# Patient Record
Sex: MALE | Race: WHITE | NOT HISPANIC OR LATINO | Employment: OTHER | URBAN - METROPOLITAN AREA
[De-identification: names, ages, dates, MRNs, and addresses within clinical notes are randomized per-mention and may not be internally consistent; named-entity substitution may affect disease eponyms.]

---

## 2017-06-17 ENCOUNTER — APPOINTMENT (OUTPATIENT)
Dept: RADIOLOGY | Facility: MEDICAL CENTER | Age: 37
DRG: 004 | End: 2017-06-17
Attending: EMERGENCY MEDICINE
Payer: COMMERCIAL

## 2017-06-17 ENCOUNTER — RESOLUTE PROFESSIONAL BILLING HOSPITAL PROF FEE (OUTPATIENT)
Dept: HOSPITALIST | Facility: MEDICAL CENTER | Age: 37
End: 2017-06-17
Payer: COMMERCIAL

## 2017-06-17 ENCOUNTER — HOSPITAL ENCOUNTER (INPATIENT)
Facility: MEDICAL CENTER | Age: 37
LOS: 24 days | DRG: 004 | End: 2017-07-11
Attending: EMERGENCY MEDICINE | Admitting: INTERNAL MEDICINE
Payer: COMMERCIAL

## 2017-06-17 DIAGNOSIS — J96.01 ACUTE RESPIRATORY FAILURE WITH HYPOXIA (HCC): ICD-10-CM

## 2017-06-17 PROBLEM — J69.0 ASPIRATION PNEUMONIA (HCC): Status: ACTIVE | Noted: 2017-06-17

## 2017-06-17 PROBLEM — R25.1 OCCASIONAL TREMORS: Status: ACTIVE | Noted: 2017-06-17

## 2017-06-17 PROBLEM — E87.1 HYPONATREMIA: Status: ACTIVE | Noted: 2017-06-17

## 2017-06-17 PROBLEM — R41.82 ALTERED MENTAL STATUS: Status: ACTIVE | Noted: 2017-06-17

## 2017-06-17 PROBLEM — E16.2 HYPOGLYCEMIA: Status: ACTIVE | Noted: 2017-06-17

## 2017-06-17 PROBLEM — A41.9 SEPSIS, UNSPECIFIED: Status: ACTIVE | Noted: 2017-06-17

## 2017-06-17 LAB
ALBUMIN SERPL BCP-MCNC: 3.9 G/DL (ref 3.2–4.9)
ALBUMIN/GLOB SERPL: 1.1 G/DL
ALP SERPL-CCNC: 118 U/L (ref 30–99)
ALT SERPL-CCNC: 37 U/L (ref 2–50)
ANION GAP SERPL CALC-SCNC: 11 MMOL/L (ref 0–11.9)
APPEARANCE UR: CLEAR
APTT PPP: 33.6 SEC (ref 24.7–36)
AST SERPL-CCNC: 34 U/L (ref 12–45)
BASOPHILS # BLD AUTO: 0 % (ref 0–1.8)
BASOPHILS # BLD: 0 K/UL (ref 0–0.12)
BILIRUB SERPL-MCNC: 0.3 MG/DL (ref 0.1–1.5)
BILIRUB UR QL STRIP.AUTO: NEGATIVE
BUN SERPL-MCNC: 23 MG/DL (ref 8–22)
CALCIUM SERPL-MCNC: 8.6 MG/DL (ref 8.5–10.5)
CHLORIDE SERPL-SCNC: 93 MMOL/L (ref 96–112)
CO2 SERPL-SCNC: 23 MMOL/L (ref 20–33)
COLOR UR: ABNORMAL
CREAT SERPL-MCNC: 0.35 MG/DL (ref 0.5–1.4)
EKG IMPRESSION: NORMAL
EOSINOPHIL # BLD AUTO: 0 K/UL (ref 0–0.51)
EOSINOPHIL NFR BLD: 0 % (ref 0–6.9)
ERYTHROCYTE [DISTWIDTH] IN BLOOD BY AUTOMATED COUNT: 49.8 FL (ref 35.9–50)
GFR SERPL CREATININE-BSD FRML MDRD: >60 ML/MIN/1.73 M 2
GLOBULIN SER CALC-MCNC: 3.6 G/DL (ref 1.9–3.5)
GLUCOSE BLD-MCNC: 167 MG/DL (ref 65–99)
GLUCOSE BLD-MCNC: 204 MG/DL (ref 65–99)
GLUCOSE SERPL-MCNC: 39 MG/DL (ref 65–99)
GLUCOSE UR STRIP.AUTO-MCNC: 500 MG/DL
HCT VFR BLD AUTO: 42 % (ref 42–52)
HGB BLD-MCNC: 14.1 G/DL (ref 14–18)
INR PPP: 1.08 (ref 0.87–1.13)
KETONES UR STRIP.AUTO-MCNC: 10 MG/DL
LACTATE BLD-SCNC: 1 MMOL/L (ref 0.5–2)
LACTATE BLD-SCNC: 1.4 MMOL/L (ref 0.5–2)
LEUKOCYTE ESTERASE UR QL STRIP.AUTO: NEGATIVE
LYMPHOCYTES # BLD AUTO: 1.4 K/UL (ref 1–4.8)
LYMPHOCYTES NFR BLD: 5 % (ref 22–41)
MANUAL DIFF BLD: NORMAL
MCH RBC QN AUTO: 28.8 PG (ref 27–33)
MCHC RBC AUTO-ENTMCNC: 33.6 G/DL (ref 33.7–35.3)
MCV RBC AUTO: 85.7 FL (ref 81.4–97.8)
MICRO URNS: ABNORMAL
MONOCYTES # BLD AUTO: 1.12 K/UL (ref 0–0.85)
MONOCYTES NFR BLD AUTO: 4 % (ref 0–13.4)
MORPHOLOGY BLD-IMP: NORMAL
NEUTROPHILS # BLD AUTO: 25.48 K/UL (ref 1.82–7.42)
NEUTROPHILS NFR BLD: 88 % (ref 44–72)
NEUTS BAND NFR BLD MANUAL: 3 % (ref 0–10)
NITRITE UR QL STRIP.AUTO: NEGATIVE
NRBC # BLD AUTO: 0 K/UL
NRBC BLD AUTO-RTO: 0 /100 WBC
PH UR STRIP.AUTO: 5 [PH]
PLATELET # BLD AUTO: 334 K/UL (ref 164–446)
PLATELET BLD QL SMEAR: NORMAL
PMV BLD AUTO: 9.1 FL (ref 9–12.9)
POTASSIUM SERPL-SCNC: 4.2 MMOL/L (ref 3.6–5.5)
PROT SERPL-MCNC: 7.5 G/DL (ref 6–8.2)
PROT UR QL STRIP: NEGATIVE MG/DL
PROTHROMBIN TIME: 14.3 SEC (ref 12–14.6)
RBC # BLD AUTO: 4.9 M/UL (ref 4.7–6.1)
RBC BLD AUTO: NORMAL
RBC UR QL AUTO: NEGATIVE
SODIUM SERPL-SCNC: 127 MMOL/L (ref 135–145)
SP GR UR STRIP.AUTO: 1.01
TOXIC GRANULES BLD QL SMEAR: SLIGHT
WBC # BLD AUTO: 28 K/UL (ref 4.8–10.8)

## 2017-06-17 PROCEDURE — 87086 URINE CULTURE/COLONY COUNT: CPT

## 2017-06-17 PROCEDURE — 302136 NUTRITION PUMP: Performed by: INTERNAL MEDICINE

## 2017-06-17 PROCEDURE — 85007 BL SMEAR W/DIFF WBC COUNT: CPT

## 2017-06-17 PROCEDURE — 36415 COLL VENOUS BLD VENIPUNCTURE: CPT

## 2017-06-17 PROCEDURE — 96361 HYDRATE IV INFUSION ADD-ON: CPT

## 2017-06-17 PROCEDURE — 700105 HCHG RX REV CODE 258: Performed by: EMERGENCY MEDICINE

## 2017-06-17 PROCEDURE — 71010 DX-CHEST-PORTABLE (1 VIEW): CPT

## 2017-06-17 PROCEDURE — 93005 ELECTROCARDIOGRAM TRACING: CPT | Performed by: EMERGENCY MEDICINE

## 2017-06-17 PROCEDURE — 94760 N-INVAS EAR/PLS OXIMETRY 1: CPT

## 2017-06-17 PROCEDURE — 82962 GLUCOSE BLOOD TEST: CPT | Mod: 91

## 2017-06-17 PROCEDURE — 85027 COMPLETE CBC AUTOMATED: CPT

## 2017-06-17 PROCEDURE — 96365 THER/PROPH/DIAG IV INF INIT: CPT

## 2017-06-17 PROCEDURE — 700111 HCHG RX REV CODE 636 W/ 250 OVERRIDE (IP): Performed by: INTERNAL MEDICINE

## 2017-06-17 PROCEDURE — 99223 1ST HOSP IP/OBS HIGH 75: CPT | Performed by: INTERNAL MEDICINE

## 2017-06-17 PROCEDURE — 96375 TX/PRO/DX INJ NEW DRUG ADDON: CPT

## 2017-06-17 PROCEDURE — 80053 COMPREHEN METABOLIC PANEL: CPT

## 2017-06-17 PROCEDURE — 83605 ASSAY OF LACTIC ACID: CPT | Mod: 91

## 2017-06-17 PROCEDURE — 302128 INFUSION PUMP W/POLE: Performed by: INTERNAL MEDICINE

## 2017-06-17 PROCEDURE — A9270 NON-COVERED ITEM OR SERVICE: HCPCS | Performed by: INTERNAL MEDICINE

## 2017-06-17 PROCEDURE — 87040 BLOOD CULTURE FOR BACTERIA: CPT | Mod: 91

## 2017-06-17 PROCEDURE — 700111 HCHG RX REV CODE 636 W/ 250 OVERRIDE (IP): Performed by: EMERGENCY MEDICINE

## 2017-06-17 PROCEDURE — 700111 HCHG RX REV CODE 636 W/ 250 OVERRIDE (IP)

## 2017-06-17 PROCEDURE — 99285 EMERGENCY DEPT VISIT HI MDM: CPT

## 2017-06-17 PROCEDURE — 81003 URINALYSIS AUTO W/O SCOPE: CPT

## 2017-06-17 PROCEDURE — 770020 HCHG ROOM/CARE - TELE (206)

## 2017-06-17 PROCEDURE — 51798 US URINE CAPACITY MEASURE: CPT

## 2017-06-17 PROCEDURE — 85730 THROMBOPLASTIN TIME PARTIAL: CPT

## 2017-06-17 PROCEDURE — 85610 PROTHROMBIN TIME: CPT

## 2017-06-17 PROCEDURE — 70450 CT HEAD/BRAIN W/O DYE: CPT

## 2017-06-17 PROCEDURE — 700102 HCHG RX REV CODE 250 W/ 637 OVERRIDE(OP): Performed by: INTERNAL MEDICINE

## 2017-06-17 PROCEDURE — 96376 TX/PRO/DX INJ SAME DRUG ADON: CPT

## 2017-06-17 PROCEDURE — 700105 HCHG RX REV CODE 258: Performed by: INTERNAL MEDICINE

## 2017-06-17 RX ORDER — ONDANSETRON 4 MG/1
4 TABLET, ORALLY DISINTEGRATING ORAL EVERY 4 HOURS PRN
Status: DISCONTINUED | OUTPATIENT
Start: 2017-06-17 | End: 2017-07-11 | Stop reason: HOSPADM

## 2017-06-17 RX ORDER — BISACODYL 10 MG
10 SUPPOSITORY, RECTAL RECTAL
Status: DISCONTINUED | OUTPATIENT
Start: 2017-06-17 | End: 2017-06-19

## 2017-06-17 RX ORDER — POLYETHYLENE GLYCOL 3350 17 G/17G
1 POWDER, FOR SOLUTION ORAL
Status: DISCONTINUED | OUTPATIENT
Start: 2017-06-17 | End: 2017-06-19

## 2017-06-17 RX ORDER — SODIUM CHLORIDE 9 MG/ML
30 INJECTION, SOLUTION INTRAVENOUS
Status: DISCONTINUED | OUTPATIENT
Start: 2017-06-17 | End: 2017-06-23

## 2017-06-17 RX ORDER — SODIUM CHLORIDE 9 MG/ML
2000 INJECTION, SOLUTION INTRAVENOUS ONCE
Status: DISCONTINUED | OUTPATIENT
Start: 2017-06-17 | End: 2017-06-17

## 2017-06-17 RX ORDER — LORAZEPAM 2 MG/ML
1 INJECTION INTRAMUSCULAR ONCE
Status: COMPLETED | OUTPATIENT
Start: 2017-06-17 | End: 2017-06-17

## 2017-06-17 RX ORDER — LORAZEPAM 2 MG/ML
0.5 INJECTION INTRAMUSCULAR ONCE
Status: COMPLETED | OUTPATIENT
Start: 2017-06-17 | End: 2017-06-17

## 2017-06-17 RX ORDER — SODIUM CHLORIDE 9 MG/ML
INJECTION, SOLUTION INTRAVENOUS CONTINUOUS
Status: DISCONTINUED | OUTPATIENT
Start: 2017-06-17 | End: 2017-06-18

## 2017-06-17 RX ORDER — LORAZEPAM 2 MG/ML
0.5 INJECTION INTRAMUSCULAR EVERY 6 HOURS PRN
Status: DISCONTINUED | OUTPATIENT
Start: 2017-06-17 | End: 2017-07-01

## 2017-06-17 RX ORDER — PROMETHAZINE HYDROCHLORIDE 25 MG/1
12.5-25 SUPPOSITORY RECTAL EVERY 4 HOURS PRN
Status: DISCONTINUED | OUTPATIENT
Start: 2017-06-17 | End: 2017-07-11 | Stop reason: HOSPADM

## 2017-06-17 RX ORDER — ACETAMINOPHEN 325 MG/1
650 TABLET ORAL EVERY 6 HOURS PRN
Status: DISCONTINUED | OUTPATIENT
Start: 2017-06-17 | End: 2017-07-11 | Stop reason: HOSPADM

## 2017-06-17 RX ORDER — LORAZEPAM 2 MG/ML
1 INJECTION INTRAMUSCULAR ONCE
Status: DISCONTINUED | OUTPATIENT
Start: 2017-06-17 | End: 2017-06-17

## 2017-06-17 RX ORDER — SODIUM CHLORIDE 9 MG/ML
1000 INJECTION, SOLUTION INTRAVENOUS
Status: DISCONTINUED | OUTPATIENT
Start: 2017-06-17 | End: 2017-06-23

## 2017-06-17 RX ORDER — ONDANSETRON 2 MG/ML
4 INJECTION INTRAMUSCULAR; INTRAVENOUS EVERY 4 HOURS PRN
Status: DISCONTINUED | OUTPATIENT
Start: 2017-06-17 | End: 2017-07-11 | Stop reason: HOSPADM

## 2017-06-17 RX ORDER — LORAZEPAM 1 MG/1
0.5 TABLET ORAL EVERY 6 HOURS PRN
Status: DISCONTINUED | OUTPATIENT
Start: 2017-06-17 | End: 2017-07-01

## 2017-06-17 RX ORDER — PROMETHAZINE HYDROCHLORIDE 25 MG/1
12.5-25 TABLET ORAL EVERY 4 HOURS PRN
Status: DISCONTINUED | OUTPATIENT
Start: 2017-06-17 | End: 2017-07-11 | Stop reason: HOSPADM

## 2017-06-17 RX ORDER — LORAZEPAM 2 MG/ML
INJECTION INTRAMUSCULAR
Status: COMPLETED
Start: 2017-06-17 | End: 2017-06-17

## 2017-06-17 RX ORDER — ACETAMINOPHEN 325 MG/1
650 TABLET ORAL EVERY 6 HOURS PRN
Status: DISCONTINUED | OUTPATIENT
Start: 2017-06-17 | End: 2017-06-17

## 2017-06-17 RX ORDER — AMPICILLIN AND SULBACTAM 2; 1 G/1; G/1
3 INJECTION, POWDER, FOR SOLUTION INTRAMUSCULAR; INTRAVENOUS ONCE
Status: COMPLETED | OUTPATIENT
Start: 2017-06-17 | End: 2017-06-17

## 2017-06-17 RX ORDER — AMOXICILLIN 250 MG
2 CAPSULE ORAL 2 TIMES DAILY
Status: DISCONTINUED | OUTPATIENT
Start: 2017-06-17 | End: 2017-06-19

## 2017-06-17 RX ADMIN — SODIUM CHLORIDE 2000 ML: 9 INJECTION, SOLUTION INTRAVENOUS at 08:15

## 2017-06-17 RX ADMIN — AMPICILLIN SODIUM AND SULBACTAM SODIUM 3 G: 2; 1 INJECTION, POWDER, FOR SOLUTION INTRAMUSCULAR; INTRAVENOUS at 18:02

## 2017-06-17 RX ADMIN — ENOXAPARIN SODIUM 40 MG: 100 INJECTION SUBCUTANEOUS at 20:52

## 2017-06-17 RX ADMIN — LORAZEPAM 1 MG: 2 INJECTION INTRAMUSCULAR; INTRAVENOUS at 07:21

## 2017-06-17 RX ADMIN — SODIUM CHLORIDE: 9 INJECTION, SOLUTION INTRAVENOUS at 12:33

## 2017-06-17 RX ADMIN — AMPICILLIN SODIUM AND SULBACTAM SODIUM 3 G: 2; 1 INJECTION, POWDER, FOR SOLUTION INTRAMUSCULAR; INTRAVENOUS at 11:53

## 2017-06-17 RX ADMIN — ACETAMINOPHEN 650 MG: 325 TABLET, FILM COATED ORAL at 18:57

## 2017-06-17 RX ADMIN — LORAZEPAM 0.5 MG: 2 INJECTION INTRAMUSCULAR; INTRAVENOUS at 11:40

## 2017-06-17 RX ADMIN — FAMOTIDINE 20 MG: 10 INJECTION, SOLUTION INTRAVENOUS at 18:58

## 2017-06-17 RX ADMIN — LORAZEPAM 1 MG: 2 INJECTION INTRAMUSCULAR at 07:21

## 2017-06-17 RX ADMIN — DEXTROSE MONOHYDRATE 1000 MG: 50 INJECTION, SOLUTION INTRAVENOUS at 12:32

## 2017-06-17 RX ADMIN — DEXTROSE MONOHYDRATE 250 ML: 100 INJECTION, SOLUTION INTRAVENOUS at 10:04

## 2017-06-17 ASSESSMENT — COGNITIVE AND FUNCTIONAL STATUS - GENERAL
STANDING UP FROM CHAIR USING ARMS: TOTAL
TURNING FROM BACK TO SIDE WHILE IN FLAT BAD: UNABLE
SUGGESTED CMS G CODE MODIFIER MOBILITY: CN
MOVING FROM LYING ON BACK TO SITTING ON SIDE OF FLAT BED: UNABLE
PERSONAL GROOMING: TOTAL
DAILY ACTIVITIY SCORE: 6
EATING MEALS: TOTAL
HELP NEEDED FOR BATHING: TOTAL
SUGGESTED CMS G CODE MODIFIER DAILY ACTIVITY: CN
DRESSING REGULAR UPPER BODY CLOTHING: TOTAL
MOBILITY SCORE: 6
WALKING IN HOSPITAL ROOM: TOTAL
MOVING TO AND FROM BED TO CHAIR: UNABLE
DRESSING REGULAR LOWER BODY CLOTHING: TOTAL
CLIMB 3 TO 5 STEPS WITH RAILING: TOTAL
TOILETING: TOTAL

## 2017-06-17 ASSESSMENT — PATIENT HEALTH QUESTIONNAIRE - PHQ9
SUM OF ALL RESPONSES TO PHQ9 QUESTIONS 1 AND 2: 0
2. FEELING DOWN, DEPRESSED, IRRITABLE, OR HOPELESS: NOT AT ALL
1. LITTLE INTEREST OR PLEASURE IN DOING THINGS: NOT AT ALL
SUM OF ALL RESPONSES TO PHQ QUESTIONS 1-9: 0

## 2017-06-17 NOTE — IP AVS SNAPSHOT
[employer] Access Code: [email or web address]  Expires: 8/10/2017 11:25 AM    Your email address is not on file at [facility].  Email Addresses are required for you to sign up for [employer], please contact [phone] to verify your personal information and to provide your email address prior to attempting to register for [employer].    Rub[place] [address]    [employer]  A secure, online tool to manage your health information     [facility]’s [employer]® is a secure, online tool that connects you to your personalized health information from the privacy of your home -- day or night - making it very easy for you to manage your healthcare. Once the activation process is completed, you can even access your medical information using the [employer] karen, which is available for free in the Apple Karen store or Google Play store.     To learn more about [employer], visit [email or web address]    There are two levels of access available (as shown below):   My Chart Features  [facility] Primary Care Doctor [facility]  Specialists [facility]  Urgent  Care Non-[facility] Primary Care Doctor   Email your healthcare team securely and privately 24/7 X X X    Manage appointments: schedule your next appointment; view details of past/upcoming appointments X      Request prescription refills. X      View recent personal medical records, including lab and immunizations X X X X   View health record, including health history, allergies, medications X X X X   Read reports about your outpatient visits, procedures, consult and ER notes X X X X   See your discharge summary, which is a recap of your hospital and/or ER visit that includes your diagnosis, lab results, and care plan X X  X     How to register for [facility]:  Once your e-mail address has been verified, follow the following steps to sign up for [employer].     1. Go to  [email or web address]  2. Click on the Sign Up Now box, which takes you to the New Member Sign Up page. You will need to  provide the following information:  a. Enter your [employer] Access Code exactly as it appears at the top of this page. (You will not need to use this code after you’ve completed the sign-up process. If you do not sign up before the expiration date, you must request a new code.)   b. Enter your date of birth.   c. Enter your home email address.   d. Click Submit, and follow the next screen’s instructions.  3. Create a [employer] ID. This will be your [employer] login ID and cannot be changed, so think of one that is secure and easy to remember.  4. Create a [employer] password. You can change your password at any time.  5. Enter your Password Reset Question and Answer. This can be used at a later time if you forget your password.   6. Enter your e-mail address. This allows you to receive e-mail notifications when new information is available in [employer].  7. Click Sign Up. You can now view your health information.    For assistance activating your [employer] account, call [phone]

## 2017-06-17 NOTE — DIETARY
"Nutrition Support Assessment - Male    Ruben Edwards is a 36 y.o. male with admitting DX of Aspiration pneumonia    Pertinent History: Traumatic brain injury  Allergies:  Sulfa drugs    Height: 172.7 cm (5' 7.99\")  Weight: 57.6 kg (126 lb 15.8 oz)  Ideal Body Weight: 63.05 kg (139 lb) (Pt quadriplegic)  Percent Ideal Body Weight: 91.4  Body mass index is 19.31 kg/(m^2).     Pertinent Labs: Na 127, Glucose 39, BUN 23, Creatinine 0.35, POC glucose 204  Pertinent Medications: unasyn, lovenox, pericolace, pain pump (pt supplied) device  Pertinent Fluids: NS infusion @ 125 ml/hr  Surgery / Procedures: None noted in chart  Skin: No skin breakdown noted in chart  GI: Last BM PTA     Estimated Needs: REE per MSJ x 1.2 = 1778 kcal/day  Total Calories / day: 1700 - 2000  (Calories / k - 35)  Total Grams Protein / day: 69 - 86  (Grams Protein / k.2 - 1.5)  Total Fluids ml / day: 1731.1 ml         Assessment / Evaluation:   Consult received for TF assessment. Per ED MD note - He is quadriplegic with a poor baseline mental status. About a month ago he may have had some aspiration. Since that time he's only been fed the NG tube. However he has periodically been given water.     Plan / Recommendation:   Start Fibersource HN @ 25 ml/hr and advance per protocol to 65 ml/hr (goal rate) to provide 1872 kcal (33 kcal/kg), 84 grams protein (1.5 gm/kg), and 1264 ml free water per day.   Fluids per MD.     RD following.           "

## 2017-06-17 NOTE — ED PROVIDER NOTES
"ED Provider Note    CHIEF COMPLAINT  Chief Complaint   Patient presents with   • Shortness of Breath   • Difficulty Breathing       HPI  Ruben Edwards is a 36 y.o. male who presents to the emergency room with difficulty breathing. Patient has a past medical history of traumatic brain injury. He is quadriplegic with a poor baseline mental status. He requires 100% care by the family. About a month ago he may have had some aspiration. A fever with cough. He was seen by primary provider. He is put on antibiotics. His symptoms improved. Since that time he's only been fed the NG tube. However he has periodically been given water. He started coughing and choking and gagging. He appeared in distress and family called the ambulance. He has had some jerking movement primarily of the left upper extremity. He will tend to have some jerking movement of this extremity when he is in distress, but he has not done anything to this degree. He has not had a fever at home. No vomiting. I am unable to obtain any history from the patient.    REVIEW OF SYSTEMS  As per HPI, otherwise a 10 point review of systems is negative    PAST MEDICAL HISTORY  Traumatic brain injury    SOCIAL HISTORY  Lives with family    SURGICAL HISTORY  No past surgical history on file.    CURRENT MEDICATIONS  Home Medications     Reviewed by Ligia Frazier, Pharmacy Int (Pharmacy Intern) on 06/17/17 at 0945  Med List Status: Partial    Medication Last Dose Status    BACLOFEN IT 6/17/2017 Active                ALLERGIES  Allergies   Allergen Reactions   • Sulfa Drugs Unspecified     Per caretaker       PHYSICAL EXAM  VITAL SIGNS: Pulse 111  Temp(Src) 37.4 °C (99.3 °F)  Resp 12  Ht 1.727 m (5' 8\")  Wt 54.432 kg (120 lb)  BMI 18.25 kg/m2  SpO2 99%   Constitutional: Awake and alert, chronically debilitated male with obvious respiratory distress and rhonchorous breath sounds  HENT:  Atraumatic, Normocephalic.Oropharynx moist mucus membranes, Nose normal " inspection.   Eyes: Normal inspection  Neck: Supple, no JVD. Tracheostomy stoma healed  Cardiovascular: Tachycardic heart rate, Normal rhythm.  Symmetric peripheral pulses.   Thorax & Lungs: Diffuse rhonchorous breath sounds with upper airway mucus sounding noise. Tachypnea.  Abdomen: Bowel sounds normal, soft, non-distended, nontender, no mass. [place] button left upper quadrant  Skin: No pathologic rash. Diaphoresis  Extremities: No asymmetric swelling. Muscle atrophy and spasticity  Neurologic: Diffuse spasticity. There is a rhythmic jerking of the left upper extremity  Psychiatric: Anxious appearing    RADIOLOGY/PROCEDURES  CT-HEAD W/O   Final Result      1.  Limited exam      2.  No acute intracranial findings.      3.  Encephalomalacia in the left basal ganglia and frontal lobe.      4.  Questionable absence of the corpus callosum.      5.  Mild right maxillary sinus disease.      6.  Diffuse atrophy         DX-CHEST-PORTABLE (1 VIEW)   Final Result         No acute cardiac or pulmonary abnormality is identified.         Imaging is interpreted by radiologist    Labs:  Results for orders placed or performed during the hospital encounter of 06/17/17   LACTIC ACID   Result Value Ref Range    Lactic Acid 1.4 0.5 - 2.0 mmol/L   CBC WITH DIFFERENTIAL   Result Value Ref Range    WBC 28.0 (H) 4.8 - 10.8 K/uL    RBC 4.90 4.70 - 6.10 M/uL    Hemoglobin 14.1 14.0 - 18.0 g/dL    Hematocrit 42.0 42.0 - 52.0 %    MCV 85.7 81.4 - 97.8 fL    MCH 28.8 27.0 - 33.0 pg    MCHC 33.6 (L) 33.7 - 35.3 g/dL    RDW 49.8 35.9 - 50.0 fL    Platelet Count 334 164 - 446 K/uL    MPV 9.1 9.0 - 12.9 fL    Nucleated RBC 0.00 /100 WBC    NRBC (Absolute) 0.00 K/uL    Neutrophils-Polys 88.00 (H) 44.00 - 72.00 %    Lymphocytes 5.00 (L) 22.00 - 41.00 %    Monocytes 4.00 0.00 - 13.40 %    Eosinophils 0.00 0.00 - 6.90 %    Basophils 0.00 0.00 - 1.80 %    Neutrophils (Absolute) 25.48 (H) 1.82 - 7.42 K/uL    Lymphs (Absolute) 1.40 1.00 - 4.80 K/uL     Monos (Absolute) 1.12 (H) 0.00 - 0.85 K/uL    Eos (Absolute) 0.00 0.00 - 0.51 K/uL    Baso (Absolute) 0.00 0.00 - 0.12 K/uL   COMP METABOLIC PANEL   Result Value Ref Range    Sodium 127 (L) 135 - 145 mmol/L    Potassium 4.2 3.6 - 5.5 mmol/L    Chloride 93 (L) 96 - 112 mmol/L    Co2 23 20 - 33 mmol/L    Anion Gap 11.0 0.0 - 11.9    Glucose 39 (LL) 65 - 99 mg/dL    Bun 23 (H) 8 - 22 mg/dL    Creatinine 0.35 (L) 0.50 - 1.40 mg/dL    Calcium 8.6 8.5 - 10.5 mg/dL    AST(SGOT) 34 12 - 45 U/L    ALT(SGPT) 37 2 - 50 U/L    Alkaline Phosphatase 118 (H) 30 - 99 U/L    Total Bilirubin 0.3 0.1 - 1.5 mg/dL    Albumin 3.9 3.2 - 4.9 g/dL    Total Protein 7.5 6.0 - 8.2 g/dL    Globulin 3.6 (H) 1.9 - 3.5 g/dL    A-G Ratio 1.1 g/dL   ESTIMATED GFR   Result Value Ref Range    GFR If African American >60 >60 mL/min/1.73 m 2    GFR If Non African American >60 >60 mL/min/1.73 m 2   DIFFERENTIAL MANUAL   Result Value Ref Range    Bands-Stabs 3.00 0.00 - 10.00 %    Manual Diff Status PERFORMED    PERIPHERAL SMEAR REVIEW   Result Value Ref Range    Peripheral Smear Review see below    PLATELET ESTIMATE   Result Value Ref Range    Plt Estimation Normal    MORPHOLOGY   Result Value Ref Range    RBC Morphology Normal     Toxic Gran Slight    ACCU-CHEK GLUCOSE   Result Value Ref Range    Glucose - Accu-Ck 204 (H) 65 - 99 mg/dL   EKG (ER)   Result Value Ref Range    Report       [facility] Emergency Dept.    Test Date:  2017  Pt Name:    JAVED IGLESIAS                  Department: ER  MRN:        [id number]                      Room:       RD 03  Gender:     M                            Technician: [id number]  :        1980                   Requested By:JAQUELIN WILDER  Order #:    [id number]                    Reading MD: JAQUELIN WILDER MD    Measurements  Intervals                                Axis  Rate:       124                          P:          39  ME:         168                          QRS:         50  QRSD:       82                           T:          15  QT:         312  QTc:        448    Interpretive Statements  SINUS TACHYCARDIA  PROBABLE LEFT ATRIAL ABNORMALITY  PROBABLE LEFT VENTRICULAR HYPERTROPHY  BORDERLINE INFERIOR Q WAVES  BASELINE WANDER IN LEAD(S) V1  No previous ECG available for comparison    Electronically Signed On 6- 11:41:53 PDT by JAQUELIN WILDER MD             COURSE & MEDICAL DECISION MAKING  Patient presents to the ER with respiratory distress. His exam is consistent with upper airway obstruction and/or aspiration. He had rhythmic jerking of his left upper extremity. He was hypoxic. He was given Ativan intravenously which quelled this activity. He was suctioned vigorously. His respiratory status dramatically improved. He remained mildly hypoxic. Blood sugar was checked and was low. He was given 250 mg of D 10 intravenously. A chest x-ray does not show infiltrate yet, but I do suspect aspiration. He was treated with Unasyn intravenously. He was tachycardic and given normal saline. He was not hypotensive and does not have an elevated lactic acid. With the possibility of new seizure activity CT scan of the head was obtained with results as noted. Upon returning from CT scan he was noted to have recurrent jerking activity as well as fluttering of his eyes which was a new activity for the patient according to the mother. This stopped after about 20 minutes. Replete blood sugar was acceptable. I ordered IV Keppra. At this point patient will need to be admitted to the hospital. I consulted Dr. Morfin for admission.      FINAL IMPRESSION  1. Hypoxic respiratory failure  2. Suspected aspiration  3. Seizure  4. Hyponatremia  5. Hypoglycemia    CRITICAL CARE TIME 40 minutes  There was a very real possibility of deterioration of the patient's condition.  This patient required the highest level of care.  I provided critical care services which included: review of the medical record,  treatment orders, ordering and reviewing test results, frequent reevaluation of the patient's condition and response to treatment, as well as discussing the case with appropriate personnel and various consultants. The critical care time associated with the care of this patient is exclusive of any procedures or specific interventions.      This dictation was created using voice recognition software. The accuracy of the dictation is limited to the abilities of the software.  The nursing notes were reviewed and certain aspects of this information were incorporated into this note.      Electronically signed by: Evans Espinosa, 6/17/2017 11:38 AM

## 2017-06-17 NOTE — ED NOTES
, while in the room, pt's eye lashes started fluttering and pt was starting to shake.  Per family this has never happened before, pt does not have a hx of seizures. ERP was notified and ordered keppra.

## 2017-06-17 NOTE — IP AVS SNAPSHOT
" <p align=\"LEFT\"><IMG SRC=\"//[email or web address]$/[email or web address]\" alt=\"Image\" WIDTH=\"50%\" HEIGHT=\"200\" BORDER=\"\"></p>                   Name:Ruben Edwards  Medical Record Number:[id number]  CSN: [id number]    YOB: 1980   Age: 37 y.o.  Sex: male  HT:1.727 m (5' 7.99\") WT: 55 kg (121 lb 4.1 oz)          Admit Date: 6/17/2017     Discharge Date:   Today's Date: 7/11/2017  Attending Doctor:  Attila Crowder*                  Allergies:  Sulfa drugs          Follow-up Information     1. Follow up with [facility] ([facility] .    Specialty:  [facility]    Contact information    [address]  [place] [address]  [phone]         Medication List      Take these Medications        Instructions    acetaminophen 325 MG Tabs   Commonly known as:  TYLENOL    Take 2 Tabs by mouth every 6 hours as needed (Mild Pain; (Pain scale 1-3); Temp greater than 100.5 F).   Dose:  650 mg       BACLOFEN IT    by Intrathecal route Continuous. IT pump.       enoxaparin 30 MG/0.3ML Soln inj   Commonly known as:  LOVENOX    Inject 0.3 mL as instructed every day.   Dose:  30 mg       famotidine 20 MG Tabs   Commonly known as:  PEPCID    Take 1 Tab by mouth 2 Times a Day.   Dose:  20 mg       * levalbuterol 1.25 MG/3ML Nebu   Commonly known as:  XOPENEX    3 mL by Nebulization route 4 times a day.   Dose:  1.25 mg       * levalbuterol 1.25 MG/3ML Nebu   Commonly known as:  XOPENEX    3 mL by Nebulization route every four hours as needed for Shortness of Breath.   Dose:  1.25 mg       levetiracetam 100 MG/ML Soln   Commonly known as:  KEPPRA    Take 15 mL by mouth every 12 hours.   Dose:  1500 mg       miconazole 2%-zinc oxide 2 % Crea topical cream    Apply to groin       ondansetron 4 MG Tbdp   Commonly known as:  ZOFRAN ODT    Take 1 Tab by mouth every four hours as needed for Nausea/Vomiting (give PO if no IV route available).   Dose:  4 mg       oxycodone immediate-release 5 MG Tabs   "   Commonly known as:  ROXICODONE    Take 1 Tab by mouth every four hours as needed.   Dose:  5 mg       PHENobarbital 20 MG/5ML Elix    Take 17.5 mL by mouth 2 Times a Day.   Dose:  70 mg       senna-docusate 8.6-50 MG Tabs   Commonly known as:  PERICOLACE or SENOKOT S    Take 2 Tabs by mouth 2 Times a Day.   Dose:  2 Tab       * Notice:  This list has 2 medication(s) that are the same as other medications prescribed for you. Read the directions carefully, and ask your doctor or other care provider to review them with you.

## 2017-06-17 NOTE — PROGRESS NOTES
Pt admitted to this unit this afternoon and appears to be in no sort of resp distress.  Vitals are stable.  Unable to assess level of orientation.  Pt has old TB and unable to assess cognitive status.  Air mattress cover applied to prevent breakdown.  Currently on 3L of O2 with no signs of resp distress.Bladder scan just preformed and found >999 cc of urine.  MD paged and waiting to hear back.  Skin assessed and is clear and intact.  Family oriented to unit and bedroom.  MD contacted about tube feedings as well and family will be preparing his tube feeding in the day time.  Otherwise patient is stable with no signs of distress.  Will continue to monitor for any changes.

## 2017-06-17 NOTE — ED NOTES
Med rec partially complete per patient's family.  Pt has a baclofen pump placed by [facility].  Pt's family to call later today with pump rate.

## 2017-06-17 NOTE — IP AVS SNAPSHOT
7/11/2017    Ruben Edwards  [address]  [facility] [address]    Dear Ruben:    [facility] wants to ensure your discharge home is safe and you or your loved ones have had all of your questions answered regarding your care after you leave the hospital.    Below is a list of resources and contact information should you have any questions regarding your hospital stay, follow-up instructions, or active medical symptoms.    Questions or Concerns Regarding… Contact   Medical Questions Related to Your Discharge  (7 days a week, 8am-5pm) Contact a Nurse Care Coordinator   [phone]   Medical Questions Not Related to Your Discharge  (24 hours a day / 7 days a week)  Contact the Nurse Health Line   [phone]    Medications or Discharge Instructions Refer to your discharge packet   or contact your [facility] Primary Care Provider   [phone]   Follow-up Appointment(s) Schedule your appointment via [employer]   or contact Scheduling [phone]   Billing Review your statement via [employer]  or contact Billing [phone]   Medical Records Review your records via [employer]   or contact Medical Records [phone]     You may receive a telephone call within two days of discharge. This call is to make certain you understand your discharge instructions and have the opportunity to have any questions answered. You can also easily access your medical information, test results and upcoming appointments via the [employer] free online health management tool. You can learn more and sign up at [employer]. For assistance setting up your [employer] account, please call [phone].    Once again, we want to ensure your discharge home is safe and that you have a clear understanding of any next steps in your care. If you have any questions or concerns, please do not hesitate to contact us, we are here for you. Thank you for choosing [facility] for your healthcare needs.    Sincerely,    Your [facility] Healthcare Team

## 2017-06-17 NOTE — ED NOTES
Lab called with critical result of glucose of 39 at 0850. Critical lab result read back to lab.   Dr. Espinosa notified of critical lab result at 0850.  Critical lab result read back by Dr. Espinosa.

## 2017-06-17 NOTE — IP AVS SNAPSHOT
" Home Care Instructions                                                                                                                  Name:Ruben Edwards  Medical Record Number:[id number]  CSN: [id number]    YOB: 1980   Age: 37 y.o.  Sex: male  HT:1.727 m (5' 7.99\") WT: 55 kg (121 lb 4.1 oz)          Admit Date: 6/17/2017     Discharge Date:   Today's Date: 7/11/2017  Attending Doctor:  Attila Crowder*                  Allergies:  Sulfa drugs            Discharge Instructions       Discharge Instructions    Discharged to other by medical transportation with escort. Discharged via ambulance, hospital escort: Yes.  Special equipment needed: Oxygen    Be sure to schedule a follow-up appointment with your primary care doctor or any specialists as instructed.     Discharge Plan:   Diet Plan: Discussed  Activity Level: Discussed  Confirmed Symptoms Management: Discussed  Medication Reconciliation Updated: Yes  Influenza Vaccine Indication: Patient Refuses    I understand that a diet low in cholesterol, fat, and sodium is recommended for good health. Unless I have been given specific instructions below for another diet, I accept this instruction as my diet prescription.   Other diet: tube feeding    Special Instructions: Discharged to [facility]    · Is patient discharged on Warfarin / Coumadin?   No     · Is patient Post Blood Transfusion?  No    Depression / Suicide Risk    As you are discharged from this [facility] facility, it is important to learn how to keep safe from harming yourself.    Recognize the warning signs:  · Abrupt changes in personality, positive or negative- including increase in energy   · Giving away possessions  · Change in eating patterns- significant weight changes-  positive or negative  · Change in sleeping patterns- unable to sleep or sleeping all the time   · Unwillingness or inability to communicate  · Depression  · Unusual sadness, discouragement and " loneliness  · Talk of wanting to die  · Neglect of personal appearance   · Rebelliousness- reckless behavior  · Withdrawal from people/activities they love  · Confusion- inability to concentrate     If you or a loved one observes any of these behaviors or has concerns about self-harm, here's what you can do:  · Talk about it- your feelings and reasons for harming yourself  · Remove any means that you might use to hurt yourself (examples: pills, rope, extension cords, firearm)  · Get professional help from the community (Mental Health, Substance Abuse, psychological counseling)  · Do not be alone:Call your Safe Contact- someone whom you trust who will be there for you.  · Call your local CRISIS HOTLINE 301-5647 or 570-107-0313  · Call your local Children's Mobile Crisis Response Team [place] (321) 164-8536 or www.[employer]  · Call the toll free National Suicide Prevention Hotlines   · National Suicide Prevention Lifeline 121-797-YUEQ (6001)  · [employer] Hope Line Network 800-SUICIDE (088-4269)        Follow-up Information     1. Follow up with [facility] ([facility]) .    Specialty:  [facility]    Contact information    [address]  [facility] [address]  [phone]         Discharge Medication Instructions:    Below are the medications your physician expects you to take upon discharge:    Review all your home medications and newly ordered medications with your doctor and/or pharmacist. Follow medication instructions as directed by your doctor and/or pharmacist.    Please keep your medication list with you and share with your physician.               Medication List      START taking these medications        Instructions    Morning Afternoon Evening Bedtime    acetaminophen 325 MG Tabs   Last time this was given:  650 mg on 7/8/2017  5:50 PM   Commonly known as:  TYLENOL        Take 2 Tabs by mouth every 6 hours as needed (Mild Pain; (Pain scale 1-3); Temp  greater than 100.5 F).   Dose:  650 mg                        enoxaparin 30 MG/0.3ML Soln inj   Last time this was given:  30 mg on 7/10/2017  8:39 PM   Commonly known as:  LOVENOX        Inject 0.3 mL as instructed every day.   Dose:  30 mg                        famotidine 20 MG Tabs   Last time this was given:  20 mg on 7/11/2017  8:09 AM   Commonly known as:  PEPCID        Take 1 Tab by mouth 2 Times a Day.   Dose:  20 mg                        * levalbuterol 1.25 MG/3ML Nebu   Last time this was given:  1.25 mg on 7/11/2017 10:33 AM   Commonly known as:  XOPENEX        3 mL by Nebulization route 4 times a day.   Dose:  1.25 mg                        * levalbuterol 1.25 MG/3ML Nebu   Last time this was given:  1.25 mg on 7/11/2017 10:33 AM   Commonly known as:  XOPENEX        3 mL by Nebulization route every four hours as needed for Shortness of Breath.   Dose:  1.25 mg                        levetiracetam 100 MG/ML Soln   Last time this was given:  1,500 mg on 7/11/2017  8:08 AM   Commonly known as:  KEPPRA        Take 15 mL by mouth every 12 hours.   Dose:  1500 mg                        miconazole 2%-zinc oxide 2 % Crea topical cream   Last time this was given:  7/9/2017  9:26 AM        Apply to groin                        ondansetron 4 MG Tbdp   Commonly known as:  ZOFRAN ODT        Take 1 Tab by mouth every four hours as needed for Nausea/Vomiting (give PO if no IV route available).   Dose:  4 mg                        oxycodone immediate-release 5 MG Tabs   Last time this was given:  5 mg on 7/10/2017 12:40 AM   Commonly known as:  ROXICODONE        Take 1 Tab by mouth every four hours as needed.   Dose:  5 mg                        PHENobarbital 20 MG/5ML Elix   Last time this was given:  70 mg on 7/11/2017  8:08 AM        Take 17.5 mL by mouth 2 Times a Day.   Dose:  70 mg                        senna-docusate 8.6-50 MG Tabs   Last time this was given:  2 Tabs on 7/11/2017  8:08 AM   Commonly known as:   PERICOLACE or SENOKOT S        Take 2 Tabs by mouth 2 Times a Day.   Dose:  2 Tab                        * Notice:  This list has 2 medication(s) that are the same as other medications prescribed for you. Read the directions carefully, and ask your doctor or other care provider to review them with you.      CONTINUE taking these medications        Instructions    Morning Afternoon Evening Bedtime    BACLOFEN IT        by Intrathecal route Continuous. IT pump.                             Where to Get Your Medications      Information about where to get these medications is not yet available     ! Ask your nurse or doctor about these medications    - acetaminophen 325 MG Tabs  - enoxaparin 30 MG/0.3ML Soln inj  - famotidine 20 MG Tabs  - levalbuterol 1.25 MG/3ML Nebu  - levalbuterol 1.25 MG/3ML Nebu  - levetiracetam 100 MG/ML Soln  - miconazole 2%-zinc oxide 2 % Crea topical cream  - ondansetron 4 MG Tbdp  - oxycodone immediate-release 5 MG Tabs  - PHENobarbital 20 MG/5ML Elix  - senna-docusate 8.6-50 MG Tabs            Orders for after discharge     DISCHARGE PATIENT    Complete by:  As directed        REFERRAL TO LONG TERM ACUTE CARE    Complete by:  As directed              Instructions           Diet / Nutrition:    Follow any diet instructions given to you by your doctor or the dietician, including how much salt (sodium) you are allowed each day.    If you are overweight, talk to your doctor about a weight reduction plan.    Activity:    Remain physically active following your doctor's instructions about exercise and activity.    Rest often.     Any time you become even a little tired or short of breath, SIT DOWN and rest.    Worsening Symptoms:    Report any of the following signs and symptoms to the doctor's office immediately:    *Pain of jaw, arm, or neck  *Chest pain not relieved by medication                               *Dizziness or loss of consciousness  *Difficulty breathing even when at rest   *More  tired than usual                                       *Bleeding drainage or swelling of surgical site  *Swelling of feet, ankles, legs or stomach                 *Fever (>100ºF)  *Pink or blood tinged sputum  *Weight gain (3lbs/day or 5lbs /week)           *Shock from internal defibrillator (if applicable)  *Palpitations or irregular heartbeats                *Cool and/or numb extremities    Stroke Awareness    Common Risk Factors for Stroke include:    Age  Atrial Fibrillation  Carotid Artery Stenosis  Diabetes Mellitus  Excessive alcohol consumption  High blood pressure  Overweight   Physical inactivity  Smoking    Warning signs and symptoms of a stroke include:    *Sudden numbness or weakness of the face, arm or leg (especially on one side of the body).  *Sudden confusion, trouble speaking or understanding.  *Sudden trouble seeing in one or both eyes.  *Sudden trouble walking, dizziness, loss of balance or coordination.Sudden severe headache with no known cause.    It is very important to get treatment quickly when a stroke occurs. If you experience any of the above warning signs, call 911 immediately.                   Disclaimer         Quit Smoking / Tobacco Use:    I understand the use of any tobacco products increases my chance of suffering from future heart disease or stroke and could cause other illnesses which may shorten my life. Quitting the use of tobacco products is the single most important thing I can do to improve my health. For further information on smoking / tobacco cessation call a Toll Free Quit Line at 1-535.652.4558 (*National [employer] [place]) or 1-448.211.4406 (American Lung Association) or you can access the web based program at www.lungusa.org.    [place] Tobacco Users Help Line:  (562) 189-6681       Toll Free: 1-426.729.9249  Quit Tobacco Program [facility] ([phone]    Crisis Hotline:    [place] Crisis Hotline:  4-093-UXDXRHP or 1-772.334.4930    [place]  Crisis Hotline:    1-505.765.1401 or 580-497-2956    Discharge Survey:   Thank you for choosing [facility]. We hope we did everything we could to make your hospital stay a pleasant one. You may be receiving a phone survey and we would appreciate your time and participation in answering the questions. Your input is very valuable to us in our efforts to improve our service to our patients and their families.        My signature on this form indicates that:    1. I have reviewed and understand the above information.  2. My questions regarding this information have been answered to my satisfaction.  3. I have formulated a plan with my discharge nurse to obtain my prescribed medications for home.                  Disclaimer         __________________________________                     __________       ________                       Patient Signature                                                 Date                    Time

## 2017-06-17 NOTE — ED NOTES
Pt's family came out of the room to tell this RN that pt was having the same type of movements.  Dr. Morfin gave a verbal to give 0.5mg ativan if this happened again.  Pt medicated per orders.
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
## 2017-06-18 ENCOUNTER — APPOINTMENT (OUTPATIENT)
Dept: RADIOLOGY | Facility: MEDICAL CENTER | Age: 37
DRG: 004 | End: 2017-06-18
Attending: INTERNAL MEDICINE
Payer: COMMERCIAL

## 2017-06-18 PROBLEM — R06.03 ACUTE RESPIRATORY DISTRESS: Status: ACTIVE | Noted: 2017-06-18

## 2017-06-18 LAB
ALBUMIN SERPL BCP-MCNC: 3.6 G/DL (ref 3.2–4.9)
ALBUMIN/GLOB SERPL: 1.1 G/DL
ALP SERPL-CCNC: 102 U/L (ref 30–99)
ALT SERPL-CCNC: 35 U/L (ref 2–50)
ANION GAP SERPL CALC-SCNC: 8 MMOL/L (ref 0–11.9)
ANISOCYTOSIS BLD QL SMEAR: ABNORMAL
AST SERPL-CCNC: 41 U/L (ref 12–45)
BASOPHILS # BLD AUTO: 0 % (ref 0–1.8)
BASOPHILS # BLD: 0 K/UL (ref 0–0.12)
BILIRUB SERPL-MCNC: 0.5 MG/DL (ref 0.1–1.5)
BUN SERPL-MCNC: 7 MG/DL (ref 8–22)
CALCIUM SERPL-MCNC: 8.4 MG/DL (ref 8.5–10.5)
CHLORIDE SERPL-SCNC: 91 MMOL/L (ref 96–112)
CO2 SERPL-SCNC: 25 MMOL/L (ref 20–33)
CREAT SERPL-MCNC: <0.2 MG/DL (ref 0.5–1.4)
EOSINOPHIL # BLD AUTO: 0 K/UL (ref 0–0.51)
EOSINOPHIL NFR BLD: 0 % (ref 0–6.9)
ERYTHROCYTE [DISTWIDTH] IN BLOOD BY AUTOMATED COUNT: 49 FL (ref 35.9–50)
GFR SERPL CREATININE-BSD FRML MDRD: >60 ML/MIN/1.73 M 2
GLOBULIN SER CALC-MCNC: 3.3 G/DL (ref 1.9–3.5)
GLUCOSE BLD-MCNC: 125 MG/DL (ref 65–99)
GLUCOSE SERPL-MCNC: 81 MG/DL (ref 65–99)
HCT VFR BLD AUTO: 40 % (ref 42–52)
HGB BLD-MCNC: 13.5 G/DL (ref 14–18)
LYMPHOCYTES # BLD AUTO: 0.92 K/UL (ref 1–4.8)
LYMPHOCYTES NFR BLD: 5.2 % (ref 22–41)
MACROCYTES BLD QL SMEAR: ABNORMAL
MANUAL DIFF BLD: NORMAL
MCH RBC QN AUTO: 28.7 PG (ref 27–33)
MCHC RBC AUTO-ENTMCNC: 33.8 G/DL (ref 33.7–35.3)
MCV RBC AUTO: 85.1 FL (ref 81.4–97.8)
MONOCYTES # BLD AUTO: 0.92 K/UL (ref 0–0.85)
MONOCYTES NFR BLD AUTO: 5.2 % (ref 0–13.4)
MORPHOLOGY BLD-IMP: NORMAL
NEUTROPHILS # BLD AUTO: 15.86 K/UL (ref 1.82–7.42)
NEUTROPHILS NFR BLD: 87 % (ref 44–72)
NEUTS BAND NFR BLD MANUAL: 2.6 % (ref 0–10)
NRBC # BLD AUTO: 0 K/UL
NRBC BLD AUTO-RTO: 0 /100 WBC
OVALOCYTES BLD QL SMEAR: NORMAL
PLATELET # BLD AUTO: 243 K/UL (ref 164–446)
PLATELET BLD QL SMEAR: NORMAL
PMV BLD AUTO: 8.6 FL (ref 9–12.9)
POIKILOCYTOSIS BLD QL SMEAR: NORMAL
POTASSIUM SERPL-SCNC: 2.8 MMOL/L (ref 3.6–5.5)
PROT SERPL-MCNC: 6.9 G/DL (ref 6–8.2)
RBC # BLD AUTO: 4.7 M/UL (ref 4.7–6.1)
RBC BLD AUTO: PRESENT
SODIUM SERPL-SCNC: 124 MMOL/L (ref 135–145)
WBC # BLD AUTO: 17.7 K/UL (ref 4.8–10.8)

## 2017-06-18 PROCEDURE — 94640 AIRWAY INHALATION TREATMENT: CPT

## 2017-06-18 PROCEDURE — 99233 SBSQ HOSP IP/OBS HIGH 50: CPT | Performed by: INTERNAL MEDICINE

## 2017-06-18 PROCEDURE — 94668 MNPJ CHEST WALL SBSQ: CPT

## 2017-06-18 PROCEDURE — 700101 HCHG RX REV CODE 250: Performed by: INTERNAL MEDICINE

## 2017-06-18 PROCEDURE — 36415 COLL VENOUS BLD VENIPUNCTURE: CPT

## 2017-06-18 PROCEDURE — 85027 COMPLETE CBC AUTOMATED: CPT

## 2017-06-18 PROCEDURE — 85007 BL SMEAR W/DIFF WBC COUNT: CPT

## 2017-06-18 PROCEDURE — 700102 HCHG RX REV CODE 250 W/ 637 OVERRIDE(OP): Performed by: INTERNAL MEDICINE

## 2017-06-18 PROCEDURE — A9270 NON-COVERED ITEM OR SERVICE: HCPCS | Performed by: INTERNAL MEDICINE

## 2017-06-18 PROCEDURE — 82962 GLUCOSE BLOOD TEST: CPT

## 2017-06-18 PROCEDURE — 94667 MNPJ CHEST WALL 1ST: CPT

## 2017-06-18 PROCEDURE — 94669 MECHANICAL CHEST WALL OSCILL: CPT

## 2017-06-18 PROCEDURE — 71010 DX-CHEST-PORTABLE (1 VIEW): CPT

## 2017-06-18 PROCEDURE — 700105 HCHG RX REV CODE 258: Performed by: INTERNAL MEDICINE

## 2017-06-18 PROCEDURE — 700111 HCHG RX REV CODE 636 W/ 250 OVERRIDE (IP): Performed by: INTERNAL MEDICINE

## 2017-06-18 PROCEDURE — 770020 HCHG ROOM/CARE - TELE (206)

## 2017-06-18 PROCEDURE — 80053 COMPREHEN METABOLIC PANEL: CPT

## 2017-06-18 RX ORDER — SODIUM CHLORIDE AND POTASSIUM CHLORIDE 300; 900 MG/100ML; MG/100ML
INJECTION, SOLUTION INTRAVENOUS CONTINUOUS
Status: DISCONTINUED | OUTPATIENT
Start: 2017-06-18 | End: 2017-06-19

## 2017-06-18 RX ORDER — SODIUM CHLORIDE FOR INHALATION 3 %
3 VIAL, NEBULIZER (ML) INHALATION
Status: DISCONTINUED | OUTPATIENT
Start: 2017-06-18 | End: 2017-06-20

## 2017-06-18 RX ORDER — SODIUM CHLORIDE AND POTASSIUM CHLORIDE 300; 900 MG/100ML; MG/100ML
INJECTION, SOLUTION INTRAVENOUS CONTINUOUS
Status: DISCONTINUED | OUTPATIENT
Start: 2017-06-18 | End: 2017-06-18

## 2017-06-18 RX ORDER — MAG HYDROX/ALUMINUM HYD/SIMETH 400-400-40
1 SUSPENSION, ORAL (FINAL DOSE FORM) ORAL ONCE
Status: DISPENSED | OUTPATIENT
Start: 2017-06-18 | End: 2017-06-19

## 2017-06-18 RX ADMIN — FAMOTIDINE 20 MG: 10 INJECTION, SOLUTION INTRAVENOUS at 09:55

## 2017-06-18 RX ADMIN — ENOXAPARIN SODIUM 40 MG: 100 INJECTION SUBCUTANEOUS at 20:52

## 2017-06-18 RX ADMIN — AMPICILLIN SODIUM AND SULBACTAM SODIUM 3 G: 2; 1 INJECTION, POWDER, FOR SOLUTION INTRAMUSCULAR; INTRAVENOUS at 11:39

## 2017-06-18 RX ADMIN — SODIUM CHLORIDE: 9 INJECTION, SOLUTION INTRAVENOUS at 04:30

## 2017-06-18 RX ADMIN — BISACODYL 10 MG: 10 SUPPOSITORY RECTAL at 11:39

## 2017-06-18 RX ADMIN — AMPICILLIN SODIUM AND SULBACTAM SODIUM 3 G: 2; 1 INJECTION, POWDER, FOR SOLUTION INTRAMUSCULAR; INTRAVENOUS at 17:47

## 2017-06-18 RX ADMIN — SODIUM CHLORIDE SOLN NEBU 3% 3 ML: 3 NEBU SOLN at 20:07

## 2017-06-18 RX ADMIN — SODIUM CHLORIDE SOLN NEBU 3% 3 ML: 3 NEBU SOLN at 14:58

## 2017-06-18 RX ADMIN — ALBUTEROL SULFATE 2.5 MG: 2.5 SOLUTION RESPIRATORY (INHALATION) at 20:07

## 2017-06-18 RX ADMIN — AMPICILLIN SODIUM AND SULBACTAM SODIUM 3 G: 2; 1 INJECTION, POWDER, FOR SOLUTION INTRAMUSCULAR; INTRAVENOUS at 00:00

## 2017-06-18 RX ADMIN — AMPICILLIN SODIUM AND SULBACTAM SODIUM 3 G: 2; 1 INJECTION, POWDER, FOR SOLUTION INTRAMUSCULAR; INTRAVENOUS at 05:51

## 2017-06-18 RX ADMIN — FAMOTIDINE 20 MG: 10 INJECTION, SOLUTION INTRAVENOUS at 20:52

## 2017-06-18 RX ADMIN — ALBUTEROL SULFATE 2.5 MG: 2.5 SOLUTION RESPIRATORY (INHALATION) at 14:56

## 2017-06-18 RX ADMIN — POTASSIUM CHLORIDE AND SODIUM CHLORIDE: 900; 300 INJECTION, SOLUTION INTRAVENOUS at 13:49

## 2017-06-18 ASSESSMENT — LIFESTYLE VARIABLES
DO YOU DRINK ALCOHOL: NO
EVER_SMOKED: NEVER

## 2017-06-18 NOTE — PROGRESS NOTES
Inserted rosas catheter @1630.  Initiated Tube feeding @1645 @ 25.  Stopping @ 7 pm per family request and doctors order.  Will continue to monitor for any changes.

## 2017-06-18 NOTE — PROGRESS NOTES
Bedside report received. Patient A&O X Brain injusry, non-responcisve, tele- ST,  RA,  Voids- rosas, Activity- bed rest, pt does not move, contracted, Diet- TF per MOM- MD authorized to give home nutrition. Complains of pain non-verbal, vitals stable. POC discussed with patient and mom, mom in the room.

## 2017-06-18 NOTE — THERAPY
"SPEECH THERAPY NOTE  Hold bedside swallow evaluation this date. Patient had two episodes of \"storming\" with eye/facial twitching and SPO2% dropping to 81% followed by coughing up large amounts of brown/blood-tinged secretions. Immediately following episodes, patient's SPO2% would return to 94%. Patient is currently NPO with PEG tube. He has hx of TBI s/p 18 yr with PEG tube and has been non verbal/non mobile since. Patient's mother, who is primary caregiver, reports patient was given pureed solids and water daily for pleasure feeding, but received primary nutrition from PEG tube feedings. She states 6 months ago, he began having difficulty swallowing, with delayed swallow, occasional coughing, and increased pulmonary congestion. He currently is being treated for aspiration PNA. Due to high risk of aspiration, a diagnostic swallow study such as a FEES or MBS is recommended. Patient's mother believes he can sit still and tolerate FEES procedure. Educated patient and family regarding aspiration risk and FEES procedure.  "

## 2017-06-18 NOTE — ASSESSMENT & PLAN NOTE
- pt unable to clear secretions   -sputum + Klebsiella completed Rocephin  -blood cultures neg  Bronchoscopy 6/25. Again on 6/29  Thoracentesis with 300 ml off on 6/25.  - s/p trach 7/2

## 2017-06-18 NOTE — ASSESSMENT & PLAN NOTE
- worse and reintubated 4 days ago. Suspect mucus plugging as cxr much better after bronc. .   - vent per pulmonary.   - ongoing diuresis .

## 2017-06-18 NOTE — PROGRESS NOTES
[facility] Hospitalist Progress Note    Date of Service: 2017    Chief Complaint  36 y.o. male admitted 2017 with AMS.    Interval Problem Update  Pt has hx of TBI and is nonverbal. Family is able to take cues as to when he is agitated.  Called to bedside this morning d/t respiratory distress.  Discussed plan and pt condition with RN at bedside.  Discussed with family at bedside.    Consultants/Specialty  none    Disposition  Pt requires additional treatment in the hospital        Review of Systems   Unable to perform ROS: acuity of condition      Physical Exam  Laboratory/Imaging   Hemodynamics  Temp (24hrs), Av.1 °C (96.9 °F), Min:36 °C (96.8 °F), Max:36.1 °C (97 °F)   Temperature: 36.1 °C (97 °F)  Pulse  Av.7  Min: 75  Max: 131    Blood Pressure: 128/90 mmHg      Respiratory      Respiration: 16, Pulse Oximetry: 96 %, O2 Daily Delivery Respiratory : OxyMask     Work Of Breathing / Effort: Shallow  RUL Breath Sounds: Diminished, RML Breath Sounds: Coarse Crackles, RLL Breath Sounds: Coarse Crackles, JONATAN Breath Sounds: Diminished;Expiratory Wheezes, LLL Breath Sounds: Coarse Crackles;Expiratory Wheezes    Fluids    Intake/Output Summary (Last 24 hours) at 17 1422  Last data filed at 17 1354   Gross per 24 hour   Intake   2005 ml   Output   2800 ml   Net   -795 ml       Nutrition  Orders Placed This Encounter   Procedures   • Diet NPO     Standing Status: Standing      Number of Occurrences: 1      Standing Expiration Date:      Order Specific Question:  Restrict to:     Answer:  Strict [1]     Physical Exam   Constitutional:  Non-toxic appearance. He appears distressed (respiratory ).   Chronically ill appearing    HENT:   Head: Normocephalic and atraumatic.   Mouth/Throat: Oropharynx is clear and moist. No oropharyngeal exudate.   Eyes: Right eye exhibits no discharge. Left eye exhibits no discharge.   Neck: Neck supple. No tracheal deviation, no edema and no erythema present.   Trach  stoma   Cardiovascular: Normal rate and regular rhythm.  Exam reveals no gallop and no friction rub.    No murmur heard.  Pulmonary/Chest: No accessory muscle usage or stridor. He is in respiratory distress. He has no wheezes. He has rhonchi. He has no rales.   Abdominal: Soft. Bowel sounds are normal. He exhibits no distension.   Musculoskeletal: He exhibits no edema.   Lymphadenopathy:     He has no cervical adenopathy.   Neurological:   Awake, nonverbal    Skin: Skin is warm and dry. No rash noted. He is not diaphoretic. No erythema.   Psychiatric: He is noncommunicative.   Nursing note and vitals reviewed.      Recent Labs      06/17/17   0745  06/18/17   0341   WBC  28.0*  17.7*   RBC  4.90  4.70   HEMOGLOBIN  14.1  13.5*   HEMATOCRIT  42.0  40.0*   MCV  85.7  85.1   MCH  28.8  28.7   MCHC  33.6*  33.8   RDW  49.8  49.0   PLATELETCT  334  243   MPV  9.1  8.6*     Recent Labs      06/17/17   0745  06/18/17   0341   SODIUM  127*  124*   POTASSIUM  4.2  2.8*   CHLORIDE  93*  91*   CO2  23  25   GLUCOSE  39*  81   BUN  23*  7*   CREATININE  0.35*  <0.20*   CALCIUM  8.6  8.4*     Recent Labs      06/17/17   1601   APTT  33.6   INR  1.08                  Assessment/Plan     Sepsis (CMS-HCC)  Assessment & Plan  - d/t aspiration pneumonia  - was improved but now pt more agitated and in distress  - pt needs suctioning and CPT  - continue abx    Aspiration pneumonia (CMS-HCC)  Assessment & Plan  - pt unable to get his secretions out  - needs suctioning and CPT  - RN to call RT  - continue unasyn  - await culture results     Acute respiratory distress (CMS-HCC)  Assessment & Plan  - now worse, needs RT therapy  - high risk for needing intubation if treatments aren't started soon    Altered mental status  Assessment & Plan  - acute on chronic  - now in more distress    Occasional tremors  Assessment & Plan  - unlikely to be seizure but will get EEG    Hyponatremia  Assessment & Plan  - worse, continue ivf  - repeat bmp in  am    Hypoglycemia  Assessment & Plan  - resolved      Labs reviewed, Medications reviewed and Radiology images reviewed        DVT Prophylaxis: Enoxaparin (Lovenox)    Ulcer prophylaxis: Yes  Antibiotics: Treating active infection/contamination beyond 24 hours perioperative coverage        More than 35 minutes was spent in pt exam, interview, and more than 60 % of this in discussion of plan, diagnoses, prognosis and disposition with patient, family, nurse and case management coordinator.
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
## 2017-06-18 NOTE — PROGRESS NOTES
Bedside report performed with Neymar. Pt is a/o, safety check performed, all possessions and call bell within reach. POC and doctors orders addressed as needed.

## 2017-06-19 ENCOUNTER — APPOINTMENT (OUTPATIENT)
Dept: RADIOLOGY | Facility: MEDICAL CENTER | Age: 37
DRG: 004 | End: 2017-06-19
Attending: INTERNAL MEDICINE
Payer: COMMERCIAL

## 2017-06-19 LAB
ALBUMIN SERPL BCP-MCNC: 3.4 G/DL (ref 3.2–4.9)
ALBUMIN/GLOB SERPL: 1 G/DL
ALP SERPL-CCNC: 111 U/L (ref 30–99)
ALT SERPL-CCNC: 52 U/L (ref 2–50)
ANION GAP SERPL CALC-SCNC: 7 MMOL/L (ref 0–11.9)
ANISOCYTOSIS BLD QL SMEAR: ABNORMAL
AST SERPL-CCNC: 49 U/L (ref 12–45)
BACTERIA UR CULT: NORMAL
BASE EXCESS BLDA CALC-SCNC: 4 MMOL/L (ref -4–3)
BASOPHILS # BLD AUTO: 0.9 % (ref 0–1.8)
BASOPHILS # BLD: 0.09 K/UL (ref 0–0.12)
BILIRUB SERPL-MCNC: 0.7 MG/DL (ref 0.1–1.5)
BODY TEMPERATURE: ABNORMAL DEGREES
BUN SERPL-MCNC: 7 MG/DL (ref 8–22)
CALCIUM SERPL-MCNC: 8.6 MG/DL (ref 8.5–10.5)
CHLORIDE SERPL-SCNC: 92 MMOL/L (ref 96–112)
CO2 BLDA-SCNC: 27 MMOL/L (ref 20–33)
CO2 SERPL-SCNC: 26 MMOL/L (ref 20–33)
CREAT SERPL-MCNC: <0.2 MG/DL (ref 0.5–1.4)
CRP SERPL HS-MCNC: 13.38 MG/DL (ref 0–0.75)
EOSINOPHIL # BLD AUTO: 0.09 K/UL (ref 0–0.51)
EOSINOPHIL NFR BLD: 0.9 % (ref 0–6.9)
ERYTHROCYTE [DISTWIDTH] IN BLOOD BY AUTOMATED COUNT: 47.1 FL (ref 35.9–50)
GFR SERPL CREATININE-BSD FRML MDRD: >60 ML/MIN/1.73 M 2
GLOBULIN SER CALC-MCNC: 3.3 G/DL (ref 1.9–3.5)
GLUCOSE BLD-MCNC: 107 MG/DL (ref 65–99)
GLUCOSE SERPL-MCNC: 80 MG/DL (ref 65–99)
HCO3 BLDA-SCNC: 26.3 MMOL/L (ref 17–25)
HCT VFR BLD AUTO: 35.7 % (ref 42–52)
HGB BLD-MCNC: 12.4 G/DL (ref 14–18)
LYMPHOCYTES # BLD AUTO: 0.82 K/UL (ref 1–4.8)
LYMPHOCYTES NFR BLD: 8 % (ref 22–41)
MACROCYTES BLD QL SMEAR: ABNORMAL
MANUAL DIFF BLD: NORMAL
MCH RBC QN AUTO: 29.1 PG (ref 27–33)
MCHC RBC AUTO-ENTMCNC: 34.7 G/DL (ref 33.7–35.3)
MCV RBC AUTO: 83.8 FL (ref 81.4–97.8)
MONOCYTES # BLD AUTO: 0.9 K/UL (ref 0–0.85)
MONOCYTES NFR BLD AUTO: 8.8 % (ref 0–13.4)
MORPHOLOGY BLD-IMP: NORMAL
NEUTROPHILS # BLD AUTO: 8.3 K/UL (ref 1.82–7.42)
NEUTROPHILS NFR BLD: 81.4 % (ref 44–72)
NRBC # BLD AUTO: 0 K/UL
NRBC BLD AUTO-RTO: 0 /100 WBC
O2/TOTAL GAS SETTING VFR VENT: 100 %
PCO2 BLDA: 32.3 MMHG (ref 26–37)
PCO2 TEMP ADJ BLDA: 30.6 MMHG (ref 26–37)
PH BLDA: 7.52 [PH] (ref 7.4–7.5)
PH TEMP ADJ BLDA: 7.54 [PH] (ref 7.4–7.5)
PLATELET # BLD AUTO: 219 K/UL (ref 164–446)
PLATELET BLD QL SMEAR: NORMAL
PMV BLD AUTO: 9 FL (ref 9–12.9)
PO2 BLDA: 384 MMHG (ref 64–87)
PO2 TEMP ADJ BLDA: 377 MMHG (ref 64–87)
POTASSIUM SERPL-SCNC: 3.5 MMOL/L (ref 3.6–5.5)
PREALB SERPL-MCNC: 8 MG/DL (ref 18–38)
PROCALCITONIN SERPL-MCNC: 4.95 NG/ML
PROT SERPL-MCNC: 6.7 G/DL (ref 6–8.2)
RBC # BLD AUTO: 4.26 M/UL (ref 4.7–6.1)
RBC BLD AUTO: PRESENT
SAO2 % BLDA: 100 % (ref 93–99)
SIGNIFICANT IND 70042: NORMAL
SITE SITE: NORMAL
SODIUM SERPL-SCNC: 125 MMOL/L (ref 135–145)
SOURCE SOURCE: NORMAL
SPECIMEN DRAWN FROM PATIENT: ABNORMAL
WBC # BLD AUTO: 10.2 K/UL (ref 4.8–10.8)

## 2017-06-19 PROCEDURE — 700111 HCHG RX REV CODE 636 W/ 250 OVERRIDE (IP): Performed by: INTERNAL MEDICINE

## 2017-06-19 PROCEDURE — 36556 INSERT NON-TUNNEL CV CATH: CPT | Mod: 51 | Performed by: INTERNAL MEDICINE

## 2017-06-19 PROCEDURE — 36600 WITHDRAWAL OF ARTERIAL BLOOD: CPT

## 2017-06-19 PROCEDURE — 94669 MECHANICAL CHEST WALL OSCILL: CPT

## 2017-06-19 PROCEDURE — 700105 HCHG RX REV CODE 258: Performed by: PSYCHIATRY & NEUROLOGY

## 2017-06-19 PROCEDURE — 700101 HCHG RX REV CODE 250: Performed by: INTERNAL MEDICINE

## 2017-06-19 PROCEDURE — 99291 CRITICAL CARE FIRST HOUR: CPT | Mod: 25 | Performed by: INTERNAL MEDICINE

## 2017-06-19 PROCEDURE — 31645 BRNCHSC W/THER ASPIR 1ST: CPT | Mod: AG | Performed by: INTERNAL MEDICINE

## 2017-06-19 PROCEDURE — 700111 HCHG RX REV CODE 636 W/ 250 OVERRIDE (IP): Performed by: PHARMACIST

## 2017-06-19 PROCEDURE — 31720 CLEARANCE OF AIRWAYS: CPT

## 2017-06-19 PROCEDURE — 85027 COMPLETE CBC AUTOMATED: CPT

## 2017-06-19 PROCEDURE — 94002 VENT MGMT INPAT INIT DAY: CPT

## 2017-06-19 PROCEDURE — 94640 AIRWAY INHALATION TREATMENT: CPT

## 2017-06-19 PROCEDURE — G8996 SWALLOW CURRENT STATUS: HCPCS | Mod: CM

## 2017-06-19 PROCEDURE — 5A1955Z RESPIRATORY VENTILATION, GREATER THAN 96 CONSECUTIVE HOURS: ICD-10-PCS | Performed by: INTERNAL MEDICINE

## 2017-06-19 PROCEDURE — 87206 SMEAR FLUORESCENT/ACID STAI: CPT

## 2017-06-19 PROCEDURE — 0BH17EZ INSERTION OF ENDOTRACHEAL AIRWAY INTO TRACHEA, VIA NATURAL OR ARTIFICIAL OPENING: ICD-10-PCS | Performed by: INTERNAL MEDICINE

## 2017-06-19 PROCEDURE — 85007 BL SMEAR W/DIFF WBC COUNT: CPT

## 2017-06-19 PROCEDURE — 36556 INSERT NON-TUNNEL CV CATH: CPT

## 2017-06-19 PROCEDURE — A9270 NON-COVERED ITEM OR SERVICE: HCPCS | Performed by: INTERNAL MEDICINE

## 2017-06-19 PROCEDURE — 87070 CULTURE OTHR SPECIMN AEROBIC: CPT

## 2017-06-19 PROCEDURE — 99233 SBSQ HOSP IP/OBS HIGH 50: CPT | Performed by: INTERNAL MEDICINE

## 2017-06-19 PROCEDURE — 71010 DX-CHEST-PORTABLE (1 VIEW): CPT

## 2017-06-19 PROCEDURE — 87186 SC STD MICRODIL/AGAR DIL: CPT

## 2017-06-19 PROCEDURE — 700102 HCHG RX REV CODE 250 W/ 637 OVERRIDE(OP): Performed by: INTERNAL MEDICINE

## 2017-06-19 PROCEDURE — 31500 INSERT EMERGENCY AIRWAY: CPT | Mod: 51 | Performed by: INTERNAL MEDICINE

## 2017-06-19 PROCEDURE — 02HV33Z INSERTION OF INFUSION DEVICE INTO SUPERIOR VENA CAVA, PERCUTANEOUS APPROACH: ICD-10-PCS | Performed by: INTERNAL MEDICINE

## 2017-06-19 PROCEDURE — 84134 ASSAY OF PREALBUMIN: CPT

## 2017-06-19 PROCEDURE — 87015 SPECIMEN INFECT AGNT CONCNTJ: CPT

## 2017-06-19 PROCEDURE — G8997 SWALLOW GOAL STATUS: HCPCS | Mod: CM

## 2017-06-19 PROCEDURE — 700111 HCHG RX REV CODE 636 W/ 250 OVERRIDE (IP): Performed by: PSYCHIATRY & NEUROLOGY

## 2017-06-19 PROCEDURE — 86140 C-REACTIVE PROTEIN: CPT

## 2017-06-19 PROCEDURE — G8998 SWALLOW D/C STATUS: HCPCS | Mod: CM

## 2017-06-19 PROCEDURE — 87102 FUNGUS ISOLATION CULTURE: CPT

## 2017-06-19 PROCEDURE — 770022 HCHG ROOM/CARE - ICU (200)

## 2017-06-19 PROCEDURE — 94668 MNPJ CHEST WALL SBSQ: CPT

## 2017-06-19 PROCEDURE — 82962 GLUCOSE BLOOD TEST: CPT

## 2017-06-19 PROCEDURE — 302978 HCHG BRONCHOSCOPY-DIAGNOSTIC

## 2017-06-19 PROCEDURE — 95951 EEG: CPT | Mod: 52

## 2017-06-19 PROCEDURE — 0B9F8ZX DRAINAGE OF RIGHT LOWER LUNG LOBE, VIA NATURAL OR ARTIFICIAL OPENING ENDOSCOPIC, DIAGNOSTIC: ICD-10-PCS | Performed by: INTERNAL MEDICINE

## 2017-06-19 PROCEDURE — 82803 BLOOD GASES ANY COMBINATION: CPT

## 2017-06-19 PROCEDURE — 31500 INSERT EMERGENCY AIRWAY: CPT

## 2017-06-19 PROCEDURE — C1751 CATH, INF, PER/CENT/MIDLINE: HCPCS

## 2017-06-19 PROCEDURE — 92612 ENDOSCOPY SWALLOW (FEES) VID: CPT

## 2017-06-19 PROCEDURE — 80053 COMPREHEN METABOLIC PANEL: CPT

## 2017-06-19 PROCEDURE — 76937 US GUIDE VASCULAR ACCESS: CPT

## 2017-06-19 PROCEDURE — 87116 MYCOBACTERIA CULTURE: CPT

## 2017-06-19 PROCEDURE — 700105 HCHG RX REV CODE 258: Performed by: INTERNAL MEDICINE

## 2017-06-19 PROCEDURE — 302214 INTUBATION BOX: Performed by: INTERNAL MEDICINE

## 2017-06-19 PROCEDURE — 87077 CULTURE AEROBIC IDENTIFY: CPT

## 2017-06-19 PROCEDURE — 87205 SMEAR GRAM STAIN: CPT

## 2017-06-19 PROCEDURE — 84145 PROCALCITONIN (PCT): CPT

## 2017-06-19 PROCEDURE — 306396 CUSHION, WAFFLE ADULT 17X17: Performed by: INTERNAL MEDICINE

## 2017-06-19 RX ORDER — ROCURONIUM BROMIDE 10 MG/ML
50 INJECTION, SOLUTION INTRAVENOUS ONCE
Status: COMPLETED | OUTPATIENT
Start: 2017-06-19 | End: 2017-06-19

## 2017-06-19 RX ORDER — IPRATROPIUM BROMIDE AND ALBUTEROL SULFATE 2.5; .5 MG/3ML; MG/3ML
3 SOLUTION RESPIRATORY (INHALATION)
Status: DISCONTINUED | OUTPATIENT
Start: 2017-06-19 | End: 2017-06-23

## 2017-06-19 RX ORDER — FAMOTIDINE 20 MG/1
20 TABLET, FILM COATED ORAL EVERY 12 HOURS
Status: DISCONTINUED | OUTPATIENT
Start: 2017-06-19 | End: 2017-06-28

## 2017-06-19 RX ORDER — SODIUM CHLORIDE AND POTASSIUM CHLORIDE 150; 900 MG/100ML; MG/100ML
INJECTION, SOLUTION INTRAVENOUS CONTINUOUS
Status: DISCONTINUED | OUTPATIENT
Start: 2017-06-19 | End: 2017-06-22

## 2017-06-19 RX ORDER — IPRATROPIUM BROMIDE AND ALBUTEROL SULFATE 2.5; .5 MG/3ML; MG/3ML
3 SOLUTION RESPIRATORY (INHALATION)
Status: DISCONTINUED | OUTPATIENT
Start: 2017-06-19 | End: 2017-06-30

## 2017-06-19 RX ORDER — LIDOCAINE HYDROCHLORIDE 10 MG/ML
1-2 INJECTION, SOLUTION INFILTRATION; PERINEURAL
Status: DISCONTINUED | OUTPATIENT
Start: 2017-06-19 | End: 2017-06-28

## 2017-06-19 RX ORDER — SODIUM CHLORIDE AND POTASSIUM CHLORIDE 300; 900 MG/100ML; MG/100ML
INJECTION, SOLUTION INTRAVENOUS CONTINUOUS
Status: DISCONTINUED | OUTPATIENT
Start: 2017-06-19 | End: 2017-06-19

## 2017-06-19 RX ORDER — POLYETHYLENE GLYCOL 3350 17 G/17G
1 POWDER, FOR SOLUTION ORAL
Status: DISCONTINUED | OUTPATIENT
Start: 2017-06-19 | End: 2017-07-11 | Stop reason: HOSPADM

## 2017-06-19 RX ORDER — DEXTROSE MONOHYDRATE 25 G/50ML
25 INJECTION, SOLUTION INTRAVENOUS
Status: DISCONTINUED | OUTPATIENT
Start: 2017-06-19 | End: 2017-07-11 | Stop reason: HOSPADM

## 2017-06-19 RX ORDER — AMOXICILLIN 250 MG
2 CAPSULE ORAL 2 TIMES DAILY
Status: DISCONTINUED | OUTPATIENT
Start: 2017-06-19 | End: 2017-07-11 | Stop reason: HOSPADM

## 2017-06-19 RX ORDER — PROPOFOL 10 MG/ML
200 INJECTION, EMULSION INTRAVENOUS ONCE
Status: COMPLETED | OUTPATIENT
Start: 2017-06-19 | End: 2017-06-19

## 2017-06-19 RX ORDER — ETOMIDATE 2 MG/ML
20 INJECTION INTRAVENOUS ONCE
Status: COMPLETED | OUTPATIENT
Start: 2017-06-19 | End: 2017-06-19

## 2017-06-19 RX ORDER — IPRATROPIUM BROMIDE AND ALBUTEROL SULFATE 2.5; .5 MG/3ML; MG/3ML
3 SOLUTION RESPIRATORY (INHALATION)
Status: DISCONTINUED | OUTPATIENT
Start: 2017-06-19 | End: 2017-06-19

## 2017-06-19 RX ORDER — LEVETIRACETAM 100 MG/ML
1500 SOLUTION ORAL EVERY 12 HOURS
Status: DISCONTINUED | OUTPATIENT
Start: 2017-06-20 | End: 2017-06-20

## 2017-06-19 RX ORDER — BISACODYL 10 MG
10 SUPPOSITORY, RECTAL RECTAL
Status: DISCONTINUED | OUTPATIENT
Start: 2017-06-19 | End: 2017-07-11 | Stop reason: HOSPADM

## 2017-06-19 RX ORDER — SODIUM CHLORIDE 9 MG/ML
INJECTION, SOLUTION INTRAVENOUS
Status: ACTIVE
Start: 2017-06-19 | End: 2017-06-20

## 2017-06-19 RX ORDER — CHLORHEXIDINE GLUCONATE ORAL RINSE 1.2 MG/ML
15 SOLUTION DENTAL 2 TIMES DAILY
Status: DISCONTINUED | OUTPATIENT
Start: 2017-06-19 | End: 2017-06-28

## 2017-06-19 RX ADMIN — AMPICILLIN SODIUM AND SULBACTAM SODIUM 3 G: 2; 1 INJECTION, POWDER, FOR SOLUTION INTRAMUSCULAR; INTRAVENOUS at 00:07

## 2017-06-19 RX ADMIN — SODIUM CHLORIDE AND POTASSIUM CHLORIDE: 9; 2.98 INJECTION, SOLUTION INTRAVENOUS at 01:28

## 2017-06-19 RX ADMIN — BISACODYL 10 MG: 10 SUPPOSITORY RECTAL at 14:02

## 2017-06-19 RX ADMIN — POTASSIUM CHLORIDE AND SODIUM CHLORIDE: 900; 150 INJECTION, SOLUTION INTRAVENOUS at 13:14

## 2017-06-19 RX ADMIN — DEXTROSE MONOHYDRATE 1000 MG: 50 INJECTION, SOLUTION INTRAVENOUS at 16:49

## 2017-06-19 RX ADMIN — SODIUM CHLORIDE SOLN NEBU 3% 3 ML: 3 NEBU SOLN at 18:44

## 2017-06-19 RX ADMIN — FENTANYL CITRATE 100 MCG: 50 INJECTION, SOLUTION INTRAMUSCULAR; INTRAVENOUS at 19:37

## 2017-06-19 RX ADMIN — FAMOTIDINE 20 MG: 10 INJECTION, SOLUTION INTRAVENOUS at 08:32

## 2017-06-19 RX ADMIN — ALBUTEROL SULFATE 2.5 MG: 2.5 SOLUTION RESPIRATORY (INHALATION) at 06:40

## 2017-06-19 RX ADMIN — PROPOFOL 30 MG: 10 INJECTION, EMULSION INTRAVENOUS at 19:00

## 2017-06-19 RX ADMIN — IPRATROPIUM BROMIDE AND ALBUTEROL SULFATE 3 ML: .5; 3 SOLUTION RESPIRATORY (INHALATION) at 18:45

## 2017-06-19 RX ADMIN — ALBUTEROL SULFATE 2.5 MG: 2.5 SOLUTION RESPIRATORY (INHALATION) at 15:49

## 2017-06-19 RX ADMIN — ENOXAPARIN SODIUM 30 MG: 100 INJECTION SUBCUTANEOUS at 19:57

## 2017-06-19 RX ADMIN — IPRATROPIUM BROMIDE AND ALBUTEROL SULFATE 3 ML: .5; 3 SOLUTION RESPIRATORY (INHALATION) at 22:35

## 2017-06-19 RX ADMIN — POTASSIUM CHLORIDE AND SODIUM CHLORIDE: 900; 150 INJECTION, SOLUTION INTRAVENOUS at 18:27

## 2017-06-19 RX ADMIN — IPRATROPIUM BROMIDE AND ALBUTEROL SULFATE 3 ML: .5; 3 SOLUTION RESPIRATORY (INHALATION) at 03:44

## 2017-06-19 RX ADMIN — PROPOFOL 30 MCG/KG/MIN: 10 INJECTION, EMULSION INTRAVENOUS at 19:05

## 2017-06-19 RX ADMIN — ETOMIDATE 20 MG: 2 INJECTION INTRAVENOUS at 17:57

## 2017-06-19 RX ADMIN — SODIUM CHLORIDE SOLN NEBU 3% 3 ML: 3 NEBU SOLN at 15:50

## 2017-06-19 RX ADMIN — SODIUM CHLORIDE SOLN NEBU 3% 3 ML: 3 NEBU SOLN at 11:48

## 2017-06-19 RX ADMIN — AMPICILLIN SODIUM AND SULBACTAM SODIUM 3 G: 2; 1 INJECTION, POWDER, FOR SOLUTION INTRAMUSCULAR; INTRAVENOUS at 05:27

## 2017-06-19 RX ADMIN — PROPOFOL 30 MG: 10 INJECTION, EMULSION INTRAVENOUS at 18:00

## 2017-06-19 RX ADMIN — AMPICILLIN SODIUM AND SULBACTAM SODIUM 3 G: 2; 1 INJECTION, POWDER, FOR SOLUTION INTRAMUSCULAR; INTRAVENOUS at 18:27

## 2017-06-19 RX ADMIN — ALBUTEROL SULFATE 2.5 MG: 2.5 SOLUTION RESPIRATORY (INHALATION) at 11:48

## 2017-06-19 RX ADMIN — CHLORHEXIDINE GLUCONATE 15 ML: 1.2 RINSE ORAL at 19:57

## 2017-06-19 RX ADMIN — LORAZEPAM 0.5 MG: 2 INJECTION INTRAMUSCULAR; INTRAVENOUS at 02:55

## 2017-06-19 RX ADMIN — FAMOTIDINE 20 MG: 10 INJECTION INTRAVENOUS at 19:57

## 2017-06-19 RX ADMIN — ROCURONIUM BROMIDE 50 MG: 10 INJECTION, SOLUTION INTRAVENOUS at 17:57

## 2017-06-19 RX ADMIN — AMPICILLIN SODIUM AND SULBACTAM SODIUM 3 G: 2; 1 INJECTION, POWDER, FOR SOLUTION INTRAMUSCULAR; INTRAVENOUS at 12:30

## 2017-06-19 RX ADMIN — FENTANYL CITRATE 50 MCG: 50 INJECTION, SOLUTION INTRAMUSCULAR; INTRAVENOUS at 18:04
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
## 2017-06-19 NOTE — THERAPY
"Speech Language Therapy FEES completed.  Functional Status: Non-fxnl swallow with penetration of ntl, overt aspiration of thin liquids and oral holding of puree.  Pt would benefit from npo for all nutrition and thickening of H2O, with suction, during oral care.    Recommendations - Diet: Diet / Liquid Recommendation: NPO.  Reassess as an outpt in +/-6 months if pt presents with improved status, to re-assess status for intake of quality tastes of ntl/puree.                          Strategies: Not approriate at this time.                          Medication Administration: Medication Administration : Via Gastric Tube  Plan of Care: Patient with no further skilled SLP needs in the acute care setting at this time  Post-Acute Therapy: Currently anticipate no further skilled therapy needs once patient is discharged from the inpatient setting.  Repeat study in 6 mos.    See \"Rehab Therapy-Acute\" Patient Summary Report for complete documentation.   "

## 2017-06-19 NOTE — EEG PROGRESS NOTE
VIDEO ELECTROENCEPHALOGRAM REPORT      Referring MD: Dr. Morfin.     DOS:  6/19/2017 (total recording time 34 minutes).     INDICATION:  Ruben Edwards 36 y.o. male presenting with seizures. History of TBI.     CURRENT ANTIEPILEPTIC REGIMEN: None.     TECHNIQUE: 30 channel video electroencephalogram (EEG) was performed in accordance with the international 10-20 system. The study was reviewed in bipolar and referential montages. The recording examined the patient during wakeful and drowsy state(s).     DESCRIPTION OF THE RECORD:  During the wakefulness, the background showed a symmetrical 2-4 Hz delta activity diffusely.  There was no reactivity to eye closure/opening.  There was loss of the normal anterior-posterior gradient.  During drowsiness, slower delta frequencies were seen.      ACTIVATION PROCEDURES:   Not performed.     ICTAL AND/OR INTERICTAL FINDINGS:   Frequent interictal / independent right and left temporal spikes. FOUR seizures captured during the study. All seizures were several seconds long, some involved secondary generalization. Two seizures appear to have a right temporal and two others a left temporal onset. There is no recovery of mentation (unknown baseline) in between events, in that sense, the patient should be considered in status epilepticus.     SEIZURE ONE:   First seizure was ongoing when study started running and was characterized electroencephalographically by rhythmic sharply contoured alpha / theta which appeared generalized and better organized / more prominent on the left frontotemporal region, then rhythmic right temporal sharply countored theta was noted while rhythmic alpha activity continued on the left temporal chain. Review of video EEG during the seizure showed patient staring, oropharyngeal automatisms such as lip smacking and swallowing noted. He was not interactive during or in between seizures. Post ictally, there was diffuse attenuation and slowing of the EEG.                         SEIZURE TWO:   Rhythmic alpha activity on the right temporal spreading to the right hemisphere, then becoming generalized. Eye fluttering, then eye deviation to the right with nystagmus like movements to the right, makes gurgling noises and mild jerk activity involving mostly the left upper extremity and head. Staring, confused.                        SEIZURE THREE:   Staring, blinking / eye flutter, eyes deviated to the left. Did not generalized. Review of the EEG shows spiky alpha activity on the left temporal, spreading to the entire left hemisphere. There is diffuse attenuation and slowing of the EEG in the post ictal state.             SEIZURE FOUR:   Starts on the right temporal with rhythmic alpha activity which spreads to the entire right hemisphere. Eyes deviated and nystagmus to the right. Generalization is noted, makes grunting noises, left arm twitching, head /trunk jerks, eye fluttering. Post ictally he is not interactive, makes breathing noises,  and there is diffuse slowing in the EEG.                     EKG: sampling of the EKG recording demonstrated sinus rhythm with sinus tachycardia.       INTERPRETATION:  This is an abnormal video EEG recording in the awake and drowsy state(s).  Frequent interictal / independent right and left temporal spikes. FOUR seizures captured during the study. All seizures were several seconds long, some involved secondary generalization. Two seizures appear to have a right temporal and two others a left temporal onset. There is no recovery of mentation (unknown baseline) in between events, in that sense, the patient should be considered in status epilepticus. 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 Clinical and radiological correlation is recommended.    Updates provided to the patient's RN, I requested a call back stat from the attending physician.       Harris Bolton MD  Medical Director, Epilepsy and Neurodiagnostics.   Clinical  of Neurology  [facility].   Diplomate in Neurology, Epilepsy, and Electrodiagnostic Medicine.   Office: [phone]  Fax: [phone]

## 2017-06-19 NOTE — CARE PLAN
Problem: Bowel/Gastric:  Goal: Normal bowel function is maintained or improved  Intervention: Educate patient and significant other/support system about diet, fluid intake, medications and activity to promote bowel function  Family educated on proper bowel function, verbalized understanding

## 2017-06-19 NOTE — PROGRESS NOTES
Bedside report received. Patient non-verbal, tele- SR-ST,  RA- RT treatments,  Voids- rosas, Activity- bed rest, Diet- TF- NPO. Complains of pain non-verbal medicated per MAR. POC discussed with patient. Call light and belongings with in reach.  Bed locked and in lowest position, alarm and fall precautions in place.

## 2017-06-19 NOTE — PROCEDURES
VIDEO ELECTROENCEPHALOGRAM REPORT      Referring MD: Dr. Morfin.     DOS:  6/19/2017 (total recording time 34 minutes).     INDICATION:  Ruben Edwards 36 y.o. male presenting with seizures. History of TBI.     CURRENT ANTIEPILEPTIC REGIMEN: None.     TECHNIQUE: 30 channel video electroencephalogram (EEG) was performed in accordance with the international 10-20 system. The study was reviewed in bipolar and referential montages. The recording examined the patient during wakeful and drowsy state(s).     DESCRIPTION OF THE RECORD:  During the wakefulness, the background showed a symmetrical 2-4 Hz delta activity diffusely.  There was no reactivity to eye closure/opening.  There was loss of the normal anterior-posterior gradient.  During drowsiness, slower delta frequencies were seen.      ACTIVATION PROCEDURES:    Not performed.     ICTAL AND/OR INTERICTAL FINDINGS:    Frequent interictal / independent right and left temporal spikes. FOUR seizures captured during the study. All seizures were several seconds long, some involved secondary generalization. Two seizures appear to have a right temporal and two others a left temporal onset. There is no recovery of mentation (unknown baseline) in between events, in that sense, the patient should be considered in status epilepticus.      SEIZURE ONE:   First seizure was ongoing when study started running and was characterized electroencephalographically by rhythmic sharply contoured alpha / theta which appeared generalized and better organized / more prominent on the left frontotemporal region, then rhythmic right temporal sharply countored theta was noted while rhythmic alpha activity continued on the left temporal chain. Review of video EEG during the seizure showed patient staring, oropharyngeal automatisms such as lip smacking and swallowing noted. He was not interactive during or in between seizures. Post ictally, there was diffuse attenuation and slowing of the EEG.                         SEIZURE TWO:    Rhythmic alpha activity on the right temporal spreading to the right hemisphere, then becoming generalized. Eye fluttering, then eye deviation to the right with nystagmus like movements to the right, makes gurgling noises and mild jerk activity involving mostly the left upper extremity and head. Staring, confused.                        SEIZURE THREE:   Staring, blinking / eye flutter, eyes deviated to the left. Did not generalized. Review of the EEG shows spiky alpha activity on the left temporal, spreading to the entire left hemisphere. There is diffuse attenuation and slowing of the EEG in the post ictal state.              SEIZURE FOUR:   Starts on the right temporal with rhythmic alpha activity which spreads to the entire right hemisphere. Eyes deviated and nystagmus to the right. Generalization is noted, makes grunting noises, left arm twitching, head /trunk jerks, eye fluttering. Post ictally he is not interactive, makes breathing noises,  and there is diffuse slowing in the EEG.                     EKG: sampling of the EKG recording demonstrated sinus rhythm with sinus tachycardia.        INTERPRETATION:  This is an abnormal video EEG recording in the awake and drowsy state(s).  Frequent interictal / independent right and left temporal spikes. FOUR seizures captured during the study. All seizures were several seconds long, some involved secondary generalization. Two seizures appear to have a right temporal and two others a left temporal onset. There is no recovery of mentation (unknown baseline) in between events, in that sense, the patient should be considered in status epilepticus. 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 Clinical and radiological correlation is recommended.    Updates provided to the patient's RN, I requested a call back stat from the attending physician.       Harris Bolton MD  Medical Director, Epilepsy and Neurodiagnostics.    Clinical  of  Neurology [facility].    Diplomate in Neurology, Epilepsy, and Electrodiagnostic Medicine.    Office: [phone]  Fax: [phone]        MARLON GOODMAN    DD:  06/19/2017 12:30:36  DT:  06/19/2017 16:27:58    D#:  [id number]  Job#:  [id number]

## 2017-06-19 NOTE — CARE PLAN
Problem: Infection  Goal: Will remain free from infection  Intervention: Assess signs and symptoms of infection  No s/s of infection noted

## 2017-06-19 NOTE — PROGRESS NOTES
[facility] Hospitalist Progress Note    Date of Service: 2017    Chief Complaint  36 y.o. male admitted 2017 with AMS.    Interval Problem Update  Pt has hx of TBI and is nonverbal. Family is able to take cues as to when he is agitated.  Respiratory distress, improved with suctioning and CPT.  Concern for seizure, await EEG.  Discussed plan and pt condition with RN at bedside.  Discussed with family at bedside.    Consultants/Specialty  none    Disposition  Pt requires additional treatment in the hospital        Review of Systems   Unable to perform ROS: acuity of condition      Physical Exam  Laboratory/Imaging   Hemodynamics  Temp (24hrs), Av.3 °C (97.4 °F), Min:35.9 °C (96.7 °F), Max:37.1 °C (98.7 °F)   Temperature: 36.8 °C (98.3 °F)  Pulse  Av.1  Min: 75  Max: 131    Blood Pressure: 139/92 mmHg      Respiratory      Respiration: 20, Pulse Oximetry: 94 %, O2 Daily Delivery Respiratory : OxyMask     Given By:: Mask, PEP/CPT Method: Positive Airway Pressure Device;Percussion/Vibration, Work Of Breathing / Effort: Mild  RUL Breath Sounds: Rhonchi, RML Breath Sounds: Rhonchi, RLL Breath Sounds: Diminished, JONATAN Breath Sounds: Rhonchi, LLL Breath Sounds: Diminished    Fluids    Intake/Output Summary (Last 24 hours) at 17 1024  Last data filed at 17 0500   Gross per 24 hour   Intake   1200 ml   Output   1600 ml   Net   -400 ml       Nutrition  Orders Placed This Encounter   Procedures   • Diet NPO     Standing Status: Standing      Number of Occurrences: 1      Standing Expiration Date:      Order Specific Question:  Restrict to:     Answer:  Strict [1]     Physical Exam   Constitutional:  Non-toxic appearance. No distress.   Chronically ill appearing    HENT:   Head: Normocephalic and atraumatic.   Mouth/Throat: No oropharyngeal exudate.   Eyes: Right eye exhibits no discharge. Left eye exhibits no discharge. No scleral icterus.   Neck: Neck supple. No edema and no erythema present.   Trach  stoma   Cardiovascular: Normal rate and regular rhythm.  Exam reveals no gallop.    No murmur heard.  Pulmonary/Chest: No accessory muscle usage or stridor. He is in respiratory distress. He has no wheezes. He has rhonchi.   Abdominal: Soft. Bowel sounds are normal.   Musculoskeletal: He exhibits no edema.   Lymphadenopathy:     He has no cervical adenopathy.   Neurological:   Awake, nonverbal    Skin: Skin is warm and dry. He is not diaphoretic.   Psychiatric: He is noncommunicative.   Nursing note and vitals reviewed.      Recent Labs      06/17/17   0745  06/18/17   0341  06/19/17   0344   WBC  28.0*  17.7*  10.2   RBC  4.90  4.70  4.26*   HEMOGLOBIN  14.1  13.5*  12.4*   HEMATOCRIT  42.0  40.0*  35.7*   MCV  85.7  85.1  83.8   MCH  28.8  28.7  29.1   MCHC  33.6*  33.8  34.7   RDW  49.8  49.0  47.1   PLATELETCT  334  243  219   MPV  9.1  8.6*  9.0     Recent Labs      06/17/17   0745  06/18/17   0341  06/19/17   0343   SODIUM  127*  124*  125*   POTASSIUM  4.2  2.8*  3.5*   CHLORIDE  93*  91*  92*   CO2  23  25  26   GLUCOSE  39*  81  80   BUN  23*  7*  7*   CREATININE  0.35*  <0.20*  <0.20*   CALCIUM  8.6  8.4*  8.6     Recent Labs      06/17/17   1601   APTT  33.6   INR  1.08                  Assessment/Plan     Sepsis (CMS-HCC)  Assessment & Plan  - d/t aspiration pneumonia  - improved, continue suctioning and CPT  - continue abx    Aspiration pneumonia (CMS-HCC)  Assessment & Plan  - pt unable to get his secretions out  - needs suctioning and CPT  - continue unasyn  - await culture results     Acute respiratory distress (CMS-HCC)  Assessment & Plan  - improved with suctioning and CPT, continue  - good sats on low flow o2    Altered mental status  Assessment & Plan  - acute on chronic  - now in more distress    Occasional tremors  Assessment & Plan  - concern for seizure but pt has similar actions at times per family  - if sz will call neuro    Hyponatremia  Assessment & Plan  - worse, continue ivf  - repeat  bmp in am    Hypoglycemia  Assessment & Plan  - resolved      Labs reviewed, Medications reviewed and Radiology images reviewed        DVT Prophylaxis: Enoxaparin (Lovenox)    Ulcer prophylaxis: Yes  Antibiotics: Treating active infection/contamination beyond 24 hours perioperative coverage        More than 35 minutes was spent in pt exam, interview, and more than 60 % of this in discussion of plan, diagnoses, prognosis and disposition with patient, family, nurse and case management coordinator.     Addendum: discussed with neuro and family and Dr Araiza, will upgrade to ICU for intubation and status tx.

## 2017-06-20 ENCOUNTER — APPOINTMENT (OUTPATIENT)
Dept: RADIOLOGY | Facility: MEDICAL CENTER | Age: 37
DRG: 004 | End: 2017-06-20
Attending: INTERNAL MEDICINE
Payer: COMMERCIAL

## 2017-06-20 PROBLEM — J96.01 ACUTE RESPIRATORY FAILURE WITH HYPOXIA (HCC): Status: ACTIVE | Noted: 2017-06-20

## 2017-06-20 PROBLEM — G40.901 STATUS EPILEPTICUS (HCC): Status: ACTIVE | Noted: 2017-06-20

## 2017-06-20 PROBLEM — R25.1 OCCASIONAL TREMORS: Status: RESOLVED | Noted: 2017-06-17 | Resolved: 2017-06-20

## 2017-06-20 PROBLEM — E87.6 HYPOKALEMIA: Status: ACTIVE | Noted: 2017-06-20

## 2017-06-20 PROBLEM — S06.9XAA TBI (TRAUMATIC BRAIN INJURY) (HCC): Status: ACTIVE | Noted: 2017-06-20

## 2017-06-20 PROBLEM — R41.82 ALTERED MENTAL STATUS: Status: RESOLVED | Noted: 2017-06-17 | Resolved: 2017-06-20

## 2017-06-20 PROBLEM — R06.03 ACUTE RESPIRATORY DISTRESS: Status: RESOLVED | Noted: 2017-06-18 | Resolved: 2017-06-20

## 2017-06-20 PROBLEM — G93.40 ENCEPHALOPATHY ACUTE: Status: ACTIVE | Noted: 2017-06-20

## 2017-06-20 LAB
ANION GAP SERPL CALC-SCNC: 8 MMOL/L (ref 0–11.9)
BASE EXCESS BLDA CALC-SCNC: -2 MMOL/L (ref -4–3)
BASOPHILS # BLD AUTO: 0.4 % (ref 0–1.8)
BASOPHILS # BLD AUTO: 0.6 % (ref 0–1.8)
BASOPHILS # BLD: 0.02 K/UL (ref 0–0.12)
BASOPHILS # BLD: 0.04 K/UL (ref 0–0.12)
BODY TEMPERATURE: ABNORMAL DEGREES
BUN SERPL-MCNC: 7 MG/DL (ref 8–22)
CALCIUM SERPL-MCNC: 7.7 MG/DL (ref 8.5–10.5)
CHLORIDE SERPL-SCNC: 98 MMOL/L (ref 96–112)
CO2 BLDA-SCNC: 22 MMOL/L (ref 20–33)
CO2 SERPL-SCNC: 22 MMOL/L (ref 20–33)
CREAT SERPL-MCNC: <0.2 MG/DL (ref 0.5–1.4)
EOSINOPHIL # BLD AUTO: 0.05 K/UL (ref 0–0.51)
EOSINOPHIL # BLD AUTO: 0.07 K/UL (ref 0–0.51)
EOSINOPHIL NFR BLD: 0.9 % (ref 0–6.9)
EOSINOPHIL NFR BLD: 1 % (ref 0–6.9)
ERYTHROCYTE [DISTWIDTH] IN BLOOD BY AUTOMATED COUNT: 47.8 FL (ref 35.9–50)
ERYTHROCYTE [DISTWIDTH] IN BLOOD BY AUTOMATED COUNT: 48.1 FL (ref 35.9–50)
GFR SERPL CREATININE-BSD FRML MDRD: >60 ML/MIN/1.73 M 2
GLUCOSE BLD-MCNC: 118 MG/DL (ref 65–99)
GLUCOSE BLD-MCNC: 135 MG/DL (ref 65–99)
GLUCOSE BLD-MCNC: 67 MG/DL (ref 65–99)
GLUCOSE BLD-MCNC: 74 MG/DL (ref 65–99)
GLUCOSE BLD-MCNC: 77 MG/DL (ref 65–99)
GLUCOSE BLD-MCNC: 82 MG/DL (ref 65–99)
GLUCOSE SERPL-MCNC: 74 MG/DL (ref 65–99)
GRAM STN SPEC: NORMAL
HCO3 BLDA-SCNC: 21.3 MMOL/L (ref 17–25)
HCT VFR BLD AUTO: 27.8 % (ref 42–52)
HCT VFR BLD AUTO: 28.8 % (ref 42–52)
HGB BLD-MCNC: 10.1 G/DL (ref 14–18)
HGB BLD-MCNC: 9.5 G/DL (ref 14–18)
IMM GRANULOCYTES # BLD AUTO: 0.01 K/UL (ref 0–0.11)
IMM GRANULOCYTES # BLD AUTO: 0.03 K/UL (ref 0–0.11)
IMM GRANULOCYTES NFR BLD AUTO: 0.2 % (ref 0–0.9)
IMM GRANULOCYTES NFR BLD AUTO: 0.4 % (ref 0–0.9)
LYMPHOCYTES # BLD AUTO: 1.25 K/UL (ref 1–4.8)
LYMPHOCYTES # BLD AUTO: 1.39 K/UL (ref 1–4.8)
LYMPHOCYTES NFR BLD: 20 % (ref 22–41)
LYMPHOCYTES NFR BLD: 22.1 % (ref 22–41)
MAGNESIUM SERPL-MCNC: 1.4 MG/DL (ref 1.5–2.5)
MCH RBC QN AUTO: 29 PG (ref 27–33)
MCH RBC QN AUTO: 29.5 PG (ref 27–33)
MCHC RBC AUTO-ENTMCNC: 34.2 G/DL (ref 33.7–35.3)
MCHC RBC AUTO-ENTMCNC: 35.1 G/DL (ref 33.7–35.3)
MCV RBC AUTO: 84.2 FL (ref 81.4–97.8)
MCV RBC AUTO: 84.8 FL (ref 81.4–97.8)
MONOCYTES # BLD AUTO: 0.55 K/UL (ref 0–0.85)
MONOCYTES # BLD AUTO: 0.66 K/UL (ref 0–0.85)
MONOCYTES NFR BLD AUTO: 9.5 % (ref 0–13.4)
MONOCYTES NFR BLD AUTO: 9.7 % (ref 0–13.4)
NEUTROPHILS # BLD AUTO: 3.78 K/UL (ref 1.82–7.42)
NEUTROPHILS # BLD AUTO: 4.76 K/UL (ref 1.82–7.42)
NEUTROPHILS NFR BLD: 66.7 % (ref 44–72)
NEUTROPHILS NFR BLD: 68.5 % (ref 44–72)
NRBC # BLD AUTO: 0 K/UL
NRBC # BLD AUTO: 0 K/UL
NRBC BLD AUTO-RTO: 0 /100 WBC
NRBC BLD AUTO-RTO: 0 /100 WBC
O2/TOTAL GAS SETTING VFR VENT: 40 %
PCO2 BLDA: 29.5 MMHG (ref 26–37)
PCO2 TEMP ADJ BLDA: 29.3 MMHG (ref 26–37)
PH BLDA: 7.47 [PH] (ref 7.4–7.5)
PH TEMP ADJ BLDA: 7.47 [PH] (ref 7.4–7.5)
PLATELET # BLD AUTO: 144 K/UL (ref 164–446)
PLATELET # BLD AUTO: 166 K/UL (ref 164–446)
PMV BLD AUTO: 9 FL (ref 9–12.9)
PMV BLD AUTO: 9 FL (ref 9–12.9)
PO2 BLDA: 137 MMHG (ref 64–87)
PO2 TEMP ADJ BLDA: 136 MMHG (ref 64–87)
POTASSIUM SERPL-SCNC: 2.7 MMOL/L (ref 3.6–5.5)
RBC # BLD AUTO: 3.28 M/UL (ref 4.7–6.1)
RBC # BLD AUTO: 3.42 M/UL (ref 4.7–6.1)
RHODAMINE-AURAMINE STN SPEC: NORMAL
SAO2 % BLDA: 99 % (ref 93–99)
SIGNIFICANT IND 70042: NORMAL
SIGNIFICANT IND 70042: NORMAL
SITE SITE: NORMAL
SITE SITE: NORMAL
SODIUM SERPL-SCNC: 128 MMOL/L (ref 135–145)
SOURCE SOURCE: NORMAL
SOURCE SOURCE: NORMAL
SPECIMEN DRAWN FROM PATIENT: ABNORMAL
WBC # BLD AUTO: 5.7 K/UL (ref 4.8–10.8)
WBC # BLD AUTO: 7 K/UL (ref 4.8–10.8)

## 2017-06-20 PROCEDURE — 80048 BASIC METABOLIC PNL TOTAL CA: CPT

## 2017-06-20 PROCEDURE — 94003 VENT MGMT INPAT SUBQ DAY: CPT

## 2017-06-20 PROCEDURE — 700105 HCHG RX REV CODE 258: Performed by: INTERNAL MEDICINE

## 2017-06-20 PROCEDURE — 770022 HCHG ROOM/CARE - ICU (200)

## 2017-06-20 PROCEDURE — A9270 NON-COVERED ITEM OR SERVICE: HCPCS | Performed by: PSYCHIATRY & NEUROLOGY

## 2017-06-20 PROCEDURE — 700111 HCHG RX REV CODE 636 W/ 250 OVERRIDE (IP): Performed by: INTERNAL MEDICINE

## 2017-06-20 PROCEDURE — 71010 DX-CHEST-PORTABLE (1 VIEW): CPT

## 2017-06-20 PROCEDURE — 85025 COMPLETE CBC W/AUTO DIFF WBC: CPT

## 2017-06-20 PROCEDURE — 99291 CRITICAL CARE FIRST HOUR: CPT | Performed by: INTERNAL MEDICINE

## 2017-06-20 PROCEDURE — 700102 HCHG RX REV CODE 250 W/ 637 OVERRIDE(OP): Performed by: INTERNAL MEDICINE

## 2017-06-20 PROCEDURE — 82962 GLUCOSE BLOOD TEST: CPT

## 2017-06-20 PROCEDURE — 700102 HCHG RX REV CODE 250 W/ 637 OVERRIDE(OP): Performed by: PSYCHIATRY & NEUROLOGY

## 2017-06-20 PROCEDURE — 700101 HCHG RX REV CODE 250: Performed by: INTERNAL MEDICINE

## 2017-06-20 PROCEDURE — 700105 HCHG RX REV CODE 258: Performed by: PSYCHIATRY & NEUROLOGY

## 2017-06-20 PROCEDURE — 99233 SBSQ HOSP IP/OBS HIGH 50: CPT | Performed by: HOSPITALIST

## 2017-06-20 PROCEDURE — 51798 US URINE CAPACITY MEASURE: CPT

## 2017-06-20 PROCEDURE — 94640 AIRWAY INHALATION TREATMENT: CPT

## 2017-06-20 PROCEDURE — 82803 BLOOD GASES ANY COMBINATION: CPT

## 2017-06-20 PROCEDURE — 36600 WITHDRAWAL OF ARTERIAL BLOOD: CPT

## 2017-06-20 PROCEDURE — 95951 EEG-24 HR: CPT

## 2017-06-20 PROCEDURE — 83735 ASSAY OF MAGNESIUM: CPT

## 2017-06-20 PROCEDURE — A9270 NON-COVERED ITEM OR SERVICE: HCPCS | Performed by: INTERNAL MEDICINE

## 2017-06-20 PROCEDURE — 700111 HCHG RX REV CODE 636 W/ 250 OVERRIDE (IP): Performed by: PSYCHIATRY & NEUROLOGY

## 2017-06-20 RX ORDER — POTASSIUM CHLORIDE 29.8 MG/ML
40 INJECTION INTRAVENOUS ONCE
Status: COMPLETED | OUTPATIENT
Start: 2017-06-20 | End: 2017-06-20

## 2017-06-20 RX ORDER — POTASSIUM CHLORIDE 1.5 G/1.58G
40 POWDER, FOR SOLUTION ORAL ONCE
Status: COMPLETED | OUTPATIENT
Start: 2017-06-20 | End: 2017-06-20

## 2017-06-20 RX ORDER — MAGNESIUM SULFATE HEPTAHYDRATE 40 MG/ML
4 INJECTION, SOLUTION INTRAVENOUS ONCE
Status: COMPLETED | OUTPATIENT
Start: 2017-06-20 | End: 2017-06-20

## 2017-06-20 RX ORDER — PHENOBARBITAL 20 MG/5ML
60 ELIXIR ORAL 2 TIMES DAILY
Status: DISCONTINUED | OUTPATIENT
Start: 2017-06-20 | End: 2017-06-23

## 2017-06-20 RX ORDER — SODIUM CHLORIDE 9 MG/ML
1000 INJECTION, SOLUTION INTRAVENOUS ONCE
Status: COMPLETED | OUTPATIENT
Start: 2017-06-20 | End: 2017-06-20

## 2017-06-20 RX ADMIN — IPRATROPIUM BROMIDE AND ALBUTEROL SULFATE 3 ML: .5; 3 SOLUTION RESPIRATORY (INHALATION) at 06:30

## 2017-06-20 RX ADMIN — AMPICILLIN SODIUM AND SULBACTAM SODIUM 3 G: 2; 1 INJECTION, POWDER, FOR SOLUTION INTRAMUSCULAR; INTRAVENOUS at 12:25

## 2017-06-20 RX ADMIN — IPRATROPIUM BROMIDE AND ALBUTEROL SULFATE 3 ML: .5; 3 SOLUTION RESPIRATORY (INHALATION) at 02:21

## 2017-06-20 RX ADMIN — DEXTROSE MONOHYDRATE 1500 MG: 50 INJECTION, SOLUTION INTRAVENOUS at 09:18

## 2017-06-20 RX ADMIN — AMPICILLIN SODIUM AND SULBACTAM SODIUM 3 G: 2; 1 INJECTION, POWDER, FOR SOLUTION INTRAMUSCULAR; INTRAVENOUS at 23:50

## 2017-06-20 RX ADMIN — DEXTROSE MONOHYDRATE 1500 MG: 50 INJECTION, SOLUTION INTRAVENOUS at 20:01

## 2017-06-20 RX ADMIN — CALCIUM GLUCONATE 2 G: 94 INJECTION, SOLUTION INTRAVENOUS at 09:30

## 2017-06-20 RX ADMIN — CHLORHEXIDINE GLUCONATE 15 ML: 1.2 RINSE ORAL at 20:01

## 2017-06-20 RX ADMIN — AMPICILLIN SODIUM AND SULBACTAM SODIUM 3 G: 2; 1 INJECTION, POWDER, FOR SOLUTION INTRAMUSCULAR; INTRAVENOUS at 18:01

## 2017-06-20 RX ADMIN — POTASSIUM CHLORIDE 40 MEQ: 29.8 INJECTION, SOLUTION INTRAVENOUS at 07:28

## 2017-06-20 RX ADMIN — ENOXAPARIN SODIUM 30 MG: 100 INJECTION SUBCUTANEOUS at 20:00

## 2017-06-20 RX ADMIN — FAMOTIDINE 20 MG: 10 INJECTION INTRAVENOUS at 09:18

## 2017-06-20 RX ADMIN — POTASSIUM CHLORIDE AND SODIUM CHLORIDE: 900; 150 INJECTION, SOLUTION INTRAVENOUS at 05:31

## 2017-06-20 RX ADMIN — CHLORHEXIDINE GLUCONATE 15 ML: 1.2 RINSE ORAL at 09:18

## 2017-06-20 RX ADMIN — SODIUM CHLORIDE 1000 ML: 9 INJECTION, SOLUTION INTRAVENOUS at 00:31

## 2017-06-20 RX ADMIN — AMPICILLIN SODIUM AND SULBACTAM SODIUM 3 G: 2; 1 INJECTION, POWDER, FOR SOLUTION INTRAMUSCULAR; INTRAVENOUS at 07:27

## 2017-06-20 RX ADMIN — IPRATROPIUM BROMIDE AND ALBUTEROL SULFATE 3 ML: .5; 3 SOLUTION RESPIRATORY (INHALATION) at 15:14

## 2017-06-20 RX ADMIN — SENNOSIDES AND DOCUSATE SODIUM 2 TABLET: 8.6; 5 TABLET ORAL at 20:01

## 2017-06-20 RX ADMIN — PHENOBARBITAL SODIUM 528 MG: 130 INJECTION INTRAMUSCULAR; INTRAVENOUS at 17:17

## 2017-06-20 RX ADMIN — IPRATROPIUM BROMIDE AND ALBUTEROL SULFATE 3 ML: .5; 3 SOLUTION RESPIRATORY (INHALATION) at 10:54

## 2017-06-20 RX ADMIN — POTASSIUM CHLORIDE AND SODIUM CHLORIDE: 900; 150 INJECTION, SOLUTION INTRAVENOUS at 15:18

## 2017-06-20 RX ADMIN — IPRATROPIUM BROMIDE AND ALBUTEROL SULFATE 3 ML: .5; 3 SOLUTION RESPIRATORY (INHALATION) at 22:56

## 2017-06-20 RX ADMIN — DEXTROSE MONOHYDRATE 25 ML: 25 INJECTION, SOLUTION INTRAVENOUS at 18:40

## 2017-06-20 RX ADMIN — NOREPINEPHRINE BITARTRATE 10 MCG/MIN: 1 INJECTION INTRAVENOUS at 06:19

## 2017-06-20 RX ADMIN — PHENOBARBITAL 60 MG: 20 ELIXIR ORAL at 20:01

## 2017-06-20 RX ADMIN — MAGNESIUM SULFATE IN WATER 4 G: 40 INJECTION, SOLUTION INTRAVENOUS at 08:01

## 2017-06-20 RX ADMIN — AMPICILLIN SODIUM AND SULBACTAM SODIUM 3 G: 2; 1 INJECTION, POWDER, FOR SOLUTION INTRAMUSCULAR; INTRAVENOUS at 00:55

## 2017-06-20 RX ADMIN — IPRATROPIUM BROMIDE AND ALBUTEROL SULFATE 3 ML: .5; 3 SOLUTION RESPIRATORY (INHALATION) at 19:20

## 2017-06-20 RX ADMIN — FAMOTIDINE 20 MG: 20 TABLET, FILM COATED ORAL at 20:01

## 2017-06-20 NOTE — CARE PLAN
Problem: Nutritional:  Goal: Nutrition support tolerated and meeting greater than 85% of estimated needs  Outcome: NOT MET  R/t tube feeding refused per mother in favor of home-made tube feeding

## 2017-06-20 NOTE — DIETARY
"Nutrition Support Assessment - Male    Ruben Edwards is a 36 y.o. male with admitting DX of Aspiration pneumonia (CMS-[facility])    Pertinent History: history of TBI and quadriplegia.   Allergies: Sulfa drugs    Height: 172.7 cm (5' 7.99\")  Weight: 52.8 kg (116 lb 6.5 oz)  Weight to Use in Calculations: 57.6 kg (126 lb 15.8 oz)  Ideal Body Weight: 63.05 kg (139 lb) (Pt quadriplegic)  Percent Ideal Body Weight: 91.4  Body mass index is 17.7 kg/(m^2).     Pertinent Labs: Na 128, K+ 2.7, BUN 7, Cr .20   Last BM: 06/20/17  Pertinent Medications: pepcid, SSI, senokot  Pertinent Fluids: NaCl with 20 KCL @ 100 ml/hr, levophed @ 2 mcg/min,   Surgery / Procedures: na  Skin: pt with possible pressure ulcers to chest, buttock, arm - not seen by wound team yet    Estimated Needs:MSJ x 1.2 based on admit scale weight  Total Calories / day: 1800 - 2000 kcals (31 - 35 kcals/kg)   Total Grams Protein / day: 86 - 115 g (1.5 - 2.0 g/kg)  Total Fluids ml / day: 1731.1 ml         Assessment / Evaluation:    1) pt now on ventilator secondary to aspiration pneumonia, sepsis and respiratory distress  2) mom takes total care of her son at home and makes homemade tube feedings.  Discussed with mom that her son has increased nutritional needs secondary to infection, ventilator and possible pressure ulcers.  Mom is in agreement to use tube feeding at this time.    3) pt to benefit from peptide based, low CHO, tube feeding with fish oil, MCT oil, as well increased amounts of specific vitamins and minerals.     Plan / Recommendation:    1) start and advance TF per protocol  2) Impact 1.5 goal 50 ml/hr will provide 1800 kcals, 113 g protein, 926 ml H20/day  3) consider changing back to moms home TF when pt no longer is critically ill, and infections have resolved.         "

## 2017-06-20 NOTE — CARE PLAN
Problem: Ventilation Defect:  Goal: Ability to achieve and maintain unassisted ventilation or tolerate decreased levels of ventilator support  Intervention: Support and monitor invasive and noninvasive mechanical ventilation  Adult Ventilation Update    Total Vent Days: 2      Patient Lines/Drains/Airways Status    Active Airway      Name: Placement date: Placement time: Site: Days:     Airway Group ET Tube 8.0 06/19/17 1755    less than 1                 Plateau Pressure (Q Shift): 20 (06/20/17 1056)  Static Compliance (ml / cm H2O): 22 (06/20/17 1519)    Patient failed trials because of Barriers to Wean: Other (No order) (06/20/17 1056)  Cough: Productive (06/20/17 1519)  Sputum Amount: Small (06/20/17 1519)  Sputum Color: Tan (06/20/17 1519)  Sputum Consistency: Thick (06/20/17 1519)    Mobility Group  Activity Performed: Unable to mobilize (06/20/17 1056)  Assistance / Tolerance: Assistance of Two or More;General Weakness (06/19/17 1549)  Pt Calls for Assistance: No (06/20/17 0800)  Staff Present for Mobilization: RN;CNA (06/19/17 1549)  Assistive Devices: Hand held assist;Rails (06/19/17 1549)  Reason Not Mobilized: Bed rest (06/20/17 1056)  Mobilization Comments: 24 hour EEG (06/20/17 1056)    Events/Summary/Plan: vent check (06/20/17 1519)

## 2017-06-20 NOTE — PROGRESS NOTES
Jeana from Lab called with critical result of K+ 2.7 at 0640. Critical lab result read back to Jeana.   Dr. Araiza notified of critical lab result at 0644.  Critical lab result read back by Dr. Araiza.

## 2017-06-20 NOTE — PROGRESS NOTES
No changes in pt status, non-verbal, Quad,  2-3 Mask- mom places mask when needed, tele/HR - SR-ST, voids- [place], tolerates Mom's TF diet.  Non-verbal s/s of pain. Reviewed plan of care, continue to monitor vitals and manage pain.
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
## 2017-06-20 NOTE — PROGRESS NOTES
NEUROLOGY PROGRESS NOTE      Background:  36 y.o. male was admitted on 6/17/2017  7:07 AM for Aspiration pneumonia (CMS-[facility])  Aspiration pneumonia (CMS-HCC).  He is being followed by Neurology for status epilepticus.    SUBJECTIVE: Intubated, sedated. No clinical Sz activity. Became severely hypotensive and propofol was held.   Reported by Dr. Bolton to have 2 episode of Sz on EEG monitoring    NEUROLOGICAL EXAM:   NEUROLOGIC:  Limited as the patient is not interactive.  Apparently at    baseline, he just track with his eyes.  He is nonverbal, with a spastic    quadriparesis.    OBJECTIVE:     EEG:   (6/19)  This is an abnormal video EEG recording in the awake and drowsy state(s).  Frequent interictal / independent right and left temporal spikes. FOUR seizures captured during the study. All seizures were several seconds long, some involved secondary generalization. Two seizures appear to have a right temporal and two others a left temporal onset. There is no recovery of mentation (unknown baseline) in between events, in that sense, the patient should be considered in status epilepticus. 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 Clinical and radiological correlation is recommended.    MEDICATIONS:  Current Facility-Administered Medications   Medication Dose   • norepinephrine (LEVOPHED) 8 mg in  mL Infusion  0.5-30 mcg/min   • levetiracetam (KEPPRA) 1,500 mg in D5W 100 mL IVPB  1,500 mg   • enoxaparin (LOVENOX) inj 30 mg  30 mg   • 0.9 % NaCl with KCl 20 mEq infusion     • Respiratory Care per Protocol     • senna-docusate (PERICOLACE or SENOKOT S) 8.6-50 MG per tablet 2 Tab  2 Tab    And   • polyethylene glycol/lytes (MIRALAX) PACKET 1 Packet  1 Packet    And   • magnesium hydroxide (MILK OF MAGNESIA) suspension 30 mL  30 mL    And   • bisacodyl (DULCOLAX) suppository 10 mg  10 mg   • chlorhexidine (PERIDEX) 0.12 % solution 15 mL  15 mL   • lidocaine (XYLOCAINE) 1%  injection  1-2 mL   • MD ALERT...Adult ICU Electrolyte  "Replacement per Pharmacy Protocol     • fentaNYL (SUBLIMAZE) injection 25 mcg  25 mcg    Or   • fentaNYL (SUBLIMAZE) injection 50 mcg  50 mcg    Or   • fentaNYL (SUBLIMAZE) injection 100 mcg  100 mcg   • famotidine (PEPCID) tablet 20 mg  20 mg    Or   • famotidine (PEPCID) injection 20 mg  20 mg   • ipratropium-albuterol (DUONEB) nebulizer solution 3 mL  3 mL   • ipratropium-albuterol (DUONEB) nebulizer solution 3 mL  3 mL   • insulin lispro (HUMALOG) injection 1-6 Units  1-6 Units   • glucose 4 g chewable tablet 16 g  16 g    And   • dextrose 50% (D50W) injection 25 mL  25 mL   • propofol (DIPRIVAN) injection  5-80 mcg/kg/min   • sodium chloride 3% nebulizer solution 3 mL  3 mL   • NS infusion 1,632 mL  30 mL/kg   • NS (BOLUS) infusion 1,000 mL  1,000 mL   • ampicillin/sulbactam (UNASYN) 3 g in  mL IVPB  3 g   • ondansetron (ZOFRAN) syringe/vial injection 4 mg  4 mg   • ondansetron (ZOFRAN ODT) dispertab 4 mg  4 mg   • promethazine (PHENERGAN) tablet 12.5-25 mg  12.5-25 mg   • promethazine (PHENERGAN) suppository 12.5-25 mg  12.5-25 mg   • prochlorperazine (COMPAZINE) injection 5-10 mg  5-10 mg   • lorazepam (ATIVAN) tablet 0.5 mg  0.5 mg    Or   • lorazepam (ATIVAN) injection 0.5 mg  0.5 mg   • acetaminophen (TYLENOL) tablet 650 mg  650 mg   • Pain Pump (patient supplied) Device         Blood pressure 128/98, pulse 72, temperature 36.9 °C (98.5 °F), resp. rate 14, height 1.727 m (5' 7.99\"), weight 52.8 kg (116 lb 6.5 oz), SpO2 99 %.        Recent Labs      06/19/17   0344  06/20/17   0330  06/20/17   0530   WBC  10.2  7.0  5.7   RBC  4.26*  3.42*  3.28*   HEMOGLOBIN  12.4*  10.1*  9.5*   HEMATOCRIT  35.7*  28.8*  27.8*   MCV  83.8  84.2  84.8   MCH  29.1  29.5  29.0   MCHC  34.7  35.1  34.2   RDW  47.1  48.1  47.8   PLATELETCT  219  166  144*   MPV  9.0  9.0  9.0     Recent Labs      06/18/17   0341  06/19/17   0343  06/20/17   0530   SODIUM  124*  125*  128*   POTASSIUM  2.8*  3.5*  2.7*   CHLORIDE  91*  " 92*  98   CO2  25  26  22   GLUCOSE  81  80  74   BUN  7*  7*  7*       No results found for this or any previous visit.     ASSESSMENT AND PLAN:   A 36-year-old male with status epilepticus. Currently intubated.  Reported by Dr. Bolton to have 2 episode of Sz on EEG monitoring. Will con't  With prolonged EEG monitoring Keppra increased to 1500mg BID. Will load with   Phenobarb 10mg/kg and con't with 60md BID.

## 2017-06-20 NOTE — CONSULTS
DATE OF SERVICE:  06/19/2017    PULMONARY CRITICAL CARE CONSULTATION    REQUESTING PHYSICIAN:  Clarence Morfin DO    HISTORY OF PRESENT ILLNESS:  All information taken from the patient's family   at bedside as well as physician sign out as the patient is noncommunicative.    This is a 36-year-old gentleman with history of traumatic brain injury   approximately 18 years ago after [place].  He   had been in a nearly persistent vegetative state since, has not been verbal,   does not mobilize, previously was trached for approximately 1 year, but has   been trached free for approximately 18 years.  He continues to have a deep   stoma; however, it has apparently closed off.  He has a PEG tube for feeding;   however, he used to also take miniscule amounts of food orally as well up   until most recently.  Patient was admitted on 06/17/2017 with the concern of   aspiration pneumonia as well as increased tremor type activities with upper   extremity involvement.  The patient was initially placed on telemetry floor,   treated for aspiration pneumonitis with antibiotic therapy as well as   supportive therapy with IV fluids and tube feeds.  Over the past 24 hours or   so, there had been noted ongoing jerking type motions of the upper extremities   and therefore, patient had EEG.  The EEG did show several periods of seizures   lasting several seconds.  Based on this, neurology recommended that the   patient be transferred to intensive care unit for concern of underlying status   epilepticus, intubated and placed on propofol therapy, while antiepileptic   medications are being titrated and adjusted.  Patient has no history of known   seizure activity.  His only medication is a baclofen pump for a significant   spasticity and this is apparently managed by [facility].  At the present time,   no further history is currently available or able to be obtained.    ALLERGIES:  SULFA DRUGS.    OUTPATIENT  MEDICATIONS:  Include baclofen pump.    PAST MEDICAL HISTORY:  Only significant for previous traumatic brain injury   when he was 17 years old.    PAST SURGICAL HISTORY:  1.  Includes tracheostomy approximately 18 years ago removed after   approximately 1 year, still has a deep stump that apparently has closed off.  2.  PEG tube.    SOCIAL HISTORY:  Lives with family, is completely dependent for all ADLs.  No   tobacco or alcohol use.    FAMILY HISTORY:  Noncontributory.    REVIEW OF SYSTEMS:  Unable to obtain given the patient's clinical state.    PHYSICAL EXAMINATION:  VITAL SIGNS:  Temperature 37.2, heart rate 109, respiratory rate 20, blood   pressure within normal limits, satting 96% on 2 liters.  GENERAL:  The patient is noncommunicative, does not appear to be in acute   distress at this present time, and is quite contracted in all extremities.  HEENT:  Normocephalic, anicteric, moist mucous membranes.  NECK:  With noted stoma.  CARDIAC:  Tachy rate, regular rhythm.  RESPIRATORY:  Diffuse rhonchi.  ABDOMEN:  Soft, nondistended.  Positive bowel sounds.  PEG tube in place   without significant erythema or drainage.  EXTREMITIES:  All very contracted and in flexion.  NEUROLOGIC:  The patient is awake, does not follow commands, all extremities   are flexed and contracted.    LABORATORY DATA:  White count 10.2, H and H of 12 and 35, platelets 219.  No   current bands.  Chemistry with a sodium of 125, potassium 3.5, chloride 92,   bicarbonate 26, gap of 7, glucose 80, BUN 7, creatinine less than 0.2, calcium   8.6, AST 49, ALT 52, alkaline phosphatase 111, total bilirubin 0.7, INR 1.08,   PTT of 33.6.  Urinalysis:  500 glucose, 10 ketones, otherwise unremarkable.    Procalcitonin was 4.95.  Blood culture on 06/17/2017, no growth to date.    Urine culture on 06/17/2017 is negative.    IMAGING:  CT head on 06/17/2017 shows no acute intracranial findings.    Encephalomalacia in the left basal ganglia and frontal lobe.   Questionable   absence of the corpus callosum.  Diffuse atrophy.    Chest x-ray with diffuse bibasilar opacification, left greater than right.    EEG on 06/19/2017 showed 4 seizures captured during the study, all were   several seconds long, involving secondary generalization.  Seizures appeared   to have right temporal and 2 others left temporal onset per report and should   be considered in status epilepticus.    ASSESSMENT:  1.  New onset seizure activity with concern of status epilepticus.  2.  Aspiration pneumonia.  3.  Acute respiratory insufficiency secondary to above.  4.  Hyponatremia.  5.  Severe chronic underlying debility secondary to profound traumatic brain   injury 18 years ago.    PLAN:  1.  Acute hypoxic respiratory failure secondary to aspiration pneumonitis.    Patient will be intubated, put on full mechanical ventilation.  We will have   RT and O2 protocols, will be on empiric antibiotics.  We will also perform   bronchoscopy and BAL.  2.  Concern of underlying status epilepticus.  The patient had numerous   recurrent seizures on most recent EEG.  Under recommendation of neurology, the   patient will be intubated, put on propofol therapy as well as initiated on   antiepileptics per their choosing.  The patient will be under seizure   precautions, will remain fully sedate and on full mechanical ventilatory   support, utilizing propofol as sedation of choice, further EEGs per neurology.    Total critical care time not including billable procedures 45 minutes.       ____________________________________     MD OMAR Gomes / MAXINE    DD:  06/19/2017 17:42:30  DT:  06/19/2017 21:15:09    D#:  [id number]  Job#:  [id number]

## 2017-06-20 NOTE — CARE PLAN
Problem: Communication  Goal: The ability to communicate needs accurately and effectively will improve  Outcome: PROGRESSING AS EXPECTED  Discussed Plan of Care, daily medications with patient, reviewed diet, and activity.  Reviewed with mom, pt non-verbal

## 2017-06-20 NOTE — CARE PLAN
Problem: Skin Integrity  Goal: Risk for impaired skin integrity will decrease  Outcome: PROGRESSING AS EXPECTED  Pt on waffle overlay mattress. Mepilex on coccyx and to upper back. Pt turned q2h. Full skin assessment performed every shift and throughout shift as needed.    Problem: Respiratory:  Goal: Respiratory status will improve  Outcome: PROGRESSING AS EXPECTED  VAP bundle in place. Q2h suctioning and oral care provided. Continuous pulse ox monitoring in place. Collaborating with MD and RT for vent management.

## 2017-06-20 NOTE — PROCEDURES
Date: 6/19/2017      Procedure:  Emergent orotracheal intubation    Indication: Status Epilepticus    Physician:  Kev Araiza M.D.    Consent:  Emergent     Procedure:  This patient has developed increasing respiratory failure in status epilepticus and requires emergent intubation.  RSI given with etomidate and rocuronium.  Using a #3 mac, a 8.0 ETT was placed on the first attempt w/o complication.  Tube placement confirmed by direct visualization of placement, end-tidal CO2 monitor color change and equal breath sounds.  No immediate complications.  Vitals remained stable throughout the procedure.  A post-procedure CXR will be reviewed.

## 2017-06-20 NOTE — ASSESSMENT & PLAN NOTE
- had continuous seizure activity for approximately 48 hours suspected from pneumonia.   - neurology following  - on phenobarbital and keppra  -no further events

## 2017-06-20 NOTE — PROGRESS NOTES
[facility] Hospitalist Progress Note    Date of Service: 2017    Chief Complaint  36 y.o. male admitted 2017 with altered mental status.    Interval Problem Update  Mr. Edwards has a hx of TBI at age 17. He was admitted with aspiration pneumonitis. EEG revealed status epilepticus thus he was intubated on  placed on IV keppra and propofol.   Due to low BP last night, propofol was held and levophed started with one liter NS bolus given  CVP 8-12  Urine output 400 ml over night.  I met with Ruben's mother at bedside and we discussed his condition at length.  Consultants/Specialty  Neurology  Pulmonology  I discussed with Dr. Araiza on [facility]    Disposition  ICU        Review of Systems   Unable to perform ROS: intubated      Physical Exam  Laboratory/Imaging   Hemodynamics  Temp (24hrs), Av.4 °C (97.6 °F), Min:35.8 °C (96.4 °F), Max:37.2 °C (98.9 °F)   Temperature: 36.6 °C (97.9 °F)  Pulse  Av.8  Min: 71  Max: 131 Heart Rate (Monitored): 75  Blood Pressure: 128/98 mmHg, NIBP: 105/62 mmHg CVP (mm Hg): (!) 12 MM HG    Respiratory  Liriano Vent Mode: APVCMV, Rate (breaths/min): 14, PEEP/CPAP: 8, PEEP/CPAP: 8, FiO2: 30, P Peak (PIP): 24, P MEAN: 11   Respiration: 14, Pulse Oximetry: 99 %, O2 Daily Delivery Respiratory : OxyMask     Given By:: Mask, PEP/CPT Method: Positive Airway Pressure Device;Percussion/Vibration, Work Of Breathing / Effort: Vented  RUL Breath Sounds: Clear, RML Breath Sounds: Clear, RLL Breath Sounds: Diminished, JONATAN Breath Sounds: Clear, LLL Breath Sounds: Diminished    Fluids    Intake/Output Summary (Last 24 hours) at 17 0824  Last data filed at 17 0600   Gross per 24 hour   Intake 3764.91 ml   Output   1296 ml   Net 2468.91 ml       Nutrition  Orders Placed This Encounter   Procedures   • Diet NPO     Standing Status: Standing      Number of Occurrences: 1      Standing Expiration Date:      Order Specific Question:  Type:     Answer:  Now [1]     Order Specific  Question:  Restrict to:     Answer:  Strict [1]     Physical Exam   Constitutional: No distress.   HENT:   EEG monitor on.   Endotracheal tube.    Neck:   Right IJ central line without bleeding.   Well-healed tracheostomy site.    Cardiovascular: Regular rhythm.    No murmur heard.  Pulmonary/Chest:   Vent, good air movement   Abdominal: He exhibits no distension.   PEG   Genitourinary:   Waldrop cath   Musculoskeletal: He exhibits no edema or tenderness.   Poor muscle tone   Neurological:   Contractures of the arms and legs. Sedated. He does not follow.    Skin: Skin is warm. No rash noted. There is pallor.       Recent Labs      06/19/17   0344  06/20/17   0330  06/20/17   0530   WBC  10.2  7.0  5.7   RBC  4.26*  3.42*  3.28*   HEMOGLOBIN  12.4*  10.1*  9.5*   HEMATOCRIT  35.7*  28.8*  27.8*   MCV  83.8  84.2  84.8   MCH  29.1  29.5  29.0   MCHC  34.7  35.1  34.2   RDW  47.1  48.1  47.8   PLATELETCT  219  166  144*   MPV  9.0  9.0  9.0     Recent Labs      06/18/17   0341  06/19/17   0343  06/20/17   0530   SODIUM  124*  125*  128*   POTASSIUM  2.8*  3.5*  2.7*   CHLORIDE  91*  92*  98   CO2  25  26  22   GLUCOSE  81  80  74   BUN  7*  7*  7*   CREATININE  <0.20*  <0.20*  <0.20*   CALCIUM  8.4*  8.6  7.7*     Recent Labs      06/17/17   1601   APTT  33.6   INR  1.08                  Assessment/Plan     Status epilepticus (CMS-HCC) (present on admission)  Assessment & Plan  As evidenced on EEG. Continuous EEG on  Neurology consultation.  IV keppra loaded.     Aspiration pneumonia (CMS-[facility]) (present on admission)  Assessment & Plan  - pt unable to get his secretions out  - continue IV unasyn  - await culture results     Sepsis (CMS-[facility])  Assessment & Plan  - secondary to aspiration pneumonia  - continue IV abx    Acute respiratory failure with hypoxia (CMS-[facility]) (present on admission)  Assessment & Plan  Intubated on 6/19.  Pulmonary consult.    Hyponatremia (present on admission)  Assessment & Plan  - persistent  despite IV fluids  - repeat bmp in am    Hypoglycemia (present on admission)  Assessment & Plan  - resolved    Encephalopathy acute (present on admission)  Assessment & Plan  Secondary to status epilepticus.    TBI (traumatic brain injury) (CMS-HCC) (present on admission)  Assessment & Plan  Since age 17 years.  With quadriplegia   [facility]aclofen pump followed at [facility].    Hypokalemia (present on admission)  Assessment & Plan  IV replacement.      Labs reviewed and Medications reviewed  Waldrop catheter: Unconscious / Sedated Patient on a Ventilator      DVT Prophylaxis: Enoxaparin (Lovenox)

## 2017-06-20 NOTE — PROGRESS NOTES
Pulmonary Critical Care Progress Note      Date of service: 6/20/2017     Chief Complaint: status epilepticus     History of Present Illness: All information taken from the patient's family    at bedside as well as physician sign out as the patient is noncommunicative.     This is a 36-year-old gentleman with history of traumatic brain injury    approximately 18 years ago after a [place].  He    had been in a nearly persistent vegetative state since, has not been verbal,    does not mobilize, previously was trached for approximately 1 year, but has    been trached free for approximately 18 years.  He continues to have a deep    stoma; however, it has apparently closed off.  He has a PEG tube for feeding;    however, he used to also take miniscule amounts of food orally as well up    until most recently.  Patient was admitted on 06/17/2017 with the concern of    aspiration pneumonia as well as increased tremor type activities with upper    extremity involvement.  The patient was initially placed on telemetry floor,    treated for aspiration pneumonitis with antibiotic therapy as well as    supportive therapy with IV fluids and tube feeds.  Over the past 24 hours or    so, there had been noted ongoing jerking type motions of the upper extremities   and therefore, patient had EEG.  The EEG did show several periods of seizures   lasting several seconds.  Based on this, neurology recommended that the    patient be transferred to intensive care unit for concern of underlying status   epilepticus, intubated and placed on propofol therapy, while antiepileptic    medications are being titrated and adjusted.  Patient has no history of known    seizure activity.  His only medication is a baclofen pump for a significant    spasticity and this is apparently managed by [facility].  At the present time,    no further history is currently available or able to be obtained.    ROS:  Respiratory: unable to  perform due to the patient's inability to effectively communicate, Cardiac: unable to perform due to the patient's inability to effectively communicate, GI: unable to perform due to the patient's inability to effectively communicate.  All other systems negative.    Interval Events:  24 hour interval history reviewed     Tmax 98.9 °F   +2.5 liters over the last 24 hours, +1.7 liters since admit   CXR shows: Slightly improved left lower lobe consolidation and infiltrate.    Does not follow.   Opens eyes, pupils are questionably reactive. Withdraws all extremities but is very contracted.  SR, 70's-80s  Hypotensive overnight   Started on Levo, Levo at 3  Fluid bolus administered last night   NPO, PEG tube in place  Bedrest orders  Vent day 2   FiO2 40% PEEP 8    PFSH:  No change.    Physical Exam  General: nad, spastic quadriparesis   Neuro/Psych: does not follow, sedate  HEENT: nc, at, deep stoma to ant neck  CVS: rrr  Respiratory: scattered rhonchi  Abdomen/: s, nt, peg in place  Extremities: contracted, no edema    Respiratory:  Liriano Vent Mode: APVCMV, Rate (breaths/min): 14, Vt Target (mL): 400, PEEP/CPAP: 8, FiO2: 30, Static Compliance (ml / cm H2O): 23, Control VTE (exp VT): 435  Pulse Oximetry: 100 %  Chest Tube Drains:            ImagingAvailable data reviewed   Recent Labs      06/19/17 2015 06/20/17   0431   ISTATAPH  7.520*  7.466   ISTATAPCO2  32.3  29.5   ISTATAPO2  384*  137*   ISTATATCO2  27  22   PSLTAHL5HTH  100*  99   ISTATARTHCO3  26.3*  21.3   ISTATARTBE  4*  -2   ISTATTEMP  96.4 F  36.8 C   ISTATFIO2  100  40   ISTATSPEC  Arterial  Arterial   ISTATAPHTC  7.539*  7.469   EPHVLAIP9ZZ  377*  136*       HemoDynamics:  Pulse: 71, Heart Rate (Monitored): 63  Blood Pressure: 128/98 mmHg, NIBP: (!) 82/47 mmHg  CVP (mm Hg): (!) 8 MM HG      Imaging: Available data reviewed        Neuro:  GCS Total [place] Coma Score: 10       Imaging: Available data reviewed    Fluids:  Intake/Output       06/18/17  0700 - 06/19/17 0659 06/19/17 0700 - 06/20/17 0659 06/20/17 0700 - 06/21/17 0659      0415-3262 1443-5442 Total 0700-1859 1900-0659 Total 0700-1859 1900-0659 Total       Intake    I.V.  600  -- 600  1200  2384.9 3584.9  --  -- --    Propofol Volume -- -- -- -- 84.9 84.9 -- -- --    IV Volume (< Enter Name Here >) 600 -- 600 1000 1000 2000 -- -- --    IV Volume (NS 20K+) -- -- -- -- 1200 1200 -- -- --    IV Piggyback Volume (IV Piggyback) -- -- -- 200 100 300 -- -- --    Enteral  645  -- 645  180  -- 180  --  -- --    Enteral Volume 645 -- 645 180 -- 180 -- -- --    Total Intake 1245 -- 1245 1380 2384.9 3764.9 -- -- --       Output    Urine  750  850 1600  900  396 1296  --  -- --    Indwelling Cathether   -- -- --    Total Output   -- -- --       Net I/O     495 -850 -.9 2468.9 -- -- --        Weight: 52.8 kg (116 lb 6.5 oz)  Recent Labs      06/17/17   0745  06/18/17 0341 06/19/17   0343  06/20/17   0330   SODIUM  127*  124*  125*   --    POTASSIUM  4.2  2.8*  3.5*   --    CHLORIDE  93*  91*  92*   --    CO2  23  25  26   --    BUN  23*  7*  7*   --    CREATININE  0.35*  <0.20*  <0.20*   --    MAGNESIUM   --    --    --   1.4*   CALCIUM  8.6  8.4*  8.6   --        GI/Nutrition:    Imaging: Available data reviewed  NPO  Liver Function  Recent Labs      06/17/17   0745  06/18/17 0341 06/19/17   0343   ALTSGPT  37  35  52*   ASTSGOT  34  41  49*   ALKPHOSPHAT  118*  102*  111*   TBILIRUBIN  0.3  0.5  0.7   PREALBUMIN   --    --   8.0*   GLUCOSE  39*  81  80       Heme:  Recent Labs      06/17/17   1601   06/19/17   0344  06/20/17   0330  06/20/17   0530   RBC   --    < >  4.26*  3.42*  3.28*   HEMOGLOBIN   --    < >  12.4*  10.1*  9.5*   HEMATOCRIT   --    < >  35.7*  28.8*  27.8*   PLATELETCT   --    < >  219  166  144*   PROTHROMBTM  14.3   --    --    --    --    APTT  33.6   --    --    --    --    INR  1.08   --    --    --    --     < > = values  in this interval not displayed.       Infectious Disease:  Temp  Av.4 °C (97.6 °F)  Min: 35.8 °C (96.4 °F)  Max: 37.2 °C (98.9 °F)  Micro: cultures reviewed  Recent Labs      17   0745  17   0341  17   0343  17   0344  17   0330  17   0530   WBC  28.0*  17.7*   --   10.2  7.0  5.7   NEUTSPOLYS  88.00*  87.00*   --   81.40*  68.50  66.70   LYMPHOCYTES  5.00*  5.20*   --   8.00*  20.00*  22.10   MONOCYTES  4.00  5.20   --   8.80  9.50  9.70   EOSINOPHILS  0.00  0.00   --   0.90  1.00  0.90   BASOPHILS  0.00  0.00   --   0.90  0.60  0.40   ASTSGOT  34  41  49*   --    --    --    ALTSGPT  37  35  52*   --    --    --    ALKPHOSPHAT  118*  102*  111*   --    --    --    TBILIRUBIN  0.3  0.5  0.7   --    --    --      Current Facility-Administered Medications   Medication Dose Frequency Provider Last Rate Last Dose   • norepinephrine (LEVOPHED) 8 mg in  mL Infusion  0.5-30 mcg/min Continuous Rivas Dee M.D. 7.5 mL/hr at 1731 4 mcg/min at 1731   • levetiracetam (KEPPRA) 1,500 mg in D5W 100 mL IVPB  1,500 mg Q12HRS Martell Hunter M.D.       • enoxaparin (LOVENOX) inj 30 mg  30 mg DAILY FARHAT MedinaO.   30 mg at 17   • 0.9 % NaCl with KCl 20 mEq infusion   Continuous Clarence Morfin D.O. 100 mL/hr at 1731     • Respiratory Care per Protocol   Continuous RT Kev Araiza M.D.       • senna-docusate (PERICOLACE or SENOKOT S) 8.6-50 MG per tablet 2 Tab  2 Tab BID Kev Araiza M.D.   Stopped at 17 2100    And   • polyethylene glycol/lytes (MIRALAX) PACKET 1 Packet  1 Packet QDAY PRBRIDGETTE Araiza M.D.        And   • magnesium hydroxide (MILK OF MAGNESIA) suspension 30 mL  30 mL QDAY SUSAN Araiza M.D.        And   • bisacodyl (DULCOLAX) suppository 10 mg  10 mg QDAY PRBRIDGETTE Araiza M.D.       • chlorhexidine (PERIDEX) 0.12 % solution 15 mL  15 mL BID Kev Araiza M.D.   15 mL at 17    • lidocaine (XYLOCAINE) 1%  injection  1-2 mL Q30 MIN PRN Kev Araiza M.D.       • MD ALERT...Adult ICU Electrolyte Replacement per Pharmacy Protocol   pharmacy to dose Kev Araiza M.D.       • MD ALERT...Pharmacy to implement PROPOFOL CRITICAL CARE PROTOCOL 1 Each  1 Each PRN Kev Araiza M.D.       • fentaNYL (SUBLIMAZE) injection 25 mcg  25 mcg Q HOUR PRN Kev Arazia M.D.        Or   • fentaNYL (SUBLIMAZE) injection 50 mcg  50 mcg Q HOUR PRN Kev Araiza M.D.        Or   • fentaNYL (SUBLIMAZE) injection 100 mcg  100 mcg Q HOUR PRN Kev Araiza M.D.   100 mcg at 06/19/17 1937   • famotidine (PEPCID) tablet 20 mg  20 mg Q12HRS Kev Araiza M.D.        Or   • famotidine (PEPCID) injection 20 mg  20 mg Q12HRS Kev Araiza M.D.   20 mg at 06/19/17 1957   • ipratropium-albuterol (DUONEB) nebulizer solution 3 mL  3 mL Q2HRS PRN (RT) Kev Araiza M.D.       • ipratropium-albuterol (DUONEB) nebulizer solution 3 mL  3 mL Q4HRS (RT) Kev Araiza M.D.   3 mL at 06/20/17 0630   • insulin lispro (HUMALOG) injection 1-6 Units  1-6 Units Q6HRS Kev Araiza M.D.   Stopped at 06/19/17 1845   • glucose 4 g chewable tablet 16 g  16 g Q15 MIN PRN Kev Araiza M.D.        And   • dextrose 50% (D50W) injection 25 mL  25 mL Q15 MIN PRN Kev Araiza M.D.       • propofol (DIPRIVAN) injection  5-80 mcg/kg/min Continuous Wenceslao Partida, PHARMD   Stopped at 06/20/17 0440   • sodium chloride 3% nebulizer solution 3 mL  3 mL 4X/DAY (RT) Clarence Morfin D.O.   3 mL at 06/19/17 1844   • NS infusion 1,632 mL  30 mL/kg Once PRN Clarence Morfin D.O.       • NS (BOLUS) infusion 1,000 mL  1,000 mL Once PRN FARHAT MedinaO.   Stopped at 06/17/17 1836   • ampicillin/sulbactam (UNASYN) 3 g in  mL IVPB  3 g Q6HRS FARHAT MedinaO.   Stopped at 06/20/17 0125   • ondansetron (ZOFRAN) syringe/vial injection 4 mg  4 mg Q4HRS PRN FARHAT MedinaO.       • ondansetron (ZOFRAN  ODT) dispertab 4 mg  4 mg Q4HRS PRN JASPAL Medina.O.       • promethazine (PHENERGAN) tablet 12.5-25 mg  12.5-25 mg Q4HRS PRN JASPAL Medina.O.       • promethazine (PHENERGAN) suppository 12.5-25 mg  12.5-25 mg Q4HRS PRN JASPAL Medina.O.       • prochlorperazine (COMPAZINE) injection 5-10 mg  5-10 mg Q4HRS PRN JASPAL Medina.O.       • lorazepam (ATIVAN) tablet 0.5 mg  0.5 mg Q6HRS PRN JASPAL Medina.O.   Stopped at 06/19/17 0248    Or   • lorazepam (ATIVAN) injection 0.5 mg  0.5 mg Q6HRS PRN JASPAL Medina.O.   0.5 mg at 06/19/17 0255   • acetaminophen (TYLENOL) tablet 650 mg  650 mg Q6HRS PRN JASPAL Medina.O.   650 mg at 06/17/17 1857   • Pain Pump (patient supplied) Device   Continuous JASPAL Medina.O.   Stopped at 06/17/17 1230     Last reviewed on 6/17/2017  9:45 AM by Ligia Frazier, Pharmacy Int    Quality  Measures:  Radiology images reviewed, Medications reviewed and Labs reviewed                    Gtts:     Levo 3  Propofol off    Abx:   Unasyn     Cultures:   Urine 6/17 negative  Blood 6/17 negative  BAL 6/19 NLF GNRs    Assessment/Plan  -  New onset seizure activity with concern of status epilepticus.   - keppra   - propofol   - continuous eeg   - neuro following  -  Aspiration pneumonia.   - intubated 6/19 (mainly for sz control)   - bal 6/19 NLFGNRs, diffuse thick secretions noted   - on empiric abx  -  Acute respiratory insufficiency secondary to above.   - rt/02 protcol   - full vent support   - sat/sbt once sz activity suppressed  -  Hyponatremia.  - HypoK and Hyop Mag - repace  -  Severe chronic underlying debility secondary to profound traumatic brain    injury 18 years ago.    Discussed patient condition and risk of morbidity and/or mortality with RN, RT, Therapies and Pharmacy.    The patient remains critically ill.  Critical care time = 35 minutes in directly providing and coordinating critical care and extensive data review.  No time overlap and  excludes procedures.     IKathleen (Scribe), am scribing for, and in the presence of, Kev Araiza M.D.    Electronically signed by: Kathleen Wills ([place]), 6/20/2017    IKev M.D. personally performed the services described in this documentation, as scribed by Kathleen Wills in my presence, and it is both accurate and complete.

## 2017-06-20 NOTE — WOUND TEAM
"[facility]  Inpatient Services  Initial Wound & Skin Care Evaluation    Admission Date:  6/17/2017   HPI, PMH, SH: Reviewed  Unit where seen by Wound Team: S115/[id number]    WOUND CONSULT RELATED TO: multiple wounds    SUBJECTIVE:   Intubated, non-verbal     Self Report / Pain Level: no s/s of distress      OBJECTIVE: on BEATRICE bed with waffle overlay, heel float boots  WOUND TYPE, LOCATION, CHARACTERISTICS (Pressure ulcers: location, stage, POA or date identified)  Pressure Ulcer  Deep Tissue Injury POA Chest Right Medial  Periwound Skin: Intact  Drainage : None  Tissue Type and %:    Red/purplish 100%  Wound Edges:    attached    Odor:     None   Exposed structure(s):   No   Signs and Symptoms of Infection: none    Pressure Ulcer  Stage 3 POA Ischium Left  Periwound Skin: Intact  Drainage : None  Tissue Type and %:    Red/pink 100%  Wound Edges:    Attached, scarred    Odor:     None   Exposed structure(s):   No   Signs and Symptoms of Infection: none    Pressure Ulcer  Stage 2 POA Arm Right Middle  Periwound Skin: Intact  Drainage : None  Tissue Type and %:    Red/pink 100% \"T\" shaped  Wound Edges:    attached    Odor:     None   Exposed structure(s):   No   Signs and Symptoms of Infection: none    Wound Rash Elbow Right Anterior  Periwound Skin: Intact  Drainage : Scant, Serosanguinous     Tissue Type and %:    Red, [id number]%  Wound Edges:    attached    Odor:     None   Exposed structure(s):   No   Signs and Symptoms of Infection: none    Measurements: taken 6/20/17    Chest Right  L ischium R arm  R elbow  Length (cm):  2  1  1.5  2  Width (cm):  1  0.5  1.5  1  Depth (cm):  (nm)  (nm)  0.1  <0.1     Tracts/undermining:  All None     INTERVENTIONS BY WOUND TEAM: assessed pt's skin and bony prominences, removed foam drsg from R elbow, applied barrier paste. Cleaned L ischium with NS, dried. Applied adhesive foam. Cleaned of BM, repositioned, Waffle folded to float heels, waffle cushion is present under " head and between knees.   Chest Right Medial; Arm Right;  Elbow Right-Dressing Options: Open to Air  Ischium Left-Dressing Options: Foam    Interdisciplinary consultation:   With Nursing;With Patient / Family    EVALUATION: pt has POA DTI to R chest, resolving St 3 to L ischium, St 2 to R arm/wrist. There is discolored IAD to skin in gluteal cleft 12 x 8 cm. Scar tissue present to coccyx. Barrier paste to skin of elbow and buttocks to protect from moisture.     Factors affecting wound healing: contractures, quadriplegia, aspiration pneumonia   Goals: decreased wound size 1% each week      NURSING PLAN OF CARE ORDERS (X):    Dressing changes: See Dressing Maintenance orders:   Skin care: See Skin Care orders: x  Rectal tube care: See Rectal Tube Care orders:   Other orders:    RSKIN: CURRENT (X) ORDERED (O)  Q shift Neo:  X  Q shift pressure point assessments:  X  Pressure redistribution mattress        BEATRICE  X with waffle overlay    Bariatric BEATRICE      Bariatric foam        Heel float boots    x   Heels floated on pillows      Barrier wipes    x  Barrier Cream  x    Barrier paste      Sacral silicone dressing      Padded O2 tubing      Anchorfast   x   Trach with Optifoam split foam       Waffle cushion      Rectal tube or BMS      Antifungal tx    Turn q 2 hours x  Up to chair  Ambulate   PT/OT     Dietician    x  PO     TF   TPN     PVN    NPO 1  # days   Other       WOUND TEAM PLAN OF CARE (X):   NPWT change 3 x week:        Dressing changes by wound team:       Follow up as needed:  x     Other (explain):    Anticipated discharge plans (X):  SNF:           Home Care:           Outpatient Wound Center:            Self Care:            Other:  tbd

## 2017-06-20 NOTE — PROCEDURES
"Date: 6/19/2017    Procedure:  Central Venous Catheter Insertion    Indication: access    Physician:  Kev Araiza M.D.    Consent:  Obtained from NOK and included in the medical record; time out performed    Procedure:  The patient was placed in the [place] position.  Appropriate \"time out\" was performed.  The right neck was prepped and draped in the usual sterile fashion.  Under full body sterile barrier precautions, a triple lumen central venous catheter was placed without difficulty in the right IJ position.  US was used for localization and placement.  All lumens were flushed with sterile saline and sterile caps were applied.  The line was sutured in place and a sterile dressing was applied.  There were no immediate complications.  A post-procedure CXR will be reviewed.  Estimated blood loss < 5cc.      "

## 2017-06-20 NOTE — PROGRESS NOTES
Dr. Benedict updated on pt's low u/o and low BP. Also notified MD that pt is more obtunded. Order for levophed for MAP >65 received. Will pass on updates for dayshift to address in rounds.

## 2017-06-20 NOTE — PROCEDURES
Date: 6/19/2017    Procedure:  Emergent bronchoscopy    Indication: Respiratory failure, ? pneumonia    Physician:  Kev Araiza M.D.    Consent:  Emergent     Procedure:  This patient has developed increasing respiratory failure and required emergent intubation.  Bronchoscopy was indicated for airway evaluation and BAL to evaluate for pneumonia.  A flexible FOB was inserted through the ETT without difficulty.  All airways were evaluated to the sub-segmental level.  The airway mucosa was diffusely inflamed with dark, thin, secretions.  No endobronchial lesions were seen.  The bronchoscope was then wedged in a segment of the right lower lobe.  20cc of saline was instilled with moderate return of dark, thin, homogenous BAL fluid.  The BAL specimen will be sent for appropriate culture.  No immediate complications.  EBL = 0.

## 2017-06-20 NOTE — ASSESSMENT & PLAN NOTE
Since age 17 years.  With quadriplegia   Baclofen pump followed at [facility]--his mother states it does not need refilled for a few months.

## 2017-06-21 ENCOUNTER — APPOINTMENT (OUTPATIENT)
Dept: RADIOLOGY | Facility: MEDICAL CENTER | Age: 37
DRG: 004 | End: 2017-06-21
Attending: INTERNAL MEDICINE
Payer: COMMERCIAL

## 2017-06-21 LAB
ANION GAP SERPL CALC-SCNC: 6 MMOL/L (ref 0–11.9)
BACTERIA SPEC RESP CULT: ABNORMAL
BACTERIA SPEC RESP CULT: ABNORMAL
BASE EXCESS BLDA CALC-SCNC: 1 MMOL/L (ref -4–3)
BASOPHILS # BLD AUTO: 0.5 % (ref 0–1.8)
BASOPHILS # BLD: 0.03 K/UL (ref 0–0.12)
BODY TEMPERATURE: ABNORMAL DEGREES
BUN SERPL-MCNC: 4 MG/DL (ref 8–22)
CALCIUM SERPL-MCNC: 7.7 MG/DL (ref 8.5–10.5)
CHLORIDE SERPL-SCNC: 104 MMOL/L (ref 96–112)
CO2 BLDA-SCNC: 25 MMOL/L (ref 20–33)
CO2 SERPL-SCNC: 24 MMOL/L (ref 20–33)
CREAT SERPL-MCNC: <0.2 MG/DL (ref 0.5–1.4)
EOSINOPHIL # BLD AUTO: 0.19 K/UL (ref 0–0.51)
EOSINOPHIL NFR BLD: 3.2 % (ref 0–6.9)
ERYTHROCYTE [DISTWIDTH] IN BLOOD BY AUTOMATED COUNT: 49.2 FL (ref 35.9–50)
GFR SERPL CREATININE-BSD FRML MDRD: >60 ML/MIN/1.73 M 2
GLUCOSE BLD-MCNC: 111 MG/DL (ref 65–99)
GLUCOSE BLD-MCNC: 112 MG/DL (ref 65–99)
GLUCOSE BLD-MCNC: 115 MG/DL (ref 65–99)
GLUCOSE BLD-MCNC: 120 MG/DL (ref 65–99)
GLUCOSE SERPL-MCNC: 122 MG/DL (ref 65–99)
GRAM STN SPEC: ABNORMAL
HCO3 BLDA-SCNC: 23.7 MMOL/L (ref 17–25)
HCT VFR BLD AUTO: 29.1 % (ref 42–52)
HGB BLD-MCNC: 10.1 G/DL (ref 14–18)
IMM GRANULOCYTES # BLD AUTO: 0.02 K/UL (ref 0–0.11)
IMM GRANULOCYTES NFR BLD AUTO: 0.3 % (ref 0–0.9)
LYMPHOCYTES # BLD AUTO: 1.37 K/UL (ref 1–4.8)
LYMPHOCYTES NFR BLD: 22.9 % (ref 22–41)
MAGNESIUM SERPL-MCNC: 1.6 MG/DL (ref 1.5–2.5)
MCH RBC QN AUTO: 29.1 PG (ref 27–33)
MCHC RBC AUTO-ENTMCNC: 34.7 G/DL (ref 33.7–35.3)
MCV RBC AUTO: 83.9 FL (ref 81.4–97.8)
MONOCYTES # BLD AUTO: 0.57 K/UL (ref 0–0.85)
MONOCYTES NFR BLD AUTO: 9.5 % (ref 0–13.4)
NEUTROPHILS # BLD AUTO: 3.8 K/UL (ref 1.82–7.42)
NEUTROPHILS NFR BLD: 63.6 % (ref 44–72)
NRBC # BLD AUTO: 0 K/UL
NRBC BLD AUTO-RTO: 0 /100 WBC
O2/TOTAL GAS SETTING VFR VENT: 30 %
PCO2 BLDA: 31.3 MMHG (ref 26–37)
PCO2 TEMP ADJ BLDA: 30.7 MMHG (ref 26–37)
PH BLDA: 7.49 [PH] (ref 7.4–7.5)
PH TEMP ADJ BLDA: 7.5 [PH] (ref 7.4–7.5)
PHENOBARB SERPL-MCNC: 14.3 UG/ML (ref 15–40)
PHOSPHATE SERPL-MCNC: <1 MG/DL (ref 2.5–4.5)
PLATELET # BLD AUTO: 170 K/UL (ref 164–446)
PMV BLD AUTO: 9.1 FL (ref 9–12.9)
PO2 BLDA: 89 MMHG (ref 64–87)
PO2 TEMP ADJ BLDA: 86 MMHG (ref 64–87)
POTASSIUM SERPL-SCNC: 2.9 MMOL/L (ref 3.6–5.5)
PROCALCITONIN SERPL-MCNC: 1.09 NG/ML
RBC # BLD AUTO: 3.47 M/UL (ref 4.7–6.1)
SAO2 % BLDA: 98 % (ref 93–99)
SIGNIFICANT IND 70042: ABNORMAL
SITE SITE: ABNORMAL
SODIUM SERPL-SCNC: 134 MMOL/L (ref 135–145)
SOURCE SOURCE: ABNORMAL
SPECIMEN DRAWN FROM PATIENT: ABNORMAL
TRIGL SERPL-MCNC: 59 MG/DL (ref 0–149)
WBC # BLD AUTO: 6 K/UL (ref 4.8–10.8)

## 2017-06-21 PROCEDURE — 94640 AIRWAY INHALATION TREATMENT: CPT

## 2017-06-21 PROCEDURE — 71010 DX-CHEST-PORTABLE (1 VIEW): CPT

## 2017-06-21 PROCEDURE — 770022 HCHG ROOM/CARE - ICU (200)

## 2017-06-21 PROCEDURE — 700111 HCHG RX REV CODE 636 W/ 250 OVERRIDE (IP): Performed by: INTERNAL MEDICINE

## 2017-06-21 PROCEDURE — 700101 HCHG RX REV CODE 250: Performed by: INTERNAL MEDICINE

## 2017-06-21 PROCEDURE — A9270 NON-COVERED ITEM OR SERVICE: HCPCS | Performed by: PSYCHIATRY & NEUROLOGY

## 2017-06-21 PROCEDURE — 83735 ASSAY OF MAGNESIUM: CPT

## 2017-06-21 PROCEDURE — 80184 ASSAY OF PHENOBARBITAL: CPT

## 2017-06-21 PROCEDURE — 95951 HCHG EEG-VIDEO-24HR: CPT

## 2017-06-21 PROCEDURE — 94003 VENT MGMT INPAT SUBQ DAY: CPT

## 2017-06-21 PROCEDURE — 80048 BASIC METABOLIC PNL TOTAL CA: CPT

## 2017-06-21 PROCEDURE — 82803 BLOOD GASES ANY COMBINATION: CPT

## 2017-06-21 PROCEDURE — 700102 HCHG RX REV CODE 250 W/ 637 OVERRIDE(OP): Performed by: HOSPITALIST

## 2017-06-21 PROCEDURE — 700111 HCHG RX REV CODE 636 W/ 250 OVERRIDE (IP): Performed by: HOSPITALIST

## 2017-06-21 PROCEDURE — 84478 ASSAY OF TRIGLYCERIDES: CPT

## 2017-06-21 PROCEDURE — 700102 HCHG RX REV CODE 250 W/ 637 OVERRIDE(OP): Performed by: PSYCHIATRY & NEUROLOGY

## 2017-06-21 PROCEDURE — 700111 HCHG RX REV CODE 636 W/ 250 OVERRIDE (IP): Performed by: PSYCHIATRY & NEUROLOGY

## 2017-06-21 PROCEDURE — 700105 HCHG RX REV CODE 258: Performed by: INTERNAL MEDICINE

## 2017-06-21 PROCEDURE — 85025 COMPLETE CBC W/AUTO DIFF WBC: CPT

## 2017-06-21 PROCEDURE — 99233 SBSQ HOSP IP/OBS HIGH 50: CPT | Performed by: HOSPITALIST

## 2017-06-21 PROCEDURE — 84145 PROCALCITONIN (PCT): CPT

## 2017-06-21 PROCEDURE — 700102 HCHG RX REV CODE 250 W/ 637 OVERRIDE(OP): Performed by: INTERNAL MEDICINE

## 2017-06-21 PROCEDURE — 84100 ASSAY OF PHOSPHORUS: CPT

## 2017-06-21 PROCEDURE — 700105 HCHG RX REV CODE 258: Performed by: PSYCHIATRY & NEUROLOGY

## 2017-06-21 PROCEDURE — 700105 HCHG RX REV CODE 258: Performed by: HOSPITALIST

## 2017-06-21 PROCEDURE — 99291 CRITICAL CARE FIRST HOUR: CPT | Performed by: INTERNAL MEDICINE

## 2017-06-21 PROCEDURE — 82962 GLUCOSE BLOOD TEST: CPT | Mod: 91

## 2017-06-21 PROCEDURE — A9270 NON-COVERED ITEM OR SERVICE: HCPCS | Performed by: INTERNAL MEDICINE

## 2017-06-21 PROCEDURE — A9270 NON-COVERED ITEM OR SERVICE: HCPCS | Performed by: HOSPITALIST

## 2017-06-21 RX ORDER — POTASSIUM CHLORIDE 29.8 MG/ML
40 INJECTION INTRAVENOUS ONCE
Status: COMPLETED | OUTPATIENT
Start: 2017-06-21 | End: 2017-06-21

## 2017-06-21 RX ORDER — MAGNESIUM SULFATE HEPTAHYDRATE 40 MG/ML
4 INJECTION, SOLUTION INTRAVENOUS ONCE
Status: COMPLETED | OUTPATIENT
Start: 2017-06-21 | End: 2017-06-21

## 2017-06-21 RX ORDER — POTASSIUM CHLORIDE 1.5 G/1.58G
40 POWDER, FOR SOLUTION ORAL 2 TIMES DAILY
Status: DISCONTINUED | OUTPATIENT
Start: 2017-06-21 | End: 2017-06-25

## 2017-06-21 RX ORDER — METRONIDAZOLE 500 MG/1
500 TABLET ORAL EVERY 8 HOURS
Status: COMPLETED | OUTPATIENT
Start: 2017-06-21 | End: 2017-06-23

## 2017-06-21 RX ADMIN — POTASSIUM CHLORIDE 40 MEQ: 1.5 POWDER, FOR SOLUTION ORAL at 08:30

## 2017-06-21 RX ADMIN — POTASSIUM CHLORIDE 40 MEQ: 29.8 INJECTION, SOLUTION INTRAVENOUS at 06:23

## 2017-06-21 RX ADMIN — IPRATROPIUM BROMIDE AND ALBUTEROL SULFATE 3 ML: .5; 3 SOLUTION RESPIRATORY (INHALATION) at 14:43

## 2017-06-21 RX ADMIN — CEFTRIAXONE SODIUM 2 G: 2 INJECTION, POWDER, FOR SOLUTION INTRAMUSCULAR; INTRAVENOUS at 16:19

## 2017-06-21 RX ADMIN — IPRATROPIUM BROMIDE AND ALBUTEROL SULFATE 3 ML: .5; 3 SOLUTION RESPIRATORY (INHALATION) at 03:38

## 2017-06-21 RX ADMIN — METRONIDAZOLE 500 MG: 500 TABLET ORAL at 16:20

## 2017-06-21 RX ADMIN — POTASSIUM CHLORIDE 40 MEQ: 1.5 POWDER, FOR SOLUTION ORAL at 21:29

## 2017-06-21 RX ADMIN — SODIUM PHOSPHATE, MONOBASIC, MONOHYDRATE AND SODIUM PHOSPHATE, DIBASIC, ANHYDROUS 30 MMOL: 276; 142 INJECTION, SOLUTION INTRAVENOUS at 06:41

## 2017-06-21 RX ADMIN — POTASSIUM CHLORIDE AND SODIUM CHLORIDE: 900; 150 INJECTION, SOLUTION INTRAVENOUS at 21:19

## 2017-06-21 RX ADMIN — IPRATROPIUM BROMIDE AND ALBUTEROL SULFATE 3 ML: .5; 3 SOLUTION RESPIRATORY (INHALATION) at 18:56

## 2017-06-21 RX ADMIN — PHENOBARBITAL 60 MG: 20 ELIXIR ORAL at 21:17

## 2017-06-21 RX ADMIN — SENNOSIDES AND DOCUSATE SODIUM 2 TABLET: 8.6; 5 TABLET ORAL at 21:29

## 2017-06-21 RX ADMIN — IPRATROPIUM BROMIDE AND ALBUTEROL SULFATE 3 ML: .5; 3 SOLUTION RESPIRATORY (INHALATION) at 10:31

## 2017-06-21 RX ADMIN — LORAZEPAM 0.5 MG: 2 INJECTION INTRAMUSCULAR; INTRAVENOUS at 01:32

## 2017-06-21 RX ADMIN — SENNOSIDES AND DOCUSATE SODIUM 2 TABLET: 8.6; 5 TABLET ORAL at 08:30

## 2017-06-21 RX ADMIN — PHENOBARBITAL 60 MG: 20 ELIXIR ORAL at 08:30

## 2017-06-21 RX ADMIN — POTASSIUM CHLORIDE AND SODIUM CHLORIDE: 900; 150 INJECTION, SOLUTION INTRAVENOUS at 11:28

## 2017-06-21 RX ADMIN — POTASSIUM CHLORIDE AND SODIUM CHLORIDE: 900; 150 INJECTION, SOLUTION INTRAVENOUS at 01:22

## 2017-06-21 RX ADMIN — IPRATROPIUM BROMIDE AND ALBUTEROL SULFATE 3 ML: .5; 3 SOLUTION RESPIRATORY (INHALATION) at 22:28

## 2017-06-21 RX ADMIN — DEXTROSE MONOHYDRATE 1500 MG: 50 INJECTION, SOLUTION INTRAVENOUS at 08:30

## 2017-06-21 RX ADMIN — DEXTROSE MONOHYDRATE 1500 MG: 50 INJECTION, SOLUTION INTRAVENOUS at 21:17

## 2017-06-21 RX ADMIN — FAMOTIDINE 20 MG: 20 TABLET, FILM COATED ORAL at 08:30

## 2017-06-21 RX ADMIN — AMPICILLIN SODIUM AND SULBACTAM SODIUM 3 G: 2; 1 INJECTION, POWDER, FOR SOLUTION INTRAMUSCULAR; INTRAVENOUS at 05:44

## 2017-06-21 RX ADMIN — AMPICILLIN SODIUM AND SULBACTAM SODIUM 3 G: 2; 1 INJECTION, POWDER, FOR SOLUTION INTRAMUSCULAR; INTRAVENOUS at 11:30

## 2017-06-21 RX ADMIN — CHLORHEXIDINE GLUCONATE 15 ML: 1.2 RINSE ORAL at 08:30

## 2017-06-21 RX ADMIN — IPRATROPIUM BROMIDE AND ALBUTEROL SULFATE 3 ML: .5; 3 SOLUTION RESPIRATORY (INHALATION) at 07:05

## 2017-06-21 RX ADMIN — METRONIDAZOLE 500 MG: 500 TABLET ORAL at 21:17

## 2017-06-21 RX ADMIN — MAGNESIUM SULFATE IN WATER 4 G: 40 INJECTION, SOLUTION INTRAVENOUS at 06:38

## 2017-06-21 RX ADMIN — LORAZEPAM 0.5 MG: 2 INJECTION INTRAMUSCULAR; INTRAVENOUS at 15:49

## 2017-06-21 RX ADMIN — CHLORHEXIDINE GLUCONATE 15 ML: 1.2 RINSE ORAL at 21:29

## 2017-06-21 RX ADMIN — ENOXAPARIN SODIUM 30 MG: 100 INJECTION SUBCUTANEOUS at 21:29

## 2017-06-21 RX ADMIN — FAMOTIDINE 20 MG: 20 TABLET, FILM COATED ORAL at 21:29

## 2017-06-21 NOTE — PROCEDURES
VIDEO ELECTROENCEPHALOGRAM REPORT      Referring MD: Dr. Hunter.      DOS:  6/20/2017 - 6/21/2017 (total recording time 21 hours and 48 minutes).     INDICATION:  Ruben Edwards 36 y.o. male presenting with frequent seizures. History of TBI.     CURRENT ANTIEPILEPTIC REGIMEN: Levetiracetam 1500 mg q 12 hrs, phenobarbital 60 mg bid, and propofol continuous infusion.     TECHNIQUE: 30 channel video electroencephalogram (EEG) was performed in accordance with the international 10-20 system. The study was reviewed in bipolar and referential montages. The recording examined a sedated patient with brief intermittent arousals and elements of slow wave sleep.     DESCRIPTION OF THE RECORD:  The patients has been sedated since the last study. There is waxing and waning of the cerebral electrical activity, mostly in keeping with slow diffuse delta activity and elements of slow wave wave sleep including vertex sharp waves.     Brief intermittent arousals without background improvement but noted increased muscle artifact.      ACTIVATION PROCEDURES:    Not performed.     ICTAL AND/OR INTERICTAL FINDINGS:    Frequent interictal / independent right and left temporal spikes. TEN seizures captured during the study. All seizures were several seconds long, a few involved secondary generalization. All seizures had a left temporal onset. There is no evidence to suggest status epilepticus during the study.      Most seizures were focal on the left temporal and/or left hemispheric without clear clinical changes (patient is now sedated and intubated), however a few seizures exhibit secondary generalization (including one marked for review by the patient's RN) and clinically demonstrated eye fluttering. Review of the EEG during seizures shows spiky alpha activity on the left temporal, which may remain localized in the left temporal or left hemisphere but a few times spreading to both hemispheres with higher amplitude spikes / sharps which are  rhythmic for several seconds long. There is diffuse attenuation and slowing of the EEG in the post ictal state.        EKG: sampling of the EKG recording demonstrated sinus rhythm with sinus tachycardia.        INTERPRETATION:  This is an abnormal extended video EEG recording in the awake and drowsy state(s).  Frequent interictal / independent right and left temporal spikes. TEN (10) focal seizures captured during the study. All seizures were several seconds long, a few involved secondary generalization. All seizures had a left temporal onset. There has been a some improvement when compared to the prior EEG. 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 There is no evidence to suggest status epilepticus during the study. Clinical and radiological correlation is recommended.    Updates provided to Dr. Hunter.       Harris Bolton MD  Medical Director, Epilepsy and Neurodiagnostics.    Clinical  of Neurology [facility].    Diplomate in Neurology, Epilepsy, and Electrodiagnostic Medicine.    Office: [phone]  Fax: [phone]    MARLON GOODMAN    DD:  06/21/2017 07:35:11  DT:  06/21/2017 07:44:07    D#:  [id number]  Job#:  [id number]

## 2017-06-21 NOTE — CARE PLAN
Problem: Skin Integrity  Goal: Risk for impaired skin integrity will decrease  Outcome: PROGRESSING AS EXPECTED  Q2h turns in place. Wound care per WOCN order. Full skin check performed every shift and pressure points assessed throughout shift.    Problem: Respiratory:  Goal: Respiratory status will improve  Intervention: Collaborate with respiratory therapist and Interdisciplinary Team on treatment measures to improve respiratory function  VAP bundle in place. Q2h suctioning and oral care provided. Continuous pulse ox monitoring in place. Collaborating with MD and RT for vent management.

## 2017-06-21 NOTE — CARE PLAN
Problem: Skin Integrity  Goal: Risk for impaired skin integrity will decrease  Patient has full bed waffle cushion in place, turned every two hours, heels floated, wound care following, barrier cream and mepilex dressing used.  Nutrition consult, patient now receiving enteral feeding at goal rate.     Problem: Mobility  Goal: Risk for activity intolerance will decrease  Discussed with LOPEZ MEDEIROS to begin mobilizing patient once continuous EEG is completed.

## 2017-06-21 NOTE — RESPIRATORY CARE
COPD EDUCATION by COPD CLINICAL EDUCATOR  6/21/2017 at 5:32 AM by Berkley Kang     Patient reviewed by COPD education team. Patient does not qualify for COPD program.

## 2017-06-21 NOTE — PROGRESS NOTES
Pulmonary Critical Care Progress Note      Date of service: 6/21/2017    Chief Complaint: status epilepticus     History of Present Illness: All information taken from the patient's family at bedside as well as physician sign out as the patient is noncommunicative.  This is a 36-year-old gentleman with history of traumatic brain injury approximately 18 years ago after a [place].  He had been in a nearly persistent vegetative state since, has not been verbal, does not mobilize, previously was trached for approximately 1 year, but has been trached free for approximately 18 years.  He continues to have a deep stoma; however, it has apparently closed off.  He has a PEG tube for feeding; however, he used to also take miniscule amounts of food orally as well up until most recently.  Patient was admitted on 06/17/2017 with the concern of aspiration pneumonia as well as increased tremor type activities with upper extremity involvement.  The patient was initially placed on telemetry floor, treated for aspiration pneumonitis with antibiotic therapy as well as supportive therapy with IV fluids and tube feeds.  Over the past 24 hours or so, there had been noted ongoing jerking type motions of the upper extremities and therefore, patient had EEG.  The EEG did show several periods of seizures lasting several seconds.  Based on this, neurology recommended that the patient be transferred to intensive care unit for concern of underlying status epilepticus, intubated and placed on propofol therapy, while antiepileptic medications are being titrated and adjusted.  Patient has no history of known seizure activity.  His only medication is a baclofen pump for a significant spasticity and this is apparently managed by [facility].  At the present time, no further history is currently available or able to be obtained.    ROS:  Respiratory: unable to perform due to the patient's inability to effectively  communicate, Cardiac: unable to perform due to the patient's inability to effectively communicate, GI: unable to perform due to the patient's inability to effectively communicate.  All other systems negative.    Interval Events:  24 hour interval history reviewed   Tmax 99.6 °F   +2.8 L over the last 24 hours, +4.5 since admit   CXR shows: increase left lower lobe infiltrate and consolidation.    Rapid eye fluttering noted, possible seizure, continuous EEG resume.  Neurologically unchanged, does not follow commands, withdrawals all extremities.   Phenobarb started yesterday per Dr. Hunter (Neurology)  SR, BP  systolic.   TF started yesterday per dietary.   ET tube was placed to 24 from 20.  Electrolytes replaced per protocol.     PFSH:  No change.    Physical Exam  General: nad, spastic quadriparesis   Neuro/Psych: does not follow, sedate  HEENT: nc, at, deep stoma to ant neck  CVS: rrr  Respiratory: scattered rhonchi  Abdomen/: s, nt, peg in place  Extremities: contracted, no edema    Physical Exam:  General:  NAD, getting continuous EEG  HEENT:  Normocephalic, atraumatic. Pupils equal.  CVS:  RRR.   Respiratory:  Clear to auscultation.  Abdomen:  Soft, NT.  Extremities:  Contracted, no edema.   Neuro/Psych:  Unchanged, withdrawals to sternal rub.      Respiratory:  Liriano Vent Mode: APVCMV, Rate (breaths/min): 14, Vt Target (mL): 400, PEEP/CPAP: 8, FiO2: 30, Static Compliance (ml / cm H2O): 54, Control VTE (exp VT): 412  Pulse Oximetry: 99 %  ImagingAvailable data reviewed   Recent Labs      06/19/17 2015 06/20/17   0431  06/21/17   0500   ISTATAPH  7.520*  7.466  7.488   ISTATAPCO2  32.3  29.5  31.3   ISTATAPO2  384*  137*  89*   ISTATATCO2  27  22  25   CHZKXDS2HDV  100*  99  98   ISTATARTHCO3  26.3*  21.3  23.7   ISTATARTBE  4*  -2  1   ISTATTEMP  96.4 F  36.8 C  97.8 F   ISTATFIO2  100  40  30   ISTATSPEC  Arterial  Arterial  Arterial   ISTATAPHTC  7.539*  7.469  7.495   [id number]  377*  136*   86     HemoDynamics:  Pulse: 79, Heart Rate (Monitored): 75  NIBP: 108/68 mmHg  CVP (mm Hg): (!) 14-16 MM HG  Imaging: Available data reviewed        Neuro:  GCS Total Aleida Coma Score: 10     Imaging: Available data reviewed    Fluids:  Intake/Output       06/19/17 0700 - 06/20/17 0659 06/20/17 0700 - 06/21/17 0659 06/21/17 0700 - 06/22/17 0659      0700-1859 1900-0659 Total 0700-1859 1900-0659 Total 0700-1859 1900-0659 Total       Intake    I.V.  1200  2384.9 3584.9  1953.1  1525.2 3478.3  --  -- --    Magnesium Sulfate Volume -- -- -- 99.6 -- 99.6 -- -- --    Propofol Volume -- 84.9 84.9 -- -- -- -- -- --    Norepinephrine Volume -- -- -- 53.5 25.2 78.7 -- -- --    IV Volume (< Enter Name Here >) 1000 1000 2000 -- -- -- -- -- --    IV Volume (NS 20K+) -- 1200 1200 1200 1200 2400 -- -- --    IV Piggyback Volume (IV Piggyback) 200 100 300 600 300 900 -- -- --    Other  --  -- --  --  120 120  --  -- --    Medications (P.O./ Enteral Liquids) -- -- -- -- 120 120 -- -- --    Enteral  180  -- 180  75  660 735  --  -- --    Enteral Volume 180 -- 180 75 450 525 -- -- --    Free Water / Tube Flush -- -- -- -- 210 210 -- -- --    Total Intake 1380 2384.9 3764.9 2028.1 2305.2 4333.3 -- -- --       Output    Urine  900  396 1296  530  925 1455  --  -- --    Indwelling Cathether   -- -- --    Stool/Urine  --  -- --  0  -- 0  --  -- --    Measurable Stool (ml) -- -- -- 0 -- 0 -- -- --    Stool  --  -- --  --  -- --  --  -- --    Number of Times Stooled -- -- -- 1 x -- 1 x -- -- --    Total Output   -- -- --       Net I/O     480 1988.9 2468.9 1498.1 1380.2 2878.3 -- -- --        Weight: 56.9 kg (125 lb 7.1 oz)  Recent Labs      06/19/17   0343  06/20/17   0330  06/20/17   0530  06/21/17   0420   SODIUM  125*   --   128*  134*   POTASSIUM  3.5*   --   2.7*  2.9*   CHLORIDE  92*   --   98  104   CO2  26   --   22  24   BUN  7*   --   7*  4*   CREATININE  <0.20*   --   <0.20*   <0.20*   MAGNESIUM   --   1.4*   --   1.6   PHOSPHORUS   --    --    --   <1.0*   CALCIUM  8.6   --   7.7*  7.7*     GI/Nutrition:  Imaging: Available data reviewed  NPO     Liver Function  Recent Labs      17   0420   ALTSGPT  52*   --    --    ASTSGOT  49*   --    --    ALKPHOSPHAT  111*   --    --    TBILIRUBIN  0.7   --    --    PREALBUMIN  8.0*   --    --    GLUCOSE  80  74  122*     Heme:  Recent Labs      17   0420   RBC  3.42*  3.28*  3.47*   HEMOGLOBIN  10.1*  9.5*  10.1*   HEMATOCRIT  28.8*  27.8*  29.1*   PLATELETCT  166  144*  170     Infectious Disease:  Temp  Av.6 °C (97.9 °F)  Min: 36.2 °C (97.1 °F)  Max: 37.6 °C (99.6 °F)  Micro: cultures reviewed  Recent Labs      17   0420   WBC   --    < >  7.0  5.7  6.0   NEUTSPOLYS   --    < >  68.50  66.70  63.60   LYMPHOCYTES   --    < >  20.00*  22.10  22.90   MONOCYTES   --    < >  9.50  9.70  9.50   EOSINOPHILS   --    < >  1.00  0.90  3.20   BASOPHILS   --    < >  0.60  0.40  0.50   ASTSGOT  49*   --    --    --    --    ALTSGPT  52*   --    --    --    --    ALKPHOSPHAT  111*   --    --    --    --    TBILIRUBIN  0.7   --    --    --    --     < > = values in this interval not displayed.     Current Facility-Administered Medications   Medication Dose Frequency Provider Last Rate Last Dose   • magnesium sulfate IVPB premix 4 g  4 g Once Kev Araiza M.D. 25 mL/hr at 17 0638 4 g at 17   • potassium chloride in water (KCL) ivpb **Administer in ICU only** 40 mEq  40 mEq Once Kev Araiza M.D. 50 mL/hr at 17 0623 40 mEq at 17 0623   • potassium chloride (KLOR-CON) 20 MEQ packet 40 mEq  40 mEq BID Kev Araiza M.D.       • sodium phosphate 30 mmol in 1/2  mL ivpb  30 mmol Once Kev Araiza M.D. 83.3 mL/hr at 17 0641 30 mmol at 17 06   • norepinephrine  (LEVOPHED) 8 mg in  mL Infusion  0.5-30 mcg/min Continuous Rivas Dee M.D. 1.9 mL/hr at 06/21/17 0617 1 mcg/min at 06/21/17 0617   • levetiracetam (KEPPRA) 1,500 mg in D5W 100 mL IVPB  1,500 mg Q12HRS Martell Hunter M.D.   Stopped at 06/20/17 2016   • PHENobarbital solution 60 mg  60 mg BID Martell Hunter M.D.   60 mg at 06/20/17 2001   • enoxaparin (LOVENOX) inj 30 mg  30 mg DAILY FARHAT MedinaOBucky   30 mg at 06/20/17 2000   • 0.9 % NaCl with KCl 20 mEq infusion   Continuous Clarence Morfin D.O. 100 mL/hr at 06/21/17 0122     • Respiratory Care per Protocol   Continuous RT Kev Araiza M.D.       • senna-docusate (PERICOLACE or SENOKOT S) 8.6-50 MG per tablet 2 Tab  2 Tab BID Kev Araiza M.D.   2 Tab at 06/20/17 2001    And   • polyethylene glycol/lytes (MIRALAX) PACKET 1 Packet  1 Packet QDAY PRN Kev Araiza M.D.        And   • magnesium hydroxide (MILK OF MAGNESIA) suspension 30 mL  30 mL QDAY PRN Kev Araiza M.D.        And   • bisacodyl (DULCOLAX) suppository 10 mg  10 mg QDAY PRN Kev Araiza M.D.       • chlorhexidine (PERIDEX) 0.12 % solution 15 mL  15 mL BID Kev Araiza M.D.   15 mL at 06/20/17 2001   • lidocaine (XYLOCAINE) 1%  injection  1-2 mL Q30 MIN PRN Kev Araiza M.D.       • MD ALERT...Adult ICU Electrolyte Replacement per Pharmacy Protocol   pharmacy to dose Kev Araiza M.D.       • fentaNYL (SUBLIMAZE) injection 25 mcg  25 mcg Q HOUR PRN Kev Araiza M.D.        Or   • fentaNYL (SUBLIMAZE) injection 50 mcg  50 mcg Q HOUR PRN Kev Araiza M.D.        Or   • fentaNYL (SUBLIMAZE) injection 100 mcg  100 mcg Q HOUR PRN Kev Araiza M.D.   100 mcg at 06/19/17 1937   • famotidine (PEPCID) tablet 20 mg  20 mg Q12HRS Kev Araiza M.D.   20 mg at 06/20/17 2001    Or   • famotidine (PEPCID) injection 20 mg  20 mg Q12HRS Kev Araiza M.D.   20 mg at 06/20/17 0918   • ipratropium-albuterol (DUONEB) nebulizer solution 3 mL  3 mL  Q2HRS PRN (RT) Kev Araiza M.D.       • ipratropium-albuterol (DUONEB) nebulizer solution 3 mL  3 mL Q4HRS (RT) Kev Araiza M.D.   3 mL at 06/21/17 0705   • insulin lispro (HUMALOG) injection 1-6 Units  1-6 Units Q6HRS Kev Araiza M.D.   Stopped at 06/19/17 1845   • glucose 4 g chewable tablet 16 g  16 g Q15 MIN PRN Kev Araiza M.D.        And   • dextrose 50% (D50W) injection 25 mL  25 mL Q15 MIN PRN Kev Araiza M.D.   25 mL at 06/20/17 1840   • propofol (DIPRIVAN) injection  5-80 mcg/kg/min Continuous Wenceslao Partida, PHARMD   Stopped at 06/20/17 0440   • NS infusion 1,632 mL  30 mL/kg Once PRN FARHAT MedinaO.       • NS (BOLUS) infusion 1,000 mL  1,000 mL Once PRN FARHAT MedinaO.   Stopped at 06/17/17 1836   • ampicillin/sulbactam (UNASYN) 3 g in  mL IVPB  3 g Q6HRS JASPAL Medina.O.   Stopped at 06/21/17 0614   • ondansetron (ZOFRAN) syringe/vial injection 4 mg  4 mg Q4HRS PRN FARHAT MedinaO.       • ondansetron (ZOFRAN ODT) dispertab 4 mg  4 mg Q4HRS PRN JASPAL Medina.O.       • promethazine (PHENERGAN) tablet 12.5-25 mg  12.5-25 mg Q4HRS PRN FARHAT MedinaO.       • promethazine (PHENERGAN) suppository 12.5-25 mg  12.5-25 mg Q4HRS PRN JASPAL Medina.O.       • prochlorperazine (COMPAZINE) injection 5-10 mg  5-10 mg Q4HRS PRN FARHAT MedinaO.       • lorazepam (ATIVAN) tablet 0.5 mg  0.5 mg Q6HRS PRN FARHAT MedinaO.   Stopped at 06/19/17 0248    Or   • lorazepam (ATIVAN) injection 0.5 mg  0.5 mg Q6HRS PRN JASPAL Medina.O.   0.5 mg at 06/21/17 0132   • acetaminophen (TYLENOL) tablet 650 mg  650 mg Q6HRS PRN JASPAL Medina.O.   650 mg at 06/17/17 1857   • Pain Pump (patient supplied) Device   Continuous Clarence Morfin D.O.   Stopped at 06/17/17 1230     Last reviewed on 6/17/2017  9:45 AM by Ligia Frazier, Pharmacy Int    Quality  Measures:  Radiology images reviewed, Medications reviewed and Labs  reviewed                    Gtts:  NS with 20 K 100   Levo 1  Propofol off    Abx:   Unasyn     Cultures:   Urine 6/17 negative  Blood 6/17 negative  BAL 6/19 NLF GNRs    Assessment/Plan  -  New onset seizure activity with concern of status epilepticus.   - keppra   - propofol   - continuous eeg   - neuro following  -  Aspiration pneumonia.   - intubated 6/19 (mainly for sz control)   - bal 6/19 NLFGNRs, diffuse thick secretions noted   - on empiric abx  -  Acute respiratory insufficiency secondary to above.   - rt/02 protcol   - full vent support   - sat/sbt once sz activity suppressed  -  Hyponatremia.  - HypoK and Hyop Mag - replace  -  Severe chronic underlying debility secondary to profound traumatic brain    injury 18 years ago.    Discussed patient condition and risk of morbidity and/or mortality with RN, RT, Therapies, Pharmacy, Dietary, Charge nurse / hot rounds and QA team.    The patient remains critically ill.  Critical care time = 35 minutes in directly providing and coordinating critical care and extensive data review.  No time overlap and excludes procedures.    Kaylene WOODY (Conchita), am scribing for, and in the presence of, Kev Araiza M.D.    Electronically signed by: Kaylene Cote (Conchita), 6/21/2017    Kev WOODY M.D. personally performed the services described in this documentation, as scribed by Kaylene Cote in my presence, and it is both accurate and complete.

## 2017-06-21 NOTE — PROGRESS NOTES
Dr. Hunter at bedside.  Discussed EEG results and patient's plan of care.  Plans to continue continuous EEG for another 24 hours to monitor for continued seizure activity.  Family at bedside, updated by Dr. Hunter on plan of care.

## 2017-06-21 NOTE — PROGRESS NOTES
NEUROLOGY PROGRESS NOTE      Background:  36 y.o. male was admitted on 6/17/2017  7:07 AM for Aspiration pneumonia (CMS-HCC)  Aspiration pneumonia (CMS-HCC).  He is being followed by Neurology for status epilepticus.    SUBJECTIVE: Intubated, sedated. No clinical Sz activity. Became severely hypotensive and propofol was held.   Reported by Dr. Bolton to have 10 episode of Sz on EEG monitoring    NEUROLOGICAL EXAM:   NEUROLOGIC:  Limited as the patient is not interactive.  Apparently at    baseline, he just track with his eyes.  He is nonverbal, with a spastic    quadriparesis.    OBJECTIVE:     EEG:   (6/19)  This is an abnormal video EEG recording in the awake and drowsy state(s).  Frequent interictal / independent right and left temporal spikes. FOUR seizures captured during the study. All seizures were several seconds long, some involved secondary generalization. Two seizures appear to have a right temporal and two others a left temporal onset. There is no recovery of mentation (unknown baseline) in between events, in that sense, the patient should be considered in status epilepticus. 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 Clinical and radiological correlation is recommended.    MEDICATIONS:  Current Facility-Administered Medications   Medication Dose   • potassium chloride (KLOR-CON) 20 MEQ packet 40 mEq  40 mEq   • cefTRIAXone (ROCEPHIN) 2 g in  mL IVPB  2 g   • metronidazole (FLAGYL) tablet 500 mg  500 mg   • norepinephrine (LEVOPHED) 8 mg in  mL Infusion  0.5-30 mcg/min   • levetiracetam (KEPPRA) 1,500 mg in D5W 100 mL IVPB  1,500 mg   • PHENobarbital solution 60 mg  60 mg   • enoxaparin (LOVENOX) inj 30 mg  30 mg   • 0.9 % NaCl with KCl 20 mEq infusion     • Respiratory Care per Protocol     • senna-docusate (PERICOLACE or SENOKOT S) 8.6-50 MG per tablet 2 Tab  2 Tab    And   • polyethylene glycol/lytes (MIRALAX) PACKET 1 Packet  1 Packet    And   • magnesium hydroxide (MILK OF MAGNESIA) suspension 30 mL   "30 mL    And   • bisacodyl (DULCOLAX) suppository 10 mg  10 mg   • chlorhexidine (PERIDEX) 0.12 % solution 15 mL  15 mL   • lidocaine (XYLOCAINE) 1%  injection  1-2 mL   • MD ALERT...Adult ICU Electrolyte Replacement per Pharmacy Protocol     • fentaNYL (SUBLIMAZE) injection 25 mcg  25 mcg    Or   • fentaNYL (SUBLIMAZE) injection 50 mcg  50 mcg    Or   • fentaNYL (SUBLIMAZE) injection 100 mcg  100 mcg   • famotidine (PEPCID) tablet 20 mg  20 mg    Or   • famotidine (PEPCID) injection 20 mg  20 mg   • ipratropium-albuterol (DUONEB) nebulizer solution 3 mL  3 mL   • ipratropium-albuterol (DUONEB) nebulizer solution 3 mL  3 mL   • insulin lispro (HUMALOG) injection 1-6 Units  1-6 Units   • glucose 4 g chewable tablet 16 g  16 g    And   • dextrose 50% (D50W) injection 25 mL  25 mL   • propofol (DIPRIVAN) injection  5-80 mcg/kg/min   • NS infusion 1,632 mL  30 mL/kg   • NS (BOLUS) infusion 1,000 mL  1,000 mL   • ondansetron (ZOFRAN) syringe/vial injection 4 mg  4 mg   • ondansetron (ZOFRAN ODT) dispertab 4 mg  4 mg   • promethazine (PHENERGAN) tablet 12.5-25 mg  12.5-25 mg   • promethazine (PHENERGAN) suppository 12.5-25 mg  12.5-25 mg   • prochlorperazine (COMPAZINE) injection 5-10 mg  5-10 mg   • lorazepam (ATIVAN) tablet 0.5 mg  0.5 mg    Or   • lorazepam (ATIVAN) injection 0.5 mg  0.5 mg   • acetaminophen (TYLENOL) tablet 650 mg  650 mg   • Pain Pump (patient supplied) Device         Blood pressure 128/98, pulse 78, temperature 37.4 °C (99.4 °F), resp. rate 14, height 1.727 m (5' 7.99\"), weight 56.9 kg (125 lb 7.1 oz), SpO2 99 %.        Recent Labs      06/20/17   0330  06/20/17   0530  06/21/17   0420   WBC  7.0  5.7  6.0   RBC  3.42*  3.28*  3.47*   HEMOGLOBIN  10.1*  9.5*  10.1*   HEMATOCRIT  28.8*  27.8*  29.1*   MCV  84.2  84.8  83.9   MCH  29.5  29.0  29.1   MCHC  35.1  34.2  34.7   RDW  48.1  47.8  49.2   PLATELETCT  166  144*  170   MPV  9.0  9.0  9.1     Recent Labs      06/19/17   0343  06/20/17   0530  " 06/21/17   0420   SODIUM  125*  128*  134*   POTASSIUM  3.5*  2.7*  2.9*   CHLORIDE  92*  98  104   CO2  26  22  24   GLUCOSE  80  74  122*   BUN  7*  7*  4*       No results found for this or any previous visit.     ASSESSMENT AND PLAN:   A 36-year-old male with status epilepticus. Currently intubated.  Reported by Dr. Bolton to have 2 episode of Sz on EEG monitoring. Will con't  With prolonged EEG monitoring Keppra increased to 1500mg BID. Will load with    con't with Phenobarb 60md BID.  Had a lenghtly discussion with the parents. They would like us to be aggressive in his treatment.    CCT:35 min

## 2017-06-21 NOTE — CARE PLAN
Problem: Fluid Volume:  Goal: Will maintain balanced intake and output  CVP monitored via central line.  Waldrop catheter in place for strict I & O monitoring.     Problem: Skin Integrity  Goal: Risk for impaired skin integrity will decrease  Wound care RN at bedside, collaborated on best approach for wound care dressings and maintenance.  Patient has full bed waffle cushion in place, turns every two hours, heels floated, barrier paste applied to wounds.

## 2017-06-21 NOTE — PROGRESS NOTES
[facility] Hospitalist Progress Note    Date of Service: 2017    Chief Complaint  36 y.o. male admitted 2017 with altered mental status.    Interval Problem Update  Mr. Edwards has a hx of TBI at age 17. He was admitted with aspiration pneumonitis. EEG revealed status epilepticus thus he was intubated on  placed on IV keppra and propofol.   Due to low BP last night, propofol was held and remains off. Levophed remains at 1. CVP over 10.   He remains on 24 hour EEG    Urine output appropriate over night.  I met with Ruben's mother today and we discussed his condition.   Consultants/Specialty  Neurology  Pulmonology  I discussed with Dr. Araiza on Hot Rounds    Disposition  ICU        Review of Systems   Unable to perform ROS: intubated      Physical Exam  Laboratory/Imaging   Hemodynamics  Temp (24hrs), Av.6 °C (97.9 °F), Min:36.2 °C (97.1 °F), Max:37.6 °C (99.6 °F)   Temperature: 36.4 °C (97.6 °F)  Pulse  Av.8  Min: 60  Max: 131 Heart Rate (Monitored): 79  NIBP: (!) 99/58 mmHg CVP (mm Hg): (!) 14 MM HG    Respiratory  Liriano Vent Mode: APVCMV, Rate (breaths/min): 14, PEEP/CPAP: 8, PEEP/CPAP: 8, FiO2: 30, P Peak (PIP): 20, P MEAN: 11   Respiration: 15, Pulse Oximetry: 99 %     Work Of Breathing / Effort: Vented  RUL Breath Sounds: Clear, RML Breath Sounds: Clear, RLL Breath Sounds: Diminished, JONATAN Breath Sounds: Clear, LLL Breath Sounds: Diminished    Fluids    Intake/Output Summary (Last 24 hours) at 17 0720  Last data filed at 17 0600   Gross per 24 hour   Intake 4333.32 ml   Output   1455 ml   Net 2878.32 ml       Nutrition  Orders Placed This Encounter   Procedures   • Diet NPO     Standing Status: Standing      Number of Occurrences: 1      Standing Expiration Date:      Order Specific Question:  Type:     Answer:  Now [1]     Order Specific Question:  Restrict to:     Answer:  Strict [1]     Physical Exam   Constitutional: No distress.   HENT:   EEG monitor on.   Endotracheal tube.     Eyes: Right eye exhibits no discharge. Left eye exhibits no discharge.   Neck:   Right IJ central line without bleeding.   Well-healed tracheostomy site.    Cardiovascular: Regular rhythm.    No murmur heard.  Pulmonary/Chest:   Vent, good air movement   Abdominal: He exhibits no distension. There is no tenderness.   PEG. Baclofen pump palpable on abdomen.    Genitourinary:   Waldrop cath   Musculoskeletal: He exhibits edema. He exhibits no tenderness.   Poor muscle tone   Neurological:   Contractures of the arms and legs. Sedated. He does not follow.    Skin: Skin is warm. No rash noted. There is pallor.       Recent Labs      06/20/17   0330  06/20/17   0530  06/21/17   0420   WBC  7.0  5.7  6.0   RBC  3.42*  3.28*  3.47*   HEMOGLOBIN  10.1*  9.5*  10.1*   HEMATOCRIT  28.8*  27.8*  29.1*   MCV  84.2  84.8  83.9   MCH  29.5  29.0  29.1   MCHC  35.1  34.2  34.7   RDW  48.1  47.8  49.2   PLATELETCT  166  144*  170   MPV  9.0  9.0  9.1     Recent Labs      06/19/17   0343  06/20/17   0530  06/21/17   0420   SODIUM  125*  128*  134*   POTASSIUM  3.5*  2.7*  2.9*   CHLORIDE  92*  98  104   CO2  26  22  24   GLUCOSE  80  74  122*   BUN  7*  7*  4*   CREATININE  <0.20*  <0.20*  <0.20*   CALCIUM  8.6  7.7*  7.7*             Recent Labs      06/21/17   0420   TRIGLYCERIDE  59          Assessment/Plan     Status epilepticus (CMS-[facility]) (present on admission)  Assessment & Plan  As evidenced on EEG. Continuous EEG will continue another 24 hours.  Neurology consultation.  IV keppra loaded.   Phenobarbital added.    Aspiration pneumonia (CMS-[facility]) (present on admission)  Assessment & Plan  - pt unable to get his secretions out  - s/p IV unasyn  - culture growing Klebsiella resistant to ampicillin thus change to rocephin and add cortrak flagyl to cover aspiration     Sepsis (CMS-[facility])  Assessment & Plan  - secondary to aspiration pneumonia  - continue IV abx    Acute respiratory failure with hypoxia (CMS-[facility]) (present on  admission)  Assessment & Plan  Intubated on 6/19.  Pulmonary consult.    Hyponatremia (present on admission)  Assessment & Plan  - persistent despite IV fluids  - repeat bmp in am    Hypoglycemia (present on admission)  Assessment & Plan  - resolved    Encephalopathy acute (present on admission)  Assessment & Plan  Secondary to status epilepticus.    TBI (traumatic brain injury) (CMS-HCC) (present on admission)  Assessment & Plan  Since age 17 years.  With quadriplegia   Baclofen pump followed at [facility].    Hypokalemia (present on admission)  Assessment & Plan  IV replacement.      Labs reviewed, Medications reviewed and Radiology images reviewed  Waldrop catheter: Unconscious / Sedated Patient on a Ventilator      DVT Prophylaxis: Enoxaparin (Lovenox)

## 2017-06-21 NOTE — PROGRESS NOTES
Jeana from Lab called with critical result of K+ 2.9 & Phosphorus <1 at 0558. Critical lab result read back to Jeana.   Dr. Araiza notified of critical lab result at 0608.  Critical lab result read back by Dr. Araiza. Orders placed by MD.

## 2017-06-21 NOTE — EEG PROGRESS NOTE
VIDEO ELECTROENCEPHALOGRAM REPORT      Referring MD: Dr. Hunter.     DOS:  6/20/2017 - 6/21/2017 (total recording time 21 hours and 48 minutes).     INDICATION:  Ruben Edwards 36 y.o. male presenting with frequent seizures. History of TBI.     CURRENT ANTIEPILEPTIC REGIMEN: Levetiracetam 1500 mg q 12 hrs, phenobarbital 60 mg bid, and propofol continuous infusion.     TECHNIQUE: 30 channel video electroencephalogram (EEG) was performed in accordance with the international 10-20 system. The study was reviewed in bipolar and referential montages. The recording examined a sedated patient with brief intermittent arousals and elements of slow wave sleep.     DESCRIPTION OF THE RECORD:  The patients has been sedated since the last study. There is waxing and waning of the cerebral electrical activity, mostly in keeping with slow diffuse delta activity and elements of slow wave wave sleep including vertex sharp waves.     Brief intermittent arousals without background improvement but noted increased muscle artifact.      ACTIVATION PROCEDURES:    Not performed.     ICTAL AND/OR INTERICTAL FINDINGS:    Frequent interictal / independent right and left temporal spikes. TEN seizures captured during the study. All seizures were several seconds long, a few involved secondary generalization. All seizures had a left temporal onset. There is no evidence to suggest status epilepticus during the study.      Most seizures were focal on the left temporal and/or left hemispheric without clear clinical changes (patient is now sedated and intubated), however a few seizures exhibit secondary generalization (including one marked for review by the patient's RN) and clinically demonstrated eye fluttering. Review of the EEG during seizures shows spiky alpha activity on the left temporal, which may remain localized in the left temporal or left hemisphere but a few times spreading to both hemispheres with higher amplitude spikes / sharps which are  rhythmic for several seconds long. There is diffuse attenuation and slowing of the EEG in the post ictal state.        EKG: sampling of the EKG recording demonstrated sinus rhythm with sinus tachycardia.        INTERPRETATION:  This is an abnormal extended video EEG recording in the awake and drowsy state(s).  Frequent interictal / independent right and left temporal spikes. TEN (10) focal seizures captured during the study. All seizures were several seconds long, a few involved secondary generalization. All seizures had a left temporal onset. There has been a some improvement when compared to the prior EEG. 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 There is no evidence to suggest status epilepticus during the study. Clinical and radiological correlation is recommended.    Updates provided to Dr. Hunter.       Harris Bolton MD  Medical Director, Epilepsy and Neurodiagnostics.    Clinical  of Neurology [facility].    Diplomate in Neurology, Epilepsy, and Electrodiagnostic Medicine.    Office: [phone]  Fax: [phone]

## 2017-06-22 ENCOUNTER — APPOINTMENT (OUTPATIENT)
Dept: RADIOLOGY | Facility: MEDICAL CENTER | Age: 37
DRG: 004 | End: 2017-06-22
Attending: INTERNAL MEDICINE
Payer: COMMERCIAL

## 2017-06-22 LAB
ANION GAP SERPL CALC-SCNC: 5 MMOL/L (ref 0–11.9)
ANISOCYTOSIS BLD QL SMEAR: ABNORMAL
BACTERIA BLD CULT: NORMAL
BASOPHILS # BLD AUTO: 0 % (ref 0–1.8)
BASOPHILS # BLD: 0 K/UL (ref 0–0.12)
BUN SERPL-MCNC: 9 MG/DL (ref 8–22)
CALCIUM SERPL-MCNC: 7.8 MG/DL (ref 8.5–10.5)
CHLORIDE SERPL-SCNC: 109 MMOL/L (ref 96–112)
CO2 SERPL-SCNC: 24 MMOL/L (ref 20–33)
CREAT SERPL-MCNC: <0.2 MG/DL (ref 0.5–1.4)
EOSINOPHIL # BLD AUTO: 0.59 K/UL (ref 0–0.51)
EOSINOPHIL NFR BLD: 6.9 % (ref 0–6.9)
ERYTHROCYTE [DISTWIDTH] IN BLOOD BY AUTOMATED COUNT: 49.9 FL (ref 35.9–50)
GFR SERPL CREATININE-BSD FRML MDRD: >60 ML/MIN/1.73 M 2
GLUCOSE BLD-MCNC: 124 MG/DL (ref 65–99)
GLUCOSE BLD-MCNC: 82 MG/DL (ref 65–99)
GLUCOSE BLD-MCNC: 94 MG/DL (ref 65–99)
GLUCOSE BLD-MCNC: 96 MG/DL (ref 65–99)
GLUCOSE SERPL-MCNC: 111 MG/DL (ref 65–99)
HCT VFR BLD AUTO: 30.3 % (ref 42–52)
HGB BLD-MCNC: 10.4 G/DL (ref 14–18)
LYMPHOCYTES # BLD AUTO: 1.78 K/UL (ref 1–4.8)
LYMPHOCYTES NFR BLD: 20.9 % (ref 22–41)
MAGNESIUM SERPL-MCNC: 2 MG/DL (ref 1.5–2.5)
MANUAL DIFF BLD: NORMAL
MCH RBC QN AUTO: 28.9 PG (ref 27–33)
MCHC RBC AUTO-ENTMCNC: 34.3 G/DL (ref 33.7–35.3)
MCV RBC AUTO: 84.2 FL (ref 81.4–97.8)
MICROCYTES BLD QL SMEAR: ABNORMAL
MONOCYTES # BLD AUTO: 0.14 K/UL (ref 0–0.85)
MONOCYTES NFR BLD AUTO: 1.7 % (ref 0–13.4)
MORPHOLOGY BLD-IMP: NORMAL
NEUTROPHILS # BLD AUTO: 5.99 K/UL (ref 1.82–7.42)
NEUTROPHILS NFR BLD: 69.6 % (ref 44–72)
NEUTS BAND NFR BLD MANUAL: 0.9 % (ref 0–10)
NRBC # BLD AUTO: 0 K/UL
NRBC BLD AUTO-RTO: 0 /100 WBC
PHOSPHATE SERPL-MCNC: 2 MG/DL (ref 2.5–4.5)
PLATELET # BLD AUTO: 203 K/UL (ref 164–446)
PLATELET BLD QL SMEAR: NORMAL
PMV BLD AUTO: 9.6 FL (ref 9–12.9)
POTASSIUM SERPL-SCNC: 4.1 MMOL/L (ref 3.6–5.5)
RBC # BLD AUTO: 3.6 M/UL (ref 4.7–6.1)
RBC BLD AUTO: PRESENT
SIGNIFICANT IND 70042: NORMAL
SITE SITE: NORMAL
SODIUM SERPL-SCNC: 138 MMOL/L (ref 135–145)
SOURCE SOURCE: NORMAL
WBC # BLD AUTO: 8.5 K/UL (ref 4.8–10.8)

## 2017-06-22 PROCEDURE — 700102 HCHG RX REV CODE 250 W/ 637 OVERRIDE(OP): Performed by: HOSPITALIST

## 2017-06-22 PROCEDURE — 700101 HCHG RX REV CODE 250: Performed by: PHARMACIST

## 2017-06-22 PROCEDURE — 700105 HCHG RX REV CODE 258: Performed by: PHARMACIST

## 2017-06-22 PROCEDURE — 700105 HCHG RX REV CODE 258: Performed by: HOSPITALIST

## 2017-06-22 PROCEDURE — 700101 HCHG RX REV CODE 250: Performed by: INTERNAL MEDICINE

## 2017-06-22 PROCEDURE — 99291 CRITICAL CARE FIRST HOUR: CPT | Performed by: INTERNAL MEDICINE

## 2017-06-22 PROCEDURE — 99233 SBSQ HOSP IP/OBS HIGH 50: CPT | Performed by: HOSPITALIST

## 2017-06-22 PROCEDURE — 700111 HCHG RX REV CODE 636 W/ 250 OVERRIDE (IP): Performed by: PSYCHIATRY & NEUROLOGY

## 2017-06-22 PROCEDURE — 82962 GLUCOSE BLOOD TEST: CPT

## 2017-06-22 PROCEDURE — 700102 HCHG RX REV CODE 250 W/ 637 OVERRIDE(OP): Performed by: PSYCHIATRY & NEUROLOGY

## 2017-06-22 PROCEDURE — 700105 HCHG RX REV CODE 258: Performed by: PSYCHIATRY & NEUROLOGY

## 2017-06-22 PROCEDURE — 770022 HCHG ROOM/CARE - ICU (200)

## 2017-06-22 PROCEDURE — 700102 HCHG RX REV CODE 250 W/ 637 OVERRIDE(OP): Performed by: INTERNAL MEDICINE

## 2017-06-22 PROCEDURE — A9270 NON-COVERED ITEM OR SERVICE: HCPCS | Performed by: HOSPITALIST

## 2017-06-22 PROCEDURE — 71010 DX-CHEST-PORTABLE (1 VIEW): CPT

## 2017-06-22 PROCEDURE — 94003 VENT MGMT INPAT SUBQ DAY: CPT

## 2017-06-22 PROCEDURE — A9270 NON-COVERED ITEM OR SERVICE: HCPCS | Performed by: INTERNAL MEDICINE

## 2017-06-22 PROCEDURE — 85007 BL SMEAR W/DIFF WBC COUNT: CPT

## 2017-06-22 PROCEDURE — 700111 HCHG RX REV CODE 636 W/ 250 OVERRIDE (IP): Performed by: INTERNAL MEDICINE

## 2017-06-22 PROCEDURE — 83735 ASSAY OF MAGNESIUM: CPT

## 2017-06-22 PROCEDURE — 84100 ASSAY OF PHOSPHORUS: CPT

## 2017-06-22 PROCEDURE — A9270 NON-COVERED ITEM OR SERVICE: HCPCS | Performed by: PSYCHIATRY & NEUROLOGY

## 2017-06-22 PROCEDURE — 80048 BASIC METABOLIC PNL TOTAL CA: CPT

## 2017-06-22 PROCEDURE — 94640 AIRWAY INHALATION TREATMENT: CPT

## 2017-06-22 PROCEDURE — 85027 COMPLETE CBC AUTOMATED: CPT

## 2017-06-22 PROCEDURE — 700111 HCHG RX REV CODE 636 W/ 250 OVERRIDE (IP): Performed by: HOSPITALIST

## 2017-06-22 RX ADMIN — POLYETHYLENE GLYCOL 3350 1 PACKET: 17 POWDER, FOR SOLUTION ORAL at 20:55

## 2017-06-22 RX ADMIN — MAGNESIUM HYDROXIDE 30 ML: 400 SUSPENSION ORAL at 20:55

## 2017-06-22 RX ADMIN — POTASSIUM CHLORIDE 40 MEQ: 1.5 POWDER, FOR SOLUTION ORAL at 10:24

## 2017-06-22 RX ADMIN — IPRATROPIUM BROMIDE AND ALBUTEROL SULFATE 3 ML: .5; 3 SOLUTION RESPIRATORY (INHALATION) at 22:32

## 2017-06-22 RX ADMIN — IPRATROPIUM BROMIDE AND ALBUTEROL SULFATE 3 ML: .5; 3 SOLUTION RESPIRATORY (INHALATION) at 11:38

## 2017-06-22 RX ADMIN — CEFTRIAXONE SODIUM 2 G: 2 INJECTION, POWDER, FOR SOLUTION INTRAMUSCULAR; INTRAVENOUS at 10:25

## 2017-06-22 RX ADMIN — DEXTROSE MONOHYDRATE 1500 MG: 50 INJECTION, SOLUTION INTRAVENOUS at 20:56

## 2017-06-22 RX ADMIN — METRONIDAZOLE 500 MG: 500 TABLET ORAL at 05:36

## 2017-06-22 RX ADMIN — CHLORHEXIDINE GLUCONATE 15 ML: 1.2 RINSE ORAL at 10:25

## 2017-06-22 RX ADMIN — SODIUM CHLORIDE 15 MMOL: 450 INJECTION, SOLUTION INTRAVENOUS at 10:25

## 2017-06-22 RX ADMIN — SENNOSIDES AND DOCUSATE SODIUM 2 TABLET: 8.6; 5 TABLET ORAL at 10:24

## 2017-06-22 RX ADMIN — FAMOTIDINE 20 MG: 20 TABLET, FILM COATED ORAL at 20:55

## 2017-06-22 RX ADMIN — IPRATROPIUM BROMIDE AND ALBUTEROL SULFATE 3 ML: .5; 3 SOLUTION RESPIRATORY (INHALATION) at 18:32

## 2017-06-22 RX ADMIN — METRONIDAZOLE 500 MG: 500 TABLET ORAL at 15:29

## 2017-06-22 RX ADMIN — PHENOBARBITAL 60 MG: 20 ELIXIR ORAL at 10:25

## 2017-06-22 RX ADMIN — IPRATROPIUM BROMIDE AND ALBUTEROL SULFATE 3 ML: .5; 3 SOLUTION RESPIRATORY (INHALATION) at 15:43

## 2017-06-22 RX ADMIN — SENNOSIDES AND DOCUSATE SODIUM 2 TABLET: 8.6; 5 TABLET ORAL at 20:55

## 2017-06-22 RX ADMIN — CHLORHEXIDINE GLUCONATE 15 ML: 1.2 RINSE ORAL at 22:41

## 2017-06-22 RX ADMIN — POTASSIUM CHLORIDE AND SODIUM CHLORIDE: 900; 150 INJECTION, SOLUTION INTRAVENOUS at 07:21

## 2017-06-22 RX ADMIN — IPRATROPIUM BROMIDE AND ALBUTEROL SULFATE 3 ML: .5; 3 SOLUTION RESPIRATORY (INHALATION) at 02:53

## 2017-06-22 RX ADMIN — LORAZEPAM 0.5 MG: 2 INJECTION INTRAMUSCULAR; INTRAVENOUS at 00:44

## 2017-06-22 RX ADMIN — POTASSIUM CHLORIDE 40 MEQ: 1.5 POWDER, FOR SOLUTION ORAL at 20:55

## 2017-06-22 RX ADMIN — FAMOTIDINE 20 MG: 20 TABLET, FILM COATED ORAL at 10:24

## 2017-06-22 RX ADMIN — DEXTROSE MONOHYDRATE 1500 MG: 50 INJECTION, SOLUTION INTRAVENOUS at 10:25

## 2017-06-22 RX ADMIN — PHENOBARBITAL 60 MG: 20 ELIXIR ORAL at 20:56

## 2017-06-22 RX ADMIN — IPRATROPIUM BROMIDE AND ALBUTEROL SULFATE 3 ML: .5; 3 SOLUTION RESPIRATORY (INHALATION) at 06:23

## 2017-06-22 RX ADMIN — ENOXAPARIN SODIUM 30 MG: 100 INJECTION SUBCUTANEOUS at 20:55

## 2017-06-22 RX ADMIN — METRONIDAZOLE 500 MG: 500 TABLET ORAL at 22:41

## 2017-06-22 NOTE — PROGRESS NOTES
[facility] Hospitalist Progress Note    Date of Service: 2017    Chief Complaint  36 y.o. male admitted 2017 with altered mental status.    Interval Problem Update  Mr. Edwards has a hx of TBI at age 17. He was admitted with aspiration pneumonitis. EEG revealed status epilepticus thus he was intubated on  placed on IV keppra, phenobarbital.   Due to low BP, propofol was held and remains off.   He remains on 48 hour EEG  Tmax 99.6. 900 ml urine over night.  I met with Ruben's mother today and we discussed his condition.   Consultants/Specialty  Neurology  Pulmonology  I discussed with Dr. Araiza on [facility]    Disposition  ICU        Review of Systems   Unable to perform ROS: intubated      Physical Exam  Laboratory/Imaging   Hemodynamics  Temp (24hrs), Av.9 °C (98.4 °F), Min:36.1 °C (97 °F), Max:37.6 °C (99.6 °F)   Temperature: 36.9 °C (98.4 °F)  Pulse  Av.9  Min: 60  Max: 131 Heart Rate (Monitored): 82  NIBP: (!) 95/54 mmHg CVP (mm Hg): (!) 187 MM HG    Respiratory  Liriano Vent Mode: APVCMV, Rate (breaths/min): 14, PEEP/CPAP: 8, PEEP/CPAP: 8, FiO2: 30, P Peak (PIP): 22, P MEAN: 11   Respiration: 14, Pulse Oximetry: 100 %     Work Of Breathing / Effort: Vented  RUL Breath Sounds: Clear, RML Breath Sounds: Clear, RLL Breath Sounds: Diminished, JONATAN Breath Sounds: Clear, LLL Breath Sounds: Diminished    Fluids    Intake/Output Summary (Last 24 hours) at 17 0747  Last data filed at 17 0600   Gross per 24 hour   Intake 5221.31 ml   Output   2600 ml   Net 2621.31 ml       Nutrition  Orders Placed This Encounter   Procedures   • Diet NPO     Standing Status: Standing      Number of Occurrences: 1      Standing Expiration Date:      Order Specific Question:  Type:     Answer:  Now [1]     Order Specific Question:  Restrict to:     Answer:  Strict [1]     Physical Exam   Constitutional: No distress.   Thin and frail-appearing   HENT:   Endotracheal tube.    Eyes: Right eye exhibits no  discharge. Left eye exhibits no discharge.   Neck:   Right IJ central line without bleeding.   Well-healed tracheostomy site.    Cardiovascular: Normal rate and regular rhythm.    No murmur heard.  Pulmonary/Chest:   Vent, good air movement   Abdominal: He exhibits no distension. There is no tenderness.   PEG. Baclofen pump palpable on abdomen.    Genitourinary:   Waldrop cath   Musculoskeletal: He exhibits edema. He exhibits no tenderness.   Poor muscle tone, significant contractures of the arms and legs.    Neurological:    He does not follow and does not withdraw to pain.   Skin: Skin is warm. No rash noted. There is pallor.       Recent Labs      06/20/17   0530  06/21/17   0420  06/22/17   0533   WBC  5.7  6.0  8.5   RBC  3.28*  3.47*  3.60*   HEMOGLOBIN  9.5*  10.1*  10.4*   HEMATOCRIT  27.8*  29.1*  30.3*   MCV  84.8  83.9  84.2   MCH  29.0  29.1  28.9   MCHC  34.2  34.7  34.3   RDW  47.8  49.2  49.9   PLATELETCT  144*  170  203   MPV  9.0  9.1  9.6     Recent Labs      06/20/17   0530  06/21/17   0420  06/22/17   0533   SODIUM  128*  134*  138   POTASSIUM  2.7*  2.9*  4.1   CHLORIDE  98  104  109   CO2  22  24  24   GLUCOSE  74  122*  111*   BUN  7*  4*  9   CREATININE  <0.20*  <0.20*  <0.20*   CALCIUM  7.7*  7.7*  7.8*             Recent Labs      06/21/17   0420   TRIGLYCERIDE  59          Assessment/Plan     Status epilepticus (CMS-[facility]) (present on admission)  Assessment & Plan  As evidenced on EEG. Continuous EEG showed seizure activity over 48 hours.  Neurology consultation.  IV keppra loaded.   Phenobarbital added.    Aspiration pneumonia (CMS-[facility]) (present on admission)  Assessment & Plan  - pt unable to get his secretions out  - s/p IV unasyn  - culture growing Klebsiella resistant to ampicillin thus rocephin and add cortrak flagyl to cover aspiration     Sepsis (CMS-[facility])  Assessment & Plan  - secondary to aspiration pneumonia  - continue IV abx    Acute respiratory failure with hypoxia (CMS-[facility])  (present on admission)  Assessment & Plan  Intubated on 6/19.  Pulmonary consult.  He may require another tracheostomy.    Hyponatremia (present on admission)  Assessment & Plan  - persistent despite IV fluids  - repeat bmp in am    Hypoglycemia (present on admission)  Assessment & Plan  - resolved    Encephalopathy acute (present on admission)  Assessment & Plan  Secondary to status epilepticus.    TBI (traumatic brain injury) (CMS-[facility]) (present on admission)  Assessment & Plan  Since age 17 years.  With quadriplegia   Baclofen pump followed at [facility].    Hypokalemia (present on admission)  Assessment & Plan  IV replacement.      Labs reviewed, Medications reviewed and Radiology images reviewed  Waldrop catheter: Unconscious / Sedated Patient on a Ventilator      DVT Prophylaxis: Enoxaparin (Lovenox)

## 2017-06-22 NOTE — CARE PLAN
Problem: Infection  Goal: Will remain free from infection  Pt will remain free from s/s of further infection.  Intervention: Implement standard precautions and perform hand washing before and after patient contact  Standard precautions in place including adequate hand hygiene.      Problem: Skin Integrity  Goal: Risk for impaired skin integrity will decrease  Neo skin risk assessment completed.   Intervention: Assess risk factors for impaired skin integrity and/or pressure ulcers  Wound care collaboration with [facility]. Pressure ulcers present on admission.  Intervention: Implement precautions to protect skin integrity in collaboration with the interdisciplinary team  Measures in place to protect skin including nutrition per dietician, low air loss mattress, waffle cushion, Q 2 h turns, medical device repositioning, head to toe skin assessment.

## 2017-06-22 NOTE — CARE PLAN
Problem: Ventilation Defect:  Goal: Ability to achieve and maintain unassisted ventilation or tolerate decreased levels of ventilator support  Adult Ventilation Update    Total Vent Days: 3          Patient Lines/Drains/Airways Status    Active Airway      Name: Placement date: Placement time: Site: Days:     Airway Group ET Tube 8.0 06/19/17  1755    1                  Cough: Productive;Strong (06/21/17 1600)  Sputum Amount: Small;Moderate (06/21/17 1600)  Sputum Color: White;Yellow;Bloody (06/21/17 1600)  Sputum Consistency: Thick (06/21/17 1600)    Mobility Group  Activity Performed: Unable to mobilize (06/21/17 0800)  Assistance / Tolerance: Assistance of Two or More;General Weakness (06/19/17 1549)  Pt Calls for Assistance: No (06/21/17 0800)  Staff Present for Mobilization: RN;CNA (06/19/17 1549)  Assistive Devices: Hand held assist;Rails (06/19/17 1549)  Reason Not Mobilized: Bed rest;Unstable condition (06/21/17 0800)  Mobilization Comments: 24 hour continuous EEG (06/21/17 0800)    Events/Summary/Plan: Pt remains on CMV. No changes made at this time.(06/21/17 4099)

## 2017-06-22 NOTE — EEG PROGRESS NOTE
24 HR VIDEO ELECTROENCEPHALOGRAM REPORT      Referring MD: Dr. Hunter.      DOS:  6/21/2017 - 6/22/2017     INDICATION:  Ruben Edwards 36 y.o. male presenting with frequent seizures. History of TBI.     CURRENT ANTIEPILEPTIC REGIMEN: Levetiracetam 1500 mg q 12 hrs, phenobarbital 60 mg bid.    TECHNIQUE: 30 channel 24 hrs video electroencephalogram (EEG) was performed in accordance with the international 10-20 system. The study was reviewed in bipolar and referential montages. The recording examined a mostly sedated patient with intermittent arousals and elements of slow wave sleep.     DESCRIPTION OF THE RECORD:  There is waxing and waning of the cerebral electrical activity, mostly in keeping with slow diffuse delta activity and elements of slow wave wave sleep including vertex sharp waves.     Intermittent arousals without background improvement but noted increased muscle artifact.      ACTIVATION PROCEDURES:    Not performed.     ICTAL AND/OR INTERICTAL FINDINGS:    Frequent interictal / independent right and left temporal spikes. No seizures.       EKG: sampling of the EKG recording demonstrated sinus rhythm with sinus tachycardia.        INTERPRETATION:  This is an abnormal 24 hrs video EEG recording in the awake and drowsy state(s).  Frequent interictal / independent right and left temporal spikes. No seizures. There has been significant improvement when compared to prior studies. Clinical and radiological correlation is recommended.    Updates provided to Dr. Hunter.       Harris Bolton MD  Medical Director, Epilepsy and Neurodiagnostics.    Clinical  of Neurology [facility].    Diplomate in Neurology, Epilepsy, and Electrodiagnostic Medicine.    Office: [phone]  Fax: [phone]

## 2017-06-22 NOTE — DOCUMENTATION QUERY
DOCUMENTATION QUERY    PROVIDERS: Please select “Cosign w/ note”to reply to query.    Dr. Encarnacion,    To better represent the severity of illness of your patient, please review the following information and exercise your independent professional judgment in responding to this query.     Multiple Pressure Ulcers are noted in the Wound Team Note on 6/20/17.       Pressure ulcer, DTI, R medial chest, POA       Pressure ulcer, stage 3, L ischium, POA       Pressure ulcer, stage 2, R middle arm, POA     Based upon the clinical findings, risk factors, and treatment, can a diagnosis be provided to support this finding?    · Agree with wound team findings of:              Pressure ulcer, DTI, R medial chest, POA              Pressure ulcer, stage 3, L ischium, POA              Pressure ulcer, stage 2, R middle arm, POA  • Disagree with Wound team findings  • Other explanation of clinical findings, please explain  • Unable to determine    The medical record reflects the following:   Clinical Findings Wound team notes pressure ulcer, dti, R medial chest, pressure ulcer, stage 3, L ischium, and pressure ulcer, stage 2, R middle arm, all POA.   Treatment Wound team consult, adhesive foam to L ischium, all others open to air   Risk Factors Pt w/hx of TBI, quadriplegic, sepsis, acute respiratory failure now intubated, aspiration pna, encephalopathy, hyponatremia, hypokalemia, & hypomagnesemia.   Location within medical record Wound Team Note     Thank you,   Meg Lion RN  Clinical   Phone # [phone]

## 2017-06-22 NOTE — DOCUMENTATION QUERY
DOCUMENTATION QUERY    PROVIDERS: Please select “Cosign w/ note”to reply to query.    Dr. Encarnacion,    To better represent the severity of illness of your patient, please review the following information and exercise your independent professional judgment in responding to this query.     Sepsis and Acute Respiratory Failure w/hypoxia are documented in the Progress Notes. Based upon the clinical findings, risk factors, and treatment, can the relationship between these two conditions be further specified?    • Acute respiratory failure w/hypoxia is related to sepsis  • Acute respiratory failure w/hypoxia is not related to sepsis  • Other explanation of clinical findings, please explain  • Unable to determine      The medical record reflects the following:   Clinical Findings Documented that pt has sepsis and acute respiratory failure.   Treatment Labs, imaging, cx's, abx, rt protocol, mechanical ventilation.   Risk Factors Sepsis, acute respiratory failure now intubated, aspiration pna   Location within medical record  Progress Notes     Thank you,   Meg Lion RN  Clinical   Phone # [phone]

## 2017-06-22 NOTE — CARE PLAN
Problem: Nutritional:  Goal: Nutrition support tolerated and meeting greater than 85% of estimated needs  Outcome: MET Date Met:  06/22/17  TF at goal of Impact Peptide 1.5 @50ml/hr.

## 2017-06-22 NOTE — PROGRESS NOTES
Pulmonary Critical Care Progress Note      Date of service: 6/22/2017    Chief Complaint: status epilepticus     History of Present Illness: All information taken from the patient's family at bedside as well as physician sign out as the patient is noncommunicative.  This is a 36-year-old gentleman with history of traumatic brain injury approximately 18 years ago after [place].  He had been in a nearly persistent vegetative state since, has not been verbal, does not mobilize, previously was trached for approximately 1 year, but has been trached free for approximately 18 years.  He continues to have a deep stoma; however, it has apparently closed off.  He has a PEG tube for feeding; however, he used to also take miniscule amounts of food orally as well up until most recently.  Patient was admitted on 06/17/2017 with the concern of aspiration pneumonia as well as increased tremor type activities with upper extremity involvement.  The patient was initially placed on telemetry floor, treated for aspiration pneumonitis with antibiotic therapy as well as supportive therapy with IV fluids and tube feeds.  Over the past 24 hours or so, there had been noted ongoing jerking type motions of the upper extremities and therefore, patient had EEG.  The EEG did show several periods of seizures lasting several seconds.  Based on this, neurology recommended that the patient be transferred to intensive care unit for concern of underlying status epilepticus, intubated and placed on propofol therapy, while antiepileptic medications are being titrated and adjusted.  Patient has no history of known seizure activity.  His only medication is a baclofen pump for a significant spasticity and this is apparently managed by [facility].  At the present time, no further history is currently available or able to be obtained.    ROS:  Respiratory: unable to perform due to the patient's inability to effectively  communicate, Cardiac: unable to perform due to the patient's inability to effectively communicate, GI: unable to perform due to the patient's inability to effectively communicate.  All other systems negative.    Interval Events:  24 hour interval history reviewed   Tmax 99.6 °F   +2.6L over the last 24 hours, +7L since admit   CXR shows: ongoing left lower lobe infiltrate and consolidation with bilateral interstitial edema.    EEG completed 48hr, off continuous EEG.   Not on sedation. Opens eyes to voice. Extremities cont contracted.  SR, BP  systolic.   BM overnight.   Bedrest orders.     PFSH:  No change.    Physical Exam  General: nad, spastic quadriparesis   Neuro/Psych: does not follow, sedate  HEENT: nc, at, deep stoma to ant neck  CVS: rrr  Respiratory: Mild rhonchi on left, otherwise clear.   Abdomen/: s, nt, peg in place  Extremities: contracted, 2+ edema.    Respiratory:  Liriano Vent Mode: APVCMV, Rate (breaths/min): 14, Vt Target (mL): 400, PEEP/CPAP: 8, FiO2: 30, Static Compliance (ml / cm H2O): 42, Control VTE (exp VT): 397  Pulse Oximetry: 100 %  ImagingAvailable data reviewed   Recent Labs      06/19/17 2015 06/20/17   0431  06/21/17   0500   ISTATAPH  7.520*  7.466  7.488   ISTATAPCO2  32.3  29.5  31.3   ISTATAPO2  384*  137*  89*   ISTATATCO2  27  22  25   LEABUYP6OXC  100*  99  98   ISTATARTHCO3  26.3*  21.3  23.7   ISTATARTBE  4*  -2  1   ISTATTEMP  96.4 F  36.8 C  97.8 F   ISTATFIO2  100  40  30   ISTATSPEC  Arterial  Arterial  Arterial   ISTATAPHTC  7.539*  7.469  7.495   YEBZNYBN5ZY  377*  136*  86     HemoDynamics:  Pulse: 84, Heart Rate (Monitored): 82  NIBP: (!) 95/54 mmHg  CVP (mm Hg): (!) 14-16 MM HG  Imaging: Available data reviewed        Neuro:  GCS Total Spur Coma Score: 10     Imaging: Available data reviewed    Fluids:  Intake/Output       06/20/17 0700 - 06/21/17 0659 06/21/17 0700 - 06/22/17 0659 06/22/17 0700 - 06/23/17 0659      9805-0038 2818-2009 Total 7050-2363  8002-2200 Total 5302-4126 9788-3503 Total       Intake    I.V.  1953.1  1525.2 3478.3  2171.3  1300 3471.3  --  -- --    Magnesium Sulfate Volume 99.6 -- 99.6 50 -- 50 -- -- --    Norepinephrine Volume 53.5 25.2 78.7 21.3 0 21.3 -- -- --    IV Volume (NS 20K+) 1200 1200 2400 1200 1200 2400 -- -- --    IV Piggyback Volume (IV Piggyback) 600 300 900  -- -- --    Other  --  120 120  120  190 310  --  -- --    Medications (P.O./ Enteral Liquids) -- 120 120 120 190 310 -- -- --    Enteral  75  660 735  720  720 1440  --  -- --    Enteral Volume 75 450 525  -- -- --    Free Water / Tube Flush -- 210 210 120 120 240 -- -- --    Total Intake 2028.1 2305.2 4333.3 3011.3 2210 5221.3 -- -- --       Output    Urine  530  925 1455  1700  900 2600  --  -- --    Indwelling Cathether  1571 410 0977 -- -- --    Stool/Urine  0  -- 0  0  -- 0  --  -- --    Measurable Stool (ml) 0 -- 0 0 -- 0 -- -- --    Stool  --  -- --  --  -- --  --  -- --    Number of Times Stooled 1 x -- 1 x 0 x -- 0 x -- -- --    Total Output  3091 945 4796 -- -- --       Net I/O     1498.1 1380.2 2878.3 1311.3 1310 2621.3 -- -- --        Weight: 60.4 kg (133 lb 2.5 oz)  Recent Labs      06/20/17   0330  06/20/17   0530  06/21/17   0420  06/22/17   0533   SODIUM   --   128*  134*  138   POTASSIUM   --   2.7*  2.9*  4.1   CHLORIDE   --   98  104  109   CO2   --   22  24  24   BUN   --   7*  4*  9   CREATININE   --   <0.20*  <0.20*  <0.20*   MAGNESIUM  1.4*   --   1.6  2.0   PHOSPHORUS   --    --   <1.0*  2.0*   CALCIUM   --   7.7*  7.7*  7.8*     GI/Nutrition:  Imaging: Available data reviewed  NPO     Liver Function  Recent Labs      06/20/17   0530  06/21/17 0420  06/22/17   0533   GLUCOSE  74  122*  111*     Heme:  Recent Labs      06/20/17   0530  06/21/17   0420  06/22/17   0533   RBC  3.28*  3.47*  3.60*   HEMOGLOBIN  9.5*  10.1*  10.4*   HEMATOCRIT  27.8*  29.1*  30.3*   PLATELETCT  144*  170  203      Infectious Disease:  Temp  Av.9 °C (98.4 °F)  Min: 36.1 °C (97 °F)  Max: 37.6 °C (99.6 °F)  Micro: cultures reviewed  Recent Labs      17   0330  17   0530  17   0420  17   0533   WBC  7.0  5.7  6.0  8.5   NEUTSPOLYS  68.50  66.70  63.60   --    LYMPHOCYTES  20.00*  22.10  22.90   --    MONOCYTES  9.50  9.70  9.50   --    EOSINOPHILS  1.00  0.90  3.20   --    BASOPHILS  0.60  0.40  0.50   --      Current Facility-Administered Medications   Medication Dose Frequency Provider Last Rate Last Dose   • potassium chloride (KLOR-CON) 20 MEQ packet 40 mEq  40 mEq BID Kev Araiza M.D.   40 mEq at 17   • cefTRIAXone (ROCEPHIN) 2 g in  mL IVPB  2 g Q24HRS Aidan Encarnacion M.D.   Stopped at 17 1649   • metronidazole (FLAGYL) tablet 500 mg  500 mg Q8HRS Aidan Encarnacion M.D.   500 mg at 17 0536   • norepinephrine (LEVOPHED) 8 mg in  mL Infusion  0.5-30 mcg/min Continuous Rivas Dee M.D.   Stopped at 17 1730   • levetiracetam (KEPPRA) 1,500 mg in D5W 100 mL IVPB  1,500 mg Q12HRS Martell Hunter M.D.   Stopped at 17   • PHENobarbital solution 60 mg  60 mg BID Martell Hunter M.D.   60 mg at 17   • enoxaparin (LOVENOX) inj 30 mg  30 mg DAILY FARHAT MedinaO.   30 mg at 17   • 0.9 % NaCl with KCl 20 mEq infusion   Continuous FARHAT MedinaO. 100 mL/hr at 17     • Respiratory Care per Protocol   Continuous RT Kev Araiza M.D.       • senna-docusate (PERICOLACE or SENOKOT S) 8.6-50 MG per tablet 2 Tab  2 Tab BID Kev Araiza M.D.   2 Tab at 17    And   • polyethylene glycol/lytes (MIRALAX) PACKET 1 Packet  1 Packet QDAY PRBRIDGETTE Araiza M.D.        And   • magnesium hydroxide (MILK OF MAGNESIA) suspension 30 mL  30 mL QDAY SUSAN Araiza M.D.        And   • bisacodyl (DULCOLAX) suppository 10 mg  10 mg QDAY SUSAN Araiza M.D.       • chlorhexidine (PERIDEX)  0.12 % solution 15 mL  15 mL BID Kev Araiza M.D.   15 mL at 06/21/17 2129   • lidocaine (XYLOCAINE) 1%  injection  1-2 mL Q30 MIN PRN Kev Araiza M.D.       • MD ALERT...Adult ICU Electrolyte Replacement per Pharmacy Protocol   pharmacy to dose Kev Araiza M.D.       • fentaNYL (SUBLIMAZE) injection 25 mcg  25 mcg Q HOUR PRN Kev Araiza M.D.        Or   • fentaNYL (SUBLIMAZE) injection 50 mcg  50 mcg Q HOUR PRN Kev Araiza M.D.        Or   • fentaNYL (SUBLIMAZE) injection 100 mcg  100 mcg Q HOUR PRN Kev Araiza M.D.   100 mcg at 06/19/17 1937   • famotidine (PEPCID) tablet 20 mg  20 mg Q12HRS Kev Araiza M.D.   20 mg at 06/21/17 2129    Or   • famotidine (PEPCID) injection 20 mg  20 mg Q12HRS Kev Araiza M.D.   20 mg at 06/20/17 0918   • ipratropium-albuterol (DUONEB) nebulizer solution 3 mL  3 mL Q2HRS PRN (RT) Kev Araiza M.D.       • ipratropium-albuterol (DUONEB) nebulizer solution 3 mL  3 mL Q4HRS (RT) Kev Araiza M.D.   3 mL at 06/22/17 0623   • insulin lispro (HUMALOG) injection 1-6 Units  1-6 Units Q6HRS Kev Araiza M.D.   Stopped at 06/19/17 1845   • glucose 4 g chewable tablet 16 g  16 g Q15 MIN PRN Kev Araiza M.D.        And   • dextrose 50% (D50W) injection 25 mL  25 mL Q15 MIN PRN Kev Araiza M.D.   25 mL at 06/20/17 1840   • propofol (DIPRIVAN) injection  5-80 mcg/kg/min Continuous Wenceslao Partida, PHARMD   Stopped at 06/20/17 0440   • NS infusion 1,632 mL  30 mL/kg Once PRN FARHAT MedinaO.       • NS (BOLUS) infusion 1,000 mL  1,000 mL Once PRN FARHAT MedinaO.   Stopped at 06/17/17 1836   • ondansetron (ZOFRAN) syringe/vial injection 4 mg  4 mg Q4HRS PRN FARHAT MedinaO.       • ondansetron (ZOFRAN ODT) dispertab 4 mg  4 mg Q4HRS PRN FARHAT MedinaO.       • promethazine (PHENERGAN) tablet 12.5-25 mg  12.5-25 mg Q4HRS PRN FARHAT MedinaO.       • promethazine (PHENERGAN) suppository 12.5-25 mg  12.5-25  mg Q4HRS PRN JASPAL Medina.O.       • prochlorperazine (COMPAZINE) injection 5-10 mg  5-10 mg Q4HRS PRN Clarence Morfin D.O.       • lorazepam (ATIVAN) tablet 0.5 mg  0.5 mg Q6HRS PRN JASPAL Medina.O.   Stopped at 06/19/17 0248    Or   • lorazepam (ATIVAN) injection 0.5 mg  0.5 mg Q6HRS PRN JASPAL Medina.O.   0.5 mg at 06/22/17 0044   • acetaminophen (TYLENOL) tablet 650 mg  650 mg Q6HRS PRN JASPAL Medina.O.   650 mg at 06/17/17 1857   • Pain Pump (patient supplied) Device   Continuous JASPAL Medina.SAM.   Stopped at 06/17/17 1230     Last reviewed on 6/17/2017  9:45 AM by Ligia Frazier, Pharmacy Int    Quality  Measures:  Radiology images reviewed, Medications reviewed and Labs reviewed  Waldrop catheter: Epidural Catheter / Drain                  Gtts:  NS with 20 K 100   Levo off  Propofol off    Abx:   Ceftriaxone   Flagyl    Cultures:   Urine 6/17 negative  Blood 6/17 negative  BAL 6/19  Klebsiella PNA     Assessment/Plan  -  New onset seizure activity with concern of status epilepticus.   - keppra   - phenobarb   - continuous eeg without sz overnight, stopped a.m. 6/22   - neuro following  -  Aspiration pneumonia.   - intubated 6/19 (mainly for sz control)   - bal 6/19 Kleb pna   - on rocephin   -  Acute respiratory insufficiency secondary to above.   - rt/02 protcol   - full vent support   - sat/sbt now sz activity suppressed   - dc ivf   - will likely need diureses soon   -  Hyponatremia.  - HypoK and Hyop Mag - replace  -  Severe chronic underlying debility secondary to profound traumatic brain    injury 18 years ago.    Discussed patient condition and risk of morbidity and/or mortality with RN, RT, Therapies, Pharmacy, Dietary, Charge nurse / hot rounds and hospitalist.    The patient remains critically ill.  Critical care time = 35 minutes in directly providing and coordinating critical care and extensive data review.  No time overlap and excludes procedures.    Kaylene WOODY  (Scribe), am scribing for, and in the presence of, Kev Araiza M.D.    Electronically signed by: Kaylene Cote (Scribbartolome), 6/22/2017    I, Kev Araiza M.D. personally performed the services described in this documentation, as scribed by Kaylene Cote in my presence, and it is both accurate and complete.

## 2017-06-22 NOTE — CARE PLAN
Problem: Safety  Goal: Will remain free from injury  Outcome: PROGRESSING AS EXPECTED  Seizure precautions in place, bed low and locked, protective padding in place, suction at bed side    Problem: Skin Integrity  Goal: Risk for impaired skin integrity will decrease  Outcome: PROGRESSING AS EXPECTED  Pt turned Q2 hours, bony prominences offloaded with pillows/heel protector boots, barrier wipes in use to prevent skin breakdown, linens kept free of moisture, full waffle cushion in use

## 2017-06-22 NOTE — PROCEDURES
24 HR VIDEO ELECTROENCEPHALOGRAM REPORT      Referring MD: Dr. Hunter.      DOS:  6/21/2017 - 6/22/2017     INDICATION:  Ruben Edwards 36 y.o. male presenting with frequent seizures. History of TBI.     CURRENT ANTIEPILEPTIC REGIMEN: Levetiracetam 1500 mg q 12 hrs, phenobarbital 60 mg bid.    TECHNIQUE: 30 channel 24 hrs video electroencephalogram (EEG) was performed in accordance with the international 10-20 system. The study was reviewed in bipolar and referential montages. The recording examined a mostly sedated patient with intermittent arousals and elements of slow wave sleep.     DESCRIPTION OF THE RECORD:  There is waxing and waning of the cerebral electrical activity, mostly in keeping with slow diffuse delta activity and elements of slow wave wave sleep including vertex sharp waves.     Intermittent arousals without background improvement but noted increased muscle artifact.      ACTIVATION PROCEDURES:    Not performed.     ICTAL AND/OR INTERICTAL FINDINGS:    Frequent interictal / independent right and left temporal spikes. No seizures.       EKG: sampling of the EKG recording demonstrated sinus rhythm with sinus tachycardia.        INTERPRETATION:  This is an abnormal 24 hrs video EEG recording in the awake and drowsy state(s).  Frequent interictal / independent right and left temporal spikes. The findings increase risk for seizures and suggest underlying areas of cortical irritability, however no seizures were captured during this study. There has been significant improvement when compared to prior studies. Clinical and radiological correlation is recommended.    Updates provided to Dr. Hunter.       Harris Bolton MD  Medical Director, Epilepsy and Neurodiagnostics.    Clinical  of Neurology [employer] [facility].    Diplomate in Neurology, Epilepsy, and Electrodiagnostic Medicine.    Office: [phone]  Fax: [phone]    MARLON / MAXINE    DD:  06/22/2017  06:42:57  DT:  06/22/2017 07:43:51    D#:  [id number]  Job#:  [id number]

## 2017-06-23 ENCOUNTER — APPOINTMENT (OUTPATIENT)
Dept: RADIOLOGY | Facility: MEDICAL CENTER | Age: 37
DRG: 004 | End: 2017-06-23
Attending: INTERNAL MEDICINE
Payer: COMMERCIAL

## 2017-06-23 LAB
ANION GAP SERPL CALC-SCNC: 5 MMOL/L (ref 0–11.9)
BASE EXCESS BLDA CALC-SCNC: 2 MMOL/L (ref -4–3)
BASOPHILS # BLD AUTO: 0.3 % (ref 0–1.8)
BASOPHILS # BLD: 0.03 K/UL (ref 0–0.12)
BODY TEMPERATURE: ABNORMAL CENTIGRADE
BUN SERPL-MCNC: 7 MG/DL (ref 8–22)
CALCIUM SERPL-MCNC: 8.1 MG/DL (ref 8.5–10.5)
CHLORIDE SERPL-SCNC: 101 MMOL/L (ref 96–112)
CO2 SERPL-SCNC: 24 MMOL/L (ref 20–33)
CREAT SERPL-MCNC: <0.2 MG/DL (ref 0.5–1.4)
EOSINOPHIL # BLD AUTO: 0.38 K/UL (ref 0–0.51)
EOSINOPHIL NFR BLD: 3.7 % (ref 0–6.9)
ERYTHROCYTE [DISTWIDTH] IN BLOOD BY AUTOMATED COUNT: 49.8 FL (ref 35.9–50)
GFR SERPL CREATININE-BSD FRML MDRD: >60 ML/MIN/1.73 M 2
GLUCOSE BLD-MCNC: 101 MG/DL (ref 65–99)
GLUCOSE BLD-MCNC: 111 MG/DL (ref 65–99)
GLUCOSE BLD-MCNC: 130 MG/DL (ref 65–99)
GLUCOSE BLD-MCNC: 91 MG/DL (ref 65–99)
GLUCOSE BLD-MCNC: 97 MG/DL (ref 65–99)
GLUCOSE SERPL-MCNC: 117 MG/DL (ref 65–99)
HCO3 BLDA-SCNC: 24 MMOL/L (ref 17–25)
HCT VFR BLD AUTO: 31 % (ref 42–52)
HGB BLD-MCNC: 10.6 G/DL (ref 14–18)
IMM GRANULOCYTES # BLD AUTO: 0.06 K/UL (ref 0–0.11)
IMM GRANULOCYTES NFR BLD AUTO: 0.6 % (ref 0–0.9)
LYMPHOCYTES # BLD AUTO: 0.97 K/UL (ref 1–4.8)
LYMPHOCYTES NFR BLD: 9.5 % (ref 22–41)
MCH RBC QN AUTO: 28.8 PG (ref 27–33)
MCHC RBC AUTO-ENTMCNC: 34.2 G/DL (ref 33.7–35.3)
MCV RBC AUTO: 84.2 FL (ref 81.4–97.8)
MONOCYTES # BLD AUTO: 0.86 K/UL (ref 0–0.85)
MONOCYTES NFR BLD AUTO: 8.4 % (ref 0–13.4)
NEUTROPHILS # BLD AUTO: 7.92 K/UL (ref 1.82–7.42)
NEUTROPHILS NFR BLD: 77.5 % (ref 44–72)
NRBC # BLD AUTO: 0 K/UL
NRBC BLD AUTO-RTO: 0 /100 WBC
O2/TOTAL GAS SETTING VFR VENT: 40 % (ref 30–60)
PCO2 BLDA: 30.5 MMHG (ref 26–37)
PH BLDA: 7.51 [PH] (ref 7.4–7.5)
PLATELET # BLD AUTO: 215 K/UL (ref 164–446)
PMV BLD AUTO: 9.3 FL (ref 9–12.9)
PO2 BLDA: 101.1 MMHG (ref 64–87)
POTASSIUM SERPL-SCNC: 3.7 MMOL/L (ref 3.6–5.5)
RBC # BLD AUTO: 3.68 M/UL (ref 4.7–6.1)
SAO2 % BLDA: 97.6 % (ref 93–99)
SODIUM SERPL-SCNC: 130 MMOL/L (ref 135–145)
WBC # BLD AUTO: 10.2 K/UL (ref 4.8–10.8)

## 2017-06-23 PROCEDURE — 700111 HCHG RX REV CODE 636 W/ 250 OVERRIDE (IP): Performed by: INTERNAL MEDICINE

## 2017-06-23 PROCEDURE — A9270 NON-COVERED ITEM OR SERVICE: HCPCS | Performed by: PSYCHIATRY & NEUROLOGY

## 2017-06-23 PROCEDURE — 99233 SBSQ HOSP IP/OBS HIGH 50: CPT | Performed by: HOSPITALIST

## 2017-06-23 PROCEDURE — 700102 HCHG RX REV CODE 250 W/ 637 OVERRIDE(OP): Performed by: PSYCHIATRY & NEUROLOGY

## 2017-06-23 PROCEDURE — 71250 CT THORAX DX C-: CPT

## 2017-06-23 PROCEDURE — 700102 HCHG RX REV CODE 250 W/ 637 OVERRIDE(OP): Performed by: INTERNAL MEDICINE

## 2017-06-23 PROCEDURE — 700101 HCHG RX REV CODE 250: Performed by: INTERNAL MEDICINE

## 2017-06-23 PROCEDURE — A9270 NON-COVERED ITEM OR SERVICE: HCPCS | Performed by: INTERNAL MEDICINE

## 2017-06-23 PROCEDURE — 80048 BASIC METABOLIC PNL TOTAL CA: CPT

## 2017-06-23 PROCEDURE — A9270 NON-COVERED ITEM OR SERVICE: HCPCS | Performed by: HOSPITALIST

## 2017-06-23 PROCEDURE — 82803 BLOOD GASES ANY COMBINATION: CPT

## 2017-06-23 PROCEDURE — 82962 GLUCOSE BLOOD TEST: CPT | Mod: 91

## 2017-06-23 PROCEDURE — 700111 HCHG RX REV CODE 636 W/ 250 OVERRIDE (IP): Performed by: HOSPITALIST

## 2017-06-23 PROCEDURE — 71010 DX-CHEST-PORTABLE (1 VIEW): CPT

## 2017-06-23 PROCEDURE — 700111 HCHG RX REV CODE 636 W/ 250 OVERRIDE (IP): Performed by: PSYCHIATRY & NEUROLOGY

## 2017-06-23 PROCEDURE — 99291 CRITICAL CARE FIRST HOUR: CPT | Performed by: INTERNAL MEDICINE

## 2017-06-23 PROCEDURE — 94003 VENT MGMT INPAT SUBQ DAY: CPT

## 2017-06-23 PROCEDURE — 700105 HCHG RX REV CODE 258: Performed by: HOSPITALIST

## 2017-06-23 PROCEDURE — 94640 AIRWAY INHALATION TREATMENT: CPT

## 2017-06-23 PROCEDURE — 770022 HCHG ROOM/CARE - ICU (200)

## 2017-06-23 PROCEDURE — 700105 HCHG RX REV CODE 258: Performed by: PSYCHIATRY & NEUROLOGY

## 2017-06-23 PROCEDURE — 700102 HCHG RX REV CODE 250 W/ 637 OVERRIDE(OP): Performed by: HOSPITALIST

## 2017-06-23 PROCEDURE — 85025 COMPLETE CBC W/AUTO DIFF WBC: CPT

## 2017-06-23 RX ORDER — FUROSEMIDE 10 MG/ML
20 INJECTION INTRAMUSCULAR; INTRAVENOUS EVERY 8 HOURS
Status: DISCONTINUED | OUTPATIENT
Start: 2017-06-23 | End: 2017-06-23

## 2017-06-23 RX ORDER — LORAZEPAM 2 MG/ML
2 INJECTION INTRAMUSCULAR ONCE
Status: COMPLETED | OUTPATIENT
Start: 2017-06-23 | End: 2017-06-23

## 2017-06-23 RX ORDER — PHENOBARBITAL 20 MG/5ML
70 ELIXIR ORAL 2 TIMES DAILY
Status: DISCONTINUED | OUTPATIENT
Start: 2017-06-23 | End: 2017-07-11 | Stop reason: HOSPADM

## 2017-06-23 RX ADMIN — FENTANYL CITRATE 50 MCG: 50 INJECTION, SOLUTION INTRAMUSCULAR; INTRAVENOUS at 23:12

## 2017-06-23 RX ADMIN — FAMOTIDINE 20 MG: 20 TABLET, FILM COATED ORAL at 08:40

## 2017-06-23 RX ADMIN — PHENOBARBITAL 70 MG: 20 ELIXIR ORAL at 20:40

## 2017-06-23 RX ADMIN — CEFTRIAXONE SODIUM 2 G: 2 INJECTION, POWDER, FOR SOLUTION INTRAMUSCULAR; INTRAVENOUS at 08:39

## 2017-06-23 RX ADMIN — POTASSIUM CHLORIDE 40 MEQ: 1.5 POWDER, FOR SOLUTION ORAL at 08:40

## 2017-06-23 RX ADMIN — SENNOSIDES AND DOCUSATE SODIUM 2 TABLET: 8.6; 5 TABLET ORAL at 08:40

## 2017-06-23 RX ADMIN — FAMOTIDINE 20 MG: 20 TABLET, FILM COATED ORAL at 20:42

## 2017-06-23 RX ADMIN — DEXTROSE MONOHYDRATE 1500 MG: 50 INJECTION, SOLUTION INTRAVENOUS at 08:39

## 2017-06-23 RX ADMIN — LORAZEPAM 2 MG: 2 INJECTION INTRAMUSCULAR; INTRAVENOUS at 14:15

## 2017-06-23 RX ADMIN — CHLORHEXIDINE GLUCONATE 15 ML: 1.2 RINSE ORAL at 08:40

## 2017-06-23 RX ADMIN — ENOXAPARIN SODIUM 30 MG: 100 INJECTION SUBCUTANEOUS at 20:42

## 2017-06-23 RX ADMIN — FUROSEMIDE 20 MG: 10 INJECTION, SOLUTION INTRAMUSCULAR; INTRAVENOUS at 13:10

## 2017-06-23 RX ADMIN — DEXTROSE MONOHYDRATE 1500 MG: 50 INJECTION, SOLUTION INTRAVENOUS at 21:25

## 2017-06-23 RX ADMIN — PHENOBARBITAL 60 MG: 20 ELIXIR ORAL at 08:39

## 2017-06-23 RX ADMIN — METRONIDAZOLE 500 MG: 500 TABLET ORAL at 06:12

## 2017-06-23 RX ADMIN — POTASSIUM CHLORIDE 40 MEQ: 1.5 POWDER, FOR SOLUTION ORAL at 20:42

## 2017-06-23 RX ADMIN — ONDANSETRON 4 MG: 2 INJECTION INTRAMUSCULAR; INTRAVENOUS at 22:38

## 2017-06-23 RX ADMIN — FENTANYL CITRATE 50 MCG: 50 INJECTION, SOLUTION INTRAMUSCULAR; INTRAVENOUS at 20:40

## 2017-06-23 RX ADMIN — IPRATROPIUM BROMIDE AND ALBUTEROL SULFATE 3 ML: .5; 3 SOLUTION RESPIRATORY (INHALATION) at 02:29

## 2017-06-23 RX ADMIN — CHLORHEXIDINE GLUCONATE 15 ML: 1.2 RINSE ORAL at 20:42

## 2017-06-23 NOTE — DIETARY
"Nutrition Services:     Pt's mother requested to talk to RD regarding current TF formula and feedings via PEG. Spoke to pt's mom who states that she has been bolusing her own \"shake/smoothie\" type drinks that she prepares herself at home for the past twenty years. Pt currently on Impact Peptide 1.5 @50ml/hr fed continuously x 24 hours since admit in ICU. Pt previously agreeable to current TF regimen; however, states that she prefers to return back to bolusing her son every hour and a half of her own formula that she makes and will bring from home while he is in the ICU. Unfortunately, there is a risk associated with pt's mom bolusing her own made formula, given that the exact contents of the formula is unknown. Pt states she places \"57 trace minerals, omega 3s, omega 7s, and omega 9s, powdered goat milk, whole milk, greek yogurt, and whey protein.\" She states that she puts other \"stuff\" in there; however, she is unable to recall the \"other food items\" at this time. Also, unknown exactly what \"trace minerals\" she places in her formula, query whether omega 7s and omega 9 supplementation necessary 2' limited evidence on its effectiveness.     Therefore, rec'd pt continue on a continuous feed while inpatient of current TF regimen. Discussed with MD, and agreeable to continuing pt on Impact Peptide 1.5 continuously; however, would like to have a \"meeting\" with pt to meet a compromise.     RD to follow-up and leave recommendations accordingly.   "

## 2017-06-23 NOTE — PROGRESS NOTES
NEUROLOGY PROGRESS NOTE      Background:  36 y.o. male was admitted on 6/17/2017  7:07 AM for Aspiration pneumonia (CMS-[facility])  Aspiration pneumonia (CMS-[facility]).  He is being followed by Neurology for status epilepticus.    SUBJECTIVE: Intubated, sedated. Noted to have short lasting eye twitching by his nurse.    NEUROLOGICAL EXAM:   NEUROLOGIC:  Limited as the patient is not interactive.  Apparently at    baseline, he just track with his eyes.  He is nonverbal, with a spastic    quadriparesis.    OBJECTIVE:     EEG:   (6/19)  This is an abnormal video EEG recording in the awake and drowsy state(s).  Frequent interictal / independent right and left temporal spikes. FOUR seizures captured during the study. All seizures were several seconds long, some involved secondary generalization. Two seizures appear to have a right temporal and two others a left temporal onset. There is no recovery of mentation (unknown baseline) in between events, in that sense, the patient should be considered in status epilepticus. 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 Clinical and radiological correlation is recommended.    EEG(6/22):  This is an abnormal 24 hrs video EEG recording in the awake and drowsy state(s).  Frequent interictal / independent right and left temporal spikes. The findings increase risk for seizures and suggest underlying areas of cortical irritability, however no seizures were captured during this study. There has been significant improvement when compared to prior studies. Clinical and radiological correlation is recommended.      Harris Bolton MD      MEDICATIONS:  Current Facility-Administered Medications   Medication Dose   • furosemide (LASIX) injection 20 mg  20 mg   • potassium chloride (KLOR-CON) 20 MEQ packet 40 mEq  40 mEq   • levetiracetam (KEPPRA) 1,500 mg in D5W 100 mL IVPB  1,500 mg   • PHENobarbital solution 60 mg  60 mg   • enoxaparin (LOVENOX) inj 30 mg  30 mg   • Respiratory Care per Protocol     • senna-docusate  "(PERICOLACE or SENOKOT S) 8.6-50 MG per tablet 2 Tab  2 Tab    And   • polyethylene glycol/lytes (MIRALAX) PACKET 1 Packet  1 Packet    And   • magnesium hydroxide (MILK OF MAGNESIA) suspension 30 mL  30 mL    And   • bisacodyl (DULCOLAX) suppository 10 mg  10 mg   • chlorhexidine (PERIDEX) 0.12 % solution 15 mL  15 mL   • lidocaine (XYLOCAINE) 1%  injection  1-2 mL   • MD ALERT...Adult ICU Electrolyte Replacement per Pharmacy Protocol     • fentaNYL (SUBLIMAZE) injection 25 mcg  25 mcg    Or   • fentaNYL (SUBLIMAZE) injection 50 mcg  50 mcg   • famotidine (PEPCID) tablet 20 mg  20 mg    Or   • famotidine (PEPCID) injection 20 mg  20 mg   • ipratropium-albuterol (DUONEB) nebulizer solution 3 mL  3 mL   • insulin lispro (HUMALOG) injection 1-6 Units  1-6 Units   • glucose 4 g chewable tablet 16 g  16 g    And   • dextrose 50% (D50W) injection 25 mL  25 mL   • ondansetron (ZOFRAN) syringe/vial injection 4 mg  4 mg   • ondansetron (ZOFRAN ODT) dispertab 4 mg  4 mg   • promethazine (PHENERGAN) tablet 12.5-25 mg  12.5-25 mg   • promethazine (PHENERGAN) suppository 12.5-25 mg  12.5-25 mg   • prochlorperazine (COMPAZINE) injection 5-10 mg  5-10 mg   • lorazepam (ATIVAN) tablet 0.5 mg  0.5 mg    Or   • lorazepam (ATIVAN) injection 0.5 mg  0.5 mg   • acetaminophen (TYLENOL) tablet 650 mg  650 mg   • Pain Pump (patient supplied) Device         Blood pressure 128/98, pulse 85, temperature 36.2 °C (97.2 °F), resp. rate 22, height 1.727 m (5' 7.99\"), weight 59.2 kg (130 lb 8.2 oz), SpO2 98 %.        Recent Labs      06/21/17   0420  06/22/17   0533  06/23/17   0525   WBC  6.0  8.5  10.2   RBC  3.47*  3.60*  3.68*   HEMOGLOBIN  10.1*  10.4*  10.6*   HEMATOCRIT  29.1*  30.3*  31.0*   MCV  83.9  84.2  84.2   MCH  29.1  28.9  28.8   MCHC  34.7  34.3  34.2   RDW  49.2  49.9  49.8   PLATELETCT  170  203  215   MPV  9.1  9.6  9.3     Recent Labs      06/21/17   0420  06/22/17   0533  06/23/17   0525   SODIUM  134*  138  130* "   POTASSIUM  2.9*  4.1  3.7   CHLORIDE  104  109  101   CO2  24  24  24   GLUCOSE  122*  111*  117*   BUN  4*  9  7*       No results found for this or any previous visit.     ASSESSMENT AND PLAN:   A 36-year-old male with status epilepticus. Currently intubated.  Con't with Keppra 1500mg BID and  Phenobarb 60md BID.  When possible proceed with SBT and extubation.  I will follow as needed, please call me if any questions.    Addendum:  It was reported that he had some more facial twitching suspected for Sz activity,   I have increased Phenobarb to 70mg BID.

## 2017-06-23 NOTE — PROGRESS NOTES
Pulmonary Critical Care Progress Note      Date of service: 6/23/2017    Chief Complaint: status epilepticus     History of Present Illness: All information taken from the patient's family at bedside as well as physician sign out as the patient is noncommunicative.  This is a 36-year-old gentleman with history of traumatic brain injury approximately 18 years ago after a [place].  He had been in a nearly persistent vegetative state since, has not been verbal, does not mobilize, previously was trached for approximately 1 year, but has been trached free for approximately 18 years.  He continues to have a deep stoma; however, it has apparently closed off.  He has a PEG tube for feeding; however, he used to also take miniscule amounts of food orally as well up until most recently.  Patient was admitted on 06/17/2017 with the concern of aspiration pneumonia as well as increased tremor type activities with upper extremity involvement.  The patient was initially placed on telemetry floor, treated for aspiration pneumonitis with antibiotic therapy as well as supportive therapy with IV fluids and tube feeds.  Over the past 24 hours or so, there had been noted ongoing jerking type motions of the upper extremities and therefore, patient had EEG.  The EEG did show several periods of seizures lasting several seconds.  Based on this, neurology recommended that the patient be transferred to intensive care unit for concern of underlying status epilepticus, intubated and placed on propofol therapy, while antiepileptic medications are being titrated and adjusted.  Patient has no history of known seizure activity.  His only medication is a baclofen pump for a significant spasticity and this is apparently managed by [facility].  At the present time, no further history is currently available or able to be obtained.    ROS:  Respiratory: unable to perform due to the patient's inability to effectively  communicate, Cardiac: unable to perform due to the patient's inability to effectively communicate, GI: unable to perform due to the patient's inability to effectively communicate.  All other systems negative.    Interval Events:  24 hour interval history reviewed   Tmax 99.6 °F   -400cc over the last 24 hours, +6.7L since admit   CXR shows: worsening left lower lobe infiltrate and consolidation with bilateral interstitial edema.    Opening eyes more  No obvious further sz activity    PFSH:  No change.    Physical Exam  General: nad, spastic quadriparesis   Neuro/Psych: does not follow, partial eye opening  HEENT: nc, at, deep stoma to ant neck  CVS: rrr  Respiratory: Mild rhonchi on left, otherwise clear.   Abdomen/: s, nt, peg in place  Extremities: contracted, 2+ edema.    Respiratory:  Liriano Vent Mode: APVCMV, Rate (breaths/min): 14, Vt Target (mL): 400, PEEP/CPAP: 8, FiO2: 30, Static Compliance (ml / cm H2O): 24, Control VTE (exp VT): 590  Pulse Oximetry: 98 %  ImagingAvailable data reviewed   Recent Labs      06/21/17   0500  06/23/17   0559   [id number]   --   7.51*   [id number]   --   30.5   [id number]   --   101.1*   [id number]   --   97.6   ARTHCO3   --   24   FIO2   --   40   ARTBE   --   2   ISTATAPH  7.488   --    ISTATAPCO2  31.3   --    ISTATAPO2  89*   --    ISTATATCO2  25   --    [id number]  98   --    ISTATARTHCO3  23.7   --    ISTATARTBE  1   --    ISTATTEMP  97.8 F   --    ISTATFIO2  30   --    ISTATSPEC  Arterial   --    ISTATAPHTC  7.495   --    TPLRCFRZ0HP  86   --      HemoDynamics:  Pulse: 85, Heart Rate (Monitored): 93  NIBP: 111/71 mmHg  CVP (mm Hg): (!) 14-16 MM HG  Imaging: Available data reviewed        Neuro:  GCS Total Aleida Coma Score: 10     Imaging: Available data reviewed    Fluids:  Intake/Output       06/21/17 0700 - 06/22/17 0659 06/22/17 0700 - 06/23/17 0659 06/23/17 0700 - 06/24/17 0659      0700-1859 1900-0659 Total 0700-1859 1900-0659 Total 0700-1859 1900-0659 Total        Intake    I.V.  2171.3  1300 3471.3  1000  100 1100  --  -- --    Magnesium Sulfate Volume 50 -- 50 -- -- -- -- -- --    Norepinephrine Volume 21.3 0 21.3 -- -- -- -- -- --    IV Volume (TKO) -- -- -- -- 100 100 -- -- --    IV Volume (NS 20K+) 1200 1200 2400 800 -- 800 -- -- --    IV Piggyback Volume (IV Piggyback)  200 -- 200 -- -- --    Other  120  190 310  90  240 330  --  -- --    Medications (P.O./ Enteral Liquids) 120 190 310 90 240 330 -- -- --    Enteral  720  720 1440  780  750 1530  --  -- --    Enteral Volume   -- -- --    Free Water / Tube Flush 120 120 240 180 150 330 -- -- --    Total Intake 3011.3 2210 5221.3 1870 1090 2960 -- -- --       Output    Urine  1700  900 2600  1450  1950 3400  --  -- --    Indwelling Cathether 1193 687 2945 1450 1950 3400 -- -- --    Stool/Urine  0  -- 0  --  -- --  --  -- --    Measurable Stool (ml) 0 -- 0 -- -- -- -- -- --    Stool  --  -- --  --  -- --  --  -- --    Number of Times Stooled 0 x -- 0 x -- -- -- -- -- --    Total Output 8532 399 2427 1450 1950 3400 -- -- --       Net I/O     1311.3 1310 2621.3 420 -860 -440 -- -- --        Weight: 59.2 kg (130 lb 8.2 oz)  Recent Labs      17   04217   0533  17   0525   SODIUM  134*  138  130*   POTASSIUM  2.9*  4.1  3.7   CHLORIDE  104  109  101   CO2  24  24  24   BUN  4*  9  7*   CREATININE  <0.20*  <0.20*  <0.20*   MAGNESIUM  1.6  2.0   --    PHOSPHORUS  <1.0*  2.0*   --    CALCIUM  7.7*  7.8*  8.1*     GI/Nutrition:  Imaging: Available data reviewed  NPO     Liver Function  Recent Labs      17   0533  17   0525   GLUCOSE  122*  111*  117*     Heme:  Recent Labs      17   0533  17   0525   RBC  3.47*  3.60*  3.68*   HEMOGLOBIN  10.1*  10.4*  10.6*   HEMATOCRIT  29.1*  30.3*  31.0*   PLATELETCT  170  203  215     Infectious Disease:  Temp  Av °C (98.6 °F)  Min: 36.2 °C (97.2 °F)  Max: 37.6 °C (99.6  °F)  Micro: cultures reviewed  Recent Labs      06/21/17   0420  06/22/17   0533  06/23/17   0525   WBC  6.0  8.5  10.2   NEUTSPOLYS  63.60  69.60  77.50*   LYMPHOCYTES  22.90  20.90*  9.50*   MONOCYTES  9.50  1.70  8.40   EOSINOPHILS  3.20  6.90  3.70   BASOPHILS  0.50  0.00  0.30     Current Facility-Administered Medications   Medication Dose Frequency Provider Last Rate Last Dose   • potassium chloride (KLOR-CON) 20 MEQ packet 40 mEq  40 mEq BID Kev Araiza M.D.   40 mEq at 06/23/17 0840   • levetiracetam (KEPPRA) 1,500 mg in D5W 100 mL IVPB  1,500 mg Q12HRS Martell Hunter M.D.   Stopped at 06/23/17 0854   • PHENobarbital solution 60 mg  60 mg BID Martell Hunter M.D.   60 mg at 06/23/17 0839   • enoxaparin (LOVENOX) inj 30 mg  30 mg DAILY Clarence Morfin D.O.   30 mg at 06/22/17 2055   • Respiratory Care per Protocol   Continuous RT Kev Araiza M.D.       • senna-docusate (PERICOLACE or SENOKOT S) 8.6-50 MG per tablet 2 Tab  2 Tab BID Kev Araiza M.D.   2 Tab at 06/23/17 0840    And   • polyethylene glycol/lytes (MIRALAX) PACKET 1 Packet  1 Packet QDAY PRN Kev Araiza M.D.   1 Packet at 06/22/17 2055    And   • magnesium hydroxide (MILK OF MAGNESIA) suspension 30 mL  30 mL QDAY PRN Kev Araiza M.D.   30 mL at 06/22/17 2055    And   • bisacodyl (DULCOLAX) suppository 10 mg  10 mg QDAY PRN Kev Araiza M.D.   10 mg at 06/23/17 0337   • chlorhexidine (PERIDEX) 0.12 % solution 15 mL  15 mL BID Kev Araiza M.D.   15 mL at 06/23/17 0840   • lidocaine (XYLOCAINE) 1%  injection  1-2 mL Q30 MIN PRN Kev Araiza M.D.       • MD ALERT...Adult ICU Electrolyte Replacement per Pharmacy Protocol   pharmacy to dose Kev Araiza M.D.       • fentaNYL (SUBLIMAZE) injection 25 mcg  25 mcg Q HOUR PRN Kev Araiza M.D.        Or   • fentaNYL (SUBLIMAZE) injection 50 mcg  50 mcg Q HOUR PRN Kev Araiza M.D.        Or   • fentaNYL (SUBLIMAZE) injection 100 mcg  100 mcg Q HOUR PRN  Kev Araiza M.D.   100 mcg at 06/19/17 1937   • famotidine (PEPCID) tablet 20 mg  20 mg Q12HRS Kev Araiza M.D.   20 mg at 06/23/17 0840    Or   • famotidine (PEPCID) injection 20 mg  20 mg Q12HRS Kev Araiza M.D.   20 mg at 06/20/17 0918   • ipratropium-albuterol (DUONEB) nebulizer solution 3 mL  3 mL Q2HRS PRN (RT) Kev Araiza M.D.       • insulin lispro (HUMALOG) injection 1-6 Units  1-6 Units Q6HRS Kev Araiza M.D.   Stopped at 06/19/17 1845   • glucose 4 g chewable tablet 16 g  16 g Q15 MIN PRN Kev Araiza M.D.        And   • dextrose 50% (D50W) injection 25 mL  25 mL Q15 MIN PRN Kev Araiza M.D.   25 mL at 06/20/17 1840   • NS infusion 1,632 mL  30 mL/kg Once PRN FARHAT MedinaO.       • NS (BOLUS) infusion 1,000 mL  1,000 mL Once PRN FARHAT MedinaO.   Stopped at 06/17/17 1836   • ondansetron (ZOFRAN) syringe/vial injection 4 mg  4 mg Q4HRS PRN FARHAT MedinaO.       • ondansetron (ZOFRAN ODT) dispertab 4 mg  4 mg Q4HRS PRN FARHAT MedinaO.       • promethazine (PHENERGAN) tablet 12.5-25 mg  12.5-25 mg Q4HRS PRN FARHAT MedinaO.       • promethazine (PHENERGAN) suppository 12.5-25 mg  12.5-25 mg Q4HRS PRN FARHAT MedinaO.       • prochlorperazine (COMPAZINE) injection 5-10 mg  5-10 mg Q4HRS PRN FARHAT MedinaO.       • lorazepam (ATIVAN) tablet 0.5 mg  0.5 mg Q6HRS PRN FARHAT MedinaO.   Stopped at 06/19/17 0248    Or   • lorazepam (ATIVAN) injection 0.5 mg  0.5 mg Q6HRS PRN Clarence Morfin D.O.   0.5 mg at 06/22/17 0044   • acetaminophen (TYLENOL) tablet 650 mg  650 mg Q6HRS PRN FARHAT MedinaOBucky   650 mg at 06/17/17 1857   • Pain Pump (patient supplied) Device   Continuous Clarence Morfin D.O.   Stopped at 06/17/17 1230     Last reviewed on 6/17/2017  9:45 AM by Ligia Frazier, Pharmacy Int    Quality  Measures:  Radiology images reviewed, Medications reviewed and Labs reviewed  Waldrop catheter: Epidural Catheter /  Drain                  Gtts:  Levo off  Propofol off    Abx:   Ceftriaxone   Flagyl    Cultures:   Urine 6/17 negative  Blood 6/17 negative  BAL 6/19  Klebsiella PNA     Assessment/Plan  -  New onset seizure activity with concern of status epilepticus.   - keppra   - phenobarb   - neuro following  -  Aspiration pneumonia.   - intubated 6/19    - bal 6/19 Kleb pna   - on rocephin    - procalcitonin improving  -  Acute respiratory insufficiency secondary to above.   - rt/02 protcol   - full vent support   - sat/sbt now sz activity suppressed   - start diureses   -  Hyponatremia.  - HypoK and Hyop Mag - replace  -  Severe chronic underlying debility secondary to profound traumatic brain    injury 18 years ago.    Discussed patient condition and risk of morbidity and/or mortality with RN, RT, Therapies, Pharmacy, Dietary, Charge nurse / hot rounds and hospitalist.    The patient remains critically ill.  Critical care time = 35 minutes in directly providing and coordinating critical care and extensive data review.  No time overlap and excludes procedures.

## 2017-06-23 NOTE — CARE PLAN
Problem: Skin Integrity  Goal: Risk for impaired skin integrity will decrease  Outcome: PROGRESSING AS EXPECTED  Pt turned Q2 hours, bony prominences offloaded with pillows/heel protector boots, barrier wipes in use to prevent skin breakdown, linens kept free of moisture    Problem: Pain Management  Goal: Pain level will decrease to patient’s comfort goal  Outcome: PROGRESSING AS EXPECTED  Pt assessed for pain and medications administered as needed per the MAR.

## 2017-06-23 NOTE — CARE PLAN
Problem: Skin Integrity  Goal: Risk for impaired skin integrity will decrease  Outcome: PROGRESSING AS EXPECTED  Waffle cushion over entire bed. Heels floated. Q2 turns. All devices intact. Pink under marycarmen under ears. Marycarmen repositioned frequently on ears.     Problem: Urinary Elimination:  Goal: Ability to reestablish a normal urinary elimination pattern will improve  Outcome: PROGRESSING AS EXPECTED  Large amount of UOP. Lasix 20 IV provided. I/O monitored q2 hrs.

## 2017-06-23 NOTE — PROGRESS NOTES
[facility] Hospitalist Progress Note    Date of Service: 2017    Chief Complaint  36 y.o. male admitted 2017 with altered mental status.    Interval Problem Update  Mr. Edwards has a hx of TBI at age 17. He was admitted with aspiration pneumonitis. EEG revealed status epilepticus thus he was intubated on  placed on IV keppra, phenobarbital.    RN notes he occasionally tracks with his eyes. CVP 14-18  He remains on 48 hour EEG  Tmax 99.1. 2,000 ml urine over night. He is tolerating G tube feeding.   I met with Ruben's mother today and we discussed his condition including feeding options as she would like to bring in food from home. He had a seizure this afternoon which resolved with 2 mg IV ativan. His BP has been high.  Consultants/Specialty  Neurology  Pulmonology  I discussed with Dr. Araiza on Hot Rounds    Disposition  ICU        Review of Systems   Unable to perform ROS: intubated      Physical Exam  Laboratory/Imaging   Hemodynamics  Temp (24hrs), Av °C (98.6 °F), Min:36.2 °C (97.2 °F), Max:37.6 °C (99.6 °F)   Temperature: 36.2 °C (97.2 °F)  Pulse  Av  Min: 60  Max: 131 Heart Rate (Monitored): 79  NIBP: 111/71 mmHg CVP (mm Hg): (!) 18 MM HG    Respiratory  Liriano Vent Mode: APVCMV, Rate (breaths/min): 14, PEEP/CPAP: 8, PEEP/CPAP: 8, FiO2: 30, P Peak (PIP): 16, P MEAN: 11   Respiration: (!) 22, Pulse Oximetry: 99 %     Work Of Breathing / Effort: Vented  RUL Breath Sounds: Clear, RML Breath Sounds: Clear, RLL Breath Sounds: Diminished, JONATAN Breath Sounds: Clear, LLL Breath Sounds: Diminished    Fluids    Intake/Output Summary (Last 24 hours) at 17 0809  Last data filed at 17 0600   Gross per 24 hour   Intake   2250 ml   Output   3000 ml   Net   -750 ml       Nutrition  Orders Placed This Encounter   Procedures   • Diet NPO     Standing Status: Standing      Number of Occurrences: 1      Standing Expiration Date:      Order Specific Question:  Type:     Answer:  Now [1]     Order  Specific Question:  Restrict to:     Answer:  Strict [1]     Physical Exam   Constitutional: No distress.   Thin and frail-appearing   HENT:   Endotracheal tube.    Eyes: Right eye exhibits no discharge. Left eye exhibits no discharge.   Neck:   Right IJ central line without bleeding.   Well-healed tracheostomy site.    Cardiovascular: Normal rate and regular rhythm.    No murmur heard.  Pulmonary/Chest:   Vent, good air movement   Abdominal: He exhibits no distension. There is no tenderness.   PEG. Baclofen pump palpable on abdomen.    Genitourinary:   Waldrop cath   Musculoskeletal: He exhibits edema. He exhibits no tenderness.   Poor muscle tone, significant contractures of the arms and legs.    Neurological:    He does not follow and does not withdraw to pain. He opens his eyes but but does not track.   Skin: Skin is warm. No rash noted. He is not diaphoretic. There is pallor.       Recent Labs      06/21/17   0420  06/22/17   0533  06/23/17   0525   WBC  6.0  8.5  10.2   RBC  3.47*  3.60*  3.68*   HEMOGLOBIN  10.1*  10.4*  10.6*   HEMATOCRIT  29.1*  30.3*  31.0*   MCV  83.9  84.2  84.2   MCH  29.1  28.9  28.8   MCHC  34.7  34.3  34.2   RDW  49.2  49.9  49.8   PLATELETCT  170  203  215   MPV  9.1  9.6  9.3     Recent Labs      06/21/17   0420  06/22/17   0533  06/23/17   0525   SODIUM  134*  138  130*   POTASSIUM  2.9*  4.1  3.7   CHLORIDE  104  109  101   CO2  24  24  24   GLUCOSE  122*  111*  117*   BUN  4*  9  7*   CREATININE  <0.20*  <0.20*  <0.20*   CALCIUM  7.7*  7.8*  8.1*             Recent Labs      06/21/17   0420   TRIGLYCERIDE  59          Assessment/Plan     Status epilepticus (CMS-HCC) (present on admission)  Assessment & Plan  As evidenced on EEG. Continuous EEG showed seizure activity over 48 hours.  Neurology consultation.  IV keppra loaded.   Phenobarbital added.    Aspiration pneumonia (CMS-HCC) (present on admission)  Assessment & Plan  - pt unable to get his secretions out  - culture growing  Klebsiella resistant to ampicillin thus rocephin and add cortrak flagyl to cover aspiration for 7 days.  -CT chest to eval further.       Sepsis (CMS-HCC)  Assessment & Plan  - secondary to aspiration pneumonia  - s/p IV abx    Acute respiratory failure with hypoxia (CMS-HCC) (present on admission)  Assessment & Plan  Intubated on 6/19.  Pulmonary consult.  He may require another tracheostomy.    Hyponatremia (present on admission)  Assessment & Plan  - persistent despite IV fluids  - repeat bmp in am    Hypoglycemia (present on admission)  Assessment & Plan  - resolved    Encephalopathy acute (present on admission)  Assessment & Plan  Secondary to status epilepticus. His mentation is not improving.    TBI (traumatic brain injury) (CMS-HCC) (present on admission)  Assessment & Plan  Since age 17 years.  With quadriplegia   [facility] pump [facility].    Hypokalemia (present on admission)  Assessment & Plan  IV replacement.      Labs reviewed, Medications reviewed and Radiology images reviewed  Waldrop catheter: Unconscious / Sedated Patient on a Ventilator      DVT Prophylaxis: Enoxaparin (Lovenox)

## 2017-06-24 ENCOUNTER — APPOINTMENT (OUTPATIENT)
Dept: RADIOLOGY | Facility: MEDICAL CENTER | Age: 37
DRG: 004 | End: 2017-06-24
Attending: INTERNAL MEDICINE
Payer: COMMERCIAL

## 2017-06-24 LAB
ANION GAP SERPL CALC-SCNC: 7 MMOL/L (ref 0–11.9)
APTT PPP: 36.7 SEC (ref 24.7–36)
BASE EXCESS BLDA CALC-SCNC: 2 MMOL/L (ref -4–3)
BASOPHILS # BLD AUTO: 0.3 % (ref 0–1.8)
BASOPHILS # BLD: 0.02 K/UL (ref 0–0.12)
BODY TEMPERATURE: ABNORMAL CENTIGRADE
BUN SERPL-MCNC: 11 MG/DL (ref 8–22)
CALCIUM SERPL-MCNC: 8.6 MG/DL (ref 8.5–10.5)
CHLORIDE SERPL-SCNC: 92 MMOL/L (ref 96–112)
CO2 SERPL-SCNC: 22 MMOL/L (ref 20–33)
CREAT SERPL-MCNC: <0.2 MG/DL (ref 0.5–1.4)
EOSINOPHIL # BLD AUTO: 0.4 K/UL (ref 0–0.51)
EOSINOPHIL NFR BLD: 5.4 % (ref 0–6.9)
ERYTHROCYTE [DISTWIDTH] IN BLOOD BY AUTOMATED COUNT: 47.8 FL (ref 35.9–50)
GFR SERPL CREATININE-BSD FRML MDRD: >60 ML/MIN/1.73 M 2
GLUCOSE BLD-MCNC: 116 MG/DL (ref 65–99)
GLUCOSE BLD-MCNC: 99 MG/DL (ref 65–99)
GLUCOSE SERPL-MCNC: 128 MG/DL (ref 65–99)
HCO3 BLDA-SCNC: 24 MMOL/L (ref 17–25)
HCT VFR BLD AUTO: 33 % (ref 42–52)
HGB BLD-MCNC: 11.4 G/DL (ref 14–18)
IMM GRANULOCYTES # BLD AUTO: 0.04 K/UL (ref 0–0.11)
IMM GRANULOCYTES NFR BLD AUTO: 0.5 % (ref 0–0.9)
INR PPP: 1.08 (ref 0.87–1.13)
LYMPHOCYTES # BLD AUTO: 1.4 K/UL (ref 1–4.8)
LYMPHOCYTES NFR BLD: 18.7 % (ref 22–41)
MCH RBC QN AUTO: 28.4 PG (ref 27–33)
MCHC RBC AUTO-ENTMCNC: 34.5 G/DL (ref 33.7–35.3)
MCV RBC AUTO: 82.3 FL (ref 81.4–97.8)
MONOCYTES # BLD AUTO: 0.7 K/UL (ref 0–0.85)
MONOCYTES NFR BLD AUTO: 9.4 % (ref 0–13.4)
NEUTROPHILS # BLD AUTO: 4.91 K/UL (ref 1.82–7.42)
NEUTROPHILS NFR BLD: 65.7 % (ref 44–72)
NRBC # BLD AUTO: 0 K/UL
NRBC BLD AUTO-RTO: 0 /100 WBC
PCO2 BLDA: 29.3 MMHG (ref 26–37)
PH BLDA: 7.52 [PH] (ref 7.4–7.5)
PLATELET # BLD AUTO: 238 K/UL (ref 164–446)
PMV BLD AUTO: 9 FL (ref 9–12.9)
PO2 BLDA: 67.9 MMHG (ref 64–87)
POTASSIUM SERPL-SCNC: 3.9 MMOL/L (ref 3.6–5.5)
PROCALCITONIN SERPL-MCNC: 0.26 NG/ML
PROTHROMBIN TIME: 14.3 SEC (ref 12–14.6)
RBC # BLD AUTO: 4.01 M/UL (ref 4.7–6.1)
SAO2 % BLDA: 95 % (ref 93–99)
SODIUM SERPL-SCNC: 121 MMOL/L (ref 135–145)
WBC # BLD AUTO: 7.5 K/UL (ref 4.8–10.8)

## 2017-06-24 PROCEDURE — 700111 HCHG RX REV CODE 636 W/ 250 OVERRIDE (IP): Performed by: PSYCHIATRY & NEUROLOGY

## 2017-06-24 PROCEDURE — 700102 HCHG RX REV CODE 250 W/ 637 OVERRIDE(OP): Performed by: INTERNAL MEDICINE

## 2017-06-24 PROCEDURE — 85610 PROTHROMBIN TIME: CPT

## 2017-06-24 PROCEDURE — 84145 PROCALCITONIN (PCT): CPT

## 2017-06-24 PROCEDURE — 85025 COMPLETE CBC W/AUTO DIFF WBC: CPT

## 2017-06-24 PROCEDURE — 82962 GLUCOSE BLOOD TEST: CPT | Mod: 91

## 2017-06-24 PROCEDURE — 700105 HCHG RX REV CODE 258: Performed by: INTERNAL MEDICINE

## 2017-06-24 PROCEDURE — 82803 BLOOD GASES ANY COMBINATION: CPT

## 2017-06-24 PROCEDURE — 80048 BASIC METABOLIC PNL TOTAL CA: CPT

## 2017-06-24 PROCEDURE — 700111 HCHG RX REV CODE 636 W/ 250 OVERRIDE (IP): Performed by: INTERNAL MEDICINE

## 2017-06-24 PROCEDURE — 85730 THROMBOPLASTIN TIME PARTIAL: CPT

## 2017-06-24 PROCEDURE — 99233 SBSQ HOSP IP/OBS HIGH 50: CPT | Performed by: HOSPITALIST

## 2017-06-24 PROCEDURE — 71010 DX-CHEST-PORTABLE (1 VIEW): CPT

## 2017-06-24 PROCEDURE — 700105 HCHG RX REV CODE 258: Performed by: PSYCHIATRY & NEUROLOGY

## 2017-06-24 PROCEDURE — 770022 HCHG ROOM/CARE - ICU (200)

## 2017-06-24 PROCEDURE — A9270 NON-COVERED ITEM OR SERVICE: HCPCS | Performed by: INTERNAL MEDICINE

## 2017-06-24 PROCEDURE — A9270 NON-COVERED ITEM OR SERVICE: HCPCS | Performed by: PSYCHIATRY & NEUROLOGY

## 2017-06-24 PROCEDURE — 94003 VENT MGMT INPAT SUBQ DAY: CPT

## 2017-06-24 PROCEDURE — 99291 CRITICAL CARE FIRST HOUR: CPT | Performed by: INTERNAL MEDICINE

## 2017-06-24 PROCEDURE — 700102 HCHG RX REV CODE 250 W/ 637 OVERRIDE(OP): Performed by: PSYCHIATRY & NEUROLOGY

## 2017-06-24 RX ORDER — SODIUM CHLORIDE 1 G/1
2 TABLET ORAL
Status: DISCONTINUED | OUTPATIENT
Start: 2017-06-24 | End: 2017-06-28

## 2017-06-24 RX ORDER — OXYCODONE HYDROCHLORIDE 5 MG/1
5 TABLET ORAL EVERY 4 HOURS PRN
Status: DISCONTINUED | OUTPATIENT
Start: 2017-06-24 | End: 2017-07-11 | Stop reason: HOSPADM

## 2017-06-24 RX ADMIN — OXYCODONE HYDROCHLORIDE 5 MG: 5 TABLET ORAL at 18:25

## 2017-06-24 RX ADMIN — CEFTRIAXONE SODIUM 2 G: 2 INJECTION, POWDER, FOR SOLUTION INTRAMUSCULAR; INTRAVENOUS at 08:32

## 2017-06-24 RX ADMIN — POTASSIUM CHLORIDE 40 MEQ: 1.5 POWDER, FOR SOLUTION ORAL at 20:20

## 2017-06-24 RX ADMIN — SODIUM CHLORIDE TAB 1 GM 2 G: 1 TAB at 08:32

## 2017-06-24 RX ADMIN — SENNOSIDES AND DOCUSATE SODIUM 2 TABLET: 8.6; 5 TABLET ORAL at 08:32

## 2017-06-24 RX ADMIN — FENTANYL CITRATE 50 MCG: 50 INJECTION, SOLUTION INTRAMUSCULAR; INTRAVENOUS at 06:07

## 2017-06-24 RX ADMIN — PHENOBARBITAL 70 MG: 20 ELIXIR ORAL at 08:32

## 2017-06-24 RX ADMIN — CHLORHEXIDINE GLUCONATE 15 ML: 1.2 RINSE ORAL at 08:32

## 2017-06-24 RX ADMIN — OXYCODONE HYDROCHLORIDE 5 MG: 5 TABLET ORAL at 08:32

## 2017-06-24 RX ADMIN — ENOXAPARIN SODIUM 30 MG: 100 INJECTION SUBCUTANEOUS at 20:20

## 2017-06-24 RX ADMIN — CHLORHEXIDINE GLUCONATE 15 ML: 1.2 RINSE ORAL at 20:20

## 2017-06-24 RX ADMIN — DEXTROSE MONOHYDRATE 1500 MG: 50 INJECTION, SOLUTION INTRAVENOUS at 20:28

## 2017-06-24 RX ADMIN — FAMOTIDINE 20 MG: 20 TABLET, FILM COATED ORAL at 08:33

## 2017-06-24 RX ADMIN — FENTANYL CITRATE 25 MCG: 50 INJECTION, SOLUTION INTRAMUSCULAR; INTRAVENOUS at 14:06

## 2017-06-24 RX ADMIN — SODIUM CHLORIDE TAB 1 GM 2 G: 1 TAB at 18:25

## 2017-06-24 RX ADMIN — DEXTROSE MONOHYDRATE 1500 MG: 50 INJECTION, SOLUTION INTRAVENOUS at 08:32

## 2017-06-24 RX ADMIN — POTASSIUM CHLORIDE 40 MEQ: 1.5 POWDER, FOR SOLUTION ORAL at 08:32

## 2017-06-24 RX ADMIN — FENTANYL CITRATE 50 MCG: 50 INJECTION, SOLUTION INTRAMUSCULAR; INTRAVENOUS at 02:30

## 2017-06-24 RX ADMIN — FAMOTIDINE 20 MG: 20 TABLET, FILM COATED ORAL at 20:20

## 2017-06-24 RX ADMIN — PHENOBARBITAL 70 MG: 20 ELIXIR ORAL at 20:28

## 2017-06-24 ASSESSMENT — LIFESTYLE VARIABLES: DO YOU DRINK ALCOHOL: NO

## 2017-06-24 NOTE — PROGRESS NOTES
NEUROLOGY PROGRESS NOTE      Background:  36 y.o. male was admitted on 6/17/2017  7:07 AM for Aspiration pneumonia (CMS-HCC)  Aspiration pneumonia (CMS-HCC).  He is being followed by Neurology for status epilepticus.    SUBJECTIVE: Intubated, sedated. No more Sz reported, appears more alert and intermittently track with his eyes.    NEUROLOGICAL EXAM:   NEUROLOGIC:  Limited as the patient is not interactive.  Apparently at    baseline, he just track with his eyes.  He is nonverbal, with a spastic    quadriparesis.    OBJECTIVE:     EEG:   (6/19)  This is an abnormal video EEG recording in the awake and drowsy state(s).  Frequent interictal / independent right and left temporal spikes. FOUR seizures captured during the study. All seizures were several seconds long, some involved secondary generalization. Two seizures appear to have a right temporal and two others a left temporal onset. There is no recovery of mentation (unknown baseline) in between events, in that sense, the patient should be considered in status epilepticus. 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 Clinical and radiological correlation is recommended.    EEG(6/22):  This is an abnormal 24 hrs video EEG recording in the awake and drowsy state(s).  Frequent interictal / independent right and left temporal spikes. The findings increase risk for seizures and suggest underlying areas of cortical irritability, however no seizures were captured during this study. There has been significant improvement when compared to prior studies. Clinical and radiological correlation is recommended.      Harris Bolton MD      MEDICATIONS:  Current Facility-Administered Medications   Medication Dose   • sodium chloride (SALT) tablet 2 g  2 g   • cefTRIAXone (ROCEPHIN) 2 g in  mL IVPB  2 g   • fentaNYL (SUBLIMAZE) injection 25 mcg  25 mcg   • oxycodone immediate-release (ROXICODONE) tablet 5 mg  5 mg   • PHENobarbital solution 70 mg  70 mg   • potassium chloride (KLOR-CON) 20  "MEQ packet 40 mEq  40 mEq   • levetiracetam (KEPPRA) 1,500 mg in D5W 100 mL IVPB  1,500 mg   • enoxaparin (LOVENOX) inj 30 mg  30 mg   • Respiratory Care per Protocol     • senna-docusate (PERICOLACE or SENOKOT S) 8.6-50 MG per tablet 2 Tab  2 Tab    And   • polyethylene glycol/lytes (MIRALAX) PACKET 1 Packet  1 Packet    And   • magnesium hydroxide (MILK OF MAGNESIA) suspension 30 mL  30 mL    And   • bisacodyl (DULCOLAX) suppository 10 mg  10 mg   • chlorhexidine (PERIDEX) 0.12 % solution 15 mL  15 mL   • lidocaine (XYLOCAINE) 1%  injection  1-2 mL   • MD ALERT...Adult ICU Electrolyte Replacement per Pharmacy Protocol     • famotidine (PEPCID) tablet 20 mg  20 mg    Or   • famotidine (PEPCID) injection 20 mg  20 mg   • ipratropium-albuterol (DUONEB) nebulizer solution 3 mL  3 mL   • insulin lispro (HUMALOG) injection 1-6 Units  1-6 Units   • glucose 4 g chewable tablet 16 g  16 g    And   • dextrose 50% (D50W) injection 25 mL  25 mL   • ondansetron (ZOFRAN) syringe/vial injection 4 mg  4 mg   • ondansetron (ZOFRAN ODT) dispertab 4 mg  4 mg   • promethazine (PHENERGAN) tablet 12.5-25 mg  12.5-25 mg   • promethazine (PHENERGAN) suppository 12.5-25 mg  12.5-25 mg   • prochlorperazine (COMPAZINE) injection 5-10 mg  5-10 mg   • lorazepam (ATIVAN) tablet 0.5 mg  0.5 mg    Or   • lorazepam (ATIVAN) injection 0.5 mg  0.5 mg   • acetaminophen (TYLENOL) tablet 650 mg  650 mg   • Pain Pump (patient supplied) Device         Blood pressure 128/98, pulse 103, temperature 37 °C (98.6 °F), resp. rate 15, height 1.727 m (5' 7.99\"), weight 55.3 kg (121 lb 14.6 oz), SpO2 99 %.        Recent Labs      06/22/17   0533  06/23/17   0525 06/24/17   0524   WBC  8.5  10.2  7.5   RBC  3.60*  3.68*  4.01*   HEMOGLOBIN  10.4*  10.6*  11.4*   HEMATOCRIT  30.3*  31.0*  33.0*   MCV  84.2  84.2  82.3   MCH  28.9  28.8  28.4   MCHC  34.3  34.2  34.5   RDW  49.9  49.8  47.8   PLATELETCT  203  215  238   MPV  9.6  9.3  9.0     Recent Labs      " 06/22/17   0533  06/23/17   0525  06/24/17   0524   SODIUM  138  130*  121*   POTASSIUM  4.1  3.7  3.9   CHLORIDE  109  101  92*   CO2  24 24  22   GLUCOSE  111*  117*  128*   BUN  9  7*  11       No results found for this or any previous visit.     ASSESSMENT AND PLAN:   A 36-year-old male with status epilepticus which has resolved. Currently intubated.  Con't with Keppra 1500mg BID and  Phenobarb 70md BID.  Patient may experience short episodes of Sz intermittently but clearly he is not in status.  When possible proceed with SBT and extubation.  I will follow as needed, please call me if any questions.

## 2017-06-24 NOTE — CARE PLAN
Problem: Fluid Volume:  Goal: Will maintain balanced intake and output  Outcome: PROGRESSING SLOWER THAN EXPECTED  Strict I&O are obtained Q2hr.     Problem: Skin Integrity  Goal: Risk for impaired skin integrity will decrease  Outcome: PROGRESSING SLOWER THAN EXPECTED  Skin assessment completed. Dressings and barrier cream in place. Pt has been seen by Wound care. Waffle cushion on bed. Extra pillows in place.  Q2hr turning to prevent skin breakdown.

## 2017-06-24 NOTE — PROGRESS NOTES
Pulmonary Critical Care Progress Note      Date of service: 6/24/2017    Chief Complaint: status epilepticus     History of Present Illness: All information taken from the patient's family at bedside as well as physician sign out as the patient is noncommunicative.  This is a 36-year-old gentleman with history of traumatic brain injury approximately 18 years ago after [place].  He had been in a nearly persistent vegetative state since, has not been verbal, does not mobilize, previously was trached for approximately 1 year, but has been trached free for approximately 18 years.  He continues to have a deep stoma; however, it has apparently closed off.  He has a PEG tube for feeding; however, he used to also take miniscule amounts of food orally as well up until most recently.  Patient was admitted on 06/17/2017 with the concern of aspiration pneumonia as well as increased tremor type activities with upper extremity involvement.  The patient was initially placed on telemetry floor, treated for aspiration pneumonitis with antibiotic therapy as well as supportive therapy with IV fluids and tube feeds.  Over the past 24 hours or so, there had been noted ongoing jerking type motions of the upper extremities and therefore, patient had EEG.  The EEG did show several periods of seizures lasting several seconds.  Based on this, neurology recommended that the patient be transferred to intensive care unit for concern of underlying status epilepticus, intubated and placed on propofol therapy, while antiepileptic medications are being titrated and adjusted.  Patient has no history of known seizure activity.  His only medication is a baclofen pump for a significant spasticity and this is apparently managed by [facility].  At the present time, no further history is currently available or able to be obtained.    ROS:  Respiratory: unable to perform due to the patient's inability to effectively  communicate, Cardiac: unable to perform due to the patient's inability to effectively communicate, GI: unable to perform due to the patient's inability to effectively communicate.  All other systems negative.      Interval Events:  24 hour interval history reviewed   Tmax 99.1  -4L over the last 24 hours, +2.5L since admit   CXR shows: ongoing left lower lobe infiltrate and effusion   CT chest performed last night.  Remains PERRL and sluggish. Not tracking.   No seizures overnight. (did have yesterday afternoon)  One small episode of emesis last night.   ST, BP hypertensive overnight. BP improved with fentanyl pushes PRN.   Mobile to edge of bed.   No SBTs yet, no abnormal secretions.       PFSH:  No change.      Physical Exam  General: NAD, spastic quadriparesis   Neuro/Psych:  Eyes remain open but not tracking   HEENT: NC, AT, deep stoma to anterior neck  CVS: RRR  Respiratory: Mild rhonchi on left, otherwise clear.   Abdomen/: soft, NT, peg tube in place  Extremities: contracted, trace edema.      Respiratory:  Liriano Vent Mode: APVCMV, Rate (breaths/min): 14, Vt Target (mL): 400, PEEP/CPAP: 8, FiO2: 30, Static Compliance (ml / cm H2O): 157, Control VTE (exp VT): 405  Pulse Oximetry: 97 %  ImagingAvailable data reviewed     Recent Labs      06/23/17   0559  06/24/17   0606   [id number]  7.51*  7.52*   [id number]  30.5  29.3   [id number]  101.1*  67.9   EALB6KMP  97.6  95.0   ARTHCO3  24  24   FIO2  40   --    ARTBE  2  2     HemoDynamics:  Pulse: 100, Heart Rate (Monitored): 100  NIBP: 127/75 mmHg  CVP (mm Hg): (!) 14-16 MM HG  Imaging: Available data reviewed      Neuro:  GCS Total Aleida Coma Score: 10  Imaging: Available data reviewed      Fluids:  Intake/Output       06/22/17 0700 - 06/23/17 0659 06/23/17 0700 - 06/24/17 0659 06/24/17 0700 - 06/25/17 0659      4479-1352 2455-8021 Total 3460-6556 8668-3068 Total 2691-4634 5610-1117 Total       Intake    I.V.  1000  100 1100  220  220 440  --  -- --    IV  Volume (TKO) -- 100 100 120 120 240 -- -- --    IV Volume (NS 20K+) 800 -- 800 -- -- -- -- -- --    IV Piggyback Volume (IV Piggyback) 200 -- 200 100 100 200 -- -- --    Other  90  240 330  90  120 210  --  -- --    Medications (P.O./ Enteral Liquids) 90 240 330 90 120 210 -- -- --    Enteral  780  750 1530  690  750 1440  --  -- --    Enteral Volume   -- -- --    Free Water / Tube Flush 180 150 330 90 150 240 -- -- --    Total Intake 1870 1090 2960 1000 1090 2090 -- -- --       Output    Urine  1450  1950 3400  4500  1750 6250  --  -- --    Indwelling Cathether 1450 1950 3400 4500 1750 6250 -- -- --    Emesis  --  -- --  --  -- --  --  -- --    Emesis - Number of Times -- -- -- -- 1 x 1 x -- -- --    Stool  --  -- --  --  -- --  --  -- --    Number of Times Stooled -- -- -- -- 1 x 1 x -- -- --    Total Output 1450 1950 3400 4500 1750 6250 -- -- --       Net I/O     388 -502 -862 -3771 -364 -2263 -- -- --        Weight: 55.3 kg (121 lb 14.6 oz)  Recent Labs      17   SODIUM  138  130*  121*   POTASSIUM  4.1  3.7  3.9   CHLORIDE  109  101  92*   CO2    22   BUN  9  7*  11   CREATININE  <0.20*  <0.20*  <0.20*   MAGNESIUM  2.0   --    --    PHOSPHORUS  2.0*   --    --    CALCIUM  7.8*  8.1*  8.6     GI/Nutrition:  Imaging: Available data reviewed  NPO     Liver Function  Recent Labs      1725  17   GLUCOSE  111*  117*  128*       Heme:  Recent Labs      17  17   0524   RBC  3.60*  3.68*  4.01*   HEMOGLOBIN  10.4*  10.6*  11.4*   HEMATOCRIT  30.3*  31.0*  33.0*   PLATELETCT  203  215  238     Infectious Disease:  Temp  Av.6 °C (97.9 °F)  Min: 36.2 °C (97.2 °F)  Max: 37.3 °C (99.1 °F)  Micro: cultures reviewed     Recent Labs      17   0533  17   0525  17   0524   WBC  8.5  10.2  7.5   NEUTSPOLYS  69.60  77.50*  65.70   LYMPHOCYTES  20.90*  9.50*   18.70*   MONOCYTES  1.70  8.40  9.40   EOSINOPHILS  6.90  3.70  5.40   BASOPHILS  0.00  0.30  0.30     Current Facility-Administered Medications   Medication Dose Frequency Provider Last Rate Last Dose   • PHENobarbital solution 70 mg  70 mg BID Martell Hunter M.D.   70 mg at 06/23/17 2040   • potassium chloride (KLOR-CON) 20 MEQ packet 40 mEq  40 mEq BID Kev Araiza M.D.   40 mEq at 06/23/17 2042   • levetiracetam (KEPPRA) 1,500 mg in D5W 100 mL IVPB  1,500 mg Q12HRS Martell Hunter M.D.   Stopped at 06/23/17 2140   • enoxaparin (LOVENOX) inj 30 mg  30 mg DAILY Clarence Morfin D.O.   30 mg at 06/23/17 2042   • Respiratory Care per Protocol   Continuous RT Kev Araiza M.D.       • senna-docusate (PERICOLACE or SENOKOT S) 8.6-50 MG per tablet 2 Tab  2 Tab BID Kev Araiza M.D.   Stopped at 06/23/17 2100    And   • polyethylene glycol/lytes (MIRALAX) PACKET 1 Packet  1 Packet QDAY PRN Kev Araiza M.D.   1 Packet at 06/22/17 2055    And   • magnesium hydroxide (MILK OF MAGNESIA) suspension 30 mL  30 mL QDAY PRN Kev Araiza M.D.   30 mL at 06/22/17 2055    And   • bisacodyl (DULCOLAX) suppository 10 mg  10 mg QDAY PRN Kev Araiza M.D.   10 mg at 06/23/17 0337   • chlorhexidine (PERIDEX) 0.12 % solution 15 mL  15 mL BID Kev Araiza M.D.   15 mL at 06/23/17 2042   • lidocaine (XYLOCAINE) 1%  injection  1-2 mL Q30 MIN PRN Kev Araiza M.D.       • MD ALERT...Adult ICU Electrolyte Replacement per Pharmacy Protocol   pharmacy to dose Kev Araiza M.D.       • fentaNYL (SUBLIMAZE) injection 25 mcg  25 mcg Q HOUR PRN Kev Araiza M.D.        Or   • fentaNYL (SUBLIMAZE) injection 50 mcg  50 mcg Q HOUR PRN Kev Araiza M.D.   50 mcg at 06/24/17 0607   • famotidine (PEPCID) tablet 20 mg  20 mg Q12HRS Kev Araiza M.D.   20 mg at 06/23/17 2042    Or   • famotidine (PEPCID) injection 20 mg  20 mg Q12HRS Kev Araiza M.D.   20 mg at 06/20/17 0918   • ipratropium-albuterol  (DUONEB) nebulizer solution 3 mL  3 mL Q2HRS PRN (RT) Kev Araiza M.D.       • insulin lispro (HUMALOG) injection 1-6 Units  1-6 Units Q6HRS Kev Araiza M.D.   Stopped at 06/19/17 1845   • glucose 4 g chewable tablet 16 g  16 g Q15 MIN PRN Kev Araiza M.D.        And   • dextrose 50% (D50W) injection 25 mL  25 mL Q15 MIN PRN Kev Araiza M.D.   25 mL at 06/20/17 1840   • ondansetron (ZOFRAN) syringe/vial injection 4 mg  4 mg Q4HRS PRN Clarence Morfin D.O.   4 mg at 06/23/17 2238   • ondansetron (ZOFRAN ODT) dispertab 4 mg  4 mg Q4HRS PRN Clarence Morfin D.O.       • promethazine (PHENERGAN) tablet 12.5-25 mg  12.5-25 mg Q4HRS PRN Clarence Morfin D.O.       • promethazine (PHENERGAN) suppository 12.5-25 mg  12.5-25 mg Q4HRS PRN JASPAL Medina.O.       • prochlorperazine (COMPAZINE) injection 5-10 mg  5-10 mg Q4HRS PRN Clarence Morfin D.O.       • lorazepam (ATIVAN) tablet 0.5 mg  0.5 mg Q6HRS PRN Clarence Morfin D.O.   Stopped at 06/19/17 0248    Or   • lorazepam (ATIVAN) injection 0.5 mg  0.5 mg Q6HRS PRN Clarence Morfin D.O.   0.5 mg at 06/22/17 0044   • acetaminophen (TYLENOL) tablet 650 mg  650 mg Q6HRS PRN Clarence Morfin D.O.   650 mg at 06/17/17 1857   • Pain Pump (patient supplied) Device   Continuous JASPAL Medina.O.   Stopped at 06/17/17 1230     Last reviewed on 6/17/2017  9:45 AM by Ligia Frazier, Pharmacy Int    Quality  Measures:  Radiology images reviewed, Medications reviewed and Labs reviewed  Waldrop catheter: Epidural Catheter / Drain                  Gtts:  Levo off  Propofol off    Abx:   Ceftriaxone day 4   Unasyn x 4 days    Cultures:   Urine 6/17 negative  Blood 6/17 negative  BAL 6/19  Klebsiella PNA       Assessment/Plan  -  New onset seizure activity with concern of status epilepticus.   - keppra   - phenobarb   - neuro following  -  Aspiration pneumonia. Klebsiella pna   - intubated 6/19    - bal 6/19 Kleb pna   - on rocephin    - plan for 10 days  abx   - procalcitonin improving - repeat today  -  Moderate Left sided Pleural Effusion   - thoracentesis  -  Acute respiratory insufficiency secondary to above.   - rt/02 protcol   - full vent support   - sat/sbt now sz activity suppressed  -  Hyponatremia.   - start salt tabs   - hold further diureses   - repeat bmp this evening  - HypoK and Hyop Mag - replace  -  Severe chronic underlying debility secondary to profound traumatic brain    injury 18 years ago.    Discussed patient condition and risk of morbidity and/or mortality with RN, RT, Therapies, Pharmacy, Dietary, Charge nurse / hot rounds and hospitalist.    The patient remains critically ill.  Critical care time = 32 minutes in directly providing and coordinating critical care and extensive data review.  No time overlap and excludes procedures.    Mica WOODY (Conchita), am scribing for, and in the presence of, Kev Araiza M.D.   Electronically signed by: Mica Quintana (Conchita), 6/24/2017  Kev WOODY M.D. personally performed the services described in this documentation, as scribed by Mica Quintana in my presence, and it is both accurate and complete.

## 2017-06-24 NOTE — CARE PLAN
Problem: Bowel/Gastric:  Goal: Normal bowel function is maintained or improved  Outcome: PROGRESSING AS EXPECTED  Pt having regular bowel movements.     Problem: Mobility  Goal: Risk for activity intolerance will decrease  Outcome: PROGRESSING AS EXPECTED  Pt sits edge of bed with 3 person assist. Tolerates well.

## 2017-06-24 NOTE — PROGRESS NOTES
[facility] Hospitalist Progress Note    Date of Service: 2017    Chief Complaint  36 y.o. male admitted 2017 with altered mental status.    Interval Problem Update  Mr. Edwards has a hx of TBI at age 17. He was admitted with aspiration pneumonitis. EEG revealed status epilepticus thus he was intubated on  placed on IV keppra, phenobarbital.    RN notes he was up to side of bed  S/p 48 hour EEG  Tmax 99.6. 1,750 ml urine over night. He is tolerating G tube feeding.   I met with Ruben's mother today and we discussed his BP and pulse. He had a seizure   which resolved with 2 mg IV ativan. His BP has been high and pulse has gone up. Night RN gave 4 doses of fentanyl last night which dropped his BP and pulse.  Consultants/Specialty  Neurology  Pulmonology  I discussed with Dr. Araiza on Hot Rounds    Disposition  ICU        Review of Systems   Unable to perform ROS: intubated      Physical Exam  Laboratory/Imaging   Hemodynamics  Temp (24hrs), Av.6 °C (97.9 °F), Min:36.2 °C (97.2 °F), Max:37.3 °C (99.1 °F)   Temperature: 37 °C (98.6 °F)  Pulse  Av  Min: 60  Max: 131 Heart Rate (Monitored): 93  NIBP: 159/103 mmHg     Respiratory  Liriano Vent Mode: APVCMV, Rate (breaths/min): 14, PEEP/CPAP: 8, PEEP/CPAP: 8, FiO2: 30, P Peak (PIP): 16, P MEAN: 10   Respiration: 15, Pulse Oximetry: 98 %     Work Of Breathing / Effort: Vented  RUL Breath Sounds: Clear, RML Breath Sounds: Clear, RLL Breath Sounds: Diminished, JONATAN Breath Sounds: Clear, LLL Breath Sounds: Diminished    Fluids    Intake/Output Summary (Last 24 hours) at 17 0745  Last data filed at 17 0600   Gross per 24 hour   Intake   2090 ml   Output   6250 ml   Net  -4160 ml       Nutrition  Orders Placed This Encounter   Procedures   • Diet NPO     Standing Status: Standing      Number of Occurrences: 1      Standing Expiration Date:      Order Specific Question:  Type:     Answer:  Now [1]     Order Specific Question:  Restrict to:      Answer:  With Tube Feed [4]      Comments:  yogurt and milk ok for delivery. used in bolus formula. not for PO intake     Physical Exam   Constitutional: No distress.   Thin and frail-appearing   HENT:   Endotracheal tube.    Eyes: Right eye exhibits no discharge. Left eye exhibits no discharge.   Eyes are open but he does not track   Neck:   Right IJ central line without bleeding.   Well-healed tracheostomy site.    Cardiovascular: Normal rate and regular rhythm.    No murmur heard.  Pulmonary/Chest:   Vent, moderate air movement   Abdominal: Bowel sounds are normal. He exhibits no distension. There is no tenderness.   PEG. Baclofen pump palpable on abdomen.    Genitourinary:   Waldrop cath   Musculoskeletal: He exhibits edema. He exhibits no tenderness.   Poor muscle tone, significant contractures of the arms and legs.    Neurological:   He does not follow and does not withdraw to pain.   Skin: Skin is warm and dry. No rash noted. He is not diaphoretic. There is pallor.       Recent Labs      06/22/17   0533  06/23/17   0525  06/24/17   0524   WBC  8.5  10.2  7.5   RBC  3.60*  3.68*  4.01*   HEMOGLOBIN  10.4*  10.6*  11.4*   HEMATOCRIT  30.3*  31.0*  33.0*   MCV  84.2  84.2  82.3   MCH  28.9  28.8  28.4   MCHC  34.3  34.2  34.5   RDW  49.9  49.8  47.8   PLATELETCT  203  215  238   MPV  9.6  9.3  9.0     Recent Labs      06/22/17   0533  06/23/17   0525  06/24/17   0524   SODIUM  138  130*  121*   POTASSIUM  4.1  3.7  3.9   CHLORIDE  109  101  92*   CO2  24  24  22   GLUCOSE  111*  117*  128*   BUN  9  7*  11   CREATININE  <0.20*  <0.20*  <0.20*   CALCIUM  7.8*  8.1*  8.6                      Assessment/Plan     Status epilepticus (CMS-HCC) (present on admission)  Assessment & Plan  As evidenced on EEG. Continuous EEG showed seizure activity over 48 hours.  Neurology consultation.  IV keppra loaded.   Phenobarbital added.    Aspiration pneumonia (CMS-HCC) (present on admission)  Assessment & Plan  - pt unable to get  his secretions out  - culture growing Klebsiella resistant to ampicillin thus rocephin to cover aspiration for 10 days.  -CT chest to eval further done 6/23  CT:    1.  Bilateral airspace opacities, much more severe on the left, consistent with multifocal pneumonia. Moderate left and trace amounts of right pleural fluid are also present       Sepsis (CMS-HCC)  Assessment & Plan  - secondary to aspiration pneumonia      Acute respiratory failure with hypoxia (CMS-HCC) (present on admission)  Assessment & Plan  Intubated on 6/19.  Pulmonary consult.  He may require another tracheostomy.    Hyponatremia (present on admission)  Assessment & Plan  - persistent despite IV fluids  - repeat bmp in am    Hypoglycemia (present on admission)  Assessment & Plan  - resolved    Encephalopathy acute (present on admission)  Assessment & Plan  Secondary to status epilepticus. His mentation is not improving. Consider post-ictal as well as secondary to meds. Refrain from sedation.    TBI (traumatic brain injury) (CMS-HCC) (present on admission)  Assessment & Plan  Since age 17 years.  With quadriplegia   [facility] pump [facility].    Hypokalemia (present on admission)  Assessment & Plan  IV replacement.      Labs reviewed, Medications reviewed and Radiology images reviewed  Waldrop catheter: Unconscious / Sedated Patient on a Ventilator      DVT Prophylaxis: Enoxaparin (Lovenox)

## 2017-06-24 NOTE — PROGRESS NOTES
MD Araiza Paged about updates    06:32 MD Araiza bedside. Updated about a Na of 121 from 130 the day prior. Orders received and implemented.

## 2017-06-25 ENCOUNTER — APPOINTMENT (OUTPATIENT)
Dept: RADIOLOGY | Facility: MEDICAL CENTER | Age: 37
DRG: 004 | End: 2017-06-25
Attending: INTERNAL MEDICINE
Payer: COMMERCIAL

## 2017-06-25 ENCOUNTER — APPOINTMENT (OUTPATIENT)
Dept: RADIOLOGY | Facility: MEDICAL CENTER | Age: 37
DRG: 004 | End: 2017-06-25
Attending: HOSPITALIST
Payer: COMMERCIAL

## 2017-06-25 LAB
AMYLASE FLD-CCNC: 46 U/L
ANION GAP SERPL CALC-SCNC: 4 MMOL/L (ref 0–11.9)
ANION GAP SERPL CALC-SCNC: 6 MMOL/L (ref 0–11.9)
ANION GAP SERPL CALC-SCNC: 7 MMOL/L (ref 0–11.9)
ANION GAP SERPL CALC-SCNC: 7 MMOL/L (ref 0–11.9)
APPEARANCE FLD: NORMAL
BASE EXCESS BLDA CALC-SCNC: -1 MMOL/L (ref -4–3)
BASE EXCESS BLDA CALC-SCNC: 1 MMOL/L (ref -4–3)
BASOPHILS # BLD AUTO: 0.4 % (ref 0–1.8)
BASOPHILS # BLD: 0.03 K/UL (ref 0–0.12)
BODY FLD TYPE: NORMAL
BODY TEMPERATURE: ABNORMAL CENTIGRADE
BODY TEMPERATURE: ABNORMAL DEGREES
BUN SERPL-MCNC: 13 MG/DL (ref 8–22)
BUN SERPL-MCNC: 14 MG/DL (ref 8–22)
BUN SERPL-MCNC: 14 MG/DL (ref 8–22)
BUN SERPL-MCNC: 17 MG/DL (ref 8–22)
CALCIUM SERPL-MCNC: 8 MG/DL (ref 8.5–10.5)
CALCIUM SERPL-MCNC: 8.1 MG/DL (ref 8.5–10.5)
CALCIUM SERPL-MCNC: 8.2 MG/DL (ref 8.5–10.5)
CALCIUM SERPL-MCNC: 8.3 MG/DL (ref 8.5–10.5)
CHLORIDE SERPL-SCNC: 103 MMOL/L (ref 96–112)
CHLORIDE SERPL-SCNC: 94 MMOL/L (ref 96–112)
CHLORIDE SERPL-SCNC: 95 MMOL/L (ref 96–112)
CHLORIDE SERPL-SCNC: 99 MMOL/L (ref 96–112)
CO2 BLDA-SCNC: 23 MMOL/L (ref 20–33)
CO2 SERPL-SCNC: 21 MMOL/L (ref 20–33)
CO2 SERPL-SCNC: 22 MMOL/L (ref 20–33)
COLOR FLD: YELLOW
CORTIS SERPL-MCNC: 5.7 UG/DL (ref 0–23)
CREAT SERPL-MCNC: <0.2 MG/DL (ref 0.5–1.4)
CSF COMMENTS 1658: NORMAL
EOSINOPHIL # BLD AUTO: 0.32 K/UL (ref 0–0.51)
EOSINOPHIL NFR BLD: 4.1 % (ref 0–6.9)
EOSINOPHIL NFR FLD: 3 %
ERYTHROCYTE [DISTWIDTH] IN BLOOD BY AUTOMATED COUNT: 48.5 FL (ref 35.9–50)
GFR SERPL CREATININE-BSD FRML MDRD: >60 ML/MIN/1.73 M 2
GLUCOSE FLD-MCNC: 116 MG/DL
GLUCOSE SERPL-MCNC: 105 MG/DL (ref 65–99)
GLUCOSE SERPL-MCNC: 111 MG/DL (ref 65–99)
GLUCOSE SERPL-MCNC: 111 MG/DL (ref 65–99)
GLUCOSE SERPL-MCNC: 127 MG/DL (ref 65–99)
GRAM STN SPEC: NORMAL
GRAM STN SPEC: NORMAL
HCO3 BLDA-SCNC: 22 MMOL/L (ref 17–25)
HCO3 BLDA-SCNC: 24 MMOL/L (ref 17–25)
HCT VFR BLD AUTO: 31.8 % (ref 42–52)
HGB BLD-MCNC: 11 G/DL (ref 14–18)
IMM GRANULOCYTES # BLD AUTO: 0.07 K/UL (ref 0–0.11)
IMM GRANULOCYTES NFR BLD AUTO: 0.9 % (ref 0–0.9)
INHALED O2 FLOW RATE: 2 L/MIN (ref 2–10)
LDH FLD-CCNC: 272 U/L
LYMPHOCYTES # BLD AUTO: 1.69 K/UL (ref 1–4.8)
LYMPHOCYTES NFR BLD: 21.9 % (ref 22–41)
LYMPHOCYTES NFR FLD: 61 %
MCH RBC QN AUTO: 28.8 PG (ref 27–33)
MCHC RBC AUTO-ENTMCNC: 34.6 G/DL (ref 33.7–35.3)
MCV RBC AUTO: 83.2 FL (ref 81.4–97.8)
MESOTHL CELL NFR FLD: 1 %
MONOCYTES # BLD AUTO: 0.83 K/UL (ref 0–0.85)
MONOCYTES NFR BLD AUTO: 10.7 % (ref 0–13.4)
MONONUC CELLS NFR FLD: 6 %
NEUTROPHILS # BLD AUTO: 4.79 K/UL (ref 1.82–7.42)
NEUTROPHILS NFR BLD: 62 % (ref 44–72)
NEUTROPHILS NFR FLD: 29 %
NRBC # BLD AUTO: 0 K/UL
NRBC BLD AUTO-RTO: 0 /100 WBC
O2/TOTAL GAS SETTING VFR VENT: 30 %
PCO2 BLDA: 32 MMHG (ref 26–37)
PCO2 BLDA: 36 MMHG (ref 26–37)
PCO2 TEMP ADJ BLDA: 31.7 MMHG (ref 26–37)
PH BLDA: 7.45 [PH] (ref 7.4–7.5)
PH BLDA: 7.45 [PH] (ref 7.4–7.5)
PH FLD: 7.8 [PH]
PH TEMP ADJ BLDA: 7.45 [PH] (ref 7.4–7.5)
PLATELET # BLD AUTO: 279 K/UL (ref 164–446)
PMV BLD AUTO: 9.1 FL (ref 9–12.9)
PO2 BLDA: 111 MMHG (ref 64–87)
PO2 BLDA: 42.1 MMHG (ref 64–87)
PO2 TEMP ADJ BLDA: 109 MMHG (ref 64–87)
POTASSIUM SERPL-SCNC: 3.9 MMOL/L (ref 3.6–5.5)
POTASSIUM SERPL-SCNC: 4.1 MMOL/L (ref 3.6–5.5)
POTASSIUM SERPL-SCNC: 4.1 MMOL/L (ref 3.6–5.5)
POTASSIUM SERPL-SCNC: 4.8 MMOL/L (ref 3.6–5.5)
PROT FLD-MCNC: 3.1 G/DL
RBC # BLD AUTO: 3.82 M/UL (ref 4.7–6.1)
RBC # FLD: <2000 CELLS/UL
SAO2 % BLDA: 78.8 % (ref 93–99)
SAO2 % BLDA: 99 % (ref 93–99)
SIGNIFICANT IND 70042: NORMAL
SIGNIFICANT IND 70042: NORMAL
SITE SITE: NORMAL
SITE SITE: NORMAL
SODIUM SERPL-SCNC: 121 MMOL/L (ref 135–145)
SODIUM SERPL-SCNC: 124 MMOL/L (ref 135–145)
SODIUM SERPL-SCNC: 128 MMOL/L (ref 135–145)
SODIUM SERPL-SCNC: 129 MMOL/L (ref 135–145)
SOURCE SOURCE: NORMAL
SOURCE SOURCE: NORMAL
SPECIMEN DRAWN FROM PATIENT: ABNORMAL
WBC # BLD AUTO: 7.7 K/UL (ref 4.8–10.8)
WBC # FLD: 94 CELLS/UL

## 2017-06-25 PROCEDURE — 87015 SPECIMEN INFECT AGNT CONCNTJ: CPT | Mod: 91

## 2017-06-25 PROCEDURE — 700102 HCHG RX REV CODE 250 W/ 637 OVERRIDE(OP): Performed by: PSYCHIATRY & NEUROLOGY

## 2017-06-25 PROCEDURE — 83615 LACTATE (LD) (LDH) ENZYME: CPT

## 2017-06-25 PROCEDURE — 700102 HCHG RX REV CODE 250 W/ 637 OVERRIDE(OP): Performed by: HOSPITALIST

## 2017-06-25 PROCEDURE — A9270 NON-COVERED ITEM OR SERVICE: HCPCS | Performed by: PSYCHIATRY & NEUROLOGY

## 2017-06-25 PROCEDURE — 94003 VENT MGMT INPAT SUBQ DAY: CPT

## 2017-06-25 PROCEDURE — A9270 NON-COVERED ITEM OR SERVICE: HCPCS | Performed by: INTERNAL MEDICINE

## 2017-06-25 PROCEDURE — 84157 ASSAY OF PROTEIN OTHER: CPT

## 2017-06-25 PROCEDURE — 82150 ASSAY OF AMYLASE: CPT

## 2017-06-25 PROCEDURE — 700111 HCHG RX REV CODE 636 W/ 250 OVERRIDE (IP): Performed by: INTERNAL MEDICINE

## 2017-06-25 PROCEDURE — 85025 COMPLETE CBC W/AUTO DIFF WBC: CPT

## 2017-06-25 PROCEDURE — 89051 BODY FLUID CELL COUNT: CPT

## 2017-06-25 PROCEDURE — 71010 DX-CHEST-PORTABLE (1 VIEW): CPT

## 2017-06-25 PROCEDURE — 82803 BLOOD GASES ANY COMBINATION: CPT | Mod: 91

## 2017-06-25 PROCEDURE — 700111 HCHG RX REV CODE 636 W/ 250 OVERRIDE (IP): Performed by: HOSPITALIST

## 2017-06-25 PROCEDURE — 700105 HCHG RX REV CODE 258: Performed by: PSYCHIATRY & NEUROLOGY

## 2017-06-25 PROCEDURE — 83986 ASSAY PH BODY FLUID NOS: CPT

## 2017-06-25 PROCEDURE — 87205 SMEAR GRAM STAIN: CPT | Mod: 91

## 2017-06-25 PROCEDURE — 80048 BASIC METABOLIC PNL TOTAL CA: CPT

## 2017-06-25 PROCEDURE — 0BJ08ZZ INSPECTION OF TRACHEOBRONCHIAL TREE, VIA NATURAL OR ARTIFICIAL OPENING ENDOSCOPIC: ICD-10-PCS | Performed by: INTERNAL MEDICINE

## 2017-06-25 PROCEDURE — 99291 CRITICAL CARE FIRST HOUR: CPT | Performed by: HOSPITALIST

## 2017-06-25 PROCEDURE — 87206 SMEAR FLUORESCENT/ACID STAI: CPT

## 2017-06-25 PROCEDURE — 82945 GLUCOSE OTHER FLUID: CPT

## 2017-06-25 PROCEDURE — 87116 MYCOBACTERIA CULTURE: CPT

## 2017-06-25 PROCEDURE — 700102 HCHG RX REV CODE 250 W/ 637 OVERRIDE(OP): Performed by: INTERNAL MEDICINE

## 2017-06-25 PROCEDURE — 31645 BRNCHSC W/THER ASPIR 1ST: CPT | Mod: AG | Performed by: INTERNAL MEDICINE

## 2017-06-25 PROCEDURE — 0W9B3ZX DRAINAGE OF LEFT PLEURAL CAVITY, PERCUTANEOUS APPROACH, DIAGNOSTIC: ICD-10-PCS | Performed by: INTERNAL MEDICINE

## 2017-06-25 PROCEDURE — 302978 HCHG BRONCHOSCOPY-DIAGNOSTIC

## 2017-06-25 PROCEDURE — 87102 FUNGUS ISOLATION CULTURE: CPT

## 2017-06-25 PROCEDURE — 700105 HCHG RX REV CODE 258: Performed by: INTERNAL MEDICINE

## 2017-06-25 PROCEDURE — 87070 CULTURE OTHR SPECIMN AEROBIC: CPT | Mod: 91

## 2017-06-25 PROCEDURE — 36600 WITHDRAWAL OF ARTERIAL BLOOD: CPT

## 2017-06-25 PROCEDURE — 32555 ASPIRATE PLEURA W/ IMAGING: CPT | Mod: LT

## 2017-06-25 PROCEDURE — 82533 TOTAL CORTISOL: CPT

## 2017-06-25 PROCEDURE — 770022 HCHG ROOM/CARE - ICU (200)

## 2017-06-25 PROCEDURE — 0B9J8ZX DRAINAGE OF LEFT LOWER LUNG LOBE, VIA NATURAL OR ARTIFICIAL OPENING ENDOSCOPIC, DIAGNOSTIC: ICD-10-PCS | Performed by: INTERNAL MEDICINE

## 2017-06-25 PROCEDURE — 700111 HCHG RX REV CODE 636 W/ 250 OVERRIDE (IP): Performed by: PSYCHIATRY & NEUROLOGY

## 2017-06-25 PROCEDURE — A9270 NON-COVERED ITEM OR SERVICE: HCPCS | Performed by: HOSPITALIST

## 2017-06-25 PROCEDURE — 99291 CRITICAL CARE FIRST HOUR: CPT | Mod: 25 | Performed by: INTERNAL MEDICINE

## 2017-06-25 PROCEDURE — 71010 DX-CHEST-LIMITED (1 VIEW): CPT

## 2017-06-25 RX ORDER — PROPOFOL 10 MG/ML
20 INJECTION, EMULSION INTRAVENOUS ONCE
Status: COMPLETED | OUTPATIENT
Start: 2017-06-25 | End: 2017-06-25

## 2017-06-25 RX ORDER — POTASSIUM CHLORIDE 1.5 G/1.58G
40 POWDER, FOR SOLUTION ORAL 3 TIMES DAILY
Status: DISCONTINUED | OUTPATIENT
Start: 2017-06-25 | End: 2017-07-06

## 2017-06-25 RX ORDER — LEVETIRACETAM 100 MG/ML
1500 SOLUTION ORAL EVERY 12 HOURS
Status: DISCONTINUED | OUTPATIENT
Start: 2017-06-25 | End: 2017-07-02

## 2017-06-25 RX ORDER — MIDODRINE HYDROCHLORIDE 5 MG/1
5 TABLET ORAL
Status: DISCONTINUED | OUTPATIENT
Start: 2017-06-25 | End: 2017-06-26

## 2017-06-25 RX ORDER — 3% SODIUM CHLORIDE 3 G/100ML
INJECTION, SOLUTION INTRAVENOUS CONTINUOUS
Status: DISCONTINUED | OUTPATIENT
Start: 2017-06-25 | End: 2017-06-25

## 2017-06-25 RX ADMIN — POLYETHYLENE GLYCOL 3350 1 PACKET: 17 POWDER, FOR SOLUTION ORAL at 20:28

## 2017-06-25 RX ADMIN — MAGNESIUM HYDROXIDE 30 ML: 400 SUSPENSION ORAL at 15:48

## 2017-06-25 RX ADMIN — SODIUM CHLORIDE TAB 1 GM 2 G: 1 TAB at 11:19

## 2017-06-25 RX ADMIN — PROPOFOL 20 MG: 10 INJECTION, EMULSION INTRAVENOUS at 12:01

## 2017-06-25 RX ADMIN — DEXTROSE MONOHYDRATE 1500 MG: 50 INJECTION, SOLUTION INTRAVENOUS at 08:32

## 2017-06-25 RX ADMIN — FAMOTIDINE 20 MG: 20 TABLET, FILM COATED ORAL at 08:34

## 2017-06-25 RX ADMIN — LEVETIRACETAM 1500 MG: 100 SOLUTION ORAL at 20:27

## 2017-06-25 RX ADMIN — SENNOSIDES AND DOCUSATE SODIUM 2 TABLET: 8.6; 5 TABLET ORAL at 20:28

## 2017-06-25 RX ADMIN — PHENOBARBITAL 70 MG: 20 ELIXIR ORAL at 22:11

## 2017-06-25 RX ADMIN — MIDODRINE HYDROCHLORIDE 5 MG: 5 TABLET ORAL at 18:06

## 2017-06-25 RX ADMIN — SODIUM CHLORIDE: 3 INJECTION, SOLUTION INTRAVENOUS at 02:23

## 2017-06-25 RX ADMIN — PROPOFOL 20 MG: 10 INJECTION, EMULSION INTRAVENOUS at 12:04

## 2017-06-25 RX ADMIN — SODIUM CHLORIDE TAB 1 GM 2 G: 1 TAB at 08:33

## 2017-06-25 RX ADMIN — CHLORHEXIDINE GLUCONATE 15 ML: 1.2 RINSE ORAL at 08:34

## 2017-06-25 RX ADMIN — POTASSIUM CHLORIDE 40 MEQ: 1.5 POWDER, FOR SOLUTION ORAL at 20:28

## 2017-06-25 RX ADMIN — PROPOFOL 20 MG: 10 INJECTION, EMULSION INTRAVENOUS at 11:59

## 2017-06-25 RX ADMIN — SENNOSIDES AND DOCUSATE SODIUM 2 TABLET: 8.6; 5 TABLET ORAL at 08:33

## 2017-06-25 RX ADMIN — FAMOTIDINE 20 MG: 20 TABLET, FILM COATED ORAL at 20:28

## 2017-06-25 RX ADMIN — PHENOBARBITAL 70 MG: 20 ELIXIR ORAL at 08:32

## 2017-06-25 RX ADMIN — POTASSIUM CHLORIDE 40 MEQ: 1.5 POWDER, FOR SOLUTION ORAL at 08:33

## 2017-06-25 RX ADMIN — CEFTRIAXONE SODIUM 2 G: 2 INJECTION, POWDER, FOR SOLUTION INTRAMUSCULAR; INTRAVENOUS at 08:34

## 2017-06-25 RX ADMIN — CHLORHEXIDINE GLUCONATE 15 ML: 1.2 RINSE ORAL at 20:27

## 2017-06-25 RX ADMIN — ENOXAPARIN SODIUM 30 MG: 100 INJECTION SUBCUTANEOUS at 20:27

## 2017-06-25 RX ADMIN — POTASSIUM CHLORIDE 40 MEQ: 1.5 POWDER, FOR SOLUTION ORAL at 15:48

## 2017-06-25 RX ADMIN — SODIUM CHLORIDE TAB 1 GM 2 G: 1 TAB at 18:06

## 2017-06-25 RX ADMIN — HYDROCORTISONE SODIUM SUCCINATE 100 MG: 100 INJECTION, POWDER, FOR SOLUTION INTRAMUSCULAR; INTRAVENOUS at 20:29

## 2017-06-25 ASSESSMENT — LIFESTYLE VARIABLES: DO YOU DRINK ALCOHOL: NO

## 2017-06-25 NOTE — PROGRESS NOTES
Garry from Lab called with critical result of PO2 at 0440. Critical lab result read back to Garry.   RT will redraw this lab as it is suspected to be a venous sample.

## 2017-06-25 NOTE — CARE PLAN
Problem: Communication  Goal: The ability to communicate needs accurately and effectively will improve  Outcome: PROGRESSING AS EXPECTED  Patient non-communicative at baseline.    Problem: Skin Integrity  Goal: Risk for impaired skin integrity will decrease  Outcome: PROGRESSING AS EXPECTED  Full waffle cushion in place. Q 2 hour turns. Pillows for support.
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
## 2017-06-25 NOTE — PROGRESS NOTES
Dr. Araiza at bedside performing bronchoscopy. Signed consents in the chart. Total of 60mg propofol given during procedure. Scanned in MAR. RSI kit returned to charge RN.

## 2017-06-25 NOTE — PROGRESS NOTES
Dr. Pruett and US tech at bedside to perform left thoracentesis. Time out performed. RN at bedside to monitor. 300ml fluid removed.

## 2017-06-25 NOTE — PROCEDURES
Date: 6/25/2017    Procedure:  Bronchoscopy w BAL    Indication: Respiratory failure, ? pneumonia    Physician:  Kev Araiza M.D.    Consent:  Obtained     Procedure:  This patient has developed increasing respiratory failure, on vent.  Bronchoscopy was indicated for airway evaluation and BAL to evaluate for pneumonia.  A flexible FOB was inserted through the ETT without difficulty.  All airways were evaluated to the sub-segmental level.  The airway mucosa was mildly inflamed.  No endobronchial lesions were seen.  The bronchoscope was then wedged in a segment of the Left lower lobe.  20cc of saline was instilled with moderate return of thick, opaque BAL fluid.  The BAL specimen will be sent for appropriate culture.  No immediate complications.  EBL = 0.

## 2017-06-25 NOTE — CARE PLAN
Problem: Mobility  Goal: Risk for activity intolerance will decrease  Outcome: PROGRESSING AS EXPECTED  Pt sits in wheel chair most of day at home. Sitting in cardiac chair since 8am today.     Problem: Urinary Elimination:  Goal: Ability to reestablish a normal urinary elimination pattern will improve  Outcome: PROGRESSING AS EXPECTED  Adequate UOP. Monitor strict I/O.
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
## 2017-06-26 ENCOUNTER — APPOINTMENT (OUTPATIENT)
Dept: RADIOLOGY | Facility: MEDICAL CENTER | Age: 37
DRG: 004 | End: 2017-06-26
Attending: INTERNAL MEDICINE
Payer: COMMERCIAL

## 2017-06-26 LAB
ALBUMIN SERPL BCP-MCNC: 3 G/DL (ref 3.2–4.9)
ALBUMIN/GLOB SERPL: 0.8 G/DL
ALP SERPL-CCNC: 80 U/L (ref 30–99)
ALT SERPL-CCNC: 21 U/L (ref 2–50)
ANION GAP SERPL CALC-SCNC: 2 MMOL/L (ref 0–11.9)
ANION GAP SERPL CALC-SCNC: 2 MMOL/L (ref 0–11.9)
ANION GAP SERPL CALC-SCNC: 5 MMOL/L (ref 0–11.9)
AST SERPL-CCNC: 16 U/L (ref 12–45)
BASE EXCESS BLDA CALC-SCNC: -1 MMOL/L (ref -4–3)
BASOPHILS # BLD AUTO: 0.4 % (ref 0–1.8)
BASOPHILS # BLD: 0.02 K/UL (ref 0–0.12)
BILIRUB SERPL-MCNC: 0.3 MG/DL (ref 0.1–1.5)
BODY TEMPERATURE: ABNORMAL DEGREES
BUN SERPL-MCNC: 18 MG/DL (ref 8–22)
BUN SERPL-MCNC: 20 MG/DL (ref 8–22)
BUN SERPL-MCNC: 21 MG/DL (ref 8–22)
CALCIUM SERPL-MCNC: 8.3 MG/DL (ref 8.5–10.5)
CALCIUM SERPL-MCNC: 8.5 MG/DL (ref 8.5–10.5)
CALCIUM SERPL-MCNC: 8.5 MG/DL (ref 8.5–10.5)
CHLORIDE SERPL-SCNC: 105 MMOL/L (ref 96–112)
CHLORIDE SERPL-SCNC: 106 MMOL/L (ref 96–112)
CHLORIDE SERPL-SCNC: 106 MMOL/L (ref 96–112)
CO2 BLDA-SCNC: 24 MMOL/L (ref 20–33)
CO2 SERPL-SCNC: 22 MMOL/L (ref 20–33)
CO2 SERPL-SCNC: 23 MMOL/L (ref 20–33)
CO2 SERPL-SCNC: 25 MMOL/L (ref 20–33)
CREAT SERPL-MCNC: <0.2 MG/DL (ref 0.5–1.4)
CRP SERPL HS-MCNC: 3.12 MG/DL (ref 0–0.75)
EOSINOPHIL # BLD AUTO: 0.07 K/UL (ref 0–0.51)
EOSINOPHIL NFR BLD: 1.3 % (ref 0–6.9)
ERYTHROCYTE [DISTWIDTH] IN BLOOD BY AUTOMATED COUNT: 51 FL (ref 35.9–50)
GFR SERPL CREATININE-BSD FRML MDRD: >60 ML/MIN/1.73 M 2
GLOBULIN SER CALC-MCNC: 3.6 G/DL (ref 1.9–3.5)
GLUCOSE SERPL-MCNC: 133 MG/DL (ref 65–99)
GLUCOSE SERPL-MCNC: 155 MG/DL (ref 65–99)
GLUCOSE SERPL-MCNC: 172 MG/DL (ref 65–99)
HCO3 BLDA-SCNC: 22.9 MMOL/L (ref 17–25)
HCT VFR BLD AUTO: 30.9 % (ref 42–52)
HGB BLD-MCNC: 10.5 G/DL (ref 14–18)
IMM GRANULOCYTES # BLD AUTO: 0.05 K/UL (ref 0–0.11)
IMM GRANULOCYTES NFR BLD AUTO: 1 % (ref 0–0.9)
LYMPHOCYTES # BLD AUTO: 0.87 K/UL (ref 1–4.8)
LYMPHOCYTES NFR BLD: 16.6 % (ref 22–41)
MCH RBC QN AUTO: 28.8 PG (ref 27–33)
MCHC RBC AUTO-ENTMCNC: 34 G/DL (ref 33.7–35.3)
MCV RBC AUTO: 84.9 FL (ref 81.4–97.8)
MONOCYTES # BLD AUTO: 0.21 K/UL (ref 0–0.85)
MONOCYTES NFR BLD AUTO: 4 % (ref 0–13.4)
NEUTROPHILS # BLD AUTO: 4.03 K/UL (ref 1.82–7.42)
NEUTROPHILS NFR BLD: 76.7 % (ref 44–72)
NRBC # BLD AUTO: 0 K/UL
NRBC BLD AUTO-RTO: 0 /100 WBC
O2/TOTAL GAS SETTING VFR VENT: 30 %
PCO2 BLDA: 34.2 MMHG (ref 26–37)
PCO2 TEMP ADJ BLDA: 35.6 MMHG (ref 26–37)
PH BLDA: 7.43 [PH] (ref 7.4–7.5)
PH TEMP ADJ BLDA: 7.42 [PH] (ref 7.4–7.5)
PLATELET # BLD AUTO: 327 K/UL (ref 164–446)
PMV BLD AUTO: 8.6 FL (ref 9–12.9)
PO2 BLDA: 136 MMHG (ref 64–87)
PO2 TEMP ADJ BLDA: 141 MMHG (ref 64–87)
POTASSIUM SERPL-SCNC: 3.9 MMOL/L (ref 3.6–5.5)
POTASSIUM SERPL-SCNC: 4.1 MMOL/L (ref 3.6–5.5)
POTASSIUM SERPL-SCNC: 4.4 MMOL/L (ref 3.6–5.5)
PREALB SERPL-MCNC: 14 MG/DL (ref 18–38)
PROT SERPL-MCNC: 6.6 G/DL (ref 6–8.2)
RBC # BLD AUTO: 3.64 M/UL (ref 4.7–6.1)
RHODAMINE-AURAMINE STN SPEC: NORMAL
SAO2 % BLDA: 99 % (ref 93–99)
SIGNIFICANT IND 70042: NORMAL
SITE SITE: NORMAL
SODIUM SERPL-SCNC: 130 MMOL/L (ref 135–145)
SODIUM SERPL-SCNC: 132 MMOL/L (ref 135–145)
SODIUM SERPL-SCNC: 134 MMOL/L (ref 135–145)
SOURCE SOURCE: NORMAL
SPECIMEN DRAWN FROM PATIENT: ABNORMAL
WBC # BLD AUTO: 5.3 K/UL (ref 4.8–10.8)

## 2017-06-26 PROCEDURE — A9270 NON-COVERED ITEM OR SERVICE: HCPCS | Performed by: PSYCHIATRY & NEUROLOGY

## 2017-06-26 PROCEDURE — 700105 HCHG RX REV CODE 258: Performed by: HOSPITALIST

## 2017-06-26 PROCEDURE — 94150 VITAL CAPACITY TEST: CPT

## 2017-06-26 PROCEDURE — 85025 COMPLETE CBC W/AUTO DIFF WBC: CPT

## 2017-06-26 PROCEDURE — 700111 HCHG RX REV CODE 636 W/ 250 OVERRIDE (IP): Performed by: HOSPITALIST

## 2017-06-26 PROCEDURE — 80053 COMPREHEN METABOLIC PANEL: CPT

## 2017-06-26 PROCEDURE — A9270 NON-COVERED ITEM OR SERVICE: HCPCS | Performed by: INTERNAL MEDICINE

## 2017-06-26 PROCEDURE — 700111 HCHG RX REV CODE 636 W/ 250 OVERRIDE (IP): Performed by: INTERNAL MEDICINE

## 2017-06-26 PROCEDURE — 94003 VENT MGMT INPAT SUBQ DAY: CPT

## 2017-06-26 PROCEDURE — 770022 HCHG ROOM/CARE - ICU (200)

## 2017-06-26 PROCEDURE — 700102 HCHG RX REV CODE 250 W/ 637 OVERRIDE(OP): Performed by: PSYCHIATRY & NEUROLOGY

## 2017-06-26 PROCEDURE — 99291 CRITICAL CARE FIRST HOUR: CPT | Performed by: INTERNAL MEDICINE

## 2017-06-26 PROCEDURE — 84134 ASSAY OF PREALBUMIN: CPT

## 2017-06-26 PROCEDURE — 700102 HCHG RX REV CODE 250 W/ 637 OVERRIDE(OP): Performed by: INTERNAL MEDICINE

## 2017-06-26 PROCEDURE — 86140 C-REACTIVE PROTEIN: CPT

## 2017-06-26 PROCEDURE — A9270 NON-COVERED ITEM OR SERVICE: HCPCS | Performed by: HOSPITALIST

## 2017-06-26 PROCEDURE — 71010 DX-CHEST-PORTABLE (1 VIEW): CPT

## 2017-06-26 PROCEDURE — 80048 BASIC METABOLIC PNL TOTAL CA: CPT

## 2017-06-26 PROCEDURE — 700102 HCHG RX REV CODE 250 W/ 637 OVERRIDE(OP): Performed by: HOSPITALIST

## 2017-06-26 PROCEDURE — 82803 BLOOD GASES ANY COMBINATION: CPT

## 2017-06-26 PROCEDURE — 36600 WITHDRAWAL OF ARTERIAL BLOOD: CPT

## 2017-06-26 PROCEDURE — 99233 SBSQ HOSP IP/OBS HIGH 50: CPT | Performed by: HOSPITALIST

## 2017-06-26 RX ADMIN — SODIUM CHLORIDE TAB 1 GM 2 G: 1 TAB at 11:54

## 2017-06-26 RX ADMIN — LEVETIRACETAM 1500 MG: 100 SOLUTION ORAL at 20:09

## 2017-06-26 RX ADMIN — LEVETIRACETAM 1500 MG: 100 SOLUTION ORAL at 10:12

## 2017-06-26 RX ADMIN — FAMOTIDINE 20 MG: 20 TABLET, FILM COATED ORAL at 20:09

## 2017-06-26 RX ADMIN — POTASSIUM CHLORIDE 40 MEQ: 1.5 POWDER, FOR SOLUTION ORAL at 08:20

## 2017-06-26 RX ADMIN — POTASSIUM CHLORIDE 40 MEQ: 1.5 POWDER, FOR SOLUTION ORAL at 20:08

## 2017-06-26 RX ADMIN — HYDROCORTISONE SODIUM SUCCINATE 100 MG: 100 INJECTION, POWDER, FOR SOLUTION INTRAMUSCULAR; INTRAVENOUS at 20:08

## 2017-06-26 RX ADMIN — CHLORHEXIDINE GLUCONATE 15 ML: 1.2 RINSE ORAL at 09:00

## 2017-06-26 RX ADMIN — BISACODYL 10 MG: 10 SUPPOSITORY RECTAL at 12:16

## 2017-06-26 RX ADMIN — PHENOBARBITAL 70 MG: 20 ELIXIR ORAL at 08:30

## 2017-06-26 RX ADMIN — SODIUM CHLORIDE TAB 1 GM 2 G: 1 TAB at 08:20

## 2017-06-26 RX ADMIN — HYDROCORTISONE SODIUM SUCCINATE 100 MG: 100 INJECTION, POWDER, FOR SOLUTION INTRAMUSCULAR; INTRAVENOUS at 04:06

## 2017-06-26 RX ADMIN — ENOXAPARIN SODIUM 30 MG: 100 INJECTION SUBCUTANEOUS at 20:09

## 2017-06-26 RX ADMIN — SENNOSIDES AND DOCUSATE SODIUM 2 TABLET: 8.6; 5 TABLET ORAL at 08:20

## 2017-06-26 RX ADMIN — SODIUM CHLORIDE TAB 1 GM 2 G: 1 TAB at 16:52

## 2017-06-26 RX ADMIN — HYDROCORTISONE SODIUM SUCCINATE 100 MG: 100 INJECTION, POWDER, FOR SOLUTION INTRAMUSCULAR; INTRAVENOUS at 10:15

## 2017-06-26 RX ADMIN — FAMOTIDINE 20 MG: 20 TABLET, FILM COATED ORAL at 08:20

## 2017-06-26 RX ADMIN — CHLORHEXIDINE GLUCONATE 15 ML: 1.2 RINSE ORAL at 20:09

## 2017-06-26 RX ADMIN — CEFTRIAXONE SODIUM 2 G: 2 INJECTION, POWDER, FOR SOLUTION INTRAMUSCULAR; INTRAVENOUS at 10:12

## 2017-06-26 RX ADMIN — MIDODRINE HYDROCHLORIDE 5 MG: 5 TABLET ORAL at 11:54

## 2017-06-26 RX ADMIN — MIDODRINE HYDROCHLORIDE 5 MG: 5 TABLET ORAL at 08:21

## 2017-06-26 RX ADMIN — POTASSIUM CHLORIDE 40 MEQ: 1.5 POWDER, FOR SOLUTION ORAL at 15:34

## 2017-06-26 RX ADMIN — PHENOBARBITAL 70 MG: 20 ELIXIR ORAL at 20:09

## 2017-06-26 RX ADMIN — SENNOSIDES AND DOCUSATE SODIUM 2 TABLET: 8.6; 5 TABLET ORAL at 20:09

## 2017-06-26 ASSESSMENT — PULMONARY FUNCTION TESTS
FVC: 398
FVC: 780
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
## 2017-06-26 NOTE — PROGRESS NOTES
Pulmonary Critical Care Progress Note      Date of Service: 6/26/17    Chief Complaint: Shortness of Breath, difficulty breathing.    History of Present Illness: All information taken from the patient's family at bedside as well as physician sign out as the patient is noncommunicative.  This is a 36-year-old gentleman with history of traumatic brain injury approximately 18 years ago after a [place].  He had been in a nearly persistent vegetative state since, has not been verbal, does not mobilize, previously was trached for approximately 1 year, but has been trached free for approximately 18 years.  He continues to have a deep stoma; however, it has apparently closed off.  He has a PEG tube for feeding; however, he used to also take miniscule amounts of food orally as well up until most recently.  Patient was admitted on 06/17/2017 with the concern of aspiration pneumonia as well as increased tremor type activities with upper extremity involvement.  The patient was initially placed on telemetry floor, treated for aspiration pneumonitis with antibiotic therapy as well as supportive therapy with IV fluids and tube feeds.  Over the past 24 hours or so, there had been noted ongoing jerking type motions of the upper extremities and therefore, patient had EEG.  The EEG did show several periods of seizures lasting several seconds.  Based on this, neurology recommended that the patient be transferred to intensive care unit for concern of underlying status epilepticus, intubated and placed on propofol therapy, while antiepileptic medications are being titrated and adjusted.  Patient has no history of known seizure activity.  His only medication is a baclofen pump for a significant spasticity and this is apparently managed by [facility].  At the present time, no further history is currently available or able to be obtained.    ROS:    Respiratory: unable to perform due to the patient's inability to  effectively communicate,   Cardiac: unable to perform due to the patient's inability to effectively communicate,   GI: unable to perform due to the patient's inability to effectively communicate.    All other systems negative.    Interval Events:  24 hour interval history reviewed   Does not follow  No witnessed seizure activity over last couple of days  SR, BP okay but has been fluctuating   Bronch yesterday  1100cc UOP overnight  TKO fluids  Thoracentesis yesterday, 300cc output  Keppra and Phenobarbital     PFSH:  No change.    Respiratory:  Liriano Vent Mode: APVCMV, Rate (breaths/min): 14, Vt Target (mL): 400, PEEP/CPAP: 8, FiO2: 30, Static Compliance (ml / cm H2O): 47, Control VTE (exp VT): 379  Pulse Oximetry: 100 %     Vent day 8  No SBTs secondary to not following          Exam: Few rhonchi diffusely, no wheeze, breathing comfortably on vent, PIP low 20's  ImagingCXR  I have personally reviewed the chest x-ray my impression is  bilateral lower lobe infiltrates with perihilar component. ET tube and RIJ in good position.   Recent Labs      06/24/17   0606  06/25/17   0426  06/25/17   0507  06/26/17   0532   MMSZX17C  7.52*  7.45   --    --    VODQYV971O  29.3  36.0   --    --    WYSPR371C  67.9  42.1*   --    --    BCHU7BEX  95.0  78.8*   --    --    ARTHCO3  24  24   --    --    ARTBE  2  1   --    --    ISTATAPH   --    --   7.446  7.433   ISTATAPCO2   --    --   32.0  34.2   ISTATAPO2   --    --   111*  136*   ISTATATCO2   --    --   23  24   LUIPLOS1OSQ   --    --   99  99   ISTATARTHCO3   --    --   22.0  22.9   ISTATARTBE   --    --   -1  -1   ISTATTEMP   --    --   98.2 F  37.9 C   ISTATFIO2   --    --   30  30   ISTATSPEC   --    --   Arterial  Arterial   ISTATAPHTC   --    --   7.449  7.420   TQOQKSBO8QH   --    --   109*  141*   Blood gas shows: Normal pH with adequate oxygenation.     HemoDynamics:  Pulse: 73, Heart Rate (Monitored): 81  NIBP: 132/71 mmHg  CVP (mm Hg): (!) 9 MM HG    Exam: RRR, no  murmur, improved blood pressure  Imaging: Available data reviewed        Neuro:  GCS Total Aleida Coma Score: 10       Exam: Contractures throughout, opens eyes, intermittently tracking visually but still drowsy per mom from baseline  Imaging: Available data reviewed    Fluids:  Intake/Output       06/24/17 0700 - 06/25/17 0659 06/25/17 0700 - 06/26/17 0659 06/26/17 0700 - 06/27/17 0659      0700-1859 1900-0659 Total 0700-1859 1900-0659 Total 1240-6104 2073-7284 Total       Intake    I.V.  120  220 340  100  120 220  --  -- --    IV Volume (TKO) 120 120 240 100 120 220 -- -- --    IV Piggyback Volume (IV Piggyback) -- 100 100 -- -- -- -- -- --    Other  90  120 210  90  120 210  --  -- --    Medications (P.O./ Enteral Liquids) 90 120 210 90 120 210 -- -- --    Enteral  690  690 1380  690  690 1380  --  -- --    Enteral Volume   -- -- --    Free Water / Tube Flush 90 90 180 90 90 180 -- -- --    Total Intake 900 1030 1930  -- -- --       Output    Urine  1750  1325 3075  1425  1200 2625  --  -- --    Indwelling Cathether 1750 1325 3075 1425 1200 2625 -- -- --    Total Output 1750 1325 3075 1425 1200 2625 -- -- --       Net I/O     -850 -295 -1145 -545 -270 -815 -- -- --        Weight: 55.7 kg (122 lb 12.7 oz)  Recent Labs      06/25/17   1730  06/25/17   2350  06/26/17   0530   SODIUM  129*  130*  134*   POTASSIUM  4.8  4.4  3.9   CHLORIDE  103  106  106   CO2  22  22  23   BUN  17  18  20   CREATININE  <0.20*  <0.20*  <0.20*   CALCIUM  8.2*  8.3*  8.5       GI/Nutrition:  Exam: Soft, NT, normal bowel sounds, G tube noted without signs of infection   Imaging: Available data reviewed  NPO and tube feed Tolerated   Last BM 6/23  Tube feeds at 50  Liver Function  Recent Labs      06/25/17   1730  06/25/17   2350  06/26/17   0530   ALTSGPT   --    --   21   ASTSGOT   --    --   16   ALKPHOSPHAT   --    --   80   TBILIRUBIN   --    --   0.3   PREALBUMIN   --    --   14.0*   GLUCOSE   111*  133*  155*       Heme:  Recent Labs      17   0524  17   1200  17   0005  17   0530   RBC  4.01*   --   3.82*  3.64*   HEMOGLOBIN  11.4*   --   11.0*  10.5*   HEMATOCRIT  33.0*   --   31.8*  30.9*   PLATELETCT  238   --   279  327   PROTHROMBTM   --   14.3   --    --    APTT   --   36.7*   --    --    INR   --   1.08   --    --        Infectious Disease:  Temp  Av.1 °C (98.7 °F)  Min: 36.2 °C (97.1 °F)  Max: 37.7 °C (99.9 °F)   Rocephin for Klebsiella, normal WBC    Micro: reviewed  Recent Labs      17   0524  17   0005  17   1440  17   0530   WBC  7.5  7.7   --   5.3   NEUTSPOLYS  65.70  62.00   --   76.70*   LYMPHOCYTES  18.70*  21.90*   --   16.60*   MONOCYTES  9.40  10.70   --   4.00   EOSINOPHILS  5.40  4.10  3  1.30   BASOPHILS  0.30  0.40   --   0.40   ASTSGOT   --    --    --   16   ALTSGPT   --    --    --   21   ALKPHOSPHAT   --    --    --   80   TBILIRUBIN   --    --    --   0.3     Current Facility-Administered Medications   Medication Dose Frequency Provider Last Rate Last Dose   • potassium chloride (KLOR-CON) 20 MEQ packet 40 mEq  40 mEq TID Kev Araiza M.D.   40 mEq at 17   • levetiracetam (KEPPRA) 100 MG/ML solution 1,500 mg  1,500 mg Q12HRS Martell Hunter M.D.   1,500 mg at 17   • norepinephrine (LEVOPHED) 8 mg in  mL Infusion  0.5-30 mcg/min Continuous Aidan Encarnacion M.D.   Stopped at 17 1715   • midodrine (PROAMATINE) tablet 5 mg  5 mg TID WITH MEALS Aidan Encarnacion M.D.   5 mg at 17   • hydrocortisone sodium succinate PF (SOLU-CORTEF) 100 MG injection 100 mg  100 mg Q8HRS Aidan Encarnacion M.D.   100 mg at 17 0406   • sodium chloride (SALT) tablet 2 g  2 g TID WITH MEALS Kev Araiza M.D.   2 g at 17   • cefTRIAXone (ROCEPHIN) 2 g in  mL IVPB  2 g Q24HRS Kev Araiza M.D.   Stopped at 17 0904   • fentaNYL (SUBLIMAZE) injection 25 mcg  25 mcg Q4HRS PRN  Kev Araiza M.D.   25 mcg at 06/24/17 1406   • oxycodone immediate-release (ROXICODONE) tablet 5 mg  5 mg Q4HRS PRN Kev Araiza M.D.   5 mg at 06/24/17 1825   • PHENobarbital solution 70 mg  70 mg BID Martell Hunter M.D.   70 mg at 06/25/17 2211   • enoxaparin (LOVENOX) inj 30 mg  30 mg DAILY Clarence Morfin D.O.   30 mg at 06/25/17 2027   • Respiratory Care per Protocol   Continuous RT Kev Araiza M.D.       • senna-docusate (PERICOLACE or SENOKOT S) 8.6-50 MG per tablet 2 Tab  2 Tab BID Kev Araiza M.D.   2 Tab at 06/25/17 2028    And   • polyethylene glycol/lytes (MIRALAX) PACKET 1 Packet  1 Packet QDAY PRN Kev Araiza M.D.   1 Packet at 06/25/17 2028    And   • magnesium hydroxide (MILK OF MAGNESIA) suspension 30 mL  30 mL QDAY PRN Kev Araiza M.D.   30 mL at 06/25/17 1548    And   • bisacodyl (DULCOLAX) suppository 10 mg  10 mg QDAY PRN Kev Araiza M.D.   10 mg at 06/23/17 0337   • chlorhexidine (PERIDEX) 0.12 % solution 15 mL  15 mL BID Kev Araiza M.D.   15 mL at 06/25/17 2027   • lidocaine (XYLOCAINE) 1%  injection  1-2 mL Q30 MIN PRN Kev Araiza M.D.       • MD ALERT...Adult ICU Electrolyte Replacement per Pharmacy Protocol   pharmacy to dose Kev Araiza M.D.       • famotidine (PEPCID) tablet 20 mg  20 mg Q12HRS Kev Araiza M.D.   20 mg at 06/25/17 2028   • ipratropium-albuterol (DUONEB) nebulizer solution 3 mL  3 mL Q2HRS PRN (RT) Kev Araiza M.D.       • glucose 4 g chewable tablet 16 g  16 g Q15 MIN PRN Kev Araiza M.D.        And   • dextrose 50% (D50W) injection 25 mL  25 mL Q15 MIN PRN Kev Araiza M.D.   25 mL at 06/20/17 1840   • ondansetron (ZOFRAN) syringe/vial injection 4 mg  4 mg Q4HRS PRN FARHAT MedinaO.   4 mg at 06/23/17 2238   • ondansetron (ZOFRAN ODT) dispertab 4 mg  4 mg Q4HRS PRN FARHAT MedinaO.       • promethazine (PHENERGAN) tablet 12.5-25 mg  12.5-25 mg Q4HRS PRN Clarence Morfin D.O.       •  promethazine (PHENERGAN) suppository 12.5-25 mg  12.5-25 mg Q4HRS PRN FARHAT MedinaO.       • prochlorperazine (COMPAZINE) injection 5-10 mg  5-10 mg Q4HRS PRN JASPAL Medina.O.       • lorazepam (ATIVAN) tablet 0.5 mg  0.5 mg Q6HRS PRN JASPAL Medina.O.   Stopped at 06/19/17 0248    Or   • lorazepam (ATIVAN) injection 0.5 mg  0.5 mg Q6HRS PRN JASPAL Medina.O.   0.5 mg at 06/22/17 0044   • acetaminophen (TYLENOL) tablet 650 mg  650 mg Q6HRS PRN JASPAL Medina.O.   650 mg at 06/17/17 1857   • Pain Pump (patient supplied) Device   Continuous JASPAL Medina.O.   Stopped at 06/17/17 1230     Last reviewed on 6/17/2017  9:45 AM by Ligia Frazier, Pharmacy Int    Quality  Measures:  Labs reviewed, Medications reviewed and Radiology images reviewed  Waldrop catheter: Critically Ill - Requiring Accurate Measurement of Urinary Output and Unconscious / Sedated Patient on a Ventilator  Central line in place: Need for access    DVT Prophylaxis: Enoxaparin (Lovenox)  DVT prophylaxis - mechanical: SCDs  Ulcer prophylaxis: Yes  Antibiotics: Treating active infection/contamination beyond 24 hours perioperative coverage  Assessed for rehab: Patient unable to tolerate rehabilitation therapeutic regimen      Assessment/Plan:  Acute hypoxic respiratory failure - intubated 6/19              - rt/02 protcol              - full vent support with BID SBTs, hopeful extubation in 1-2 days              - limit sedatives   - force diuresis  New onset seizure activity with concern of status epilepticus              - keppra              - phenobarb              - neuro following, ?reduction in dosage in case contributing to drowsiness  Aspiration pneumonia. Klebsiella pna              - completes abx today  Moderate Left sided Pleural Effusion s/p - thoracentesis 6/25 (transudative)   - f/u cultures/cytology  Hyponatremia              - cont salt tabs              - d/c 3%  HypoK and Hyop Mag - replace  Severe  chronic underlying debility secondary to profound traumatic brain injury 18 years ago  Prophylaxis, enteral nutrition via PEG    Discussed patient condition and risk of morbidity and/or mortality with Family, RN, RT, Pharmacy, Charge nurse / hot rounds, Patient and hospitalist.    The patient remains critically ill.  Critical care time = 34 minutes in directly providing and coordinating critical care and extensive data review.  No time overlap and excludes procedures.       Kalani WOODY (Conchita), am scribing for, and in the presence of, Jeremy Gonda, M.D..    Electronically signed by: Kalani Roger (Conchita), 6/26/2017    I, Jeremy Gonda, M.D. personally performed the services described in this documentation, as scribed by Kalani Roger in my presence, and it is both accurate and complete.

## 2017-06-26 NOTE — CARE PLAN
Problem: Infection  Goal: Will remain free from infection  Outcome: PROGRESSING AS EXPECTED  All standard infection prevention in place.    Problem: Respiratory:  Goal: Respiratory status will improve  Outcome: PROGRESSING AS EXPECTED  Will collaborate with RT to coordinate suctioning and pulmonary treatment.

## 2017-06-26 NOTE — CARE PLAN
Problem: Ventilation Defect:  Goal: Ability to achieve and maintain unassisted ventilation or tolerate decreased levels of ventilator support  Intervention: Monitor ventilator weaning response  Adult Ventilation Update    Total Vent Days: 8    APVCMV    14  400  +8  30%        Patient Lines/Drains/Airways Status    Active Airway      Name: Placement date: Placement time: Site: Days:     Airway Group ET Tube 8.0 06/19/17  1755    6                   #FVC / Vital Capacity (liters) : 780 (06/26/17 1630)  NIF (cm H2O) : -6 (unable to follow) (06/26/17 1630)  Rapid Shallow Breathing Index (RR/VT): 15 (06/26/17 1630)  Plateau Pressure (Q Shift): 16 (06/26/17 0649)  Static Compliance (ml / cm H2O): 84.9 (06/26/17 1446)    Barriers to SBT Weaning Trial Stopped due to:: Pt weaned for 1 hour and returned to rest settings per protocol (06/26/17 1630)  Length of Weaning Trial Length of Weaning Trial (Hours): 1.5 (06/26/17 1630)    Cough: Productive (06/26/17 1446)  Sputum Amount: Scant (06/26/17 1600)  Sputum Color: White (06/26/17 1600)  Sputum Consistency: Thin (06/26/17 1600)    Mobility Group  Activity Performed: Back to bed (06/26/17 1200)  Time Activity Tolerated: 300 min (in cardiac chair for 5 hours ) (06/25/17 1300)  Distance Per Occurrence (ft.): 0 feet (06/25/17 1300)  # of Times Distance was Traveled: 1 (06/25/17 1300)  Assistance / Tolerance: Assistance of Two or More;Tolerates Well (06/26/17 1200)  Pt Calls for Assistance: No (06/26/17 1200)  Staff Present for Mobilization: RN (06/26/17 1200)  Assistive Devices: Hand held assist;Rails (06/24/17 1037)  Reason Not Mobilized: Bed rest (06/22/17 0800)  Mobilization Comments: 24 hour continuous EEG (06/21/17 0800)    Events/Summary/Plan: Pt did 2.5 hours SBT on 5/5 settings.

## 2017-06-26 NOTE — DOCUMENTATION QUERY
DOCUMENTATION QUERY    PROVIDERS: Please select “Cosign w/ note”to reply to query.    Dr. Encarnacion,    To better represent the severity of illness of your patient, please review the following information and exercise your independent professional judgment in responding to this query.     Low BP and levophed started are documented in the Progress Notes. Based upon the clinical findings, risk factors, and treatment, can a diagnosis be provided to support this finding?    • Septic shock  • Cardiogenic shock  • Hypovolemic shock  • Other type of shock  • Hypotension without shock  • Unable to determine  • Other explanation of clinical findings, please explain          The medical record reflects the following:   Clinical Findings Documented low BP and levophed started.   Treatment levophed   Risk Factors Sepsis, aspiration pna, acute respiratory failure w/hypoxia, acute encephalopathy, & hx of TBI w/quadriplegia.   Location within medical record Progress Notes     Thank you,   Meg Lion RN  Clinical   Phone # [phone]

## 2017-06-26 NOTE — PROGRESS NOTES
Dr. Encarnacion notified of hypotension. Orders to monitor CVP. Dr. Encarnacion notified of CVP 6. Levophed ordered to keep MAP >55, midodrine 5mg, and cortisol ordered. Levophed not required at this time. Cortisol drawn and sent. Midodrine administered.

## 2017-06-26 NOTE — FLOWSHEET NOTE
06/26/17 0945   Events/Summary/Plan   Events/Summary/Plan sbt end, not following for parameters, returned to vent settings   Weaning Parameters   RR (bpm) 6   #FVC / Vital Capacity (liters)  398  (unable to follow )   NIF (cm H2O)  (unable to follow)   Rapid Shallow Breathing Index (RR/VT) 10   Spontaneous VE 5.8   Spontaneous    Weaning Trial   Weaning Trial Stopped due to: Pt weaned for 1 hour and returned to rest settings per protocol   Length of Weaning Trial (Hours) 1   Vent Weaning Smart Text completed? Yes

## 2017-06-26 NOTE — DOCUMENTATION QUERY
DOCUMENTATION QUERY    PROVIDERS: Please select “Cosign w/ note”to reply to query.    Dr. Encarnacion,    To better represent the severity of illness of your patient, please review the following information and exercise your independent professional judgment in responding to this query.     BMI of 17.7 is noted in the Dietary Note on 6/20/17. Based upon the clinical findings, risk factors, and treatment, can a diagnosis be provided to support this finding?    • Underweight  • Cachexia  • Unable to determine  • Findings of no clinical significance  • Other explanation of clinical findings, please explain        The medical record reflects the following:   Clinical Findings Noted BMI of 17.7 in the Dietary Note on 6/20/17.   Treatment Dietary consult, tube feeds   Risk Factors Pt w/hx of TBI w/quadriplegia & PEG tube, sepsis, aspiration pna, acute respiratory failure w/hypoxia, currently intubated, acute encephalopathy, hyponatremia, hypokalemia & hypomagnesemia   Location within medical record Dietary Note on 6/20/17.     Thank you,   Meg Lion RN  Clinical   Phone # [phone]

## 2017-06-26 NOTE — CARE PLAN
Problem: Ventilation Defect:  Goal: Ability to achieve and maintain unassisted ventilation or tolerate decreased levels of ventilator support  Outcome: PROGRESSING AS EXPECTED  Intervention: Support and monitor invasive and noninvasive mechanical ventilation  Adult Ventilation Update    Total Vent Days: 8      Patient Lines/Drains/Airways Status    Active Airway      Name: Placement date: Placement time: Site: Days:     Airway Group ET Tube 8.0 06/19/17  1755    6                 No SBTs currently    Plateau Pressure (Q Shift): 15 (06/26/17 0156)  Static Compliance (ml / cm H2O): 62 (06/26/17 0156)      Intervention: Perform ventilator associated pneumonia prevention interventions  See VAP Flowsheet

## 2017-06-26 NOTE — CARE PLAN
Problem: Skin Integrity  Goal: Risk for impaired skin integrity will decrease  Turn q 2 hr while in bed, check devices q shift, low air loss mattress, mepilex.     Problem: Mobility  Goal: Risk for activity intolerance will decrease  ROM, up to cardiac chair as tolerated.

## 2017-06-26 NOTE — PROGRESS NOTES
[facility] Hospitalist Progress Note    Date of Service: 2017    Chief Complaint  36 y.o. male admitted 2017 with altered mental status.    Interval Problem Update  Mr. Edwards has a hx of TBI at age 17. He was admitted with aspiration pneumonitis. EEG revealed status epilepticus thus he was intubated on  placed on IV keppra, phenobarbital.    S/p 48 hour EEG  He is afebrile. 1,100 ml urine over night. He is tolerating G tube feeding.   I met with Ruben's parents today and we discussed his condition and consideration of LTACH. No seizures over night per nurse.  Thoracentesis done .  Consultants/Specialty  Neurology  Pulmonology  I discussed with Dr. Gonda on Hot Rounds    Disposition  ICU  LTACH referral placed.       Review of Systems   Unable to perform ROS: intubated      Physical Exam  Laboratory/Imaging   Hemodynamics  Temp (24hrs), Av.1 °C (98.7 °F), Min:36.2 °C (97.1 °F), Max:37.7 °C (99.9 °F)   Temperature: 36.3 °C (97.4 °F)  Pulse  Av.9  Min: 60  Max: 131 Heart Rate (Monitored): 81  NIBP: 132/71 mmHg CVP (mm Hg): (!) 9 MM HG    Respiratory  Liriano Vent Mode: APVCMV, Rate (breaths/min): 14, PEEP/CPAP: 8, PEEP/CPAP: 8, FiO2: 30, P Peak (PIP): 18, P MEAN: 10   Respiration: 19, Pulse Oximetry: 100 %     Work Of Breathing / Effort: Vented  RUL Breath Sounds: Clear, RML Breath Sounds: Diminished, RLL Breath Sounds: Diminished, JONATAN Breath Sounds: Clear, LLL Breath Sounds: Diminished    Fluids    Intake/Output Summary (Last 24 hours) at 17 0749  Last data filed at 17 0600   Gross per 24 hour   Intake   1810 ml   Output   2625 ml   Net   -815 ml       Nutrition  Orders Placed This Encounter   Procedures   • Diet NPO     Standing Status: Standing      Number of Occurrences: 1      Standing Expiration Date:      Order Specific Question:  Type:     Answer:  Now [1]     Order Specific Question:  Restrict to:     Answer:  With Tube Feed [4]      Comments:  yogurt and milk ok for  delivery. used in bolus formula. not for PO intake     Physical Exam   Constitutional: No distress.   Thin    HENT:   Endotracheal tube.    Eyes: Right eye exhibits no discharge. Left eye exhibits no discharge.   Eyes are open but he does not track   Neck:   Right IJ central line without bleeding.   Well-healed tracheostomy site with indentation.    Cardiovascular: Normal rate and regular rhythm.    No murmur heard.  Pulmonary/Chest:   Vent, moderate air movement, few crackles.    Abdominal: Bowel sounds are normal. He exhibits no distension. There is no tenderness.   PEG. Baclofen pump palpable on abdomen.    Genitourinary:   Waldrop cath   Musculoskeletal: He exhibits edema. He exhibits no tenderness.   Poor muscle tone, severe contractures of the arms and legs with significant deformity.    Neurological:   He does not follow and does not withdraw to pain. He opens his eyes spontaneously.   Skin: Skin is warm and dry. No rash noted. He is not diaphoretic. There is pallor.       Recent Labs      06/24/17   0524  06/25/17   0005  06/26/17   0530   WBC  7.5  7.7  5.3   RBC  4.01*  3.82*  3.64*   HEMOGLOBIN  11.4*  11.0*  10.5*   HEMATOCRIT  33.0*  31.8*  30.9*   MCV  82.3  83.2  84.9   MCH  28.4  28.8  28.8   MCHC  34.5  34.6  34.0   RDW  47.8  48.5  51.0*   PLATELETCT  238  279  327   MPV  9.0  9.1  8.6*     Recent Labs      06/25/17   1730  06/25/17   2350  06/26/17   0530   SODIUM  129*  130*  134*   POTASSIUM  4.8  4.4  3.9   CHLORIDE  103  106  106   CO2  22  22  23   GLUCOSE  111*  133*  155*   BUN  17  18  20   CREATININE  <0.20*  <0.20*  <0.20*   CALCIUM  8.2*  8.3*  8.5     Recent Labs      06/24/17   1200   APTT  36.7*   INR  1.08                  Assessment/Plan     Status epilepticus (CMS-HCC) (present on admission)  Assessment & Plan  As evidenced on EEG. Continuous EEG showed seizure activity over 48 hours.  Neurology consulted and following.  IV keppra loaded.   Phenobarbital added.    Aspiration  pneumonia (CMS-HCC) (present on admission)  Assessment & Plan  - pt unable to get his secretions out  - culture growing Klebsiella resistant to ampicillin thus rocephin to cover aspiration for 10 days.  Bronchoscopy 6/25.  Thoracentesis with 300 ml off on 6/25.    -CT chest to eval further done 6/23  CT:    1.  Bilateral airspace opacities, much more severe on the left, consistent with multifocal pneumonia. Moderate left and trace amounts of right pleural fluid are also present       Sepsis (CMS-HCC)  Assessment & Plan  - secondary to aspiration pneumonia  -on 6/25 he became quite hypotensive.  Midodrine was started and will be stopped now that BP has improved. Cortisol level low at 5.7 thus IV hydrocortisone ordered.      Acute respiratory failure with hypoxia (CMS-HCC) (present on admission)  Assessment & Plan  Intubated on 6/19.  Pulmonary consult.  He may require another tracheostomy.  LTACH referral placed.     Hyponatremia (present on admission)  Assessment & Plan  - persistent despite IV fluids  - repeat bmp in am  -on 3% Na drip stopped     Hypoglycemia (present on admission)  Assessment & Plan  - resolved    Encephalopathy acute (present on admission)  Assessment & Plan  Secondary to status epilepticus. His mentation is not improving. Consider post-ictal as well as secondary to meds. Refrain from sedation.    TBI (traumatic brain injury) (CMS-HCC) (present on admission)  Assessment & Plan  Since age 17 years.  With quadriplegia   Baclofen pump followed at [facility]--his mother states it does not need refilled for a few months.    Hypokalemia (present on admission)  Assessment & Plan  IV replacement.      Labs reviewed, Medications reviewed and Radiology images reviewed  Waldrop catheter: Unconscious / Sedated Patient on a Ventilator      DVT Prophylaxis: Enoxaparin (Lovenox)

## 2017-06-27 ENCOUNTER — APPOINTMENT (OUTPATIENT)
Dept: RADIOLOGY | Facility: MEDICAL CENTER | Age: 37
DRG: 004 | End: 2017-06-27
Attending: INTERNAL MEDICINE
Payer: COMMERCIAL

## 2017-06-27 LAB
ANION GAP SERPL CALC-SCNC: 5 MMOL/L (ref 0–11.9)
BACTERIA SPEC RESP CULT: NORMAL
BASE EXCESS BLDA CALC-SCNC: 0 MMOL/L (ref -4–3)
BASOPHILS # BLD AUTO: 0.3 % (ref 0–1.8)
BASOPHILS # BLD: 0.02 K/UL (ref 0–0.12)
BODY TEMPERATURE: ABNORMAL CENTIGRADE
BUN SERPL-MCNC: 22 MG/DL (ref 8–22)
CALCIUM SERPL-MCNC: 8.2 MG/DL (ref 8.5–10.5)
CHLORIDE SERPL-SCNC: 109 MMOL/L (ref 96–112)
CO2 SERPL-SCNC: 24 MMOL/L (ref 20–33)
CREAT SERPL-MCNC: <0.2 MG/DL (ref 0.5–1.4)
EOSINOPHIL # BLD AUTO: 0.02 K/UL (ref 0–0.51)
EOSINOPHIL NFR BLD: 0.3 % (ref 0–6.9)
ERYTHROCYTE [DISTWIDTH] IN BLOOD BY AUTOMATED COUNT: 54.4 FL (ref 35.9–50)
GFR SERPL CREATININE-BSD FRML MDRD: >60 ML/MIN/1.73 M 2
GLUCOSE SERPL-MCNC: 132 MG/DL (ref 65–99)
GRAM STN SPEC: NORMAL
HCO3 BLDA-SCNC: 24 MMOL/L (ref 17–25)
HCT VFR BLD AUTO: 29.4 % (ref 42–52)
HGB BLD-MCNC: 9.8 G/DL (ref 14–18)
IMM GRANULOCYTES # BLD AUTO: 0.05 K/UL (ref 0–0.11)
IMM GRANULOCYTES NFR BLD AUTO: 0.8 % (ref 0–0.9)
LYMPHOCYTES # BLD AUTO: 1.14 K/UL (ref 1–4.8)
LYMPHOCYTES NFR BLD: 18.4 % (ref 22–41)
MCH RBC QN AUTO: 29.3 PG (ref 27–33)
MCHC RBC AUTO-ENTMCNC: 33.3 G/DL (ref 33.7–35.3)
MCV RBC AUTO: 87.8 FL (ref 81.4–97.8)
MONOCYTES # BLD AUTO: 0.39 K/UL (ref 0–0.85)
MONOCYTES NFR BLD AUTO: 6.3 % (ref 0–13.4)
NEUTROPHILS # BLD AUTO: 4.56 K/UL (ref 1.82–7.42)
NEUTROPHILS NFR BLD: 73.9 % (ref 44–72)
NRBC # BLD AUTO: 0 K/UL
NRBC BLD AUTO-RTO: 0 /100 WBC
PCO2 BLDA: 36.4 MMHG (ref 26–37)
PH BLDA: 7.43 [PH] (ref 7.4–7.5)
PLATELET # BLD AUTO: 342 K/UL (ref 164–446)
PMV BLD AUTO: 8.7 FL (ref 9–12.9)
PO2 BLDA: 144.8 MMHG (ref 64–87)
POTASSIUM SERPL-SCNC: 4.1 MMOL/L (ref 3.6–5.5)
RBC # BLD AUTO: 3.35 M/UL (ref 4.7–6.1)
SAO2 % BLDA: 98.2 % (ref 93–99)
SIGNIFICANT IND 70042: NORMAL
SITE SITE: NORMAL
SODIUM SERPL-SCNC: 138 MMOL/L (ref 135–145)
SOURCE SOURCE: NORMAL
WBC # BLD AUTO: 6.2 K/UL (ref 4.8–10.8)

## 2017-06-27 PROCEDURE — 700102 HCHG RX REV CODE 250 W/ 637 OVERRIDE(OP): Performed by: PSYCHIATRY & NEUROLOGY

## 2017-06-27 PROCEDURE — 700102 HCHG RX REV CODE 250 W/ 637 OVERRIDE(OP): Performed by: INTERNAL MEDICINE

## 2017-06-27 PROCEDURE — 71010 DX-CHEST-PORTABLE (1 VIEW): CPT

## 2017-06-27 PROCEDURE — 99233 SBSQ HOSP IP/OBS HIGH 50: CPT | Performed by: HOSPITALIST

## 2017-06-27 PROCEDURE — 51798 US URINE CAPACITY MEASURE: CPT

## 2017-06-27 PROCEDURE — 82803 BLOOD GASES ANY COMBINATION: CPT

## 2017-06-27 PROCEDURE — 94003 VENT MGMT INPAT SUBQ DAY: CPT

## 2017-06-27 PROCEDURE — 94667 MNPJ CHEST WALL 1ST: CPT

## 2017-06-27 PROCEDURE — 94640 AIRWAY INHALATION TREATMENT: CPT

## 2017-06-27 PROCEDURE — A9270 NON-COVERED ITEM OR SERVICE: HCPCS | Performed by: PSYCHIATRY & NEUROLOGY

## 2017-06-27 PROCEDURE — 770022 HCHG ROOM/CARE - ICU (200)

## 2017-06-27 PROCEDURE — A9270 NON-COVERED ITEM OR SERVICE: HCPCS | Performed by: INTERNAL MEDICINE

## 2017-06-27 PROCEDURE — 700111 HCHG RX REV CODE 636 W/ 250 OVERRIDE (IP): Performed by: INTERNAL MEDICINE

## 2017-06-27 PROCEDURE — 99291 CRITICAL CARE FIRST HOUR: CPT | Performed by: INTERNAL MEDICINE

## 2017-06-27 PROCEDURE — 85025 COMPLETE CBC W/AUTO DIFF WBC: CPT

## 2017-06-27 PROCEDURE — 80048 BASIC METABOLIC PNL TOTAL CA: CPT

## 2017-06-27 PROCEDURE — 31720 CLEARANCE OF AIRWAYS: CPT

## 2017-06-27 PROCEDURE — 94150 VITAL CAPACITY TEST: CPT

## 2017-06-27 RX ADMIN — SENNOSIDES AND DOCUSATE SODIUM 2 TABLET: 8.6; 5 TABLET ORAL at 08:24

## 2017-06-27 RX ADMIN — CHLORHEXIDINE GLUCONATE 15 ML: 1.2 RINSE ORAL at 08:24

## 2017-06-27 RX ADMIN — SODIUM CHLORIDE TAB 1 GM 2 G: 1 TAB at 08:24

## 2017-06-27 RX ADMIN — POTASSIUM CHLORIDE 40 MEQ: 1.5 POWDER, FOR SOLUTION ORAL at 20:51

## 2017-06-27 RX ADMIN — HYDROCORTISONE SODIUM SUCCINATE 100 MG: 100 INJECTION, POWDER, FOR SOLUTION INTRAMUSCULAR; INTRAVENOUS at 02:07

## 2017-06-27 RX ADMIN — SENNOSIDES AND DOCUSATE SODIUM 2 TABLET: 8.6; 5 TABLET ORAL at 20:51

## 2017-06-27 RX ADMIN — SODIUM CHLORIDE TAB 1 GM 2 G: 1 TAB at 18:16

## 2017-06-27 RX ADMIN — POTASSIUM CHLORIDE 40 MEQ: 1.5 POWDER, FOR SOLUTION ORAL at 18:16

## 2017-06-27 RX ADMIN — LEVETIRACETAM 1500 MG: 100 SOLUTION ORAL at 08:25

## 2017-06-27 RX ADMIN — PHENOBARBITAL 70 MG: 20 ELIXIR ORAL at 21:26

## 2017-06-27 RX ADMIN — ENOXAPARIN SODIUM 30 MG: 100 INJECTION SUBCUTANEOUS at 20:51

## 2017-06-27 RX ADMIN — SODIUM CHLORIDE TAB 1 GM 2 G: 1 TAB at 11:58

## 2017-06-27 RX ADMIN — POTASSIUM CHLORIDE 40 MEQ: 1.5 POWDER, FOR SOLUTION ORAL at 08:25

## 2017-06-27 RX ADMIN — CHLORHEXIDINE GLUCONATE 15 ML: 1.2 RINSE ORAL at 20:51

## 2017-06-27 RX ADMIN — HYDROCORTISONE SODIUM SUCCINATE 100 MG: 100 INJECTION, POWDER, FOR SOLUTION INTRAMUSCULAR; INTRAVENOUS at 18:16

## 2017-06-27 RX ADMIN — LEVETIRACETAM 1500 MG: 100 SOLUTION ORAL at 20:50

## 2017-06-27 RX ADMIN — HYDROCORTISONE SODIUM SUCCINATE 100 MG: 100 INJECTION, POWDER, FOR SOLUTION INTRAMUSCULAR; INTRAVENOUS at 10:24

## 2017-06-27 RX ADMIN — PHENOBARBITAL 70 MG: 20 ELIXIR ORAL at 08:25

## 2017-06-27 RX ADMIN — FAMOTIDINE 20 MG: 20 TABLET, FILM COATED ORAL at 08:24

## 2017-06-27 RX ADMIN — FAMOTIDINE 20 MG: 20 TABLET, FILM COATED ORAL at 20:51

## 2017-06-27 ASSESSMENT — PULMONARY FUNCTION TESTS: FVC: 1.2

## 2017-06-27 NOTE — PROGRESS NOTES
Pulmonary Critical Care Progress Note      Date of Service: 6/27/17    Chief Complaint: Shortness of Breath, difficulty breathing.    History of Present Illness: All information taken from the patient's family at bedside as well as physician sign out as the patient is noncommunicative.  This is a 36-year-old gentleman with history of traumatic brain injury approximately 18 years ago after a [place].  He had been in a nearly persistent vegetative state since, has not been verbal, does not mobilize, previously was trached for approximately 1 year, but has been trached free for approximately 18 years.  He continues to have a deep stoma; however, it has apparently closed off.  He has a PEG tube for feeding; however, he used to also take miniscule amounts of food orally as well up until most recently.  Patient was admitted on 06/17/2017 with the concern of aspiration pneumonia as well as increased tremor type activities with upper extremity involvement.  The patient was initially placed on telemetry floor, treated for aspiration pneumonitis with antibiotic therapy as well as supportive therapy with IV fluids and tube feeds.  Over the past 24 hours or so, there had been noted ongoing jerking type motions of the upper extremities and therefore, patient had EEG.  The EEG did show several periods of seizures lasting several seconds.  Based on this, neurology recommended that the patient be transferred to intensive care unit for concern of underlying status epilepticus, intubated and placed on propofol therapy, while antiepileptic medications are being titrated and adjusted.  Patient has no history of known seizure activity.  His only medication is a baclofen pump for a significant spasticity and this is apparently managed by [facility].  At the present time, no further history is currently available or able to be obtained.      ROS:    Respiratory: unable to perform due to the patient's inability  to effectively communicate,   Cardiac: unable to perform due to the patient's inability to effectively communicate,   GI: unable to perform due to the patient's inability to effectively communicate.    All other systems negative.    Interval Events:  24 hour interval history reviewed   Mobile to cardiac chair with good toleration.   Good forced vent weaning parameters    PFSH:  No change.      Respiratory:  Liriano Vent Mode: Spont, Rate (breaths/min): 14, Vt Target (mL): 400, PEEP/CPAP: 8, FiO2: 30, P Support: 5, Static Compliance (ml / cm H2O): 38, Control VTE (exp VT): 371  Pulse Oximetry: 100 %   SBT this AM with RSBI 7, VC 1.2 and NiF -34 --> extubate today.   Exam: Clear bilaterally, no wheeze, breathing comfortably on vent, PIP low 20's  ImagingCXR  I have personally reviewed the chest x-ray my impression is  unchanged left lower lobe hazy infiltrate. ET tube and RIJ in good position.       Recent Labs      06/25/17   0426  06/25/17   0507  06/26/17   0532  06/27/17   0512   NUDYF76O  7.45   --    --   7.43   [id number]  36.0   --    --   36.4   [id number]  42.1*   --    --   144.8*   [id number]  78.8*   --    --   98.2   ARTHCO3  24   --    --   24   ARTBE  1   --    --   0   ISTATAPH   --   7.446  7.433   --    ISTATAPCO2   --   32.0  34.2   --    ISTATAPO2   --   111*  136*   --    ISTATATCO2   --   23  24   --    [id number]   --   99  99   --    ISTATARTHCO3   --   22.0  22.9   --    ISTATARTBE   --   -1  -1   --    ISTATTEMP   --   98.2 F  37.9 C   --    ISTATFIO2   --   30  30   --    ISTATSPEC   --   Arterial  Arterial   --    ISTATAPHTC   --   7.449  7.420   --    YURKDXFG6OH   --   109*  141*   --    Blood gas shows: Normal pH with adequate oxygenation.       HemoDynamics:  Pulse: 75, Heart Rate (Monitored): 74  NIBP: 106/53 mmHg  CVP (mm Hg): (!) 10 MM HG  Exam: RRR, no murmur, trace pedal edema, right greater than left.   CVP 7-11   Imaging: Available data reviewed      Neuro:  GCS Total Clarence  Coma Score: 10  Exam: At baseline, opens eyes, awake but not following commands, contractures throughout without muscular atrophy.   Imaging: Available data reviewed      Fluids:  Intake/Output       06/25/17 0700 - 06/26/17 0659 06/26/17 0700 - 06/27/17 0659 06/27/17 0700 - 06/28/17 0659      2190-6204 6239-1469 Total 7246-7052 1439-6163 Total 2164-0425 6300-8255 Total       Intake    I.V.  100  120 220  150  120 270  --  -- --    IV Volume (TKO) 100 120 220 50 120 170 -- -- --    IV Piggyback Volume (IV Piggyback) -- -- -- 100 -- 100 -- -- --    Other  90  120 210  160  120 280  --  -- --    Medications (P.O./ Enteral Liquids) 90 120 210 160 120 280 -- -- --    Enteral  690  690 1380  660  720 1380  --  -- --    Enteral Volume   -- -- --    Free Water / Tube Flush 90 90 180 60 120 180 -- -- --    Total Intake   -- -- --       Output    Urine  1425  1200 2625  420  625 1045  --  -- --    Indwelling Cathether 1425 1200 2625  -- -- --    Total Output 1425 1200 2625  -- -- --       Net I/O     -545 270 -194 694 335 885 -- -- --        Weight: 55.1 kg (121 lb 7.6 oz)  Recent Labs      06/26/17   0530  06/26/17   1205  06/27/17   0400   SODIUM  134*  132*  138   POTASSIUM  3.9  4.1  4.1   CHLORIDE  106  105  109   CO2  23  25  24   BUN  20  21  22   CREATININE  <0.20*  <0.20*  <0.20*   CALCIUM  8.5  8.5  8.2*       GI/Nutrition:  Exam: Soft, NT, tolerating tube feeds, normal bowel sounds, Peg tube in place with minimal erythema at insertion site.   Imaging: Available data reviewed  NPO and tube feed Tolerated       Liver Function  Recent Labs      06/26/17   0530  06/26/17   1205  06/27/17   0400   ALTSGPT  21   --    --    ASTSGOT  16   --    --    ALKPHOSPHAT  80   --    --    TBILIRUBIN  0.3   --    --    PREALBUMIN  14.0*   --    --    GLUCOSE  155*  172*  132*       Heme:  Recent Labs      06/24/17   1200  06/25/17   0005  06/26/17   0530   17   0400   RBC   --   3.82*  3.64*  3.35*   HEMOGLOBIN   --   11.0*  10.5*  9.8*   HEMATOCRIT   --   31.8*  30.9*  29.4*   PLATELETCT   --   279  327  342   PROTHROMBTM  14.3   --    --    --    APTT  36.7*   --    --    --    INR  1.08   --    --    --        Infectious Disease:  Temp  Av.8 °C (98.2 °F)  Min: 35.9 °C (96.6 °F)  Max: 37.2 °C (98.9 °F)   Rocephin for Klebsiella pneumonia, normal WBC    Micro: reviewed  Recent Labs      17   0005  17   1440  17   0530  17   0400   WBC  7.7   --   5.3  6.2   NEUTSPOLYS  62.00   --   76.70*  73.90*   LYMPHOCYTES  21.90*   --   16.60*  18.40*   MONOCYTES  10.70   --   4.00  6.30   EOSINOPHILS  4.10  3  1.30  0.30   BASOPHILS  0.40   --   0.40  0.30   ASTSGOT   --    --   16   --    ALTSGPT   --    --   21   --    ALKPHOSPHAT   --    --   80   --    TBILIRUBIN   --    --   0.3   --      Current Facility-Administered Medications   Medication Dose Frequency Provider Last Rate Last Dose   • potassium chloride (KLOR-CON) 20 MEQ packet 40 mEq  40 mEq TID Kev Araiza M.D.   40 mEq at 17   • levetiracetam (KEPPRA) 100 MG/ML solution 1,500 mg  1,500 mg Q12HRS Martell Hunter M.D.   1,500 mg at 17   • hydrocortisone sodium succinate PF (SOLU-CORTEF) 100 MG injection 100 mg  100 mg Q8HRS Jeremy M Gonda, M.D.   100 mg at 17 0207   • sodium chloride (SALT) tablet 2 g  2 g TID WITH MEALS Kev Araiza M.D.   2 g at 17 1652   • fentaNYL (SUBLIMAZE) injection 25 mcg  25 mcg Q4HRS PRN JAYDE Bahena.D.   25 mcg at 17 1406   • oxycodone immediate-release (ROXICODONE) tablet 5 mg  5 mg Q4HRS PRBRIDGETTE Araiza M.D.   5 mg at 17 1825   • PHENobarbital solution 70 mg  70 mg BID Martell Hunter M.D.   70 mg at 17   • enoxaparin (LOVENOX) inj 30 mg  30 mg DAILY Clarence Morfin D.O.   30 mg at 17   • Respiratory Care per Protocol   Continuous RT Kev Araiza M.D.       •  senna-docusate (PERICOLACE or SENOKOT S) 8.6-50 MG per tablet 2 Tab  2 Tab BID Kev Araiza M.D.   2 Tab at 06/26/17 2009    And   • polyethylene glycol/lytes (MIRALAX) PACKET 1 Packet  1 Packet QDAY PRN Kev Araiza M.D.   1 Packet at 06/25/17 2028    And   • magnesium hydroxide (MILK OF MAGNESIA) suspension 30 mL  30 mL QDAY PRN Kev Araiza M.D.   30 mL at 06/25/17 1548    And   • bisacodyl (DULCOLAX) suppository 10 mg  10 mg QDAY PRN Kev Araiza M.D.   10 mg at 06/26/17 1216   • chlorhexidine (PERIDEX) 0.12 % solution 15 mL  15 mL BID Kev Araiza M.D.   15 mL at 06/26/17 2009   • lidocaine (XYLOCAINE) 1%  injection  1-2 mL Q30 MIN PRN Kev Araiza M.D.       • MD ALERT...Adult ICU Electrolyte Replacement per Pharmacy Protocol   pharmacy to dose Kev Araiza M.D.       • famotidine (PEPCID) tablet 20 mg  20 mg Q12HRS Kev Araiza M.D.   20 mg at 06/26/17 2009   • ipratropium-albuterol (DUONEB) nebulizer solution 3 mL  3 mL Q2HRS PRN (RT) Kev Araiza M.D.       • glucose 4 g chewable tablet 16 g  16 g Q15 MIN PRN Kev Araiza M.D.        And   • dextrose 50% (D50W) injection 25 mL  25 mL Q15 MIN PRN Kev Araiza M.D.   25 mL at 06/20/17 1840   • ondansetron (ZOFRAN) syringe/vial injection 4 mg  4 mg Q4HRS PRN FARHAT MedinaO.   4 mg at 06/23/17 2238   • ondansetron (ZOFRAN ODT) dispertab 4 mg  4 mg Q4HRS PRN Clarence Morfin DBuckyO.       • promethazine (PHENERGAN) tablet 12.5-25 mg  12.5-25 mg Q4HRS PRN FARHAT MedinaO.       • promethazine (PHENERGAN) suppository 12.5-25 mg  12.5-25 mg Q4HRS PRN FARHAT MedinaO.       • prochlorperazine (COMPAZINE) injection 5-10 mg  5-10 mg Q4HRS PRN FARHAT MedinaO.       • lorazepam (ATIVAN) tablet 0.5 mg  0.5 mg Q6HRS PRN JASPAL Medina.O.   Stopped at 06/19/17 0248    Or   • lorazepam (ATIVAN) injection 0.5 mg  0.5 mg Q6HRS PRN JASPAL Medina.O.   0.5 mg at 06/22/17 0044   • acetaminophen (TYLENOL)  tablet 650 mg  650 mg Q6HRS PRN Clarence Morfin D.O.   650 mg at 06/17/17 1857   • Pain Pump (patient supplied) Device   Continuous Clarence Morfin D.O.   Stopped at 06/17/17 1230     Last reviewed on 6/17/2017  9:45 AM by Ligia Frazier, Pharmacy Int      Quality  Measures:  Labs reviewed, Medications reviewed and Radiology images reviewed  Waldrop catheter: Critically Ill - Requiring Accurate Measurement of Urinary Output and Unconscious / Sedated Patient on a Ventilator  Central line in place: Need for access    DVT Prophylaxis: Enoxaparin (Lovenox)  DVT prophylaxis - mechanical: SCDs  Ulcer prophylaxis: Yes  Antibiotics: Treating active infection/contamination beyond 24 hours perioperative coverage  Assessed for rehab: Patient unable to tolerate rehabilitation therapeutic regimen        Assessment/Plan:  Acute hypoxic respiratory failure - intubated 6/19              - rt/02 protcol              - extubation trial today, pulm toilet/aspiration precautions              - limit sedatives   - force diuresis  New onset seizure activity with concern of status epilepticus              - keppra              - phenobarb              - neuro following, ?reduction in dosage in case contributing to drowsiness  Aspiration pneumonia. Klebsiella pna              - completed abx x 7 days  Moderate Left sided Pleural Effusion s/p - thoracentesis 6/25 (transudative)   - f/u cultures/cytology  Hyponatremia              - cont salt tabs  HypoK and Hyop Mag - replace  Severe chronic underlying debility secondary to profound traumatic brain injury 18 years ago  Prophylaxis, enteral nutrition via PEG, therapies    Discussed patient condition and risk of morbidity and/or mortality with RN, RT, Pharmacy, Charge nurse / hot rounds, Patient and hospitalist.    The patient remains critically ill.  Critical care time = 33 minutes in directly providing and coordinating critical care and extensive data review.  No time overlap and  excludes procedures.      Mica WOODY (Conchita), am scribing for, and in the presence of, Jeremy Gonda M.D.   Electronically signed by: Mica Quintana (Conchita), 6/27/2017  I, Jeremy Gonda M.D.  personally performed the services described in this documentation, as scribed by Mica Quintana in my presence, and it is both accurate and complete.

## 2017-06-27 NOTE — RESPIRATORY CARE
Extubation    Cuff leak noted: yes  Stridor present: not at this time      Patient toleration: well    Events/Summary/Plan: pt extubated per Dr. Gonda, cuff leak present, no distress noted, placed on 5L oxymask

## 2017-06-27 NOTE — CARE PLAN
Problem: Mobility  Goal: Risk for activity intolerance will decrease  Intervention: Assess and monitor signs of activity intolerance  Pt sitting up in a cardiac chair and tolerating it well at this point.      Problem: Pain Management  Goal: Pain level will decrease to patient’s comfort goal  Intervention: Follow pain managment plan developed in collaboration with patient and Interdisciplinary Team  CPOT scale used to ensure adequate pain control.

## 2017-06-27 NOTE — DISCHARGE PLANNING
[facility]  [facility] referral sent to [facility]) at 0814.

## 2017-06-27 NOTE — DISCHARGE PLANNING
Spoke to Emily at [facility]. She said they have, on rare occasions, been able to obtain auth for [facility] pts with [facility]. She will work on that.

## 2017-06-27 NOTE — DIETARY
"WEEKLY TF UPDATE - Day 10 of admit  TF via Peg: Impact Peptide 1.5 @ goal rate 50ml/hr providing 1800 kcals, 113 grams protein and 924 mL free water.  TF continues to run at goal at this time and is meeting 100% of patients estimated nutrition needs.  Patient remains intubated this morning.    Per discussion in rounds patients mother continues to bolus some of her homemade \"pureed foods and shakes\" in PEG tube however RN uncertain of quantity that has been provided recently.  Per previous RD documentation it is clear that the exact contents of this home formula are unclear so dietary team remains unable to calculate percentage of estimated needs being provided from home feedings.  Weight is fairly stable with admit weight and I recommend continuing with Impact Peptide @ goal for primary source of nutrition at this time.    Mom not at bedside this morning to clarify what has been provided most recently.  Per active diet order whole milk and vanilla yogurt okay per MD via g-button.  Dr. Rivas Farrell is now on this patients case and this morning I discussed feeding routine with him.  MD is aware that patients mom continues to bolus home feeds intermittently at this time and he is comfortable with her continuing this via g-button.  Nursing communication in place for \"family may bring in home tube feeding formula and administer\".      Pertinent Labs: Glucose: 132, Creatinine: < 0.20, Calcium: 8.2  Pertinent Meds: Pepcid, phenobarbital, potassium chloride, pericolace, sodium chloride, pain pump  Fluids: No IVF noted in chart  GI: Last BM 6/26  WT 6/27: 55.1 kg; admit weight via bed scale 6/17 was 57.6 kg and patients mom has previously stated to RD that weight had been stable at home.   SKIN: Per wound team assessment 6/20: DTI to chest, stage 3 pressure ulcer to ischium, stage 2 pressure ulcer to right arm, rash to right elbow.    TF is currently providing 100% of estimated micronutrient needs for healing including 1200 " mg vitamin C per day and 22 grams of L-Arginine which can aid in wound healing and maintaining skin integrity.     PLAN/RECOMMEND -   1) Continue with TF as ordered for primary source of nutrition: Impact Peptide 1.5 @ 50 ml/hr continuous.   2) If patients mom to continue to bolus home feeds per MD order and nursing communication, please document amount provided in i/o flowsheet to better track quantity given - I discussed this with RN.   3) Monitor amount of home TF being provided and consider transition to home feeding routine when patient no longer critically ill.    RD will continue to monitor.

## 2017-06-27 NOTE — DISCHARGE PLANNING
LTAC in [facility] include Angy Otero, Rowdy Al, [facility], Ruth Dacosta, [facility] [place], Ruth Lockett, Ruth Arciniega, Angy Arciniega.

## 2017-06-27 NOTE — CARE PLAN
Problem: Venous Thromboembolism (VTW)/Deep Vein Thrombosis (DVT) Prevention:  Goal: Patient will participate in Venous Thrombosis (VTE)/Deep Vein Thrombosis (DVT)Prevention Measures  Outcome: PROGRESSING AS EXPECTED  Pt has mechanical and pharmacological prophylaxis in place.     Problem: Skin Integrity  Goal: Risk for impaired skin integrity will decrease  Outcome: PROGRESSING AS EXPECTED  Skin assessment completed. Moisture barrier and mepilex in place. Pillows in use to float heels. Waffle cushion in place. Q2hr turning to prevent skin breakdown.

## 2017-06-27 NOTE — PROGRESS NOTES
[facility]ist Progress Note    Date of Service: 2017    Chief Complaint  36 y.o. male admitted 2017 with altered mental status.  Severe TBI with status and aspiration. He may require a trach if his mentation does not improve.    Interval Problem Update  Mr. Edwards has a hx of TBI at age 17. He was admitted with aspiration pneumonitis. EEG revealed status epilepticus thus he was intubated on  placed on IV keppra, phenobarbital.    S/p 48 hour EEG  He is afebrile. 1,100 ml urine over night. He is tolerating G tube feeding.   I met with Ruben's parents today and we discussed his condition and consideration of LTACH. No seizures over night per nurse.  Thoracentesis done .    No changes neurologiclaly over night  Bp and hr stable.   Cvp 7-11  Tolerating peg TF at goal with last bm on   550 ou tvia rosas  Tolerated up to cardiac chair  Weaning attempted with good parameters    Consultants/Specialty  Neurology  Pulmonology  I discussed with Dr. Gonda on [facility] [place]    Disposition  ICU  LTACH referral placed.   Mother would prefer home. Was feeding him at home.   Possible decrease in sz meds per neuro?- mother concerned about aloc.       Review of Systems   Unable to perform ROS: intubated      Physical Exam  Laboratory/Imaging   Hemodynamics  Temp (24hrs), Av.8 °C (98.2 °F), Min:35.9 °C (96.6 °F), Max:37.2 °C (98.9 °F)   Temperature: 36.9 °C (98.5 °F)  Pulse  Av.1  Min: 60  Max: 131 Heart Rate (Monitored): 68  NIBP: (!) 89/48 mmHg CVP (mm Hg): (!) 10 MM HG    Respiratory  Liriano Vent Mode: Spont, Rate (breaths/min): 14, PEEP/CPAP: 8, PEEP/CPAP: 8, FiO2: 30, P Peak (PIP): 15, P MEAN: 9   Respiration: 14, Pulse Oximetry: 100 %     Work Of Breathing / Effort: Vented  RUL Breath Sounds: Clear, RML Breath Sounds: Diminished, RLL Breath Sounds: Diminished, JONATAN Breath Sounds: Clear, LLL Breath Sounds: Diminished    Fluids    Intake/Output Summary (Last 24 hours) at 17 0755  Last data  filed at 06/27/17 0600   Gross per 24 hour   Intake   1930 ml   Output   1045 ml   Net    885 ml       Nutrition  Orders Placed This Encounter   Procedures   • Diet NPO     Standing Status: Standing      Number of Occurrences: 1      Standing Expiration Date:      Order Specific Question:  Type:     Answer:  Now [1]     Order Specific Question:  Restrict to:     Answer:  With Tube Feed [4]      Comments:  yogurt and milk ok for delivery. used in bolus formula. not for PO intake     Physical Exam   Constitutional: He appears well-developed and well-nourished. No distress.   HENT:   Right Ear: External ear normal.   Left Ear: External ear normal.   Nose: Nose normal.   Eyes: Right eye exhibits no discharge. Left eye exhibits no discharge. No scleral icterus.   Neck: No JVD present. No tracheal deviation present.   Right IJ central line without bleeding.   Well-healed tracheostomy site with indentation.    Cardiovascular: Normal rate, regular rhythm, normal heart sounds and intact distal pulses.    No murmur heard.  Pulmonary/Chest: Effort normal and breath sounds normal. No respiratory distress. He has no rales.   intubated   Abdominal: Soft. Bowel sounds are normal. He exhibits no distension. There is no tenderness.   PEG. Baclofen pump palpable on abdomen.    Genitourinary:   Waldrop    Musculoskeletal: He exhibits edema. He exhibits no tenderness.   Poor muscle tone, severe contractures of the arms and legs with significant deformity.    Neurological: He is alert.   Eyes are open but he does not track   Skin: Skin is warm and dry. No rash noted. He is not diaphoretic. No erythema. There is pallor.   Nursing note and vitals reviewed.      Recent Labs      06/25/17   0005  06/26/17   0530  06/27/17   0400   WBC  7.7  5.3  6.2   RBC  3.82*  3.64*  3.35*   HEMOGLOBIN  11.0*  10.5*  9.8*   HEMATOCRIT  31.8*  30.9*  29.4*   MCV  83.2  84.9  87.8   MCH  28.8  28.8  29.3   MCHC  34.6  34.0  33.3*   RDW  48.5  51.0*  54.4*    PLATELETCT  279  327  342   MPV  9.1  8.6*  8.7*     Recent Labs      06/26/17   0530  06/26/17   1205  06/27/17   0400   SODIUM  134*  132*  138   POTASSIUM  3.9  4.1  4.1   CHLORIDE  106  105  109   CO2  23 25  24   GLUCOSE  155*  172*  132*   BUN  20  21  22   CREATININE  <0.20*  <0.20*  <0.20*   CALCIUM  8.5  8.5  8.2*     Recent Labs      06/24/17   1200   APTT  36.7*   INR  1.08                  Assessment/Plan     Status epilepticus (CMS-HCC) (present on admission)  Assessment & Plan  As evidenced on EEG. Continuous EEG showed seizure activity over 48 hours.  Neurology consulted and following.  IV keppra loaded.   Phenobarbital added.  Mother asking about decrease in meds now for mental status    Aspiration pneumonia (CMS-HCC) (present on admission)  Assessment & Plan  - pt unable to clear secretions   - culture growing Klebsiella resistant to ampicillin thus rocephin to cover aspiration for 10 days.  Bronchoscopy 6/25.  Thoracentesis with 300 ml off on 6/25.    -CT chest to eval further done 6/23  CT:    1.  Bilateral airspace opacities, much more severe on the left, consistent with multifocal pneumonia. Moderate left and trace amounts of right pleural fluid are also present       Sepsis (CMS-[facility])  Assessment & Plan  - secondary to aspiration pneumonia  -on 6/25 he became quite hypotensive.  Midodrine was started and will be stopped now that BP has improved. Cortisol level low at 5.7 thus IV hydrocortisone ordered.   Completed today.       Acute respiratory failure with hypoxia (CMS-HCC) (present on admission)  Assessment & Plan  Intubated on 6/19.  Pulmonary folloiwing.  He may require another tracheostomy.  LTACH referral placed.   Palliative care consulted    Hyponatremia (present on admission)  Assessment & Plan  Resolved.     Hypoglycemia (present on admission)  Assessment & Plan  - resolved    Encephalopathy acute (present on admission)  Assessment & Plan  Secondary to status epilepticus and  pneumonia.  His mentation is unchanged.     TBI (traumatic brain injury) (CMS-HCC) (present on admission)  Assessment & Plan  Since age 17 years.  With quadriplegia   Baclofen pump followed at [facility]--his mother states it does not need refilled for a few months.    Hypokalemia (present on admission)  Assessment & Plan  IV replacement.      Labs reviewed, Medications reviewed, Radiology images reviewed and EKG reviewed  Waldrop catheter: Unconscious / Sedated Patient on a Ventilator      DVT Prophylaxis: Enoxaparin (Lovenox)      Antibiotics: Treating active infection/contamination beyond 24 hours perioperative coverage  Assessed for rehab: Patient was assess for and/or received rehabilitation services during this hospitalization

## 2017-06-28 ENCOUNTER — APPOINTMENT (OUTPATIENT)
Dept: RADIOLOGY | Facility: MEDICAL CENTER | Age: 37
DRG: 004 | End: 2017-06-28
Attending: INTERNAL MEDICINE
Payer: COMMERCIAL

## 2017-06-28 LAB
ALBUMIN SERPL BCP-MCNC: 3.1 G/DL (ref 3.2–4.9)
ALBUMIN/GLOB SERPL: 0.9 G/DL
ALP SERPL-CCNC: 83 U/L (ref 30–99)
ALT SERPL-CCNC: 63 U/L (ref 2–50)
ANION GAP SERPL CALC-SCNC: 5 MMOL/L (ref 0–11.9)
AST SERPL-CCNC: 57 U/L (ref 12–45)
BACTERIA FLD AEROBE CULT: NORMAL
BASOPHILS # BLD AUTO: 0.7 % (ref 0–1.8)
BASOPHILS # BLD: 0.04 K/UL (ref 0–0.12)
BILIRUB SERPL-MCNC: 0.2 MG/DL (ref 0.1–1.5)
BUN SERPL-MCNC: 27 MG/DL (ref 8–22)
CALCIUM SERPL-MCNC: 8.6 MG/DL (ref 8.5–10.5)
CHLORIDE SERPL-SCNC: 113 MMOL/L (ref 96–112)
CO2 SERPL-SCNC: 26 MMOL/L (ref 20–33)
CREAT SERPL-MCNC: <0.2 MG/DL (ref 0.5–1.4)
EOSINOPHIL # BLD AUTO: 0.01 K/UL (ref 0–0.51)
EOSINOPHIL NFR BLD: 0.2 % (ref 0–6.9)
ERYTHROCYTE [DISTWIDTH] IN BLOOD BY AUTOMATED COUNT: 52.9 FL (ref 35.9–50)
GFR SERPL CREATININE-BSD FRML MDRD: >60 ML/MIN/1.73 M 2
GLOBULIN SER CALC-MCNC: 3.4 G/DL (ref 1.9–3.5)
GLUCOSE SERPL-MCNC: 108 MG/DL (ref 65–99)
GRAM STN SPEC: NORMAL
HCT VFR BLD AUTO: 29.9 % (ref 42–52)
HGB BLD-MCNC: 9.9 G/DL (ref 14–18)
IMM GRANULOCYTES # BLD AUTO: 0.02 K/UL (ref 0–0.11)
IMM GRANULOCYTES NFR BLD AUTO: 0.3 % (ref 0–0.9)
LYMPHOCYTES # BLD AUTO: 1.58 K/UL (ref 1–4.8)
LYMPHOCYTES NFR BLD: 27 % (ref 22–41)
MAGNESIUM SERPL-MCNC: 1.9 MG/DL (ref 1.5–2.5)
MCH RBC QN AUTO: 28.9 PG (ref 27–33)
MCHC RBC AUTO-ENTMCNC: 33.1 G/DL (ref 33.7–35.3)
MCV RBC AUTO: 87.4 FL (ref 81.4–97.8)
MONOCYTES # BLD AUTO: 0.72 K/UL (ref 0–0.85)
MONOCYTES NFR BLD AUTO: 12.3 % (ref 0–13.4)
NEUTROPHILS # BLD AUTO: 3.48 K/UL (ref 1.82–7.42)
NEUTROPHILS NFR BLD: 59.5 % (ref 44–72)
NRBC # BLD AUTO: 0 K/UL
NRBC BLD AUTO-RTO: 0 /100 WBC
PHOSPHATE SERPL-MCNC: 2.4 MG/DL (ref 2.5–4.5)
PLATELET # BLD AUTO: 429 K/UL (ref 164–446)
PMV BLD AUTO: 8.8 FL (ref 9–12.9)
POTASSIUM SERPL-SCNC: 3.8 MMOL/L (ref 3.6–5.5)
PROT SERPL-MCNC: 6.5 G/DL (ref 6–8.2)
RBC # BLD AUTO: 3.42 M/UL (ref 4.7–6.1)
SIGNIFICANT IND 70042: NORMAL
SITE SITE: NORMAL
SODIUM SERPL-SCNC: 144 MMOL/L (ref 135–145)
SOURCE SOURCE: NORMAL
WBC # BLD AUTO: 5.9 K/UL (ref 4.8–10.8)

## 2017-06-28 PROCEDURE — 51798 US URINE CAPACITY MEASURE: CPT

## 2017-06-28 PROCEDURE — 80053 COMPREHEN METABOLIC PANEL: CPT

## 2017-06-28 PROCEDURE — 84100 ASSAY OF PHOSPHORUS: CPT

## 2017-06-28 PROCEDURE — 700111 HCHG RX REV CODE 636 W/ 250 OVERRIDE (IP): Performed by: INTERNAL MEDICINE

## 2017-06-28 PROCEDURE — 700105 HCHG RX REV CODE 258: Performed by: INTERNAL MEDICINE

## 2017-06-28 PROCEDURE — 94668 MNPJ CHEST WALL SBSQ: CPT

## 2017-06-28 PROCEDURE — 99233 SBSQ HOSP IP/OBS HIGH 50: CPT | Performed by: INTERNAL MEDICINE

## 2017-06-28 PROCEDURE — 31720 CLEARANCE OF AIRWAYS: CPT

## 2017-06-28 PROCEDURE — 700101 HCHG RX REV CODE 250: Performed by: INTERNAL MEDICINE

## 2017-06-28 PROCEDURE — 700102 HCHG RX REV CODE 250 W/ 637 OVERRIDE(OP): Performed by: HOSPITALIST

## 2017-06-28 PROCEDURE — A9270 NON-COVERED ITEM OR SERVICE: HCPCS | Performed by: INTERNAL MEDICINE

## 2017-06-28 PROCEDURE — 99233 SBSQ HOSP IP/OBS HIGH 50: CPT | Performed by: HOSPITALIST

## 2017-06-28 PROCEDURE — A9270 NON-COVERED ITEM OR SERVICE: HCPCS | Performed by: PSYCHIATRY & NEUROLOGY

## 2017-06-28 PROCEDURE — A9270 NON-COVERED ITEM OR SERVICE: HCPCS | Performed by: HOSPITALIST

## 2017-06-28 PROCEDURE — 770006 HCHG ROOM/CARE - MED/SURG/GYN SEMI*

## 2017-06-28 PROCEDURE — 71010 DX-CHEST-PORTABLE (1 VIEW): CPT

## 2017-06-28 PROCEDURE — 700102 HCHG RX REV CODE 250 W/ 637 OVERRIDE(OP): Performed by: PSYCHIATRY & NEUROLOGY

## 2017-06-28 PROCEDURE — 83735 ASSAY OF MAGNESIUM: CPT

## 2017-06-28 PROCEDURE — 85025 COMPLETE CBC W/AUTO DIFF WBC: CPT

## 2017-06-28 PROCEDURE — 700102 HCHG RX REV CODE 250 W/ 637 OVERRIDE(OP): Performed by: INTERNAL MEDICINE

## 2017-06-28 RX ORDER — SODIUM CHLORIDE 1 G/1
1 TABLET ORAL
Status: DISCONTINUED | OUTPATIENT
Start: 2017-06-28 | End: 2017-07-01

## 2017-06-28 RX ADMIN — POTASSIUM PHOSPHATE, MONOBASIC AND POTASSIUM PHOSPHATE, DIBASIC 15 MMOL: 224; 236 INJECTION, SOLUTION INTRAVENOUS at 08:20

## 2017-06-28 RX ADMIN — SENNOSIDES AND DOCUSATE SODIUM 2 TABLET: 8.6; 5 TABLET ORAL at 20:11

## 2017-06-28 RX ADMIN — SODIUM CHLORIDE TAB 1 GM 1 G: 1 TAB at 17:01

## 2017-06-28 RX ADMIN — POTASSIUM CHLORIDE 40 MEQ: 1.5 POWDER, FOR SOLUTION ORAL at 15:20

## 2017-06-28 RX ADMIN — MAGNESIUM HYDROXIDE 30 ML: 400 SUSPENSION ORAL at 13:05

## 2017-06-28 RX ADMIN — SENNOSIDES AND DOCUSATE SODIUM 2 TABLET: 8.6; 5 TABLET ORAL at 08:19

## 2017-06-28 RX ADMIN — POTASSIUM CHLORIDE 40 MEQ: 1.5 POWDER, FOR SOLUTION ORAL at 08:19

## 2017-06-28 RX ADMIN — HYDROCORTISONE SODIUM SUCCINATE 100 MG: 100 INJECTION, POWDER, FOR SOLUTION INTRAMUSCULAR; INTRAVENOUS at 08:20

## 2017-06-28 RX ADMIN — HYDROCORTISONE SODIUM SUCCINATE 100 MG: 100 INJECTION, POWDER, FOR SOLUTION INTRAMUSCULAR; INTRAVENOUS at 02:13

## 2017-06-28 RX ADMIN — LEVETIRACETAM 1500 MG: 100 SOLUTION ORAL at 08:20

## 2017-06-28 RX ADMIN — PHENOBARBITAL 70 MG: 20 ELIXIR ORAL at 08:19

## 2017-06-28 RX ADMIN — POTASSIUM CHLORIDE 40 MEQ: 1.5 POWDER, FOR SOLUTION ORAL at 20:11

## 2017-06-28 RX ADMIN — SODIUM CHLORIDE TAB 1 GM 1 G: 1 TAB at 10:44

## 2017-06-28 RX ADMIN — BISACODYL 10 MG: 10 SUPPOSITORY RECTAL at 15:20

## 2017-06-28 RX ADMIN — PHENOBARBITAL 70 MG: 20 ELIXIR ORAL at 20:10

## 2017-06-28 RX ADMIN — LEVETIRACETAM 1500 MG: 100 SOLUTION ORAL at 20:11

## 2017-06-28 RX ADMIN — ENOXAPARIN SODIUM 30 MG: 100 INJECTION SUBCUTANEOUS at 20:11

## 2017-06-28 RX ADMIN — FAMOTIDINE 20 MG: 20 TABLET, FILM COATED ORAL at 08:19

## 2017-06-28 NOTE — FLOWSHEET NOTE
06/28/17 0805   Education   Education Yes - Pt. / Family has been Instructed in use of Respiratory Equipment   PEP/CPT Group   PEP/CPT/Airway Clearance Therapy Yes   #PEP/CPT (Manual) Subsequent Subsequent   PEP/CPT Method Positive Airway Pressure Device   Pressure 10   Respiratory WDL   Respiratory (WDL) X   Chest Exam   Work Of Breathing / Effort Mild   Respiration 14   Pulse 76   Heart Rate (Monitored) 78   Breath Sounds   RUL Breath Sounds Diminished   RML Breath Sounds Diminished   RLL Breath Sounds Diminished   JONATAN Breath Sounds Diminished   LLL Breath Sounds Diminished   Secretions   Cough Moist;Productive   How Sputum Obtained Oropharyngeal   Sputum Amount Small   Sputum Color Clear;White   Sputum Consistency Thin   Suction Frequency Suctioned Once or Twice Per Encounter   Oxygen   Pulse Oximetry 96 %   O2 Daily Delivery Respiratory  Room Air with O2 Available

## 2017-06-28 NOTE — PROGRESS NOTES
[facility] Hospitalist Progress Note    Date of Service: 2017    Chief Complaint  36 y.o. male admitted 2017 with altered mental status.  Severe TBI with status and aspiration.     Interval Problem Update  No events  No seizures  occ bigem  On room air  Tf at goal  Bm on   Straight cathing q6  No fevers  Mobilizing to cardiac chair  Small mod secretions      Taper salt tabs    Consultants/Specialty  Neurology  Pulmonology      Disposition  ICU  LTACH referral placed.   Mother would prefer home. Was feeding him at home.   Possible decrease in sz meds per neuro?- mother concerned about aloc. - called and left message with dr degroot today.       Review of Systems   Unable to perform ROS: intubated      Physical Exam  Laboratory/Imaging   Hemodynamics  Temp (24hrs), Av.6 °C (97.9 °F), Min:36.3 °C (97.4 °F), Max:37.1 °C (98.7 °F)   Temperature: 36.7 °C (98 °F)  Pulse  Av.8  Min: 60  Max: 131 Heart Rate (Monitored): 78  NIBP: 109/71 mmHg     Respiratory      Respiration: 14, Pulse Oximetry: 96 %, O2 Daily Delivery Respiratory : Room Air with O2 Available     PEP/CPT Method: Positive Airway Pressure Device, Work Of Breathing / Effort: Mild  RUL Breath Sounds: Diminished, RML Breath Sounds: Diminished, RLL Breath Sounds: Diminished, JONATAN Breath Sounds: Diminished, LLL Breath Sounds: Diminished    Fluids    Intake/Output Summary (Last 24 hours) at 17 0842  Last data filed at 17 0600   Gross per 24 hour   Intake   1885 ml   Output    950 ml   Net    935 ml       Nutrition  Orders Placed This Encounter   Procedures   • Diet NPO     Standing Status: Standing      Number of Occurrences: 1      Standing Expiration Date:      Order Specific Question:  Type:     Answer:  Now [1]     Order Specific Question:  Restrict to:     Answer:  With Tube Feed [4]      Comments:  yogurt and milk ok for delivery. used in bolus formula. not for PO intake     Physical Exam   Constitutional: He appears well-developed  and well-nourished. No distress.   HENT:   Right Ear: External ear normal.   Left Ear: External ear normal.   Nose: Nose normal.   Eyes: Right eye exhibits no discharge. Left eye exhibits no discharge. No scleral icterus.   Neck: No JVD present. No tracheal deviation present.   Right IJ central line without bleeding.   Well-healed tracheostomy site with indentation.    Cardiovascular: Normal rate, regular rhythm, normal heart sounds and intact distal pulses.    No murmur heard.  Pulmonary/Chest: Effort normal and breath sounds normal. No respiratory distress. He has no rales.   intubated   Abdominal: Soft. Bowel sounds are normal. He exhibits no distension. There is no tenderness.   PEG. Baclofen pump palpable on abdomen.    Genitourinary:   Waldrop    Musculoskeletal: He exhibits edema. He exhibits no tenderness.   Poor muscle tone, severe contractures of the arms and legs with significant deformity.    Neurological: He is alert.   Eyes are open but he does not track   Skin: Skin is warm and dry. No rash noted. He is not diaphoretic. No erythema. There is pallor.   Nursing note and vitals reviewed.      Recent Labs      06/26/17   0530  06/27/17   0400  06/28/17   0219   WBC  5.3  6.2  5.9   RBC  3.64*  3.35*  3.42*   HEMOGLOBIN  10.5*  9.8*  9.9*   HEMATOCRIT  30.9*  29.4*  29.9*   MCV  84.9  87.8  87.4   MCH  28.8  29.3  28.9   MCHC  34.0  33.3*  33.1*   RDW  51.0*  54.4*  52.9*   PLATELETCT  327  342  429   MPV  8.6*  8.7*  8.8*     Recent Labs      06/26/17   1205  06/27/17   0400  06/28/17   0219   SODIUM  132*  138  144   POTASSIUM  4.1  4.1  3.8   CHLORIDE  105  109  113*   CO2  25  24  26   GLUCOSE  172*  132*  108*   BUN  21  22  27*   CREATININE  <0.20*  <0.20*  <0.20*   CALCIUM  8.5  8.2*  8.6                      Assessment/Plan     Status epilepticus (CMS-HCC) (present on admission)  Assessment & Plan  As evidenced on EEG. Continuous EEG showed seizure activity over 48 hours.  Neurology consulted and  following.  IV keppra loaded.   Phenobarbital added.  Mother asking about decrease in meds now for mental status    Aspiration pneumonia (CMS-HCC) (present on admission)  Assessment & Plan  - pt unable to clear secretions   - culture growing Klebsiella resistant to ampicillin thus rocephin to cover aspiration for 10 days.  Bronchoscopy 6/25.  Thoracentesis with 300 ml off on 6/25.    -CT chest to eval further done 6/23  CT:    1.  Bilateral airspace opacities, much more severe on the left, consistent with multifocal pneumonia. Moderate left and trace amounts of right pleural fluid are also present       Sepsis (CMS-[facility])  Assessment & Plan  - secondary to aspiration pneumonia  -on 6/25 he became quite hypotensive.  Midodrine was started and will be stopped now that BP has improved. Cortisol level low at 5.7 thus IV hydrocortisone ordered.   Completed today.       Acute respiratory failure with hypoxia (CMS-[facility]) (present on admission)  Assessment & Plan  Intubated on 6/19.  Pulmonary folloiwing.  He may require another tracheostomy.  LTACH referral placed.   Palliative care consulted    Hyponatremia (present on admission)  Assessment & Plan  Resolved.     Hypoglycemia (present on admission)  Assessment & Plan  - resolved    Encephalopathy acute (present on admission)  Assessment & Plan  Secondary to status epilepticus and pneumonia.  His mentation is unchanged.     TBI (traumatic brain injury) (CMS-[facility]) (present on admission)  Assessment & Plan  Since age 17 years.  With quadriplegia   Baclofen pump followed at [facility]--his mother states it does not need refilled for a few months.    Hypokalemia (present on admission)  Assessment & Plan  IV replacement.      Labs reviewed, Medications reviewed, Radiology images reviewed and EKG reviewed  Waldrop catheter: Unconscious / Sedated Patient on a Ventilator      DVT Prophylaxis: Enoxaparin (Lovenox)      Antibiotics: Treating active infection/contamination beyond 24 hours  perioperative coverage  Assessed for rehab: Patient was assess for and/or received rehabilitation services during this hospitalization

## 2017-06-28 NOTE — CARE PLAN
Problem: Bowel/Gastric:  Goal: Normal bowel function is maintained or improved  Outcome: PROGRESSING AS EXPECTED  Providing stool softeners, See MAR. Targeting a bowel movement at least every two days. Providing PRN bowel protocol medications as needed.     Problem: Fluid Volume:  Goal: Will maintain balanced intake and output  Outcome: PROGRESSING AS EXPECTED  Obtaining strict I&O Q2hr.

## 2017-06-28 NOTE — PROGRESS NOTES
Pt transferred to S199 bed 1 with chart, belongings and meds. RN walked with mother, transport and patient to neuro unit. Chart and medications given to receiving unit.

## 2017-06-28 NOTE — PROGRESS NOTES
The pt has not voided in 6 hours after rosas removal.  Bladder scan ordered and traction notified.

## 2017-06-28 NOTE — PROGRESS NOTES
Pt bladder scan showed 600. Dr. Farrell notified. Order for straight cath. During straight cath pt began spontaneously voiding.

## 2017-06-28 NOTE — PROGRESS NOTES
Patient received from ICU. Patient moved to our bed. Seizure precautions in place. Patient positioned so that he is comfortable. Tube feeding set up. Patient's mother stated that she is very worried that he has not had a BM. MOM given per EMAR. Vitals stable. Will monitor closely.

## 2017-06-28 NOTE — CARE PLAN
Problem: Respiratory:  Goal: Respiratory status will improve  Outcome: PROGRESSING AS EXPECTED  Pt on room air. Transmitted upper airway sound. Strong cough. Requiring occasional occasional NT suction.     Problem: Urinary Elimination:  Goal: Ability to reestablish a normal urinary elimination pattern will improve  Outcome: PROGRESSING AS EXPECTED  Pt has not yet voided this AM. Bladder scan ordered. Passed on in report.

## 2017-06-28 NOTE — FLOWSHEET NOTE
06/28/17 1107   Events/Summary/Plan   Events/Summary/Plan Pt. out of room.   Therapy Not Performed   Type of Therapy Not Performed PEP   Reason Therapy Not Performed Off Floor

## 2017-06-28 NOTE — PROGRESS NOTES
Pulmonary Critical Care Progress Note      Date of Service: 6/28/17    Chief Complaint: Shortness of Breath, difficulty breathing.    History of Present Illness: All information taken from the patient's family at bedside as well as physician sign out as the patient is noncommunicative.  This is a 36-year-old gentleman with history of traumatic brain injury approximately 18 years ago after [place].  He had been in a nearly persistent vegetative state since, has not been verbal, does not mobilize, previously was trached for approximately 1 year, but has been trached free for approximately 18 years.  He continues to have a deep stoma; however, it has apparently closed off.  He has a PEG tube for feeding; however, he used to also take miniscule amounts of food orally as well up until most recently.  Patient was admitted on 06/17/2017 with the concern of aspiration pneumonia as well as increased tremor type activities with upper extremity involvement.  The patient was initially placed on telemetry floor, treated for aspiration pneumonitis with antibiotic therapy as well as supportive therapy with IV fluids and tube feeds.  Over the past 24 hours or so, there had been noted ongoing jerking type motions of the upper extremities and therefore, patient had EEG.  The EEG did show several periods of seizures lasting several seconds.  Based on this, neurology recommended that the patient be transferred to intensive care unit for concern of underlying status epilepticus, intubated and placed on propofol therapy, while antiepileptic medications are being titrated and adjusted.  Patient has no history of known seizure activity.  His only medication is a baclofen pump for a significant spasticity and this is apparently managed by [facility].  At the present time, no further history is currently available or able to be obtained.      ROS:    Respiratory: unable to perform due to the patient's inability  to effectively communicate,   Cardiac: unable to perform due to the patient's inability to effectively communicate,   GI: unable to perform due to the patient's inability to effectively communicate.    All other systems negative.    Interval Events:  24 hour interval history reviewed   Extubated yesterday. On room air.   Mobile to cardiac chair with good toleration.   No seizure activity overnight.   No pain medications overnight. No gtts.     PFSH:  No change.      Respiratory:     Pulse Oximetry: 96 % on room air   Minimal secretions.   Exam: Few rhonchi at the bases. Sonorous respirations with sleeping. Breathing comfortably on room air.   ImagingCXR  I have personally reviewed the chest x-ray my impression is  Unchanged perihilar and left lower lobe infiltrate. RIJ in good position.       Recent Labs      06/26/17   0532  06/27/17   0512   KNSOI88V   --   7.43   [id number]   --   36.4   [id number]   --   144.8*   [id number]   --   98.2   ARTHCO3   --   24   ARTBE   --   0   ISTATAPH  7.433   --    ISTATAPCO2  34.2   --    ISTATAPO2  136*   --    ISTATATCO2  24   --    YBWLGEI0QLB  99   --    ISTATARTHCO3  22.9   --    ISTATARTBE  -1   --    ISTATTEMP  37.9 C   --    ISTATFIO2  30   --    ISTATSPEC  Arterial   --    ISTATAPHTC  7.420   --    QYHZVCUO6AX  141*   --    Blood gas shows: None today       HemoDynamics:  Pulse: 79, Heart Rate (Monitored): 79  NIBP: 109/71 mmHg    Exam: RRR, no murmur, trace pedal edema.  Imaging: Available data reviewed      Neuro:  GCS Total Aleida Coma Score: 10  Exam:  At baseline per mother, contractures, not following commands, non verbal.   Imaging: Available data reviewed      Fluids:  Intake/Output       06/26/17 0700 - 06/27/17 0659 06/27/17 0700 - 06/28/17 0659 06/28/17 0700 - 06/29/17 0659      2717-7052 2006-6391 Total 4631-5082 6934-9770 Total 6964-6193 0793-7705 Total       Intake    I.V.  150  120 270  120  150 270  --  -- --    IV Volume (TKO) 50 120 170 120 150 270 -- --  --    IV Piggyback Volume (IV Piggyback) 100 -- 100 -- -- -- -- -- --    Other  160  120 280  145  150 295  --  -- --    Medications (P.O./ Enteral Liquids) 160 120 280 145 150 295 -- -- --    Enteral  660  720 1380  690  780 1470  --  -- --    Enteral Volume   -- -- --    Free Water / Tube Flush 60 120 180 90 180 270 -- -- --    Total Intake  955 1080 2035 -- -- --       Output    Urine  420  625 1045  325  700 1025  --  -- --    Indwelling Cathether  325 -- 325 -- -- --    Straight Catheter -- -- -- -- 700 700 -- -- --    Void (ml) -- -- -- 0 -- 0 -- -- --    Stool  --  -- --  --  -- --  --  -- --    Number of Times Stooled -- -- -- 0 x 0 x 0 x -- -- --    Total Output   -- -- --       Net I/O     550 335 885  -- -- --        Weight: 55.2 kg (121 lb 11.1 oz)  Recent Labs      06/26/17   1205  06/27/17 0400  06/28/17 0219   SODIUM  132*  138  144   POTASSIUM  4.1  4.1  3.8   CHLORIDE  105  109  113*   CO2  25  24  26   BUN  21  22  27*   CREATININE  <0.20*  <0.20*  <0.20*   MAGNESIUM   --    --   1.9   PHOSPHORUS   --    --   2.4*   CALCIUM  8.5  8.2*  8.6       GI/Nutrition:  Exam: Soft, NT, tolerating tube feeds, normal bowel sounds, G tube in place with no erythema noted around site. Pain pump palpable to the right lower quadrant.   Imaging: Available data reviewed  NPO and tube feed Tolerated       Liver Function  Recent Labs      06/26/17   0530  06/26/17   1205  06/27/17   0400  06/28/17 0219   ALTSGPT  21   --    --   63*   ASTSGOT  16   --    --   57*   ALKPHOSPHAT  80   --    --   83   TBILIRUBIN  0.3   --    --   0.2   PREALBUMIN  14.0*   --    --    --    GLUCOSE  155*  172*  132*  108*       Heme:  Recent Labs      06/26/17   0530  06/27/17   0400  06/28/17   0219   RBC  3.64*  3.35*  3.42*   HEMOGLOBIN  10.5*  9.8*  9.9*   HEMATOCRIT  30.9*  29.4*  29.9*   PLATELETCT  327  342  429       Infectious Disease:  Temp   Av.6 °C (97.9 °F)  Min: 36.3 °C (97.4 °F)  Max: 37.1 °C (98.7 °F)   Rocephin for Klebsiella pneumonia, normal WBC    Micro: reviewed  Recent Labs      17   0530  17   0400  179   WBC  5.3  6.2  5.9   NEUTSPOLYS  76.70*  73.90*  59.50   LYMPHOCYTES  16.60*  18.40*  27.00   MONOCYTES  4.00  6.30  12.30   EOSINOPHILS  1.30  0.30  0.20   BASOPHILS  0.40  0.30  0.70   ASTSGOT  16   --   57*   ALTSGPT  21   --   63*   ALKPHOSPHAT  80   --   83   TBILIRUBIN  0.3   --   0.2     Current Facility-Administered Medications   Medication Dose Frequency Provider Last Rate Last Dose   • potassium phosphates 15 mmol in D5W 250 mL ivpb  15 mmol Once Jeremy M Gonda, M.D.       • potassium chloride (KLOR-CON) 20 MEQ packet 40 mEq  40 mEq TID Kev Araiza M.D.   40 mEq at 17   • levetiracetam (KEPPRA) 100 MG/ML solution 1,500 mg  1,500 mg Q12HRS Martell Hunter M.D.   1,500 mg at 17   • hydrocortisone sodium succinate PF (SOLU-CORTEF) 100 MG injection 100 mg  100 mg Q8HRS Jeremy M Gonda, M.D.   100 mg at 17   • sodium chloride (SALT) tablet 2 g  2 g TID WITH MEALS Kev Araiza M.D.   2 g at 17   • fentaNYL (SUBLIMAZE) injection 25 mcg  25 mcg Q4HRS PRN Kev Araiza M.D.   25 mcg at 17 1406   • oxycodone immediate-release (ROXICODONE) tablet 5 mg  5 mg Q4HRS PRN Kev Araiza M.D.   5 mg at 17 182   • PHENobarbital solution 70 mg  70 mg BID Martell Hunter M.D.   70 mg at 17   • enoxaparin (LOVENOX) inj 30 mg  30 mg DAILY Clarence Morfin D.O.   30 mg at 17   • Respiratory Care per Protocol   Continuous RT Kev Araiza M.D.       • senna-docusate (PERICOLACE or SENOKOT S) 8.6-50 MG per tablet 2 Tab  2 Tab BID Kev Araiza M.D.   2 Tab at 17    And   • polyethylene glycol/lytes (MIRALAX) PACKET 1 Packet  1 Packet QDAY PRN Kev Araiza M.D.   1 Packet at 17    And   • magnesium  hydroxide (MILK OF MAGNESIA) suspension 30 mL  30 mL QDAY PRN Kev Araiza M.D.   30 mL at 06/25/17 1548    And   • bisacodyl (DULCOLAX) suppository 10 mg  10 mg QDAY PRN Kev Araiza M.D.   10 mg at 06/26/17 1216   • chlorhexidine (PERIDEX) 0.12 % solution 15 mL  15 mL BID Kev Araiza M.D.   15 mL at 06/27/17 2051   • lidocaine (XYLOCAINE) 1%  injection  1-2 mL Q30 MIN PRN Kev Araiza M.D.       • MD ALERT...Adult ICU Electrolyte Replacement per Pharmacy Protocol   pharmacy to dose Kev Araiza M.D.       • famotidine (PEPCID) tablet 20 mg  20 mg Q12HRS Kev Araiza M.D.   20 mg at 06/27/17 2051   • ipratropium-albuterol (DUONEB) nebulizer solution 3 mL  3 mL Q2HRS PRN (RT) Kev Araiza M.D.       • glucose 4 g chewable tablet 16 g  16 g Q15 MIN PRN Kev Araiza M.D.        And   • dextrose 50% (D50W) injection 25 mL  25 mL Q15 MIN PRN Kev Araiza M.D.   25 mL at 06/20/17 1840   • ondansetron (ZOFRAN) syringe/vial injection 4 mg  4 mg Q4HRS PRN FARHAT MedinaOBucky   4 mg at 06/23/17 2238   • ondansetron (ZOFRAN ODT) dispertab 4 mg  4 mg Q4HRS PRN FARHAT MedinaO.       • promethazine (PHENERGAN) tablet 12.5-25 mg  12.5-25 mg Q4HRS PRN FARHAT MedinaO.       • promethazine (PHENERGAN) suppository 12.5-25 mg  12.5-25 mg Q4HRS PRN FARHAT MedinaO.       • prochlorperazine (COMPAZINE) injection 5-10 mg  5-10 mg Q4HRS PRN FARHAT MedinaO.       • lorazepam (ATIVAN) tablet 0.5 mg  0.5 mg Q6HRS PRN Clarence Morfin D.O.   Stopped at 06/19/17 0248    Or   • lorazepam (ATIVAN) injection 0.5 mg  0.5 mg Q6HRS PRN FARHAT MedinaO.   0.5 mg at 06/22/17 0044   • acetaminophen (TYLENOL) tablet 650 mg  650 mg Q6HRS PRN FARHAT MedinaO.   650 mg at 06/17/17 1857   • Pain Pump (patient supplied) Device   Continuous FARHAT MedinaOBucky   Stopped at 06/17/17 1230     Last reviewed on 6/17/2017  9:45 AM by Ligia Frazier, Pharmacy Int      Quality   Measures:  Labs reviewed, Medications reviewed and Radiology images reviewed        DVT Prophylaxis: Enoxaparin (Lovenox)  DVT prophylaxis - mechanical: SCDs  Ulcer prophylaxis: Yes  Antibiotics: Treating active infection/contamination beyond 24 hours perioperative coverage  Assessed for rehab: Patient was assess for and/or received rehabilitation services during this hospitalization        Assessment/Plan:  Acute hypoxic respiratory failure - intubated 6/19-6/27              - pulm toilet/aspiration precautions              - limit sedatives  New onset seizure activity with concern of status epilepticus              - keppra, phenobarb per neuro              - neuro following, ?reduction in dosage in case contributing to drowsiness  Aspiration pneumonia. Klebsiella pna              - completed abx x 7 days  Moderate Left sided Pleural Effusion s/p - thoracentesis 6/25 (transudative)  Hyponatremia              - decrease salt tabs  HypoK and Hyop Mag, phos - replace  Severe chronic underlying debility secondary to profound traumatic brain injury 18 years ago  Prophylaxis, enteral nutrition via PEG, therapies    Ok to transfer patient out of ICU today. [facility] will sign off at transfer. Please call with questions.    Discussed patient condition and risk of morbidity and/or mortality with RN, RT, Pharmacy, Charge nurse / hot rounds, Patient and hospitalist and neurology.        Mica WOODY (Conchita), am scribing for, and in the presence of, Jeremy Gonda M.D.   Electronically signed by: Mica Quintana (Conchita), 6/28/2017  I, Jeremy Gonda M.D.  personally performed the services described in this documentation, as scribed by iMca Quintana in my presence, and it is both accurate and complete.

## 2017-06-29 ENCOUNTER — APPOINTMENT (OUTPATIENT)
Dept: RADIOLOGY | Facility: MEDICAL CENTER | Age: 37
DRG: 004 | End: 2017-06-29
Attending: INTERNAL MEDICINE
Payer: COMMERCIAL

## 2017-06-29 ENCOUNTER — APPOINTMENT (OUTPATIENT)
Dept: RADIOLOGY | Facility: MEDICAL CENTER | Age: 37
DRG: 004 | End: 2017-06-29
Attending: HOSPITALIST
Payer: COMMERCIAL

## 2017-06-29 LAB
ANION GAP SERPL CALC-SCNC: 5 MMOL/L (ref 0–11.9)
ANION GAP SERPL CALC-SCNC: 6 MMOL/L (ref 0–11.9)
BASOPHILS # BLD AUTO: 0.4 % (ref 0–1.8)
BASOPHILS # BLD AUTO: 0.8 % (ref 0–1.8)
BASOPHILS # BLD: 0.07 K/UL (ref 0–0.12)
BASOPHILS # BLD: 0.07 K/UL (ref 0–0.12)
BUN SERPL-MCNC: 19 MG/DL (ref 8–22)
BUN SERPL-MCNC: 31 MG/DL (ref 8–22)
CALCIUM SERPL-MCNC: 8.1 MG/DL (ref 8.5–10.5)
CALCIUM SERPL-MCNC: 8.6 MG/DL (ref 8.5–10.5)
CHLORIDE SERPL-SCNC: 100 MMOL/L (ref 96–112)
CHLORIDE SERPL-SCNC: 107 MMOL/L (ref 96–112)
CO2 SERPL-SCNC: 27 MMOL/L (ref 20–33)
CO2 SERPL-SCNC: 27 MMOL/L (ref 20–33)
CREAT SERPL-MCNC: <0.2 MG/DL (ref 0.5–1.4)
CREAT SERPL-MCNC: <0.2 MG/DL (ref 0.5–1.4)
EOSINOPHIL # BLD AUTO: 0.17 K/UL (ref 0–0.51)
EOSINOPHIL # BLD AUTO: 0.31 K/UL (ref 0–0.51)
EOSINOPHIL NFR BLD: 1.7 % (ref 0–6.9)
EOSINOPHIL NFR BLD: 1.9 % (ref 0–6.9)
ERYTHROCYTE [DISTWIDTH] IN BLOOD BY AUTOMATED COUNT: 53.9 FL (ref 35.9–50)
ERYTHROCYTE [DISTWIDTH] IN BLOOD BY AUTOMATED COUNT: 54.5 FL (ref 35.9–50)
GFR SERPL CREATININE-BSD FRML MDRD: >60 ML/MIN/1.73 M 2
GFR SERPL CREATININE-BSD FRML MDRD: >60 ML/MIN/1.73 M 2
GLUCOSE SERPL-MCNC: 102 MG/DL (ref 65–99)
GLUCOSE SERPL-MCNC: 89 MG/DL (ref 65–99)
HCT VFR BLD AUTO: 35.1 % (ref 42–52)
HCT VFR BLD AUTO: 35.7 % (ref 42–52)
HGB BLD-MCNC: 11.5 G/DL (ref 14–18)
HGB BLD-MCNC: 11.9 G/DL (ref 14–18)
IMM GRANULOCYTES # BLD AUTO: 0.03 K/UL (ref 0–0.11)
IMM GRANULOCYTES # BLD AUTO: 0.07 K/UL (ref 0–0.11)
IMM GRANULOCYTES NFR BLD AUTO: 0.3 % (ref 0–0.9)
IMM GRANULOCYTES NFR BLD AUTO: 0.4 % (ref 0–0.9)
LACTATE BLD-SCNC: 0.8 MMOL/L (ref 0.5–2)
LYMPHOCYTES # BLD AUTO: 2.27 K/UL (ref 1–4.8)
LYMPHOCYTES # BLD AUTO: 2.28 K/UL (ref 1–4.8)
LYMPHOCYTES NFR BLD: 12.4 % (ref 22–41)
LYMPHOCYTES NFR BLD: 25.6 % (ref 22–41)
MCH RBC QN AUTO: 28.9 PG (ref 27–33)
MCH RBC QN AUTO: 29.3 PG (ref 27–33)
MCHC RBC AUTO-ENTMCNC: 32.8 G/DL (ref 33.7–35.3)
MCHC RBC AUTO-ENTMCNC: 33.3 G/DL (ref 33.7–35.3)
MCV RBC AUTO: 87.9 FL (ref 81.4–97.8)
MCV RBC AUTO: 88.2 FL (ref 81.4–97.8)
MONOCYTES # BLD AUTO: 0.85 K/UL (ref 0–0.85)
MONOCYTES # BLD AUTO: 1.39 K/UL (ref 0–0.85)
MONOCYTES NFR BLD AUTO: 7.6 % (ref 0–13.4)
MONOCYTES NFR BLD AUTO: 9.6 % (ref 0–13.4)
NEUTROPHILS # BLD AUTO: 14.28 K/UL (ref 1.82–7.42)
NEUTROPHILS # BLD AUTO: 5.47 K/UL (ref 1.82–7.42)
NEUTROPHILS NFR BLD: 61.8 % (ref 44–72)
NEUTROPHILS NFR BLD: 77.5 % (ref 44–72)
NRBC # BLD AUTO: 0 K/UL
NRBC # BLD AUTO: 0 K/UL
NRBC BLD AUTO-RTO: 0 /100 WBC
NRBC BLD AUTO-RTO: 0 /100 WBC
PLATELET # BLD AUTO: 559 K/UL (ref 164–446)
PLATELET # BLD AUTO: 617 K/UL (ref 164–446)
PMV BLD AUTO: 7.9 FL (ref 9–12.9)
PMV BLD AUTO: 8.4 FL (ref 9–12.9)
POTASSIUM SERPL-SCNC: 4 MMOL/L (ref 3.6–5.5)
POTASSIUM SERPL-SCNC: 4.1 MMOL/L (ref 3.6–5.5)
RBC # BLD AUTO: 3.98 M/UL (ref 4.7–6.1)
RBC # BLD AUTO: 4.06 M/UL (ref 4.7–6.1)
SODIUM SERPL-SCNC: 133 MMOL/L (ref 135–145)
SODIUM SERPL-SCNC: 139 MMOL/L (ref 135–145)
WBC # BLD AUTO: 18.4 K/UL (ref 4.8–10.8)
WBC # BLD AUTO: 8.9 K/UL (ref 4.8–10.8)

## 2017-06-29 PROCEDURE — 700102 HCHG RX REV CODE 250 W/ 637 OVERRIDE(OP): Performed by: PSYCHIATRY & NEUROLOGY

## 2017-06-29 PROCEDURE — A9270 NON-COVERED ITEM OR SERVICE: HCPCS | Performed by: INTERNAL MEDICINE

## 2017-06-29 PROCEDURE — 71010 DX-CHEST-LIMITED (1 VIEW): CPT

## 2017-06-29 PROCEDURE — 51798 US URINE CAPACITY MEASURE: CPT

## 2017-06-29 PROCEDURE — 87205 SMEAR GRAM STAIN: CPT

## 2017-06-29 PROCEDURE — 99291 CRITICAL CARE FIRST HOUR: CPT | Performed by: INTERNAL MEDICINE

## 2017-06-29 PROCEDURE — 80048 BASIC METABOLIC PNL TOTAL CA: CPT

## 2017-06-29 PROCEDURE — 94668 MNPJ CHEST WALL SBSQ: CPT

## 2017-06-29 PROCEDURE — 700102 HCHG RX REV CODE 250 W/ 637 OVERRIDE(OP)

## 2017-06-29 PROCEDURE — 31720 CLEARANCE OF AIRWAYS: CPT

## 2017-06-29 PROCEDURE — A9270 NON-COVERED ITEM OR SERVICE: HCPCS

## 2017-06-29 PROCEDURE — 700111 HCHG RX REV CODE 636 W/ 250 OVERRIDE (IP): Performed by: INTERNAL MEDICINE

## 2017-06-29 PROCEDURE — 83605 ASSAY OF LACTIC ACID: CPT

## 2017-06-29 PROCEDURE — A9270 NON-COVERED ITEM OR SERVICE: HCPCS | Performed by: PSYCHIATRY & NEUROLOGY

## 2017-06-29 PROCEDURE — 770022 HCHG ROOM/CARE - ICU (200)

## 2017-06-29 PROCEDURE — 71010 DX-CHEST-PORTABLE (1 VIEW): CPT

## 2017-06-29 PROCEDURE — 700102 HCHG RX REV CODE 250 W/ 637 OVERRIDE(OP): Performed by: INTERNAL MEDICINE

## 2017-06-29 PROCEDURE — 87186 SC STD MICRODIL/AGAR DIL: CPT

## 2017-06-29 PROCEDURE — 87070 CULTURE OTHR SPECIMN AEROBIC: CPT

## 2017-06-29 PROCEDURE — 99292 CRITICAL CARE ADDL 30 MIN: CPT | Performed by: INTERNAL MEDICINE

## 2017-06-29 PROCEDURE — 85025 COMPLETE CBC W/AUTO DIFF WBC: CPT | Mod: 91

## 2017-06-29 PROCEDURE — 87077 CULTURE AEROBIC IDENTIFY: CPT | Mod: 91

## 2017-06-29 PROCEDURE — 700102 HCHG RX REV CODE 250 W/ 637 OVERRIDE(OP): Performed by: HOSPITALIST

## 2017-06-29 PROCEDURE — 700105 HCHG RX REV CODE 258: Performed by: HOSPITALIST

## 2017-06-29 PROCEDURE — A9270 NON-COVERED ITEM OR SERVICE: HCPCS | Performed by: HOSPITALIST

## 2017-06-29 PROCEDURE — 99233 SBSQ HOSP IP/OBS HIGH 50: CPT | Performed by: INTERNAL MEDICINE

## 2017-06-29 RX ORDER — SODIUM CHLORIDE 9 MG/ML
INJECTION, SOLUTION INTRAVENOUS CONTINUOUS
Status: ACTIVE | OUTPATIENT
Start: 2017-06-29 | End: 2017-06-30

## 2017-06-29 RX ORDER — FAMOTIDINE 20 MG/1
20 TABLET, FILM COATED ORAL 2 TIMES DAILY
Status: DISCONTINUED | OUTPATIENT
Start: 2017-06-29 | End: 2017-06-29

## 2017-06-29 RX ORDER — MIDAZOLAM HYDROCHLORIDE 1 MG/ML
INJECTION INTRAMUSCULAR; INTRAVENOUS
Status: COMPLETED
Start: 2017-06-29 | End: 2017-06-30

## 2017-06-29 RX ORDER — FAMOTIDINE 20 MG/1
20 TABLET, FILM COATED ORAL 2 TIMES DAILY
Status: DISCONTINUED | OUTPATIENT
Start: 2017-06-29 | End: 2017-07-11 | Stop reason: HOSPADM

## 2017-06-29 RX ADMIN — FAMOTIDINE 20 MG: 20 TABLET, FILM COATED ORAL at 17:45

## 2017-06-29 RX ADMIN — SODIUM CHLORIDE: 9 INJECTION, SOLUTION INTRAVENOUS at 17:47

## 2017-06-29 RX ADMIN — SODIUM CHLORIDE TAB 1 GM 1 G: 1 TAB at 13:12

## 2017-06-29 RX ADMIN — POTASSIUM CHLORIDE 40 MEQ: 1.5 POWDER, FOR SOLUTION ORAL at 20:30

## 2017-06-29 RX ADMIN — SENNOSIDES AND DOCUSATE SODIUM 2 TABLET: 8.6; 5 TABLET ORAL at 07:37

## 2017-06-29 RX ADMIN — LEVETIRACETAM 1500 MG: 100 SOLUTION ORAL at 20:14

## 2017-06-29 RX ADMIN — POTASSIUM CHLORIDE 40 MEQ: 1.5 POWDER, FOR SOLUTION ORAL at 17:45

## 2017-06-29 RX ADMIN — LEVETIRACETAM 1500 MG: 100 SOLUTION ORAL at 07:37

## 2017-06-29 RX ADMIN — ENOXAPARIN SODIUM 30 MG: 100 INJECTION SUBCUTANEOUS at 20:14

## 2017-06-29 RX ADMIN — SENNOSIDES AND DOCUSATE SODIUM 2 TABLET: 8.6; 5 TABLET ORAL at 20:15

## 2017-06-29 RX ADMIN — PHENOBARBITAL 70 MG: 20 ELIXIR ORAL at 20:15

## 2017-06-29 RX ADMIN — PHENOBARBITAL 70 MG: 20 ELIXIR ORAL at 07:38

## 2017-06-29 RX ADMIN — SODIUM CHLORIDE TAB 1 GM 1 G: 1 TAB at 07:37

## 2017-06-29 RX ADMIN — POTASSIUM CHLORIDE 40 MEQ: 1.5 POWDER, FOR SOLUTION ORAL at 07:37

## 2017-06-29 RX ADMIN — SODIUM CHLORIDE TAB 1 GM 1 G: 1 TAB at 17:48

## 2017-06-29 NOTE — PROGRESS NOTES
TRAVIS A+O status, pt is nonverbal. Nonverbal cues used to assess pain, pt is sleeping calmly. Q2h turning in place to maintain integrity of pt's skin. Waffle cushion is in use, heals floated, mepliex used prophylacticly. Skin wound noted on left ischium, sacrum is blanching. Central line removal dressing is CDI. Contractures noted Q4 extremities. Pt has been having loose stools, incontinent of urine, scant irene blood assessed at tip of penis, has since stopped.oral care performed as necessary, suctioned PRN. Found to be tachy at times. Pt repositioned and oxygen applied at 1 LPM. Peg tube assessed, bereket skin has erythema. TF running at 50, which is goal, HOB elevated at all times.

## 2017-06-29 NOTE — CARE PLAN
Problem: Safety  Goal: Will remain free from falls  Intervention: Implement fall precautions  Bed alarm, non skid socks, mobility sign, in place.      Problem: Mobility  Goal: Risk for activity intolerance will decrease  Intervention: Assess and monitor signs of activity intolerance  q2 turrn.

## 2017-06-29 NOTE — PROGRESS NOTES
0544: MD paged for bladder scan results.     0546: one time order to straight cath now     0610: pt straight cathed for 700ml

## 2017-06-29 NOTE — PROGRESS NOTES
[facility] Hospitalist Progress Note    Date of Service: 2017    Chief Complaint  36 y.o. male admitted 2017 with altered mental status.  Severe TBI with status and aspiration.     Interval Problem Update  Febrile overnight  Now requiring 2 liters supplemental oxygen  Tachycardic   Mother requesting for o2 to be removed and to do tube feeds on her own    Taper salt tabs    Consultants/Specialty  Neurology  Pulmonology    Disposition  LTACH referral placed.   Mother would prefer home. Was feeding him at home.       Review of Systems   Unable to perform ROS: intubated      Physical Exam  Laboratory/Imaging   Hemodynamics  Temp (24hrs), Av.5 °C (99.5 °F), Min:36.7 °C (98 °F), Max:38.1 °C (100.5 °F)   Temperature: 37.8 °C (100.1 °F)  Pulse  Av.1  Min: 60  Max: 131    Blood Pressure: 135/84 mmHg     Respiratory      Respiration: 18, Pulse Oximetry: 94 %, O2 Daily Delivery Respiratory : Nasal Cannula     PEP/CPT Method: Positive Airway Pressure Device, Work Of Breathing / Effort: Mild  RUL Breath Sounds: Fine Crackles, RML Breath Sounds: Diminished, RLL Breath Sounds: Diminished, JONATAN Breath Sounds: Fine Crackles, LLL Breath Sounds: Diminished    Fluids    Intake/Output Summary (Last 24 hours) at 17 1418  Last data filed at 17 1336   Gross per 24 hour   Intake    600 ml   Output   1700 ml   Net  -1100 ml       Nutrition  Orders Placed This Encounter   Procedures   • Diet NPO     Standing Status: Standing      Number of Occurrences: 1      Standing Expiration Date:      Order Specific Question:  Type:     Answer:  Now [1]     Order Specific Question:  Restrict to:     Answer:  With Tube Feed [4]      Comments:  yogurt and milk ok for delivery. used in bolus formula. not for PO intake     Physical Exam   Constitutional: He appears ill. No distress.   HENT:   Right Ear: External ear normal.   Left Ear: External ear normal.   Nose: Nose normal.   Eyes: Pupils are equal, round, and reactive to light.  No scleral icterus.   Neck: No JVD present. No tracheal deviation present.   Right IJ central line without bleeding.   Well-healed tracheostomy site with indentation.    Cardiovascular: Regular rhythm.  Tachycardia present.    No murmur heard.  Pulmonary/Chest: Effort normal and breath sounds normal. No respiratory distress.   Abdominal: Soft. Bowel sounds are normal. He exhibits no distension.   PEG. Baclofen pump palpable on abdomen.   PEG tube site erythematous and with purulent drainage   Genitourinary:   [place]rop    Musculoskeletal: He exhibits edema. He exhibits no tenderness.   Poor muscle tone, severe contractures of the arms and legs with significant deformity.    Neurological: He is alert.   Eyes are open but he does not track   Skin: Skin is warm and dry. He is not diaphoretic. There is pallor.   Nursing note and vitals reviewed.      Recent Labs      06/27/17   0400  06/28/17 0219 06/29/17   0513   WBC  6.2  5.9  8.9   RBC  3.35*  3.42*  3.98*   HEMOGLOBIN  9.8*  9.9*  11.5*   HEMATOCRIT  29.4*  29.9*  35.1*   MCV  87.8  87.4  88.2   MCH  29.3  28.9  28.9   MCHC  33.3*  33.1*  32.8*   RDW  54.4*  52.9*  54.5*   PLATELETCT  342  429  617*   MPV  8.7*  8.8*  8.4*     Recent Labs      06/27/17   0400  06/28/17 0219 06/29/17   0513   SODIUM  138  144  139   POTASSIUM  4.1  3.8  4.0   CHLORIDE  109  113*  107   CO2  24  26  27   GLUCOSE  132*  108*  102*   BUN  22  27*  31*   CREATININE  <0.20*  <0.20*  <0.20*   CALCIUM  8.2*  8.6  8.6                      Assessment/Plan     Status epilepticus (CMS-[facility]) (present on admission)  Assessment & Plan  - had continuous seizure activity for approximately 48 hours  - neurology following  - on phenobarbital and keppra  - mother wants patient to be taken off soon  - seizure precautions    Aspiration pneumonia (CMS-HCC) (present on admission)  Assessment & Plan  - pt unable to clear secretions   - culture growing Klebsiella resistant to ampicillin thus rocephin to  cover aspiration for 10 days.  Bronchoscopy 6/25.  Thoracentesis with 300 ml off on 6/25.    - now requiring supplemental oxygen  - currently off of abx  - will repeat cxr        Sepsis (CMS-HCC)  Assessment & Plan  - secondary to aspiration pneumonia  -on 6/25 he became quite hypotensive.  Midodrine was started and will be stopped now that BP has improved. Cortisol level low at 5.7 thus IV hydrocortisone ordered.   Completed today.     - febrile and tachycardic overnight; o2 sats declining therefore placed on supplemental oxygen  - will check cxr  - may need to restart abx if he worsens any further    Acute respiratory failure with hypoxia (CMS-HCC) (present on admission)  Assessment & Plan  - liberated off vent a few days  - now requiring 2 liter o2    Hyponatremia (present on admission)  Assessment & Plan  - resolved  - still on salt tabs     Hypoglycemia (present on admission)  Assessment & Plan  - resolved    Encephalopathy acute (present on admission)  Assessment & Plan  Secondary to status epilepticus and pneumonia.  His mentation is unchanged although mother feels he is not blinking as much     TBI (traumatic brain injury) (CMS-HCC) (present on admission)  Assessment & Plan  Since age 17 years.  With quadriplegia   Baclofen pump followed at [facility]--his mother states it does not need refilled for a few months.    Hypokalemia (present on admission)  Assessment & Plan  - replenished      Labs reviewed, Medications reviewed and Radiology images reviewed        DVT Prophylaxis: Enoxaparin (Lovenox)      Antibiotics: Treating active infection/contamination beyond 24 hours perioperative coverage  Assessed for rehab: Patient was assess for and/or received rehabilitation services during this hospitalization

## 2017-06-29 NOTE — PROGRESS NOTES
Paged dr fontenot concerning xray results. Per Dr Fontenot, pulmonary will be seeing pt today or tomorrow.   Mother of pt stated intent to free water flush 180 ml every 1.5  Hours.   Mother of pt also wants the following noted:  Pt is to have sheet covering lower legs ONLY.   No blankets at all  She would like temp taken every hour, minimum

## 2017-06-29 NOTE — CARE PLAN
Problem: Venous Thromboembolism (VTW)/Deep Vein Thrombosis (DVT) Prevention:  Goal: Patient will participate in Venous Thrombosis (VTE)/Deep Vein Thrombosis (DVT)Prevention Measures  SCDs in place     Problem: Skin Integrity  Goal: Risk for impaired skin integrity will decrease  Q2h turning in place to maintain integrity of pt's skin.      Problem: Respiratory:  Goal: Respiratory status will improve  Oral care performed PRN     Problem: Urinary Elimination:  Goal: Ability to reestablish a normal urinary elimination pattern will improve  Pt is incontinent or urine

## 2017-06-29 NOTE — CARE PLAN
Problem: Skin Integrity  Goal: Risk for impaired skin integrity will decrease  Intervention: Assess and monitor skin integrity, appearance and/or temperature  Q2 turns. Mepilex.       Problem: Pain Management  Goal: Pain level will decrease to patient’s comfort goal  Intervention: Follow pain managment plan developed in collaboration with patient and Interdisciplinary Team  No s/sx of pain at this time.

## 2017-06-30 ENCOUNTER — APPOINTMENT (OUTPATIENT)
Dept: RADIOLOGY | Facility: MEDICAL CENTER | Age: 37
DRG: 004 | End: 2017-06-30
Attending: INTERNAL MEDICINE
Payer: COMMERCIAL

## 2017-06-30 LAB
ANION GAP SERPL CALC-SCNC: 6 MMOL/L (ref 0–11.9)
APPEARANCE UR: CLEAR
APTT PPP: 28.7 SEC (ref 24.7–36)
BACTERIA #/AREA URNS HPF: NEGATIVE /HPF
BASE EXCESS BLDA CALC-SCNC: 0 MMOL/L (ref -4–3)
BASE EXCESS BLDA CALC-SCNC: 0 MMOL/L (ref -4–3)
BASOPHILS # BLD AUTO: 0.3 % (ref 0–1.8)
BASOPHILS # BLD: 0.05 K/UL (ref 0–0.12)
BILIRUB UR QL STRIP.AUTO: NEGATIVE
BODY TEMPERATURE: ABNORMAL DEGREES
BODY TEMPERATURE: ABNORMAL DEGREES
BUN SERPL-MCNC: 17 MG/DL (ref 8–22)
CALCIUM SERPL-MCNC: 7.3 MG/DL (ref 8.5–10.5)
CHLORIDE SERPL-SCNC: 104 MMOL/L (ref 96–112)
CO2 BLDA-SCNC: 24 MMOL/L (ref 20–33)
CO2 BLDA-SCNC: 26 MMOL/L (ref 20–33)
CO2 SERPL-SCNC: 26 MMOL/L (ref 20–33)
COLOR UR: YELLOW
CREAT SERPL-MCNC: <0.2 MG/DL (ref 0.5–1.4)
EOSINOPHIL # BLD AUTO: 0.26 K/UL (ref 0–0.51)
EOSINOPHIL NFR BLD: 1.5 % (ref 0–6.9)
EPI CELLS #/AREA URNS HPF: NEGATIVE /HPF
ERYTHROCYTE [DISTWIDTH] IN BLOOD BY AUTOMATED COUNT: 52.8 FL (ref 35.9–50)
GFR SERPL CREATININE-BSD FRML MDRD: >60 ML/MIN/1.73 M 2
GLUCOSE SERPL-MCNC: 87 MG/DL (ref 65–99)
GLUCOSE UR STRIP.AUTO-MCNC: NEGATIVE MG/DL
GRAM STN SPEC: NORMAL
HCO3 BLDA-SCNC: 22.8 MMOL/L (ref 17–25)
HCO3 BLDA-SCNC: 24.7 MMOL/L (ref 17–25)
HCT VFR BLD AUTO: 31.3 % (ref 42–52)
HGB BLD-MCNC: 10.3 G/DL (ref 14–18)
HYALINE CASTS #/AREA URNS LPF: ABNORMAL /LPF
IMM GRANULOCYTES # BLD AUTO: 0.11 K/UL (ref 0–0.11)
IMM GRANULOCYTES NFR BLD AUTO: 0.6 % (ref 0–0.9)
INR PPP: 1.01 (ref 0.87–1.13)
KETONES UR STRIP.AUTO-MCNC: NEGATIVE MG/DL
LACTATE BLD-SCNC: 0.9 MMOL/L (ref 0.5–2)
LACTATE BLD-SCNC: 0.9 MMOL/L (ref 0.5–2)
LDH SERPL-CCNC: 141 U/L (ref 107–266)
LEUKOCYTE ESTERASE UR QL STRIP.AUTO: NEGATIVE
LYMPHOCYTES # BLD AUTO: 2.09 K/UL (ref 1–4.8)
LYMPHOCYTES NFR BLD: 11.8 % (ref 22–41)
MCH RBC QN AUTO: 29 PG (ref 27–33)
MCHC RBC AUTO-ENTMCNC: 32.9 G/DL (ref 33.7–35.3)
MCV RBC AUTO: 88.2 FL (ref 81.4–97.8)
MICRO URNS: ABNORMAL
MONOCYTES # BLD AUTO: 1.11 K/UL (ref 0–0.85)
MONOCYTES NFR BLD AUTO: 6.3 % (ref 0–13.4)
NEUTROPHILS # BLD AUTO: 14.12 K/UL (ref 1.82–7.42)
NEUTROPHILS NFR BLD: 79.5 % (ref 44–72)
NITRITE UR QL STRIP.AUTO: NEGATIVE
NRBC # BLD AUTO: 0 K/UL
NRBC BLD AUTO-RTO: 0 /100 WBC
O2/TOTAL GAS SETTING VFR VENT: 100 %
O2/TOTAL GAS SETTING VFR VENT: 60 %
PCO2 BLDA: 31.5 MMHG (ref 26–37)
PCO2 BLDA: 40.2 MMHG (ref 26–37)
PCO2 TEMP ADJ BLDA: 31.3 MMHG (ref 26–37)
PCO2 TEMP ADJ BLDA: 40.7 MMHG (ref 26–37)
PH BLDA: 7.4 [PH] (ref 7.4–7.5)
PH BLDA: 7.47 [PH] (ref 7.4–7.5)
PH TEMP ADJ BLDA: 7.39 [PH] (ref 7.4–7.5)
PH TEMP ADJ BLDA: 7.47 [PH] (ref 7.4–7.5)
PH UR STRIP.AUTO: 8.5 [PH]
PLATELET # BLD AUTO: 512 K/UL (ref 164–446)
PMV BLD AUTO: 8 FL (ref 9–12.9)
PO2 BLDA: 105 MMHG (ref 64–87)
PO2 BLDA: 136 MMHG (ref 64–87)
PO2 TEMP ADJ BLDA: 107 MMHG (ref 64–87)
PO2 TEMP ADJ BLDA: 135 MMHG (ref 64–87)
POTASSIUM SERPL-SCNC: 3.4 MMOL/L (ref 3.6–5.5)
PROCALCITONIN SERPL-MCNC: 0.05 NG/ML
PROT SERPL-MCNC: 5.7 G/DL (ref 6–8.2)
PROT UR QL STRIP: NEGATIVE MG/DL
PROTHROMBIN TIME: 13.6 SEC (ref 12–14.6)
RBC # BLD AUTO: 3.55 M/UL (ref 4.7–6.1)
RBC # URNS HPF: ABNORMAL /HPF
RBC UR QL AUTO: ABNORMAL
RHODAMINE-AURAMINE STN SPEC: NORMAL
RHODAMINE-AURAMINE STN SPEC: NORMAL
SAO2 % BLDA: 98 % (ref 93–99)
SAO2 % BLDA: 99 % (ref 93–99)
SIGNIFICANT IND 70042: NORMAL
SITE SITE: NORMAL
SODIUM SERPL-SCNC: 136 MMOL/L (ref 135–145)
SOURCE SOURCE: NORMAL
SP GR UR STRIP.AUTO: 1.02
SPECIMEN DRAWN FROM PATIENT: ABNORMAL
SPECIMEN DRAWN FROM PATIENT: ABNORMAL
TRIGL SERPL-MCNC: 71 MG/DL (ref 0–149)
VANCOMYCIN SERPL-MCNC: 10.9 UG/ML
WBC # BLD AUTO: 17.7 K/UL (ref 4.8–10.8)
WBC #/AREA URNS HPF: ABNORMAL /HPF

## 2017-06-30 PROCEDURE — 700111 HCHG RX REV CODE 636 W/ 250 OVERRIDE (IP)

## 2017-06-30 PROCEDURE — 87015 SPECIMEN INFECT AGNT CONCNTJ: CPT | Mod: 91

## 2017-06-30 PROCEDURE — 87205 SMEAR GRAM STAIN: CPT

## 2017-06-30 PROCEDURE — 0B9J8ZX DRAINAGE OF LEFT LOWER LUNG LOBE, VIA NATURAL OR ARTIFICIAL OPENING ENDOSCOPIC, DIAGNOSTIC: ICD-10-PCS | Performed by: INTERNAL MEDICINE

## 2017-06-30 PROCEDURE — 99233 SBSQ HOSP IP/OBS HIGH 50: CPT | Performed by: HOSPITALIST

## 2017-06-30 PROCEDURE — 700105 HCHG RX REV CODE 258: Performed by: INTERNAL MEDICINE

## 2017-06-30 PROCEDURE — A9270 NON-COVERED ITEM OR SERVICE: HCPCS

## 2017-06-30 PROCEDURE — 302977 HCHG BRONCHOSCOPY PROC-THERAPEUTIC

## 2017-06-30 PROCEDURE — 81001 URINALYSIS AUTO W/SCOPE: CPT

## 2017-06-30 PROCEDURE — 87116 MYCOBACTERIA CULTURE: CPT

## 2017-06-30 PROCEDURE — 302214 INTUBATION BOX: Performed by: INTERNAL MEDICINE

## 2017-06-30 PROCEDURE — 87077 CULTURE AEROBIC IDENTIFY: CPT

## 2017-06-30 PROCEDURE — A9270 NON-COVERED ITEM OR SERVICE: HCPCS | Performed by: INTERNAL MEDICINE

## 2017-06-30 PROCEDURE — 700111 HCHG RX REV CODE 636 W/ 250 OVERRIDE (IP): Performed by: INTERNAL MEDICINE

## 2017-06-30 PROCEDURE — 88305 TISSUE EXAM BY PATHOLOGIST: CPT

## 2017-06-30 PROCEDURE — 3E1F88Z IRRIGATION OF RESPIRATORY TRACT USING IRRIGATING SUBSTANCE, VIA NATURAL OR ARTIFICIAL OPENING ENDOSCOPIC: ICD-10-PCS | Performed by: INTERNAL MEDICINE

## 2017-06-30 PROCEDURE — 92950 HEART/LUNG RESUSCITATION CPR: CPT

## 2017-06-30 PROCEDURE — 0BH18EZ INSERTION OF ENDOTRACHEAL AIRWAY INTO TRACHEA, VIA NATURAL OR ARTIFICIAL OPENING ENDOSCOPIC: ICD-10-PCS | Performed by: INTERNAL MEDICINE

## 2017-06-30 PROCEDURE — 94003 VENT MGMT INPAT SUBQ DAY: CPT

## 2017-06-30 PROCEDURE — 700101 HCHG RX REV CODE 250

## 2017-06-30 PROCEDURE — 85730 THROMBOPLASTIN TIME PARTIAL: CPT

## 2017-06-30 PROCEDURE — 80202 ASSAY OF VANCOMYCIN: CPT

## 2017-06-30 PROCEDURE — 700102 HCHG RX REV CODE 250 W/ 637 OVERRIDE(OP): Performed by: HOSPITALIST

## 2017-06-30 PROCEDURE — 700101 HCHG RX REV CODE 250: Performed by: INTERNAL MEDICINE

## 2017-06-30 PROCEDURE — 700102 HCHG RX REV CODE 250 W/ 637 OVERRIDE(OP)

## 2017-06-30 PROCEDURE — 87186 SC STD MICRODIL/AGAR DIL: CPT

## 2017-06-30 PROCEDURE — 87070 CULTURE OTHR SPECIMN AEROBIC: CPT | Mod: 91

## 2017-06-30 PROCEDURE — 700102 HCHG RX REV CODE 250 W/ 637 OVERRIDE(OP): Performed by: INTERNAL MEDICINE

## 2017-06-30 PROCEDURE — 31645 BRNCHSC W/THER ASPIR 1ST: CPT | Mod: AG | Performed by: INTERNAL MEDICINE

## 2017-06-30 PROCEDURE — 82803 BLOOD GASES ANY COMBINATION: CPT

## 2017-06-30 PROCEDURE — 84155 ASSAY OF PROTEIN SERUM: CPT

## 2017-06-30 PROCEDURE — 84145 PROCALCITONIN (PCT): CPT

## 2017-06-30 PROCEDURE — 700105 HCHG RX REV CODE 258

## 2017-06-30 PROCEDURE — 94669 MECHANICAL CHEST WALL OSCILL: CPT

## 2017-06-30 PROCEDURE — 99291 CRITICAL CARE FIRST HOUR: CPT | Mod: 25 | Performed by: INTERNAL MEDICINE

## 2017-06-30 PROCEDURE — C1751 CATH, INF, PER/CENT/MIDLINE: HCPCS | Performed by: INTERNAL MEDICINE

## 2017-06-30 PROCEDURE — 71010 DX-CHEST-PORTABLE (1 VIEW): CPT

## 2017-06-30 PROCEDURE — 84478 ASSAY OF TRIGLYCERIDES: CPT

## 2017-06-30 PROCEDURE — 5A1955Z RESPIRATORY VENTILATION, GREATER THAN 96 CONSECUTIVE HOURS: ICD-10-PCS | Performed by: INTERNAL MEDICINE

## 2017-06-30 PROCEDURE — 83605 ASSAY OF LACTIC ACID: CPT | Mod: 91

## 2017-06-30 PROCEDURE — 36600 WITHDRAWAL OF ARTERIAL BLOOD: CPT

## 2017-06-30 PROCEDURE — 71010 DX-CHEST-LIMITED (1 VIEW): CPT

## 2017-06-30 PROCEDURE — 88112 CYTOPATH CELL ENHANCE TECH: CPT

## 2017-06-30 PROCEDURE — 99152 MOD SED SAME PHYS/QHP 5/>YRS: CPT

## 2017-06-30 PROCEDURE — 31500 INSERT EMERGENCY AIRWAY: CPT

## 2017-06-30 PROCEDURE — 770022 HCHG ROOM/CARE - ICU (200)

## 2017-06-30 PROCEDURE — 87106 FUNGI IDENTIFICATION YEAST: CPT | Mod: 91

## 2017-06-30 PROCEDURE — 94668 MNPJ CHEST WALL SBSQ: CPT

## 2017-06-30 PROCEDURE — 31500 INSERT EMERGENCY AIRWAY: CPT | Mod: 51 | Performed by: INTERNAL MEDICINE

## 2017-06-30 PROCEDURE — 94640 AIRWAY INHALATION TREATMENT: CPT

## 2017-06-30 PROCEDURE — 36556 INSERT NON-TUNNEL CV CATH: CPT | Mod: 51 | Performed by: INTERNAL MEDICINE

## 2017-06-30 PROCEDURE — 700102 HCHG RX REV CODE 250 W/ 637 OVERRIDE(OP): Performed by: PSYCHIATRY & NEUROLOGY

## 2017-06-30 PROCEDURE — 85025 COMPLETE CBC W/AUTO DIFF WBC: CPT

## 2017-06-30 PROCEDURE — 87102 FUNGUS ISOLATION CULTURE: CPT | Mod: 91

## 2017-06-30 PROCEDURE — 87206 SMEAR FLUORESCENT/ACID STAI: CPT | Mod: 91

## 2017-06-30 PROCEDURE — 76937 US GUIDE VASCULAR ACCESS: CPT

## 2017-06-30 PROCEDURE — A9270 NON-COVERED ITEM OR SERVICE: HCPCS | Performed by: PSYCHIATRY & NEUROLOGY

## 2017-06-30 PROCEDURE — 87040 BLOOD CULTURE FOR BACTERIA: CPT | Mod: 91

## 2017-06-30 PROCEDURE — 80048 BASIC METABOLIC PNL TOTAL CA: CPT

## 2017-06-30 PROCEDURE — 31624 DX BRONCHOSCOPE/LAVAGE: CPT | Mod: 51 | Performed by: INTERNAL MEDICINE

## 2017-06-30 PROCEDURE — 85610 PROTHROMBIN TIME: CPT

## 2017-06-30 PROCEDURE — A9270 NON-COVERED ITEM OR SERVICE: HCPCS | Performed by: HOSPITALIST

## 2017-06-30 PROCEDURE — 36556 INSERT NON-TUNNEL CV CATH: CPT

## 2017-06-30 PROCEDURE — C1751 CATH, INF, PER/CENT/MIDLINE: HCPCS

## 2017-06-30 PROCEDURE — 02HV33Z INSERTION OF INFUSION DEVICE INTO SUPERIOR VENA CAVA, PERCUTANEOUS APPROACH: ICD-10-PCS | Performed by: INTERNAL MEDICINE

## 2017-06-30 PROCEDURE — 83615 LACTATE (LD) (LDH) ENZYME: CPT

## 2017-06-30 RX ORDER — ASCORBIC ACID 500 MG/ML
1500 INJECTION, SOLUTION INTRAMUSCULAR; INTRAVENOUS; SUBCUTANEOUS EVERY 6 HOURS
Status: DISCONTINUED | OUTPATIENT
Start: 2017-06-30 | End: 2017-06-30

## 2017-06-30 RX ORDER — AMOXICILLIN 250 MG
2 CAPSULE ORAL 2 TIMES DAILY
Status: DISCONTINUED | OUTPATIENT
Start: 2017-06-30 | End: 2017-06-30

## 2017-06-30 RX ORDER — ROCURONIUM BROMIDE 10 MG/ML
50 INJECTION, SOLUTION INTRAVENOUS ONCE
Status: COMPLETED | OUTPATIENT
Start: 2017-06-30 | End: 2017-06-30

## 2017-06-30 RX ORDER — SODIUM CHLORIDE 9 MG/ML
INJECTION, SOLUTION INTRAVENOUS CONTINUOUS
Status: DISCONTINUED | OUTPATIENT
Start: 2017-06-30 | End: 2017-07-06

## 2017-06-30 RX ORDER — SODIUM CHLORIDE 9 MG/ML
1000 INJECTION, SOLUTION INTRAVENOUS
Status: COMPLETED | OUTPATIENT
Start: 2017-06-30 | End: 2017-07-01

## 2017-06-30 RX ORDER — MIDAZOLAM HYDROCHLORIDE 1 MG/ML
5 INJECTION INTRAMUSCULAR; INTRAVENOUS
Status: DISCONTINUED | OUTPATIENT
Start: 2017-06-30 | End: 2017-07-01

## 2017-06-30 RX ORDER — MIDAZOLAM HYDROCHLORIDE 1 MG/ML
1 INJECTION INTRAMUSCULAR; INTRAVENOUS
Status: DISCONTINUED | OUTPATIENT
Start: 2017-06-30 | End: 2017-07-01

## 2017-06-30 RX ORDER — SODIUM CHLORIDE 9 MG/ML
30 INJECTION, SOLUTION INTRAVENOUS
Status: COMPLETED | OUTPATIENT
Start: 2017-06-30 | End: 2017-06-30

## 2017-06-30 RX ORDER — MIDAZOLAM HYDROCHLORIDE 1 MG/ML
2 INJECTION INTRAMUSCULAR; INTRAVENOUS
Status: DISCONTINUED | OUTPATIENT
Start: 2017-06-30 | End: 2017-07-01

## 2017-06-30 RX ORDER — NOREPINEPHRINE BITARTRATE 1 MG/ML
INJECTION, SOLUTION INTRAVENOUS
Status: COMPLETED
Start: 2017-06-30 | End: 2017-06-30

## 2017-06-30 RX ORDER — IPRATROPIUM BROMIDE AND ALBUTEROL SULFATE 2.5; .5 MG/3ML; MG/3ML
3 SOLUTION RESPIRATORY (INHALATION)
Status: DISCONTINUED | OUTPATIENT
Start: 2017-06-30 | End: 2017-07-08 | Stop reason: SINTOL

## 2017-06-30 RX ORDER — BISACODYL 10 MG
10 SUPPOSITORY, RECTAL RECTAL
Status: DISCONTINUED | OUTPATIENT
Start: 2017-06-30 | End: 2017-06-30

## 2017-06-30 RX ORDER — POLYETHYLENE GLYCOL 3350 17 G/17G
1 POWDER, FOR SOLUTION ORAL
Status: DISCONTINUED | OUTPATIENT
Start: 2017-06-30 | End: 2017-06-30

## 2017-06-30 RX ORDER — LIDOCAINE HYDROCHLORIDE 10 MG/ML
1-2 INJECTION, SOLUTION INFILTRATION; PERINEURAL
Status: DISCONTINUED | OUTPATIENT
Start: 2017-06-30 | End: 2017-07-11 | Stop reason: HOSPADM

## 2017-06-30 RX ORDER — CHLORHEXIDINE GLUCONATE ORAL RINSE 1.2 MG/ML
15 SOLUTION DENTAL 2 TIMES DAILY
Status: DISCONTINUED | OUTPATIENT
Start: 2017-06-30 | End: 2017-07-11 | Stop reason: HOSPADM

## 2017-06-30 RX ADMIN — CHLORHEXIDINE GLUCONATE 15 ML: 1.2 RINSE ORAL at 02:28

## 2017-06-30 RX ADMIN — HYDROCORTISONE SODIUM SUCCINATE 50 MG: 100 INJECTION, POWDER, FOR SOLUTION INTRAMUSCULAR; INTRAVENOUS at 17:20

## 2017-06-30 RX ADMIN — CHLORHEXIDINE GLUCONATE 15 ML: 1.2 RINSE ORAL at 08:46

## 2017-06-30 RX ADMIN — LEVETIRACETAM 1500 MG: 100 SOLUTION ORAL at 21:18

## 2017-06-30 RX ADMIN — SENNOSIDES AND DOCUSATE SODIUM 2 TABLET: 8.6; 5 TABLET ORAL at 08:46

## 2017-06-30 RX ADMIN — HYDROCORTISONE SODIUM SUCCINATE 50 MG: 100 INJECTION, POWDER, FOR SOLUTION INTRAMUSCULAR; INTRAVENOUS at 11:55

## 2017-06-30 RX ADMIN — ROCURONIUM BROMIDE 50 MG: 10 INJECTION, SOLUTION INTRAVENOUS at 00:49

## 2017-06-30 RX ADMIN — PHENOBARBITAL 70 MG: 20 ELIXIR ORAL at 21:15

## 2017-06-30 RX ADMIN — FENTANYL CITRATE 50 MCG: 50 INJECTION, SOLUTION INTRAMUSCULAR; INTRAVENOUS at 00:00

## 2017-06-30 RX ADMIN — VANCOMYCIN HYDROCHLORIDE 800 MG: 100 INJECTION, POWDER, LYOPHILIZED, FOR SOLUTION INTRAVENOUS at 15:10

## 2017-06-30 RX ADMIN — IPRATROPIUM BROMIDE AND ALBUTEROL SULFATE 3 ML: .5; 3 SOLUTION RESPIRATORY (INHALATION) at 00:38

## 2017-06-30 RX ADMIN — PROPOFOL 30 MG: 10 INJECTION, EMULSION INTRAVENOUS at 00:19

## 2017-06-30 RX ADMIN — POTASSIUM CHLORIDE 40 MEQ: 1.5 POWDER, FOR SOLUTION ORAL at 21:17

## 2017-06-30 RX ADMIN — NOREPINEPHRINE BITARTRATE 5 MCG/MIN: 1 INJECTION INTRAVENOUS at 03:15

## 2017-06-30 RX ADMIN — SODIUM CHLORIDE TAB 1 GM 1 G: 1 TAB at 11:55

## 2017-06-30 RX ADMIN — LEVETIRACETAM 1500 MG: 100 SOLUTION ORAL at 09:39

## 2017-06-30 RX ADMIN — VANCOMYCIN HYDROCHLORIDE 1400 MG: 100 INJECTION, POWDER, LYOPHILIZED, FOR SOLUTION INTRAVENOUS at 02:17

## 2017-06-30 RX ADMIN — NOREPINEPHRINE BITARTRATE 2 MCG/MIN: 1 INJECTION INTRAVENOUS at 18:32

## 2017-06-30 RX ADMIN — ASCORBIC ACID: 500 INJECTION, SOLUTION INTRAMUSCULAR; INTRAVENOUS; SUBCUTANEOUS at 03:16

## 2017-06-30 RX ADMIN — THIAMINE HYDROCHLORIDE 200 MG: 100 INJECTION, SOLUTION INTRAMUSCULAR; INTRAVENOUS at 02:39

## 2017-06-30 RX ADMIN — PIPERACILLIN SODIUM AND TAZOBACTAM SODIUM 4.5 G: 4; .5 INJECTION, POWDER, FOR SOLUTION INTRAVENOUS at 01:40

## 2017-06-30 RX ADMIN — SODIUM CHLORIDE TAB 1 GM 1 G: 1 TAB at 17:20

## 2017-06-30 RX ADMIN — POTASSIUM CHLORIDE 40 MEQ: 1.5 POWDER, FOR SOLUTION ORAL at 08:46

## 2017-06-30 RX ADMIN — HYDROCORTISONE SODIUM SUCCINATE 50 MG: 100 INJECTION, POWDER, FOR SOLUTION INTRAMUSCULAR; INTRAVENOUS at 02:28

## 2017-06-30 RX ADMIN — PIPERACILLIN SODIUM AND TAZOBACTAM SODIUM 4.5 G: 4; .5 INJECTION, POWDER, FOR SOLUTION INTRAVENOUS at 05:41

## 2017-06-30 RX ADMIN — PROPOFOL 10 MCG/KG/MIN: 10 INJECTION, EMULSION INTRAVENOUS at 02:45

## 2017-06-30 RX ADMIN — IPRATROPIUM BROMIDE AND ALBUTEROL SULFATE 3 ML: .5; 3 SOLUTION RESPIRATORY (INHALATION) at 02:27

## 2017-06-30 RX ADMIN — FAMOTIDINE 20 MG: 20 TABLET, FILM COATED ORAL at 08:46

## 2017-06-30 RX ADMIN — HYDROCORTISONE SODIUM SUCCINATE 50 MG: 100 INJECTION, POWDER, FOR SOLUTION INTRAMUSCULAR; INTRAVENOUS at 05:40

## 2017-06-30 RX ADMIN — IPRATROPIUM BROMIDE AND ALBUTEROL SULFATE 3 ML: .5; 3 SOLUTION RESPIRATORY (INHALATION) at 14:29

## 2017-06-30 RX ADMIN — SODIUM CHLORIDE TAB 1 GM 1 G: 1 TAB at 08:46

## 2017-06-30 RX ADMIN — ENOXAPARIN SODIUM 30 MG: 100 INJECTION SUBCUTANEOUS at 21:17

## 2017-06-30 RX ADMIN — VANCOMYCIN HYDROCHLORIDE 800 MG: 100 INJECTION, POWDER, LYOPHILIZED, FOR SOLUTION INTRAVENOUS at 23:13

## 2017-06-30 RX ADMIN — MIDAZOLAM HYDROCHLORIDE 1 MG: 1 INJECTION INTRAMUSCULAR; INTRAVENOUS at 00:00

## 2017-06-30 RX ADMIN — CHLORHEXIDINE GLUCONATE 15 ML: 1.2 RINSE ORAL at 21:17

## 2017-06-30 RX ADMIN — NOREPINEPHRINE BITARTRATE 4 MG: 1 INJECTION INTRAVENOUS at 02:48

## 2017-06-30 RX ADMIN — SENNOSIDES AND DOCUSATE SODIUM 2 TABLET: 8.6; 5 TABLET ORAL at 21:17

## 2017-06-30 RX ADMIN — IPRATROPIUM BROMIDE AND ALBUTEROL SULFATE 3 ML: .5; 3 SOLUTION RESPIRATORY (INHALATION) at 07:19

## 2017-06-30 RX ADMIN — PIPERACILLIN SODIUM AND TAZOBACTAM SODIUM 4.5 G: 4; .5 INJECTION, POWDER, FOR SOLUTION INTRAVENOUS at 11:56

## 2017-06-30 RX ADMIN — SODIUM CHLORIDE 1656 ML: 9 INJECTION, SOLUTION INTRAVENOUS at 02:21

## 2017-06-30 RX ADMIN — FAMOTIDINE 20 MG: 20 TABLET, FILM COATED ORAL at 21:18

## 2017-06-30 RX ADMIN — THIAMINE HYDROCHLORIDE 200 MG: 100 INJECTION, SOLUTION INTRAMUSCULAR; INTRAVENOUS at 15:06

## 2017-06-30 RX ADMIN — POTASSIUM CHLORIDE 40 MEQ: 1.5 POWDER, FOR SOLUTION ORAL at 15:05

## 2017-06-30 RX ADMIN — IPRATROPIUM BROMIDE AND ALBUTEROL SULFATE 3 ML: .5; 3 SOLUTION RESPIRATORY (INHALATION) at 10:28

## 2017-06-30 RX ADMIN — IPRATROPIUM BROMIDE AND ALBUTEROL SULFATE 3 ML: .5; 3 SOLUTION RESPIRATORY (INHALATION) at 22:42

## 2017-06-30 RX ADMIN — ASCORBIC ACID: 500 INJECTION, SOLUTION INTRAMUSCULAR; INTRAVENOUS; SUBCUTANEOUS at 08:46

## 2017-06-30 RX ADMIN — IPRATROPIUM BROMIDE AND ALBUTEROL SULFATE 3 ML: .5; 3 SOLUTION RESPIRATORY (INHALATION) at 18:41

## 2017-06-30 RX ADMIN — ASCORBIC ACID: 500 INJECTION, SOLUTION INTRAMUSCULAR; INTRAVENOUS; SUBCUTANEOUS at 17:19

## 2017-06-30 RX ADMIN — PHENOBARBITAL 70 MG: 20 ELIXIR ORAL at 09:22

## 2017-06-30 RX ADMIN — MIDAZOLAM HYDROCHLORIDE 1 MG: 1 INJECTION, SOLUTION INTRAMUSCULAR; INTRAVENOUS at 00:00

## 2017-06-30 RX ADMIN — ASCORBIC ACID: 500 INJECTION, SOLUTION INTRAMUSCULAR; INTRAVENOUS; SUBCUTANEOUS at 12:07

## 2017-06-30 RX ADMIN — PIPERACILLIN SODIUM AND TAZOBACTAM SODIUM 4.5 G: 4; .5 INJECTION, POWDER, FOR SOLUTION INTRAVENOUS at 17:19

## 2017-06-30 NOTE — RESPIRATORY CARE
Endotracheal Intubation Procedure Note    Indication for endotracheal intubation: Impending respiratory failure  Sedation: Yes  Paralytic: No  Lidocaine: Yes  Atropine: No  Equipment: Bronchoscope   Cricoid Pressure: No  Number of attempts: 1  ETT location confirmed by: BBS, negative gastric sounds, positive color change via EZcap, Bronchoscope intubation.

## 2017-06-30 NOTE — PROGRESS NOTES
Dr. Gonda at the bedside to speak with the pts mother about a tracheostomy. All questions answered.

## 2017-06-30 NOTE — CARE PLAN
Problem: Communication  Goal: The ability to communicate needs accurately and effectively will improve  Intervention: Educate patient and significant other/support system about the plan of care, procedures, treatments, medications and allow for questions  RN to discuss the plan of care with the treatment team and the pts family. Rn will continue to update the family throughout the day.       Problem: Venous Thromboembolism (VTW)/Deep Vein Thrombosis (DVT) Prevention:  Goal: Patient will participate in Venous Thrombosis (VTE)/Deep Vein Thrombosis (DVT)Prevention Measures  Intervention: Ensure patient wears graduated elastic stockings (NATI hose) and/or SCDs, if ordered, when in bed or chair (Remove at least once per shift for skin check)  SCDs are in place, SCD machine is on, SCDs are inflating.

## 2017-06-30 NOTE — DISCHARGE PLANNING
Informed by [facility] liaison, Leidy that the pt's insurance was going to sign a RADHA for LTACH supports prior to the pt improving. After the pt improved he was readmitted to the ICU. Pt's need continue to be that of an LTACH. Leidy states she will attempt to reestablish contact with the insurance provider on Monday 7/3/2017 and see if they are willing to agree to another RADHA. Updated family and Charge RN

## 2017-06-30 NOTE — PROGRESS NOTES
Pulmonary Critical Care Progress Note      Date of Service: 6/29/17    Chief Complaint: Shortness of Breath, difficulty breathing.    History of Present Illness: All information taken from the patient's family at bedside as well as physician sign out as the patient is noncommunicative.  This is a 36-year-old gentleman with history of traumatic brain injury approximately 18 years ago after a [place].  He had been in a nearly persistent vegetative state since, has not been verbal, does not mobilize, previously was trached for approximately 1 year, but has been trached free for approximately 18 years.  He continues to have a deep stoma; however, it has apparently closed off.  He has a PEG tube for feeding; however, he used to also take miniscule amounts of food orally as well up until most recently.  Patient was admitted on 06/17/2017 with the concern of aspiration pneumonia as well as increased tremor type activities with upper extremity involvement.  The patient was initially placed on telemetry floor, treated for aspiration pneumonitis with antibiotic therapy as well as supportive therapy with IV fluids and tube feeds.  Over the past 24 hours or so, there had been noted ongoing jerking type motions of the upper extremities and therefore, patient had EEG.  The EEG did show several periods of seizures lasting several seconds.  Based on this, neurology recommended that the patient be transferred to intensive care unit for concern of underlying status epilepticus, intubated and placed on propofol therapy, while antiepileptic medications are being titrated and adjusted.  Patient has no history of known seizure activity.  His only medication is a baclofen pump for a significant spasticity and this is apparently managed by [facility].  At the present time, no further history is currently available or able to be obtained.    ROS:    Respiratory: unable to perform due to the patient's inability to  effectively communicate,   Cardiac: unable to perform due to the patient's inability to effectively communicate,   GI: unable to perform due to the patient's inability to effectively communicate.        Interval Events:  24 hour interval history reviewed     Extubated 6/27.   Transferred to Neuro floor yesterday 6/28  Called tonight by Dr Zaragoza (Hospitalist) and RNs for worsening hypoxemia.  Patient moved back to ICU 6/29 and aggressive suctioning by RT helped.  No seizure activity reported.  No clear aspiration event reported.  However O2 requirements increased from RA to 100% NRBM on arrival to ICU  WOB breathing mild to moderately increased - ok cough - no gag - not protecting airway  WBC jumped 8.9 -> to 18.4, T 100.5 earlier today  CXR reveals total whiteout L chest - worse vs earlier today - L main cut off sign - probable plugs and L effusion  Bp so far is acceptable  Intubated with FOB just after midnight  TONS plugs LMB  BAL LLL sent  Dropped Bp with vent/sedation/sepsis?  Line placed - low CVP 1!!!  Sepsis order set entered    Respiratory:     Pulse Oximetry: 94 % on 100% NRBM  Lots secretions obtained by NT suctioning by RT    Exam:  Labored resps, few rhonchi diffusely on R and JONATAN, absent BS much of L chest   Old trach stoma noted - from initial trauma 17 years ago per mom   Severe retrognathia and poor dentition  ImagingAvailable data reviewed  CT chest 6/23:  1.  Bilateral airspace opacities, much more severe on the left, consistent with multifocal pneumonia. Moderate left and trace amounts of right pleural fluid are also present.    Recent Labs      06/27/17   0512   UPGCV20S  7.43   [id number]  36.4   [id number]  144.8*   KOUN5DYE  98.2   ARTHCO3  24   ARTBE  0       HemoDynamics:  Pulse: (!) 101, Heart Rate (Monitored): 100  Blood Pressure: 151/92 mmHg       Exam: TR/RR, no murmur, no pedal edema, strong pulses, good cap refill  Imaging: Available data reviewed      Neuro:  GCS Total Aleida Coma  Score: 6  Exam:  At baseline per mother, contractures, not following commands, non verbal, opens eyes and not tracking   Imaging: Available data reviewed     Last EEG 6/22:  The findings increase risk for seizures and suggest underlying areas of cortical irritability, however no seizures were captured during this study. There has been significant improvement when compared to prior studies.    Fluids:  Intake/Output       06/27/17 0700 - 06/28/17 0659 06/28/17 0700 - 06/29/17 0659 06/29/17 0700 - 06/30/17 0659      0700-1859 5060-2516 Total 0700-1859 1900-0659 Total 0700-1859 1900-0659 Total       Intake    P.O.  --  -- --  --  0 0  --  -- --    P.O. -- -- -- -- 0 0 -- -- --    I.V.  120  150 270  30  -- 30  --  -- --    IV Volume (TKO) 120 150 270 30 -- 30 -- -- --    Other  145  150 295  30  -- 30  --  -- --    Medications (P.O./ Enteral Liquids) 145 150 295 30 -- 30 -- -- --    Enteral  690  780 1470  130  600 730  --  -- --    Enteral Volume  100 600 700 -- -- --    Free Water / Tube Flush 90 180 270 30 -- 30 -- -- --    Total Intake 955 1080 2035 190 600 790 -- -- --       Output    Urine  325  700 1025  600  700 1300  1600  -- 1600    Number of Times Voided -- -- -- -- 1 x 1 x 1 x -- 1 x    Indwelling Cathether 325 -- 325 -- -- -- -- -- --    Straight Catheter -- 700 700  1600 -- 1600    Void (ml) 0 -- 0 0 -- 0 -- -- --    Stool  --  -- --  --  -- --  --  -- --    Number of Times Stooled 0 x 0 x 0 x -- 2 x 2 x 1 x -- 1 x    Total Output   1600 -- 1600       Net I/O      -410 -100 -510 -1600 -- -1600           Recent Labs      06/27/17   0400  06/28/17   0219  06/29/17   0513   SODIUM  138  144  139   POTASSIUM  4.1  3.8  4.0   CHLORIDE  109  113*  107   CO2  24  26  27   BUN  22  27*  31*   CREATININE  <0.20*  <0.20*  <0.20*   MAGNESIUM   --   1.9   --    PHOSPHORUS   --   2.4*   --    CALCIUM  8.2*  8.6  8.6       GI/Nutrition:  Exam: Soft, NT, TF  off, normal bowel sounds, PEG tube in place with min erythema noted around site.   Pain pump palpable to the right lower quadrant.     Imaging: Available data reviewed  NPO - PEG-tube with TF on hold now      Liver Function  Recent Labs      17   ALTSGPT   --   63*   --    ASTSGOT   --   57*   --    ALKPHOSPHAT   --   83   --    TBILIRUBIN   --   0.2   --    GLUCOSE  132*  108*  102*       Heme:  Recent Labs      17   RBC  3.35*  3.42*  3.98*   HEMOGLOBIN  9.8*  9.9*  11.5*   HEMATOCRIT  29.4*  29.9*  35.1*   PLATELETCT  342  429  617*       Infectious Disease:  Temp  Av.6 °C (99.6 °F)  Min: 36.8 °C (98.3 °F)  Max: 38.1 °C (100.5 °F)   S/p Rx Klebsiella pneumonia, Flagyl complete  and Ceftriaxone   WBC up to 18.4 tonight - normal this AM    Micro: reviewed  Recent Labs      17   WBC  6.2  5.9  8.9   NEUTSPOLYS  73.90*  59.50  61.80   LYMPHOCYTES  18.40*  27.00  25.60   MONOCYTES  6.30  12.30  9.60   EOSINOPHILS  0.30  0.20  1.90   BASOPHILS  0.30  0.70  0.80   ASTSGOT   --   57*   --    ALTSGPT   --   63*   --    ALKPHOSPHAT   --   83   --    TBILIRUBIN   --   0.2   --      Current Facility-Administered Medications   Medication Dose Frequency Provider Last Rate Last Dose   • famotidine (PEPCID) tablet 20 mg  20 mg BID Gabrielle Vargas, PHARMD   20 mg at 17   • NS infusion   Continuous Gila Fontenot M.D. 100 mL/hr at 17     • sodium chloride (SALT) tablet 1 g  1 g TID WITH MEALS Rivas Farrell M.D.   1 g at 17   • potassium chloride (KLOR-CON) 20 MEQ packet 40 mEq  40 mEq TID Kev Araiza M.D.   40 mEq at 17   • levetiracetam (KEPPRA) 100 MG/ML solution 1,500 mg  1,500 mg Q12HRS Martell Hunter M.D.   1,500 mg at 17   • oxycodone immediate-release (ROXICODONE) tablet 5 mg  5 mg Q4HRS PRN Kev VIVAS  SHOSHANA Araiza   5 mg at 06/24/17 1825   • PHENobarbital solution 70 mg  70 mg BID Martell Hunter M.D.   70 mg at 06/29/17 2015   • enoxaparin (LOVENOX) inj 30 mg  30 mg DAILY FARHAT MedinaOBucky   30 mg at 06/29/17 2014   • Respiratory Care per Protocol   Continuous RT Kev Araiza M.D.       • senna-docusate (PERICOLACE or SENOKOT S) 8.6-50 MG per tablet 2 Tab  2 Tab BID Kev Araiza M.D.   2 Tab at 06/29/17 2015    And   • polyethylene glycol/lytes (MIRALAX) PACKET 1 Packet  1 Packet QDAY PRN Kev Araiza M.D.   1 Packet at 06/25/17 2028    And   • magnesium hydroxide (MILK OF MAGNESIA) suspension 30 mL  30 mL QDAY PRN Kev Araiza M.D.   30 mL at 06/28/17 1305    And   • bisacodyl (DULCOLAX) suppository 10 mg  10 mg QDAY PRN Kev Araiza M.D.   10 mg at 06/28/17 1520   • ipratropium-albuterol (DUONEB) nebulizer solution 3 mL  3 mL Q2HRS PRN (RT) Kev Araiza M.D.       • glucose 4 g chewable tablet 16 g  16 g Q15 MIN PRN Kev Araiza M.D.        And   • dextrose 50% (D50W) injection 25 mL  25 mL Q15 MIN PRN Kev Araiza M.D.   25 mL at 06/20/17 1840   • ondansetron (ZOFRAN) syringe/vial injection 4 mg  4 mg Q4HRS PRN Clarence Morfin D.O.   4 mg at 06/23/17 2238   • ondansetron (ZOFRAN ODT) dispertab 4 mg  4 mg Q4HRS PRN Clarence Morfin D.O.       • promethazine (PHENERGAN) tablet 12.5-25 mg  12.5-25 mg Q4HRS PRN Clarence Morfin D.O.       • promethazine (PHENERGAN) suppository 12.5-25 mg  12.5-25 mg Q4HRS PRN FARHAT MedinaO.       • lorazepam (ATIVAN) tablet 0.5 mg  0.5 mg Q6HRS PRN FARHAT MedinaO.   Stopped at 06/19/17 0248    Or   • lorazepam (ATIVAN) injection 0.5 mg  0.5 mg Q6HRS PRN JASPAL Medina.O.   0.5 mg at 06/22/17 0044   • acetaminophen (TYLENOL) tablet 650 mg  650 mg Q6HRS PRN JASPAL Medina.O.   650 mg at 06/17/17 1857   • Pain Pump (patient supplied) Device   Continuous FARHAT MedinaO.   Stopped at 06/17/17 1230     Last reviewed on  6/17/2017  9:45 AM by Ligia Frazier, Pharmacy Int      Quality  Measures:  Labs reviewed, Medications reviewed and Radiology images reviewed        DVT Prophylaxis: Enoxaparin (Lovenox)  DVT prophylaxis - mechanical: SCDs  Ulcer prophylaxis: Yes  Antibiotics: Treating active infection/contamination beyond 24 hours perioperative coverage  Assessed for rehab: Patient was assess for and/or received rehabilitation services during this hospitalization        Assessment/Plan:  Acute hypoxic respiratory failure - intubated 6/19-6/27   - Acute ICU transfer for hypoxemia  6/29   - Re-intubated 6/30 - difficult airway              - pulm toilet/aspiration precautions              - limit sedatives - use Propofol - change to Precedex when ready for SBTs   - not ready for SBT   - sedation vacations/mobilize when clinically appropriate  Aspiration/HCAP pneumonia   - emergent Tx FOB 6/30 early AM - COPIOUS PLUGS - L main bronchus down 100%  obstructed - very inflamed airways - BAL - LLL sent after Tx FOB   - Vanco/Zosyn - de-escalate when appropriate   - aggressive pulmonary toilet  Sepsis syndrome - pulmonary source   - Sepsis protocols - LA level pending   - central line - may need pressors   - Marik protocols  Moderate Left sided Pleural Effusion s/p - thoracentesis 6/25 (transudative - 300cc)   - may have recurred - see pleural effusion order set - tap again   - monitor for need for CT chest to eval for empyema  H/o trach with trauma 17 years ago - prob proximal airway stricture at trach site seen at FOB   - May need ENT eval and F/u FOB with extubation? - CT neck?  Aspiration pneumonia. Klebsiella pneumonia - pansensitive              - completed abx x 7 days 6/26 - C3/Flagyl  Recent  seizure activity with concern of status epilepticus   - last EEG 6/22 noted - less irritability              - Keppra, Phenobarb per neuro              - neuro following, ?reduction in dosage in case contributing to drowsiness per neuro   -  check levels if LOC an issue - request neuro F/u?  Hyponatremia              - salt tabs PRN - serial Na  Anemia with thrombocytosis with clinical bleeding - serial CBC  HypoK and Hyop Mag, phos - ERP  Severe chronic underlying debility secondary to profound traumatic brain injury 18 years ago  Enteral nutrition via PEG - on hold for now,  Therapies - on hold for now  Prophylaxis     D/w Mom, brother and aunt re status at length at bedside - all agree to re-intubate and treat HCAP/L lung ATX/sepsis    Discussed patient condition and risk of morbidity and/or mortality with 3x Family, RN, RT, hospitalist and Charge RN/RTs.      Critically ill [id number], 80 minutes, no overlap with procedures

## 2017-06-30 NOTE — PROGRESS NOTES
RCC - procedure note    DOS:  6/30/2017    R IJ central line    Emergently performed, hypotension, HCAP, sepsis, acute hypoxemic RF,  poor peripheral access, needs multiple IV meds/drips    Time out observed    R neck prep/drape usual fashion - chlorhexidine/sterile fields   glove/gown/cap/mask  U/s guidance - excellent views  Vein cannulated 1st attempt - wire passed easily  7.0 F triple lumen place with Seldinger technique  Good venous return all ports  Central line sutured in place 3.0 silk x 2  Site re-prep with chlorhexidine/Biopatch/sterile dressing  CVP appeared very low at placement - fluid bolus ongoing    pCXR pending

## 2017-06-30 NOTE — PROGRESS NOTES
Pulmonary Critical Care Progress Note        Date of service: 6/30/2017    Chief Complaint: Seizures    History of Present Illness: All information taken from the patient's family at bedside as well as physician sign out as the patient is noncommunicative.  This is a 36-year-old gentleman with history of traumatic brain injury approximately 18 years ago after [place].  He had been in a nearly persistent vegetative state since, has not been verbal, does not mobilize, previously was trached for approximately 1 year, but has been trached free for approximately 18 years.  He continues to have a deep stoma; however, it has apparently closed off.  He has a PEG tube for feeding; however, he used to also take miniscule amounts of food orally as well up until most recently.  Patient was admitted on 06/17/2017 with the concern of aspiration pneumonia as well as increased tremor type activities with upper extremity involvement.  The patient was initially placed on telemetry floor, treated for aspiration pneumonitis with antibiotic therapy as well as supportive therapy with IV fluids and tube feeds.  Over the past 24 hours or so, there had been noted ongoing jerking type motions of the upper extremities and therefore, patient had EEG.  The EEG did show several periods of seizures lasting several seconds.  Based on this, neurology recommended that the patient be transferred to intensive care unit for concern of underlying status epilepticus, intubated and placed on propofol therapy, while antiepileptic medications are being titrated and adjusted.  Patient has no history of known seizure activity.  His only medication is a baclofen pump for a significant spasticity and this is apparently managed by [facility].  At the present time, no further history is currently available or able to be obtained.    ROS:    Respiratory: unable to perform due to the patient's inability to effectively communicate    Cardiac: unable to perform due to the patient's inability to effectively communicate   GI: unable to perform due to the patient's inability to effectively communicate.    All other systems negative.    Interval Events:  24 hour interval history reviewed   Intubated for worsening respiratory failure overnight, bronch performed  1600 L NS administered overnight  Increase in WBC, temp, requiring vasopressor support    PFSH:  No change.    Respiratory:  Liriano Vent Mode: APVCMV, Rate (breaths/min): 15, Vt Target (mL): 410, PEEP/CPAP: 8, FiO2: 30, Static Compliance (ml / cm H2O): 55, Control VTE (exp VT): 413  Pulse Oximetry: 100 %    Vent day 1.  Clear pink secretions noted.  No SBT performed.    Exam:  Slightly diminished at left base with few rhonchi, otherwise breathing comfortably and clear on right.  PIP in mid teens.    ImagingAvailable data reviewed  CXR shows improved L lung white-out after bronch. Unchanged left lower lobe consolidation.  Left sided effusion.  ETT and FT in good position.    Recent Labs      06/30/17   0122  06/30/17   0442   ISTATAPH  7.397*  7.467   ISTATAPCO2  40.2*  31.5   ISTATAPO2  105*  136*   ISTATATCO2  26  24   [id number]  98  99   ISTATARTHCO3  24.7  22.8   ISTATARTBE  0  0   ISTATTEMP  99.2 F  36.8 C   ISTATFIO2  100  60   ISTATSPEC  Arterial  Arterial   ISTATAPHTC  7.392*  7.470   [id number]  107*  135*   Blood Gas:  Combined respiratory alkalosis with metabolic acidosis.  Adequate oxygenation.    HemoDynamics:  Pulse: 60, Heart Rate (Monitored): 61  Blood Pressure: 151/92 mmHg, NIBP: (!) 85/45 mmHg  CVP (mm Hg): (!) 9 MM HG    Sinus rhythm in the 60-90s with frequent PACs.  On Levo 2 mcg.    Exam:  RRR, no murmurs, no edema.    Imaging: Available data reviewed        Neuro:  GCS Total [place] Coma Score: 6    At baseline, nonverbal.  On prop 15.    Exam:  Eyes open, looks around room, unchanged contractures throughout.  Does not follow commands at baseline.    Imaging:  Available data reviewed    Fluids:  Intake/Output       06/28/17 0700 - 06/29/17 0659 06/29/17 0700 - 06/30/17 0659 06/30/17 0700 - 07/01/17 0659      0700-1859 1900-0659 Total 0700-1859 1900-0659 Total 0700-1859 1900-0659 Total       Intake    P.O.  --  0 0  --  -- --  --  -- --    P.O. -- 0 0 -- -- -- -- -- --    I.V.  30  -- 30  --  2486.4 2486.4  544.4  -- 544.4    Magnesium Sulfate Volume -- -- -- -- -- -- 15.8 -- 15.8    Propofol Volume -- -- -- -- 14 14 13.4 -- 13.4    Norepinephrine Volume -- -- -- -- 16.5 16.5 15.2 -- 15.2    IV Volume (TKO) 30 -- 30 -- -- -- -- -- --    Normal Saline -- -- -- -- 2456 2456 400 -- 400    IV Piggyback Volume (IV Piggyback) -- -- -- -- -- -- 100 -- 100    Other  30  -- 30  --  -- --  120  -- 120    Medications (P.O./ Enteral Liquids) 30 -- 30 -- -- -- 120 -- 120    Enteral  130  600 730  --  -- --  30  -- 30    Enteral Volume 100 600 700 -- -- -- 0 -- 0    Free Water / Tube Flush 30 -- 30 -- -- -- 30 -- 30    Total Intake 190 600 790 -- 2486.4 2486.4 694.4 -- 694.4       Output    Urine  600  700 1300  1600  1400 3000  275  -- 275    Number of Times Voided -- 1 x 1 x 1 x -- 1 x -- -- --    Indwelling Cathether -- -- -- -- -- -- 275 -- 275    Straight Catheter  1600 1400 3000 -- -- --    Void (ml) 0 -- 0 -- -- -- -- -- --    Stool/Urine  --  -- --  --  -- --  0  -- 0    Measurable Stool (ml) -- -- -- -- -- -- 0 -- 0    Stool  --  -- --  --  -- --  --  -- --    Number of Times Stooled -- 2 x 2 x 1 x -- 1 x 0 x -- 0 x    Total Output  1600 1400 3000 275 -- 275       Net I/O     -410 -100 -510 -1600 1086.4 -513.6 419.4 -- 419.4        Weight: 55.2 kg (121 lb 11.1 oz)  Recent Labs      06/28/17   0219  06/29/17   0513  06/29/17   2225  06/30/17   0155   SODIUM  144  139  133*  136   POTASSIUM  3.8  4.0  4.1  3.4*   CHLORIDE  113*  107  100  104   CO2  26  27  27  26   BUN  27*  31*  19  17   CREATININE  <0.20*  <0.20*  <0.20*  <0.20*   MAGNESIUM  1.9   --     --    --    PHOSPHORUS  2.4*   --    --    --    CALCIUM  8.6  8.6  8.1*  7.3*       GI/Nutrition:    Last bowel movement .    Exam:  Soft, G tube in place, surrounding erythema with mild purulent drainage. Pain pump placed in the right lower quadrant. Normal Bowel Sounds.    Imaging: Available data reviewed     tube feed Tolerated  Liver Function  Recent Labs      17   ALTSGPT  63*   --    --    --    ASTSGOT  57*   --    --    --    ALKPHOSPHAT  83   --    --    --    TBILIRUBIN  0.2   --    --    --    GLUCOSE  108*  102*  89  87       Heme:  Recent Labs      17   RBC  3.98*  4.06*  3.55*   HEMOGLOBIN  11.5*  11.9*  10.3*   HEMATOCRIT  35.1*  35.7*  31.3*   PLATELETCT  617*  559*  512*   PROTHROMBTM   --    --   13.6   APTT   --    --   28.7   INR   --    --   1.01       Infectious Disease:  Temp  Av.3 °C (99.2 °F)  Min: 36.8 °C (98.3 °F)  Max: 37.9 °C (100.2 °F)  Monitored Temp  Av.1 °C (98.8 °F)  Min: 36.8 °C (98.2 °F)  Max: 37.3 °C (99.1 °F)     Tmax overnight 99.2 °F.  On vanco, zosyn.    Micro: reviewed  Recent Labs      17   WBC  5.9  8.9  18.4*  17.7*   NEUTSPOLYS  59.50  61.80  77.50*  79.50*   LYMPHOCYTES  27.00  25.60  12.40*  11.80*   MONOCYTES  12.30  9.60  7.60  6.30   EOSINOPHILS  0.20  1.90  1.70  1.50   BASOPHILS  0.70  0.80  0.40  0.30   ASTSGOT  57*   --    --    --    ALTSGPT  63*   --    --    --    ALKPHOSPHAT  83   --    --    --    TBILIRUBIN  0.2   --    --    --      Current Facility-Administered Medications   Medication Dose Frequency Provider Last Rate Last Dose   • propofol (DIPRIVAN) injection  5-80 mcg/kg/min Continuous Clarence Pro M.D. 5 mL/hr at 17 0800 15 mcg/kg/min at 17 0800   • Respiratory Care per Protocol   Continuous RT Clarence Pro M.D.       • chlorhexidine  (PERIDEX) 0.12 % solution 15 mL  15 mL BID Clarence Pro M.D.   15 mL at 06/30/17 0846   • lidocaine (XYLOCAINE) 1%  injection  1-2 mL Q30 MIN PRN Clarence Pro M.D.       • MD ALERT...Adult ICU Electrolyte Replacement per Pharmacy Protocol   pharmacy to dose Clarence Pro M.D.       • midazolam (VERSED) 2 MG/2ML injection 1 mg  1 mg Q HOUR PRN Clarence Pro M.D.   1 mg at 06/30/17 0000    Or   • midazolam (VERSED) 2 MG/2ML injection 2 mg  2 mg Q HOUR PRN Clarence Pro M.D.        Or   • midazolam (VERSED) injection 5 mg  5 mg Q HOUR PRN Clarence Pro M.D.       • fentaNYL (SUBLIMAZE) injection 25 mcg  25 mcg Q HOUR PRN Clarence Pro M.D.        Or   • fentaNYL (SUBLIMAZE) injection 50 mcg  50 mcg Q HOUR PRN Clarence Pro M.D.   50 mcg at 06/30/17 0000    Or   • fentaNYL (SUBLIMAZE) injection 100 mcg  100 mcg Q HOUR PRN Clarence Pro M.D.       • ipratropium-albuterol (DUONEB) nebulizer solution 3 mL  3 mL Q2HRS PRN (RT) Clarence Pro M.D.       • ipratropium-albuterol (DUONEB) nebulizer solution 3 mL  3 mL Q4HRS (RT) Clarence Pro M.D.   3 mL at 06/30/17 1028   • piperacillin-tazobactam (ZOSYN) 4.5 g in  mL IVPB  4.5 g Q6HRS Clarence Pro M.D. 100 mL/hr at 06/30/17 1156 4.5 g at 06/30/17 1156   • MD ALERT... vancomycin per pharmacy protocol   pharmacy to dose Clarence Pro M.D.       • NS (BOLUS) infusion 1,000 mL  1,000 mL Once PRN Clarence Pro M.D.       • thiamine (B-1) 200 mg in D5W 100 mL IVPB  200 mg Q12HR Clarence Pro M.D. 200 mL/hr at 06/30/17 0239 200 mg at 06/30/17 0239   • hydrocortisone sodium succinate PF (SOLU-CORTEF) 100 MG injection 50 mg  50 mg Q6HRS Clarence Pro M.D.   50 mg at 06/30/17 1155   • ascorbic acid (VITAMIN C) 1,500 mg in  mL   Q6HRS Clarence Pro M.D. 100 mL/hr at 06/30/17 1207     • norepinephrine (LEVOPHED) 8 mg in  mL Infusion  0.5-30 mcg/min Continuous Clarence Pro M.D.  7.5 mL/hr at 06/30/17 1145 4 mcg/min at 06/30/17 1145   • NS infusion   Continuous Jeremy M Gonda, M.D. 100 mL/hr at 06/30/17 0852     • famotidine (PEPCID) tablet 20 mg  20 mg BID Gabrielle Vargas PHARMD   20 mg at 06/30/17 0846   • sodium chloride (SALT) tablet 1 g  1 g TID WITH MEALS Rivas Farrell M.D.   1 g at 06/30/17 1155   • potassium chloride (KLOR-CON) 20 MEQ packet 40 mEq  40 mEq TID Kev Araiza M.D.   40 mEq at 06/30/17 0846   • levetiracetam (KEPPRA) 100 MG/ML solution 1,500 mg  1,500 mg Q12HRS Martell Hunter M.D.   1,500 mg at 06/30/17 0939   • oxycodone immediate-release (ROXICODONE) tablet 5 mg  5 mg Q4HRS PRN Kev Araiza M.D.   5 mg at 06/24/17 1825   • PHENobarbital solution 70 mg  70 mg BID Martell Hunter M.D.   70 mg at 06/30/17 0922   • enoxaparin (LOVENOX) inj 30 mg  30 mg DAILY Clarence Morfin D.O.   30 mg at 06/29/17 2014   • senna-docusate (PERICOLACE or SENOKOT S) 8.6-50 MG per tablet 2 Tab  2 Tab BID Kev Araiza M.D.   2 Tab at 06/30/17 0846    And   • polyethylene glycol/lytes (MIRALAX) PACKET 1 Packet  1 Packet QDAY SUSAN Araiza M.D.   1 Packet at 06/25/17 2028    And   • magnesium hydroxide (MILK OF MAGNESIA) suspension 30 mL  30 mL QDAY SUSAN Araiza M.D.   30 mL at 06/28/17 1305    And   • bisacodyl (DULCOLAX) suppository 10 mg  10 mg QDAY PRBRIDGETTE Araiza M.D.   10 mg at 06/28/17 1520   • ipratropium-albuterol (DUONEB) nebulizer solution 3 mL  3 mL Q2HRS PRN (RT) Kev Araiza M.D.       • glucose 4 g chewable tablet 16 g  16 g Q15 MIN PRN Kev Araiza M.D.        And   • dextrose 50% (D50W) injection 25 mL  25 mL Q15 MIN PRN Kev Araiza M.D.   25 mL at 06/20/17 1840   • ondansetron (ZOFRAN) syringe/vial injection 4 mg  4 mg Q4HRS PRN FARHAT MedinaO.   4 mg at 06/23/17 2238   • ondansetron (ZOFRAN ODT) dispertab 4 mg  4 mg Q4HRS PRN Clarence Morfin D.O.       • promethazine (PHENERGAN) tablet 12.5-25 mg  12.5-25 mg Q4HRS PRN  Clarence Morfin D.O.       • promethazine (PHENERGAN) suppository 12.5-25 mg  12.5-25 mg Q4HRS PRN Clarence Morfin D.O.       • lorazepam (ATIVAN) tablet 0.5 mg  0.5 mg Q6HRS PRN JASPAL Medina.O.   Stopped at 06/19/17 0248    Or   • lorazepam (ATIVAN) injection 0.5 mg  0.5 mg Q6HRS PRN JASPAL Medina.O.   0.5 mg at 06/22/17 0044   • acetaminophen (TYLENOL) tablet 650 mg  650 mg Q6HRS PRN JASPAL Medina.O.   650 mg at 06/17/17 1857   • Pain Pump (patient supplied) Device   Continuous Clarence Morfin D.O.   Stopped at 06/17/17 1230     Last reviewed on 6/17/2017  9:45 AM by Ligia Frazier, Pharmacy Int    Quality  Measures:  Medications reviewed, Labs reviewed and Radiology images reviewed  Waldrop catheter: Critically Ill - Requiring Accurate Measurement of Urinary Output and Unconscious / Sedated Patient on a Ventilator  Central line in place: Need for access, Shock and Vasopressors    DVT Prophylaxis: Enoxaparin (Lovenox)  DVT prophylaxis - mechanical: SCDs  Ulcer prophylaxis: Yes  Antibiotics: Treating active infection/contamination beyond 24 hours perioperative coverage  Assessed for rehab: Patient unable to tolerate rehabilitation therapeutic regimen      Assessment and Plan:  Acute hypoxic respiratory failure - intubated 6/19-6/27, re-intubated 6/30              - cont full vent support, RR 15, start SBTs later today              - IPV, mucolytics              - limit sedatives  Aspiration/HCAP pneumonia with L lung atelectasis - s/p FOB 6/30 early AM - COPIOUS PLUGS              - cont Vanco/Zosyn - de-escalate when appropriate, f/u cultures, PCT              - aggressive pulmonary toilet  Sepsis syndrome with shock - pulmonary source              - Sepsis protocols, cont levophed to goal MAP>65              - Marik protocols  Moderate Left sided Pleural Effusion s/p - thoracentesis 6/25 (transudative - 300cc)              - monitor for need for CT chest to eval for empyema  H/o trach with  trauma 17 years ago - prob proximal airway stricture at trach site seen at FOB              - May need ENT eval and F/u FOB with extubation? - CT neck?  Aspiration pneumonia. Klebsiella pneumonia - pansensitive              - completed abx x 7 days 6/26 - C3/Flagyl  Recent first time seizure likely secondary to hyponatremia              - Keppra, Phenobarb per neuro              - neuro following  Hyponatremia              - salt tabs PRN - serial Na  Anemia with thrombocytosis with clinical bleeding - serial CBC  HypoK and Hyop Mag, phos - ERP  Severe chronic underlying debility secondary to profound traumatic brain injury 18 years ago  Enteral nutrition via PEG - resume  Therapies   Prophylaxis     Discussed with mother eventual extubation attempt again vs trach to allow for ongoing pulm toiletry. She will likely choose trach but would like to think about it today. Her goal is to get him home again to continue his care and trach may assist with this. Also discussed TPH per her suggestion as another viable option once trach placed (she has already been in discussion with them). She does not however want him to live in a chronic vent-dependent condition and would consider comfort care should he worsen or show signs of becoming ventilator dependent.    Discussed patient condition and risk of morbidity and/or mortality with Family, RN, RT, Pharmacy, Charge nurse / hot rounds and hospitalist.    The patient remains critically ill.  Critical care time = 34 minutes in directly providing and coordinating critical care and extensive data review.  No time overlap and excludes procedures.       Elmo WOODY (Renzoibe), am scribing for, and in the presence of, Jeremy Gonda M.D..  Electronically signed by: Elmo Flores (Conchita), 6/30/2017  I, Jeremy Gonda M.D. personally performed the services described in this documentation, as scribed by Elmo Flores in my presence, and it is both accurate and complete.

## 2017-06-30 NOTE — PROGRESS NOTES
"Pharmacy Kinetics 37 y.o. male on vancomycin day # 1   2017    Currently on Vancomycin 1400 mg iv once    Indication for Treatment: Pneumonia    Pertinent history per medical record: Admitted on 2017 for aspiration pneumonia.  Patient was transferred to ICU today from Neuro after having a seizure, confirmed by EEG.  He was then intubated and placed on propofol.  Empiric antibiotics started for HCAP/aspiration pneumonia.      Other antibiotics: Zosyn 4.5 g IV q6h    Allergies: Sulfa drugs     List concerns for renal function: low dose pressors, bed bound    Pertinent cultures to date:   17: Abdominal wound, ? PEG tube site = NGTD  17:  Blood, peripheral x2 = in process  17:  Respiratory, BAL = in process    Recent Labs      17   0219  17   0513  17   2225  17   0155   WBC  5.9  8.9  18.4*  17.7*   NEUTSPOLYS  59.50  61.80  77.50*  79.50*     Recent Labs      17   0219  17   0513  17   2225  17   0155   BUN  27*  31*  19  17   CREATININE  <0.20*  <0.20*  <0.20*  <0.20*   ALBUMIN  3.1*   --    --    --      Recent Labs      17   1200   VANCORANDOM  10.9     Intake/Output Summary (Last 24 hours) at 17 1429  Last data filed at 17 1400   Gross per 24 hour   Intake 4014.37 ml   Output   2450 ml   Net 1564.37 ml      Blood pressure 151/92, pulse 58, temperature 36.9 °C (98.4 °F), resp. rate 15, height 1.727 m (5' 7.99\"), weight 55.2 kg (121 lb 11.1 oz), SpO2 100 %. Temp (24hrs), Av.3 °C (99.1 °F), Min:36.8 °C (98.3 °F), Max:37.9 °C (100.2 °F)    A/P   1. Vancomycin dose change: start 800 mg IV q8h  2. Next vancomycin level: 1-2 days (not ordered)  3. Goal trough: 16-20 mcg/mL  4. Comments: Trough back a little higher than expected, will start a lower scheduled dose. PCT 0.05, MD would like to continue broad spectrum abx at this time. Remain son low dose NE and Do protocol. CXR much improved after therapeutic bronch.      Terry " Leroy, PharmD, BCPS

## 2017-06-30 NOTE — PROGRESS NOTES
Patient seen awake however, unable to assess orientation d/t patient being nonverbal.  Labored breathing noted upon assessment. Patient mouth breathing heavily.  Patient on 2L via Oxy mask. Oxygen sats 87-89%. RN provided oral suctioning. Oxygen sats did not improve.   Oxygen sats began to drop and sustain at 85%. RN increased Oxygen to 4L. Sats continued to remain at 87-89%  RN called respiratory to provide NT suctioning.   Patient's oxygen demands began to increase. Patient eventually placed on 15L via non-rebreather mask. Oxygen sats sustained in low 90's.  On call hospitalist (Dr. Zaragoza)  Paged regarding transfer to higher level of care.   Rapid response then called.   Orders received for transfer to [facility]. Family notified.

## 2017-06-30 NOTE — PROCEDURES
RCC    DOS:  6/30/2017    Intubation - altered LOC, not protecting airway, acute hypoxemic and hypercarbic respiratory failure, L lung whiteout - suspect LMB obstruction with plugs    Timeout observed    Sedation:  Versed 2 mg, Fentanyl 50 mcg, Topical Huricaine spray    Severe retrognathia and poor dentition  Old trach stoma noted anterior neck - well healed  FOB employed for procedure - 8.0 ET loaded onto FOB  Oral bite block in place  Cords well seen with FOB after suctioning airway with [place] and FOB  FOB advanced to main kiersten in process noted possible proximal airway stricture at level of old tracheostomy  ET advanced into position with some resistance  Tons of thick tan mucous in airway - % obstructed - Tx FOB follows - TF aspiration?  Good ET tube fogging, good color change with etCO2 monitor and good bilateral BS/chest wall movement and tracheal rings clearly seen with FOB    CXR pending

## 2017-06-30 NOTE — CARE PLAN
Problem: Ventilation Defect:  Goal: Ability to achieve and maintain unassisted ventilation or tolerate decreased levels of ventilator support  Intervention: Support and monitor invasive and noninvasive mechanical ventilation  Adult Ventilation Update    Total Vent Days: 1      Patient Lines/Drains/Airways Status    Active Airway      Name: Placement date: Placement time: Site: Days:     Airway Group ET Tube Oral 8.0 06/30/17  0008  Oral  less than 1                 In the last 24 hours, the patient tolerated SBT for 1 hour on settings of 5/8.    Rapid Shallow Breathing Index (RR/VT): 29 (06/30/17 1158)  Plateau Pressure (Q Shift): 15 (06/30/17 1046)  Static Compliance (ml / cm H2O): 46 (06/30/17 1440)    Barriers to SBT Weaning Trial Stopped due to:: Pt weaned for 1 hour and returned to rest settings per protocol (06/30/17 1158)  Length of Weaning Trial Length of Weaning Trial (Hours): 1 (06/30/17 1158)    Sputum/Suction   Cough: Moist;Productive (06/30/17 1440)  Sputum Amount: Small (06/30/17 1440)  Sputum Color: Clear;Pink Tinged (06/30/17 1440)  Sputum Consistency: Thin (06/30/17 1440)    Events/Summary/Plan: SBT ended. PT placed back on full support at previous settings. (06/30/17 1158) SBT x1 today. FiO2 decreased to 30 % today. No other changes made to vent settings today.

## 2017-06-30 NOTE — PROCEDURES
RCC    Date of service:  6/30/2017    PROCEDURE:  Diagnostic and therapeutic bronchoscopy with bronchoalveolar lavage/bronchail wash.     INDICATION:  Acute hypoxemic respiratory failure, HCAP/atlectasis - r/o plugs & aspiration.    Timeout observed.     DESCRIPTION OF PROCEDURE:  The patient is intubated on full ventilator  support.  He was sedated with Versed/Propofol for the procedure.  Hemodynamics were acceptable throughout but Bp dropped after completion of procedure.  The flexible fiberoptic bronchoscope was inserted through the adaptor inline with the endotracheal tube without difficulty and the ET tube was noted to be 1 cm above the main kiersten and was repositioned.  All airways were examined at the subsegmental level.  Airway mucosa was severely inflamed on the L>>R.  There was a large copious amount of thick purulent tan yellow secretions, which were therapeutically lavaged from the L main bronchus down to both JONATAN and LLL equentially with small aliquots of saline and collected in a Lukens Trap - 100% full.  L main bronchus was 100% obstructed.  Bronchoscope was then wedged the LLL and 30 mL x 2 BAL was obtained, yielding very purulent thick yellow secretions, which will be sent for appropriate cultures.  There is no immediate complication.    pCXR post FOB and central line is still pending

## 2017-06-30 NOTE — PROGRESS NOTES
[facility] Hospitalist Progress Note    Date of Service: 2017    Chief Complaint  36 y.o. male admitted 2017 with altered mental status seizures and aspiration. H/o   Severe TBI. Initially improved then had repeat event with respiratory failure and left lung white out s/p improvement with bronc.     Interval Problem Update  Transferred back to icu with aspiration event and Intubated over night  No fevers  On vent day 1              Consultants/Specialty  Neurology  Pulmonology    Disposition  LTACH referral placed.   Mother would prefer home. Was feeding him at home.       Review of Systems   Unable to perform ROS: intubated      Physical Exam  Laboratory/Imaging   Hemodynamics  Temp (24hrs), Av.4 °C (99.3 °F), Min:36.8 °C (98.3 °F), Max:37.9 °C (100.2 °F)   Temperature: 37.3 °C (99.1 °F), Monitored Temp: 37.3 °C (99.1 °F)  Pulse  Av.1  Min: 52  Max: 131 Heart Rate (Monitored): 73  Blood Pressure: 151/92 mmHg, NIBP: 100/64 mmHg CVP (mm Hg): 3 MM HG    Respiratory  Liriano Vent Mode: APVCMV, Rate (breaths/min): 20, PEEP/CPAP: 8, PEEP/CPAP: 8, FiO2: 40, P Peak (PIP): 21, P MEAN: 12   Respiration: 19, Pulse Oximetry: 100 %, O2 Daily Delivery Respiratory : OxyMask     PEP/CPT Method: Percussion/Vibration, Work Of Breathing / Effort: Vented  RUL Breath Sounds: Diminished, RML Breath Sounds: Diminished, RLL Breath Sounds: Diminished, JONATAN Breath Sounds: Diminished, LLL Breath Sounds: Diminished    Fluids    Intake/Output Summary (Last 24 hours) at 17 0837  Last data filed at 17 0600   Gross per 24 hour   Intake 2486.41 ml   Output   3000 ml   Net -513.59 ml       Nutrition  Orders Placed This Encounter   Procedures   • Diet NPO     Standing Status: Standing      Number of Occurrences: 1      Standing Expiration Date:      Order Specific Question:  Type:     Answer:  Now [1]     Order Specific Question:  Restrict to:     Answer:  Strict [1]     Physical Exam   Constitutional: He appears  well-developed and well-nourished. He appears ill. No distress.   HENT:   Right Ear: External ear normal.   Left Ear: External ear normal.   Nose: Nose normal.   Eyes: Pupils are equal, round, and reactive to light. Right eye exhibits no discharge. Left eye exhibits no discharge. No scleral icterus.   Neck: No JVD present. No tracheal deviation present.   Right IJ central line without bleeding.   Well-healed tracheostomy site with indentation.    Cardiovascular: Regular rhythm, normal heart sounds and intact distal pulses.  Tachycardia present.    No murmur heard.  Pulmonary/Chest: Effort normal and breath sounds normal. No respiratory distress. He has no wheezes. He has no rales.   Abdominal: Soft. Bowel sounds are normal. He exhibits no distension. There is no tenderness. There is no guarding.   PEG. Baclofen pump palpable on abdomen.   PEG tube site erythematous and with purulent drainage   Genitourinary:   Waldrop    Musculoskeletal: He exhibits edema. He exhibits no tenderness.   Poor muscle tone, severe contractures of the arms and legs with significant deformity.    Neurological:   Eyes are open but he does not track   Skin: Skin is warm and dry. He is not diaphoretic. No erythema. There is pallor.   Nursing note and vitals reviewed.      Recent Labs      06/29/17   0513 06/29/17 2225 06/30/17   0155   WBC  8.9  18.4*  17.7*   RBC  3.98*  4.06*  3.55*   HEMOGLOBIN  11.5*  11.9*  10.3*   HEMATOCRIT  35.1*  35.7*  31.3*   MCV  88.2  87.9  88.2   MCH  28.9  29.3  29.0   MCHC  32.8*  33.3*  32.9*   RDW  54.5*  53.9*  52.8*   PLATELETCT  617*  559*  512*   MPV  8.4*  7.9*  8.0*     Recent Labs      06/29/17   0513  06/29/17 2225 06/30/17   0155   SODIUM  139  133*  136   POTASSIUM  4.0  4.1  3.4*   CHLORIDE  107  100  104   CO2  27  27  26   GLUCOSE  102*  89  87   BUN  31*  19  17   CREATININE  <0.20*  <0.20*  <0.20*   CALCIUM  8.6  8.1*  7.3*     Recent Labs      06/30/17   0155   APTT  28.7   INR  1.01          Recent Labs      06/30/17   0155   TRIGLYCERIDE  71          Assessment/Plan     Status epilepticus (CMS-HCC) (present on admission)  Assessment & Plan  - had continuous seizure activity for approximately 48 hours  - neurology following  - on phenobarbital and keppra  - mother wants patient to be taken off soon  - seizure precautions    Aspiration pneumonia (CMS-HCC) (present on admission)  Assessment & Plan  - pt unable to clear secretions   - culture growing Klebsiella resistant to ampicillin thus rocephin to cover aspiration   Bronchoscopy 6/25. Again on 6/29  Thoracentesis with 300 ml off on 6/25.  - trach likely needed.        Sepsis (CMS-HCC)  Assessment & Plan  - secondary to aspiration pneumonia  -on 6/25 he became quite hypotensive.  Midodrine was started and will be stopped now that BP has improved. Cortisol level low at 5.7 thus IV hydrocortisone ordered.   Completed today.     - worse over night again but likely SIRS    Acute respiratory failure with hypoxia (CMS-HCC) (present on admission)  Assessment & Plan  - recurred over night. Mucus plugging likely.   - Trach replacement discussed with mother.   - family considering  - now requiring 2 liter o2    Hyponatremia (present on admission)  Assessment & Plan  - resolved  - still on salt tabs     Hypoglycemia (present on admission)  Assessment & Plan  - resolved    Encephalopathy acute (present on admission)  Assessment & Plan  Secondary to status epilepticus and pneumonia.      TBI (traumatic brain injury) (CMS-HCC) (present on admission)  Assessment & Plan  Since age 17 years.  With quadriplegia   [facility]acl[facility]en pump [facility]--his mother states it does not need refilled for a few months.    Hypokalemia (present on admission)  Assessment & Plan  - replenished      Labs reviewed, Medications reviewed, Radiology images reviewed and EKG reviewed  Waldrop catheter: Critically Ill - Requiring Accurate Measurement of Urinary Output      DVT  Prophylaxis: Enoxaparin (Lovenox)      Antibiotics: Treating active infection/contamination beyond 24 hours perioperative coverage  Assessed for rehab: Patient was assess for and/or received rehabilitation services during this hospitalization

## 2017-06-30 NOTE — RESPIRATORY CARE
Pt arrived to S134 for higher level of care. Pt suctioned orally with Yankauer, moderate amount of white/clear/tan sputum achieved. Nasopharyngeal 7.5 placed in right nares, NT suctioned with the same result as above. Placed pt on 8L oxymask SpO2 94%.

## 2017-06-30 NOTE — CARE PLAN
Problem: Skin Integrity  Goal: Risk for impaired skin integrity will decrease  Outcome: PROGRESSING AS EXPECTED    Problem: Urinary Elimination:  Goal: Ability to reestablish a normal urinary elimination pattern will improve  Outcome: PROGRESSING SLOWER THAN EXPECTED

## 2017-06-30 NOTE — CARE PLAN
Problem: Ventilation Defect:  Goal: Ability to achieve and maintain unassisted ventilation or tolerate decreased levels of ventilator support  Intervention: Support and monitor invasive and noninvasive mechanical ventilation  Adult Ventilation Update    Total Vent Days: 1  CMV: 20/410/+8/40%      Patient Lines/Drains/Airways Status    Active Airway      Name: Placement date:           Airway Group ET Tube Oral 8.0 06/30/17                  Plateau Pressure (Q Shift): 15   Static Compliance (ml / cm H2O): 37     Sputum/Suction   Cough: Moist, congested   Sputum Amount: Moderate  Sputum Color: Tan, pink tinged, white/clear  Sputum Consistency: Thick    Mobility Group  Activity Performed: Unable to mobilize   Reason Not Mobilized: Unstable condition   Mobilization Comments: Unstable     Events/Summary/Plan: Pt intubated/bronched overnight for impending respiratory failure after being brought to the ICU for a Rapid response.

## 2017-06-30 NOTE — FLOWSHEET NOTE
06/30/17 1158   Events/Summary/Plan   Events/Summary/Plan SBT ended. PT placed back on full support at previous settings.   Weaning Parameters   RR (bpm) 16   Rapid Shallow Breathing Index (RR/VT) 29   Spontaneous VE 6   Spontaneous    Weaning Trial   Weaning Trial Stopped due to: Pt weaned for 1 hour and returned to rest settings per protocol   Length of Weaning Trial (Hours) 1   Vent Weaning Smart Text completed? Yes

## 2017-06-30 NOTE — PROGRESS NOTES
Dr. Gonda to notify RN that per the pts mother, his BP is most accurate on the Right Lower leg. RN to change the BP cuff to the right lower leg. RN will continue to monitor.

## 2017-06-30 NOTE — PROGRESS NOTES
"Pharmacy Kinetics 37 y.o. male on vancomycin day # 1 2017    Currently on Vancomycin 1400 mg iv once    Indication for Treatment: Pneumonia    Pertinent history per medical record: Admitted on 2017 for aspiration pneumonia.  Patient was transferred to ICU today from Neuro after having a seizure, confirmed by EEG.  He was then intubated and placed on propofol.  Empiric antibiotics started for HCAP/aspiration pneumonia.      Other antibiotics: Zosyn 4.5 g q6 hours    Allergies: Sulfa drugs     List concerns for renal function: Pressors    Pertinent cultures to date:     Blood culture x2 -- in process        Recent Labs      17   0219  17   0513  17   2225  17   0155   WBC  5.9  8.9  18.4*  17.7*   NEUTSPOLYS  59.50  61.80  77.50*  79.50*     Recent Labs      17   0219  17   0513  17   2225  17   0155   BUN  27*  31*  19  17   CREATININE  <0.20*  <0.20*  <0.20*  <0.20*   ALBUMIN  3.1*   --    --    --      No results for input(s): VANCOTROUGH, VANCOPEAK, VANCORANDOM in the last 72 hours.  Intake/Output Summary (Last 24 hours) at 17 0449  Last data filed at 17 1800   Gross per 24 hour   Intake      0 ml   Output   2300 ml   Net  -2300 ml      Blood pressure 151/92, pulse 67, temperature 37.3 °C (99.1 °F), resp. rate 19, height 1.727 m (5' 7.99\"), weight 55.2 kg (121 lb 11.1 oz), SpO2 100 %. Temp (24hrs), Av.5 °C (99.5 °F), Min:36.8 °C (98.3 °F), Max:38.1 °C (100.5 °F)      A/P   1. Vancomycin dose change: New start  2. Next vancomycin level: Today @ 1200  3. Goal trough: 16-20 mcg/mL  4. Comments: Because it is unclear how the patient will clear vancomycin, a random 10 hour level is ordered for today.  Clinical pharmacist to follow.      Nicola Beck, PHARMD        "

## 2017-07-01 ENCOUNTER — APPOINTMENT (OUTPATIENT)
Dept: RADIOLOGY | Facility: MEDICAL CENTER | Age: 37
DRG: 004 | End: 2017-07-01
Attending: INTERNAL MEDICINE
Payer: COMMERCIAL

## 2017-07-01 PROBLEM — R41.0 ACUTE DELIRIUM: Status: RESOLVED | Noted: 2017-06-17 | Resolved: 2017-06-20

## 2017-07-01 LAB
ANION GAP SERPL CALC-SCNC: 6 MMOL/L (ref 0–11.9)
BASE EXCESS BLDA CALC-SCNC: -1 MMOL/L (ref -4–3)
BASOPHILS # BLD AUTO: 0.2 % (ref 0–1.8)
BASOPHILS # BLD: 0.02 K/UL (ref 0–0.12)
BODY TEMPERATURE: ABNORMAL DEGREES
BUN SERPL-MCNC: 9 MG/DL (ref 8–22)
CALCIUM SERPL-MCNC: 7.5 MG/DL (ref 8.5–10.5)
CHLORIDE SERPL-SCNC: 113 MMOL/L (ref 96–112)
CO2 BLDA-SCNC: 24 MMOL/L (ref 20–33)
CO2 SERPL-SCNC: 23 MMOL/L (ref 20–33)
CREAT SERPL-MCNC: <0.2 MG/DL (ref 0.5–1.4)
EOSINOPHIL # BLD AUTO: 0.21 K/UL (ref 0–0.51)
EOSINOPHIL NFR BLD: 2.1 % (ref 0–6.9)
ERYTHROCYTE [DISTWIDTH] IN BLOOD BY AUTOMATED COUNT: 55.4 FL (ref 35.9–50)
GFR SERPL CREATININE-BSD FRML MDRD: >60 ML/MIN/1.73 M 2
GLUCOSE SERPL-MCNC: 160 MG/DL (ref 65–99)
HCO3 BLDA-SCNC: 23 MMOL/L (ref 17–25)
HCT VFR BLD AUTO: 29.1 % (ref 42–52)
HGB BLD-MCNC: 9.5 G/DL (ref 14–18)
IMM GRANULOCYTES # BLD AUTO: 0.06 K/UL (ref 0–0.11)
IMM GRANULOCYTES NFR BLD AUTO: 0.6 % (ref 0–0.9)
INR PPP: 1.07 (ref 0.87–1.13)
LYMPHOCYTES # BLD AUTO: 1.16 K/UL (ref 1–4.8)
LYMPHOCYTES NFR BLD: 11.6 % (ref 22–41)
MAGNESIUM SERPL-MCNC: 1.8 MG/DL (ref 1.5–2.5)
MCH RBC QN AUTO: 29.2 PG (ref 27–33)
MCHC RBC AUTO-ENTMCNC: 32.6 G/DL (ref 33.7–35.3)
MCV RBC AUTO: 89.5 FL (ref 81.4–97.8)
MONOCYTES # BLD AUTO: 0.63 K/UL (ref 0–0.85)
MONOCYTES NFR BLD AUTO: 6.3 % (ref 0–13.4)
NEUTROPHILS # BLD AUTO: 7.94 K/UL (ref 1.82–7.42)
NEUTROPHILS NFR BLD: 79.2 % (ref 44–72)
NRBC # BLD AUTO: 0 K/UL
NRBC BLD AUTO-RTO: 0 /100 WBC
O2/TOTAL GAS SETTING VFR VENT: 30 %
PCO2 BLDA: 32.5 MMHG (ref 26–37)
PCO2 TEMP ADJ BLDA: 30.7 MMHG (ref 26–37)
PH BLDA: 7.46 [PH] (ref 7.4–7.5)
PH TEMP ADJ BLDA: 7.48 [PH] (ref 7.4–7.5)
PHOSPHATE SERPL-MCNC: 1.8 MG/DL (ref 2.5–4.5)
PLATELET # BLD AUTO: 493 K/UL (ref 164–446)
PMV BLD AUTO: 8.2 FL (ref 9–12.9)
PO2 BLDA: 59 MMHG (ref 64–87)
PO2 TEMP ADJ BLDA: 54 MMHG (ref 64–87)
POTASSIUM SERPL-SCNC: 3.1 MMOL/L (ref 3.6–5.5)
PROTHROMBIN TIME: 14.2 SEC (ref 12–14.6)
RBC # BLD AUTO: 3.25 M/UL (ref 4.7–6.1)
SAO2 % BLDA: 92 % (ref 93–99)
SODIUM SERPL-SCNC: 142 MMOL/L (ref 135–145)
SPECIMEN DRAWN FROM PATIENT: ABNORMAL
WBC # BLD AUTO: 10 K/UL (ref 4.8–10.8)

## 2017-07-01 PROCEDURE — A9270 NON-COVERED ITEM OR SERVICE: HCPCS | Performed by: HOSPITALIST

## 2017-07-01 PROCEDURE — 700111 HCHG RX REV CODE 636 W/ 250 OVERRIDE (IP): Performed by: INTERNAL MEDICINE

## 2017-07-01 PROCEDURE — 85025 COMPLETE CBC W/AUTO DIFF WBC: CPT

## 2017-07-01 PROCEDURE — 83735 ASSAY OF MAGNESIUM: CPT

## 2017-07-01 PROCEDURE — 82803 BLOOD GASES ANY COMBINATION: CPT

## 2017-07-01 PROCEDURE — 80048 BASIC METABOLIC PNL TOTAL CA: CPT

## 2017-07-01 PROCEDURE — 700111 HCHG RX REV CODE 636 W/ 250 OVERRIDE (IP)

## 2017-07-01 PROCEDURE — 700102 HCHG RX REV CODE 250 W/ 637 OVERRIDE(OP): Performed by: INTERNAL MEDICINE

## 2017-07-01 PROCEDURE — 700102 HCHG RX REV CODE 250 W/ 637 OVERRIDE(OP): Performed by: HOSPITALIST

## 2017-07-01 PROCEDURE — A9270 NON-COVERED ITEM OR SERVICE: HCPCS | Performed by: INTERNAL MEDICINE

## 2017-07-01 PROCEDURE — 700105 HCHG RX REV CODE 258: Performed by: INTERNAL MEDICINE

## 2017-07-01 PROCEDURE — A9270 NON-COVERED ITEM OR SERVICE: HCPCS

## 2017-07-01 PROCEDURE — 770022 HCHG ROOM/CARE - ICU (200)

## 2017-07-01 PROCEDURE — 84100 ASSAY OF PHOSPHORUS: CPT

## 2017-07-01 PROCEDURE — 85610 PROTHROMBIN TIME: CPT

## 2017-07-01 PROCEDURE — 99291 CRITICAL CARE FIRST HOUR: CPT | Performed by: INTERNAL MEDICINE

## 2017-07-01 PROCEDURE — 700101 HCHG RX REV CODE 250: Performed by: INTERNAL MEDICINE

## 2017-07-01 PROCEDURE — A9270 NON-COVERED ITEM OR SERVICE: HCPCS | Performed by: PSYCHIATRY & NEUROLOGY

## 2017-07-01 PROCEDURE — 36600 WITHDRAWAL OF ARTERIAL BLOOD: CPT

## 2017-07-01 PROCEDURE — 700105 HCHG RX REV CODE 258

## 2017-07-01 PROCEDURE — 700101 HCHG RX REV CODE 250

## 2017-07-01 PROCEDURE — 94668 MNPJ CHEST WALL SBSQ: CPT

## 2017-07-01 PROCEDURE — 71010 DX-CHEST-PORTABLE (1 VIEW): CPT

## 2017-07-01 PROCEDURE — 99233 SBSQ HOSP IP/OBS HIGH 50: CPT | Performed by: HOSPITALIST

## 2017-07-01 PROCEDURE — 94669 MECHANICAL CHEST WALL OSCILL: CPT

## 2017-07-01 PROCEDURE — 700102 HCHG RX REV CODE 250 W/ 637 OVERRIDE(OP)

## 2017-07-01 PROCEDURE — 94640 AIRWAY INHALATION TREATMENT: CPT

## 2017-07-01 PROCEDURE — 700102 HCHG RX REV CODE 250 W/ 637 OVERRIDE(OP): Performed by: PSYCHIATRY & NEUROLOGY

## 2017-07-01 PROCEDURE — 94003 VENT MGMT INPAT SUBQ DAY: CPT

## 2017-07-01 RX ORDER — SODIUM CHLORIDE 9 MG/ML
1000 INJECTION, SOLUTION INTRAVENOUS ONCE
Status: DISCONTINUED | OUTPATIENT
Start: 2017-07-01 | End: 2017-07-01

## 2017-07-01 RX ORDER — MAGNESIUM SULFATE HEPTAHYDRATE 40 MG/ML
2 INJECTION, SOLUTION INTRAVENOUS ONCE
Status: COMPLETED | OUTPATIENT
Start: 2017-07-01 | End: 2017-07-01

## 2017-07-01 RX ORDER — POTASSIUM CHLORIDE 1.5 G/1.58G
40 POWDER, FOR SOLUTION ORAL ONCE
Status: DISCONTINUED | OUTPATIENT
Start: 2017-07-01 | End: 2017-07-01

## 2017-07-01 RX ADMIN — POTASSIUM PHOSPHATE, MONOBASIC AND POTASSIUM PHOSPHATE, DIBASIC 30 MMOL: 224; 236 INJECTION, SOLUTION INTRAVENOUS at 08:14

## 2017-07-01 RX ADMIN — MAGNESIUM SULFATE IN WATER 2 G: 40 INJECTION, SOLUTION INTRAVENOUS at 08:13

## 2017-07-01 RX ADMIN — VANCOMYCIN HYDROCHLORIDE 800 MG: 100 INJECTION, POWDER, LYOPHILIZED, FOR SOLUTION INTRAVENOUS at 06:51

## 2017-07-01 RX ADMIN — LEVETIRACETAM 1500 MG: 100 SOLUTION ORAL at 08:14

## 2017-07-01 RX ADMIN — PIPERACILLIN SODIUM AND TAZOBACTAM SODIUM 4.5 G: 4; .5 INJECTION, POWDER, FOR SOLUTION INTRAVENOUS at 05:49

## 2017-07-01 RX ADMIN — ASCORBIC ACID: 500 INJECTION, SOLUTION INTRAMUSCULAR; INTRAVENOUS; SUBCUTANEOUS at 12:39

## 2017-07-01 RX ADMIN — SODIUM CHLORIDE TAB 1 GM 1 G: 1 TAB at 12:27

## 2017-07-01 RX ADMIN — IPRATROPIUM BROMIDE AND ALBUTEROL SULFATE 3 ML: .5; 3 SOLUTION RESPIRATORY (INHALATION) at 22:08

## 2017-07-01 RX ADMIN — HYDROCORTISONE SODIUM SUCCINATE 50 MG: 100 INJECTION, POWDER, FOR SOLUTION INTRAMUSCULAR; INTRAVENOUS at 17:39

## 2017-07-01 RX ADMIN — POTASSIUM CHLORIDE 40 MEQ: 1.5 POWDER, FOR SOLUTION ORAL at 14:06

## 2017-07-01 RX ADMIN — ASCORBIC ACID: 500 INJECTION, SOLUTION INTRAMUSCULAR; INTRAVENOUS; SUBCUTANEOUS at 23:41

## 2017-07-01 RX ADMIN — SODIUM CHLORIDE TAB 1 GM 1 G: 1 TAB at 06:52

## 2017-07-01 RX ADMIN — PIPERACILLIN SODIUM AND TAZOBACTAM SODIUM 4.5 G: 4; .5 INJECTION, POWDER, FOR SOLUTION INTRAVENOUS at 12:26

## 2017-07-01 RX ADMIN — BISACODYL 10 MG: 10 SUPPOSITORY RECTAL at 10:47

## 2017-07-01 RX ADMIN — IPRATROPIUM BROMIDE AND ALBUTEROL SULFATE 3 ML: .5; 3 SOLUTION RESPIRATORY (INHALATION) at 02:28

## 2017-07-01 RX ADMIN — HYDROCORTISONE SODIUM SUCCINATE 50 MG: 100 INJECTION, POWDER, FOR SOLUTION INTRAMUSCULAR; INTRAVENOUS at 01:37

## 2017-07-01 RX ADMIN — PHENOBARBITAL 70 MG: 20 ELIXIR ORAL at 20:39

## 2017-07-01 RX ADMIN — PIPERACILLIN SODIUM AND TAZOBACTAM SODIUM 4.5 G: 4; .5 INJECTION, POWDER, FOR SOLUTION INTRAVENOUS at 01:37

## 2017-07-01 RX ADMIN — THIAMINE HYDROCHLORIDE 200 MG: 100 INJECTION, SOLUTION INTRAMUSCULAR; INTRAVENOUS at 13:49

## 2017-07-01 RX ADMIN — LEVETIRACETAM 1500 MG: 100 SOLUTION ORAL at 20:39

## 2017-07-01 RX ADMIN — HYDROCORTISONE SODIUM SUCCINATE 50 MG: 100 INJECTION, POWDER, FOR SOLUTION INTRAMUSCULAR; INTRAVENOUS at 12:27

## 2017-07-01 RX ADMIN — SODIUM CHLORIDE: 9 INJECTION, SOLUTION INTRAVENOUS at 16:35

## 2017-07-01 RX ADMIN — POTASSIUM CHLORIDE 40 MEQ: 1.5 POWDER, FOR SOLUTION ORAL at 20:39

## 2017-07-01 RX ADMIN — FAMOTIDINE 20 MG: 20 TABLET, FILM COATED ORAL at 20:38

## 2017-07-01 RX ADMIN — SENNOSIDES AND DOCUSATE SODIUM 2 TABLET: 8.6; 5 TABLET ORAL at 08:13

## 2017-07-01 RX ADMIN — NOREPINEPHRINE BITARTRATE 1 MCG/MIN: 1 INJECTION INTRAVENOUS at 18:13

## 2017-07-01 RX ADMIN — IPRATROPIUM BROMIDE AND ALBUTEROL SULFATE 3 ML: .5; 3 SOLUTION RESPIRATORY (INHALATION) at 18:43

## 2017-07-01 RX ADMIN — SODIUM CHLORIDE 1000 ML: 9 INJECTION, SOLUTION INTRAVENOUS at 02:00

## 2017-07-01 RX ADMIN — VANCOMYCIN HYDROCHLORIDE 800 MG: 100 INJECTION, POWDER, LYOPHILIZED, FOR SOLUTION INTRAVENOUS at 14:07

## 2017-07-01 RX ADMIN — PHENOBARBITAL 70 MG: 20 ELIXIR ORAL at 08:13

## 2017-07-01 RX ADMIN — THIAMINE HYDROCHLORIDE 200 MG: 100 INJECTION, SOLUTION INTRAMUSCULAR; INTRAVENOUS at 03:50

## 2017-07-01 RX ADMIN — PROPOFOL 5 MCG/KG/MIN: 10 INJECTION, EMULSION INTRAVENOUS at 22:29

## 2017-07-01 RX ADMIN — SENNOSIDES AND DOCUSATE SODIUM 2 TABLET: 8.6; 5 TABLET ORAL at 20:39

## 2017-07-01 RX ADMIN — ASCORBIC ACID: 500 INJECTION, SOLUTION INTRAMUSCULAR; INTRAVENOUS; SUBCUTANEOUS at 17:40

## 2017-07-01 RX ADMIN — ASCORBIC ACID: 500 INJECTION, SOLUTION INTRAMUSCULAR; INTRAVENOUS; SUBCUTANEOUS at 01:37

## 2017-07-01 RX ADMIN — PIPERACILLIN SODIUM AND TAZOBACTAM SODIUM 4.5 G: 4; .5 INJECTION, POWDER, FOR SOLUTION INTRAVENOUS at 17:40

## 2017-07-01 RX ADMIN — IPRATROPIUM BROMIDE AND ALBUTEROL SULFATE 3 ML: .5; 3 SOLUTION RESPIRATORY (INHALATION) at 10:46

## 2017-07-01 RX ADMIN — IPRATROPIUM BROMIDE AND ALBUTEROL SULFATE 3 ML: .5; 3 SOLUTION RESPIRATORY (INHALATION) at 07:19

## 2017-07-01 RX ADMIN — CHLORHEXIDINE GLUCONATE 15 ML: 1.2 RINSE ORAL at 08:13

## 2017-07-01 RX ADMIN — PROPOFOL 10 MCG/KG/MIN: 10 INJECTION, EMULSION INTRAVENOUS at 01:40

## 2017-07-01 RX ADMIN — VANCOMYCIN HYDROCHLORIDE 800 MG: 100 INJECTION, POWDER, LYOPHILIZED, FOR SOLUTION INTRAVENOUS at 22:06

## 2017-07-01 RX ADMIN — FAMOTIDINE 20 MG: 20 TABLET, FILM COATED ORAL at 08:13

## 2017-07-01 RX ADMIN — CHLORHEXIDINE GLUCONATE 15 ML: 1.2 RINSE ORAL at 20:38

## 2017-07-01 RX ADMIN — ASCORBIC ACID: 500 INJECTION, SOLUTION INTRAMUSCULAR; INTRAVENOUS; SUBCUTANEOUS at 05:38

## 2017-07-01 RX ADMIN — HYDROCORTISONE SODIUM SUCCINATE 50 MG: 100 INJECTION, POWDER, FOR SOLUTION INTRAMUSCULAR; INTRAVENOUS at 23:42

## 2017-07-01 RX ADMIN — HYDROCORTISONE SODIUM SUCCINATE 50 MG: 100 INJECTION, POWDER, FOR SOLUTION INTRAMUSCULAR; INTRAVENOUS at 05:39

## 2017-07-01 RX ADMIN — POTASSIUM CHLORIDE 40 MEQ: 1.5 POWDER, FOR SOLUTION ORAL at 08:13

## 2017-07-01 RX ADMIN — IPRATROPIUM BROMIDE AND ALBUTEROL SULFATE 3 ML: .5; 3 SOLUTION RESPIRATORY (INHALATION) at 15:28

## 2017-07-01 NOTE — CARE PLAN
Problem: Communication  Goal: The ability to communicate needs accurately and effectively will improve  Intervention: Educate patient and significant other/support system about the plan of care, procedures, treatments, medications and allow for questions  RN to discuss the plan of care for the trach this morning with pts mother Susana. Susana will be notified of a time frame once RN is made aware.       Problem: Skin Integrity  Goal: Risk for impaired skin integrity will decrease  Intervention: Assess risk factors for impaired skin integrity and/or pressure ulcers  RN to turn pt q 2 hours, RN to utilize pillows for support and repositioning. Body waffle cushion in place.

## 2017-07-01 NOTE — CARE PLAN
Problem: Ventilation Defect:  Goal: Ability to achieve and maintain unassisted ventilation or tolerate decreased levels of ventilator support  Intervention: Support and monitor invasive and noninvasive mechanical ventilation  Adult Ventilation Update    Total Vent Days: 2      Patient Lines/Drains/Airways Status    Active Airway      Name: Placement date: Placement time: Site: Days:     Airway Group ET Tube Oral 8.0 06/30/17  0008  Oral  1               Plateau Pressure (Q Shift): 19 (07/01/17 1537)  Static Compliance (ml / cm H2O): 39 (07/01/17 1537)    Sputum/Suction   Cough: Moist;Productive (07/01/17 1200)  Sputum Amount: Scant (07/01/17 1537)  Sputum Color: Unable to Evaluate (07/01/17 1537)  Sputum Consistency: Unable to Evaluate (07/01/17 1537)    Events/Summary/Plan: Report received, orders reviewed. (07/01/17 0733) FiO2 increased to 40% this AM. No other changes made to vent settings today. No SBTs today.

## 2017-07-01 NOTE — PROGRESS NOTES
[facility] Hospitalist Progress Note    Date of Service: 2017    Chief Complaint  36 y.o. male admitted 2017 with altered mental status seizures and aspiration. H/o   Severe TBI. Intubated Initially improved then had repeat event with respiratory failure and left lung white, reintubation out s/p improvement with bronc.     Interval Problem Update  Hypotensive with low uop over night  Bradycardic  Trace WD to pain  Cvp 10  Peg intact with a little redness unchanged  abd distended wiht BM on  and BS  On pressors for hypotension  Vent day          K  Mag  Phos        Consultants/Specialty  Neurology  Pulmonology    Disposition  LTACH referral placed.   Mother would prefer home. Was feeding him at home.       Review of Systems   Unable to perform ROS: intubated      Physical Exam  Laboratory/Imaging   Hemodynamics  Temp (24hrs), Av.1 °C (98.8 °F), Min:36.9 °C (98.4 °F), Max:37.3 °C (99.1 °F)   Temperature: 36.9 °C (98.4 °F), Monitored Temp: 36.4 °C (97.5 °F)  Pulse  Av.7  Min: 50  Max: 131 Heart Rate (Monitored): (!) 55  NIBP: 112/53 mmHg CVP (mm Hg): (!) 11 MM HG    Respiratory  Liriano Vent Mode: APVCMV, Rate (breaths/min): 15, PEEP/CPAP: 8, PEEP/CPAP: 8, FiO2: 40, P Peak (PIP): 23, P MEAN: 12   Respiration: 15, Pulse Oximetry: 97 %     PEP/CPT Method: Percussion/Vibration, Work Of Breathing / Effort: Vented  RUL Breath Sounds: Clear, RML Breath Sounds: Clear, RLL Breath Sounds: Diminished, JONATAN Breath Sounds: Clear, LLL Breath Sounds: Diminished    Fluids    Intake/Output Summary (Last 24 hours) at 17 0740  Last data filed at 17 0600   Gross per 24 hour   Intake 4390.75 ml   Output   2415 ml   Net 1975.75 ml       Nutrition  Orders Placed This Encounter   Procedures   • Diet NPO     Standing Status: Standing      Number of Occurrences: 1      Standing Expiration Date:      Order Specific Question:  Type:     Answer:  Now [1]     Order Specific Question:  Restrict to:     Answer:  Strict  [1]     Physical Exam   Constitutional: He appears well-developed and well-nourished. He appears ill. No distress.   HENT:   Right Ear: External ear normal.   Left Ear: External ear normal.   Nose: Nose normal.   Eyes: Pupils are equal, round, and reactive to light. Right eye exhibits no discharge. Left eye exhibits no discharge. No scleral icterus.   Neck: No JVD present. No tracheal deviation present.   Right IJ central line without bleeding.   Well-healed tracheostomy site with indentation.    Cardiovascular: Regular rhythm, normal heart sounds and intact distal pulses.  Tachycardia present.    No murmur heard.  Pulmonary/Chest: Effort normal and breath sounds normal. No respiratory distress. He has no rales.   Abdominal: Soft. Bowel sounds are normal. He exhibits no distension. There is no tenderness.   PEG. Baclofen pump palpable on abdomen.   PEG tube site erythematous and with purulent drainage   Genitourinary:   Waldrop    Musculoskeletal: He exhibits edema. He exhibits no tenderness.   Poor muscle tone, severe contractures of the arms and legs with significant deformity.    Neurological:   Unresponsive today.    Skin: Skin is warm and dry. He is not diaphoretic. No erythema. There is pallor.   Nursing note and vitals reviewed.      Recent Labs      06/29/17 2225 06/30/17 0155 07/01/17   0350   WBC  18.4*  17.7*  10.0   RBC  4.06*  3.55*  3.25*   HEMOGLOBIN  11.9*  10.3*  9.5*   HEMATOCRIT  35.7*  31.3*  29.1*   MCV  87.9  88.2  89.5   MCH  29.3  29.0  29.2   MCHC  33.3*  32.9*  32.6*   RDW  53.9*  52.8*  55.4*   PLATELETCT  559*  512*  493*   MPV  7.9*  8.0*  8.2*     Recent Labs      06/29/17 2225 06/30/17 0155  07/01/17   0350   SODIUM  133*  136  142   POTASSIUM  4.1  3.4*  3.1*   CHLORIDE  100  104  113*   CO2  27  26  23   GLUCOSE  89  87  160*   BUN  19  17  9   CREATININE  <0.20*  <0.20*  <0.20*   CALCIUM  8.1*  7.3*  7.5*     Recent Labs      06/30/17 0155  07/01/17   0350   APTT  28.7    --    INR  1.01  1.07         Recent Labs      06/30/17   0155   TRIGLYCERIDE  71          Assessment/Plan     Status epilepticus (CMS-HCC) (present on admission)  Assessment & Plan  - had continuous seizure activity for approximately 48 hours suspected from pneumonia.   - neurology following  - on phenobarbital and keppra  - seizure precautions    Aspiration pneumonia (CMS-HCC) (present on admission)  Assessment & Plan  - pt unable to clear secretions   - culture growing Klebsiella resistant to ampicillin thus rocephin to cover aspiration   Bronchoscopy 6/25. Again on 6/29  Thoracentesis with 300 ml off on 6/25.  - to trach today.        Sepsis (CMS-HCC)  Assessment & Plan  - likely resolved.   Sirs from aspiration now most appropriate dx.       Acute respiratory failure with hypoxia (CMS-HCC) (present on admission)  Assessment & Plan  - recurred. Mucus plugging likely.   - Trach replacement likely today.       Hyponatremia (present on admission)  Assessment & Plan  - resolved  - stop salt tabs     Hypoglycemia (present on admission)  Assessment & Plan  - resolved    TBI (traumatic brain injury) (CMS-HCC) (present on admission)  Assessment & Plan  Since age 17 years.  With quadriplegia   [facility]aclofen pump [facility] at [facility]--his mother states it does not need refilled for a few months.    Hypokalemia (present on admission)  Assessment & Plan  - replenished      Labs reviewed, Medications reviewed, Radiology images reviewed and EKG reviewed  Waldrop catheter: Critically Ill - Requiring Accurate Measurement of Urinary Output      DVT Prophylaxis: Enoxaparin (Lovenox)      Antibiotics: Treating active infection/contamination beyond 24 hours perioperative coverage  Assessed for rehab: Patient was assess for and/or received rehabilitation services during this hospitalization

## 2017-07-01 NOTE — PROGRESS NOTES
Pulmonary Critical Care Progress Note        Date of service: 7/1/2017    Interval Events:  24 hour interval history reviewed  Reason for visit:  Respiratory failure, pneumonia, atelectasis  Unable to provide CC or ROS due to chronic encephalopathy      Patient became bradycardic last night with a low of 38.  1 L NS administered.  At baseline neurologically.  Sinus bradycardic overnight 38-60s with frequent PACs.  Systolic BPs 70s-110.  CVP 10  TFs at goal.  Last bowel movement 6/29/17  On Levo 2 mcg, Prop 10,   CXR shows improvement in the left lower lobe  Vancomycin and Zosyn      PFSH:  No change.    Respiratory:  Liriano Vent Mode: APVCMV, Rate (breaths/min): 15, Vt Target (mL): 410, PEEP/CPAP: 8, FiO2: 40, Static Compliance (ml / cm H2O): 32, Control VTE (exp VT): 406  Pulse Oximetry: 99 %  CXR with improved LLL opacification  Few coarse crackles bilaterally    Recent Labs      06/30/17   0122  06/30/17   0442  07/01/17   0454   ISTATAPH  7.397*  7.467  7.457   ISTATAPCO2  40.2*  31.5  32.5   ISTATAPO2  105*  136*  59*   ISTATATCO2  26  24  24   [id number]  98  99  92*   ISTATARTHCO3  24.7  22.8  23.0   ISTATARTBE  0  0  -1   ISTATTEMP  99.2 F  36.8 C  96.3 F   ISTATFIO2  100  60  30   ISTATSPEC  Arterial  Arterial  Arterial   ISTATAPHTC  7.392*  7.470  7.477   [id number]  107*  135*  54*       HemoDynamics:  Pulse: 62, Heart Rate (Monitored): (!) 58  NIBP: 115/52 mmHg  CVP (mm Hg): (!) 10 MM HG  SR/SB  NE 2    Neuro:  Sedated  Propofol 10    Fluids:  Intake/Output       06/29/17 0700 - 06/30/17 0659 06/30/17 0700 - 07/01/17 0659 07/01/17 0700 - 07/02/17 0659      6129-7149 1818-7403 Total 0466-9501 2269-9887 Total 5060-2945 5592-0991 Total       Intake    I.V.  --  2486.4 2486.4  1828.8  1397 3225.8  979.7  -- 979.7    Magnesium Sulfate Volume -- -- -- 15.8 -- 15.8 44.6 -- 44.6    Propofol Volume -- 14 14 53.4 49.3 102.7 14.9 -- 14.9    Norepinephrine Volume -- 16.5 16.5 59.5 47.7 107.2 20.2 -- 20.2     Normal Saline -- 2456 2456 1200 1000 2200 400 -- 400    IV Piggyback Volume (IV Piggyback) -- -- -- 500 300 800 500 -- 500    Other  --  -- --  180  75 255  120  -- 120    Medications (P.O./ Enteral Liquids) -- -- -- 180 75 255 120 -- 120    Enteral  --  -- --  290  620 910  230  -- 230    Enteral Volume -- -- -- 200 300 500 200 -- 200    Free Water / Tube Flush -- -- -- 90 320 410 30 -- 30    Total Intake -- 2486.4 2486.4 2298.8 2092 4390.8 1329.7 -- 1329.7       Output    Urine  1600  1400 3000  1400  1015 2415  195  -- 195    Number of Times Voided 1 x -- 1 x -- -- -- -- -- --    Indwelling Cathether -- -- -- 1400 1015 2415 195 -- 195    Straight Catheter 1600 1400 3000 -- -- -- -- -- --    Stool/Urine  --  -- --  0  0 0  0  -- 0    Measurable Stool (ml) -- -- -- 0 0 0 0 -- 0    Stool  --  -- --  --  -- --  --  -- --    Number of Times Stooled 1 x -- 1 x 0 x 0 x 0 x 0 x -- 0 x    Total Output 1600 1400 3000 1400 1015 2415 195 -- 195       Net I/O     -1600 1086.4 -513.6 898.8 1077 1975.8 1134.7 -- 1134.7        Weight: 64 kg (141 lb 1.5 oz)  Recent Labs      175  17   0350   SODIUM  133*  136  142   POTASSIUM  4.1  3.4*  3.1*   CHLORIDE  100  104  113*   CO2  27  26  23   BUN  19  17  9   CREATININE  <0.20*  <0.20*  <0.20*   MAGNESIUM   --    --   1.8   PHOSPHORUS   --    --   1.8*   CALCIUM  8.1*  7.3*  7.5*       GI/Nutrition:  Abd soft ND/NT  Tolerating enteral TF    Liver Function  Recent Labs      17   0350   GLUCOSE  89  87  160*       Heme:  Recent Labs      17   2225  17   0155  17   0350   RBC  4.06*  3.55*  3.25*   HEMOGLOBIN  11.9*  10.3*  9.5*   HEMATOCRIT  35.7*  31.3*  29.1*   PLATELETCT  559*  512*  493*   PROTHROMBTM   --   13.6  14.2   APTT   --   28.7   --    INR   --   1.01  1.07       Infectious Disease:  Monitored Temp  Av.7 °C (98.1 °F)  Min: 35.6 °C (96.1 °F)  Max: 37.3 °C (99.1 °F)      Recent Labs      06/29/17   2225  06/30/17   0155  07/01/17   0350   WBC  18.4*  17.7*  10.0   NEUTSPOLYS  77.50*  79.50*  79.20*   LYMPHOCYTES  12.40*  11.80*  11.60*   MONOCYTES  7.60  6.30  6.30   EOSINOPHILS  1.70  1.50  2.10   BASOPHILS  0.40  0.30  0.20     Current Facility-Administered Medications   Medication Dose Frequency Provider Last Rate Last Dose   • NS (BOLUS) infusion 1,000 mL  1,000 mL Once Rivas Farrell M.D.   Stopped at 07/01/17 0215   • potassium phosphates 30 mmol in D5W 500 mL ivpb  30 mmol Once Terry Harper PHARMD 83.3 mL/hr at 07/01/17 0814 30 mmol at 07/01/17 0814   • propofol (DIPRIVAN) injection  5-80 mcg/kg/min Continuous Clarence Pro M.D. 1.7 mL/hr at 07/01/17 0900 5 mcg/kg/min at 07/01/17 0900   • Respiratory Care per Protocol   Continuous RT Clarence Pro M.D.       • chlorhexidine (PERIDEX) 0.12 % solution 15 mL  15 mL BID Clarence Pro M.D.   15 mL at 07/01/17 0813   • lidocaine (XYLOCAINE) 1%  injection  1-2 mL Q30 MIN PRN Clarence Pro M.D.       • MD ALERT...Adult ICU Electrolyte Replacement per Pharmacy Protocol   pharmacy to dose Clarence Pro M.D.       • fentaNYL (SUBLIMAZE) injection 25 mcg  25 mcg Q HOUR PRN Clarence Pro M.D.        Or   • fentaNYL (SUBLIMAZE) injection 50 mcg  50 mcg Q HOUR PRN Clarence Pro M.D.   50 mcg at 06/30/17 0000    Or   • fentaNYL (SUBLIMAZE) injection 100 mcg  100 mcg Q HOUR PRN Clarence Pro M.D.       • ipratropium-albuterol (DUONEB) nebulizer solution 3 mL  3 mL Q2HRS PRN (RT) Clarence Pro M.D.       • ipratropium-albuterol (DUONEB) nebulizer solution 3 mL  3 mL Q4HRS (RT) Clarence Pro M.D.   3 mL at 07/01/17 1046   • piperacillin-tazobactam (ZOSYN) 4.5 g in  mL IVPB  4.5 g Q6HRS Clarence Pro M.D.   Stopped at 07/01/17 0649   • MD ALERT... vancomycin per pharmacy protocol   pharmacy to dose Clarence Pro M.D.       • thiamine (B-1) 200 mg in D5W 100 mL IVPB  200 mg  Q12HR Clarence Pro M.D. 200 mL/hr at 07/01/17 0350 200 mg at 07/01/17 0350   • hydrocortisone sodium succinate PF (SOLU-CORTEF) 100 MG injection 50 mg  50 mg Q6HRS Jeremy M Gonda, M.D.   50 mg at 07/01/17 0539   • ascorbic acid (VITAMIN C) 1,500 mg in  mL   Q6HRS Clarence Pro M.D.   Stopped at 07/01/17 0638   • norepinephrine (LEVOPHED) 8 mg in  mL Infusion  0.5-30 mcg/min Continuous Clarence Pro M.D. 3.8 mL/hr at 07/01/17 0718 2 mcg/min at 07/01/17 0718   • NS infusion   Continuous Jeremy M Gonda, M.D. 100 mL/hr at 07/01/17 0718     • vancomycin 800 mg in  mL IVPB  14 mg/kg Q8HR Jeremy M Gonda, M.D.   Stopped at 07/01/17 0821   • famotidine (PEPCID) tablet 20 mg  20 mg BID Gabrielle Vargas, PHARMD   20 mg at 07/01/17 0813   • sodium chloride (SALT) tablet 1 g  1 g TID WITH MEALS Rivas Farrell M.D.   1 g at 07/01/17 0652   • potassium chloride (KLOR-CON) 20 MEQ packet 40 mEq  40 mEq TID Kev Araiza M.D.   40 mEq at 07/01/17 0813   • levetiracetam (KEPPRA) 100 MG/ML solution 1,500 mg  1,500 mg Q12HRS Martell Hunter M.D.   1,500 mg at 07/01/17 0814   • oxycodone immediate-release (ROXICODONE) tablet 5 mg  5 mg Q4HRS PRN Kev Araiza M.D.   5 mg at 06/24/17 1825   • PHENobarbital solution 70 mg  70 mg BID Martell Hunter M.D.   70 mg at 07/01/17 0813   • enoxaparin (LOVENOX) inj 30 mg  30 mg DAILY Clarence Morfin D.O.   30 mg at 06/30/17 2117   • senna-docusate (PERICOLACE or SENOKOT S) 8.6-50 MG per tablet 2 Tab  2 Tab BID Kev Araiza M.D.   2 Tab at 07/01/17 0813    And   • polyethylene glycol/lytes (MIRALAX) PACKET 1 Packet  1 Packet QDAY SUSAN Araiza M.D.   1 Packet at 06/25/17 2028    And   • magnesium hydroxide (MILK OF MAGNESIA) suspension 30 mL  30 mL QDAY SUSAN Araiza M.D.   30 mL at 06/28/17 1305    And   • bisacodyl (DULCOLAX) suppository 10 mg  10 mg QDAY SUSAN Araiza M.D.   10 mg at 07/01/17 1047   • glucose 4 g chewable tablet  16 g  16 g Q15 MIN PRN Kev Araiza M.D.        And   • dextrose 50% (D50W) injection 25 mL  25 mL Q15 MIN PRN Kve Araiza M.D.   25 mL at 06/20/17 1840   • ondansetron (ZOFRAN) syringe/vial injection 4 mg  4 mg Q4HRS PRN Clarence Morfin D.O.   4 mg at 06/23/17 2238   • ondansetron (ZOFRAN ODT) dispertab 4 mg  4 mg Q4HRS PRN Clarence Morfin D.O.       • promethazine (PHENERGAN) tablet 12.5-25 mg  12.5-25 mg Q4HRS PRN Clarence Morfin D.O.       • promethazine (PHENERGAN) suppository 12.5-25 mg  12.5-25 mg Q4HRS PRN Clarence Morfin D.O.       • lorazepam (ATIVAN) tablet 0.5 mg  0.5 mg Q6HRS PRN Clarence Morfin D.O.   Stopped at 06/19/17 0248    Or   • lorazepam (ATIVAN) injection 0.5 mg  0.5 mg Q6HRS PRN Clarence Morfin D.O.   0.5 mg at 06/22/17 0044   • acetaminophen (TYLENOL) tablet 650 mg  650 mg Q6HRS PRN Clarence Morfin D.O.   650 mg at 06/17/17 1857   • Pain Pump (patient supplied) Device   Continuous Clarence Morfin D.O.   Stopped at 06/17/17 1230     Last reviewed on 6/17/2017  9:45 AM by Ligia Frazier, Pharmacy Int    Quality  Measures:  Medications reviewed, Labs reviewed and Radiology images reviewed                        Assessment and Plan:    Acute hypoxemic respiratory failure  VDRF - intubated 6/19-6/27, re-intubated 6/30   - cont vent support   - needs trach - discussed with mother - she consents  Aspiration/HCAP pneumonia with L lung atelectasis   - S/P FOB 6/30 - atelectasis improved              - cont Vanco/Zosyn  Sepsis syndrome with shock - pulmonary source              - vasopressor support as necessary              - Marik protocol  Parapneumonic left pleural effusion   - S/P thoracentesis 6/25   - follow  Prior trach with trauma years ago   - prob proximal airway stricture at trach site seen at FOB   - query candidacy for perc trach - discussed with Dr. Calabrese who will evaluate  Aspiration pneumonia   - Klebsiella pneumonia - pansensitive              - completed abx x 7  days 6/26 - C3/Flagyl  New onset seizure likely secondary to hyponatremia              - Keppra, Phenobarb per neuro  Hyponatremia - improved              - cont salt tabs  Severe chronic underlying debility secondary to profound traumatic brain injury 18 years ago  Dysphagia - cont enteral G-tube feedings    Critical Care Time:  35 minutes  [id number]  No time overlap  Time excludes procedures  Discussed with RN, RT, Team, mother       Elmo WOODY (Scribe), am scribing for, and in the presence of, Rivas Brooks M.D..  Electronically signed by: Elmo Flores (Scribe), 7/1/2017  Rivas WOODY M.D. personally performed the services described in this documentation, as scribed by Elmo Flores in my presence, and it is both accurate and complete.

## 2017-07-01 NOTE — PROGRESS NOTES
"Pharmacy Kinetics 37 y.o. male on vancomycin day # 2   7/1/2017    Currently on Vancomycin 800 mg IV q8h    Indication for Treatment: Pneumonia    Pertinent history per medical record: Admitted on 6/17/2017 for aspiration pneumonia.  Patient was transferred to ICU today from Neuro after having a seizure, confirmed by EEG.  He was then intubated and placed on propofol.  Empiric antibiotics started for HCAP/aspiration pneumonia.      Other antibiotics: Zosyn 4.5 g IV q6h    Allergies: Sulfa drugs     List concerns for renal function: low dose pressors, bed bound    Pertinent cultures to date:   6/29/17: Abdominal wound, ? PEG tube site = mixed enteric leon  6/30/17:  Blood, peripheral x2 = NGTD  6/30/17:  Respiratory, BAL = 23k cfu/mL staph aureus    Recent Labs      06/29/17   0513  06/29/17   2225  06/30/17   0155  07/01/17   0350   WBC  8.9  18.4*  17.7*  10.0   NEUTSPOLYS  61.80  77.50*  79.50*  79.20*     Recent Labs      06/29/17   0513  06/29/17   2225  06/30/17   0155  07/01/17   0350   BUN  31*  19  17  9   CREATININE  <0.20*  <0.20*  <0.20*  <0.20*     Recent Labs      06/30/17   1200   VANCORANDOM  10.9     Intake/Output Summary (Last 24 hours) at 07/01/17 1508  Last data filed at 07/01/17 1400   Gross per 24 hour   Intake 4809.89 ml   Output   2330 ml   Net 2479.89 ml      Blood pressure 151/92, pulse 48, temperature 36.9 °C (98.4 °F), resp. rate 14, height 1.727 m (5' 7.99\"), weight 64 kg (141 lb 1.5 oz), SpO2 100 %. No data recorded.    A/P   1. Vancomycin dose change: start 800 mg IV q8h  2. Next vancomycin level: 1-2 days (not ordered)  3. Goal trough: 16-20 mcg/mL  4. Comments: BAL back with staph aureus, should finalize tomorrow. Good UOP. Continue same. Trough in a couple of days if MRSA.      Terry Harper, PharmD, BCPS        "

## 2017-07-01 NOTE — CARE PLAN
Problem: Fluid Volume:  Goal: Will maintain balanced intake and output  Outcome: PROGRESSING SLOWER THAN EXPECTED

## 2017-07-01 NOTE — CARE PLAN
Problem: Urinary Elimination:  Goal: Ability to reestablish a normal urinary elimination pattern will improve  Outcome: PROGRESSING SLOWER THAN EXPECTED

## 2017-07-01 NOTE — PROGRESS NOTES
"Ruben Edwards is a 37 y.o. male patient.  1. Acute respiratory failure with hypoxia (CMS-HCC)    36 y.o. male admitted 6/17/2017 with altered mental status seizures and aspiration. H/o   Severe TBI 18 years ago, had trach at that time. Intubated Initially improved then had repeat event with respiratory failure and left lung white, reintubation out s/p improvement with bronc.       Mom states that he was eating before and has just had problems the last 6 months.  This is the second episode of aspiration    Allergies   Allergen Reactions   • Sulfa Drugs Unspecified     Per caretaker     Active Problems:    Status epilepticus (CMS-[facility])    Aspiration pneumonia (CMS-[facility])    Sepsis (CMS-[facility])    Acute respiratory failure with hypoxia (CMS-[facility])    Hyponatremia    Hypoglycemia    TBI (traumatic brain injury) (CMS-[facility])    Hypokalemia    Blood pressure 151/92, pulse 48, temperature 36.9 °C (98.4 °F), resp. rate 14, height 1.727 m (5' 7.99\"), weight 64 kg (141 lb 1.5 oz), SpO2 100 %.    Subjective Intubated with 8.0 ETT   Objective neck with previous trach scar that with palpation the trachea can be felt at it's base  Assessment & Plan  38 yo male history of TBI 18 years about, with severe CP  Recent aspiration pneumonia and dysphagia  Will take him to the OR tomorrow at 1pm for trach, direct laryngoscopy, bronchoscopy, esophagoscopy discussed with mother    Catherine Osorio MD  7/1/2017    "

## 2017-07-01 NOTE — PROGRESS NOTES
Dr. Calabrese to speak with pts mother, MD Dr. Calabrese and Dr. Gordon to consult ENT in regards to pts percutaneous tracheostomy.

## 2017-07-01 NOTE — PROGRESS NOTES
RN to call Dr. Farrell in regards to pts bradycardia, Heart rate 47-53. RN awaiting return phone call.

## 2017-07-01 NOTE — PROGRESS NOTES
Dr. Brooks at the bedside to speak with the pts mother Susana. MD orders to cancel the thoracentesis, due to chest x ray.

## 2017-07-01 NOTE — CARE PLAN
Problem: Ventilation Defect:  Goal: Ability to achieve and maintain unassisted ventilation or tolerate decreased levels of ventilator support  Intervention: Support and monitor invasive and noninvasive mechanical ventilation  Adult Ventilation Update    Total Vent Days: 2  CMV: 15/410/+8/30%      Patient Lines/Drains/Airways Status    Active Airway      Name: Placement date:           Airway Group ET Tube Oral 8.0 06/30/17                  In the last 24 hours, the patient tolerated SBT for 2 hours on settings of 5/8    Rapid Shallow Breathing Index (RR/VT): 132   Plateau Pressure (Q Shift): 14   Static Compliance (ml / cm H2O): 35     Sputum/Suction   Cough: Weak   Sputum Amount: Small   Sputum Color: White;Clear   Sputum Consistency: Thin;Thick     Mobility Group  Activity Performed: Unable to mobilize     Events/Summary/Plan: No ventilatory changes made overnight. CPT Q4, BBS clear/diminished, FiO2 titrated per ABG and SpO2.

## 2017-07-01 NOTE — PROGRESS NOTES
Dr. Farrell returned page and I updated him on pt HR dropping slowly into high 40s and mid 50s. Updated him on current medication list and agreed to work on weaning off Levophed. Notify him if HR drops and sustains <40

## 2017-07-02 ENCOUNTER — APPOINTMENT (OUTPATIENT)
Dept: RADIOLOGY | Facility: MEDICAL CENTER | Age: 37
DRG: 004 | End: 2017-07-02
Attending: INTERNAL MEDICINE
Payer: COMMERCIAL

## 2017-07-02 LAB
ANION GAP SERPL CALC-SCNC: 3 MMOL/L (ref 0–11.9)
BACTERIA BRONCH AEROBE CULT: ABNORMAL
BACTERIA BRONCH AEROBE CULT: ABNORMAL
BACTERIA SPEC RESP CULT: ABNORMAL
BACTERIA SPEC RESP CULT: ABNORMAL
BASOPHILS # BLD AUTO: 0.4 % (ref 0–1.8)
BASOPHILS # BLD: 0.04 K/UL (ref 0–0.12)
BUN SERPL-MCNC: 9 MG/DL (ref 8–22)
CALCIUM SERPL-MCNC: 7.5 MG/DL (ref 8.5–10.5)
CHLORIDE SERPL-SCNC: 115 MMOL/L (ref 96–112)
CO2 SERPL-SCNC: 25 MMOL/L (ref 20–33)
CREAT SERPL-MCNC: <0.2 MG/DL (ref 0.5–1.4)
EOSINOPHIL # BLD AUTO: 0.18 K/UL (ref 0–0.51)
EOSINOPHIL NFR BLD: 1.8 % (ref 0–6.9)
ERYTHROCYTE [DISTWIDTH] IN BLOOD BY AUTOMATED COUNT: 56.7 FL (ref 35.9–50)
GFR SERPL CREATININE-BSD FRML MDRD: >60 ML/MIN/1.73 M 2
GLUCOSE SERPL-MCNC: 145 MG/DL (ref 65–99)
GRAM STN SPEC: ABNORMAL
GRAM STN SPEC: ABNORMAL
HCT VFR BLD AUTO: 29.3 % (ref 42–52)
HGB BLD-MCNC: 9.6 G/DL (ref 14–18)
IMM GRANULOCYTES # BLD AUTO: 0.06 K/UL (ref 0–0.11)
IMM GRANULOCYTES NFR BLD AUTO: 0.6 % (ref 0–0.9)
LYMPHOCYTES # BLD AUTO: 1.36 K/UL (ref 1–4.8)
LYMPHOCYTES NFR BLD: 13.9 % (ref 22–41)
MAGNESIUM SERPL-MCNC: 2.1 MG/DL (ref 1.5–2.5)
MCH RBC QN AUTO: 29.5 PG (ref 27–33)
MCHC RBC AUTO-ENTMCNC: 32.8 G/DL (ref 33.7–35.3)
MCV RBC AUTO: 90.2 FL (ref 81.4–97.8)
MONOCYTES # BLD AUTO: 0.85 K/UL (ref 0–0.85)
MONOCYTES NFR BLD AUTO: 8.7 % (ref 0–13.4)
NEUTROPHILS # BLD AUTO: 7.26 K/UL (ref 1.82–7.42)
NEUTROPHILS NFR BLD: 74.6 % (ref 44–72)
NRBC # BLD AUTO: 0 K/UL
NRBC BLD AUTO-RTO: 0 /100 WBC
PHOSPHATE SERPL-MCNC: 1.6 MG/DL (ref 2.5–4.5)
PLATELET # BLD AUTO: 506 K/UL (ref 164–446)
PMV BLD AUTO: 8.3 FL (ref 9–12.9)
POTASSIUM SERPL-SCNC: 3.4 MMOL/L (ref 3.6–5.5)
PROCALCITONIN SERPL-MCNC: 0.06 NG/ML
RBC # BLD AUTO: 3.25 M/UL (ref 4.7–6.1)
SIGNIFICANT IND 70042: ABNORMAL
SIGNIFICANT IND 70042: ABNORMAL
SITE SITE: ABNORMAL
SITE SITE: ABNORMAL
SODIUM SERPL-SCNC: 143 MMOL/L (ref 135–145)
SOURCE SOURCE: ABNORMAL
SOURCE SOURCE: ABNORMAL
TRIGL SERPL-MCNC: 54 MG/DL (ref 0–149)
WBC # BLD AUTO: 9.8 K/UL (ref 4.8–10.8)

## 2017-07-02 PROCEDURE — 700111 HCHG RX REV CODE 636 W/ 250 OVERRIDE (IP): Performed by: INTERNAL MEDICINE

## 2017-07-02 PROCEDURE — 500126 HCHG BOVIE, NEEDLE TIP: Performed by: OTOLARYNGOLOGY

## 2017-07-02 PROCEDURE — 700111 HCHG RX REV CODE 636 W/ 250 OVERRIDE (IP): Performed by: PSYCHIATRY & NEUROLOGY

## 2017-07-02 PROCEDURE — A9270 NON-COVERED ITEM OR SERVICE: HCPCS | Performed by: INTERNAL MEDICINE

## 2017-07-02 PROCEDURE — 160048 HCHG OR STATISTICAL LEVEL 1-5: Performed by: OTOLARYNGOLOGY

## 2017-07-02 PROCEDURE — 700111 HCHG RX REV CODE 636 W/ 250 OVERRIDE (IP)

## 2017-07-02 PROCEDURE — 700102 HCHG RX REV CODE 250 W/ 637 OVERRIDE(OP): Performed by: INTERNAL MEDICINE

## 2017-07-02 PROCEDURE — 0DJ08ZZ INSPECTION OF UPPER INTESTINAL TRACT, VIA NATURAL OR ARTIFICIAL OPENING ENDOSCOPIC: ICD-10-PCS | Performed by: OTOLARYNGOLOGY

## 2017-07-02 PROCEDURE — 94003 VENT MGMT INPAT SUBQ DAY: CPT

## 2017-07-02 PROCEDURE — 700105 HCHG RX REV CODE 258: Performed by: PSYCHIATRY & NEUROLOGY

## 2017-07-02 PROCEDURE — 700105 HCHG RX REV CODE 258: Performed by: PHARMACIST

## 2017-07-02 PROCEDURE — 700101 HCHG RX REV CODE 250: Performed by: INTERNAL MEDICINE

## 2017-07-02 PROCEDURE — 83735 ASSAY OF MAGNESIUM: CPT

## 2017-07-02 PROCEDURE — 71010 DX-CHEST-PORTABLE (1 VIEW): CPT

## 2017-07-02 PROCEDURE — 700102 HCHG RX REV CODE 250 W/ 637 OVERRIDE(OP)

## 2017-07-02 PROCEDURE — 700105 HCHG RX REV CODE 258: Performed by: INTERNAL MEDICINE

## 2017-07-02 PROCEDURE — 85025 COMPLETE CBC W/AUTO DIFF WBC: CPT

## 2017-07-02 PROCEDURE — 84100 ASSAY OF PHOSPHORUS: CPT

## 2017-07-02 PROCEDURE — 0CJS8ZZ INSPECTION OF LARYNX, VIA NATURAL OR ARTIFICIAL OPENING ENDOSCOPIC: ICD-10-PCS | Performed by: OTOLARYNGOLOGY

## 2017-07-02 PROCEDURE — 84145 PROCALCITONIN (PCT): CPT

## 2017-07-02 PROCEDURE — 99291 CRITICAL CARE FIRST HOUR: CPT | Performed by: INTERNAL MEDICINE

## 2017-07-02 PROCEDURE — 94669 MECHANICAL CHEST WALL OSCILL: CPT

## 2017-07-02 PROCEDURE — A9270 NON-COVERED ITEM OR SERVICE: HCPCS

## 2017-07-02 PROCEDURE — A9270 NON-COVERED ITEM OR SERVICE: HCPCS | Performed by: PSYCHIATRY & NEUROLOGY

## 2017-07-02 PROCEDURE — 160009 HCHG ANES TIME/MIN: Performed by: OTOLARYNGOLOGY

## 2017-07-02 PROCEDURE — 700101 HCHG RX REV CODE 250

## 2017-07-02 PROCEDURE — 160041 HCHG SURGERY MINUTES - EA ADDL 1 MIN LEVEL 4: Performed by: OTOLARYNGOLOGY

## 2017-07-02 PROCEDURE — 99291 CRITICAL CARE FIRST HOUR: CPT | Performed by: HOSPITALIST

## 2017-07-02 PROCEDURE — 700102 HCHG RX REV CODE 250 W/ 637 OVERRIDE(OP): Performed by: PSYCHIATRY & NEUROLOGY

## 2017-07-02 PROCEDURE — 501838 HCHG SUTURE GENERAL: Performed by: OTOLARYNGOLOGY

## 2017-07-02 PROCEDURE — 160029 HCHG SURGERY MINUTES - 1ST 30 MINS LEVEL 4: Performed by: OTOLARYNGOLOGY

## 2017-07-02 PROCEDURE — 94640 AIRWAY INHALATION TREATMENT: CPT

## 2017-07-02 PROCEDURE — 700101 HCHG RX REV CODE 250: Performed by: PHARMACIST

## 2017-07-02 PROCEDURE — 94668 MNPJ CHEST WALL SBSQ: CPT

## 2017-07-02 PROCEDURE — 84478 ASSAY OF TRIGLYCERIDES: CPT

## 2017-07-02 PROCEDURE — 0B113F4 BYPASS TRACHEA TO CUTANEOUS WITH TRACHEOSTOMY DEVICE, PERCUTANEOUS APPROACH: ICD-10-PCS | Performed by: OTOLARYNGOLOGY

## 2017-07-02 PROCEDURE — 80048 BASIC METABOLIC PNL TOTAL CA: CPT

## 2017-07-02 PROCEDURE — 770022 HCHG ROOM/CARE - ICU (200)

## 2017-07-02 PROCEDURE — 502240 HCHG MISC OR SUPPLY RC 0272: Performed by: OTOLARYNGOLOGY

## 2017-07-02 RX ORDER — LIDOCAINE HYDROCHLORIDE AND EPINEPHRINE 10; 10 MG/ML; UG/ML
INJECTION, SOLUTION INFILTRATION; PERINEURAL
Status: DISCONTINUED | OUTPATIENT
Start: 2017-07-02 | End: 2017-07-02 | Stop reason: HOSPADM

## 2017-07-02 RX ORDER — FUROSEMIDE 10 MG/ML
40 INJECTION INTRAMUSCULAR; INTRAVENOUS ONCE
Status: COMPLETED | OUTPATIENT
Start: 2017-07-02 | End: 2017-07-02

## 2017-07-02 RX ORDER — POTASSIUM CHLORIDE 29.8 MG/ML
40 INJECTION INTRAVENOUS ONCE
Status: DISCONTINUED | OUTPATIENT
Start: 2017-07-02 | End: 2017-07-02

## 2017-07-02 RX ORDER — CEFAZOLIN SODIUM 2 G/100ML
2 INJECTION, SOLUTION INTRAVENOUS EVERY 8 HOURS
Status: COMPLETED | OUTPATIENT
Start: 2017-07-02 | End: 2017-07-06

## 2017-07-02 RX ORDER — PHENOBARBITAL SODIUM 130 MG/ML
70 INJECTION, SOLUTION INTRAMUSCULAR; INTRAVENOUS ONCE
Status: COMPLETED | OUTPATIENT
Start: 2017-07-02 | End: 2017-07-02

## 2017-07-02 RX ORDER — LEVETIRACETAM 100 MG/ML
1500 SOLUTION ORAL EVERY 12 HOURS
Status: DISCONTINUED | OUTPATIENT
Start: 2017-07-02 | End: 2017-07-11 | Stop reason: HOSPADM

## 2017-07-02 RX ADMIN — THIAMINE HYDROCHLORIDE 200 MG: 100 INJECTION, SOLUTION INTRAMUSCULAR; INTRAVENOUS at 14:59

## 2017-07-02 RX ADMIN — POTASSIUM CHLORIDE 40 MEQ: 1.5 POWDER, FOR SOLUTION ORAL at 21:14

## 2017-07-02 RX ADMIN — ASCORBIC ACID: 500 INJECTION, SOLUTION INTRAMUSCULAR; INTRAVENOUS; SUBCUTANEOUS at 06:08

## 2017-07-02 RX ADMIN — ENOXAPARIN SODIUM 30 MG: 100 INJECTION SUBCUTANEOUS at 21:15

## 2017-07-02 RX ADMIN — IPRATROPIUM BROMIDE AND ALBUTEROL SULFATE 3 ML: .5; 3 SOLUTION RESPIRATORY (INHALATION) at 06:51

## 2017-07-02 RX ADMIN — CHLORHEXIDINE GLUCONATE 15 ML: 1.2 RINSE ORAL at 09:50

## 2017-07-02 RX ADMIN — DEXTROSE MONOHYDRATE 1500 MG: 50 INJECTION, SOLUTION INTRAVENOUS at 09:51

## 2017-07-02 RX ADMIN — FAMOTIDINE 20 MG: 20 TABLET, FILM COATED ORAL at 21:14

## 2017-07-02 RX ADMIN — VANCOMYCIN HYDROCHLORIDE 800 MG: 100 INJECTION, POWDER, LYOPHILIZED, FOR SOLUTION INTRAVENOUS at 06:08

## 2017-07-02 RX ADMIN — ASCORBIC ACID: 500 INJECTION, SOLUTION INTRAMUSCULAR; INTRAVENOUS; SUBCUTANEOUS at 12:18

## 2017-07-02 RX ADMIN — ASCORBIC ACID: 500 INJECTION, SOLUTION INTRAMUSCULAR; INTRAVENOUS; SUBCUTANEOUS at 23:53

## 2017-07-02 RX ADMIN — FUROSEMIDE 40 MG: 10 INJECTION, SOLUTION INTRAMUSCULAR; INTRAVENOUS at 08:26

## 2017-07-02 RX ADMIN — CEFAZOLIN SODIUM 2 G: 2 INJECTION, SOLUTION INTRAVENOUS at 21:16

## 2017-07-02 RX ADMIN — PIPERACILLIN SODIUM AND TAZOBACTAM SODIUM 4.5 G: 4; .5 INJECTION, POWDER, FOR SOLUTION INTRAVENOUS at 00:34

## 2017-07-02 RX ADMIN — CEFAZOLIN SODIUM 2 G: 2 INJECTION, SOLUTION INTRAVENOUS at 14:29

## 2017-07-02 RX ADMIN — LEVETIRACETAM 1500 MG: 100 SOLUTION ORAL at 21:15

## 2017-07-02 RX ADMIN — POTASSIUM PHOSPHATE, MONOBASIC AND POTASSIUM PHOSPHATE, DIBASIC 30 MMOL: 224; 236 INJECTION, SOLUTION INTRAVENOUS at 08:26

## 2017-07-02 RX ADMIN — HYDROCORTISONE SODIUM SUCCINATE 50 MG: 100 INJECTION, POWDER, FOR SOLUTION INTRAMUSCULAR; INTRAVENOUS at 05:06

## 2017-07-02 RX ADMIN — IPRATROPIUM BROMIDE AND ALBUTEROL SULFATE 3 ML: .5; 3 SOLUTION RESPIRATORY (INHALATION) at 22:06

## 2017-07-02 RX ADMIN — THIAMINE HYDROCHLORIDE 200 MG: 100 INJECTION, SOLUTION INTRAMUSCULAR; INTRAVENOUS at 02:05

## 2017-07-02 RX ADMIN — HYDROCORTISONE SODIUM SUCCINATE 50 MG: 100 INJECTION, POWDER, FOR SOLUTION INTRAMUSCULAR; INTRAVENOUS at 23:54

## 2017-07-02 RX ADMIN — IPRATROPIUM BROMIDE AND ALBUTEROL SULFATE 3 ML: .5; 3 SOLUTION RESPIRATORY (INHALATION) at 02:38

## 2017-07-02 RX ADMIN — IPRATROPIUM BROMIDE AND ALBUTEROL SULFATE 3 ML: .5; 3 SOLUTION RESPIRATORY (INHALATION) at 10:52

## 2017-07-02 RX ADMIN — IPRATROPIUM BROMIDE AND ALBUTEROL SULFATE 3 ML: .5; 3 SOLUTION RESPIRATORY (INHALATION) at 14:52

## 2017-07-02 RX ADMIN — SENNOSIDES AND DOCUSATE SODIUM 2 TABLET: 8.6; 5 TABLET ORAL at 21:14

## 2017-07-02 RX ADMIN — IPRATROPIUM BROMIDE AND ALBUTEROL SULFATE 3 ML: .5; 3 SOLUTION RESPIRATORY (INHALATION) at 18:41

## 2017-07-02 RX ADMIN — ASCORBIC ACID: 500 INJECTION, SOLUTION INTRAMUSCULAR; INTRAVENOUS; SUBCUTANEOUS at 17:58

## 2017-07-02 RX ADMIN — HYDROCORTISONE SODIUM SUCCINATE 50 MG: 100 INJECTION, POWDER, FOR SOLUTION INTRAMUSCULAR; INTRAVENOUS at 17:59

## 2017-07-02 RX ADMIN — PHENOBARBITAL SODIUM 70 MG: 130 INJECTION INTRAMUSCULAR; INTRAVENOUS at 09:52

## 2017-07-02 RX ADMIN — HYDROCORTISONE SODIUM SUCCINATE 50 MG: 100 INJECTION, POWDER, FOR SOLUTION INTRAMUSCULAR; INTRAVENOUS at 12:18

## 2017-07-02 RX ADMIN — CHLORHEXIDINE GLUCONATE 15 ML: 1.2 RINSE ORAL at 21:14

## 2017-07-02 RX ADMIN — POTASSIUM CHLORIDE 40 MEQ: 1.5 POWDER, FOR SOLUTION ORAL at 16:30

## 2017-07-02 RX ADMIN — PHENOBARBITAL 70 MG: 20 ELIXIR ORAL at 22:25

## 2017-07-02 RX ADMIN — PIPERACILLIN SODIUM AND TAZOBACTAM SODIUM 4.5 G: 4; .5 INJECTION, POWDER, FOR SOLUTION INTRAVENOUS at 05:06

## 2017-07-02 NOTE — PROGRESS NOTES
Pulmonary Critical Care Progress Note        Date of service:  7/2/2017    Interval Events:  24 hour interval history reviewed  Reason for visit:  Respiratory failure, pneumonia, atelectasis  Unable to provide CC or ROS due to chronic encephalopathy      Scheduled to OR at 1PM today for trach.   TF off since 4AM.  CVP, blood pressure, and urine output all increased when Levo increased.  No secretions suctioned.   On Levo 2mcg, off Propofol and NS.       PFSH:  No change.    Respiratory:  Liriano Vent Mode: APVCMV, Rate (breaths/min): 15, Vt Target (mL): 410, PEEP/CPAP: 8, FiO2: 40, Static Compliance (ml / cm H2O): 37, Control VTE (exp VT): 405  Pulse Oximetry: 100 %  CXR with increased bibasilar opacities and edema.  Left > right opacification  Few coarse crackles bilaterally - no wheezing    Recent Labs      06/30/17   0122  06/30/17   0442  07/01/17   0454   ISTATAPH  7.397*  7.467  7.457   ISTATAPCO2  40.2*  31.5  32.5   ISTATAPO2  105*  136*  59*   ISTATATCO2  26  24  24   [id number]  98  99  92*   ISTATARTHCO3  24.7  22.8  23.0   ISTATARTBE  0  0  -1   ISTATTEMP  99.2 F  36.8 C  96.3 F   ISTATFIO2  100  60  30   ISTATSPEC  Arterial  Arterial  Arterial   ISTATAPHTC  7.392*  7.470  7.477   [id number]  107*  135*  54*       HemoDynamics:  Pulse: 68, Heart Rate (Monitored): (!) 54  NIBP: 111/49 mmHg  CVP (mm Hg): (!) 17 MM HG  SR/SB  NE 2    Neuro:  Sedated  Propofol off    Fluids:  Intake/Output       06/30/17 0700 - 07/01/17 0659 07/01/17 0700 - 07/02/17 0659 07/02/17 0700 - 07/03/17 0659      4769-6523 6193-4702 Total 2593-8635 4175-5223 Total 9093-2890 6571-2496 Total       Intake    I.V.  1828.8  1397 3225.8  2129.1  1455 3584.1  --  -- --    Magnesium Sulfate Volume 15.8 -- 15.8 50 -- 50 -- -- --    Propofol Volume 53.4 49.3 102.7 28.5 17 45.5 -- -- --    Norepinephrine Volume 59.5 47.7 107.2 50.6 38 88.6 -- -- --    Normal Saline 1200 1000 2200 1200 1000 2200 -- -- --    IV Piggyback Volume (IV Piggyback)  500 300 800  -- -- --    Other  180  75 255  180  120 300  --  -- --    Medications (P.O./ Enteral Liquids) 180 75 255 180 120 300 -- -- --    Enteral  290  620 910  490  800 1290  --  -- --    Enteral Volume 200 300 500 400 500 900 -- -- --    Free Water / Tube Flush 90 320 410 90 300 390 -- -- --    Total Intake 2298.8 2092 4390.8 2799.1 2375 5174.1 -- -- --       Output    Urine  1400  1015 2415  600  400 1000  --  -- --    Indwelling Cathether 1400 1015 2415  -- -- --    Stool/Urine  0  0 0  0  0 0  --  -- --    Measurable Stool (ml) 0 0 0 0 0 0 -- -- --    Stool  --  -- --  --  -- --  --  -- --    Number of Times Stooled 0 x 0 x 0 x 1 x 0 x 1 x -- -- --    Total Output 1400 1015 2415  -- -- --       Net I/O     898.8 1077 1975.8 2199.1 1975 4174.1 -- -- --        Weight: 64.9 kg (143 lb 1.3 oz)  Recent Labs      17   0500   SODIUM  136  142  143   POTASSIUM  3.4*  3.1*  3.4*   CHLORIDE  104  113*  115*   CO2  26  23  25   BUN  17  9  9   CREATININE  <0.20*  <0.20*  <0.20*   MAGNESIUM   --   1.8  2.1   PHOSPHORUS   --   1.8*  1.6*   CALCIUM  7.3*  7.5*  7.5*       GI/Nutrition:  Abd soft ND/NT  TF on hold for surgery    Liver Function  Recent Labs      17   0500   GLUCOSE  87  160*  145*       Heme:  Recent Labs      17   0500   RBC  3.55*  3.25*  3.25*   HEMOGLOBIN  10.3*  9.5*  9.6*   HEMATOCRIT  31.3*  29.1*  29.3*   PLATELETCT  512*  493*  506*   PROTHROMBTM  13.6  14.2   --    APTT  28.7   --    --    INR  1.01  1.07   --        Infectious Disease:  Monitored Temp  Av.7 °C (98 °F)  Min: 36.1 °C (97 °F)  Max: 37.3 °C (99.1 °F)     Recent Labs      17   0155  17   0350  17   0500   WBC  17.7*  10.0  9.8   NEUTSPOLYS  79.50*  79.20*  74.60*   LYMPHOCYTES  11.80*  11.60*  13.90*   MONOCYTES  6.30  6.30  8.70   EOSINOPHILS  1.50  2.10   1.80   BASOPHILS  0.30  0.20  0.40     Current Facility-Administered Medications   Medication Dose Frequency Provider Last Rate Last Dose   • propofol (DIPRIVAN) injection  5-80 mcg/kg/min Continuous Clarence Pro M.D. 1.7 mL/hr at 07/01/17 2229 5 mcg/kg/min at 07/01/17 2229   • Respiratory Care per Protocol   Continuous RT Clarence Pro M.D.       • chlorhexidine (PERIDEX) 0.12 % solution 15 mL  15 mL BID Clarence Pro M.D.   15 mL at 07/01/17 2038   • lidocaine (XYLOCAINE) 1%  injection  1-2 mL Q30 MIN PRN Clarence Pro M.D.       • MD ALERT...Adult ICU Electrolyte Replacement per Pharmacy Protocol   pharmacy to dose Clarence Pro M.D.       • fentaNYL (SUBLIMAZE) injection 25 mcg  25 mcg Q HOUR PRN Clarence Pro M.D.        Or   • fentaNYL (SUBLIMAZE) injection 50 mcg  50 mcg Q HOUR PRN Clarence rPo M.D.   50 mcg at 06/30/17 0000    Or   • fentaNYL (SUBLIMAZE) injection 100 mcg  100 mcg Q HOUR PRN Clarence Pro M.D.       • ipratropium-albuterol (DUONEB) nebulizer solution 3 mL  3 mL Q2HRS PRN (RT) Clarence Pro M.D.       • ipratropium-albuterol (DUONEB) nebulizer solution 3 mL  3 mL Q4HRS (RT) Clarence Pro M.D.   3 mL at 07/02/17 0238   • piperacillin-tazobactam (ZOSYN) 4.5 g in  mL IVPB  4.5 g Q6HRS Clarence Pro M.D. 100 mL/hr at 07/02/17 0506 4.5 g at 07/02/17 0506   • MD ALERT... vancomycin per pharmacy protocol   pharmacy to dose Clarence Pro M.D.       • thiamine (B-1) 200 mg in D5W 100 mL IVPB  200 mg Q12HR Clarence Pro M.D. 200 mL/hr at 07/02/17 0205 200 mg at 07/02/17 0205   • hydrocortisone sodium succinate PF (SOLU-CORTEF) 100 MG injection 50 mg  50 mg Q6HRS Jeremy M Gonda, M.D.   50 mg at 07/02/17 0506   • ascorbic acid (VITAMIN C) 1,500 mg in  mL   Q6HRS Clarence Pro M.D.   Stopped at 07/02/17 0041   • norepinephrine (LEVOPHED) 8 mg in  mL Infusion  0.5-30 mcg/min Continuous Rivas Farrell M.D. 3.8 mL/hr  at 07/02/17 0207 2 mcg/min at 07/02/17 0207   • NS infusion   Continuous Jeremy M Gonda, M.D. 100 mL/hr at 07/01/17 1635     • vancomycin 800 mg in  mL IVPB  14 mg/kg Q8HR Jeremy M Gonda, M.D.   Stopped at 07/01/17 2336   • famotidine (PEPCID) tablet 20 mg  20 mg BID Gabrielle Vargas PHARMD   20 mg at 07/01/17 2038   • potassium chloride (KLOR-CON) 20 MEQ packet 40 mEq  40 mEq TID Kev Araiza M.D.   40 mEq at 07/01/17 2039   • levetiracetam (KEPPRA) 100 MG/ML solution 1,500 mg  1,500 mg Q12HRS Martell Hunter M.D.   1,500 mg at 07/01/17 2039   • oxycodone immediate-release (ROXICODONE) tablet 5 mg  5 mg Q4HRS PRN Kev Araiza M.D.   5 mg at 06/24/17 1825   • PHENobarbital solution 70 mg  70 mg BID Martell Hunter M.D.   70 mg at 07/01/17 2039   • enoxaparin (LOVENOX) inj 30 mg  30 mg DAILY Clarence Morfin D.O.   Stopped at 07/01/17 2100   • senna-docusate (PERICOLACE or SENOKOT S) 8.6-50 MG per tablet 2 Tab  2 Tab BID Kev Araiza M.D.   2 Tab at 07/01/17 2039    And   • polyethylene glycol/lytes (MIRALAX) PACKET 1 Packet  1 Packet QDAY SUSAN Araiza M.D.   1 Packet at 06/25/17 2028    And   • magnesium hydroxide (MILK OF MAGNESIA) suspension 30 mL  30 mL QDAY SUSAN Araiza M.D.   30 mL at 06/28/17 1305    And   • bisacodyl (DULCOLAX) suppository 10 mg  10 mg QDAY PRN Kev Araiza M.D.   10 mg at 07/01/17 1047   • glucose 4 g chewable tablet 16 g  16 g Q15 MIN PRN Kev Araiza M.D.        And   • dextrose 50% (D50W) injection 25 mL  25 mL Q15 MIN PRN Kev Araiza M.D.   25 mL at 06/20/17 1840   • ondansetron (ZOFRAN) syringe/vial injection 4 mg  4 mg Q4HRS PRN FARHAT MedinaO.   4 mg at 06/23/17 2238   • ondansetron (ZOFRAN ODT) dispertab 4 mg  4 mg Q4HRS PRN Clarence Morfin D.O.       • promethazine (PHENERGAN) tablet 12.5-25 mg  12.5-25 mg Q4HRS PRN Clarence Morfin D.O.       • promethazine (PHENERGAN) suppository 12.5-25 mg  12.5-25 mg Q4HRS PRN Clarence SORENSEN  ZAHRA Morfin       • acetaminophen (TYLENOL) tablet 650 mg  650 mg Q6HRS PRN Clarence Morfin D.O.   650 mg at 06/17/17 1857   • Pain Pump (patient supplied) Device   Continuous Clarence Morfin D.O.   Stopped at 06/17/17 1230     Last reviewed on 6/17/2017  9:45 AM by Ligia Frazier, Pharmacy Int    Quality  Measures:  Medications reviewed, Labs reviewed and Radiology images reviewed                        Assessment and Plan:    Acute hypoxemic respiratory failure  VDRF - intubated 6/19-6/27, re-intubated 6/30   - cont vent support   - trach today with Dr. Osorio - suspect tracheal scar tissue from prior trach  Aspiration/HCAP pneumonia with left lung atelectasis   - S/P FOB 6/30 - atelectasis improved   - Staph aureus in sputum - sensitivities pending              - cont Vanco/Zosyn  Sepsis syndrome with shock - pulmonary source              - vasopressor support as necessary              - Marik protocol  PERALTA - cont hydrocortisone  Acute pulmonary edema   - decrease IV fluids and force diuresis  Parapneumonic left pleural effusion   - S/P thoracentesis 6/25   - follow  Prior trach with trauma years ago   - prob proximal airway stricture at trach site seen at FOB   - to OR with ENT today  S/P aspiration pneumonia   - Klebsiella pneumonia - pansensitive              - completed abx x 7 days 6/26 - C3/Flagyl  New onset seizure likely secondary to hyponatremia              - Keppra, Phenobarb per neuro  Hyponatremia - improved              - cont salt tabs  Severe chronic underlying debility secondary to profound traumatic brain injury 20 years ago  Dysphagia - cont enteral G-tube feedings    Critical Care Time:  32 minutes  [id number]  No time overlap  Time excludes procedures  Discussed with RN, RT, Team, mother    Kaylene WOODY ([place]), am scribing for, and in the presence of, Rivas Brooks M.D..    Electronically signed by: Kaylene Cote ([place]), 7/2/2017    Rivas WOODY M.D. personally  performed the services described in this documentation, as scribed by Kaylene Cote in my presence, and it is both accurate and complete.

## 2017-07-02 NOTE — PROGRESS NOTES
Called pharmacist Sabrina to get second opinion on whether to administer Lovenox tonight prior to OR for trach tomorrow @ 1PM. Discussed that patient doesn't appear to be high risk for clot formation, therefore she said to hold Lovenox tonight.

## 2017-07-02 NOTE — OP REPORT
DATE OF OPERATION: 7/2/2017     PREOPERATIVE DIAGNOSIS: Respiratory failure, aspiration pneumonia    POSTOPERATIVE DIAGNOSIS: Same    PROCEDURE: Tracheostomy, direct laryngoscopy, esophagoscopy.     ATTENDING: Catherine Osorio MD     ANESTHESIA:  Anesthesiologist: Te Vieyra Jr., M.D.     COMPLICATIONS: None.     SPECIMENS: None     PROCEDURE IN DETAIL: The patient was properly identified and taken to the   operating room where he was lying in supine position. General anesthesia was   induced and ventilation was maintained through previously place 8.0 ETT..  The patient was positioned with his head extended.  He was noted to have a previous scar with a deep well from previous tracheostomy where the trachea could be felt at its base.  1% lidocaine with epinephrine was injected into the area, a total of 4 cc was used after which the patient was prep and draped in a sterile fashion.  A 15 blade was used to incise the skin in a vertical fashion after which a needle point cautery at 15 cain was used to cut through the previous scar tissue.  A Weitlaner self retaining retractor was placed for improved visualization.  Any bleeding seen was stopped using the monopolar cautery.  The trachea was cleaned off and entered using a vertical incision through the tracheal rings.  Immediately the ETT could be visualized.  A cricoid hook was placed in the trachea superiorly and the ETT was pulled by the anesthiologist to just above the stoma.  A trachea dilator was used to widen the opening and a 6.0 cuffed shiley trach tube was placed in the stoma without difficulty.  This was secured in position using 3 3-0 silk suture and an adult trach tie.  The drape was removed from the head and a tooth guard was place on the teeth.  A modified anterior commissure scope was used to inspect the oral cavity and larynx.  He was noted to have a high arched palate and copious secretions.  The larynx showed some furrows posterior where the  previous ETT was place.  Subglottic was patent.  The scope was removed and an esophagoscope was brought in.  The upper cervical esophagus was scoped showing no stenosis and normal mucosa.  This scope was removed.  The patient was unprepped and draped and returned to the ICU in stable condition    Catherine Osorio MD

## 2017-07-02 NOTE — CARE PLAN
Problem: Psychosocial Needs:  Goal: Level of anxiety will decrease  Outcome: PROGRESSING SLOWER THAN EXPECTED

## 2017-07-02 NOTE — CARE PLAN
Problem: Ventilation Defect:  Goal: Ability to achieve and maintain unassisted ventilation or tolerate decreased levels of ventilator support  Intervention: Manage ventilation therapy by monitoring diagnostic test results  Adult Ventilation Update    Total Vent Days: 3      Patient Lines/Drains/Airways Status    Active Airway      Name: Placement date: Placement time: Site: Days:     Airway Group ET Tube Oral 8.0 06/30/17  0008  Oral  2               #FVC / Vital Capacity (liters) : 1.2 (pt not following) (06/27/17 0827)  NIF (cm H2O) : -34 (pt not following, manually) (06/27/17 0827)  Rapid Shallow Breathing Index (RR/VT): 132 (06/30/17 1948)  Plateau Pressure (Q Shift): 20 (07/01/17 1843)  Static Compliance (ml / cm H2O): 30 (07/02/17 0238)    Patient failed trials because of Barriers to Wean: Other (Comments) (06/24/17 0642)  Barriers to SBT Weaning Trial Stopped due to:: Pt weaned for 1 hour and returned to rest settings per protocol (06/30/17 1158)  Length of Weaning Trial Length of Weaning Trial (Hours): 1 (06/30/17 1158)  Sputum/Suction   Cough: Moist;Productive (07/02/17 0000)  Sputum Amount: Small (07/02/17 0000)  Sputum Color: Clear (07/02/17 0000)  Sputum Consistency: Thin (07/02/17 0000)    Mobility Group  Activity Performed: Unable to mobilize (07/01/17 0800)  Time Activity Tolerated:  (Up to cardiac chair) (06/28/17 0600)  Distance Per Occurrence (ft.): 0 feet (06/28/17 1800)  # of Times Distance was Traveled: 0 (06/28/17 1800)  Assistance / Tolerance: Assistance of Two or More (06/28/17 2000)  Pt Calls for Assistance: No (07/01/17 2000)  Staff Present for Mobilization: RN (06/28/17 2000)  Gait: Unable to Ambulate (06/28/17 2000)  Assistive Devices:  (full assist) (06/27/17 1000)  Reason Not Mobilized: Unstable condition (07/01/17 0800)  Mobilization Comments: Unstable (06/30/17 0000)

## 2017-07-02 NOTE — PROGRESS NOTES
[facility] Hospitalist Progress Note    Date of Service: 2017    Chief Complaint  36 y.o. male admitted 2017 with altered mental status seizures and aspiration. H/o   Severe TBI. Intubated Initially improved then had repeat event with respiratory failure and left lung white, reintubation out s/p improvement with bronc.     Interval Problem Update  Planned trach today  No events over night  No neuro changes  Ignacio over night to 39  On pressors for hypotension  Good uop  notmobilizing  Vent day3  Small tan secretions  day3 vanc/zosyn with bal staph aureus          Diuresis  K  Mag  Phos        Consultants/Specialty  Neurology  Pulmonology    Disposition  LTACH referral placed.   Mother would prefer home. Was feeding him at home.   Critical care time 35min wiht active management of hypotension and shock.       Review of Systems   Unable to perform ROS: intubated      Physical Exam  Laboratory/Imaging   Hemodynamics  No data recorded.   Monitored Temp: 36.6 °C (97.9 °F)  Pulse  Av.4  Min: 42  Max: 131 Heart Rate (Monitored): (!) 53  NIBP: 127/62 mmHg CVP (mm Hg): (!) 13 MM HG    Respiratory  Liriano Vent Mode: APVCMV, Rate (breaths/min): 15, PEEP/CPAP: 8, PEEP/CPAP: 8, FiO2: 40, P Peak (PIP): 20, P MEAN: 11   Respiration: 15, Pulse Oximetry: 100 %     PEP/CPT Method: Percussion/Vibration, Work Of Breathing / Effort: Vented  RUL Breath Sounds: Clear, RML Breath Sounds: Clear, RLL Breath Sounds: Diminished, JONATAN Breath Sounds: Clear, LLL Breath Sounds: Diminished    Fluids    Intake/Output Summary (Last 24 hours) at 17 0755  Last data filed at 17 0600   Gross per 24 hour   Intake 5278.33 ml   Output   1135 ml   Net 4143.33 ml       Nutrition  Orders Placed This Encounter   Procedures   • DIET NPO     Standing Status: Standing      Number of Occurrences: 1      Standing Expiration Date:      Order Specific Question:  Restrict to:     Answer:  Strict [1]      Comments:  Stop tube feeds     Physical  Exam   Constitutional: He appears well-developed and well-nourished. He appears ill. No distress.   HENT:   Right Ear: External ear normal.   Left Ear: External ear normal.   Nose: Nose normal.   Eyes: Pupils are equal, round, and reactive to light. Right eye exhibits no discharge. Left eye exhibits no discharge. No scleral icterus.   Neck: No JVD present. No tracheal deviation present.   Right IJ central line without bleeding.   Well-healed tracheostomy site with indentation.    Cardiovascular: Regular rhythm, normal heart sounds and intact distal pulses.  Tachycardia present.    No murmur heard.  Pulmonary/Chest: Effort normal and breath sounds normal. No respiratory distress. He has no rales.   Abdominal: Soft. Bowel sounds are normal. He exhibits no distension. There is no tenderness.   PEG. Baclofen pump palpable on abdomen.   PEG tube site erythematous and with purulent drainage   Genitourinary:   Waldrop    Musculoskeletal: He exhibits edema. He exhibits no tenderness.   Poor muscle tone, severe contractures of the arms and legs with significant deformity.    Neurological:   Unresponsive today.    Skin: Skin is warm and dry. He is not diaphoretic. No erythema. There is pallor.   Nursing note and vitals reviewed.      Recent Labs      06/30/17 0155 07/01/17   0350  07/02/17   0500   WBC  17.7*  10.0  9.8   RBC  3.55*  3.25*  3.25*   HEMOGLOBIN  10.3*  9.5*  9.6*   HEMATOCRIT  31.3*  29.1*  29.3*   MCV  88.2  89.5  90.2   MCH  29.0  29.2  29.5   MCHC  32.9*  32.6*  32.8*   RDW  52.8*  55.4*  56.7*   PLATELETCT  512*  493*  506*   MPV  8.0*  8.2*  8.3*     Recent Labs      06/30/17 0155 07/01/17   0350  07/02/17   0500   SODIUM  136  142  143   POTASSIUM  3.4*  3.1*  3.4*   CHLORIDE  104  113*  115*   CO2  26  23  25   GLUCOSE  87  160*  145*   BUN  17  9  9   CREATININE  <0.20*  <0.20*  <0.20*   CALCIUM  7.3*  7.5*  7.5*     Recent Labs      06/30/17 0155 07/01/17   0350   APTT  28.7   --    INR  1.01   1.07         Recent Labs      06/30/17   0155  07/02/17   0500   TRIGLYCERIDE  71  54          Assessment/Plan     Status epilepticus (CMS-HCC) (present on admission)  Assessment & Plan  - had continuous seizure activity for approximately 48 hours suspected from pneumonia.   - neurology following  - on phenobarbital and keppra  - seizure precautions    Aspiration pneumonia (CMS-HCC) (present on admission)  Assessment & Plan  - pt unable to clear secretions   - culture growing Klebsiella resistant to ampicillin thus rocephin to cover aspiration   Bronchoscopy 6/25. Again on 6/29  Thoracentesis with 300 ml off on 6/25.  - to trach today.        Sepsis (CMS-HCC)  Assessment & Plan  - likely resolved.   Sirs from aspiration now most appropriate dx.       Acute respiratory failure with hypoxia (CMS-HCC) (present on admission)  Assessment & Plan  - recurred. Mucus plugging likely.   - Trach replacement likely today.       Hyponatremia (present on admission)  Assessment & Plan  - resolved  - stop salt tabs     Hypoglycemia (present on admission)  Assessment & Plan  - resolved    TBI (traumatic brain injury) (CMS-HCC) (present on admission)  Assessment & Plan  Since age 17 years.  With quadriplegia   Baclofen pump followed at [facility]--his mother states it does not need refilled for a few months.    Hypokalemia (present on admission)  Assessment & Plan  - replenished      Labs reviewed, Medications reviewed, Radiology images reviewed and EKG reviewed  Waldrop catheter: Critically Ill - Requiring Accurate Measurement of Urinary Output      DVT Prophylaxis: Enoxaparin (Lovenox)      Antibiotics: Treating active infection/contamination beyond 24 hours perioperative coverage  Assessed for rehab: Patient was assess for and/or received rehabilitation services during this hospitalization

## 2017-07-03 ENCOUNTER — APPOINTMENT (OUTPATIENT)
Dept: RADIOLOGY | Facility: MEDICAL CENTER | Age: 37
DRG: 004 | End: 2017-07-03
Attending: INTERNAL MEDICINE
Payer: COMMERCIAL

## 2017-07-03 LAB
ALBUMIN SERPL BCP-MCNC: 2.9 G/DL (ref 3.2–4.9)
ALBUMIN/GLOB SERPL: 1 G/DL
ALP SERPL-CCNC: 82 U/L (ref 30–99)
ALT SERPL-CCNC: 48 U/L (ref 2–50)
ANION GAP SERPL CALC-SCNC: 5 MMOL/L (ref 0–11.9)
AST SERPL-CCNC: 24 U/L (ref 12–45)
BASOPHILS # BLD AUTO: 0.4 % (ref 0–1.8)
BASOPHILS # BLD: 0.03 K/UL (ref 0–0.12)
BILIRUB SERPL-MCNC: 0.2 MG/DL (ref 0.1–1.5)
BUN SERPL-MCNC: 12 MG/DL (ref 8–22)
CALCIUM SERPL-MCNC: 7.9 MG/DL (ref 8.5–10.5)
CHLORIDE SERPL-SCNC: 109 MMOL/L (ref 96–112)
CO2 SERPL-SCNC: 26 MMOL/L (ref 20–33)
CREAT SERPL-MCNC: <0.2 MG/DL (ref 0.5–1.4)
CRP SERPL HS-MCNC: 0.65 MG/DL (ref 0–0.75)
EOSINOPHIL # BLD AUTO: 0.11 K/UL (ref 0–0.51)
EOSINOPHIL NFR BLD: 1.4 % (ref 0–6.9)
ERYTHROCYTE [DISTWIDTH] IN BLOOD BY AUTOMATED COUNT: 57.4 FL (ref 35.9–50)
GFR SERPL CREATININE-BSD FRML MDRD: >60 ML/MIN/1.73 M 2
GLOBULIN SER CALC-MCNC: 3 G/DL (ref 1.9–3.5)
GLUCOSE SERPL-MCNC: 166 MG/DL (ref 65–99)
HCT VFR BLD AUTO: 30.8 % (ref 42–52)
HGB BLD-MCNC: 9.9 G/DL (ref 14–18)
IMM GRANULOCYTES # BLD AUTO: 0.04 K/UL (ref 0–0.11)
IMM GRANULOCYTES NFR BLD AUTO: 0.5 % (ref 0–0.9)
LYMPHOCYTES # BLD AUTO: 1.25 K/UL (ref 1–4.8)
LYMPHOCYTES NFR BLD: 15.6 % (ref 22–41)
MAGNESIUM SERPL-MCNC: 1.8 MG/DL (ref 1.5–2.5)
MCH RBC QN AUTO: 29.1 PG (ref 27–33)
MCHC RBC AUTO-ENTMCNC: 32.1 G/DL (ref 33.7–35.3)
MCV RBC AUTO: 90.6 FL (ref 81.4–97.8)
MONOCYTES # BLD AUTO: 0.67 K/UL (ref 0–0.85)
MONOCYTES NFR BLD AUTO: 8.3 % (ref 0–13.4)
NEUTROPHILS # BLD AUTO: 5.93 K/UL (ref 1.82–7.42)
NEUTROPHILS NFR BLD: 73.8 % (ref 44–72)
NRBC # BLD AUTO: 0 K/UL
NRBC BLD AUTO-RTO: 0 /100 WBC
PHOSPHATE SERPL-MCNC: 2.2 MG/DL (ref 2.5–4.5)
PLATELET # BLD AUTO: 639 K/UL (ref 164–446)
PMV BLD AUTO: 8.4 FL (ref 9–12.9)
POTASSIUM SERPL-SCNC: 3.9 MMOL/L (ref 3.6–5.5)
PREALB SERPL-MCNC: 29 MG/DL (ref 18–38)
PROT SERPL-MCNC: 5.9 G/DL (ref 6–8.2)
RBC # BLD AUTO: 3.4 M/UL (ref 4.7–6.1)
SODIUM SERPL-SCNC: 140 MMOL/L (ref 135–145)
WBC # BLD AUTO: 8 K/UL (ref 4.8–10.8)

## 2017-07-03 PROCEDURE — A9270 NON-COVERED ITEM OR SERVICE: HCPCS

## 2017-07-03 PROCEDURE — 700105 HCHG RX REV CODE 258: Performed by: PHARMACIST

## 2017-07-03 PROCEDURE — 99291 CRITICAL CARE FIRST HOUR: CPT | Performed by: INTERNAL MEDICINE

## 2017-07-03 PROCEDURE — 700101 HCHG RX REV CODE 250: Performed by: PHARMACIST

## 2017-07-03 PROCEDURE — 700111 HCHG RX REV CODE 636 W/ 250 OVERRIDE (IP): Performed by: INTERNAL MEDICINE

## 2017-07-03 PROCEDURE — 94668 MNPJ CHEST WALL SBSQ: CPT

## 2017-07-03 PROCEDURE — 700111 HCHG RX REV CODE 636 W/ 250 OVERRIDE (IP): Performed by: HOSPITALIST

## 2017-07-03 PROCEDURE — 700102 HCHG RX REV CODE 250 W/ 637 OVERRIDE(OP): Performed by: INTERNAL MEDICINE

## 2017-07-03 PROCEDURE — A9270 NON-COVERED ITEM OR SERVICE: HCPCS | Performed by: INTERNAL MEDICINE

## 2017-07-03 PROCEDURE — 86140 C-REACTIVE PROTEIN: CPT

## 2017-07-03 PROCEDURE — 83735 ASSAY OF MAGNESIUM: CPT

## 2017-07-03 PROCEDURE — 94669 MECHANICAL CHEST WALL OSCILL: CPT

## 2017-07-03 PROCEDURE — 71010 DX-CHEST-PORTABLE (1 VIEW): CPT

## 2017-07-03 PROCEDURE — 80053 COMPREHEN METABOLIC PANEL: CPT

## 2017-07-03 PROCEDURE — 700111 HCHG RX REV CODE 636 W/ 250 OVERRIDE (IP): Performed by: PHARMACIST

## 2017-07-03 PROCEDURE — 99291 CRITICAL CARE FIRST HOUR: CPT | Performed by: HOSPITALIST

## 2017-07-03 PROCEDURE — A9270 NON-COVERED ITEM OR SERVICE: HCPCS | Performed by: PSYCHIATRY & NEUROLOGY

## 2017-07-03 PROCEDURE — 700101 HCHG RX REV CODE 250: Performed by: INTERNAL MEDICINE

## 2017-07-03 PROCEDURE — 94640 AIRWAY INHALATION TREATMENT: CPT

## 2017-07-03 PROCEDURE — 85025 COMPLETE CBC W/AUTO DIFF WBC: CPT

## 2017-07-03 PROCEDURE — 700102 HCHG RX REV CODE 250 W/ 637 OVERRIDE(OP)

## 2017-07-03 PROCEDURE — 94003 VENT MGMT INPAT SUBQ DAY: CPT

## 2017-07-03 PROCEDURE — A9270 NON-COVERED ITEM OR SERVICE: HCPCS | Performed by: PHARMACIST

## 2017-07-03 PROCEDURE — 770022 HCHG ROOM/CARE - ICU (200)

## 2017-07-03 PROCEDURE — 84134 ASSAY OF PREALBUMIN: CPT

## 2017-07-03 PROCEDURE — 700102 HCHG RX REV CODE 250 W/ 637 OVERRIDE(OP): Performed by: PHARMACIST

## 2017-07-03 PROCEDURE — 700102 HCHG RX REV CODE 250 W/ 637 OVERRIDE(OP): Performed by: PSYCHIATRY & NEUROLOGY

## 2017-07-03 PROCEDURE — 84100 ASSAY OF PHOSPHORUS: CPT

## 2017-07-03 PROCEDURE — 700105 HCHG RX REV CODE 258: Performed by: INTERNAL MEDICINE

## 2017-07-03 RX ORDER — MAGNESIUM SULFATE HEPTAHYDRATE 40 MG/ML
2 INJECTION, SOLUTION INTRAVENOUS ONCE
Status: DISCONTINUED | OUTPATIENT
Start: 2017-07-03 | End: 2017-07-03

## 2017-07-03 RX ORDER — MAGNESIUM SULFATE HEPTAHYDRATE 40 MG/ML
2 INJECTION, SOLUTION INTRAVENOUS ONCE
Status: COMPLETED | OUTPATIENT
Start: 2017-07-03 | End: 2017-07-03

## 2017-07-03 RX ORDER — POTASSIUM CHLORIDE 1.5 G/1.58G
20 POWDER, FOR SOLUTION ORAL ONCE
Status: COMPLETED | OUTPATIENT
Start: 2017-07-03 | End: 2017-07-03

## 2017-07-03 RX ORDER — FUROSEMIDE 10 MG/ML
20 INJECTION INTRAMUSCULAR; INTRAVENOUS EVERY 12 HOURS
Status: DISCONTINUED | OUTPATIENT
Start: 2017-07-03 | End: 2017-07-04

## 2017-07-03 RX ADMIN — SENNOSIDES AND DOCUSATE SODIUM 2 TABLET: 8.6; 5 TABLET ORAL at 09:00

## 2017-07-03 RX ADMIN — PHENOBARBITAL 70 MG: 20 ELIXIR ORAL at 21:47

## 2017-07-03 RX ADMIN — CEFAZOLIN SODIUM 2 G: 2 INJECTION, SOLUTION INTRAVENOUS at 13:59

## 2017-07-03 RX ADMIN — ASCORBIC ACID: 500 INJECTION, SOLUTION INTRAMUSCULAR; INTRAVENOUS; SUBCUTANEOUS at 06:00

## 2017-07-03 RX ADMIN — ASCORBIC ACID: 500 INJECTION, SOLUTION INTRAMUSCULAR; INTRAVENOUS; SUBCUTANEOUS at 18:06

## 2017-07-03 RX ADMIN — IPRATROPIUM BROMIDE AND ALBUTEROL SULFATE 3 ML: .5; 3 SOLUTION RESPIRATORY (INHALATION) at 14:54

## 2017-07-03 RX ADMIN — THIAMINE HYDROCHLORIDE 200 MG: 100 INJECTION, SOLUTION INTRAMUSCULAR; INTRAVENOUS at 03:27

## 2017-07-03 RX ADMIN — ENOXAPARIN SODIUM 30 MG: 100 INJECTION SUBCUTANEOUS at 21:47

## 2017-07-03 RX ADMIN — LEVETIRACETAM 1500 MG: 100 SOLUTION ORAL at 09:04

## 2017-07-03 RX ADMIN — HYDROCORTISONE SODIUM SUCCINATE 50 MG: 100 INJECTION, POWDER, FOR SOLUTION INTRAMUSCULAR; INTRAVENOUS at 12:21

## 2017-07-03 RX ADMIN — ACETAMINOPHEN 650 MG: 325 TABLET, FILM COATED ORAL at 10:35

## 2017-07-03 RX ADMIN — IPRATROPIUM BROMIDE AND ALBUTEROL SULFATE 3 ML: .5; 3 SOLUTION RESPIRATORY (INHALATION) at 10:57

## 2017-07-03 RX ADMIN — FUROSEMIDE 20 MG: 10 INJECTION, SOLUTION INTRAMUSCULAR; INTRAVENOUS at 09:02

## 2017-07-03 RX ADMIN — IPRATROPIUM BROMIDE AND ALBUTEROL SULFATE 3 ML: .5; 3 SOLUTION RESPIRATORY (INHALATION) at 02:22

## 2017-07-03 RX ADMIN — LEVETIRACETAM 1500 MG: 100 SOLUTION ORAL at 21:47

## 2017-07-03 RX ADMIN — THIAMINE HYDROCHLORIDE 200 MG: 100 INJECTION, SOLUTION INTRAMUSCULAR; INTRAVENOUS at 13:59

## 2017-07-03 RX ADMIN — IPRATROPIUM BROMIDE AND ALBUTEROL SULFATE 3 ML: .5; 3 SOLUTION RESPIRATORY (INHALATION) at 19:16

## 2017-07-03 RX ADMIN — BISACODYL 10 MG: 10 SUPPOSITORY RECTAL at 10:35

## 2017-07-03 RX ADMIN — PHENOBARBITAL 70 MG: 20 ELIXIR ORAL at 09:01

## 2017-07-03 RX ADMIN — MAGNESIUM SULFATE IN WATER 2 G: 40 INJECTION, SOLUTION INTRAVENOUS at 09:02

## 2017-07-03 RX ADMIN — POTASSIUM CHLORIDE 40 MEQ: 1.5 POWDER, FOR SOLUTION ORAL at 21:47

## 2017-07-03 RX ADMIN — CEFAZOLIN SODIUM 2 G: 2 INJECTION, SOLUTION INTRAVENOUS at 06:00

## 2017-07-03 RX ADMIN — POTASSIUM CHLORIDE 40 MEQ: 1.5 POWDER, FOR SOLUTION ORAL at 16:36

## 2017-07-03 RX ADMIN — CHLORHEXIDINE GLUCONATE 15 ML: 1.2 RINSE ORAL at 09:00

## 2017-07-03 RX ADMIN — HYDROCORTISONE SODIUM SUCCINATE 50 MG: 100 INJECTION, POWDER, FOR SOLUTION INTRAMUSCULAR; INTRAVENOUS at 18:06

## 2017-07-03 RX ADMIN — CEFAZOLIN SODIUM 2 G: 2 INJECTION, SOLUTION INTRAVENOUS at 21:48

## 2017-07-03 RX ADMIN — IPRATROPIUM BROMIDE AND ALBUTEROL SULFATE 3 ML: .5; 3 SOLUTION RESPIRATORY (INHALATION) at 22:37

## 2017-07-03 RX ADMIN — HYDROCORTISONE SODIUM SUCCINATE 50 MG: 100 INJECTION, POWDER, FOR SOLUTION INTRAMUSCULAR; INTRAVENOUS at 06:00

## 2017-07-03 RX ADMIN — MAGNESIUM SULFATE IN WATER 2 G: 40 INJECTION, SOLUTION INTRAVENOUS at 11:15

## 2017-07-03 RX ADMIN — CHLORHEXIDINE GLUCONATE 15 ML: 1.2 RINSE ORAL at 21:47

## 2017-07-03 RX ADMIN — FUROSEMIDE 20 MG: 10 INJECTION, SOLUTION INTRAMUSCULAR; INTRAVENOUS at 21:46

## 2017-07-03 RX ADMIN — SENNOSIDES AND DOCUSATE SODIUM 2 TABLET: 8.6; 5 TABLET ORAL at 21:47

## 2017-07-03 RX ADMIN — ASCORBIC ACID: 500 INJECTION, SOLUTION INTRAMUSCULAR; INTRAVENOUS; SUBCUTANEOUS at 16:26

## 2017-07-03 RX ADMIN — POTASSIUM CHLORIDE 40 MEQ: 1.5 POWDER, FOR SOLUTION ORAL at 09:03

## 2017-07-03 RX ADMIN — SODIUM PHOSPHATE, MONOBASIC, MONOHYDRATE AND SODIUM PHOSPHATE, DIBASIC, ANHYDROUS 15 MMOL: 276; 142 INJECTION, SOLUTION INTRAVENOUS at 09:01

## 2017-07-03 RX ADMIN — FAMOTIDINE 20 MG: 20 TABLET, FILM COATED ORAL at 09:00

## 2017-07-03 RX ADMIN — POTASSIUM CHLORIDE 20 MEQ: 1.5 POWDER, FOR SOLUTION ORAL at 09:01

## 2017-07-03 RX ADMIN — IPRATROPIUM BROMIDE AND ALBUTEROL SULFATE 3 ML: .5; 3 SOLUTION RESPIRATORY (INHALATION) at 06:36

## 2017-07-03 RX ADMIN — FAMOTIDINE 20 MG: 20 TABLET, FILM COATED ORAL at 21:47

## 2017-07-03 NOTE — CARE PLAN
Problem: Respiratory:  Goal: Respiratory status will improve  Intervention: Collaborate with respiratory therapist and Interdisciplinary Team on treatment measures to improve respiratory function  SBT x 1.5 hrs      Problem: Mobility  Goal: Risk for activity intolerance will decrease  Intervention: Encourage patient to increase activity level in collaboration with Interdisciplinary Team  Edge of bed x 10 min w 3 person assist

## 2017-07-03 NOTE — PROGRESS NOTES
12 hr chart check    Day shift summary:    Trach today in OR. No SBTs.    Lasix and IVF TKO today, 3.5L UOP.    Levo off this AM, but restarted at low dose after OR this afternoon.

## 2017-07-03 NOTE — CARE PLAN
Problem: Fluid Volume:  Goal: Will maintain balanced intake and output  Intervention: Monitor, educate, and encourage compliance with therapeutic intake of liquids  Lasix this AM      Problem: Respiratory:  Goal: Respiratory status will improve  Intervention: Collaborate with respiratory therapist and Interdisciplinary Team on treatment measures to improve respiratory function  Trach today

## 2017-07-03 NOTE — CARE PLAN
Problem: Ventilation Defect:  Goal: Ability to achieve and maintain unassisted ventilation or tolerate decreased levels of ventilator support  Adult Ventilation Update    Total Vent Days: 3      Patient Lines/Drains/Airways Status    Active Airway      Name: Placement date: Placement time: Site: Days:     Airway Group Standard Cuffed Trach Tracheostomy 6.0 07/02/17  1403  Tracheostomy  less than 1                 Patient failed trials because of Barriers to Wean: Other (Comments) (06/24/17 0642)  Barriers to SBT Weaning Trial Stopped due to:: Pt weaned for 1 hour and returned to rest settings per protocol (06/30/17 1158)  Length of Weaning Trial Length of Weaning Trial (Hours): 1 (06/30/17 1158)    Cough: Moist;Productive (07/02/17 1459)  Sputum Amount: Small (07/02/17 1600)  Sputum Color: Bloody;Clear (07/02/17 1600)  Sputum Consistency: Thin (07/02/17 1600)    Mobility Group  Activity Performed: Unable to mobilize (07/02/17 1430)  Time Activity Tolerated:  (Up to cardiac chair) (06/28/17 0600)  Distance Per Occurrence (ft.): 0 feet (06/28/17 1800)  # of Times Distance was Traveled: 0 (06/28/17 1800)  Assistance / Tolerance: Assistance of Two or More (06/28/17 2000)  Pt Calls for Assistance: No (07/02/17 0800)  Staff Present for Mobilization: RN (06/28/17 2000)  Gait: Unable to Ambulate (06/28/17 2000)  Assistive Devices:  (full assist) (06/27/17 1000)  Reason Not Mobilized: Bed rest (07/02/17 1430)  Mobilization Comments: Unstable (06/30/17 0000)    Events/Summary/Plan: ^ shiley placed today in OR. Pt remains on APV-CMV, no further changes at this time. CPT performed, DUO given. (07/02/17 1459)

## 2017-07-03 NOTE — PROGRESS NOTES
Pulmonary Critical Care Progress Note        Date of service:  7/3/2017    Interval Events:  24 hour interval history reviewed  Reason for visit:  Respiratory failure, pneumonia, atelectasis  Unable to provide CC or ROS due to chronic encephalopathy      SR  NE 1  CVP 9-15  Tolerating TF      PFSH:  No change.    Respiratory:  Liriaon Vent Mode: APVCMV, Rate (breaths/min): 15, Vt Target (mL): 410, PEEP/CPAP: 8, FiO2: 40, Static Compliance (ml / cm H2O): 45, Control VTE (exp VT): 402  Pulse Oximetry: 100 %  CXR with unchanged left > right lung opacities  Coarse crackles bilaterally    Recent Labs      07/01/17   0454   ISTATAPH  7.457   ISTATAPCO2  32.5   ISTATAPO2  59*   ISTATATCO2  24   [id number]  92*   ISTATARTHCO3  23.0   ISTATARTBE  -1   ISTATTEMP  96.3 F   ISTATFIO2  30   ISTATSPEC  Arterial   ISTATAPHTC  7.477   [id number]  54*       HemoDynamics:  Pulse: 62, Heart Rate (Monitored): 61  NIBP: 106/59 mmHg  CVP (mm Hg): (!) 15 MM HG  SR/SB  NE 1    Neuro:  Sedated  Propofol off    Fluids:  Intake/Output       07/01/17 0700 - 07/02/17 0659 07/02/17 0700 - 07/03/17 0659 07/03/17 0700 - 07/04/17 0659      2317-5386 7655-2874 Total 6832-7005 1686-3455 Total 5624-6594 0612-7925 Total       Intake    I.V.  2129.1  1659.2 3788.3  1051.6  428.3 1479.9  --  -- --    Magnesium Sulfate Volume 50 -- 50 -- -- -- -- -- --    Propofol Volume 28.5 13.6 42.1 -- -- -- -- -- --    Norepinephrine Volume 50.6 45.6 96.2 10.6 28.3 38.9 -- -- --    Normal Saline 1200 1200 2400 140 100 240 -- -- --    IV Piggyback Volume (IV Piggyback)   -- -- --    Other  180  120 300  30  120 150  --  -- --    Medications (P.O./ Enteral Liquids) 180 120 300 30 120 150 -- -- --    Enteral  490  700 1190  260  710 970  --  -- --    Enteral Volume 400 400 800 200 500 700 -- -- --    Free Water / Tube Flush 90 300 390 60 210 270 -- -- --    Total Intake 2799.1 2479.2 5278.3 1341.6 1258.3 2599.9 -- -- --       Output    Urine   600  535 1135  3450  235 3685  --  -- --    Indwelling Cathether  3450 235 3685 -- -- --    Stool/Urine  0  0 0  --  -- --  --  -- --    Measurable Stool (ml) 0 0 0 -- -- -- -- -- --    Stool  --  -- --  --  -- --  --  -- --    Number of Times Stooled 1 x 0 x 1 x 1 x 0 x 1 x -- -- --    Total Output  3450 235 3685 -- -- --       Net I/O     2199.1 1944.2 4143.3 -2108.4 1023.3 -1085.1 -- -- --        Weight: 62.1 kg (136 lb 14.5 oz)  Recent Labs      17   0425   SODIUM  142  143  140   POTASSIUM  3.1*  3.4*  3.9   CHLORIDE  113*  115*  109   CO2  23  25  26   BUN  9  9  12   CREATININE  <0.20*  <0.20*  <0.20*   MAGNESIUM  1.8  2.1  1.8   PHOSPHORUS  1.8*  1.6*  2.2*   CALCIUM  7.5*  7.5*  7.9*       GI/Nutrition:  Abd soft ND/NT  Tolerating enteral TF    Liver Function  Recent Labs      17   0425   ALTSGPT   --    --   48   ASTSGOT   --    --   24   ALKPHOSPHAT   --    --   82   TBILIRUBIN   --    --   0.2   PREALBUMIN   --    --   29.0   GLUCOSE  160*  145*  166*       Heme:  Recent Labs      17   0425   RBC  3.25*  3.25*  3.40*   HEMOGLOBIN  9.5*  9.6*  9.9*   HEMATOCRIT  29.1*  29.3*  30.8*   PLATELETCT  493*  506*  639*   PROTHROMBTM  14.2   --    --    INR  1.07   --    --        Infectious Disease:  Monitored Temp  Av.7 °C (98 °F)  Min: 34.8 °C (94.6 °F)  Max: 37.4 °C (99.3 °F)     Recent Labs      17   0350  17   0500  17   0425   WBC  10.0  9.8  8.0   NEUTSPOLYS  79.20*  74.60*  73.80*   LYMPHOCYTES  11.60*  13.90*  15.60*   MONOCYTES  6.30  8.70  8.30   EOSINOPHILS  2.10  1.80  1.40   BASOPHILS  0.20  0.40  0.40   ASTSGOT   --    --   24   ALTSGPT   --    --   48   ALKPHOSPHAT   --    --   82   TBILIRUBIN   --    --   0.2     Current Facility-Administered Medications   Medication Dose Frequency Provider Last Rate Last Dose   • levetiracetam  (KEPPRA) 100 MG/ML solution 1,500 mg  1,500 mg Q12HRS Martell Hunter M.D.   1,500 mg at 07/02/17 2115   • ceFAZolin (ANCEF) IVPB 2 g  2 g Q8HRS Rivas Dee M.D.   2 g at 07/02/17 2116   • propofol (DIPRIVAN) injection  5-80 mcg/kg/min Continuous Clarence Pro M.D.   Stopped at 07/02/17 0200   • Respiratory Care per Protocol   Continuous RT Clarence Pro M.D.       • chlorhexidine (PERIDEX) 0.12 % solution 15 mL  15 mL BID Clarence Pro M.D.   15 mL at 07/02/17 2114   • lidocaine (XYLOCAINE) 1%  injection  1-2 mL Q30 MIN PRN Clarence Pro M.D.       • MD ALERT...Adult ICU Electrolyte Replacement per Pharmacy Protocol   pharmacy to dose Clarence Pro M.D.       • fentaNYL (SUBLIMAZE) injection 25 mcg  25 mcg Q HOUR PRN Clarence Pro M.D.        Or   • fentaNYL (SUBLIMAZE) injection 50 mcg  50 mcg Q HOUR PRN Clarence Pro M.D.   50 mcg at 06/30/17 0000    Or   • fentaNYL (SUBLIMAZE) injection 100 mcg  100 mcg Q HOUR PRN Clarence Pro M.D.       • ipratropium-albuterol (DUONEB) nebulizer solution 3 mL  3 mL Q2HRS PRN (RT) Clarence Pro M.D.       • ipratropium-albuterol (DUONEB) nebulizer solution 3 mL  3 mL Q4HRS (RT) Clarence Pro M.D.   3 mL at 07/03/17 0222   • thiamine (B-1) 200 mg in D5W 100 mL IVPB  200 mg Q12HR Clarence Pro M.D. 200 mL/hr at 07/03/17 0327 200 mg at 07/03/17 0327   • hydrocortisone sodium succinate PF (SOLU-CORTEF) 100 MG injection 50 mg  50 mg Q6HRS Jeremy M Gonda, M.D.   50 mg at 07/02/17 2354   • ascorbic acid (VITAMIN C) 1,500 mg in  mL   Q6HRS Clarence Pro M.D.   Stopped at 07/03/17 0053   • norepinephrine (LEVOPHED) 8 mg in  mL Infusion  0.5-30 mcg/min Continuous Rivas Farrell M.D. 9.4 mL/hr at 07/03/17 0405 5 mcg/min at 07/03/17 0405   • NS infusion   Continuous Rivas Dee M.D. 10 mL/hr at 07/02/17 0729     • famotidine (PEPCID) tablet 20 mg  20 mg BID Gabrielle Vargas, PHARMD   20 mg at  07/02/17 2114   • potassium chloride (KLOR-CON) 20 MEQ packet 40 mEq  40 mEq TID Kev Araiza M.D.   40 mEq at 07/02/17 2114   • oxycodone immediate-release (ROXICODONE) tablet 5 mg  5 mg Q4HRS PRN Kev Araiza M.D.   5 mg at 06/24/17 1825   • PHENobarbital solution 70 mg  70 mg BID Martell Hunter M.D.   70 mg at 07/02/17 2225   • enoxaparin (LOVENOX) inj 30 mg  30 mg DAILY Clarence Morfin D.O.   30 mg at 07/02/17 2115   • senna-docusate (PERICOLACE or SENOKOT S) 8.6-50 MG per tablet 2 Tab  2 Tab BID Kev Araiza M.D.   2 Tab at 07/02/17 2114    And   • polyethylene glycol/lytes (MIRALAX) PACKET 1 Packet  1 Packet QDAY PRBRIDGETTE Araiza M.D.   1 Packet at 06/25/17 2028    And   • magnesium hydroxide (MILK OF MAGNESIA) suspension 30 mL  30 mL QDAY SUSAN Araiza M.D.   30 mL at 06/28/17 1305    And   • bisacodyl (DULCOLAX) suppository 10 mg  10 mg QDAY PRBRIDGETTE Araiza M.D.   10 mg at 07/01/17 1047   • glucose 4 g chewable tablet 16 g  16 g Q15 MIN PRBRIDGETTE Araiza M.D.        And   • dextrose 50% (D50W) injection 25 mL  25 mL Q15 MIN PRBRIDGETTE Araiza M.D.   25 mL at 06/20/17 1840   • ondansetron (ZOFRAN) syringe/vial injection 4 mg  4 mg Q4HRS PRN Clarence Morfin D.O.   4 mg at 06/23/17 2238   • ondansetron (ZOFRAN ODT) dispertab 4 mg  4 mg Q4HRS PRN Clarence Morfin D.O.       • promethazine (PHENERGAN) tablet 12.5-25 mg  12.5-25 mg Q4HRS PRN Clarence Morfin D.O.       • promethazine (PHENERGAN) suppository 12.5-25 mg  12.5-25 mg Q4HRS PRN Clarence Morfin D.O.       • acetaminophen (TYLENOL) tablet 650 mg  650 mg Q6HRS PRN Clarence Morfin D.O.   650 mg at 06/17/17 1857   • Pain Pump (patient supplied) Device   Continuous Clarence Morfin D.O.   Stopped at 06/17/17 1230     Last reviewed on 6/17/2017  9:45 AM by Ligia Frazier, Pharmacy Int    Quality  Measures:  Medications reviewed, Labs reviewed and Radiology images reviewed                        Assessment and  Plan:    Acute hypoxemic respiratory failure  VDRF - intubated 6/19-6/27, re-intubated 6/30   - cont vent support   - SBT as tolerated  Trach 7/2  Aspiration/HCAP pneumonia with left lung atelectasis   - S/P FOB 6/30 - atelectasis improved   - MSSA - antibiotics changed to Ancef  Sepsis syndrome with shock - pulmonary source              - vasopressor support as necessary              - Marik protocol  PERALTA - cont hydrocortisone  Acute pulmonary edema   - force diuresis as tolerated  Parapneumonic left pleural effusion   - S/P thoracentesis 6/25   - follow  Prior trach with trauma years ago  S/P aspiration pneumonia   - Klebsiella pneumonia - pansensitive              - completed abx x 7 days 6/26 - C3/Flagyl  New onset seizure likely secondary to hyponatremia              - Keppra, Phenobarb per neuro  Severe chronic underlying debility secondary to profound traumatic brain injury 20 years ago  Dysphagia - cont enteral G-tube feedings    Critical Care Time:  33 minutes  16986  No time overlap  Time excludes procedures  Discussed with RN, RT, Team, mother

## 2017-07-03 NOTE — CARE PLAN
Problem: Safety  Goal: Will remain free from injury  Safety and fall precautions in place, bed in lowest position. Bed alarm on.     Problem: Skin Integrity  Goal: Risk for impaired skin integrity will decrease  Skin assessment completed. Moisture barrier in place. Pillows in use to float heels. Q2hr turning to prevent skin breakdown. Full body waffle cushion in place. Mepilex on sacrum and left ischium.

## 2017-07-03 NOTE — PROGRESS NOTES
12 hour chart check completed      Night Shift Summary:    - Low urine output throughout night, started to increase around 0500, MD aware    - Levo off from 2045 until 2355, now running at 1mcg/min

## 2017-07-03 NOTE — PROGRESS NOTES
[facility] Hospitalist Progress Note    Date of Service: 7/3/2017    Chief Complaint  36 y.o. male admitted 2017 with altered mental status seizures and aspiration. H/o   Severe TBI. Intubated Initially improved then had repeat event with respiratory failure and left lung white, reintubation out s/p improvement with bronc.     Interval Problem Update  S/p trach  Still on pressors for shock  No fevers  TF are at goal.  Good uop   Central line in place  Vent day4        Diuresis  K  Mag  Phos        Consultants/Specialty  Neurology  Pulmonology    Disposition  LTACH referral placed.   Mother would prefer home.   Critical care time 31min with active management of pressors for shock.         Review of Systems   Unable to perform ROS: intubated      Physical Exam  Laboratory/Imaging   Hemodynamics  No data recorded.   Monitored Temp: 36.8 °C (98.2 °F)  Pulse  Av.3  Min: 42  Max: 131 Heart Rate (Monitored): 73  NIBP: 100/55 mmHg CVP (mm Hg): (!) 9 MM HG    Respiratory  Liriano Vent Mode: APVCMV, Rate (breaths/min): 15, PEEP/CPAP: 8, PEEP/CPAP: 8, FiO2: 40, P Peak (PIP): 20, P MEAN: 11   Respiration: (!) 9, Pulse Oximetry: 99 %     PEP/CPT Method: Percussion/Vibration, Work Of Breathing / Effort: Vented  RUL Breath Sounds: Diminished, RML Breath Sounds: Diminished, RLL Breath Sounds: Diminished, JONATAN Breath Sounds: Diminished, LLL Breath Sounds: Diminished    Fluids    Intake/Output Summary (Last 24 hours) at 17 0758  Last data filed at 17 0600   Gross per 24 hour   Intake 2935.85 ml   Output   3950 ml   Net -1014.15 ml       Nutrition  Orders Placed This Encounter   Procedures   • DIET NPO     Standing Status: Standing      Number of Occurrences: 1      Standing Expiration Date:      Order Specific Question:  Restrict to:     Answer:  With Tube Feed [4]     Physical Exam   Constitutional: He appears well-developed and well-nourished. He appears ill. No distress.   HENT:   Right Ear: External ear normal.    Left Ear: External ear normal.   Nose: Nose normal.   Eyes: Pupils are equal, round, and reactive to light. Right eye exhibits no discharge. Left eye exhibits no discharge. No scleral icterus.   Neck: No JVD present. No tracheal deviation present.   Right IJ central line without bleeding.   Well-healed tracheostomy site with indentation.    Cardiovascular: Regular rhythm, normal heart sounds and intact distal pulses.  Tachycardia present.    No murmur heard.  Pulmonary/Chest: Effort normal and breath sounds normal. No respiratory distress. He has no rales.   Abdominal: Soft. Bowel sounds are normal. He exhibits no distension. There is no tenderness.   PEG. Baclofen pump palpable on abdomen.   PEG tube site erythematous and with purulent drainage   Genitourinary:   Waldrop    Musculoskeletal: He exhibits edema. He exhibits no tenderness.   Poor muscle tone, severe contractures of the arms and legs with significant deformity.    Neurological:   Unresponsive today.    Skin: Skin is warm and dry. He is not diaphoretic. No erythema. There is pallor.   Nursing note and vitals reviewed.      Recent Labs      07/01/17   0350  07/02/17   0500  07/03/17   0425   WBC  10.0  9.8  8.0   RBC  3.25*  3.25*  3.40*   HEMOGLOBIN  9.5*  9.6*  9.9*   HEMATOCRIT  29.1*  29.3*  30.8*   MCV  89.5  90.2  90.6   MCH  29.2  29.5  29.1   MCHC  32.6*  32.8*  32.1*   RDW  55.4*  56.7*  57.4*   PLATELETCT  493*  506*  639*   MPV  8.2*  8.3*  8.4*     Recent Labs      07/01/17   0350  07/02/17   0500  07/03/17   0425   SODIUM  142  143  140   POTASSIUM  3.1*  3.4*  3.9   CHLORIDE  113*  115*  109   CO2  23  25  26   GLUCOSE  160*  145*  166*   BUN  9  9  12   CREATININE  <0.20*  <0.20*  <0.20*   CALCIUM  7.5*  7.5*  7.9*     Recent Labs      07/01/17   0350   INR  1.07         Recent Labs      07/02/17   0500   TRIGLYCERIDE  54          Assessment/Plan     Status epilepticus (CMS-HCC) (present on admission)  Assessment & Plan  - had continuous  seizure activity for approximately 48 hours suspected from pneumonia.   - neurology following  - on phenobarbital and keppra  - seizure precautions    Aspiration pneumonia (CMS-HCC) (present on admission)  Assessment & Plan  - pt unable to clear secretions   - culture growing Klebsiella resistant to ampicillin thus rocephin to cover aspiration   Bronchoscopy 6/25. Again on 6/29  Thoracentesis with 300 ml off on 6/25.  - to trach today.        Sepsis (CMS-HCC)  Assessment & Plan  - likely resolved.   Sirs from aspiration now most appropriate dx.       Acute respiratory failure with hypoxia (CMS-HCC) (present on admission)  Assessment & Plan  - recurred. Mucus plugging likely.   - Trach replacement likely today.       Hyponatremia (present on admission)  Assessment & Plan  - resolved  - stop salt tabs     Hypoglycemia (present on admission)  Assessment & Plan  - resolved    TBI (traumatic brain injury) (CMS-HCC) (present on admission)  Assessment & Plan  Since age 17 years.  With quadriplegia   [facility]aclofen pump [facility] at [facility]--his mother states it does not need refilled for a few months.    Hypokalemia (present on admission)  Assessment & Plan  - replenished      Labs reviewed, Medications reviewed, Radiology images reviewed and EKG reviewed  Waldrop catheter: Critically Ill - Requiring Accurate Measurement of Urinary Output      DVT Prophylaxis: Enoxaparin (Lovenox)      Antibiotics: Treating active infection/contamination beyond 24 hours perioperative coverage  Assessed for rehab: Patient was assess for and/or received rehabilitation services during this hospitalization

## 2017-07-03 NOTE — PROGRESS NOTES
2250: Paged hospitalist, Dr. Kenney, for updates on low urine output.     2255: Updates given to Dr. Kenney. Orders received to check albumin level with AM labs. No other orders received at this time.

## 2017-07-03 NOTE — PROGRESS NOTES
"Ruben Edwards is a 37 y.o. male patient.  1. Acute respiratory failure with hypoxia (CMS-[facility])      No past medical history on file.        Allergies   Allergen Reactions   • Sulfa Drugs Unspecified     Per caretaker     Active Problems:    Status epilepticus (CMS-[facility])    Aspiration pneumonia (CMS-HCC)    Sepsis (CMS-HCC)    Acute respiratory failure with hypoxia (CMS-HCC)    Hyponatremia    Hypoglycemia    TBI (traumatic brain injury) (CMS-HCC)    Hypokalemia    Blood pressure 151/92, pulse 83, temperature 36.9 °C (98.4 °F), resp. rate 17, height 1.727 m (5' 7.99\"), weight 62.1 kg (136 lb 14.5 oz), SpO2 99 %.    Subjective stable  Objective on vent trach   Assessment & Plan   Respiratory failure, aspiration s/p trach  Will remove ties later this week    Catherine Osorio MD  7/3/2017    "

## 2017-07-04 ENCOUNTER — APPOINTMENT (OUTPATIENT)
Dept: RADIOLOGY | Facility: MEDICAL CENTER | Age: 37
DRG: 004 | End: 2017-07-04
Attending: INTERNAL MEDICINE
Payer: COMMERCIAL

## 2017-07-04 LAB
ALBUMIN SERPL BCP-MCNC: 2.6 G/DL (ref 3.2–4.9)
ALBUMIN/GLOB SERPL: 1 G/DL
ALP SERPL-CCNC: 75 U/L (ref 30–99)
ALT SERPL-CCNC: 37 U/L (ref 2–50)
ANION GAP SERPL CALC-SCNC: 4 MMOL/L (ref 0–11.9)
AST SERPL-CCNC: 18 U/L (ref 12–45)
BACTERIA WND AEROBE CULT: ABNORMAL
BASOPHILS # BLD AUTO: 0.4 % (ref 0–1.8)
BASOPHILS # BLD: 0.03 K/UL (ref 0–0.12)
BILIRUB SERPL-MCNC: 0.2 MG/DL (ref 0.1–1.5)
BUN SERPL-MCNC: 18 MG/DL (ref 8–22)
CALCIUM SERPL-MCNC: 7.6 MG/DL (ref 8.5–10.5)
CHLORIDE SERPL-SCNC: 108 MMOL/L (ref 96–112)
CO2 SERPL-SCNC: 29 MMOL/L (ref 20–33)
CREAT SERPL-MCNC: <0.2 MG/DL (ref 0.5–1.4)
EOSINOPHIL # BLD AUTO: 0.3 K/UL (ref 0–0.51)
EOSINOPHIL NFR BLD: 3.8 % (ref 0–6.9)
ERYTHROCYTE [DISTWIDTH] IN BLOOD BY AUTOMATED COUNT: 58.5 FL (ref 35.9–50)
GFR SERPL CREATININE-BSD FRML MDRD: >60 ML/MIN/1.73 M 2
GLOBULIN SER CALC-MCNC: 2.7 G/DL (ref 1.9–3.5)
GLUCOSE SERPL-MCNC: 101 MG/DL (ref 65–99)
GRAM STN SPEC: ABNORMAL
HCT VFR BLD AUTO: 27.6 % (ref 42–52)
HGB BLD-MCNC: 9 G/DL (ref 14–18)
IMM GRANULOCYTES # BLD AUTO: 0.02 K/UL (ref 0–0.11)
IMM GRANULOCYTES NFR BLD AUTO: 0.3 % (ref 0–0.9)
LYMPHOCYTES # BLD AUTO: 2.46 K/UL (ref 1–4.8)
LYMPHOCYTES NFR BLD: 30.8 % (ref 22–41)
MAGNESIUM SERPL-MCNC: 2.1 MG/DL (ref 1.5–2.5)
MCH RBC QN AUTO: 29.4 PG (ref 27–33)
MCHC RBC AUTO-ENTMCNC: 32.6 G/DL (ref 33.7–35.3)
MCV RBC AUTO: 90.2 FL (ref 81.4–97.8)
MONOCYTES # BLD AUTO: 0.9 K/UL (ref 0–0.85)
MONOCYTES NFR BLD AUTO: 11.3 % (ref 0–13.4)
NEUTROPHILS # BLD AUTO: 4.27 K/UL (ref 1.82–7.42)
NEUTROPHILS NFR BLD: 53.4 % (ref 44–72)
NRBC # BLD AUTO: 0 K/UL
NRBC BLD AUTO-RTO: 0 /100 WBC
PHOSPHATE SERPL-MCNC: 2.1 MG/DL (ref 2.5–4.5)
PLATELET # BLD AUTO: 479 K/UL (ref 164–446)
PMV BLD AUTO: 8.3 FL (ref 9–12.9)
POTASSIUM SERPL-SCNC: 3.8 MMOL/L (ref 3.6–5.5)
PROT SERPL-MCNC: 5.3 G/DL (ref 6–8.2)
RBC # BLD AUTO: 3.06 M/UL (ref 4.7–6.1)
SIGNIFICANT IND 70042: ABNORMAL
SITE SITE: ABNORMAL
SODIUM SERPL-SCNC: 141 MMOL/L (ref 135–145)
SOURCE SOURCE: ABNORMAL
WBC # BLD AUTO: 8 K/UL (ref 4.8–10.8)

## 2017-07-04 PROCEDURE — 99291 CRITICAL CARE FIRST HOUR: CPT | Performed by: INTERNAL MEDICINE

## 2017-07-04 PROCEDURE — 700102 HCHG RX REV CODE 250 W/ 637 OVERRIDE(OP): Performed by: INTERNAL MEDICINE

## 2017-07-04 PROCEDURE — 700101 HCHG RX REV CODE 250: Performed by: PHARMACIST

## 2017-07-04 PROCEDURE — 83735 ASSAY OF MAGNESIUM: CPT

## 2017-07-04 PROCEDURE — A9270 NON-COVERED ITEM OR SERVICE: HCPCS

## 2017-07-04 PROCEDURE — 94640 AIRWAY INHALATION TREATMENT: CPT

## 2017-07-04 PROCEDURE — 80053 COMPREHEN METABOLIC PANEL: CPT

## 2017-07-04 PROCEDURE — 700105 HCHG RX REV CODE 258: Performed by: PHARMACIST

## 2017-07-04 PROCEDURE — 94150 VITAL CAPACITY TEST: CPT

## 2017-07-04 PROCEDURE — 71010 DX-CHEST-PORTABLE (1 VIEW): CPT

## 2017-07-04 PROCEDURE — 94669 MECHANICAL CHEST WALL OSCILL: CPT

## 2017-07-04 PROCEDURE — 700102 HCHG RX REV CODE 250 W/ 637 OVERRIDE(OP)

## 2017-07-04 PROCEDURE — 99233 SBSQ HOSP IP/OBS HIGH 50: CPT | Performed by: HOSPITALIST

## 2017-07-04 PROCEDURE — A9270 NON-COVERED ITEM OR SERVICE: HCPCS | Performed by: INTERNAL MEDICINE

## 2017-07-04 PROCEDURE — 84100 ASSAY OF PHOSPHORUS: CPT

## 2017-07-04 PROCEDURE — 700101 HCHG RX REV CODE 250: Performed by: INTERNAL MEDICINE

## 2017-07-04 PROCEDURE — 770022 HCHG ROOM/CARE - ICU (200)

## 2017-07-04 PROCEDURE — 85025 COMPLETE CBC W/AUTO DIFF WBC: CPT

## 2017-07-04 PROCEDURE — 700102 HCHG RX REV CODE 250 W/ 637 OVERRIDE(OP): Performed by: PSYCHIATRY & NEUROLOGY

## 2017-07-04 PROCEDURE — 94003 VENT MGMT INPAT SUBQ DAY: CPT

## 2017-07-04 PROCEDURE — A9270 NON-COVERED ITEM OR SERVICE: HCPCS | Performed by: PSYCHIATRY & NEUROLOGY

## 2017-07-04 PROCEDURE — 700111 HCHG RX REV CODE 636 W/ 250 OVERRIDE (IP): Performed by: INTERNAL MEDICINE

## 2017-07-04 PROCEDURE — 94668 MNPJ CHEST WALL SBSQ: CPT

## 2017-07-04 RX ORDER — FUROSEMIDE 10 MG/ML
20 INJECTION INTRAMUSCULAR; INTRAVENOUS
Status: DISCONTINUED | OUTPATIENT
Start: 2017-07-05 | End: 2017-07-06

## 2017-07-04 RX ORDER — POTASSIUM CHLORIDE 1.5 G/1.58G
20 POWDER, FOR SOLUTION ORAL ONCE
Status: DISCONTINUED | OUTPATIENT
Start: 2017-07-04 | End: 2017-07-04

## 2017-07-04 RX ADMIN — IPRATROPIUM BROMIDE AND ALBUTEROL SULFATE 3 ML: .5; 3 SOLUTION RESPIRATORY (INHALATION) at 02:09

## 2017-07-04 RX ADMIN — PHENOBARBITAL 70 MG: 20 ELIXIR ORAL at 11:25

## 2017-07-04 RX ADMIN — CEFAZOLIN SODIUM 2 G: 2 INJECTION, SOLUTION INTRAVENOUS at 21:38

## 2017-07-04 RX ADMIN — CEFAZOLIN SODIUM 2 G: 2 INJECTION, SOLUTION INTRAVENOUS at 14:21

## 2017-07-04 RX ADMIN — SENNOSIDES AND DOCUSATE SODIUM 2 TABLET: 8.6; 5 TABLET ORAL at 21:37

## 2017-07-04 RX ADMIN — CHLORHEXIDINE GLUCONATE 15 ML: 1.2 RINSE ORAL at 09:16

## 2017-07-04 RX ADMIN — FAMOTIDINE 20 MG: 20 TABLET, FILM COATED ORAL at 09:15

## 2017-07-04 RX ADMIN — IPRATROPIUM BROMIDE AND ALBUTEROL SULFATE 3 ML: .5; 3 SOLUTION RESPIRATORY (INHALATION) at 14:34

## 2017-07-04 RX ADMIN — IPRATROPIUM BROMIDE AND ALBUTEROL SULFATE 3 ML: .5; 3 SOLUTION RESPIRATORY (INHALATION) at 19:23

## 2017-07-04 RX ADMIN — POTASSIUM CHLORIDE 40 MEQ: 1.5 POWDER, FOR SOLUTION ORAL at 09:15

## 2017-07-04 RX ADMIN — ENOXAPARIN SODIUM 30 MG: 100 INJECTION SUBCUTANEOUS at 21:37

## 2017-07-04 RX ADMIN — LEVETIRACETAM 1500 MG: 100 SOLUTION ORAL at 21:38

## 2017-07-04 RX ADMIN — IPRATROPIUM BROMIDE AND ALBUTEROL SULFATE 3 ML: .5; 3 SOLUTION RESPIRATORY (INHALATION) at 10:46

## 2017-07-04 RX ADMIN — FAMOTIDINE 20 MG: 20 TABLET, FILM COATED ORAL at 21:37

## 2017-07-04 RX ADMIN — IPRATROPIUM BROMIDE AND ALBUTEROL SULFATE 3 ML: .5; 3 SOLUTION RESPIRATORY (INHALATION) at 06:48

## 2017-07-04 RX ADMIN — CHLORHEXIDINE GLUCONATE 15 ML: 1.2 RINSE ORAL at 21:37

## 2017-07-04 RX ADMIN — POTASSIUM PHOSPHATE, MONOBASIC AND POTASSIUM PHOSPHATE, DIBASIC 15 MMOL: 224; 236 INJECTION, SOLUTION INTRAVENOUS at 09:15

## 2017-07-04 RX ADMIN — SENNOSIDES AND DOCUSATE SODIUM 2 TABLET: 8.6; 5 TABLET ORAL at 09:16

## 2017-07-04 RX ADMIN — IPRATROPIUM BROMIDE AND ALBUTEROL SULFATE 3 ML: .5; 3 SOLUTION RESPIRATORY (INHALATION) at 22:39

## 2017-07-04 RX ADMIN — CEFAZOLIN SODIUM 2 G: 2 INJECTION, SOLUTION INTRAVENOUS at 05:54

## 2017-07-04 RX ADMIN — POTASSIUM CHLORIDE 40 MEQ: 1.5 POWDER, FOR SOLUTION ORAL at 14:21

## 2017-07-04 RX ADMIN — LEVETIRACETAM 1500 MG: 100 SOLUTION ORAL at 09:16

## 2017-07-04 RX ADMIN — PHENOBARBITAL 70 MG: 20 ELIXIR ORAL at 21:37

## 2017-07-04 RX ADMIN — POTASSIUM CHLORIDE 40 MEQ: 1.5 POWDER, FOR SOLUTION ORAL at 21:37

## 2017-07-04 ASSESSMENT — PULMONARY FUNCTION TESTS: FVC: 555

## 2017-07-04 NOTE — CARE PLAN
Problem: Ventilation Defect:  Goal: Ability to achieve and maintain unassisted ventilation or tolerate decreased levels of ventilator support  Outcome: PROGRESSING SLOWER THAN EXPECTED  Adult Ventilation Update    Total Vent Days: 4  Trache day 2      Patient Lines/Drains/Airways Status    Active Airway      Name: Placement date: Placement time: Site: Days:     Airway Group Standard Cuffed Trach Tracheostomy 6.0 07/02/17  1403  Tracheostomy  1                 In the last 24 hours, the patient tolerated SBT for 2 hours and 30 minutes on settings of 10/8.    #FVC / Vital Capacity (liters) :  (unable to follow) (07/03/17 0758)  NIF (cm H2O) :  (unable to follow) (07/03/17 0758)  Rapid Shallow Breathing Index (RR/VT): 19 (07/03/17 1617)  Plateau Pressure (Q Shift): 17 (07/03/17 0636)  Static Compliance (ml / cm H2O): 33 (07/03/17 1617)     Cough: Productive (07/03/17 1454)  Sputum Amount: Moderate (07/03/17 1600)  Sputum Color: Tan (07/03/17 1600)  Sputum Consistency: Thick;Thin (07/03/17 1600)    Mobility Group  Activity Performed: Edge of bed (07/03/17 1600)  Time Activity Tolerated: 10 min (07/03/17 1600)  Distance Per Occurrence (ft.): 0 feet (06/28/17 1800)  # of Times Distance was Traveled: 0 (06/28/17 1800)  Assistance / Tolerance: Assistance of Two or More (07/03/17 1600)  Pt Calls for Assistance: No (07/03/17 1600)  Staff Present for Mobilization: RT;RN;Other (comment) (07/03/17 1600)  Gait: Unable to Ambulate (07/03/17 1600)  Assistive Devices:  (full assist) (06/27/17 1000)  Reason Not Mobilized: Bed rest (07/02/17 2000)  Mobilization Comments: Unstable (06/30/17 0000)    Events/Summary/Plan: No vent changes at this time.  Patient is resting on current setting.  Will continue to monitor patient.

## 2017-07-04 NOTE — DIETARY
WEEKLY TF UPDATE - TF via PEG: Impact Peptide 1.5 @ 50 ml/hr providing 1800 kcals, 113 grams protein and 924 mL free water.  TF continues to run at goal at this time and is meeting 100% of patients estimated nutrition needs.  RN reports pt is tolerating TF. Pt is trach and on the vent. Pt's mother was providing home TF boluses. MD d/c order on 7/1    Pertinent Labs: Glucose 101, Creatinine < 0.20, Phosphorus  Pertinent Meds: ancef, peridex, lovenox, pepcid, lasix, keppra, electrolyte replacement, phenobarbital, klor-con, potassium phosphates, pericolace, levophed (stopped), propofol (paused)  Fluids: NS infusion @ 10 ml/hr. 924 ml free water from TF.  GI: Last BM 7/4  Wt 7/4: 61.4 kg via bed scale, wt fluctuation noted. Suspect related to fluids, per rounds RN reported LE edema. Pt is receiving lasix. Per I/Os pt is +6 L  SKIN: Per wound team note on 6/20 - pressure ulcer deep tissue injury POA chest right medial, pressure ulcer stage 3 POA ischium left, pressure ulcer stage 2 POA arm right middle, and wound rash elbow right anterior. TF meetings RDI for vitamins/minerals to aide in wound healing.     PLAN/RECOMMEND - Continue current TF regimen with no changes. Fluids per MD. Weekly weights to monitor fluid and nutrition status.     RD following.

## 2017-07-04 NOTE — PROGRESS NOTES
Pulmonary Critical Care Progress Note        Date of service:  7/4/2017    Interval Events:  24 hour interval history reviewed  Reason for visit:  Respiratory failure, pneumonia, atelectasis  Unable to provide CC or ROS due to chronic encephalopathy      SR  Decrease Lasix   TF at 50 tolerated  CXR with less edema      PFSH:  No change.    Respiratory:  Liriano Vent Mode: APVCMV, Rate (breaths/min): 15, Vt Target (mL): 410, PEEP/CPAP: 8, FiO2: 40, Static Compliance (ml / cm H2O): 31, Control VTE (exp VT): 322  Pulse Oximetry: 99 %  CXR with improved edema.  Persistent left lung opacities  Coarse crackles bilaterally    HemoDynamics:  Pulse: 81, Heart Rate (Monitored): 80  NIBP: 126/63 mmHg  CVP (mm Hg): (!) 10 MM HG  SR  NE off    Neuro:  More alert  Propofol off    Fluids:  Intake/Output       07/02/17 0700 - 07/03/17 0659 07/03/17 0700 - 07/04/17 0659 07/04/17 0700 - 07/05/17 0659      7312-9963 4178-7020 Total 7082-8038 8729-9965 Total 7223-8380 4601-2123 Total       Intake    I.V.  1051.6  664.3 1715.9  886.4  200 1086.4  --  -- --    Norepinephrine Volume 10.6 44.3 54.9 36.4 -- 36.4 -- -- --    Normal Saline 140 120 260 100 100 200 -- -- --    IV Piggyback Volume (IV Piggyback)  750 100 850 -- -- --    Other  30  120 150  120  120 240  --  -- --    Medications (P.O./ Enteral Liquids) 30 120 150 120 120 240 -- -- --    Enteral  260  810 1070  720  710 1430  --  -- --    Enteral Volume 200 600 800  -- -- --    Free Water / Tube Flush 60 210 270 120 210 330 -- -- --    Total Intake 1341.6 1594.3 2935.9 1726.4 1030 2756.4 -- -- --       Output    Urine  3450  500 3950  1975  1450 3425  --  -- --    Indwelling Cathether 3450 500 3950 1975 1450 3425 -- -- --    Stool  --  -- --  --  -- --  --  -- --    Number of Times Stooled 1 x 0 x 1 x 1 x 1 x 2 x -- -- --    Total Output 9211 500 7490 1975 1450 3425 -- -- --       Net I/O     -2108.4 1094.3 -1014.2 -248.7 -420 -668.7 -- -- --         Weight: 61.4 kg (135 lb 5.8 oz)  Recent Labs      17   0500  17   0500   SODIUM  143  140  141   POTASSIUM  3.4*  3.9  3.8   CHLORIDE  115*  109  108   CO2  25  26  29   BUN  9  12  18   CREATININE  <0.20*  <0.20*  <0.20*   MAGNESIUM  2.1  1.8  2.1   PHOSPHORUS  1.6*  2.2*  2.1*   CALCIUM  7.5*  7.9*  7.6*       GI/Nutrition:  Abd soft ND/NT  Tolerating enteral TF    Liver Function  Recent Labs      17   0500  17   0500   ALTSGPT   --   48  37   ASTSGOT   --   24  18   ALKPHOSPHAT   --   82  75   TBILIRUBIN   --   0.2  0.2   PREALBUMIN   --   29.0   --    GLUCOSE  145*  166*  101*       Heme:  Recent Labs      17   0500  17   0500   RBC  3.25*  3.40*  3.06*   HEMOGLOBIN  9.6*  9.9*  9.0*   HEMATOCRIT  29.3*  30.8*  27.6*   PLATELETCT  506*  639*  479*       Infectious Disease:  Monitored Temp  Av.9 °C (98.5 °F)  Min: 36 °C (96.8 °F)  Max: 37.5 °C (99.5 °F)     Recent Labs      17   0500  17   0500   WBC  9.8  8.0  8.0   NEUTSPOLYS  74.60*  73.80*  53.40   LYMPHOCYTES  13.90*  15.60*  30.80   MONOCYTES  8.70  8.30  11.30   EOSINOPHILS  1.80  1.40  3.80   BASOPHILS  0.40  0.40  0.40   ASTSGOT   --   24  18   ALTSGPT   --   48  37   ALKPHOSPHAT   --   82  75   TBILIRUBIN   --   0.2  0.2     Current Facility-Administered Medications   Medication Dose Frequency Provider Last Rate Last Dose   • furosemide (LASIX) injection 20 mg  20 mg Q12HRS Rivas Dee M.D.   20 mg at 17 5448   • levetiracetam (KEPPRA) 100 MG/ML solution 1,500 mg  1,500 mg Q12HRS Martell Hunter M.D.   1,500 mg at 17 2147   • ceFAZolin (ANCEF) IVPB 2 g  2 g Q8HRS Rivas Dee M.D.   2 g at 17 0554   • propofol (DIPRIVAN) injection  5-80 mcg/kg/min Continuous Clarence Pro M.D.   Stopped at 17 0200   • Respiratory Care per Protocol   Continuous RT Clarence Pro M.D.       •  chlorhexidine (PERIDEX) 0.12 % solution 15 mL  15 mL BID Clarence Pro M.D.   15 mL at 07/03/17 2147   • lidocaine (XYLOCAINE) 1%  injection  1-2 mL Q30 MIN PRN Clarence Pro M.D.       • MD ALERT...Adult ICU Electrolyte Replacement per Pharmacy Protocol   pharmacy to dose Clarence Pro M.D.       • fentaNYL (SUBLIMAZE) injection 25 mcg  25 mcg Q HOUR PRN Clarence Pro M.D.        Or   • fentaNYL (SUBLIMAZE) injection 50 mcg  50 mcg Q HOUR PRN Clarence Pro M.D.   50 mcg at 06/30/17 0000    Or   • fentaNYL (SUBLIMAZE) injection 100 mcg  100 mcg Q HOUR PRN Clarence Pro M.D.       • ipratropium-albuterol (DUONEB) nebulizer solution 3 mL  3 mL Q2HRS PRN (RT) Clarence Pro M.D.       • ipratropium-albuterol (DUONEB) nebulizer solution 3 mL  3 mL Q4HRS (RT) Clarence Pro M.D.   3 mL at 07/04/17 0209   • norepinephrine (LEVOPHED) 8 mg in  mL Infusion  0.5-30 mcg/min Continuous Rivas Farrell M.D.   Stopped at 07/03/17 1413   • NS infusion   Continuous Rivas Dee M.D. 10 mL/hr at 07/02/17 0729     • famotidine (PEPCID) tablet 20 mg  20 mg BID Gabrielle Vargas, PHARMD   20 mg at 07/03/17 2147   • potassium chloride (KLOR-CON) 20 MEQ packet 40 mEq  40 mEq TID Kev Araiza M.D.   40 mEq at 07/03/17 2147   • oxycodone immediate-release (ROXICODONE) tablet 5 mg  5 mg Q4HRS PRN Kev Araiza M.D.   5 mg at 06/24/17 1825   • PHENobarbital solution 70 mg  70 mg BID Martell Hunter M.D.   70 mg at 07/03/17 2147   • enoxaparin (LOVENOX) inj 30 mg  30 mg DAILY Clarence Morfin D.O.   30 mg at 07/03/17 2147   • senna-docusate (PERICOLACE or SENOKOT S) 8.6-50 MG per tablet 2 Tab  2 Tab BID Kev Araiza M.D.   2 Tab at 07/03/17 2147    And   • polyethylene glycol/lytes (MIRALAX) PACKET 1 Packet  1 Packet QDAY SUSAN Araiza M.D.   1 Packet at 06/25/17 2028    And   • magnesium hydroxide (MILK OF MAGNESIA) suspension 30 mL  30 mL QDAY SUSAN Araiza M.D.    30 mL at 06/28/17 1305    And   • bisacodyl (DULCOLAX) suppository 10 mg  10 mg QDAY PRN Kev Araiza M.D.   10 mg at 07/03/17 1035   • glucose 4 g chewable tablet 16 g  16 g Q15 MIN PRN Kev Araiza M.D.        And   • dextrose 50% (D50W) injection 25 mL  25 mL Q15 MIN PRN Kev Araiza M.D.   25 mL at 06/20/17 1840   • ondansetron (ZOFRAN) syringe/vial injection 4 mg  4 mg Q4HRS PRN Clarence Morfin D.O.   4 mg at 06/23/17 2238   • ondansetron (ZOFRAN ODT) dispertab 4 mg  4 mg Q4HRS PRN Clarence Morfin D.O.       • promethazine (PHENERGAN) tablet 12.5-25 mg  12.5-25 mg Q4HRS PRN Clarence Morfin D.O.       • promethazine (PHENERGAN) suppository 12.5-25 mg  12.5-25 mg Q4HRS PRN Clarence Morfin D.O.       • acetaminophen (TYLENOL) tablet 650 mg  650 mg Q6HRS PRN Clarence Morfin D.O.   650 mg at 07/03/17 1035   • Pain Pump (patient supplied) Device   Continuous JASPAL Medina.O.   Stopped at 06/17/17 1230     Last reviewed on 6/17/2017  9:45 AM by Ligia Frazier, Pharmacy Int    Quality  Measures:  Medications reviewed, Labs reviewed and Radiology images reviewed                        Assessment and Plan:    Acute hypoxemic respiratory failure  VDRF - intubated 6/19-6/27, re-intubated 6/30   - cont vent support   - SBT as tolerated  Trach 7/2  Aspiration/HCAP pneumonia with left lung atelectasis   - S/P FOB 6/30 - atelectasis improved   - MSSA - Ancef  Sepsis syndrome with shock - pulmonary source              - vasopressor support as necessary              - Do protocol completed  PERALTA - observe off hydrocortisone  Acute pulmonary edema   - force diuresis as tolerated  Parapneumonic left pleural effusion   - S/P thoracentesis 6/25   - follow  Prior trach with trauma years ago  S/P aspiration pneumonia   - Klebsiella pneumonia - pansensitive              - completed abx x 7 days 6/26 - C3/Flagyl  New onset seizure likely secondary to hyponatremia              - Keppra, Phenobarb per  neuro  Severe chronic underlying debility secondary to profound traumatic brain injury 20 years ago  Dysphagia - cont enteral G-tube feedings    Critical Care Time:  32 minutes  28662  No time overlap  Time excludes procedures  Discussed with RN, RT, Team       IKaylene (Scribe), am scribing for, and in the presence of, Rivas Dee M.D.    Electronically signed by: Kaylene Cote (Renzoibe), 7/4/2017    IRivas M.D. personally performed the services described in this documentation, as scribed by Kaylene Cote in my presence, and it is both accurate and complete.

## 2017-07-04 NOTE — PROGRESS NOTES
12 hr chart check    Day shift summary:    Neuro exam unchanged.    Levo off since 1415.    SBT x 1.5 hrs.    Large BM after suppository.    Edge of bed x 10 min w 3 person assist.

## 2017-07-04 NOTE — CARE PLAN
Problem: Skin Integrity  Goal: Risk for impaired skin integrity will decrease  Intervention: Assess risk factors for impaired skin integrity and/or pressure ulcers  Pt on a waffle mattress and pillows used to float pressure points.      Problem: Mobility  Goal: Risk for activity intolerance will decrease  Intervention: Assess and monitor signs of activity intolerance  Pt mobilized each shift with 2-3 people assist.

## 2017-07-04 NOTE — CARE PLAN
Problem: Mobility  Goal: Risk for activity intolerance will decrease  Edge of bed for 10 min. Full assist x2. Tolerated well.     Problem: Urinary Elimination:  Goal: Ability to reestablish a normal urinary elimination pattern will improve  Pt receiving lasix BID. Urine output improving and adequate.

## 2017-07-04 NOTE — PROGRESS NOTES
[facility] Hospitalist Progress Note    Date of Service: 2017    Chief Complaint  36 y.o. male admitted 2017 with altered mental status seizures and aspiration. H/o   Severe TBI. Intubated Initially improved then had repeat event with respiratory failure and left lung white, reintubation out s/p improvement with bronc.      Interval Problem Update    ROS limited by mental status  Per Mom at bedside he seems much better to her today  Tolerating SBT's  Having copious secretions  Tolerating lasix    Consultants/Specialty  Pulmonology    Disposition  Cont in ICU for pulmonary toilett        ROS   Physical Exam  Laboratory/Imaging   Hemodynamics  No data recorded.   Monitored Temp: 36.7 °C (98.1 °F)  Pulse  Av.3  Min: 42  Max: 131 Heart Rate (Monitored): 76  NIBP: 134/57 mmHg CVP (mm Hg): (!) 14 MM HG    Respiratory  Liriano Vent Mode: Spont, Rate (breaths/min): 15, PEEP/CPAP: 8, PEEP/CPAP: 8, FiO2: 40, P Peak (PIP): 25, P MEAN: 13   Respiration: 16, Pulse Oximetry: 100 %     PEP/CPT Method: Percussion/Vibration, Work Of Breathing / Effort: Vented  RUL Breath Sounds: Clear, RML Breath Sounds: Diminished, RLL Breath Sounds: Diminished, JONATAN Breath Sounds: Clear, LLL Breath Sounds: Diminished    Fluids    Intake/Output Summary (Last 24 hours) at 17 0837  Last data filed at 17 0800   Gross per 24 hour   Intake 2757.49 ml   Output   3680 ml   Net -922.51 ml       Nutrition  Orders Placed This Encounter   Procedures   • DIET NPO     Standing Status: Standing      Number of Occurrences: 1      Standing Expiration Date:      Order Specific Question:  Restrict to:     Answer:  With Tube Feed [4]     Physical Exam    Recent Labs      17   0500  17   0425  17   0500   WBC  9.8  8.0  8.0   RBC  3.25*  3.40*  3.06*   HEMOGLOBIN  9.6*  9.9*  9.0*   HEMATOCRIT  29.3*  30.8*  27.6*   MCV  90.2  90.6  90.2   MCH  29.5  29.1  29.4   MCHC  32.8*  32.1*  32.6*   RDW  56.7*  57.4*  58.5*   PLATELETCT   506*  639*  479*   MPV  8.3*  8.4*  8.3*     Recent Labs      07/02/17   0500  07/03/17   0425  07/04/17   0500   SODIUM  143  140  141   POTASSIUM  3.4*  3.9  3.8   CHLORIDE  115*  109  108   CO2  25  26  29   GLUCOSE  145*  166*  101*   BUN  9  12  18   CREATININE  <0.20*  <0.20*  <0.20*   CALCIUM  7.5*  7.9*  7.6*             Recent Labs      07/02/17   0500   TRIGLYCERIDE  54          Assessment/Plan     Status epilepticus (CMS-[facility]) (present on admission)  Assessment & Plan  - had continuous seizure activity for approximately 48 hours suspected from pneumonia.   - neurology following  - on phenobarbital and keppra  - seizure precautions  -Neurology consulted    Aspiration pneumonia (CMS-HCC) (present on admission)  Assessment & Plan  - pt unable to clear secretions   - culture growing Klebsiella resistant to ampicillin thus rocephin to cover aspiration   -blood cultures neg  Bronchoscopy 6/25. Again on 6/29  Thoracentesis with 300 ml off on 6/25.  - s/p trach 7/2       Sepsis (CMS-HCC)  Assessment & Plan  Off pressors  Continue iv abx  Trend temps  pulm toilet.         Acute respiratory failure with hypoxia (CMS-HCC) (present on admission)  Assessment & Plan  - recurred. Mucus plugging likely.   - Trach replacement done yesterday.       Hyponatremia (present on admission)  Assessment & Plan  - resolved  - stop salt tabs     Hypoglycemia (present on admission)  Assessment & Plan  - resolved    TBI (traumatic brain injury) (CMS-HCC) (present on admission)  Assessment & Plan  Since age 17 years.  With quadriplegia   Baclofen pump followed at [facility]--his mother states it does not need refilled for a few months.    Hypokalemia (present on admission)  Assessment & Plan  - replace and replace mag      Core Measures

## 2017-07-04 NOTE — CARE PLAN
Problem: Ventilation Defect:  Goal: Ability to achieve and maintain unassisted ventilation or tolerate decreased levels of ventilator support  Intervention: Manage ventilation therapy by monitoring diagnostic test results  Adult Ventilation Update    Total Vent Days: 5  Trach Days: 3      Patient Lines/Drains/Airways Status    Active Airway      Name: Placement date: Placement time: Site: Days:     Airway Group Standard Cuffed Trach Tracheostomy 6.0 07/02/17  1403  Tracheostomy  1                 In the last 24 hours, the patient tolerated SBT for 2 hours and 30 minutes on settings of 10/5 40%.    #FVC / Vital Capacity (liters) :  (unable to follow) (07/03/17 0758)  NIF (cm H2O) :  (unable to follow) (07/03/17 0758)  Rapid Shallow Breathing Index (RR/VT): 19 (07/03/17 1617)  Plateau Pressure (Q Shift): 21 (07/03/17 1916)  Static Compliance (ml / cm H2O): 31 (07/04/17 0209)    Patient failed trials because of Barriers to Wean: Other (Comments) (06/24/17 0642)  Barriers to SBT Weaning Trial Stopped due to:: Pt weaned for 1 hour and returned to rest settings per protocol (07/03/17 1617)  Length of Weaning Trial Length of Weaning Trial (Hours): 1.19 (07/03/17 1617)    3 day post trache.  The patient is currently in slow wean according to protocol.     Sputum/Suction   Cough: Productive (07/03/17 1454)  Sputum Amount: Small (07/04/17 0000)  Sputum Color: Bloody;Clear (07/04/17 0000)  Sputum Consistency: Thin (07/04/17 0000)    Mobility Group  Activity Performed: Edge of bed (07/04/17 0000)  Time Activity Tolerated: 10 min (07/04/17 0000)  Distance Per Occurrence (ft.): 0 feet (07/04/17 0000)  # of Times Distance was Traveled: 0 (07/04/17 0000)  Assistance / Tolerance: Assistance of Two or More (07/04/17 0000)  Pt Calls for Assistance: No (07/04/17 0000)  Staff Present for Mobilization: RN;CNA (07/04/17 0000)  Gait: Unable to Ambulate (07/04/17 0000)  Assistive Devices: Other (Comments) (Full assist) (07/04/17  0000)  Reason Not Mobilized: Bed rest (07/02/17 2000)  Mobilization Comments: Unstable (06/30/17 0000)    Events/Summary/Plan: Patient placed on CMV to rest for the night (07/03/17 9457)

## 2017-07-04 NOTE — PROGRESS NOTES
"Ruben Edwards is a 37 y.o. male patient.  1. Acute respiratory failure with hypoxia (CMS-[facility])      History of TBI    Allergies   Allergen Reactions   • Sulfa Drugs Unspecified     Per caretaker     Active Problems:    Status epilepticus (CMS-[facility])    Aspiration pneumonia (CMS-[facility])    Sepsis (CMS-[facility])    Acute respiratory failure with hypoxia (CMS-HCC)    Hyponatremia    Hypoglycemia    TBI (traumatic brain injury) (CMS-HCC)    Hypokalemia    Blood pressure 151/92, pulse 78, temperature 36.9 °C (98.4 °F), resp. rate 11, height 1.727 m (5' 7.99\"), weight 61.4 kg (135 lb 5.8 oz), SpO2 100 %.    Subjective Stable overnight  Objective trach looks good, sutures removed  Assessment & Plan   Continue current care and weaning    Catherine Osorio MD  7/4/2017    "

## 2017-07-05 ENCOUNTER — APPOINTMENT (OUTPATIENT)
Dept: RADIOLOGY | Facility: MEDICAL CENTER | Age: 37
DRG: 004 | End: 2017-07-05
Attending: INTERNAL MEDICINE
Payer: COMMERCIAL

## 2017-07-05 LAB
ANION GAP SERPL CALC-SCNC: 4 MMOL/L (ref 0–11.9)
BACTERIA BLD CULT: NORMAL
BACTERIA BLD CULT: NORMAL
BASOPHILS # BLD AUTO: 0.4 % (ref 0–1.8)
BASOPHILS # BLD: 0.04 K/UL (ref 0–0.12)
BUN SERPL-MCNC: 15 MG/DL (ref 8–22)
CALCIUM SERPL-MCNC: 7.6 MG/DL (ref 8.5–10.5)
CHLORIDE SERPL-SCNC: 100 MMOL/L (ref 96–112)
CO2 SERPL-SCNC: 27 MMOL/L (ref 20–33)
CREAT SERPL-MCNC: <0.2 MG/DL (ref 0.5–1.4)
EOSINOPHIL # BLD AUTO: 0.34 K/UL (ref 0–0.51)
EOSINOPHIL NFR BLD: 3.6 % (ref 0–6.9)
ERYTHROCYTE [DISTWIDTH] IN BLOOD BY AUTOMATED COUNT: 54.9 FL (ref 35.9–50)
GFR SERPL CREATININE-BSD FRML MDRD: >60 ML/MIN/1.73 M 2
GLUCOSE SERPL-MCNC: 92 MG/DL (ref 65–99)
HCT VFR BLD AUTO: 29 % (ref 42–52)
HGB BLD-MCNC: 9.4 G/DL (ref 14–18)
IMM GRANULOCYTES # BLD AUTO: 0.04 K/UL (ref 0–0.11)
IMM GRANULOCYTES NFR BLD AUTO: 0.4 % (ref 0–0.9)
LYMPHOCYTES # BLD AUTO: 1.75 K/UL (ref 1–4.8)
LYMPHOCYTES NFR BLD: 18.3 % (ref 22–41)
MAGNESIUM SERPL-MCNC: 1.5 MG/DL (ref 1.5–2.5)
MCH RBC QN AUTO: 29.3 PG (ref 27–33)
MCHC RBC AUTO-ENTMCNC: 32.4 G/DL (ref 33.7–35.3)
MCV RBC AUTO: 90.3 FL (ref 81.4–97.8)
MONOCYTES # BLD AUTO: 0.9 K/UL (ref 0–0.85)
MONOCYTES NFR BLD AUTO: 9.4 % (ref 0–13.4)
NEUTROPHILS # BLD AUTO: 6.48 K/UL (ref 1.82–7.42)
NEUTROPHILS NFR BLD: 67.9 % (ref 44–72)
NRBC # BLD AUTO: 0 K/UL
NRBC BLD AUTO-RTO: 0 /100 WBC
PHOSPHATE SERPL-MCNC: 3.1 MG/DL (ref 2.5–4.5)
PLATELET # BLD AUTO: 469 K/UL (ref 164–446)
PMV BLD AUTO: 8.6 FL (ref 9–12.9)
POTASSIUM SERPL-SCNC: 3.9 MMOL/L (ref 3.6–5.5)
RBC # BLD AUTO: 3.21 M/UL (ref 4.7–6.1)
SIGNIFICANT IND 70042: NORMAL
SIGNIFICANT IND 70042: NORMAL
SITE SITE: NORMAL
SITE SITE: NORMAL
SODIUM SERPL-SCNC: 131 MMOL/L (ref 135–145)
SOURCE SOURCE: NORMAL
SOURCE SOURCE: NORMAL
TRIGL SERPL-MCNC: 41 MG/DL (ref 0–149)
WBC # BLD AUTO: 9.6 K/UL (ref 4.8–10.8)

## 2017-07-05 PROCEDURE — 94003 VENT MGMT INPAT SUBQ DAY: CPT

## 2017-07-05 PROCEDURE — 700102 HCHG RX REV CODE 250 W/ 637 OVERRIDE(OP): Performed by: INTERNAL MEDICINE

## 2017-07-05 PROCEDURE — 700102 HCHG RX REV CODE 250 W/ 637 OVERRIDE(OP): Performed by: PSYCHIATRY & NEUROLOGY

## 2017-07-05 PROCEDURE — 700111 HCHG RX REV CODE 636 W/ 250 OVERRIDE (IP): Performed by: INTERNAL MEDICINE

## 2017-07-05 PROCEDURE — A9270 NON-COVERED ITEM OR SERVICE: HCPCS

## 2017-07-05 PROCEDURE — 94150 VITAL CAPACITY TEST: CPT

## 2017-07-05 PROCEDURE — A9270 NON-COVERED ITEM OR SERVICE: HCPCS | Performed by: INTERNAL MEDICINE

## 2017-07-05 PROCEDURE — 83735 ASSAY OF MAGNESIUM: CPT

## 2017-07-05 PROCEDURE — 94669 MECHANICAL CHEST WALL OSCILL: CPT

## 2017-07-05 PROCEDURE — 84478 ASSAY OF TRIGLYCERIDES: CPT

## 2017-07-05 PROCEDURE — A9270 NON-COVERED ITEM OR SERVICE: HCPCS | Performed by: PHARMACIST

## 2017-07-05 PROCEDURE — 700111 HCHG RX REV CODE 636 W/ 250 OVERRIDE (IP): Performed by: PHARMACIST

## 2017-07-05 PROCEDURE — 302101 FENESTRATED FOAM

## 2017-07-05 PROCEDURE — 71010 DX-CHEST-PORTABLE (1 VIEW): CPT

## 2017-07-05 PROCEDURE — 85025 COMPLETE CBC W/AUTO DIFF WBC: CPT

## 2017-07-05 PROCEDURE — 700102 HCHG RX REV CODE 250 W/ 637 OVERRIDE(OP)

## 2017-07-05 PROCEDURE — A9270 NON-COVERED ITEM OR SERVICE: HCPCS | Performed by: PSYCHIATRY & NEUROLOGY

## 2017-07-05 PROCEDURE — 700105 HCHG RX REV CODE 258: Performed by: HOSPITALIST

## 2017-07-05 PROCEDURE — 80048 BASIC METABOLIC PNL TOTAL CA: CPT

## 2017-07-05 PROCEDURE — 770022 HCHG ROOM/CARE - ICU (200)

## 2017-07-05 PROCEDURE — 94640 AIRWAY INHALATION TREATMENT: CPT

## 2017-07-05 PROCEDURE — 99233 SBSQ HOSP IP/OBS HIGH 50: CPT | Performed by: HOSPITALIST

## 2017-07-05 PROCEDURE — 84100 ASSAY OF PHOSPHORUS: CPT

## 2017-07-05 PROCEDURE — 700101 HCHG RX REV CODE 250: Performed by: INTERNAL MEDICINE

## 2017-07-05 PROCEDURE — 99291 CRITICAL CARE FIRST HOUR: CPT | Performed by: INTERNAL MEDICINE

## 2017-07-05 PROCEDURE — 94668 MNPJ CHEST WALL SBSQ: CPT

## 2017-07-05 PROCEDURE — 700102 HCHG RX REV CODE 250 W/ 637 OVERRIDE(OP): Performed by: PHARMACIST

## 2017-07-05 RX ORDER — POTASSIUM CHLORIDE 1.5 G/1.58G
20 POWDER, FOR SOLUTION ORAL ONCE
Status: COMPLETED | OUTPATIENT
Start: 2017-07-05 | End: 2017-07-05

## 2017-07-05 RX ORDER — MAGNESIUM SULFATE HEPTAHYDRATE 40 MG/ML
4 INJECTION, SOLUTION INTRAVENOUS ONCE
Status: COMPLETED | OUTPATIENT
Start: 2017-07-05 | End: 2017-07-05

## 2017-07-05 RX ORDER — SODIUM CHLORIDE 1 G/1
1 TABLET ORAL
Status: DISCONTINUED | OUTPATIENT
Start: 2017-07-05 | End: 2017-07-09 | Stop reason: ALTCHOICE

## 2017-07-05 RX ORDER — SODIUM CHLORIDE 9 MG/ML
500 INJECTION, SOLUTION INTRAVENOUS ONCE
Status: COMPLETED | OUTPATIENT
Start: 2017-07-05 | End: 2017-07-05

## 2017-07-05 RX ADMIN — SENNOSIDES AND DOCUSATE SODIUM 2 TABLET: 8.6; 5 TABLET ORAL at 08:50

## 2017-07-05 RX ADMIN — ACETAMINOPHEN 650 MG: 325 TABLET, FILM COATED ORAL at 20:29

## 2017-07-05 RX ADMIN — IPRATROPIUM BROMIDE AND ALBUTEROL SULFATE 3 ML: .5; 3 SOLUTION RESPIRATORY (INHALATION) at 02:05

## 2017-07-05 RX ADMIN — IPRATROPIUM BROMIDE AND ALBUTEROL SULFATE 3 ML: .5; 3 SOLUTION RESPIRATORY (INHALATION) at 22:29

## 2017-07-05 RX ADMIN — ENOXAPARIN SODIUM 30 MG: 100 INJECTION SUBCUTANEOUS at 20:29

## 2017-07-05 RX ADMIN — IPRATROPIUM BROMIDE AND ALBUTEROL SULFATE 3 ML: .5; 3 SOLUTION RESPIRATORY (INHALATION) at 18:15

## 2017-07-05 RX ADMIN — ACETAMINOPHEN 650 MG: 325 TABLET, FILM COATED ORAL at 02:12

## 2017-07-05 RX ADMIN — LEVETIRACETAM 1500 MG: 100 SOLUTION ORAL at 08:51

## 2017-07-05 RX ADMIN — SODIUM CHLORIDE TAB 1 GM 1 G: 1 TAB at 11:54

## 2017-07-05 RX ADMIN — PHENOBARBITAL 70 MG: 20 ELIXIR ORAL at 20:29

## 2017-07-05 RX ADMIN — ONDANSETRON 4 MG: 2 INJECTION INTRAMUSCULAR; INTRAVENOUS at 14:29

## 2017-07-05 RX ADMIN — IPRATROPIUM BROMIDE AND ALBUTEROL SULFATE 3 ML: .5; 3 SOLUTION RESPIRATORY (INHALATION) at 14:57

## 2017-07-05 RX ADMIN — PHENOBARBITAL 70 MG: 20 ELIXIR ORAL at 08:50

## 2017-07-05 RX ADMIN — IPRATROPIUM BROMIDE AND ALBUTEROL SULFATE 3 ML: .5; 3 SOLUTION RESPIRATORY (INHALATION) at 10:26

## 2017-07-05 RX ADMIN — SODIUM CHLORIDE TAB 1 GM 1 G: 1 TAB at 18:20

## 2017-07-05 RX ADMIN — MAGNESIUM SULFATE IN WATER 4 G: 40 INJECTION, SOLUTION INTRAVENOUS at 08:49

## 2017-07-05 RX ADMIN — CHLORHEXIDINE GLUCONATE 15 ML: 1.2 RINSE ORAL at 08:50

## 2017-07-05 RX ADMIN — CEFAZOLIN SODIUM 2 G: 2 INJECTION, SOLUTION INTRAVENOUS at 05:30

## 2017-07-05 RX ADMIN — CEFAZOLIN SODIUM 2 G: 2 INJECTION, SOLUTION INTRAVENOUS at 13:55

## 2017-07-05 RX ADMIN — IPRATROPIUM BROMIDE AND ALBUTEROL SULFATE 3 ML: .5; 3 SOLUTION RESPIRATORY (INHALATION) at 07:11

## 2017-07-05 RX ADMIN — POTASSIUM CHLORIDE 40 MEQ: 1.5 POWDER, FOR SOLUTION ORAL at 20:29

## 2017-07-05 RX ADMIN — FUROSEMIDE 20 MG: 10 INJECTION, SOLUTION INTRAVENOUS at 08:50

## 2017-07-05 RX ADMIN — POTASSIUM CHLORIDE 20 MEQ: 1.5 POWDER, FOR SOLUTION ORAL at 08:49

## 2017-07-05 RX ADMIN — SODIUM CHLORIDE 500 ML: 9 INJECTION, SOLUTION INTRAVENOUS at 13:54

## 2017-07-05 RX ADMIN — CHLORHEXIDINE GLUCONATE 15 ML: 1.2 RINSE ORAL at 20:29

## 2017-07-05 RX ADMIN — POTASSIUM CHLORIDE 40 MEQ: 1.5 POWDER, FOR SOLUTION ORAL at 11:54

## 2017-07-05 RX ADMIN — CEFAZOLIN SODIUM 2 G: 2 INJECTION, SOLUTION INTRAVENOUS at 23:04

## 2017-07-05 RX ADMIN — LEVETIRACETAM 1500 MG: 100 SOLUTION ORAL at 20:29

## 2017-07-05 RX ADMIN — FAMOTIDINE 20 MG: 20 TABLET, FILM COATED ORAL at 08:50

## 2017-07-05 RX ADMIN — FAMOTIDINE 20 MG: 20 TABLET, FILM COATED ORAL at 20:29

## 2017-07-05 RX ADMIN — POTASSIUM CHLORIDE 40 MEQ: 1.5 POWDER, FOR SOLUTION ORAL at 14:41

## 2017-07-05 ASSESSMENT — PULMONARY FUNCTION TESTS: FVC: 560

## 2017-07-05 NOTE — CARE PLAN
Problem: Safety  Goal: Will remain free from injury  Safety and fall precautions in place, bed in lowest position, bed alarm on.      Problem: Pain Management  Goal: Pain level will decrease to patient’s comfort goal  Assessed patient's pain via CPOT pain scale. Pt looks comfortable and pain free.

## 2017-07-05 NOTE — PROGRESS NOTES
[facility] Hospitalist Progress Note    Date of Service: 2017    Chief Complaint  36 y.o. male admitted 2017 with altered mental status seizures and aspiration. H/o   Severe TBI. Intubated Initially improved then had repeat event with respiratory failure and left lung white, reintubation out s/p improvement with bronc.      Interval Problem Update    ROS limited by mental status  Per Mom at bedside he seems much better to her today  Daily BM's  Up x 4 hrs in WC yesterday  SBT x 2 hrs x 2 yesterday    Consultants/Specialty  Pulmonology    Disposition  Cont in ICU for pulmonary toilett        ROS     Physical Exam  Laboratory/Imaging   Hemodynamics  No data recorded.   Monitored Temp: 36.6 °C (97.9 °F)  Pulse  Av.5  Min: 42  Max: 131 Heart Rate (Monitored): 80  NIBP: 104/59 mmHg CVP (mm Hg): (!) 7 MM HG    Respiratory  Liriano Vent Mode: APVCMV, Rate (breaths/min): 15, PEEP/CPAP: 8, PEEP/CPAP: 8, FiO2: 30, P Peak (PIP): 18, P MEAN: 12   Respiration: 15, Pulse Oximetry: 98 %     PEP/CPT Method: Percussion/Vibration, Work Of Breathing / Effort: Vented  RUL Breath Sounds: Clear, RML Breath Sounds: Diminished, RLL Breath Sounds: Diminished, JONATAN Breath Sounds: Clear, LLL Breath Sounds: Diminished    Fluids    Intake/Output Summary (Last 24 hours) at 17 0837  Last data filed at 17 0600   Gross per 24 hour   Intake   2320 ml   Output   2710 ml   Net   -390 ml       Nutrition  Orders Placed This Encounter   Procedures   • DIET NPO     Standing Status: Standing      Number of Occurrences: 1      Standing Expiration Date:      Order Specific Question:  Restrict to:     Answer:  With Tube Feed [4]     Physical Exam      Recent Labs      17   0425  17   0500  17   0515   WBC  8.0  8.0  9.6   RBC  3.40*  3.06*  3.21*   HEMOGLOBIN  9.9*  9.0*  9.4*   HEMATOCRIT  30.8*  27.6*  29.0*   MCV  90.6  90.2  90.3   MCH  29.1  29.4  29.3   MCHC  32.1*  32.6*  32.4*   RDW  57.4*  58.5*  54.9*    PLATELETCT  639*  479*  469*   MPV  8.4*  8.3*  8.6*     Recent Labs      07/03/17   0425  07/04/17   0500  07/05/17   0515   SODIUM  140  141  131*   POTASSIUM  3.9  3.8  3.9   CHLORIDE  109  108  100   CO2  26  29  27   GLUCOSE  166*  101*  92   BUN  12  18  15   CREATININE  <0.20*  <0.20*  <0.20*   CALCIUM  7.9*  7.6*  7.6*             Recent Labs      07/05/17   0515   TRIGLYCERIDE  41          Assessment/Plan     Status epilepticus (CMS-[facility]) (present on admission)  Assessment & Plan  - had continuous seizure activity for approximately 48 hours suspected from pneumonia.   - neurology following  - on phenobarbital and keppra  - seizure precautions  -Neurology consulted    Aspiration pneumonia (CMS-HCC) (present on admission)  Assessment & Plan  - pt unable to clear secretions   - culture growing Klebsiella resistant to ampicillin thus rocephin to cover aspiration   -blood cultures neg  Bronchoscopy 6/25. Again on 6/29  Thoracentesis with 300 ml off on 6/25.  - s/p trach 7/2       Sepsis (CMS-HCC)  Assessment & Plan  Off pressors  Continue iv abx  Trend temps  pulm toilet.         Acute respiratory failure with hypoxia (CMS-HCC) (present on admission)  Assessment & Plan  - recurred. Mucus plugging likely.   - Trach replacement done yesterday.       Hyponatremia (present on admission)  Assessment & Plan  - resolved  - stop salt tabs     Hypoglycemia (present on admission)  Assessment & Plan  - resolved    TBI (traumatic brain injury) (CMS-HCC) (present on admission)  Assessment & Plan  Since age 17 years.  With quadriplegia   Baclofen pump followed at [facility]--his mother states it does not need refilled for a few months.    Hypokalemia (present on admission)  Assessment & Plan  - replace and replace mag      Core Measures

## 2017-07-05 NOTE — CARE PLAN
Problem: Skin Integrity  Goal: Risk for impaired skin integrity will decrease  Intervention: Assess risk factors for impaired skin integrity and/or pressure ulcers  Head to toe skin assessment done for the shift and appropriate dressing in place.      Problem: Mobility  Goal: Risk for activity intolerance will decrease  Intervention: Assess and monitor signs of activity intolerance  Pt getting up to a wheelchair during the day and tolerates well.

## 2017-07-05 NOTE — PROGRESS NOTES
Pulmonary Critical Care Progress Note        Date of service:  7/5/2017    Interval Events:  24 hour interval history reviewed  Reason for visit:  Respiratory failure, pneumonia, atelectasis  Unable to provide CC or ROS due to chronic encephalopathy      SR  CXR about the same  Resume salt tabs  CVP 10-15      PFSH:  No change.    Respiratory:  Rate (breaths/min): 15, Vt Target (mL): 410, PEEP/CPAP: 8, FiO2: 30  Pulse Oximetry: 98 %  CXR with unchanged left lung opacities  Coarse crackles bilaterally  No wheezing  Lots of secretions    HemoDynamics:  Pulse: 87, Heart Rate (Monitored): 86  NIBP: 140/73 mmHg  CVP (mm Hg): 5 MM HG  SR  NE off    Neuro:  More alert    Fluids:  Intake/Output       07/03/17 0700 - 07/04/17 0659 07/04/17 0700 - 07/05/17 0659 07/05/17 0700 - 07/06/17 0659      3015-1021 1657-8712 Total 2676-3238 3701-3593 Total 1345-0270 6586-4269 Total       Intake    I.V.  886.4  320 1206.4  370  80 450  --  -- --    Norepinephrine Volume 36.4 -- 36.4 -- -- -- -- -- --    Normal Saline 100 120 220 120 80 200 -- -- --    IV Piggyback Volume (IV Piggyback) 750 200 950 250 -- 250 -- -- --    Other  120  120 240  115  120 235  --  -- --    Medications (P.O./ Enteral Liquids) 120 120 240 115 120 235 -- -- --    Enteral  720  810 1530  735  580 1315  --  -- --    Enteral Volume   -- -- --    Free Water / Tube Flush 120 210 330 135 180 315 -- -- --    Total Intake 1726.4 1250 2976.4 0767 008 7272 -- -- --       Output    Urine  1975 1700 3675  970  875 1845  --  -- --    Indwelling Cathether 1975 1700 3675  -- -- --    Stool  --  -- --  --  -- --  --  -- --    Number of Times Stooled 1 x 1 x 2 x 2 x 0 x 2 x -- -- --    Total Output 1975 1700 7538  -- -- --       Net I/O     -248.7 -450 -698.7 250 -95 155 -- -- --           Recent Labs      07/03/17   0425  07/04/17   0500   SODIUM  140  141   POTASSIUM  3.9  3.8   CHLORIDE  109  108   CO2  26  29   BUN  12  18    CREATININE  <0.20*  <0.20*   MAGNESIUM  1.8  2.1   PHOSPHORUS  2.2*  2.1*   CALCIUM  7.9*  7.6*       GI/Nutrition:  Abd soft ND/NT  Tolerating enteral TF    Liver Function  Recent Labs      17   0500   ALTSGPT  48  37   ASTSGOT  24  18   ALKPHOSPHAT  82  75   TBILIRUBIN  0.2  0.2   PREALBUMIN  29.0   --    GLUCOSE  166*  101*       Heme:  Recent Labs      17   0500  17   0515   RBC  3.40*  3.06*  3.21*   HEMOGLOBIN  9.9*  9.0*  9.4*   HEMATOCRIT  30.8*  27.6*  29.0*   PLATELETCT  639*  479*  469*       Infectious Disease:  Monitored Temp  Av.6 °C (97.9 °F)  Min: 35.4 °C (95.7 °F)  Max: 37 °C (98.6 °F)     Recent Labs      17   0500  17   0515   WBC  8.0  8.0  9.6   NEUTSPOLYS  73.80*  53.40  67.90   LYMPHOCYTES  15.60*  30.80  18.30*   MONOCYTES  8.30  11.30  9.40   EOSINOPHILS  1.40  3.80  3.60   BASOPHILS  0.40  0.40  0.40   ASTSGOT  24  18   --    ALTSGPT  48  37   --    ALKPHOSPHAT  82  75   --    TBILIRUBIN  0.2  0.2   --      Current Facility-Administered Medications   Medication Dose Frequency Provider Last Rate Last Dose   • furosemide (LASIX) injection 20 mg  20 mg Q DAY Rivas Dee M.D.       • levetiracetam (KEPPRA) 100 MG/ML solution 1,500 mg  1,500 mg Q12HRS Martell Hunter M.D.   1,500 mg at 17   • ceFAZolin (ANCEF) IVPB 2 g  2 g Q8HRS Rivas Dee M.D.   2 g at 17 0530   • propofol (DIPRIVAN) injection  5-80 mcg/kg/min Continuous Clarence Pro M.D.   Stopped at 17 0200   • Respiratory Care per Protocol   Continuous RT Clarence Pro M.D.       • chlorhexidine (PERIDEX) 0.12 % solution 15 mL  15 mL BID Clarence rPo M.D.   15 mL at 17 2137   • lidocaine (XYLOCAINE) 1%  injection  1-2 mL Q30 MIN PRN Clarence Pro M.D.       • MD ALERT...Adult ICU Electrolyte Replacement per Pharmacy Protocol   pharmacy to dose Clarence Pro M.D.       •  fentaNYL (SUBLIMAZE) injection 25 mcg  25 mcg Q HOUR PRN Clarence Pro M.D.        Or   • fentaNYL (SUBLIMAZE) injection 50 mcg  50 mcg Q HOUR PRN Clarence Pro M.D.   50 mcg at 06/30/17 0000    Or   • fentaNYL (SUBLIMAZE) injection 100 mcg  100 mcg Q HOUR PRN Clarence Pro M.D.       • ipratropium-albuterol (DUONEB) nebulizer solution 3 mL  3 mL Q2HRS PRN (RT) Clarence Pro M.D.       • ipratropium-albuterol (DUONEB) nebulizer solution 3 mL  3 mL Q4HRS (RT) Clarence Pro M.D.   3 mL at 07/05/17 0205   • norepinephrine (LEVOPHED) 8 mg in  mL Infusion  0.5-30 mcg/min Continuous Rivas Farrell M.D.   Stopped at 07/03/17 1413   • NS infusion   Continuous Rivsa Dee M.D. 10 mL/hr at 07/02/17 0729     • famotidine (PEPCID) tablet 20 mg  20 mg BID Gabrielle Vargas, PHARMJASPAL   20 mg at 07/04/17 2137   • potassium chloride (KLOR-CON) 20 MEQ packet 40 mEq  40 mEq TID Kev Araiza M.D.   40 mEq at 07/04/17 2137   • oxycodone immediate-release (ROXICODONE) tablet 5 mg  5 mg Q4HRS PRN Kev Araiza M.D.   5 mg at 06/24/17 1825   • PHENobarbital solution 70 mg  70 mg BID Martell Hunter M.D.   70 mg at 07/04/17 2137   • enoxaparin (LOVENOX) inj 30 mg  30 mg DAILY Clarence Morfin D.O.   30 mg at 07/04/17 2137   • senna-docusate (PERICOLACE or SENOKOT S) 8.6-50 MG per tablet 2 Tab  2 Tab BID Kev Araiza M.D.   2 Tab at 07/04/17 2137    And   • polyethylene glycol/lytes (MIRALAX) PACKET 1 Packet  1 Packet QDAY PRN Kev Araiza M.D.   1 Packet at 06/25/17 2028    And   • magnesium hydroxide (MILK OF MAGNESIA) suspension 30 mL  30 mL QDAY PRBRIDGETTE Araiza M.D.   30 mL at 06/28/17 1305    And   • bisacodyl (DULCOLAX) suppository 10 mg  10 mg QDAY PRBRIDGETTE Araiza M.D.   10 mg at 07/03/17 1035   • glucose 4 g chewable tablet 16 g  16 g Q15 MIN SUSAN Araiza M.D.        And   • dextrose 50% (D50W) injection 25 mL  25 mL Q15 MIN SUSAN Araiza M.D.   25 mL  at 06/20/17 1840   • ondansetron (ZOFRAN) syringe/vial injection 4 mg  4 mg Q4HRS PRN JASPAL Medina.O.   4 mg at 06/23/17 2238   • ondansetron (ZOFRAN ODT) dispertab 4 mg  4 mg Q4HRS PRN JASPAL Medina.O.       • promethazine (PHENERGAN) tablet 12.5-25 mg  12.5-25 mg Q4HRS PRN JASPAL Medina.O.       • promethazine (PHENERGAN) suppository 12.5-25 mg  12.5-25 mg Q4HRS PRN JASPAL Medina.O.       • acetaminophen (TYLENOL) tablet 650 mg  650 mg Q6HRS PRN JASPAL Medina.O.   650 mg at 07/05/17 0212   • Pain Pump (patient supplied) Device   Continuous JASPAL Medina.SAM.   Stopped at 06/17/17 1230     Last reviewed on 6/17/2017  9:45 AM by Ligia Frazier, Pharmacy Int    Quality  Measures:  Medications reviewed, Labs reviewed and Radiology images reviewed                        Assessment and Plan:    Acute hypoxemic respiratory failure  VDRF - intubated 6/19-6/27, re-intubated 6/30   - cont vent support   - SBT as tolerated  Trach 7/2  Aspiration/HCAP pneumonia with left lung atelectasis   - S/P FOB 6/30 - atelectasis improved   - MSSA - Ancef  Sepsis syndrome with shock - pulmonary source              - vasopressor support as necessary              - [place] protocol completed  PERALTA - observe off hydrocortisone  Acute pulmonary edema   - force diuresis as tolerated  Parapneumonic left pleural effusion   - S/P thoracentesis 6/25   - follow  Prior trach with trauma years ago  S/P aspiration pneumonia   - Klebsiella pneumonia - pansensitive              - completed abx x 7 days 6/26 - C3/Flagyl  New onset seizure likely secondary to hyponatremia              - Keppra, Phenobarb per neuro  Severe chronic underlying debility secondary to profound traumatic brain injury 20 years ago  Dysphagia - cont enteral G-tube feedings  Hyponatremia - resume salt tabs   - follow closely    Critical Care Time:  35 minutes  69113  No time overlap  Time excludes procedures  Discussed with RN, RT, Team

## 2017-07-05 NOTE — CARE PLAN
Problem: Ventilation Defect:  Goal: Ability to achieve and maintain unassisted ventilation or tolerate decreased levels of ventilator support  Outcome: PROGRESSING SLOWER THAN EXPECTED  Adult Ventilation Update    Total Vent Days: 5  Trache:2      Patient Lines/Drains/Airways Status    Active Airway      Name: Placement date: Placement time: Site: Days:     Airway Group Standard Cuffed Trach Tracheostomy 6.0 07/02/17  1403  Tracheostomy  2                 In the last 24 hours, the patient tolerated SBT for 4 hours on settings of 5/8.    #FVC / Vital Capacity (liters) : 555 (07/04/17 0812)  NIF (cm H2O) :  (unable to follow) (07/03/17 0758)  Rapid Shallow Breathing Index (RR/VT): 26 (07/04/17 0812)  Plateau Pressure (Q Shift): 20 (07/04/17 0648)  Static Compliance (ml / cm H2O): 53 (07/04/17 1636)    Patient failed trials because of Barriers to Wean: Other (Comments) (06/24/17 0642)  Barriers to SBT Weaning Trial Stopped due to:: Pt weaned for 1 hour and returned to rest settings per protocol (07/04/17 0812)  Length of Weaning Trial Length of Weaning Trial (Hours): 1 (07/04/17 0812)      Cough: Productive (07/04/17 1454)  Sputum Amount: Moderate (07/04/17 1454)  Sputum Color: White;Yellow (07/04/17 1454)  Sputum Consistency: Thick (07/04/17 1454)    Mobility Group  Activity Performed: Back to bed (07/04/17 1600)  Time Activity Tolerated: 180 min (07/04/17 1600)  Distance Per Occurrence (ft.): 0 feet (07/04/17 0000)  # of Times Distance was Traveled: 0 (07/04/17 0000)  Assistance / Tolerance: Assistance of Two or More (07/04/17 1600)  Pt Calls for Assistance: No (07/04/17 1600)  Staff Present for Mobilization: RN;RT (07/04/17 1600)  Gait: Unable to Ambulate (07/04/17 1600)  Assistive Devices:  (full assist) (07/04/17 1600)  Reason Not Mobilized: Bed rest (07/02/17 2000)  Mobilization Comments: Unstable (06/30/17 0000)    Events/Summary/Plan: Patient placed back on CMV to rest for the night.  No vent changes at this  time. Will continue to monitor patient. (07/04/17 2356)

## 2017-07-05 NOTE — PROGRESS NOTES
Pt voided 3.2L since morning lasix and now SBP in 80s.  Dr. Sinclair updated and order received    -500L NS bolus   -May repeat another 500L NS bolus if not effective after first bolus.

## 2017-07-05 NOTE — DIETARY
Nutrition Services: Brief Update  Per discussion in rounds TF of Impact Peptide 1.5 continues at goal and patients family has not been administering home TF.  MD and RN are aware of this and the order for home feeds previously removed.     Continue with Impact @ goal of 50 ml/hr.

## 2017-07-05 NOTE — PROGRESS NOTES
[facility] Hospitalist Progress Note    Date of Service: 2017    Chief Complaint  36 y.o. male admitted 2017 with altered mental status seizures and aspiration. H/o   Severe TBI. Intubated Initially improved then had repeat event with respiratory failure and left lung white, reintubation out s/p improvement with bronc.      Interval Problem Update    ROS limited by mental status  Per Mom at bedside he seems much better to her today  Daily BM's  Up x 4 hrs in WC yesterday  SBT x 2 hrs x 2 yesterday    Consultants/Specialty  Pulmonology    Disposition  Cont in ICU for pulmonary toilet  Appropriate for dc to [facility] when bed available        Review of Systems   Unable to perform ROS: mental acuity      Physical Exam  Laboratory/Imaging   Hemodynamics  No data recorded.   Monitored Temp: 36.5 °C (97.7 °F)  Pulse  Av.6  Min: 42  Max: 131 Heart Rate (Monitored): 94  NIBP: 106/52 mmHg CVP (mm Hg): (!) 15 MM HG    Respiratory  Liriano Vent Mode: APVCMV, Rate (breaths/min): 15, PEEP/CPAP: 8, PEEP/CPAP: 8, FiO2: 30, P Peak (PIP): 17, P MEAN: 11   Respiration: 15, Pulse Oximetry: 99 %     PEP/CPT Method: Percussion/Vibration, Work Of Breathing / Effort: Vented  RUL Breath Sounds: Clear, RML Breath Sounds: Diminished, RLL Breath Sounds: Diminished, JONATAN Breath Sounds: Clear, LLL Breath Sounds: Diminished    Fluids    Intake/Output Summary (Last 24 hours) at 17 1239  Last data filed at 17 1200   Gross per 24 hour   Intake   2300 ml   Output   6225 ml   Net  -3925 ml       Nutrition  Orders Placed This Encounter   Procedures   • DIET NPO     Standing Status: Standing      Number of Occurrences: 1      Standing Expiration Date:      Order Specific Question:  Restrict to:     Answer:  With Tube Feed [4]     Physical Exam   Constitutional: He appears well-developed and well-nourished. No distress.   HENT:   Head: Normocephalic and atraumatic.   Eyes: Conjunctivae are normal.   Neck: No JVD present.   Trach    Cardiovascular: Normal rate.  Exam reveals no gallop.    No murmur heard.  Pulmonary/Chest: Effort normal. No stridor. No respiratory distress. He has no wheezes. He has rales.   Abdominal: Soft. There is no tenderness. There is no rebound and no guarding.   Musculoskeletal: He exhibits no edema.   Neurological: He is alert.   Alert, attends but does not follow.  Multiple long standing contractures   Skin: Skin is warm and dry. No rash noted. He is not diaphoretic.   Nursing note and vitals reviewed.      Recent Labs      07/03/17 0425 07/04/17   0500  07/05/17   0515   WBC  8.0  8.0  9.6   RBC  3.40*  3.06*  3.21*   HEMOGLOBIN  9.9*  9.0*  9.4*   HEMATOCRIT  30.8*  27.6*  29.0*   MCV  90.6  90.2  90.3   MCH  29.1  29.4  29.3   MCHC  32.1*  32.6*  32.4*   RDW  57.4*  58.5*  54.9*   PLATELETCT  639*  479*  469*   MPV  8.4*  8.3*  8.6*     Recent Labs      07/03/17 0425 07/04/17   0500  07/05/17   0515   SODIUM  140  141  131*   POTASSIUM  3.9  3.8  3.9   CHLORIDE  109  108  100   CO2  26  29  27   GLUCOSE  166*  101*  92   BUN  12  18  15   CREATININE  <0.20*  <0.20*  <0.20*   CALCIUM  7.9*  7.6*  7.6*             Recent Labs      07/05/17   0515   TRIGLYCERIDE  41          Assessment/Plan     Status epilepticus (CMS-HCC) (present on admission)  Assessment & Plan  - had continuous seizure activity for approximately 48 hours suspected from pneumonia.   - neurology following  - on phenobarbital and keppra  - seizure precautions  -Neurology consulted    Aspiration pneumonia (CMS-HCC) (present on admission)  Assessment & Plan  - pt unable to clear secretions   - culture growing Klebsiella resistant to ampicillin thus rocephin to cover aspiration   -blood cultures neg  Bronchoscopy 6/25. Again on 6/29  Thoracentesis with 300 ml off on 6/25.  - s/p trach 7/2       Sepsis (CMS-HCC)  Assessment & Plan  Off pressors  Continue iv abx  Trend temps  pulm toilet.         Acute respiratory failure with hypoxia (CMS-HCC)  (present on admission)  Assessment & Plan  - recurred. Mucus plugging likely.   - Trach replacement done yesterday.       Hyponatremia (present on admission)  Assessment & Plan  - resolved  - stop salt tabs     Hypoglycemia (present on admission)  Assessment & Plan  - resolved    TBI (traumatic brain injury) (CMS-HCC) (present on admission)  Assessment & Plan  Since age 17 years.  With quadriplegia   Baclofen pump followed at [facility]--his mother states it does not need refilled for a few months.    Hypokalemia (present on admission)  Assessment & Plan  - replace and replace mag      Radiology images reviewed, Labs reviewed, Medications reviewed and EKG reviewed  Waldrop catheter: No Waldrop      DVT Prophylaxis: Enoxaparin (Lovenox)  DVT prophylaxis - mechanical: SCDs  Ulcer prophylaxis: Not indicated  Antibiotics: Treating active infection/contamination beyond 24 hours perioperative coverage

## 2017-07-06 ENCOUNTER — APPOINTMENT (OUTPATIENT)
Dept: RADIOLOGY | Facility: MEDICAL CENTER | Age: 37
DRG: 004 | End: 2017-07-06
Attending: INTERNAL MEDICINE
Payer: COMMERCIAL

## 2017-07-06 PROBLEM — E87.6 HYPOKALEMIA: Status: RESOLVED | Noted: 2017-06-20 | Resolved: 2017-07-06

## 2017-07-06 LAB
ANION GAP SERPL CALC-SCNC: 5 MMOL/L (ref 0–11.9)
BASOPHILS # BLD AUTO: 0.4 % (ref 0–1.8)
BASOPHILS # BLD: 0.03 K/UL (ref 0–0.12)
BUN SERPL-MCNC: 15 MG/DL (ref 8–22)
CALCIUM SERPL-MCNC: 8 MG/DL (ref 8.5–10.5)
CHLORIDE SERPL-SCNC: 106 MMOL/L (ref 96–112)
CO2 SERPL-SCNC: 25 MMOL/L (ref 20–33)
CREAT SERPL-MCNC: <0.2 MG/DL (ref 0.5–1.4)
EOSINOPHIL # BLD AUTO: 0.26 K/UL (ref 0–0.51)
EOSINOPHIL NFR BLD: 3.3 % (ref 0–6.9)
ERYTHROCYTE [DISTWIDTH] IN BLOOD BY AUTOMATED COUNT: 55.5 FL (ref 35.9–50)
GFR SERPL CREATININE-BSD FRML MDRD: >60 ML/MIN/1.73 M 2
GLUCOSE SERPL-MCNC: 106 MG/DL (ref 65–99)
HCT VFR BLD AUTO: 29.8 % (ref 42–52)
HGB BLD-MCNC: 9.7 G/DL (ref 14–18)
IMM GRANULOCYTES # BLD AUTO: 0.03 K/UL (ref 0–0.11)
IMM GRANULOCYTES NFR BLD AUTO: 0.4 % (ref 0–0.9)
LYMPHOCYTES # BLD AUTO: 1.78 K/UL (ref 1–4.8)
LYMPHOCYTES NFR BLD: 22.6 % (ref 22–41)
MAGNESIUM SERPL-MCNC: 2 MG/DL (ref 1.5–2.5)
MCH RBC QN AUTO: 29.3 PG (ref 27–33)
MCHC RBC AUTO-ENTMCNC: 32.6 G/DL (ref 33.7–35.3)
MCV RBC AUTO: 90 FL (ref 81.4–97.8)
MONOCYTES # BLD AUTO: 0.89 K/UL (ref 0–0.85)
MONOCYTES NFR BLD AUTO: 11.3 % (ref 0–13.4)
NEUTROPHILS # BLD AUTO: 4.9 K/UL (ref 1.82–7.42)
NEUTROPHILS NFR BLD: 62 % (ref 44–72)
NRBC # BLD AUTO: 0 K/UL
NRBC BLD AUTO-RTO: 0 /100 WBC
PHOSPHATE SERPL-MCNC: 3.4 MG/DL (ref 2.5–4.5)
PLATELET # BLD AUTO: 480 K/UL (ref 164–446)
PMV BLD AUTO: 8.5 FL (ref 9–12.9)
POTASSIUM SERPL-SCNC: 4.3 MMOL/L (ref 3.6–5.5)
RBC # BLD AUTO: 3.31 M/UL (ref 4.7–6.1)
SODIUM SERPL-SCNC: 136 MMOL/L (ref 135–145)
WBC # BLD AUTO: 7.9 K/UL (ref 4.8–10.8)

## 2017-07-06 PROCEDURE — 80048 BASIC METABOLIC PNL TOTAL CA: CPT

## 2017-07-06 PROCEDURE — A9270 NON-COVERED ITEM OR SERVICE: HCPCS | Performed by: PSYCHIATRY & NEUROLOGY

## 2017-07-06 PROCEDURE — 94668 MNPJ CHEST WALL SBSQ: CPT

## 2017-07-06 PROCEDURE — A9270 NON-COVERED ITEM OR SERVICE: HCPCS | Performed by: INTERNAL MEDICINE

## 2017-07-06 PROCEDURE — 302101 FENESTRATED FOAM: Performed by: HOSPITALIST

## 2017-07-06 PROCEDURE — 84100 ASSAY OF PHOSPHORUS: CPT

## 2017-07-06 PROCEDURE — 700111 HCHG RX REV CODE 636 W/ 250 OVERRIDE (IP): Performed by: INTERNAL MEDICINE

## 2017-07-06 PROCEDURE — 94640 AIRWAY INHALATION TREATMENT: CPT

## 2017-07-06 PROCEDURE — 99291 CRITICAL CARE FIRST HOUR: CPT | Performed by: INTERNAL MEDICINE

## 2017-07-06 PROCEDURE — 71010 DX-CHEST-PORTABLE (1 VIEW): CPT

## 2017-07-06 PROCEDURE — 700102 HCHG RX REV CODE 250 W/ 637 OVERRIDE(OP): Performed by: PSYCHIATRY & NEUROLOGY

## 2017-07-06 PROCEDURE — 700101 HCHG RX REV CODE 250: Performed by: INTERNAL MEDICINE

## 2017-07-06 PROCEDURE — 83735 ASSAY OF MAGNESIUM: CPT

## 2017-07-06 PROCEDURE — 99233 SBSQ HOSP IP/OBS HIGH 50: CPT | Performed by: HOSPITALIST

## 2017-07-06 PROCEDURE — 94669 MECHANICAL CHEST WALL OSCILL: CPT

## 2017-07-06 PROCEDURE — 770022 HCHG ROOM/CARE - ICU (200)

## 2017-07-06 PROCEDURE — 700102 HCHG RX REV CODE 250 W/ 637 OVERRIDE(OP): Performed by: INTERNAL MEDICINE

## 2017-07-06 PROCEDURE — 94003 VENT MGMT INPAT SUBQ DAY: CPT

## 2017-07-06 PROCEDURE — 700102 HCHG RX REV CODE 250 W/ 637 OVERRIDE(OP)

## 2017-07-06 PROCEDURE — 85025 COMPLETE CBC W/AUTO DIFF WBC: CPT

## 2017-07-06 PROCEDURE — A9270 NON-COVERED ITEM OR SERVICE: HCPCS

## 2017-07-06 RX ADMIN — IPRATROPIUM BROMIDE AND ALBUTEROL SULFATE 3 ML: .5; 3 SOLUTION RESPIRATORY (INHALATION) at 18:36

## 2017-07-06 RX ADMIN — IPRATROPIUM BROMIDE AND ALBUTEROL SULFATE 3 ML: .5; 3 SOLUTION RESPIRATORY (INHALATION) at 09:41

## 2017-07-06 RX ADMIN — LEVETIRACETAM 1500 MG: 100 SOLUTION ORAL at 21:02

## 2017-07-06 RX ADMIN — PHENOBARBITAL 70 MG: 20 ELIXIR ORAL at 21:01

## 2017-07-06 RX ADMIN — PHENOBARBITAL 70 MG: 20 ELIXIR ORAL at 08:38

## 2017-07-06 RX ADMIN — FAMOTIDINE 20 MG: 20 TABLET, FILM COATED ORAL at 08:38

## 2017-07-06 RX ADMIN — CHLORHEXIDINE GLUCONATE 15 ML: 1.2 RINSE ORAL at 21:02

## 2017-07-06 RX ADMIN — SODIUM CHLORIDE TAB 1 GM 1 G: 1 TAB at 08:38

## 2017-07-06 RX ADMIN — SODIUM CHLORIDE TAB 1 GM 1 G: 1 TAB at 18:15

## 2017-07-06 RX ADMIN — LEVETIRACETAM 1500 MG: 100 SOLUTION ORAL at 08:38

## 2017-07-06 RX ADMIN — FAMOTIDINE 20 MG: 20 TABLET, FILM COATED ORAL at 21:02

## 2017-07-06 RX ADMIN — IPRATROPIUM BROMIDE AND ALBUTEROL SULFATE 3 ML: .5; 3 SOLUTION RESPIRATORY (INHALATION) at 02:31

## 2017-07-06 RX ADMIN — IPRATROPIUM BROMIDE AND ALBUTEROL SULFATE 3 ML: .5; 3 SOLUTION RESPIRATORY (INHALATION) at 22:11

## 2017-07-06 RX ADMIN — SENNOSIDES AND DOCUSATE SODIUM 2 TABLET: 8.6; 5 TABLET ORAL at 21:02

## 2017-07-06 RX ADMIN — SODIUM CHLORIDE TAB 1 GM 1 G: 1 TAB at 11:46

## 2017-07-06 RX ADMIN — CEFAZOLIN SODIUM 2 G: 2 INJECTION, SOLUTION INTRAVENOUS at 14:04

## 2017-07-06 RX ADMIN — CEFAZOLIN SODIUM 2 G: 2 INJECTION, SOLUTION INTRAVENOUS at 21:49

## 2017-07-06 RX ADMIN — CEFAZOLIN SODIUM 2 G: 2 INJECTION, SOLUTION INTRAVENOUS at 05:33

## 2017-07-06 RX ADMIN — IPRATROPIUM BROMIDE AND ALBUTEROL SULFATE 3 ML: .5; 3 SOLUTION RESPIRATORY (INHALATION) at 06:46

## 2017-07-06 RX ADMIN — CHLORHEXIDINE GLUCONATE 15 ML: 1.2 RINSE ORAL at 08:38

## 2017-07-06 RX ADMIN — IPRATROPIUM BROMIDE AND ALBUTEROL SULFATE 3 ML: .5; 3 SOLUTION RESPIRATORY (INHALATION) at 14:11

## 2017-07-06 RX ADMIN — ENOXAPARIN SODIUM 30 MG: 100 INJECTION SUBCUTANEOUS at 21:01

## 2017-07-06 NOTE — PROGRESS NOTES
[facility] Hospitalist Progress Note    Date of Service: 2017    Chief Complaint  36 y.o. male admitted 2017 with altered mental status seizures and aspiration. H/o   Severe TBI. Intubated Initially improved then had repeat event with respiratory failure and left lung white, reintubation out s/p improvement with bronc.      Interval Problem Update    ROS limited by mental status  3 litres out after lasix yesterday, some hypotension so was bolused 500ml back  More alert and interactive per RN  Mom also feels pt is improved today    Consultants/Specialty  Pulmonology    Disposition  Cont in ICU for pulmonary toilet  TPH has declined  Cont to wean from vent in house.    Hopefully dc directly home        Review of Systems   Unable to perform ROS: mental acuity      Physical Exam  Laboratory/Imaging   Hemodynamics  No data recorded.   Monitored Temp: 35.9 °C (96.6 °F)  Pulse  Av.7  Min: 42  Max: 131 Heart Rate (Monitored): 81  NIBP: 114/53 mmHg CVP (mm Hg): (!) 10 MM HG    Respiratory  Liriano Vent Mode: Spont, Rate (breaths/min): 15, PEEP/CPAP: 8, PEEP/CPAP: 8, FiO2: 30, P Peak (PIP): 15, P MEAN: 10   Respiration: 14, Pulse Oximetry: 100 %     PEP/CPT Method: Percussion/Vibration, Work Of Breathing / Effort: Vented;Mild  RUL Breath Sounds: Clear, RML Breath Sounds: Diminished, RLL Breath Sounds: Diminished, JONATAN Breath Sounds: Clear, LLL Breath Sounds: Diminished    Fluids    Intake/Output Summary (Last 24 hours) at 17 0831  Last data filed at 17 0600   Gross per 24 hour   Intake   2530 ml   Output   5650 ml   Net  -3120 ml       Nutrition  Orders Placed This Encounter   Procedures   • DIET NPO     Standing Status: Standing      Number of Occurrences: 1      Standing Expiration Date:      Order Specific Question:  Restrict to:     Answer:  With Tube Feed [4]     Physical Exam   Constitutional: He appears well-developed and well-nourished. No distress.   HENT:   Head: Normocephalic and atraumatic.    Eyes: Conjunctivae are normal.   Neck: No JVD present.   Trach   Cardiovascular: Normal rate.  Exam reveals no gallop.    No murmur heard.  Pulmonary/Chest: Effort normal. No stridor. No respiratory distress. He has no wheezes. He has rales.   Abdominal: Soft. There is no tenderness. There is no rebound and no guarding.   Musculoskeletal: He exhibits no edema.   Neurological: He is alert.   Alert, attends but does not follow.  Multiple long standing contractures   Skin: Skin is warm and dry. No rash noted. He is not diaphoretic.   Nursing note and vitals reviewed.      Recent Labs      07/04/17   0500  07/05/17   0515  07/06/17   0330   WBC  8.0  9.6  7.9   RBC  3.06*  3.21*  3.31*   HEMOGLOBIN  9.0*  9.4*  9.7*   HEMATOCRIT  27.6*  29.0*  29.8*   MCV  90.2  90.3  90.0   MCH  29.4  29.3  29.3   MCHC  32.6*  32.4*  32.6*   RDW  58.5*  54.9*  55.5*   PLATELETCT  479*  469*  480*   MPV  8.3*  8.6*  8.5*     Recent Labs      07/04/17   0500  07/05/17   0515  07/06/17   0330   SODIUM  141  131*  136   POTASSIUM  3.8  3.9  4.3   CHLORIDE  108  100  106   CO2  29  27  25   GLUCOSE  101*  92  106*   BUN  18  15  15   CREATININE  <0.20*  <0.20*  <0.20*   CALCIUM  7.6*  7.6*  8.0*             Recent Labs      07/05/17   0515   TRIGLYCERIDE  41          Assessment/Plan     Status epilepticus (CMS-HCC) (present on admission)  Assessment & Plan  - had continuous seizure activity for approximately 48 hours suspected from pneumonia.   - neurology following  - on phenobarbital and keppra  -no further events    Aspiration pneumonia (CMS-HCC) (present on admission)  Assessment & Plan  - pt unable to clear secretions   - culture growing Klebsiella resistant to ampicillin thus rocephin to cover aspiration   -blood cultures neg  Bronchoscopy 6/25. Again on 6/29  Thoracentesis with 300 ml off on 6/25.  - s/p trach 7/2       Sepsis (CMS-[facility])  Assessment & Plan  Off pressors  Ancef for MSSA  Trend temps  pulm toilet.         Acute  respiratory failure with hypoxia (CMS-HCC) (present on admission)  Assessment & Plan  Cont to wean from vent  Trach in place and tolerating well      Hyponatremia (present on admission)  Assessment & Plan  Chronic  Stable on po NaCl  Cont to follow    Hypoglycemia (present on admission)  Assessment & Plan  - resolved    TBI (traumatic brain injury) (CMS-[facility]) (present on admission)  Assessment & Plan  Since age 17 years.  With quadriplegia   Baclofen pump followed at [facility]--his mother states it does not need refilled for a few months.      Radiology images reviewed, Labs reviewed, Medications reviewed and EKG reviewed  Waldrop catheter: No Waldrop      DVT Prophylaxis: Enoxaparin (Lovenox)  DVT prophylaxis - mechanical: SCDs  Ulcer prophylaxis: Not indicated  Antibiotics: Treating active infection/contamination beyond 24 hours perioperative coverage

## 2017-07-06 NOTE — CARE PLAN
Problem: Skin Integrity  Goal: Risk for impaired skin integrity will decrease  Intervention: Assess risk factors for impaired skin integrity and/or pressure ulcers  +mepilex on pressure points, pillows and waffle mattress used to prevent pressure ulcers.      Problem: Pain Management  Goal: Pain level will decrease to patient’s comfort goal  Intervention: Follow pain managment plan developed in collaboration with patient and Interdisciplinary Team  No signs of apparent distress/pain at this point.

## 2017-07-06 NOTE — CARE PLAN
Problem: Pain Management  Goal: Pain level will decrease to patient’s comfort goal  Assessed patient's pain via CPOT pain scale. Pt also blinked heavily when asked about pain. Medicated per MAR.    Problem: Urinary Elimination:  Goal: Ability to reestablish a normal urinary elimination pattern will improve  Adequate urine output. Clear, yellow through indwelling catheter.

## 2017-07-06 NOTE — PROGRESS NOTES
Pulmonary Critical Care Progress Note        Date of service:  7/6/2017    Interval Events:  24 hour interval history reviewed  Reason for visit:  Respiratory failure, pneumonia, atelectasis  Unable to provide CC or ROS due to chronic encephalopathy      On CPAP 4hr yesterday.  Failed SBT on wheelchair.   SR.  CVP 11.  Minimal TF leaking from peg tube.   Received Lasix yesterday, 3L urine output and hypotensive.  CXR with much improved edema.      PFSH:  No change.    Respiratory:  Liriano Vent Mode: APVCMV, Rate (breaths/min): 15, Vt Target (mL): 410, PEEP/CPAP: 8, FiO2: 30, Static Compliance (ml / cm H2O): 39, Control VTE (exp VT): 451  Pulse Oximetry: 99 %  CXR with improved edema, persistent LLL opacification - improved  Coarse crackles bilaterally  Lots of secretions    HemoDynamics:  Pulse: 79, Heart Rate (Monitored): 78  NIBP: (!) 82/37 mmHg  CVP (mm Hg): (!) 8 MM HG  SR  NE off    Neuro:  More alert    Fluids:  Intake/Output       07/04/17 0700 - 07/05/17 0659 07/05/17 0700 - 07/06/17 0659 07/06/17 0700 - 07/07/17 0659      8927-7459 6311-6764 Total 2884-0022 0219-6276 Total 7950-5146 5998-6604 Total       Intake    I.V.  370  320 690  780  320 1100  --  -- --    Normal Saline 120 120 240 580 120 700 -- -- --    IV Piggyback Volume (IV Piggyback) 250 200 450 200 200 400 -- -- --    Other  115  120 235  145  120 265  --  -- --    Medications (P.O./ Enteral Liquids) 115 120 235 145 120 265 -- -- --    Enteral  735  810 1545  705  810 1515  --  -- --    Enteral Volume   -- -- --    Free Water / Tube Flush 135 210 345 105 210 315 -- -- --    Total Intake 1220 1250 2470 1630 1250 2880 -- -- --       Output    Urine  970  1800 2770  4650  1500 6150  --  -- --    Indwelling Cathether 970 1800 2770 4650 1500 6150 -- -- --    Stool  --  -- --  --  -- --  --  -- --    Number of Times Stooled 2 x 1 x 3 x 2 x 0 x 2 x -- -- --    Total Output 970 0205 9698 3255 6484 4053 -- -- --       Net I/O      250 -449 -300 -3020 -250 -7540 -- -- --        Weight: 56.6 kg (124 lb 12.5 oz)  Recent Labs      17   0500  17   0330   SODIUM  141  131*  136   POTASSIUM  3.8  3.9  4.3   CHLORIDE  108  100  106   CO2  29  27  25   BUN  18  15  15   CREATININE  <0.20*  <0.20*  <0.20*   MAGNESIUM  2.1  1.5  2.0   PHOSPHORUS  2.1*  3.1  3.4   CALCIUM  7.6*  7.6*  8.0*       GI/Nutrition:  Abd soft ND/NT  Tolerating enteral TF    Liver Function  Recent Labs      17   0500  17   033   ALTSGPT  37   --    --    ASTSGOT  18   --    --    ALKPHOSPHAT  75   --    --    TBILIRUBIN  0.2   --    --    GLUCOSE  101*  92  106*       Heme:  Recent Labs      17   05017   0330   RBC  3.06*  3.21*  3.31*   HEMOGLOBIN  9.0*  9.4*  9.7*   HEMATOCRIT  27.6*  29.0*  29.8*   PLATELETCT  479*  469*  480*       Infectious Disease:  Monitored Temp  Av.7 °C (98 °F)  Min: 36.1 °C (97 °F)  Max: 37.2 °C (99 °F)     Recent Labs      17   0500  17   0330   WBC  8.0  9.6  7.9   NEUTSPOLYS  53.40  67.90  62.00   LYMPHOCYTES  30.80  18.30*  22.60   MONOCYTES  11.30  9.40  11.30   EOSINOPHILS  3.80  3.60  3.30   BASOPHILS  0.40  0.40  0.40   ASTSGOT  18   --    --    ALTSGPT  37   --    --    ALKPHOSPHAT  75   --    --    TBILIRUBIN  0.2   --    --      Current Facility-Administered Medications   Medication Dose Frequency Provider Last Rate Last Dose   • sodium chloride (SALT) tablet 1 g  1 g TID WITH MEALS Rivas Dee M.D.   1 g at 17 1820   • furosemide (LASIX) injection 20 mg  20 mg Q DAY Rivas Dee M.D.   20 mg at 17 0850   • levetiracetam (KEPPRA) 100 MG/ML solution 1,500 mg  1,500 mg Q12HRS Martell Hunter M.D.   1,500 mg at 17   • ceFAZolin (ANCEF) IVPB 2 g  2 g Q8HRS Rivas Dee M.D.   2 g at 17 0533   • propofol (DIPRIVAN) injection  5-80 mcg/kg/min Continuous Clarence TRACY  SHOSHANA Pro   Stopped at 07/02/17 0200   • Respiratory Care per Protocol   Continuous RT Clarence Pro M.D.       • chlorhexidine (PERIDEX) 0.12 % solution 15 mL  15 mL BID Clarence Pro M.D.   15 mL at 07/05/17 2029   • lidocaine (XYLOCAINE) 1%  injection  1-2 mL Q30 MIN PRN Clarence Pro M.D.       • MD ALERT...Adult ICU Electrolyte Replacement per Pharmacy Protocol   pharmacy to dose Clarence Pro M.D.       • fentaNYL (SUBLIMAZE) injection 25 mcg  25 mcg Q HOUR PRN Clarence Pro M.D.        Or   • fentaNYL (SUBLIMAZE) injection 50 mcg  50 mcg Q HOUR PRN Clarence Pro M.D.   50 mcg at 06/30/17 0000    Or   • fentaNYL (SUBLIMAZE) injection 100 mcg  100 mcg Q HOUR PRN Clarence Pro M.D.       • ipratropium-albuterol (DUONEB) nebulizer solution 3 mL  3 mL Q2HRS PRN (RT) Clarence Pro M.D.       • ipratropium-albuterol (DUONEB) nebulizer solution 3 mL  3 mL Q4HRS (RT) Clarence Pro M.D.   3 mL at 07/06/17 0231   • norepinephrine (LEVOPHED) 8 mg in  mL Infusion  0.5-30 mcg/min Continuous Rivas Farrell M.D.   Stopped at 07/03/17 1413   • NS infusion   Continuous Rivas Dee M.D. 10 mL/hr at 07/02/17 0729     • famotidine (PEPCID) tablet 20 mg  20 mg BID Gabrielle Vargas PHARMD   20 mg at 07/05/17 2029   • potassium chloride (KLOR-CON) 20 MEQ packet 40 mEq  40 mEq TID Kev Araiza M.D.   40 mEq at 07/05/17 2029   • oxycodone immediate-release (ROXICODONE) tablet 5 mg  5 mg Q4HRS PRN Kev Araiza M.D.   5 mg at 06/24/17 1825   • PHENobarbital solution 70 mg  70 mg BID Martell Hunter M.D.   70 mg at 07/05/17 2029   • enoxaparin (LOVENOX) inj 30 mg  30 mg DAILY Clarence Morfin D.O.   30 mg at 07/05/17 2029   • senna-docusate (PERICOLACE or SENOKOT S) 8.6-50 MG per tablet 2 Tab  2 Tab BID Kev Araiza M.D.   Stopped at 07/05/17 2100    And   • polyethylene glycol/lytes (MIRALAX) PACKET 1 Packet  1 Packet QDAY PRN Kev Araiza M.D.   1  Packet at 06/25/17 2028    And   • magnesium hydroxide (MILK OF MAGNESIA) suspension 30 mL  30 mL QDAY PRN Kev Araiza M.D.   30 mL at 06/28/17 1305    And   • bisacodyl (DULCOLAX) suppository 10 mg  10 mg QDAY PRN Kev Araiza M.D.   10 mg at 07/03/17 1035   • glucose 4 g chewable tablet 16 g  16 g Q15 MIN PRN Kev Araiza M.D.        And   • dextrose 50% (D50W) injection 25 mL  25 mL Q15 MIN PRN Kev Araiza M.D.   25 mL at 06/20/17 1840   • ondansetron (ZOFRAN) syringe/vial injection 4 mg  4 mg Q4HRS PRN JASPAL Medina.O.   4 mg at 07/05/17 1429   • ondansetron (ZOFRAN ODT) dispertab 4 mg  4 mg Q4HRS PRN JASAPL Medina.O.       • promethazine (PHENERGAN) tablet 12.5-25 mg  12.5-25 mg Q4HRS PRN JASPAL Medina.O.       • promethazine (PHENERGAN) suppository 12.5-25 mg  12.5-25 mg Q4HRS PRN JASPAL Medina.O.       • acetaminophen (TYLENOL) tablet 650 mg  650 mg Q6HRS PRN JASPAL Medina.O.   650 mg at 07/05/17 2029   • Pain Pump (patient supplied) Device   Continuous JASPAL Medina.O.   Stopped at 06/17/17 1230     Last reviewed on 6/17/2017  9:45 AM by Ligia Frazier, Pharmacy Int    Quality  Measures:  Medications reviewed, Labs reviewed and Radiology images reviewed                        Assessment and Plan:    Acute hypoxemic respiratory failure  VDRF - intubated 6/19-6/27, re-intubated 6/30   - cont vent support   - SBT as tolerated  Trach 7/2  Aspiration/HCAP pneumonia with left lung atelectasis   - S/P FOB 6/30 - atelectasis improved   - MSSA - Ancef  Sepsis syndrome with shock - pulmonary source              - vasopressor support as necessary              - Marik protocol completed  PERALTA - observe off hydrocortisone  Acute pulmonary edema   - force diuresis as tolerated  Parapneumonic left pleural effusion   - S/P thoracentesis 6/25   - follow  Prior trach with trauma years ago  S/P aspiration pneumonia   - Klebsiella pneumonia - pansensitive              -  completed abx x 7 days 6/26 - C3/Flagyl  New onset seizure likely secondary to hyponatremia              - Keppra, Phenobarb per neuro  Severe chronic underlying debility secondary to profound traumatic brain injury 20 years ago  Dysphagia - cont enteral G-tube feedings  Hyponatremia - improved with salt tabs   - follow closely    Critical Care Time:  33 minutes  16355  No time overlap  Time excludes procedures  Discussed with RN, RT, Team      I, Kaylene Cote (Scribe), am scribing for, and in the presence of, Rivas Brooks M.D.    Electronically signed by: Kaylene Cote (Renzoibe), 7/6/2017    IRivas M.D. personally performed the services described in this documentation, as scribed by Kaylene Cote in my presence, and it is both accurate and complete.

## 2017-07-06 NOTE — CARE PLAN
Problem: Ventilation Defect:  Goal: Ability to achieve and maintain unassisted ventilation or tolerate decreased levels of ventilator support  Outcome: PROGRESSING AS EXPECTED  Adult Ventilation Update    Total Vent Days: 6  Trache 3      Patient Lines/Drains/Airways Status    Active Airway      Name: Placement date: Placement time: Site: Days:     Airway Group Standard Cuffed Trach Tracheostomy 6.0 07/02/17  1403  Tracheostomy  3                 In the last 24 hours, the patient tolerated SBT for 4 hours on settings of 5/8.    #FVC / Vital Capacity (liters) : 555 (07/04/17 0812)  NIF (cm H2O) :  (unable to follow) (07/03/17 0758)  Rapid Shallow Breathing Index (RR/VT): 26 (07/04/17 0812)  Plateau Pressure (Q Shift): 15 (07/04/17 1925)  Static Compliance (ml / cm H2O): 127 (07/05/17 1621)      Cough: Productive (07/05/17 1457)  Sputum Amount: Moderate (07/05/17 1600)  Sputum Color: Yellow (07/05/17 1600)  Sputum Consistency: Thick (07/05/17 1600)    Mobility Group  Activity Performed: Back to bed (07/05/17 1600)  Time Activity Tolerated: 10 min (07/05/17 0200)  Distance Per Occurrence (ft.): 0 feet (07/05/17 0200)  # of Times Distance was Traveled: 0 (07/05/17 0200)  Assistance / Tolerance: Assistance of Two or More (07/05/17 1500)  Pt Calls for Assistance: No (07/05/17 1500)  Staff Present for Mobilization: RN;RT (and the pt's mom) (07/05/17 1500)  Gait: Unable to Ambulate (07/05/17 1500)  Assistive Devices: Other (Comments) (Full assist) (07/05/17 0200)  Reason Not Mobilized: Bed rest (07/02/17 2000)  Mobilization Comments: Unstable (06/30/17 0000)    Events/Summary/Plan: No vent changes at his time.  Will continue to monitor patient. (07/05/17 1621)

## 2017-07-06 NOTE — CARE PLAN
Problem: Ventilation Defect:  Goal: Ability to achieve and maintain unassisted ventilation or tolerate decreased levels of ventilator support  Outcome: PROGRESSING SLOWER THAN EXPECTED  Adult Ventilation Update    Total Vent Days: 7      Patient Lines/Drains/Airways Status    Active Airway      Name: Placement date: Placement time: Site: Days:     Airway Group Standard Cuffed Trach Tracheostomy 6.0 07/02/17  1403  Tracheostomy  4                 In the last 24 hours, the patient tolerated SBT for 1 hrs  on settings of 5/8.    #FVC / Vital Capacity (liters) : 560 (07/05/17 1028)  NIF (cm H2O) : -12 (07/05/17 1028)  Rapid Shallow Breathing Index (RR/VT): 29 (07/05/17 1028)  Plateau Pressure (Q Shift): 13 (07/05/17 1829)  Static Compliance (ml / cm H2O): 39 (07/06/17 0234)    Barriers to SBT Weaning Trial Stopped due to:: Pt weaned for 1 hour and returned to rest settings per protocol (07/04/17 0812)  Length of Weaning Trial Length of Weaning Trial (Hours): 1    Sputum/Suction   Cough: Productive (07/06/17 0234)  Sputum Amount: Moderate (07/06/17 0234)  Sputum Color: White;Yellow (07/06/17 0234)  Sputum Consistency: Thick (07/06/17 0234)    Assistive Devices: Other (Comments) (Full assist) (07/05/17 2200)  Reason Not Mobilized: Bed rest (07/02/17 2000)  Mobilization Comments: Unstable (06/30/17 0000)    Events/Summary/Plan: Patient back in bed  from his chair.  Patient resting on CMV. Settings unchanged.

## 2017-07-07 ENCOUNTER — APPOINTMENT (OUTPATIENT)
Dept: RADIOLOGY | Facility: MEDICAL CENTER | Age: 37
DRG: 004 | End: 2017-07-07
Attending: INTERNAL MEDICINE
Payer: COMMERCIAL

## 2017-07-07 LAB
ANION GAP SERPL CALC-SCNC: 5 MMOL/L (ref 0–11.9)
BASOPHILS # BLD AUTO: 0.4 % (ref 0–1.8)
BASOPHILS # BLD: 0.03 K/UL (ref 0–0.12)
BUN SERPL-MCNC: 14 MG/DL (ref 8–22)
CALCIUM SERPL-MCNC: 8.1 MG/DL (ref 8.5–10.5)
CHLORIDE SERPL-SCNC: 104 MMOL/L (ref 96–112)
CO2 SERPL-SCNC: 27 MMOL/L (ref 20–33)
CREAT SERPL-MCNC: <0.2 MG/DL (ref 0.5–1.4)
EOSINOPHIL # BLD AUTO: 0.29 K/UL (ref 0–0.51)
EOSINOPHIL NFR BLD: 3.9 % (ref 0–6.9)
ERYTHROCYTE [DISTWIDTH] IN BLOOD BY AUTOMATED COUNT: 54.2 FL (ref 35.9–50)
GFR SERPL CREATININE-BSD FRML MDRD: >60 ML/MIN/1.73 M 2
GLUCOSE SERPL-MCNC: 107 MG/DL (ref 65–99)
HCT VFR BLD AUTO: 28.6 % (ref 42–52)
HGB BLD-MCNC: 9.1 G/DL (ref 14–18)
IMM GRANULOCYTES # BLD AUTO: 0.04 K/UL (ref 0–0.11)
IMM GRANULOCYTES NFR BLD AUTO: 0.5 % (ref 0–0.9)
LYMPHOCYTES # BLD AUTO: 1.32 K/UL (ref 1–4.8)
LYMPHOCYTES NFR BLD: 17.6 % (ref 22–41)
MAGNESIUM SERPL-MCNC: 1.6 MG/DL (ref 1.5–2.5)
MCH RBC QN AUTO: 28.5 PG (ref 27–33)
MCHC RBC AUTO-ENTMCNC: 31.8 G/DL (ref 33.7–35.3)
MCV RBC AUTO: 89.7 FL (ref 81.4–97.8)
MONOCYTES # BLD AUTO: 0.8 K/UL (ref 0–0.85)
MONOCYTES NFR BLD AUTO: 10.6 % (ref 0–13.4)
NEUTROPHILS # BLD AUTO: 5.04 K/UL (ref 1.82–7.42)
NEUTROPHILS NFR BLD: 67 % (ref 44–72)
NRBC # BLD AUTO: 0 K/UL
NRBC BLD AUTO-RTO: 0 /100 WBC
PHOSPHATE SERPL-MCNC: 3.1 MG/DL (ref 2.5–4.5)
PLATELET # BLD AUTO: 480 K/UL (ref 164–446)
PMV BLD AUTO: 8.5 FL (ref 9–12.9)
POTASSIUM SERPL-SCNC: 3.4 MMOL/L (ref 3.6–5.5)
RBC # BLD AUTO: 3.19 M/UL (ref 4.7–6.1)
SODIUM SERPL-SCNC: 136 MMOL/L (ref 135–145)
WBC # BLD AUTO: 7.5 K/UL (ref 4.8–10.8)

## 2017-07-07 PROCEDURE — A9270 NON-COVERED ITEM OR SERVICE: HCPCS | Performed by: INTERNAL MEDICINE

## 2017-07-07 PROCEDURE — 770022 HCHG ROOM/CARE - ICU (200)

## 2017-07-07 PROCEDURE — 71010 DX-CHEST-PORTABLE (1 VIEW): CPT

## 2017-07-07 PROCEDURE — A9270 NON-COVERED ITEM OR SERVICE: HCPCS | Performed by: PSYCHIATRY & NEUROLOGY

## 2017-07-07 PROCEDURE — 99233 SBSQ HOSP IP/OBS HIGH 50: CPT | Performed by: HOSPITALIST

## 2017-07-07 PROCEDURE — A9270 NON-COVERED ITEM OR SERVICE: HCPCS

## 2017-07-07 PROCEDURE — 700102 HCHG RX REV CODE 250 W/ 637 OVERRIDE(OP)

## 2017-07-07 PROCEDURE — 94669 MECHANICAL CHEST WALL OSCILL: CPT

## 2017-07-07 PROCEDURE — 700111 HCHG RX REV CODE 636 W/ 250 OVERRIDE (IP)

## 2017-07-07 PROCEDURE — 94640 AIRWAY INHALATION TREATMENT: CPT

## 2017-07-07 PROCEDURE — 700102 HCHG RX REV CODE 250 W/ 637 OVERRIDE(OP): Performed by: PSYCHIATRY & NEUROLOGY

## 2017-07-07 PROCEDURE — 700102 HCHG RX REV CODE 250 W/ 637 OVERRIDE(OP): Performed by: INTERNAL MEDICINE

## 2017-07-07 PROCEDURE — 85025 COMPLETE CBC W/AUTO DIFF WBC: CPT

## 2017-07-07 PROCEDURE — 84100 ASSAY OF PHOSPHORUS: CPT

## 2017-07-07 PROCEDURE — 80048 BASIC METABOLIC PNL TOTAL CA: CPT

## 2017-07-07 PROCEDURE — 700102 HCHG RX REV CODE 250 W/ 637 OVERRIDE(OP): Performed by: HOSPITALIST

## 2017-07-07 PROCEDURE — 94003 VENT MGMT INPAT SUBQ DAY: CPT

## 2017-07-07 PROCEDURE — 700111 HCHG RX REV CODE 636 W/ 250 OVERRIDE (IP): Performed by: INTERNAL MEDICINE

## 2017-07-07 PROCEDURE — 700101 HCHG RX REV CODE 250: Performed by: INTERNAL MEDICINE

## 2017-07-07 PROCEDURE — 83735 ASSAY OF MAGNESIUM: CPT

## 2017-07-07 RX ORDER — MAGNESIUM SULFATE HEPTAHYDRATE 40 MG/ML
2 INJECTION, SOLUTION INTRAVENOUS ONCE
Status: COMPLETED | OUTPATIENT
Start: 2017-07-07 | End: 2017-07-07

## 2017-07-07 RX ORDER — MICONAZOLE NITRATE 20 MG/G
CREAM TOPICAL EVERY 6 HOURS PRN
Status: DISCONTINUED | OUTPATIENT
Start: 2017-07-07 | End: 2017-07-11 | Stop reason: HOSPADM

## 2017-07-07 RX ORDER — POTASSIUM CHLORIDE 1.5 G/1.58G
40 POWDER, FOR SOLUTION ORAL ONCE
Status: COMPLETED | OUTPATIENT
Start: 2017-07-07 | End: 2017-07-07

## 2017-07-07 RX ADMIN — BISACODYL 10 MG: 10 SUPPOSITORY RECTAL at 12:06

## 2017-07-07 RX ADMIN — SENNOSIDES AND DOCUSATE SODIUM 2 TABLET: 8.6; 5 TABLET ORAL at 20:43

## 2017-07-07 RX ADMIN — IPRATROPIUM BROMIDE AND ALBUTEROL SULFATE 3 ML: .5; 3 SOLUTION RESPIRATORY (INHALATION) at 02:17

## 2017-07-07 RX ADMIN — MAGNESIUM SULFATE IN WATER 2 G: 40 INJECTION, SOLUTION INTRAVENOUS at 08:53

## 2017-07-07 RX ADMIN — LEVETIRACETAM 1500 MG: 100 SOLUTION ORAL at 20:43

## 2017-07-07 RX ADMIN — IPRATROPIUM BROMIDE AND ALBUTEROL SULFATE 3 ML: .5; 3 SOLUTION RESPIRATORY (INHALATION) at 06:02

## 2017-07-07 RX ADMIN — IPRATROPIUM BROMIDE AND ALBUTEROL SULFATE 3 ML: .5; 3 SOLUTION RESPIRATORY (INHALATION) at 18:44

## 2017-07-07 RX ADMIN — FAMOTIDINE 20 MG: 20 TABLET, FILM COATED ORAL at 20:43

## 2017-07-07 RX ADMIN — SODIUM CHLORIDE TAB 1 GM 1 G: 1 TAB at 12:06

## 2017-07-07 RX ADMIN — ENOXAPARIN SODIUM 30 MG: 100 INJECTION SUBCUTANEOUS at 20:42

## 2017-07-07 RX ADMIN — IPRATROPIUM BROMIDE AND ALBUTEROL SULFATE 3 ML: .5; 3 SOLUTION RESPIRATORY (INHALATION) at 11:00

## 2017-07-07 RX ADMIN — IPRATROPIUM BROMIDE AND ALBUTEROL SULFATE 3 ML: .5; 3 SOLUTION RESPIRATORY (INHALATION) at 17:37

## 2017-07-07 RX ADMIN — SENNOSIDES AND DOCUSATE SODIUM 2 TABLET: 8.6; 5 TABLET ORAL at 08:54

## 2017-07-07 RX ADMIN — LEVETIRACETAM 1500 MG: 100 SOLUTION ORAL at 08:54

## 2017-07-07 RX ADMIN — SODIUM CHLORIDE TAB 1 GM 1 G: 1 TAB at 17:18

## 2017-07-07 RX ADMIN — CHLORHEXIDINE GLUCONATE 15 ML: 1.2 RINSE ORAL at 08:54

## 2017-07-07 RX ADMIN — PHENOBARBITAL 70 MG: 20 ELIXIR ORAL at 08:56

## 2017-07-07 RX ADMIN — MICONAZOLE NITRATE: 20 CREAM TOPICAL at 16:00

## 2017-07-07 RX ADMIN — FAMOTIDINE 20 MG: 20 TABLET, FILM COATED ORAL at 08:53

## 2017-07-07 RX ADMIN — IPRATROPIUM BROMIDE AND ALBUTEROL SULFATE 3 ML: .5; 3 SOLUTION RESPIRATORY (INHALATION) at 21:56

## 2017-07-07 RX ADMIN — POTASSIUM CHLORIDE 40 MEQ: 1.5 POWDER, FOR SOLUTION ORAL at 08:53

## 2017-07-07 RX ADMIN — PHENOBARBITAL 70 MG: 20 ELIXIR ORAL at 20:43

## 2017-07-07 RX ADMIN — SODIUM CHLORIDE TAB 1 GM 1 G: 1 TAB at 08:53

## 2017-07-07 RX ADMIN — CHLORHEXIDINE GLUCONATE 15 ML: 1.2 RINSE ORAL at 20:43

## 2017-07-07 NOTE — CARE PLAN
Problem: Skin Integrity  Goal: Risk for impaired skin integrity will decrease  Outcome: PROGRESSING AS EXPECTED  Pt has severe contractures in all extremities, at baseline. Techniques and collaboration with pt's mother (primary caregiver) used to help maintain pt's skin integrity.    Problem: Respiratory:  Goal: Respiratory status will improve  Collaboration with RT for pt vent settings and readiness to ween, in place. Pt response to spontaneous breathing done.

## 2017-07-07 NOTE — CARE PLAN
Problem: Ventilation Defect:  Goal: Ability to achieve and maintain unassisted ventilation or tolerate decreased levels of ventilator support  Intervention: Monitor ventilator weaning response  Pts goal is 12 hours, he is about 11 hours in with no sign of resp distress.  Intervention: Manage ventilation therapy by monitoring diagnostic test results  Adult Ventilation Update    Total Vent Days: 7      Patient Lines/Drains/Airways Status    Active Airway      Name: Placement date: Placement time: Site: Days:     Airway Group Standard Cuffed Trach Tracheostomy 6.0 07/02/17  1403  Tracheostomy  4

## 2017-07-07 NOTE — DISCHARGE PLANNING
Spoke with [facility] liaison, Leidy. She informs this SW that there is some concern that in the event the pt still needs a vent or has a trache upon dc, [facility] will only send the pt home via ambulance transport. Leidy states there is some concern that the pt's insurance, [employer] may not pay for the transport. Mary is waiting for approval from her supervisors regarding acceptance.

## 2017-07-07 NOTE — PROGRESS NOTES
[facility] Hospitalist Progress Note    Date of Service: 2017    Chief Complaint  36 y.o. male admitted 2017 with altered mental status seizures and aspiration. H/o   Severe TBI. Intubated Initially improved then had repeat event with respiratory failure and left lung white, reintubation out s/p improvement with bronc.      Interval Problem Update    ROS limited by mental status  Per Mom doing much better, back to his normal  SBT x 12 hrs, rested overnight    Consultants/Specialty  Pulmonology    Disposition  Cont in ICU for pulmonary toilet  TPH has declined  Cont to wean from vent in house.    Hopefully dc directly home        Review of Systems   Unable to perform ROS: mental acuity      Physical Exam  Laboratory/Imaging   Hemodynamics  No data recorded.   Monitored Temp: 36.6 °C (97.9 °F)  Pulse  Av.9  Min: 42  Max: 131 Heart Rate (Monitored): 94  NIBP: 117/59 mmHg CVP (mm Hg): (!) 287 MM HG    Respiratory  Liriano Vent Mode: Spont, Rate (breaths/min): 15, PEEP/CPAP: 8, PEEP/CPAP: 8, FiO2: 30, P Peak (PIP): 15, P MEAN: 10   Respiration: (!) 10, Pulse Oximetry: 100 %     PEP/CPT Method: Percussion/Vibration, Work Of Breathing / Effort: Mild;Vented  RUL Breath Sounds: Clear After Suction, RML Breath Sounds: Clear After Suction, RLL Breath Sounds: Diminished, JONATAN Breath Sounds: Clear After Suction, LLL Breath Sounds: Diminished    Fluids    Intake/Output Summary (Last 24 hours) at 17 0815  Last data filed at 17 0600   Gross per 24 hour   Intake   1730 ml   Output   2525 ml   Net   -795 ml       Nutrition  Orders Placed This Encounter   Procedures   • DIET NPO     Standing Status: Standing      Number of Occurrences: 1      Standing Expiration Date:      Order Specific Question:  Restrict to:     Answer:  With Tube Feed [4]     Physical Exam   Constitutional: He appears well-developed and well-nourished. No distress.   HENT:   Head: Normocephalic and atraumatic.   Eyes: Conjunctivae are normal.    Neck: No JVD present.   Trach   Cardiovascular: Normal rate.  Exam reveals no gallop.    No murmur heard.  Pulmonary/Chest: Effort normal. No stridor. No respiratory distress. He has no wheezes. He has rales.   Abdominal: Soft. There is no tenderness. There is no rebound and no guarding.   Musculoskeletal: He exhibits no edema.   Neurological: He is alert.   Alert, attends but does not follow.  Multiple long standing contractures   Skin: Skin is warm and dry. No rash noted. He is not diaphoretic.   Nursing note and vitals reviewed.      Recent Labs      07/05/17 0515 07/06/17   0330  07/07/17   0440   WBC  9.6  7.9  7.5   RBC  3.21*  3.31*  3.19*   HEMOGLOBIN  9.4*  9.7*  9.1*   HEMATOCRIT  29.0*  29.8*  28.6*   MCV  90.3  90.0  89.7   MCH  29.3  29.3  28.5   MCHC  32.4*  32.6*  31.8*   RDW  54.9*  55.5*  54.2*   PLATELETCT  469*  480*  480*   MPV  8.6*  8.5*  8.5*     Recent Labs      07/05/17 0515 07/06/17   0330  07/07/17   0440   SODIUM  131*  136  136   POTASSIUM  3.9  4.3  3.4*   CHLORIDE  100  106  104   CO2  27  25  27   GLUCOSE  92  106*  107*   BUN  15  15  14   CREATININE  <0.20*  <0.20*  <0.20*   CALCIUM  7.6*  8.0*  8.1*             Recent Labs      07/05/17   0515   TRIGLYCERIDE  41          Assessment/Plan     Status epilepticus (CMS-HCC) (present on admission)  Assessment & Plan  - had continuous seizure activity for approximately 48 hours suspected from pneumonia.   - neurology following  - on phenobarbital and keppra  -no further events    Aspiration pneumonia (CMS-HCC) (present on admission)  Assessment & Plan  - pt unable to clear secretions   - culture growing Klebsiella resistant to ampicillin thus rocephin to cover aspiration   -blood cultures neg  Bronchoscopy 6/25. Again on 6/29  Thoracentesis with 300 ml off on 6/25.  - s/p trach 7/2       Sepsis (CMS-HCC)  Assessment & Plan  Off pressors  Ancef for MSSA  Trend temps  pulm toilet.         Acute respiratory failure with hypoxia  (CMS-HCC) (present on admission)  Assessment & Plan  Cont to wean from vent  Trach in place and tolerating well      Hyponatremia (present on admission)  Assessment & Plan  Chronic  Stable on po NaCl  Cont to follow    Hypoglycemia (present on admission)  Assessment & Plan  - resolved    TBI (traumatic brain injury) (CMS-HCC) (present on admission)  Assessment & Plan  Since age 17 years.  With quadriplegia   [facility]aclofen pump [facility] at [facility]--his mother states it does not need refilled for a few months.      Radiology images reviewed, Labs reviewed, Medications reviewed and EKG reviewed  Waldrop catheter: No Waldrop      DVT Prophylaxis: Enoxaparin (Lovenox)  DVT prophylaxis - mechanical: SCDs  Ulcer prophylaxis: Not indicated  Antibiotics: Treating active infection/contamination beyond 24 hours perioperative coverage

## 2017-07-07 NOTE — DISCHARGE SUMMARY
CHIEF COMPLAINT ON ADMISSION  Chief Complaint   Patient presents with   • Shortness of Breath   • Difficulty Breathing       CODE STATUS  Full Code    HPI & HOSPITAL COURSE  This is a 37 y.o. With a PMHx of TBI admitted 6/17 with altered mental status, sepsis, aspiration pneumonia, hyponatremia and acute hypoxic respiratory failure. His hyponatremia was thought to be due to dehydration however he is maintained at present on po NaClHe was also having some twitching movement which lead to an EEG which demonstrated status epilepticus.   He required intubation on 6/19.  He was subsequently extubated and then re-intubated on 6/30.  On 7/2 dr Osorio performed a Trach.    As regards the pt's seziures he has been stable now for some time on Keppra and Phenobarb.  No structural surprises were found on imaging.  As regards his pneumonia, cultures grew out Klebsiell for he has completed his antibiotic course (rocephin/flagyl).  He subsequently grew out MSSA from sputum BAL on 6/30 for which he is currently on Ancef.  We would recommend completing a 10 day course of this as long as he continues to improve.    He underwent needle drainage of a parapneumonic effusion on 6/25.  As regards sepsis he was treated with the Marik protocol as well as standard interventions.  He was given Hydrocortisone as part of that protocol as well as due to relative addrenal insuficiency.    Pt has also had chronic hyponatremia for which he was maintained on NaCl tabs 1g po TID.  His Mom would like to make sure he still needs them so they have been DC'd on 7/8.  We would recomend following daily Na to determine how he's doing in this regard    Therefore, he is discharged in fair and stable condition with close outpatient follow-up.    PMHX  1-TBI  2-PEG dependent for nutrition    Medications  As outpt pt on baclofen pump    Allergies  Sulfa    Social Hx:  Pt is cared for by his Mom and has been since his injury.  He does not smoke or  drink    Family Hx  Non contributory    ROS:  Unobtainable    SPECIFIC OUTPATIENT FOLLOW-UP      DISCHARGE PROBLEM LIST  Active Problems:    Status epilepticus (CMS-[facility]) POA: Yes    Aspiration pneumonia (CMS-[facility]) POA: Yes    Sepsis (CMS-[facility]) POA: Unknown    Acute respiratory failure with hypoxia (CMS-[facility]) POA: Yes    Hyponatremia POA: Yes    Hypoglycemia POA: Yes    TBI (traumatic brain injury) (CMS-HCC) POA: Yes  Resolved Problems:    Acute respiratory distress (CMS-[facility]) POA: Unknown    Acute delirium    Occasional tremors POA: Unknown    Hypokalemia POA: Yes      FOLLOW UP  No future appointments.  No follow-up provider specified.    MEDICATIONS ON DISCHARGE   Jerry, Ruben   Home Medication Instructions YARITZA:[id number]    Printed on:07/07/17 1305   Medication Information                      BACLOFEN IT  by Intrathecal route Continuous. IT pump.                 DIET  Orders Placed This Encounter   Procedures   • DIET NPO     Standing Status: Standing      Number of Occurrences: 1      Standing Expiration Date:      Order Specific Question:  Restrict to:     Answer:  With Tube Feed [4]       ACTIVITY  As tolerated.  Weight bearing as tolerated      CONSULTATIONS  Neurology  Pulmonology    PROCEDURES  Thoracentesis      LABORATORY  Lab Results   Component Value Date/Time    SODIUM 136 07/07/2017 04:40 AM    POTASSIUM 3.4* 07/07/2017 04:40 AM    CHLORIDE 104 07/07/2017 04:40 AM    CO2 27 07/07/2017 04:40 AM    GLUCOSE 107* 07/07/2017 04:40 AM    BUN 14 07/07/2017 04:40 AM    CREATININE <0.20* 07/07/2017 04:40 AM        Lab Results   Component Value Date/Time    WBC 7.5 07/07/2017 04:40 AM    HEMOGLOBIN 9.1* 07/07/2017 04:40 AM    HEMATOCRIT 28.6* 07/07/2017 04:40 AM    PLATELET COUNT 480* 07/07/2017 04:40 AM        Total time of the discharge process exceeds 40 minutes

## 2017-07-07 NOTE — CARE PLAN
Problem: Ventilation Defect:  Goal: Ability to achieve and maintain unassisted ventilation or tolerate decreased levels of ventilator support  Intervention: Support and monitor invasive and noninvasive mechanical ventilation  Adult Ventilation Update    Total Vent Days: 8  Trache Day 5      Patient Lines/Drains/Airways Status    Active Airway      Name: Placement date: Placement time: Site: Days:     Airway Group Standard Cuffed Trach Tracheostomy 6.0 07/02/17  1403  Tracheostomy  4                 In the last 24 hours, the patient tolerated SBT for 12 hours per trache weaning protocol on settings of 5/8.    #FVC / Vital Capacity (liters) : 560 (07/05/17 1028)  NIF (cm H2O) : -12 (07/05/17 1028)  Rapid Shallow Breathing Index (RR/VT): 29 (07/05/17 1028)  Plateau Pressure (Q Shift): 16 (07/07/17 0219)  Static Compliance (ml / cm H2O): 56 (07/07/17 0219)    Patient failed trials because of Barriers to Wean: Other (Comments) (06/24/17 0642)  Barriers to SBT Weaning Trial Stopped due to:: Pt weaned for 1 hour and returned to rest settings per protocol (Pt weaned for 12 hours per protocol) (07/06/17 1854)  Length of Weaning Trial Length of Weaning Trial (Hours): 12 (07/06/17 1854)      Sputum/Suction   Cough: Productive (07/06/17 2212)  Sputum Amount: Scant (07/07/17 0000)  Sputum Color: White;Clear;Tan (07/07/17 0000)  Sputum Consistency: Thick;Thin (07/07/17 0000)    Mobility Group  Activity Performed: Edge of bed (07/07/17 0000)  Time Activity Tolerated: 5 min (07/07/17 0000)  Distance Per Occurrence (ft.): 0 feet (07/07/17 0000)  # of Times Distance was Traveled: 0 (07/07/17 0000)  Assistance / Tolerance: Assistance of Two or More;Tolerates Well (07/07/17 0000)  Pt Calls for Assistance: No (07/07/17 0000)  Staff Present for Mobilization: RN;CNA (07/07/17 0000)  Gait: Unable to Ambulate (07/07/17 0000)  Assistive Devices: Other (Comments) (full assist with waffle and sheets) (07/07/17 0000)  Reason Not Mobilized: Bed  rest (07/02/17 2000)  Mobilization Comments: Unstable (06/30/17 0000)    Events/Summary/Plan: Placed back on rest settings for the night. per protocol (07/06/17 5050)

## 2017-07-07 NOTE — CARE PLAN
Problem: Fluid Volume:  Goal: Will maintain balanced intake and output  Outcome: PROGRESSING AS EXPECTED  Strict I/O in place.    Problem: Respiratory:  Goal: Respiratory status will improve  Outcome: PROGRESSING AS EXPECTED  Collaboration with pulmonologist and RT for treatments and breathing trials.    Problem: Mobility  Goal: Risk for activity intolerance will decrease  Outcome: PROGRESSING AS EXPECTED  Progressive mobility in place.

## 2017-07-07 NOTE — PROGRESS NOTES
Pulmonary Critical Care Progress Note        Date of service:  7/7/2017    Interval Events:  24 hour interval history reviewed  Reason for visit:  Respiratory failure, pneumonia, atelectasis  Unable to provide CC or ROS due to chronic encephalopathy      SR  Adequate urine output via rosas  CXR with LLL atelectasis - improved      PFSH:  No change.    Respiratory:  Liriano Vent Mode: Spont, Rate (breaths/min): 15, Vt Target (mL): 410, PEEP/CPAP: 8, FiO2: 30, P Support: 5, Static Compliance (ml / cm H2O): 129, Control VTE (exp VT): 465  Pulse Oximetry: 100 %  CXR with improved LLL atelectasis and opacification  Coarse crackles bilaterally  Increased secretions    HemoDynamics:  Pulse: 80, Heart Rate (Monitored): 94  NIBP: (!) 95/43 mmHg  CVP (mm Hg): (!) 287 MM HG  SR  NE off    Neuro:  More alert    Fluids:  Intake/Output       07/05/17 0700 - 07/06/17 0659 07/06/17 0700 - 07/07/17 0659 07/07/17 0700 - 07/08/17 0659      2920-4140 0977-1815 Total 9576-0488 4588-6428 Total 2946-5525 8504-1394 Total       Intake    I.V.  780  320 1100  220  240 460  --  -- --    Normal Saline 580 120 700 120 140 260 -- -- --    IV Piggyback Volume (IV Piggyback) 200 200 400 100 100 200 -- -- --    Other  145  120 265  120  30 150  --  -- --    Medications (P.O./ Enteral Liquids) 145 120 265 120 30 150 -- -- --    Enteral  705  810 1515  690  530 1220  --  -- --    Enteral Volume   -- -- --    Free Water / Tube Flush 105 210 315 90 30 120 -- -- --    Total Intake 1630 1250 2880 9512 737 3714 -- -- --       Output    Urine  4650  1500 6150  1275  1300 2575  --  -- --    Indwelling Cathether 4650 1500 6150 1275 1300 2575 -- -- --    Stool  --  -- --  --  -- --  --  -- --    Number of Times Stooled 2 x 0 x 2 x 0 x 0 x 0 x -- -- --    Total Output 5068 3096 4677 4789 8748 7282 -- -- --       Net I/O     -3020 -250 -3270 -245 -500 -745 -- -- --        Weight: 56.3 kg (124 lb 1.9 oz)  Recent Labs      07/05/17   0515   17   03317   0440   SODIUM  131*  136  136   POTASSIUM  3.9  4.3  3.4*   CHLORIDE  100  106  104   CO2  27  25  27   BUN  15  15  14   CREATININE  <0.20*  <0.20*  <0.20*   MAGNESIUM  1.5  2.0  1.6   PHOSPHORUS  3.1  3.4  3.1   CALCIUM  7.6*  8.0*  8.1*       GI/Nutrition:  Abd soft ND/NT  Tolerating enteral TF    Liver Function  Recent Labs      17   0440   GLUCOSE  92  106*  107*       Heme:  Recent Labs      17   0440   RBC  3.21*  3.31*  3.19*   HEMOGLOBIN  9.4*  9.7*  9.1*   HEMATOCRIT  29.0*  29.8*  28.6*   PLATELETCT  469*  480*  480*       Infectious Disease:  Monitored Temp  Av.6 °C (97.9 °F)  Min: 35.9 °C (96.6 °F)  Max: 37 °C (98.6 °F)     Recent Labs      17   0440   WBC  9.6  7.9  7.5   NEUTSPOLYS  67.90  62.00  67.00   LYMPHOCYTES  18.30*  22.60  17.60*   MONOCYTES  9.40  11.30  10.60   EOSINOPHILS  3.60  3.30  3.90   BASOPHILS  0.40  0.40  0.40     Current Facility-Administered Medications   Medication Dose Frequency Provider Last Rate Last Dose   • sodium chloride (SALT) tablet 1 g  1 g TID WITH MEALS Rivas Dee M.D.   1 g at 17   • levetiracetam (KEPPRA) 100 MG/ML solution 1,500 mg  1,500 mg Q12HRS Martell Hunter M.D.   1,500 mg at 17   • Respiratory Care per Protocol   Continuous RT Clarence Pro M.D.       • chlorhexidine (PERIDEX) 0.12 % solution 15 mL  15 mL BID Clarence Pro M.D.   15 mL at 17   • lidocaine (XYLOCAINE) 1%  injection  1-2 mL Q30 MIN PRN Clarence Pro M.D.       • MD ALERT...Adult ICU Electrolyte Replacement per Pharmacy Protocol   pharmacy to dose Clarence Pro M.D.       • fentaNYL (SUBLIMAZE) injection 25 mcg  25 mcg Q HOUR PRN Clarence Pro M.D.        Or   • fentaNYL (SUBLIMAZE) injection 50 mcg  50 mcg Q HOUR PRN Clarence Pro M.D.   50 mcg at 17 0000     Or   • fentaNYL (SUBLIMAZE) injection 100 mcg  100 mcg Q HOUR PRN Clarence Pro M.D.       • ipratropium-albuterol (DUONEB) nebulizer solution 3 mL  3 mL Q2HRS PRN (RT) Clarence Pro M.D.       • ipratropium-albuterol (DUONEB) nebulizer solution 3 mL  3 mL Q4HRS (RT) Clarence Pro M.D.   3 mL at 07/07/17 0602   • famotidine (PEPCID) tablet 20 mg  20 mg BID Gabrielle Vargas PHARMD   20 mg at 07/06/17 2102   • oxycodone immediate-release (ROXICODONE) tablet 5 mg  5 mg Q4HRS PRN Kve Araiza M.D.   5 mg at 06/24/17 1825   • PHENobarbital solution 70 mg  70 mg BID Martell Hunter M.D.   70 mg at 07/06/17 2101   • enoxaparin (LOVENOX) inj 30 mg  30 mg DAILY Clarence Morfin D.O.   30 mg at 07/06/17 2101   • senna-docusate (PERICOLACE or SENOKOT S) 8.6-50 MG per tablet 2 Tab  2 Tab BID Kev Araiza M.D.   2 Tab at 07/06/17 2102    And   • polyethylene glycol/lytes (MIRALAX) PACKET 1 Packet  1 Packet QDAY SUSAN Araiza M.D.   1 Packet at 06/25/17 2028    And   • magnesium hydroxide (MILK OF MAGNESIA) suspension 30 mL  30 mL QDAY PRBRIDGETTE Araiza M.D.   30 mL at 06/28/17 1305    And   • bisacodyl (DULCOLAX) suppository 10 mg  10 mg QDAY PRBRIDGETTE Araiza M.D.   10 mg at 07/03/17 1035   • glucose 4 g chewable tablet 16 g  16 g Q15 MIN PRN Kev Araiza M.D.        And   • dextrose 50% (D50W) injection 25 mL  25 mL Q15 MIN PRBRIDGETTE Araiza M.D.   25 mL at 06/20/17 1840   • ondansetron (ZOFRAN) syringe/vial injection 4 mg  4 mg Q4HRS PRN FARHAT MedinaO.   4 mg at 07/05/17 1429   • ondansetron (ZOFRAN ODT) dispertab 4 mg  4 mg Q4HRS PRN JASPAL Medina.O.       • promethazine (PHENERGAN) tablet 12.5-25 mg  12.5-25 mg Q4HRS PRN JASPAL Medina.O.       • promethazine (PHENERGAN) suppository 12.5-25 mg  12.5-25 mg Q4HRS PRN JASPAL Medina.O.       • acetaminophen (TYLENOL) tablet 650 mg  650 mg Q6HRS PRN JASPAL Medina.O.   650 mg at 07/05/17 2029   • Pain Pump  (patient supplied) Device   Continuous Clarence Morfin D.O.   Stopped at 06/17/17 1230     Last reviewed on 6/17/2017  9:45 AM by Ligia Frazier, Pharmacy Int    Quality  Measures:  Medications reviewed, Labs reviewed and Radiology images reviewed                        Assessment and Plan:    Acute hypoxemic respiratory failure  VDRF - intubated 6/19-6/27, re-intubated 6/30   - cont vent support   - SBT as tolerated   - T-piece trials as tolerated  Trach 7/2  Aspiration/HCAP pneumonia with left lung atelectasis   - S/P FOB 6/30 - atelectasis improved   - MSSA - completed Ancef - observe off antibiotics  Sepsis syndrome with shock - pulmonary source              - vasopressor support as necessary              - Marik protocol completed  PERALTA - observe off hydrocortisone  Acute pulmonary edema   - improved  Parapneumonic left pleural effusion   - S/P thoracentesis 6/25   - follow  Prior trach with trauma years ago  S/P aspiration pneumonia   - Klebsiella pneumonia - pansensitive              - completed abx x 7 days 6/26 - C3/Flagyl  New onset seizure likely secondary to hyponatremia              - Keppra, Phenobarb per neuro  Severe chronic underlying debility secondary to profound traumatic brain injury 20 years ago  Dysphagia - cont enteral G-tube feedings  Hyponatremia - improved with salt tabs   - follow closely    Critical Care Time:  35 minutes  09365  No time overlap  Time excludes procedures  Discussed with RN, RT, Team      IKaylene (Scribe), am scribing for, and in the presence of, Rivas Brooks M.D.    Electronically signed by: Kaylene Cote (Scribe), 7/7/2017    IRivas M.D. personally performed the services described in this documentation, as scribed by Kaylene Cote in my presence, and it is both accurate and complete.

## 2017-07-08 ENCOUNTER — APPOINTMENT (OUTPATIENT)
Dept: RADIOLOGY | Facility: MEDICAL CENTER | Age: 37
DRG: 004 | End: 2017-07-08
Attending: INTERNAL MEDICINE
Payer: COMMERCIAL

## 2017-07-08 LAB
ANION GAP SERPL CALC-SCNC: 7 MMOL/L (ref 0–11.9)
BASOPHILS # BLD AUTO: 0.7 % (ref 0–1.8)
BASOPHILS # BLD: 0.05 K/UL (ref 0–0.12)
BUN SERPL-MCNC: 18 MG/DL (ref 8–22)
CALCIUM SERPL-MCNC: 8.2 MG/DL (ref 8.5–10.5)
CHLORIDE SERPL-SCNC: 103 MMOL/L (ref 96–112)
CO2 SERPL-SCNC: 26 MMOL/L (ref 20–33)
COMMENT 1642: NORMAL
CREAT SERPL-MCNC: <0.2 MG/DL (ref 0.5–1.4)
EOSINOPHIL # BLD AUTO: 0.31 K/UL (ref 0–0.51)
EOSINOPHIL NFR BLD: 4.1 % (ref 0–6.9)
ERYTHROCYTE [DISTWIDTH] IN BLOOD BY AUTOMATED COUNT: 54.7 FL (ref 35.9–50)
GFR SERPL CREATININE-BSD FRML MDRD: >60 ML/MIN/1.73 M 2
GLUCOSE SERPL-MCNC: 98 MG/DL (ref 65–99)
HCT VFR BLD AUTO: 31.6 % (ref 42–52)
HGB BLD-MCNC: 10.2 G/DL (ref 14–18)
IMM GRANULOCYTES # BLD AUTO: 0.03 K/UL (ref 0–0.11)
IMM GRANULOCYTES NFR BLD AUTO: 0.4 % (ref 0–0.9)
LYMPHOCYTES # BLD AUTO: 1.54 K/UL (ref 1–4.8)
LYMPHOCYTES NFR BLD: 20.5 % (ref 22–41)
MAGNESIUM SERPL-MCNC: 1.8 MG/DL (ref 1.5–2.5)
MCH RBC QN AUTO: 28.8 PG (ref 27–33)
MCHC RBC AUTO-ENTMCNC: 32.3 G/DL (ref 33.7–35.3)
MCV RBC AUTO: 89.3 FL (ref 81.4–97.8)
MONOCYTES # BLD AUTO: 0.84 K/UL (ref 0–0.85)
MONOCYTES NFR BLD AUTO: 11.2 % (ref 0–13.4)
MORPHOLOGY BLD-IMP: NORMAL
NEUTROPHILS # BLD AUTO: 4.74 K/UL (ref 1.82–7.42)
NEUTROPHILS NFR BLD: 63.1 % (ref 44–72)
NRBC # BLD AUTO: 0 K/UL
NRBC BLD AUTO-RTO: 0 /100 WBC
PHOSPHATE SERPL-MCNC: 3 MG/DL (ref 2.5–4.5)
PLATELET # BLD AUTO: 499 K/UL (ref 164–446)
PMV BLD AUTO: ABNORMAL FL (ref 9–12.9)
POTASSIUM SERPL-SCNC: 3.9 MMOL/L (ref 3.6–5.5)
RBC # BLD AUTO: 3.54 M/UL (ref 4.7–6.1)
SODIUM SERPL-SCNC: 136 MMOL/L (ref 135–145)
WBC # BLD AUTO: 7.5 K/UL (ref 4.8–10.8)

## 2017-07-08 PROCEDURE — 700111 HCHG RX REV CODE 636 W/ 250 OVERRIDE (IP): Performed by: INTERNAL MEDICINE

## 2017-07-08 PROCEDURE — A9270 NON-COVERED ITEM OR SERVICE: HCPCS | Performed by: INTERNAL MEDICINE

## 2017-07-08 PROCEDURE — 71010 DX-CHEST-PORTABLE (1 VIEW): CPT

## 2017-07-08 PROCEDURE — A9270 NON-COVERED ITEM OR SERVICE: HCPCS

## 2017-07-08 PROCEDURE — 700102 HCHG RX REV CODE 250 W/ 637 OVERRIDE(OP): Performed by: INTERNAL MEDICINE

## 2017-07-08 PROCEDURE — 80048 BASIC METABOLIC PNL TOTAL CA: CPT

## 2017-07-08 PROCEDURE — 94668 MNPJ CHEST WALL SBSQ: CPT

## 2017-07-08 PROCEDURE — 94640 AIRWAY INHALATION TREATMENT: CPT

## 2017-07-08 PROCEDURE — 700102 HCHG RX REV CODE 250 W/ 637 OVERRIDE(OP)

## 2017-07-08 PROCEDURE — 770022 HCHG ROOM/CARE - ICU (200)

## 2017-07-08 PROCEDURE — 99233 SBSQ HOSP IP/OBS HIGH 50: CPT | Performed by: HOSPITALIST

## 2017-07-08 PROCEDURE — 700101 HCHG RX REV CODE 250: Performed by: INTERNAL MEDICINE

## 2017-07-08 PROCEDURE — 94669 MECHANICAL CHEST WALL OSCILL: CPT

## 2017-07-08 PROCEDURE — 700102 HCHG RX REV CODE 250 W/ 637 OVERRIDE(OP): Performed by: HOSPITALIST

## 2017-07-08 PROCEDURE — 83735 ASSAY OF MAGNESIUM: CPT

## 2017-07-08 PROCEDURE — 700102 HCHG RX REV CODE 250 W/ 637 OVERRIDE(OP): Performed by: PSYCHIATRY & NEUROLOGY

## 2017-07-08 PROCEDURE — 84100 ASSAY OF PHOSPHORUS: CPT

## 2017-07-08 PROCEDURE — 85025 COMPLETE CBC W/AUTO DIFF WBC: CPT

## 2017-07-08 PROCEDURE — 700111 HCHG RX REV CODE 636 W/ 250 OVERRIDE (IP)

## 2017-07-08 PROCEDURE — A9270 NON-COVERED ITEM OR SERVICE: HCPCS | Performed by: PSYCHIATRY & NEUROLOGY

## 2017-07-08 RX ORDER — POTASSIUM CHLORIDE 1.5 G/1.58G
20 POWDER, FOR SOLUTION ORAL ONCE
Status: COMPLETED | OUTPATIENT
Start: 2017-07-08 | End: 2017-07-08

## 2017-07-08 RX ORDER — MAGNESIUM SULFATE HEPTAHYDRATE 40 MG/ML
2 INJECTION, SOLUTION INTRAVENOUS ONCE
Status: COMPLETED | OUTPATIENT
Start: 2017-07-08 | End: 2017-07-08

## 2017-07-08 RX ORDER — LEVALBUTEROL INHALATION SOLUTION 1.25 MG/3ML
1.25 SOLUTION RESPIRATORY (INHALATION)
Status: DISCONTINUED | OUTPATIENT
Start: 2017-07-08 | End: 2017-07-11 | Stop reason: HOSPADM

## 2017-07-08 RX ADMIN — MICONAZOLE NITRATE: 20 CREAM TOPICAL at 16:00

## 2017-07-08 RX ADMIN — FAMOTIDINE 20 MG: 20 TABLET, FILM COATED ORAL at 20:55

## 2017-07-08 RX ADMIN — MAGNESIUM SULFATE IN WATER 2 G: 40 INJECTION, SOLUTION INTRAVENOUS at 08:52

## 2017-07-08 RX ADMIN — SODIUM CHLORIDE TAB 1 GM 1 G: 1 TAB at 08:52

## 2017-07-08 RX ADMIN — LEVETIRACETAM 1500 MG: 100 SOLUTION ORAL at 08:51

## 2017-07-08 RX ADMIN — POTASSIUM CHLORIDE 20 MEQ: 1.5 POWDER, FOR SOLUTION ORAL at 08:52

## 2017-07-08 RX ADMIN — ENOXAPARIN SODIUM 30 MG: 100 INJECTION SUBCUTANEOUS at 20:55

## 2017-07-08 RX ADMIN — IPRATROPIUM BROMIDE AND ALBUTEROL SULFATE 3 ML: .5; 3 SOLUTION RESPIRATORY (INHALATION) at 09:37

## 2017-07-08 RX ADMIN — SODIUM CHLORIDE TAB 1 GM 1 G: 1 TAB at 17:32

## 2017-07-08 RX ADMIN — SENNOSIDES AND DOCUSATE SODIUM 2 TABLET: 8.6; 5 TABLET ORAL at 08:51

## 2017-07-08 RX ADMIN — IPRATROPIUM BROMIDE AND ALBUTEROL SULFATE 3 ML: .5; 3 SOLUTION RESPIRATORY (INHALATION) at 15:49

## 2017-07-08 RX ADMIN — SODIUM CHLORIDE TAB 1 GM 1 G: 1 TAB at 12:07

## 2017-07-08 RX ADMIN — MICONAZOLE NITRATE: 20 CREAM TOPICAL at 10:00

## 2017-07-08 RX ADMIN — PHENOBARBITAL 70 MG: 20 ELIXIR ORAL at 08:53

## 2017-07-08 RX ADMIN — CHLORHEXIDINE GLUCONATE 15 ML: 1.2 RINSE ORAL at 08:57

## 2017-07-08 RX ADMIN — SENNOSIDES AND DOCUSATE SODIUM 2 TABLET: 8.6; 5 TABLET ORAL at 20:55

## 2017-07-08 RX ADMIN — CHLORHEXIDINE GLUCONATE 15 ML: 1.2 RINSE ORAL at 20:55

## 2017-07-08 RX ADMIN — FAMOTIDINE 20 MG: 20 TABLET, FILM COATED ORAL at 08:51

## 2017-07-08 RX ADMIN — IPRATROPIUM BROMIDE AND ALBUTEROL SULFATE 3 ML: .5; 3 SOLUTION RESPIRATORY (INHALATION) at 06:41

## 2017-07-08 RX ADMIN — ACETAMINOPHEN 650 MG: 325 TABLET, FILM COATED ORAL at 17:50

## 2017-07-08 RX ADMIN — LEVETIRACETAM 1500 MG: 100 SOLUTION ORAL at 20:52

## 2017-07-08 RX ADMIN — IPRATROPIUM BROMIDE AND ALBUTEROL SULFATE 3 ML: .5; 3 SOLUTION RESPIRATORY (INHALATION) at 03:16

## 2017-07-08 RX ADMIN — PHENOBARBITAL 70 MG: 20 ELIXIR ORAL at 20:52

## 2017-07-08 NOTE — FLOWSHEET NOTE
17 2154   Ventilator Identifier   Ventilator Number 21   General Vent Information   Ventilator Red Plug Ventilator Plugged into Red Electrical Outlet   Vent Temp (Celsius) 37   Pulse Oximetry 98 %   Heart Rate (Monitored) 90   Resuscitation Bag Resuscitation Bag and Mask at Bedside and Checked   Vent Alarms   Ventilation Alarms Reviewed / Activated Yes   Upper Pressure Limit (HI PIP) Alarm 40   Low VE (LPM) Alarm 3   High Respiratory Rate Alarm 40   Low Respiratory Rate Alarm 6   Low VT Alarm 0.11   Apnea Parameters Checked / Activated Yes   Liriano Vent   Liriano Vent Yes   Liriano Vent Mode PSMIV-APV   [place]ryden Conventional Settings   Rate (breaths/min) 15   Vt Target (mL) 410   TI (Seconds) 1   PEEP/CPAP 8   P Support 5   FiO2 30   Sensitivity Setting Flow Trigger   Other Settings 5   Dryden Measured Parameters   P Peak (PIP) 17    Minute Volume 6.4   Control VTE (exp VT) 422   f Total (Breaths/Min) 15   P MEAN 10   Trache Weaning   Placed on T-Piece/Collar and Stopped Vent Clock Yes  (stopped)   Length of T-Piece Trial (Hours) 13 hours   Vent Inline Medication   #(Single Treatment) Vent Inline Medication Yes   Secretions   How Sputum Obtained Tracheal   Sputum Amount Small   Sputum Color White   Sputum Consistency Thin   Suction Frequency Suctioned Once or Twice Per Encounter   Airway Group Standard Cuffed Trach Tracheostomy 6.0   Placement Date/Time: 17 1403   Airway Placement: Central  Airway Type: Standard Cuffed Trach  Airway Location: Tracheostomy  Airway Size: 6.0  Inserted In: OR  Inserted by: MD  Procedure Intervention: Under Anesthesia   Airway Tube Secured Ties;Velcro Attachment   Cuff Pressure cmH2O (R.C.) 28   T-piece trial concluded for rest, no distress noted. Trial to resume in the early A.M.

## 2017-07-08 NOTE — CARE PLAN
Problem: Ventilation Defect:  Goal: Ability to achieve and maintain unassisted ventilation or tolerate decreased levels of ventilator support  Outcome: PROGRESSING AS EXPECTED  Pt has been on Aerosol pole for 12 hours at this time   Intervention: Monitor ventilator weaning response  12 hour T Piece at this time, goal is as tolerated then DC vent if pt makes 24 hours   Intervention: Manage ventilation therapy by monitoring diagnostic test results  Adult Ventilation Update    Total Vent Days: 9      Patient Lines/Drains/Airways Status    Active Airway      Name: Placement date: Placement time: Site: Days:     Airway Group Standard Cuffed Trach Tracheostomy 6.0 07/02/17  1403  Tracheostomy  7                 Pt has been on T Piece for 12 hours, today (pt did 16 hours yesterday).  The goal is 24 hours(as pt tolerates) then DC vent.  Secretions were minimal today.  Mostly clear/white, thick/thin.  One moderate amount of yellow tinged thick secretions this shift.  CPT was done throughout shift and pt is on 5L/30%

## 2017-07-08 NOTE — CARE PLAN
Problem: Skin Integrity  Goal: Risk for impaired skin integrity will decrease  Outcome: PROGRESSING AS EXPECTED  Skin inspected and assessed during assessments. q2hr turns in place and appropriate skin/wound care protocols in place.    Problem: Respiratory:  Goal: Respiratory status will improve  Outcome: PROGRESSING AS EXPECTED  Pt doing very well on t-piece during the day. Collaboration with RT to help optimize pt's respiratory status and oxygenation

## 2017-07-08 NOTE — PROGRESS NOTES
[facility] Hospitalist Progress Note    Date of Service: 2017    Chief Complaint  36 y.o. male admitted 2017 with altered mental status seizures and aspiration. H/o   Severe TBI. Intubated Initially improved then had repeat event with respiratory failure and left lung white, reintubation out s/p improvement with bronc.      Interval Problem Update    ROS limited by mental status  Per Mom back to his normal  SBT x 16 hrs, rested overnight    Consultants/Specialty  Pulmonology    Disposition  Cont in ICU for pulmonary toilet  TPH planned for Tue  Cont to wean from vent in house.            Review of Systems   Unable to perform ROS: mental acuity      Physical Exam  Laboratory/Imaging   Hemodynamics  No data recorded.   Monitored Temp: 36.7 °C (98.1 °F)  Pulse  Av.4  Min: 42  Max: 131 Heart Rate (Monitored): 89  NIBP: 124/81 mmHg     Respiratory  Liriano Vent Mode: PSMIV-APV, Rate (breaths/min): 15, PEEP/CPAP: 8, PEEP/CPAP: 8, FiO2: 30, P Peak (PIP): 22, P MEAN: 11 #Aerosol Therapy / Airway Management: T-Piece, Aerosol Humidity Temp (celsius): 34.7 Respiration: 15, Pulse Oximetry: 97 %, O2 Daily Delivery Respiratory : T-Piece     Given By:: Trache, PEP/CPT Method: Percussion/Vibration, Work Of Breathing / Effort: Mild  RUL Breath Sounds: Clear, RML Breath Sounds: Clear After Suction;Diminished, RLL Breath Sounds: Diminished, JONATAN Breath Sounds: Clear, LLL Breath Sounds: Diminished    Fluids    Intake/Output Summary (Last 24 hours) at 17 1452  Last data filed at 17 1400   Gross per 24 hour   Intake   1770 ml   Output   2440 ml   Net   -670 ml       Nutrition  Orders Placed This Encounter   Procedures   • DIET NPO     Standing Status: Standing      Number of Occurrences: 1      Standing Expiration Date:      Order Specific Question:  Restrict to:     Answer:  With Tube Feed [4]     Physical Exam   Constitutional: He appears well-developed and well-nourished. No distress.   HENT:   Head:  Normocephalic and atraumatic.   Eyes: Conjunctivae are normal.   Neck: No JVD present.   Trach   Cardiovascular: Normal rate.  Exam reveals no gallop.    No murmur heard.  Pulmonary/Chest: Effort normal. No stridor. No respiratory distress. He has no wheezes. He has rales.   Abdominal: Soft. There is no tenderness. There is no rebound and no guarding.   Musculoskeletal: He exhibits no edema.   Neurological: He is alert.   Alert, attends but does not follow.  Multiple long standing contractures   Skin: Skin is warm and dry. No rash noted. He is not diaphoretic.   Nursing note and vitals reviewed.      Recent Labs      07/06/17 0330 07/07/17 0440 07/08/17 0415   WBC  7.9  7.5  7.5   RBC  3.31*  3.19*  3.54*   HEMOGLOBIN  9.7*  9.1*  10.2*   HEMATOCRIT  29.8*  28.6*  31.6*   MCV  90.0  89.7  89.3   MCH  29.3  28.5  28.8   MCHC  32.6*  31.8*  32.3*   RDW  55.5*  54.2*  54.7*   PLATELETCT  480*  480*  499*   MPV  8.5*  8.5*  SEE COMMENT     Recent Labs      07/06/17 0330 07/07/17 0440 07/08/17 0415   SODIUM  136  136  136   POTASSIUM  4.3  3.4*  3.9   CHLORIDE  106  104  103   CO2  25  27  26   GLUCOSE  106*  107*  98   BUN  15  14  18   CREATININE  <0.20*  <0.20*  <0.20*   CALCIUM  8.0*  8.1*  8.2*                      Assessment/Plan     Status epilepticus (CMS-[facility]) (present on admission)  Assessment & Plan  - had continuous seizure activity for approximately 48 hours suspected from pneumonia.   - neurology following  - on phenobarbital and keppra  -no further events    Aspiration pneumonia (CMS-[facility]CC) (present on admission)  Assessment & Plan  - pt unable to clear secretions   -sputum + Klebsiella completed Rocephin  -blood cultures neg  Bronchoscopy 6/25. Again on 6/29  Thoracentesis with 300 ml off on 6/25.  - s/p trach 7/2       Sepsis (CMS-[facility])  Assessment & Plan  Off pressors  Completed Ancef for MSSA        Acute respiratory failure with hypoxia (CMS-HCC) (present on admission)  Assessment &  Plan  Cont to wean from vent  Trach in place and tolerating well      Hyponatremia (present on admission)  Assessment & Plan  Chronic  Stable on po NaCl  Cont to follow    Hypoglycemia (present on admission)  Assessment & Plan  - resolved    TBI (traumatic brain injury) (CMS-HCC) (present on admission)  Assessment & Plan  Since age 17 years.  With quadriplegia   Baclofen pump followed at [facility]--his mother states it does not need refilled for a few months.      Radiology images reviewed, Labs reviewed, Medications reviewed and EKG reviewed  Waldrop catheter: No Waldrop      DVT Prophylaxis: Enoxaparin (Lovenox)  DVT prophylaxis - mechanical: SCDs  Ulcer prophylaxis: Not indicated  Antibiotics: Treating active infection/contamination beyond 24 hours perioperative coverage     35min's with Mom reviewing current issues, dc planning etc.  All questions answered

## 2017-07-08 NOTE — DIETARY
Nutrition support note:  Received call from RN that MD has given pt's mom the ok to provide homemade shakes via PEG (like pt gets at home).  Spoke with RN and mom.  Each shake (he gets three per day at home) provides ~800 kcals and ~30 gm pro.  Current TF is with Impact 1.5 at 50 mL/hr which provides 1800 kcals and 113 gm pro and 924 mL free water per day.  Mom is providing healthful ingredients in the shakes; RD does not have any concerns with mom giving these if ok with MD.  RN will just have to help mom manage fluids. Will change TF to 12 hours at noc only to provide ~50% of needs until shake tolerance is established.    Recommendation/plan: mom will begin giving pt homemade shake during the day.  Mom will give one shake today (it takes her 4 hours to get it in).  Hold TF. Change TF to night only, Impact 1.5 at 50 mL/hr from 1800 - 0600).  If pt tolerates shake, mom can give two shakes tomorrow (with same noc TF).  When pt tolerating all three shakes per day, can d/c Impact TF.  Plan reviewed with RN and mom. Brought mom some greek yogurt to use for her shakes.  RD following.

## 2017-07-08 NOTE — PROGRESS NOTES
Pulmonary Critical Care Progress Note        Date of service:  7/8/2017    Interval Events:  24 hour interval history reviewed  Reason for visit:  Respiratory failure, pneumonia, atelectasis  Unable to provide CC or ROS due to chronic encephalopathy        SR  CXR with slightly improved LLL      PFSH:  No change.    Respiratory:  Liriano Vent Mode: PSMIV-APV, Rate (breaths/min): 15, Vt Target (mL): 410, PEEP/CPAP: 8, FiO2: 30, P Support: 5, Control VTE (exp VT): 288  Pulse Oximetry: 98 %  CXR with slightly improved LLL opacification  Coarse crackles bilaterally    HemoDynamics:  Pulse: 87, Heart Rate (Monitored): 89  NIBP: 133/78 mmHg    SR    Neuro:  Eyes open, does not follow for me    Fluids:  Intake/Output       07/06/17 0700 - 07/07/17 0659 07/07/17 0700 - 07/08/17 0659 07/08/17 0700 - 07/09/17 0659      0700-1859 4698-2615 Total 0700-1859 1900-0659 Total 9425-0715 0144-7413 Total       Intake    I.V.  220  280 500  390  40 430  --  -- --    Magnesium Sulfate Volume -- -- -- 50 -- 50 -- -- --    Normal Saline 120 180 300 240 40 280 -- -- --    IV Piggyback Volume (IV Piggyback) 100 100 200 100 -- 100 -- -- --    Other  120  30 150  120  -- 120  --  -- --    Medications (P.O./ Enteral Liquids) 120 30 150 120 -- 120 -- -- --    Enteral  690  630 1320  750  500 1250  --  -- --    Enteral Volume   -- -- --    Free Water / Tube Flush 90 30 120 150 -- 150 -- -- --    Total Intake 1147 734 5977 4925 886 1490 -- -- --       Output    Urine  1275  1600 2875  1815  500 2315  --  -- --    Indwelling Cathether 1275 1600 2875 2812 596 5025 -- -- --    Stool  --  -- --  --  -- --  --  -- --    Number of Times Stooled 0 x 0 x 0 x 1 x -- 1 x -- -- --    Total Output 1275 1600 2875 0027 610 5755 -- -- --       Net I/O     -245 -660 -905 -555 40 -069 -- -- --        Weight: 54.8 kg (120 lb 13 oz)  Recent Labs      07/06/17   0330  07/07/17   0440   SODIUM  136  136   POTASSIUM  4.3  3.4*   CHLORIDE  106   104   CO2  25  27   BUN  15  14   CREATININE  <0.20*  <0.20*   MAGNESIUM  2.0  1.6   PHOSPHORUS  3.4  3.1   CALCIUM  8.0*  8.1*       GI/Nutrition:  Abd soft ND/NT  Tolerating enteral TF    Liver Function  Recent Labs      17   0330  17   044   GLUCOSE  106*  107*       Heme:  Recent Labs      17   0330  17   0415   RBC  3.31*  3.19*  3.54*   HEMOGLOBIN  9.7*  9.1*  10.2*   HEMATOCRIT  29.8*  28.6*  31.6*   PLATELETCT  480*  480*  499*       Infectious Disease:  Monitored Temp  Av.5 °C (97.7 °F)  Min: 35.7 °C (96.3 °F)  Max: 37.2 °C (99 °F)     Recent Labs      17   03317   0415   WBC  7.9  7.5  7.5   NEUTSPOLYS  62.00  67.00  63.10   LYMPHOCYTES  22.60  17.60*  20.50*   MONOCYTES  11.30  10.60  11.20   EOSINOPHILS  3.30  3.90  4.10   BASOPHILS  0.40  0.40  0.70     Current Facility-Administered Medications   Medication Dose Frequency Provider Last Rate Last Dose   • miconazole 2%-zinc oxide (RADHA) topical cream   Q6HRS PRN Attila Crowder D.O.       • sodium chloride (SALT) tablet 1 g  1 g TID WITH MEALS Rivas Dee M.D.   1 g at 17   • levetiracetam (KEPPRA) 100 MG/ML solution 1,500 mg  1,500 mg Q12HRS Martell Hunter M.D.   1,500 mg at 17   • Respiratory Care per Protocol   Continuous RT Clarence Pro M.D.       • chlorhexidine (PERIDEX) 0.12 % solution 15 mL  15 mL BID Clarence Pro M.D.   15 mL at 17   • lidocaine (XYLOCAINE) 1%  injection  1-2 mL Q30 MIN PRN Clarence Pro M.D.       • MD ALERT...Adult ICU Electrolyte Replacement per Pharmacy Protocol   pharmacy to dose Clarence Pro M.D.       • fentaNYL (SUBLIMAZE) injection 25 mcg  25 mcg Q HOUR PRN Clarence Pro M.D.        Or   • fentaNYL (SUBLIMAZE) injection 50 mcg  50 mcg Q HOUR PRN Clarence Pro M.D.   50 mcg at 17 0000    Or   • fentaNYL (SUBLIMAZE) injection 100 mcg  100 mcg Q HOUR  PRN Clarence Pro M.D.       • ipratropium-albuterol (DUONEB) nebulizer solution 3 mL  3 mL Q2HRS PRN (RT) Clarence Pro M.D.       • ipratropium-albuterol (DUONEB) nebulizer solution 3 mL  3 mL Q4HRS (RT) Clarence Pro M.D.   3 mL at 07/08/17 0316   • famotidine (PEPCID) tablet 20 mg  20 mg BID Gabrielle Vargas PHARMD   20 mg at 07/07/17 2043   • oxycodone immediate-release (ROXICODONE) tablet 5 mg  5 mg Q4HRS PRN Kev Araiza M.D.   5 mg at 06/24/17 1825   • PHENobarbital solution 70 mg  70 mg BID Martell Hunter M.D.   70 mg at 07/07/17 2043   • enoxaparin (LOVENOX) inj 30 mg  30 mg DAILY Clarence Morfin D.O.   30 mg at 07/07/17 2042   • senna-docusate (PERICOLACE or SENOKOT S) 8.6-50 MG per tablet 2 Tab  2 Tab BID Kev Araiza M.D.   2 Tab at 07/07/17 2043    And   • polyethylene glycol/lytes (MIRALAX) PACKET 1 Packet  1 Packet QDAY PRBRIDGETTE Araiza M.D.   1 Packet at 06/25/17 2028    And   • magnesium hydroxide (MILK OF MAGNESIA) suspension 30 mL  30 mL QDAY PRBRIDGETTE Araiza M.D.   30 mL at 06/28/17 1305    And   • bisacodyl (DULCOLAX) suppository 10 mg  10 mg QDAY PRBRIDGETTE Araiza M.D.   10 mg at 07/07/17 1206   • glucose 4 g chewable tablet 16 g  16 g Q15 MIN PRBRIDGETTE Araiza M.D.        And   • dextrose 50% (D50W) injection 25 mL  25 mL Q15 MIN SUSAN Araiza M.D.   25 mL at 06/20/17 1840   • ondansetron (ZOFRAN) syringe/vial injection 4 mg  4 mg Q4HRS PRN FARHAT MedinaO.   4 mg at 07/05/17 1429   • ondansetron (ZOFRAN ODT) dispertab 4 mg  4 mg Q4HRS PRN FARHAT MedinaO.       • promethazine (PHENERGAN) tablet 12.5-25 mg  12.5-25 mg Q4HRS PRN FARHAT MedinaO.       • promethazine (PHENERGAN) suppository 12.5-25 mg  12.5-25 mg Q4HRS PRN FARHAT MedinaO.       • acetaminophen (TYLENOL) tablet 650 mg  650 mg Q6HRS PRN FARHAT MedinaO.   650 mg at 07/05/17 2029   • Pain Pump (patient supplied) Device   Continuous Clarence Morfin D.O.    Stopped at 06/17/17 1230     Last reviewed on 6/17/2017  9:45 AM by Ligia Frazier, Pharmacy Int    Quality  Measures:  Medications reviewed, Labs reviewed and Radiology images reviewed                        Assessment and Plan:    Acute hypoxemic respiratory failure  VDRF - intubated 6/19-6/27, re-intubated 6/30   - cont vent support   - SBT as tolerated   - T-piece trials as tolerated  Trach 7/2  Aspiration/HCAP pneumonia with left lung atelectasis   - S/P FOB 6/30 - atelectasis improved   - MSSA - completed Ancef - observe off antibiotics  Sepsis syndrome with shock - pulmonary source   - improved              - Marik protocol completed  PERALTA - observe off hydrocortisone  Acute pulmonary edema   - improved  Parapneumonic left pleural effusion   - S/P thoracentesis 6/25   - follow  Prior trach with trauma years ago  S/P aspiration pneumonia   - Klebsiella pneumonia - pansensitive              - completed abx x 7 days 6/26 - C3/Flagyl  New onset seizure likely secondary to hyponatremia              - Keppra, Phenobarb per neuro  Severe chronic underlying debility secondary to profound traumatic brain injury 20 years ago  Dysphagia - cont enteral G-tube feedings  Hyponatremia - improved with salt tabs    Critical Care Time:  32 minutes  [id number]  No time overlap  Time excludes procedures  Discussed with RN, RT, Team      I, Kaylene Cote (Scribe), am scribing for, and in the presence of, Rivas Brooks M.D.    Electronically signed by: Kaylene Cote (Scribe), 7/8/2017    IRivas M.D. personally performed the services described in this documentation, as scribed by Kaylene Cote in my presence, and it is both accurate and complete.

## 2017-07-08 NOTE — CARE PLAN
Problem: Ventilation Defect:  Goal: Ability to achieve and maintain unassisted ventilation or tolerate decreased levels of ventilator support  Outcome: PROGRESSING AS EXPECTED  Pt doing well off vent   Intervention: Monitor ventilator weaning response  Pt has been on the T Piece for 8 hours at this time with no signs of resp distress.    Intervention: Manage ventilation therapy by monitoring diagnostic test results  Adult Ventilation Update    Total Vent Days: 8      Patient Lines/Drains/Airways Status    Active Airway      Name: Placement date: Placement time: Site: Days:     Airway Group Standard Cuffed Trach Tracheostomy 6.0 07/02/17  1403  Tracheostomy  5                 Rest settings are    CMV  15  410  8  30%

## 2017-07-09 ENCOUNTER — APPOINTMENT (OUTPATIENT)
Dept: RADIOLOGY | Facility: MEDICAL CENTER | Age: 37
DRG: 004 | End: 2017-07-09
Attending: INTERNAL MEDICINE
Payer: COMMERCIAL

## 2017-07-09 LAB
ANION GAP SERPL CALC-SCNC: 8 MMOL/L (ref 0–11.9)
BASOPHILS # BLD AUTO: 0.6 % (ref 0–1.8)
BASOPHILS # BLD: 0.04 K/UL (ref 0–0.12)
BUN SERPL-MCNC: 14 MG/DL (ref 8–22)
CALCIUM SERPL-MCNC: 8 MG/DL (ref 8.5–10.5)
CHLORIDE SERPL-SCNC: 103 MMOL/L (ref 96–112)
CO2 SERPL-SCNC: 24 MMOL/L (ref 20–33)
CREAT SERPL-MCNC: <0.2 MG/DL (ref 0.5–1.4)
EOSINOPHIL # BLD AUTO: 0.28 K/UL (ref 0–0.51)
EOSINOPHIL NFR BLD: 4.5 % (ref 0–6.9)
ERYTHROCYTE [DISTWIDTH] IN BLOOD BY AUTOMATED COUNT: 55.2 FL (ref 35.9–50)
GFR SERPL CREATININE-BSD FRML MDRD: >60 ML/MIN/1.73 M 2
GLUCOSE SERPL-MCNC: 100 MG/DL (ref 65–99)
HCT VFR BLD AUTO: 31.3 % (ref 42–52)
HGB BLD-MCNC: 10.3 G/DL (ref 14–18)
IMM GRANULOCYTES # BLD AUTO: 0.03 K/UL (ref 0–0.11)
IMM GRANULOCYTES NFR BLD AUTO: 0.5 % (ref 0–0.9)
LYMPHOCYTES # BLD AUTO: 1.25 K/UL (ref 1–4.8)
LYMPHOCYTES NFR BLD: 20.3 % (ref 22–41)
MAGNESIUM SERPL-MCNC: 1.8 MG/DL (ref 1.5–2.5)
MCH RBC QN AUTO: 29.4 PG (ref 27–33)
MCHC RBC AUTO-ENTMCNC: 32.9 G/DL (ref 33.7–35.3)
MCV RBC AUTO: 89.4 FL (ref 81.4–97.8)
MONOCYTES # BLD AUTO: 0.7 K/UL (ref 0–0.85)
MONOCYTES NFR BLD AUTO: 11.3 % (ref 0–13.4)
NEUTROPHILS # BLD AUTO: 3.87 K/UL (ref 1.82–7.42)
NEUTROPHILS NFR BLD: 62.8 % (ref 44–72)
NRBC # BLD AUTO: 0 K/UL
NRBC BLD AUTO-RTO: 0 /100 WBC
PHOSPHATE SERPL-MCNC: 3.1 MG/DL (ref 2.5–4.5)
PLATELET # BLD AUTO: 473 K/UL (ref 164–446)
PMV BLD AUTO: 8.1 FL (ref 9–12.9)
POTASSIUM SERPL-SCNC: 3.6 MMOL/L (ref 3.6–5.5)
RBC # BLD AUTO: 3.5 M/UL (ref 4.7–6.1)
SODIUM SERPL-SCNC: 135 MMOL/L (ref 135–145)
WBC # BLD AUTO: 6.2 K/UL (ref 4.8–10.8)

## 2017-07-09 PROCEDURE — 700102 HCHG RX REV CODE 250 W/ 637 OVERRIDE(OP): Performed by: INTERNAL MEDICINE

## 2017-07-09 PROCEDURE — 94669 MECHANICAL CHEST WALL OSCILL: CPT

## 2017-07-09 PROCEDURE — 700102 HCHG RX REV CODE 250 W/ 637 OVERRIDE(OP): Performed by: PSYCHIATRY & NEUROLOGY

## 2017-07-09 PROCEDURE — 700101 HCHG RX REV CODE 250: Performed by: INTERNAL MEDICINE

## 2017-07-09 PROCEDURE — 94640 AIRWAY INHALATION TREATMENT: CPT

## 2017-07-09 PROCEDURE — A9270 NON-COVERED ITEM OR SERVICE: HCPCS | Performed by: INTERNAL MEDICINE

## 2017-07-09 PROCEDURE — 700102 HCHG RX REV CODE 250 W/ 637 OVERRIDE(OP)

## 2017-07-09 PROCEDURE — 99232 SBSQ HOSP IP/OBS MODERATE 35: CPT | Performed by: HOSPITALIST

## 2017-07-09 PROCEDURE — 80048 BASIC METABOLIC PNL TOTAL CA: CPT

## 2017-07-09 PROCEDURE — 302101 FENESTRATED FOAM: Performed by: HOSPITALIST

## 2017-07-09 PROCEDURE — A9270 NON-COVERED ITEM OR SERVICE: HCPCS

## 2017-07-09 PROCEDURE — 94668 MNPJ CHEST WALL SBSQ: CPT

## 2017-07-09 PROCEDURE — 700111 HCHG RX REV CODE 636 W/ 250 OVERRIDE (IP): Performed by: INTERNAL MEDICINE

## 2017-07-09 PROCEDURE — 71010 DX-CHEST-PORTABLE (1 VIEW): CPT

## 2017-07-09 PROCEDURE — 85025 COMPLETE CBC W/AUTO DIFF WBC: CPT

## 2017-07-09 PROCEDURE — 84100 ASSAY OF PHOSPHORUS: CPT

## 2017-07-09 PROCEDURE — 770022 HCHG ROOM/CARE - ICU (200)

## 2017-07-09 PROCEDURE — 700111 HCHG RX REV CODE 636 W/ 250 OVERRIDE (IP)

## 2017-07-09 PROCEDURE — A9270 NON-COVERED ITEM OR SERVICE: HCPCS | Performed by: PSYCHIATRY & NEUROLOGY

## 2017-07-09 PROCEDURE — 83735 ASSAY OF MAGNESIUM: CPT

## 2017-07-09 RX ORDER — MAGNESIUM SULFATE HEPTAHYDRATE 40 MG/ML
2 INJECTION, SOLUTION INTRAVENOUS ONCE
Status: COMPLETED | OUTPATIENT
Start: 2017-07-09 | End: 2017-07-09

## 2017-07-09 RX ORDER — POTASSIUM CHLORIDE 1.5 G/1.58G
40 POWDER, FOR SOLUTION ORAL ONCE
Status: COMPLETED | OUTPATIENT
Start: 2017-07-09 | End: 2017-07-09

## 2017-07-09 RX ADMIN — FAMOTIDINE 20 MG: 20 TABLET, FILM COATED ORAL at 21:08

## 2017-07-09 RX ADMIN — POTASSIUM CHLORIDE 40 MEQ: 1.5 POWDER, FOR SOLUTION ORAL at 12:05

## 2017-07-09 RX ADMIN — LEVETIRACETAM 1500 MG: 100 SOLUTION ORAL at 09:20

## 2017-07-09 RX ADMIN — LEVALBUTEROL 1.25 MG: 1.25 SOLUTION RESPIRATORY (INHALATION) at 09:44

## 2017-07-09 RX ADMIN — SENNOSIDES AND DOCUSATE SODIUM 2 TABLET: 8.6; 5 TABLET ORAL at 21:08

## 2017-07-09 RX ADMIN — CHLORHEXIDINE GLUCONATE 15 ML: 1.2 RINSE ORAL at 21:08

## 2017-07-09 RX ADMIN — PHENOBARBITAL 70 MG: 20 ELIXIR ORAL at 09:19

## 2017-07-09 RX ADMIN — PHENOBARBITAL 70 MG: 20 ELIXIR ORAL at 21:08

## 2017-07-09 RX ADMIN — LEVALBUTEROL 1.25 MG: 1.25 SOLUTION RESPIRATORY (INHALATION) at 06:17

## 2017-07-09 RX ADMIN — MICONAZOLE NITRATE: 20 CREAM TOPICAL at 09:26

## 2017-07-09 RX ADMIN — SENNOSIDES AND DOCUSATE SODIUM 2 TABLET: 8.6; 5 TABLET ORAL at 09:19

## 2017-07-09 RX ADMIN — ENOXAPARIN SODIUM 30 MG: 100 INJECTION SUBCUTANEOUS at 21:08

## 2017-07-09 RX ADMIN — LEVALBUTEROL 1.25 MG: 1.25 SOLUTION RESPIRATORY (INHALATION) at 18:52

## 2017-07-09 RX ADMIN — FAMOTIDINE 20 MG: 20 TABLET, FILM COATED ORAL at 09:20

## 2017-07-09 RX ADMIN — CHLORHEXIDINE GLUCONATE 15 ML: 1.2 RINSE ORAL at 09:20

## 2017-07-09 RX ADMIN — LEVETIRACETAM 1500 MG: 100 SOLUTION ORAL at 21:09

## 2017-07-09 RX ADMIN — MAGNESIUM SULFATE IN WATER 2 G: 40 INJECTION, SOLUTION INTRAVENOUS at 09:21

## 2017-07-09 RX ADMIN — SODIUM CHLORIDE TAB 1 GM 1 G: 1 TAB at 09:20

## 2017-07-09 NOTE — CARE PLAN
Problem: Bronchoconstriction:  Goal: Improve in air movement and diminished wheezing  Intervention: Implement inhaled treatments  Respiratory Therapy Update     Trache day 8  Pt has been on t-piece trail since 0400 7/8/17. Tolerating it well    Interdisciplinary Plan of Care-Goals (Indications)  Obstructive Ventilatory Defect or Pulmonary Disease without Obvious Obstruction: History / Diagnosis (07/08/17 1604)  Bronchopulmonary Hygiene Indications: Difficulty with Secretion Clearance (07/09/17 0232)  Hyperinflation Protocol Indications: Physical Exam (06/29/17 0746)  Interdisciplinary Plan of Care-Outcomes   Bronchodilator Outcome: Patient at Stable Baseline (07/08/17 1604)  Bronchopulmonary Hygiene Outcome: Optimal Hydration with Moderate or Less Sputum Production (07/09/17 0232)  Hyperinflation Protocol Goals/Outcome: Stable Vital Capacity x24 hrs and Patient Understands / uses I.S. (06/29/17 0746)    #PEP/CPT Manual Initial: Initial (06/27/17 1513)     #SVN Performed: Held during my shift. Pt's mother was concerned about his HR and wanted to give him a break from the bronchodilator treatments.    Cough: Moist;Productive;Strong (07/09/17 0400)  Sputum Amount: Moderate (07/09/17 0400)  Sputum Color: White;Clear;Yellow (07/09/17 0400)  Sputum Consistency: Thick;Thin (07/09/17 0400)    O2 (FiO2): 30 (07/09/17 0232)  O2 (LPM): 5 (07/09/17 0400)  O2 Daily Delivery Respiratory : T-Piece (07/09/17 0232)    Breath Sounds  Pre/Post Intervention: Post Intervention Assessment (07/09/17 0232)  RUL Breath Sounds: Clear (07/09/17 0400)  RML Breath Sounds: Clear After Suction;Diminished (07/09/17 0400)  RLL Breath Sounds: Diminished (07/09/17 0400)  JONATAN Breath Sounds: Clear (07/09/17 0400)  LLL Breath Sounds: Diminished (07/09/17 0400)

## 2017-07-09 NOTE — PROGRESS NOTES
[facility] Hospitalist Progress Note    Date of Service: 2017    Chief Complaint  36 y.o. male admitted 2017 with altered mental status seizures and aspiration. H/o   Severe TBI. Intubated Initially improved then had repeat event with respiratory failure and left lung white, reintubation out s/p improvement with bronc.      Interval Problem Update    ROS limited by mental status  Per Mom back to his normal  Has been off vent x 30hrs now    Consultants/Specialty  Pulmonology    Disposition  Cont in ICU for pulmonary toilet  TPH vs homeplanned for Tue  Cont to wean from vent in house.            Review of Systems   Unable to perform ROS: mental acuity      Physical Exam  Laboratory/Imaging   Hemodynamics  No data recorded.   Monitored Temp: 36.9 °C (98.4 °F)  Pulse  Av.7  Min: 42  Max: 131 Heart Rate (Monitored): 82  NIBP: 144/72 mmHg     Respiratory    #Aerosol Therapy / Airway Management: T-Piece, Aerosol Humidity Temp (celsius): 35 Respiration: 18, Pulse Oximetry: 99 %, O2 Daily Delivery Respiratory : T-Piece     Given By:: Trache, PEP/CPT Method: Percussion/Vibration, Work Of Breathing / Effort: Mild  RUL Breath Sounds: Clear;Diminished, RML Breath Sounds: Clear;Diminished, RLL Breath Sounds: Diminished, JONATAN Breath Sounds: Clear After Suction, LLL Breath Sounds: Diminished    Fluids    Intake/Output Summary (Last 24 hours) at 17 1422  Last data filed at 17 1200   Gross per 24 hour   Intake   1490 ml   Output   2650 ml   Net  -1160 ml       Nutrition  Orders Placed This Encounter   Procedures   • DIET NPO     Standing Status: Standing      Number of Occurrences: 1      Standing Expiration Date:      Order Specific Question:  Restrict to:     Answer:  With Tube Feed [4]     Physical Exam   Constitutional: He appears well-developed and well-nourished. No distress.   HENT:   Head: Normocephalic and atraumatic.   Eyes: Conjunctivae are normal.   Neck: No JVD present.   Trach   Cardiovascular:  Normal rate.  Exam reveals no gallop.    No murmur heard.  Pulmonary/Chest: Effort normal. No stridor. No respiratory distress. He has no wheezes. He has rales.   Abdominal: Soft. There is no tenderness. There is no rebound and no guarding.   Musculoskeletal: He exhibits no edema.   Neurological: He is alert.   Alert, attends but does not follow.  Multiple long standing contractures   Skin: Skin is warm and dry. No rash noted. He is not diaphoretic.   Nursing note and vitals reviewed.      Recent Labs      07/07/17 0440 07/08/17 0415 07/09/17   0521   WBC  7.5  7.5  6.2   RBC  3.19*  3.54*  3.50*   HEMOGLOBIN  9.1*  10.2*  10.3*   HEMATOCRIT  28.6*  31.6*  31.3*   MCV  89.7  89.3  89.4   MCH  28.5  28.8  29.4   MCHC  31.8*  32.3*  32.9*   RDW  54.2*  54.7*  55.2*   PLATELETCT  480*  499*  473*   MPV  8.5*  SEE COMMENT  8.1*     Recent Labs      07/07/17 0440 07/08/17 0415 07/09/17   0521   SODIUM  136  136  135   POTASSIUM  3.4*  3.9  3.6   CHLORIDE  104  103  103   CO2  27  26  24   GLUCOSE  107*  98  100*   BUN  14  18  14   CREATININE  <0.20*  <0.20*  <0.20*   CALCIUM  8.1*  8.2*  8.0*                      Assessment/Plan     Status epilepticus (CMS-HCC) (present on admission)  Assessment & Plan  - had continuous seizure activity for approximately 48 hours suspected from pneumonia.   - neurology following  - on phenobarbital and keppra  -no further events    Aspiration pneumonia (CMS-HCC) (present on admission)  Assessment & Plan  - pt unable to clear secretions   -sputum + Klebsiella completed Rocephin  -blood cultures neg  Bronchoscopy 6/25. Again on 6/29  Thoracentesis with 300 ml off on 6/25.  - s/p trach 7/2       Sepsis (CMS-HCC)  Assessment & Plan  Off pressors  Completed Ancef for MSSA        Acute respiratory failure with hypoxia (CMS-HCC) (present on admission)  Assessment & Plan  Cont to wean from vent  Trach in place and tolerating well      Hyponatremia (present on admission)  Assessment  & Plan  Chronic  Stable on po NaCl  Cont to follow    Hypoglycemia (present on admission)  Assessment & Plan  - resolved    TBI (traumatic brain injury) (CMS-HCC) (present on admission)  Assessment & Plan  Since age 17 years.  With quadriplegia   [facility]ofen pump [facility]--his mother states it does not need refilled for a few months.      Radiology images reviewed, Labs reviewed, Medications reviewed and EKG reviewed  Waldrop catheter: No Waldrop      DVT Prophylaxis: Enoxaparin (Lovenox)  DVT prophylaxis - mechanical: SCDs  Ulcer prophylaxis: Not indicated  Antibiotics: Treating active infection/contamination beyond 24 hours perioperative coverage     35min's with Mom reviewing current issues, dc planning etc.  All questions answered

## 2017-07-09 NOTE — PROGRESS NOTES
Pulmonary Critical Care Progress Note        Date of service:  7/9/2017    Interval Events:  24 hour interval history reviewed  Reason for visit:  Respiratory failure, pneumonia, atelectasis  Unable to provide CC or ROS due to chronic encephalopathy      SR  D/C salt tabs  T-piece 5L, 30%  CXR with no change from prior      PFSH:  No change.    Respiratory:     Pulse Oximetry: 99 %  CXR with unchanged LLL opacification  Coarse crackles bilaterally  No wheezing  T-piece  5 L 30%    HemoDynamics:  Pulse: 82, Heart Rate (Monitored): 82  NIBP: 132/70 mmHg    SR    Neuro:  Eyes open, does not follow for me    Fluids:  Intake/Output       07/07/17 0700 - 07/08/17 0659 07/08/17 0700 - 07/09/17 0659 07/09/17 0700 - 07/10/17 0659      6597-2036 2401-1629 Total 0700-1859 8254-8603 Total 9575-2493 2913-1268 Total       Intake    I.V.  390  40 430  --  -- --  --  -- --    Magnesium Sulfate Volume 50 -- 50 -- -- -- -- -- --    Normal Saline 240 40 280 -- -- -- -- -- --    IV Piggyback Volume (IV Piggyback) 100 -- 100 -- -- -- -- -- --    Other  120  -- 120  210  30 240  --  -- --    Medications (P.O./ Enteral Liquids) 120 -- 120 210 30 240 -- -- --    Enteral  750  600 1350  960  560 1520  --  -- --    Enteral Volume   -- -- --    Free Water / Tube Flush 150 -- 150 460 60 520 -- -- --    Total Intake 6911 695 2189 9048 624 8484 -- -- --       Output    Urine  1815 700 2515 2025 900 2925  --  -- --    Indwelling Cathether 7887 459 36485 700 2515 2025 900 2925 -- -- --    Stool  --  -- --  --  -- --  --  -- --    Number of Times Stooled 1 x -- 1 x 1 x -- 1 x -- -- --    Total Output 1815 700 2515 2025 900 2925 -- -- --       Net I/O     -555 -60 -615 -855 -310 -1165 -- -- --           Recent Labs      07/07/17   0440  07/08/17   0415  07/09/17   0521   SODIUM  136  136  135   POTASSIUM  3.4*  3.9  3.6   CHLORIDE  104  103  103   CO2  27  26  24   BUN  14  18  14   CREATININE  <0.20*  <0.20*  <0.20*   MAGNESIUM   1.6  1.8  1.8   PHOSPHORUS  3.1  3.0  3.1   CALCIUM  8.1*  8.2*  8.0*       GI/Nutrition:  Abd soft ND/NT  Tolerating enteral TF    Liver Function  Recent Labs      17   0440  17   0521   GLUCOSE  107*  98  100*       Heme:  Recent Labs      17   04117   0521   RBC  3.19*  3.54*  3.50*   HEMOGLOBIN  9.1*  10.2*  10.3*   HEMATOCRIT  28.6*  31.6*  31.3*   PLATELETCT  480*  499*  473*       Infectious Disease:  Monitored Temp  Av.7 °C (98.1 °F)  Min: 35.8 °C (96.4 °F)  Max: 37.4 °C (99.3 °F)     Recent Labs      17   0521   WBC  7.5  7.5  6.2   NEUTSPOLYS  67.00  63.10  62.80   LYMPHOCYTES  17.60*  20.50*  20.30*   MONOCYTES  10.60  11.20  11.30   EOSINOPHILS  3.90  4.10  4.50   BASOPHILS  0.40  0.70  0.60     Current Facility-Administered Medications   Medication Dose Frequency Provider Last Rate Last Dose   • levalbuterol (XOPENEX) 1.25 MG/3ML nebulizer solution 1.25 mg  1.25 mg Q4H PRN (RT) Rivas Dee M.D.       • levalbuterol (XOPENEX) 1.25 MG/3ML nebulizer solution 1.25 mg  1.25 mg 4X/DAY (RT) Rivas Dee M.D.   1.25 mg at 17 1730   • miconazole 2%-zinc oxide (RADHA) topical cream   Q6HRS PRN Attila Crowder D.O.       • sodium chloride (SALT) tablet 1 g  1 g TID WITH MEALS Rivas Dee M.D.   1 g at 17 1732   • levetiracetam (KEPPRA) 100 MG/ML solution 1,500 mg  1,500 mg Q12HRS Martell Hunter M.D.   1,500 mg at 17   • Respiratory Care per Protocol   Continuous RT Clarence Pro M.D.       • chlorhexidine (PERIDEX) 0.12 % solution 15 mL  15 mL BID Clarence Pro M.D.   15 mL at 17   • lidocaine (XYLOCAINE) 1%  injection  1-2 mL Q30 MIN PRN Clarence Pro M.D.       • MD ALERT...Adult ICU Electrolyte Replacement per Pharmacy Protocol   pharmacy to dose Clarence Pro M.D.       • fentaNYL (SUBLIMAZE) injection 25 mcg   25 mcg Q HOUR PRN Clarence Pro M.D.        Or   • fentaNYL (SUBLIMAZE) injection 50 mcg  50 mcg Q HOUR PRN Clarence Pro M.D.   50 mcg at 06/30/17 0000    Or   • fentaNYL (SUBLIMAZE) injection 100 mcg  100 mcg Q HOUR PRN Clarence Pro M.D.       • famotidine (PEPCID) tablet 20 mg  20 mg BID Gabrielle Vargas PHARMD   20 mg at 07/08/17 2055   • oxycodone immediate-release (ROXICODONE) tablet 5 mg  5 mg Q4HRS PRN Kev Araiza M.D.   5 mg at 06/24/17 1825   • PHENobarbital solution 70 mg  70 mg BID Martell Hunter M.D.   70 mg at 07/08/17 2052   • enoxaparin (LOVENOX) inj 30 mg  30 mg DAILY FARHAT MedinaOBucky   30 mg at 07/08/17 2055   • senna-docusate (PERICOLACE or SENOKOT S) 8.6-50 MG per tablet 2 Tab  2 Tab BID Kev Araiza M.D.   2 Tab at 07/08/17 2055    And   • polyethylene glycol/lytes (MIRALAX) PACKET 1 Packet  1 Packet QDAY PRBRIDGETTE Araiza M.D.   1 Packet at 06/25/17 2028    And   • magnesium hydroxide (MILK OF MAGNESIA) suspension 30 mL  30 mL QDAY SUSAN Araiza M.D.   30 mL at 06/28/17 1305    And   • bisacodyl (DULCOLAX) suppository 10 mg  10 mg QDAY PRBRIDGETTE Araiza M.D.   10 mg at 07/07/17 1206   • glucose 4 g chewable tablet 16 g  16 g Q15 MIN PRBRIDGETTE Araiza M.D.        And   • dextrose 50% (D50W) injection 25 mL  25 mL Q15 MIN PRBRIDGETTE Araiza M.D.   25 mL at 06/20/17 1840   • ondansetron (ZOFRAN) syringe/vial injection 4 mg  4 mg Q4HRS PRN FARHAT MedinaOBucky   4 mg at 07/05/17 1429   • ondansetron (ZOFRAN ODT) dispertab 4 mg  4 mg Q4HRS PRN FARHAT MedinaO.       • promethazine (PHENERGAN) tablet 12.5-25 mg  12.5-25 mg Q4HRS PRN FARHAT MedinaO.       • promethazine (PHENERGAN) suppository 12.5-25 mg  12.5-25 mg Q4HRS PRN FARHAT MedinaO.       • acetaminophen (TYLENOL) tablet 650 mg  650 mg Q6HRS PRN JASPAL Medina.O.   650 mg at 07/08/17 1750   • Pain Pump (patient supplied) Device   Continuous Clarence Morfin D.O.    Stopped at 06/17/17 1230     Last reviewed on 6/17/2017  9:45 AM by Ligia Frazier, Pharmacy Int    Quality  Measures:  Medications reviewed, Labs reviewed and Radiology images reviewed                        Assessment and Plan:    Acute hypoxemic respiratory failure  VDRF - intubated 6/19-6/27, re-intubated 6/30   - T-piece trials as tolerated  Trach 7/2  Aspiration/HCAP pneumonia with left lung atelectasis   - S/P FOB 6/30 - atelectasis improved   - MSSA - completed Ancef - observe off antibiotics  Sepsis syndrome with shock - pulmonary source   - improved              - Marik protocol completed  PERALTA - observe off hydrocortisone  Acute pulmonary edema   - improved  Parapneumonic left pleural effusion   - S/P thoracentesis 6/25   - follow  Prior trach with trauma years ago  S/P aspiration pneumonia   - Klebsiella pneumonia - pansensitive              - completed abx x 7 days 6/26 - C3/Flagyl  New onset seizure likely secondary to hyponatremia              - Keppra, Phenobarb per neuro  Severe chronic underlying debility secondary to profound traumatic brain injury 20 years ago  Dysphagia - cont enteral G-tube feedings  Hyponatremia - improved with salt tabs   - stop salt tabs and observe    Keep in ICU.  Unstable pulmonary status.  Requires frequent suctioning for secretions.    Critical Care Time:  34 minutes  [id number]  No time overlap  Time excludes procedures  Discussed with RN, RT, Team      IKaylene (Scribe), am scribing for, and in the presence of, Rivas Brooks M.D.    Electronically signed by: Kaylene Cote (Scribe), 7/9/2017    IRivas M.D. personally performed the services described in this documentation, as scribed by Kaylene Cote in my presence, and it is both accurate and complete.

## 2017-07-09 NOTE — CARE PLAN
Problem: Skin Integrity  Goal: Risk for impaired skin integrity will decrease  Outcome: PROGRESSING AS EXPECTED  Collaboration with pt's mother (primary caregiver) on what pt's normal routine for skin care is, as well as with interdisciplinary team. Cream applied to areas of concern. q2hr turns in place        Problem: Respiratory:  Goal: Respiratory status will improve  Outcome: PROGRESSING AS EXPECTED  Pt on t-piece and doing very well. Collaboration with RT in place and RN/RT in agreement

## 2017-07-10 LAB
ALBUMIN SERPL BCP-MCNC: 3.5 G/DL (ref 3.2–4.9)
ALBUMIN/GLOB SERPL: 1 G/DL
ALP SERPL-CCNC: 173 U/L (ref 30–99)
ALT SERPL-CCNC: 25 U/L (ref 2–50)
ANION GAP SERPL CALC-SCNC: 9 MMOL/L (ref 0–11.9)
AST SERPL-CCNC: 21 U/L (ref 12–45)
BASOPHILS # BLD AUTO: 0.7 % (ref 0–1.8)
BASOPHILS # BLD: 0.05 K/UL (ref 0–0.12)
BILIRUB SERPL-MCNC: 0.3 MG/DL (ref 0.1–1.5)
BUN SERPL-MCNC: 13 MG/DL (ref 8–22)
CALCIUM SERPL-MCNC: 8.4 MG/DL (ref 8.5–10.5)
CHLORIDE SERPL-SCNC: 98 MMOL/L (ref 96–112)
CO2 SERPL-SCNC: 25 MMOL/L (ref 20–33)
CREAT SERPL-MCNC: <0.2 MG/DL (ref 0.5–1.4)
CRP SERPL HS-MCNC: 3.31 MG/DL (ref 0–0.75)
EOSINOPHIL # BLD AUTO: 0.32 K/UL (ref 0–0.51)
EOSINOPHIL NFR BLD: 4.4 % (ref 0–6.9)
ERYTHROCYTE [DISTWIDTH] IN BLOOD BY AUTOMATED COUNT: 52.6 FL (ref 35.9–50)
GFR SERPL CREATININE-BSD FRML MDRD: >60 ML/MIN/1.73 M 2
GLOBULIN SER CALC-MCNC: 3.6 G/DL (ref 1.9–3.5)
GLUCOSE SERPL-MCNC: 99 MG/DL (ref 65–99)
HCT VFR BLD AUTO: 35 % (ref 42–52)
HGB BLD-MCNC: 11.5 G/DL (ref 14–18)
IMM GRANULOCYTES # BLD AUTO: 0.02 K/UL (ref 0–0.11)
IMM GRANULOCYTES NFR BLD AUTO: 0.3 % (ref 0–0.9)
LYMPHOCYTES # BLD AUTO: 1.27 K/UL (ref 1–4.8)
LYMPHOCYTES NFR BLD: 17.4 % (ref 22–41)
MAGNESIUM SERPL-MCNC: 2.6 MG/DL (ref 1.5–2.5)
MCH RBC QN AUTO: 29 PG (ref 27–33)
MCHC RBC AUTO-ENTMCNC: 32.9 G/DL (ref 33.7–35.3)
MCV RBC AUTO: 88.4 FL (ref 81.4–97.8)
MONOCYTES # BLD AUTO: 0.85 K/UL (ref 0–0.85)
MONOCYTES NFR BLD AUTO: 11.6 % (ref 0–13.4)
NEUTROPHILS # BLD AUTO: 4.79 K/UL (ref 1.82–7.42)
NEUTROPHILS NFR BLD: 65.6 % (ref 44–72)
NRBC # BLD AUTO: 0 K/UL
NRBC BLD AUTO-RTO: 0 /100 WBC
PHOSPHATE SERPL-MCNC: 3.1 MG/DL (ref 2.5–4.5)
PLATELET # BLD AUTO: 478 K/UL (ref 164–446)
PMV BLD AUTO: 8.1 FL (ref 9–12.9)
POTASSIUM SERPL-SCNC: 3.9 MMOL/L (ref 3.6–5.5)
PREALB SERPL-MCNC: 23 MG/DL (ref 18–38)
PROT SERPL-MCNC: 7.1 G/DL (ref 6–8.2)
RBC # BLD AUTO: 3.96 M/UL (ref 4.7–6.1)
SODIUM SERPL-SCNC: 132 MMOL/L (ref 135–145)
WBC # BLD AUTO: 7.3 K/UL (ref 4.8–10.8)

## 2017-07-10 PROCEDURE — A9270 NON-COVERED ITEM OR SERVICE: HCPCS | Performed by: PSYCHIATRY & NEUROLOGY

## 2017-07-10 PROCEDURE — A9270 NON-COVERED ITEM OR SERVICE: HCPCS

## 2017-07-10 PROCEDURE — 94640 AIRWAY INHALATION TREATMENT: CPT

## 2017-07-10 PROCEDURE — 770022 HCHG ROOM/CARE - ICU (200)

## 2017-07-10 PROCEDURE — 84134 ASSAY OF PREALBUMIN: CPT

## 2017-07-10 PROCEDURE — 99233 SBSQ HOSP IP/OBS HIGH 50: CPT | Performed by: HOSPITALIST

## 2017-07-10 PROCEDURE — 700102 HCHG RX REV CODE 250 W/ 637 OVERRIDE(OP)

## 2017-07-10 PROCEDURE — 85025 COMPLETE CBC W/AUTO DIFF WBC: CPT

## 2017-07-10 PROCEDURE — 94668 MNPJ CHEST WALL SBSQ: CPT

## 2017-07-10 PROCEDURE — 700102 HCHG RX REV CODE 250 W/ 637 OVERRIDE(OP): Performed by: PSYCHIATRY & NEUROLOGY

## 2017-07-10 PROCEDURE — 80053 COMPREHEN METABOLIC PANEL: CPT

## 2017-07-10 PROCEDURE — 700101 HCHG RX REV CODE 250: Performed by: INTERNAL MEDICINE

## 2017-07-10 PROCEDURE — 700102 HCHG RX REV CODE 250 W/ 637 OVERRIDE(OP): Performed by: INTERNAL MEDICINE

## 2017-07-10 PROCEDURE — 84100 ASSAY OF PHOSPHORUS: CPT

## 2017-07-10 PROCEDURE — 700111 HCHG RX REV CODE 636 W/ 250 OVERRIDE (IP): Performed by: INTERNAL MEDICINE

## 2017-07-10 PROCEDURE — A9270 NON-COVERED ITEM OR SERVICE: HCPCS | Performed by: INTERNAL MEDICINE

## 2017-07-10 PROCEDURE — 86140 C-REACTIVE PROTEIN: CPT

## 2017-07-10 PROCEDURE — 83735 ASSAY OF MAGNESIUM: CPT

## 2017-07-10 RX ORDER — POTASSIUM CHLORIDE 1.5 G/1.58G
20 POWDER, FOR SOLUTION ORAL ONCE
Status: COMPLETED | OUTPATIENT
Start: 2017-07-10 | End: 2017-07-10

## 2017-07-10 RX ADMIN — OXYCODONE HYDROCHLORIDE 5 MG: 5 TABLET ORAL at 00:40

## 2017-07-10 RX ADMIN — FAMOTIDINE 20 MG: 20 TABLET, FILM COATED ORAL at 20:38

## 2017-07-10 RX ADMIN — SENNOSIDES AND DOCUSATE SODIUM 2 TABLET: 8.6; 5 TABLET ORAL at 20:38

## 2017-07-10 RX ADMIN — FAMOTIDINE 20 MG: 20 TABLET, FILM COATED ORAL at 08:01

## 2017-07-10 RX ADMIN — BISACODYL 10 MG: 10 SUPPOSITORY RECTAL at 13:03

## 2017-07-10 RX ADMIN — LEVETIRACETAM 1500 MG: 100 SOLUTION ORAL at 08:01

## 2017-07-10 RX ADMIN — LEVALBUTEROL 1.25 MG: 1.25 SOLUTION RESPIRATORY (INHALATION) at 18:35

## 2017-07-10 RX ADMIN — PHENOBARBITAL 70 MG: 20 ELIXIR ORAL at 20:38

## 2017-07-10 RX ADMIN — ENOXAPARIN SODIUM 30 MG: 100 INJECTION SUBCUTANEOUS at 20:39

## 2017-07-10 RX ADMIN — LEVETIRACETAM 1500 MG: 100 SOLUTION ORAL at 20:39

## 2017-07-10 RX ADMIN — CHLORHEXIDINE GLUCONATE 15 ML: 1.2 RINSE ORAL at 20:38

## 2017-07-10 RX ADMIN — LEVALBUTEROL 1.25 MG: 1.25 SOLUTION RESPIRATORY (INHALATION) at 06:37

## 2017-07-10 RX ADMIN — LEVALBUTEROL 1.25 MG: 1.25 SOLUTION RESPIRATORY (INHALATION) at 10:28

## 2017-07-10 RX ADMIN — POTASSIUM CHLORIDE 20 MEQ: 1.5 POWDER, FOR SOLUTION ORAL at 08:01

## 2017-07-10 RX ADMIN — SENNOSIDES AND DOCUSATE SODIUM 2 TABLET: 8.6; 5 TABLET ORAL at 08:01

## 2017-07-10 RX ADMIN — PHENOBARBITAL 70 MG: 20 ELIXIR ORAL at 08:00

## 2017-07-10 NOTE — DISCHARGE PLANNING
Arranged patient's transport to [facility] via [facility] at 1100 on 07/11.  Susu) notified.

## 2017-07-10 NOTE — DISCHARGE PLANNING
Informed by bedside RN that the pt's mother and POA has decided to have the pt go to [facility]. Completed PCS for requesting transport for tomorrow at 1400. Faxed and notified Celeste WRIGHT. Had pt's mother sign COBRA.

## 2017-07-10 NOTE — PROGRESS NOTES
[facility] Hospitalist Progress Note    Date of Service: 7/10/2017    Chief Complaint  36 y.o. male admitted 2017 with altered mental status seizures and aspiration. H/o   Severe TBI. Intubated Initially improved then had repeat event with respiratory failure and left lung white, reintubation out s/p improvement with bronc.      Interval Problem Update    ROS limited by mental status  Per Mom back to his normal  For >2 days    Consultants/Specialty  Pulmonology    Disposition  Cont in ICU for pulmonary toilet  TPH vs home planned for Tue  Follow Na as now off po supplementation          Review of Systems   Unable to perform ROS: mental acuity      Physical Exam  Laboratory/Imaging   Hemodynamics  No data recorded.   Monitored Temp: 36.8 °C (98.2 °F)  Pulse  Av.9  Min: 42  Max: 131 Heart Rate (Monitored): 88  NIBP: 111/60 mmHg     Respiratory    #Aerosol Therapy / Airway Management: T-Piece, Aerosol Humidity Temp (celsius): 34 Respiration: 17, Pulse Oximetry: 95 %, O2 Daily Delivery Respiratory : T-Piece     Given By:: Trache, PEP/CPT Method: Percussion/Vibration, Work Of Breathing / Effort: Mild  RUL Breath Sounds: Coarse Crackles, RML Breath Sounds: Coarse Crackles, RLL Breath Sounds: Coarse Crackles, JONATAN Breath Sounds: Coarse Crackles, LLL Breath Sounds: Coarse Crackles    Fluids    Intake/Output Summary (Last 24 hours) at 07/10/17 1430  Last data filed at 07/10/17 1200   Gross per 24 hour   Intake 1078.75 ml   Output   2280 ml   Net -1201.25 ml       Nutrition  Orders Placed This Encounter   Procedures   • DIET NPO     Standing Status: Standing      Number of Occurrences: 1      Standing Expiration Date:      Order Specific Question:  Restrict to:     Answer:  With Tube Feed [4]     Physical Exam   Constitutional: He appears well-developed and well-nourished. No distress.   HENT:   Head: Normocephalic and atraumatic.   Eyes: Conjunctivae are normal.   Neck: No JVD present.   Trach   Cardiovascular: Normal  rate.  Exam reveals no gallop.    No murmur heard.  Pulmonary/Chest: Effort normal. No stridor. No respiratory distress. He has no wheezes. He has rales.   Abdominal: Soft. There is no tenderness. There is no rebound and no guarding.   Musculoskeletal: He exhibits no edema.   Neurological: He is alert.   Alert, attends but does not follow.  Multiple long standing contractures   Skin: Skin is warm and dry. No rash noted. He is not diaphoretic.   Nursing note and vitals reviewed.      Recent Labs      07/08/17   0415  07/09/17   0521  07/10/17   0450   WBC  7.5  6.2  7.3   RBC  3.54*  3.50*  3.96*   HEMOGLOBIN  10.2*  10.3*  11.5*   HEMATOCRIT  31.6*  31.3*  35.0*   MCV  89.3  89.4  88.4   MCH  28.8  29.4  29.0   MCHC  32.3*  32.9*  32.9*   RDW  54.7*  55.2*  52.6*   PLATELETCT  499*  473*  478*   MPV  SEE COMMENT  8.1*  8.1*     Recent Labs      07/08/17   0415  07/09/17   0521  07/10/17   0450   SODIUM  136  135  132*   POTASSIUM  3.9  3.6  3.9   CHLORIDE  103  103  98   CO2  26  24  25   GLUCOSE  98  100*  99   BUN  18  14  13   CREATININE  <0.20*  <0.20*  <0.20*   CALCIUM  8.2*  8.0*  8.4*                      Assessment/Plan     Status epilepticus (CMS-HCC) (present on admission)  Assessment & Plan  - had continuous seizure activity for approximately 48 hours suspected from pneumonia.   - neurology following  - on phenobarbital and keppra  -no further events    Aspiration pneumonia (CMS-HCC) (present on admission)  Assessment & Plan  - pt unable to clear secretions   -sputum + Klebsiella completed Rocephin  -blood cultures neg  Bronchoscopy 6/25. Again on 6/29  Thoracentesis with 300 ml off on 6/25.  - s/p trach 7/2       Sepsis (CMS-HCC)  Assessment & Plan  Off pressors  Completed Ancef for MSSA        Acute respiratory failure with hypoxia (CMS-HCC) (present on admission)  Assessment & Plan  Cont to wean from vent  Trach in place and tolerating well      Hyponatremia (present on admission)  Assessment &  Plan  Chronic  Trial off po NaCl per Mom's wishes  Na down to 132.    Cont to follow; may yet need to resume NaCl    Hypoglycemia (present on admission)  Assessment & Plan  - resolved    TBI (traumatic brain injury) (CMS-HCC) (present on admission)  Assessment & Plan  Since age 17 years.  With quadriplegia   Baclofen pump followed at [facility]--his mother states it does not need refilled for a few months.      Radiology images reviewed, Labs reviewed, Medications reviewed and EKG reviewed  Waldrop catheter: No Waldrop      DVT Prophylaxis: Enoxaparin (Lovenox)  DVT prophylaxis - mechanical: SCDs  Ulcer prophylaxis: Not indicated  Antibiotics: Treating active infection/contamination beyond 24 hours perioperative coverage     35min's with Mom reviewing current issues, dc planning etc.  All questions answered

## 2017-07-10 NOTE — CARE PLAN
Problem: Oxygenation:  Goal: Maintain adequate oxygenation dependent on patient condition  Intervention: Manage oxygen therapy by monitoring pulse oximetry and/or ABG values  Respiratory Therapy Update    Interdisciplinary Plan of Care-Goals (Indications)  Obstructive Ventilatory Defect or Pulmonary Disease without Obvious Obstruction: History / Diagnosis (07/09/17 1000)  Bronchopulmonary Hygiene Indications: Difficulty with Secretion Clearance (07/09/17 1623)  Hyperinflation Protocol Indications: Physical Exam (06/29/17 0746)  Interdisciplinary Plan of Care-Outcomes   Bronchodilator Outcome: Patient at Stable Baseline (07/09/17 1000)  Bronchopulmonary Hygiene Outcome: Optimal Hydration with Moderate or Less Sputum Production (07/09/17 1623)  Hyperinflation Protocol Goals/Outcome: Stable Vital Capacity x24 hrs and Patient Understands / uses I.S. (06/29/17 0746)    Cough: Productive;Strong (07/10/17 0000)  Sputum Amount: Small (07/10/17 0000)  Sputum Color: White;Clear (07/10/17 0000)  Sputum Consistency: Thin;Thick (07/10/17 0000)    O2 (FiO2): 30 (07/10/17 0244)  O2 (LPM): 5 (07/10/17 0244)  O2 Daily Delivery Respiratory : T-Piece (07/10/17 0244)    Breath Sounds  Pre/Post Intervention: Pre Intervention Assessment (07/10/17 0244)  RUL Breath Sounds: Clear;Diminished (07/10/17 0244)  RML Breath Sounds: Clear;Diminished (07/10/17 0244)  RLL Breath Sounds: Diminished (07/10/17 0244)  JONATAN Breath Sounds: Clear;Diminished (07/10/17 0244)  LLL Breath Sounds: Diminished (07/10/17 0244)      Events/Summary/Plan: Mother refusing breathing tx for Ruben (07/09/17 1623)

## 2017-07-10 NOTE — PROGRESS NOTES
Pulmonary Critical Care Progress Note        Date of service:  7/10/2017    Interval Events:  24 hour interval history reviewed  Reason for visit:  Respiratory failure, pneumonia, atelectasis  Unable to provide CC or ROS due to chronic encephalopathy    SR, 80s-90s  On T piece 5 liters at 30% O2  Has J-tube in place.   Waldrop with 1200 output overnight   No drips running     CXR:  No change compared to chest x-ray from yesterday.     YESTERDAY'S IE  SR  D/C salt tabs  T-piece 5L, 30%  CXR with no change from prior      PFSH:  No change.    Physical Exam  General: Awake but not interactive. Not following commands.   Neuro/Psych: Awake.   HEENT: Pupils are equal.   CVS: Regular rate and rhythm.   Respiratory: Regular rate and rhythm.   Abdomen/: Soft. Non tender.   Extremities: Retracted in all four extremities.  2+ DPP. No pedal edema.   Skin: Warm. Dry.     Respiratory:     Pulse Oximetry: 97 %    HemoDynamics:  Pulse: 87, Heart Rate (Monitored): 86  NIBP: 127/66 mmHg      Neuro:      Fluids:  Intake/Output       07/08/17 0700 - 07/09/17 0659 07/09/17 0700 - 07/10/17 0659 07/10/17 0700 - 07/11/17 0659      6585-4922 8937-2922 Total 7385-3232 4560-1970 Total 9253-6103 8324-3173 Total       Intake    I.V.  --  -- --  150  -- 150  --  -- --    Magnesium Sulfate Volume -- -- -- 150 -- 150 -- -- --    Other  210  30 240  180  180 360  --  -- --    Medications (P.O./ Enteral Liquids) 210 30 240 180 180 360 -- -- --    Enteral  960  660 1620  430  570 1000  --  -- --    Enteral Volume  250 500 750 -- -- --    Free Water / Tube Flush 460 60 520 180 70 250 -- -- --    Total Intake 0506 654 6718  -- -- --       Output    Urine  2025 1125 3150  1215  1175 2390  --  -- --    Indwelling Cathether 2025 1125 3150 1215 1175 2390 -- -- --    Stool  --  -- --  --  -- --  --  -- --    Number of Times Stooled 1 x -- 1 x 1 x -- 1 x -- -- --    Total Output 2025 8490 0982 9168 0790 4528 -- -- --       Net I/O      -855 -435 -1290 -455 -425 -880 -- -- --        Weight: 54.7 kg (120 lb 9.5 oz)  Recent Labs      07/08/17   0415  07/09/17   0521  07/10/17   0450   SODIUM  136  135  132*   POTASSIUM  3.9  3.6  3.9   CHLORIDE  103  103  98   CO2  26  24  25   BUN  18  14  13   CREATININE  <0.20*  <0.20*  <0.20*   MAGNESIUM  1.8  1.8  2.6*   PHOSPHORUS  3.0  3.1  3.1   CALCIUM  8.2*  8.0*  8.4*       GI/Nutrition:  Tolerating enteral TF    Liver Function  Recent Labs      07/08/17   0415  07/09/17   0521  07/10/17   0450   ALTSGPT   --    --   25   ASTSGOT   --    --   21   ALKPHOSPHAT   --    --   173*   TBILIRUBIN   --    --   0.3   PREALBUMIN   --    --   23.0   GLUCOSE  98  100*  99       Heme:  Recent Labs      07/08/17   0415  07/09/17   0521  07/10/17   0450   RBC  3.54*  3.50*  3.96*   HEMOGLOBIN  10.2*  10.3*  11.5*   HEMATOCRIT  31.6*  31.3*  35.0*   PLATELETCT  499*  473*  478*       Infectious Disease:  Monitored Temp  Av.8 °C (98.2 °F)  Min: 36.3 °C (97.3 °F)  Max: 37.2 °C (99 °F)     Recent Labs      07/08/17   0415  07/09/17   0521  07/10/17   0450   WBC  7.5  6.2  7.3   NEUTSPOLYS  63.10  62.80  65.60   LYMPHOCYTES  20.50*  20.30*  17.40*   MONOCYTES  11.20  11.30  11.60   EOSINOPHILS  4.10  4.50  4.40   BASOPHILS  0.70  0.60  0.70   ASTSGOT   --    --   21   ALTSGPT   --    --   25   ALKPHOSPHAT   --    --   173*   TBILIRUBIN   --    --   0.3     Current Facility-Administered Medications   Medication Dose Frequency Provider Last Rate Last Dose   • levalbuterol (XOPENEX) 1.25 MG/3ML nebulizer solution 1.25 mg  1.25 mg Q4H PRN (RT) Rivas Dee M.D.       • levalbuterol (XOPENEX) 1.25 MG/3ML nebulizer solution 1.25 mg  1.25 mg 4X/DAY (RT) Rivas Dee M.D.   1.25 mg at 17 1852   • miconazole 2%-zinc oxide (RADHA) topical cream   Q6HRS PRN Attila Crowder D.O.       • levetiracetam (KEPPRA) 100 MG/ML solution 1,500 mg  1,500 mg Q12HRS Martell Hunter M.D.   1,500 mg at 17  2109   • Respiratory Care per Protocol   Continuous RT Clarence Pro M.D.       • chlorhexidine (PERIDEX) 0.12 % solution 15 mL  15 mL BID Clarence Pro M.D.   15 mL at 07/09/17 2108   • lidocaine (XYLOCAINE) 1%  injection  1-2 mL Q30 MIN PRN Clarence Pro M.D.       • MD ALERT...Adult ICU Electrolyte Replacement per Pharmacy Protocol   pharmacy to dose Clarence Pro M.D.       • famotidine (PEPCID) tablet 20 mg  20 mg BID Gabrielle Vargas, FUENTES   20 mg at 07/09/17 2108   • oxycodone immediate-release (ROXICODONE) tablet 5 mg  5 mg Q4HRS PRN Kev Araiza M.D.   5 mg at 07/10/17 0040   • PHENobarbital solution 70 mg  70 mg BID Martell Hunter M.D.   70 mg at 07/09/17 2108   • enoxaparin (LOVENOX) inj 30 mg  30 mg DAILY Clarence Morfin D.O.   30 mg at 07/09/17 2108   • senna-docusate (PERICOLACE or SENOKOT S) 8.6-50 MG per tablet 2 Tab  2 Tab BID Kev Araiza M.D.   2 Tab at 07/09/17 2108    And   • polyethylene glycol/lytes (MIRALAX) PACKET 1 Packet  1 Packet QDAY PRBRIDGETTE Araiza M.D.   1 Packet at 06/25/17 2028    And   • magnesium hydroxide (MILK OF MAGNESIA) suspension 30 mL  30 mL QDAY PRBRIDGETTE Araiza M.D.   30 mL at 06/28/17 1305    And   • bisacodyl (DULCOLAX) suppository 10 mg  10 mg QDAY PRBRIDGETTE Araiza M.D.   10 mg at 07/07/17 1206   • glucose 4 g chewable tablet 16 g  16 g Q15 MIN PRN Kev Araiza M.D.        And   • dextrose 50% (D50W) injection 25 mL  25 mL Q15 MIN PRN Kev Araiza M.D.   25 mL at 06/20/17 1840   • ondansetron (ZOFRAN) syringe/vial injection 4 mg  4 mg Q4HRS PRN FARHAT MedinaO.   4 mg at 07/05/17 1429   • ondansetron (ZOFRAN ODT) dispertab 4 mg  4 mg Q4HRS PRN JASPAL Medina.O.       • promethazine (PHENERGAN) tablet 12.5-25 mg  12.5-25 mg Q4HRS PRN JASPAL Medina.O.       • promethazine (PHENERGAN) suppository 12.5-25 mg  12.5-25 mg Q4HRS PRN JASPAL Medina.O.       • acetaminophen (TYLENOL) tablet 650 mg  650 mg  Q6HRS PRN Clarence Morfin D.O.   650 mg at 07/08/17 1750   • Pain Pump (patient supplied) Device   Continuous Clarence Morfin D.O.   Stopped at 06/17/17 1230     Last reviewed on 6/17/2017  9:45 AM by Ligia Frazier, Pharmacy Int    Quality  Measures:  Medications reviewed, Labs reviewed and Radiology images reviewed  Waldrop catheter: Critically Ill - Requiring Accurate Measurement of Urinary Output      DVT Prophylaxis: Enoxaparin (Lovenox)  DVT prophylaxis - mechanical: SCDs  Ulcer prophylaxis: Yes        Assessment and Plan:    Acute hypoxemic respiratory failure   - VDRF;  intubated 6/19-6/27, re-intubated 6/30   - T-piece trials as tolerated   - Trach 7/2   - PEP/CPT every 12 hours  Aspiration/HCAP pneumonia with left lung atelectasis   - S/P FOB 6/30 - atelectasis improved   - MSSA - completed Ancef - observe off antibiotics  Sepsis syndrome with shock - pulmonary source   - improved              - Marik protocol completed  PERALTA    - observe off hydrocortisone  Acute pulmonary edema   - improved  Parapneumonic left pleural effusion   - S/P thoracentesis 6/25   - follow  Prior trach with trauma years ago  S/P aspiration pneumonia   - Klebsiella pneumonia - pansensitive              - completed abx x 7 days 6/26 - C3/Flagyl  New onset seizure likely secondary to hyponatremia              - Keppra, Phenobarb per neuro  Severe chronic underlying debility secondary to profound traumatic brain injury 20 years ago  Dysphagia - cont enteral G-tube feedings  Hyponatremia - improved with salt tabs   - stop salt tabs and observe    Keep in ICU.  Unstable pulmonary status.  Requires frequent suctioning for secretions.    Critical Care Time:  36 minutes  [id number]  No time overlap  Time excludes procedures  Discussed with RN, RT, Team     IKathleen (Conchita), am scribing for, and in the presence of, Darleen Blackburn M.D.   Electronically signed by: Kathleen Wills (Conchita), 7/10/2017    Darleen WOODY M.D.   personally performed the services described in this documentation, as scribed by Kathleen Wills in my presence, and it is both accurate and complete.

## 2017-07-10 NOTE — CARE PLAN
Problem: Infection  Goal: Will remain free from infection  Outcome: PROGRESSING AS EXPECTED  Pt showing no signs of new infection, all lines able to be removed have been discontinued. Will eval rosas as his incontinence dermatitis improves.     Problem: Urinary Elimination:  Goal: Ability to reestablish a normal urinary elimination pattern will improve  Outcome: PROGRESSING AS EXPECTED  Pt urinating adequate amounts via rosas catheter

## 2017-07-10 NOTE — PROGRESS NOTES
Spoke with patient's mother who states she would like patient to d/c to [place] and not home

## 2017-07-10 NOTE — DISCHARGE PLANNING
Discussed pt during AM Rounds. Pt is ready to either be dc'ed home or to an LTACH. After discussing the matter with the doctor as well as the [facility] liaison. It was discovered that the to pt's mother stated that she would like the pt dc'ed home in the event that the pt's insurance pays for an ambulance transfer or to [facility] in the event the insurance does not  the transport charge home. Informed by RN and [facility] Liaison that the pt's mother is indisposed today and would not be able to respond until tomorrow.

## 2017-07-11 ENCOUNTER — HOSPITAL ENCOUNTER (OUTPATIENT)
Facility: MEDICAL CENTER | Age: 37
End: 2017-07-27
Attending: HOSPITALIST | Admitting: HOSPITALIST
Payer: COMMERCIAL

## 2017-07-11 ENCOUNTER — RESOLUTE PROFESSIONAL BILLING HOSPITAL PROF FEE (OUTPATIENT)
Dept: HOSPITALIST | Facility: MEDICAL CENTER | Age: 37
End: 2017-07-11
Payer: COMMERCIAL

## 2017-07-11 ENCOUNTER — APPOINTMENT (OUTPATIENT)
Dept: RADIOLOGY | Facility: MEDICAL CENTER | Age: 37
DRG: 004 | End: 2017-07-11
Attending: INTERNAL MEDICINE
Payer: COMMERCIAL

## 2017-07-11 VITALS
OXYGEN SATURATION: 95 % | HEART RATE: 82 BPM | TEMPERATURE: 98.4 F | WEIGHT: 121.25 LBS | HEIGHT: 68 IN | SYSTOLIC BLOOD PRESSURE: 151 MMHG | RESPIRATION RATE: 17 BRPM | BODY MASS INDEX: 18.38 KG/M2 | DIASTOLIC BLOOD PRESSURE: 92 MMHG

## 2017-07-11 PROBLEM — G40.901 STATUS EPILEPTICUS (HCC): Status: RESOLVED | Noted: 2017-06-20 | Resolved: 2017-07-11

## 2017-07-11 PROBLEM — A41.9 SEPSIS, UNSPECIFIED: Status: RESOLVED | Noted: 2017-06-17 | Resolved: 2017-07-11

## 2017-07-11 PROBLEM — J69.0 ASPIRATION PNEUMONIA (HCC): Status: RESOLVED | Noted: 2017-06-17 | Resolved: 2017-07-11

## 2017-07-11 LAB
ANION GAP SERPL CALC-SCNC: 9 MMOL/L (ref 0–11.9)
BASOPHILS # BLD AUTO: 0.9 % (ref 0–1.8)
BASOPHILS # BLD: 0.06 K/UL (ref 0–0.12)
BUN SERPL-MCNC: 14 MG/DL (ref 8–22)
CALCIUM SERPL-MCNC: 8.4 MG/DL (ref 8.5–10.5)
CHLORIDE SERPL-SCNC: 97 MMOL/L (ref 96–112)
CO2 SERPL-SCNC: 23 MMOL/L (ref 20–33)
CREAT SERPL-MCNC: <0.2 MG/DL (ref 0.5–1.4)
EOSINOPHIL # BLD AUTO: 0.33 K/UL (ref 0–0.51)
EOSINOPHIL NFR BLD: 4.7 % (ref 0–6.9)
ERYTHROCYTE [DISTWIDTH] IN BLOOD BY AUTOMATED COUNT: 52.8 FL (ref 35.9–50)
GFR SERPL CREATININE-BSD FRML MDRD: >60 ML/MIN/1.73 M 2
GLUCOSE SERPL-MCNC: 102 MG/DL (ref 65–99)
HCT VFR BLD AUTO: 34.7 % (ref 42–52)
HGB BLD-MCNC: 11.3 G/DL (ref 14–18)
IMM GRANULOCYTES # BLD AUTO: 0.03 K/UL (ref 0–0.11)
IMM GRANULOCYTES NFR BLD AUTO: 0.4 % (ref 0–0.9)
LYMPHOCYTES # BLD AUTO: 1.32 K/UL (ref 1–4.8)
LYMPHOCYTES NFR BLD: 18.8 % (ref 22–41)
MAGNESIUM SERPL-MCNC: 1.7 MG/DL (ref 1.5–2.5)
MCH RBC QN AUTO: 28.8 PG (ref 27–33)
MCHC RBC AUTO-ENTMCNC: 32.6 G/DL (ref 33.7–35.3)
MCV RBC AUTO: 88.3 FL (ref 81.4–97.8)
MONOCYTES # BLD AUTO: 0.85 K/UL (ref 0–0.85)
MONOCYTES NFR BLD AUTO: 12.1 % (ref 0–13.4)
NEUTROPHILS # BLD AUTO: 4.44 K/UL (ref 1.82–7.42)
NEUTROPHILS NFR BLD: 63.1 % (ref 44–72)
NRBC # BLD AUTO: 0 K/UL
NRBC BLD AUTO-RTO: 0 /100 WBC
PHOSPHATE SERPL-MCNC: 3.3 MG/DL (ref 2.5–4.5)
PLATELET # BLD AUTO: 435 K/UL (ref 164–446)
PMV BLD AUTO: 8 FL (ref 9–12.9)
POTASSIUM SERPL-SCNC: 3.9 MMOL/L (ref 3.6–5.5)
RBC # BLD AUTO: 3.93 M/UL (ref 4.7–6.1)
SODIUM SERPL-SCNC: 129 MMOL/L (ref 135–145)
WBC # BLD AUTO: 7 K/UL (ref 4.8–10.8)

## 2017-07-11 PROCEDURE — A9270 NON-COVERED ITEM OR SERVICE: HCPCS

## 2017-07-11 PROCEDURE — A9270 NON-COVERED ITEM OR SERVICE: HCPCS | Performed by: INTERNAL MEDICINE

## 2017-07-11 PROCEDURE — 71010 DX-CHEST-PORTABLE (1 VIEW): CPT

## 2017-07-11 PROCEDURE — 700102 HCHG RX REV CODE 250 W/ 637 OVERRIDE(OP)

## 2017-07-11 PROCEDURE — 700101 HCHG RX REV CODE 250: Performed by: INTERNAL MEDICINE

## 2017-07-11 PROCEDURE — 87186 SC STD MICRODIL/AGAR DIL: CPT | Mod: 91

## 2017-07-11 PROCEDURE — 94640 AIRWAY INHALATION TREATMENT: CPT

## 2017-07-11 PROCEDURE — 94668 MNPJ CHEST WALL SBSQ: CPT

## 2017-07-11 PROCEDURE — 87205 SMEAR GRAM STAIN: CPT

## 2017-07-11 PROCEDURE — 99239 HOSP IP/OBS DSCHRG MGMT >30: CPT | Performed by: HOSPITALIST

## 2017-07-11 PROCEDURE — 700111 HCHG RX REV CODE 636 W/ 250 OVERRIDE (IP)

## 2017-07-11 PROCEDURE — 84100 ASSAY OF PHOSPHORUS: CPT

## 2017-07-11 PROCEDURE — 700102 HCHG RX REV CODE 250 W/ 637 OVERRIDE(OP): Performed by: INTERNAL MEDICINE

## 2017-07-11 PROCEDURE — 87070 CULTURE OTHR SPECIMN AEROBIC: CPT

## 2017-07-11 PROCEDURE — 700102 HCHG RX REV CODE 250 W/ 637 OVERRIDE(OP): Performed by: PSYCHIATRY & NEUROLOGY

## 2017-07-11 PROCEDURE — A9270 NON-COVERED ITEM OR SERVICE: HCPCS | Performed by: PSYCHIATRY & NEUROLOGY

## 2017-07-11 PROCEDURE — 87077 CULTURE AEROBIC IDENTIFY: CPT

## 2017-07-11 PROCEDURE — 85025 COMPLETE CBC W/AUTO DIFF WBC: CPT

## 2017-07-11 PROCEDURE — 83735 ASSAY OF MAGNESIUM: CPT

## 2017-07-11 PROCEDURE — 87641 MR-STAPH DNA AMP PROBE: CPT

## 2017-07-11 PROCEDURE — 80048 BASIC METABOLIC PNL TOTAL CA: CPT

## 2017-07-11 RX ORDER — LEVETIRACETAM 100 MG/ML
1500 SOLUTION ORAL EVERY 12 HOURS
Qty: 240 ML | Status: ON HOLD
Start: 2017-07-11 | End: 2018-03-15

## 2017-07-11 RX ORDER — MAGNESIUM SULFATE HEPTAHYDRATE 40 MG/ML
2 INJECTION, SOLUTION INTRAVENOUS ONCE
Status: COMPLETED | OUTPATIENT
Start: 2017-07-11 | End: 2017-07-11

## 2017-07-11 RX ORDER — MICONAZOLE NITRATE 20 MG/G
CREAM TOPICAL
Status: ON HOLD
Start: 2017-07-11 | End: 2018-02-18

## 2017-07-11 RX ORDER — AMOXICILLIN 250 MG
2 CAPSULE ORAL 2 TIMES DAILY
Qty: 30 TAB | Refills: 0 | Status: ON HOLD
Start: 2017-07-11 | End: 2018-02-18

## 2017-07-11 RX ORDER — LEVALBUTEROL INHALATION SOLUTION 1.25 MG/3ML
1.25 SOLUTION RESPIRATORY (INHALATION) EVERY 4 HOURS PRN
Qty: 24 ML | Status: ON HOLD
Start: 2017-07-11 | End: 2018-02-18

## 2017-07-11 RX ORDER — FAMOTIDINE 20 MG/1
20 TABLET, FILM COATED ORAL 2 TIMES DAILY
Qty: 60 TAB | Status: ON HOLD
Start: 2017-07-11 | End: 2018-02-18

## 2017-07-11 RX ORDER — PHENOBARBITAL 20 MG/5ML
70 ELIXIR ORAL 2 TIMES DAILY
Qty: 240 ML | Refills: 3 | Status: ON HOLD
Start: 2017-07-11 | End: 2018-02-18

## 2017-07-11 RX ORDER — ACETAMINOPHEN 325 MG/1
650 TABLET ORAL EVERY 6 HOURS PRN
Qty: 30 TAB | Refills: 0 | Status: ON HOLD
Start: 2017-07-11 | End: 2018-02-18

## 2017-07-11 RX ORDER — POTASSIUM CHLORIDE 1.5 G/1.58G
20 POWDER, FOR SOLUTION ORAL ONCE
Status: COMPLETED | OUTPATIENT
Start: 2017-07-11 | End: 2017-07-11

## 2017-07-11 RX ORDER — OXYCODONE HYDROCHLORIDE 5 MG/1
5 TABLET ORAL EVERY 4 HOURS PRN
Qty: 30 TAB | Refills: 0 | Status: ON HOLD
Start: 2017-07-11 | End: 2018-02-18

## 2017-07-11 RX ORDER — ONDANSETRON 4 MG/1
4 TABLET, ORALLY DISINTEGRATING ORAL EVERY 4 HOURS PRN
Qty: 10 TAB | Refills: 0 | Status: ON HOLD
Start: 2017-07-11 | End: 2018-02-18

## 2017-07-11 RX ORDER — LEVALBUTEROL INHALATION SOLUTION 1.25 MG/3ML
1.25 SOLUTION RESPIRATORY (INHALATION) 4 TIMES DAILY
Qty: 24 ML | Status: ON HOLD
Start: 2017-07-11 | End: 2018-02-18

## 2017-07-11 RX ORDER — MAGNESIUM SULFATE HEPTAHYDRATE 40 MG/ML
2 INJECTION, SOLUTION INTRAVENOUS ONCE
Status: DISCONTINUED | OUTPATIENT
Start: 2017-07-11 | End: 2017-07-11 | Stop reason: HOSPADM

## 2017-07-11 RX ADMIN — SENNOSIDES AND DOCUSATE SODIUM 2 TABLET: 8.6; 5 TABLET ORAL at 08:08

## 2017-07-11 RX ADMIN — MAGNESIUM SULFATE IN WATER 2 G: 40 INJECTION, SOLUTION INTRAVENOUS at 08:09

## 2017-07-11 RX ADMIN — FAMOTIDINE 20 MG: 20 TABLET, FILM COATED ORAL at 08:09

## 2017-07-11 RX ADMIN — LEVETIRACETAM 1500 MG: 100 SOLUTION ORAL at 08:08

## 2017-07-11 RX ADMIN — LEVALBUTEROL 1.25 MG: 1.25 SOLUTION RESPIRATORY (INHALATION) at 10:33

## 2017-07-11 RX ADMIN — PHENOBARBITAL 70 MG: 20 ELIXIR ORAL at 08:08

## 2017-07-11 RX ADMIN — POTASSIUM CHLORIDE 20 MEQ: 1.5 POWDER, FOR SOLUTION ORAL at 08:08

## 2017-07-11 RX ADMIN — LEVALBUTEROL 1.25 MG: 1.25 SOLUTION RESPIRATORY (INHALATION) at 06:53

## 2017-07-11 ASSESSMENT — LIFESTYLE VARIABLES: EVER_SMOKED: NEVER

## 2017-07-11 NOTE — DISCHARGE INSTRUCTIONS
Discharge Instructions    Discharged to other by medical transportation with escort. Discharged via ambulance, hospital escort: Yes.  Special equipment needed: Oxygen    Be sure to schedule a follow-up appointment with your primary care doctor or any specialists as instructed.     Discharge Plan:   Diet Plan: Discussed  Activity Level: Discussed  Confirmed Symptoms Management: Discussed  Medication Reconciliation Updated: Yes  Influenza Vaccine Indication: Patient Refuses    I understand that a diet low in cholesterol, fat, and sodium is recommended for good health. Unless I have been given specific instructions below for another diet, I accept this instruction as my diet prescription.   Other diet: tube feeding    Special Instructions: Discharged to [facility]    · Is patient discharged on Warfarin / Coumadin?   No     · Is patient Post Blood Transfusion?  No    Depression / Suicide Risk    As you are discharged from this [facility] facility, it is important to learn how to keep safe from harming yourself.    Recognize the warning signs:  · Abrupt changes in personality, positive or negative- including increase in energy   · Giving away possessions  · Change in eating patterns- significant weight changes-  positive or negative  · Change in sleeping patterns- unable to sleep or sleeping all the time   · Unwillingness or inability to communicate  · Depression  · Unusual sadness, discouragement and loneliness  · Talk of wanting to die  · Neglect of personal appearance   · Rebelliousness- reckless behavior  · Withdrawal from people/activities they love  · Confusion- inability to concentrate     If you or a loved one observes any of these behaviors or has concerns about self-harm, here's what you can do:  · Talk about it- your feelings and reasons for harming yourself  · Remove any means that you might use to hurt yourself (examples: pills, rope, extension cords, firearm)  · Get professional help from the  community (Mental Health, Substance Abuse, psychological counseling)  · Do not be alone:Call your Safe Contact- someone whom you trust who will be there for you.  · Call your local CRISIS HOTLINE 662-0131 or 432-840-3906  · Call your local Children's Mobile Crisis Response Team [place] (366) 206-1375 or www.Pythian  · Call the toll free National Suicide Prevention Hotlines   · National Suicide Prevention Lifeline 017-178-VRUJ (8505)  · National Hope Line Network 800-SUICIDE (148-6917)

## 2017-07-11 NOTE — PROGRESS NOTES
Pt is to d/c to [facility] LTACH per [facility] at 1400. Mother has been informed and understands all D/C instructions. Report called to [facility]

## 2017-07-11 NOTE — DISCHARGE SUMMARY
CHIEF COMPLAINT ON ADMISSION  Chief Complaint   Patient presents with   • Shortness of Breath   • Difficulty Breathing       CODE STATUS  Full Code    HPI & HOSPITAL COURSE  This is a 37 y.o. male here with aspiration PNA seizures and respiratory failure.  Pt Seen and examined in the ICU and chart reviewed and discussed with nursing staff and Dr Blackburn, no overnight events clinically stable for transfer to [facility], discharge meds reviewed and signed admit orders to [facility].    Please refer to d/c summary by Dr Sinclair for further details        DISCHARGE PROBLEM LIST  Active Problems:    Acute respiratory failure with hypoxia (CMS-HCC) POA: Yes    Hyponatremia POA: Yes    Hypoglycemia POA: Yes    TBI (traumatic brain injury) (CMS-HCC) POA: Yes  Resolved Problems:    Status epilepticus (CMS-HCC) POA: Yes    Aspiration pneumonia (CMS-HCC) POA: Yes    Sepsis (CMS-HCC) POA: Unknown    Acute respiratory distress (CMS-HCC) POA: Unknown    Acute delirium    Occasional tremors POA: Unknown    Hypokalemia POA: Yes      FOLLOW UP  No future appointments.  [facility] ([facility])  [address]  [facility] [address]  [phone]          MEDICATIONS ON DISCHARGE   Jerry, Ruben   Home Medication Instructions YARITZA:[id number]    Printed on:07/11/17 1052   Medication Information                      acetaminophen (TYLENOL) 325 MG Tab  Take 2 Tabs by mouth every 6 hours as needed (Mild Pain; (Pain scale 1-3); Temp greater than 100.5 F).             BACLOFEN IT  by Intrathecal route Continuous. IT pump.             enoxaparin (LOVENOX) 30 MG/0.3ML Solution inj  Inject 0.3 mL as instructed every day.             famotidine (PEPCID) 20 MG Tab  Take 1 Tab by mouth 2 Times a Day.             levalbuterol (XOPENEX) 1.25 MG/3ML Nebu Soln  3 mL by Nebulization route 4 times a day.             levalbuterol (XOPENEX) 1.25 MG/3ML Nebu Soln  3 mL by Nebulization route every four hours as needed for Shortness of Breath.              levetiracetam (KEPPRA) 100 MG/ML Solution  Take 15 mL by mouth every 12 hours.             miconazole 2%-zinc oxide (RADHA) 2 % Cream topical cream  Apply to groin             ondansetron (ZOFRAN ODT) 4 MG TABLET DISPERSIBLE  Take 1 Tab by mouth every four hours as needed for Nausea/Vomiting (give PO if no IV route available).             oxycodone immediate-release (ROXICODONE) 5 MG Tab  Take 1 Tab by mouth every four hours as needed.             PHENobarbital 20 MG/5ML Elixir  Take 17.5 mL by mouth 2 Times a Day.             senna-docusate (PERICOLACE OR SENOKOT S) 8.6-50 MG Tab  Take 2 Tabs by mouth 2 Times a Day.                      Total time of the discharge process exceeds 32 minutes

## 2017-07-11 NOTE — CARE PLAN
Problem: Infection  Goal: Will remain free from infection  Outcome: PROGRESSING AS EXPECTED    Problem: Respiratory:  Goal: Respiratory status will improve  Outcome: PROGRESSING AS EXPECTED

## 2017-07-11 NOTE — PROGRESS NOTES
Pulmonary Critical Care Progress Note        Date of service:  7/11/2017    Reason for visit:  Respiratory failure, pneumonia, atelectasis  Unable to provide CC or ROS due to chronic encephalopathy    Interval Events:  24 hour interval history reviewed  [facility]     YESTERDAY'S IE  SR, 80s-90s  On T piece 5 liters at 30% O2  Has J-tube in place.   Waldrop with 1200 output overnight   No drips running   CXR:  No change compared to chest x-ray from yesterday.       PFSH:  No change.    Physical Exam  General: Awake but not interactive. Not following commands.   Neuro/Psych: Awake.   HEENT: Pupils are equal.   CVS: Regular rate and rhythm.   Respiratory: Regular rate and rhythm.   Abdomen/: Soft. Non tender.   Extremities: Retracted in all four extremities.  2+ DPP. No pedal edema.   Skin: Warm. Dry.     Respiratory:     Pulse Oximetry: 99 %    HemoDynamics:  Pulse: 86, Heart Rate (Monitored): 86  NIBP: 107/70 mmHg      Neuro:      Fluids:  Intake/Output       07/09/17 0700 - 07/10/17 0659 07/10/17 0700 - 07/11/17 0659 07/11/17 0700 - 07/12/17 0659      2813-8922 8364-4585 Total 6751-1428 9891-6685 Total 0143-9187 8207-3591 Total       Intake    I.V.  150  -- 150  --  -- --  --  -- --    Magnesium Sulfate Volume 150 -- 150 -- -- -- -- -- --    Other  180  180 360  735  60 795  --  -- --    Medications (P.O./ Enteral Liquids) 180 180 360 735 60 795 -- -- --    Enteral  430  570 1000  160  660 820  --  -- --    Enteral Volume 250 500 750 100 500 600 -- -- --    Free Water / Tube Flush 180 70 250 60 160 220 -- -- --    Total Intake   -- -- --       Output    Urine  1215  1175 2390  1005  700 1705  --  -- --    Indwelling Cathether 1215 1175 2390 7387 325 5224 -- -- --    Stool  --  -- --  --  -- --  --  -- --    Number of Times Stooled 1 x -- 1 x -- -- -- -- -- --    Total Output 3487 9285 2173 3433 977 1176 -- -- --       Net I/O     -455 -425 -880 -110 20 -90 -- -- --         Weight: 55 kg (121 lb 4.1 oz)  Recent Labs      07/09/17   0521  07/10/17   0450  07/11/17   0405   SODIUM  135  132*  129*   POTASSIUM  3.6  3.9  3.9   CHLORIDE  103  98  97   CO2  24  25  23   BUN  14  13  14   CREATININE  <0.20*  <0.20*  <0.20*   MAGNESIUM  1.8  2.6*  1.7   PHOSPHORUS  3.1  3.1  3.3   CALCIUM  8.0*  8.4*  8.4*       GI/Nutrition:  Tolerating enteral TF    Liver Function  Recent Labs      07/09/17   0521  07/10/17   0450  07/11/17   0405   ALTSGPT   --   25   --    ASTSGOT   --   21   --    ALKPHOSPHAT   --   173*   --    TBILIRUBIN   --   0.3   --    PREALBUMIN   --   23.0   --    GLUCOSE  100*  99  102*       Heme:  Recent Labs      07/09/17   0521  07/10/17   0450  07/11/17   0405   RBC  3.50*  3.96*  3.93*   HEMOGLOBIN  10.3*  11.5*  11.3*   HEMATOCRIT  31.3*  35.0*  34.7*   PLATELETCT  473*  478*  435       Infectious Disease:  Monitored Temp  Av.2 °C (98.9 °F)  Min: 36.8 °C (98.2 °F)  Max: 37.7 °C (99.9 °F)     Recent Labs      07/09/17   0521  07/10/17   0450  07/11/17   0405   WBC  6.2  7.3  7.0   NEUTSPOLYS  62.80  65.60  63.10   LYMPHOCYTES  20.30*  17.40*  18.80*   MONOCYTES  11.30  11.60  12.10   EOSINOPHILS  4.50  4.40  4.70   BASOPHILS  0.60  0.70  0.90   ASTSGOT   --   21   --    ALTSGPT   --   25   --    ALKPHOSPHAT   --   173*   --    TBILIRUBIN   --   0.3   --      Current Facility-Administered Medications   Medication Dose Frequency Provider Last Rate Last Dose   • levalbuterol (XOPENEX) 1.25 MG/3ML nebulizer solution 1.25 mg  1.25 mg Q4H PRN (RT) Rivas Dee M.D.       • levalbuterol (XOPENEX) 1.25 MG/3ML nebulizer solution 1.25 mg  1.25 mg 4X/DAY (RT) Rivas Dee M.D.   1.25 mg at 07/10/17 1835   • miconazole 2%-zinc oxide (RADHA) topical cream   Q6HRS PRN FARHAT KlineOBucky       • levetiracetam (KEPPRA) 100 MG/ML solution 1,500 mg  1,500 mg Q12HRS Martell Hunter M.D.   1,500 mg at 07/10/17 2039   • Respiratory Care per Protocol    Continuous RT Clarence Pro M.D.       • chlorhexidine (PERIDEX) 0.12 % solution 15 mL  15 mL BID Clarence Pro M.D.   15 mL at 07/10/17 2038   • lidocaine (XYLOCAINE) 1%  injection  1-2 mL Q30 MIN PRN Clarence Pro M.D.       • MD ALERT...Adult ICU Electrolyte Replacement per Pharmacy Protocol   pharmacy to dose Clarence Pro M.D.       • famotidine (PEPCID) tablet 20 mg  20 mg BID Gabrielle Vargas PHARMD   20 mg at 07/10/17 2038   • oxycodone immediate-release (ROXICODONE) tablet 5 mg  5 mg Q4HRS PRN Kev Araiza M.D.   5 mg at 07/10/17 0040   • PHENobarbital solution 70 mg  70 mg BID Martell Hunter M.D.   70 mg at 07/10/17 2038   • enoxaparin (LOVENOX) inj 30 mg  30 mg DAILY Clarence Morfin D.O.   30 mg at 07/10/17 2039   • senna-docusate (PERICOLACE or SENOKOT S) 8.6-50 MG per tablet 2 Tab  2 Tab BID Kev Araiza M.D.   2 Tab at 07/10/17 2038    And   • polyethylene glycol/lytes (MIRALAX) PACKET 1 Packet  1 Packet QDAY PRBRIDGETTE Araiza M.D.   1 Packet at 06/25/17 2028    And   • magnesium hydroxide (MILK OF MAGNESIA) suspension 30 mL  30 mL QDAY PRBRIDGETTE Araiza M.D.   30 mL at 06/28/17 1305    And   • bisacodyl (DULCOLAX) suppository 10 mg  10 mg QDAY PRBRIDGETTE Araiza M.D.   10 mg at 07/10/17 1303   • glucose 4 g chewable tablet 16 g  16 g Q15 MIN PRBRIDGETTE Araiza M.D.        And   • dextrose 50% (D50W) injection 25 mL  25 mL Q15 MIN PRBRIDGETTE Araiza M.D.   25 mL at 06/20/17 1840   • ondansetron (ZOFRAN) syringe/vial injection 4 mg  4 mg Q4HRS PRN JASPAL Medina.O.   4 mg at 07/05/17 1429   • ondansetron (ZOFRAN ODT) dispertab 4 mg  4 mg Q4HRS PRN JASPAL Medina.O.       • promethazine (PHENERGAN) tablet 12.5-25 mg  12.5-25 mg Q4HRS PRN JASPAL Medina.O.       • promethazine (PHENERGAN) suppository 12.5-25 mg  12.5-25 mg Q4HRS PRN JASPAL Medina.O.       • acetaminophen (TYLENOL) tablet 650 mg  650 mg Q6HRS PRN JASPAL Medina.O.   650 mg at  07/08/17 1750   • Pain Pump (patient supplied) Device   Continuous Clarence Morfin D.O.   Stopped at 06/17/17 1230     Last reviewed on 6/17/2017  9:45 AM by Ligia Frazier, Pharmacy Int    Quality  Measures:  Medications reviewed, Labs reviewed and Radiology images reviewed  Waldrop catheter: Critically Ill - Requiring Accurate Measurement of Urinary Output      DVT Prophylaxis: Enoxaparin (Lovenox)  DVT prophylaxis - mechanical: SCDs  Ulcer prophylaxis: Yes        Assessment and Plan:    Acute hypoxemic respiratory failure   - VDRF;  intubated 6/19-6/27, re-intubated 6/30   - Trach 7/2   - cont T-piece trials and O2 weaning   - PEP/CPT every 12 hours  Aspiration/HCAP pneumonia with left lung atelectasis   - S/P FOB 6/30 - atelectasis improved   - MSSA - completed Ancef - observe off antibiotics  Sepsis syndrome with shock - pulmonary source   - improved              - Marik protocol completed  PERALTA    - off hydrocortisone  Acute pulmonary edema   - improved  Parapneumonic left pleural effusion   - S/P thoracentesis 6/25   - follow  Prior trach with trauma years ago  S/P aspiration pneumonia   - Klebsiella pneumonia - pansensitive              - completed abx x 7 days 6/26 - C3/Flagyl  New onset seizure likely secondary to hyponatremia              - Keppra, Phenobarb per neuro  Severe chronic underlying debility secondary to profound traumatic brain injury 20 years ago  Dysphagia - cont enteral G-tube feedings  Hyponatremia - improved with salt tabs   - stop salt tabs and observe  Transfer to [facility] today      Critical Care Time:  32 minutes  [id number]  No time overlap  Time excludes procedures  Discussed with RN, RT, Team     I, Kathleen Wills (oCnchita), am scribing for, and in the presence of, Darleen Blackburn M.D.   Electronically signed by: Kathleen Wills (Conchita), 7/11/2017    IDarleen M.D.  personally performed the services described in this documentation, as scribed by Kathleen Wills in my presence, and it  is both accurate and complete.

## 2017-07-11 NOTE — CARE PLAN
Problem: Skin Integrity  Goal: Risk for impaired skin integrity will decrease  Outcome: PROGRESSING AS EXPECTED  Assessed skin at beginning of shift. Repositioned pt Q2H and as needed, provided bath and linen change. No changes in skin integrity.    Problem: Mobility  Goal: Risk for activity intolerance will decrease  Outcome: PROGRESSING AS EXPECTED  Repositioned pt Q2H and as needed. Performed ROM. Ambulated as tolerated. Provided rest periods.

## 2017-07-12 ENCOUNTER — APPOINTMENT (OUTPATIENT)
Dept: RADIOLOGY | Facility: MEDICAL CENTER | Age: 37
End: 2017-07-12
Attending: HOSPITALIST
Payer: COMMERCIAL

## 2017-07-12 LAB
ANION GAP SERPL CALC-SCNC: 9 MMOL/L (ref 0–11.9)
APPEARANCE UR: CLEAR
BASOPHILS # BLD AUTO: 0.7 % (ref 0–1.8)
BASOPHILS # BLD: 0.06 K/UL (ref 0–0.12)
BILIRUB UR QL STRIP.AUTO: NEGATIVE
BUN SERPL-MCNC: 14 MG/DL (ref 8–22)
CALCIUM SERPL-MCNC: 8.5 MG/DL (ref 8.4–10.2)
CHLORIDE SERPL-SCNC: 96 MMOL/L (ref 96–112)
CO2 SERPL-SCNC: 23 MMOL/L (ref 20–33)
COLOR UR: YELLOW
CREAT SERPL-MCNC: <0.3 MG/DL (ref 0.5–1.4)
EOSINOPHIL # BLD AUTO: 0.47 K/UL (ref 0–0.51)
EOSINOPHIL NFR BLD: 5.2 % (ref 0–6.9)
ERYTHROCYTE [DISTWIDTH] IN BLOOD BY AUTOMATED COUNT: 50.4 FL (ref 35.9–50)
GFR SERPL CREATININE-BSD FRML MDRD: >60 ML/MIN/1.73 M 2
GLUCOSE SERPL-MCNC: 100 MG/DL (ref 65–99)
GLUCOSE UR STRIP.AUTO-MCNC: NEGATIVE MG/DL
GRAM STN SPEC: NORMAL
HCT VFR BLD AUTO: 34.7 % (ref 42–52)
HGB BLD-MCNC: 11.7 G/DL (ref 14–18)
IMM GRANULOCYTES # BLD AUTO: 0.05 K/UL (ref 0–0.11)
IMM GRANULOCYTES NFR BLD AUTO: 0.6 % (ref 0–0.9)
KETONES UR STRIP.AUTO-MCNC: NEGATIVE MG/DL
LEUKOCYTE ESTERASE UR QL STRIP.AUTO: NEGATIVE
LYMPHOCYTES # BLD AUTO: 1.3 K/UL (ref 1–4.8)
LYMPHOCYTES NFR BLD: 14.4 % (ref 22–41)
MAGNESIUM SERPL-MCNC: 1.8 MG/DL (ref 1.5–2.5)
MCH RBC QN AUTO: 29.4 PG (ref 27–33)
MCHC RBC AUTO-ENTMCNC: 33.7 G/DL (ref 33.7–35.3)
MCV RBC AUTO: 87.2 FL (ref 81.4–97.8)
MICRO URNS: NORMAL
MONOCYTES # BLD AUTO: 0.95 K/UL (ref 0–0.85)
MONOCYTES NFR BLD AUTO: 10.5 % (ref 0–13.4)
MRSA DNA SPEC QL NAA+PROBE: NORMAL
NEUTROPHILS # BLD AUTO: 6.2 K/UL (ref 1.82–7.42)
NEUTROPHILS NFR BLD: 68.6 % (ref 44–72)
NITRITE UR QL STRIP.AUTO: NEGATIVE
NRBC # BLD AUTO: 0 K/UL
NRBC BLD AUTO-RTO: 0 /100 WBC
PH UR STRIP.AUTO: 7 [PH]
PLATELET # BLD AUTO: 405 K/UL (ref 164–446)
PMV BLD AUTO: 8.1 FL (ref 9–12.9)
POTASSIUM SERPL-SCNC: 4.3 MMOL/L (ref 3.6–5.5)
PROT UR QL STRIP: NEGATIVE MG/DL
RBC # BLD AUTO: 3.98 M/UL (ref 4.7–6.1)
RBC UR QL AUTO: NEGATIVE
SIGNIFICANT IND 70042: NORMAL
SIGNIFICANT IND 70042: NORMAL
SITE SITE: NORMAL
SITE SITE: NORMAL
SODIUM SERPL-SCNC: 128 MMOL/L (ref 135–145)
SOURCE SOURCE: NORMAL
SOURCE SOURCE: NORMAL
SP GR UR STRIP.AUTO: <=1.005
WBC # BLD AUTO: 9 K/UL (ref 4.8–10.8)

## 2017-07-12 PROCEDURE — 99356 PR PROLONGED SVC I/P OR OBS SETTING 1ST HOUR: CPT | Performed by: HOSPITALIST

## 2017-07-12 PROCEDURE — 80048 BASIC METABOLIC PNL TOTAL CA: CPT

## 2017-07-12 PROCEDURE — 71010 DX-CHEST-PORTABLE (1 VIEW): CPT

## 2017-07-12 PROCEDURE — 83735 ASSAY OF MAGNESIUM: CPT

## 2017-07-12 PROCEDURE — 85025 COMPLETE CBC W/AUTO DIFF WBC: CPT

## 2017-07-12 PROCEDURE — 81003 URINALYSIS AUTO W/O SCOPE: CPT

## 2017-07-12 PROCEDURE — 87040 BLOOD CULTURE FOR BACTERIA: CPT

## 2017-07-12 PROCEDURE — 99233 SBSQ HOSP IP/OBS HIGH 50: CPT | Performed by: HOSPITALIST

## 2017-07-12 NOTE — [facility]
Hospital Medicine Progress Note, Adult, Complex               Author: Evans Kaykay Date & Time created: 7/12/2017  11:00 AM     Interval History:  CC - RF  Transferred from [facility] yesterday for RF.    Review of Systems:  Review of Systems   Unable to perform ROS: medical condition       Physical Exam:  Physical Exam   Constitutional: No distress.   HENT:   Right Ear: External ear normal.   Left Ear: External ear normal.   Nose: Nose normal.   Eyes: Right eye exhibits no discharge. Left eye exhibits no discharge.   Neck:   Trach collar   Cardiovascular: Normal rate and regular rhythm.    SR   Pulmonary/Chest: No respiratory distress. He has no wheezes.   Decreased BS   Abdominal: Soft. Bowel sounds are normal. He exhibits distension. There is no rebound and no guarding.   Musculoskeletal:   Severely contracted limbs   Neurological:   Awake but not interactive   Skin: Skin is warm and dry. He is not diaphoretic.   Vitals reviewed.      Labs:        Invalid input(s): [id number]      Recent Labs      07/10/17   0450  07/11/17   0405  07/12/17   0330   SODIUM  132*  129*  128*   POTASSIUM  3.9  3.9  4.3   CHLORIDE  98  97  96   CO2  25  23  23   BUN  13  14  14   CREATININE  <0.20*  <0.20*  <0.30*   MAGNESIUM  2.6*  1.7  1.8   PHOSPHORUS  3.1  3.3   --    CALCIUM  8.4*  8.4*  8.5     Recent Labs      07/10/17   0450  07/11/17   0405  07/12/17   0330   ALTSGPT  25   --    --    ASTSGOT  21   --    --    ALKPHOSPHAT  173*   --    --    TBILIRUBIN  0.3   --    --    PREALBUMIN  23.0   --    --    GLUCOSE  99  102*  100*     Recent Labs      07/10/17   0450  07/11/17   0405  07/12/17   0330   RBC  3.96*  3.93*  3.98*   HEMOGLOBIN  11.5*  11.3*  11.7*   HEMATOCRIT  35.0*  34.7*  34.7*   PLATELETCT  478*  435  405     Recent Labs      07/10/17   0450  07/11/17   0405  07/12/17   0330   WBC  7.3  7.0  9.0   NEUTSPOLYS  65.60  63.10  68.60   LYMPHOCYTES  17.40*  18.80*  14.40*   MONOCYTES  11.60  12.10  10.50   EOSINOPHILS  4.40   4.70  5.20   BASOPHILS  0.70  0.90  0.70   ASTSGOT  21   --    --    ALTSGPT  25   --    --    ALKPHOSPHAT  173*   --    --    TBILIRUBIN  0.3   --    --              Medical Decision Making, by Problem:  S/P MSSA/KLEBSIELLA/ASPIRATION PNA W/ SEPSIS - completed abx prior to admission  PARAPNEUMONIC EFFUSION S/P THORACENTESIS  STATUS EPILEPTICUS - check Keppra and Phenobarbital levels  S/P VDRF - HTC 30% FiO2  S/P PERALTA - completed Hydrocortisone prior to admission  ANEMIA - follow H/H  HYPONATREMIA - follow Na+ levels, limit free water  REMOTE H/O TBI - mother is caretaker  CHRONIC PAIN - Baclofen pump as outpt  CHRONIC CONSTIPATION - mother requests daily suppository  SULFA ALLERGY    Reviewed w/ RN and CM    ADDENDUM  Met w/ pt's mother.  Discussed pt's clinical course at [facility], current management plans, and disposition goals.  Reviewed her concerns about care dietary, nursing, and respiratory care at [facility] w/ [place] (Director of Nursing) in attendance.  All questions answered.  Also D/W staff, >70 min.          Medications reviewed and Labs reviewed  Waldrop catheter: Strict Intake and Output During Sepisis or Shock      DVT Prophylaxis: Enoxaparin (Lovenox)    Ulcer prophylaxis: Yes

## 2017-07-13 LAB
ALBUMIN SERPL BCP-MCNC: 3 G/DL (ref 3.2–4.9)
ALBUMIN/GLOB SERPL: 0.7 G/DL
ALP SERPL-CCNC: 198 U/L (ref 30–99)
ALT SERPL-CCNC: 34 U/L (ref 2–50)
ANION GAP SERPL CALC-SCNC: 8 MMOL/L (ref 0–11.9)
APTT PPP: 28.1 SEC (ref 24.7–36)
AST SERPL-CCNC: 25 U/L (ref 12–45)
BASOPHILS # BLD AUTO: 0.5 % (ref 0–1.8)
BASOPHILS # BLD: 0.04 K/UL (ref 0–0.12)
BILIRUB SERPL-MCNC: 0.5 MG/DL (ref 0.1–1.5)
BUN SERPL-MCNC: 12 MG/DL (ref 8–22)
CALCIUM SERPL-MCNC: 8.3 MG/DL (ref 8.4–10.2)
CHLORIDE SERPL-SCNC: 94 MMOL/L (ref 96–112)
CO2 SERPL-SCNC: 24 MMOL/L (ref 20–33)
CREAT SERPL-MCNC: <0.3 MG/DL (ref 0.5–1.4)
EOSINOPHIL # BLD AUTO: 0.45 K/UL (ref 0–0.51)
EOSINOPHIL NFR BLD: 6 % (ref 0–6.9)
ERYTHROCYTE [DISTWIDTH] IN BLOOD BY AUTOMATED COUNT: 50.4 FL (ref 35.9–50)
GFR SERPL CREATININE-BSD FRML MDRD: >60 ML/MIN/1.73 M 2
GLOBULIN SER CALC-MCNC: 4.2 G/DL (ref 1.9–3.5)
GLUCOSE SERPL-MCNC: 99 MG/DL (ref 65–99)
HCT VFR BLD AUTO: 34.1 % (ref 42–52)
HGB BLD-MCNC: 11.5 G/DL (ref 14–18)
IMM GRANULOCYTES # BLD AUTO: 0.05 K/UL (ref 0–0.11)
IMM GRANULOCYTES NFR BLD AUTO: 0.7 % (ref 0–0.9)
INR PPP: 1.03 (ref 0.87–1.13)
LEVETIRACETAM SERPL-MCNC: 26 UG/ML (ref 12–46)
LYMPHOCYTES # BLD AUTO: 1.21 K/UL (ref 1–4.8)
LYMPHOCYTES NFR BLD: 16.2 % (ref 22–41)
MCH RBC QN AUTO: 29.3 PG (ref 27–33)
MCHC RBC AUTO-ENTMCNC: 33.7 G/DL (ref 33.7–35.3)
MCV RBC AUTO: 87 FL (ref 81.4–97.8)
MONOCYTES # BLD AUTO: 0.8 K/UL (ref 0–0.85)
MONOCYTES NFR BLD AUTO: 10.7 % (ref 0–13.4)
NEUTROPHILS # BLD AUTO: 4.9 K/UL (ref 1.82–7.42)
NEUTROPHILS NFR BLD: 65.9 % (ref 44–72)
NRBC # BLD AUTO: 0 K/UL
NRBC BLD AUTO-RTO: 0 /100 WBC
PHENOBARB SERPL-MCNC: 14 UG/ML (ref 15–40)
PHOSPHATE SERPL-MCNC: 3.3 MG/DL (ref 2.5–4.5)
PLATELET # BLD AUTO: 344 K/UL (ref 164–446)
PMV BLD AUTO: 8 FL (ref 9–12.9)
POTASSIUM SERPL-SCNC: 4.2 MMOL/L (ref 3.6–5.5)
PROT SERPL-MCNC: 7.2 G/DL (ref 6–8.2)
PROTHROMBIN TIME: 13.3 SEC (ref 12–14.6)
RBC # BLD AUTO: 3.92 M/UL (ref 4.7–6.1)
SODIUM SERPL-SCNC: 126 MMOL/L (ref 135–145)
WBC # BLD AUTO: 7.5 K/UL (ref 4.8–10.8)

## 2017-07-13 PROCEDURE — 84100 ASSAY OF PHOSPHORUS: CPT

## 2017-07-13 PROCEDURE — 80177 DRUG SCRN QUAN LEVETIRACETAM: CPT

## 2017-07-13 PROCEDURE — 85610 PROTHROMBIN TIME: CPT

## 2017-07-13 PROCEDURE — 85025 COMPLETE CBC W/AUTO DIFF WBC: CPT

## 2017-07-13 PROCEDURE — 99233 SBSQ HOSP IP/OBS HIGH 50: CPT | Performed by: HOSPITALIST

## 2017-07-13 PROCEDURE — 80053 COMPREHEN METABOLIC PANEL: CPT

## 2017-07-13 PROCEDURE — 85730 THROMBOPLASTIN TIME PARTIAL: CPT

## 2017-07-13 PROCEDURE — 80184 ASSAY OF PHENOBARBITAL: CPT

## 2017-07-13 NOTE — [facility]
Hospital Medicine Progress Note, Adult, Complex               Author: Cristina Kaykay Date & Time created: 7/13/2017  12:01 PM     Interval History:  CC - RF  Sputum +Proteus.  G-tube site cultures from [facility] noted.  Mother at bedside.    Review of Systems:  Review of Systems   Unable to perform ROS: medical condition       Physical Exam:  Physical Exam   Constitutional: No distress.   HENT:   Right Ear: External ear normal.   Left Ear: External ear normal.   Nose: Nose normal.   Eyes: Right eye exhibits no discharge. Left eye exhibits no discharge. No scleral icterus.   Neck:   Trach collar   Cardiovascular: Normal rate and regular rhythm.    SR   Pulmonary/Chest: No respiratory distress. He has no wheezes.   Decreased BS   Abdominal: Soft. Bowel sounds are normal. There is no rebound and no guarding.   protuberant   Musculoskeletal:   Severely contracted limbs   Neurological:   Awake but not interactive   Skin: Skin is warm and dry. He is not diaphoretic.   Vitals reviewed.      Labs:        Invalid input(s): [id number]      Recent Labs      07/11/17 0405 07/12/17 0330 07/13/17 0444   SODIUM  129*  128*  126*   POTASSIUM  3.9  4.3  4.2   CHLORIDE  97  96  94*   CO2  23  23  24   BUN  14  14  12   CREATININE  <0.20*  <0.30*  <0.30*   MAGNESIUM  1.7  1.8   --    PHOSPHORUS  3.3   --   3.3   CALCIUM  8.4*  8.5  8.3*     Recent Labs      07/11/17 0405 07/12/17 0330 07/13/17 0444   ALTSGPT   --    --   34   ASTSGOT   --    --   25   ALKPHOSPHAT   --    --   198*   TBILIRUBIN   --    --   0.5   GLUCOSE  102*  100*  99     Recent Labs      07/11/17 0405 07/12/17 0330 07/13/17 0444   RBC  3.93*  3.98*  3.92*   HEMOGLOBIN  11.3*  11.7*  11.5*   HEMATOCRIT  34.7*  34.7*  34.1*   PLATELETCT  435  405  344   PROTHROMBTM   --    --   13.3   APTT   --    --   28.1   INR   --    --   1.03     Recent Labs      07/11/17 0405 07/12/17 0330 07/13/17 0444   WBC  7.0  9.0  7.5   NEUTSPOLYS  63.10  68.60   65.90   LYMPHOCYTES  18.80*  14.40*  16.20*   MONOCYTES  12.10  10.50  10.70   EOSINOPHILS  4.70  5.20  6.00   BASOPHILS  0.90  0.70  0.50   ASTSGOT   --    --   25   ALTSGPT   --    --   34   ALKPHOSPHAT   --    --   198*   TBILIRUBIN   --    --   0.5             Medical Decision Making, by Problem:  PROTEUS TBI - start Unasyn  CUTANEOUS YEAST - start Diflucan  G TUBE CELLULITIS - antimicrobials as above  S/P MSSA/KLEBSIELLA/ASPIRATION PNA W/ SEPSIS  PARAPNEUMONIC EFFUSION S/P THORACENTESIS  STATUS EPILEPTICUS - Keppra and Phenobarbital levels pending  S/P VDRF - HTC 30% FiO2  S/P PERALTA - completed Hydrocortisone prior to admission  ANEMIA - follow H/H  HYPONATREMIA - follow Na+ levels, limit free water  REMOTE H/O TBI - mother is caretaker  CHRONIC PAIN - Baclofen pump as outpt  CHRONIC CONSTIPATION - mother requests daily suppository  SULFA ALLERGY  PROPH - add Culturelle    Reviewed w/ pt's mother at length regarding above plans.  Also D/W RN/RT, >40 min.          Medications reviewed and Labs reviewed  Waldrop catheter: Strict Intake and Output During Sepisis or Shock      DVT Prophylaxis: Enoxaparin (Lovenox)    Ulcer prophylaxis: Yes

## 2017-07-13 NOTE — PROGRESS NOTES
Pulmonary Critical Care Progress Note        Chief Complaint: respiratory failure, tracheostomy.    History of Present Illness: 37 y.o. male with a history of traumatic brain injury and chronic encephalopathy as well as a seizure disorder. He was transferred here for further treatment of respiratory failure with a tracheostomy tube in place.    ROS:   unable to perform due to the patient's inability to effectively communicate.    Interval Events:  24 hour interval history reviewed    PFSH:  No change.    Respiratory:  Exam: symmetrical breath sounds, few rales, no wheeze or egophony.  ImagingAvailable data reviewed. Chest radiogram 7/12/17 with patchy bibasilar densities.    HemoDynamics:  Exam: regular rhythm (Sinus)  Imaging: None - Reviewed    Neuro:  Exam: no response to question or command.  Imaging: Available data reviewed.    Fluids:  Intake/Output     None           Recent Labs      07/10/17   0450  07/11/17   0405  07/12/17   0330   SODIUM  132*  129*  128*   POTASSIUM  3.9  3.9  4.3   CHLORIDE  98  97  96   CO2  25  23  23   BUN  13  14  14   CREATININE  <0.20*  <0.20*  <0.30*   MAGNESIUM  2.6*  1.7  1.8   PHOSPHORUS  3.1  3.3   --    CALCIUM  8.4*  8.4*  8.5       GI/Nutrition:  Exam: abdomen is soft and non-tender  Imaging: Available data reviewed  tolerating enteral tube feeding.  Liver Function  Recent Labs      07/10/17   0450  07/11/17   0405  07/12/17   0330   ALTSGPT  25   --    --    ASTSGOT  21   --    --    ALKPHOSPHAT  173*   --    --    TBILIRUBIN  0.3   --    --    PREALBUMIN  23.0   --    --    GLUCOSE  99  102*  100*       Heme:  Recent Labs      07/10/17   0450  07/11/17   0405  07/12/17   0330   RBC  3.96*  3.93*  3.98*   HEMOGLOBIN  11.5*  11.3*  11.7*   HEMATOCRIT  35.0*  34.7*  34.7*   PLATELETCT  478*  435  405       Infectious Disease:  No Data Recorded  Micro: cultures reviewed. Sputum 7/11/17 with normal leon, Proteus and another gram-negative mateo.  Recent Labs      07/10/17    0450  07/11/17   0405  07/12/17   0330   WBC  7.3  7.0  9.0   NEUTSPOLYS  65.60  63.10  68.60   LYMPHOCYTES  17.40*  18.80*  14.40*   MONOCYTES  11.60  12.10  10.50   EOSINOPHILS  4.40  4.70  5.20   BASOPHILS  0.70  0.90  0.70   ASTSGOT  21   --    --    ALTSGPT  25   --    --    ALKPHOSPHAT  173*   --    --    TBILIRUBIN  0.3   --    --      No current facility-administered medications for this encounter.       Last reviewed on 6/17/2017  9:45 AM by Ligia Frazier, Pharmacy Int    Quality  Measures:  Core Measures & Quality Metrics    Problems:  Respiratory failure - tracheostomy, tolerating collar.  Recent pneumonia - completed antibiotics prior to admission  Recent parapneumonic effusion, recent thoracentesis  Seizure disorder  Anemia  Hyponatremia  Relative adrenal insufficiency, recent hydrocortisone taper.  Encephalopathy - chronic, history of traumatic brain injury.    Recommend:  Continue titrated oxygen via the tracheostomy collar and expiratory protocols.  Continue nutritional support, anticonvulsants and prophylaxis.  The patient's mother who is his caregiver will be trained in the maintenance of the tracheostomy tube at home.  Discussed with respiratory therapy and with Dr. Mccracken.         98.4

## 2017-07-14 LAB
ALBUMIN SERPL BCP-MCNC: 2.8 G/DL (ref 3.2–4.9)
ALBUMIN/GLOB SERPL: 0.7 G/DL
ALP SERPL-CCNC: 184 U/L (ref 30–99)
ALT SERPL-CCNC: 31 U/L (ref 2–50)
ANION GAP SERPL CALC-SCNC: 8 MMOL/L (ref 0–11.9)
AST SERPL-CCNC: 22 U/L (ref 12–45)
BACTERIA SPEC RESP CULT: ABNORMAL
BILIRUB SERPL-MCNC: 0.4 MG/DL (ref 0.1–1.5)
BUN SERPL-MCNC: 10 MG/DL (ref 8–22)
CALCIUM SERPL-MCNC: 8 MG/DL (ref 8.4–10.2)
CHLORIDE SERPL-SCNC: 95 MMOL/L (ref 96–112)
CO2 SERPL-SCNC: 23 MMOL/L (ref 20–33)
CREAT SERPL-MCNC: <0.3 MG/DL (ref 0.5–1.4)
GFR SERPL CREATININE-BSD FRML MDRD: >60 ML/MIN/1.73 M 2
GLOBULIN SER CALC-MCNC: 4 G/DL (ref 1.9–3.5)
GLUCOSE SERPL-MCNC: 107 MG/DL (ref 65–99)
GRAM STN SPEC: ABNORMAL
POTASSIUM SERPL-SCNC: 3.8 MMOL/L (ref 3.6–5.5)
PROT SERPL-MCNC: 6.8 G/DL (ref 6–8.2)
SIGNIFICANT IND 70042: ABNORMAL
SITE SITE: ABNORMAL
SODIUM SERPL-SCNC: 126 MMOL/L (ref 135–145)
SOURCE SOURCE: ABNORMAL

## 2017-07-14 PROCEDURE — 99233 SBSQ HOSP IP/OBS HIGH 50: CPT | Performed by: HOSPITALIST

## 2017-07-14 PROCEDURE — 80053 COMPREHEN METABOLIC PANEL: CPT

## 2017-07-14 NOTE — [facility]
Hospital Medicine Progress Note, Adult, Complex               Author: Cristina Mccracken Date & Time created: 7/14/2017  12:30 PM     Interval History:  CC - RF  No 24 hr events.  Final sputum cx +Kleb/MSSA/Proteus.    Review of Systems:  Review of Systems   Unable to perform ROS: medical condition       Physical Exam:  Physical Exam   Constitutional: No distress.   HENT:   Right Ear: External ear normal.   Left Ear: External ear normal.   Nose: Nose normal.   Eyes: Right eye exhibits no discharge. Left eye exhibits no discharge. No scleral icterus.   Neck:   Trach collar   Cardiovascular: Normal rate and regular rhythm.    SR   Pulmonary/Chest: No respiratory distress. He has no wheezes.   Decreased BS   Abdominal: Soft. Bowel sounds are normal. There is no rebound and no guarding.   protuberant   Musculoskeletal:   Severely contracted limbs   Neurological:   Awake but not interactive   Skin: Skin is warm and dry. He is not diaphoretic.   Vitals reviewed.      Labs:        Invalid input(s): [id number]      Recent Labs      07/12/17 0330 07/13/17 0444 07/14/17   0400   SODIUM  128*  126*  126*   POTASSIUM  4.3  4.2  3.8   CHLORIDE  96  94*  95*   CO2  23  24  23   BUN  14  12  10   CREATININE  <0.30*  <0.30*  <0.30*   MAGNESIUM  1.8   --    --    PHOSPHORUS   --   3.3   --    CALCIUM  8.5  8.3*  8.0*     Recent Labs      07/12/17 0330 07/13/17 0444 07/14/17   0400   ALTSGPT   --   34  31   ASTSGOT   --   25  22   ALKPHOSPHAT   --   198*  184*   TBILIRUBIN   --   0.5  0.4   GLUCOSE  100*  99  107*     Recent Labs      07/12/17 0330 07/13/17 0444   RBC  3.98*  3.92*   HEMOGLOBIN  11.7*  11.5*   HEMATOCRIT  34.7*  34.1*   PLATELETCT  405  344   PROTHROMBTM   --   13.3   APTT   --   28.1   INR   --   1.03     Recent Labs      07/12/17 0330 07/13/17 0444 07/14/17   0400   WBC  9.0  7.5   --    NEUTSPOLYS  68.60  65.90   --    LYMPHOCYTES  14.40*  16.20*   --    MONOCYTES  10.50  10.70   --    EOSINOPHILS   5.20  6.00   --    BASOPHILS  0.70  0.50   --    ASTSGOT   --   25  22   ALTSGPT   --   34  31   ALKPHOSPHAT   --   198*  184*   TBILIRUBIN   --   0.5  0.4             Medical Decision Making, by Problem:  KLEB/MSSA/PROTEUS TBI - change Unasyn to Ancef based on JIN  CUTANEOUS YEAST - cont Fluconazole  G TUBE CELLULITIS - antimicrobials as above  S/P MSSA/KLEBSIELLA/ASPIRATION PNA W/ SEPSIS  PARAPNEUMONIC EFFUSION S/P THORACENTESIS  STATUS EPILEPTICUS - Keppra and Phenobarbital levels nontoxic  S/P VDRF - HTC 30% FiO2  S/P PERALTA - completed Hydrocortisone prior to admission  ANEMIA - follow H/H  HYPONATREMIA - follow Na+ levels, limit free water, may need NaCl tablets  REMOTE H/O TBI - mother is caretaker  CHRONIC PAIN - Baclofen pump as outpt  CHRONIC CONSTIPATION - mother requests daily suppository  SULFA ALLERGY    Reviewed w/ Staff and mother          Medications reviewed and Labs reviewed  Waldrop catheter: Strict Intake and Output During Sepisis or Shock      DVT Prophylaxis: Enoxaparin (Lovenox)    Ulcer prophylaxis: Yes  Antibiotics: Treating active infection/contamination beyond 24 hours perioperative coverage

## 2017-07-14 NOTE — PROGRESS NOTES
Pulmonary Critical Care Progress Note        Chief Complaint: respiratory failure, tracheostomy.    History of Present Illness: 37 y.o. male with a history of traumatic brain injury and chronic encephalopathy as well as a seizure disorder. He was transferred here for further treatment of respiratory failure with a tracheostomy tube in place.    ROS:   unable to perform due to the patient's inability to effectively communicate.    Interval Events:  24 hour interval history reviewed    PFSH:  No change.    Respiratory:  Exam: symmetrical breath sounds, few rales, no wheeze or egophony.  ImagingAvailable data reviewed. Chest radiogram 7/12/17 with patchy bibasilar densities.    HemoDynamics:  Exam: regular rhythm (Sinus)  Imaging: None - Reviewed    Neuro:  Exam: no response to question or command.  Imaging: Available data reviewed.    Fluids:  Intake/Output     None           Recent Labs      07/11/17 0405 07/12/17 0330 07/13/17 0444   SODIUM  129*  128*  126*   POTASSIUM  3.9  4.3  4.2   CHLORIDE  97  96  94*   CO2  23  23  24   BUN  14  14  12   CREATININE  <0.20*  <0.30*  <0.30*   MAGNESIUM  1.7  1.8   --    PHOSPHORUS  3.3   --   3.3   CALCIUM  8.4*  8.5  8.3*       GI/Nutrition:  Exam: abdomen is soft and non-tender  Imaging: Available data reviewed  tolerating enteral tube feeding.  Liver Function  Recent Labs      07/11/17 0405 07/12/17 0330 07/13/17 0444   ALTSGPT   --    --   34   ASTSGOT   --    --   25   ALKPHOSPHAT   --    --   198*   TBILIRUBIN   --    --   0.5   GLUCOSE  102*  100*  99       Heme:  Recent Labs      07/11/17 0405 07/12/17 0330 07/13/17 0444   RBC  3.93*  3.98*  3.92*   HEMOGLOBIN  11.3*  11.7*  11.5*   HEMATOCRIT  34.7*  34.7*  34.1*   PLATELETCT  435  405  344   PROTHROMBTM   --    --   13.3   APTT   --    --   28.1   INR   --    --   1.03       Infectious Disease:  No Data Recorded  Micro: cultures reviewed. Sputum 7/11/17 with normal leon, Proteus and another  gram-negative mateo.  Recent Labs      07/11/17   0405  07/12/17   0330  07/13/17   0444   WBC  7.0  9.0  7.5   NEUTSPOLYS  63.10  68.60  65.90   LYMPHOCYTES  18.80*  14.40*  16.20*   MONOCYTES  12.10  10.50  10.70   EOSINOPHILS  4.70  5.20  6.00   BASOPHILS  0.90  0.70  0.50   ASTSGOT   --    --   25   ALTSGPT   --    --   34   ALKPHOSPHAT   --    --   198*   TBILIRUBIN   --    --   0.5     No current facility-administered medications for this encounter.       Last reviewed on 6/17/2017  9:45 AM by Ligia Frazier, Pharmacy Int    Quality  Measures:  Core Measures & Quality Metrics    Problems:  Respiratory failure - tracheostomy, tolerating collar.  Recent pneumonia - completed antibiotics prior to admission  Recent parapneumonic effusion, recent thoracentesis  Seizure disorder  Anemia, stable  Hyponatremia  Relative adrenal insufficiency, recent hydrocortisone taper.  Encephalopathy - chronic, history of traumatic brain injury.    Recommend:  Continue titrated oxygen via the tracheostomy collar and expiratory protocols.  Continue nutritional support, anticonvulsants and prophylaxis.  The patient's mother who is his caregiver will be trained in the maintenance of the tracheostomy tube at home.  Discussed with respiratory therapy and with Dr. Mccracken.

## 2017-07-14 NOTE — PROGRESS NOTES
Pulmonary Critical Care Progress Note        Chief Complaint: respiratory failure, tracheostomy.    History of Present Illness: 37 y.o. male with a history of traumatic brain injury and chronic encephalopathy as well as a seizure disorder. He was transferred here for further treatment of respiratory failure with a tracheostomy tube in place.    ROS:   unable to perform due to the patient's inability to effectively communicate.    Interval Events:  24 hour interval history reviewed    PFSH:  No change.    Respiratory:  Exam: symmetrical breath sounds, few rales, no wheeze or egophony.  ImagingAvailable data reviewed. Chest radiogram 7/12/17 with patchy bibasilar densities. No film today.    HemoDynamics:  Exam: regular rhythm (Sinus)  Imaging: None - Reviewed    Neuro:  Exam: no response to question or command.  Imaging: Available data reviewed.    Fluids:  Intake/Output     None           Recent Labs      07/12/17 0330 07/13/17 0444  07/14/17   0400   SODIUM  128*  126*  126*   POTASSIUM  4.3  4.2  3.8   CHLORIDE  96  94*  95*   CO2  23  24  23   BUN  14  12  10   CREATININE  <0.30*  <0.30*  <0.30*   MAGNESIUM  1.8   --    --    PHOSPHORUS   --   3.3   --    CALCIUM  8.5  8.3*  8.0*       GI/Nutrition:  Exam: abdomen is soft and non-tender  Imaging: Available data reviewed  tolerating enteral tube feeding.  Liver Function  Recent Labs      07/12/17   0330 07/13/17 0444  07/14/17   0400   ALTSGPT   --   34  31   ASTSGOT   --   25  22   ALKPHOSPHAT   --   198*  184*   TBILIRUBIN   --   0.5  0.4   GLUCOSE  100*  99  107*       Heme:  Recent Labs      07/12/17   0330  07/13/17   0444   RBC  3.98*  3.92*   HEMOGLOBIN  11.7*  11.5*   HEMATOCRIT  34.7*  34.1*   PLATELETCT  405  344   PROTHROMBTM   --   13.3   APTT   --   28.1   INR   --   1.03       Infectious Disease:  No Data Recorded  Micro: cultures reviewed. Sputum 7/11/17 with normal leon, Proteus and another gram-negative mateo.  Recent Labs      07/12/17    0330  07/13/17   0444  07/14/17   0400   WBC  9.0  7.5   --    NEUTSPOLYS  68.60  65.90   --    LYMPHOCYTES  14.40*  16.20*   --    MONOCYTES  10.50  10.70   --    EOSINOPHILS  5.20  6.00   --    BASOPHILS  0.70  0.50   --    ASTSGOT   --   25  22   ALTSGPT   --   34  31   ALKPHOSPHAT   --   198*  184*   TBILIRUBIN   --   0.5  0.4     No current facility-administered medications for this encounter.       Last reviewed on 6/17/2017  9:45 AM by Ligia Frazier, Pharmacy Int    Quality  Measures:  Core Measures & Quality Metrics    Problems:  Respiratory failure - tracheostomy, tolerating collar.  Recent pneumonia - completed antibiotics prior to admission  Recent parapneumonic effusion, recent thoracentesis  Seizure disorder  Anemia, stable  Hyponatremia  Relative adrenal insufficiency, recent hydrocortisone taper.  Encephalopathy - chronic, history of traumatic brain injury.    Recommend:  Continue titrated oxygen via the tracheostomy collar and expiratory protocols.  Continue nutritional support, anticonvulsants and prophylaxis.  The patient's mother who is his caregiver will be trained in the maintenance of the tracheostomy tube at home.  Discussed with respiratory therapy and with Dr. Mccracken.

## 2017-07-15 LAB
ALBUMIN SERPL BCP-MCNC: 2.7 G/DL (ref 3.2–4.9)
ALBUMIN/GLOB SERPL: 0.7 G/DL
ALP SERPL-CCNC: 176 U/L (ref 30–99)
ALT SERPL-CCNC: 33 U/L (ref 2–50)
ANION GAP SERPL CALC-SCNC: 10 MMOL/L (ref 0–11.9)
AST SERPL-CCNC: 25 U/L (ref 12–45)
BILIRUB SERPL-MCNC: 0.3 MG/DL (ref 0.1–1.5)
BUN SERPL-MCNC: 7 MG/DL (ref 8–22)
CALCIUM SERPL-MCNC: 8.1 MG/DL (ref 8.4–10.2)
CHLORIDE SERPL-SCNC: 97 MMOL/L (ref 96–112)
CO2 SERPL-SCNC: 24 MMOL/L (ref 20–33)
CREAT SERPL-MCNC: <0.3 MG/DL (ref 0.5–1.4)
ERYTHROCYTE [DISTWIDTH] IN BLOOD BY AUTOMATED COUNT: 50.7 FL (ref 35.9–50)
GFR SERPL CREATININE-BSD FRML MDRD: >60 ML/MIN/1.73 M 2
GLOBULIN SER CALC-MCNC: 3.8 G/DL (ref 1.9–3.5)
GLUCOSE SERPL-MCNC: 99 MG/DL (ref 65–99)
HCT VFR BLD AUTO: 31.9 % (ref 42–52)
HGB BLD-MCNC: 10.5 G/DL (ref 14–18)
MCH RBC QN AUTO: 29 PG (ref 27–33)
MCHC RBC AUTO-ENTMCNC: 32.9 G/DL (ref 33.7–35.3)
MCV RBC AUTO: 88.1 FL (ref 81.4–97.8)
PLATELET # BLD AUTO: 307 K/UL (ref 164–446)
PMV BLD AUTO: 8.2 FL (ref 9–12.9)
POTASSIUM SERPL-SCNC: 3.8 MMOL/L (ref 3.6–5.5)
PROT SERPL-MCNC: 6.5 G/DL (ref 6–8.2)
RBC # BLD AUTO: 3.62 M/UL (ref 4.7–6.1)
SODIUM SERPL-SCNC: 131 MMOL/L (ref 135–145)
WBC # BLD AUTO: 4.9 K/UL (ref 4.8–10.8)

## 2017-07-15 PROCEDURE — 80053 COMPREHEN METABOLIC PANEL: CPT

## 2017-07-15 PROCEDURE — 85027 COMPLETE CBC AUTOMATED: CPT

## 2017-07-15 PROCEDURE — 99233 SBSQ HOSP IP/OBS HIGH 50: CPT | Performed by: HOSPITALIST

## 2017-07-15 NOTE — PROGRESS NOTES
Pulmonary Critical Care Progress Note        Chief Complaint: respiratory failure, tracheostomy.    History of Present Illness: 37 y.o. male with a history of traumatic brain injury and chronic encephalopathy as well as a seizure disorder. He was transferred here for further treatment of respiratory failure with a tracheostomy tube in place.    ROS:   unable to perform due to the patient's inability to effectively communicate.    Interval Events:  24 hour interval history reviewed   Kleb sputum    PFSH:  No change.    Respiratory:  Exam: symmetrical breath sounds, few rales, no wheeze or egophony.  ImagingAvailable data reviewed. Chest radiogram 7/12/17 with patchy bibasilar densities. No film today.    HemoDynamics:  Exam: regular rhythm (Sinus)  Imaging: None - Reviewed    Neuro:  Exam: no response to question or command.  Imaging: Available data reviewed.    Fluids:  Intake/Output     None           Recent Labs      07/13/17 0444 07/14/17   0400  07/15/17   0425   SODIUM  126*  126*  131*   POTASSIUM  4.2  3.8  3.8   CHLORIDE  94*  95*  97   CO2  24  23  24   BUN  12  10  7*   CREATININE  <0.30*  <0.30*  <0.30*   PHOSPHORUS  3.3   --    --    CALCIUM  8.3*  8.0*  8.1*       GI/Nutrition:  Exam: abdomen is soft and non-tender  Imaging: Available data reviewed  tolerating enteral tube feeding.  Liver Function  Recent Labs      07/13/17 0444 07/14/17 0400  07/15/17   0425   ALTSGPT  34  31  33   ASTSGOT  25  22  25   ALKPHOSPHAT  198*  184*  176*   TBILIRUBIN  0.5  0.4  0.3   GLUCOSE  99  107*  99       Heme:  Recent Labs      07/13/17   0444  07/15/17   0425   RBC  3.92*  3.62*   HEMOGLOBIN  11.5*  10.5*   HEMATOCRIT  34.1*  31.9*   PLATELETCT  344  307   PROTHROMBTM  13.3   --    APTT  28.1   --    INR  1.03   --        Infectious Disease:  No Data Recorded  Micro: cultures reviewed. Sputum 7/11/17 with normal leon, Proteus and another gram-negative mateo.  Recent Labs      07/13/17 0444 07/14/17    0400  07/15/17   0425   WBC  7.5   --   4.9   NEUTSPOLYS  65.90   --    --    LYMPHOCYTES  16.20*   --    --    MONOCYTES  10.70   --    --    EOSINOPHILS  6.00   --    --    BASOPHILS  0.50   --    --    ASTSGOT  25  22  25   ALTSGPT  34  31  33   ALKPHOSPHAT  198*  184*  176*   TBILIRUBIN  0.5  0.4  0.3     No current facility-administered medications for this encounter.       Last reviewed on 6/17/2017  9:45 AM by Ligia Frazier, Pharmacy Int    Quality  Measures:  Core Measures & Quality Metrics    Problems:  Respiratory failure - tracheostomy, tolerating collar.  Recent pneumonia - completed antibiotics prior to admission; JKleb may be colonizer  Recent parapneumonic effusion, recent thoracentesis  Seizure disorder  Anemia, stable  Hyponatremia  Relative adrenal insufficiency, recent hydrocortisone taper.  Encephalopathy - chronic, history of traumatic brain injury.    Recommend:  Continue titrated oxygen via the tracheostomy collar and expiratory protocols.  Continue nutritional support, anticonvulsants and prophylaxis.  The patient's mother who is his caregiver will be trained in the maintenance of the tracheostomy tube at home.  Discussed with respiratory therapy

## 2017-07-15 NOTE — [facility]
Hospital Medicine Progress Note, Adult, Complex               Author: Cristina Mccracken Date & Time created: 7/15/2017  8:17 AM     Interval History:  CC - RF  Noc RN reports thick trach secretions caused one episode of desaturation into the 80's overnight, resolved w/ aggressive suctioning.    Review of Systems:  Review of Systems   Unable to perform ROS: medical condition       Physical Exam:  Physical Exam   Constitutional: No distress.   HENT:   Right Ear: External ear normal.   Left Ear: External ear normal.   Nose: Nose normal.   Eyes: Right eye exhibits no discharge. Left eye exhibits no discharge. No scleral icterus.   Neck:   Trach collar   Cardiovascular: Normal rate and regular rhythm.    SR   Pulmonary/Chest: No respiratory distress. He has no wheezes.   Decreased BS   Abdominal: Soft. Bowel sounds are normal. There is no rebound and no guarding.   Protuberant, palpable pain pump   Musculoskeletal:   Severely contracted limbs   Neurological:   Awake but not interactive   Skin: Skin is warm and dry. He is not diaphoretic.   Vitals reviewed.      Labs:        Invalid input(s): [id number]      Recent Labs      07/13/17   0444  07/14/17   0400  07/15/17   0425   SODIUM  126*  126*  131*   POTASSIUM  4.2  3.8  3.8   CHLORIDE  94*  95*  97   CO2  24  23  24   BUN  12  10  7*   CREATININE  <0.30*  <0.30*  <0.30*   PHOSPHORUS  3.3   --    --    CALCIUM  8.3*  8.0*  8.1*     Recent Labs      07/13/17   0444  07/14/17   0400  07/15/17   0425   ALTSGPT  34  31  33   ASTSGOT  25  22  25   ALKPHOSPHAT  198*  184*  176*   TBILIRUBIN  0.5  0.4  0.3   GLUCOSE  99  107*  99     Recent Labs      07/13/17   0444  07/15/17   0425   RBC  3.92*  3.62*   HEMOGLOBIN  11.5*  10.5*   HEMATOCRIT  34.1*  31.9*   PLATELETCT  344  307   PROTHROMBTM  13.3   --    APTT  28.1   --    INR  1.03   --      Recent Labs      07/13/17   0444  07/14/17   0400  07/15/17   0425   WBC  7.5   --   4.9   NEUTSPOLYS  65.90   --    --    LYMPHOCYTES   16.20*   --    --    MONOCYTES  10.70   --    --    EOSINOPHILS  6.00   --    --    BASOPHILS  0.50   --    --    ASTSGOT  25  22  25   ALTSGPT  34  31  33   ALKPHOSPHAT  198*  184*  176*   TBILIRUBIN  0.5  0.4  0.3             Medical Decision Making, by Problem:  KLEB/MSSA/PROTEUS TBI - cont Ancef  CUTANEOUS YEAST - cont Fluconazole  G TUBE CELLULITIS - antimicrobials as above  S/P MSSA/KLEBSIELLA/ASPIRATION PNA W/ SEPSIS  PARAPNEUMONIC EFFUSION S/P THORACENTESIS  STATUS EPILEPTICUS - Keppra and Phenobarbital levels nontoxic  S/P VDRF - HTC 30% FiO2, trial Robitussin DM to loosen up secretions  S/P PERALTA - completed Hydrocortisone prior to admission  PROTUBERANT ABD - lack of muscle tone vs distension, trial Simethicone, consider KUB  ANEMIA - follow H/H  HYPONATREMIA - much improved w/ NS infusion, follow Na+ levels, limit free water, may need NaCl tablets  ELEVATED ALK PHOS - follow  REMOTE H/O TBI - mother is caretaker  CHRONIC PAIN - Baclofen pump as outpt  CHRONIC CONSTIPATION - mother requests daily suppository  SULFA ALLERGY    Reviewed w/ noc RN and mother          Medications reviewed and Labs reviewed  Waldrop catheter: Strict Intake and Output During Sepisis or Shock      DVT Prophylaxis: Enoxaparin (Lovenox)    Ulcer prophylaxis: Yes  Antibiotics: Treating active infection/contamination beyond 24 hours perioperative coverage

## 2017-07-16 LAB
ALBUMIN SERPL BCP-MCNC: 2.7 G/DL (ref 3.2–4.9)
ALBUMIN/GLOB SERPL: 0.7 G/DL
ALP SERPL-CCNC: 176 U/L (ref 30–99)
ALT SERPL-CCNC: 33 U/L (ref 2–50)
ANION GAP SERPL CALC-SCNC: 7 MMOL/L (ref 0–11.9)
AST SERPL-CCNC: 27 U/L (ref 12–45)
BILIRUB SERPL-MCNC: 0.2 MG/DL (ref 0.1–1.5)
BUN SERPL-MCNC: 6 MG/DL (ref 8–22)
CALCIUM SERPL-MCNC: 7.8 MG/DL (ref 8.4–10.2)
CHLORIDE SERPL-SCNC: 96 MMOL/L (ref 96–112)
CO2 SERPL-SCNC: 24 MMOL/L (ref 20–33)
CREAT SERPL-MCNC: <0.3 MG/DL (ref 0.5–1.4)
GFR SERPL CREATININE-BSD FRML MDRD: >60 ML/MIN/1.73 M 2
GLOBULIN SER CALC-MCNC: 3.7 G/DL (ref 1.9–3.5)
GLUCOSE SERPL-MCNC: 102 MG/DL (ref 65–99)
POTASSIUM SERPL-SCNC: 3.5 MMOL/L (ref 3.6–5.5)
PROT SERPL-MCNC: 6.4 G/DL (ref 6–8.2)
SODIUM SERPL-SCNC: 127 MMOL/L (ref 135–145)

## 2017-07-16 PROCEDURE — 99233 SBSQ HOSP IP/OBS HIGH 50: CPT | Performed by: HOSPITALIST

## 2017-07-16 PROCEDURE — 80053 COMPREHEN METABOLIC PANEL: CPT

## 2017-07-16 NOTE — PROGRESS NOTES
Pulmonary Critical Care Progress Note        Chief Complaint: respiratory failure, tracheostomy.    History of Present Illness: 37 y.o. male with a history of traumatic brain injury and chronic encephalopathy as well as a seizure disorder. He was transferred here for further treatment of respiratory failure with a tracheostomy tube in place.    ROS:   unable to perform due to the patient's inability to effectively communicate.    Interval Events:  24 hour interval history reviewed   Kleb sputum; needs aggressive suctioning, d/w RT    PFSH:  No change.    Respiratory:  Exam: symmetrical breath sounds, few rales, no wheeze or egophony.  ImagingAvailable data reviewed. Chest radiogram 7/12/17 with patchy bibasilar densities. No film today.    HemoDynamics:  Exam: regular rhythm (Sinus)  Imaging: None - Reviewed    Neuro:  Exam: no response to question or command.  Imaging: Available data reviewed.    Fluids:  Intake/Output     None           Recent Labs      07/14/17   0400  07/15/17   0425  07/16/17   0425   SODIUM  126*  131*  127*   POTASSIUM  3.8  3.8  3.5*   CHLORIDE  95*  97  96   CO2  23  24  24   BUN  10  7*  6*   CREATININE  <0.30*  <0.30*  <0.30*   CALCIUM  8.0*  8.1*  7.8*       GI/Nutrition:  Exam: abdomen is soft and non-tender  Imaging: Available data reviewed  tolerating enteral tube feeding.  Liver Function  Recent Labs      07/14/17   0400  07/15/17   0425  07/16/17   0425   ALTSGPT  31  33  33   ASTSGOT  22  25  27   ALKPHOSPHAT  184*  176*  176*   TBILIRUBIN  0.4  0.3  0.2   GLUCOSE  107*  99  102*       Heme:  Recent Labs      07/15/17   0425   RBC  3.62*   HEMOGLOBIN  10.5*   HEMATOCRIT  31.9*   PLATELETCT  307       Infectious Disease:  No Data Recorded  Micro: cultures reviewed. Sputum 7/11/17 with normal leon, Proteus and another gram-negative mateo.  Recent Labs      07/14/17   0400  07/15/17   0425  07/16/17   0425   WBC   --   4.9   --    ASTSGOT  22  25  27   ALTSGPT  31  33  33    ALKPHOSPHAT  184*  176*  176*   TBILIRUBIN  0.4  0.3  0.2     No current facility-administered medications for this encounter.       Last reviewed on 6/17/2017  9:45 AM by Ligia Frazier, Pharmacy Int    Quality  Measures:  Core Measures & Quality Metrics    Problems:  Respiratory failure - tracheostomy, tolerating collar.  Recent pneumonia - completed antibiotics prior to admission; JKleb may be colonizer  Recent parapneumonic effusion, recent thoracentesis  Seizure disorder  Anemia, stable  Hyponatremia  Relative adrenal insufficiency, recent hydrocortisone taper.  Encephalopathy - chronic, history of traumatic brain injury.    Recommend:  Continue titrated oxygen via the tracheostomy collar and expiratory protocols.  Continue nutritional support, anticonvulsants and prophylaxis.  The patient's mother who is his caregiver will be trained in the maintenance of the tracheostomy tube at home.  Discussed with respiratory therapy

## 2017-07-17 ENCOUNTER — APPOINTMENT (OUTPATIENT)
Dept: RADIOLOGY | Facility: MEDICAL CENTER | Age: 37
End: 2017-07-17
Attending: HOSPITALIST
Payer: COMMERCIAL

## 2017-07-17 LAB
ALBUMIN SERPL BCP-MCNC: 2.7 G/DL (ref 3.2–4.9)
ALBUMIN/GLOB SERPL: 0.7 G/DL
ALP SERPL-CCNC: 185 U/L (ref 30–99)
ALT SERPL-CCNC: 31 U/L (ref 2–50)
ANION GAP SERPL CALC-SCNC: 7 MMOL/L (ref 0–11.9)
AST SERPL-CCNC: 28 U/L (ref 12–45)
BACTERIA BLD CULT: NORMAL
BACTERIA BLD CULT: NORMAL
BASOPHILS # BLD AUTO: 1 % (ref 0–1.8)
BASOPHILS # BLD: 0.04 K/UL (ref 0–0.12)
BILIRUB SERPL-MCNC: 0.3 MG/DL (ref 0.1–1.5)
BUN SERPL-MCNC: 7 MG/DL (ref 8–22)
CALCIUM SERPL-MCNC: 8.3 MG/DL (ref 8.4–10.2)
CHLORIDE SERPL-SCNC: 98 MMOL/L (ref 96–112)
CO2 SERPL-SCNC: 24 MMOL/L (ref 20–33)
CREAT SERPL-MCNC: <0.3 MG/DL (ref 0.5–1.4)
EOSINOPHIL # BLD AUTO: 0.48 K/UL (ref 0–0.51)
EOSINOPHIL NFR BLD: 12.4 % (ref 0–6.9)
ERYTHROCYTE [DISTWIDTH] IN BLOOD BY AUTOMATED COUNT: 51.1 FL (ref 35.9–50)
GFR SERPL CREATININE-BSD FRML MDRD: >60 ML/MIN/1.73 M 2
GLOBULIN SER CALC-MCNC: 3.9 G/DL (ref 1.9–3.5)
GLUCOSE SERPL-MCNC: 108 MG/DL (ref 65–99)
HCT VFR BLD AUTO: 34.2 % (ref 42–52)
HGB BLD-MCNC: 11.4 G/DL (ref 14–18)
IMM GRANULOCYTES # BLD AUTO: 0.03 K/UL (ref 0–0.11)
IMM GRANULOCYTES NFR BLD AUTO: 0.8 % (ref 0–0.9)
LYMPHOCYTES # BLD AUTO: 1 K/UL (ref 1–4.8)
LYMPHOCYTES NFR BLD: 25.8 % (ref 22–41)
MAGNESIUM SERPL-MCNC: 1.7 MG/DL (ref 1.5–2.5)
MCH RBC QN AUTO: 29.5 PG (ref 27–33)
MCHC RBC AUTO-ENTMCNC: 33.3 G/DL (ref 33.7–35.3)
MCV RBC AUTO: 88.4 FL (ref 81.4–97.8)
MONOCYTES # BLD AUTO: 0.51 K/UL (ref 0–0.85)
MONOCYTES NFR BLD AUTO: 13.2 % (ref 0–13.4)
NEUTROPHILS # BLD AUTO: 1.81 K/UL (ref 1.82–7.42)
NEUTROPHILS NFR BLD: 46.8 % (ref 44–72)
NRBC # BLD AUTO: 0 K/UL
NRBC BLD AUTO-RTO: 0 /100 WBC
PLATELET # BLD AUTO: 247 K/UL (ref 164–446)
PMV BLD AUTO: 8 FL (ref 9–12.9)
POTASSIUM SERPL-SCNC: 3.9 MMOL/L (ref 3.6–5.5)
PROT SERPL-MCNC: 6.6 G/DL (ref 6–8.2)
RBC # BLD AUTO: 3.87 M/UL (ref 4.7–6.1)
SIGNIFICANT IND 70042: NORMAL
SIGNIFICANT IND 70042: NORMAL
SITE SITE: NORMAL
SITE SITE: NORMAL
SODIUM SERPL-SCNC: 129 MMOL/L (ref 135–145)
SOURCE SOURCE: NORMAL
SOURCE SOURCE: NORMAL
WBC # BLD AUTO: 3.9 K/UL (ref 4.8–10.8)

## 2017-07-17 PROCEDURE — 80053 COMPREHEN METABOLIC PANEL: CPT

## 2017-07-17 PROCEDURE — 99233 SBSQ HOSP IP/OBS HIGH 50: CPT | Performed by: HOSPITALIST

## 2017-07-17 PROCEDURE — 71010 DX-CHEST-PORTABLE (1 VIEW): CPT

## 2017-07-17 PROCEDURE — 85025 COMPLETE CBC W/AUTO DIFF WBC: CPT

## 2017-07-17 PROCEDURE — 83735 ASSAY OF MAGNESIUM: CPT

## 2017-07-17 PROCEDURE — 302244 HCHG LTACH STAT

## 2017-07-17 NOTE — [facility]
Hospital Medicine Progress Note, Adult, Complex               Author: Cristina Mccracken Date & Time created: 7/17/2017  11:55 AM     Interval History:  CC - RF  Mother requesting that Keppra be decreased d/t low Na.  Sodium back up on NS.    Review of Systems:  Review of Systems   Unable to perform ROS: medical condition       Physical Exam:  Physical Exam   Constitutional: No distress.   HENT:   Right Ear: External ear normal.   Left Ear: External ear normal.   Nose: Nose normal.   Eyes: Right eye exhibits no discharge. Left eye exhibits no discharge. No scleral icterus.   Neck:   Trach collar   Cardiovascular: Normal rate and regular rhythm.    SR   Pulmonary/Chest: No respiratory distress. He has no wheezes.   Decreased BS   Abdominal: Soft. Bowel sounds are normal. There is no rebound and no guarding.   scaphoid, palpable pain pump   Musculoskeletal:   Severely contracted limbs   Neurological:   Awake but not interactive   Skin: Skin is warm and dry. He is not diaphoretic.   Vitals reviewed.      Labs:        Invalid input(s): [id number]      Recent Labs      07/15/17   0425  07/16/17   0425  07/17/17   0409   SODIUM  131*  127*  129*   POTASSIUM  3.8  3.5*  3.9   CHLORIDE  97  96  98   CO2  24  24  24   BUN  7*  6*  7*   CREATININE  <0.30*  <0.30*  <0.30*   MAGNESIUM   --    --   1.7   CALCIUM  8.1*  7.8*  8.3*     Recent Labs      07/15/17   0425  07/16/17   0425  07/17/17   0409   ALTSGPT  33  33  31   ASTSGOT  25  27  28   ALKPHOSPHAT  176*  176*  185*   TBILIRUBIN  0.3  0.2  0.3   GLUCOSE  99  102*  108*     Recent Labs      07/15/17   0425  07/17/17   0409   RBC  3.62*  3.87*   HEMOGLOBIN  10.5*  11.4*   HEMATOCRIT  31.9*  34.2*   PLATELETCT  307  247     Recent Labs      07/15/17   0425  07/16/17   0425  07/17/17   0409   WBC  4.9   --   3.9*   NEUTSPOLYS   --    --   46.80   LYMPHOCYTES   --    --   25.80   MONOCYTES   --    --   13.20   EOSINOPHILS   --    --   12.40*   BASOPHILS   --    --   1.00   ASTSGOT   25  27  28   ALTSGPT  33  33  31   ALKPHOSPHAT  176*  176*  185*   TBILIRUBIN  0.3  0.2  0.3             Medical Decision Making, by Problem:  KLEB/MSSA/PROTEUS TBI - cont Ancef through 7/21/17  CUTANEOUS YEAST - cont Fluconazole w/ clinical endpoint  G TUBE CELLULITIS - antimicrobials as above  S/P MSSA/KLEBSIELLA/ASPIRATION PNA W/ SEPSIS  PARAPNEUMONIC EFFUSION S/P THORACENTESIS  STATUS EPILEPTICUS - decrease Keppra from 1500 mg bid to 1250 mg bid per mother's request given pt's hyponatremia, cont Phenobarbital  S/P VDRF - HTC on ambient O2, Robitussin DM helping to loosen up secretions  S/P PERALTA - completed Hydrocortisone prior to admission  ANEMIA - follow H/H  HYPONATREMIA - improved w/ NS infusion, will supplement w/ NaCl to avert florid volume overload, follow Na+ levels, limit free water  HYPOKALEMIA - corrected K+, replete borderline low Mg++  ELEVATED ALK PHOS - likely 2/2 biliary stasis as pt not chronically on TF, w/u further if worsens  REMOTE H/O TBI - mother is caretaker  CHRONIC PAIN - Baclofen pump as outpt  CHRONIC CONSTIPATION - bowel regimen  SULFA ALLERGY    Reviewed w/ pt's mother.  She understands that decreasing the Keppra dose will place pt at increased risk of SZ activity and is willing to take that chance.  Face-to-face >35 min.          Medications reviewed and Labs reviewed  Waldrop catheter: Strict Intake and Output During Sepisis or Shock      DVT Prophylaxis: Enoxaparin (Lovenox)    Ulcer prophylaxis: Yes  Antibiotics: Treating active infection/contamination beyond 24 hours perioperative coverage

## 2017-07-17 NOTE — PROGRESS NOTES
Pulmonary Critical Care Progress Note        Chief Complaint: respiratory failure, tracheostomy.    History of Present Illness: 37 y.o. male with a history of traumatic brain injury and chronic encephalopathy as well as a seizure disorder. He was transferred here for further treatment of respiratory failure with a tracheostomy tube in place.    ROS:   unable to perform due to the patient's inability to effectively communicate.    Interval Events:  24 hour interval history reviewed     7/17 - 98 degrees - 136/78 - 86 - 16 - 98%; labs reviewed and similar to prior with ongoing hyponatremia; cxr today showed improved left basilar atx; 21% HMTC         PFSH:  No change.    Respiratory:  Exam: symmetrical breath sounds, few rales, no wheeze or egophony.  ImagingAvailable data reviewed. Chest radiogram 7/12/17 with patchy bibasilar densities. No film today.    HemoDynamics:  Exam: regular rhythm (Sinus)  Imaging: None - Reviewed    Neuro:  Exam: no response to question or command.  Imaging: Available data reviewed.    Fluids:  Intake/Output     None           Recent Labs      07/15/17   0425  07/16/17   0425  07/17/17   0409   SODIUM  131*  127*  129*   POTASSIUM  3.8  3.5*  3.9   CHLORIDE  97  96  98   CO2  24  24  24   BUN  7*  6*  7*   CREATININE  <0.30*  <0.30*  <0.30*   MAGNESIUM   --    --   1.7   CALCIUM  8.1*  7.8*  8.3*       GI/Nutrition:  Exam: abdomen is soft and non-tender  Imaging: Available data reviewed  tolerating enteral tube feeding.  Liver Function  Recent Labs      07/15/17   0425  07/16/17   0425  07/17/17   0409   ALTSGPT  33  33  31   ASTSGOT  25  27  28   ALKPHOSPHAT  176*  176*  185*   TBILIRUBIN  0.3  0.2  0.3   GLUCOSE  99  102*  108*       Heme:  Recent Labs      07/15/17   0425  07/17/17   0409   RBC  3.62*  3.87*   HEMOGLOBIN  10.5*  11.4*   HEMATOCRIT  31.9*  34.2*   PLATELETCT  307  247       Infectious Disease:  No Data Recorded  Micro: cultures reviewed. Sputum 7/11/17 with normal  leon, Proteus and another gram-negative mateo.  Recent Labs      07/15/17   0425  07/16/17   0425  07/17/17   0409   WBC  4.9   --   3.9*   NEUTSPOLYS   --    --   46.80   LYMPHOCYTES   --    --   25.80   MONOCYTES   --    --   13.20   EOSINOPHILS   --    --   12.40*   BASOPHILS   --    --   1.00   ASTSGOT  25  27  28   ALTSGPT  33  33  31   ALKPHOSPHAT  176*  176*  185*   TBILIRUBIN  0.3  0.2  0.3     No current facility-administered medications for this encounter.       Last reviewed on 6/17/2017  9:45 AM by Ligia Frazier, Pharmacy Int    Quality  Measures:  Radiology images reviewed, Medications reviewed and Labs reviewed                      Problems:  Respiratory failure - tracheostomy, tolerating collar.  Recent pneumonia - completed antibiotics prior to admission; JKleb may be colonizer  Recent parapneumonic effusion, recent thoracentesis  Seizure disorder  Anemia, stable  Hyponatremia  Relative adrenal insufficiency, recent hydrocortisone taper.  Encephalopathy - chronic, history of traumatic brain injury.    Recommend:  Continue titrated oxygen via the tracheostomy collar and expiratory protocols.  Continue nutritional support, anticonvulsants and prophylaxis.  The patient's mother who is his caregiver will be trained in the maintenance of the tracheostomy tube at home.  Discussed with respiratory therapy     Antiinfectives - fluconazole, ancef

## 2017-07-18 LAB
ALBUMIN SERPL BCP-MCNC: 3 G/DL (ref 3.2–4.9)
ALBUMIN/GLOB SERPL: 0.8 G/DL
ALP SERPL-CCNC: 213 U/L (ref 30–99)
ALT SERPL-CCNC: 34 U/L (ref 2–50)
ANION GAP SERPL CALC-SCNC: 8 MMOL/L (ref 0–11.9)
AST SERPL-CCNC: 30 U/L (ref 12–45)
BILIRUB SERPL-MCNC: 0.3 MG/DL (ref 0.1–1.5)
BUN SERPL-MCNC: 7 MG/DL (ref 8–22)
CALCIUM SERPL-MCNC: 8 MG/DL (ref 8.4–10.2)
CHLORIDE SERPL-SCNC: 95 MMOL/L (ref 96–112)
CO2 SERPL-SCNC: 22 MMOL/L (ref 20–33)
CREAT SERPL-MCNC: <0.3 MG/DL (ref 0.5–1.4)
GFR SERPL CREATININE-BSD FRML MDRD: >60 ML/MIN/1.73 M 2
GLOBULIN SER CALC-MCNC: 4 G/DL (ref 1.9–3.5)
GLUCOSE SERPL-MCNC: 102 MG/DL (ref 65–99)
POTASSIUM SERPL-SCNC: 4.3 MMOL/L (ref 3.6–5.5)
PROT SERPL-MCNC: 7 G/DL (ref 6–8.2)
SODIUM SERPL-SCNC: 125 MMOL/L (ref 135–145)

## 2017-07-18 PROCEDURE — 99232 SBSQ HOSP IP/OBS MODERATE 35: CPT | Performed by: HOSPITALIST

## 2017-07-18 PROCEDURE — 80053 COMPREHEN METABOLIC PANEL: CPT

## 2017-07-18 NOTE — PROGRESS NOTES
Pulmonary Critical Care Progress Note        Chief Complaint: respiratory failure, tracheostomy.    History of Present Illness: 37 y.o. male with a history of traumatic brain injury and chronic encephalopathy as well as a seizure disorder. He was transferred here for further treatment of respiratory failure with a tracheostomy tube in place.    ROS:   unable to perform due to the patient's inability to effectively communicate.    Interval Events:  24 hour interval history reviewed     7/17 - 98 degrees - 136/78 - 86 - 16 - 98%; labs reviewed and similar to prior with ongoing hyponatremia; cxr today showed improved left basilar atx; 21% HMTC    7/18 - d/w RT, changed to 28% 2/2 desats; 97 degrees - 137/79 - 82 - 20 - 96%; follow up cxr in the AM;       PFSH:  No change.    Respiratory:  Exam: symmetrical breath sounds, few rales, no wheeze or egophony.  ImagingAvailable data reviewed. Chest radiogram 7/12/17 with patchy bibasilar densities. No film today.    HemoDynamics:  Exam: regular rhythm (Sinus)  Imaging: None - Reviewed    Neuro:  Exam: no response to question or command.  Imaging: Available data reviewed.    Fluids:  Intake/Output     None           Recent Labs      07/16/17   0425  07/17/17 0409 07/18/17   0424   SODIUM  127*  129*  125*   POTASSIUM  3.5*  3.9  4.3   CHLORIDE  96  98  95*   CO2  24  24  22   BUN  6*  7*  7*   CREATININE  <0.30*  <0.30*  <0.30*   MAGNESIUM   --   1.7   --    CALCIUM  7.8*  8.3*  8.0*       GI/Nutrition:  Exam: abdomen is soft and non-tender  Imaging: Available data reviewed  tolerating enteral tube feeding.  Liver Function  Recent Labs      07/16/17   0425 07/17/17 0409 07/18/17   0424   ALTSGPT  33  31  34   ASTSGOT  27  28  30   ALKPHOSPHAT  176*  185*  213*   TBILIRUBIN  0.2  0.3  0.3   GLUCOSE  102*  108*  102*       Heme:  Recent Labs      07/17/17 0409   RBC  3.87*   HEMOGLOBIN  11.4*   HEMATOCRIT  34.2*   PLATELETCT  247       Infectious Disease:  No Data  Recorded  Micro: cultures reviewed. Sputum 7/11/17 with normal leon, Proteus and another gram-negative mateo.  Recent Labs      07/16/17   0425  07/17/17   0409  07/18/17   0424   WBC   --   3.9*   --    NEUTSPOLYS   --   46.80   --    LYMPHOCYTES   --   25.80   --    MONOCYTES   --   13.20   --    EOSINOPHILS   --   12.40*   --    BASOPHILS   --   1.00   --    ASTSGOT  27  28  30   ALTSGPT  33  31  34   ALKPHOSPHAT  176*  185*  213*   TBILIRUBIN  0.2  0.3  0.3     No current facility-administered medications for this encounter.       Last reviewed on 6/17/2017  9:45 AM by Ligia Frazier, Pharmacy Int    Quality  Measures:  Radiology images reviewed, Medications reviewed and Labs reviewed                      Problems:  Respiratory failure - tracheostomy, tolerating collar.  Recent pneumonia - completed antibiotics prior to admission; JKleb may be colonizer  Recent parapneumonic effusion, recent thoracentesis  Seizure disorder  Anemia, stable  Hyponatremia  Relative adrenal insufficiency, recent hydrocortisone taper.  Encephalopathy - chronic, history of traumatic brain injury.    Recommend:  Continue titrated oxygen via the tracheostomy collar and expiratory protocols.  Continue nutritional support, anticonvulsants and prophylaxis.  The patient's mother who is his caregiver will be trained in the maintenance of the tracheostomy tube at home.  Discussed with respiratory therapy     Antiinfectives - fluconazole, ancef

## 2017-07-18 NOTE — [facility]
Hospital Medicine Progress Note, Adult, Complex               Author: Jacki Galvan Date & Time created: 7/18/2017  8:13 AM     CC: RF    Interval History:  No events per RN  Getting tube feeds 6 hours total per mother request, gives yogurt blend herself once daily  NA still low, keppra reduced as possible cause    Review of Systems:  Review of Systems   Unable to perform ROS      T 99 P 87 /79 RR 20 SaO2 96% I/O 1.1/2.5 2BM tele SR   Physical Exam   Constitutional: He appears well-developed. No distress.   HENT:   Head: Normocephalic.   Eyes: Conjunctivae are normal. No scleral icterus.   Neck:   trach   Cardiovascular: Normal rate, regular rhythm and intact distal pulses.    No murmur heard.  Pulmonary/Chest: Effort normal. No respiratory distress. He exhibits no tenderness.   distant   Abdominal: Soft. Bowel sounds are normal. He exhibits no distension. There is no tenderness.   Peg   Musculoskeletal: He exhibits no edema.   Arm and feet contractures   Neurological:   No interaction   Skin: Skin is warm. No erythema.       Labs:        Invalid input(s): [id number]      Recent Labs      07/16/17 0425 07/17/17 0409 07/18/17 0424   SODIUM  127*  129*  125*   POTASSIUM  3.5*  3.9  4.3   CHLORIDE  96  98  95*   CO2  24  24  22   BUN  6*  7*  7*   CREATININE  <0.30*  <0.30*  <0.30*   MAGNESIUM   --   1.7   --    CALCIUM  7.8*  8.3*  8.0*     Recent Labs      07/16/17 0425 07/17/17 0409 07/18/17 0424   ALTSGPT  33  31  34   ASTSGOT  27  28  30   ALKPHOSPHAT  176*  185*  213*   TBILIRUBIN  0.2  0.3  0.3   GLUCOSE  102*  108*  102*     Recent Labs      07/17/17 0409   RBC  3.87*   HEMOGLOBIN  11.4*   HEMATOCRIT  34.2*   PLATELETCT  247     Recent Labs      07/16/17 0425 07/17/17 0409 07/18/17 0424   WBC   --   3.9*   --    NEUTSPOLYS   --   46.80   --    LYMPHOCYTES   --   25.80   --    MONOCYTES   --   13.20   --    EOSINOPHILS   --   12.40*   --    BASOPHILS   --   1.00   --     ASTSGOT  27  28  30   ALTSGPT  33  31  34   ALKPHOSPHAT  176*  185*  213*   TBILIRUBIN  0.2  0.3  0.3   Medical Decision Making, by Problem:  Klebsiella/MSSA/Proteus TBI   -Ancef thru 7/21  GT cellulitis   -Ancef  Treated PNA with sepsis PTA  S/p thoracentesis for parapneumonic effusion  Status epilepticus    -keppra, phenobarbital  Acute RF   -HTC, doubt decannulation  Anemia  Hyponatremia   -suspect volume depletion   -hydrate  Elevated alk phos  Remote TBI with PVS   -cared for almost 20 years at home  Chronic pain   -baclofen pump  PCM   -ask dietician to ensure adequate caloric intake      Medications reviewed  Waldrop catheter: Neurogenic Bladder      DVT Prophylaxis: Enoxaparin (Lovenox)      Antibiotics: Treating active infection/contamination beyond 24 hours perioperative coverage

## 2017-07-19 ENCOUNTER — APPOINTMENT (OUTPATIENT)
Dept: RADIOLOGY | Facility: MEDICAL CENTER | Age: 37
End: 2017-07-19
Attending: HOSPITALIST
Payer: COMMERCIAL

## 2017-07-19 LAB
ANION GAP SERPL CALC-SCNC: 5 MMOL/L (ref 0–11.9)
BUN SERPL-MCNC: 10 MG/DL (ref 8–22)
CALCIUM SERPL-MCNC: 8.1 MG/DL (ref 8.4–10.2)
CHLORIDE SERPL-SCNC: 98 MMOL/L (ref 96–112)
CO2 SERPL-SCNC: 24 MMOL/L (ref 20–33)
CREAT SERPL-MCNC: <0.3 MG/DL (ref 0.5–1.4)
GFR SERPL CREATININE-BSD FRML MDRD: >60 ML/MIN/1.73 M 2
GLUCOSE SERPL-MCNC: 97 MG/DL (ref 65–99)
POTASSIUM SERPL-SCNC: 4 MMOL/L (ref 3.6–5.5)
SODIUM SERPL-SCNC: 127 MMOL/L (ref 135–145)

## 2017-07-19 PROCEDURE — 99232 SBSQ HOSP IP/OBS MODERATE 35: CPT | Performed by: HOSPITALIST

## 2017-07-19 PROCEDURE — 80048 BASIC METABOLIC PNL TOTAL CA: CPT

## 2017-07-19 PROCEDURE — 71010 DX-CHEST-PORTABLE (1 VIEW): CPT

## 2017-07-19 NOTE — PROGRESS NOTES
Pulmonary Critical Care Progress Note        Chief Complaint: respiratory failure, tracheostomy.    History of Present Illness: 37 y.o. male with a history of traumatic brain injury and chronic encephalopathy as well as a seizure disorder. He was transferred here for further treatment of respiratory failure with a tracheostomy tube in place.    ROS:   unable to perform due to the patient's inability to effectively communicate.    Interval Events:  24 hour interval history reviewed     7/17 - 98 degrees - 136/78 - 86 - 16 - 98%; labs reviewed and similar to prior with ongoing hyponatremia; cxr today showed improved left basilar atx; 21% HMTC    7/18 - d/w RT, changed to 28% 2/2 desats; 97 degrees - 137/79 - 82 - 20 - 96%; follow up cxr in the AM;     7/19 - mother getting trained in trach care; d/w RT; 98.9 degrees - 73 - 118/63 - 14 - 100% - SR; sodium to 127; cxr with worsening left basilar opacity; no new micro or WBC      PFSH:  No change.    Respiratory:  Exam: symmetrical breath sounds, few rales, no wheeze or egophony.  ImagingAvailable data reviewed.     HemoDynamics:  Exam: regular rhythm (Sinus)  Imaging: None - Reviewed    Neuro:  Exam: no response to question or command.  Imaging: Available data reviewed.    Fluids:  Intake/Output     None           Recent Labs      07/17/17 0409 07/18/17 0424 07/19/17   0154   SODIUM  129*  125*  127*   POTASSIUM  3.9  4.3  4.0   CHLORIDE  98  95*  98   CO2  24  22  24   BUN  7*  7*  10   CREATININE  <0.30*  <0.30*  <0.30*   MAGNESIUM  1.7   --    --    CALCIUM  8.3*  8.0*  8.1*       GI/Nutrition:  Exam: abdomen is soft and non-tender  Imaging: Available data reviewed  tolerating enteral tube feeding.  Liver Function  Recent Labs      07/17/17 0409 07/18/17 0424 07/19/17   0154   ALTSGPT  31  34   --    ASTSGOT  28  30   --    ALKPHOSPHAT  185*  213*   --    TBILIRUBIN  0.3  0.3   --    GLUCOSE  108*  102*  97       Heme:  Recent Labs      07/17/17    0409   RBC  3.87*   HEMOGLOBIN  11.4*   HEMATOCRIT  34.2*   PLATELETCT  247       Infectious Disease:  No Data Recorded  Micro: cultures reviewed. Sputum 7/11/17 with normal leon, Proteus and another gram-negative mateo.  Recent Labs      07/17/17   0409  07/18/17   0424   WBC  3.9*   --    NEUTSPOLYS  46.80   --    LYMPHOCYTES  25.80   --    MONOCYTES  13.20   --    EOSINOPHILS  12.40*   --    BASOPHILS  1.00   --    ASTSGOT  28  30   ALTSGPT  31  34   ALKPHOSPHAT  185*  213*   TBILIRUBIN  0.3  0.3     No current facility-administered medications for this encounter.       Last reviewed on 6/17/2017  9:45 AM by Ligia Frazier, Pharmacy Int    Quality  Measures:  Radiology images reviewed, Medications reviewed and Labs reviewed                      Problems:  Respiratory failure - tracheostomy, tolerating collar.  7/19 - worsening left basilar opacity  Recent pneumonia - completed antibiotics prior to admission; Kleb may be colonizer  Recent parapneumonic effusion, recent thoracentesis  Seizure disorder  Anemia, stable  Hyponatremia  Relative adrenal insufficiency, recent hydrocortisone taper.  Encephalopathy - chronic, history of traumatic brain injury.    Recommend:  Continue titrated oxygen via the tracheostomy collar and expiratory protocols.  Continue nutritional support, anticonvulsants and prophylaxis.  The patient's mother who is his caregiver will be trained in the maintenance of the tracheostomy tube at home.  Discussed with respiratory therapy     Antiinfectives - fluconazole, ancef

## 2017-07-19 NOTE — [facility]
Hospital Medicine Progress Note, Adult, Complex               Author: Jacki Galvan Date & Time created: 7/19/2017  6:36 AM     CC: RF    Interval History:  Dietician met with patients mother who agreed to increase feeds to 8 hours daily with a higher rate  CXR with worsening LLL process    Review of Systems:  Review of Systems   Unable to perform ROS      T 98.9 P73BP 118/63 RR 14SaO2 100% I/O 2.2/2.7 1BM tele SR   Physical Exam   Constitutional: He appears well-developed. No distress.   HENT:   Head: Normocephalic.   Eyes: Conjunctivae are normal. No scleral icterus.   Neck:   trach   Cardiovascular: Normal rate, regular rhythm and intact distal pulses.    No murmur heard.  Pulmonary/Chest: Effort normal. No respiratory distress. He has no wheezes. He exhibits no tenderness.   distant   Abdominal: Soft. Bowel sounds are normal. He exhibits no distension. There is no tenderness.   Peg   Musculoskeletal: He exhibits no edema.   Arm and feet contractures   Neurological:   No interaction   Skin: Skin is warm. No erythema.       Labs:        Invalid input(s): [id number]      Recent Labs      07/17/17 0409 07/18/17 0424 07/19/17 0154   SODIUM  129*  125*  127*   POTASSIUM  3.9  4.3  4.0   CHLORIDE  98  95*  98   CO2  24  22  24   BUN  7*  7*  10   CREATININE  <0.30*  <0.30*  <0.30*   MAGNESIUM  1.7   --    --    CALCIUM  8.3*  8.0*  8.1*     Recent Labs      07/17/17 0409 07/18/17 0424  07/19/17   0154   ALTSGPT  31  34   --    ASTSGOT  28  30   --    ALKPHOSPHAT  185*  213*   --    TBILIRUBIN  0.3  0.3   --    GLUCOSE  108*  102*  97     Recent Labs      07/17/17 0409   RBC  3.87*   HEMOGLOBIN  11.4*   HEMATOCRIT  34.2*   PLATELETCT  247     Recent Labs      07/17/17 0409 07/18/17 0424   WBC  3.9*   --    NEUTSPOLYS  46.80   --    LYMPHOCYTES  25.80   --    MONOCYTES  13.20   --    EOSINOPHILS  12.40*   --    BASOPHILS  1.00   --    ASTSGOT  28  30   ALTSGPT  31  34   ALKPHOSPHAT  185*  213*    TBILIRUBIN  0.3  0.3   Medical Decision Making, by Problem:  Klebsiella/MSSA/Proteus TBI   -Ancef thru 7/21   -follow worsening LLL process  GT cellulitis   -Ancef  Treated PNA with sepsis PTA  S/p thoracentesis for parapneumonic effusion  Status epilepticus    -keppra, phenobarbital  Acute RF   -HTC, doubt decannulation  Anemia  Hyponatremia   -suspect volume depletion, improved with saline  Elevated alk phos  Remote TBI with PVS   -cared for almost 20 years at home  Chronic pain   -baclofen pump  PCM   -TF, home protein shake      Medications reviewed, Labs reviewed and Radiology images reviewed  Waldrop catheter: Neurogenic Bladder      DVT Prophylaxis: Enoxaparin (Lovenox)      Antibiotics: Treating active infection/contamination beyond 24 hours perioperative coverage

## 2017-07-20 LAB
FUNGUS SPEC CULT: NORMAL
SIGNIFICANT IND 70042: NORMAL
SITE SITE: NORMAL
SOURCE SOURCE: NORMAL

## 2017-07-20 PROCEDURE — 99232 SBSQ HOSP IP/OBS MODERATE 35: CPT | Performed by: HOSPITALIST

## 2017-07-20 NOTE — PROGRESS NOTES
Pulmonary Critical Care Progress Note        Chief Complaint: respiratory failure, tracheostomy.    History of Present Illness: 37 y.o. male with a history of traumatic brain injury and chronic encephalopathy as well as a seizure disorder. He was transferred here for further treatment of respiratory failure with a tracheostomy tube in place.    ROS:   unable to perform due to the patient's inability to effectively communicate.    Interval Events:  24 hour interval history reviewed     7/17 - 98 degrees - 136/78 - 86 - 16 - 98%; labs reviewed and similar to prior with ongoing hyponatremia; cxr today showed improved left basilar atx; 21% HMTC    7/18 - d/w RT, changed to 28% 2/2 desats; 97 degrees - 137/79 - 82 - 20 - 96%; follow up cxr in the AM;     7/19 - mother getting trained in trach care; d/w RT; 98.9 degrees - 73 - 118/63 - 14 - 100% - SR; sodium to 127; cxr with worsening left basilar opacity; no new micro or WBC    7/20 - d/w RT, no interval deterioration; 97.8 degrees - 66 - 109/40 - 13 - 100% - SR; no new labs, images, micro      PFSH:  No change.    Respiratory:  Exam: symmetrical breath sounds, few rales, no wheeze or egophony.  ImagingAvailable data reviewed.     HemoDynamics:  Exam: regular rhythm (Sinus)  Imaging: None - Reviewed    Neuro:  Exam: no response to question or command.  Imaging: Available data reviewed.    Fluids:  Intake/Output     None           Recent Labs      07/18/17 0424 07/19/17 0154   SODIUM  125*  127*   POTASSIUM  4.3  4.0   CHLORIDE  95*  98   CO2  22  24   BUN  7*  10   CREATININE  <0.30*  <0.30*   CALCIUM  8.0*  8.1*       GI/Nutrition:  Exam: abdomen is soft and non-tender  Imaging: Available data reviewed  tolerating enteral tube feeding.  Liver Function  Recent Labs      07/18/17 0424 07/19/17   0154   ALTSGPT  34   --    ASTSGOT  30   --    ALKPHOSPHAT  213*   --    TBILIRUBIN  0.3   --    GLUCOSE  102*  97       Heme:  No results for input(s): RBC,  HEMOGLOBIN, HEMATOCRIT, PLATELETCT, PROTHROMBTM, APTT, INR, IRON, FERRITIN, TOTIRONBC in the last 72 hours.    Infectious Disease:  No Data Recorded  Micro: cultures reviewed. Sputum 7/11/17 with normal leon, Proteus and another gram-negative mateo.  Recent Labs      07/18/17   0424   ASTSGOT  30   ALTSGPT  34   ALKPHOSPHAT  213*   TBILIRUBIN  0.3     No current facility-administered medications for this encounter.       Last reviewed on 6/17/2017  9:45 AM by Ligia Frazier, Pharmacy Int    Quality  Measures:  Radiology images reviewed, Medications reviewed and Labs reviewed                      Problems:  Respiratory failure - tracheostomy, tolerating collar.  7/19 - worsening left basilar opacity  Recent pneumonia - completed antibiotics prior to admission; Kleb may be colonizer  Recent parapneumonic effusion, recent thoracentesis  Seizure disorder  Anemia, stable  Hyponatremia  Relative adrenal insufficiency, recent hydrocortisone taper.  Encephalopathy - chronic, history of traumatic brain injury.    Recommend:  Continue titrated oxygen via the tracheostomy collar and expiratory protocols.  Continue nutritional support, anticonvulsants and prophylaxis.  The patient's mother who is his caregiver will be trained in the maintenance of the tracheostomy tube at home.  Discussed with respiratory therapy     Antiinfectives - fluconazole, ancef

## 2017-07-20 NOTE — [facility]
Hospital Medicine Progress Note, Adult, Complex               Author: Jacki Galvan Date & Time created: 7/20/2017  5:42 AM     CC: RF    Interval History:  patient's mother requested bladder training, not possible to train with inability to communicate  Patient's mother informed case management that she doesn't plan on administering AEDs lifelong  RT has validated care giver competence for trach care, CPT      Review of Systems:  Review of Systems   Unable to perform ROS      T 97.8 P66BP 109/40RR 13SaO2 100% I/O 2.6/1.8 1BM tele SR   Physical Exam   Constitutional: He appears well-developed. No distress.   HENT:   Head: Normocephalic.   Eyes: Right eye exhibits no discharge. Left eye exhibits no discharge. No scleral icterus.   Neck:   trach   Cardiovascular: Normal rate, regular rhythm and intact distal pulses.    No murmur heard.  Pulmonary/Chest: Effort normal. No respiratory distress. He exhibits no tenderness.   distant   Abdominal: Soft. Bowel sounds are normal. He exhibits no distension. There is no tenderness.   Peg with mild erythema   Musculoskeletal: He exhibits no edema.   Arm and feet contractures   Neurological:   No interaction   Skin: Skin is warm. No erythema.       Labs:        Invalid input(s): [id number]      Recent Labs      07/18/17 0424  07/19/17   0154   SODIUM  125*  127*   POTASSIUM  4.3  4.0   CHLORIDE  95*  98   CO2  22  24   BUN  7*  10   CREATININE  <0.30*  <0.30*   CALCIUM  8.0*  8.1*     Recent Labs      07/18/17   0424  07/19/17   0154   ALTSGPT  34   --    ASTSGOT  30   --    ALKPHOSPHAT  213*   --    TBILIRUBIN  0.3   --    GLUCOSE  102*  97     No results for input(s): RBC, HEMOGLOBIN, HEMATOCRIT, PLATELETCT, PROTHROMBTM, APTT, INR, IRON, FERRITIN, TOTIRONBC in the last 72 hours.  Recent Labs      07/18/17   0424   ASTSGOT  30   ALTSGPT  34   ALKPHOSPHAT  213*   TBILIRUBIN  0.3   Medical Decision Making, by Problem:  Klebsiella/MSSA/Proteus TBI   -Ancef thru  7/21   -follow worsening LLL process  GT cellulitis   -Ancef  Treated PNA with sepsis PTA  S/p thoracentesis for parapneumonic effusion  Status epilepticus    -keppra, phenobarbital   -believe lifelong AEDs necessary   Acute RF   -HTC, doubt decannulation   -follow up with Dr. Osorio in the future for decannulation request  Anemia  Hyponatremia   -suspect volume depletion, improved with saline   -re eval in am  Elevated alk phos  Remote TBI with PVS   -cared for almost 20 years at home  Chronic pain   -baclofen pump  PCM   -TF, home protein shake      Medications reviewed  Waldrop catheter: Neurogenic Bladder      DVT Prophylaxis: Enoxaparin (Lovenox)      Antibiotics: Treating active infection/contamination beyond 24 hours perioperative coverage

## 2017-07-21 LAB
ANION GAP SERPL CALC-SCNC: 8 MMOL/L (ref 0–11.9)
BASOPHILS # BLD AUTO: 0.5 % (ref 0–1.8)
BASOPHILS # BLD: 0.02 K/UL (ref 0–0.12)
BUN SERPL-MCNC: 10 MG/DL (ref 8–22)
CALCIUM SERPL-MCNC: 8.3 MG/DL (ref 8.4–10.2)
CHLORIDE SERPL-SCNC: 96 MMOL/L (ref 96–112)
CO2 SERPL-SCNC: 24 MMOL/L (ref 20–33)
CREAT SERPL-MCNC: <0.3 MG/DL (ref 0.5–1.4)
EOSINOPHIL # BLD AUTO: 0.49 K/UL (ref 0–0.51)
EOSINOPHIL NFR BLD: 12.6 % (ref 0–6.9)
ERYTHROCYTE [DISTWIDTH] IN BLOOD BY AUTOMATED COUNT: 49.3 FL (ref 35.9–50)
GFR SERPL CREATININE-BSD FRML MDRD: >60 ML/MIN/1.73 M 2
GLUCOSE SERPL-MCNC: 102 MG/DL (ref 65–99)
HCT VFR BLD AUTO: 34.3 % (ref 42–52)
HGB BLD-MCNC: 11.5 G/DL (ref 14–18)
IMM GRANULOCYTES # BLD AUTO: 0.03 K/UL (ref 0–0.11)
IMM GRANULOCYTES NFR BLD AUTO: 0.8 % (ref 0–0.9)
LYMPHOCYTES # BLD AUTO: 1.32 K/UL (ref 1–4.8)
LYMPHOCYTES NFR BLD: 33.8 % (ref 22–41)
MCH RBC QN AUTO: 28.7 PG (ref 27–33)
MCHC RBC AUTO-ENTMCNC: 33.5 G/DL (ref 33.7–35.3)
MCV RBC AUTO: 85.5 FL (ref 81.4–97.8)
MONOCYTES # BLD AUTO: 0.59 K/UL (ref 0–0.85)
MONOCYTES NFR BLD AUTO: 15.1 % (ref 0–13.4)
NEUTROPHILS # BLD AUTO: 1.45 K/UL (ref 1.82–7.42)
NEUTROPHILS NFR BLD: 37.2 % (ref 44–72)
NRBC # BLD AUTO: 0 K/UL
NRBC BLD AUTO-RTO: 0 /100 WBC
PLATELET # BLD AUTO: 265 K/UL (ref 164–446)
PMV BLD AUTO: 8.3 FL (ref 9–12.9)
POTASSIUM SERPL-SCNC: 4.1 MMOL/L (ref 3.6–5.5)
RBC # BLD AUTO: 4.01 M/UL (ref 4.7–6.1)
SODIUM SERPL-SCNC: 128 MMOL/L (ref 135–145)
WBC # BLD AUTO: 3.9 K/UL (ref 4.8–10.8)

## 2017-07-21 PROCEDURE — 85025 COMPLETE CBC W/AUTO DIFF WBC: CPT

## 2017-07-21 PROCEDURE — 99232 SBSQ HOSP IP/OBS MODERATE 35: CPT | Performed by: HOSPITALIST

## 2017-07-21 PROCEDURE — 80048 BASIC METABOLIC PNL TOTAL CA: CPT

## 2017-07-21 NOTE — PROGRESS NOTES
Pulmonary Critical Care Progress Note        Chief Complaint: respiratory failure, tracheostomy.    History of Present Illness: 37 y.o. male with a history of traumatic brain injury and chronic encephalopathy as well as a seizure disorder. He was transferred here for further treatment of respiratory failure with a tracheostomy tube in place.    ROS:   unable to perform due to the patient's inability to effectively communicate.    Interval Events:  24 hour interval history reviewed     7/17 - 98 degrees - 136/78 - 86 - 16 - 98%; labs reviewed and similar to prior with ongoing hyponatremia; cxr today showed improved left basilar atx; 21% HMTC    7/18 - d/w RT, changed to 28% 2/2 desats; 97 degrees - 137/79 - 82 - 20 - 96%; follow up cxr in the AM;     7/19 - mother getting trained in trach care; d/w RT; 98.9 degrees - 73 - 118/63 - 14 - 100% - SR; sodium to 127; cxr with worsening left basilar opacity; no new micro or WBC    7/20 - d/w RT, no interval deterioration; 97.8 degrees - 66 - 109/40 - 13 - 100% - SR; no new labs, images, micro    7/21 - d/w RT, capping during the day, uncapped qhs; 99 degrees - 80 - 130/88 - 17 - SR; today's labs reviewed and are similar      PFSH:  No change.    Respiratory:  Exam: symmetrical breath sounds, few rales, no wheeze or egophony.  ImagingAvailable data reviewed.     HemoDynamics:  Exam: regular rhythm (Sinus)  Imaging: None - Reviewed    Neuro:  Exam: no response to question or command.  Imaging: Available data reviewed.    Fluids:  Intake/Output     None           Recent Labs      07/19/17   0154  07/21/17   0410   SODIUM  127*  128*   POTASSIUM  4.0  4.1   CHLORIDE  98  96   CO2  24  24   BUN  10  10   CREATININE  <0.30*  <0.30*   CALCIUM  8.1*  8.3*       GI/Nutrition:  Exam: abdomen is soft and non-tender  Imaging: Available data reviewed  tolerating enteral tube feeding.  Liver Function  Recent Labs      07/19/17   0154  07/21/17   0410   GLUCOSE  97  102*        Heme:  Recent Labs      07/21/17   0410   RBC  4.01*   HEMOGLOBIN  11.5*   HEMATOCRIT  34.3*   PLATELETCT  265       Infectious Disease:  No Data Recorded  Micro: cultures reviewed. Sputum 7/11/17 with normal leon, Proteus and another gram-negative mateo.  Recent Labs      07/21/17   0410   WBC  3.9*   NEUTSPOLYS  37.20*   LYMPHOCYTES  33.80   MONOCYTES  15.10*   EOSINOPHILS  12.60*   BASOPHILS  0.50     No current facility-administered medications for this encounter.       Last reviewed on 6/17/2017  9:45 AM by Ligia Frazier, Pharmacy Int    Quality  Measures:  Radiology images reviewed, Medications reviewed and Labs reviewed                      Problems:  Respiratory failure - tracheostomy, tolerating collar.  7/19 - worsening left basilar opacity  Recent pneumonia - completed antibiotics prior to admission; Kleb may be colonizer  Recent parapneumonic effusion, recent thoracentesis  Seizure disorder  Anemia, stable  Hyponatremia  Relative adrenal insufficiency, recent hydrocortisone taper.  Encephalopathy - chronic, history of traumatic brain injury.    Recommend:  Continue titrated oxygen via the tracheostomy collar and expiratory protocols.  Continue nutritional support, anticonvulsants and prophylaxis.  The patient's mother who is his caregiver will be trained in the maintenance of the tracheostomy tube at home.  Discussed with respiratory therapy     Antiinfectives - fluconazole, ancef

## 2017-07-21 NOTE — [facility]
Hospital Medicine Progress Note, Adult, Complex               Author: Jacki Galvan Date & Time created: 7/21/2017  5:40 AM     CC: RF    Interval History:  Waldrop removed, no spontaneous voiding, I and O cath by RN q6  Patient's mother expressed that she didn't want to take him home unless his Na level was normal (was low on initial admission)  ANC trending low, suspect ancef      Review of Systems:  Review of Systems   Unable to perform ROS      T 99 P80BP 130/88RR 17SaO2 100% I/O 2.9/2.5 3BM tele SR   Physical Exam   Constitutional: He appears well-developed. No distress.   HENT:   Head: Normocephalic.   Eyes: Right eye exhibits no discharge. Left eye exhibits no discharge. No scleral icterus.   Neck:   trach   Cardiovascular: Normal rate, regular rhythm and intact distal pulses.    No murmur heard.  Pulmonary/Chest: Effort normal. No respiratory distress. He exhibits no tenderness.   distant   Abdominal: Soft. Bowel sounds are normal. He exhibits no distension. There is no tenderness.   Peg with mild erythema   Musculoskeletal: He exhibits edema (tr pedal).   Arm and feet contractures   Neurological:   No interaction   Skin: Skin is warm. No erythema.       Labs:        Invalid input(s): [id number]      Recent Labs      07/19/17   0154  07/21/17   0410   SODIUM  127*  128*   POTASSIUM  4.0  4.1   CHLORIDE  98  96   CO2  24  24   BUN  10  10   CREATININE  <0.30*  <0.30*   CALCIUM  8.1*  8.3*     Recent Labs      07/19/17   0154  07/21/17   0410   GLUCOSE  97  102*     Recent Labs      07/21/17   0410   RBC  4.01*   HEMOGLOBIN  11.5*   HEMATOCRIT  34.3*   PLATELETCT  265     Recent Labs      07/21/17   0410   WBC  3.9*   NEUTSPOLYS  37.20*   LYMPHOCYTES  33.80   MONOCYTES  15.10*   EOSINOPHILS  12.60*   BASOPHILS  0.50   Medical Decision Making, by Problem:  Klebsiella/MSSA/Proteus TBI   -Ancef thru today   -follow worsening LLL process  GT cellulitis-treated  Treated PNA with sepsis PTA  S/p thoracentesis  for parapneumonic effusion  Status epilepticus    -keppra, phenobarbital   -believe lifelong AEDs necessary   Acute RF   -HTC, doubt decannulation   -follow up with Dr. Osorio in the future for decannulation request  Anemia  Hyponatremia   -trending up   -increase NaCl, receiving hydration  Elevated alk phos  Remote TBI with PVS   -cared for almost 20 years at home  Chronic pain   -baclofen pump  PCM   -TF, home protein shake      Medications reviewed and Labs reviewed  Waldrop catheter: No Waldrop      DVT Prophylaxis: Enoxaparin (Lovenox)      Antibiotics: Treating active infection/contamination beyond 24 hours perioperative coverage

## 2017-07-22 LAB
ANION GAP SERPL CALC-SCNC: 9 MMOL/L (ref 0–11.9)
ANION GAP SERPL CALC-SCNC: 9 MMOL/L (ref 0–11.9)
APPEARANCE UR: CLEAR
BASOPHILS # BLD AUTO: 0.3 % (ref 0–1.8)
BASOPHILS # BLD: 0.02 K/UL (ref 0–0.12)
BILIRUB UR QL STRIP.AUTO: NEGATIVE
BUN SERPL-MCNC: 10 MG/DL (ref 8–22)
BUN SERPL-MCNC: 8 MG/DL (ref 8–22)
CALCIUM SERPL-MCNC: 8.2 MG/DL (ref 8.4–10.2)
CALCIUM SERPL-MCNC: 8.3 MG/DL (ref 8.4–10.2)
CHLORIDE SERPL-SCNC: 91 MMOL/L (ref 96–112)
CHLORIDE SERPL-SCNC: 92 MMOL/L (ref 96–112)
CO2 SERPL-SCNC: 22 MMOL/L (ref 20–33)
CO2 SERPL-SCNC: 23 MMOL/L (ref 20–33)
COLOR UR: YELLOW
CREAT SERPL-MCNC: <0.3 MG/DL (ref 0.5–1.4)
CREAT SERPL-MCNC: <0.3 MG/DL (ref 0.5–1.4)
EOSINOPHIL # BLD AUTO: 0.44 K/UL (ref 0–0.51)
EOSINOPHIL NFR BLD: 5.7 % (ref 0–6.9)
ERYTHROCYTE [DISTWIDTH] IN BLOOD BY AUTOMATED COUNT: 47.8 FL (ref 35.9–50)
GFR SERPL CREATININE-BSD FRML MDRD: >60 ML/MIN/1.73 M 2
GFR SERPL CREATININE-BSD FRML MDRD: >60 ML/MIN/1.73 M 2
GLUCOSE SERPL-MCNC: 101 MG/DL (ref 65–99)
GLUCOSE SERPL-MCNC: 125 MG/DL (ref 65–99)
GLUCOSE UR STRIP.AUTO-MCNC: NEGATIVE MG/DL
GRAM STN SPEC: NORMAL
HCT VFR BLD AUTO: 35.7 % (ref 42–52)
HGB BLD-MCNC: 12 G/DL (ref 14–18)
IMM GRANULOCYTES # BLD AUTO: 0.05 K/UL (ref 0–0.11)
IMM GRANULOCYTES NFR BLD AUTO: 0.6 % (ref 0–0.9)
KETONES UR STRIP.AUTO-MCNC: NEGATIVE MG/DL
LEUKOCYTE ESTERASE UR QL STRIP.AUTO: NEGATIVE
LYMPHOCYTES # BLD AUTO: 1.36 K/UL (ref 1–4.8)
LYMPHOCYTES NFR BLD: 17.5 % (ref 22–41)
MCH RBC QN AUTO: 28.6 PG (ref 27–33)
MCHC RBC AUTO-ENTMCNC: 33.6 G/DL (ref 33.7–35.3)
MCV RBC AUTO: 85 FL (ref 81.4–97.8)
MICRO URNS: NORMAL
MONOCYTES # BLD AUTO: 0.75 K/UL (ref 0–0.85)
MONOCYTES NFR BLD AUTO: 9.7 % (ref 0–13.4)
NEUTROPHILS # BLD AUTO: 5.15 K/UL (ref 1.82–7.42)
NEUTROPHILS NFR BLD: 66.2 % (ref 44–72)
NITRITE UR QL STRIP.AUTO: NEGATIVE
NRBC # BLD AUTO: 0 K/UL
NRBC BLD AUTO-RTO: 0 /100 WBC
OSMOLALITY SERPL: 255 MOSM/KG H2O (ref 278–298)
OSMOLALITY UR: 574 MOSM/KG H2O (ref 300–900)
PH UR STRIP.AUTO: 7 [PH]
PLATELET # BLD AUTO: 322 K/UL (ref 164–446)
PMV BLD AUTO: 7.9 FL (ref 9–12.9)
POTASSIUM SERPL-SCNC: 3.7 MMOL/L (ref 3.6–5.5)
POTASSIUM SERPL-SCNC: 4.2 MMOL/L (ref 3.6–5.5)
PROT UR QL STRIP: NEGATIVE MG/DL
RBC # BLD AUTO: 4.2 M/UL (ref 4.7–6.1)
RBC UR QL AUTO: NEGATIVE
SIGNIFICANT IND 70042: NORMAL
SITE SITE: NORMAL
SODIUM SERPL-SCNC: 123 MMOL/L (ref 135–145)
SODIUM SERPL-SCNC: 123 MMOL/L (ref 135–145)
SOURCE SOURCE: NORMAL
SP GR UR STRIP.AUTO: 1.01
TSH SERPL DL<=0.005 MIU/L-ACNC: 1.1 UIU/ML (ref 0.35–5.5)
WBC # BLD AUTO: 7.8 K/UL (ref 4.8–10.8)

## 2017-07-22 PROCEDURE — 87077 CULTURE AEROBIC IDENTIFY: CPT | Mod: 91

## 2017-07-22 PROCEDURE — 87186 SC STD MICRODIL/AGAR DIL: CPT | Mod: 91

## 2017-07-22 PROCEDURE — 99233 SBSQ HOSP IP/OBS HIGH 50: CPT | Performed by: HOSPITALIST

## 2017-07-22 PROCEDURE — 87070 CULTURE OTHR SPECIMN AEROBIC: CPT

## 2017-07-22 PROCEDURE — 83935 ASSAY OF URINE OSMOLALITY: CPT

## 2017-07-22 PROCEDURE — 85025 COMPLETE CBC W/AUTO DIFF WBC: CPT

## 2017-07-22 PROCEDURE — 83930 ASSAY OF BLOOD OSMOLALITY: CPT

## 2017-07-22 PROCEDURE — 87086 URINE CULTURE/COLONY COUNT: CPT

## 2017-07-22 PROCEDURE — 80048 BASIC METABOLIC PNL TOTAL CA: CPT

## 2017-07-22 PROCEDURE — 81003 URINALYSIS AUTO W/O SCOPE: CPT

## 2017-07-22 PROCEDURE — 87205 SMEAR GRAM STAIN: CPT

## 2017-07-22 PROCEDURE — 84443 ASSAY THYROID STIM HORMONE: CPT

## 2017-07-22 NOTE — [facility]
Hospital Medicine Progress Note, Adult, Complex               Author: Jacki Galvan Date & Time created: 7/22/2017  8:16 AM     CC: RF    Interval History:  Increased secretions per RT  Na decreased  labile HR and BP periodically last 12 hours    Review of Systems:  Review of Systems   Unable to perform ROS      T 99.7 P112BP 152/80RR 90YkE635% I/O3/550 inc 1BM tele SR   Physical Exam   Constitutional: He appears well-developed.   HENT:   Head: Normocephalic.   Eyes: Right eye exhibits no discharge. Left eye exhibits no discharge. No scleral icterus.   Neck: No tracheal deviation present.   trach   Cardiovascular: Normal rate, regular rhythm and intact distal pulses.    No murmur heard.  Pulmonary/Chest: Effort normal. No respiratory distress. He exhibits no tenderness.   distant   Abdominal: Soft. Bowel sounds are normal. He exhibits no distension. There is no tenderness.   Peg with mild erythema   Musculoskeletal: He exhibits edema (tr pedal).   Arm and feet contractures   Neurological:   No interaction   Skin: Skin is warm. No erythema.       Labs:        Invalid input(s): [id number]      Recent Labs      07/21/17   0410  07/22/17   0410   SODIUM  128*  123*   POTASSIUM  4.1  4.2   CHLORIDE  96  91*   CO2  24  23   BUN  10  8   CREATININE  <0.30*  <0.30*   CALCIUM  8.3*  8.3*     Recent Labs      07/21/17   0410  07/22/17   0410   GLUCOSE  102*  101*     Recent Labs      07/21/17   0410  07/22/17   0410   RBC  4.01*  4.20*   HEMOGLOBIN  11.5*  12.0*   HEMATOCRIT  34.3*  35.7*   PLATELETCT  265  322     Recent Labs      07/21/17   0410  07/22/17   0410   WBC  3.9*  7.8   NEUTSPOLYS  37.20*  66.20   LYMPHOCYTES  33.80  17.50*   MONOCYTES  15.10*  9.70   EOSINOPHILS  12.60*  5.70   BASOPHILS  0.50  0.30   Medical Decision Making, by Problem:  Klebsiella/MSSA/Proteus TBI-treated   -repeat sputum cx with increase secretions   -am cxr for LLL process  GT cellulitis-treated  Treated PNA with sepsis PTA  S/p  thoracentesis for parapneumonic effusion  Status epilepticus    -keppra, phenobarbital   -believe lifelong AEDs necessary   Acute RF   -HTC, doubt decannulation   -follow up with Dr. Osorio in the future for decannulation request  Anemia  Hyponatremia   -TSH, osms P   -off IVF   -Nacl increased to 3x/day   -repeat at 1pm  Autonomic instability   -metoprolol low dose   -check for UTI, follow sputum cx  Elevated alk phos  Remote TBI with PVS   -cared for almost 20 years at home  Chronic pain   -baclofen pump  PCM   -TF, home protein shake      Medications reviewed and Labs reviewed  Waldrop catheter: No Waldrop      DVT Prophylaxis: Enoxaparin (Lovenox)

## 2017-07-23 ENCOUNTER — APPOINTMENT (OUTPATIENT)
Dept: RADIOLOGY | Facility: MEDICAL CENTER | Age: 37
End: 2017-07-23
Attending: HOSPITALIST
Payer: COMMERCIAL

## 2017-07-23 LAB
ANION GAP SERPL CALC-SCNC: 5 MMOL/L (ref 0–11.9)
APPEARANCE UR: CLEAR
BILIRUB UR QL STRIP.AUTO: NEGATIVE
BUN SERPL-MCNC: 10 MG/DL (ref 8–22)
CALCIUM SERPL-MCNC: 8.5 MG/DL (ref 8.4–10.2)
CHLORIDE SERPL-SCNC: 97 MMOL/L (ref 96–112)
CO2 SERPL-SCNC: 27 MMOL/L (ref 20–33)
COLOR UR: YELLOW
CREAT SERPL-MCNC: <0.3 MG/DL (ref 0.5–1.4)
GFR SERPL CREATININE-BSD FRML MDRD: >60 ML/MIN/1.73 M 2
GLUCOSE SERPL-MCNC: 109 MG/DL (ref 65–99)
GLUCOSE UR STRIP.AUTO-MCNC: NEGATIVE MG/DL
KETONES UR STRIP.AUTO-MCNC: NEGATIVE MG/DL
LEUKOCYTE ESTERASE UR QL STRIP.AUTO: NEGATIVE
MICRO URNS: NORMAL
NITRITE UR QL STRIP.AUTO: NEGATIVE
PH UR STRIP.AUTO: 6.5 [PH]
POTASSIUM SERPL-SCNC: 3.8 MMOL/L (ref 3.6–5.5)
PROT UR QL STRIP: NEGATIVE MG/DL
RBC UR QL AUTO: NEGATIVE
SODIUM SERPL-SCNC: 129 MMOL/L (ref 135–145)
SP GR UR STRIP.AUTO: <=1.005

## 2017-07-23 PROCEDURE — 80048 BASIC METABOLIC PNL TOTAL CA: CPT

## 2017-07-23 PROCEDURE — 99232 SBSQ HOSP IP/OBS MODERATE 35: CPT | Performed by: HOSPITALIST

## 2017-07-23 PROCEDURE — 87086 URINE CULTURE/COLONY COUNT: CPT

## 2017-07-23 PROCEDURE — 71010 DX-CHEST-PORTABLE (1 VIEW): CPT

## 2017-07-23 PROCEDURE — 81003 URINALYSIS AUTO W/O SCOPE: CPT

## 2017-07-23 NOTE — [facility]
Hospital Medicine Progress Note, Adult, Complex               Author: Jacki Galvan Date & Time created: 7/23/2017  8:54 AM     CC: RF    Interval History:  CXR with atx  HR normal  Na improved  Keppra reduced per mother's request    Review of Systems:  Review of Systems   Unable to perform ROS      T 98.1P66BP 118/41RR 72AmW936% I/O1.6/3.2 1BM tele SR   Physical Exam   Constitutional: He appears well-developed.   HENT:   Head: Normocephalic.   Eyes: Right eye exhibits no discharge. Left eye exhibits no discharge. No scleral icterus.   Neck: No tracheal deviation present.   trach   Cardiovascular: Normal rate, regular rhythm and intact distal pulses.    No murmur heard.  Pulmonary/Chest: Effort normal. No respiratory distress. He exhibits no tenderness.   distant   Abdominal: Soft. Bowel sounds are normal. He exhibits no distension. There is no tenderness.   Peg with mild erythema  palpable baclofen pump   Musculoskeletal: He exhibits no edema.   Arm and feet contractures   Neurological:   No interaction   Skin: Skin is warm. No erythema.       Labs:        Invalid input(s): [id number]      Recent Labs      07/22/17   0410  07/22/17   1312  07/23/17   0430   SODIUM  123*  123*  129*   POTASSIUM  4.2  3.7  3.8   CHLORIDE  91*  92*  97   CO2  23  22  27   BUN  8  10  10   CREATININE  <0.30*  <0.30*  <0.30*   CALCIUM  8.3*  8.2*  8.5     Recent Labs      07/22/17   0410  07/22/17   1312  07/23/17   0430   GLUCOSE  101*  125*  109*     Recent Labs      07/21/17   0410  07/22/17   0410   RBC  4.01*  4.20*   HEMOGLOBIN  11.5*  12.0*   HEMATOCRIT  34.3*  35.7*   PLATELETCT  265  322     Recent Labs      07/21/17   0410  07/22/17   0410   WBC  3.9*  7.8   NEUTSPOLYS  37.20*  66.20   LYMPHOCYTES  33.80  17.50*   MONOCYTES  15.10*  9.70   EOSINOPHILS  12.60*  5.70   BASOPHILS  0.50  0.30   Medical Decision Making, by Problem:  Klebsiella/MSSA/Proteus TBI-treated   -cxr with atx  GT cellulitis-treated  Treated PNA with  sepsis PTA  S/p thoracentesis for parapneumonic effusion  Status epilepticus    -keppra, phenobarbital   -believe lifelong AEDs necessary, patient's mother is likely going to stop administration per report  Acute RF   -HTC, doubt decannulation   -follow up with [place] [facility]  Anemia  Hyponatremia   -improved   -continue NaCl  Autonomic instability   -no signs of infection and no further episodes  Elevated alk phos  Remote TBI with PVS   -cared for almost 20 years at home  Chronic pain   -baclofen pump  PCM   -TF, home protein shake      Medications reviewed, Labs reviewed and Radiology images reviewed  Waldrop catheter: No Waldrop      DVT Prophylaxis: Enoxaparin (Lovenox)

## 2017-07-24 LAB
ANION GAP SERPL CALC-SCNC: 8 MMOL/L (ref 0–11.9)
BACTERIA SPEC RESP CULT: ABNORMAL
BACTERIA UR CULT: NORMAL
BUN SERPL-MCNC: 12 MG/DL (ref 8–22)
CALCIUM SERPL-MCNC: 8.5 MG/DL (ref 8.4–10.2)
CHLORIDE SERPL-SCNC: 97 MMOL/L (ref 96–112)
CO2 SERPL-SCNC: 25 MMOL/L (ref 20–33)
CREAT SERPL-MCNC: <0.3 MG/DL (ref 0.5–1.4)
GFR SERPL CREATININE-BSD FRML MDRD: >60 ML/MIN/1.73 M 2
GLUCOSE SERPL-MCNC: 100 MG/DL (ref 65–99)
GRAM STN SPEC: ABNORMAL
POTASSIUM SERPL-SCNC: 4.3 MMOL/L (ref 3.6–5.5)
SIGNIFICANT IND 70042: ABNORMAL
SIGNIFICANT IND 70042: NORMAL
SITE SITE: ABNORMAL
SITE SITE: NORMAL
SODIUM SERPL-SCNC: 130 MMOL/L (ref 135–145)
SOURCE SOURCE: ABNORMAL
SOURCE SOURCE: NORMAL

## 2017-07-24 PROCEDURE — 80048 BASIC METABOLIC PNL TOTAL CA: CPT

## 2017-07-24 PROCEDURE — 99232 SBSQ HOSP IP/OBS MODERATE 35: CPT | Performed by: HOSPITALIST

## 2017-07-24 NOTE — [facility]
Hospital Medicine Progress Note, Adult, Complex               Author: Jacki Galvan Date & Time created: 7/24/2017  6:37 AM     CC: RF    Interval History:  No events reported  Na 130  Mother requests no condom cath    Review of Systems:  Review of Systems   Unable to perform ROS      T 98.1P71BP 124/45RR 67IcV214% I/O1.5/inc 1BM tele SR   Physical Exam   Constitutional: He appears well-developed.   HENT:   Head: Normocephalic.   Eyes: Right eye exhibits no discharge. Left eye exhibits no discharge.   Neck: No tracheal deviation present.   trach   Cardiovascular: Normal rate, regular rhythm and intact distal pulses.    No murmur heard.  Pulmonary/Chest: Effort normal. No respiratory distress. He exhibits no tenderness.   distant   Abdominal: Soft. Bowel sounds are normal. He exhibits no distension. There is no tenderness.   Peg with mild erythema  palpable baclofen pump   Musculoskeletal: He exhibits no edema.   Arm and feet contractures   Neurological:   No interaction   Skin: Skin is warm. No erythema.       Labs:        Invalid input(s): [id number]      Recent Labs      07/22/17   1312  07/23/17   0430  07/24/17   0429   SODIUM  123*  129*  130*   POTASSIUM  3.7  3.8  4.3   CHLORIDE  92*  97  97   CO2  22  27  25   BUN  10  10  12   CREATININE  <0.30*  <0.30*  <0.30*   CALCIUM  8.2*  8.5  8.5     Recent Labs      07/22/17   1312  07/23/17   0430  07/24/17   0429   GLUCOSE  125*  109*  100*     Recent Labs      07/22/17   0410   RBC  4.20*   HEMOGLOBIN  12.0*   HEMATOCRIT  35.7*   PLATELETCT  322     Recent Labs      07/22/17   0410   WBC  7.8   NEUTSPOLYS  66.20   LYMPHOCYTES  17.50*   MONOCYTES  9.70   EOSINOPHILS  5.70   BASOPHILS  0.30   Medical Decision Making, by Problem:  Klebsiella/MSSA/Proteus TBI-treated, now colonized   -cxr with atx, continue pulm toilet  GT cellulitis-treated   -add bactroban today  Treated PNA with sepsis PTA  S/p thoracentesis for parapneumonic effusion  Status epilepticus     -keppra, phenobarbital   -believe lifelong AEDs necessary, patient's mother is likely going to stop administration per report  Acute RF   -HTC, doubt decannulation   -follow up with [place] [facility] due to insurance  Anemia  Hyponatremia   -improved   -continue NaCl  Autonomic instability   -no signs of infection and no further episodes  Elevated alk phos  Remote TBI with PVS   -cared for almost 20 years at home  Chronic pain   -baclofen pump  PCM   -TF, home protein shake    DC am- med rec, Rx, DC summary done  Medications reviewed and Labs reviewed  Waldrop catheter: No Waldrop      DVT Prophylaxis: Enoxaparin (Lovenox)

## 2017-07-24 NOTE — PROGRESS NOTES
Pulmonary Critical Care Progress Note        Chief Complaint: respiratory failure, tracheostomy.    History of Present Illness: 37 y.o. male with a history of traumatic brain injury and chronic encephalopathy as well as a seizure disorder. He was transferred here for further treatment of respiratory failure with a tracheostomy tube in place.    ROS:   unable to perform due to the patient's inability to effectively communicate.    Interval Events:  24 hour interval history reviewed     7/17 - 98 degrees - 136/78 - 86 - 16 - 98%; labs reviewed and similar to prior with ongoing hyponatremia; cxr today showed improved left basilar atx; 21% HMTC    7/18 - d/w RT, changed to 28% 2/2 desats; 97 degrees - 137/79 - 82 - 20 - 96%; follow up cxr in the AM;     7/19 - mother getting trained in trach care; d/w RT; 98.9 degrees - 73 - 118/63 - 14 - 100% - SR; sodium to 127; cxr with worsening left basilar opacity; no new micro or WBC    7/20 - d/w RT, no interval deterioration; 97.8 degrees - 66 - 109/40 - 13 - 100% - SR; no new labs, images, micro    7/21 - d/w RT, capping during the day, uncapped qhs; 99 degrees - 80 - 130/88 - 17 - SR; today's labs reviewed and are similar  7/22- spoke with mom at bed side patient remains the same with no change in management and no events overnight       PFSH:  No change.    Respiratory:  Exam: symmetrical breath sounds, few rales, no wheeze or egophony.  ImagingAvailable data reviewed.     HemoDynamics:  Exam: regular rhythm (Sinus)  Imaging: None - Reviewed    Neuro:  Exam: no response to question or command.  Imaging: Available data reviewed.    Fluids:  Intake/Output     None           Recent Labs      07/22/17   0410  07/22/17   1312  07/23/17   0430   SODIUM  123*  123*  129*   POTASSIUM  4.2  3.7  3.8   CHLORIDE  91*  92*  97   CO2  23  22  27   BUN  8  10  10   CREATININE  <0.30*  <0.30*  <0.30*   CALCIUM  8.3*  8.2*  8.5       GI/Nutrition:  Exam: abdomen is soft and  non-tender  Imaging: Available data reviewed  tolerating enteral tube feeding.  Liver Function  Recent Labs      07/22/17   0410  07/22/17   1312  07/23/17   0430   GLUCOSE  101*  125*  109*       Heme:  Recent Labs      07/21/17   0410  07/22/17   0410   RBC  4.01*  4.20*   HEMOGLOBIN  11.5*  12.0*   HEMATOCRIT  34.3*  35.7*   PLATELETCT  265  322       Infectious Disease:  No Data Recorded  Micro: cultures reviewed. Sputum 7/11/17 with normal leon, Proteus and another gram-negative mateo.  Recent Labs      07/21/17   0410  07/22/17   0410   WBC  3.9*  7.8   NEUTSPOLYS  37.20*  66.20   LYMPHOCYTES  33.80  17.50*   MONOCYTES  15.10*  9.70   EOSINOPHILS  12.60*  5.70   BASOPHILS  0.50  0.30     No current facility-administered medications for this encounter.       Last reviewed on 6/17/2017  9:45 AM by Ligia Frazier, Pharmacy Int    Quality  Measures:  Radiology images reviewed, Medications reviewed and Labs reviewed                      Problems:  Respiratory failure - tracheostomy, tolerating collar.  7/19 - worsening left basilar opacity  Recent pneumonia - completed antibiotics prior to admission; Kleb may be colonizer  Recent parapneumonic effusion, recent thoracentesis  Seizure disorder  Anemia, stable  Hyponatremia  Relative adrenal insufficiency, recent hydrocortisone taper.  Encephalopathy - chronic, history of traumatic brain injury.    Recommend:  Continue titrated oxygen via the tracheostomy collar and expiratory protocols.  Continue nutritional support, anticonvulsants and prophylaxis.  The patient's mother who is his caregiver will be trained in the maintenance of the tracheostomy tube at home.  Discussed with respiratory therapy     Antiinfectives - fluconazole, ancef

## 2017-07-25 LAB
ANION GAP SERPL CALC-SCNC: 5 MMOL/L (ref 0–11.9)
BACTERIA UR CULT: NORMAL
BUN SERPL-MCNC: 13 MG/DL (ref 8–22)
CALCIUM SERPL-MCNC: 8.2 MG/DL (ref 8.4–10.2)
CHLORIDE SERPL-SCNC: 99 MMOL/L (ref 96–112)
CO2 SERPL-SCNC: 25 MMOL/L (ref 20–33)
CREAT SERPL-MCNC: <0.3 MG/DL (ref 0.5–1.4)
ERYTHROCYTE [DISTWIDTH] IN BLOOD BY AUTOMATED COUNT: 49.8 FL (ref 35.9–50)
GFR SERPL CREATININE-BSD FRML MDRD: >60 ML/MIN/1.73 M 2
GLUCOSE SERPL-MCNC: 100 MG/DL (ref 65–99)
HCT VFR BLD AUTO: 34.3 % (ref 42–52)
HGB BLD-MCNC: 11.4 G/DL (ref 14–18)
MCH RBC QN AUTO: 28.5 PG (ref 27–33)
MCHC RBC AUTO-ENTMCNC: 33.2 G/DL (ref 33.7–35.3)
MCV RBC AUTO: 85.8 FL (ref 81.4–97.8)
PLATELET # BLD AUTO: 284 K/UL (ref 164–446)
PMV BLD AUTO: 8 FL (ref 9–12.9)
POTASSIUM SERPL-SCNC: 4.1 MMOL/L (ref 3.6–5.5)
RBC # BLD AUTO: 4 M/UL (ref 4.7–6.1)
SIGNIFICANT IND 70042: NORMAL
SITE SITE: NORMAL
SODIUM SERPL-SCNC: 129 MMOL/L (ref 135–145)
SOURCE SOURCE: NORMAL
WBC # BLD AUTO: 5.1 K/UL (ref 4.8–10.8)

## 2017-07-25 PROCEDURE — 80048 BASIC METABOLIC PNL TOTAL CA: CPT

## 2017-07-25 PROCEDURE — 85027 COMPLETE CBC AUTOMATED: CPT

## 2017-07-25 PROCEDURE — 99233 SBSQ HOSP IP/OBS HIGH 50: CPT | Performed by: HOSPITALIST

## 2017-07-25 NOTE — [facility]
Hospital Medicine Progress Note, Adult, Complex               Author: Cristina Kaykay Date & Time created: 7/25/2017  12:17 PM     Interval History:  CC - RF  No 24 hr problems reported.    Review of Systems:  Review of Systems   Unable to perform ROS: medical condition       Physical Exam:  Physical Exam   Constitutional: No distress.   HENT:   Right Ear: External ear normal.   Left Ear: External ear normal.   Nose: Nose normal.   Eyes: Right eye exhibits no discharge. Left eye exhibits no discharge. No scleral icterus.   Neck:   Trach collar   Cardiovascular: Normal rate and regular rhythm.    SR   Pulmonary/Chest: No respiratory distress. He has no wheezes.   Decreased BS   Abdominal: Soft. Bowel sounds are normal. There is no tenderness. There is no rebound and no guarding.   palpable pain pump   Musculoskeletal:   Severely contracted limbs   Neurological:   Awake but not interactive   Skin: Skin is warm and dry. He is not diaphoretic.   Vitals reviewed.      Labs:        Invalid input(s): [id number]      Recent Labs      07/23/17   0430  07/24/17 0429  07/25/17   0342   SODIUM  129*  130*  129*   POTASSIUM  3.8  4.3  4.1   CHLORIDE  97  97  99   CO2  27  25  25   BUN  10  12  13   CREATININE  <0.30*  <0.30*  <0.30*   CALCIUM  8.5  8.5  8.2*     Recent Labs      07/23/17   0430  07/24/17   0429  07/25/17   0342   GLUCOSE  109*  100*  100*     Recent Labs      07/25/17   0342   RBC  4.00*   HEMOGLOBIN  11.4*   HEMATOCRIT  34.3*   PLATELETCT  284     Recent Labs      07/25/17   0342   WBC  5.1             Medical Decision Making, by Problem:  KLEB/MSSA/PROTEUS TBI - treated  CUTANEOUS YEAST - treated  G TUBE CELLULITIS - treated  S/P MSSA/KLEBSIELLA/ASPIRATION PNA W/ SEPSIS  PARAPNEUMONIC EFFUSION S/P THORACENTESIS  STATUS EPILEPTICUS - Keppra decreased further last week given pt's hyponatremia, cont Phenobarbital  S/P VDRF - HTC on ambient O2  S/P PERALTA  ANEMIA - H/H stable  HYPONATREMIA - increase NaCl, cont to  follow Na+ levels, limit free water  ELEVATED ALK PHOS - likely 2/2 biliary stasis as pt not chronically on TF, w/u further if persists  REMOTE H/O TBI - mother is caretaker  CHRONIC PAIN - Baclofen pump as outpt  CHRONIC CONSTIPATION - bowel regimen  SULFA ALLERGY  DISPO - home tomorrow, trach teaching    Reviewed w/ CM and mother, >35 min.        Medications reviewed and Labs reviewed  Waldrop catheter: No Waldrop      DVT Prophylaxis: Enoxaparin (Lovenox)    Ulcer prophylaxis: Yes

## 2017-07-25 NOTE — PROGRESS NOTES
Pulmonary Critical Care Progress Note        Chief Complaint: respiratory failure, tracheostomy.    History of Present Illness: 37 y.o. male with a history of traumatic brain injury and chronic encephalopathy as well as a seizure disorder. He was transferred here for further treatment of respiratory failure with a tracheostomy tube in place.    ROS:   unable to perform due to the patient's inability to effectively communicate.    Interval Events:  24 hour interval history reviewed     7/17 - 98 degrees - 136/78 - 86 - 16 - 98%; labs reviewed and similar to prior with ongoing hyponatremia; cxr today showed improved left basilar atx; 21% HMTC    7/18 - d/w RT, changed to 28% 2/2 desats; 97 degrees - 137/79 - 82 - 20 - 96%; follow up cxr in the AM;     7/19 - mother getting trained in trach care; d/w RT; 98.9 degrees - 73 - 118/63 - 14 - 100% - SR; sodium to 127; cxr with worsening left basilar opacity; no new micro or WBC    7/20 - d/w RT, no interval deterioration; 97.8 degrees - 66 - 109/40 - 13 - 100% - SR; no new labs, images, micro    7/21 - d/w RT, capping during the day, uncapped qhs; 99 degrees - 80 - 130/88 - 17 - SR; today's labs reviewed and are similar  7/22- spoke with mom at bed side patient remains the same with no change in management and no events overnight   7/23 discussed with mom at bedside. Plan for d/c in the next few days and re evaluate out patient for decanulation in few weeks       PFSH:  No change.    Respiratory:  Exam: symmetrical breath sounds, few rales, no wheeze or egophony.  ImagingAvailable data reviewed.     HemoDynamics:  Exam: regular rhythm (Sinus)  Imaging: None - Reviewed    Neuro:  Exam: no response to question or command.  Imaging: Available data reviewed.    Fluids:  Intake/Output     None           Recent Labs      07/23/17   0430  07/24/17   0429  07/25/17   0342   SODIUM  129*  130*  129*   POTASSIUM  3.8  4.3  4.1   CHLORIDE  97  97  99   CO2  27  25  25   BUN  10   12  13   CREATININE  <0.30*  <0.30*  <0.30*   CALCIUM  8.5  8.5  8.2*       GI/Nutrition:  Exam: abdomen is soft and non-tender  Imaging: Available data reviewed  tolerating enteral tube feeding.  Liver Function  Recent Labs      07/23/17   0430  07/24/17   0429  07/25/17   0342   GLUCOSE  109*  100*  100*       Heme:  Recent Labs      07/25/17   0342   RBC  4.00*   HEMOGLOBIN  11.4*   HEMATOCRIT  34.3*   PLATELETCT  284       Infectious Disease:  No Data Recorded  Micro: cultures reviewed. Sputum 7/11/17 with normal leon, Proteus and another gram-negative mateo.  Recent Labs      07/25/17   0342   WBC  5.1     No current facility-administered medications for this encounter.       Last reviewed on 6/17/2017  9:45 AM by Ligia Frazier, Pharmacy Int    Quality  Measures:  Radiology images reviewed, Medications reviewed and Labs reviewed                      Problems:  Respiratory failure - tracheostomy, tolerating collar.  7/19 - worsening left basilar opacity  Recent pneumonia - completed antibiotics prior to admission; Kleb may be colonizer  Recent parapneumonic effusion, recent thoracentesis  Seizure disorder  Anemia, stable  Hyponatremia  Relative adrenal insufficiency, recent hydrocortisone taper.  Encephalopathy - chronic, history of traumatic brain injury.    Recommend:  Continue titrated oxygen via the tracheostomy collar and expiratory protocols.  Continue nutritional support, anticonvulsants and prophylaxis.  The patient's mother who is his caregiver will be trained in the maintenance of the tracheostomy tube at home.  Discussed with respiratory therapy     Antiinfectives - fluconazole, ancef

## 2017-07-25 NOTE — PROGRESS NOTES
Pulmonary Critical Care Progress Note        Chief Complaint: respiratory failure, tracheostomy.    History of Present Illness: 37 y.o. male with a history of traumatic brain injury and chronic encephalopathy as well as a seizure disorder. He was transferred here for further treatment of respiratory failure with a tracheostomy tube in place.    ROS:   unable to perform due to the patient's inability to effectively communicate.    Interval Events:  24 hour interval history reviewed   No evident dyspnea on tracheostomy collar. Preparations for discharge to home with family training on support equipment in progress. No new problems overnight.    PFSH:  No change.    Respiratory:  Exam: symmetrical breath sounds, few rales, no wheeze or egophony.  ImagingAvailable data reviewed. The chest radiogram on July 23 demonstrates unchanged left basilar density. No film today.    HemoDynamics:  Exam: regular rhythm (Sinus)  Imaging: None - Reviewed    Neuro:  Exam: no response to question or command.  Imaging: Available data reviewed.    Fluids:  Intake/Output     None           Recent Labs      07/23/17   0430  07/24/17   0429  07/25/17   0342   SODIUM  129*  130*  129*   POTASSIUM  3.8  4.3  4.1   CHLORIDE  97  97  99   CO2  27  25  25   BUN  10  12  13   CREATININE  <0.30*  <0.30*  <0.30*   CALCIUM  8.5  8.5  8.2*       GI/Nutrition:  Exam: abdomen is soft and non-tender  Imaging: Available data reviewed  tolerating enteral tube feeding.  Liver Function  Recent Labs      07/23/17   0430  07/24/17   0429  07/25/17   0342   GLUCOSE  109*  100*  100*       Heme:  Recent Labs      07/25/17   0342   RBC  4.00*   HEMOGLOBIN  11.4*   HEMATOCRIT  34.3*   PLATELETCT  284       Infectious Disease:  No Data Recorded  Micro: cultures reviewed. Sputum 7/22/17 with Proteus, Klebsiella and Citrobacter.   Recent Labs      07/25/17   0342   WBC  5.1     No current facility-administered medications for this encounter.       Last  reviewed on 6/17/2017  9:45 AM by Ligia Frazier, Pharmacy Int    Quality  Measures:  Radiology images reviewed, Medications reviewed and Labs reviewed                      Problems:  1. Respiratory failure - tracheostomy, tolerating collar and room air.  2. Abnormal chest radiogram. Recent pneumonia with improved parapneumonic effusion and persisting atelectasis but no suggestion of new infection.  3. Sputum colonization with Proteus, Klebsiella and Citrobacter.  4. Seizure disorder  5. Anemia, stable  6. Hyponatremia  7. Relative adrenal insufficiency, recent hydrocortisone taper.  8. Encephalopathy - chronic, history of traumatic brain injury.    Recommend:  Continue tracheostomy collar and respiratory protocols.  Continue nutritional support, anticonvulsants and prophylaxis.  The patient's mother who is his caregiver will be trained in the maintenance of the tracheostomy tube at home.  Discussed with respiratory therapy and with Dr. Macedo

## 2017-07-25 NOTE — PROGRESS NOTES
Pulmonary Critical Care Progress Note        Chief Complaint: respiratory failure, tracheostomy.    History of Present Illness: 37 y.o. male with a history of traumatic brain injury and chronic encephalopathy as well as a seizure disorder. He was transferred here for further treatment of respiratory failure with a tracheostomy tube in place.    ROS:   unable to perform due to the patient's inability to effectively communicate.    Interval Events:  24 hour interval history reviewed   No evident dyspnea on tracheostomy collar. Preparations for discharge to home with family training on support equipment in progress. No new problems overnight.    PFSH:  No change.    Respiratory:  Exam: symmetrical breath sounds, few rales, no wheeze or egophony.  ImagingAvailable data reviewed. The chest renogram on July 23 demonstrates unchanged left basilar density. No film today.    HemoDynamics:  Exam: regular rhythm (Sinus)  Imaging: None - Reviewed    Neuro:  Exam: no response to question or command.  Imaging: Available data reviewed.    Fluids:  Intake/Output     None           Recent Labs      07/23/17   0430  07/24/17   0429  07/25/17   0342   SODIUM  129*  130*  129*   POTASSIUM  3.8  4.3  4.1   CHLORIDE  97  97  99   CO2  27  25  25   BUN  10  12  13   CREATININE  <0.30*  <0.30*  <0.30*   CALCIUM  8.5  8.5  8.2*       GI/Nutrition:  Exam: abdomen is soft and non-tender  Imaging: Available data reviewed  tolerating enteral tube feeding.  Liver Function  Recent Labs      07/23/17   0430  07/24/17   0429  07/25/17   0342   GLUCOSE  109*  100*  100*       Heme:  Recent Labs      07/25/17   0342   RBC  4.00*   HEMOGLOBIN  11.4*   HEMATOCRIT  34.3*   PLATELETCT  284       Infectious Disease:  No Data Recorded  Micro: cultures reviewed. Sputum 7/22/17 with Proteus, Klebsiella and Citrobacter.   Recent Labs      07/25/17   0342   WBC  5.1     No current facility-administered medications for this encounter.       Last reviewed  on 6/17/2017  9:45 AM by Ligia Frazier, Pharmacy Int    Quality  Measures:  Radiology images reviewed, Medications reviewed and Labs reviewed                      Problems:  1. Respiratory failure - tracheostomy, tolerating collar and room air.  2. Abnormal chest radiogram. Recent pneumonia with improved parapneumonic effusion and persisting atelectasis but no suggestion of new infection.  3. Sputum colonization with Proteus, Klebsiella and Citrobacter.  4. Seizure disorder  5. Anemia, stable  6. Hyponatremia  7. Relative adrenal insufficiency, recent hydrocortisone taper.  8. Encephalopathy - chronic, history of traumatic brain injury.    Recommend:  Continue tracheostomy collar and respiratory protocols.  Continue nutritional support, anticonvulsants and prophylaxis.  The patient's mother who is his caregiver will be trained in the maintenance of the tracheostomy tube at home.  Discussed with respiratory therapy and with Dr. Galvan.

## 2017-07-26 LAB
ALBUMIN SERPL BCP-MCNC: 2.9 G/DL (ref 3.2–4.9)
ALP SERPL-CCNC: 175 U/L (ref 30–99)
ALT SERPL-CCNC: 37 U/L (ref 2–50)
AST SERPL-CCNC: 25 U/L (ref 12–45)
BILIRUB CONJ SERPL-MCNC: <0.1 MG/DL (ref 0.1–0.5)
BILIRUB INDIRECT SERPL-MCNC: ABNORMAL MG/DL (ref 0–1)
BILIRUB SERPL-MCNC: 0.2 MG/DL (ref 0.1–1.5)
PROT SERPL-MCNC: 6.4 G/DL (ref 6–8.2)
SODIUM SERPL-SCNC: 132 MMOL/L (ref 135–145)

## 2017-07-26 PROCEDURE — 80076 HEPATIC FUNCTION PANEL: CPT

## 2017-07-26 PROCEDURE — 84295 ASSAY OF SERUM SODIUM: CPT

## 2017-07-26 PROCEDURE — 99233 SBSQ HOSP IP/OBS HIGH 50: CPT | Performed by: HOSPITALIST

## 2017-07-26 NOTE — [facility]
Hospital Medicine Progress Note, Adult, Complex               Author: Cristina Mccracken Date & Time created: 7/26/2017  10:40 AM     Interval History:  CC - RF  Mother requesting that Himalayan Salt be used instead of NaCl for sodium supplementation.    Review of Systems:  Review of Systems   Unable to perform ROS: medical condition       Physical Exam:  Physical Exam   Constitutional: No distress.   HENT:   Right Ear: External ear normal.   Left Ear: External ear normal.   Nose: Nose normal.   Eyes: Right eye exhibits no discharge. Left eye exhibits no discharge. No scleral icterus.   Neck:   Trach collar   Cardiovascular: Normal rate and regular rhythm.    SR   Pulmonary/Chest: No respiratory distress. He has no wheezes.   Decreased BS   Abdominal: Soft. Bowel sounds are normal. There is no tenderness. There is no rebound and no guarding.   palpable pain pump   Musculoskeletal:   Severely contracted limbs   Neurological:   Awake but not interactive   Skin: Skin is warm and dry. He is not diaphoretic.   Vitals reviewed.      Labs:        Invalid input(s): [id number]      Recent Labs      07/24/17 0429 07/25/17 0342 07/26/17 0230   SODIUM  130*  129*  132*   POTASSIUM  4.3  4.1   --    CHLORIDE  97  99   --    CO2  25  25   --    BUN  12  13   --    CREATININE  <0.30*  <0.30*   --    CALCIUM  8.5  8.2*   --      Recent Labs      07/24/17 0429 07/25/17 0342 07/26/17 0230   ALTSGPT   --    --   37   ASTSGOT   --    --   25   ALKPHOSPHAT   --    --   175*   TBILIRUBIN   --    --   0.2   DBILIRUBIN   --    --   <0.1   GLUCOSE  100*  100*   --      Recent Labs      07/25/17 0342   RBC  4.00*   HEMOGLOBIN  11.4*   HEMATOCRIT  34.3*   PLATELETCT  284     Recent Labs      07/25/17 0342 07/26/17 0230   WBC  5.1   --    ASTSGOT   --   25   ALTSGPT   --   37   ALKPHOSPHAT   --   175*   TBILIRUBIN   --   0.2             Medical Decision Making, by Problem:  S/P KLEB/MSSA/PROTEUS TBI  S/P CUTANEOUS YEAST  S/P G  TUBE CELLULITIS  S/P MSSA/KLEBSIELLA/ASPIRATION PNA W/ SEPSIS  PARAPNEUMONIC EFFUSION S/P THORACENTESIS  STATUS EPILEPTICUS - Keppra decreased further last week given pt's hyponatremia, cont Phenobarbital  S/P VDRF - HTC on ambient O2  S/P PERALTA  ANEMIA - H/H stable  HYPONATREMIA - replace NaCl w/ Himalayan Salt per mother's request (dose conversion discussed w/ Pharmacy), cont to follow Na+ levels, limit free water  ELEVATED ALK PHOS - likely 2/2 biliary stasis as pt not chronically on TF, levels improving  REMOTE H/O TBI - mother is caretaker  CHRONIC PAIN - Baclofen pump as outpt  CHRONIC CONSTIPATION - bowel regimen  SULFA ALLERGY  DISPO - home tomorrow, trach teaching    Reviewed w/ RN, Pharmacy, and CM, >35 min.        Medications reviewed and Labs reviewed  Waldrop catheter: No Waldrop      DVT Prophylaxis: Enoxaparin (Lovenox)    Ulcer prophylaxis: Yes

## 2017-07-27 ENCOUNTER — APPOINTMENT (OUTPATIENT)
Dept: RADIOLOGY | Facility: MEDICAL CENTER | Age: 37
End: 2017-07-27
Attending: HOSPITALIST
Payer: COMMERCIAL

## 2017-07-27 LAB
ANION GAP SERPL CALC-SCNC: 10 MMOL/L (ref 0–11.9)
BASOPHILS # BLD AUTO: 0.5 % (ref 0–1.8)
BASOPHILS # BLD: 0.02 K/UL (ref 0–0.12)
BUN SERPL-MCNC: 19 MG/DL (ref 8–22)
CALCIUM SERPL-MCNC: 8.6 MG/DL (ref 8.4–10.2)
CHLORIDE SERPL-SCNC: 104 MMOL/L (ref 96–112)
CO2 SERPL-SCNC: 24 MMOL/L (ref 20–33)
CREAT SERPL-MCNC: <0.3 MG/DL (ref 0.5–1.4)
EOSINOPHIL # BLD AUTO: 0.51 K/UL (ref 0–0.51)
EOSINOPHIL NFR BLD: 13.1 % (ref 0–6.9)
ERYTHROCYTE [DISTWIDTH] IN BLOOD BY AUTOMATED COUNT: 52.2 FL (ref 35.9–50)
FUNGUS SPEC CULT: ABNORMAL
FUNGUS SPEC CULT: NORMAL
GFR SERPL CREATININE-BSD FRML MDRD: >60 ML/MIN/1.73 M 2
GLUCOSE SERPL-MCNC: 97 MG/DL (ref 65–99)
HCT VFR BLD AUTO: 34.1 % (ref 42–52)
HGB BLD-MCNC: 11 G/DL (ref 14–18)
IMM GRANULOCYTES # BLD AUTO: 0.02 K/UL (ref 0–0.11)
IMM GRANULOCYTES NFR BLD AUTO: 0.5 % (ref 0–0.9)
LYMPHOCYTES # BLD AUTO: 1.26 K/UL (ref 1–4.8)
LYMPHOCYTES NFR BLD: 32.3 % (ref 22–41)
MCH RBC QN AUTO: 28.9 PG (ref 27–33)
MCHC RBC AUTO-ENTMCNC: 32.3 G/DL (ref 33.7–35.3)
MCV RBC AUTO: 89.7 FL (ref 81.4–97.8)
MONOCYTES # BLD AUTO: 0.77 K/UL (ref 0–0.85)
MONOCYTES NFR BLD AUTO: 19.7 % (ref 0–13.4)
NEUTROPHILS # BLD AUTO: 1.32 K/UL (ref 1.82–7.42)
NEUTROPHILS NFR BLD: 33.9 % (ref 44–72)
NRBC # BLD AUTO: 0 K/UL
NRBC BLD AUTO-RTO: 0 /100 WBC
PLATELET # BLD AUTO: 289 K/UL (ref 164–446)
PMV BLD AUTO: 8 FL (ref 9–12.9)
POTASSIUM SERPL-SCNC: 4.2 MMOL/L (ref 3.6–5.5)
RBC # BLD AUTO: 3.8 M/UL (ref 4.7–6.1)
SIGNIFICANT IND 70042: ABNORMAL
SIGNIFICANT IND 70042: ABNORMAL
SIGNIFICANT IND 70042: NORMAL
SITE SITE: ABNORMAL
SITE SITE: ABNORMAL
SITE SITE: NORMAL
SODIUM SERPL-SCNC: 138 MMOL/L (ref 135–145)
SOURCE SOURCE: ABNORMAL
SOURCE SOURCE: ABNORMAL
SOURCE SOURCE: NORMAL
WBC # BLD AUTO: 3.9 K/UL (ref 4.8–10.8)

## 2017-07-27 PROCEDURE — 99239 HOSP IP/OBS DSCHRG MGMT >30: CPT | Performed by: HOSPITALIST

## 2017-07-27 PROCEDURE — 85025 COMPLETE CBC W/AUTO DIFF WBC: CPT

## 2017-07-27 PROCEDURE — 71010 DX-CHEST-PORTABLE (1 VIEW): CPT

## 2017-07-27 PROCEDURE — 80048 BASIC METABOLIC PNL TOTAL CA: CPT

## 2017-07-27 NOTE — PROGRESS NOTES
Pulmonary Critical Care Progress Note        Chief Complaint: respiratory failure, tracheostomy.    History of Present Illness: 37 y.o. male with a history of traumatic brain injury and chronic encephalopathy as well as a seizure disorder. He was transferred here for further treatment of respiratory failure with a tracheostomy tube in place.    ROS:   unable to perform due to the patient's inability to effectively communicate.    Interval Events:  24 hour interval history reviewed   No evident dyspnea on tracheostomy collar. Preparations for discharge to home with family training on support equipment in progress. No new problems overnight.     PFSH:  No change.    Respiratory:  Exam: symmetrical breath sounds, few rales, no wheeze or egophony.  ImagingAvailable data reviewed. The chest radiogram on July 23 demonstrates unchanged left basilar density. No film today.    HemoDynamics:  Exam: regular rhythm (Sinus)  Imaging: None - Reviewed    Neuro:  Exam: no response to question or command.  Imaging: Available data reviewed.    Fluids:  Intake/Output     None           Recent Labs      07/25/17 0342 07/26/17 0230 07/27/17 0420   SODIUM  129*  132*  138   POTASSIUM  4.1   --   4.2   CHLORIDE  99   --   104   CO2  25   --   24   BUN  13   --   19   CREATININE  <0.30*   --   <0.30*   CALCIUM  8.2*   --   8.6       GI/Nutrition:  Exam: abdomen is soft and non-tender  Imaging: Available data reviewed  tolerating enteral tube feeding.  Liver Function  Recent Labs      07/25/17 0342 07/26/17 0230 07/27/17 0420   ALTSGPT   --   37   --    ASTSGOT   --   25   --    ALKPHOSPHAT   --   175*   --    TBILIRUBIN   --   0.2   --    DBILIRUBIN   --   <0.1   --    GLUCOSE  100*   --   97       Heme:  Recent Labs      07/25/17 0342 07/27/17   0420   RBC  4.00*  3.80*   HEMOGLOBIN  11.4*  11.0*   HEMATOCRIT  34.3*  34.1*   PLATELETCT  284  289       Infectious Disease:  No Data Recorded  Micro: cultures  reviewed. Sputum 7/22/17 with Proteus, Klebsiella and Citrobacter.   Recent Labs      07/25/17   0342  07/26/17   0230  07/27/17   0420   WBC  5.1   --   3.9*   NEUTSPOLYS   --    --   33.90*   LYMPHOCYTES   --    --   32.30   MONOCYTES   --    --   19.70*   EOSINOPHILS   --    --   13.10*   BASOPHILS   --    --   0.50   ASTSGOT   --   25   --    ALTSGPT   --   37   --    ALKPHOSPHAT   --   175*   --    TBILIRUBIN   --   0.2   --      No current facility-administered medications for this encounter.       Last reviewed on 6/17/2017  9:45 AM by Ligia Frazier, Pharmacy Int    Quality  Measures:  Radiology images reviewed, Medications reviewed and Labs reviewed                      Problems:  1. Respiratory failure - tracheostomy, tolerating collar and room air.  2. Abnormal chest radiogram. Recent pneumonia with improved parapneumonic effusion and persisting atelectasis but no suggestion of new infection.  3. Sputum colonization with Proteus, Klebsiella and Citrobacter.  4. Seizure disorder  5. Anemia, stable  6. Hyponatremia  7. Relative adrenal insufficiency, recent hydrocortisone taper.  8. Encephalopathy - chronic, history of traumatic brain injury.    Recommend:  Continue tracheostomy collar and respiratory protocols.  Continue nutritional support, anticonvulsants and prophylaxis.  The patient's mother who is his caregiver will be trained in the maintenance of the tracheostomy tube at home.  Discharge planned for today.  Discussed with respiratory therapy and with Dr. Macedo

## 2017-07-27 NOTE — PROGRESS NOTES
Pulmonary Critical Care Progress Note        Chief Complaint: respiratory failure, tracheostomy.    History of Present Illness: 37 y.o. male with a history of traumatic brain injury and chronic encephalopathy as well as a seizure disorder. He was transferred here for further treatment of respiratory failure with a tracheostomy tube in place.    ROS:   unable to perform due to the patient's inability to effectively communicate.    Interval Events:  24 hour interval history reviewed   No evident dyspnea on tracheostomy collar. Preparations for discharge to home with family training on support equipment in progress. No new problems overnight.     PFSH:  No change.    Respiratory:  Exam: symmetrical breath sounds, few rales, no wheeze or egophony.  ImagingAvailable data reviewed. The chest radiogram on July 23 demonstrates unchanged left basilar density. No film today.    HemoDynamics:  Exam: regular rhythm (Sinus)  Imaging: None - Reviewed    Neuro:  Exam: no response to question or command.  Imaging: Available data reviewed.    Fluids:  Intake/Output     None           Recent Labs      07/24/17 0429 07/25/17 0342 07/26/17   0230   SODIUM  130*  129*  132*   POTASSIUM  4.3  4.1   --    CHLORIDE  97  99   --    CO2  25  25   --    BUN  12  13   --    CREATININE  <0.30*  <0.30*   --    CALCIUM  8.5  8.2*   --        GI/Nutrition:  Exam: abdomen is soft and non-tender  Imaging: Available data reviewed  tolerating enteral tube feeding.  Liver Function  Recent Labs      07/24/17 0429 07/25/17 0342 07/26/17   0230   ALTSGPT   --    --   37   ASTSGOT   --    --   25   ALKPHOSPHAT   --    --   175*   TBILIRUBIN   --    --   0.2   DBILIRUBIN   --    --   <0.1   GLUCOSE  100*  100*   --        Heme:  Recent Labs      07/25/17 0342   RBC  4.00*   HEMOGLOBIN  11.4*   HEMATOCRIT  34.3*   PLATELETCT  284       Infectious Disease:  No Data Recorded  Micro: cultures reviewed. Sputum 7/22/17 with Proteus,  Klebsiella and Citrobacter.   Recent Labs      07/25/17   0342  07/26/17   0230   WBC  5.1   --    ASTSGOT   --   25   ALTSGPT   --   37   ALKPHOSPHAT   --   175*   TBILIRUBIN   --   0.2     No current facility-administered medications for this encounter.       Last reviewed on 6/17/2017  9:45 AM by Ligia Frazier, Pharmacy Int    Quality  Measures:  Radiology images reviewed, Medications reviewed and Labs reviewed                      Problems:  1. Respiratory failure - tracheostomy, tolerating collar and room air.  2. Abnormal chest radiogram. Recent pneumonia with improved parapneumonic effusion and persisting atelectasis but no suggestion of new infection.  3. Sputum colonization with Proteus, Klebsiella and Citrobacter.  4. Seizure disorder  5. Anemia, stable  6. Hyponatremia  7. Relative adrenal insufficiency, recent hydrocortisone taper.  8. Encephalopathy - chronic, history of traumatic brain injury.    Recommend:  Continue tracheostomy collar and respiratory protocols.  Continue nutritional support, anticonvulsants and prophylaxis.  The patient's mother who is his caregiver will be trained in the maintenance of the tracheostomy tube at home.  Discussed with respiratory therapy and with Dr. Mccracken.

## 2017-08-15 LAB
MYCOBACTERIUM SPEC CULT: NORMAL
RHODAMINE-AURAMINE STN SPEC: NORMAL
SIGNIFICANT IND 70042: NORMAL
SITE SITE: NORMAL
SOURCE SOURCE: NORMAL

## 2017-08-25 LAB
MYCOBACTERIUM SPEC CULT: NORMAL
MYCOBACTERIUM SPEC CULT: NORMAL
RHODAMINE-AURAMINE STN SPEC: NORMAL
RHODAMINE-AURAMINE STN SPEC: NORMAL
SIGNIFICANT IND 70042: NORMAL
SIGNIFICANT IND 70042: NORMAL
SITE SITE: NORMAL
SITE SITE: NORMAL
SOURCE SOURCE: NORMAL
SOURCE SOURCE: NORMAL

## 2017-09-11 NOTE — PROGRESS NOTES
Report called to receiving RN.    Vaccine Information Statement(s) for was given today. This has been reviewed, questions answered, and verbal consent given by Patient for injection(s) and administration of Influenza (Inactivated).    1. Does the patient have a moderate to severe fever?  No  2. Has the patient had a serious reaction to a flu shot before?   No  3. Has the patient ever had Guillian [place] Syndrome within 6 weeks of a previous flu shot?  No  4. Does the patient have a serious allergy to eggs?  No  5. Is the patient less that 6 months of age?  No    Patient is eligible to receive the vaccine based on all questions being answered as 'No'.          Patient tolerated without incident. See immunization grid for documentation.

## 2018-02-18 ENCOUNTER — APPOINTMENT (OUTPATIENT)
Dept: RADIOLOGY | Facility: MEDICAL CENTER | Age: 38
DRG: 853 | End: 2018-02-18
Attending: INTERNAL MEDICINE
Payer: COMMERCIAL

## 2018-02-18 ENCOUNTER — HOSPITAL ENCOUNTER (INPATIENT)
Facility: MEDICAL CENTER | Age: 38
LOS: 28 days | DRG: 853 | End: 2018-03-18
Attending: INTERNAL MEDICINE | Admitting: INTERNAL MEDICINE
Payer: COMMERCIAL

## 2018-02-18 ENCOUNTER — HOSPITAL ENCOUNTER (OUTPATIENT)
Dept: RADIOLOGY | Facility: MEDICAL CENTER | Age: 38
End: 2018-02-18

## 2018-02-18 ENCOUNTER — HOSPITAL ENCOUNTER (OUTPATIENT)
Facility: MEDICAL CENTER | Age: 38
DRG: 853 | End: 2018-02-18
Payer: COMMERCIAL

## 2018-02-18 ENCOUNTER — RESOLUTE PROFESSIONAL BILLING HOSPITAL PROF FEE (OUTPATIENT)
Dept: HOSPITALIST | Facility: MEDICAL CENTER | Age: 38
End: 2018-02-18
Payer: COMMERCIAL

## 2018-02-18 DIAGNOSIS — J96.21 ACUTE ON CHRONIC RESPIRATORY FAILURE WITH HYPOXEMIA (HCC): ICD-10-CM

## 2018-02-18 DIAGNOSIS — R56.9 SEIZURE (HCC): ICD-10-CM

## 2018-02-18 LAB
ACTION RANGE TRIGGERED IACRT: YES
ACTION RANGE TRIGGERED IACRT: YES
ALBUMIN SERPL BCP-MCNC: 2.6 G/DL (ref 3.2–4.9)
ALBUMIN/GLOB SERPL: 0.9 G/DL
ALP SERPL-CCNC: 142 U/L (ref 30–99)
ALT SERPL-CCNC: 46 U/L (ref 2–50)
AMMONIA PLAS-SCNC: 41 UMOL/L (ref 11–45)
ANION GAP SERPL CALC-SCNC: 11 MMOL/L (ref 0–11.9)
ANISOCYTOSIS BLD QL SMEAR: ABNORMAL
AST SERPL-CCNC: 37 U/L (ref 12–45)
BASE EXCESS BLDA CALC-SCNC: -8 MMOL/L (ref -4–3)
BASE EXCESS BLDA CALC-SCNC: -8 MMOL/L (ref -4–3)
BASOPHILS # BLD AUTO: 0 % (ref 0–1.8)
BASOPHILS # BLD: 0 K/UL (ref 0–0.12)
BILIRUB CONJ SERPL-MCNC: 0.1 MG/DL (ref 0.1–0.5)
BILIRUB INDIRECT SERPL-MCNC: 0.1 MG/DL (ref 0–1)
BILIRUB SERPL-MCNC: 0.2 MG/DL (ref 0.1–1.5)
BODY TEMPERATURE: ABNORMAL DEGREES
BODY TEMPERATURE: ABNORMAL DEGREES
BUN SERPL-MCNC: 67 MG/DL (ref 8–22)
BURR CELLS BLD QL SMEAR: NORMAL
CALCIUM SERPL-MCNC: 7.8 MG/DL (ref 8.5–10.5)
CHLORIDE SERPL-SCNC: 112 MMOL/L (ref 96–112)
CK SERPL-CCNC: 90 U/L (ref 0–154)
CO2 BLDA-SCNC: 18 MMOL/L (ref 20–33)
CO2 BLDA-SCNC: 20 MMOL/L (ref 20–33)
CO2 SERPL-SCNC: 20 MMOL/L (ref 20–33)
CORTIS SERPL-MCNC: 36.6 UG/DL (ref 0–23)
CREAT SERPL-MCNC: 0.32 MG/DL (ref 0.5–1.4)
EOSINOPHIL # BLD AUTO: 0 K/UL (ref 0–0.51)
EOSINOPHIL NFR BLD: 0 % (ref 0–6.9)
ERYTHROCYTE [DISTWIDTH] IN BLOOD BY AUTOMATED COUNT: 62.1 FL (ref 35.9–50)
FERRITIN SERPL-MCNC: 99.7 NG/ML (ref 22–322)
GIANT PLATELETS BLD QL SMEAR: NORMAL
GLOBULIN SER CALC-MCNC: 2.9 G/DL (ref 1.9–3.5)
GLUCOSE BLD-MCNC: 86 MG/DL (ref 65–99)
GLUCOSE SERPL-MCNC: 93 MG/DL (ref 65–99)
HAV IGM SERPL QL IA: NEGATIVE
HBV CORE IGM SER QL: NEGATIVE
HBV SURFACE AG SER QL: NEGATIVE
HBV SURFACE AG SER QL: NEGATIVE
HCO3 BLDA-SCNC: 16.8 MMOL/L (ref 17–25)
HCO3 BLDA-SCNC: 18.4 MMOL/L (ref 17–25)
HCT VFR BLD AUTO: 41.7 % (ref 42–52)
HCT VFR BLD AUTO: 43.8 % (ref 42–52)
HCT VFR BLD AUTO: 44.6 % (ref 42–52)
HCV AB SER QL: NEGATIVE
HCV AB SER QL: NEGATIVE
HEMOCCULT SP1 STL QL: POSITIVE
HGB BLD-MCNC: 13.4 G/DL (ref 14–18)
HGB BLD-MCNC: 13.6 G/DL (ref 14–18)
HGB BLD-MCNC: 13.8 G/DL (ref 14–18)
HIV 1+2 AB+HIV1 P24 AG SERPL QL IA: NON REACTIVE
INST. QUALIFIED PATIENT IIQPT: YES
INST. QUALIFIED PATIENT IIQPT: YES
LACTATE BLD-SCNC: 5.5 MMOL/L (ref 0.5–2)
LACTATE BLD-SCNC: 5.8 MMOL/L (ref 0.5–2)
LACTATE BLD-SCNC: 6.1 MMOL/L (ref 0.5–2)
LACTATE BLD-SCNC: 6.6 MMOL/L (ref 0.5–2)
LYMPHOCYTES # BLD AUTO: 0.43 K/UL (ref 1–4.8)
LYMPHOCYTES NFR BLD: 12.6 % (ref 22–41)
MACROCYTES BLD QL SMEAR: ABNORMAL
MANUAL DIFF BLD: ABNORMAL
MCH RBC QN AUTO: 28.6 PG (ref 27–33)
MCHC RBC AUTO-ENTMCNC: 31.5 G/DL (ref 33.7–35.3)
MCV RBC AUTO: 90.7 FL (ref 81.4–97.8)
METAMYELOCYTES NFR BLD MANUAL: 16.2 %
MICROCYTES BLD QL SMEAR: ABNORMAL
MONOCYTES # BLD AUTO: 0.12 K/UL (ref 0–0.85)
MONOCYTES NFR BLD AUTO: 3.6 % (ref 0–13.4)
MORPHOLOGY BLD-IMP: NORMAL
MYELOCYTES NFR BLD MANUAL: 13.5 %
NEUTROPHILS # BLD AUTO: 1.84 K/UL (ref 1.82–7.42)
NEUTROPHILS NFR BLD: 30.7 % (ref 44–72)
NEUTS BAND NFR BLD MANUAL: 23.4 % (ref 0–10)
NRBC # BLD AUTO: 0.03 K/UL
NRBC BLD-RTO: 0.9 /100 WBC
O2/TOTAL GAS SETTING VFR VENT: 100 %
O2/TOTAL GAS SETTING VFR VENT: 60 %
PCO2 BLDA: 31.5 MMHG (ref 26–37)
PCO2 BLDA: 41.1 MMHG (ref 26–37)
PCO2 TEMP ADJ BLDA: 30 MMHG (ref 26–37)
PCO2 TEMP ADJ BLDA: 37.2 MMHG (ref 26–37)
PH BLDA: 7.26 [PH] (ref 7.4–7.5)
PH BLDA: 7.33 [PH] (ref 7.4–7.5)
PH TEMP ADJ BLDA: 7.29 [PH] (ref 7.4–7.5)
PH TEMP ADJ BLDA: 7.35 [PH] (ref 7.4–7.5)
PHENOBARB SERPL-MCNC: 3.2 UG/ML (ref 15–40)
PLATELET # BLD AUTO: 161 K/UL (ref 164–446)
PLATELET BLD QL SMEAR: NORMAL
PMV BLD AUTO: 11.8 FL (ref 9–12.9)
PO2 BLDA: 102 MMHG (ref 64–87)
PO2 BLDA: 157 MMHG (ref 64–87)
PO2 TEMP ADJ BLDA: 144 MMHG (ref 64–87)
PO2 TEMP ADJ BLDA: 96 MMHG (ref 64–87)
POIKILOCYTOSIS BLD QL SMEAR: NORMAL
POTASSIUM SERPL-SCNC: 4 MMOL/L (ref 3.6–5.5)
PROT SERPL-MCNC: 5.5 G/DL (ref 6–8.2)
RBC # BLD AUTO: 4.83 M/UL (ref 4.7–6.1)
RBC BLD AUTO: PRESENT
SAO2 % BLDA: 98 % (ref 93–99)
SAO2 % BLDA: 99 % (ref 93–99)
SODIUM SERPL-SCNC: 143 MMOL/L (ref 135–145)
SPECIMEN DRAWN FROM PATIENT: ABNORMAL
SPECIMEN DRAWN FROM PATIENT: ABNORMAL
TRIGL SERPL-MCNC: 40 MG/DL (ref 0–149)
TSH SERPL DL<=0.005 MIU/L-ACNC: 1.16 UIU/ML (ref 0.38–5.33)
WBC # BLD AUTO: 3.4 K/UL (ref 4.8–10.8)
WBC STL QL MICRO: NORMAL

## 2018-02-18 PROCEDURE — 700111 HCHG RX REV CODE 636 W/ 250 OVERRIDE (IP): Performed by: INTERNAL MEDICINE

## 2018-02-18 PROCEDURE — 94002 VENT MGMT INPAT INIT DAY: CPT

## 2018-02-18 PROCEDURE — 700111 HCHG RX REV CODE 636 W/ 250 OVERRIDE (IP)

## 2018-02-18 PROCEDURE — 302978 HCHG BRONCHOSCOPY-DIAGNOSTIC

## 2018-02-18 PROCEDURE — 87324 CLOSTRIDIUM AG IA: CPT

## 2018-02-18 PROCEDURE — 82803 BLOOD GASES ANY COMBINATION: CPT | Mod: 91

## 2018-02-18 PROCEDURE — 87116 MYCOBACTERIA CULTURE: CPT

## 2018-02-18 PROCEDURE — 88312 SPECIAL STAINS GROUP 1: CPT | Mod: 59

## 2018-02-18 PROCEDURE — 770022 HCHG ROOM/CARE - ICU (200)

## 2018-02-18 PROCEDURE — 87045 FECES CULTURE AEROBIC BACT: CPT

## 2018-02-18 PROCEDURE — A9270 NON-COVERED ITEM OR SERVICE: HCPCS | Performed by: INTERNAL MEDICINE

## 2018-02-18 PROCEDURE — 88112 CYTOPATH CELL ENHANCE TECH: CPT

## 2018-02-18 PROCEDURE — 700105 HCHG RX REV CODE 258: Performed by: INTERNAL MEDICINE

## 2018-02-18 PROCEDURE — 89055 LEUKOCYTE ASSESSMENT FECAL: CPT

## 2018-02-18 PROCEDURE — 83605 ASSAY OF LACTIC ACID: CPT | Mod: 91

## 2018-02-18 PROCEDURE — 700111 HCHG RX REV CODE 636 W/ 250 OVERRIDE (IP): Performed by: PSYCHIATRY & NEUROLOGY

## 2018-02-18 PROCEDURE — 84478 ASSAY OF TRIGLYCERIDES: CPT

## 2018-02-18 PROCEDURE — 87205 SMEAR GRAM STAIN: CPT

## 2018-02-18 PROCEDURE — 87106 FUNGI IDENTIFICATION YEAST: CPT

## 2018-02-18 PROCEDURE — 85007 BL SMEAR W/DIFF WBC COUNT: CPT

## 2018-02-18 PROCEDURE — 87493 C DIFF AMPLIFIED PROBE: CPT

## 2018-02-18 PROCEDURE — 82550 ASSAY OF CK (CPK): CPT

## 2018-02-18 PROCEDURE — 36600 WITHDRAWAL OF ARTERIAL BLOOD: CPT

## 2018-02-18 PROCEDURE — 84443 ASSAY THYROID STIM HORMONE: CPT

## 2018-02-18 PROCEDURE — 87899 AGENT NOS ASSAY W/OPTIC: CPT

## 2018-02-18 PROCEDURE — 87077 CULTURE AEROBIC IDENTIFY: CPT | Mod: 91

## 2018-02-18 PROCEDURE — 87102 FUNGUS ISOLATION CULTURE: CPT

## 2018-02-18 PROCEDURE — 85018 HEMOGLOBIN: CPT | Mod: 91

## 2018-02-18 PROCEDURE — 87070 CULTURE OTHR SPECIMN AEROBIC: CPT

## 2018-02-18 PROCEDURE — 87046 STOOL CULTR AEROBIC BACT EA: CPT

## 2018-02-18 PROCEDURE — 99291 CRITICAL CARE FIRST HOUR: CPT | Performed by: INTERNAL MEDICINE

## 2018-02-18 PROCEDURE — 700102 HCHG RX REV CODE 250 W/ 637 OVERRIDE(OP): Performed by: INTERNAL MEDICINE

## 2018-02-18 PROCEDURE — 700105 HCHG RX REV CODE 258: Performed by: PSYCHIATRY & NEUROLOGY

## 2018-02-18 PROCEDURE — 99152 MOD SED SAME PHYS/QHP 5/>YRS: CPT

## 2018-02-18 PROCEDURE — 88305 TISSUE EXAM BY PATHOLOGIST: CPT

## 2018-02-18 PROCEDURE — 87040 BLOOD CULTURE FOR BACTERIA: CPT

## 2018-02-18 PROCEDURE — 71045 X-RAY EXAM CHEST 1 VIEW: CPT

## 2018-02-18 PROCEDURE — 82270 OCCULT BLOOD FECES: CPT

## 2018-02-18 PROCEDURE — C9113 INJ PANTOPRAZOLE SODIUM, VIA: HCPCS | Performed by: INTERNAL MEDICINE

## 2018-02-18 PROCEDURE — 82962 GLUCOSE BLOOD TEST: CPT

## 2018-02-18 PROCEDURE — 87206 SMEAR FLUORESCENT/ACID STAI: CPT

## 2018-02-18 PROCEDURE — 82728 ASSAY OF FERRITIN: CPT

## 2018-02-18 PROCEDURE — 5A1955Z RESPIRATORY VENTILATION, GREATER THAN 96 CONSECUTIVE HOURS: ICD-10-PCS | Performed by: INTERNAL MEDICINE

## 2018-02-18 PROCEDURE — 80074 ACUTE HEPATITIS PANEL: CPT

## 2018-02-18 PROCEDURE — 0B9J8ZX DRAINAGE OF LEFT LOWER LUNG LOBE, VIA NATURAL OR ARTIFICIAL OPENING ENDOSCOPIC, DIAGNOSTIC: ICD-10-PCS | Performed by: INTERNAL MEDICINE

## 2018-02-18 PROCEDURE — 87186 SC STD MICRODIL/AGAR DIL: CPT

## 2018-02-18 PROCEDURE — 0B9H8ZZ DRAINAGE OF LUNG LINGULA, VIA NATURAL OR ARTIFICIAL OPENING ENDOSCOPIC: ICD-10-PCS | Performed by: INTERNAL MEDICINE

## 2018-02-18 PROCEDURE — 82248 BILIRUBIN DIRECT: CPT

## 2018-02-18 PROCEDURE — 85014 HEMATOCRIT: CPT | Mod: 91

## 2018-02-18 PROCEDURE — 87015 SPECIMEN INFECT AGNT CONCNTJ: CPT

## 2018-02-18 PROCEDURE — C9254 INJECTION, LACOSAMIDE: HCPCS | Performed by: PSYCHIATRY & NEUROLOGY

## 2018-02-18 PROCEDURE — 80053 COMPREHEN METABOLIC PANEL: CPT

## 2018-02-18 PROCEDURE — 83630 LACTOFERRIN FECAL (QUAL): CPT

## 2018-02-18 PROCEDURE — 94640 AIRWAY INHALATION TREATMENT: CPT

## 2018-02-18 PROCEDURE — 85027 COMPLETE CBC AUTOMATED: CPT

## 2018-02-18 PROCEDURE — 0B9G8ZZ DRAINAGE OF LEFT UPPER LUNG LOBE, VIA NATURAL OR ARTIFICIAL OPENING ENDOSCOPIC: ICD-10-PCS | Performed by: INTERNAL MEDICINE

## 2018-02-18 PROCEDURE — 700101 HCHG RX REV CODE 250: Performed by: INTERNAL MEDICINE

## 2018-02-18 PROCEDURE — 82533 TOTAL CORTISOL: CPT

## 2018-02-18 PROCEDURE — 82140 ASSAY OF AMMONIA: CPT

## 2018-02-18 PROCEDURE — 80184 ASSAY OF PHENOBARBITAL: CPT

## 2018-02-18 RX ORDER — OXYCODONE HYDROCHLORIDE 5 MG/1
5 TABLET ORAL
Status: DISCONTINUED | OUTPATIENT
Start: 2018-02-18 | End: 2018-03-18 | Stop reason: HOSPADM

## 2018-02-18 RX ORDER — ONDANSETRON 4 MG/1
4 TABLET, ORALLY DISINTEGRATING ORAL EVERY 4 HOURS PRN
Status: DISCONTINUED | OUTPATIENT
Start: 2018-02-18 | End: 2018-03-18 | Stop reason: HOSPADM

## 2018-02-18 RX ORDER — LEVALBUTEROL INHALATION SOLUTION 1.25 MG/3ML
1.25 SOLUTION RESPIRATORY (INHALATION) 4 TIMES DAILY
Status: DISCONTINUED | OUTPATIENT
Start: 2018-02-18 | End: 2018-02-20

## 2018-02-18 RX ORDER — AMOXICILLIN 250 MG
2 CAPSULE ORAL 2 TIMES DAILY
Status: DISCONTINUED | OUTPATIENT
Start: 2018-02-18 | End: 2018-02-21

## 2018-02-18 RX ORDER — VALPROATE SODIUM 100 MG/ML
500 INJECTION, SOLUTION INTRAVENOUS 2 TIMES DAILY
Status: DISCONTINUED | OUTPATIENT
Start: 2018-02-18 | End: 2018-02-18

## 2018-02-18 RX ORDER — LORAZEPAM 2 MG/ML
INJECTION INTRAMUSCULAR
Status: COMPLETED
Start: 2018-02-18 | End: 2018-02-18

## 2018-02-18 RX ORDER — MORPHINE SULFATE 4 MG/ML
4 INJECTION, SOLUTION INTRAMUSCULAR; INTRAVENOUS
Status: DISCONTINUED | OUTPATIENT
Start: 2018-02-18 | End: 2018-02-25

## 2018-02-18 RX ORDER — VALPROATE SODIUM 100 MG/ML
1000 INJECTION, SOLUTION INTRAVENOUS ONCE
Status: DISCONTINUED | OUTPATIENT
Start: 2018-02-18 | End: 2018-02-18

## 2018-02-18 RX ORDER — LEVALBUTEROL INHALATION SOLUTION 1.25 MG/3ML
1.25 SOLUTION RESPIRATORY (INHALATION) EVERY 4 HOURS PRN
Status: DISCONTINUED | OUTPATIENT
Start: 2018-02-18 | End: 2018-03-18 | Stop reason: HOSPADM

## 2018-02-18 RX ORDER — BISACODYL 10 MG
10 SUPPOSITORY, RECTAL RECTAL
Status: DISCONTINUED | OUTPATIENT
Start: 2018-02-18 | End: 2018-03-11

## 2018-02-18 RX ORDER — SODIUM CHLORIDE 9 MG/ML
INJECTION, SOLUTION INTRAVENOUS CONTINUOUS
Status: DISCONTINUED | OUTPATIENT
Start: 2018-02-18 | End: 2018-02-18

## 2018-02-18 RX ORDER — SODIUM CHLORIDE 9 MG/ML
INJECTION, SOLUTION INTRAVENOUS CONTINUOUS
Status: DISCONTINUED | OUTPATIENT
Start: 2018-02-18 | End: 2018-02-22

## 2018-02-18 RX ORDER — SODIUM CHLORIDE 9 MG/ML
30 INJECTION, SOLUTION INTRAVENOUS
Status: DISCONTINUED | OUTPATIENT
Start: 2018-02-18 | End: 2018-02-21

## 2018-02-18 RX ORDER — PANTOPRAZOLE SODIUM 40 MG/10ML
40 INJECTION, POWDER, LYOPHILIZED, FOR SOLUTION INTRAVENOUS 2 TIMES DAILY
Status: DISCONTINUED | OUTPATIENT
Start: 2018-02-18 | End: 2018-02-21

## 2018-02-18 RX ORDER — LEVETIRACETAM 100 MG/ML
1500 SOLUTION ORAL EVERY 12 HOURS
Status: DISCONTINUED | OUTPATIENT
Start: 2018-02-18 | End: 2018-02-18

## 2018-02-18 RX ORDER — POLYETHYLENE GLYCOL 3350 17 G/17G
1 POWDER, FOR SOLUTION ORAL
Status: DISCONTINUED | OUTPATIENT
Start: 2018-02-18 | End: 2018-03-11

## 2018-02-18 RX ORDER — EAR PLUGS
1000 EACH OTIC (EAR) DAILY
Status: ON HOLD | COMMUNITY
End: 2022-02-19

## 2018-02-18 RX ORDER — PROMETHAZINE HYDROCHLORIDE 25 MG/1
12.5-25 TABLET ORAL EVERY 4 HOURS PRN
Status: DISCONTINUED | OUTPATIENT
Start: 2018-02-18 | End: 2018-03-18 | Stop reason: HOSPADM

## 2018-02-18 RX ORDER — LIDOCAINE HYDROCHLORIDE 10 MG/ML
1-2 INJECTION, SOLUTION INFILTRATION; PERINEURAL
Status: DISCONTINUED | OUTPATIENT
Start: 2018-02-18 | End: 2018-03-14 | Stop reason: ALTCHOICE

## 2018-02-18 RX ORDER — ACETAMINOPHEN 325 MG/1
650 TABLET ORAL EVERY 6 HOURS PRN
Status: DISCONTINUED | OUTPATIENT
Start: 2018-02-18 | End: 2018-03-18 | Stop reason: HOSPADM

## 2018-02-18 RX ORDER — PHENOBARBITAL 20 MG/5ML
10 ELIXIR ORAL 2 TIMES DAILY
Status: DISCONTINUED | OUTPATIENT
Start: 2018-02-18 | End: 2018-02-20

## 2018-02-18 RX ORDER — DEXTROSE MONOHYDRATE 25 G/50ML
25 INJECTION, SOLUTION INTRAVENOUS
Status: DISCONTINUED | OUTPATIENT
Start: 2018-02-18 | End: 2018-02-26

## 2018-02-18 RX ORDER — OXYCODONE HYDROCHLORIDE 10 MG/1
10 TABLET ORAL
Status: DISCONTINUED | OUTPATIENT
Start: 2018-02-18 | End: 2018-03-18 | Stop reason: HOSPADM

## 2018-02-18 RX ORDER — SODIUM CHLORIDE 450 MG/100ML
INJECTION, SOLUTION INTRAVENOUS CONTINUOUS
Status: DISCONTINUED | OUTPATIENT
Start: 2018-02-18 | End: 2018-02-18

## 2018-02-18 RX ORDER — PROMETHAZINE HYDROCHLORIDE 12.5 MG/1
12.5-25 SUPPOSITORY RECTAL EVERY 4 HOURS PRN
Status: DISCONTINUED | OUTPATIENT
Start: 2018-02-18 | End: 2018-03-18 | Stop reason: HOSPADM

## 2018-02-18 RX ORDER — PHENOBARBITAL 20 MG/5ML
10 ELIXIR ORAL 2 TIMES DAILY
Status: ON HOLD | COMMUNITY
End: 2018-03-15

## 2018-02-18 RX ORDER — SODIUM CHLORIDE 9 MG/ML
500 INJECTION, SOLUTION INTRAVENOUS
Status: DISCONTINUED | OUTPATIENT
Start: 2018-02-18 | End: 2018-02-21

## 2018-02-18 RX ORDER — IPRATROPIUM BROMIDE AND ALBUTEROL SULFATE 2.5; .5 MG/3ML; MG/3ML
3 SOLUTION RESPIRATORY (INHALATION)
Status: DISCONTINUED | OUTPATIENT
Start: 2018-02-18 | End: 2018-03-14 | Stop reason: ALTCHOICE

## 2018-02-18 RX ORDER — ONDANSETRON 2 MG/ML
4 INJECTION INTRAMUSCULAR; INTRAVENOUS EVERY 4 HOURS PRN
Status: DISCONTINUED | OUTPATIENT
Start: 2018-02-18 | End: 2018-03-18 | Stop reason: HOSPADM

## 2018-02-18 RX ORDER — LORAZEPAM 2 MG/ML
4 INJECTION INTRAMUSCULAR
Status: DISCONTINUED | OUTPATIENT
Start: 2018-02-18 | End: 2018-03-18 | Stop reason: HOSPADM

## 2018-02-18 RX ORDER — CHLORHEXIDINE GLUCONATE ORAL RINSE 1.2 MG/ML
15 SOLUTION DENTAL 2 TIMES DAILY
Status: DISCONTINUED | OUTPATIENT
Start: 2018-02-18 | End: 2018-03-18 | Stop reason: HOSPADM

## 2018-02-18 RX ORDER — MICONAZOLE NITRATE 20 MG/G
CREAM TOPICAL PRN
Status: DISCONTINUED | OUTPATIENT
Start: 2018-02-18 | End: 2018-03-18 | Stop reason: HOSPADM

## 2018-02-18 RX ORDER — FAMOTIDINE 20 MG/1
20 TABLET, FILM COATED ORAL EVERY 12 HOURS
Status: DISCONTINUED | OUTPATIENT
Start: 2018-02-18 | End: 2018-02-18

## 2018-02-18 RX ADMIN — VASOPRESSIN 0.03 UNITS/MIN: 20 INJECTION INTRAVENOUS at 21:02

## 2018-02-18 RX ADMIN — PANTOPRAZOLE SODIUM 40 MG: 40 INJECTION, POWDER, LYOPHILIZED, FOR SOLUTION INTRAVENOUS at 22:55

## 2018-02-18 RX ADMIN — CEFTRIAXONE 2 G: 2 INJECTION, POWDER, FOR SOLUTION INTRAMUSCULAR; INTRAVENOUS at 20:00

## 2018-02-18 RX ADMIN — SODIUM CHLORIDE: 4.5 INJECTION, SOLUTION INTRAVENOUS at 18:59

## 2018-02-18 RX ADMIN — SODIUM CHLORIDE 1500 MG: 9 INJECTION, SOLUTION INTRAVENOUS at 22:51

## 2018-02-18 RX ADMIN — LORAZEPAM: 2 INJECTION INTRAMUSCULAR; INTRAVENOUS at 16:45

## 2018-02-18 RX ADMIN — LEVALBUTEROL HYDROCHLORIDE 1.25 MG: 1.25 SOLUTION RESPIRATORY (INHALATION) at 19:18

## 2018-02-18 RX ADMIN — HYDROCORTISONE SODIUM SUCCINATE 100 MG: 100 INJECTION, POWDER, FOR SOLUTION INTRAMUSCULAR; INTRAVENOUS at 19:07

## 2018-02-18 RX ADMIN — LEVALBUTEROL HYDROCHLORIDE 1.25 MG: 1.25 SOLUTION RESPIRATORY (INHALATION) at 22:36

## 2018-02-18 RX ADMIN — PHENYLEPHRINE HYDROCHLORIDE 100 MCG/MIN: 10 INJECTION INTRAVENOUS at 18:15

## 2018-02-18 RX ADMIN — CALCIUM CHLORIDE 1 G: 100 INJECTION, SOLUTION INTRAVENOUS at 20:26

## 2018-02-18 RX ADMIN — CHLORHEXIDINE GLUCONATE 15 ML: 1.2 RINSE ORAL at 21:16

## 2018-02-18 RX ADMIN — METRONIDAZOLE 500 MG: 500 INJECTION, SOLUTION INTRAVENOUS at 18:58

## 2018-02-18 RX ADMIN — LORAZEPAM 4 MG: 2 INJECTION INTRAMUSCULAR; INTRAVENOUS at 19:46

## 2018-02-18 RX ADMIN — LORAZEPAM: 2 INJECTION INTRAMUSCULAR; INTRAVENOUS at 17:00

## 2018-02-18 RX ADMIN — SODIUM CHLORIDE: 9 INJECTION, SOLUTION INTRAVENOUS at 21:20

## 2018-02-18 RX ADMIN — METRONIDAZOLE 500 MG: 500 INJECTION, SOLUTION INTRAVENOUS at 23:00

## 2018-02-18 RX ADMIN — HYDROCORTISONE SODIUM SUCCINATE 100 MG: 100 INJECTION, POWDER, FOR SOLUTION INTRAMUSCULAR; INTRAVENOUS at 21:17

## 2018-02-18 RX ADMIN — PROPOFOL 5 MCG/KG/MIN: 10 INJECTION, EMULSION INTRAVENOUS at 18:43

## 2018-02-18 RX ADMIN — NOREPINEPHRINE BITARTRATE 30 MCG/MIN: 1 INJECTION INTRAVENOUS at 17:30

## 2018-02-18 RX ADMIN — SODIUM CHLORIDE 200 MG: 9 INJECTION, SOLUTION INTRAVENOUS at 23:32

## 2018-02-18 RX ADMIN — NOREPINEPHRINE BITARTRATE 30 MCG/MIN: 1 INJECTION INTRAVENOUS at 22:27

## 2018-02-18 ASSESSMENT — LIFESTYLE VARIABLES: DO YOU DRINK ALCOHOL: NO

## 2018-02-18 NOTE — PROGRESS NOTES
Patient is a direct admit from [facility].  Dr Thornton is admitting the patient.   ADT order signed and held, needs to be released when the patient arrives on the unit.

## 2018-02-19 ENCOUNTER — APPOINTMENT (OUTPATIENT)
Dept: RADIOLOGY | Facility: MEDICAL CENTER | Age: 38
DRG: 853 | End: 2018-02-19
Attending: HOSPITALIST
Payer: COMMERCIAL

## 2018-02-19 ENCOUNTER — APPOINTMENT (OUTPATIENT)
Dept: RADIOLOGY | Facility: MEDICAL CENTER | Age: 38
DRG: 853 | End: 2018-02-19
Attending: INTERNAL MEDICINE
Payer: COMMERCIAL

## 2018-02-19 PROBLEM — E44.0 MODERATE PROTEIN MALNUTRITION (HCC): Status: ACTIVE | Noted: 2018-02-19

## 2018-02-19 PROBLEM — R79.89 AZOTEMIA: Status: ACTIVE | Noted: 2018-02-19

## 2018-02-19 PROBLEM — K92.2 GI BLEED: Status: ACTIVE | Noted: 2018-02-19

## 2018-02-19 PROBLEM — I95.9 HYPOTENSION: Status: ACTIVE | Noted: 2018-02-19

## 2018-02-19 PROBLEM — D68.9 COAGULOPATHY (HCC): Status: ACTIVE | Noted: 2018-02-19

## 2018-02-19 PROBLEM — L89.324 DECUBITUS ULCER OF ISCHIUM, LEFT, STAGE IV (HCC): Status: ACTIVE | Noted: 2018-02-19

## 2018-02-19 PROBLEM — R19.7 DIARRHEA: Status: ACTIVE | Noted: 2018-02-19

## 2018-02-19 PROBLEM — R53.81 PHYSICAL DEBILITY: Status: ACTIVE | Noted: 2018-02-19

## 2018-02-19 PROBLEM — E83.42 HYPOMAGNESEMIA: Status: ACTIVE | Noted: 2018-02-19

## 2018-02-19 PROBLEM — M24.50: Status: ACTIVE | Noted: 2018-02-19

## 2018-02-19 PROBLEM — R65.21 SEPTIC SHOCK (HCC): Status: ACTIVE | Noted: 2018-02-19

## 2018-02-19 PROBLEM — R56.9 SEIZURE (HCC): Status: ACTIVE | Noted: 2018-02-19

## 2018-02-19 PROBLEM — A41.9 SEPTIC SHOCK (HCC): Status: ACTIVE | Noted: 2018-02-19

## 2018-02-19 PROBLEM — E87.0 HYPERNATREMIA: Status: ACTIVE | Noted: 2018-02-19

## 2018-02-19 PROBLEM — J96.21 ACUTE ON CHRONIC RESPIRATORY FAILURE WITH HYPOXEMIA (HCC): Status: ACTIVE | Noted: 2018-02-19

## 2018-02-19 LAB
ACTION RANGE TRIGGERED IACRT: NO
ALBUMIN SERPL BCP-MCNC: 2.6 G/DL (ref 3.2–4.9)
ALBUMIN/GLOB SERPL: 1 G/DL
ALP SERPL-CCNC: 141 U/L (ref 30–99)
ALT SERPL-CCNC: 43 U/L (ref 2–50)
ANION GAP SERPL CALC-SCNC: 11 MMOL/L (ref 0–11.9)
ANION GAP SERPL CALC-SCNC: 12 MMOL/L (ref 0–11.9)
ANISOCYTOSIS BLD QL SMEAR: ABNORMAL
AST SERPL-CCNC: 37 U/L (ref 12–45)
BASE EXCESS BLDA CALC-SCNC: -4 MMOL/L (ref -4–3)
BASOPHILS # BLD AUTO: 0 % (ref 0–1.8)
BASOPHILS # BLD: 0 K/UL (ref 0–0.12)
BILIRUB SERPL-MCNC: 0.2 MG/DL (ref 0.1–1.5)
BODY TEMPERATURE: ABNORMAL DEGREES
BUN SERPL-MCNC: 44 MG/DL (ref 8–22)
BUN SERPL-MCNC: 54 MG/DL (ref 8–22)
BURR CELLS BLD QL SMEAR: NORMAL
CALCIUM SERPL-MCNC: 8.3 MG/DL (ref 8.5–10.5)
CALCIUM SERPL-MCNC: 8.3 MG/DL (ref 8.5–10.5)
CHLORIDE SERPL-SCNC: 115 MMOL/L (ref 96–112)
CHLORIDE SERPL-SCNC: 116 MMOL/L (ref 96–112)
CO2 BLDA-SCNC: 19 MMOL/L (ref 20–33)
CO2 SERPL-SCNC: 18 MMOL/L (ref 20–33)
CO2 SERPL-SCNC: 19 MMOL/L (ref 20–33)
CREAT SERPL-MCNC: 0.28 MG/DL (ref 0.5–1.4)
CREAT SERPL-MCNC: 0.31 MG/DL (ref 0.5–1.4)
EOSINOPHIL # BLD AUTO: 0 K/UL (ref 0–0.51)
EOSINOPHIL NFR BLD: 0 % (ref 0–6.9)
ERYTHROCYTE [DISTWIDTH] IN BLOOD BY AUTOMATED COUNT: 56.9 FL (ref 35.9–50)
GLOBULIN SER CALC-MCNC: 2.7 G/DL (ref 1.9–3.5)
GLUCOSE BLD-MCNC: 119 MG/DL (ref 65–99)
GLUCOSE BLD-MCNC: 120 MG/DL (ref 65–99)
GLUCOSE BLD-MCNC: 121 MG/DL (ref 65–99)
GLUCOSE BLD-MCNC: 127 MG/DL (ref 65–99)
GLUCOSE SERPL-MCNC: 121 MG/DL (ref 65–99)
GLUCOSE SERPL-MCNC: 148 MG/DL (ref 65–99)
GRAM STN SPEC: NORMAL
HCO3 BLDA-SCNC: 18.5 MMOL/L (ref 17–25)
HCT VFR BLD AUTO: 30.3 % (ref 42–52)
HCT VFR BLD AUTO: 31.9 % (ref 42–52)
HCT VFR BLD AUTO: 33.8 % (ref 42–52)
HCT VFR BLD AUTO: 35.7 % (ref 42–52)
HGB BLD-MCNC: 10.2 G/DL (ref 14–18)
HGB BLD-MCNC: 10.9 G/DL (ref 14–18)
HGB BLD-MCNC: 11.4 G/DL (ref 14–18)
HGB BLD-MCNC: 11.9 G/DL (ref 14–18)
INR PPP: 1.98 (ref 0.87–1.13)
INST. QUALIFIED PATIENT IIQPT: YES
LACTATE BLD-SCNC: 3.7 MMOL/L (ref 0.5–2)
LACTATE BLD-SCNC: 4.7 MMOL/L (ref 0.5–2)
LV EJECT FRACT  99904: 65
LV EJECT FRACT MOD 2C 99903: 66.35
LV EJECT FRACT MOD 4C 99902: 64.91
LV EJECT FRACT MOD BP 99901: 66.03
LYMPHOCYTES # BLD AUTO: 0.47 K/UL (ref 1–4.8)
LYMPHOCYTES NFR BLD: 5.4 % (ref 22–41)
MACROCYTES BLD QL SMEAR: ABNORMAL
MAGNESIUM SERPL-MCNC: 1.7 MG/DL (ref 1.5–2.5)
MANUAL DIFF BLD: NORMAL
MCH RBC QN AUTO: 28.7 PG (ref 27–33)
MCHC RBC AUTO-ENTMCNC: 33.3 G/DL (ref 33.7–35.3)
MCV RBC AUTO: 86.2 FL (ref 81.4–97.8)
METAMYELOCYTES NFR BLD MANUAL: 19.8 %
MONOCYTES # BLD AUTO: 0 K/UL (ref 0–0.85)
MONOCYTES NFR BLD AUTO: 0 % (ref 0–13.4)
MORPHOLOGY BLD-IMP: NORMAL
MYELOCYTES NFR BLD MANUAL: 5.4 %
NEUTROPHILS # BLD AUTO: 6.04 K/UL (ref 1.82–7.42)
NEUTROPHILS NFR BLD: 59.5 % (ref 44–72)
NEUTS BAND NFR BLD MANUAL: 9.9 % (ref 0–10)
NRBC # BLD AUTO: 0.03 K/UL
NRBC BLD-RTO: 0.3 /100 WBC
O2/TOTAL GAS SETTING VFR VENT: 50 %
PCO2 BLDA: 23.9 MMHG (ref 26–37)
PCO2 TEMP ADJ BLDA: 25 MMHG (ref 26–37)
PH BLDA: 7.5 [PH] (ref 7.4–7.5)
PH TEMP ADJ BLDA: 7.48 [PH] (ref 7.4–7.5)
PHOSPHATE SERPL-MCNC: 3.5 MG/DL (ref 2.5–4.5)
PLATELET # BLD AUTO: 121 K/UL (ref 164–446)
PLATELET BLD QL SMEAR: NORMAL
PMV BLD AUTO: 11 FL (ref 9–12.9)
PO2 BLDA: 125 MMHG (ref 64–87)
PO2 TEMP ADJ BLDA: 132 MMHG (ref 64–87)
POIKILOCYTOSIS BLD QL SMEAR: NORMAL
POTASSIUM SERPL-SCNC: 3.9 MMOL/L (ref 3.6–5.5)
POTASSIUM SERPL-SCNC: 4 MMOL/L (ref 3.6–5.5)
PROT SERPL-MCNC: 5.3 G/DL (ref 6–8.2)
PROTHROMBIN TIME: 22.2 SEC (ref 12–14.6)
RBC # BLD AUTO: 4.14 M/UL (ref 4.7–6.1)
RBC BLD AUTO: PRESENT
RHODAMINE-AURAMINE STN SPEC: NORMAL
SAO2 % BLDA: 99 % (ref 93–99)
SIGNIFICANT IND 70042: NORMAL
SIGNIFICANT IND 70042: NORMAL
SITE SITE: NORMAL
SITE SITE: NORMAL
SODIUM SERPL-SCNC: 145 MMOL/L (ref 135–145)
SODIUM SERPL-SCNC: 146 MMOL/L (ref 135–145)
SOURCE SOURCE: NORMAL
SOURCE SOURCE: NORMAL
SPECIMEN DRAWN FROM PATIENT: ABNORMAL
WBC # BLD AUTO: 8.7 K/UL (ref 4.8–10.8)

## 2018-02-19 PROCEDURE — C9113 INJ PANTOPRAZOLE SODIUM, VIA: HCPCS | Performed by: INTERNAL MEDICINE

## 2018-02-19 PROCEDURE — 80048 BASIC METABOLIC PNL TOTAL CA: CPT

## 2018-02-19 PROCEDURE — 700111 HCHG RX REV CODE 636 W/ 250 OVERRIDE (IP): Performed by: INTERNAL MEDICINE

## 2018-02-19 PROCEDURE — 36600 WITHDRAWAL OF ARTERIAL BLOOD: CPT

## 2018-02-19 PROCEDURE — 94640 AIRWAY INHALATION TREATMENT: CPT

## 2018-02-19 PROCEDURE — 99233 SBSQ HOSP IP/OBS HIGH 50: CPT | Performed by: HOSPITALIST

## 2018-02-19 PROCEDURE — 700105 HCHG RX REV CODE 258: Performed by: PSYCHIATRY & NEUROLOGY

## 2018-02-19 PROCEDURE — 700102 HCHG RX REV CODE 250 W/ 637 OVERRIDE(OP): Performed by: INTERNAL MEDICINE

## 2018-02-19 PROCEDURE — 85027 COMPLETE CBC AUTOMATED: CPT

## 2018-02-19 PROCEDURE — 700111 HCHG RX REV CODE 636 W/ 250 OVERRIDE (IP): Performed by: PSYCHIATRY & NEUROLOGY

## 2018-02-19 PROCEDURE — 71045 X-RAY EXAM CHEST 1 VIEW: CPT

## 2018-02-19 PROCEDURE — 94003 VENT MGMT INPAT SUBQ DAY: CPT

## 2018-02-19 PROCEDURE — 70450 CT HEAD/BRAIN W/O DYE: CPT

## 2018-02-19 PROCEDURE — 700105 HCHG RX REV CODE 258: Performed by: INTERNAL MEDICINE

## 2018-02-19 PROCEDURE — C9254 INJECTION, LACOSAMIDE: HCPCS | Performed by: PSYCHIATRY & NEUROLOGY

## 2018-02-19 PROCEDURE — 82803 BLOOD GASES ANY COMBINATION: CPT

## 2018-02-19 PROCEDURE — 700101 HCHG RX REV CODE 250: Performed by: INTERNAL MEDICINE

## 2018-02-19 PROCEDURE — 700102 HCHG RX REV CODE 250 W/ 637 OVERRIDE(OP): Performed by: PSYCHIATRY & NEUROLOGY

## 2018-02-19 PROCEDURE — 700105 HCHG RX REV CODE 258: Performed by: HOSPITALIST

## 2018-02-19 PROCEDURE — A9270 NON-COVERED ITEM OR SERVICE: HCPCS | Performed by: PSYCHIATRY & NEUROLOGY

## 2018-02-19 PROCEDURE — 93306 TTE W/DOPPLER COMPLETE: CPT | Mod: 26 | Performed by: INTERNAL MEDICINE

## 2018-02-19 PROCEDURE — 83735 ASSAY OF MAGNESIUM: CPT

## 2018-02-19 PROCEDURE — 85014 HEMATOCRIT: CPT

## 2018-02-19 PROCEDURE — 85610 PROTHROMBIN TIME: CPT

## 2018-02-19 PROCEDURE — 85007 BL SMEAR W/DIFF WBC COUNT: CPT

## 2018-02-19 PROCEDURE — 84100 ASSAY OF PHOSPHORUS: CPT

## 2018-02-19 PROCEDURE — 83605 ASSAY OF LACTIC ACID: CPT | Mod: 91

## 2018-02-19 PROCEDURE — 85018 HEMOGLOBIN: CPT

## 2018-02-19 PROCEDURE — 770022 HCHG ROOM/CARE - ICU (200)

## 2018-02-19 PROCEDURE — 302101 FENESTRATED FOAM: Performed by: INTERNAL MEDICINE

## 2018-02-19 PROCEDURE — 93306 TTE W/DOPPLER COMPLETE: CPT

## 2018-02-19 PROCEDURE — 700111 HCHG RX REV CODE 636 W/ 250 OVERRIDE (IP): Performed by: HOSPITALIST

## 2018-02-19 PROCEDURE — A9270 NON-COVERED ITEM OR SERVICE: HCPCS | Performed by: INTERNAL MEDICINE

## 2018-02-19 PROCEDURE — 80053 COMPREHEN METABOLIC PANEL: CPT

## 2018-02-19 PROCEDURE — 82962 GLUCOSE BLOOD TEST: CPT

## 2018-02-19 RX ORDER — MAGNESIUM SULFATE HEPTAHYDRATE 40 MG/ML
2 INJECTION, SOLUTION INTRAVENOUS ONCE
Status: COMPLETED | OUTPATIENT
Start: 2018-02-19 | End: 2018-02-19

## 2018-02-19 RX ADMIN — PANTOPRAZOLE SODIUM 40 MG: 40 INJECTION, POWDER, LYOPHILIZED, FOR SOLUTION INTRAVENOUS at 20:22

## 2018-02-19 RX ADMIN — HYDROCORTISONE SODIUM SUCCINATE 50 MG: 100 INJECTION, POWDER, FOR SOLUTION INTRAMUSCULAR; INTRAVENOUS at 13:56

## 2018-02-19 RX ADMIN — LEVALBUTEROL HYDROCHLORIDE 1.25 MG: 1.25 SOLUTION RESPIRATORY (INHALATION) at 06:25

## 2018-02-19 RX ADMIN — LEVALBUTEROL HYDROCHLORIDE 1.25 MG: 1.25 SOLUTION RESPIRATORY (INHALATION) at 10:01

## 2018-02-19 RX ADMIN — HYDROCORTISONE SODIUM SUCCINATE 50 MG: 100 INJECTION, POWDER, FOR SOLUTION INTRAMUSCULAR; INTRAVENOUS at 20:22

## 2018-02-19 RX ADMIN — LEVALBUTEROL HYDROCHLORIDE 1.25 MG: 1.25 SOLUTION RESPIRATORY (INHALATION) at 18:14

## 2018-02-19 RX ADMIN — VASOPRESSIN 0.03 UNITS/MIN: 20 INJECTION INTRAVENOUS at 17:27

## 2018-02-19 RX ADMIN — MIDAZOLAM 2 MG/HR: 5 INJECTION INTRAMUSCULAR; INTRAVENOUS at 17:17

## 2018-02-19 RX ADMIN — VASOPRESSIN 0.03 UNITS/MIN: 20 INJECTION INTRAVENOUS at 06:38

## 2018-02-19 RX ADMIN — HYDROCORTISONE SODIUM SUCCINATE 100 MG: 100 INJECTION, POWDER, FOR SOLUTION INTRAMUSCULAR; INTRAVENOUS at 06:18

## 2018-02-19 RX ADMIN — PROPOFOL 25 MCG/KG/MIN: 10 INJECTION, EMULSION INTRAVENOUS at 20:23

## 2018-02-19 RX ADMIN — CEFTRIAXONE 2 G: 2 INJECTION, POWDER, FOR SOLUTION INTRAMUSCULAR; INTRAVENOUS at 09:33

## 2018-02-19 RX ADMIN — LEVALBUTEROL HYDROCHLORIDE 1.25 MG: 1.25 SOLUTION RESPIRATORY (INHALATION) at 14:03

## 2018-02-19 RX ADMIN — SODIUM CHLORIDE: 9 INJECTION, SOLUTION INTRAVENOUS at 13:57

## 2018-02-19 RX ADMIN — NOREPINEPHRINE BITARTRATE 25 MCG/MIN: 1 INJECTION INTRAVENOUS at 11:15

## 2018-02-19 RX ADMIN — SODIUM CHLORIDE 200 MG: 9 INJECTION, SOLUTION INTRAVENOUS at 10:36

## 2018-02-19 RX ADMIN — SODIUM CHLORIDE 1500 MG: 9 INJECTION, SOLUTION INTRAVENOUS at 09:35

## 2018-02-19 RX ADMIN — VANCOMYCIN HYDROCHLORIDE 1500 MG: 100 INJECTION, POWDER, LYOPHILIZED, FOR SOLUTION INTRAVENOUS at 17:31

## 2018-02-19 RX ADMIN — MAGNESIUM SULFATE HEPTAHYDRATE 2 G: 40 INJECTION, SOLUTION INTRAVENOUS at 10:04

## 2018-02-19 RX ADMIN — METRONIDAZOLE 500 MG: 500 INJECTION, SOLUTION INTRAVENOUS at 13:55

## 2018-02-19 RX ADMIN — METRONIDAZOLE 500 MG: 500 INJECTION, SOLUTION INTRAVENOUS at 20:22

## 2018-02-19 RX ADMIN — METRONIDAZOLE 500 MG: 500 INJECTION, SOLUTION INTRAVENOUS at 06:13

## 2018-02-19 RX ADMIN — PROPOFOL 15 MCG/KG/MIN: 10 INJECTION, EMULSION INTRAVENOUS at 09:35

## 2018-02-19 RX ADMIN — LEVALBUTEROL HYDROCHLORIDE 1.25 MG: 1.25 SOLUTION RESPIRATORY (INHALATION) at 22:23

## 2018-02-19 RX ADMIN — SODIUM CHLORIDE 200 MG: 9 INJECTION, SOLUTION INTRAVENOUS at 20:49

## 2018-02-19 RX ADMIN — SODIUM CHLORIDE 1500 MG: 9 INJECTION, SOLUTION INTRAVENOUS at 20:23

## 2018-02-19 RX ADMIN — SODIUM CHLORIDE: 9 INJECTION, SOLUTION INTRAVENOUS at 06:40

## 2018-02-19 RX ADMIN — CHLORHEXIDINE GLUCONATE 15 ML: 1.2 RINSE ORAL at 20:21

## 2018-02-19 RX ADMIN — CHLORHEXIDINE GLUCONATE 15 ML: 1.2 RINSE ORAL at 09:33

## 2018-02-19 RX ADMIN — PANTOPRAZOLE SODIUM 40 MG: 40 INJECTION, POWDER, LYOPHILIZED, FOR SOLUTION INTRAVENOUS at 09:35

## 2018-02-19 RX ADMIN — NOREPINEPHRINE BITARTRATE 15 MCG/MIN: 1 INJECTION INTRAVENOUS at 17:27

## 2018-02-19 RX ADMIN — PHYTONADIONE 10 MG: 10 INJECTION, EMULSION INTRAMUSCULAR; INTRAVENOUS; SUBCUTANEOUS at 20:33

## 2018-02-19 NOTE — CARE PLAN
Problem: Bowel/Gastric:  Goal: Will not experience complications related to bowel motility    Intervention: Assess baseline bowel pattern  Poc reviewed with family at beginning of shift/verbalized understanding

## 2018-02-19 NOTE — PROGRESS NOTES
Med rec completed by phone call to patient's mother who reported her son is only taking Keppra 15 mL BID, phenobarbital 1/2 teaspoon twice daily along with vitamin B12 (unable to verify dose)  No abx in past 30 days  Allergy reviewed

## 2018-02-19 NOTE — H&P
CHIEF COMPLAINT:  Hypoxia, seizure, aspiration, and hypertension.     HISTORY OF PRESENT ILLNESS:  This is a 37-year-old male with a history of   hypertension and [place].  At baseline, he opens his eyes, and responds by   nodding and drinking, but is generally nonverbal.  He never had seizures   until in June or July of 2017 after an aspiration episode, and at that time,   he was started on phenobarbital and Keppra.  He also had respiratory failure   at that time, requiring tracheostomy.  He was slowly being weaned off   phenobarbital since three-and-half months ago, and unfortunately in the last 2   weeks, he started to have seizures again, which each episode lasting for   about 2 minutes, with seizures improved with benzodiazepines.     Two days ago, he had another episode of seizure, with aspiration of   secretions.  Last night, he had multiple episodes of vomiting with witnessed   episodes of aspiration.  Since then, his mother noticed that his oxygen   saturation on the home oxygen monitor never went up to 60%.  Notably, he was   never in respiratory distress, and appeared comfortable.  He did not have any   fevers or chills.  Due to the hypoxia, his mother brought him to the ED at   [facility], where initial blood workup showed WBC count of 2700   with 28% bands and BMP remarkable for sodium of 147, and lactate of 3.7.  His   liver function tests were also mildly elevated with AST of 56, ALT of 69, and   alkaline phosphatase of 176.  Influenza A and B PCRs were negative.  Chest   x-rays were obtained.  It showed bilateral pulmonary infiltrates, more   pronounced diffusely throughout the left lung, with tracheostomy tube in good   position.  Urinalysis did not show any pyuria.  He was connected to mechanical   ventilator, and was given imipenem, and Keppra.  He was noted to be   persistently hypotensive despite IV  fluids, and thus he was started on   Levophed.  He continued to have seizures there.  He was subsequently   transferred for further evaluation and management to [facility].     REVIEW OF SYSTEMS  Unable to obtain due to encephalopathy/traumatic brain injury.     PMH/PSH/FMH: I personally reviewed all ancillary histories as noted.     PAST MEDICAL HISTORY:  Traumatic brain injury  Seizure disorder  Hypertension     PAST SURGICAL HISTORY:  Past Surgical History         Past Surgical History:   Procedure Laterality Date   • LARYNGOSCOPY   7/2/2017     Procedure: LARYNGOSCOPY;  Surgeon: Catherine Osorio M.D.;  Location: SURGERY [facility];  Service:    • BRONCHOSCOPY   7/2/2017     Procedure: BRONCHOSCOPY;  Surgeon: Catherine Osorio M.D.;  Location: SURGERY [facility];  Service:    • TRACHEOSTOMY   7/2/2017     Procedure: TRACHEOSTOMY;  Surgeon: Catherine Osorio M.D.;  Location: SURGERY [facility];  Service:    • ESOPHAGOSCOPY   7/2/2017     Procedure: ESOPHAGOSCOPY;  Surgeon: Catherine Osorio M.D.;  Location: SURGERY [facility];  Service:             PERSONAL/SOCIAL HISTORY:  No history of smoking or alcohol use.     FAMILY MEDICAL HISTORY:  Reviewed. Non-contributory.     ALLERGIES:  Sulfa drugs     HOME MEDICATIONS:          Prior to Admission medications    Medication Sig Start Date End Date Taking? Authorizing Provider   PHENobarbital 20 MG/5ML Elixir Take 10 mg by mouth 2 times a day.     Yes Physician Outpatient   Cyanocobalamin (VITAMIN B-12) 1000 MCG/15ML Liquid Take  by mouth every day.     Yes Physician Outpatient   levetiracetam (KEPPRA) 100 MG/ML Solution Take 15 mL by mouth every 12 hours. 7/11/17     Luis Bowers M.D.               PHYSICAL EXAMINATION:  VITAL SIGNS:  Blood pressure 85/50, heart rate 92, respiratory rate 18,   temperature 34.4 degrees Celsius.  CONSTITUTIONAL:  Nonverbal, GCS of 3.  Appears comfortable.  Not in distress.  HENT:  Tracheostomy tube in  place, with secretions.  No tonsillopharyngeal   congestions.  HENT: Normocephalic, atraumatic, (-) tonsillopharyngal congestion or exudate.  EYES: PERRLA, pink conjuctivae, (-) icteric sclerae  NECK: (-) cervical lymphadenopathy, (-) neck rigidity  RESPIRATORY:  Bilateral rales and rhonchi on both lungs fields, with   diminished air entry, bilateral bases.  CARDIOVASCULAR:  Tachycardic, regular rhythm.  No murmurs, rubs, or gallops.    No leg edema.  GASTROINTESTINAL:  Normoactive bowel sounds.  Soft and nontender abdomen.    G-tube in place.  MUSCULOSKELETAL:  Contractures on both arms and legs.  No joint swelling.  SKIN:  Stage III sacral and buttocks decubitus sores.  PSYCHIATRIC:  Unable to assess.  NEUROLOGIC:  Moves all 4 extremities.  Nonverbal.  (+) intermittent shaking spells  lasting for 1-2 minutes.  CAPILLARY REFILL: slowed  PERIPHERAL PULSE: 1+  SKIN: pale        PERTINENT DIAGNOSTIC RESULTS:  Reviewed, and as mentioned above. Please refer to ED course.           ASSESSMENT:  1.  Severe sepsis/septic shock secondary to aspiration pneumonia.  2.  Aspiration pneumonia.  3.  Acute on chronic respiratory failure with hypoxia secondary to aspiration pneumonia and severe sepsis.  4.  Ongoing seizure, in the setting of known seizure disorder.  5.  Dark stool, query hematochezia.  6.  Transaminitis.  7.  Hypernatremia.  8.  Traumatic brain injury.  9.  Stage III sacral/buttocks decubitus source.     PLAN:  --- I will admit him to the ICU.  I anticipate that he would need at least 2   midnights hospital stay to provide medically necessary services.  --- I will start him on IV fluids per sepsis protocol.  He already received   initial IV fluid bolus at outside facility.  I will check a lactate level, and   if it remains high, will proceed with IV fluid boluses.  We will do serial   lactate monitoring.  I will start him on vasopressors with Levophed, to keep   MAP greater than 65.  Close hemodynamic monitoring in  the ICU.  Monitor urine   output closely.  --- I will start him on ceftriaxone and Flagyl for his aspiration pneumonia.  I   will send sputum gram stain and culture, along with blood cultures x2 sets.    We will trend his WBC count.  I will repeat a chest x-ray.  --- [facility] (Dr. Brown) was already on board for vent management.  He may need a   bedside bronchoscopy.  I will put him on bronchodilators per RT protocol.    Continue mechanical ventilatory support per pulmonology.  --- I will resume his home dose Keppra 1500 mg b.i.d. through his G-tube.  I   spoke with Dr. Jade of neurology for further inputs regarding his ongoing   seizures, and recommended starting him on Depakote IV.  I will start him on   Depakote 1 g IV x1, then 500 mg b.i.d. per neurology recommendations.  I will   obtain an EEG.  I will check for ammonia level.  We will do neuro checks every   4 hours.  We will await further inputs from neurology.  --- I will send for stool guaiac x3 given his reddish stool.  I will monitor the   hemoglobin and hematocrit every 8 hours and transfuse for less than 7.  His   anemia worsens.  If his hemoglobin drops significantly, with positive stool   guaiac, may need GI to see him for endoscopy once his acute issues are   stabilized.  I will continue him on famotidine for stress ulcer prophylaxis.  --- I will send for stool studies including C. diff PCR, WBC, culture And   lactoferrin.  --- Unknown if his elevated LFTs are chronic, but this could be related to his   septic shock.  I will continue him on IV fluids as above.  I will check for   viral hepatitis panel, TSH, CPK, ferritin, and cortisol levels.  We will trend   his LFTs.  --- Given that he looks dry, and with hypernatremia, I will start him on   maintenance IV fluids with half normal saline at 125 mL per hour.  We will   follow his sodium levels.  Check TSH with cortisol.  --- I will continue wound care for his sacral decubitus source.  Continue RADHA cream    as needed.  I will get the wound service on board.        Deep venous thrombosis prophylaxis - Lovenox subcutaneously.  Gastrointestinal prophylaxis - famotidine.  Code status -- full code.        Time spent: The patient is critically ill,, with high chance of deterioration into cardiac arrest, and  eventually death if left untreated. The care that has been undertaken is medically  complex. I spent 70 minutes CRITICAL CARE TIME, including managing medical   issues, coordination of care, not including doing procedures, with no overlap in critical  care time.           ____________________________________     Duy Whitaker M.D.    JACOB / N[facility]    DD:  02/18/2018 17:40:21  DT:  02/18/2018 20:00:20    D#:  [id number]  Job#:  [id number]

## 2018-02-19 NOTE — ASSESSMENT & PLAN NOTE
Good cortisol.  We will half the solu-cortef dose  Monitor vitals  Pressor support to keep MAP>65 and SBP >90

## 2018-02-19 NOTE — PROGRESS NOTES
2 RN skin assessment; 12hr chart check.    The following areas are of concern:    Dermatitis perineum  ST 3?? left ischium; wound bed is red  Unable to stage left buttock; eschar present  Unstageable to L great toe; eschar present   Heels appear boggy but blanching   Edema present dependent to bilateral feel   Skin is flaky and fragile

## 2018-02-19 NOTE — CARE PLAN
Problem: Safety  Goal: Will remain free from injury    Intervention: Provide assistance with mobility  Unable to mobilize patient  2/2 to unstable condition

## 2018-02-19 NOTE — PROGRESS NOTES
[facility] Hospitalist Progress Note    Date of Service: 2018    Chief Complaint  37 y.o. male with hx of traumatic brain injury (baseline nonverbal and immobile), with a hx of seizures admitted 2018 with septic shock with presumed pneumonia from seizure related aspiration.  Mother was recently working with her neurologist in [place] to adjust off phenobarbital and using new CBD but began having seizures ~1 week ago.    Interval Problem Update  Hada bronchoscopy on admit yesterday. CXR shows left lung opacification reflecting aspiration. Neurology consulting. Patient on vent via chronic trach.  Unresponsive and having seizures while I was examining him (just initated on versed during my exam).  Mother at bedside and gave extensive history.  SHe wants NO CPR/difibrillation if his heart stops but to continue current aggressive care.      Critically ill. Critical care time: 40 minutes.    Consultants/Specialty  Neurology  Pulmonary    Disposition  To remain in ICU on ventilator.        Review of Systems   Unable to perform ROS: Intubated      Physical Exam  Laboratory/Imaging   Hemodynamics  No data recorded.   Monitored Temp: 37.6 °C (99.7 °F)  Pulse  Av.8  Min: 65  Max: 119 Heart Rate (Monitored): 75  NIBP: 122/66      Respiratory  Liriano Vent Mode: APVCMV, Rate (breaths/min): 26, PEEP/CPAP: 10, PEEP/CPAP: 10, FiO2: 30, P Peak (PIP): 26, P MEAN: 16   Respiration: (!) 26, Pulse Oximetry: 98 %     Work Of Breathing / Effort: Vented  RUL Breath Sounds: Clear After Suction, RML Breath Sounds: Clear After Suction, RLL Breath Sounds: Diminished, JONATAN Breath Sounds: Clear After Suction, LLL Breath Sounds: Diminished    Fluids    Intake/Output Summary (Last 24 hours) at 18  Last data filed at 18 1800   Gross per 24 hour   Intake          1576.05 ml   Output             2350 ml   Net          -773.95 ml       Nutrition  Orders Placed This Encounter   Procedures   • Diet NPO     Standing Status:    Standing     Number of Occurrences:   1     Order Specific Question:   Restrict to:     Answer:   Strict [1]     Physical Exam   Constitutional: He has a sickly appearance. He is intubated.   HENT:   Head: Normocephalic.   Nose: Nose normal.   Eyes: Conjunctivae are normal. Right eye exhibits no discharge. Left eye exhibits no discharge. No scleral icterus.   Neck: No tracheal deviation present.   Trach site   Cardiovascular: Normal rate and normal heart sounds.    No murmur heard.  Pulses:       Radial pulses are 1+ on the right side, and 1+ on the left side.        Dorsalis pedis pulses are 1+ on the right side, and 1+ on the left side.   Pulmonary/Chest: No stridor. He is intubated. He has decreased breath sounds in the left middle field and the left lower field. He has no wheezes. He has rales (left ).   Abdominal: Soft. Bowel sounds are normal. He exhibits no distension.   LUQ peg site w/o erythema/discharge   Musculoskeletal: He exhibits edema (bilateral feet) and deformity (chronic flexion contracture of right upper arm/fingers).   Lymphadenopathy:     He has no cervical adenopathy.   Neurological: A cranial nerve deficit is present. Coordination abnormal.   Skin:   Chronic ischeal decubiti pictures evaluated; toe wound covered with clean dressing   Psychiatric: Cognition and memory are impaired. He is noncommunicative.   Vitals reviewed.      Recent Labs      02/18/18   1740   02/19/18   0130  02/19/18   0600  02/19/18   1146  02/19/18   1812   WBC  3.4*   --   8.7   --    --    --    RBC  4.83   --   4.14*   --    --    --    HEMOGLOBIN  13.8*  13.6*   < >  11.9*  11.4*  10.9*  10.2*   HEMATOCRIT  43.8  44.6   < >  35.7*  33.8*  31.9*  30.3*   MCV  90.7   --   86.2   --    --    --    MCH  28.6   --   28.7   --    --    --    MCHC  31.5*   --   33.3*   --    --    --    RDW  62.1*   --   56.9*   --    --    --    PLATELETCT  161*   --   121*   --    --    --    MPV  11.8   --   11.0   --    --    --     <  > = values in this interval not displayed.     Recent Labs      02/18/18   1857  02/19/18   0015  02/19/18   0600   SODIUM  143  145  146*   POTASSIUM  4.0  3.9  4.0   CHLORIDE  112  115*  116*   CO2  20  18*  19*   GLUCOSE  93  148*  121*   BUN  67*  54*  44*   CREATININE  0.32*  0.31*  0.28*   CALCIUM  7.8*  8.3*  8.3*     Recent Labs      02/19/18   1031   INR  1.98*         Recent Labs      02/18/18   1740   TRIGLYCERIDE  40          Assessment/Plan     GI bleed   Assessment & Plan    Stop lovenox.  Check H.pylori  IV PPI  Monitor hgb/hct  Correct coagulopathy giving Vit K and rechecking am INR  May need GI consult        Hypotension   Assessment & Plan    Good cortisol.  We will half the solu-cortef dose  Monitor vitals  Pressor support to keep MAP>65 and SBP >90        Acute on chronic respiratory failure with hypoxemia (CMS-HCC)   Assessment & Plan    Prior trach 8 months ago but doesn't use supplemental oxygen per report  Currently on ventilator  Monitor ABG, CXR, vitals, strict I/O  Concern of aspiration pneumonia  Pulmonary consulting  Had bronchoscopy 2/18  antibiotics        Aspiration pneumonia (CMS-HCC)- (present on admission)   Assessment & Plan    Vancomycin and rocephin  F/u on respiratory cultures  Check MRSA nares  Monitor vitals, labs        Coagulopathy (CMS-HCC)   Assessment & Plan    Give Vitamin K  Check tomorrow am INR        Hypernatremia   Assessment & Plan    Monitor BMP  Switch to LR (due to elevated chloride) or 1/2 NS if worsens on labs  Free water via PEG        Hypomagnesemia   Assessment & Plan    Supplement  Recheck        Decubitus ulcer of ischium, left, stage IV (CMS-HCC)   Assessment & Plan    Wound care  Frequent turns  Keep area clean and dry        Azotemia   Assessment & Plan    IV fluids        Diarrhea   Assessment & Plan    Rule out Cdiff  No current diarrhea   Need new stool sample as prior got thrown out in lab per RN  Neutropenia upon admit  IV fluids.          Septic  shock (CMS-HCC)   Assessment & Plan    Probable seizure related aspiration to left lung  On vanco, metronidazole and ceftriaxone  Influenza from [facility] reportedly negative (unsure if PCR)  F/u on cultures  On pressor support to keep MAP>65 and SBP>90  Monitor strict I/O's  Check TSH  IV fluids        Moderate protein malnutrition (CMS-HCC)   Assessment & Plan    Restart enteral nutrition via PEG 2/20        Seizure (CMS-HCC)   Assessment & Plan    CT head not seen from outside hospital.  Will order STAT to r/o head bleed.  Acute on chronic with breakthrough seizure  Versed drip  Ativan prn  Phenobarbital 10mg BID  Keppra 1500mg IV BID  Vimpat 200mg BID  neurochecks.        Physical debility   Assessment & Plan    Chronic due to TBI  Skin/wound care.  Nutritional support        TBI (traumatic brain injury) (CMS-HCC)- (present on admission)   Assessment & Plan    Chronic.  Chronic severe contracture of extremities  Nonverbal per pt's mother.        Contraction, joint, multiple sites   Assessment & Plan    Chronic.  Needs outpatient physiatry for other modalities in treatment  botox outpatient vs PT vs no treat.  Wound care.          Quality-Core Measures   Reviewed items::  Labs reviewed, Medications reviewed and Radiology images reviewed  Waldrop catheter::  Critically Ill - Requiring Accurate Measurement of Urinary Output, Strict Intake and Output During Sepisis or Shock and Unconscious / Sedated Patient on a Ventilator  Central line in place:  Need for access, Sepsis, Shock and Vasopressors  DVT prophylaxis pharmacological::  Enoxaparin (Lovenox)  Antibiotics:  Treating active infection/contamination beyond 24 hours perioperative coverage

## 2018-02-19 NOTE — CARE PLAN
Problem: Ventilation Defect:  Goal: Ability to achieve and maintain unassisted ventilation or tolerate decreased levels of ventilator support  Outcome: PROGRESSING SLOWER THAN EXPECTED  Adult Ventilation Update    Total Vent Days: 2    Patient Lines/Drains/Airways Status    Active Airway     Name: Placement date: Placement time: Site: Days:    Airway Group Standard Cuffed Trach Tracheostomy 6.0 07/02/17   1403   Tracheostomy   232    Airway Group Tracheostomy 6.0       Tracheostomy                 CMV 28/460/12/50%       Static Compliance (ml / cm H2O): 43.3 (02/19/18 0228)    Patient failed trials because of Barriers to Wean: FiO2 >60% or PEEP >10 CM H2O (02/18/18 1659)  Barriers to SBT    Length of Weaning Trial      Sputum/Suction   Cough: Productive (02/19/18 0400)  Sputum Amount: Moderate (02/19/18 0400)  Sputum Color: Bloody (02/19/18 0400)  Sputum Consistency: Thick;Thin (02/19/18 0400)    Mobility Group  Activity Performed: Unable to mobilize (02/19/18 0400)  Pt Calls for Assistance: No (02/19/18 0400)  Reason Not Mobilized: Unstable condition (02/19/18 0400)    Events/Summary/Plan: ABG drawn. No more ventilator changes made.

## 2018-02-19 NOTE — CARE PLAN
Problem: Oxygenation:  Goal: Maintain adequate oxygenation dependent on patient condition  Outcome: PROGRESSING SLOWER THAN EXPECTED      Problem: Ventilation Defect:  Goal: Ability to achieve and maintain unassisted ventilation or tolerate decreased levels of ventilator support  Outcome: PROGRESSING SLOWER THAN EXPECTED  Adult Ventilation Update    Total Vent Days: 1     Patient Lines/Drains/Airways Status    Active Airway     Name: Placement date: Placement time: Site: Days:    Airway Group Standard Cuffed Trach Tracheostomy 6.0 07/02/17   1403   Tracheostomy   231    Airway Group Tracheostomy 6.0       Tracheostomy                                    Patient failed trials because of Barriers to Wean: FiO2 >60% or PEEP >10 CM H2O (02/18/18 1659)  Barriers to SBT    Length of Weaning Trial           Sputum/Suction     Sputum Amount: Moderate (02/18/18 1659)  Sputum Color: Bloody (02/18/18 1659)  Sputum Consistency: Thin;Thick (02/18/18 1659)         Events/Summary/Plan: Pt arrived by transport team trached and vented  (02/18/18 1659)

## 2018-02-19 NOTE — CONSULTS
NEUROLOGY CONSULT         CHIEF COMPLAINT: Seizures.       Date of Service:  02/18/18    REQUESTING PHYSICIAN: Amandeep Whitaker M.D.      HISTORY OF PRESENT ILLNESS:  Ruben Edwards is a 37 y.o. male with history of a TBI from [place] and seizure disorder who presents with increase frequency of seizures as well as aspiration pneumonia.     The patient was admitted last June when he presented with respiratory failure and new onset seizures. He was started on Keppra and phenobarbital and was discharged home on Keppra 1500 mg twice a day and phenobarbital 17.5 mg PO BID after his seizures were controlled. He also had a tracheostomy placed for respiatory failure. He was seen by an outside neurologist, Dr Spain.  He developed a transaminitis from the Phenobarbital and was overly-sedated, and so Dr. Spain started reducing his phenobarbital with a slow taper. In the last 3 weeks,  he has had increased seizures. In the last two days after his Phenobarbital was decreased to 10 mg twice a day,  he started to have increased seizures. He had multiple seizures a day involving twitching of his face last seen a couple of minutes each. His mother has been giving him Diastat to help with the seizures. Dr. Spain was called who increased his Keppra to 3000 mg PO BID.  After several seizures last night, he aspirated and had vomiting. He was therefore brought to [facility]. His found to have a white blood cell count of 27 and a lactate level of 3.7. His AST was 56 with AST was 69. Chest x-ray showed bilateral pulmonary infiltrates.  He was started on  Antibiotics and given Keppra and then transferred to [facility].     ROS:  Unable to be obtained.        PAST MEDICAL HISTORY:  Traumatic brain injury  Seizures  HTN      ALLERGIES:  Allergies   Allergen Reactions   • Sulfa Drugs Unspecified     Per caretaker, mother is allergic to sulfa so believes her son could be as  well         MEDICATIONS:  Keppra 1500 mg PO BID  [id number] mg PO BID.       FAMILY HISTORY:  No family history on file.    SOCIAL HISTORY:  Social History     Social History   • Marital status: Single     Spouse name: N/A   • Number of children: N/A   • Years of education: N/A     Occupational History   • Not on file.     Social History Main Topics   • Smoking status: Not on file   • Smokeless tobacco: Not on file   • Alcohol use Not on file   • Drug use: Unknown   • Sexual activity: Not on file     Other Topics Concern   • Not on file     Social History Narrative   • No narrative on file       EXAM:  Vitals:    02/18/18 1745 02/18/18 1800 02/18/18 1815 02/18/18 1830   Pulse: 96 80 77 74   Temp:       SpO2: 97% 98% 97% 98%   Weight:       Height:         General: pt is chronically ill appearing, he has a tracheostomy  Eyes: anicteric, PERRLA, pupils midline  Chest: CTA   Heart: regular rate   Abd: soft, non-tender, non distended  Extremtiies: no edema  Skin: no rashes or lesions  Neuro  General: The patient is nonresponsive with his eyes closed , he does respond to noxious stimuli .   CN: Pupils are midline and equal and reactive . He has not essential symmetry . He has occasional twitching of his mouth and eyes which last for about 2 minutes and occur after he stimulated .   Motor: He is spastic with contractures in all 4 extremities.       LABORATORY TESTING  Lab Results   Component Value Date/Time    WBC 3.4 (L) 02/18/2018 05:40 PM    RBC 4.83 02/18/2018 05:40 PM    HEMOGLOBIN 13.8 (L) 02/18/2018 05:40 PM    HEMOGLOBIN 13.6 (L) 02/18/2018 05:40 PM    HEMATOCRIT 43.8 02/18/2018 05:40 PM    HEMATOCRIT 44.6 02/18/2018 05:40 PM    MCV 90.7 02/18/2018 05:40 PM    MCH 28.6 02/18/2018 05:40 PM    MCHC 31.5 (L) 02/18/2018 05:40 PM    MPV 11.8 02/18/2018 05:40 PM    NEUTSPOLYS 30.70 (L) 02/18/2018 05:40 PM    LYMPHOCYTES 12.60 (L) 02/18/2018 05:40 PM    MONOCYTES 3.60 02/18/2018 05:40 PM    EOSINOPHILS 0.00  02/18/2018 05:40 PM    EOSINOPHILS 3 06/25/2017 02:40 PM    BASOPHILS 0.00 02/18/2018 05:40 PM    ANISOCYTOSIS 2+ 02/18/2018 05:40 PM            IMPRESSION AND PLAN: Ruben Edwards is a 37 y.o. male with history of a TBI from [place] and seizure disorder who presents with increase frequency of seizures as well as aspiration pneumonia. The increased frequency of his seizures might have been caused by the tapering of his phenobarbital. However, his mother would prefer that it  continue to be tapered off. I will therefore start him on Vimpat for seizure control.    Recommend:  --Will continue Keppra 1500 mg IV BID  --Continue Phenobarbital 10 mg suspension BID  --Will start Vimpat 200 mg IV BID  --Continuous EEG ordered for tomorrow.  --I would titrate up the propofol for his seizures, and use Ativan only if the seizures are lasting > 3 minutes.     Maci Jade M.D.    Neurology

## 2018-02-19 NOTE — DISCHARGE PLANNING
Discussed pt during AM Rounds. Treating physician Dr. Reynoso requested that the pt's guardian be contacted for the purpose of setting up an  Family conference for today. TC to pt's mother and guardian, Susana [phone]. She states she is available to meet with the doctor at anytime this afternoon. Updated Dr. Reynoso.

## 2018-02-19 NOTE — PROGRESS NOTES
2000 Dr. Araiza at bedside. Notified of lactic acid. New orders received.   2034 Dr. Araiza notified of cortisol level and positive occult blood. Okay to insert nasal trumpet due to frequent BM and pressure sores. Okay to draw one set of blood cultures from central line.

## 2018-02-19 NOTE — PROGRESS NOTES
1630 [facility] arrived to unit. Pt on ventilator, portable monitor and levophed running at 0.4mcg/kg/min?? (4mg/ 250NS) Report received from FARHAT Ellis with [facility].  1631 Levophed gtt stopped.   1640 Levophed gtt restarted at 10mcg/min. BP 40/20's. DO Bijal and MD Whitaker at bedside.   1650 Pt BP 60/40's Levophed gtt increased to 20 mcg/min.   1720 Levophed gtt increased to 30mcg/min.

## 2018-02-19 NOTE — DIETARY
"Nutrition services: Day 1 of admit.  38 yo M with admitting diagnosis: severe sepsis, aspiration PNA, acute on chronic respiratory failure w/ hypoxia, ongoing seizure Consult received for pt underweight w/ low BMI of 17.2, pt is currently on vent/sedated at this time w/ prop infused minimal rate @5ml/hr to provide an additional 132kcals/day. Therefore, pt currently is NPO and order received for Metabolic Cart Study per Dietitian/RT. Study not indicated at this time 2' enteral nutrition support not ordered to initiate at this time.     Assessment:  Ht: 71\" Wt: 56kg (123#) - admit wt BMI: 17.2  Diet/Intake: NPO x 1 day    Evaluation:  1. Pt is currently on vent via trach/sedated w/ prop @5ml/hr providing 132kcals/day. Pt also currently on pressor support of vasopressin @0.03units/day per MAR.   2. Pt is currently underweight w/ BMI of 17.2 and at 71.5% of his IBW. Wt is stable since previous admissions.   3. Pt was admitted in June of 2017 w/ G-tube in place and it has been well documented from previous RDs that pt's mother was bolusing her own recipe of formula that she \"created\" at home.   4. Per chart review- noted that pt had a J-tube placed in July of 2017; unsure at this time if it is a J-tube vs G-tube.     Recommendations/Plan:  1. Initiate nutrition support as soon as medically feasible.    2. Monitor weight.    RD to monitor TF consultation, continuous feeds vs cont Bolus nutrition formula support provided by mother inpatient per MD arnold. RD to monitor and leave recommendations accordingly.         "

## 2018-02-19 NOTE — PROGRESS NOTES
Pulmonary Critical Care Progress Note        Chief Complaint: Respiratory failure, septic shock    History of Present Illness:  Mr. Edwards is a 37-year-old male with past medical history of traumatic brain injury at age 17, seizures diagnosed in June of 2017. His baseline mental status appears to be nodding and minimal interacting without vocalization. Who was brought to the ICU as a direct admit from [facility] where he was brought today for hypoxia. Reportedly the patient was being weanied off of his phenobarbital by his neurologist when he began having more frequent seizures, approximately one week ago his neurologist increased his Keppra dosing however the seizures continued. His mother did use CBD and THC with some success in slowing the seizures. Since 2 days ago the patient had multiple aspiration events following these seizures. His mother evaluated him with a home oxygen saturation monitor and he was noted to be hypoxic, he was brought to [facility] where he was found to have 28% bands with leukopenia. Chest x-ray was obtained demonstrating bilateral infiltrates right greater than left. He was placed on mechanical ventilation and his oxygenation was unable to be improved more than 85%, he was hypotensive and given multiple boluses of IV fluids without improvement. He was started on levo fed and given multiple doses of Ativan for seizures and transferred to our ICU for higher level of care. He arrives on the ventilator and on pressors critically ill.    Please note this history is obtained by my partner Dr. Appiah 2/18/2018.    ROS:  Respiratory: unable to perform due to the patient's inability to effectively communicate, Cardiac: unable to perform due to the patient's inability to effectively communicate, GI: unable to perform due to the patient's inability to effectively communicate.  All other systems negative.    Interval Events:  24 hour interval history reviewed    -Admit for  seizures, but also large left sided pneumonia.   -Weaning off phenobarb, THC being given.  -Flu like sx recently, and not taking antisz meds  -Went to [facility].   -TBI history, and contracted.   -Decubiti of hip.  - Dr. Maci Jade recommendation: Ativan IVP. Once given overnight  -No emesis.   -In field, hypotensive, and started on Levophed 20 mcg, Vasopressin gtt  -Button Peg.   -Many Bm, Occult blood positive, and rectal tube overnight.   -Bronchoscopy done yesterday.   -Stage 4 left ischium.   -Left great toe with eschar.   -Right IJ CVP  - cc/hour.   -Decrease RR to 26 based on ABG and PEEP to 10 planned today.   -7-8 months on ventilator.  -Hold lovenox today.   -Day 2 rocephin/flagyl.   -Hydrocortisone to 50 q 6 for septic shock.  -Patient known to be trached in June 2017 after traumatic brain injury    PFSH:  No change.    Respiratory:  Liriano Vent Mode: APVCMV, Rate (breaths/min): 28, Vt Target (mL): 460, PEEP/CPAP: 12, FiO2: 50, Static Compliance (ml / cm H2O): 43.3, Control VTE (exp VT): 460  Pulse Oximetry: 98 %  Chest Tube Drains:          Exam: rhonchi left > right.  Tracheostomy in place.  ImagingCXR  I have personally reviewed the chest x-ray my impression is  there is tracheostomy in good position. The patient has a sizable left-sided pneumonia.  Recent Labs      02/18/18   1847  02/18/18   2147  02/19/18   0441   ISTATAPH  7.258*  7.335*  7.496   ISTATAPCO2  41.1*  31.5  23.9*   ISTATAPO2  157*  102*  125*   ISTATATCO2  20  18*  19*   YRTBFLI9REP  99  98  99   ISTATARTHCO3  18.4  16.8*  18.5   ISTATARTBE  -8*  -8*  -4   ISTATTEMP  34.7 C  35.9 C  100.4 F   ISTATFIO2  100  60  50   ISTATSPEC  Arterial  Arterial  Arterial   ISTATAPHTC  7.290*  7.350*  7.481   FIOKLYTH3GB  144*  96*  132*       HemoDynamics:  Pulse: 81, Heart Rate (Monitored): 81  NIBP: 105/64       Exam: regular rhythm (Sinus)  Imaging: Echo pending results.  Recent Labs      02/18/18   1857   CPKTOTAL  90        Neuro:  GCS Total Aleida Coma Score: 8       Exam: Heavily sedated. The patient has chronic traumatic brain injury and multiple contractures. Patient does not follow commands, open eyes to command and has evidence of hyperreflexia. Phenobarbital has been restarted. Patient also remains on Ativan when necessary. Versed has been recommended today for seizure activity. The patient does have some rhythmic seizure appearing activity of the facial region.  Imaging: None - Reviewed    Fluids:  Intake/Output       02/17/18 0700 - 02/18/18 0659 (Not Admitted) 02/18/18 0700 - 02/19/18 0659 02/19/18 0700 - 02/20/18 0659      5554-5898 2000-4235 Total 1975-0489 1579-3432 Total 4545-0291 1833-8582 Total       Intake    I.V.  --  -- --  --  734.1 734.1  --  -- --    Vasopressin Volume -- -- -- -- 61.1 61.1 -- -- --    Phenylephrine Volume -- -- -- -- 121 121 -- -- --    Propofol Volume -- -- -- -- 57 57 -- -- --    Norepinephrine Volume -- -- -- -- 495 495 -- -- --    Total Intake -- -- -- -- 734.1 734.1 -- -- --       Output    Urine  --  -- --  75  800 875  --  -- --    Indwelling Cathether -- -- -- -- 800 800 -- -- --    Void (ml) -- -- -- 75 -- 75 -- -- --    Stool/Urine  --  -- --  --  300 300  --  -- --    Measurable Stool (ml) -- -- -- -- 300 300 -- -- --    Stool  --  -- --  --  -- --  --  -- --    Number of Times Stooled -- -- -- 3 x 1 x 4 x -- -- --    Total Output -- -- -- 75 1100 1175 -- -- --       Net I/O     -- -- -- -75 -365.9 -440.9 -- -- --        Weight: 59.7 kg (131 lb 9.8 oz)  Recent Labs      02/18/18   1857  02/19/18   0015   SODIUM  143  145   POTASSIUM  4.0  3.9   CHLORIDE  112  115*   CO2  20  18*   BUN  67*  54*   CREATININE  0.32*  0.31*   CALCIUM  7.8*  8.3*       GI/Nutrition:  Exam: abdomen is soft and non-tender there is a button PEG currently not being utilized at this time.  Imaging: None - Reviewed  NPO  Liver Function  Recent Labs      02/18/18   1857 02/19/18   0015   ALTSGPT  46  43    ASTSGOT  37  37   ALKPHOSPHAT  142*  141*   TBILIRUBIN  0.2  0.2   DBILIRUBIN  0.1   --    GLUCOSE  93  148*       Heme:  Recent Labs      18   1740  18   1857  18   2201  18   0130  18   0600   RBC  4.83   --    --   4.14*   --    HEMOGLOBIN  13.8*  13.6*   --   13.4*  11.9*  11.4*   HEMATOCRIT  43.8  44.6   --   41.7*  35.7*  33.8*   PLATELETCT  161*   --    --   121*   --    FERRITIN   --   99.7   --    --    --        Infectious Disease:  Monitored Temp  Av.7 °C (98.1 °F)  Min: 34.1 °C (93.38 °F)  Max: 38.3 °C (100.9 °F)  Temp  Av.2 °C (97.1 °F)  Min: 36.2 °C (97.1 °F)  Max: 36.2 °C (97.1 °F)  Micro: antibiotics reviewed. The patient remains on antibiotic therapy including Rocephin and Flagyl at this time. He's also had diarrhea and placed in isolation until C. diff is ruled out. Patient is having more formed stools lately.. BAL data are showing gram-positive cocci and blood cultures from yesterday are negative to date.  Recent Labs      18   17418   1857  18   0015  18   0130   WBC  3.4*   --    --   8.7   NEUTSPOLYS  30.70*   --    --   59.50   LYMPHOCYTES  12.60*   --    --   5.40*   MONOCYTES  3.60   --    --   0.00   EOSINOPHILS  0.00   --    --   0.00   BASOPHILS  0.00   --    --   0.00   ASTSGOT   --   37  37   --    ALTSGPT   --   46  43   --    ALKPHOSPHAT   --   142*  141*   --    TBILIRUBIN   --   0.2  0.2   --      Current Facility-Administered Medications   Medication Dose Frequency Provider Last Rate Last Dose   • Respiratory Care per Protocol   Continuous RT Modesto Appiah Jr., D.O.       • senna-docusate (PERICOLACE or SENOKOT S) 8.6-50 MG per tablet 2 Tab  2 Tab BID Modesto Appiah Jr., D.O.   Stopped at 18 2100    And   • polyethylene glycol/lytes (MIRALAX) PACKET 1 Packet  1 Packet QDAY PRN Modesto Appiah Jr., D.O.        And   • magnesium hydroxide (MILK OF MAGNESIA) suspension 30 mL  30 mL QDAY PRN Modesto Appiah  FARHAT GottliebOBucky        And   • bisacodyl (DULCOLAX) suppository 10 mg  10 mg QDAY PRN FARHAT Alaniz Jr.O.       • chlorhexidine (PERIDEX) 0.12 % solution 15 mL  15 mL BID JASPAL Alaniz Jr..OBucky   15 mL at 02/18/18 2116   • lidocaine (XYLOCAINE) 1%  injection  1-2 mL Q30 MIN PRN Modesto Appiah Jr., D.O.       • MD ALERT...Adult ICU Electrolyte Replacement per Pharmacy Protocol   pharmacy to dose JASPAL Alaniz Jr..SAM.       • fentaNYL (SUBLIMAZE) injection 25 mcg  25 mcg Q HOUR PRN Modesto Appiah Jr., D.O.        Or   • fentaNYL (SUBLIMAZE) injection 50 mcg  50 mcg Q HOUR PRN Modesto Appiah Jr., D.O.        Or   • fentaNYL (SUBLIMAZE) injection 100 mcg  100 mcg Q HOUR PRN JASPAL Alaniz Jr..O.       • ipratropium-albuterol (DUONEB) nebulizer solution 3 mL  3 mL Q2HRS PRN (RT) JASPAL Alaniz Jr..O.       • insulin regular (HUMULIN R) injection 1-6 Units  1-6 Units Q6HRS JASPAL Alaniz Jr..OBucky   Stopped at 02/18/18 1800    And   • glucose 4 g chewable tablet 16 g  16 g Q15 MIN PRN JASPAL Alaniz Jr..MICHELLE        And   • dextrose 50% (D50W) injection 25 mL  25 mL Q15 MIN PRN JASPAL Alaniz Jr..O.       • MD ALERT...Pharmacy to implement PROPOFOL CRITICAL CARE PROTOCOL 1 Each  1 Each PRN JASPAL Alaniz Jr..SAM.       • levalbuterol (XOPENEX) 1.25 MG/3ML nebulizer solution 1.25 mg  1.25 mg 4X/DAY Duy Whitaker M.D.   1.25 mg at 02/19/18 0625   • levalbuterol (XOPENEX) 1.25 MG/3ML nebulizer solution 1.25 mg  1.25 mg Q4HRS PRN Duy Whitaker M.D.       • miconazole 2%-zinc oxide (RADHA) topical cream   PRN Duy Whitaker M.D.       • NS infusion 1,677 mL  30 mL/kg Once PRN Duy Whitaker M.D.       • NS (BOLUS) infusion 500 mL  500 mL Once PRN Duy Whitaker M.D.       • enoxaparin (LOVENOX) inj 40 mg  40 mg DAILY Duy Whitaker M.D.   Stopped at 02/18/18 1730   • acetaminophen (TYLENOL) tablet 650 mg  650 mg Q6HRS PRN Duy Whitaker M.D.       • Pharmacy Consult Request ...Pain Management  Review   PRN Duy Whitaker M.D.        And   • oxyCODONE immediate-release (ROXICODONE) tablet 5 mg  5 mg Q3HRS PRN Duy Whitaker M.D.        And   • oxyCODONE immediate release (ROXICODONE) tablet 10 mg  10 mg Q3HRS PRN Duy Whitaker M.D.        And   • morphine (pf) 4 mg/ml injection 4 mg  4 mg Q3HRS PRN Duy Whitaker M.D.       • metroNIDAZOLE (FLAGYL) IVPB 500 mg  500 mg Q8HRS Duy Whitaker M.D. 100 mL/hr at 02/19/18 0613 500 mg at 02/19/18 0613   • norepinephrine (LEVOPHED) 8 mg in  mL Infusion  0.5-30 mcg/min Continuous Duy Whitaker M.D. 37.5 mL/hr at 02/19/18 0045 20 mcg/min at 02/19/18 0045    And   • vasopressin (VASOSTRICT) 20 Units in  mL Infusion  0.03 Units/min Continuous Duy Whitaker M.D. 9 mL/hr at 02/18/18 2102 0.03 Units/min at 02/18/18 2102   • ondansetron (ZOFRAN) syringe/vial injection 4 mg  4 mg Q4HRS PRN Duy Whitaker M.D.       • ondansetron (ZOFRAN ODT) dispertab 4 mg  4 mg Q4HRS PRN Duy Whitaker M.D.       • promethazine (PHENERGAN) tablet 12.5-25 mg  12.5-25 mg Q4HRS PRN Duy Whitaker M.D.       • promethazine (PHENERGAN) suppository 12.5-25 mg  12.5-25 mg Q4HRS PRN Duy Whitaker M.D.       • prochlorperazine (COMPAZINE) injection 5-10 mg  5-10 mg Q4HRS PRN Duy Whitaker M.D.       • hydrocortisone sodium succinate PF (SOLU-CORTEF) 100 MG injection 100 mg  100 mg Q8HRS Modesto Appiah Jr., D.O.   100 mg at 02/19/18 0618   • LORazepam (ATIVAN) injection 4 mg  4 mg Q10 MIN PRN Duy Whitaker M.D.   4 mg at 02/18/18 1946   • pantoprazole (PROTONIX) injection 40 mg  40 mg BID Modesto Appiah Jr., D.O.   40 mg at 02/18/18 2255   • propofol (DIPRIVAN) injection  0-80 mcg/kg/min Continuous Modesto Appiah Jr., D.O. 1.7 mL/hr at 02/18/18 1843 5 mcg/kg/min at 02/18/18 1843   • phenylephrine (YAO-SYNEPHRINE) 80,000 mcg in  mL Infusion  0-300 mcg/min Continuous Modesto Appiah Jr., D.O.   Stopped at 02/18/18 2124   • cefTRIAXone (ROCEPHIN) syringe 2 g  2 g  Q24HRS Duy Whitaker M.D.   2 g at 02/18/18 2000   • NS infusion   Continuous Duy Whitaker M.D. 125 mL/hr at 02/18/18 2120     • PHENobarbital solution 10 mg  10 mg BID Maci Jade M.D.   Stopped at 02/18/18 2130   • levETIRAcetam (KEPPRA) 1,500 mg in  mL IVPB  1,500 mg Q12HRS Duy Whitaker M.D.   Stopped at 02/18/18 2306   • lacosamide (VIMPAT) 200 mg in  mL ivpb  200 mg Q12HRS Maci Jade M.D.   Stopped at 02/19/18 0032     Last reviewed on 2/18/2018  7:51 PM by Lois Camejo, Pharmacy Intern    Quality  Measures:  Labs reviewed, Medications reviewed and Radiology images reviewed  Waldrop catheter: Critically Ill - Requiring Accurate Measurement of Urinary Output  Central line in place: Sepsis, Shock and Vasopressors      DVT prophylaxis - mechanical: Contra-indicated  Ulcer prophylaxis: Yes      Assessment and plan:    1. Acute hypoxemic respiratory failure.    -Patient has sizable left-sided pneumonia. I suspect this is acute and chronic disease. There is no doubt some scarring and the effect of the BAL however his shock is related to pneumonia.  -Will need broad-spectrum antibiotic coverage including Rocephin and Flagyl and would add vancomycin in this critically ill patient with tracheostomy  -Continue bronchodilator therapy.  -Follow-up arterial blood gas.  -Await BAL data and consolidation of antibiotic therapy as able.    2. Septic shock.    -Patient has evidence of severe hypotension requiring vasopressors as well as hydrocortisone.  -Lactate is improved.  -Recall lactate may be elevated in the setting of status epilepticus.   -Additional fluid bolus for albumin may be helpful.    3. History of traumatic brain injury.    -Patient is in status epilepticus and Versed has been recommended to be added to current treatment.  -This includes Keppra, Depakote, phenobarbital, and Ativan.  -Appreciate neurology's assessment and recommendations.  -EEG was recommended.    4.  Decubitus  ulcerations of the left hip including stage IV disease.    -Possibly may require more than wound care and consideration for orthopedic evaluation and biopsy if does not improve.  -Appreciate wound care assistance today.    5. Diarrhea.    -Doubt C. difficile colitis but this is a possibility. Patient was placed in isolation and C. diff is pending.  -Rectal Waldrop has been placed.    Comment: Please see discussion on a separate critical care note with family members today.    Discussed patient condition and risk of morbidity and/or mortality with Charge nurse / hot rounds.  M.D. rounds  The patient remains critically ill.  Critical care time = 40 minutes in directly providing and coordinating critical care and extensive data review.  No time overlap and excludes procedures.    Jon Reynoso D.O.

## 2018-02-19 NOTE — PROGRESS NOTES
CRITICAL CARE MEDICINE   BRIEF PROGRESS NOTE    Date of service: 2/19/2018  Time: 1345    Discussion with patient's family, mother and father, and they have been given an update.   They understand his degree of shock, and that there remains severe pneumonia, and will likely require mechanical ventilation for a number of days.   They have given me history regarding his TBI/Seizures, and are OK to proceed with Versed infusion.   In addtion, we will move to DNR status as per their request as well.   I will change the orders.  I have also reviewed the decubiti from the left hip and buttocks.   Cdiff isolation started, and pending results.     I spent 35 min in reviewing the patient's condition, physical examination, laboratory and imaging data, prior documentation, in discussion with, and in formulating an assessment/plan.    Critical Care time: 35 min. No time overlap, procedures not included in time.  [id number]    Jon Reynoso D.O.  Critical Care Medicine

## 2018-02-20 ENCOUNTER — APPOINTMENT (OUTPATIENT)
Dept: RADIOLOGY | Facility: MEDICAL CENTER | Age: 38
DRG: 853 | End: 2018-02-20
Attending: INTERNAL MEDICINE
Payer: COMMERCIAL

## 2018-02-20 LAB
ACTION RANGE TRIGGERED IACRT: NO
ANION GAP SERPL CALC-SCNC: 13 MMOL/L (ref 0–11.9)
ANISOCYTOSIS BLD QL SMEAR: ABNORMAL
BASE EXCESS BLDA CALC-SCNC: -5 MMOL/L (ref -4–3)
BASOPHILS # BLD AUTO: 0 % (ref 0–1.8)
BASOPHILS # BLD: 0 K/UL (ref 0–0.12)
BODY TEMPERATURE: ABNORMAL DEGREES
BUN SERPL-MCNC: 28 MG/DL (ref 8–22)
BURR CELLS BLD QL SMEAR: NORMAL
CALCIUM SERPL-MCNC: 7.6 MG/DL (ref 8.5–10.5)
CHLORIDE SERPL-SCNC: 117 MMOL/L (ref 96–112)
CO2 BLDA-SCNC: 17 MMOL/L (ref 20–33)
CO2 SERPL-SCNC: 16 MMOL/L (ref 20–33)
CREAT SERPL-MCNC: <0.2 MG/DL (ref 0.5–1.4)
EOSINOPHIL # BLD AUTO: 0 K/UL (ref 0–0.51)
EOSINOPHIL NFR BLD: 0 % (ref 0–6.9)
ERYTHROCYTE [DISTWIDTH] IN BLOOD BY AUTOMATED COUNT: 53.8 FL (ref 35.9–50)
GLUCOSE BLD-MCNC: 123 MG/DL (ref 65–99)
GLUCOSE BLD-MCNC: 128 MG/DL (ref 65–99)
GLUCOSE BLD-MCNC: 129 MG/DL (ref 65–99)
GLUCOSE BLD-MCNC: 135 MG/DL (ref 65–99)
GLUCOSE BLD-MCNC: 135 MG/DL (ref 65–99)
GLUCOSE SERPL-MCNC: 142 MG/DL (ref 65–99)
HCO3 BLDA-SCNC: 16.5 MMOL/L (ref 17–25)
HCT VFR BLD AUTO: 26.5 % (ref 42–52)
HCT VFR BLD AUTO: 26.8 % (ref 42–52)
HCT VFR BLD AUTO: 27.3 % (ref 42–52)
HCT VFR BLD AUTO: 28.3 % (ref 42–52)
HCT VFR BLD AUTO: 30.2 % (ref 42–52)
HGB BLD-MCNC: 10.1 G/DL (ref 14–18)
HGB BLD-MCNC: 9 G/DL (ref 14–18)
HGB BLD-MCNC: 9.1 G/DL (ref 14–18)
HGB BLD-MCNC: 9.1 G/DL (ref 14–18)
HGB BLD-MCNC: 9.6 G/DL (ref 14–18)
INST. QUALIFIED PATIENT IIQPT: YES
LACTOFERRIN STL QL IA: NEGATIVE
LYMPHOCYTES # BLD AUTO: 1.35 K/UL (ref 1–4.8)
LYMPHOCYTES NFR BLD: 7.2 % (ref 22–41)
MACROCYTES BLD QL SMEAR: ABNORMAL
MAGNESIUM SERPL-MCNC: 2 MG/DL (ref 1.5–2.5)
MANUAL DIFF BLD: NORMAL
MCH RBC QN AUTO: 28.3 PG (ref 27–33)
MCHC RBC AUTO-ENTMCNC: 33.9 G/DL (ref 33.7–35.3)
MCV RBC AUTO: 83.5 FL (ref 81.4–97.8)
MONOCYTES # BLD AUTO: 0.5 K/UL (ref 0–0.85)
MONOCYTES NFR BLD AUTO: 2.7 % (ref 0–13.4)
MORPHOLOGY BLD-IMP: NORMAL
NEUTROPHILS # BLD AUTO: 16.85 K/UL (ref 1.82–7.42)
NEUTROPHILS NFR BLD: 84.7 % (ref 44–72)
NEUTS BAND NFR BLD MANUAL: 5.4 % (ref 0–10)
NRBC # BLD AUTO: 0.06 K/UL
NRBC BLD-RTO: 0.3 /100 WBC
O2/TOTAL GAS SETTING VFR VENT: 30 %
PCO2 BLDA: 18.7 MMHG (ref 26–37)
PCO2 TEMP ADJ BLDA: 18.1 MMHG (ref 26–37)
PH BLDA: 7.55 [PH] (ref 7.4–7.5)
PH TEMP ADJ BLDA: 7.57 [PH] (ref 7.4–7.5)
PHOSPHATE SERPL-MCNC: 1.7 MG/DL (ref 2.5–4.5)
PLATELET # BLD AUTO: 81 K/UL (ref 164–446)
PLATELET BLD QL SMEAR: NORMAL
PMV BLD AUTO: 10.5 FL (ref 9–12.9)
PO2 BLDA: 81 MMHG (ref 64–87)
PO2 TEMP ADJ BLDA: 77 MMHG (ref 64–87)
POIKILOCYTOSIS BLD QL SMEAR: NORMAL
POTASSIUM SERPL-SCNC: 2.4 MMOL/L (ref 3.6–5.5)
RBC # BLD AUTO: 3.39 M/UL (ref 4.7–6.1)
RBC BLD AUTO: PRESENT
SAO2 % BLDA: 98 % (ref 93–99)
SCHISTOCYTES BLD QL SMEAR: NORMAL
SODIUM SERPL-SCNC: 146 MMOL/L (ref 135–145)
SPECIMEN DRAWN FROM PATIENT: ABNORMAL
VANCOMYCIN TROUGH SERPL-MCNC: 15.6 UG/ML (ref 10–20)
WBC # BLD AUTO: 18.7 K/UL (ref 4.8–10.8)

## 2018-02-20 PROCEDURE — A9270 NON-COVERED ITEM OR SERVICE: HCPCS | Performed by: INTERNAL MEDICINE

## 2018-02-20 PROCEDURE — A9270 NON-COVERED ITEM OR SERVICE: HCPCS | Performed by: PSYCHIATRY & NEUROLOGY

## 2018-02-20 PROCEDURE — 700101 HCHG RX REV CODE 250: Performed by: INTERNAL MEDICINE

## 2018-02-20 PROCEDURE — 700111 HCHG RX REV CODE 636 W/ 250 OVERRIDE (IP): Performed by: INTERNAL MEDICINE

## 2018-02-20 PROCEDURE — 36600 WITHDRAWAL OF ARTERIAL BLOOD: CPT

## 2018-02-20 PROCEDURE — 85027 COMPLETE CBC AUTOMATED: CPT

## 2018-02-20 PROCEDURE — 84100 ASSAY OF PHOSPHORUS: CPT

## 2018-02-20 PROCEDURE — 85014 HEMATOCRIT: CPT | Mod: 91

## 2018-02-20 PROCEDURE — 4A10X4Z MONITORING OF CENTRAL NERVOUS ELECTRICAL ACTIVITY, EXTERNAL APPROACH: ICD-10-PCS | Performed by: PSYCHIATRY & NEUROLOGY

## 2018-02-20 PROCEDURE — 700102 HCHG RX REV CODE 250 W/ 637 OVERRIDE(OP): Performed by: PSYCHIATRY & NEUROLOGY

## 2018-02-20 PROCEDURE — 71045 X-RAY EXAM CHEST 1 VIEW: CPT

## 2018-02-20 PROCEDURE — 99291 CRITICAL CARE FIRST HOUR: CPT | Performed by: INTERNAL MEDICINE

## 2018-02-20 PROCEDURE — 80048 BASIC METABOLIC PNL TOTAL CA: CPT

## 2018-02-20 PROCEDURE — 94003 VENT MGMT INPAT SUBQ DAY: CPT

## 2018-02-20 PROCEDURE — 302136 NUTRITION PUMP: Performed by: INTERNAL MEDICINE

## 2018-02-20 PROCEDURE — A6213 FOAM DRG >16<=48 SQ IN W/BDR: HCPCS | Performed by: INTERNAL MEDICINE

## 2018-02-20 PROCEDURE — C9113 INJ PANTOPRAZOLE SODIUM, VIA: HCPCS | Performed by: INTERNAL MEDICINE

## 2018-02-20 PROCEDURE — 82803 BLOOD GASES ANY COMBINATION: CPT

## 2018-02-20 PROCEDURE — 700105 HCHG RX REV CODE 258: Performed by: INTERNAL MEDICINE

## 2018-02-20 PROCEDURE — 82962 GLUCOSE BLOOD TEST: CPT | Mod: 91

## 2018-02-20 PROCEDURE — 85018 HEMOGLOBIN: CPT | Mod: 91

## 2018-02-20 PROCEDURE — 700102 HCHG RX REV CODE 250 W/ 637 OVERRIDE(OP): Performed by: INTERNAL MEDICINE

## 2018-02-20 PROCEDURE — 700111 HCHG RX REV CODE 636 W/ 250 OVERRIDE (IP): Performed by: HOSPITALIST

## 2018-02-20 PROCEDURE — 700111 HCHG RX REV CODE 636 W/ 250 OVERRIDE (IP): Performed by: PSYCHIATRY & NEUROLOGY

## 2018-02-20 PROCEDURE — 80202 ASSAY OF VANCOMYCIN: CPT

## 2018-02-20 PROCEDURE — 94640 AIRWAY INHALATION TREATMENT: CPT

## 2018-02-20 PROCEDURE — 83735 ASSAY OF MAGNESIUM: CPT

## 2018-02-20 PROCEDURE — 770022 HCHG ROOM/CARE - ICU (200)

## 2018-02-20 PROCEDURE — C9254 INJECTION, LACOSAMIDE: HCPCS | Performed by: PSYCHIATRY & NEUROLOGY

## 2018-02-20 PROCEDURE — 700105 HCHG RX REV CODE 258: Performed by: PSYCHIATRY & NEUROLOGY

## 2018-02-20 PROCEDURE — 95951 EEG-24 HR: CPT

## 2018-02-20 PROCEDURE — 85007 BL SMEAR W/DIFF WBC COUNT: CPT

## 2018-02-20 RX ORDER — POTASSIUM CHLORIDE 14.9 MG/ML
20 INJECTION INTRAVENOUS
Status: COMPLETED | OUTPATIENT
Start: 2018-02-20 | End: 2018-02-20

## 2018-02-20 RX ORDER — MAGNESIUM SULFATE HEPTAHYDRATE 40 MG/ML
2 INJECTION, SOLUTION INTRAVENOUS ONCE
Status: COMPLETED | OUTPATIENT
Start: 2018-02-20 | End: 2018-02-20

## 2018-02-20 RX ORDER — PHENOBARBITAL 20 MG/5ML
30 ELIXIR ORAL 2 TIMES DAILY
Status: DISCONTINUED | OUTPATIENT
Start: 2018-02-20 | End: 2018-02-21

## 2018-02-20 RX ORDER — POTASSIUM CHLORIDE 29.8 MG/ML
40 INJECTION INTRAVENOUS ONCE
Status: DISCONTINUED | OUTPATIENT
Start: 2018-02-20 | End: 2018-02-20

## 2018-02-20 RX ORDER — LEVALBUTEROL INHALATION SOLUTION 1.25 MG/3ML
1.25 SOLUTION RESPIRATORY (INHALATION)
Status: DISCONTINUED | OUTPATIENT
Start: 2018-02-21 | End: 2018-03-14 | Stop reason: ALTCHOICE

## 2018-02-20 RX ORDER — POTASSIUM CHLORIDE 14.9 MG/ML
20 INJECTION INTRAVENOUS
Status: DISCONTINUED | OUTPATIENT
Start: 2018-02-20 | End: 2018-02-20

## 2018-02-20 RX ADMIN — METRONIDAZOLE 500 MG: 500 INJECTION, SOLUTION INTRAVENOUS at 05:48

## 2018-02-20 RX ADMIN — METRONIDAZOLE 500 MG: 500 INJECTION, SOLUTION INTRAVENOUS at 14:52

## 2018-02-20 RX ADMIN — METRONIDAZOLE 500 MG: 500 INJECTION, SOLUTION INTRAVENOUS at 20:36

## 2018-02-20 RX ADMIN — MAGNESIUM SULFATE HEPTAHYDRATE 2 G: 40 INJECTION, SOLUTION INTRAVENOUS at 09:18

## 2018-02-20 RX ADMIN — PROPOFOL 15 MCG/KG/MIN: 10 INJECTION, EMULSION INTRAVENOUS at 18:45

## 2018-02-20 RX ADMIN — SODIUM CHLORIDE 200 MG: 9 INJECTION, SOLUTION INTRAVENOUS at 20:36

## 2018-02-20 RX ADMIN — HYDROCORTISONE SODIUM SUCCINATE 50 MG: 100 INJECTION, POWDER, FOR SOLUTION INTRAMUSCULAR; INTRAVENOUS at 13:36

## 2018-02-20 RX ADMIN — NOREPINEPHRINE BITARTRATE 6 MCG/MIN: 1 INJECTION INTRAVENOUS at 02:28

## 2018-02-20 RX ADMIN — HYDROCORTISONE SODIUM SUCCINATE 50 MG: 100 INJECTION, POWDER, FOR SOLUTION INTRAMUSCULAR; INTRAVENOUS at 05:42

## 2018-02-20 RX ADMIN — CHLORHEXIDINE GLUCONATE 15 ML: 1.2 RINSE ORAL at 20:35

## 2018-02-20 RX ADMIN — PHENOBARBITAL 30 MG: 20 ELIXIR ORAL at 20:36

## 2018-02-20 RX ADMIN — SODIUM CHLORIDE 200 MG: 9 INJECTION, SOLUTION INTRAVENOUS at 08:00

## 2018-02-20 RX ADMIN — VANCOMYCIN HYDROCHLORIDE 900 MG: 100 INJECTION, POWDER, LYOPHILIZED, FOR SOLUTION INTRAVENOUS at 19:24

## 2018-02-20 RX ADMIN — STANDARDIZED SENNA CONCENTRATE AND DOCUSATE SODIUM 2 TABLET: 8.6; 5 TABLET, FILM COATED ORAL at 11:49

## 2018-02-20 RX ADMIN — SODIUM CHLORIDE: 9 INJECTION, SOLUTION INTRAVENOUS at 02:27

## 2018-02-20 RX ADMIN — MIDAZOLAM 5 MG/HR: 5 INJECTION INTRAMUSCULAR; INTRAVENOUS at 18:45

## 2018-02-20 RX ADMIN — PANTOPRAZOLE SODIUM 40 MG: 40 INJECTION, POWDER, LYOPHILIZED, FOR SOLUTION INTRAVENOUS at 20:38

## 2018-02-20 RX ADMIN — CEFTRIAXONE 2 G: 2 INJECTION, POWDER, FOR SOLUTION INTRAMUSCULAR; INTRAVENOUS at 09:18

## 2018-02-20 RX ADMIN — VASOPRESSIN 0.03 UNITS/MIN: 20 INJECTION INTRAVENOUS at 15:05

## 2018-02-20 RX ADMIN — POTASSIUM CHLORIDE 20 MEQ: 14.9 INJECTION, SOLUTION INTRAVENOUS at 14:47

## 2018-02-20 RX ADMIN — POTASSIUM CHLORIDE 20 MEQ: 14.9 INJECTION, SOLUTION INTRAVENOUS at 17:46

## 2018-02-20 RX ADMIN — VASOPRESSIN 0.03 UNITS/MIN: 20 INJECTION INTRAVENOUS at 05:43

## 2018-02-20 RX ADMIN — VANCOMYCIN HYDROCHLORIDE 900 MG: 100 INJECTION, POWDER, LYOPHILIZED, FOR SOLUTION INTRAVENOUS at 11:50

## 2018-02-20 RX ADMIN — LEVALBUTEROL HYDROCHLORIDE 1.25 MG: 1.25 SOLUTION RESPIRATORY (INHALATION) at 14:32

## 2018-02-20 RX ADMIN — PHENOBARBITAL 30 MG: 20 ELIXIR ORAL at 13:32

## 2018-02-20 RX ADMIN — SODIUM CHLORIDE 1500 MG: 9 INJECTION, SOLUTION INTRAVENOUS at 20:36

## 2018-02-20 RX ADMIN — PROPOFOL 25 MCG/KG/MIN: 10 INJECTION, EMULSION INTRAVENOUS at 05:42

## 2018-02-20 RX ADMIN — CHLORHEXIDINE GLUCONATE 15 ML: 1.2 RINSE ORAL at 08:07

## 2018-02-20 RX ADMIN — SODIUM CHLORIDE: 9 INJECTION, SOLUTION INTRAVENOUS at 15:05

## 2018-02-20 RX ADMIN — LEVALBUTEROL HYDROCHLORIDE 1.25 MG: 1.25 SOLUTION RESPIRATORY (INHALATION) at 02:42

## 2018-02-20 RX ADMIN — SODIUM CHLORIDE 1500 MG: 9 INJECTION, SOLUTION INTRAVENOUS at 08:35

## 2018-02-20 RX ADMIN — PANTOPRAZOLE SODIUM 40 MG: 40 INJECTION, POWDER, LYOPHILIZED, FOR SOLUTION INTRAVENOUS at 08:07

## 2018-02-20 RX ADMIN — VANCOMYCIN HYDROCHLORIDE 900 MG: 100 INJECTION, POWDER, LYOPHILIZED, FOR SOLUTION INTRAVENOUS at 02:25

## 2018-02-20 RX ADMIN — HYDROCORTISONE SODIUM SUCCINATE 50 MG: 100 INJECTION, POWDER, FOR SOLUTION INTRAMUSCULAR; INTRAVENOUS at 20:35

## 2018-02-20 RX ADMIN — LEVALBUTEROL HYDROCHLORIDE 1.25 MG: 1.25 SOLUTION RESPIRATORY (INHALATION) at 19:27

## 2018-02-20 RX ADMIN — POTASSIUM PHOSPHATE, MONOBASIC AND POTASSIUM PHOSPHATE, DIBASIC 30 MMOL: 224; 236 INJECTION, SOLUTION INTRAVENOUS at 08:35

## 2018-02-20 RX ADMIN — STANDARDIZED SENNA CONCENTRATE AND DOCUSATE SODIUM 2 TABLET: 8.6; 5 TABLET, FILM COATED ORAL at 20:36

## 2018-02-20 RX ADMIN — LEVALBUTEROL HYDROCHLORIDE 1.25 MG: 1.25 SOLUTION RESPIRATORY (INHALATION) at 06:26

## 2018-02-20 RX ADMIN — LEVALBUTEROL HYDROCHLORIDE 1.25 MG: 1.25 SOLUTION RESPIRATORY (INHALATION) at 10:31

## 2018-02-20 NOTE — CARE PLAN
Problem: Ventilation Defect:  Goal: Ability to achieve and maintain unassisted ventilation or tolerate decreased levels of ventilator support    Intervention: Support and monitor invasive and noninvasive mechanical ventilation  Adult Ventilation Update    Total Vent Days: 3    Patient Lines/Drains/Airways Status    Active Airway     Name: Placement date: Placement time: Site: Days:    Airway Group Standard Cuffed Trach Tracheostomy 6.0 07/02/17   1403   Tracheostomy   233    Airway Group Tracheostomy 6.0       Tracheostomy                 Barriers to Wean: Other (Comments) (pt vent dependant, home use) (02/19/18 0629)    Cough: Productive (02/20/18 0400)  Sputum Amount: Moderate (02/20/18 0400)  Sputum Color: Bloody;Clear (02/20/18 0400)  Sputum Consistency: Thick;Thin (02/20/18 0400)    Mobility Group  Activity Performed: Unable to mobilize (02/19/18 2000)  Pt Calls for Assistance: No (02/19/18 0400)  Reason Not Mobilized: Bed rest (02/19/18 2000)    Events/Summary/Plan: decrease rate to 16 (02/20/18 3130)

## 2018-02-20 NOTE — WOUND TEAM
[facility]  Inpatient Services  Initial Wound & Skin Care Evaluation    Admission Date:  2018   HPI, PMH, SH: Reviewed  Unit where seen by Wound Team: [id number]/    WOUND CONSULT RELATED TO: pressure injury mgmt    SUBJECTIVE:   Non-verbal    Self Report / Pain Level: no s/s of distress      OBJECTIVE: on BEATRICE bed, heels floated off pillows  WOUND TYPE, LOCATION, CHARACTERISTICS (Pressure ulcers: location, stage, POA or date identified)  Pressure Ulcer  Stage 4 POA Ischium Left Lateral  Periwound Skin: Discolored  Drainage : Scant, Serosanguinous  Tissue Type and %:    100% red/dark purple  Wound Edges:    open    Odor:     None   Exposed structure(s):   No   Signs and Symptoms of Infection: none    Pressure Ulcer  Unstageable POA Foot;Digit 1 Left  Periwound Skin: Intact  Drainage : None  Tissue Type and %:    100% reddish brown  Wound Edges:    open    Odor:     None   Exposed structure(s):   No   Signs and Symptoms of Infection: none    Pressure Ulcer  POA Deep tissue injury Ischium Left Medial  Periwound Skin: Discolored  Drainage : None     Tissue Type and %:    100% red/dark purple  Wound Edges:    open    Odor:     None   Exposed structure(s):   No   Signs and Symptoms of Infection: none    Measurements: taken 18   Ischium Lateral/nedial   1st toe L  Length (cm):  4/3    1  Width (cm):  3.5/6    1.5  Depth (cm):  0.8/(nm)   (nm)    Tracts/underminin1121-2538=2 cm None     INTERVENTIONS BY WOUND TEAM:cleaned wounds with NS, dried. 1st toe with hydrogel covered with non-adhesive foam and secured with hypafix. Lateral ischium hydrogel to wound bed covered with hydrofera blue, making sure to tuck into undermining. Medial ischium covered open area with hydrofiber then covered both with mepilex x 2. Pad changed and pt repositioned. Discussion with mother about POC.     Ischium Left Lateral-Dressing Options:  (Intrasite hydrogel, hydrofera blue, mepilex)  Foot;Digit 1 Left-Dressing Options:   (Intrasite hydrogel, non-adhesive foam, hypafix)  Ischium Left Medial-Dressing Options: Hydrofiber, Mepilex    Interdisciplinary consultation:   With Nursing;With Family    EVALUATION: pt has POA pressure injuries: St 4 and DTI to L ischium, and L 1st toe unstageable. He has contractures and redness inside groin area.     Factors affecting wound healing: HTN, contractures, decreased mobility, sepsis  Goals: decreased wound size 0.5% each week, wait for DTI to reveal    NURSING PLAN OF CARE ORDERS (X):    Dressing changes: See Dressing Maintenance orders: x  Skin care: See Skin Care orders: x  Rectal tube care: See Rectal Tube Care orders:   Other orders:    RSKIN: CURRENT (X) ORDERED (O)  Q shift Neo:  X  Q shift pressure point assessments:  X  Pressure redistribution mattress        BEATRICE    x  Bariatric BEATRICE      Bariatric foam        Heel float boots       Heels floated on pillows  x    Barrier wipes  x    Barrier Cream   x   Barrier paste      Sacral silicone dressing    x  Padded O2 tubing      Anchorfast      Trach with Optifoam split foam   x    Waffle cushion      Rectal tube or BMS      Antifungal tx    Turn q 2 hours x  Up to chair  Ambulate   PT/OT     Dietician    x  PO     TF x  TPN     PVN    NPO   # days   Other     WOUND TEAM PLAN OF CARE (X):   NPWT change 3 x week:        Dressing changes by wound team:       Follow up as needed:   x    Other (explain):    Anticipated discharge plans (X):  SNF:           Home Care:           Outpatient Wound Center:            Self Care:            Other:    tbd

## 2018-02-20 NOTE — EEG PROGRESS NOTE
PRELIMINARY EEG REPORT:     Recurrent, electrographic, right frontotemporal seizures. Updates provided to Dr. Hunter.

## 2018-02-20 NOTE — CARE PLAN
Problem: Infection  Goal: Will remain free from infection  Outcome: PROGRESSING AS EXPECTED  CAUTI & CLABSI infection prevention control bundles in place per orders    Problem: Venous Thromboembolism (VTW)/Deep Vein Thrombosis (DVT) Prevention:  Goal: Patient will participate in Venous Thrombosis (VTE)/Deep Vein Thrombosis (DVT)Prevention Measures  Outcome: PROGRESSING AS EXPECTED  SCD's on and inflating for VTE, anticoagulation medication given per mar

## 2018-02-20 NOTE — FLOWSHEET NOTE
02/20/18 0625   Weaning Trial   Barriers to Wean Other (Comments)  (Spoke with RN. Pt sedated and still having seizures)   No wean at this time

## 2018-02-20 NOTE — ASSESSMENT & PLAN NOTE
- Seizures improved, continuous EEG again  - Continue Vimpat, Keppra, phenobarbital, and topamax per neurology  - MRI brain concerning for changes per Dr. Bolton, no cause noted on CSF

## 2018-02-20 NOTE — PROGRESS NOTES
Pulmonary Critical Care Progress Note        Chief Complaint: Respiratory failure, septic shock    History of Present Illness:  Mr. Edwards is a 37-year-old male with past medical history of traumatic brain injury at age 17, seizures diagnosed in June of 2017. His baseline mental status appears to be nodding and minimal interacting without vocalization. Who was brought to the ICU as a direct admit from [facility] where he was brought today for hypoxia. Reportedly the patient was being weanied off of his phenobarbital by his neurologist when he began having more frequent seizures, approximately one week ago his neurologist increased his Keppra dosing however the seizures continued. His mother did use CBD and THC with some success in slowing the seizures. Since 2 days ago the patient had multiple aspiration events following these seizures. His mother evaluated him with a home oxygen saturation monitor and he was noted to be hypoxic, he was brought to [facility] where he was found to have 28% bands with leukopenia. Chest x-ray was obtained demonstrating bilateral infiltrates right greater than left. He was placed on mechanical ventilation and his oxygenation was unable to be improved more than 85%, he was hypotensive and given multiple boluses of IV fluids without improvement. He was started on levo fed and given multiple doses of Ativan for seizures and transferred to our ICU for higher level of care. He arrives on the ventilator and on pressors critically ill.    Please note this history is obtained by my partner Dr. Appiah 2/18/2018.    ROS:  Respiratory: unable to perform due to the patient's inability to effectively communicate, Cardiac: unable to perform due to the patient's inability to effectively communicate, GI: unable to perform due to the patient's inability to effectively communicate.  All other systems negative.    Interval Events:  24 hour interval history reviewed     S/p TBI  Not  responding  On levophed to 4  On Vasopressin  NSR  No fever, and mild hypothermia  No feeds yet?  Button peg  Continuous feeds  Weaning trial  Protonix to oral  Vanco, gram + cocci, rocephin and flagyl  Lactose fermenting rods on BAL  Increase WBC  Versed gtt 6 mg/hour, though we did decrease the rate to 5 mg per hour  4 agents for status epilepticus    PFSH:  No change.    Respiratory:  Liriano Vent Mode: APVCMV, Rate (breaths/min): 16, Vt Target (mL): 460, PEEP/CPAP: 10, FiO2: 30, Static Compliance (ml / cm H2O): 32.5, Control VTE (exp VT): 454  Pulse Oximetry: 98 %  Chest Tube Drains:          Exam: rhonchi left > right.  Tracheostomy in place.  ImagingCXR  I have personally reviewed the chest x-ray my impression is  there is tracheostomy in good position. The patient has a sizable left-sided pneumonia, with slight improvement noted.  Recent Labs      02/18/18   2147  02/19/18   0441  02/20/18   0524   ISTATAPH  7.335*  7.496  7.553*   ISTATAPCO2  31.5  23.9*  18.7*   ISTATAPO2  102*  125*  81   ISTATATCO2  18*  19*  17*   YCFDRML5BOD  98  99  98   ISTATARTHCO3  16.8*  18.5  16.5*   ISTATARTBE  -8*  -4  -5*   ISTATTEMP  35.9 C  100.4 F  97.2 F   ISTATFIO2  60  50  30   ISTATSPEC  Arterial  Arterial  Arterial   ISTATAPHTC  7.350*  7.481  7.565*   SVWIXEQN1NP  96*  132*  77   Interpretation is that patient has significant respiratory alkalosis and respiratory rate has been decreased. Patient remains on PEEP of 10.    HemoDynamics:  Pulse: 65, Heart Rate (Monitored): 65  NIBP: 137/76       Exam: regular rhythm (Sinus)  Imaging: Echo on 2/19/2018:    CONCLUSIONS  Normal right and left ventricular size and function.   No significant valve disease or flow abnormalities.   Dilated inferior vena cava without inspiratory collapse. The patient is   intubated.    No prior study is available for comparison.   Recent Labs      02/18/18   1857   CPKTOTAL  90       Neuro:  GCS Total Coffeen Coma Score: 8       Exam: Heavily  sedated. The patient has chronic traumatic brain injury and multiple contractures. Patient does not follow commands, open eyes to command and has evidence of hyperreflexia. Phenobarbital has been restarted. Patient also remains on Ativan when necessary. Versed has been recommended today for seizure activity, however the rate was decreased to 5 mg per hour maximum. No sign of continuous focal seizures seen.  Imaging: None - Reviewed   EEG per Dr. Bolton shows recurrent right frontal temporal seizure activity.    Fluids:  Intake/Output       02/18/18 0700 - 02/19/18 0659 02/19/18 0700 - 02/20/18 0659 02/20/18 0700 - 02/21/18 0659      2125-6237 8465-8846 Total 9887-5253 5069-2318 Total 1556-7642 5138-4480 Total       Intake    I.V.  --  860.3 860.3  715.8  512 1227.7  --  -- --    Magnesium Sulfate Volume -- -- -- 100 -- 100 -- -- --    Vasopressin Volume -- 61.1 61.1 126.9 106.5 233.4 -- -- --    Phenylephrine Volume -- 121 121 -- -- -- -- -- --    Propofol Volume -- 77.2 77.2 60 97.5 157.5 -- -- --    Midazolam Volume -- -- -- -- 60.7 60.7 -- -- --    Norepinephrine Volume -- 601 601 428.9 247.2 676.1 -- -- --    Total Intake -- 860.3 860.3 715.8 512 1227.7 -- -- --       Output    Urine  75  925 1000  1025  570 1595  --  -- --    Indwelling Cathether --  570 1595 -- -- --    Void (ml) 75 -- 75 -- -- -- -- -- --    Stool/Urine  --  400 400  --  -- --  --  -- --    Measurable Stool (ml) -- 400 400 -- -- -- -- -- --    Stool  --  -- --  --  -- --  --  -- --    Number of Times Stooled 3 x 1 x 4 x -- -- -- -- -- --    Total Output 75 1325 1400 9676 597 9523 -- -- --       Net I/O     -75 -464.7 -539.7 -309.3 -58.1 -367.3 -- -- --        Weight: 59.4 kg (130 lb 15.3 oz)  Recent Labs      02/19/18   0015  02/19/18   0600  02/20/18   0538   SODIUM  145  146*  146*   POTASSIUM  3.9  4.0  2.4*   CHLORIDE  115*  116*  117*   CO2  18*  19*  16*   BUN  54*  44*  28*   CREATININE  0.31*  0.28*  <0.20*   MAGNESIUM   --    1.7  2.0   PHOSPHORUS   --   3.5  1.7*   CALCIUM  8.3*  8.3*  7.6*       GI/Nutrition:  Exam: abdomen is soft and non-tender there is a button PEG will be accessed with patient's home feeding tube. Mild distention is noted. There is also a mechanical device at the midepigastric region palpable. We'll continue with continuous feeds however. Orders written by nutritionist.  Imaging: None - Reviewed  Trickle tube feeding for now.   Liver Function  Recent Labs      18   1857  18   0015  18   0600  18   0538   ALTSGPT  46  43   --    --    ASTSGOT  37  37   --    --    ALKPHOSPHAT  142*  141*   --    --    TBILIRUBIN  0.2  0.2   --    --    DBILIRUBIN  0.1   --    --    --    GLUCOSE  93  148*  121*  142*       Heme:  Recent Labs      18   1740  18   1857   18   0130   18   1031   18   1812  18   0010  18   0538   RBC  4.83   --    --   4.14*   --    --    --    --    --   3.39*   HEMOGLOBIN  13.8*  13.6*   --    < >  11.9*   < >   --    < >  10.2*  10.1*  9.6*   HEMATOCRIT  43.8  44.6   --    < >  35.7*   < >   --    < >  30.3*  30.2*  28.3*   PLATELETCT  161*   --    --   121*   --    --    --    --    --   81*   PROTHROMBTM   --    --    --    --    --   22.2*   --    --    --    --    INR   --    --    --    --    --   1.98*   --    --    --    --    FERRITIN   --   99.7   --    --    --    --    --    --    --    --     < > = values in this interval not displayed.       Infectious Disease:  Monitored Temp  Av.6 °C (99.6 °F)  Min: 36.2 °C (97.2 °F)  Max: 38.3 °C (100.94 °F)  Micro: antibiotics reviewed. The patient remains on antibiotic therapy including Rocephin and Flagyl at this time. He's also had diarrhea and placed in isolation until C. diff is ruled out. No significant bowel movements and unlikely to have C. diff that we are still awaiting final culture. BAL data, with Proteus, lactose fermenting gram-negative mateo, and gram positive  cocci on Gram stain.  Recent Labs      02/18/18   1740  02/18/18   1857  02/19/18   0015  02/19/18   0130  02/20/18   0538   WBC  3.4*   --    --   8.7  18.7*   NEUTSPOLYS  30.70*   --    --   59.50  84.70*   LYMPHOCYTES  12.60*   --    --   5.40*  7.20*   MONOCYTES  3.60   --    --   0.00  2.70   EOSINOPHILS  0.00   --    --   0.00  0.00   BASOPHILS  0.00   --    --   0.00  0.00   ASTSGOT   --   37  37   --    --    ALTSGPT   --   46  43   --    --    ALKPHOSPHAT   --   142*  141*   --    --    TBILIRUBIN   --   0.2  0.2   --    --      Current Facility-Administered Medications   Medication Dose Frequency Provider Last Rate Last Dose   • potassium phosphates 30 mmol in  mL ivpb  30 mmol Once Jon Reynoso D.O.       • potassium chloride in water (KCL) ivpb **Administer in ICU only** 40 mEq  40 mEq Once Jon Reynoso D.O.       • magnesium sulfate IVPB premix 2 g  2 g Once Jon Reynoso D.O.       • hydrocortisone sodium succinate PF (SOLU-CORTEF) 100 MG injection 50 mg  50 mg Q8HRS FARHAT TaborOBucky   50 mg at 02/20/18 0542   • MD ALERT... vancomycin per pharmacy protocol   pharmacy to dose Jon Reynoso D.O.       • midazolam (VERSED) premix 125 mg/125 mL NS  0-10 mg/hr Continuous Martell Hunter M.D. 5 mL/hr at 02/19/18 1900 5 mg/hr at 02/19/18 1900   • vancomycin 900 mg in  mL IVPB  15 mg/kg Q8HR Jon Reynoso D.O.   Stopped at 02/20/18 0425   • Respiratory Care per Protocol   Continuous RT Modesto Appiah Jr. D.O.       • senna-docusate (PERICOLACE or SENOKOT S) 8.6-50 MG per tablet 2 Tab  2 Tab BID Modesto Appiah Jr., D.O.   Stopped at 02/18/18 2100    And   • polyethylene glycol/lytes (MIRALAX) PACKET 1 Packet  1 Packet QDAY PRN Modesto Appiah Jr., D.O.        And   • magnesium hydroxide (MILK OF MAGNESIA) suspension 30 mL  30 mL QDAY PRN Modesto Appiah Jr., D.O.        And   • bisacodyl (DULCOLAX) suppository 10 mg  10 mg QDAY PRN Modesto Appiah Jr., D.O.       •  chlorhexidine (PERIDEX) 0.12 % solution 15 mL  15 mL BID JASPAL Alaniz Jr..O.   15 mL at 02/19/18 2021   • lidocaine (XYLOCAINE) 1%  injection  1-2 mL Q30 MIN PRN Modesto Appiah Jr., D.O.       • MD ALERT...Adult ICU Electrolyte Replacement per Pharmacy Protocol   pharmacy to dose JASPAL Alaniz Jr..SAM.       • fentaNYL (SUBLIMAZE) injection 25 mcg  25 mcg Q HOUR PRN FARHAT Alaniz Jr.O.        Or   • fentaNYL (SUBLIMAZE) injection 50 mcg  50 mcg Q HOUR PRN FARHAT Alaniz Jr.OBucky        Or   • fentaNYL (SUBLIMAZE) injection 100 mcg  100 mcg Q HOUR PRN JASPAL Alaniz Jr..O.       • ipratropium-albuterol (DUONEB) nebulizer solution 3 mL  3 mL Q2HRS PRN (RT) JASPAL Alaniz Jr..O.       • insulin regular (HUMULIN R) injection 1-6 Units  1-6 Units Q6HRS JASPAL Alaniz Jr..OBucky   Stopped at 02/18/18 1800    And   • glucose 4 g chewable tablet 16 g  16 g Q15 MIN PRN Modesto Appiah Jr., D.O.        And   • dextrose 50% (D50W) injection 25 mL  25 mL Q15 MIN PRN JASPAL Alaniz Jr..O.       • MD ALERT...Pharmacy to implement PROPOFOL CRITICAL CARE PROTOCOL 1 Each  1 Each PRN JASPAL Alaniz Jr..SAM.       • levalbuterol (XOPENEX) 1.25 MG/3ML nebulizer solution 1.25 mg  1.25 mg 4X/DAY Duy Whitaker M.D.   1.25 mg at 02/19/18 2223   • levalbuterol (XOPENEX) 1.25 MG/3ML nebulizer solution 1.25 mg  1.25 mg Q4HRS PRN Duy Whitaker M.D.   1.25 mg at 02/20/18 0626   • miconazole 2%-zinc oxide (RADHA) topical cream   PRN Dyu Whitaker M.D.       • NS infusion 1,677 mL  30 mL/kg Once PRN Duy Whitaker M.D.       • NS (BOLUS) infusion 500 mL  500 mL Once PRN Duy Wihtaker M.D.       • acetaminophen (TYLENOL) tablet 650 mg  650 mg Q6HRS PRN Duy Whitaker M.D.       • Pharmacy Consult Request ...Pain Management Review   PRN Duy Whitaker M.D.        And   • oxyCODONE immediate-release (ROXICODONE) tablet 5 mg  5 mg Q3HRS PRN Duy Whitaker M.D.        And   • oxyCODONE immediate release  (ROXICODONE) tablet 10 mg  10 mg Q3HRS PRN Duy Whitaker M.D.        And   • morphine (pf) 4 mg/ml injection 4 mg  4 mg Q3HRS PRN Duy Whitaker M.D.       • metroNIDAZOLE (FLAGYL) IVPB 500 mg  500 mg Q8HRS Duy Whitaker M.D.   Stopped at 02/20/18 0648   • norepinephrine (LEVOPHED) 8 mg in  mL Infusion  0.5-30 mcg/min Continuous Duy Whitaker M.D. 11.3 mL/hr at 02/20/18 0228 6 mcg/min at 02/20/18 0228    And   • vasopressin (VASOSTRICT) 20 Units in  mL Infusion  0.03 Units/min Continuous Dyu Whitaker M.D. 9 mL/hr at 02/20/18 0543 0.03 Units/min at 02/20/18 0543   • ondansetron (ZOFRAN) syringe/vial injection 4 mg  4 mg Q4HRS PRN Duy Whitaker M.D.       • ondansetron (ZOFRAN ODT) dispertab 4 mg  4 mg Q4HRS PRN Duy Whitaker M.D.       • promethazine (PHENERGAN) tablet 12.5-25 mg  12.5-25 mg Q4HRS PRN Duy Whitaker M.D.       • promethazine (PHENERGAN) suppository 12.5-25 mg  12.5-25 mg Q4HRS PRN Duy Whitaker M.D.       • prochlorperazine (COMPAZINE) injection 5-10 mg  5-10 mg Q4HRS PRN Duy Whitaker M.D.       • LORazepam (ATIVAN) injection 4 mg  4 mg Q10 MIN PRN Duy Whitaker M.D.   4 mg at 02/18/18 1946   • pantoprazole (PROTONIX) injection 40 mg  40 mg BID Modesto Appiah Jr., D.O.   40 mg at 02/19/18 2022   • propofol (DIPRIVAN) injection  0-80 mcg/kg/min Continuous Modesto Appiah Jr., D.O. 8.4 mL/hr at 02/20/18 0542 25 mcg/kg/min at 02/20/18 0542   • phenylephrine (YAO-SYNEPHRINE) 80,000 mcg in  mL Infusion  0-300 mcg/min Continuous Modesto Appiah Jr., D.O.   Stopped at 02/18/18 2124   • cefTRIAXone (ROCEPHIN) syringe 2 g  2 g Q24HRS Duy Whitaker M.D.   2 g at 02/19/18 0933   • NS infusion   Continuous Duy Whitaker M.D. 125 mL/hr at 02/20/18 0227     • PHENobarbital solution 10 mg  10 mg BID Maci Jade M.D.   Stopped at 02/18/18 2130   • levETIRAcetam (KEPPRA) 1,500 mg in  mL IVPB  1,500 mg Q12HRS Duy Whitaker M.D.   Stopped at 02/19/18 2038   •  lacosamide (VIMPAT) 200 mg in  mL ivpb  200 mg Q12HRS Maci Jade M.D.   Stopped at 02/19/18 2149     Last reviewed on 2/18/2018  7:51 PM by Lois Camejo, Pharmacy Intern    Quality  Measures:  Labs reviewed, Medications reviewed and Radiology images reviewed  Waldrop catheter: Critically Ill - Requiring Accurate Measurement of Urinary Output  Central line in place: Sepsis, Shock and Vasopressors      DVT prophylaxis - mechanical: Contra-indicated  Ulcer prophylaxis: Yes      Assessment and plan:    1. Acute hypoxemic respiratory failure.    -Patient has sizable left-sided pneumonia. Appears to be acute with probable scarring from previous left-sided pneumonia, though likely the cause of decompensation. There is no doubt some scarring and the effect of the BAL however his shock is related to pneumonia.  -Will need broad-spectrum antibiotic coverage including Rocephin and Flagyl and would add vancomycin in this critically ill patient with tracheostomy  -Continue bronchodilator therapy.  -Follow-up arterial blood gas, and because of respiratory alkalosis, we have made adjustments of the respiratory rate, and increase the PEEP.  -Await BAL data and consolidation of antibiotic therapy as able.    2. Septic shock.    -Patient has evidence of severe hypotension requiring vasopressors as well as hydrocortisone. Patient remains on vasopressin as well.  -Lactate is improved.  -Additional fluid bolus for albumin may be helpful, with concentrated albumin.    3. History of traumatic brain injury.    -Patient is in status epilepticus and Versed has been recommended to be added to current treatment, with increase in phenobarbital today.  -This includes Keppra, Depakote, phenobarbital, and Ativan.  -Appreciate neurology's assessment and recommendations.   -EEG was recommended.    4.  Decubitus ulcerations of the left hip including stage IV disease.    -Possibly may require more than wound care and consideration for  orthopedic evaluation and biopsy if does not improve.  -Appreciate wound care assistance today.  -Overall appeared stable.    5. Diarrhea.    -Doubt C. difficile colitis but this is a possibility. Patient was placed in isolation and C. diff is pending.  -Rectal Waldrop has been placed.    Comment: Please see discussion on a separate critical care note with family members today.    Discussed patient condition and risk of morbidity and/or mortality with Charge nurse / hot rounds.  M.D. rounds  The patient remains critically ill.  Critical care time = 35 minutes in directly providing and coordinating critical care and extensive data review.  No time overlap and excludes procedures.    Jon Reynoso D.O.

## 2018-02-20 NOTE — PROGRESS NOTES
NEUROLOGY PROGRESS NOTE      Background:  37 y.o. male was admitted on 2/18/2018  2:32 PM for Acute Hypoxic Respiratory Failure  Acute respiratory failure with hypoxia (CMS-HCC).  He is being followed by Neurology for intractable seizure.    SUBJECTIVE: Unresponsive, on vent. Continue to have intermittent facial twitching.    NEUROLOGICAL EXAM:   Remain unresponsive. Pupils are equal and sluggishly reactive. 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      OBJECTIVE:     MEDICATIONS:  Current Facility-Administered Medications   Medication Dose   • hydrocortisone sodium succinate PF (SOLU-CORTEF) 100 MG injection 50 mg  50 mg   • Respiratory Care per Protocol     • senna-docusate (PERICOLACE or SENOKOT S) 8.6-50 MG per tablet 2 Tab  2 Tab    And   • polyethylene glycol/lytes (MIRALAX) PACKET 1 Packet  1 Packet    And   • magnesium hydroxide (MILK OF MAGNESIA) suspension 30 mL  30 mL    And   • bisacodyl (DULCOLAX) suppository 10 mg  10 mg   • chlorhexidine (PERIDEX) 0.12 % solution 15 mL  15 mL   • lidocaine (XYLOCAINE) 1%  injection  1-2 mL   • MD ALERT...Adult ICU Electrolyte Replacement per Pharmacy Protocol     • fentaNYL (SUBLIMAZE) injection 25 mcg  25 mcg    Or   • fentaNYL (SUBLIMAZE) injection 50 mcg  50 mcg    Or   • fentaNYL (SUBLIMAZE) injection 100 mcg  100 mcg   • ipratropium-albuterol (DUONEB) nebulizer solution 3 mL  3 mL   • insulin regular (HUMULIN R) injection 1-6 Units  1-6 Units    And   • glucose 4 g chewable tablet 16 g  16 g    And   • dextrose 50% (D50W) injection 25 mL  25 mL   • MD ALERT...Pharmacy to implement PROPOFOL CRITICAL CARE PROTOCOL 1 Each  1 Each   • levalbuterol (XOPENEX) 1.25 MG/3ML nebulizer solution 1.25 mg  1.25 mg   • levalbuterol (XOPENEX) 1.25 MG/3ML nebulizer solution 1.25 mg  1.25 mg   • miconazole 2%-zinc oxide (RADHA) topical cream     • NS infusion 1,677 mL  30 mL/kg   • NS (BOLUS) infusion 500 mL  500 mL   • enoxaparin (LOVENOX) inj  "40 mg  40 mg   • acetaminophen (TYLENOL) tablet 650 mg  650 mg   • Pharmacy Consult Request ...Pain Management Review      And   • oxyCODONE immediate-release (ROXICODONE) tablet 5 mg  5 mg    And   • oxyCODONE immediate release (ROXICODONE) tablet 10 mg  10 mg    And   • morphine (pf) 4 mg/ml injection 4 mg  4 mg   • metroNIDAZOLE (FLAGYL) IVPB 500 mg  500 mg   • norepinephrine (LEVOPHED) 8 mg in  mL Infusion  0.5-30 mcg/min    And   • vasopressin (VASOSTRICT) 20 Units in  mL Infusion  0.03 Units/min   • ondansetron (ZOFRAN) syringe/vial injection 4 mg  4 mg   • ondansetron (ZOFRAN ODT) dispertab 4 mg  4 mg   • promethazine (PHENERGAN) tablet 12.5-25 mg  12.5-25 mg   • promethazine (PHENERGAN) suppository 12.5-25 mg  12.5-25 mg   • prochlorperazine (COMPAZINE) injection 5-10 mg  5-10 mg   • LORazepam (ATIVAN) injection 4 mg  4 mg   • pantoprazole (PROTONIX) injection 40 mg  40 mg   • propofol (DIPRIVAN) injection  0-80 mcg/kg/min   • phenylephrine (YAO-SYNEPHRINE) 80,000 mcg in  mL Infusion  0-300 mcg/min   • cefTRIAXone (ROCEPHIN) syringe 2 g  2 g   • NS infusion     • PHENobarbital solution 10 mg  10 mg   • levETIRAcetam (KEPPRA) 1,500 mg in  mL IVPB  1,500 mg   • lacosamide (VIMPAT) 200 mg in  mL ivpb  200 mg       Pulse 91, temperature 36.2 °C (97.1 °F), resp. rate (!) 25, height 1.803 m (5' 11\"), weight 59.7 kg (131 lb 9.8 oz), SpO2 93 %.    Recent Labs      02/18/18   1857   CPKTOTAL  90     Recent Labs      02/18/18   1740   02/19/18   0130  02/19/18   0600  02/19/18   1146   WBC  3.4*   --   8.7   --    --    RBC  4.83   --   4.14*   --    --    HEMOGLOBIN  13.8*  13.6*   < >  11.9*  11.4*  10.9*   HEMATOCRIT  43.8  44.6   < >  35.7*  33.8*  31.9*   MCV  90.7   --   86.2   --    --    MCH  28.6   --   28.7   --    --    MCHC  31.5*   --   33.3*   --    --    RDW  62.1*   --   56.9*   --    --    PLATELETCT  161*   --   121*   --    --    MPV  11.8   --   11.0   --    --     < " > = values in this interval not displayed.     Recent Labs      02/18/18   1857  02/19/18   0015  02/19/18   0600   SODIUM  143  145  146*   POTASSIUM  4.0  3.9  4.0   CHLORIDE  112  115*  116*   CO2  20  18*  19*   GLUCOSE  93  148*  121*   BUN  67*  54*  44*   CPKTOTAL  90   --    --        No results found for this or any previous visit.     ASSESSMENT AND PLAN:   37 y.o. male with history of a [place] and seizure disorder who presents with increase frequency of seizures as well as aspiration pneumonia. The increased frequency of his seizures might have been caused by the tapering of his phenobarbital. --Will Continue Keppra 1500 mg IV BID  Continue Phenobarbital 10 mg suspension BID  Continue Vimpat 200 mg IV BID  Suggested to start the Versed drip, as the patient continued to have intermittent facial twitching.

## 2018-02-20 NOTE — PROGRESS NOTES
RN at bedside with unit clerkKathleen. PT turned to position requested by mother off wounds. Discussed turning pt from right side to back every 2 hrs to prevent patient lying on wounds.Wound care discussed with mother and wound care nurse, Pamela. All questions answered and wound plan of care clarified with mother. Dr. Reynoso at bedside. Update given to mother and all questions answered.

## 2018-02-20 NOTE — PROGRESS NOTES
"Pharmacy Kinetics 37 y.o. male on vancomycin day # 1 2018    Vancomycin New Start  Other antibiotics: ceftriaxone 2 grams iv q24h, metronidazole 500 mg iv q8h    Indication for Treatment: PNA    Pertinent history per medical record: Admitted on 2018 for hypoxia, seizure, aspiration, and HTN.  He began having recurrent seizures the last 2 weeks PTA since his phenobarbital has been tapering down.  He also had an aspiration episode 2 days PTA, the on  he had multiple episodes of vomiting and witnessed aspiration.  He was started on ceftriaxone and metronidazole on admit, vancomycin has been added, pending finalization of culture report.    Allergies: Sulfa drugs     List concerns for renal function: BUN/SCr > 20:1, low albumin, SIRS with elevated lactic acid, pressor support    Pertinent cultures to date:   18 blood-central line:  NGTD  18 blood-peripheral x 1 NGTD  18 BAL:  Few GPC, > 5% intracellular organisms    Recent Labs      18   1740  18   0130   WBC  3.4*  8.7   NEUTSPOLYS  30.70*  59.50   BANDSSTABS  23.40*  9.90     Recent Labs      18   1857  18   0015  18   0600   BUN  67*  54*  44*   CREATININE  0.32*  0.31*  0.28*   ALBUMIN  2.6*  2.6*   --      Intake/Output Summary (Last 24 hours) at 18 1659  Last data filed at 18 1600   Gross per 24 hour   Intake          1491.85 ml   Output             2200 ml   Net          -708.15 ml      Pulse 77, temperature 36.2 °C (97.1 °F), resp. rate (!) 25, height 1.803 m (5' 11\"), weight 59.7 kg (131 lb 9.8 oz), SpO2 97 %. Temp (24hrs), Av.2 °C (97.1 °F), Min:36.2 °C (97.1 °F), Max:36.2 °C (97.1 °F)      A/P   1. Vancomycin dose change: vancomycin 1500 mg iv x 1 followed by 900 mg iv q8h (0130,0930,1730)  2. Next vancomycin level:  at 1700  3. Goal trough: 16-20 mcg/mL  4. Assessment: Patient is at risk for accumulation.  Reviewed vancomycin dosing regimen and respective level from previous " admission.  5. Plan:  Ordered a loading dose followed by 15 mg/kg iv q8h with a follow up level prior to the 4th total dose.    Marie Lion, Pharm.D., BCPS

## 2018-02-20 NOTE — CARE PLAN
Problem: Skin Integrity  Goal: Risk for impaired skin integrity will decrease  Outcome: PROGRESSING SLOWER THAN EXPECTED  Pt has pressure sores that were present on admission. Wound care consulted. Turn Q2. Less time on L side.

## 2018-02-20 NOTE — CARE PLAN
Problem: Communication  Goal: The ability to communicate needs accurately and effectively will improve  Outcome: PROGRESSING SLOWER THAN EXPECTED      Problem: Safety  Goal: Will remain free from injury  Outcome: PROGRESSING AS EXPECTED      Problem: Bowel/Gastric:  Goal: Will not experience complications related to bowel motility  Outcome: PROGRESSING AS EXPECTED

## 2018-02-20 NOTE — CARE PLAN
Problem: Ventilation Defect:  Goal: Ability to achieve and maintain unassisted ventilation or tolerate decreased levels of ventilator support  Outcome: PROGRESSING AS EXPECTED  Adult Ventilation Update    Total Vent Days: 2    Patient Lines/Drains/Airways Status    Active Airway     Name: Placement date: Placement time: Site: Days:    Airway Group Standard Cuffed Trach Tracheostomy 6.0 07/02/17   1403   Tracheostomy   232    Airway Group Tracheostomy 6.0       Tracheostomy                    Static Compliance (ml / cm H2O): 37 (02/19/18 0629)    Patient failed trials because of Barriers to Wean: Other   The patient is currently in no wean according to protocol.    Sputum/Suction ETT  Cough: Productive (02/19/18 1600)  Sputum Amount: Moderate (02/19/18 1600)  Sputum Color: Bloody (02/19/18 1600)  Sputum Consistency: Thick;Thin (02/19/18 1600)    Mobility Group  Activity Performed: Unable to mobilize   Pt Calls for Assistance: No   Reason Not Mobilized: Unstable condition (02/19/18 1600)    Events/Summary/Plan: patient moved to new room as R/O c-diff.  Weaned RR and PEEP this shift.  Mother of patient notified us that patient is just on humidity to trach collar at home and not on home ventilator.

## 2018-02-20 NOTE — PROGRESS NOTES
NEUROLOGY PROGRESS NOTE      Background:  37 y.o. male was admitted on 2/18/2018  2:32 PM for Acute Hypoxic Respiratory Failure  Acute respiratory failure with hypoxia (CMS-HCC).  He is being followed by Neurology for intractable seizure.    SUBJECTIVE: Unresponsive, on vent. Started on continuous EEG monitoring and reported to have  Recurrent, electrographic, right frontotemporal seizures.    NEUROLOGICAL EXAM:   Remain unresponsive. Pupils are equal and sluggishly reactive. 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      OBJECTIVE:     MEDICATIONS:  Current Facility-Administered Medications   Medication Dose   • potassium phosphates 30 mmol in  mL ivpb  30 mmol   • potassium chloride in water (KCL) ivpb **Administer in ICU only** 20 mEq  20 mEq   • PHENobarbital solution 30 mg  30 mg   • hydrocortisone sodium succinate PF (SOLU-CORTEF) 100 MG injection 50 mg  50 mg   • MD ALERT... vancomycin per pharmacy protocol     • midazolam (VERSED) premix 125 mg/125 mL NS  5 mg/hr   • vancomycin 900 mg in  mL IVPB  15 mg/kg   • Respiratory Care per Protocol     • senna-docusate (PERICOLACE or SENOKOT S) 8.6-50 MG per tablet 2 Tab  2 Tab    And   • polyethylene glycol/lytes (MIRALAX) PACKET 1 Packet  1 Packet    And   • magnesium hydroxide (MILK OF MAGNESIA) suspension 30 mL  30 mL    And   • bisacodyl (DULCOLAX) suppository 10 mg  10 mg   • chlorhexidine (PERIDEX) 0.12 % solution 15 mL  15 mL   • lidocaine (XYLOCAINE) 1%  injection  1-2 mL   • MD ALERT...Adult ICU Electrolyte Replacement per Pharmacy Protocol     • fentaNYL (SUBLIMAZE) injection 25 mcg  25 mcg    Or   • fentaNYL (SUBLIMAZE) injection 50 mcg  50 mcg    Or   • fentaNYL (SUBLIMAZE) injection 100 mcg  100 mcg   • ipratropium-albuterol (DUONEB) nebulizer solution 3 mL  3 mL   • insulin regular (HUMULIN R) injection 1-6 Units  1-6 Units    And   • glucose 4 g chewable tablet 16 g  16 g    And   • dextrose 50% (D50W)  "injection 25 mL  25 mL   • MD ALERT...Pharmacy to implement PROPOFOL CRITICAL CARE PROTOCOL 1 Each  1 Each   • levalbuterol (XOPENEX) 1.25 MG/3ML nebulizer solution 1.25 mg  1.25 mg   • levalbuterol (XOPENEX) 1.25 MG/3ML nebulizer solution 1.25 mg  1.25 mg   • miconazole 2%-zinc oxide (RADHA) topical cream     • NS infusion 1,677 mL  30 mL/kg   • NS (BOLUS) infusion 500 mL  500 mL   • acetaminophen (TYLENOL) tablet 650 mg  650 mg   • Pharmacy Consult Request ...Pain Management Review      And   • oxyCODONE immediate-release (ROXICODONE) tablet 5 mg  5 mg    And   • oxyCODONE immediate release (ROXICODONE) tablet 10 mg  10 mg    And   • morphine (pf) 4 mg/ml injection 4 mg  4 mg   • metroNIDAZOLE (FLAGYL) IVPB 500 mg  500 mg   • norepinephrine (LEVOPHED) 8 mg in  mL Infusion  0.5-30 mcg/min    And   • vasopressin (VASOSTRICT) 20 Units in  mL Infusion  0.03 Units/min   • ondansetron (ZOFRAN) syringe/vial injection 4 mg  4 mg   • ondansetron (ZOFRAN ODT) dispertab 4 mg  4 mg   • promethazine (PHENERGAN) tablet 12.5-25 mg  12.5-25 mg   • promethazine (PHENERGAN) suppository 12.5-25 mg  12.5-25 mg   • prochlorperazine (COMPAZINE) injection 5-10 mg  5-10 mg   • LORazepam (ATIVAN) injection 4 mg  4 mg   • pantoprazole (PROTONIX) injection 40 mg  40 mg   • propofol (DIPRIVAN) injection  0-80 mcg/kg/min   • phenylephrine (YAO-SYNEPHRINE) 80,000 mcg in  mL Infusion  0-300 mcg/min   • cefTRIAXone (ROCEPHIN) syringe 2 g  2 g   • NS infusion     • PHENobarbital solution 10 mg  10 mg   • levETIRAcetam (KEPPRA) 1,500 mg in  mL IVPB  1,500 mg   • lacosamide (VIMPAT) 200 mg in  mL ivpb  200 mg       Pulse 65, temperature 36.2 °C (97.1 °F), resp. rate 15, height 1.727 m (5' 8\"), weight 59.4 kg (130 lb 15.3 oz), SpO2 96 %.    Recent Labs      02/18/18   1857   CPKTOTAL  90     Recent Labs      02/18/18   1740   02/19/18   0130   02/20/18   0010  02/20/18   0538  02/20/18   0856   WBC  3.4*   --   8.7   --  "   --   18.7*   --    RBC  4.83   --   4.14*   --    --   3.39*   --    HEMOGLOBIN  13.8*  13.6*   < >  11.9*   < >  10.1*  9.6*  9.1*   HEMATOCRIT  43.8  44.6   < >  35.7*   < >  30.2*  28.3*  26.5*   MCV  90.7   --   86.2   --    --   83.5   --    MCH  28.6   --   28.7   --    --   28.3   --    MCHC  31.5*   --   33.3*   --    --   33.9   --    RDW  62.1*   --   56.9*   --    --   53.8*   --    PLATELETCT  161*   --   121*   --    --   81*   --    MPV  11.8   --   11.0   --    --   10.5   --     < > = values in this interval not displayed.     Recent Labs      02/18/18   1857  02/19/18   0015  02/19/18   0600  02/20/18   0538   SODIUM  143  145  146*  146*   POTASSIUM  4.0  3.9  4.0  2.4*   CHLORIDE  112  115*  116*  117*   CO2  20  18*  19*  16*   GLUCOSE  93  148*  121*  142*   BUN  67*  54*  44*  28*   CPKTOTAL  90   --    --    --        Results for orders placed or performed during the hospital encounter of 02/18/18   Echocardiogram Comp W/O Cont   Result Value Ref Range    Eject.Frac. MOD BP 66.03     Eject.Frac. MOD 4C 64.91     Eject.Frac. MOD 2C 66.35     Left Ventrical Ejection Fraction 65         ASSESSMENT AND PLAN:   37 y.o. male with history of a [place] and seizure disorder who presents with increase frequency of seizures as well as aspiration pneumonia. The increased frequency of his seizures might have been caused by the tapering of his phenobarbital. --Will Continue Keppra 1500 mg IV BID  Increase Phenobarbital to 30 mg suspension BID  Continue Vimpat 200 mg IV BID  Continue with Versed drip.

## 2018-02-20 NOTE — PROGRESS NOTES
[facility] Hospitalist Progress Note    Date of Service: 2018    Chief Complaint  37 y.o. male with hx of traumatic brain injury (baseline nonverbal and immobile), with a hx of seizures admitted 2018 with septic shock.    Interval Problem Update  Patient had been weaned off his phenobarbital, was noted to have seizures while here so phenobarbital restarted.  Presumed aspiration pneumonia during a seizure, now causing septic shock.  Patient was noted to have shock, remains on pressor support.   Discussed case with family at bedside.   Patient seen and examined in the ICU, IC care given.   Discussed patient condition and plan with Intensivist, RN, RT and charge nurse / hot rounds.    Not responsive at baseline  Levo at 4  Vaso at 0.03  NSR  Afebrile  No feeding yet  50/hr on average for uop with rosas  Distended with emesis yesterday, resolved, ok to start using button peg  No BM, c-diff pending  Persistent left pna on cxr  Leg wound  Replaced k, mg, phos  Versed gtt at 6    Consultants/Specialty  Neurology  Pulmonary    Disposition  Patient requires additional treatment in the hospital    Patient is critically ill.   The patient continues to have : Septic shock  The vital organ system that is effected is the: All her risk   If untreated there is a high chance of deterioration into: death   The critical care that I am providing today is: levophed gtt  The critical care that has been undertaken is medically complex.   There has been no overlap in critical care time.  Critical care time not including procedures, no overlap: 52 minutes      Review of Systems   Unable to perform ROS: Intubated      Physical Exam  Laboratory/Imaging   Hemodynamics  No data recorded.   Monitored Temp: 36.3 °C (97.3 °F)  Pulse  Av.2  Min: 65  Max: 119 Heart Rate (Monitored): 65  NIBP: 137/76      Respiratory  Liriano Vent Mode: APVCMV, Rate (breaths/min): 16, PEEP/CPAP: 10, PEEP/CPAP: 10, FiO2: 30, P Peak (PIP): 27, P MEAN: 14    Respiration: 15, Pulse Oximetry: 98 %     Work Of Breathing / Effort: Vented  RUL Breath Sounds: Clear After Suction, RML Breath Sounds: Clear After Suction, RLL Breath Sounds: Diminished, JONATAN Breath Sounds: Clear After Suction, LLL Breath Sounds: Diminished    Fluids    Intake/Output Summary (Last 24 hours) at 02/20/18 0744  Last data filed at 02/20/18 0600   Gross per 24 hour   Intake           1227.7 ml   Output             1595 ml   Net           -367.3 ml       Nutrition  Orders Placed This Encounter   Procedures   • Diet NPO     Standing Status:   Standing     Number of Occurrences:   1     Order Specific Question:   Restrict to:     Answer:   Strict [1]     Physical Exam   Constitutional: He appears cachectic. He has a sickly appearance. No distress. He is intubated.   HENT:   Head: Normocephalic and atraumatic.   Eyes: Right eye exhibits no discharge. Left eye exhibits no discharge.   Neck: Neck supple. No tracheal deviation present.   Trach   Cardiovascular: Normal rate and regular rhythm.  Exam reveals no gallop and no friction rub.    No murmur heard.  Pulmonary/Chest: No stridor. He is intubated. He has decreased breath sounds. He has no wheezes. He has no rhonchi. He has rales.   Abdominal: Soft. He exhibits distension. Bowel sounds are decreased.   Peg    Musculoskeletal: He exhibits edema and deformity (contracture ).   Lymphadenopathy:     He has no cervical adenopathy.   Neurological:   Intubated, nonverbal    Skin: Skin is warm and dry. He is not diaphoretic.   Psychiatric: He is noncommunicative.   Nursing note and vitals reviewed.      Recent Labs      02/18/18   1740   02/19/18   0130   02/19/18   1812  02/20/18   0010  02/20/18   0538   WBC  3.4*   --   8.7   --    --    --   18.7*   RBC  4.83   --   4.14*   --    --    --   3.39*   HEMOGLOBIN  13.8*  13.6*   < >  11.9*   < >  10.2*  10.1*  9.6*   HEMATOCRIT  43.8  44.6   < >  35.7*   < >  30.3*  30.2*  28.3*   MCV  90.7   --   86.2   --     --    --   83.5   MCH  28.6   --   28.7   --    --    --   28.3   MCHC  31.5*   --   33.3*   --    --    --   33.9   RDW  62.1*   --   56.9*   --    --    --   53.8*   PLATELETCT  161*   --   121*   --    --    --   81*   MPV  11.8   --   11.0   --    --    --   10.5    < > = values in this interval not displayed.     Recent Labs      02/19/18   0015  02/19/18   0600  02/20/18   0538   SODIUM  145  146*  146*   POTASSIUM  3.9  4.0  2.4*   CHLORIDE  115*  116*  117*   CO2  18*  19*  16*   GLUCOSE  148*  121*  142*   BUN  54*  44*  28*   CREATININE  0.31*  0.28*  <0.20*   CALCIUM  8.3*  8.3*  7.6*     Recent Labs      02/19/18   1031   INR  1.98*         Recent Labs      02/18/18   1740   TRIGLYCERIDE  40          Assessment/Plan     GI bleed   Assessment & Plan    - Possibly Carmencita-White tear, patient did have multiple episodes of severe vomiting  - Appears resolved, closely monitor bowel movements        Acute on chronic respiratory failure with hypoxemia (CMS-HCC)   Assessment & Plan    - History of trach, now acutely worsened  - Full vent support, vent management per intensivist        Aspiration pneumonia (CMS-HCC)- (present on admission)   Assessment & Plan    - Causing septic shock  - Continue vancomycin, Rocephin and Flagyl  - Await culture results, patient is status post bronch        Hypernatremia   Assessment & Plan    - Mild and stable, repeat BMP in the morning        Hypomagnesemia   Assessment & Plan    - Resolved        Decubitus ulcer of ischium, left, stage IV (CMS-HCC)   Assessment & Plan    - Continue wound care        Azotemia   Assessment & Plan    - Resolved        Diarrhea   Assessment & Plan    - Resolved          Septic shock (CMS-HCC)   Assessment & Plan    - Likely due to aspiration, await results of bronchoscopy  - Continue norepinephrine drip  - Continue vancomycin, Rocephin and Flagyl  - Await culture results        Moderate protein malnutrition (CMS-HCC)   Assessment & Plan    Restart  enteral nutrition via PEG 2/20        Seizure (CMS-HCC)   Assessment & Plan    - Continuous EEG per neurology  - Continue Vimpat, Keppra, phenobarbital as well as Versed drip  -Additional recommendations per neurology        Physical debility   Assessment & Plan    Chronic due to TBI  Skin/wound care.  Nutritional support        TBI (traumatic brain injury) (CMS-HCC)- (present on admission)   Assessment & Plan    - Chronic, nonverbal        Contraction, joint, multiple sites   Assessment & Plan    - Chronic, PT/OT          Quality-Core Measures   Reviewed items::  Labs reviewed, Medications reviewed and Radiology images reviewed  Waldrop catheter::  Critically Ill - Requiring Accurate Measurement of Urinary Output, Strict Intake and Output During Sepisis or Shock and Unconscious / Sedated Patient on a Ventilator  Central line in place:  Need for access, Sepsis, Shock and Vasopressors  DVT prophylaxis pharmacological::  Enoxaparin (Lovenox)  Ulcer Prophylaxis::  Yes  Antibiotics:  Treating active infection/contamination beyond 24 hours perioperative coverage

## 2018-02-20 NOTE — ASSESSMENT & PLAN NOTE
- Improved, now on low flow O2, family would prefer to go home without oxygen, doing well on current set up, will adjust so that its patients that appears exact same as at home  - Was placed on ventilator initially on 2/18  - Due to aspiration pneumonia during seizures  - Patient was on oxygen at home, try to wean off oxygen prior to discharge

## 2018-02-20 NOTE — DIETARY
"Nutrition Support Assessment   Day 2 of admit.  38 yo male with admitting diagnosis: severe sepsis, aspiration PNA, acute on chronic respiratory failure w/ hypoxia, ongoing seizures     Current problem list:  1. Respiratory failure on vent  2. Spastic quadriplegia with significant contracture of all extremities  3. Multiple pressure ulcers     Assessment:  Estimated Nutritional Needs: based on: height 68\", weight 56 kg, IBW 78 kg, BMI 19.9    Calculation/Equation: REE based on MSJ= 1460 kcal/d  Calories/day: 1879-8205 kcal/d (31-34 kcal/kg and 1.2-1.3 x REE)  Protein/day:  gm/d (1.5-2.0 gm/kg)    Evaluation:   1. G-tube present on admission used for nutrition support at home  2. Patient was previously on home made enteral feeds.  3. Na 146, K+ 2.4, Phos 1.7- being followed and replaced by PharmD  4. Glucose 142, BUN improving at 28  5. Patient is on Levophed and Vasopressin.  Would benefit from peptide based feeds.   6. Propofol running at 8.4 ml/hr per MAR providing 222 kcal/d  7. Increased needs for kcal and protein related to critical illness and pressure ulcers.  8. Wounds present per wound team: Stage 4 POA Ischium Left Lateral, Unstageable POA Foot;Digit 1 Left, POA Deep tissue injury Ischium Left Medial  9. Patient had emesis and distention yesterday. Tube feeds to resume at low rate to start.    Malnutrition Risk: Chronic disease related malnutrition. Cachectic appearance.     Recommendations/Plan:  1. Start Impact Peptide @ 10 ml/hr.  Advance per tolerance and MD to goal rate of 50 ml/hr.  This will provide 1800 kcal/d (2022 kcal/d with Propofol),  924 ml/d free water and 113 gm protein per day.    2. No adjustment for Propofol at this time.     RD following.   "

## 2018-02-20 NOTE — ASSESSMENT & PLAN NOTE
- Chronic  - has baclofen pump which was refilled in January  - Pump has been interrogating and is working properly

## 2018-02-21 ENCOUNTER — APPOINTMENT (OUTPATIENT)
Dept: RADIOLOGY | Facility: MEDICAL CENTER | Age: 38
DRG: 853 | End: 2018-02-21
Attending: INTERNAL MEDICINE
Payer: COMMERCIAL

## 2018-02-21 LAB
ANION GAP SERPL CALC-SCNC: 7 MMOL/L (ref 0–11.9)
ANISOCYTOSIS BLD QL SMEAR: ABNORMAL
BACTERIA BRONCH AEROBE CULT: ABNORMAL
BASOPHILS # BLD AUTO: 0 % (ref 0–1.8)
BASOPHILS # BLD: 0 K/UL (ref 0–0.12)
BUN SERPL-MCNC: 34 MG/DL (ref 8–22)
BURR CELLS BLD QL SMEAR: NORMAL
C DIFF DNA SPEC QL NAA+PROBE: NEGATIVE
C DIFF TOX A+B STL QL IA: NEGATIVE
C DIFF TOX GENS STL QL NAA+PROBE: NORMAL
CALCIUM SERPL-MCNC: 7.6 MG/DL (ref 8.5–10.5)
CHLORIDE SERPL-SCNC: 122 MMOL/L (ref 96–112)
CO2 SERPL-SCNC: 18 MMOL/L (ref 20–33)
CREAT SERPL-MCNC: 0.21 MG/DL (ref 0.5–1.4)
EOSINOPHIL # BLD AUTO: 0 K/UL (ref 0–0.51)
EOSINOPHIL NFR BLD: 0 % (ref 0–6.9)
ERYTHROCYTE [DISTWIDTH] IN BLOOD BY AUTOMATED COUNT: 54.6 FL (ref 35.9–50)
GLUCOSE BLD-MCNC: 128 MG/DL (ref 65–99)
GLUCOSE BLD-MCNC: 153 MG/DL (ref 65–99)
GLUCOSE BLD-MCNC: 167 MG/DL (ref 65–99)
GLUCOSE SERPL-MCNC: 138 MG/DL (ref 65–99)
GRAM STN SPEC: ABNORMAL
HCT VFR BLD AUTO: 26.4 % (ref 42–52)
HCT VFR BLD AUTO: 26.4 % (ref 42–52)
HGB BLD-MCNC: 8.9 G/DL (ref 14–18)
HGB BLD-MCNC: 8.9 G/DL (ref 14–18)
LYMPHOCYTES # BLD AUTO: 1.34 K/UL (ref 1–4.8)
LYMPHOCYTES NFR BLD: 9.3 % (ref 22–41)
MAGNESIUM SERPL-MCNC: 2.4 MG/DL (ref 1.5–2.5)
MANUAL DIFF BLD: NORMAL
MCH RBC QN AUTO: 28.3 PG (ref 27–33)
MCHC RBC AUTO-ENTMCNC: 33.7 G/DL (ref 33.7–35.3)
MCV RBC AUTO: 84.1 FL (ref 81.4–97.8)
METAMYELOCYTES NFR BLD MANUAL: 4.6 %
MICROCYTES BLD QL SMEAR: ABNORMAL
MONOCYTES # BLD AUTO: 0 K/UL (ref 0–0.85)
MONOCYTES NFR BLD AUTO: 0 % (ref 0–13.4)
MORPHOLOGY BLD-IMP: NORMAL
MYELOCYTES NFR BLD MANUAL: 0.9 %
NEUTROPHILS # BLD AUTO: 12.27 K/UL (ref 1.82–7.42)
NEUTROPHILS NFR BLD: 78.7 % (ref 44–72)
NEUTS BAND NFR BLD MANUAL: 6.5 % (ref 0–10)
NRBC # BLD AUTO: 0.06 K/UL
NRBC BLD-RTO: 0.4 /100 WBC
OVALOCYTES BLD QL SMEAR: NORMAL
PHOSPHATE SERPL-MCNC: 2.6 MG/DL (ref 2.5–4.5)
PLATELET # BLD AUTO: 57 K/UL (ref 164–446)
PLATELET BLD QL SMEAR: NORMAL
PMV BLD AUTO: 11.1 FL (ref 9–12.9)
POIKILOCYTOSIS BLD QL SMEAR: NORMAL
POTASSIUM SERPL-SCNC: 3.4 MMOL/L (ref 3.6–5.5)
RBC # BLD AUTO: 3.14 M/UL (ref 4.7–6.1)
RBC BLD AUTO: PRESENT
SIGNIFICANT IND 70042: ABNORMAL
SITE SITE: ABNORMAL
SODIUM SERPL-SCNC: 147 MMOL/L (ref 135–145)
SOURCE SOURCE: ABNORMAL
TOXIC GRANULES BLD QL SMEAR: SLIGHT
WBC # BLD AUTO: 14.4 K/UL (ref 4.8–10.8)

## 2018-02-21 PROCEDURE — A9270 NON-COVERED ITEM OR SERVICE: HCPCS | Performed by: INTERNAL MEDICINE

## 2018-02-21 PROCEDURE — 700105 HCHG RX REV CODE 258: Performed by: PSYCHIATRY & NEUROLOGY

## 2018-02-21 PROCEDURE — 94640 AIRWAY INHALATION TREATMENT: CPT

## 2018-02-21 PROCEDURE — A9270 NON-COVERED ITEM OR SERVICE: HCPCS | Performed by: PSYCHIATRY & NEUROLOGY

## 2018-02-21 PROCEDURE — 700101 HCHG RX REV CODE 250: Performed by: INTERNAL MEDICINE

## 2018-02-21 PROCEDURE — 80048 BASIC METABOLIC PNL TOTAL CA: CPT

## 2018-02-21 PROCEDURE — C9113 INJ PANTOPRAZOLE SODIUM, VIA: HCPCS | Performed by: INTERNAL MEDICINE

## 2018-02-21 PROCEDURE — 85014 HEMATOCRIT: CPT

## 2018-02-21 PROCEDURE — 700105 HCHG RX REV CODE 258: Performed by: INTERNAL MEDICINE

## 2018-02-21 PROCEDURE — 700102 HCHG RX REV CODE 250 W/ 637 OVERRIDE(OP): Performed by: PSYCHIATRY & NEUROLOGY

## 2018-02-21 PROCEDURE — 84100 ASSAY OF PHOSPHORUS: CPT

## 2018-02-21 PROCEDURE — 71045 X-RAY EXAM CHEST 1 VIEW: CPT

## 2018-02-21 PROCEDURE — C9254 INJECTION, LACOSAMIDE: HCPCS | Performed by: PSYCHIATRY & NEUROLOGY

## 2018-02-21 PROCEDURE — 700102 HCHG RX REV CODE 250 W/ 637 OVERRIDE(OP): Performed by: INTERNAL MEDICINE

## 2018-02-21 PROCEDURE — 83735 ASSAY OF MAGNESIUM: CPT

## 2018-02-21 PROCEDURE — 700111 HCHG RX REV CODE 636 W/ 250 OVERRIDE (IP): Performed by: INTERNAL MEDICINE

## 2018-02-21 PROCEDURE — 94003 VENT MGMT INPAT SUBQ DAY: CPT

## 2018-02-21 PROCEDURE — 700111 HCHG RX REV CODE 636 W/ 250 OVERRIDE (IP): Performed by: PSYCHIATRY & NEUROLOGY

## 2018-02-21 PROCEDURE — 85018 HEMOGLOBIN: CPT

## 2018-02-21 PROCEDURE — 99291 CRITICAL CARE FIRST HOUR: CPT | Performed by: INTERNAL MEDICINE

## 2018-02-21 PROCEDURE — 700111 HCHG RX REV CODE 636 W/ 250 OVERRIDE (IP): Performed by: HOSPITALIST

## 2018-02-21 PROCEDURE — 85027 COMPLETE CBC AUTOMATED: CPT

## 2018-02-21 PROCEDURE — 95951 HCHG EEG-VIDEO-24HR: CPT

## 2018-02-21 PROCEDURE — 85007 BL SMEAR W/DIFF WBC COUNT: CPT

## 2018-02-21 PROCEDURE — 82962 GLUCOSE BLOOD TEST: CPT

## 2018-02-21 PROCEDURE — 770022 HCHG ROOM/CARE - ICU (200)

## 2018-02-21 PROCEDURE — 87338 HPYLORI STOOL AG IA: CPT

## 2018-02-21 RX ORDER — PHENOBARBITAL 20 MG/5ML
30 ELIXIR ORAL ONCE
Status: COMPLETED | OUTPATIENT
Start: 2018-02-21 | End: 2018-02-21

## 2018-02-21 RX ORDER — PHENOBARBITAL 20 MG/5ML
60 ELIXIR ORAL 2 TIMES DAILY
Status: DISCONTINUED | OUTPATIENT
Start: 2018-02-21 | End: 2018-02-23

## 2018-02-21 RX ORDER — DOXYCYCLINE 100 MG/1
100 TABLET ORAL EVERY 12 HOURS
Status: COMPLETED | OUTPATIENT
Start: 2018-02-21 | End: 2018-02-24

## 2018-02-21 RX ORDER — FUROSEMIDE 10 MG/ML
20 INJECTION INTRAMUSCULAR; INTRAVENOUS
Status: DISCONTINUED | OUTPATIENT
Start: 2018-02-21 | End: 2018-02-23

## 2018-02-21 RX ORDER — POTASSIUM CHLORIDE 20 MEQ/1
40 TABLET, EXTENDED RELEASE ORAL ONCE
Status: COMPLETED | OUTPATIENT
Start: 2018-02-21 | End: 2018-02-21

## 2018-02-21 RX ADMIN — POTASSIUM BICARBONATE 25 MEQ: 25 TABLET, EFFERVESCENT ORAL at 21:21

## 2018-02-21 RX ADMIN — PHENOBARBITAL 30 MG: 20 ELIXIR ORAL at 13:04

## 2018-02-21 RX ADMIN — SODIUM CHLORIDE: 9 INJECTION, SOLUTION INTRAVENOUS at 05:25

## 2018-02-21 RX ADMIN — NOREPINEPHRINE BITARTRATE 1 MCG/MIN: 1 INJECTION INTRAVENOUS at 15:16

## 2018-02-21 RX ADMIN — METRONIDAZOLE 500 MG: 500 INJECTION, SOLUTION INTRAVENOUS at 15:16

## 2018-02-21 RX ADMIN — FUROSEMIDE 20 MG: 10 INJECTION, SOLUTION INTRAMUSCULAR; INTRAVENOUS at 17:55

## 2018-02-21 RX ADMIN — OMEPRAZOLE 40 MG: 20 CAPSULE, DELAYED RELEASE ORAL at 10:44

## 2018-02-21 RX ADMIN — INSULIN HUMAN 1 UNITS: 100 INJECTION, SOLUTION PARENTERAL at 18:28

## 2018-02-21 RX ADMIN — CHLORHEXIDINE GLUCONATE 15 ML: 1.2 RINSE ORAL at 10:47

## 2018-02-21 RX ADMIN — LEVALBUTEROL HYDROCHLORIDE 1.25 MG: 1.25 SOLUTION RESPIRATORY (INHALATION) at 08:13

## 2018-02-21 RX ADMIN — PANTOPRAZOLE SODIUM 40 MG: 40 INJECTION, POWDER, LYOPHILIZED, FOR SOLUTION INTRAVENOUS at 09:00

## 2018-02-21 RX ADMIN — METRONIDAZOLE 500 MG: 500 INJECTION, SOLUTION INTRAVENOUS at 21:33

## 2018-02-21 RX ADMIN — VANCOMYCIN HYDROCHLORIDE 900 MG: 100 INJECTION, POWDER, LYOPHILIZED, FOR SOLUTION INTRAVENOUS at 02:17

## 2018-02-21 RX ADMIN — SODIUM CHLORIDE 200 MG: 9 INJECTION, SOLUTION INTRAVENOUS at 21:33

## 2018-02-21 RX ADMIN — DOXYCYCLINE 100 MG: 100 TABLET ORAL at 10:44

## 2018-02-21 RX ADMIN — SODIUM CHLORIDE 1500 MG: 9 INJECTION, SOLUTION INTRAVENOUS at 21:33

## 2018-02-21 RX ADMIN — SODIUM CHLORIDE 1500 MG: 9 INJECTION, SOLUTION INTRAVENOUS at 10:46

## 2018-02-21 RX ADMIN — CEFTRIAXONE 2 G: 2 INJECTION, POWDER, FOR SOLUTION INTRAMUSCULAR; INTRAVENOUS at 10:48

## 2018-02-21 RX ADMIN — LEVALBUTEROL HYDROCHLORIDE 1.25 MG: 1.25 SOLUTION RESPIRATORY (INHALATION) at 02:17

## 2018-02-21 RX ADMIN — HYDROCORTISONE SODIUM SUCCINATE 50 MG: 100 INJECTION, POWDER, FOR SOLUTION INTRAMUSCULAR; INTRAVENOUS at 05:04

## 2018-02-21 RX ADMIN — LEVALBUTEROL HYDROCHLORIDE 1.25 MG: 1.25 SOLUTION RESPIRATORY (INHALATION) at 18:58

## 2018-02-21 RX ADMIN — POTASSIUM BICARBONATE 25 MEQ: 25 TABLET, EFFERVESCENT ORAL at 13:04

## 2018-02-21 RX ADMIN — SODIUM CHLORIDE 200 MG: 9 INJECTION, SOLUTION INTRAVENOUS at 10:44

## 2018-02-21 RX ADMIN — HYDROCORTISONE SODIUM SUCCINATE 25 MG: 100 INJECTION, POWDER, FOR SOLUTION INTRAMUSCULAR; INTRAVENOUS at 15:16

## 2018-02-21 RX ADMIN — NOREPINEPHRINE BITARTRATE 1 MCG/MIN: 1 INJECTION INTRAVENOUS at 10:47

## 2018-02-21 RX ADMIN — PHENOBARBITAL 30 MG: 20 ELIXIR ORAL at 10:45

## 2018-02-21 RX ADMIN — CHLORHEXIDINE GLUCONATE 15 ML: 1.2 RINSE ORAL at 21:21

## 2018-02-21 RX ADMIN — METRONIDAZOLE 500 MG: 500 INJECTION, SOLUTION INTRAVENOUS at 05:04

## 2018-02-21 RX ADMIN — FUROSEMIDE 20 MG: 10 INJECTION, SOLUTION INTRAMUSCULAR; INTRAVENOUS at 10:44

## 2018-02-21 RX ADMIN — PHENOBARBITAL 60 MG: 20 ELIXIR ORAL at 21:21

## 2018-02-21 RX ADMIN — LEVALBUTEROL HYDROCHLORIDE 1.25 MG: 1.25 SOLUTION RESPIRATORY (INHALATION) at 14:48

## 2018-02-21 RX ADMIN — VASOPRESSIN 0.03 UNITS/MIN: 20 INJECTION INTRAVENOUS at 02:50

## 2018-02-21 RX ADMIN — HYDROCORTISONE SODIUM SUCCINATE 25 MG: 100 INJECTION, POWDER, FOR SOLUTION INTRAMUSCULAR; INTRAVENOUS at 21:21

## 2018-02-21 RX ADMIN — POTASSIUM CHLORIDE 40 MEQ: 1500 TABLET, EXTENDED RELEASE ORAL at 10:45

## 2018-02-21 RX ADMIN — INSULIN HUMAN 1 UNITS: 100 INJECTION, SOLUTION PARENTERAL at 13:08

## 2018-02-21 RX ADMIN — DOXYCYCLINE 100 MG: 100 TABLET ORAL at 21:21

## 2018-02-21 ASSESSMENT — LIFESTYLE VARIABLES: DO YOU DRINK ALCOHOL: NO

## 2018-02-21 NOTE — PROGRESS NOTES
"Pharmacy Kinetics 37 y.o. male on vancomycin day # 2 2/20/2018    Currently on Vancomycin 900 mg iv q8hr    Indication for Treatment: PNA, and possible cellulitis    Pertinent history per medical record: Admitted on 2/18/2018 for hypoxia, seizure, aspiration, and HTN.  He began having recurrent seizures the last 2 weeks PTA since his phenobarbital has been tapering down.  He also had an aspiration episode 2 days PTA, the on 2/17 he had multiple episodes of vomiting and witnessed aspiration.  He was started on ceftriaxone and metronidazole on admit, vancomycin has been added, pending finalization of culture report.    Other antibiotics: Metronidazole 500 mg q8h, ceftriaxone 2g IV q24h    Allergies: Sulfa drugs     List concerns for renal function : Pressor support, BUN/SCr > 20:1, hypoalbuminemia     Pertinent cultures to date:   2/18/18 blood-central line:  NGTD  2/18/18 blood-peripheral x 1 NGTD  2/18/18 BAL:  LFGNR (1,400 cfu/mL) , proteus sp (30,000 cfu/mL)    Recent Labs      02/18/18   1740  02/19/18   0130  02/20/18   0538   WBC  3.4*  8.7  18.7*   NEUTSPOLYS  30.70*  59.50  84.70*   BANDSSTABS  23.40*  9.90  5.40     Recent Labs      02/18/18   1857  02/19/18   0015  02/19/18   0600  02/20/18   0538   BUN  67*  54*  44*  28*   CREATININE  0.32*  0.31*  0.28*  <0.20*   ALBUMIN  2.6*  2.6*   --    --      No results for input(s): VANCOTROUGH, VANCOPEAK, VANCORANDOM in the last 72 hours.  Intake/Output Summary (Last 24 hours) at 02/20/18 1819  Last data filed at 02/20/18 1200   Gross per 24 hour   Intake           694.98 ml   Output              570 ml   Net           124.98 ml      Pulse 64, temperature 36.2 °C (97.1 °F), resp. rate 16, height 1.727 m (5' 8\"), weight 59.4 kg (130 lb 15.3 oz), SpO2 95 %. No data recorded.      A/P   1. Vancomycin dose change: Continue current dose  2. Next vancomycin level: 2/20 at 1800  3. Goal trough: 16-20 mcg/mL  4. Comments: Recommend prompt deescalation of vancomycin once " cultures finalize.  Renal function with improved BUN, urine output low at 0.4 mL/kg/min.  Vancomycin trough was still in process.  Evening clinical pharmacist to assess level.  Pharmacy will continue to monitor.    Ping Hernandez, PharmD., BCPS  PGY-2 Critical Care Resident  [phone]

## 2018-02-21 NOTE — PROGRESS NOTES
[facility]    Date of Service: 2018    Chief Complaint  37 y.o. male with hx of traumatic brain injury (baseline nonverbal and immobile), with a hx of seizures admitted 2018 with septic shock.    Interval Problem Update  Patient had been weaned off his phenobarbital, was noted to have seizures while here so phenobarbital restarted.  Presumed aspiration pneumonia during a seizure, now causing septic shock.  Patient was noted to have shock, remains on pressor support.   Discussed case with family at bedside.   Patient seen and examined in the ICU, ICU care given.   Discussed patient condition and plan with Intensivist, RN, RT and charge nurse / hot rounds.    Not responsive at baseline  Continuous EEG overnight  Propofol 15, versed 5  Levo  NSR  tmax 99  Tolerating TF at 10  No BM, active BS  Urine 310 out overnight, decreasing  Vent day 4  No significant secretions   Change abx to doxy  Start lasix  Give k    Consultants/Specialty  Neurology  Pulmonary    Disposition  Patient requires additional treatment in the hospital    Patient is critically ill.   The patient continues to have : Septic shock  The vital organ system that is effected is the: All her risk   If untreated there is a high chance of deterioration into: death   The critical care that I am providing today is: levophed gtt  The critical care that has been undertaken is medically complex.   There has been no overlap in critical care time.  Critical care time not including procedures, no overlap: 47 minutes      Review of Systems   Unable to perform ROS: Intubated      Physical Exam  Laboratory/Imaging   Hemodynamics  No data recorded.   Monitored Temp: 37.1 °C (98.8 °F)  Pulse  Av.3  Min: 59  Max: 119 Heart Rate (Monitored): 63  NIBP: 115/64      Respiratory  Liriano Vent Mode: APVCMV, Rate (breaths/min): 16, PEEP/CPAP: 10, PEEP/CPAP: 10, FiO2: 30, P Peak (PIP): 30, P MEAN: 15   Respiration: 15, Pulse Oximetry: 95 %      Work Of Breathing / Effort: Vented  RUL Breath Sounds: Clear, RML Breath Sounds: Clear, RLL Breath Sounds: Diminished, JONATAN Breath Sounds: Clear, LLL Breath Sounds: Diminished    Fluids    Intake/Output Summary (Last 24 hours) at 02/21/18 0746  Last data filed at 02/21/18 0600   Gross per 24 hour   Intake          4834.48 ml   Output              640 ml   Net          4194.48 ml       Nutrition  Orders Placed This Encounter   Procedures   • Diet NPO     Standing Status:   Standing     Number of Occurrences:   1     Order Specific Question:   Restrict to:     Answer:   Strict [1]     Physical Exam   Constitutional: He appears cachectic. He has a sickly appearance. He is intubated.   HENT:   Head: Normocephalic and atraumatic.   Mouth/Throat: No oropharyngeal exudate.   Eyes: Right eye exhibits no discharge. Left eye exhibits no discharge. No scleral icterus.   Neck: Neck supple.   Trach   Cardiovascular: Normal rate and regular rhythm.    No murmur heard.  Pulmonary/Chest: No stridor. He is intubated. He has decreased breath sounds. He has no wheezes. He has no rhonchi. He has rales.   Full vent support    Abdominal: Soft. Bowel sounds are normal. He exhibits distension.   Peg    Musculoskeletal: He exhibits edema and deformity (contracture ).   Lymphadenopathy:     He has no cervical adenopathy.   Neurological:   Intubated, nonverbal    Skin: Skin is warm and dry. He is not diaphoretic. No erythema.   Psychiatric: He is noncommunicative.   Nursing note and vitals reviewed.      Recent Labs      02/19/18   0130   02/20/18   0538   02/20/18   1830  02/20/18   2325  02/21/18   0510   WBC  8.7   --   18.7*   --    --    --   14.4*   RBC  4.14*   --   3.39*   --    --    --   3.14*   HEMOGLOBIN  11.9*   < >  9.6*   < >  9.1*  9.0*  8.9*   HEMATOCRIT  35.7*   < >  28.3*   < >  26.8*  27.3*  26.4*   MCV  86.2   --   83.5   --    --    --   84.1   MCH  28.7   --   28.3   --    --    --   28.3   MCHC  33.3*   --   33.9   --     --    --   33.7   RDW  56.9*   --   53.8*   --    --    --   54.6*   PLATELETCT  121*   --   81*   --    --    --   57*   MPV  11.0   --   10.5   --    --    --   11.1    < > = values in this interval not displayed.     Recent Labs      02/19/18   0600  02/20/18   0538  02/21/18   0510   SODIUM  146*  146*  147*   POTASSIUM  4.0  2.4*  3.4*   CHLORIDE  116*  117*  122*   CO2  19*  16*  18*   GLUCOSE  121*  142*  138*   BUN  44*  28*  34*   CREATININE  0.28*  <0.20*  0.21*   CALCIUM  8.3*  7.6*  7.6*     Recent Labs      02/19/18   1031   INR  1.98*         Recent Labs      02/18/18   1740   TRIGLYCERIDE  40          Assessment/Plan     GI bleed   Assessment & Plan    - Possibly Carmencita-White tear, patient did have multiple episodes of severe vomiting  - Appears resolved, closely monitor bowel movements        Acute on chronic respiratory failure with hypoxemia (CMS-HCC)   Assessment & Plan    - History of trach, now acutely worsened  - Full vent support, vent management per intensivist        Aspiration pneumonia (CMS-HCC)- (present on admission)   Assessment & Plan    - Causing septic shock  - Continue vancomycin, Rocephin and Flagyl  - Await culture results, patient is status post bronch        Hypernatremia   Assessment & Plan    - Mild and stable, repeat BMP in the morning        Hypomagnesemia   Assessment & Plan    - Resolved        Decubitus ulcer of ischium, left, stage IV (CMS-HCC)   Assessment & Plan    - Continue wound care        Azotemia   Assessment & Plan    - Resolved        Diarrhea   Assessment & Plan    - Resolved          Septic shock (CMS-HCC)   Assessment & Plan    - Likely due to aspiration, await results of bronchoscopy  - Continue norepinephrine drip  - Continue vancomycin, Rocephin and Flagyl  - Await culture results        Moderate protein malnutrition (CMS-[facility])   Assessment & Plan    Restart enteral nutrition via PEG 2/20        Seizure (CMS-HCC)   Assessment & Plan    - Continuous EEG  per neurology, still with seizure activity  - Continue Vimpat, Keppra, Versed drip, increasing phenobarbital per neurology  -Additional recommendations per neurology        Physical debility   Assessment & Plan    Chronic due to TBI  Skin/wound care.  Nutritional support        TBI (traumatic brain injury) (CMS-HCC)- (present on admission)   Assessment & Plan    - Chronic, nonverbal        Contraction, joint, multiple sites   Assessment & Plan    - Chronic, PT/OT          Quality-Core Measures   Reviewed items::  Labs reviewed, Medications reviewed and Radiology images reviewed  Waldrop catheter::  Critically Ill - Requiring Accurate Measurement of Urinary Output, Strict Intake and Output During Sepisis or Shock and Unconscious / Sedated Patient on a Ventilator  Central line in place:  Need for access, Sepsis, Shock and Vasopressors  DVT prophylaxis pharmacological::  Contraindicated - High bleeding risk  DVT prophylaxis - mechanical:  SCDs  Ulcer Prophylaxis::  Yes  Antibiotics:  Treating active infection/contamination beyond 24 hours perioperative coverage

## 2018-02-21 NOTE — EEG PROGRESS NOTE
VIDEO ELECTROENCEPHALOGRAM / EPILEPSY MONITORING UNIT REPORT      Referring provider: Dr. Hunter.     DOS:   2/20/2018 - 2/21/2018 (total recording for 20 hours and 44 minutes).       INDICATION:  Ruben Edwards 37 y.o. male presenting with recurrent facial twitching and altered mental status. Possible recurrent seizures.     CURRENT ANTIEPILEPTIC REGIMEN: Levetiracetam 1500 mg q 12 hrs, Lacosamide 200 mg q 12 hrs, Phenobarbital which was increased during the recording to 30 mg q 12 hrs. Patient also on propofol and versed infusions.      TECHNIQUE: A 30-channel, 24 hrs video electroencephalogram (VEEG) was performed in accordance with the international 10-20 system. This digital study was reviewed in bipolar and referential montages. The recording examined a sedated and or encephalopathic patient.     DESCRIPTION OF THE RECORD:  There is an asymmetric background with significant attenuation and slowing of the left hemisphere. There is slowing in the right hemisphere, mostly within a delta/theta range.     ACTIVATION PROCEDURES:   Not performed.     ICTAL AND/OR INTERICTAL FINDINGS:   Continuous right frontotemporal spikes/sharps. Intermittently noted, recurrent seizures localized to the right frontotemporal region, which are electroencephalographically characterized by rhythmic or periodic spikes/sharps in this region and they do not spread contralaterally. No clear clinical changes were noted during the seizures. They are mostly brief and last typically less than a minute, but do recur often throughout the study. There has been perhaps a modest improvement with adjustment of the antiepileptic regimen.     EVENT(S):  None marked for review.     EKG: sampling review of EKG recording demonstrated sinus rhythm.       INTERPRETATION:   This is an abnormal 24 hours video electroencephalogram recording in a sedated and/or encephalopathic patient.  An asymmetric background is noted, with significant slowing and attenuation  of the left hemisphere and slowing of the right hemisphere. These findings suggest widespread underlying structural abnormalities. Continuous right frontotemporal spikes/sharps were noted throughout the study. Intermittently, recurrent focal electrographic seizures localized to the right frontotemporal region were seen. These were mostly brief and lasted typically less than a minute at a time, but they did recur often throughout the study. There has been perhaps a modest improvement with adjustment of the antiepileptic regimen.       Note: Updates provided to Dr. Hunter.          Harris Bolton MD  Medical Director, Epilepsy and Neurodiagnostics.   Clinical  of Neurology [facility].   Diplomate in Neurology, Epilepsy, and Electrodiagnostic Medicine.   Office: [phone]  Fax: [phone]

## 2018-02-21 NOTE — DIETARY
Nutrition Support Update: Discussed in rounds: pt tolerating trickle TF @ 10 ml/hr with bowel sounds and no abd distention. Propofol @ 5 ml/hr providing 132 kcals/day, no need for TF adjustment.     Plan/Rec: Increase TF per TF protocol with Impact Peptide 1.5 @ goal rate of 50 ml/hr.  This will provide 1800 kcal/d (1932 kcal/d with Propofol),  924 ml/d free water and 113 gm protein per day. RD following.

## 2018-02-21 NOTE — PROCEDURES
VIDEO ELECTROENCEPHALOGRAM / EPILEPSY MONITORING UNIT REPORT        Referring provider: Dr. Hunter.      DOS:   2/20/2018 - 2/21/2018 (total recording for 20 hours and 44 minutes).         INDICATION:  Ruben Edwards 37 y.o. male presenting with recurrent facial twitching and altered mental status. Possible recurrent seizures.      CURRENT ANTIEPILEPTIC REGIMEN: Levetiracetam 1500 mg q 12 hrs, Lacosamide 200 mg q 12 hrs, Phenobarbital which was increased during the recording to 30 mg q 12 hrs. Patient also on propofol and versed infusions.      TECHNIQUE: A 30-channel, 24 hrs video electroencephalogram (VEEG) was performed in accordance with the international 10-20 system. This digital study was reviewed in bipolar and referential montages. The recording examined a sedated and or encephalopathic patient.      DESCRIPTION OF THE RECORD:  There is an asymmetric background with significant attenuation and slowing of the left hemisphere. There is slowing in the right hemisphere, mostly within a delta/theta range.      ACTIVATION PROCEDURES:   Not performed.      ICTAL AND/OR INTERICTAL FINDINGS:   Continuous right frontotemporal spikes/sharps. Intermittently noted, recurrent seizures localized to the right frontotemporal region, which are electroencephalographically characterized by rhythmic or periodic spikes/sharps in this region and they do not spread contralaterally. No clear clinical changes were noted during the seizures. They are mostly brief and last typically less than a minute, but do recur often throughout the study. There has been perhaps a modest improvement with adjustment of the antiepileptic regimen.      EVENT(S):  None marked for review.      EKG: sampling review of EKG recording demonstrated sinus rhythm.         INTERPRETATION:   This is an abnormal 24 hours video electroencephalogram recording in a sedated and/or encephalopathic patient.  An asymmetric background is noted, with significant slowing and  attenuation of the left hemisphere and slowing of the right hemisphere. These findings suggest widespread underlying structural abnormalities. Continuous right frontotemporal spikes/sharps were noted throughout the study. Intermittently, recurrent focal electrographic seizures localized to the right frontotemporal region were seen. These were mostly brief and lasted typically less than a minute at a time, but they did recur often throughout the study. There has been perhaps a modest improvement with adjustment of the antiepileptic regimen.         Note: Updates provided to Dr. Hunter.             Harris Bolton MD  Medical Director, Epilepsy and Neurodiagnostics.   Clinical  of Neurology [facility].   Diplomate in Neurology, Epilepsy, and Electrodiagnostic Medicine.   Office: [phone]  Fax: [phone]    MARLON GOODMAN    DD:  02/21/2018 12:13:37  DT:  02/21/2018 07:47:09    D#:  [id number]  Job#:  [id number]

## 2018-02-21 NOTE — CARE PLAN
Problem: Ventilation Defect:  Goal: Ability to achieve and maintain unassisted ventilation or tolerate decreased levels of ventilator support    Intervention: Manage ventilation therapy by monitoring diagnostic test results  Adult Ventilation Update    Total Vent Days: 4    Patient Lines/Drains/Airways Status    Active Airway     Name: Placement date: Placement time: Site: Days:    Airway Group Standard Cuffed Trach Tracheostomy 6.0 07/02/17   1403   Tracheostomy   233    Airway Group Tracheostomy 6.0       Tracheostomy               Static Compliance (ml / cm H2O): 27 (02/20/18 2233)    Patient failed trials because of Barriers to Wean: Other (Comments) (Spoke with RN. Pt sedated and still having seizures) (02/20/18 0625)  Barriers to SBT    Length of Weaning Trial       Sputum/Suction   Cough: Congested;Moist (02/21/18 0000)  Sputum Amount: Moderate (02/21/18 0000)  Sputum Color: Clear;Pink Tinged (02/21/18 0000)  Sputum Consistency: Thick (02/21/18 0000)    Mobility Group  Activity Performed: Unable to mobilize (02/20/18 2000)  Pt Calls for Assistance: No (02/20/18 2000)  Reason Not Mobilized: Bed rest (02/20/18 2000)  Mobilization Comments:  (contracted ) (02/20/18 0800)    Events/Summary/Plan: decrease rate to 16 (02/20/18 2570)

## 2018-02-21 NOTE — PROGRESS NOTES
Pharmacy Kinetics Addendum:    37 y.o. male on vancomycin day # 3 2/21/2018    Currently on Vancomycin 900 mg iv q8hr    Indication for Treatment: Cellulitis/pneumonia    Recent Labs      02/18/18   1857  02/19/18   0015  02/19/18   0600  02/20/18   0538   BUN  67*  54*  44*  28*   CREATININE  0.32*  0.31*  0.28*  <0.20*   ALBUMIN  2.6*  2.6*   --    --      Recent Labs      02/20/18   1800   VANCOTROUGH  15.6       A/P   1. Vancomycin dose change: Continue 900 mg IV q8h scheduled dosing  2. Next vancomycin level: Trough in ~2 days (not ordered)  3. Goal trough: ~16 mcg/mL  4. Comments: Trough level this evening appropriate as respiratory culture negative for MRSA. Will continue current scheduled dosing and re-check trough in coming days to ensure continued clearance. Pharmacy will continue to follow.     Silvio AlfaroD, BCPS

## 2018-02-21 NOTE — CARE PLAN
Problem: Nutritional:  Goal: Nutrition support tolerated and meeting greater than 85% of estimated needs  Outcome: PROGRESSING AS EXPECTED  MD arnold'd to advance TF, see dietary progress note.

## 2018-02-21 NOTE — PROGRESS NOTES
NEUROLOGY PROGRESS NOTE      Background:  37 y.o. male was admitted on 2018  2:32 PM for Acute Hypoxic Respiratory Failure  Acute respiratory failure with hypoxia (CMS-HCC).  He is being followed by Neurology for intractable seizure.    SUBJECTIVE: Unresponsive, on vent. Started on continuous EEG monitoring .    NEUROLOGICAL EXAM:   Remain unresponsive. Pupils are equal and sluggishly reactive. 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      OBJECTIVE:     EE18:  This is an abnormal 24 hours video electroencephalogram recording in a sedated and/or encephalopathic patient.  An asymmetric background is noted, with significant slowing and attenuation of the left hemisphere and slowing of the right hemisphere. These findings suggest widespread underlying structural abnormalities. Continuous right frontotemporal spikes/sharps were noted throughout the study. Intermittently, recurrent focal electrographic seizures localized to the right frontotemporal region were seen. These were mostly brief and lasted typically less than a minute at a time, but they did recur often throughout the study. There has been perhaps a modest improvement with adjustment of the antiepileptic regimen.     MEDICATIONS:  Current Facility-Administered Medications   Medication Dose   • hydrocortisone sodium succinate PF (SOLU-CORTEF) 100 MG injection 25 mg  25 mg   • furosemide (LASIX) injection 20 mg  20 mg   • potassium bicarbonate (KLYTE) 25 MEQ effervescent tablet TBEF 25 mEq  25 mEq   • doxycycline monohydrate (ADOXA) tablet 100 mg  100 mg   • omeprazole 2 mg/mL in sodium bicarbonate (PRILOSEC) oral susp 40 mg  40 mg   • PHENobarbital solution 60 mg  60 mg   • levalbuterol (XOPENEX) 1.25 MG/3ML nebulizer solution 1.25 mg  1.25 mg   • midazolam (VERSED) premix 125 mg/125 mL NS  5 mg/hr   • Respiratory Care per Protocol     • senna-docusate (PERICOLACE or SENOKOT S) 8.6-50 MG per tablet 2 Tab  2 Tab     And   • polyethylene glycol/lytes (MIRALAX) PACKET 1 Packet  1 Packet    And   • magnesium hydroxide (MILK OF MAGNESIA) suspension 30 mL  30 mL    And   • bisacodyl (DULCOLAX) suppository 10 mg  10 mg   • chlorhexidine (PERIDEX) 0.12 % solution 15 mL  15 mL   • lidocaine (XYLOCAINE) 1%  injection  1-2 mL   • MD ALERT...Adult ICU Electrolyte Replacement per Pharmacy Protocol     • fentaNYL (SUBLIMAZE) injection 25 mcg  25 mcg    Or   • fentaNYL (SUBLIMAZE) injection 50 mcg  50 mcg    Or   • fentaNYL (SUBLIMAZE) injection 100 mcg  100 mcg   • ipratropium-albuterol (DUONEB) nebulizer solution 3 mL  3 mL   • insulin regular (HUMULIN R) injection 1-6 Units  1-6 Units    And   • glucose 4 g chewable tablet 16 g  16 g    And   • dextrose 50% (D50W) injection 25 mL  25 mL   • MD ALERT...Pharmacy to implement PROPOFOL CRITICAL CARE PROTOCOL 1 Each  1 Each   • levalbuterol (XOPENEX) 1.25 MG/3ML nebulizer solution 1.25 mg  1.25 mg   • miconazole 2%-zinc oxide (RADHA) topical cream     • acetaminophen (TYLENOL) tablet 650 mg  650 mg   • Pharmacy Consult Request ...Pain Management Review      And   • oxyCODONE immediate-release (ROXICODONE) tablet 5 mg  5 mg    And   • oxyCODONE immediate release (ROXICODONE) tablet 10 mg  10 mg    And   • morphine (pf) 4 mg/ml injection 4 mg  4 mg   • metroNIDAZOLE (FLAGYL) IVPB 500 mg  500 mg   • norepinephrine (LEVOPHED) 8 mg in  mL Infusion  0.5-30 mcg/min    And   • vasopressin (VASOSTRICT) 20 Units in  mL Infusion  0.03 Units/min   • ondansetron (ZOFRAN) syringe/vial injection 4 mg  4 mg   • ondansetron (ZOFRAN ODT) dispertab 4 mg  4 mg   • promethazine (PHENERGAN) tablet 12.5-25 mg  12.5-25 mg   • promethazine (PHENERGAN) suppository 12.5-25 mg  12.5-25 mg   • prochlorperazine (COMPAZINE) injection 5-10 mg  5-10 mg   • LORazepam (ATIVAN) injection 4 mg  4 mg   • propofol (DIPRIVAN) injection  0-80 mcg/kg/min   • phenylephrine (YAO-SYNEPHRINE) 80,000 mcg in  mL Infusion  " 0-300 mcg/min   • cefTRIAXone (ROCEPHIN) syringe 2 g  2 g   • NS infusion     • levETIRAcetam (KEPPRA) 1,500 mg in  mL IVPB  1,500 mg   • lacosamide (VIMPAT) 200 mg in  mL ivpb  200 mg       Pulse 64, temperature 36.2 °C (97.1 °F), resp. rate 15, height 1.727 m (5' 8\"), weight 62.8 kg (138 lb 7.2 oz), SpO2 96 %.    Recent Labs      02/18/18 1857   CPKTOTAL  90     Recent Labs      02/19/18   0130   02/20/18   0538   02/20/18   1830  02/20/18   2325  02/21/18   0510   WBC  8.7   --   18.7*   --    --    --   14.4*   RBC  4.14*   --   3.39*   --    --    --   3.14*   HEMOGLOBIN  11.9*   < >  9.6*   < >  9.1*  9.0*  8.9*   HEMATOCRIT  35.7*   < >  28.3*   < >  26.8*  27.3*  26.4*   MCV  86.2   --   83.5   --    --    --   84.1   MCH  28.7   --   28.3   --    --    --   28.3   MCHC  33.3*   --   33.9   --    --    --   33.7   RDW  56.9*   --   53.8*   --    --    --   54.6*   PLATELETCT  121*   --   81*   --    --    --   57*   MPV  11.0   --   10.5   --    --    --   11.1    < > = values in this interval not displayed.     Recent Labs      02/18/18 1857 02/19/18   0600  02/20/18   0538  02/21/18   0510   SODIUM  143   < >  146*  146*  147*   POTASSIUM  4.0   < >  4.0  2.4*  3.4*   CHLORIDE  112   < >  116*  117*  122*   CO2  20   < >  19*  16*  18*   GLUCOSE  93   < >  121*  142*  138*   BUN  67*   < >  44*  28*  34*   CPKTOTAL  90   --    --    --    --     < > = values in this interval not displayed.       Results for orders placed or performed during the hospital encounter of 02/18/18   Echocardiogram Comp W/O Cont   Result Value Ref Range    Eject.Frac. MOD BP 66.03     Eject.Frac. MOD 4C 64.91     Eject.Frac. MOD 2C 66.35     Left Ventrical Ejection Fraction 65         ASSESSMENT AND PLAN:   37 y.o. male with history of a [place] and seizure disorder who presents with increase frequency of seizures as well as aspiration pneumonia. The " increased frequency of his seizures might have been caused by the tapering of his phenobarbital. --Will Continue Keppra 1500 mg IV BID  Increase Phenobarbital to 60 mg suspension BID  Continue Vimpat 200 mg IV BID.  Start tapering propofol and Versed with increasing phenobarbital.  Discussed with his mother at bedside.

## 2018-02-21 NOTE — PROGRESS NOTES
Pulmonary Critical Care Progress Note        Chief Complaint: Respiratory failure, septic shock    History of Present Illness:  Mr. Edwards is a 37-year-old male with past medical history of traumatic brain injury at age 17, seizures diagnosed in June of 2017. His baseline mental status appears to be nodding and minimal interacting without vocalization. Who was brought to the ICU as a direct admit from [facility] where he was brought today for hypoxia. Reportedly the patient was being weanied off of his phenobarbital by his neurologist when he began having more frequent seizures, approximately one week ago his neurologist increased his Keppra dosing however the seizures continued. His mother did use CBD and THC with some success in slowing the seizures. Since 2 days ago the patient had multiple aspiration events following these seizures. His mother evaluated him with a home oxygen saturation monitor and he was noted to be hypoxic, he was brought to [facility] where he was found to have 28% bands with leukopenia. Chest x-ray was obtained demonstrating bilateral infiltrates right greater than left. He was placed on mechanical ventilation and his oxygenation was unable to be improved more than 85%, he was hypotensive and given multiple boluses of IV fluids without improvement. He was started on levo fed and given multiple doses of Ativan for seizures and transferred to our ICU for higher level of care. He arrives on the ventilator and on pressors critically ill.    Please note this history is obtained by my partner Dr. Appiah 2/18/2018.    ROS:  Respiratory: unable to perform due to the patient's inability to effectively communicate, Cardiac: unable to perform due to the patient's inability to effectively communicate, GI: unable to perform due to the patient's inability to effectively communicate.  All other systems negative.    Interval Events:  24 hour interval history reviewed     Hx of TBI  Left  sided pna, with slight improvement  EEG still continuous  Versed at 5 mg/hour, decreased to 4 mg/hour  Propofol  Off levophed currently  NSR  Tube feeds at 10 cc/hour  UO decreasing.   Plt 57  Vent day 4   6 shiley  protonix to PO  High bleed risk  Rocephin and flagyl  Change to po Doxy suggested.   Wean steroids 25 q 8  KVO   Lasix 20 bid soon  k supplement  Still having peroids of seizure activity.     PFSH:  No change.    Respiratory:  Liriano Vent Mode: APVCMV, Rate (breaths/min): 16, Vt Target (mL): 460, PEEP/CPAP: 10, FiO2: 30, Control VTE (exp VT): 453  Pulse Oximetry: 95 %  Chest Tube Drains:          Exam: rhonchi left > right.  Tracheostomy in place.  ImagingCXR  I have personally reviewed the chest x-ray my impression is  there is tracheostomy in good position. The patient has a sizable left-sided pneumonia, with slight improvement noted again today.  Recent Labs      02/18/18   2147  02/19/18   0441  02/20/18   0524   ISTATAPH  7.335*  7.496  7.553*   ISTATAPCO2  31.5  23.9*  18.7*   ISTATAPO2  102*  125*  81   ISTATATCO2  18*  19*  17*   RNVCTOP4IRW  98  99  98   ISTATARTHCO3  16.8*  18.5  16.5*   ISTATARTBE  -8*  -4  -5*   ISTATTEMP  35.9 C  100.4 F  97.2 F   ISTATFIO2  60  50  30   ISTATSPEC  Arterial  Arterial  Arterial   ISTATAPHTC  7.350*  7.481  7.565*   [id number]  96*  132*  77   No arterial blood gas obtained.    HemoDynamics:  Pulse: 64, Heart Rate (Monitored): 63  NIBP: 115/64       Exam: regular rhythm (Sinus)  Imaging: Echo on 2/19/2018:    CONCLUSIONS  Normal right and left ventricular size and function.   No significant valve disease or flow abnormalities.   Dilated inferior vena cava without inspiratory collapse. The patient is   intubated.    No prior study is available for comparison.   Recent Labs      02/18/18   1857   CPKTOTAL  90       Neuro:  GCS Total Alton Coma Score: 4       Exam: Heavily sedated. The patient has chronic traumatic brain injury and multiple contractures.  Patient does not follow commands, open eyes to command and has evidence of hyperreflexia. Phenobarbital has been restarted and dosage increased today.. Patient also remains on Ativan when necessary. Versed has been recommended today for seizure activity but we have asked to decrease the rate slightly to 4 mg per hour. Propofol has been asked to be discontinued since there appears to be less seizure activity. No sign of continuous focal seizures seen.  Imaging: None - Reviewed   EEG per Dr. Bolton shows recurrent right frontal temporal seizure activity.. I discussed the case with Dr. Hunter as well.     Fluids:  Intake/Output       02/19/18 0700 - 02/20/18 0659 02/20/18 0700 - 02/21/18 0659 02/21/18 0700 - 02/22/18 0659      9655-2586 8919-3725 Total 4207-5004 9225-0312 Total 9990-6460 0476-5588 Total       Intake    I.V.  715.8  2462 3177.7  2580.9  2023.6 4604.5  --  -- --    Magnesium Sulfate Volume 100 -- 100 -- -- -- -- -- --    Vasopressin Volume 126.9 106.5 233.4 108 108 216 -- -- --    Propofol Volume 60 97.5 157.5 88.5 60 148.5 -- -- --    Midazolam Volume -- 60.7 60.7 60.7 60 120.7 -- -- --    Norepinephrine Volume 428.9 247.2 676.1 73.7 45.6 119.3 -- -- --    IV Piggyback Volume (IV Piggyback) -- 450 450 450 250 700 -- -- --    IV Volume (0.9% Normal Saline) -- 1500 1500 1000 1500 2500 -- -- --    IV Volume (Magnesium) -- -- -- 100 -- 100 -- -- --    IV Volume (Potassium) -- -- -- 700 -- 700 -- -- --    Other  --  -- --  --  30 30  --  -- --    Medications (P.O./ Enteral Liquids) -- -- -- -- 30 30 -- -- --    Enteral  --  -- --  20  180 200  --  -- --    Enteral Volume -- -- -- 20 120 140 -- -- --    Free Water / Tube Flush -- -- -- -- 60 60 -- -- --    Total Intake 715.8 2462 3177.7 2600.9 2233.6 4834.5 -- -- --       Output    Urine  1025  570 1595  330  310 640  --  -- --    Indwelling Cathether 0355 143 9260 330 310 640 -- -- --    Total Output 4931 049 9115 330 310 640 -- -- --       Net I/O     -309.3  1892 1582.7 2270.9 1923.6 4194.5 -- -- --        Weight: 62.8 kg (138 lb 7.2 oz)  Recent Labs      18   0600  18   0538  18   0510   SODIUM  146*  146*  147*   POTASSIUM  4.0  2.4*  3.4*   CHLORIDE  116*  117*  122*   CO2  19*  16*  18*   BUN  44*  28*  34*   CREATININE  0.28*  <0.20*  0.21*   MAGNESIUM  1.7  2.0  2.4   PHOSPHORUS  3.5  1.7*  2.6   CALCIUM  8.3*  7.6*  7.6*       GI/Nutrition:  Exam: abdomen is soft and non-tender there is a button PEG will be accessed with patient's home feeding tube. Mild distention is noted. There is also a mechanical device at the midepigastric region palpable. Tube feeds to be increased to goal  Imaging: None - Reviewed  Liver Function  Recent Labs      18   1857  18   0015  18   0600  18   0538  18   0510   ALTSGPT  46  43   --    --    --    ASTSGOT  37  37   --    --    --    ALKPHOSPHAT  142*  141*   --    --    --    TBILIRUBIN  0.2  0.2   --    --    --    DBILIRUBIN  0.1   --    --    --    --    GLUCOSE  93  148*  121*  142*  138*       Heme:  Recent Labs      18   1857   18   0130   18   1031   18   0538   18   1830  18   2325  18   0510   RBC   --    --   4.14*   --    --    --   3.39*   --    --    --   3.14*   HEMOGLOBIN   --    < >  11.9*   < >   --    < >  9.6*   < >  9.1*  9.0*  8.9*   HEMATOCRIT   --    < >  35.7*   < >   --    < >  28.3*   < >  26.8*  27.3*  26.4*   PLATELETCT   --    --   121*   --    --    --   81*   --    --    --   57*   PROTHROMBTM   --    --    --    --   22.2*   --    --    --    --    --    --    INR   --    --    --    --   1.98*   --    --    --    --    --    --    FERRITIN  99.7   --    --    --    --    --    --    --    --    --    --     < > = values in this interval not displayed.       Infectious Disease:  Monitored Temp  Av.3 °C (97.3 °F)  Min: 35.5 °C (95.9 °F)  Max: 37.2 °C (99 °F)  Micro: antibiotics reviewed. The patient remains  on antibiotic therapy including Rocephin and Flagyl at this time. He's also had diarrhea and placed in isolation until C. diff is ruled out. No significant bowel movements and unlikely to have C. diff that we are still awaiting final culture. BAL data, with Proteus, and Klebsiella as now been reported with mild amount of yeast from BAL.   Recent Labs      02/18/18   1857  02/19/18   0015  02/19/18   0130  02/20/18   0538  02/21/18   0510   WBC   --    --   8.7  18.7*  14.4*   NEUTSPOLYS   --    --   59.50  84.70*  78.70*   LYMPHOCYTES   --    --   5.40*  7.20*  9.30*   MONOCYTES   --    --   0.00  2.70  0.00   EOSINOPHILS   --    --   0.00  0.00  0.00   BASOPHILS   --    --   0.00  0.00  0.00   ASTSGOT  37  37   --    --    --    ALTSGPT  46  43   --    --    --    ALKPHOSPHAT  142*  141*   --    --    --    TBILIRUBIN  0.2  0.2   --    --    --      Current Facility-Administered Medications   Medication Dose Frequency Provider Last Rate Last Dose   • potassium chloride SA (Kdur) tablet 40 mEq  40 mEq Once Jon Reynoso D.O.       • PHENobarbital solution 30 mg  30 mg BID Martell Hunter M.D.   30 mg at 02/20/18 2036   • levalbuterol (XOPENEX) 1.25 MG/3ML nebulizer solution 1.25 mg  1.25 mg Q6HRS (RT) FARHAT BerryOBucky   1.25 mg at 02/21/18 0217   • hydrocortisone sodium succinate PF (SOLU-CORTEF) 100 MG injection 50 mg  50 mg Q8HRS FARHAT TaborOBucky   50 mg at 02/21/18 0504   • MD ALERT... vancomycin per pharmacy protocol   pharmacy to dose Jon Reynoso D.O.       • midazolam (VERSED) premix 125 mg/125 mL NS  5 mg/hr Continuous FARHAT BerryO. 5 mL/hr at 02/20/18 1845 5 mg/hr at 02/20/18 1845   • vancomycin 900 mg in  mL IVPB  15 mg/kg Q8HR Jon Reynoso D.O.   Stopped at 02/21/18 0417   • Respiratory Care per Protocol   Continuous RT Modesto Appiah Jr., JASPAL.O.       • senna-docusate (PERICOLACE or SENOKOT S) 8.6-50 MG per tablet 2 Tab  2 Tab BID JASPLA Alaniz Jr..O.   2 Tab at  02/20/18 2036    And   • polyethylene glycol/lytes (MIRALAX) PACKET 1 Packet  1 Packet QDAY PRN Modesto Appiah Jr., D.O.        And   • magnesium hydroxide (MILK OF MAGNESIA) suspension 30 mL  30 mL QDAY PRN Modesto Appiah Jr., D.O.        And   • bisacodyl (DULCOLAX) suppository 10 mg  10 mg QDAY PRN Modesto Appiah Jr., D.O.       • chlorhexidine (PERIDEX) 0.12 % solution 15 mL  15 mL BID JASPAL Alaniz Jr..OBucky   15 mL at 02/20/18 2035   • lidocaine (XYLOCAINE) 1%  injection  1-2 mL Q30 MIN PRN Modesto Appiah Jr., D.O.       • MD ALERT...Adult ICU Electrolyte Replacement per Pharmacy Protocol   pharmacy to dose JASPAL Alaniz Jr..SAM.       • fentaNYL (SUBLIMAZE) injection 25 mcg  25 mcg Q HOUR PRN Modesto Appiah Jr., D.O.        Or   • fentaNYL (SUBLIMAZE) injection 50 mcg  50 mcg Q HOUR PRN Modesto Appiah Jr., D.O.        Or   • fentaNYL (SUBLIMAZE) injection 100 mcg  100 mcg Q HOUR PRN FARHAT Alaniz Jr.O.       • ipratropium-albuterol (DUONEB) nebulizer solution 3 mL  3 mL Q2HRS PRN (RT) Modesto Appiah Jr., D.O.       • insulin regular (HUMULIN R) injection 1-6 Units  1-6 Units Q6HRS JASPAL Alaniz Jr..O.   Stopped at 02/18/18 1800    And   • glucose 4 g chewable tablet 16 g  16 g Q15 MIN PRN Modesto Appiah Jr., D.O.        And   • dextrose 50% (D50W) injection 25 mL  25 mL Q15 MIN PRN FARHAT Alaniz Jr.O.       • MD ALERT...Pharmacy to implement PROPOFOL CRITICAL CARE PROTOCOL 1 Each  1 Each PRN Modesto Appiah Jr., D.O.       • levalbuterol (XOPENEX) 1.25 MG/3ML nebulizer solution 1.25 mg  1.25 mg Q4HRS PRN Duy Whitaker M.D.   1.25 mg at 02/20/18 0626   • miconazole 2%-zinc oxide (RADHA) topical cream   PRN Duy Whitaker M.D.       • NS infusion 1,677 mL  30 mL/kg Once PRN Duy Whitaker M.D.       • NS (BOLUS) infusion 500 mL  500 mL Once PRN Duy Whitaker M.D.       • acetaminophen (TYLENOL) tablet 650 mg  650 mg Q6HRS PRN Duy Whitaker M.D.       • Pharmacy Consult  Request ...Pain Management Review   PRN Duy Whitaker M.D.        And   • oxyCODONE immediate-release (ROXICODONE) tablet 5 mg  5 mg Q3HRS PRN Duy Whitaker M.D.        And   • oxyCODONE immediate release (ROXICODONE) tablet 10 mg  10 mg Q3HRS PRN Duy Whitaker M.D.        And   • morphine (pf) 4 mg/ml injection 4 mg  4 mg Q3HRS PRN Duy Whitaker M.D.       • metroNIDAZOLE (FLAGYL) IVPB 500 mg  500 mg Q8HRS Duy Whitaker M.D.   Stopped at 02/21/18 0604   • norepinephrine (LEVOPHED) 8 mg in  mL Infusion  0.5-30 mcg/min Continuous Duy Whitaker M.D. 3.8 mL/hr at 02/20/18 1338 2 mcg/min at 02/20/18 1338    And   • vasopressin (VASOSTRICT) 20 Units in  mL Infusion  0.03 Units/min Continuous Duy Whitaker M.D. 9 mL/hr at 02/21/18 0250 0.03 Units/min at 02/21/18 0250   • ondansetron (ZOFRAN) syringe/vial injection 4 mg  4 mg Q4HRS PRN Duy Whitaker M.D.       • ondansetron (ZOFRAN ODT) dispertab 4 mg  4 mg Q4HRS PRN Duy Whitaker M.D.       • promethazine (PHENERGAN) tablet 12.5-25 mg  12.5-25 mg Q4HRS PRN Duy Whitaker M.D.       • promethazine (PHENERGAN) suppository 12.5-25 mg  12.5-25 mg Q4HRS PRN Duy Whitaker M.D.       • prochlorperazine (COMPAZINE) injection 5-10 mg  5-10 mg Q4HRS PRN Duy Whitaker M.D.       • LORazepam (ATIVAN) injection 4 mg  4 mg Q10 MIN PRN Duy Whitaker M.D.   4 mg at 02/18/18 1946   • pantoprazole (PROTONIX) injection 40 mg  40 mg BID Modesto Appiah Jr., D.O.   40 mg at 02/20/18 2038   • propofol (DIPRIVAN) injection  0-80 mcg/kg/min Continuous Modesto Appiah Jr., D.O. 5 mL/hr at 02/20/18 1845 15 mcg/kg/min at 02/20/18 1845   • phenylephrine (YAO-SYNEPHRINE) 80,000 mcg in  mL Infusion  0-300 mcg/min Continuous Modesto Appiah Jr., D.O.   Stopped at 02/18/18 2124   • cefTRIAXone (ROCEPHIN) syringe 2 g  2 g Q24HRS Duy Whitaker M.D.   2 g at 02/20/18 0918   • NS infusion   Continuous Duy Whitaker M.D. 125 mL/hr at 02/21/18 0525     •  levETIRAcetam (KEPPRA) 1,500 mg in  mL IVPB  1,500 mg Q12HRS Duy Whitaker M.D.   Stopped at 02/20/18 2051   • lacosamide (VIMPAT) 200 mg in  mL ivpb  200 mg Q12HRS Maci Jade M.D.   Stopped at 02/20/18 2136     Last reviewed on 2/18/2018  7:51 PM by Lois Camejo, Pharmacy Intern    Quality  Measures:   Labs reviewed, Medications reviewed and Radiology images reviewed   Waldrop catheter: Critically Ill - Requiring Accurate Measurement of Urinary Output   Central line in place: Sepsis, Shock and Vasopressors       DVT prophylaxis - mechanical: Contra-indicated   Ulcer prophylaxis: Yes      Assessment and plan:    1. Acute hypoxemic respiratory failure.    -Patient has sizable left-sided pneumonia. Appears to be acute with probable scarring from previous left-sided pneumonia, though likely the cause of decompensation. There is no doubt some scarring and the effect of the BAL however his shock is related to pneumonia.  -Will need broad-spectrum antibiotic coverage including Rocephin and Flagyl and would add vancomycin in this critically ill patient with tracheostomy.   -Tomorrow likely able to consolidate antibiotics based on culture.  -Continue bronchodilator therapy.  -Attempt arterial blood gas again tomorrow if necessary. Otherwise will observe clinically.    2. Septic shock.    -Patient has evidence of severe hypotension requiring vasopressors initially but now off. As well as hydrocortisone. Will plan to wean steroids today.  -Resolved lactic acidosis    3. History of traumatic brain injury.    -Patient is in status epilepticus and Versed has been recommended to be added to current treatment, with increase in phenobarbital today.  -This includes Keppra, Depakote, phenobarbital, and with increase in phenobarbital and I'm going Versed infusion..  -Appreciate neurology's assessment and recommendations.   -Continuous 24-hour EEG overnight    4.  Decubitus ulcerations of the left hip including  stage IV disease.    -Possibly may require more than wound care and consideration for orthopedic evaluation and biopsy if does not improve.  -Appreciate wound care assistance today.  -Overall appeared stable.  -Doubt cause of sepsis.    5. Diarrhea.    -Doubt C. difficile colitis but this is a possibility. Patient was placed in isolation and C. diff is pending.  -No evidence of diarrhea at this point-  -C. diff is likely negative.    6. Pneumonia.    -Patient is substantial left-sided pneumonia. We'll continue with Rocephin, Flagyl, doxycycline.  -Continue full ventilator support at this point. He has no spontaneous effort due to the heavy sedation for status epilepticus.    Discussed patient condition and risk of morbidity and/or mortality with Charge nurse / hot rounds.  M.D. rounds  The patient remains critically ill.  Critical care time = 40 minutes in directly providing and coordinating critical care and extensive data review.  No time overlap and excludes procedures.    Jon Reynoso D.O.

## 2018-02-22 ENCOUNTER — APPOINTMENT (OUTPATIENT)
Dept: RADIOLOGY | Facility: MEDICAL CENTER | Age: 38
DRG: 853 | End: 2018-02-22
Attending: INTERNAL MEDICINE
Payer: COMMERCIAL

## 2018-02-22 LAB
ANION GAP SERPL CALC-SCNC: 7 MMOL/L (ref 0–11.9)
ANION GAP SERPL CALC-SCNC: 7 MMOL/L (ref 0–11.9)
ANISOCYTOSIS BLD QL SMEAR: ABNORMAL
BASOPHILS # BLD AUTO: 0 % (ref 0–1.8)
BASOPHILS # BLD: 0 K/UL (ref 0–0.12)
BUN SERPL-MCNC: 31 MG/DL (ref 8–22)
BUN SERPL-MCNC: 35 MG/DL (ref 8–22)
CALCIUM SERPL-MCNC: 7.9 MG/DL (ref 8.5–10.5)
CALCIUM SERPL-MCNC: 8.1 MG/DL (ref 8.5–10.5)
CHLORIDE SERPL-SCNC: 119 MMOL/L (ref 96–112)
CHLORIDE SERPL-SCNC: 121 MMOL/L (ref 96–112)
CO2 SERPL-SCNC: 24 MMOL/L (ref 20–33)
CO2 SERPL-SCNC: 26 MMOL/L (ref 20–33)
CREAT SERPL-MCNC: 0.2 MG/DL (ref 0.5–1.4)
CREAT SERPL-MCNC: <0.2 MG/DL (ref 0.5–1.4)
E COLI SXT1+2 STL IA: NORMAL
EOSINOPHIL # BLD AUTO: 0 K/UL (ref 0–0.51)
EOSINOPHIL NFR BLD: 0 % (ref 0–6.9)
ERYTHROCYTE [DISTWIDTH] IN BLOOD BY AUTOMATED COUNT: 54.5 FL (ref 35.9–50)
GLUCOSE BLD-MCNC: 141 MG/DL (ref 65–99)
GLUCOSE BLD-MCNC: 155 MG/DL (ref 65–99)
GLUCOSE BLD-MCNC: 155 MG/DL (ref 65–99)
GLUCOSE BLD-MCNC: 169 MG/DL (ref 65–99)
GLUCOSE SERPL-MCNC: 149 MG/DL (ref 65–99)
GLUCOSE SERPL-MCNC: 155 MG/DL (ref 65–99)
H PYLORI AG STL QL IA: NOT DETECTED
HCT VFR BLD AUTO: 25.7 % (ref 42–52)
HCT VFR BLD AUTO: 25.8 % (ref 42–52)
HGB BLD-MCNC: 8.5 G/DL (ref 14–18)
HGB BLD-MCNC: 8.9 G/DL (ref 14–18)
LYMPHOCYTES # BLD AUTO: 1.82 K/UL (ref 1–4.8)
LYMPHOCYTES NFR BLD: 15 % (ref 22–41)
MACROCYTES BLD QL SMEAR: ABNORMAL
MANUAL DIFF BLD: NORMAL
MCH RBC QN AUTO: 28.1 PG (ref 27–33)
MCHC RBC AUTO-ENTMCNC: 33.1 G/DL (ref 33.7–35.3)
MCV RBC AUTO: 85.1 FL (ref 81.4–97.8)
MICROCYTES BLD QL SMEAR: ABNORMAL
MONOCYTES # BLD AUTO: 0.22 K/UL (ref 0–0.85)
MONOCYTES NFR BLD AUTO: 1.8 % (ref 0–13.4)
MORPHOLOGY BLD-IMP: NORMAL
NEUTROPHILS # BLD AUTO: 10.07 K/UL (ref 1.82–7.42)
NEUTROPHILS NFR BLD: 80.5 % (ref 44–72)
NEUTS BAND NFR BLD MANUAL: 2.7 % (ref 0–10)
NRBC # BLD AUTO: 0.04 K/UL
NRBC BLD-RTO: 0.3 /100 WBC
PHENOBARB SERPL-MCNC: 7.9 UG/ML (ref 15–40)
PLATELET # BLD AUTO: 66 K/UL (ref 164–446)
PLATELET BLD QL SMEAR: NORMAL
PMV BLD AUTO: 11.9 FL (ref 9–12.9)
POLYCHROMASIA BLD QL SMEAR: NORMAL
POTASSIUM SERPL-SCNC: 3 MMOL/L (ref 3.6–5.5)
POTASSIUM SERPL-SCNC: 3 MMOL/L (ref 3.6–5.5)
RBC # BLD AUTO: 3.02 M/UL (ref 4.7–6.1)
RBC BLD AUTO: PRESENT
SIGNIFICANT IND 70042: NORMAL
SITE SITE: NORMAL
SODIUM SERPL-SCNC: 152 MMOL/L (ref 135–145)
SODIUM SERPL-SCNC: 152 MMOL/L (ref 135–145)
SOURCE SOURCE: NORMAL
TOXIC GRANULES BLD QL SMEAR: SLIGHT
VARIANT LYMPHS BLD QL SMEAR: NORMAL
WBC # BLD AUTO: 12.1 K/UL (ref 4.8–10.8)

## 2018-02-22 PROCEDURE — 71045 X-RAY EXAM CHEST 1 VIEW: CPT

## 2018-02-22 PROCEDURE — 80048 BASIC METABOLIC PNL TOTAL CA: CPT

## 2018-02-22 PROCEDURE — 94003 VENT MGMT INPAT SUBQ DAY: CPT

## 2018-02-22 PROCEDURE — A9270 NON-COVERED ITEM OR SERVICE: HCPCS | Performed by: INTERNAL MEDICINE

## 2018-02-22 PROCEDURE — 80184 ASSAY OF PHENOBARBITAL: CPT

## 2018-02-22 PROCEDURE — 99291 CRITICAL CARE FIRST HOUR: CPT | Performed by: INTERNAL MEDICINE

## 2018-02-22 PROCEDURE — 700111 HCHG RX REV CODE 636 W/ 250 OVERRIDE (IP): Performed by: INTERNAL MEDICINE

## 2018-02-22 PROCEDURE — 85007 BL SMEAR W/DIFF WBC COUNT: CPT

## 2018-02-22 PROCEDURE — 700105 HCHG RX REV CODE 258: Performed by: INTERNAL MEDICINE

## 2018-02-22 PROCEDURE — 700102 HCHG RX REV CODE 250 W/ 637 OVERRIDE(OP): Performed by: INTERNAL MEDICINE

## 2018-02-22 PROCEDURE — 700105 HCHG RX REV CODE 258: Performed by: PSYCHIATRY & NEUROLOGY

## 2018-02-22 PROCEDURE — 700111 HCHG RX REV CODE 636 W/ 250 OVERRIDE (IP): Performed by: PSYCHIATRY & NEUROLOGY

## 2018-02-22 PROCEDURE — 82962 GLUCOSE BLOOD TEST: CPT

## 2018-02-22 PROCEDURE — 700101 HCHG RX REV CODE 250: Performed by: INTERNAL MEDICINE

## 2018-02-22 PROCEDURE — A9270 NON-COVERED ITEM OR SERVICE: HCPCS | Performed by: PSYCHIATRY & NEUROLOGY

## 2018-02-22 PROCEDURE — 85027 COMPLETE CBC AUTOMATED: CPT

## 2018-02-22 PROCEDURE — C9254 INJECTION, LACOSAMIDE: HCPCS | Performed by: PSYCHIATRY & NEUROLOGY

## 2018-02-22 PROCEDURE — 770022 HCHG ROOM/CARE - ICU (200)

## 2018-02-22 PROCEDURE — 700102 HCHG RX REV CODE 250 W/ 637 OVERRIDE(OP): Performed by: PSYCHIATRY & NEUROLOGY

## 2018-02-22 PROCEDURE — 95951 HCHG EEG-VIDEO-24HR: CPT

## 2018-02-22 PROCEDURE — 85018 HEMOGLOBIN: CPT

## 2018-02-22 PROCEDURE — 94640 AIRWAY INHALATION TREATMENT: CPT

## 2018-02-22 PROCEDURE — 85014 HEMATOCRIT: CPT

## 2018-02-22 RX ORDER — POTASSIUM CHLORIDE 20 MEQ/1
60 TABLET, EXTENDED RELEASE ORAL ONCE
Status: COMPLETED | OUTPATIENT
Start: 2018-02-22 | End: 2018-02-22

## 2018-02-22 RX ORDER — PREDNISONE 20 MG/1
20 TABLET ORAL DAILY
Status: DISCONTINUED | OUTPATIENT
Start: 2018-02-22 | End: 2018-02-24

## 2018-02-22 RX ADMIN — CHLORHEXIDINE GLUCONATE 15 ML: 1.2 RINSE ORAL at 10:08

## 2018-02-22 RX ADMIN — POTASSIUM BICARBONATE 25 MEQ: 25 TABLET, EFFERVESCENT ORAL at 21:19

## 2018-02-22 RX ADMIN — INSULIN HUMAN 1 UNITS: 100 INJECTION, SOLUTION PARENTERAL at 18:07

## 2018-02-22 RX ADMIN — SODIUM CHLORIDE 1500 MG: 9 INJECTION, SOLUTION INTRAVENOUS at 09:59

## 2018-02-22 RX ADMIN — MIDAZOLAM 4 MG/HR: 5 INJECTION INTRAMUSCULAR; INTRAVENOUS at 00:02

## 2018-02-22 RX ADMIN — METRONIDAZOLE 500 MG: 500 INJECTION, SOLUTION INTRAVENOUS at 21:19

## 2018-02-22 RX ADMIN — PREDNISONE 20 MG: 20 TABLET ORAL at 11:51

## 2018-02-22 RX ADMIN — LEVALBUTEROL HYDROCHLORIDE 1.25 MG: 1.25 SOLUTION RESPIRATORY (INHALATION) at 02:36

## 2018-02-22 RX ADMIN — LEVALBUTEROL HYDROCHLORIDE 1.25 MG: 1.25 SOLUTION RESPIRATORY (INHALATION) at 14:41

## 2018-02-22 RX ADMIN — VASOPRESSIN 0.03 UNITS/MIN: 20 INJECTION INTRAVENOUS at 00:13

## 2018-02-22 RX ADMIN — INSULIN HUMAN 1 UNITS: 100 INJECTION, SOLUTION PARENTERAL at 00:08

## 2018-02-22 RX ADMIN — HYDROCORTISONE SODIUM SUCCINATE 25 MG: 100 INJECTION, POWDER, FOR SOLUTION INTRAMUSCULAR; INTRAVENOUS at 05:03

## 2018-02-22 RX ADMIN — CEFTRIAXONE 2 G: 2 INJECTION, POWDER, FOR SOLUTION INTRAMUSCULAR; INTRAVENOUS at 10:05

## 2018-02-22 RX ADMIN — SODIUM CHLORIDE 1500 MG: 9 INJECTION, SOLUTION INTRAVENOUS at 21:19

## 2018-02-22 RX ADMIN — FUROSEMIDE 20 MG: 10 INJECTION, SOLUTION INTRAMUSCULAR; INTRAVENOUS at 14:52

## 2018-02-22 RX ADMIN — LEVALBUTEROL HYDROCHLORIDE 1.25 MG: 1.25 SOLUTION RESPIRATORY (INHALATION) at 07:54

## 2018-02-22 RX ADMIN — ACETAMINOPHEN 650 MG: 325 TABLET, FILM COATED ORAL at 18:39

## 2018-02-22 RX ADMIN — POTASSIUM CHLORIDE 60 MEQ: 1500 TABLET, EXTENDED RELEASE ORAL at 11:51

## 2018-02-22 RX ADMIN — INSULIN HUMAN 1 UNITS: 100 INJECTION, SOLUTION PARENTERAL at 11:54

## 2018-02-22 RX ADMIN — FUROSEMIDE 20 MG: 10 INJECTION, SOLUTION INTRAMUSCULAR; INTRAVENOUS at 05:03

## 2018-02-22 RX ADMIN — SODIUM CHLORIDE 200 MG: 9 INJECTION, SOLUTION INTRAVENOUS at 21:22

## 2018-02-22 RX ADMIN — PHENOBARBITAL 60 MG: 20 ELIXIR ORAL at 10:06

## 2018-02-22 RX ADMIN — DOXYCYCLINE 100 MG: 100 TABLET ORAL at 21:19

## 2018-02-22 RX ADMIN — VASOPRESSIN 0.03 UNITS/MIN: 20 INJECTION INTRAVENOUS at 10:17

## 2018-02-22 RX ADMIN — CHLORHEXIDINE GLUCONATE 15 ML: 1.2 RINSE ORAL at 21:19

## 2018-02-22 RX ADMIN — OMEPRAZOLE 40 MG: 20 CAPSULE, DELAYED RELEASE ORAL at 10:05

## 2018-02-22 RX ADMIN — PHENOBARBITAL 60 MG: 20 ELIXIR ORAL at 21:19

## 2018-02-22 RX ADMIN — SODIUM CHLORIDE 200 MG: 9 INJECTION, SOLUTION INTRAVENOUS at 09:59

## 2018-02-22 RX ADMIN — METRONIDAZOLE 500 MG: 500 INJECTION, SOLUTION INTRAVENOUS at 14:30

## 2018-02-22 RX ADMIN — LEVALBUTEROL HYDROCHLORIDE 1.25 MG: 1.25 SOLUTION RESPIRATORY (INHALATION) at 19:26

## 2018-02-22 RX ADMIN — METRONIDAZOLE 500 MG: 500 INJECTION, SOLUTION INTRAVENOUS at 05:03

## 2018-02-22 RX ADMIN — DOXYCYCLINE 100 MG: 100 TABLET ORAL at 10:06

## 2018-02-22 RX ADMIN — VASOPRESSIN 0.03 UNITS/MIN: 20 INJECTION INTRAVENOUS at 22:26

## 2018-02-22 RX ADMIN — POTASSIUM BICARBONATE 25 MEQ: 25 TABLET, EFFERVESCENT ORAL at 10:06

## 2018-02-22 NOTE — EEG PROGRESS NOTE
VIDEO ELECTROENCEPHALOGRAM / EPILEPSY MONITORING UNIT REPORT        Referring provider: Dr. Hunter.      DOS:   2/21/2018 - 2/22//2018 (total recording for 23 hours and 50 minutes).         INDICATION:  Ruben Edwards 37 y.o. male presenting with recurrent facial twitching and altered mental status. EEG shows recurrent seizures.      CURRENT ANTIEPILEPTIC REGIMEN: Levetiracetam 1500 mg q 12 hrs, Lacosamide 200 mg q 12 hrs, Phenobarbital which was increased during the recording to 60 mg q 12 hrs. Patient also on versed infusion.      TECHNIQUE: A 30-channel, 24 hrs video electroencephalogram (VEEG) was performed in accordance with the international 10-20 system. This digital study was reviewed in bipolar and referential montages. The recording examined a sedated and or encephalopathic patient.      DESCRIPTION OF THE RECORD:  There is an asymmetric background with significant attenuation and slowing of the left hemisphere. There is slowing in the right hemisphere, mostly within a delta/theta range.      ACTIVATION PROCEDURES:   Not performed.      ICTAL AND/OR INTERICTAL FINDINGS:   Continuous right frontotemporal spikes/sharps. Intermittently noted, recurrent seizures localized to the right frontotemporal region, which are electroencephalographically characterized by rhythmic or periodic spikes/sharps in this region and they do not spread contralaterally. No clear clinical changes were noted during the seizures. They are mostly brief and last typically less than a minute, but do recur often throughout the study. There has been perhaps a modest improvement with adjustment of the antiepileptic regimen.      EVENT(S):  None marked for review.      EKG: sampling review of EKG recording demonstrated sinus rhythm.         INTERPRETATION:   This is an abnormal 24 hours video electroencephalogram recording in a sedated and/or encephalopathic patient.  An asymmetric background is noted, with significant slowing and attenuation  of the left hemisphere and slowing of the right hemisphere. These findings suggest widespread underlying structural abnormalities. Continuous right frontotemporal spikes/sharps were noted throughout the study. Intermittently, recurrent focal electrographic seizures localized to the right frontotemporal region were seen. These were mostly brief and lasted typically less than a minute at a time, but they did recur often throughout the study. There has been perhaps a modest improvement with adjustment of the antiepileptic regimen.         Note: Updates provided to Dr. Hunter.             Harris Bolton MD  Medical Director, Epilepsy and Neurodiagnostics.   Clinical  of Neurology [facility].   Diplomate in Neurology, Epilepsy, and Electrodiagnostic Medicine.   Office: [phone]  Fax: [phone]

## 2018-02-22 NOTE — CARE PLAN
Problem: Ventilation Defect:  Goal: Ability to achieve and maintain unassisted ventilation or tolerate decreased levels of ventilator support    Intervention: Manage ventilation therapy by monitoring diagnostic test results  Adult Ventilation Update    Total Vent Days: 5    Patient Lines/Drains/Airways Status    Active Airway     Name: Placement date: Placement time: Site: Days:    Airway Group Standard Cuffed Trach Tracheostomy 6.0 07/02/17   1403   Tracheostomy   234    Airway Group Tracheostomy 6.0       Tracheostomy               Static Compliance (ml / cm H2O): 12 (02/21/18 2058)    Patient failed trials because of Barriers to Wean: Other (Comments) (Spoke with MD. Pt sedated for seizures.) (02/21/18 0813)  Barriers to SBT    Length of Weaning Trial         Sputum/Suction   Cough: Congested;Moist (02/22/18 0000)  Sputum Amount: Small (02/22/18 0000)  Sputum Color: Clear;Pink Tinged (02/22/18 0000)  Sputum Consistency: Thick (02/22/18 0000)    Mobility Group  Activity Performed: Unable to mobilize (02/21/18 2000)  Pt Calls for Assistance: No (02/20/18 2000)  Reason Not Mobilized: Bed rest (02/21/18 2000)  Mobilization Comments:  (contracted ) (02/20/18 0800)    Events/Summary/Plan: Changed ventilator circut/ poor heater connection (02/22/18 0050)

## 2018-02-22 NOTE — PROCEDURES
Procedure Note    Date: 2/18/2018  Time: 16:45 PM    Procedure: Bronchoscopy    Indication: Large left sided pneumonia    Consent: Informed consent obtained from patient or designated decision maker after explaining the benefits/risks of the procedure including but not limited to bleeding, infection, airway trauma or loss therof, pneumothorax/hemothorax, arrythmia, or death. Patient or surrogate expressed understanding and agreement and signed consent which can be found in the patient's chart.    Procedure: After obtaining consent, a time-out was performed. Respiratory therapy and nursing at bedside throughout procedure. Patient provided sedation and analgesia throughout the procedure. Placed on full ventilator support with an FiO2 of 100% throughout the procedure. Using a fiberoptic bronchoscope, trachea entered via 6.0 Shilley Tracheostomy.  10 mL of local anesthetic sprayed at the kiersten (2% lidocaine) achieving appropriate comfort level for patient. Airways visualized directly and the following intervention was performed: suctioning of mucus and BAL. Findings as below. Patient tolerated procedure well without any difficulties and left in care of bedside nurse/RT.     Medications: ativan, lidocaine  Findings: Upper airway - Not visualized as bronchoscope passed through Trach.        Trachea to kiersten - inflammed appearing mucosa without lesions or mass, Trach tip measured 4 cm from the kiersten. Some blood from upper tracheostomy        R proximal and distal airways - inflammed appearing mucosa without mass/lesion/anatomic variance, secretions: thin, tan, moderate. Each bronchi irrigated and suctioned until clear        L proximal and distal airways - inflammed appearing mucosa without mass/lesion/anatomic variance, secretions: large amount of thick tan/red, cloud mucus in the LLL, lingula and JONATAN. LLL BAL obtained. Each bronchi irrigated and suctioned until clear        Samples - LLL BAL    Complications: none  apparent  CXR (if applicable): pending    Modesto Appiah, DO  Critical Care Medicine

## 2018-02-22 NOTE — CARE PLAN
Problem: Ventilation Defect:  Goal: Ability to achieve and maintain unassisted ventilation or tolerate decreased levels of ventilator support  Outcome: PROGRESSING AS EXPECTED  Vent Day 4  6 Shiley  CMV 16/460/30%/8    No wean today. Still on sedation. Propofol/Versed    Xopenex Q6

## 2018-02-22 NOTE — RESPIRATORY CARE
COPD EDUCATION by COPD CLINICAL EDUCATOR  2/21/2018 at 4:12 PM by Berkley Kang     Patient reviewed by COPD education team. Patient does not qualify for COPD program.

## 2018-02-22 NOTE — PROGRESS NOTES
Pulmonary Critical Care Progress Note        Chief Complaint: Respiratory failure, septic shock    History of Present Illness:  Mr. Edwards is a 37-year-old male with past medical history of traumatic brain injury at age 17, seizures diagnosed in June of 2017. His baseline mental status appears to be nodding and minimal interacting without vocalization. Who was brought to the ICU as a direct admit from [facility] where he was brought today for hypoxia. Reportedly the patient was being weanied off of his phenobarbital by his neurologist when he began having more frequent seizures, approximately one week ago his neurologist increased his Keppra dosing however the seizures continued. His mother did use CBD and THC with some success in slowing the seizures. Since 2 days ago the patient had multiple aspiration events following these seizures. His mother evaluated him with a home oxygen saturation monitor and he was noted to be hypoxic, he was brought to [facility] where he was found to have 28% bands with leukopenia. Chest x-ray was obtained demonstrating bilateral infiltrates right greater than left. He was placed on mechanical ventilation and his oxygenation was unable to be improved more than 85%, he was hypotensive and given multiple boluses of IV fluids without improvement. He was started on levo fed and given multiple doses of Ativan for seizures and transferred to our ICU for higher level of care. He arrives on the ventilator and on pressors critically ill.    Please note this history is obtained by my partner Dr. Appiah 2/18/2018.    ROS:  Respiratory: unable to perform due to the patient's inability to effectively communicate, Cardiac: unable to perform due to the patient's inability to effectively communicate, GI: unable to perform due to the patient's inability to effectively communicate.  All other systems negative.    Interval Events:  24 hour interval history reviewed     2/18 admit,  TBI  Status epilepticus, but improved with occasional spike/wave  Aspiration pna  On Levophed and vasopressin  No clinical sz  Peg working to goal 50  Lasix given  CVP  D/c H and H  Cdiff assessment  Vent day 5  2/17 trach 6.0 shiley  GI proph  GI bleed  Rocephin and flagyl  NA to 152.   Free water via to start.  May give D5W if increase Na  Prednisone 20 qd    PFSH:  No change.    Respiratory:  Liriano Vent Mode: APVCMV, Rate (breaths/min): 16, Vt Target (mL): 460, PEEP/CPAP: 10, FiO2: 30, Static Compliance (ml / cm H2O): 29.2, Control VTE (exp VT): 470  Pulse Oximetry: 95 %  Chest Tube Drains:          Exam: rhonchi left > right.  Tracheostomy in place.  ImagingCXR  I have personally reviewed the chest x-ray my impression is  there is tracheostomy in good position. Left sided pneumonia is improving, but continues to involve at least 2 lobes.  Recent Labs      02/20/18   0524   ISTATAPH  7.553*   ISTATAPCO2  18.7*   ISTATAPO2  81   ISTATATCO2  17*   [id number]  98   ISTATARTHCO3  16.5*   ISTATARTBE  -5*   ISTATTEMP  97.2 F   ISTATFIO2  30   ISTATSPEC  Arterial   ISTATAPHTC  7.565*   [id number]  77       HemoDynamics:  Pulse: (!) 55, Heart Rate (Monitored): 60  NIBP: (!) 87/48       Exam: regular rhythm (Sinus)  Imaging:     Echo on 2/19/2018:  CONCLUSIONS  Normal right and left ventricular size and function.   No significant valve disease or flow abnormalities.   Dilated inferior vena cava without inspiratory collapse. The patient is   intubated.        Neuro:  GCS Total Aleida Coma Score: 6       Exam: Heavily sedated. The patient has chronic traumatic brain injury and multiple contractures. Patient does not follow commands, open eyes to command and has evidence of hyperreflexia. Phenobarbital has been restarted and dosage increased again today. Versed has been recommended today for seizure activity but we have asked to decrease the rate slightly to 2 mg/hour. Propofol has been asked to be discontinued since  there appears to be less seizure activity. No sign of continuous focal seizures seen.  Imaging: None - Reviewed   EEG ongoing and discussed the case with Dr. Hunter as well. Continue to increase phenobarbital.    Fluids:  Intake/Output       02/20/18 0700 - 02/21/18 0659 02/21/18 0700 - 02/22/18 0659 02/22/18 0700 - 02/23/18 0659      0700-1859 1900-0659 Total 0700-1859 1900-0659 Total [id number] 1900-0659 Total       Intake    I.V.  2580.9  2023.6 4604.5  677.8  823.6 1501.4  --  -- --    Vasopressin Volume 108 108 216 108 108 216 -- -- --    Propofol Volume 88.5 60 148.5 37.9 0 37.9 -- -- --    Midazolam Volume 60.7 60 120.7 55 48 103 -- -- --    Norepinephrine Volume 73.7 45.6 119.3 31.9 17.6 49.5 -- -- --    IV Piggyback Volume (IV Piggyback) 450 250 700 -- 550 550 -- -- --    IV Volume (0.9% Normal Saline) 1000 1500 2500 445 100 545 -- -- --    IV Volume (Magnesium) 100 -- 100 -- -- -- -- -- --    IV Volume (Potassium) 700 -- 700 -- -- -- -- -- --    Other  --  30 30  --  30 30  --  -- --    Medications (P.O./ Enteral Liquids) -- 30 30 -- 30 30 -- -- --    Enteral  20  180 200  80  360 440  --  -- --    Enteral Volume 20 120 140 80 300 380 -- -- --    Free Water / Tube Flush -- 60 60 -- 60 60 -- -- --    Total Intake 2600.9 2233.6 4834.5 757.8 1213.6 1971.4 -- -- --       Output    Urine  330  310 640  2050  1690 3740  --  -- --    Indwelling Cathether 330  1690 3740 -- -- --    Stool  --  -- --  --  -- --  --  -- --    Number of Times Stooled -- -- -- 1 x -- 1 x -- -- --    Total Output 330  1690 3740 -- -- --       Net I/O     2270.9 1923.6 4194.5 -1292.2 -476.4 -1768.6 -- -- --        Weight: 62.2 kg (137 lb 2 oz)  Recent Labs      02/20/18   0538  02/21/18   0510  02/22/18   0510   SODIUM  146*  147*  152*   POTASSIUM  2.4*  3.4*  3.0*   CHLORIDE  117*  122*  121*   CO2  16*  18*  24   BUN  28*  34*  35*   CREATININE  <0.20*  0.21*  0.20*   MAGNESIUM  2.0  2.4   --    PHOSPHORUS   1.7*  2.6   --    CALCIUM  7.6*  7.6*  7.9*       GI/Nutrition:  Exam: abdomen is soft and non-tender there is a button PEG will be accessed with patient's home feeding tube.No significant distention is noted. There is also a mechanical device at the midepigastric region palpable. Tube feeds to be increased to goal. We'll begin free water given due to mild increase in sodium.  Imaging: None - Reviewed  Liver Function  Recent Labs      18   0538  18   0510  02/22/18   0510   GLUCOSE  142*  138*  155*       Heme:  Recent Labs      18   1031   18   0538   18   0510  18   1800  18   0000  18   RBC   --    --   3.39*   --   3.14*   --    --   3.02*   HEMOGLOBIN   --    < >  9.6*   < >  8.9*  8.9*  8.9*  8.5*   HEMATOCRIT   --    < >  28.3*   < >  26.4*  26.4*  25.8*  25.7*   PLATELETCT   --    --   81*   --   57*   --    --   66*   PROTHROMBTM  22.2*   --    --    --    --    --    --    --    INR  1.98*   --    --    --    --    --    --    --     < > = values in this interval not displayed.       Infectious Disease:  Monitored Temp  Av.9 °C (98.5 °F)  Min: 36.6 °C (97.88 °F)  Max: 37.4 °C (99.3 °F)  Micro: antibiotics reviewed. The patient remains on antibiotic therapy including Rocephin and Flagyl at this time. C. diff is negative. Do not need isolation for this purpose. BAL data, with Proteus, and Klebsiella as now been reported with mild amount of yeast from BAL.   Recent Labs      18   WBC  18.7*  14.4*  12.1*   NEUTSPOLYS  84.70*  78.70*   --    LYMPHOCYTES  7.20*  9.30*   --    MONOCYTES  2.70  0.00   --    EOSINOPHILS  0.00  0.00   --    BASOPHILS  0.00  0.00   --      Current Facility-Administered Medications   Medication Dose Frequency Provider Last Rate Last Dose   • hydrocortisone sodium succinate PF (SOLU-CORTEF) 100 MG injection 25 mg  25 mg Q8HRS FARHAT BerryO.   25 mg at 18 0503   •  furosemide (LASIX) injection 20 mg  20 mg BID DIURETIC Jon Reynoso D.O.   20 mg at 02/22/18 0503   • potassium bicarbonate (KLYTE) 25 MEQ effervescent tablet TBEF 25 mEq  25 mEq BID JASPAL Berry.O.   25 mEq at 02/21/18 2121   • doxycycline monohydrate (ADOXA) tablet 100 mg  100 mg Q12HRS JASPAL Berry.O.   100 mg at 02/21/18 2121   • omeprazole 2 mg/mL in sodium bicarbonate (PRILOSEC) oral susp 40 mg  40 mg DAILY JASPAL Berry.O.   40 mg at 02/21/18 1044   • PHENobarbital solution 60 mg  60 mg BID Martell Hunter M.D.   60 mg at 02/21/18 2121   • vasopressin (VASOSTRICT) 20 Units in  mL Infusion  0.03 Units/min Continuous Rivas Brooks M.D. 9 mL/hr at 02/22/18 0013 0.03 Units/min at 02/22/18 0013   • norepinephrine (LEVOPHED) 8 mg in D5W 250 mL Infusion  0-30 mcg/min Continuous Rivas Brooks M.D. 3.8 mL/hr at 02/22/18 0633 2 mcg/min at 02/22/18 0633   • levalbuterol (XOPENEX) 1.25 MG/3ML nebulizer solution 1.25 mg  1.25 mg Q6HRS (RT) JASPAL Berry.O.   1.25 mg at 02/22/18 0236   • midazolam (VERSED) premix 125 mg/125 mL NS  5 mg/hr Continuous JASPAL Berry.O. 4 mL/hr at 02/22/18 0002 4 mg/hr at 02/22/18 0002   • Respiratory Care per Protocol   Continuous RT Modseto Appiah Jr., D.O.       • polyethylene glycol/lytes (MIRALAX) PACKET 1 Packet  1 Packet QDAY PRN Modesto Appiha Jr. D.O.        And   • magnesium hydroxide (MILK OF MAGNESIA) suspension 30 mL  30 mL QDAY PRN Modesto Appiah Jr., D.O.        And   • bisacodyl (DULCOLAX) suppository 10 mg  10 mg QDAY PRN JASPAL Alaniz Jr..O.       • chlorhexidine (PERIDEX) 0.12 % solution 15 mL  15 mL BID JASPAL Alaniz Jr..MICHELLE   15 mL at 02/21/18 2121   • lidocaine (XYLOCAINE) 1%  injection  1-2 mL Q30 MIN PRN Modesto Appiah Jr., D.O.       • MD ALERT...Adult ICU Electrolyte Replacement per Pharmacy Protocol   pharmacy to dose JASPAL Alaniz Jr..SAM.       • fentaNYL (SUBLIMAZE) injection 25 mcg  25 mcg Q  HOUR PRN Modesto Appiah Jr., D.O.        Or   • fentaNYL (SUBLIMAZE) injection 50 mcg  50 mcg Q HOUR PRN JASPAL Alaniz Jr..OBucky        Or   • fentaNYL (SUBLIMAZE) injection 100 mcg  100 mcg Q HOUR PRN Modesto Appiah Jr. D.O.       • ipratropium-albuterol (DUONEB) nebulizer solution 3 mL  3 mL Q2HRS PRN (RT) JASPAL Alaniz Jr..O.       • insulin regular (HUMULIN R) injection 1-6 Units  1-6 Units Q6HRS JASPAL Alaniz Jr..OBucky   Stopped at 02/22/18 0600    And   • glucose 4 g chewable tablet 16 g  16 g Q15 MIN PRN JASPAL Alaniz Jr..OBucky        And   • dextrose 50% (D50W) injection 25 mL  25 mL Q15 MIN PRN JASPAL Alaniz Jr..O.       • levalbuterol (XOPENEX) 1.25 MG/3ML nebulizer solution 1.25 mg  1.25 mg Q4HRS PRN Duy Whitaker M.D.   1.25 mg at 02/20/18 0626   • miconazole 2%-zinc oxide (RADHA) topical cream   PRN Duy Whitaker M.D.       • acetaminophen (TYLENOL) tablet 650 mg  650 mg Q6HRS PRN Duy Whitaker M.D.       • oxyCODONE immediate-release (ROXICODONE) tablet 5 mg  5 mg Q3HRS PRN Duy Whitaker M.D.        And   • oxyCODONE immediate release (ROXICODONE) tablet 10 mg  10 mg Q3HRS PRN Duy Whitaker M.D.        And   • morphine (pf) 4 mg/ml injection 4 mg  4 mg Q3HRS PRN Duy Whitaker M.D.       • metroNIDAZOLE (FLAGYL) IVPB 500 mg  500 mg Q8HRS Duy Whitaker M.D.   Stopped at 02/22/18 0603   • ondansetron (ZOFRAN) syringe/vial injection 4 mg  4 mg Q4HRS PRN Duy Whitaker M.D.       • ondansetron (ZOFRAN ODT) dispertab 4 mg  4 mg Q4HRS PRN Duy Whitaker M.D.       • promethazine (PHENERGAN) tablet 12.5-25 mg  12.5-25 mg Q4HRS PRN Duy Whitaker M.D.       • promethazine (PHENERGAN) suppository 12.5-25 mg  12.5-25 mg Q4HRS PRN Duy Whitaker M.D.       • prochlorperazine (COMPAZINE) injection 5-10 mg  5-10 mg Q4HRS PRN Duy Whitaker M.D.       • LORazepam (ATIVAN) injection 4 mg  4 mg Q10 MIN PRN Duy Whitaker M.D.   4 mg at 02/18/18 1946   • propofol (DIPRIVAN)  injection  0-80 mcg/kg/min Continuous Modesto Appiah Jr., D.O.   Stopped at 02/21/18 1541   • cefTRIAXone (ROCEPHIN) syringe 2 g  2 g Q24HRS Duy Whitaker M.D.   2 g at 02/21/18 1048   • NS infusion   Continuous Jon Reynoso D.OBucky 10 mL/hr at 02/21/18 0918     • levETIRAcetam (KEPPRA) 1,500 mg in  mL IVPB  1,500 mg Q12HRS Duy Whitaker M.D.   Stopped at 02/21/18 2148   • lacosamide (VIMPAT) 200 mg in  mL ivpb  200 mg Q12HRS Maci Jade M.D.   Stopped at 02/21/18 2233     Last reviewed on 2/18/2018  7:51 PM by Lois Camejo, Pharmacy Intern    Quality  Measures:  Labs reviewed, Medications reviewed and Radiology images reviewed  Waldrop catheter: Critically Ill - Requiring Accurate Measurement of Urinary Output  Central line in place: Sepsis, Shock and Vasopressors      DVT prophylaxis - mechanical: Contra-indicated  Ulcer prophylaxis: Yes      Assessment and plan:    1. Acute hypoxemic respiratory failure.    -Patient has sizable left-sided pneumonia. Appears to be acute with probable scarring from previous left-sided pneumonia, though likely the cause of His respiratory failure decompensation.   -Will need broad-spectrum antibiotic coverage including Rocephin and Flagyl.   -Vancomycin discontinued  -Continue bronchodilator therapy.  -Unable to obtain arterial blood gas. Would hold on attempted repeating, unless hemodynamic or pulmonary decompensation.     2. Septic shock.    -Patient has evidence of severe hypotension requiring vasopressors initially but now off. As well as hydrocortisone. Will plan to wean steroids today.  -Resolved lactic acidosis    3. History of traumatic brain injury, chronic seizure disorder.    -Possibly related to previous phenobarbital wean off.   -May have also been related to acute pneumonia.     4. Status epilepticus.    -Versed has been recommended to be added to current treatment, though decreasing the dose to 2 mg/h today. Possible increase of the  phenobarbital today if needed.  -Holding propofol to assess need for ongoing increase in phenobarbital.  -This includes Keppra, Depakote, phenobarbital, and with increase in phenobarbital and I'm going Versed infusion.  -Appreciate neurology's assessment and recommendations.   -Continuous 24-hour EEG overnight again planned    4.  Decubitus ulcerations of the left hip including stage IV disease.    -Possibly may require more than wound care and consideration for orthopedic evaluation and biopsy if does not improve.  -Appreciate wound care assistance and wounds. Address daily..  -Overall appeared stable.  -Doubt cause of sepsis.    5. Pneumonia.    -Patient is substantial left-sided pneumonia. We'll continue with Rocephin, Flagyl, doxycycline.  -Continue full ventilator support at this point. He has no spontaneous effort due to the heavy sedation for status epilepticus.  -No spontaneous breathing trials at this time.  -Patient is status post tracheostomy placement with a 6.0 Shiley currently in place. Peak pressures are not elevated.    7. Mild fluid overload.    -Continue with Lasix diuresis.  -Patient has developed a metabolic acidosis due to hyperchloremia. We'll be cautious with further diuresis and will add free water via the tube feeds  -Consider adding D5 water IV if needed. Would try to avoid the setting of status epilepticus however.    Discussed patient condition and risk of morbidity and/or mortality with Charge nurse / hot rounds.  M.D. rounds  The patient remains critically ill.  Critical care time = 40 minutes in directly providing and coordinating critical care and extensive data review.  No time overlap and excludes procedures.    Jon Reynoso D.O.

## 2018-02-22 NOTE — CARE PLAN
Problem: Safety  Goal: Will remain free from falls  Outcome: PROGRESSING AS EXPECTED      Problem: Infection  Goal: Will remain free from infection  Outcome: PROGRESSING AS EXPECTED      Problem: Bowel/Gastric:  Goal: Normal bowel function is maintained or improved  Outcome: PROGRESSING AS EXPECTED

## 2018-02-22 NOTE — PROGRESS NOTES
NEUROLOGY PROGRESS NOTE      Background:  37 y.o. male was admitted on 2018  2:32 PM for Acute Hypoxic Respiratory Failure  Acute respiratory failure with hypoxia (CMS-HCC).  He is being followed by Neurology for intractable seizure.    SUBJECTIVE: Unresponsive, on vent. Started on continuous EEG monitoring .    NEUROLOGICAL EXAM:   Remain unresponsive. Pupils are equal and sluggishly reactive. 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      OBJECTIVE:     EE18:  This is an abnormal 24 hours video electroencephalogram recording in a sedated and/or encephalopathic patient.  An asymmetric background is noted, with significant slowing and attenuation of the left hemisphere and slowing of the right hemisphere. These findings suggest widespread underlying structural abnormalities. Continuous right frontotemporal spikes/sharps were noted throughout the study. Intermittently, recurrent focal electrographic seizures localized to the right frontotemporal region were seen. These were mostly brief and lasted typically less than a minute at a time, but they did recur often throughout the study. There has been perhaps a modest improvement with adjustment of the antiepileptic regimen.     EE18:  This is an abnormal 24 hours video electroencephalogram recording in a sedated and/or encephalopathic patient.  An asymmetric background is noted, with significant slowing and attenuation of the left hemisphere and slowing of the right hemisphere. These findings suggest widespread underlying structural abnormalities. Continuous right frontotemporal spikes/sharps were noted throughout the study. Intermittently, recurrent focal electrographic seizures localized to the right frontotemporal region were seen. These were mostly brief and lasted typically less than a minute at a time, but they did recur often throughout the study. There has been perhaps a modest improvement with adjustment  of the antiepileptic regimen.     MEDICATIONS:  Current Facility-Administered Medications   Medication Dose   • predniSONE (DELTASONE) tablet 20 mg  20 mg   • furosemide (LASIX) injection 20 mg  20 mg   • potassium bicarbonate (KLYTE) 25 MEQ effervescent tablet TBEF 25 mEq  25 mEq   • doxycycline monohydrate (ADOXA) tablet 100 mg  100 mg   • omeprazole 2 mg/mL in sodium bicarbonate (PRILOSEC) oral susp 40 mg  40 mg   • PHENobarbital solution 60 mg  60 mg   • vasopressin (VASOSTRICT) 20 Units in  mL Infusion  0.03 Units/min   • norepinephrine (LEVOPHED) 8 mg in D5W 250 mL Infusion  0-30 mcg/min   • levalbuterol (XOPENEX) 1.25 MG/3ML nebulizer solution 1.25 mg  1.25 mg   • midazolam (VERSED) premix 125 mg/125 mL NS  2 mg/hr   • Respiratory Care per Protocol     • polyethylene glycol/lytes (MIRALAX) PACKET 1 Packet  1 Packet    And   • magnesium hydroxide (MILK OF MAGNESIA) suspension 30 mL  30 mL    And   • bisacodyl (DULCOLAX) suppository 10 mg  10 mg   • chlorhexidine (PERIDEX) 0.12 % solution 15 mL  15 mL   • lidocaine (XYLOCAINE) 1%  injection  1-2 mL   • MD ALERT...Adult ICU Electrolyte Replacement per Pharmacy Protocol     • fentaNYL (SUBLIMAZE) injection 25 mcg  25 mcg    Or   • fentaNYL (SUBLIMAZE) injection 50 mcg  50 mcg    Or   • fentaNYL (SUBLIMAZE) injection 100 mcg  100 mcg   • ipratropium-albuterol (DUONEB) nebulizer solution 3 mL  3 mL   • insulin regular (HUMULIN R) injection 1-6 Units  1-6 Units    And   • glucose 4 g chewable tablet 16 g  16 g    And   • dextrose 50% (D50W) injection 25 mL  25 mL   • levalbuterol (XOPENEX) 1.25 MG/3ML nebulizer solution 1.25 mg  1.25 mg   • miconazole 2%-zinc oxide (RADHA) topical cream     • acetaminophen (TYLENOL) tablet 650 mg  650 mg   • oxyCODONE immediate-release (ROXICODONE) tablet 5 mg  5 mg    And   • oxyCODONE immediate release (ROXICODONE) tablet 10 mg  10 mg    And   • morphine (pf) 4 mg/ml injection 4 mg  4 mg   • metroNIDAZOLE (FLAGYL) IVPB 500 mg  " 500 mg   • ondansetron (ZOFRAN) syringe/vial injection 4 mg  4 mg   • ondansetron (ZOFRAN ODT) dispertab 4 mg  4 mg   • promethazine (PHENERGAN) tablet 12.5-25 mg  12.5-25 mg   • promethazine (PHENERGAN) suppository 12.5-25 mg  12.5-25 mg   • prochlorperazine (COMPAZINE) injection 5-10 mg  5-10 mg   • LORazepam (ATIVAN) injection 4 mg  4 mg   • propofol (DIPRIVAN) injection  0-80 mcg/kg/min   • cefTRIAXone (ROCEPHIN) syringe 2 g  2 g   • NS infusion     • levETIRAcetam (KEPPRA) 1,500 mg in  mL IVPB  1,500 mg   • lacosamide (VIMPAT) 200 mg in  mL ivpb  200 mg       Pulse 70, temperature 36.2 °C (97.1 °F), resp. rate 16, height 1.727 m (5' 8\"), weight 62.2 kg (137 lb 2 oz), SpO2 95 %.        Recent Labs      02/20/18   0538   02/21/18   0510  02/21/18   1800  02/22/18   0000  02/22/18   0510   WBC  18.7*   --   14.4*   --    --   12.1*   RBC  3.39*   --   3.14*   --    --   3.02*   HEMOGLOBIN  9.6*   < >  8.9*  8.9*  8.9*  8.5*   HEMATOCRIT  28.3*   < >  26.4*  26.4*  25.8*  25.7*   MCV  83.5   --   84.1   --    --   85.1   MCH  28.3   --   28.3   --    --   28.1   MCHC  33.9   --   33.7   --    --   33.1*   RDW  53.8*   --   54.6*   --    --   54.5*   PLATELETCT  81*   --   57*   --    --   66*   MPV  10.5   --   11.1   --    --   11.9    < > = values in this interval not displayed.     Recent Labs      02/20/18   0538 02/21/18   0510  02/22/18   0510   SODIUM  146*  147*  152*   POTASSIUM  2.4*  3.4*  3.0*   CHLORIDE  117*  122*  121*   CO2  16*  18*  24   GLUCOSE  142*  138*  155*   BUN  28*  34*  35*       Results for orders placed or performed during the hospital encounter of 02/18/18   Echocardiogram Comp W/O Cont   Result Value Ref Range    Eject.Frac. MOD BP 66.03     Eject.Frac. MOD 4C 64.91     Eject.Frac. MOD 2C 66.35     Left Ventrical Ejection Fraction 65         ASSESSMENT AND PLAN:   37 y.o. male with history of a [place] and seizure " disorder who presents with increase frequency of seizures as well as aspiration pneumonia. The increased frequency of his seizures might have been caused by the tapering of his phenobarbital. --Will Continue Keppra 1500 mg IV BID  Increase Phenobarbital to 60 mg suspension BID  Continue Vimpat 200 mg IV BID.  Start tapering propofol and Versed with increasing phenobarbital.  I will check phenobarbital level and that just his phenobarbital while tapering Versed and propofol.

## 2018-02-22 NOTE — PROCEDURES
VIDEO ELECTROENCEPHALOGRAM / EPILEPSY MONITORING UNIT REPORT        Referring provider: Dr. Hunter.      DOS:   2/21/2018 - 2/22//2018 (total recording for 23 hours and 50 minutes).         INDICATION:  Ruben Edwards 37 y.o. male presenting with recurrent facial twitching and altered mental status. EEG shows recurrent seizures.      CURRENT ANTIEPILEPTIC REGIMEN: Levetiracetam 1500 mg q 12 hrs, Lacosamide 200 mg q 12 hrs, Phenobarbital which was increased during the recording to 60 mg q 12 hrs. Patient also on versed infusion.      TECHNIQUE: A 30-channel, 24 hrs video electroencephalogram (VEEG) was performed in accordance with the international 10-20 system. This digital study was reviewed in bipolar and referential montages. The recording examined a sedated and or encephalopathic patient.      DESCRIPTION OF THE RECORD:  There is an asymmetric background with significant attenuation and slowing of the left hemisphere. There is slowing in the right hemisphere, mostly within a delta/theta range.      ACTIVATION PROCEDURES:   Not performed.      ICTAL AND/OR INTERICTAL FINDINGS:   Continuous right frontotemporal spikes/sharps. Intermittently noted, recurrent seizures localized to the right frontotemporal region, which are electroencephalographically characterized by rhythmic or periodic spikes/sharps in this region and they do not spread contralaterally. No clear clinical changes were noted during the seizures. They are mostly brief and last typically less than a minute, but do recur often throughout the study. There has been perhaps a modest improvement with adjustment of the antiepileptic regimen.      EVENT(S):  None marked for review.      EKG: sampling review of EKG recording demonstrated sinus rhythm.         INTERPRETATION:   This is an abnormal 24 hours video electroencephalogram recording in a sedated and/or encephalopathic patient.  An asymmetric background is noted, with significant slowing and attenuation  of the left hemisphere and slowing of the right hemisphere. These findings suggest widespread underlying structural abnormalities. Continuous right frontotemporal spikes/sharps were noted throughout the study. Intermittently, recurrent focal electrographic seizures localized to the right frontotemporal region were seen. These were mostly brief and lasted typically less than a minute at a time, but they did recur often throughout the study. There has been perhaps a modest improvement with adjustment of the antiepileptic regimen.         Note: Updates provided to Dr. Hunter.             Harris Bolton MD  Medical Director, Epilepsy and Neurodiagnostics.   Clinical  of Neurology [facility].   Diplomate in Neurology, Epilepsy, and Electrodiagnostic Medicine.   Office: [phone]  Fax: [phone]    MARLON GOODMAN    DD:  02/22/2018 11:55:30  DT:  02/22/2018 07:00:16    D#:  [id number]  Job#:  [id number]

## 2018-02-22 NOTE — PROGRESS NOTES
[facility] Hospitalist Progress Note    Date of Service: 2018    Chief Complaint  37 y.o. male with hx of traumatic brain injury (baseline nonverbal and immobile), with a hx of seizures admitted 2018 with septic shock.    Interval Problem Update  Patient had been weaned off his phenobarbital, was noted to have seizures while here so phenobarbital restarted.  Presumed aspiration pneumonia during a seizure, now causing septic shock.  Patient was noted to have shock, remains on pressor support.   Discussed case with family at bedside.   Patient seen and examined in the ICU, ICU care given.   Discussed patient condition and plan with Intensivist, RN, RT and charge nurse / hot rounds.    Not responsive at baseline  Continuous EEG overnight  versed 4  No overnight events  Levo at 2  NSR to cherri  Has left peg  Tolerating TF at 25, advancing  Adequate uop  tmax 99.5  c-diff negative  Vent day 5  Give k  Add free water    Consultants/Specialty  Neurology  Pulmonary    Disposition  Patient requires additional treatment in the hospital    Patient is critically ill.   The patient continues to have : Septic shock  The vital organ system that is effected is the: All her risk   If untreated there is a high chance of deterioration into: death   The critical care that I am providing today is: levophed gtt  The critical care that has been undertaken is medically complex.   There has been no overlap in critical care time.  Critical care time not including procedures, no overlap: 40 minutes      Review of Systems   Unable to perform ROS: Intubated      Physical Exam  Laboratory/Imaging   Hemodynamics  No data recorded.   Monitored Temp: 37.2 °C (99 °F)  Pulse  Av.5  Min: 55  Max: 119 Heart Rate (Monitored): 61  NIBP: 108/69      Respiratory  Liriano Vent Mode: APVCMV, Rate (breaths/min): 16, PEEP/CPAP: 10, PEEP/CPAP: 10, FiO2: 30, P Peak (PIP): 33, P MEAN: 15   Respiration: 15, Pulse Oximetry: 93 %     Work Of Breathing /  Effort: Vented  RUL Breath Sounds: Clear, RML Breath Sounds: Clear, RLL Breath Sounds: Diminished, JONATAN Breath Sounds: Clear, LLL Breath Sounds: Diminished    Fluids    Intake/Output Summary (Last 24 hours) at 02/22/18 0744  Last data filed at 02/22/18 0600   Gross per 24 hour   Intake          1971.42 ml   Output             3740 ml   Net         -1768.58 ml       Nutrition  Orders Placed This Encounter   Procedures   • Diet NPO     Standing Status:   Standing     Number of Occurrences:   1     Order Specific Question:   Restrict to:     Answer:   Strict [1]     Physical Exam   Constitutional: He appears cachectic. He has a sickly appearance. No distress. He is intubated.   HENT:   Head: Normocephalic and atraumatic.   Eyes: Right eye exhibits no discharge. Left eye exhibits no discharge.   Neck: Neck supple.   Trach   Cardiovascular: Normal rate and regular rhythm.  Exam reveals no gallop.    No murmur heard.  Pulmonary/Chest: No stridor. He is intubated. He has decreased breath sounds. He has no rhonchi. He has rales.   Full vent support    Abdominal: Soft. He exhibits distension.   Peg    Musculoskeletal: He exhibits edema and deformity (contracture ).   Neurological:   Intubated, nonverbal    Skin: Skin is warm and dry. He is not diaphoretic.   Psychiatric: He is noncommunicative.   Nursing note and vitals reviewed.      Recent Labs      02/20/18   0538   02/21/18   0510  02/21/18   1800  02/22/18   0000  02/22/18   0510   WBC  18.7*   --   14.4*   --    --   12.1*   RBC  3.39*   --   3.14*   --    --   3.02*   HEMOGLOBIN  9.6*   < >  8.9*  8.9*  8.9*  8.5*   HEMATOCRIT  28.3*   < >  26.4*  26.4*  25.8*  25.7*   MCV  83.5   --   84.1   --    --   85.1   MCH  28.3   --   28.3   --    --   28.1   MCHC  33.9   --   33.7   --    --   33.1*   RDW  53.8*   --   54.6*   --    --   54.5*   PLATELETCT  81*   --   57*   --    --   66*   MPV  10.5   --   11.1   --    --   11.9    < > = values in this interval not  displayed.     Recent Labs      02/20/18   0538  02/21/18   0510  02/22/18   0510   SODIUM  146*  147*  152*   POTASSIUM  2.4*  3.4*  3.0*   CHLORIDE  117*  122*  121*   CO2  16*  18*  24   GLUCOSE  142*  138*  155*   BUN  28*  34*  35*   CREATININE  <0.20*  0.21*  0.20*   CALCIUM  7.6*  7.6*  7.9*     Recent Labs      02/19/18   1031   INR  1.98*                  Assessment/Plan     GI bleed   Assessment & Plan    - Possibly Carmencita-White tear, patient did have multiple episodes of severe vomiting  -Resolved        Acute on chronic respiratory failure with hypoxemia (CMS-HCC)   Assessment & Plan    - History of trach, now acutely worsened  - Full vent support, vent management per intensivist        Aspiration pneumonia (CMS-HCC)- (present on admission)   Assessment & Plan    - Causing septic shock  - Continue doxycycline, Rocephin and Flagyl  - Await culture results, patient is status post bronch        Hypernatremia   Assessment & Plan    -Worse, will add free water flushes  - Repeat BMP in the morning        Hypomagnesemia   Assessment & Plan    - Resolved        Decubitus ulcer of ischium, left, stage IV (CMS-HCC)   Assessment & Plan    - Continue wound care        Azotemia   Assessment & Plan    - Resolved        Diarrhea   Assessment & Plan    - Resolved          Septic shock (CMS-HCC)   Assessment & Plan    - Likely due to aspiration, await results of bronchoscopy  - Continue norepinephrine drip  - Continue doxycycline, Rocephin and Flagyl  - Await culture results        Moderate protein malnutrition (CMS-HCC)   Assessment & Plan    Restart enteral nutrition via PEG 2/20        Seizure (CMS-HCC)   Assessment & Plan    - Continuous EEG per neurology, still with seizure activity  - Continue Vimpat, Keppra, decreasing Versed drip, increasing phenobarbital per neurology  -Additional recommendations per neurology        Physical debility   Assessment & Plan    Chronic due to TBI  Skin/wound care.  Nutritional  support        TBI (traumatic brain injury) (CMS-HCC)- (present on admission)   Assessment & Plan    - Chronic, nonverbal        Contraction, joint, multiple sites   Assessment & Plan    - Chronic, PT/OT          Quality-Core Measures   Reviewed items::  Labs reviewed, Medications reviewed and Radiology images reviewed  Waldrop catheter::  Critically Ill - Requiring Accurate Measurement of Urinary Output, Strict Intake and Output During Sepisis or Shock and Unconscious / Sedated Patient on a Ventilator  Central line in place:  Need for access, Sepsis, Shock and Vasopressors  DVT prophylaxis pharmacological::  Contraindicated - High bleeding risk  DVT prophylaxis - mechanical:  SCDs  Ulcer Prophylaxis::  Yes  Antibiotics:  Treating active infection/contamination beyond 24 hours perioperative coverage

## 2018-02-23 ENCOUNTER — APPOINTMENT (OUTPATIENT)
Dept: RADIOLOGY | Facility: MEDICAL CENTER | Age: 38
DRG: 853 | End: 2018-02-23
Attending: INTERNAL MEDICINE
Payer: COMMERCIAL

## 2018-02-23 PROBLEM — E43 PROTEIN-CALORIE MALNUTRITION, SEVERE (HCC): Status: ACTIVE | Noted: 2018-02-19

## 2018-02-23 LAB
ANION GAP SERPL CALC-SCNC: 5 MMOL/L (ref 0–11.9)
ANION GAP SERPL CALC-SCNC: 6 MMOL/L (ref 0–11.9)
ANION GAP SERPL CALC-SCNC: 7 MMOL/L (ref 0–11.9)
BACTERIA BLD CULT: NORMAL
BACTERIA BLD CULT: NORMAL
BASOPHILS # BLD AUTO: 0.2 % (ref 0–1.8)
BASOPHILS # BLD: 0.02 K/UL (ref 0–0.12)
BUN SERPL-MCNC: 26 MG/DL (ref 8–22)
BUN SERPL-MCNC: 27 MG/DL (ref 8–22)
BUN SERPL-MCNC: 34 MG/DL (ref 8–22)
CALCIUM SERPL-MCNC: 7.6 MG/DL (ref 8.5–10.5)
CALCIUM SERPL-MCNC: 8 MG/DL (ref 8.5–10.5)
CALCIUM SERPL-MCNC: 8.3 MG/DL (ref 8.5–10.5)
CHLORIDE SERPL-SCNC: 109 MMOL/L (ref 96–112)
CHLORIDE SERPL-SCNC: 109 MMOL/L (ref 96–112)
CHLORIDE SERPL-SCNC: 119 MMOL/L (ref 96–112)
CO2 SERPL-SCNC: 29 MMOL/L (ref 20–33)
CO2 SERPL-SCNC: 30 MMOL/L (ref 20–33)
CO2 SERPL-SCNC: 31 MMOL/L (ref 20–33)
CREAT SERPL-MCNC: 0.23 MG/DL (ref 0.5–1.4)
CREAT SERPL-MCNC: <0.2 MG/DL (ref 0.5–1.4)
CREAT SERPL-MCNC: <0.2 MG/DL (ref 0.5–1.4)
EKG IMPRESSION: NORMAL
EOSINOPHIL # BLD AUTO: 0.05 K/UL (ref 0–0.51)
EOSINOPHIL NFR BLD: 0.5 % (ref 0–6.9)
ERYTHROCYTE [DISTWIDTH] IN BLOOD BY AUTOMATED COUNT: 54.9 FL (ref 35.9–50)
GLUCOSE BLD-MCNC: 111 MG/DL (ref 65–99)
GLUCOSE BLD-MCNC: 122 MG/DL (ref 65–99)
GLUCOSE BLD-MCNC: 130 MG/DL (ref 65–99)
GLUCOSE BLD-MCNC: 152 MG/DL (ref 65–99)
GLUCOSE SERPL-MCNC: 114 MG/DL (ref 65–99)
GLUCOSE SERPL-MCNC: 122 MG/DL (ref 65–99)
GLUCOSE SERPL-MCNC: 153 MG/DL (ref 65–99)
HCT VFR BLD AUTO: 25.7 % (ref 42–52)
HGB BLD-MCNC: 8.4 G/DL (ref 14–18)
IMM GRANULOCYTES # BLD AUTO: 0.13 K/UL (ref 0–0.11)
IMM GRANULOCYTES NFR BLD AUTO: 1.2 % (ref 0–0.9)
LYMPHOCYTES # BLD AUTO: 2.45 K/UL (ref 1–4.8)
LYMPHOCYTES NFR BLD: 23.2 % (ref 22–41)
MAGNESIUM SERPL-MCNC: 1.6 MG/DL (ref 1.5–2.5)
MCH RBC QN AUTO: 28.1 PG (ref 27–33)
MCHC RBC AUTO-ENTMCNC: 32.7 G/DL (ref 33.7–35.3)
MCV RBC AUTO: 86 FL (ref 81.4–97.8)
MONOCYTES # BLD AUTO: 0.69 K/UL (ref 0–0.85)
MONOCYTES NFR BLD AUTO: 6.5 % (ref 0–13.4)
NEUTROPHILS # BLD AUTO: 7.22 K/UL (ref 1.82–7.42)
NEUTROPHILS NFR BLD: 68.4 % (ref 44–72)
NRBC # BLD AUTO: 0.03 K/UL
NRBC BLD-RTO: 0.3 /100 WBC
PHENOBARB SERPL-MCNC: 12.5 UG/ML (ref 15–40)
PLATELET # BLD AUTO: 60 K/UL (ref 164–446)
PMV BLD AUTO: 12.3 FL (ref 9–12.9)
POTASSIUM SERPL-SCNC: 2.7 MMOL/L (ref 3.6–5.5)
POTASSIUM SERPL-SCNC: 3.1 MMOL/L (ref 3.6–5.5)
POTASSIUM SERPL-SCNC: 3.5 MMOL/L (ref 3.6–5.5)
RBC # BLD AUTO: 2.99 M/UL (ref 4.7–6.1)
SIGNIFICANT IND 70042: NORMAL
SIGNIFICANT IND 70042: NORMAL
SITE SITE: NORMAL
SITE SITE: NORMAL
SODIUM SERPL-SCNC: 145 MMOL/L (ref 135–145)
SODIUM SERPL-SCNC: 147 MMOL/L (ref 135–145)
SODIUM SERPL-SCNC: 153 MMOL/L (ref 135–145)
SOURCE SOURCE: NORMAL
SOURCE SOURCE: NORMAL
TROPONIN I SERPL-MCNC: 0.01 NG/ML (ref 0–0.04)
TROPONIN I SERPL-MCNC: <0.01 NG/ML (ref 0–0.04)
WBC # BLD AUTO: 10.6 K/UL (ref 4.8–10.8)

## 2018-02-23 PROCEDURE — 700105 HCHG RX REV CODE 258: Performed by: INTERNAL MEDICINE

## 2018-02-23 PROCEDURE — A9270 NON-COVERED ITEM OR SERVICE: HCPCS | Performed by: PSYCHIATRY & NEUROLOGY

## 2018-02-23 PROCEDURE — 83735 ASSAY OF MAGNESIUM: CPT

## 2018-02-23 PROCEDURE — 4A10X4Z MONITORING OF CENTRAL NERVOUS ELECTRICAL ACTIVITY, EXTERNAL APPROACH: ICD-10-PCS | Performed by: PSYCHIATRY & NEUROLOGY

## 2018-02-23 PROCEDURE — A9270 NON-COVERED ITEM OR SERVICE: HCPCS | Performed by: INTERNAL MEDICINE

## 2018-02-23 PROCEDURE — 700102 HCHG RX REV CODE 250 W/ 637 OVERRIDE(OP): Performed by: INTERNAL MEDICINE

## 2018-02-23 PROCEDURE — 82962 GLUCOSE BLOOD TEST: CPT | Mod: 91

## 2018-02-23 PROCEDURE — 80048 BASIC METABOLIC PNL TOTAL CA: CPT | Mod: 91

## 2018-02-23 PROCEDURE — 700102 HCHG RX REV CODE 250 W/ 637 OVERRIDE(OP): Performed by: PSYCHIATRY & NEUROLOGY

## 2018-02-23 PROCEDURE — 700111 HCHG RX REV CODE 636 W/ 250 OVERRIDE (IP): Performed by: INTERNAL MEDICINE

## 2018-02-23 PROCEDURE — A6209 FOAM DRSG <=16 SQ IN W/O BDR: HCPCS | Performed by: INTERNAL MEDICINE

## 2018-02-23 PROCEDURE — 770022 HCHG ROOM/CARE - ICU (200)

## 2018-02-23 PROCEDURE — 95951 HCHG EEG-VIDEO-24HR: CPT | Performed by: PSYCHIATRY & NEUROLOGY

## 2018-02-23 PROCEDURE — 93010 ELECTROCARDIOGRAM REPORT: CPT | Performed by: INTERNAL MEDICINE

## 2018-02-23 PROCEDURE — 85025 COMPLETE CBC W/AUTO DIFF WBC: CPT

## 2018-02-23 PROCEDURE — 84484 ASSAY OF TROPONIN QUANT: CPT | Mod: 91

## 2018-02-23 PROCEDURE — 80184 ASSAY OF PHENOBARBITAL: CPT

## 2018-02-23 PROCEDURE — 99291 CRITICAL CARE FIRST HOUR: CPT | Performed by: INTERNAL MEDICINE

## 2018-02-23 PROCEDURE — C9254 INJECTION, LACOSAMIDE: HCPCS | Performed by: PSYCHIATRY & NEUROLOGY

## 2018-02-23 PROCEDURE — 700101 HCHG RX REV CODE 250: Performed by: INTERNAL MEDICINE

## 2018-02-23 PROCEDURE — 71045 X-RAY EXAM CHEST 1 VIEW: CPT

## 2018-02-23 PROCEDURE — 700111 HCHG RX REV CODE 636 W/ 250 OVERRIDE (IP): Performed by: PSYCHIATRY & NEUROLOGY

## 2018-02-23 PROCEDURE — 93005 ELECTROCARDIOGRAM TRACING: CPT | Performed by: INTERNAL MEDICINE

## 2018-02-23 PROCEDURE — 700105 HCHG RX REV CODE 258: Performed by: PSYCHIATRY & NEUROLOGY

## 2018-02-23 PROCEDURE — 94003 VENT MGMT INPAT SUBQ DAY: CPT

## 2018-02-23 PROCEDURE — 94640 AIRWAY INHALATION TREATMENT: CPT

## 2018-02-23 RX ORDER — PHENOBARBITAL 20 MG/5ML
90 ELIXIR ORAL ONCE
Status: COMPLETED | OUTPATIENT
Start: 2018-02-23 | End: 2018-02-23

## 2018-02-23 RX ORDER — PHENOBARBITAL 20 MG/5ML
90 ELIXIR ORAL 2 TIMES DAILY
Status: DISCONTINUED | OUTPATIENT
Start: 2018-02-23 | End: 2018-02-24

## 2018-02-23 RX ORDER — METRONIDAZOLE 500 MG/1
500 TABLET ORAL ONCE
Status: DISCONTINUED | OUTPATIENT
Start: 2018-02-23 | End: 2018-02-24

## 2018-02-23 RX ORDER — FUROSEMIDE 10 MG/ML
20 INJECTION INTRAMUSCULAR; INTRAVENOUS EVERY 6 HOURS
Status: DISCONTINUED | OUTPATIENT
Start: 2018-02-23 | End: 2018-02-24

## 2018-02-23 RX ORDER — METRONIDAZOLE 500 MG/1
500 TABLET ORAL EVERY 8 HOURS
Status: DISCONTINUED | OUTPATIENT
Start: 2018-02-23 | End: 2018-02-23

## 2018-02-23 RX ORDER — POTASSIUM CHLORIDE 20 MEQ/1
60 TABLET, EXTENDED RELEASE ORAL ONCE
Status: COMPLETED | OUTPATIENT
Start: 2018-02-23 | End: 2018-02-23

## 2018-02-23 RX ORDER — METRONIDAZOLE 500 MG/1
500 TABLET ORAL EVERY 8 HOURS
Status: COMPLETED | OUTPATIENT
Start: 2018-02-23 | End: 2018-02-24

## 2018-02-23 RX ORDER — POTASSIUM CHLORIDE 7.45 MG/ML
10 INJECTION INTRAVENOUS
Status: COMPLETED | OUTPATIENT
Start: 2018-02-23 | End: 2018-02-23

## 2018-02-23 RX ORDER — DEXTROSE MONOHYDRATE 50 MG/ML
250 INJECTION, SOLUTION INTRAVENOUS ONCE
Status: COMPLETED | OUTPATIENT
Start: 2018-02-23 | End: 2018-02-23

## 2018-02-23 RX ADMIN — POTASSIUM CHLORIDE 10 MEQ: 7.46 INJECTION, SOLUTION INTRAVENOUS at 18:02

## 2018-02-23 RX ADMIN — PHENOBARBITAL 90 MG: 20 ELIXIR ORAL at 09:10

## 2018-02-23 RX ADMIN — POTASSIUM BICARBONATE 50 MEQ: 25 TABLET, EFFERVESCENT ORAL at 21:47

## 2018-02-23 RX ADMIN — LORAZEPAM 2 MG: 2 INJECTION INTRAMUSCULAR; INTRAVENOUS at 10:16

## 2018-02-23 RX ADMIN — CEFTRIAXONE 2 G: 2 INJECTION, POWDER, FOR SOLUTION INTRAMUSCULAR; INTRAVENOUS at 09:36

## 2018-02-23 RX ADMIN — METRONIDAZOLE 500 MG: 500 INJECTION, SOLUTION INTRAVENOUS at 05:44

## 2018-02-23 RX ADMIN — INSULIN HUMAN 1 UNITS: 100 INJECTION, SOLUTION PARENTERAL at 12:13

## 2018-02-23 RX ADMIN — METRONIDAZOLE 500 MG: 500 TABLET ORAL at 21:41

## 2018-02-23 RX ADMIN — SODIUM CHLORIDE 200 MG: 9 INJECTION, SOLUTION INTRAVENOUS at 09:30

## 2018-02-23 RX ADMIN — POTASSIUM CHLORIDE 10 MEQ: 7.46 INJECTION, SOLUTION INTRAVENOUS at 15:50

## 2018-02-23 RX ADMIN — CHLORHEXIDINE GLUCONATE 15 ML: 1.2 RINSE ORAL at 09:09

## 2018-02-23 RX ADMIN — LEVALBUTEROL HYDROCHLORIDE 1.25 MG: 1.25 SOLUTION RESPIRATORY (INHALATION) at 02:27

## 2018-02-23 RX ADMIN — POTASSIUM BICARBONATE 50 MEQ: 25 TABLET, EFFERVESCENT ORAL at 23:57

## 2018-02-23 RX ADMIN — DEXTROSE MONOHYDRATE 250 ML: 50 INJECTION, SOLUTION INTRAVENOUS at 09:37

## 2018-02-23 RX ADMIN — FUROSEMIDE 20 MG: 10 INJECTION, SOLUTION INTRAVENOUS at 17:57

## 2018-02-23 RX ADMIN — DOXYCYCLINE 100 MG: 100 TABLET ORAL at 09:11

## 2018-02-23 RX ADMIN — VASOPRESSIN 0.03 UNITS/MIN: 20 INJECTION INTRAVENOUS at 08:45

## 2018-02-23 RX ADMIN — METRONIDAZOLE 500 MG: 500 TABLET ORAL at 14:14

## 2018-02-23 RX ADMIN — SODIUM CHLORIDE 1500 MG: 9 INJECTION, SOLUTION INTRAVENOUS at 08:45

## 2018-02-23 RX ADMIN — FUROSEMIDE 20 MG: 10 INJECTION, SOLUTION INTRAVENOUS at 12:06

## 2018-02-23 RX ADMIN — LEVALBUTEROL HYDROCHLORIDE 1.25 MG: 1.25 SOLUTION RESPIRATORY (INHALATION) at 08:37

## 2018-02-23 RX ADMIN — OMEPRAZOLE 40 MG: 20 CAPSULE, DELAYED RELEASE ORAL at 09:09

## 2018-02-23 RX ADMIN — FUROSEMIDE 20 MG: 10 INJECTION, SOLUTION INTRAMUSCULAR; INTRAVENOUS at 05:45

## 2018-02-23 RX ADMIN — LORAZEPAM 2 MG: 2 INJECTION INTRAMUSCULAR; INTRAVENOUS at 12:51

## 2018-02-23 RX ADMIN — VASOPRESSIN 0.03 UNITS/MIN: 20 INJECTION INTRAVENOUS at 22:06

## 2018-02-23 RX ADMIN — LORAZEPAM 2 MG: 2 INJECTION INTRAMUSCULAR; INTRAVENOUS at 12:25

## 2018-02-23 RX ADMIN — DOXYCYCLINE 100 MG: 100 TABLET ORAL at 21:41

## 2018-02-23 RX ADMIN — POTASSIUM CHLORIDE 60 MEQ: 1500 TABLET, EXTENDED RELEASE ORAL at 13:30

## 2018-02-23 RX ADMIN — PREDNISONE 20 MG: 20 TABLET ORAL at 09:09

## 2018-02-23 RX ADMIN — POTASSIUM BICARBONATE 25 MEQ: 25 TABLET, EFFERVESCENT ORAL at 12:06

## 2018-02-23 RX ADMIN — CHLORHEXIDINE GLUCONATE 15 ML: 1.2 RINSE ORAL at 21:41

## 2018-02-23 RX ADMIN — PHENOBARBITAL 90 MG: 20 ELIXIR ORAL at 12:27

## 2018-02-23 RX ADMIN — LEVALBUTEROL HYDROCHLORIDE 1.25 MG: 1.25 SOLUTION RESPIRATORY (INHALATION) at 19:47

## 2018-02-23 RX ADMIN — FUROSEMIDE 20 MG: 10 INJECTION, SOLUTION INTRAVENOUS at 23:57

## 2018-02-23 RX ADMIN — POTASSIUM CHLORIDE 10 MEQ: 7.46 INJECTION, SOLUTION INTRAVENOUS at 14:09

## 2018-02-23 RX ADMIN — LEVALBUTEROL HYDROCHLORIDE 1.25 MG: 1.25 SOLUTION RESPIRATORY (INHALATION) at 15:01

## 2018-02-23 RX ADMIN — SODIUM CHLORIDE 200 MG: 9 INJECTION, SOLUTION INTRAVENOUS at 21:47

## 2018-02-23 RX ADMIN — PHENOBARBITAL 90 MG: 20 ELIXIR ORAL at 21:42

## 2018-02-23 RX ADMIN — SODIUM CHLORIDE 1500 MG: 9 INJECTION, SOLUTION INTRAVENOUS at 21:42

## 2018-02-23 RX ADMIN — POTASSIUM CHLORIDE 10 MEQ: 7.46 INJECTION, SOLUTION INTRAVENOUS at 17:15

## 2018-02-23 NOTE — CARE PLAN
Problem: Bowel/Gastric:  Goal: Will not experience complications related to bowel motility    Intervention: Assess baseline bowel pattern  Pt had two bowel moments today. Tube feeds were advanced to goal.       Problem: Skin Integrity  Goal: Risk for impaired skin integrity will decrease    Intervention: Assess risk factors for impaired skin integrity and/or pressure ulcers  Skin assessment complete at beginning of shift. Patient turned [id number] and prn, extra pillows in place, mepilex on place patient, heals floated off bed, moisturizer and skin barriers in place, oral care complete, and proper skin protocols in place.  Will continue to monitor and assess.

## 2018-02-23 NOTE — CARE PLAN
Problem: Bowel/Gastric:  Goal: Will not experience complications related to bowel motility    Intervention: Assess baseline bowel pattern  Pt has had multiple foul bowel movements, sticky and incontinent. Monitoring skin for IAD and cleaning asap.       Problem: Respiratory:  Goal: Respiratory status will improve    Intervention: Assess and monitor pulmonary status  Thick, tan secretions today. Collaboration with RT for suctioning.

## 2018-02-23 NOTE — CARE PLAN
Problem: Ventilation Defect:  Goal: Ability to achieve and maintain unassisted ventilation or tolerate decreased levels of ventilator support  Outcome: PROGRESSING AS EXPECTED  Adult Ventilation Update    Total Vent Days: 5  CMV 16/460/10/30%    Patient Lines/Drains/Airways Status    Active Airway     Name: Placement date: Placement time: Site: Days:    Airway Group Standard Cuffed Trach Tracheostomy 6.0 07/02/17   1403   Tracheostomy   235    Airway Group Tracheostomy 6.0       Tracheostomy                 In the last 24 hours, No SBT           Plateau Pressure (Q Shift): 37.1 (02/22/18 0625)  Static Compliance (ml / cm H2O): 27.7 (02/22/18 1441)    Patient failed trials because of Barriers to Wean:  (EEG, seizures) (02/22/18 0625)  Barriers to SBT    Length of Weaning Trial      Cough: Congested;Moist (02/22/18 1600)  Sputum Amount: Moderate (02/22/18 1600)  Sputum Color: Clear;Pink Tinged (02/22/18 1600)  Sputum Consistency: Thick;Thin (02/22/18 1600)    Mobility Group  Activity Performed: Unable to mobilize (02/22/18 0800)  Pt Calls for Assistance: No (02/22/18 0800)  Reason Not Mobilized: Bed rest (02/22/18 0800)  Mobilization Comments:  (contracted ) (02/20/18 0800)    Events/Summary/Plan: No vent changes at this time.  Will continue to monitor patient.

## 2018-02-23 NOTE — CARE PLAN
Problem: Nutritional:  Goal: Nutrition support tolerated and meeting greater than 85% of estimated needs  Outcome: MET Date Met: 02/23/18

## 2018-02-23 NOTE — PROGRESS NOTES
NEUROLOGY PROGRESS NOTE      Background:  37 y.o. male was admitted on 2018  2:32 PM for Acute Hypoxic Respiratory Failure  Acute respiratory failure with hypoxia (CMS-HCC).  He is being followed by Neurology for intractable seizure.    SUBJECTIVE: Unresponsive, on vent. Started on continuous EEG monitoring . No significant changes.    NEUROLOGICAL EXAM:   Remain unresponsive. Pupils are equal and sluggishly reactive. 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      OBJECTIVE:     EE18:  This is an abnormal 24 hours video electroencephalogram recording in a sedated and/or encephalopathic patient.  An asymmetric background is noted, with significant slowing and attenuation of the left hemisphere and slowing of the right hemisphere. These findings suggest widespread underlying structural abnormalities. Continuous right frontotemporal spikes/sharps were noted throughout the study. Intermittently, recurrent focal electrographic seizures localized to the right frontotemporal region were seen. These were mostly brief and lasted typically less than a minute at a time, but they did recur often throughout the study. There has been perhaps a modest improvement with adjustment of the antiepileptic regimen.     EE18:  This is an abnormal 24 hours video electroencephalogram recording in a sedated and/or encephalopathic patient.  An asymmetric background is noted, with significant slowing and attenuation of the left hemisphere and slowing of the right hemisphere. These findings suggest widespread underlying structural abnormalities. Continuous right frontotemporal spikes/sharps were noted throughout the study. Intermittently, recurrent focal electrographic seizures localized to the right frontotemporal region were seen. These were mostly brief and lasted typically less than a minute at a time, but they did recur often throughout the study. There has been perhaps a modest  improvement with adjustment of the antiepileptic regimen.     EE18:  This is an abnormal 24 hours video electroencephalogram recording in a sedated and/or encephalopathic patient.  An asymmetric background is noted, with significant slowing and attenuation of the left hemisphere and slowing of the right hemisphere. These findings suggest widespread underlying structural abnormalities. Continuous right frontotemporal spikes/sharps. Intermittently noted, recurrent and brief seizures which are localized to the right frontotemporal region, with intermittent mostly short runs of right PLEDS. No clear clinical changes were noted during the seizures. The seizures captured are shorter and last typically less than a minute, but do recur often throughout the study. There has been perhaps a modest improvement with adjustment of the antiepileptic regimen.    Towards the end of the study, the patient appears no longer sedated.     MEDICATIONS:  Current Facility-Administered Medications   Medication Dose   • potassium chloride SA (Kdur) tablet 60 mEq  60 mEq   • PHENobarbital solution 90 mg  90 mg   • furosemide (LASIX) injection 20 mg  20 mg   • potassium bicarbonate (KLYTE) 25 MEQ effervescent tablet TBEF 25 mEq  25 mEq   • metroNIDAZOLE (FLAGYL) tablet 500 mg  500 mg   • PHENobarbital solution 90 mg  90 mg   • predniSONE (DELTASONE) tablet 20 mg  20 mg   • doxycycline monohydrate (ADOXA) tablet 100 mg  100 mg   • omeprazole 2 mg/mL in sodium bicarbonate (PRILOSEC) oral susp 40 mg  40 mg   • vasopressin (VASOSTRICT) 20 Units in  mL Infusion  0.03 Units/min   • norepinephrine (LEVOPHED) 8 mg in D5W 250 mL Infusion  0-30 mcg/min   • levalbuterol (XOPENEX) 1.25 MG/3ML nebulizer solution 1.25 mg  1.25 mg   • Respiratory Care per Protocol     • polyethylene glycol/lytes (MIRALAX) PACKET 1 Packet  1 Packet    And   • magnesium hydroxide (MILK OF MAGNESIA) suspension 30 mL  30 mL    And   • bisacodyl (DULCOLAX)  "suppository 10 mg  10 mg   • chlorhexidine (PERIDEX) 0.12 % solution 15 mL  15 mL   • lidocaine (XYLOCAINE) 1%  injection  1-2 mL   • MD ALERT...Adult ICU Electrolyte Replacement per Pharmacy Protocol     • fentaNYL (SUBLIMAZE) injection 25 mcg  25 mcg    Or   • fentaNYL (SUBLIMAZE) injection 50 mcg  50 mcg    Or   • fentaNYL (SUBLIMAZE) injection 100 mcg  100 mcg   • ipratropium-albuterol (DUONEB) nebulizer solution 3 mL  3 mL   • insulin regular (HUMULIN R) injection 1-6 Units  1-6 Units    And   • glucose 4 g chewable tablet 16 g  16 g    And   • dextrose 50% (D50W) injection 25 mL  25 mL   • levalbuterol (XOPENEX) 1.25 MG/3ML nebulizer solution 1.25 mg  1.25 mg   • miconazole 2%-zinc oxide (RADHA) topical cream     • acetaminophen (TYLENOL) tablet 650 mg  650 mg   • oxyCODONE immediate-release (ROXICODONE) tablet 5 mg  5 mg    And   • oxyCODONE immediate release (ROXICODONE) tablet 10 mg  10 mg    And   • morphine (pf) 4 mg/ml injection 4 mg  4 mg   • ondansetron (ZOFRAN) syringe/vial injection 4 mg  4 mg   • ondansetron (ZOFRAN ODT) dispertab 4 mg  4 mg   • promethazine (PHENERGAN) tablet 12.5-25 mg  12.5-25 mg   • promethazine (PHENERGAN) suppository 12.5-25 mg  12.5-25 mg   • prochlorperazine (COMPAZINE) injection 5-10 mg  5-10 mg   • LORazepam (ATIVAN) injection 4 mg  4 mg   • cefTRIAXone (ROCEPHIN) syringe 2 g  2 g   • levETIRAcetam (KEPPRA) 1,500 mg in  mL IVPB  1,500 mg   • lacosamide (VIMPAT) 200 mg in  mL ivpb  200 mg       Pulse (!) 59, temperature 36.2 °C (97.1 °F), resp. rate 15, height 1.727 m (5' 8\"), weight 65.1 kg (143 lb 8.3 oz), SpO2 95 %.    Recent Labs      02/23/18   0550   TROPONINI  0.01     Recent Labs      02/21/18   0510   02/22/18   0000  02/22/18   0510  02/23/18   0550   WBC  14.4*   --    --   12.1*  10.6   RBC  3.14*   --    --   3.02*  2.99*   HEMOGLOBIN  8.9*   < >  8.9*  8.5*  8.4*   HEMATOCRIT  26.4*   < >  25.8*  25.7*  25.7*   MCV  84.1   --    --   85.1  86.0 "   MCH  28.3   --    --   28.1  28.1   MCHC  33.7   --    --   33.1*  32.7*   RDW  54.6*   --    --   54.5*  54.9*   PLATELETCT  57*   --    --   66*  60*   MPV  11.1   --    --   11.9  12.3    < > = values in this interval not displayed.     Recent Labs      02/22/18   0510  02/22/18   1700  02/23/18   0550   SODIUM  152*  152*  153*   POTASSIUM  3.0*  3.0*  3.1*   CHLORIDE  121*  119*  119*   CO2  24  26  29   GLUCOSE  155*  149*  114*   BUN  35*  31*  34*       Results for orders placed or performed during the hospital encounter of 02/18/18   Echocardiogram Comp W/O Cont   Result Value Ref Range    Eject.Frac. MOD BP 66.03     Eject.Frac. MOD 4C 64.91     Eject.Frac. MOD 2C 66.35     Left Ventrical Ejection Fraction 65         ASSESSMENT AND PLAN:   37 y.o. male with history of a [place] and seizure disorder who presents with increase frequency of seizures as well as aspiration pneumonia. The increased frequency of his seizures might have been caused by the tapering of his phenobarbital. --Will Continue Keppra 1500 mg IV BID  Increase Phenobarbital to 90 mg suspension BID, phenobarbital level was subtherapeutic today at 7.9.  We will give an additional 90 mg IV and recheck phenobarbital level this afternoon.  Continue Vimpat 200 mg IV BID.    Addendum:  Had an extensive conversation with his parents regarding their expectation from medical treatment and   His significantly poor quality of life for the past 21 years! They are open to meet with palliative care for   Possible home hospice and withdrawing aggressive care.

## 2018-02-23 NOTE — EEG PROGRESS NOTE
VIDEO ELECTROENCEPHALOGRAM / EPILEPSY MONITORING UNIT REPORT        Referring provider: Dr. Hunter.      DOS:   2/22/2018 - 2/23//2018 (total recording for 23 hours and 37 minutes).         INDICATION:  Ruben Edwards 37 y.o. male presenting with recurrent facial twitching and altered mental status. EEG showed recurrent seizures.      CURRENT ANTIEPILEPTIC REGIMEN: Levetiracetam 1500 mg q 12 hrs, Lacosamide 200 mg q 12 hrs, Phenobarbital 60 mg q 12 hrs. Patient also on versed infusion which has been reduced and or held for parts of the study.      TECHNIQUE: A 30-channel, 24 hrs video electroencephalogram (VEEG) was performed in accordance with the international 10-20 system. This digital study was reviewed in bipolar and referential montages. The recording examined an encephalopathic patient during sedated/sleep states, but patient exhibiting frequent arousals as study progresses.      DESCRIPTION OF THE RECORD:  There is an asymmetric background with significant attenuation and slowing of the left hemisphere. There is slowing in the right hemisphere, mostly within a delta/theta range. During wakeful state, there is improvement of the background exhibiting a theta frequency posteriorly.      ACTIVATION PROCEDURES:   Not performed.      ICTAL AND/OR INTERICTAL FINDINGS:   Continuous right frontotemporal spikes/sharps. Intermittently noted, recurrent seizures localized to the right frontotemporal region, which are electroencephalographically characterized by rhythmic or periodic spikes/sharps in this region and they do not spread contralaterally, sometimes in keeping with right PLEDS. No clear clinical changes were noted during the seizures. The seizures captured are shorter and last typically less than a minute, but do recur often throughout the study. There has been perhaps a modest improvement with adjustment of the antiepileptic regimen.      EVENT(S):  None marked for review.      EKG: sampling review of EKG  recording demonstrated sinus rhythm.         INTERPRETATION:   This is an abnormal 24 hours video electroencephalogram recording in a sedated and/or encephalopathic patient.  An asymmetric background is noted, with significant slowing and attenuation of the left hemisphere and slowing of the right hemisphere. These findings suggest widespread underlying structural abnormalities. Continuous right frontotemporal spikes/sharps. Intermittently noted, recurrent and brief seizures which are localized to the right frontotemporal region, with intermittent mostly short runs of right PLEDS. No clear clinical changes were noted during the seizures. The seizures captured are shorter and last typically less than a minute, but do recur often throughout the study. There has been perhaps a modest improvement with adjustment of the antiepileptic regimen.    Towards the end of the study, the patient appears no longer sedated.     Note: Updates provided to Dr. Hunter.             Harris Bolton MD  Medical Director, Epilepsy and Neurodiagnostics.   Clinical  of Neurology [facility].   Diplomate in Neurology, Epilepsy, and Electrodiagnostic Medicine.   Office: [phone]  Fax: [phone]

## 2018-02-23 NOTE — PROCEDURES
VIDEO ELECTROENCEPHALOGRAM / EPILEPSY MONITORING UNIT REPORT        Referring provider: Dr. Hunter.      DOS:   2/22/2018 - 2/23//2018 (total recording for 23 hours and 37 minutes).         INDICATION:  Ruben Edwards 37 y.o. male presenting with recurrent facial twitching and altered mental status. EEG showed recurrent seizures.      CURRENT ANTIEPILEPTIC REGIMEN: Levetiracetam 1500 mg q 12 hrs, Lacosamide 200 mg q 12 hrs, Phenobarbital 60 mg q 12 hrs. Patient also on versed infusion which has been reduced and or held for parts of the study.      TECHNIQUE: A 30-channel, 24 hrs video electroencephalogram (VEEG) was performed in accordance with the international 10-20 system. This digital study was reviewed in bipolar and referential montages. The recording examined an encephalopathic patient during sedated/sleep states, but patient exhibiting frequent arousals as study progresses.      DESCRIPTION OF THE RECORD:  There is an asymmetric background with significant attenuation and slowing of the left hemisphere. There is slowing in the right hemisphere, mostly within a delta/theta range. During wakeful state, there is improvement of the background exhibiting a theta frequency posteriorly.      ACTIVATION PROCEDURES:   Not performed.      ICTAL AND/OR INTERICTAL FINDINGS:   Continuous right frontotemporal spikes/sharps. Intermittently noted, recurrent seizures localized to the right frontotemporal region, which are electroencephalographically characterized by rhythmic or periodic spikes/sharps in this region and they do not spread contralaterally, sometimes in keeping with right PLEDS. No clear clinical changes were noted during the seizures. The seizures captured are shorter and last typically less than a minute, but do recur often throughout the study. There has been perhaps a modest improvement with adjustment of the antiepileptic regimen.      EVENT(S):  None marked for review.      EKG: sampling review of EKG  recording demonstrated sinus rhythm.         INTERPRETATION:   This is an abnormal 24 hours video electroencephalogram recording in a sedated and/or encephalopathic patient.  An asymmetric background is noted, with significant slowing and attenuation of the left hemisphere and slowing of the right hemisphere. These findings suggest widespread underlying structural abnormalities. Continuous right frontotemporal spikes/sharps. Intermittently noted, recurrent and brief seizures which are localized to the right frontotemporal region, with intermittent mostly short runs of right PLEDS. No clear clinical changes were noted during the seizures. The seizures captured are shorter and last typically less than a minute, but do recur often throughout the study. There has been perhaps a modest improvement with adjustment of the antiepileptic regimen.    Towards the end of the study, the patient appears no longer sedated.      Note: Updates provided to Dr. Hunter.             Harris Bolton MD  Medical Director, Epilepsy and Neurodiagnostics.   Clinical  of Neurology [facility].   Diplomate in Neurology, Epilepsy, and Electrodiagnostic Medicine.   Office: [phone]  Fax: [phone]    MARLON GOODMAN    DD:  02/23/2018 12:25:10  DT:  02/23/2018 07:36:53    D#:  [id number]  Job#:  [id number]

## 2018-02-23 NOTE — PROGRESS NOTES
Fernando from Lab called with critical result of potassium at 2.7. Critical lab result read back to Fernando.   Dr. villalobos notified of critical lab result at 7127.  Critical lab result read back by Dr. villalobos.

## 2018-02-23 NOTE — CARE PLAN
Problem: Safety  Goal: Will remain free from injury  Outcome: PROGRESSING AS EXPECTED  Bed rails up x3, pt by nurses station, frequent rounding in place    Problem: Skin Integrity  Goal: Risk for impaired skin integrity will decrease  Outcome: PROGRESSING SLOWER THAN EXPECTED  Skin care orders read and followed, pt on a q2hr turn schedule, with bed assisting in turns as well. Known pressure injuries assessed and tended to per WOC orders. New areas addressed and skin orders placed

## 2018-02-23 NOTE — PROGRESS NOTES
Pulmonary Critical Care Progress Note        Chief Complaint: Respiratory failure, septic shock    History of Present Illness:  Mr. Edwards is a 37-year-old male with past medical history of traumatic brain injury at age 17, seizures diagnosed in June of 2017. His baseline mental status appears to be nodding and minimal interacting without vocalization. Who was brought to the ICU as a direct admit from [facility] where he was brought today for hypoxia. Reportedly the patient was being weanied off of his phenobarbital by his neurologist when he began having more frequent seizures, approximately one week ago his neurologist increased his Keppra dosing however the seizures continued. His mother did use CBD and THC with some success in slowing the seizures. Since 2 days ago the patient had multiple aspiration events following these seizures. His mother evaluated him with a home oxygen saturation monitor and he was noted to be hypoxic, he was brought to [facility] where he was found to have 28% bands with leukopenia. Chest x-ray was obtained demonstrating bilateral infiltrates right greater than left. He was placed on mechanical ventilation and his oxygenation was unable to be improved more than 85%, he was hypotensive and given multiple boluses of IV fluids without improvement. He was started on levo fed and given multiple doses of Ativan for seizures and transferred to our ICU for higher level of care. He arrives on the ventilator and on pressors critically ill.    Please note this history is obtained by my partner Dr. Appiah 2/18/2018.    ROS:  Respiratory: unable to perform due to the patient's inability to effectively communicate, Cardiac: unable to perform due to the patient's inability to effectively communicate, GI: unable to perform due to the patient's inability to effectively communicate.  All other systems negative.    Interval Events:  24 hour interval history reviewed     More alert with  versed at 2  No witnessed sz  Hemodynamically for bradycardia and irregular  Increase of edema  -cdiff and out of isolation  Afebrile this am  Fernando temp last night  Vasopressin ongoing and norepi  Free water flushes 250 q 6  Phenobarb to 90 bid  Pulmonary edema  Day 6/7 of antibiotics  NA still high  Prednisone 20 mg  Lasix 20 mg q 6  Bmp q 6  EKG first     PFSH:  No change.    Respiratory:  Liriano Vent Mode: APVCMV, Rate (breaths/min): 16, Vt Target (mL): 460, PEEP/CPAP: 10, FiO2: 40, Static Compliance (ml / cm H2O): 29.3, Control VTE (exp VT): 447  Pulse Oximetry: 96 %  Chest Tube Drains:          Exam: rhonchi left > right.  Tracheostomy in place.  ImagingCXR  I have personally reviewed the chest x-ray my impression is  patient continues to have left-sided pneumonia although improved. Tracheostomy in good position.        Invalid input(s): [id number]    HemoDynamics:  Pulse: (!) 58, Heart Rate (Monitored): (!) 58  NIBP: 105/68       Exam: regular rhythm (Sinus) with some irregular rhythm, though during my exam he was in normal sinus rhythm.   Diagnostic testing  EKG performed showed sinus bradycardia with occasional premature ventricular beat. There was slight T-wave inversions noted in inferior laterally. ME interval is 172 ms QRS duration 92 ms QTc was 447. This is my interpretation . Troponin was negative ×2 today.      Echo on 2/19/2018:  CONCLUSIONS  Normal right and left ventricular size and function.   No significant valve disease or flow abnormalities.   Dilated inferior vena cava without inspiratory collapse. The patient is   intubated.        Neuro:  GCS Total [place] Coma Score: 6       Exam: Heavily sedated. The patient has chronic traumatic brain injury and multiple contractures. Patient does not follow commands, open eyes to command and has evidence of hyperreflexia. Phenobarbital was increased to 90 mg twice a day. There was more opening his eyes but not to command. He has no evidence of  significant twitching or obvious clinical seizure activity. I am told that the EEG per Epileptolgist does not reveal ongoing seizures  Imaging: None - Reviewed   24 EEG as noted above.     Fluids:  Intake/Output       02/21/18 0700 - 02/22/18 0659 02/22/18 0700 - 02/23/18 0659 02/23/18 0700 - 02/24/18 0659      0700-1859 1900-0659 Total 0700-1859 1900-0659 Total 0700-1859 1900-0659 Total       Intake    I.V.  677.8  823.6 1501.4  490.2  184.8 675  --  -- --    Vasopressin Volume 108 108 216 108 18 126 -- -- --    Propofol Volume 37.9 0 37.9 -- -- -- -- -- --    Midazolam Volume 55 48 103 30.2 24 54.2 -- -- --    Norepinephrine Volume 31.9 17.6 49.5 32 22.8 54.8 -- -- --    IV Piggyback Volume (IV Piggyback) -- 550 550 200 -- 200 -- -- --    IV Volume (0.9% Normal Saline) 445 100 545 120 120 240 -- -- --    Other  --  30 30  600  -- 600  --  -- --    Medications (P.O./ Enteral Liquids) -- 30 30 600 -- 600 -- -- --    Enteral  80  360 440  920  600 1520  --  -- --    Enteral Volume 80 300 380  -- -- --    Free Water / Tube Flush -- 60 60 370 -- 370 -- -- --    Total Intake 757.8 1213.6 1971.4 2010.2 784.8 2795 -- -- --       Output    Urine  2050 1690 3740  2985  780 9905  --  -- --    Indwelling Cathether 2050 1690 3740 2985 780 2875 -- -- --    Stool  --  -- --  --  -- --  --  -- --    Number of Times Stooled 1 x -- 1 x 1 x -- 1 x -- -- --    Total Output 2050 1690 3740 2985 780 0912 -- -- --       Net I/O     -1292.2 -476.4 -1768.6 -974.8 4.8 -970 -- -- --        Weight: 65.1 kg (143 lb 8.3 oz)  Recent Labs      02/21/18   0510  02/22/18   0510  02/22/18   1700   SODIUM  147*  152*  152*   POTASSIUM  3.4*  3.0*  3.0*   CHLORIDE  122*  121*  119*   CO2  18*  24  26   BUN  34*  35*  31*   CREATININE  0.21*  0.20*  <0.20*   MAGNESIUM  2.4   --    --    PHOSPHORUS  2.6   --    --    CALCIUM  7.6*  7.9*  8.1*       GI/Nutrition:  Exam: abdomen is soft and non-tender there is a button PEG will be accessed  with patient's home feeding tube.No significant distention is noted. There is also a mechanical device at the midepigastric region palpable. Tube feeds to be increased to goal. I will continue with free water given due to mild increase in sodium., However D5 W over 4 hours of 250 mL was given.  Imaging: None - Reviewed  Liver Function  Recent Labs      18   0510  18   0510  18   1700   GLUCOSE  138*  155*  149*       Heme:  Recent Labs      18   0510   18   0000  18   0510  18   0550   RBC  3.14*   --    --   3.02*  2.99*   HEMOGLOBIN  8.9*   < >  8.9*  8.5*  8.4*   HEMATOCRIT  26.4*   < >  25.8*  25.7*  25.7*   PLATELETCT  57*   --    --   66*  60*    < > = values in this interval not displayed.       Infectious Disease:  Monitored Temp  Av.8 °C (100.1 °F)  Min: 36.8 °C (98.2 °F)  Max: 38.4 °C (101.1 °F)  Micro: antibiotics reviewed. The patient remains on antibiotic therapy including Rocephin and Flagyl at this time. C. diff is negative. Do not need isolation for this purpose. BAL data, with Proteus, and Klebsiella as now been reported with mild amount of yeast from BAL.   Recent Labs      18   0510  18   0510  18   0550   WBC  14.4*  12.1*  10.6   NEUTSPOLYS  78.70*  80.50*  68.40   LYMPHOCYTES  9.30*  15.00*  23.20   MONOCYTES  0.00  1.80  6.50   EOSINOPHILS  0.00  0.00  0.50   BASOPHILS  0.00  0.00  0.20     Current Facility-Administered Medications   Medication Dose Frequency Provider Last Rate Last Dose   • predniSONE (DELTASONE) tablet 20 mg  20 mg DAILY Jon Reynoso D.O.   20 mg at 18 1151   • furosemide (LASIX) injection 20 mg  20 mg BID DIURETIC Jon Reynoso D.O.   20 mg at 18 0545   • potassium bicarbonate (KLYTE) 25 MEQ effervescent tablet TBEF 25 mEq  25 mEq BID Jon Reynoso D.O.   25 mEq at 18   • doxycycline monohydrate (ADOXA) tablet 100 mg  100 mg Q12HRS Jon Reynoso D.O.   100 mg at 18  2119   • omeprazole 2 mg/mL in sodium bicarbonate (PRILOSEC) oral susp 40 mg  40 mg DAILY Jon Reynoso D.O.   40 mg at 02/22/18 1005   • PHENobarbital solution 60 mg  60 mg BID Martell Hunter M.D.   60 mg at 02/22/18 2119   • vasopressin (VASOSTRICT) 20 Units in  mL Infusion  0.03 Units/min Continuous Rivas Brooks M.D. 9 mL/hr at 02/22/18 2226 0.03 Units/min at 02/22/18 2226   • norepinephrine (LEVOPHED) 8 mg in D5W 250 mL Infusion  0-30 mcg/min Continuous Rivas Brooks M.D. 1.9 mL/hr at 02/22/18 1700 1 mcg/min at 02/22/18 1700   • levalbuterol (XOPENEX) 1.25 MG/3ML nebulizer solution 1.25 mg  1.25 mg Q6HRS (RT) FARHAT BerryOBucky   1.25 mg at 02/23/18 0227   • midazolam (VERSED) premix 125 mg/125 mL NS  2 mg/hr Continuous FARHAT BerryO. 2 mL/hr at 02/22/18 0905 2 mg/hr at 02/22/18 0905   • Respiratory Care per Protocol   Continuous RT JAPSAL Alaniz Jr..O.       • polyethylene glycol/lytes (MIRALAX) PACKET 1 Packet  1 Packet QDAY PRN JASPAL Alaniz Jr..O.        And   • magnesium hydroxide (MILK OF MAGNESIA) suspension 30 mL  30 mL QDAY PRN JASPAL Alaniz Jr..O.        And   • bisacodyl (DULCOLAX) suppository 10 mg  10 mg QDAY PRN JASPAL Alaniz Jr..O.       • chlorhexidine (PERIDEX) 0.12 % solution 15 mL  15 mL BID JASPAL Alaniz Jr..O.   15 mL at 02/22/18 2119   • lidocaine (XYLOCAINE) 1%  injection  1-2 mL Q30 MIN PRN JASPAL Alaniz Jr..O.       • MD ALERT...Adult ICU Electrolyte Replacement per Pharmacy Protocol   pharmacy to dose Modesto Appiah Jr., D.O.       • fentaNYL (SUBLIMAZE) injection 25 mcg  25 mcg Q HOUR PRN Modesto Appiah Jr., D.O.        Or   • fentaNYL (SUBLIMAZE) injection 50 mcg  50 mcg Q HOUR PRN Modesto Appiah Jr., D.O.        Or   • fentaNYL (SUBLIMAZE) injection 100 mcg  100 mcg Q HOUR PRN Modesto Appiah Jr., D.O.       • ipratropium-albuterol (DUONEB) nebulizer solution 3 mL  3 mL Q2HRS PRN (RT) Modesto Appiah Jr., D.O.        • insulin regular (HUMULIN R) injection 1-6 Units  1-6 Units Q6HRS Modesto Appiah Jr., D.OBucky   Stopped at 02/23/18 0000    And   • glucose 4 g chewable tablet 16 g  16 g Q15 MIN PRN Modesto Appiah Jr., D.OBucky        And   • dextrose 50% (D50W) injection 25 mL  25 mL Q15 MIN PRN Modesto Appiah Jr. D.OBucky       • levalbuterol (XOPENEX) 1.25 MG/3ML nebulizer solution 1.25 mg  1.25 mg Q4HRS PRN Duy Whitaker M.D.   1.25 mg at 02/20/18 0626   • miconazole 2%-zinc oxide (RADHA) topical cream   PRN Duy Whitaker M.D.       • acetaminophen (TYLENOL) tablet 650 mg  650 mg Q6HRS PRN Duy Whitaker M.D.   650 mg at 02/22/18 1839   • oxyCODONE immediate-release (ROXICODONE) tablet 5 mg  5 mg Q3HRS PRN Duy Whitaker M.D.        And   • oxyCODONE immediate release (ROXICODONE) tablet 10 mg  10 mg Q3HRS PRN Duy Whitaker M.D.        And   • morphine (pf) 4 mg/ml injection 4 mg  4 mg Q3HRS PRN Duy Whitaker M.D.       • metroNIDAZOLE (FLAGYL) IVPB 500 mg  500 mg Q8HRS Duy Whitaker M.D.   Stopped at 02/23/18 0644   • ondansetron (ZOFRAN) syringe/vial injection 4 mg  4 mg Q4HRS PRN Duy Whitaker M.D.       • ondansetron (ZOFRAN ODT) dispertab 4 mg  4 mg Q4HRS PRN Duy Whitaker M.D.       • promethazine (PHENERGAN) tablet 12.5-25 mg  12.5-25 mg Q4HRS PRN Duy Whitaker M.D.       • promethazine (PHENERGAN) suppository 12.5-25 mg  12.5-25 mg Q4HRS PRN Duy Whitaker M.D.       • prochlorperazine (COMPAZINE) injection 5-10 mg  5-10 mg Q4HRS PRN Duy Whitaker M.D.       • LORazepam (ATIVAN) injection 4 mg  4 mg Q10 MIN PRN Duy Whitaker M.D.   4 mg at 02/18/18 1946   • propofol (DIPRIVAN) injection  0-80 mcg/kg/min Continuous Modesto Appiah Jr., D.O.   Stopped at 02/21/18 1541   • cefTRIAXone (ROCEPHIN) syringe 2 g  2 g Q24HRS Duy Whitaker M.D.   2 g at 02/22/18 1005   • levETIRAcetam (KEPPRA) 1,500 mg in  mL IVPB  1,500 mg Q12HRS Duy Whitaker M.D.   Stopped at 02/22/18 2134   • lacosamide (VIMPAT)  200 mg in  mL ivpb  200 mg Q12HRS Maci Jade M.D.   Stopped at 02/22/18 2222     Last reviewed on 2/18/2018  7:51 PM by Lois Camejo, Pharmacy Intern    Quality  Measures:   Labs reviewed, Medications reviewed and Radiology images reviewed   Waldrop catheter: Critically Ill - Requiring Accurate Measurement of Urinary Output   Central line in place: Sepsis, Shock and Vasopressors       DVT prophylaxis - mechanical: Contra-indicated   Ulcer prophylaxis: Yes      Assessment and plan:    1. Acute hypoxemic respiratory failure.    -Patient has sizable left-sided pneumonia. Appears to be acute with probable scarring from previous left-sided pneumonia.  -Will need broad-spectrum antibiotic coverage including Rocephin and Flagyl.   -Vancomycin discontinued yesterday  -Continue bronchodilator therapy.  -Unable to obtain arterial blood gas. Would hold on attempted repeating, unless hemodynamic or pulmonary decompensation.    -No weaning at this time due to status epilepticus.    2. Septic shock.    -Patient has evidence of severe hypotension requiring vasopressors again overnight.  -Patient was on hydrocortisone, but weaned down to prednisone today.  -Resolved lactic acidosis    3. History of traumatic brain injury, chronic seizure disorder.    -Possibly related to previous phenobarbital wean off.   -May have also been precipitated by acute pneumonia.     4. Status epilepticus.    -Continue to increase phenobarbital to 90 mg twice a day. We have also obtained the Versed to off today.  -Propofol is off.  -This includes Keppra, Depakote, phenobarbital, and with increase in phenobarbital   -Appreciate neurology's assessment and recommendations.   -24 hour EEG to continue although would like to decrease and discontinue by this weekend especially if they move towards of her care measures    4.  Decubitus ulcerations of the left hip including stage IV disease.    -Possibly may require more than wound care and  consideration for orthopedic evaluation and biopsy if does not improve.  -Appreciate wound care assistance and wounds. Address daily..  -Overall appeared stable.  -Doubt cause of sepsis.    5. Pneumonia.    -Patient is substantial left-sided pneumonia. We'll continue with Rocephin, Flagyl, doxycycline.  -Continue full ventilator support at this point. He has no spontaneous effort due to the heavy sedation for status epilepticus.  -No spontaneous breathing trials at this time.  -Patient is status post tracheostomy placement with a 6.0 Shiley currently in place. Peak pressures are not elevated.    7. Mild fluid overload.    -Continue with Lasix diuresis, and we'll reorder for another day.  -Patient has developed a metabolic acidosis due to hyperchloremia. We'll be cautious with further diuresis and will add free water via the tube feeds.  I have added D5 water 250 mL to be given over 4 hours today. -Follow-up BMP every 6 hours for today.  -Caution using D5 water in the setting of status epilepticus.    Comment:  Long discussion was held with neurologist Dr. Bentley regarding the long-term prognosis and at this point I believe the mother is considering withdrawal of care and possible hospice care at home. I have placed a formal consult for both palliative care as well as hospice.      Discussed patient condition and risk of morbidity and/or mortality with Charge nurse / hot rounds.  M.D. rounds  The patient remains critically ill.  Critical care time = 50 minutes in directly providing and coordinating critical care and extensive data review.  No time overlap and excludes procedures.    Jon Reynoso D.O.

## 2018-02-23 NOTE — PROGRESS NOTES
Neuro MD at the bedside. Discussed POC in regards to patients Seizure medication orders. Placed verbal order for another phenobarb blood level this evening. Order placed.

## 2018-02-23 NOTE — PROGRESS NOTES
[facility] Hospitalist Progress Note    Date of Service: 2018    Chief Complaint  37 y.o. male with hx of traumatic brain injury (baseline nonverbal and immobile), with a hx of seizures admitted 2018 with septic shock.    Interval Problem Update  Patient had been weaned off his phenobarbital, was noted to have seizures while here so phenobarbital restarted.  Presumed aspiration pneumonia during a seizure, now causing septic shock.  Patient was noted to have shock, remains on pressor support.   Discussed case with family at bedside.   Patient seen and examined in the ICU, ICU care given.   Discussed patient condition and plan with Intensivist, RN, RT and charge nurse / hot rounds.    Not responsive at baseline  Continuous EEG overnight  No overnight events  BM overnight  Versed 2, more awake  Ignacio this am and irregular  Levo 1 and vaso 0.03  Tolerating TF at goal  Adequate uop  Afebrile this am, 101.3 overnight  Increase phenobarb, stop versed gtt  Vent day 6  More crackles today, worse edema on cxr  Give k  Get ekg    Consultants/Specialty  Neurology  Pulmonary    Disposition  Patient requires additional treatment in the hospital    Patient is critically ill.   The patient continues to have : Septic shock  The vital organ system that is effected is the: All her risk   If untreated there is a high chance of deterioration into: death   The critical care that I am providing today is: levophed gtt  The critical care that has been undertaken is medically complex.   There has been no overlap in critical care time.  Critical care time not including procedures, no overlap: 43 minutes      Review of Systems   Unable to perform ROS: Intubated      Physical Exam  Laboratory/Imaging   Hemodynamics  No data recorded.   Monitored Temp: 36.6 °C (97.9 °F)  Pulse  Av.3  Min: 52  Max: 119 Heart Rate (Monitored): 62  NIBP: 104/71      Respiratory  Liriano Vent Mode: APVCMV, Rate (breaths/min): 16, PEEP/CPAP: 10,  PEEP/CPAP: 10, FiO2: 40, P Peak (PIP): 20, P MEAN: 15   Respiration: 16, Pulse Oximetry: 91 %     Work Of Breathing / Effort: Vented  RUL Breath Sounds: Clear After Suction, RML Breath Sounds: Clear After Suction, RLL Breath Sounds: Diminished, JONATAN Breath Sounds: Clear After Suction, LLL Breath Sounds: Diminished    Fluids    Intake/Output Summary (Last 24 hours) at 02/23/18 0755  Last data filed at 02/23/18 0600   Gross per 24 hour   Intake          2795.01 ml   Output             3765 ml   Net          -969.99 ml       Nutrition  Orders Placed This Encounter   Procedures   • Diet NPO     Standing Status:   Standing     Number of Occurrences:   1     Order Specific Question:   Restrict to:     Answer:   Strict [1]     Physical Exam   Constitutional: He appears cachectic. He has a sickly appearance. He is intubated.   HENT:   Head: Normocephalic and atraumatic.   Mouth/Throat: No oropharyngeal exudate.   Eyes: Right eye exhibits no discharge. Left eye exhibits no discharge. No scleral icterus.   Neck: Neck supple. No tracheal deviation present.   Trach   Cardiovascular: Normal rate and regular rhythm.    No murmur heard.  Pulmonary/Chest: No stridor. He is intubated. No respiratory distress. He has decreased breath sounds. He has no rhonchi. He has rales.   Full vent support    Abdominal: Soft. He exhibits distension. Bowel sounds are decreased.   Peg    Musculoskeletal: He exhibits edema and deformity (contracture ).   Neurological:   Intubated, nonverbal    Skin: Skin is warm and dry. He is not diaphoretic. No erythema.   Psychiatric: He is noncommunicative.   Nursing note and vitals reviewed.      Recent Labs      02/21/18   0510   02/22/18   0000  02/22/18   0510  02/23/18   0550   WBC  14.4*   --    --   12.1*  10.6   RBC  3.14*   --    --   3.02*  2.99*   HEMOGLOBIN  8.9*   < >  8.9*  8.5*  8.4*   HEMATOCRIT  26.4*   < >  25.8*  25.7*  25.7*   MCV  84.1   --    --   85.1  86.0   MCH  28.3   --    --   28.1   28.1   MCHC  33.7   --    --   33.1*  32.7*   RDW  54.6*   --    --   54.5*  54.9*   PLATELETCT  57*   --    --   66*  60*   MPV  11.1   --    --   11.9  12.3    < > = values in this interval not displayed.     Recent Labs      02/22/18   0510  02/22/18   1700  02/23/18   0550   SODIUM  152*  152*  153*   POTASSIUM  3.0*  3.0*  3.1*   CHLORIDE  121*  119*  119*   CO2  24  26  29   GLUCOSE  155*  149*  114*   BUN  35*  31*  34*   CREATININE  0.20*  <0.20*  <0.20*   CALCIUM  7.9*  8.1*  7.6*                      Assessment/Plan     GI bleed   Assessment & Plan    - Possibly Carmencita-White tear, patient did have multiple episodes of severe vomiting  -Resolved        Acute on chronic respiratory failure with hypoxemia (CMS-[facility])   Assessment & Plan    - History of trach, now acutely worsened  - Full vent support, vent management per intensivist        Aspiration pneumonia (CMS-HCC)- (present on admission)   Assessment & Plan    - Causing septic shock  - Continue doxycycline, Rocephin and Flagyl  - Await culture results, patient is status post bronch        Hypernatremia   Assessment & Plan    -Worse, continue free water flushes  - Repeat BMP in the morning        Hypomagnesemia   Assessment & Plan    - Resolved        Decubitus ulcer of ischium, left, stage IV (CMS-HCC)   Assessment & Plan    - Continue wound care        Azotemia   Assessment & Plan    - Resolved        Diarrhea   Assessment & Plan    - Resolved          Septic shock (CMS-HCC)   Assessment & Plan    - Likely due to aspiration, await results of bronchoscopy  - Continue norepinephrine drip, titrating  - Continue doxycycline, Rocephin and Flagyl  - Await culture results        Protein-calorie malnutrition, severe (CMS-HCC)   Assessment & Plan    - Tolerating tube feeds        Seizure (CMS-HCC)   Assessment & Plan    - Continuous EEG per neurology, during my exam patient had frequent and multiple sharp spikes, neurology to see  - Continue Vimpat, Keppra,  decreasing Versed drip, increasing phenobarbital per neurology  -Additional recommendations per neurology        Physical debility   Assessment & Plan    - Chronic post traumatic brain injury  - At baseline        TBI (traumatic brain injury) (CMS-HCC)- (present on admission)   Assessment & Plan    - Chronic, nonverbal        Contraction, joint, multiple sites   Assessment & Plan    - Chronic, PT/OT          Quality-Core Measures   Reviewed items::  Labs reviewed, Medications reviewed and Radiology images reviewed  Waldrop catheter::  Critically Ill - Requiring Accurate Measurement of Urinary Output, Strict Intake and Output During Sepisis or Shock and Unconscious / Sedated Patient on a Ventilator  Central line in place:  Need for access, Sepsis, Shock and Vasopressors  DVT prophylaxis pharmacological::  Contraindicated - High bleeding risk  DVT prophylaxis - mechanical:  SCDs  Ulcer Prophylaxis::  Yes  Antibiotics:  Treating active infection/contamination beyond 24 hours perioperative coverage

## 2018-02-23 NOTE — PROGRESS NOTES
Pt monitored EKG appearing irregular and bradycardic. Addressed in rounds. EKG ordered and presented to Dr Reynoso. Trops x2 ordered.

## 2018-02-23 NOTE — PROGRESS NOTES
Pt exhibiting facial and Left shoulder and arm twitching. Pupils remain reactive but sluggish to light, 3mm, mild hippus.  MD (Dr Reynoso) informed and ordered to give 2mg ativan push and leave versed drip off at this time

## 2018-02-23 NOTE — CARE PLAN
Problem: Ventilation Defect:  Goal: Ability to achieve and maintain unassisted ventilation or tolerate decreased levels of ventilator support  Outcome: PROGRESSING AS EXPECTED    Intervention: Support and monitor invasive and noninvasive mechanical ventilation  Adult Ventilation Update    Total Vent Days: 6    Patient Lines/Drains/Airways Status    Active Airway     Name: Placement date: Placement time: Site: Days:    Airway Group Standard Cuffed Trach Tracheostomy 6.0 07/02/17   1403   Tracheostomy   235                                   Plateau Pressure (Q Shift): 25 (02/22/18 1926)  Static Compliance (ml / cm H2O): 27.2 (02/23/18 0227)    Patient failed trials because of Barriers to Wean:  (EEG, seizures) (02/22/18 0625)          Sputum/Suction   Cough: Congested;Moist (02/23/18 0400)  Sputum Amount: Moderate (02/23/18 0400)  Sputum Color: Clear;Pink Tinged (02/23/18 0400)  Sputum Consistency: Thick;Thin (02/23/18 0400)    Mobility Group  Activity Performed: Unable to mobilize (02/22/18 2000)  Pt Calls for Assistance: No (02/22/18 2000)  Reason Not Mobilized: Bed rest (02/22/18 2000)  Mobilization Comments: per MD order, extreme contractures unable to mobilize (02/22/18 2000)    Events/Summary/Plan: No changes overnight.

## 2018-02-24 ENCOUNTER — APPOINTMENT (OUTPATIENT)
Dept: RADIOLOGY | Facility: MEDICAL CENTER | Age: 38
DRG: 853 | End: 2018-02-24
Attending: INTERNAL MEDICINE
Payer: COMMERCIAL

## 2018-02-24 LAB
ANION GAP SERPL CALC-SCNC: 5 MMOL/L (ref 0–11.9)
ANION GAP SERPL CALC-SCNC: 7 MMOL/L (ref 0–11.9)
ANISOCYTOSIS BLD QL SMEAR: ABNORMAL
BACTERIA STL CULT: NORMAL
BASOPHILS # BLD AUTO: 0 % (ref 0–1.8)
BASOPHILS # BLD: 0 K/UL (ref 0–0.12)
BUN SERPL-MCNC: 24 MG/DL (ref 8–22)
BUN SERPL-MCNC: 25 MG/DL (ref 8–22)
CALCIUM SERPL-MCNC: 8.3 MG/DL (ref 8.5–10.5)
CALCIUM SERPL-MCNC: 8.6 MG/DL (ref 8.5–10.5)
CHLORIDE SERPL-SCNC: 107 MMOL/L (ref 96–112)
CHLORIDE SERPL-SCNC: 108 MMOL/L (ref 96–112)
CO2 SERPL-SCNC: 30 MMOL/L (ref 20–33)
CO2 SERPL-SCNC: 30 MMOL/L (ref 20–33)
CREAT SERPL-MCNC: <0.2 MG/DL (ref 0.5–1.4)
CREAT SERPL-MCNC: <0.2 MG/DL (ref 0.5–1.4)
E COLI SXT1+2 STL IA: NORMAL
EKG IMPRESSION: NORMAL
EOSINOPHIL # BLD AUTO: 0.12 K/UL (ref 0–0.51)
EOSINOPHIL NFR BLD: 1.7 % (ref 0–6.9)
ERYTHROCYTE [DISTWIDTH] IN BLOOD BY AUTOMATED COUNT: 52.7 FL (ref 35.9–50)
GLUCOSE BLD-MCNC: 105 MG/DL (ref 65–99)
GLUCOSE BLD-MCNC: 111 MG/DL (ref 65–99)
GLUCOSE BLD-MCNC: 113 MG/DL (ref 65–99)
GLUCOSE BLD-MCNC: 117 MG/DL (ref 65–99)
GLUCOSE SERPL-MCNC: 105 MG/DL (ref 65–99)
GLUCOSE SERPL-MCNC: 98 MG/DL (ref 65–99)
HCT VFR BLD AUTO: 24.5 % (ref 42–52)
HGB BLD-MCNC: 8.1 G/DL (ref 14–18)
LYMPHOCYTES # BLD AUTO: 1.39 K/UL (ref 1–4.8)
LYMPHOCYTES NFR BLD: 19.8 % (ref 22–41)
MACROCYTES BLD QL SMEAR: ABNORMAL
MANUAL DIFF BLD: NORMAL
MCH RBC QN AUTO: 28.4 PG (ref 27–33)
MCHC RBC AUTO-ENTMCNC: 33.1 G/DL (ref 33.7–35.3)
MCV RBC AUTO: 86 FL (ref 81.4–97.8)
MICROCYTES BLD QL SMEAR: ABNORMAL
MONOCYTES # BLD AUTO: 0.18 K/UL (ref 0–0.85)
MONOCYTES NFR BLD AUTO: 2.6 % (ref 0–13.4)
MORPHOLOGY BLD-IMP: NORMAL
NEUTROPHILS # BLD AUTO: 5.31 K/UL (ref 1.82–7.42)
NEUTROPHILS NFR BLD: 75.9 % (ref 44–72)
NRBC # BLD AUTO: 0.02 K/UL
NRBC BLD-RTO: 0.3 /100 WBC
PLATELET # BLD AUTO: 68 K/UL (ref 164–446)
PLATELET BLD QL SMEAR: NORMAL
PMV BLD AUTO: 12.1 FL (ref 9–12.9)
POIKILOCYTOSIS BLD QL SMEAR: NORMAL
POTASSIUM SERPL-SCNC: 3.7 MMOL/L (ref 3.6–5.5)
POTASSIUM SERPL-SCNC: 3.8 MMOL/L (ref 3.6–5.5)
RBC # BLD AUTO: 2.85 M/UL (ref 4.7–6.1)
RBC BLD AUTO: PRESENT
SIGNIFICANT IND 70042: NORMAL
SITE SITE: NORMAL
SODIUM SERPL-SCNC: 142 MMOL/L (ref 135–145)
SODIUM SERPL-SCNC: 145 MMOL/L (ref 135–145)
SOURCE SOURCE: NORMAL
TARGETS BLD QL SMEAR: NORMAL
TROPONIN I SERPL-MCNC: <0.01 NG/ML (ref 0–0.04)
WBC # BLD AUTO: 7 K/UL (ref 4.8–10.8)

## 2018-02-24 PROCEDURE — 700111 HCHG RX REV CODE 636 W/ 250 OVERRIDE (IP): Performed by: INTERNAL MEDICINE

## 2018-02-24 PROCEDURE — 700105 HCHG RX REV CODE 258: Performed by: PSYCHIATRY & NEUROLOGY

## 2018-02-24 PROCEDURE — A9270 NON-COVERED ITEM OR SERVICE: HCPCS | Performed by: INTERNAL MEDICINE

## 2018-02-24 PROCEDURE — 71045 X-RAY EXAM CHEST 1 VIEW: CPT

## 2018-02-24 PROCEDURE — 95951 HCHG EEG-VIDEO-24HR: CPT

## 2018-02-24 PROCEDURE — 85007 BL SMEAR W/DIFF WBC COUNT: CPT

## 2018-02-24 PROCEDURE — 94003 VENT MGMT INPAT SUBQ DAY: CPT

## 2018-02-24 PROCEDURE — 700111 HCHG RX REV CODE 636 W/ 250 OVERRIDE (IP): Performed by: PSYCHIATRY & NEUROLOGY

## 2018-02-24 PROCEDURE — 93010 ELECTROCARDIOGRAM REPORT: CPT | Performed by: INTERNAL MEDICINE

## 2018-02-24 PROCEDURE — C9254 INJECTION, LACOSAMIDE: HCPCS | Performed by: PSYCHIATRY & NEUROLOGY

## 2018-02-24 PROCEDURE — 700105 HCHG RX REV CODE 258: Performed by: INTERNAL MEDICINE

## 2018-02-24 PROCEDURE — 700102 HCHG RX REV CODE 250 W/ 637 OVERRIDE(OP): Performed by: PSYCHIATRY & NEUROLOGY

## 2018-02-24 PROCEDURE — 85027 COMPLETE CBC AUTOMATED: CPT

## 2018-02-24 PROCEDURE — 80048 BASIC METABOLIC PNL TOTAL CA: CPT

## 2018-02-24 PROCEDURE — 700102 HCHG RX REV CODE 250 W/ 637 OVERRIDE(OP): Performed by: INTERNAL MEDICINE

## 2018-02-24 PROCEDURE — 93005 ELECTROCARDIOGRAM TRACING: CPT | Performed by: INTERNAL MEDICINE

## 2018-02-24 PROCEDURE — 94640 AIRWAY INHALATION TREATMENT: CPT

## 2018-02-24 PROCEDURE — A9270 NON-COVERED ITEM OR SERVICE: HCPCS | Performed by: PSYCHIATRY & NEUROLOGY

## 2018-02-24 PROCEDURE — 700101 HCHG RX REV CODE 250: Performed by: INTERNAL MEDICINE

## 2018-02-24 PROCEDURE — 82962 GLUCOSE BLOOD TEST: CPT | Mod: 91

## 2018-02-24 PROCEDURE — 99291 CRITICAL CARE FIRST HOUR: CPT | Performed by: INTERNAL MEDICINE

## 2018-02-24 PROCEDURE — 84484 ASSAY OF TROPONIN QUANT: CPT

## 2018-02-24 PROCEDURE — 770022 HCHG ROOM/CARE - ICU (200)

## 2018-02-24 RX ORDER — POTASSIUM CHLORIDE 20 MEQ/1
20 TABLET, EXTENDED RELEASE ORAL ONCE
Status: COMPLETED | OUTPATIENT
Start: 2018-02-24 | End: 2018-02-24

## 2018-02-24 RX ORDER — PHENOBARBITAL 20 MG/5ML
120 ELIXIR ORAL 2 TIMES DAILY
Status: DISCONTINUED | OUTPATIENT
Start: 2018-02-24 | End: 2018-02-28

## 2018-02-24 RX ORDER — FUROSEMIDE 10 MG/ML
20 INJECTION INTRAMUSCULAR; INTRAVENOUS
Status: DISCONTINUED | OUTPATIENT
Start: 2018-02-25 | End: 2018-02-27

## 2018-02-24 RX ORDER — PREDNISONE 10 MG/1
10 TABLET ORAL DAILY
Status: DISCONTINUED | OUTPATIENT
Start: 2018-02-25 | End: 2018-02-26

## 2018-02-24 RX ORDER — PHENOBARBITAL 20 MG/5ML
120 ELIXIR ORAL ONCE
Status: COMPLETED | OUTPATIENT
Start: 2018-02-24 | End: 2018-02-24

## 2018-02-24 RX ADMIN — VASOPRESSIN 0.03 UNITS/MIN: 20 INJECTION INTRAVENOUS at 21:59

## 2018-02-24 RX ADMIN — OMEPRAZOLE 40 MG: 20 CAPSULE, DELAYED RELEASE ORAL at 10:42

## 2018-02-24 RX ADMIN — CEFTRIAXONE 2 G: 2 INJECTION, POWDER, FOR SOLUTION INTRAMUSCULAR; INTRAVENOUS at 10:45

## 2018-02-24 RX ADMIN — LEVALBUTEROL HYDROCHLORIDE 1.25 MG: 1.25 SOLUTION RESPIRATORY (INHALATION) at 14:56

## 2018-02-24 RX ADMIN — SODIUM CHLORIDE 200 MG: 9 INJECTION, SOLUTION INTRAVENOUS at 22:01

## 2018-02-24 RX ADMIN — SODIUM CHLORIDE 200 MG: 9 INJECTION, SOLUTION INTRAVENOUS at 10:42

## 2018-02-24 RX ADMIN — SODIUM CHLORIDE 1500 MG: 9 INJECTION, SOLUTION INTRAVENOUS at 10:42

## 2018-02-24 RX ADMIN — POTASSIUM BICARBONATE 50 MEQ: 25 TABLET, EFFERVESCENT ORAL at 05:42

## 2018-02-24 RX ADMIN — CHLORHEXIDINE GLUCONATE 15 ML: 1.2 RINSE ORAL at 10:42

## 2018-02-24 RX ADMIN — VASOPRESSIN 0.03 UNITS/MIN: 20 INJECTION INTRAVENOUS at 10:28

## 2018-02-24 RX ADMIN — PHENOBARBITAL 120 MG: 20 ELIXIR ORAL at 15:20

## 2018-02-24 RX ADMIN — DOXYCYCLINE 100 MG: 100 TABLET ORAL at 15:20

## 2018-02-24 RX ADMIN — LEVALBUTEROL HYDROCHLORIDE 1.25 MG: 1.25 SOLUTION RESPIRATORY (INHALATION) at 02:01

## 2018-02-24 RX ADMIN — FUROSEMIDE 20 MG: 10 INJECTION, SOLUTION INTRAVENOUS at 05:42

## 2018-02-24 RX ADMIN — PHENOBARBITAL 90 MG: 20 ELIXIR ORAL at 10:42

## 2018-02-24 RX ADMIN — LEVALBUTEROL HYDROCHLORIDE 1.25 MG: 1.25 SOLUTION RESPIRATORY (INHALATION) at 19:25

## 2018-02-24 RX ADMIN — METRONIDAZOLE 500 MG: 500 TABLET ORAL at 21:59

## 2018-02-24 RX ADMIN — PHENOBARBITAL 120 MG: 20 ELIXIR ORAL at 23:44

## 2018-02-24 RX ADMIN — POTASSIUM CHLORIDE 20 MEQ: 1500 TABLET, EXTENDED RELEASE ORAL at 15:20

## 2018-02-24 RX ADMIN — CHLORHEXIDINE GLUCONATE 15 ML: 1.2 RINSE ORAL at 21:59

## 2018-02-24 RX ADMIN — METRONIDAZOLE 500 MG: 500 TABLET ORAL at 05:42

## 2018-02-24 RX ADMIN — DOXYCYCLINE 100 MG: 100 TABLET ORAL at 23:44

## 2018-02-24 RX ADMIN — SODIUM CHLORIDE 1500 MG: 9 INJECTION, SOLUTION INTRAVENOUS at 22:01

## 2018-02-24 RX ADMIN — LEVALBUTEROL HYDROCHLORIDE 1.25 MG: 1.25 SOLUTION RESPIRATORY (INHALATION) at 08:31

## 2018-02-24 RX ADMIN — METRONIDAZOLE 500 MG: 500 TABLET ORAL at 15:20

## 2018-02-24 ASSESSMENT — ENCOUNTER SYMPTOMS
LOSS OF CONSCIOUSNESS: 1
EYE DISCHARGE: 0
WEAKNESS: 1
SEIZURES: 1

## 2018-02-24 ASSESSMENT — LIFESTYLE VARIABLES: SUBSTANCE_ABUSE: 0

## 2018-02-24 NOTE — PROGRESS NOTES
Dr Reynoso Called and discussed this patients POC and the new order for palliative care to come consult on this patient.  I called and spoke to palliative care RN, who is on tomorrow, and discussed this patients case and events of the day.

## 2018-02-24 NOTE — CARE PLAN
Problem: Ventilation Defect:  Goal: Ability to achieve and maintain unassisted ventilation or tolerate decreased levels of ventilator support  Outcome: PROGRESSING SLOWER THAN EXPECTED  Adult Ventilation Update    Total Vent Days: 6  Chronic Trache  CMV 16/460/10/30%      Patient Lines/Drains/Airways Status    Active Airway     Name: Placement date: Placement time: Site: Days:    Airway Group Standard Cuffed Trach Tracheostomy 6.0 07/02/17   1403   Tracheostomy   236    Airway Group Tracheostomy 6.0       Tracheostomy                 In the last 24 hours, No SBT due to  seizures.             Plateau Pressure (Q Shift): 25.9 (02/23/18 1031)  Static Compliance (ml / cm H2O): 31.2 (02/23/18 1501)    Patient failed trials because of Barriers to Wean:  (EEG, seizures) (02/22/18 0625)  Barriers to SBT    Length of Weaning Trial     Cough: Congested;Moist (02/23/18 1600)  Sputum Amount: Moderate;Large (02/23/18 1600)  Sputum Color: Clear;Pink Tinged;Tan (02/23/18 1600)  Sputum Consistency: Thick (02/23/18 1600)    Mobility Group  Activity Performed: Unable to mobilize (02/23/18 0800)  Pt Calls for Assistance: No (02/22/18 2000)  Reason Not Mobilized: Bed rest (02/23/18 0800)  Mobilization Comments: per MD order, extreme contractures unable to mobilize (02/22/18 2000)    Events/Summary/Plan:Decreased FIO2 to 30%.  No other vent changes at this time.  Will continue to monitor patient.

## 2018-02-24 NOTE — PROGRESS NOTES
[facility] Hospitalist Progress Note    Date of Service: 2018    Chief Complaint  37 y.o. male with hx of traumatic brain injury (baseline nonverbal and immobile), with a hx of seizures admitted 2018 with septic shock.    Interval Problem Update  Patient had been weaned off his phenobarbital, was noted to have seizures while here so phenobarbital restarted.  Presumed aspiration pneumonia during a seizure, now causing septic shock.  Patient was noted to have shock, remains on pressor support.   Discussed case with family at bedside.   Patient seen and examined in the ICU, ICU care given.   Discussed patient condition and plan with Intensivist, RN, RT and charge nurse / hot rounds.    Not very responsive at baseline  Continuous EEG overnight  Still having seizures  Levo 2, vaso 0.03  NSR 60s  Tolerating TF at goal  Rectal tube with 700 out  3.2 L out of rosas  Afebrile  Free water flushes  Vent day 7  Palliative care/hospice on board  Give k and get mg    Consultants/Specialty  Neurology  Pulmonary    Disposition  Patient requires additional treatment in the hospital    Patient is critically ill.   The patient continues to have : Septic shock  The vital organ system that is effected is the: All her risk   If untreated there is a high chance of deterioration into: death   The critical care that I am providing today is: levophed gtt  The critical care that has been undertaken is medically complex.   There has been no overlap in critical care time.  Critical care time not including procedures, no overlap: 39 minutes      Review of Systems   Unable to perform ROS: Intubated      Physical Exam  Laboratory/Imaging   Hemodynamics  No data recorded.   Temperature:  (levophed paused per md), Monitored Temp: 37.4 °C (99.3 °F)  Pulse  Av.6  Min: 52  Max: 119 Heart Rate (Monitored): 95  NIBP: (!) 98/53      Respiratory  Liriano Vent Mode: APVCMV, Rate (breaths/min): 16, PEEP/CPAP: 10, PEEP/CPAP: 10, FiO2: 35, P Peak  (PIP): 26, P MEAN: 14   Respiration: 16, Pulse Oximetry: 98 %     Work Of Breathing / Effort: Vented  RUL Breath Sounds: Clear, RML Breath Sounds: Clear, RLL Breath Sounds: Diminished, JONATAN Breath Sounds: Clear, LLL Breath Sounds: Diminished    Fluids    Intake/Output Summary (Last 24 hours) at 02/24/18 0747  Last data filed at 02/24/18 0600   Gross per 24 hour   Intake          3104.77 ml   Output             7550 ml   Net         -4445.23 ml       Nutrition  Orders Placed This Encounter   Procedures   • Diet NPO     Standing Status:   Standing     Number of Occurrences:   1     Order Specific Question:   Restrict to:     Answer:   Strict [1]     Physical Exam   Constitutional: He appears cachectic. He has a sickly appearance. No distress. He is intubated.   HENT:   Head: Normocephalic and atraumatic.   Eyes: Right eye exhibits no discharge. Left eye exhibits no discharge.   Neck: Neck supple.   Trach   Cardiovascular: Normal rate and regular rhythm.  Exam reveals no gallop.    No murmur heard.  Pulmonary/Chest: No stridor. He is intubated. No respiratory distress. He has decreased breath sounds. He has no wheezes. He has no rhonchi. He has rales.   Full vent support    Abdominal: Soft. He exhibits distension. Bowel sounds are decreased.   Peg    Musculoskeletal: He exhibits edema and deformity (contracture ).   Neurological:   Intubated, nonverbal    Skin: Skin is warm and dry. He is not diaphoretic.   Psychiatric: He is noncommunicative.   Nursing note and vitals reviewed.      Recent Labs      02/22/18   0510  02/23/18   0550  02/24/18   0539   WBC  12.1*  10.6  7.0   RBC  3.02*  2.99*  2.85*   HEMOGLOBIN  8.5*  8.4*  8.1*   HEMATOCRIT  25.7*  25.7*  24.5*   MCV  85.1  86.0  86.0   MCH  28.1  28.1  28.4   MCHC  33.1*  32.7*  33.1*   RDW  54.5*  54.9*  52.7*   PLATELETCT  66*  60*  68*   MPV  11.9  12.3  12.1     Recent Labs      02/23/18   1800  02/24/18   0008  02/24/18   0539   SODIUM  145  145  142   POTASSIUM   3.5*  3.7  3.8   CHLORIDE  109  108  107   CO2  30  30  30   GLUCOSE  122*  98  105*   BUN  26*  24*  25*   CREATININE  <0.20*  <0.20*  <0.20*   CALCIUM  8.0*  8.6  8.3*                      Assessment/Plan     Septic shock (CMS-HCC)   Assessment & Plan    - Likely due to aspiration, await results of bronchoscopy  - Continue norepinephrine drip, titrating  - Continue doxycycline, Rocephin and Flagyl  - Await culture results        Hypernatremia   Assessment & Plan    - resolved with free water flushes  - Repeat BMP in the morning        Hypomagnesemia   Assessment & Plan    - Resolved        Decubitus ulcer of ischium, left, stage IV (CMS-HCC)   Assessment & Plan    - Continue wound care        Azotemia   Assessment & Plan    - Resolved        Acute on chronic respiratory failure with hypoxemia (CMS-HCC)   Assessment & Plan    - History of trach, now acutely worsened  - Full vent support, vent management per intensivist        Aspiration pneumonia (CMS-HCC)- (present on admission)   Assessment & Plan    - Causing septic shock  - Continue doxycycline, Rocephin and Flagyl  - Await culture results, patient is status post bronch        Diarrhea   Assessment & Plan    - Resolved          Protein-calorie malnutrition, severe (CMS-HCC)   Assessment & Plan    - Tolerating tube feeds        Seizure (CMS-HCC)   Assessment & Plan    - Continuous EEG per neurology  - Continue Vimpat, Keppra, phenobarbital per neurology  -Additional recommendations per neurology        Physical debility   Assessment & Plan    - Chronic post traumatic brain injury  - At baseline        TBI (traumatic brain injury) (CMS-HCC)- (present on admission)   Assessment & Plan    - Chronic, nonverbal        Contraction, joint, multiple sites   Assessment & Plan    - Chronic, PT/OT        GI bleed   Assessment & Plan    - Possibly Carmencita-White tear, patient did have multiple episodes of severe vomiting  -Resolved          Quality-Core Measures   Reviewed  items::  Labs reviewed, Medications reviewed and Radiology images reviewed  [employer]ald[employer] catheter::  Critically Ill - Requiring Accurate Measurement of Urinary Output, Strict Intake and Output During Sepisis or Shock and Unconscious / Sedated Patient on a Ventilator  Central line in place:  Need for access, Sepsis, Shock and Vasopressors  DVT prophylaxis pharmacological::  Contraindicated - High bleeding risk  DVT prophylaxis - mechanical:  SCDs  Ulcer Prophylaxis::  Yes  Antibiotics:  Treating active infection/contamination beyond 24 hours perioperative coverage
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
## 2018-02-24 NOTE — CARE PLAN
Problem: Ventilation Defect:  Goal: Ability to achieve and maintain unassisted ventilation or tolerate decreased levels of ventilator support    Intervention: Manage ventilation therapy by monitoring diagnostic test results  Adult Ventilation Update    Total Vent Days: 6    Patient Lines/Drains/Airways Status    Active Airway     Name: Placement date: Placement time: Site: Days:    Airway Group Standard Cuffed Trach Tracheostomy 6.0 07/02/17   1403   Tracheostomy   236    Airway Group Tracheostomy 6.0       Tracheostomy                  Plateau Pressure (Q Shift): 25.9 (02/23/18 1031)  Static Compliance (ml / cm H2O): 33 (02/23/18 2204)    Patient failed trials because of Barriers to Wean:  (EEG, seizures) (02/22/18 0625)  Barriers to SBT    Length of Weaning Trial      Sputum/Suction   Cough: Congested;Moist (02/23/18 2000)  Sputum Amount: Moderate;Large (02/23/18 2000)  Sputum Color: Clear;Pink Tinged;Tan (02/23/18 2000)  Sputum Consistency: Thick (02/23/18 2000)    Mobility Group  Activity Performed: Unable to mobilize (02/23/18 2000)  Pt Calls for Assistance: No (02/23/18 2000)  Reason Not Mobilized: Bed rest (02/23/18 2000)  Mobilization Comments: per MD order, extreme contractures unable to mobilize (02/22/18 2000)    Events/Summary/Plan: Changed ventilator circut/ poor heater connection (02/22/18 0050)

## 2018-02-24 NOTE — CARE PLAN
Problem: Safety  Goal: Will remain free from injury  Outcome: PROGRESSING AS EXPECTED  bedrails up x2, bed alarm on, rounding in place    Problem: Skin Integrity  Goal: Risk for impaired skin integrity will decrease  Outcome: PROGRESSING SLOWER THAN EXPECTED  Skin care orders read and followed, pt on a q2hr turn schedule, with bed assisting in turns as well. Known pressure injuries assessed and tended to per WOC orders. New areas addressed and skin orders placed

## 2018-02-24 NOTE — PROGRESS NOTES
Pulmonary Critical Care Progress Note        Chief Complaint: Respiratory failure, septic shock    History of Present Illness:  Mr. Edwards is a 37-year-old male with past medical history of traumatic brain injury at age 17, seizures diagnosed in June of 2017. His baseline mental status appears to be nodding and minimal interacting without vocalization. Who was brought to the ICU as a direct admit from [facility] where he was brought today for hypoxia. Reportedly the patient was being weanied off of his phenobarbital by his neurologist when he began having more frequent seizures, approximately one week ago his neurologist increased his Keppra dosing however the seizures continued. His mother did use CBD and THC with some success in slowing the seizures. Since 2 days ago the patient had multiple aspiration events following these seizures. His mother evaluated him with a home oxygen saturation monitor and he was noted to be hypoxic, he was brought to [facility] where he was found to have 28% bands with leukopenia. Chest x-ray was obtained demonstrating bilateral infiltrates right greater than left. He was placed on mechanical ventilation and his oxygenation was unable to be improved more than 85%, he was hypotensive and given multiple boluses of IV fluids without improvement. He was started on levo fed and given multiple doses of Ativan for seizures and transferred to our ICU for higher level of care. He arrives on the ventilator and on pressors critically ill.    Please note this history is obtained by my partner Dr. Appiah 2/18/2018.    ROS:  Respiratory: unable to perform due to the patient's inability to effectively communicate, Cardiac: unable to perform due to the patient's inability to effectively communicate, GI: unable to perform due to the patient's inability to effectively communicate.  All other systems negative.    Interval Events:  24 hour interval history reviewed     Aspiration  pna  EKG changes yesterday and overnight, though stabilized today.  Still with sz overnight per neuro  TF at goal   Rectal tube  Afebrile  Vaso and levophed  Na 142  Vent day 7  PEEP down to 8  Day 7 of 7 doxy, rocephin  Lasix q 6, now reduced daily  Predisone today and DC IV steroid  Extra dose of pheno 90 mg given overnight    PFSH:  No change.    Respiratory:  Liriano Vent Mode: APVCMV, Rate (breaths/min): 16, Vt Target (mL): 460, PEEP/CPAP: 10, FiO2: 35, Static Compliance (ml / cm H2O): 37.3, Control VTE (exp VT): 447  Pulse Oximetry: 98 %  Chest Tube Drains:          Exam: rhonchi left > right.  Tracheostomy in place. Slight improvement today.  ImagingCXR  I have personally reviewed the chest x-ray my impression is  patient continues to have left-sided pneumonia although improved. Again slight improvement though the patient is rotated limiting the study. Tracheostomy in good position.        Invalid input(s): [id number]    HemoDynamics:  Pulse: 64, Heart Rate (Monitored): 95  NIBP: (!) 98/53       Exam: regular rhythm (Sinus) with some irregular rhythm, though during my exam he was in normal sinus rhythm.   Negative cardiac markers after abnormal EKG yesterday.    Echo on 2/19/2018:  CONCLUSIONS  Normal right and left ventricular size and function.   No significant valve disease or flow abnormalities.   Dilated inferior vena cava without inspiratory collapse. The patient is   intubated.  Recent Labs      02/23/18   0550  02/23/18   1230  02/24/18   0539   TROPONINI  0.01  <0.01  <0.01       Neuro:  GCS Total Aleida Coma Score: 6       Exam: Less sedated but no evidence of obvious clinical seizure activity The patient has chronic traumatic brain injury and multiple contractures. Patient does not follow commands, open eyes to command and has evidence of hyperreflexia. Phenobarbital was increased to 90 mg twice a day. There was more opening his eyes but not to command.   Epileptology continues to recommend  24-hour monitoring and occasional spike wave pattern are noted.  Imaging: None - Reviewed   24 EEG as noted above.     Fluids:  Intake/Output       02/22/18 0700 - 02/23/18 0659 02/23/18 0700 - 02/24/18 0659 02/24/18 0700 - 02/25/18 0659      [id number] 9883-1624 Total 4748-9267 7123-9932 Total 7607-9083 7859-5136 Total       Intake    I.V.  490.2  184.8 675  854  210.8 1064.8  --  -- --    Vasopressin Volume 108 18 126 108 108 216 -- -- --    Midazolam Volume 30.2 24 54.2 4 -- 4 -- -- --    Norepinephrine Volume 32 22.8 54.8 22 22.8 44.8 -- -- --    IV Piggyback Volume (IV Piggyback) 200 -- 200 600 -- 600 -- -- --    IV Volume (0.9% Normal Saline) 120 120 240 120 80 200 -- -- --    Other  600  -- 600  60  -- 60  --  -- --    Medications (P.O./ Enteral Liquids) 600 -- 600 60 -- 60 -- -- --    Enteral  920  600 1520  1380  850 2230  --  -- --    Enteral Volume   -- -- --    Free Water / Tube Flush 370 -- 370  -- -- --    Total Intake 2010.2 784.8 2795 2294 1060.8 3354.8 -- -- --       Output    Urine  2985  780 3765  5650  1900 7550  --  -- --    Indwelling Cathether 2985 780 3765 5650 1900 7550 -- -- --    Stool  --  -- --  --  -- --  --  -- --    Number of Times Stooled 1 x -- 1 x -- -- -- -- -- --    Total Output 2985 780 3765 5650 1900 7550 -- -- --       Net I/O     -974.8 4.8 -970 -3356 -839.2 -4195.2 -- -- --        Weight: 62.5 kg (137 lb 12.6 oz)  Recent Labs      02/23/18   1800  02/24/18   0008  02/24/18   0539   SODIUM  145  145  142   POTASSIUM  3.5*  3.7  3.8   CHLORIDE  109  108  107   CO2  30  30  30   BUN  26*  24*  25*   CREATININE  <0.20*  <0.20*  <0.20*   MAGNESIUM  1.6   --    --    CALCIUM  8.0*  8.6  8.3*       GI/Nutrition:  Exam: abdomen is soft and non-tender there is a button PEG will be accessed with patient's home feeding tube.No significant distention is noted. There is also a mechanical device at the midepigastric region palpable. This is a baclofen  pump. It was interrogated and was reported to be normal. Tube feeds to be increased to goal. I will continue with free water 250 every 8 hours with improvement in the sodium.  Imaging: None - Reviewed  Liver Function  Recent Labs      18   1800  18   0008  18   0539   GLUCOSE  122*  98  105*       Heme:  Recent Labs      18   0510  18   0550  18   0539   RBC  3.02*  2.99*  2.85*   HEMOGLOBIN  8.5*  8.4*  8.1*   HEMATOCRIT  25.7*  25.7*  24.5*   PLATELETCT  66*  60*  68*       Infectious Disease:  Monitored Temp  Av.6 °C (97.9 °F)  Min: 36.1 °C (97 °F)  Max: 37.9 °C (100.2 °F)  Micro: antibiotics reviewed. The patient remains on antibiotic therapy including Rocephin and Flagyl at this time. C. diff is negative. Do not need isolation for this purpose. BAL data, with Proteus, and Klebsiella as now been reported with mild amount of yeast from BAL.   Recent Labs      18   0510  18   0550  18   0539   WBC  12.1*  10.6  7.0   NEUTSPOLYS  80.50*  68.40   --    LYMPHOCYTES  15.00*  23.20   --    MONOCYTES  1.80  6.50   --    EOSINOPHILS  0.00  0.50   --    BASOPHILS  0.00  0.20   --      Current Facility-Administered Medications   Medication Dose Frequency Provider Last Rate Last Dose   • PHENobarbital solution 90 mg  90 mg BID Jon Reynoso D.O.   90 mg at 18   • furosemide (LASIX) injection 20 mg  20 mg Q6HRS Jon Reynoso D.O.   20 mg at 18 0542   • metroNIDAZOLE (FLAGYL) tablet 500 mg  500 mg Q8HRS JASPAL Berry.O.   500 mg at 18 0542   • potassium bicarbonate (KLYTE) 25 MEQ effervescent tablet TBEF 50 mEq  50 mEq Q6HRS Clarence Morfin D.O.   50 mEq at 18 0542   • predniSONE (DELTASONE) tablet 20 mg  20 mg DAILY Jon S Dikin, D.O.   20 mg at 18 0909   • doxycycline monohydrate (ADOXA) tablet 100 mg  100 mg Q12HRS Jon Reynoso, D.O.   100 mg at 18 2142   • omeprazole 2 mg/mL in sodium bicarbonate  (PRILOSEC) oral susp 40 mg  40 mg DAILY FARHAT BerryOBucky   40 mg at 02/23/18 0909   • vasopressin (VASOSTRICT) 20 Units in  mL Infusion  0.03 Units/min Continuous Rivas Brooks M.D. 9 mL/hr at 02/23/18 2206 0.03 Units/min at 02/23/18 2206   • norepinephrine (LEVOPHED) 8 mg in D5W 250 mL Infusion  0-30 mcg/min Continuous Rivas Brooks M.D. 1.9 mL/hr at 02/23/18 1010 1 mcg/min at 02/23/18 1010   • levalbuterol (XOPENEX) 1.25 MG/3ML nebulizer solution 1.25 mg  1.25 mg Q6HRS (RT) Jon Reynoso D.O.   1.25 mg at 02/24/18 0201   • Respiratory Care per Protocol   Continuous RT JASPAL Alaniz Jr..O.       • polyethylene glycol/lytes (MIRALAX) PACKET 1 Packet  1 Packet QDAY PRN JASPAL Alaniz Jr..O.        And   • magnesium hydroxide (MILK OF MAGNESIA) suspension 30 mL  30 mL QDAY PRN JASPAL Alaniz Jr..O.        And   • bisacodyl (DULCOLAX) suppository 10 mg  10 mg QDAY PRN JASPAL Alaniz Jr..O.       • chlorhexidine (PERIDEX) 0.12 % solution 15 mL  15 mL BID JASPAL Alaniz Jr..O.   15 mL at 02/23/18 2141   • lidocaine (XYLOCAINE) 1%  injection  1-2 mL Q30 MIN PRN JASPAL Alaniz Jr..O.       • MD ALERT...Adult ICU Electrolyte Replacement per Pharmacy Protocol   pharmacy to dose JASPAL Alaniz Jr..O.       • fentaNYL (SUBLIMAZE) injection 25 mcg  25 mcg Q HOUR PRN JASPAL Aalniz Jr..O.        Or   • fentaNYL (SUBLIMAZE) injection 50 mcg  50 mcg Q HOUR PRN JASPAL Alaniz Jr..O.        Or   • fentaNYL (SUBLIMAZE) injection 100 mcg  100 mcg Q HOUR PRN JASPAL Alaniz Jr..O.       • ipratropium-albuterol (DUONEB) nebulizer solution 3 mL  3 mL Q2HRS PRN (RT) Modesto Appiah Jr., D.O.       • insulin regular (HUMULIN R) injection 1-6 Units  1-6 Units Q6HRS Modesto Appiah Jr., D.O.   Stopped at 02/23/18 1800    And   • glucose 4 g chewable tablet 16 g  16 g Q15 MIN PRN Modesto Appiah Jr., D.O.        And   • dextrose 50% (D50W) injection 25 mL  25 mL Q15 MIN  PRN Modesto Appiah Jr., D.O.       • levalbuterol (XOPENEX) 1.25 MG/3ML nebulizer solution 1.25 mg  1.25 mg Q4HRS PRN Duy Whitaker M.D.   1.25 mg at 02/20/18 0626   • miconazole 2%-zinc oxide (RADHA) topical cream   PRN Duy Whitaker M.D.       • acetaminophen (TYLENOL) tablet 650 mg  650 mg Q6HRS PRN Duy Whitaker M.D.   650 mg at 02/22/18 1839   • oxyCODONE immediate-release (ROXICODONE) tablet 5 mg  5 mg Q3HRS PRN Duy Whitaker M.D.        And   • oxyCODONE immediate release (ROXICODONE) tablet 10 mg  10 mg Q3HRS PRN Duy Whitaker M.D.        And   • morphine (pf) 4 mg/ml injection 4 mg  4 mg Q3HRS PRN Duy Whitaker M.D.       • ondansetron (ZOFRAN) syringe/vial injection 4 mg  4 mg Q4HRS PRN Duy Whitaker M.D.       • ondansetron (ZOFRAN ODT) dispertab 4 mg  4 mg Q4HRS PRN Dyu Whitaker M.D.       • promethazine (PHENERGAN) tablet 12.5-25 mg  12.5-25 mg Q4HRS PRN Duy Whitaker M.D.       • promethazine (PHENERGAN) suppository 12.5-25 mg  12.5-25 mg Q4HRS PRN Duy Whitaker M.D.       • prochlorperazine (COMPAZINE) injection 5-10 mg  5-10 mg Q4HRS PRN Duy Whitaker M.D.       • LORazepam (ATIVAN) injection 4 mg  4 mg Q10 MIN PRN Duy Whitaker M.D.   2 mg at 02/23/18 1251   • cefTRIAXone (ROCEPHIN) syringe 2 g  2 g Q24HRS FARHAT BerryOBucky   2 g at 02/23/18 0936   • levETIRAcetam (KEPPRA) 1,500 mg in  mL IVPB  1,500 mg Q12HRS Duy Whitaker M.D.   Stopped at 02/23/18 2157   • lacosamide (VIMPAT) 200 mg in  mL ivpb  200 mg Q12HRS Maci Jade M.D.   Stopped at 02/23/18 2247     Last reviewed on 2/18/2018  7:51 PM by Lois Camejo, Pharmacy Intern    Quality  Measures:  Labs reviewed, Medications reviewed and Radiology images reviewed  Waldrop catheter: Critically Ill - Requiring Accurate Measurement of Urinary Output  Central line in place: Sepsis, Shock and Vasopressors      DVT prophylaxis - mechanical: Contra-indicated  Ulcer prophylaxis: Yes      Assessment and  plan:    1. Acute hypoxemic respiratory failure.    -Patient has sizable left-sided pneumonia. Appears to be acute with probable scarring from previous left-sided pneumonia.  -Will need broad-spectrum antibiotic coverage including Rocephin and Flagyl.   -Vancomycin discontinued 2 days ago  -Continue bronchodilator therapy.  -Unable to obtain arterial blood gas. Would hold on attempted repeating, unless hemodynamic or pulmonary decompensation.    -No weaning at this time due to status epilepticus, but would like to try tomorrow after discussion with neurology  -DC daily chest x-rays at this point.    2. Septic shock.    -Patient has evidence of severe hypotension requiring vasopressors again overnight.  -Patient was on hydrocortisone, but weaned down to prednisone today to a lower dose today and then wean further tomorrow.  -Resolved lactic acidosis  -Difficult to ascertain patient's true blood pressure and would like to DC norepinephrine. Vasopressin only at this point.    3. History of traumatic brain injury, chronic seizure disorder.    -Possibly related to previous phenobarbital wean off.   -May have also been precipitated by acute pneumonia.     4. Status epilepticus.    -Continue to increase phenobarbital to 90 mg twice a day. Bursae remains off as well as propofol infusion.  -This includes Keppra, Depakote, phenobarbital, and with increase in phenobarbital once again additional dose by neurology  -Appreciate neurology's assessment and recommendations.   -24 hour EEG to continue, but will discuss with neurology if we can discontinue this since we expect some spike wave pattern occasionally without clinical seizure activity recognized.    4.  Decubitus ulcerations of the left hip including stage IV disease.    -Possibly may require more than wound care and consideration for orthopedic evaluation and biopsy if does not improve.  -Appreciate wound care assistance and wounds. Address daily..  -Overall appeared  stable.  -Doubt cause of sepsis.    5. Pneumonia.    -Patient is substantial left-sided pneumonia. We'll continue with Rocephin, Flagyl, doxycycline.  -We'll DC daily x-rays would like to trial pressure-support CPAP tomorrow.   -Patient is status post tracheostomy placement with a 6.0 Shiley currently in place. Peak pressures are not elevated.    7. Mild fluid overload.    -Lasix down to once daily at this time. Likely last dose tomorrow.  -Patient has developed a metabolic acidosis due to hyperchloremia.     Comment: Case discussed at length with family as well again today. Would like to discontinue 24-hour EEG monitoring and continue with his ongoing medical management of status epilepticus. At this point is only intermittent spike wave which we expect after TBI. We'll discuss with neurology tomorrow.    Discussed patient condition and risk of morbidity and/or mortality with Charge nurse / hot rounds.  MMARCELLE rounds  The patient remains critically ill.  Critical care time = 45 minutes in directly providing and coordinating critical care and extensive data review.  No time overlap and excludes procedures.    Jon Reynoso D.O.

## 2018-02-24 NOTE — PROGRESS NOTES
IMPRESSION  1. 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 Started having breakthrough seizures since 2017 ( family tried to taper the patient off seizure medication and put the patient on CBD)      MANAGEMENT    Persistent epileptiform discharges from the right hemisphere, clinically, the patient was in sedation, on tracheal tube  Palliative service was consulted,     ________________________________________________________________________      The family's current wish is to wait till the seizures under control ( do not want vomiting associated with seizures), after seizures are controlled, they would bring the patient back home ( hospice at home?)    Therefore,  We will optimize the anti-seizure medication -- will continue video EEG for this weekend    We will respect the family's decision  ________________________________________________________________________        Vitals:    02/24/18 1200 02/24/18 1215 02/24/18 1230 02/24/18 1245   Pulse: 63 66 67 64   Resp: 16 16 15 15   Temp:       SpO2: 96% 96% 96% 96%   Weight:       Height:         Physical Exam   Constitutional: He is well-developed, well-nourished, and in no distress.   HENT:   Head: Normocephalic.   Neck: Neck supple.   Cardiovascular: Intact distal pulses.    Abdominal: Soft.   Neurological:   Unresponsive, contractures of limbs, bed ridden for decades   Skin: Skin is warm.       Review of Systems   HENT: Negative for nosebleeds.    Eyes: Negative for discharge.   Gastrointestinal: Negative for melena.   Genitourinary: Negative for hematuria.   Neurological: Positive for seizures, loss of consciousness and weakness.   Psychiatric/Behavioral: Negative for substance abuse.           ________________________________________________________________________    Hx from Dr Jade and Dr Hunter    ASSESSMENT AND PLAN:   37 y.o. male with history of a [place] and seizure disorder who presents with increase frequency of seizures as well as aspiration pneumonia. The increased frequency of his seizures might have been caused by the tapering of his phenobarbital. --Will Continue Keppra 1500 mg IV BID  Increase Phenobarbital to 90 mg suspension BID, phenobarbital level was subtherapeutic today at 7.9.  We will give an additional 90 mg IV and recheck phenobarbital level this afternoon.  Continue Vimpat 200 mg IV BID.     Addendum:  Had an extensive conversation with his parents regarding their expectation from medical treatment and   His significantly poor quality of life for the past 21 years! They are open to meet with palliative care for   Possible home hospice and withdrawing aggressive care.     The patient was admitted last June when he presented with respiratory failure and new onset seizures. He was started on Keppra and phenobarbital and was discharged home on Keppra 1500 mg twice a day and phenobarbital 17.5 mg PO BID after his seizures were controlled. He also had a tracheostomy placed for respiatory failure. He was seen by an outside neurologist, Dr Spain.  He developed a transaminitis from the Phenobarbital and was overly-sedated, and so Dr. Spain started reducing his phenobarbital with a slow taper. In the last 3 weeks,  he has had increased seizures. In the last two days after his Phenobarbital was decreased to 10 mg twice a day,  he started to have increased seizures. He had multiple seizures a day involving twitching of his face last seen a couple of minutes each. His mother has been giving him Diastat to help with the seizures. Dr. Spain was called who increased his Keppra to 3000 mg PO BID.  After several seizures last night, he aspirated and had vomiting. He was therefore brought to [facility]. His found to have a white blood cell count of 27 and a lactate level of 3.7. His AST was 56 with AST was 69. Chest x-ray showed bilateral pulmonary  infiltrates.  He was started on  Antibiotics and given Keppra and then transferred to [facility].      ________________________________________________________________________

## 2018-02-25 ENCOUNTER — APPOINTMENT (OUTPATIENT)
Dept: RADIOLOGY | Facility: MEDICAL CENTER | Age: 38
DRG: 853 | End: 2018-02-25
Attending: INTERNAL MEDICINE
Payer: COMMERCIAL

## 2018-02-25 LAB
ANION GAP SERPL CALC-SCNC: 5 MMOL/L (ref 0–11.9)
ANION GAP SERPL CALC-SCNC: 7 MMOL/L (ref 0–11.9)
ANISOCYTOSIS BLD QL SMEAR: ABNORMAL
BASOPHILS # BLD AUTO: 0 % (ref 0–1.8)
BASOPHILS # BLD: 0 K/UL (ref 0–0.12)
BUN SERPL-MCNC: 18 MG/DL (ref 8–22)
BUN SERPL-MCNC: 26 MG/DL (ref 8–22)
CALCIUM SERPL-MCNC: 8 MG/DL (ref 8.5–10.5)
CALCIUM SERPL-MCNC: 8.1 MG/DL (ref 8.5–10.5)
CHLORIDE SERPL-SCNC: 104 MMOL/L (ref 96–112)
CHLORIDE SERPL-SCNC: 107 MMOL/L (ref 96–112)
CO2 SERPL-SCNC: 24 MMOL/L (ref 20–33)
CO2 SERPL-SCNC: 27 MMOL/L (ref 20–33)
CREAT SERPL-MCNC: <0.2 MG/DL (ref 0.5–1.4)
CREAT SERPL-MCNC: <0.2 MG/DL (ref 0.5–1.4)
EOSINOPHIL # BLD AUTO: 0.19 K/UL (ref 0–0.51)
EOSINOPHIL NFR BLD: 2.6 % (ref 0–6.9)
ERYTHROCYTE [DISTWIDTH] IN BLOOD BY AUTOMATED COUNT: 53.4 FL (ref 35.9–50)
GLUCOSE BLD-MCNC: 102 MG/DL (ref 65–99)
GLUCOSE BLD-MCNC: 114 MG/DL (ref 65–99)
GLUCOSE BLD-MCNC: 93 MG/DL (ref 65–99)
GLUCOSE SERPL-MCNC: 83 MG/DL (ref 65–99)
GLUCOSE SERPL-MCNC: 86 MG/DL (ref 65–99)
HCT VFR BLD AUTO: 23.5 % (ref 42–52)
HGB BLD-MCNC: 7.5 G/DL (ref 14–18)
IRON SATN MFR SERPL: 11 % (ref 15–55)
IRON SERPL-MCNC: 28 UG/DL (ref 50–180)
LYMPHOCYTES # BLD AUTO: 2.24 K/UL (ref 1–4.8)
LYMPHOCYTES NFR BLD: 30.7 % (ref 22–41)
MACROCYTES BLD QL SMEAR: ABNORMAL
MAGNESIUM SERPL-MCNC: 1.5 MG/DL (ref 1.5–2.5)
MANUAL DIFF BLD: NORMAL
MCH RBC QN AUTO: 27.9 PG (ref 27–33)
MCHC RBC AUTO-ENTMCNC: 31.9 G/DL (ref 33.7–35.3)
MCV RBC AUTO: 87.4 FL (ref 81.4–97.8)
MONOCYTES # BLD AUTO: 0.39 K/UL (ref 0–0.85)
MONOCYTES NFR BLD AUTO: 5.3 % (ref 0–13.4)
MORPHOLOGY BLD-IMP: NORMAL
NEUTROPHILS # BLD AUTO: 4.48 K/UL (ref 1.82–7.42)
NEUTROPHILS NFR BLD: 61.4 % (ref 44–72)
NRBC # BLD AUTO: 0 K/UL
NRBC BLD-RTO: 0 /100 WBC
OVALOCYTES BLD QL SMEAR: NORMAL
PLATELET # BLD AUTO: 94 K/UL (ref 164–446)
PLATELET BLD QL SMEAR: NORMAL
PMV BLD AUTO: 12.6 FL (ref 9–12.9)
POIKILOCYTOSIS BLD QL SMEAR: NORMAL
POTASSIUM SERPL-SCNC: 3.2 MMOL/L (ref 3.6–5.5)
POTASSIUM SERPL-SCNC: 3.8 MMOL/L (ref 3.6–5.5)
RBC # BLD AUTO: 2.69 M/UL (ref 4.7–6.1)
RBC BLD AUTO: PRESENT
SODIUM SERPL-SCNC: 135 MMOL/L (ref 135–145)
SODIUM SERPL-SCNC: 139 MMOL/L (ref 135–145)
TIBC SERPL-MCNC: 252 UG/DL (ref 250–450)
TROPONIN I SERPL-MCNC: <0.01 NG/ML (ref 0–0.04)
WBC # BLD AUTO: 7.3 K/UL (ref 4.8–10.8)

## 2018-02-25 PROCEDURE — 80048 BASIC METABOLIC PNL TOTAL CA: CPT

## 2018-02-25 PROCEDURE — 94640 AIRWAY INHALATION TREATMENT: CPT

## 2018-02-25 PROCEDURE — A9270 NON-COVERED ITEM OR SERVICE: HCPCS | Performed by: INTERNAL MEDICINE

## 2018-02-25 PROCEDURE — 85027 COMPLETE CBC AUTOMATED: CPT

## 2018-02-25 PROCEDURE — 94003 VENT MGMT INPAT SUBQ DAY: CPT

## 2018-02-25 PROCEDURE — 82962 GLUCOSE BLOOD TEST: CPT | Mod: 91

## 2018-02-25 PROCEDURE — 700111 HCHG RX REV CODE 636 W/ 250 OVERRIDE (IP): Performed by: PSYCHIATRY & NEUROLOGY

## 2018-02-25 PROCEDURE — 700111 HCHG RX REV CODE 636 W/ 250 OVERRIDE (IP): Performed by: INTERNAL MEDICINE

## 2018-02-25 PROCEDURE — 700102 HCHG RX REV CODE 250 W/ 637 OVERRIDE(OP): Performed by: INTERNAL MEDICINE

## 2018-02-25 PROCEDURE — 83550 IRON BINDING TEST: CPT

## 2018-02-25 PROCEDURE — 700105 HCHG RX REV CODE 258: Performed by: PSYCHIATRY & NEUROLOGY

## 2018-02-25 PROCEDURE — 700105 HCHG RX REV CODE 258: Performed by: INTERNAL MEDICINE

## 2018-02-25 PROCEDURE — C9254 INJECTION, LACOSAMIDE: HCPCS | Performed by: PSYCHIATRY & NEUROLOGY

## 2018-02-25 PROCEDURE — 770022 HCHG ROOM/CARE - ICU (200)

## 2018-02-25 PROCEDURE — 700102 HCHG RX REV CODE 250 W/ 637 OVERRIDE(OP): Performed by: PSYCHIATRY & NEUROLOGY

## 2018-02-25 PROCEDURE — 95951 HCHG EEG-VIDEO-24HR: CPT | Mod: 52

## 2018-02-25 PROCEDURE — 83735 ASSAY OF MAGNESIUM: CPT

## 2018-02-25 PROCEDURE — 99291 CRITICAL CARE FIRST HOUR: CPT | Performed by: INTERNAL MEDICINE

## 2018-02-25 PROCEDURE — 700101 HCHG RX REV CODE 250: Performed by: INTERNAL MEDICINE

## 2018-02-25 PROCEDURE — A9270 NON-COVERED ITEM OR SERVICE: HCPCS | Performed by: PSYCHIATRY & NEUROLOGY

## 2018-02-25 PROCEDURE — 85007 BL SMEAR W/DIFF WBC COUNT: CPT

## 2018-02-25 PROCEDURE — 84484 ASSAY OF TROPONIN QUANT: CPT

## 2018-02-25 PROCEDURE — 74018 RADEX ABDOMEN 1 VIEW: CPT

## 2018-02-25 PROCEDURE — 83540 ASSAY OF IRON: CPT

## 2018-02-25 RX ORDER — METOCLOPRAMIDE HYDROCHLORIDE 5 MG/ML
10 INJECTION INTRAMUSCULAR; INTRAVENOUS EVERY 6 HOURS
Status: COMPLETED | OUTPATIENT
Start: 2018-02-25 | End: 2018-02-26

## 2018-02-25 RX ORDER — MAGNESIUM SULFATE HEPTAHYDRATE 40 MG/ML
4 INJECTION, SOLUTION INTRAVENOUS ONCE
Status: COMPLETED | OUTPATIENT
Start: 2018-02-25 | End: 2018-02-25

## 2018-02-25 RX ADMIN — VASOPRESSIN 0.03 UNITS/MIN: 20 INJECTION INTRAVENOUS at 11:07

## 2018-02-25 RX ADMIN — CHLORHEXIDINE GLUCONATE 15 ML: 1.2 RINSE ORAL at 20:22

## 2018-02-25 RX ADMIN — LEVALBUTEROL HYDROCHLORIDE 1.25 MG: 1.25 SOLUTION RESPIRATORY (INHALATION) at 09:39

## 2018-02-25 RX ADMIN — FUROSEMIDE 20 MG: 10 INJECTION, SOLUTION INTRAMUSCULAR; INTRAVENOUS at 08:09

## 2018-02-25 RX ADMIN — SODIUM CHLORIDE 1500 MG: 9 INJECTION, SOLUTION INTRAVENOUS at 20:22

## 2018-02-25 RX ADMIN — PHENOBARBITAL 120 MG: 20 ELIXIR ORAL at 20:22

## 2018-02-25 RX ADMIN — OMEPRAZOLE 40 MG: 20 CAPSULE, DELAYED RELEASE ORAL at 08:09

## 2018-02-25 RX ADMIN — SODIUM CHLORIDE 200 MG: 9 INJECTION, SOLUTION INTRAVENOUS at 11:07

## 2018-02-25 RX ADMIN — PREDNISONE 10 MG: 10 TABLET ORAL at 08:12

## 2018-02-25 RX ADMIN — PHENOBARBITAL 120 MG: 20 ELIXIR ORAL at 11:07

## 2018-02-25 RX ADMIN — METOCLOPRAMIDE 10 MG: 5 INJECTION, SOLUTION INTRAMUSCULAR; INTRAVENOUS at 13:49

## 2018-02-25 RX ADMIN — POTASSIUM BICARBONATE 25 MEQ: 25 TABLET, EFFERVESCENT ORAL at 08:09

## 2018-02-25 RX ADMIN — LEVALBUTEROL HYDROCHLORIDE 1.25 MG: 1.25 SOLUTION RESPIRATORY (INHALATION) at 15:33

## 2018-02-25 RX ADMIN — SODIUM CHLORIDE 200 MG: 9 INJECTION, SOLUTION INTRAVENOUS at 20:24

## 2018-02-25 RX ADMIN — METOCLOPRAMIDE 10 MG: 5 INJECTION, SOLUTION INTRAMUSCULAR; INTRAVENOUS at 18:04

## 2018-02-25 RX ADMIN — LEVALBUTEROL HYDROCHLORIDE 1.25 MG: 1.25 SOLUTION RESPIRATORY (INHALATION) at 18:22

## 2018-02-25 RX ADMIN — POTASSIUM BICARBONATE 25 MEQ: 25 TABLET, EFFERVESCENT ORAL at 15:16

## 2018-02-25 RX ADMIN — MAGNESIUM SULFATE IN WATER 4 G: 40 INJECTION, SOLUTION INTRAVENOUS at 08:12

## 2018-02-25 RX ADMIN — SODIUM CHLORIDE 1500 MG: 9 INJECTION, SOLUTION INTRAVENOUS at 08:12

## 2018-02-25 RX ADMIN — POTASSIUM BICARBONATE 25 MEQ: 25 TABLET, EFFERVESCENT ORAL at 20:22

## 2018-02-25 RX ADMIN — POTASSIUM BICARBONATE 50 MEQ: 25 TABLET, EFFERVESCENT ORAL at 08:10

## 2018-02-25 RX ADMIN — ONDANSETRON 4 MG: 2 INJECTION INTRAMUSCULAR; INTRAVENOUS at 11:40

## 2018-02-25 RX ADMIN — CHLORHEXIDINE GLUCONATE 15 ML: 1.2 RINSE ORAL at 08:10

## 2018-02-25 RX ADMIN — LEVALBUTEROL HYDROCHLORIDE 1.25 MG: 1.25 SOLUTION RESPIRATORY (INHALATION) at 03:15

## 2018-02-25 ASSESSMENT — ENCOUNTER SYMPTOMS
EYE DISCHARGE: 0
LOSS OF CONSCIOUSNESS: 1
SEIZURES: 1
WEAKNESS: 1

## 2018-02-25 ASSESSMENT — LIFESTYLE VARIABLES: SUBSTANCE_ABUSE: 0

## 2018-02-25 NOTE — PROGRESS NOTES
Pulmonary Critical Care Progress Note        Chief Complaint: Respiratory failure, septic shock    History of Present Illness:  Mr. Edwards is a 37-year-old male with past medical history of traumatic brain injury at age 17, seizures diagnosed in June of 2017. His baseline mental status appears to be nodding and minimal interacting without vocalization. Who was brought to the ICU as a direct admit from [facility] where he was brought today for hypoxia. Reportedly the patient was being weanied off of his phenobarbital by his neurologist when he began having more frequent seizures, approximately one week ago his neurologist increased his Keppra dosing however the seizures continued. His mother did use CBD and THC with some success in slowing the seizures. Since 2 days ago the patient had multiple aspiration events following these seizures. His mother evaluated him with a home oxygen saturation monitor and he was noted to be hypoxic, he was brought to [facility] where he was found to have 28% bands with leukopenia. Chest x-ray was obtained demonstrating bilateral infiltrates right greater than left. He was placed on mechanical ventilation and his oxygenation was unable to be improved more than 85%, he was hypotensive and given multiple boluses of IV fluids without improvement. He was started on levo fed and given multiple doses of Ativan for seizures and transferred to our ICU for higher level of care. He arrives on the ventilator and on pressors critically ill.    Please note this history is obtained by my partner Dr. Appiah 2/18/2018.    ROS:  Respiratory: unable to perform due to the patient's inability to effectively communicate, Cardiac: unable to perform due to the patient's inability to effectively communicate, GI: unable to perform due to the patient's inability to effectively communicate.  All other systems negative.    Interval Events:  24 hour interval history reviewed     No further EKG  changes  Left sided pna improving slowly  No SBT trials b/c of 24 hour EEG, but short trials reveal peroids of apnea  Will try to followup on Baclofen pump interrogation  On 7/7 of antibiotics, was on Rocephin, doxy, and vanco  Checking Iron studies  Lasix being given  Plan to discuss with neurology coming off 24 EEG monitor.  Up to Phenobarbital 90 mg overnight  Leaking around PEG tube?  Will check KUB today to rule out obstruction.  If Ok, would place back on TF at trickle rate.   Disscussed with Dr. Garcia.     PFSH:  No change.    Respiratory:  Liriano Vent Mode: APVCMV, Rate (breaths/min): 16, Vt Target (mL): 460, PEEP/CPAP: 8, FiO2: 35, Static Compliance (ml / cm H2O): 37, Control VTE (exp VT): 460  Pulse Oximetry: 96 %  Chest Tube Drains:          Exam: rhonchi left > right.  Tracheostomy in place. Slight improvement today.  ImagingCXR  I have personally reviewed the chest x-ray my impression is  patient continues to have left-sided pneumonia although improved again today.  Again slight improvement though the patient is rotated limiting the study. Tracheostomy in good position.        Invalid input(s): [id number]    HemoDynamics:  Pulse: 60, Heart Rate (Monitored): (!) 55  NIBP: (!) 68/34       Exam: regular rhythm (Sinus), but previously abnormal arrhythmia with pauses is not noted today. Though during my exam he was in normal sinus rhythm.   Negative cardiac markers after abnormal EKG yesterday.    Echo on 2/19/2018:  CONCLUSIONS  Normal right and left ventricular size and function.   No significant valve disease or flow abnormalities.   Dilated inferior vena cava without inspiratory collapse. The patient is   intubated.  Recent Labs      02/23/18   1230  02/24/18   0539  02/25/18   0557   TROPONINI  <0.01  <0.01  <0.01       Neuro:  GCS Total Aleida Coma Score: 10       Exam: Less sedated but no evidence of obvious clinical seizure activity, and probably back to baseline. The patient has chronic  traumatic brain injury and multiple contractures. Patient does not follow commands, open eyes to command and has evidence of hyperreflexia. Phenobarbital was increased to 90 mg twice a day. There was more opening his eyes but not to command.   Epileptology continues to recommend 24-hour monitoring and occasional spike wave pattern are noted.  Imaging: None - Reviewed   24 EEG as noted above.     Fluids:  Intake/Output       02/23/18 0700 - 02/24/18 0659 02/24/18 0700 - 02/25/18 0659 02/25/18 0700 - 02/26/18 0659      0700-1859 1900-0659 Total 0700-1859 1900-0659 Total 0700-1859 1900-0659 Total       Intake    I.V.  854  210.8 1064.8  327.9  108 435.9  15.8  -- 15.8    Vasopressin Volume 108 108 216 108 108 216 15.8 -- 15.8    Midazolam Volume 4 -- 4 -- -- -- -- -- --    Norepinephrine Volume 22 22.8 44.8 19.9 0 19.9 -- -- --    IV Piggyback Volume (IV Piggyback) 600 -- 600 200 -- 200 -- -- --    IV Volume (0.9% Normal Saline) 120 80 200 -- -- -- -- -- --    Other  60  -- 60  120  -- 120  --  -- --    Medications (P.O./ Enteral Liquids) 60 -- 60 120 -- 120 -- -- --    Enteral  1380  850 2230  720  300 1020  --  -- --    Enteral Volume  600 300 900 -- -- --    Free Water / Tube Flush  120 -- 120 -- -- --    Total Intake 2294 1060.8 3354.8 1167.9 408 1575.9 15.8 -- 15.8       Output    Urine  5650  1900 7550  2625  675 3300  33  -- 33    Indwelling Cathether 5650 1900 7550 2625 675 3300 33 -- 33    Total Output 5650 1900 7550 2625 675 3300 33 -- 33       Net I/O     -3356 -839.2 -4195.2 -1457.1 -267 -1724.1 -17.3 -- -17.3           Recent Labs      02/23/18   1800  02/24/18   0008  02/24/18   0539  02/25/18   0557   SODIUM  145  145  142  139   POTASSIUM  3.5*  3.7  3.8  3.2*   CHLORIDE  109  108  107  107   CO2  30  30  30  27   BUN  26*  24*  25*  26*   CREATININE  <0.20*  <0.20*  <0.20*  <0.20*   MAGNESIUM  1.6   --    --   1.5   CALCIUM  8.0*  8.6  8.3*  8.1*       GI/Nutrition:  Exam:  abdomen is soft and non-tender there is a button PEG will be accessed with patient's home feeding tube. Mild distention is noted today. Home PEG tube is being inserted by patient's mother under visualization with nursing staff. There is also a baclofen pump at the midepigastric region palpable. It was interrogated and was reported to be normal. Tube feeds to be increased to goal. Freewater flush discontinued due to possible increased abdominal distention with underlying ileus.  Imaging: None - Reviewed  Liver Function  Recent Labs      18   0008  18   0539  18   0557   GLUCOSE  98  105*  86       Heme:  Recent Labs      18   0550  18   0539  18   05   RBC  2.99*  2.85*  2.69*   HEMOGLOBIN  8.4*  8.1*  7.5*   HEMATOCRIT  25.7*  24.5*  23.5*   PLATELETCT  60*  68*  94*   IRON   --    --   28*   TOTIRONBC   --    --   252       Infectious Disease:  Monitored Temp  Av °C (98.6 °F)  Min: 36 °C (96.8 °F)  Max: 37.4 °C (99.3 °F)  Micro: antibiotics reviewed. The patient remains on antibiotic therapy including Rocephin and Flagyl at this time. C. diff is negative. Do not need isolation for this purpose. BAL data, with Proteus, and Klebsiella as now been reported with mild amount of yeast from BAL during the hospitalization.  Recent Labs      18   0550  18   0539  18   0557   WBC  10.6  7.0  7.3   NEUTSPOLYS  68.40  75.90*  61.40   LYMPHOCYTES  23.20  19.80*  30.70   MONOCYTES  6.50  2.60  5.30   EOSINOPHILS  0.50  1.70  2.60   BASOPHILS  0.20  0.00  0.00     Current Facility-Administered Medications   Medication Dose Frequency Provider Last Rate Last Dose   • potassium bicarbonate (KLYTE) 25 MEQ effervescent tablet TBEF 25 mEq  25 mEq TID Jon Reynoso D.O.   25 mEq at 18 0809   • magnesium sulfate IVPB premix 4 g  4 g Once Jon Reynoso D.O. 25 mL/hr at 18 0812 4 g at 18 0812   • furosemide (LASIX) injection 20 mg  20 mg Q DAY Jon CEDILLO  Edgardo D.OBucky   20 mg at 02/25/18 0809   • potassium bicarbonate (KLYTE) 25 MEQ effervescent tablet TBEF 50 mEq  50 mEq DAILY Jon Reynoso D.O.   50 mEq at 02/25/18 0810   • predniSONE (DELTASONE) tablet 10 mg  10 mg DAILY FARHAT BerryOBucky   10 mg at 02/25/18 0812   • loperamide (IMODIUM) 1 MG/5ML solution 2 mg  2 mg 4X/DAY PRN Clarence Morfin D.O.       • PHENobarbital solution 120 mg  120 mg BID Marty Garcia M.D.   120 mg at 02/24/18 2344   • omeprazole 2 mg/mL in sodium bicarbonate (PRILOSEC) oral susp 40 mg  40 mg DAILY FARHAT BerryOBucky   40 mg at 02/25/18 0809   • vasopressin (VASOSTRICT) 20 Units in  mL Infusion  0.03 Units/min Continuous Rivas Brooks M.D. 9 mL/hr at 02/25/18 0905 0.03 Units/min at 02/25/18 0905   • levalbuterol (XOPENEX) 1.25 MG/3ML nebulizer solution 1.25 mg  1.25 mg Q6HRS (RT) Jon Reynoso D.O.   1.25 mg at 02/25/18 0939   • Respiratory Care per Protocol   Continuous RT JASPAL Alaniz Jr..O.       • polyethylene glycol/lytes (MIRALAX) PACKET 1 Packet  1 Packet QDAY PRN JASPAL Alaniz Jr..O.        And   • magnesium hydroxide (MILK OF MAGNESIA) suspension 30 mL  30 mL QDAY PRN JASPAL Alaniz Jr..O.        And   • bisacodyl (DULCOLAX) suppository 10 mg  10 mg QDAY PRN Modesto Appiah Jr., JASPAL.O.       • chlorhexidine (PERIDEX) 0.12 % solution 15 mL  15 mL BID JASPAL Alaniz Jr..O.   15 mL at 02/25/18 0810   • lidocaine (XYLOCAINE) 1%  injection  1-2 mL Q30 MIN PRN JASPAL Alaniz Jr..O.       • MD ALERT...Adult ICU Electrolyte Replacement per Pharmacy Protocol   pharmacy to dose JASPAL Alaniz Jr..SAM.       • fentaNYL (SUBLIMAZE) injection 25 mcg  25 mcg Q HOUR PRN Modesto Appiah Jr., D.O.        Or   • fentaNYL (SUBLIMAZE) injection 50 mcg  50 mcg Q HOUR PRN Modesto Appiah Jr., D.O.        Or   • fentaNYL (SUBLIMAZE) injection 100 mcg  100 mcg Q HOUR PRN JASPAL Alaniz Jr..SAM.       • ipratropium-albuterol (DUONEB) nebulizer solution  3 mL  3 mL Q2HRS PRN (RT) Modesto Appiah Jr. D.O.       • insulin regular (HUMULIN R) injection 1-6 Units  1-6 Units Q6HRS Modesto Appiah Jr. D.O.   Stopped at 02/23/18 1800    And   • glucose 4 g chewable tablet 16 g  16 g Q15 MIN PRN Modesto Appiah Jr. D.OBucky        And   • dextrose 50% (D50W) injection 25 mL  25 mL Q15 MIN PRN Modesto Appiah Jr. D.O.       • levalbuterol (XOPENEX) 1.25 MG/3ML nebulizer solution 1.25 mg  1.25 mg Q4HRS PRN Duy Whitaker M.D.   1.25 mg at 02/20/18 0626   • miconazole 2%-zinc oxide (RADHA) topical cream   PRN Duy Whitaker M.D.       • acetaminophen (TYLENOL) tablet 650 mg  650 mg Q6HRS PRN Duy Whitaker M.D.   650 mg at 02/22/18 1839   • oxyCODONE immediate-release (ROXICODONE) tablet 5 mg  5 mg Q3HRS PRN Duy Whitaker M.D.        And   • oxyCODONE immediate release (ROXICODONE) tablet 10 mg  10 mg Q3HRS PRN Duy Whitaker M.D.       • ondansetron (ZOFRAN) syringe/vial injection 4 mg  4 mg Q4HRS PRN Duy Whitaker M.D.       • ondansetron (ZOFRAN ODT) dispertab 4 mg  4 mg Q4HRS PRN Duy Whitaker M.D.       • promethazine (PHENERGAN) tablet 12.5-25 mg  12.5-25 mg Q4HRS PRN Duy Whitaker M.D.       • promethazine (PHENERGAN) suppository 12.5-25 mg  12.5-25 mg Q4HRS PRN Duy Whitaker M.D.       • prochlorperazine (COMPAZINE) injection 5-10 mg  5-10 mg Q4HRS PRN Duy F Laus, M.D.       • LORazepam (ATIVAN) injection 4 mg  4 mg Q10 MIN PRN Duy Whitaker M.D.   2 mg at 02/23/18 1251   • levETIRAcetam (KEPPRA) 1,500 mg in  mL IVPB  1,500 mg Q12HRS Duy Whitaker M.D.   Stopped at 02/25/18 0827   • lacosamide (VIMPAT) 200 mg in  mL ivpb  200 mg Q12HRS Maci Jade M.D.   Stopped at 02/24/18 2301     Last reviewed on 2/18/2018  7:51 PM by Lois Camejo, Pharmacy Intern    Quality  Measures:   Labs reviewed, Medications reviewed and Radiology images reviewed   Waldrop catheter: Critically Ill - Requiring Accurate Measurement of Urinary Output    Central line in place: Sepsis, Shock and Vasopressors       DVT prophylaxis - mechanical: Contra-indicated   Ulcer prophylaxis: Yes      Assessment and plan:    1. Acute hypoxemic respiratory failure.    -Patient has sizable left-sided pneumonia. Appears to be acute with probable scarring from previous left-sided pneumonia.  -Will need broad-spectrum antibiotic coverage including Rocephin and Flagyl.   -Vancomycin discontinued 3 days ago  -Continue bronchodilator therapy.  -Unable to obtain arterial blood gas. Would hold on attempted repeating, unless hemodynamic or pulmonary decompensation.    -No weaning at this time due to status epilepticus, but would like to try tomorrow after discussion with neurology  -DC daily chest x-rays at this point.    2. Septic shock.    -Patient has evidence of severe hypotension requiring vasopressors again overnight.  -Patient was on hydrocortisone, but weaned down to prednisone today to a lower dose today and then placed on 3 additional days total.  -Resolved lactic acidosis  -Difficult to ascertain patient's true blood pressure and would like to DC norepinephrine. Vasopressin only at this point. This is being weaned off as well.    3. History of traumatic brain injury, chronic seizure disorder.    -Possibly related to previous phenobarbital wean off.   -May have also been precipitated by acute pneumonia.   -Please see #4 below    4. Status epilepticus.    -Continue to increase phenobarbital to 90 mg twice a day. Versed and propofol are being discontinued completely.  -This includes Keppra, Depakote, phenobarbital, and with increase in phenobarbital once again additional dose by neurology 24-hour monitoring is being discontinued per discussion with Dr. Garcia  -Appreciate neurology's assessment and recommendations.     4.  Decubitus ulcerations of the left hip including stage IV disease.    -Possibly may require more than wound care and consideration for orthopedic evaluation and  biopsy if does not improve.  -Appreciate wound care assistance and wounds. Address daily..  -Overall appeared stable.  -Doubt cause of sepsis.    5. Pneumonia.    -Patient is substantial left-sided pneumonia. We'll continue with Rocephin, Flagyl, doxycycline.  -We'll DC daily x-rays would like to trial pressure-support CPAP tomorrow.   -Patient is status post tracheostomy placement with a 6.0 Shiley currently in place.   -Peak pressures are not elevated.    7. Mild fluid overload.    -Lasix down to once daily at this time. Daily dose of 20 mg.  -Patient has developed a metabolic acidosis due to hyperchloremia.      8. Possible mild paralytic ileus versus feeding tube malfunction.    -Negative KUB for bowel obstruction.  -Would consider trickle tube feeds and ongoing oral antiepileptics.    Discussed patient condition and risk of morbidity and/or mortality with Charge nurse / hot rounds.  M.D. rounds  The patient remains critically ill.  Critical care time = 35 minutes in directly providing and coordinating critical care and extensive data review.  No time overlap and excludes procedures.    Jon Reynoso D.O.

## 2018-02-25 NOTE — PROGRESS NOTES
[facility] Hospitalist Progress Note    Date of Service: 2018    Chief Complaint  37 y.o. male with hx of traumatic brain injury (baseline nonverbal and immobile), with a hx of seizures admitted 2018 with septic shock.    Interval Problem Update  Patient had been weaned off his phenobarbital, was noted to have seizures while here so phenobarbital restarted.  Presumed aspiration pneumonia during a seizure, now causing septic shock.  Patient was noted to have shock, remains on pressor support.   Discussed case with family at bedside.   Patient seen and examined in the ICU, ICU care given.   Discussed patient condition and plan with Intensivist, RN, RT and charge nurse / hot rounds.    Not very responsive at baseline  Continuous EEG overnight  No overnight events  Still intermittent sz on EEG  NSR to cherri  SBP   Tolerating TF  Baclofen interrogation?  Waldrop with 600 out overnight  Afebrile  RIJ  Vent day 7  Give mg and k    Consultants/Specialty  Neurology  Pulmonary    Disposition  Patient requires additional treatment in the hospital    Patient is critically ill.   The patient continues to have : Septic shock  The vital organ system that is effected is the: All her risk   If untreated there is a high chance of deterioration into: death   The critical care that I am providing today is: vasopressin gtt  The critical care that has been undertaken is medically complex.   There has been no overlap in critical care time.  Critical care time not including procedures, no overlap: 43 minutes      Review of Systems   Unable to perform ROS: Intubated      Physical Exam  Laboratory/Imaging   Hemodynamics  No data recorded.   Monitored Temp: 36.3 °C (97.3 °F)  Pulse  Av.2  Min: 52  Max: 119 Heart Rate (Monitored): (!) 54  NIBP: (!) 94/60      Respiratory  Liriano Vent Mode: APVCMV, Rate (breaths/min): 16, PEEP/CPAP: 8, PEEP/CPAP: 8, FiO2: 35, P Peak (PIP): 24, P MEAN: 12   Respiration: 16, Pulse Oximetry: 96  %     Work Of Breathing / Effort: Vented  RUL Breath Sounds: Coarse Crackles, RML Breath Sounds: Coarse Crackles, RLL Breath Sounds: Diminished, JONATAN Breath Sounds: Coarse Crackles, LLL Breath Sounds: Diminished    Fluids    Intake/Output Summary (Last 24 hours) at 02/25/18 0742  Last data filed at 02/25/18 0600   Gross per 24 hour   Intake          1575.91 ml   Output             3300 ml   Net         -1724.09 ml       Nutrition  Orders Placed This Encounter   Procedures   • Diet NPO     Standing Status:   Standing     Number of Occurrences:   1     Order Specific Question:   Restrict to:     Answer:   Strict [1]     Physical Exam   Constitutional: He appears cachectic. He has a sickly appearance. He is intubated.   HENT:   Head: Normocephalic and atraumatic.   Mouth/Throat: No oropharyngeal exudate.   Eyes: Right eye exhibits no discharge. Left eye exhibits no discharge. No scleral icterus.   Neck: Neck supple. No tracheal deviation present.   Trach   Cardiovascular: Normal rate and regular rhythm.    No murmur heard.  Pulmonary/Chest: No stridor. He is intubated. No respiratory distress. He has decreased breath sounds. He has no rhonchi. He has rales.   Full vent support    Abdominal: He exhibits distension. Bowel sounds are decreased.   Peg    Musculoskeletal: He exhibits edema and deformity (contracture ).   Neurological:   Intubated, nonverbal    Skin: Skin is warm and dry. He is not diaphoretic. No erythema.   Psychiatric: He is noncommunicative.   Nursing note and vitals reviewed.      Recent Labs      02/23/18   0550  02/24/18   0539  02/25/18   0557   WBC  10.6  7.0  7.3   RBC  2.99*  2.85*  2.69*   HEMOGLOBIN  8.4*  8.1*  7.5*   HEMATOCRIT  25.7*  24.5*  23.5*   MCV  86.0  86.0  87.4   MCH  28.1  28.4  27.9   MCHC  32.7*  33.1*  31.9*   RDW  54.9*  52.7*  53.4*   PLATELETCT  60*  68*  94*   MPV  12.3  12.1  12.6     Recent Labs      02/24/18   0008  02/24/18   0539  02/25/18   0557   SODIUM  145  142  139    POTASSIUM  3.7  3.8  3.2*   CHLORIDE  108  107  107   CO2  30  30  27   GLUCOSE  98  105*  86   BUN  24*  25*  26*   CREATININE  <0.20*  <0.20*  <0.20*   CALCIUM  8.6  8.3*  8.1*                      Assessment/Plan     Septic shock (CMS-HCC)   Assessment & Plan    - Likely due to aspiration, await results of bronchoscopy  - Continue vasopressin drip, titrating  - finished full course of doxycycline, Rocephin and Flagyl        Hypernatremia   Assessment & Plan    - resolved with free water flushes  - Repeat BMP in the morning        Hypomagnesemia   Assessment & Plan    - Resolved        Decubitus ulcer of ischium, left, stage IV (CMS-HCC)   Assessment & Plan    - Continue wound care        Azotemia   Assessment & Plan    - Resolved        Acute on chronic respiratory failure with hypoxemia (CMS-HCC)   Assessment & Plan    - History of trach, now acutely worsened  - Full vent support, vent management per intensivist        Aspiration pneumonia (CMS-HCC)- (present on admission)   Assessment & Plan    - Causing septic shock  - Continue doxycycline, Rocephin and Flagyl  - Await culture results, patient is status post bronch        Diarrhea   Assessment & Plan    - Resolved          Protein-calorie malnutrition, severe (CMS-HCC)   Assessment & Plan    - Tolerating tube feeds        Seizure (CMS-HCC)   Assessment & Plan    - Continuous EEG per neurology  - Continue Vimpat, Keppra, phenobarbital per neurology  -Additional recommendations per neurology        Physical debility   Assessment & Plan    - Chronic post traumatic brain injury  - At baseline        TBI (traumatic brain injury) (CMS-HCC)- (present on admission)   Assessment & Plan    - Chronic, nonverbal        Contraction, joint, multiple sites   Assessment & Plan    - Chronic, PT/OT  - has baclofen pump, needs interrogating         GI bleed   Assessment & Plan    - Possibly Carmencita-White tear, patient did have multiple episodes of severe vomiting  -Resolved           Quality-Core Measures   Reviewed items::  Labs reviewed, Medications reviewed and Radiology images reviewed  Waldrop catheter::  Critically Ill - Requiring Accurate Measurement of Urinary Output, Strict Intake and Output During Sepisis or Shock and Unconscious / Sedated Patient on a Ventilator  Central line in place:  Need for access, Sepsis, Shock and Vasopressors  DVT prophylaxis pharmacological::  Contraindicated - High bleeding risk  DVT prophylaxis - mechanical:  SCDs  Ulcer Prophylaxis::  Yes

## 2018-02-25 NOTE — CARE PLAN
Problem: Ventilation Defect:  Goal: Ability to achieve and maintain unassisted ventilation or tolerate decreased levels of ventilator support  Outcome: PROGRESSING SLOWER THAN EXPECTED  Adult Ventilation Update    Total Vent Days: 7  CMV 16/460/8/35%      Patient Lines/Drains/Airways Status    Active Airway     Name: Placement date: Placement time: Site: Days:    Airway Group Standard Cuffed Trach Tracheostomy 6.0 07/02/17   1403   Tracheostomy   237    Airway Group Tracheostomy 6.0       Tracheostomy                 In the last 24 hours, No SBT peep of 10           Plateau Pressure (Q Shift): 25.9 (02/23/18 1031)  Static Compliance (ml / cm H2O): 39.6 (02/24/18 1546)    Patient failed trials because of Barriers to Wean:  (EEG, seizures) (02/22/18 0625)  Barriers to SBT    Length of Weaning Trial        Cough: Productive (02/24/18 1546)  Sputum Amount: Moderate (02/24/18 1546)  Sputum Color: Clear (02/24/18 1546)  Sputum Consistency: Thick (02/24/18 1546)    Mobility Group  Activity Performed: Unable to mobilize (02/24/18 0800)  Pt Calls for Assistance: No (02/23/18 2000)  Reason Not Mobilized: Bed rest (02/24/18 0800)  Mobilization Comments: per MD order, extreme contractures unable to mobilize (02/22/18 2000)    Events/Summary/Plan: Decreased peep to 8 . No other vent changes at this time.  Will continue to monitor patient.

## 2018-02-25 NOTE — CARE PLAN
Problem: Knowledge Deficit  Goal: Knowledge of disease process/condition, treatment plan, diagnostic tests, and medications will improve  Outcome: PROGRESSING AS EXPECTED  Pt family member updated on POC and reasoning behind therapies and interventions    Problem: Skin Integrity  Goal: Risk for impaired skin integrity will decrease  Outcome: PROGRESSING SLOWER THAN EXPECTED  Skin assessed, WOC orders followed and wounds dressed per wound team

## 2018-02-25 NOTE — CARE PLAN
Problem: Safety  Goal: Will remain free from injury  Fall prevention and safety protocols in place. Sz protocols in place    Problem: Venous Thromboembolism (VTW)/Deep Vein Thrombosis (DVT) Prevention:  Goal: Patient will participate in Venous Thrombosis (VTE)/Deep Vein Thrombosis (DVT)Prevention Measures   02/24/18 2000   Mechanical/VTE Prophylaxis   Mechanical Prophylaxis  SCDs, Sequential Compression Device   SCDs, Sequential Compression Device On   OTHER   Risk Assessment Score 5   VTE RISK Very High   Pharmacologic Prophylaxis Used LMWH: Enoxaparin(Lovenox)       Problem: Respiratory:  Goal: Respiratory status will improve  Pulm toilet

## 2018-02-25 NOTE — CARE PLAN
Problem: Skin Integrity  Goal: Risk for impaired skin integrity will decrease    Intervention: Assess risk factors for impaired skin integrity and/or pressure ulcers  Neo skin assessment complete.      Problem: Pain Management  Goal: Pain level will decrease to patient's comfort goal    Intervention: Follow pain managment plan developed in collaboration with patient and Interdisciplinary Team  Patient assessed for pain q4 hours.

## 2018-02-25 NOTE — PROGRESS NOTES
IMPRESSION  1. 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 Started having breakthrough seizures since 2017 ( seizures worsened after tapering of Phenobarbital?)      MANAGEMENT    ________________________________________________________________________      The epileptiform discharges from the right hemisphere decreased     Have a discussion with the patient's mother and ICU medical team    Apparently, patient's mother is not ready to proceed with any palliative management    The patient probably does not need 3 anti-seizure medication in the long run    However, in the near short future, advise continue the current combinations of anti-seizure medication    Will stop video EEG monitoring since the epileptiform discharges decreased already    If circumstances change do not hesitate to contact me     At this point, patient remains stable.    Thank you for the consult.     Marty Garcia M.D.  [facility]  1:19 PM02/25/18        We will respect the family's decision  ________________________________________________________________________        Vitals:    02/25/18 1000 02/25/18 1030 02/25/18 1100 02/25/18 1200   Pulse: 67  (!) 59 (!) 53   Resp: 15  14 15   Temp:       SpO2: 95% 94% 94% 97%   Weight:       Height:         Physical Exam   Constitutional: He is well-developed, well-nourished, and in no distress.   HENT:   Head: Normocephalic.   Neck: Neck supple.   Cardiovascular: Intact distal pulses.    Abdominal: Soft.   Neurological:   Unresponsive, contractures of limbs, bed ridden for decades   Skin: Skin is warm.       Review of Systems   HENT: Negative for nosebleeds.    Eyes: Negative for discharge.   Gastrointestinal: Negative for melena.   Genitourinary: Negative for hematuria.   Neurological: Positive for seizures, loss of consciousness and weakness.   Psychiatric/Behavioral: Negative for substance abuse.                 EEG 02/25/18 8:12  AM        EEG monitoring shows intermittent  epileptiform over the right hemisphere  Improved compared with that of yesterday     Will adjust PHB, control the seizures and then considering transferring back home

## 2018-02-25 NOTE — EEG PROGRESS NOTE
EEG 02/25/18 8:12 AM      EEG monitoring shows intermittent  epileptiform over the right hemisphere  Improved compared with that of yesterday    Will adjust PHB, control the seizures and then considering transferring back home

## 2018-02-26 ENCOUNTER — APPOINTMENT (OUTPATIENT)
Dept: RADIOLOGY | Facility: MEDICAL CENTER | Age: 38
DRG: 853 | End: 2018-02-26
Attending: INTERNAL MEDICINE
Payer: COMMERCIAL

## 2018-02-26 LAB
ANION GAP SERPL CALC-SCNC: 6 MMOL/L (ref 0–11.9)
ANION GAP SERPL CALC-SCNC: 7 MMOL/L (ref 0–11.9)
ANISOCYTOSIS BLD QL SMEAR: ABNORMAL
BASOPHILS # BLD AUTO: 0 % (ref 0–1.8)
BASOPHILS # BLD: 0 K/UL (ref 0–0.12)
BUN SERPL-MCNC: 16 MG/DL (ref 8–22)
BUN SERPL-MCNC: 20 MG/DL (ref 8–22)
CALCIUM SERPL-MCNC: 7.6 MG/DL (ref 8.5–10.5)
CALCIUM SERPL-MCNC: 7.7 MG/DL (ref 8.5–10.5)
CHLORIDE SERPL-SCNC: 103 MMOL/L (ref 96–112)
CHLORIDE SERPL-SCNC: 105 MMOL/L (ref 96–112)
CO2 SERPL-SCNC: 24 MMOL/L (ref 20–33)
CO2 SERPL-SCNC: 24 MMOL/L (ref 20–33)
CREAT SERPL-MCNC: <0.2 MG/DL (ref 0.5–1.4)
CREAT SERPL-MCNC: <0.2 MG/DL (ref 0.5–1.4)
CRP SERPL HS-MCNC: 1.9 MG/DL (ref 0–0.75)
CRP SERPL HS-MCNC: 2.06 MG/DL (ref 0–0.75)
EOSINOPHIL # BLD AUTO: 0 K/UL (ref 0–0.51)
EOSINOPHIL NFR BLD: 0 % (ref 0–6.9)
ERYTHROCYTE [DISTWIDTH] IN BLOOD BY AUTOMATED COUNT: 50.8 FL (ref 35.9–50)
GLUCOSE BLD-MCNC: 81 MG/DL (ref 65–99)
GLUCOSE BLD-MCNC: 88 MG/DL (ref 65–99)
GLUCOSE BLD-MCNC: 90 MG/DL (ref 65–99)
GLUCOSE SERPL-MCNC: 81 MG/DL (ref 65–99)
GLUCOSE SERPL-MCNC: 92 MG/DL (ref 65–99)
HCT VFR BLD AUTO: 22.5 % (ref 42–52)
HGB BLD-MCNC: 7.6 G/DL (ref 14–18)
HYPOCHROMIA BLD QL SMEAR: ABNORMAL
LYMPHOCYTES # BLD AUTO: 2.89 K/UL (ref 1–4.8)
LYMPHOCYTES NFR BLD: 40.7 % (ref 22–41)
MACROCYTES BLD QL SMEAR: ABNORMAL
MANUAL DIFF BLD: NORMAL
MCH RBC QN AUTO: 29.1 PG (ref 27–33)
MCHC RBC AUTO-ENTMCNC: 33.8 G/DL (ref 33.7–35.3)
MCV RBC AUTO: 86.2 FL (ref 81.4–97.8)
MICROCYTES BLD QL SMEAR: ABNORMAL
MONOCYTES # BLD AUTO: 0.13 K/UL (ref 0–0.85)
MONOCYTES NFR BLD AUTO: 1.8 % (ref 0–13.4)
MORPHOLOGY BLD-IMP: NORMAL
NEUTROPHILS # BLD AUTO: 4.08 K/UL (ref 1.82–7.42)
NEUTROPHILS NFR BLD: 57.5 % (ref 44–72)
NRBC # BLD AUTO: 0 K/UL
NRBC BLD-RTO: 0 /100 WBC
PLATELET # BLD AUTO: 139 K/UL (ref 164–446)
PLATELET BLD QL SMEAR: NORMAL
PMV BLD AUTO: 12.5 FL (ref 9–12.9)
POTASSIUM SERPL-SCNC: 3.5 MMOL/L (ref 3.6–5.5)
POTASSIUM SERPL-SCNC: 3.6 MMOL/L (ref 3.6–5.5)
PREALB SERPL-MCNC: 23 MG/DL (ref 18–38)
PREALB SERPL-MCNC: 23 MG/DL (ref 18–38)
RBC # BLD AUTO: 2.61 M/UL (ref 4.7–6.1)
RBC BLD AUTO: PRESENT
SODIUM SERPL-SCNC: 134 MMOL/L (ref 135–145)
SODIUM SERPL-SCNC: 135 MMOL/L (ref 135–145)
TOXIC GRANULES BLD QL SMEAR: SLIGHT
TROPONIN I SERPL-MCNC: <0.01 NG/ML (ref 0–0.04)
WBC # BLD AUTO: 7.1 K/UL (ref 4.8–10.8)

## 2018-02-26 PROCEDURE — 700102 HCHG RX REV CODE 250 W/ 637 OVERRIDE(OP): Performed by: INTERNAL MEDICINE

## 2018-02-26 PROCEDURE — 84134 ASSAY OF PREALBUMIN: CPT | Mod: 91

## 2018-02-26 PROCEDURE — 700105 HCHG RX REV CODE 258: Performed by: PSYCHIATRY & NEUROLOGY

## 2018-02-26 PROCEDURE — A9270 NON-COVERED ITEM OR SERVICE: HCPCS | Performed by: INTERNAL MEDICINE

## 2018-02-26 PROCEDURE — 700105 HCHG RX REV CODE 258: Performed by: INTERNAL MEDICINE

## 2018-02-26 PROCEDURE — 700117 HCHG RX CONTRAST REV CODE 255: Performed by: INTERNAL MEDICINE

## 2018-02-26 PROCEDURE — 700102 HCHG RX REV CODE 250 W/ 637 OVERRIDE(OP): Performed by: PSYCHIATRY & NEUROLOGY

## 2018-02-26 PROCEDURE — A9270 NON-COVERED ITEM OR SERVICE: HCPCS | Performed by: PSYCHIATRY & NEUROLOGY

## 2018-02-26 PROCEDURE — 84484 ASSAY OF TROPONIN QUANT: CPT

## 2018-02-26 PROCEDURE — 700111 HCHG RX REV CODE 636 W/ 250 OVERRIDE (IP): Performed by: INTERNAL MEDICINE

## 2018-02-26 PROCEDURE — C9254 INJECTION, LACOSAMIDE: HCPCS | Performed by: PSYCHIATRY & NEUROLOGY

## 2018-02-26 PROCEDURE — 700101 HCHG RX REV CODE 250: Performed by: INTERNAL MEDICINE

## 2018-02-26 PROCEDURE — 94003 VENT MGMT INPAT SUBQ DAY: CPT

## 2018-02-26 PROCEDURE — 94640 AIRWAY INHALATION TREATMENT: CPT

## 2018-02-26 PROCEDURE — 82962 GLUCOSE BLOOD TEST: CPT

## 2018-02-26 PROCEDURE — 86140 C-REACTIVE PROTEIN: CPT | Mod: 91

## 2018-02-26 PROCEDURE — 700111 HCHG RX REV CODE 636 W/ 250 OVERRIDE (IP): Performed by: PSYCHIATRY & NEUROLOGY

## 2018-02-26 PROCEDURE — 85027 COMPLETE CBC AUTOMATED: CPT

## 2018-02-26 PROCEDURE — 99291 CRITICAL CARE FIRST HOUR: CPT | Performed by: INTERNAL MEDICINE

## 2018-02-26 PROCEDURE — 80048 BASIC METABOLIC PNL TOTAL CA: CPT | Mod: 91

## 2018-02-26 PROCEDURE — 770022 HCHG ROOM/CARE - ICU (200)

## 2018-02-26 PROCEDURE — 74176 CT ABD & PELVIS W/O CONTRAST: CPT

## 2018-02-26 PROCEDURE — 85007 BL SMEAR W/DIFF WBC COUNT: CPT

## 2018-02-26 RX ORDER — PREDNISONE 5 MG/1
5 TABLET ORAL DAILY
Status: COMPLETED | OUTPATIENT
Start: 2018-02-27 | End: 2018-02-28

## 2018-02-26 RX ORDER — METOCLOPRAMIDE HYDROCHLORIDE 5 MG/ML
10 INJECTION INTRAMUSCULAR; INTRAVENOUS EVERY 6 HOURS
Status: DISCONTINUED | OUTPATIENT
Start: 2018-02-26 | End: 2018-02-28

## 2018-02-26 RX ORDER — MIDODRINE HYDROCHLORIDE 5 MG/1
5 TABLET ORAL
Status: DISCONTINUED | OUTPATIENT
Start: 2018-02-26 | End: 2018-02-27

## 2018-02-26 RX ADMIN — LEVALBUTEROL HYDROCHLORIDE 1.25 MG: 1.25 SOLUTION RESPIRATORY (INHALATION) at 18:21

## 2018-02-26 RX ADMIN — ACETAMINOPHEN 650 MG: 325 TABLET, FILM COATED ORAL at 16:03

## 2018-02-26 RX ADMIN — METOCLOPRAMIDE 10 MG: 5 INJECTION, SOLUTION INTRAMUSCULAR; INTRAVENOUS at 00:47

## 2018-02-26 RX ADMIN — LEVALBUTEROL HYDROCHLORIDE 1.25 MG: 1.25 SOLUTION RESPIRATORY (INHALATION) at 06:25

## 2018-02-26 RX ADMIN — PREDNISONE 10 MG: 10 TABLET ORAL at 08:21

## 2018-02-26 RX ADMIN — CHLORHEXIDINE GLUCONATE 15 ML: 1.2 RINSE ORAL at 21:23

## 2018-02-26 RX ADMIN — METOCLOPRAMIDE 10 MG: 5 INJECTION, SOLUTION INTRAMUSCULAR; INTRAVENOUS at 05:48

## 2018-02-26 RX ADMIN — SODIUM CHLORIDE 200 MG: 9 INJECTION, SOLUTION INTRAVENOUS at 22:53

## 2018-02-26 RX ADMIN — PHENOBARBITAL 120 MG: 20 ELIXIR ORAL at 21:23

## 2018-02-26 RX ADMIN — POTASSIUM BICARBONATE 50 MEQ: 25 TABLET, EFFERVESCENT ORAL at 10:13

## 2018-02-26 RX ADMIN — METOCLOPRAMIDE 10 MG: 5 INJECTION, SOLUTION INTRAMUSCULAR; INTRAVENOUS at 23:38

## 2018-02-26 RX ADMIN — LEVALBUTEROL HYDROCHLORIDE 1.25 MG: 1.25 SOLUTION RESPIRATORY (INHALATION) at 14:09

## 2018-02-26 RX ADMIN — SODIUM CHLORIDE 1500 MG: 9 INJECTION, SOLUTION INTRAVENOUS at 08:20

## 2018-02-26 RX ADMIN — FUROSEMIDE 20 MG: 10 INJECTION, SOLUTION INTRAMUSCULAR; INTRAVENOUS at 08:19

## 2018-02-26 RX ADMIN — CHLORHEXIDINE GLUCONATE 15 ML: 1.2 RINSE ORAL at 08:19

## 2018-02-26 RX ADMIN — MIDODRINE HYDROCHLORIDE 5 MG: 5 TABLET ORAL at 16:03

## 2018-02-26 RX ADMIN — MIDODRINE HYDROCHLORIDE 5 MG: 5 TABLET ORAL at 10:12

## 2018-02-26 RX ADMIN — OMEPRAZOLE 40 MG: 20 CAPSULE, DELAYED RELEASE ORAL at 08:20

## 2018-02-26 RX ADMIN — METOCLOPRAMIDE 10 MG: 5 INJECTION, SOLUTION INTRAMUSCULAR; INTRAVENOUS at 18:03

## 2018-02-26 RX ADMIN — SODIUM CHLORIDE 200 MG: 9 INJECTION, SOLUTION INTRAVENOUS at 10:11

## 2018-02-26 RX ADMIN — IOHEXOL 50 ML: 240 INJECTION, SOLUTION INTRATHECAL; INTRAVASCULAR; INTRAVENOUS; ORAL at 20:33

## 2018-02-26 RX ADMIN — VASOPRESSIN 0.03 UNITS/MIN: 20 INJECTION INTRAVENOUS at 00:17

## 2018-02-26 RX ADMIN — SODIUM CHLORIDE 1500 MG: 9 INJECTION, SOLUTION INTRAVENOUS at 21:23

## 2018-02-26 RX ADMIN — LEVALBUTEROL HYDROCHLORIDE 1.25 MG: 1.25 SOLUTION RESPIRATORY (INHALATION) at 02:57

## 2018-02-26 RX ADMIN — METOCLOPRAMIDE 10 MG: 5 INJECTION, SOLUTION INTRAMUSCULAR; INTRAVENOUS at 14:15

## 2018-02-26 RX ADMIN — VASOPRESSIN 0.03 UNITS/MIN: 20 INJECTION INTRAVENOUS at 11:57

## 2018-02-26 RX ADMIN — PHENOBARBITAL 120 MG: 20 ELIXIR ORAL at 10:12

## 2018-02-26 RX ADMIN — IRON SUCROSE 200 MG: 20 INJECTION, SOLUTION INTRAVENOUS at 11:58

## 2018-02-26 RX ADMIN — POTASSIUM BICARBONATE 50 MEQ: 25 TABLET, EFFERVESCENT ORAL at 16:03

## 2018-02-26 RX ADMIN — LOPERAMIDE HYDROCHLORIDE 2 MG: 1 SOLUTION ORAL at 08:20

## 2018-02-26 NOTE — PROGRESS NOTES
[facility] Progress Note    Date of Service: 2018    Chief Complaint  37 y.o. male with hx of traumatic brain injury (baseline nonverbal and immobile), with a hx of seizures admitted 2018 with septic shock.    Interval Problem Update  Patient had been weaned off his phenobarbital, was noted to have seizures while here so phenobarbital restarted.  Presumed aspiration pneumonia during a seizure, now causing septic shock.  Patient was noted to have shock, remains on pressor support.   Discussed case with family at bedside.   Patient seen and examined in the ICU, ICU care given.   Discussed patient condition and plan with Intensivist, RN, RT and charge nurse / hot rounds.    Not very responsive at baseline  Off versed since   Interrogate baclofen pump  NSR 50 - 70, no ectopy  SBP   Vaso 0.03  Button peg, tolerating home formula  Rectal tube with outpt  500 out of rosas overnight  tmax 97.9  Wound care  RIJ  Not mobilizing  Vent day 9  Give k  D/c ISS  N/V resolved     Consultants/Specialty  Neurology  Pulmonary    Disposition  Patient requires additional treatment in the hospital    Patient is critically ill.   The patient continues to have : Septic shock  The vital organ system that is effected is the: All her risk   If untreated there is a high chance of deterioration into: death   The critical care that I am providing today is: vasopressin gtt  The critical care that has been undertaken is medically complex.   There has been no overlap in critical care time.  Critical care time not including procedures, no overlap: 36 minutes      Review of Systems   Unable to perform ROS: Intubated      Physical Exam  Laboratory/Imaging   Hemodynamics  No data recorded.   Monitored Temp: 36.3 °C (97.3 °F)  Pulse  Av.9  Min: 51  Max: 119 Heart Rate (Monitored): (!) 55  NIBP: (!) 84/52      Respiratory  Liriano Vent Mode: APVCMV, Rate (breaths/min): 16, PEEP/CPAP: 8, PEEP/CPAP: 8, FiO2: 35, P Peak  (PIP): 23, P MEAN: 12   Respiration: 16, Pulse Oximetry: 95 %     Work Of Breathing / Effort: Vented  RUL Breath Sounds: Crackles, RML Breath Sounds: Crackles, RLL Breath Sounds: Crackles, JONATAN Breath Sounds: Crackles, LLL Breath Sounds: Crackles    Fluids    Intake/Output Summary (Last 24 hours) at 02/26/18 0744  Last data filed at 02/26/18 0600   Gross per 24 hour   Intake           1318.5 ml   Output             5048 ml   Net          -3729.5 ml       Nutrition  Orders Placed This Encounter   Procedures   • Diet NPO     Standing Status:   Standing     Number of Occurrences:   1     Order Specific Question:   Restrict to:     Answer:   Strict [1]     Physical Exam   Constitutional: He appears cachectic. He has a sickly appearance. No distress. He is intubated.   HENT:   Head: Normocephalic and atraumatic.   Eyes: Right eye exhibits no discharge. Left eye exhibits no discharge.   Neck: Neck supple.   Trach   Cardiovascular: Normal rate and regular rhythm.  Exam reveals no gallop.    No murmur heard.  Pulmonary/Chest: No stridor. He is intubated. No respiratory distress. He has decreased breath sounds. He has no wheezes. He has no rhonchi. He has rales.   Full vent support    Abdominal: He exhibits distension. Bowel sounds are decreased.   Peg   Baclofen pump   Musculoskeletal: He exhibits edema and deformity (contracture ).   Neurological:   Intubated, nonverbal    Skin: Skin is warm and dry. He is not diaphoretic.   Psychiatric: He is noncommunicative.   Nursing note and vitals reviewed.      Recent Labs      02/24/18   0539  02/25/18   0557  02/26/18   0550   WBC  7.0  7.3  7.1   RBC  2.85*  2.69*  2.61*   HEMOGLOBIN  8.1*  7.5*  7.6*   HEMATOCRIT  24.5*  23.5*  22.5*   MCV  86.0  87.4  86.2   MCH  28.4  27.9  29.1   MCHC  33.1*  31.9*  33.8   RDW  52.7*  53.4*  50.8*   PLATELETCT  68*  94*  139*   MPV  12.1  12.6  12.5     Recent Labs      02/25/18   0557  02/25/18   1810  02/26/18   0550   SODIUM  139  135  135    POTASSIUM  3.2*  3.8  3.5*   CHLORIDE  107  104  105   CO2  27  24  24   GLUCOSE  86  83  81   BUN  26*  18  20   CREATININE  <0.20*  <0.20*  <0.20*   CALCIUM  8.1*  8.0*  7.7*                      Assessment/Plan     Septic shock (CMS-HCC)   Assessment & Plan    - Likely due to aspiration  - Continue vasopressin drip, titrating  - finished full course of doxycycline, Rocephin and Flagyl        Hypernatremia   Assessment & Plan    - resolved with free water flushes  - Repeat BMP in the morning        Hypomagnesemia   Assessment & Plan    - Resolved        Decubitus ulcer of ischium, left, stage IV (CMS-HCC)   Assessment & Plan    - Continue wound care        Azotemia   Assessment & Plan    - Resolved        Acute on chronic respiratory failure with hypoxemia (CMS-HCC)   Assessment & Plan    - History of trach, now acutely worsened  - Full vent support, vent management per intensivist        Aspiration pneumonia (CMS-HCC)- (present on admission)   Assessment & Plan    - Causing septic shock  - finished doxycycline, Rocephin and Flagyl  - Await culture results, patient is status post bronch        Diarrhea   Assessment & Plan    - Resolved          Protein-calorie malnutrition, severe (CMS-HCC)   Assessment & Plan    - Tolerating tube feeds        Seizure (CMS-HCC)   Assessment & Plan    -Now off continuous EEG  - Continue Vimpat, Keppra, phenobarbital per neurology  -Additional recommendations per neurology        Physical debility   Assessment & Plan    - Chronic post traumatic brain injury  - At baseline        TBI (traumatic brain injury) (CMS-HCC)- (present on admission)   Assessment & Plan    - Chronic, nonverbal        Contraction, joint, multiple sites   Assessment & Plan    - Chronic, PT/OT  - has baclofen pump, needs interrogating which is pending        GI bleed   Assessment & Plan    - Possibly Carmencita-White tear, patient did have multiple episodes of severe vomiting  -Resolved          Quality-Core  Measures   Reviewed items::  Labs reviewed, Medications reviewed and Radiology images reviewed  Waldrop catheter::  Critically Ill - Requiring Accurate Measurement of Urinary Output  Central line in place:  Need for access, Sepsis, Shock and Vasopressors  DVT prophylaxis pharmacological::  Contraindicated - High bleeding risk  DVT prophylaxis - mechanical:  SCDs  Ulcer Prophylaxis::  Yes

## 2018-02-26 NOTE — DIETARY
Nutrition support weekly update:  Day 8 of admit.  Ruben Edwards is a 37 y.o. male with admitting DX of Acute Hypoxic Respiratory Failure  Patient is now being fed home regimen of tube feeds. MD ernst with homemade tube feeds per nursing.  Discussed formula/recipe with patient's mom. It contains:  yogurt, infant oatmeal, 1 scoop toddler formula, 1 scoop egg white protein, 1 tsp of super greens, milk alternative for liquid base and two different protein powder/meal replacements.    Patient's mother had screen shots of labels, but some were blurry and unable to read nutrition label. She adds 400 ml free water in addition to the fluid in the shakes. She feeds a small volume every hour and a half between about 6 am and 15:30.  I was able to ascertain that he receives approximately 9275-3786 kcal/d and  gm protein per day.    Assessment:  Weight 63.4 kg; up from initial bed scale weight of 55.9 kg    Evaluation:   1. K+ 3.5.  Patient is on Lasix and is receiving Potassium replacement.  2. Pre-Albumin 23  3. Per I/O patient is approximately 7.7 liters negative  4. Current feeding likely meeting estimated needs, but cannot verify nutrient breakdown, total kcal and protein or nutritional adequacy of micronutrients.    Malnutrition risk: Patient remains very thin, and malnourished in terms of decreased muscle mass.    Recommendations/Plan:  1. Continue home TF per MD approval.  2. Fluids per MD    RD following.

## 2018-02-26 NOTE — WOUND TEAM
In to see pt earlier for wounds worsening. Discussion with Mom Susana, the wound old wound is improving, then R ischial wound unchanged. L 1st toe eschar/crust all gone. Did not view any wounds other than L toe since MARE and Susana had changed drsgs just a few hours ago. Will follow up tomorrow.

## 2018-02-26 NOTE — CARE PLAN
Problem: Ventilation Defect:  Goal: Ability to achieve and maintain unassisted ventilation or tolerate decreased levels of ventilator support  Outcome: PROGRESSING SLOWER THAN EXPECTED  Adult Ventilation Update    Total Vent Days: 7  CMV 16/460/8/35    Patient Lines/Drains/Airways Status    Active Airway     Name: Placement date: Placement time: Site: Days:    Airway Group Standard Cuffed Trach Tracheostomy 6.0 07/02/17   1403   Tracheostomy   238    Airway Group Tracheostomy 6.0       Tracheostomy                 In the last 24 hours, No SBT till sedation gets out of system         Plateau Pressure (Q Shift): 25.9 (02/24/18 1926)  Static Compliance (ml / cm H2O): 35.5 (02/25/18 1533)    Patient failed trials because of Barriers to Wean:  (EEG, seizures) (02/22/18 0625)  Barriers to SBT    Length of Weaning Trial        Cough: Productive (02/25/18 1533)  Sputum Amount: Moderate (02/25/18 1533)  Sputum Color: Clear;White (02/25/18 1533)  Sputum Consistency: Thick;Thin (02/25/18 1533)    Mobility Group  Activity Performed: Unable to mobilize (02/25/18 0800)  Pt Calls for Assistance: No (02/24/18 2000)  Reason Not Mobilized: Bed rest (02/25/18 0800)  Mobilization Comments: per MD (02/24/18 2000)    Events/Summary/Plan:No vent changes at this time. Will continue to monitor patient.

## 2018-02-26 NOTE — PROGRESS NOTES
Pulmonary Critical Care Progress Note        Date of admission: 2/18/2018    Chief Complaint: Respiratory failure, septic shock    History of Present Illness:  Mr. Edwards is a 37-year-old male with past medical history of traumatic brain injury at age 17, seizures diagnosed in June of 2017. His baseline mental status appears to be nodding and minimal interacting without vocalization. Who was brought to the ICU as a direct admit from [facility] where he was brought today for hypoxia. Reportedly the patient was being weanied off of his phenobarbital by his neurologist when he began having more frequent seizures, approximately one week ago his neurologist increased his Keppra dosing however the seizures continued. His mother did use CBD and THC with some success in slowing the seizures. Since 2 days ago the patient had multiple aspiration events following these seizures. His mother evaluated him with a home oxygen saturation monitor and he was noted to be hypoxic, he was brought to [facility] where he was found to have 28% bands with leukopenia. Chest x-ray was obtained demonstrating bilateral infiltrates right greater than left. He was placed on mechanical ventilation and his oxygenation was unable to be improved more than 85%, he was hypotensive and given multiple boluses of IV fluids without improvement. He was started on levo fed and given multiple doses of Ativan for seizures and transferred to our ICU for higher level of care. He arrives on the ventilator and on pressors critically ill.    Please note this history is obtained by my partner Dr. Appiah 2/18/2018.    ROS:  Respiratory: unable to perform due to the patient's inability to effectively communicate, Cardiac: unable to perform due to the patient's inability to effectively communicate, GI: unable to perform due to the patient's inability to effectively communicate.  All other systems negative. Unchanged today    Interval Events:  24  hour interval history reviewed    - EEG off   - off versed gtt   - baclofen pump being evaluated   - back to neuro baseline   - trace LE edema on lasix   - vasopressin @ 0.03   - improving low plts to 136   - on home TF regimen   - low K   - DTI x 3   - SBT today   - completed abx for klebsiella and proteus PNA (2/18-2/24)   - no insulin required x 72 hrs   - iron    PFSH:  No change.    Respiratory:  Liriano Vent Mode: APVCMV, Rate (breaths/min): 16, Vt Target (mL): 460, PEEP/CPAP: 8, FiO2: 35, Static Compliance (ml / cm H2O): 38.9, Control VTE (exp VT): 460  Pulse Oximetry: 95 % PIP 18          Exam: tracheostomy tube surrounding site clean/dry/intact, unlabored respirations, no intercostal retractions or accessory muscle use and rhonchi bibasilar.   ImagingCXR  I have personally reviewed the chest x-ray my impression is  none since 2/24        Invalid input(s): [id number]    HemoDynamics:  Pulse: (!) 59, Heart Rate (Monitored): (!) 55  NIBP: (!) 84/52       Exam: no murmur, trace bilateral pedal edema, regular rhythm (Sinus), sinus bradycardia.     Echo on 2/19/2018:  CONCLUSIONS  Normal right and left ventricular size and function.   No significant valve disease or flow abnormalities.   Dilated inferior vena cava without inspiratory collapse. The patient is   intubated.  Recent Labs      02/24/18   0539  02/25/18   0557  02/26/18   0550   TROPONINI  <0.01  <0.01  <0.01       Neuro:  GCS Total Aleida Coma Score: 10       Exam: PERRL, no nystagmus noted, contractures throughout, withdraws to pain but is not following commands, close to baseline per mother    Imaging: Available data reviewed    EEG 2/25: EEG monitoring shows intermittent  epileptiform over the right hemisphere  Improved compared with that of yesterday    Fluids:  Intake/Output       02/24/18 0700 - 02/25/18 0659 02/25/18 0700 - 02/26/18 0659 02/26/18 0700 - 02/27/18 0659      1632-4074 5974-1390 Total 1651-5819 8298-8342 Total 8094-2915  6620-5548 Total       Intake    I.V.  327.9  108 435.9  79.5  69 148.5  --  -- --    Vasopressin Volume 108 108 216 79.5 69 148.5 -- -- --    Norepinephrine Volume 19.9 0 19.9 -- -- -- -- -- --    IV Piggyback Volume (IV Piggyback) 200 -- 200 -- -- -- -- -- --    Other  120  -- 120  --  -- --  --  -- --    Medications (P.O./ Enteral Liquids) 120 -- 120 -- -- -- -- -- --    Enteral  720  300 1020  750  420 1170  --  -- --    Enteral Volume 600 300 900 260 240 500 -- -- --    Free Water / Tube Flush 120 -- 120 490 180 670 -- -- --    Total Intake 1167.9 408 1575.9 829.5 489 1318.5 -- -- --       Output    Urine  2625  675 3300  2433  615 3048  --  -- --    Indwelling Cathether 2625 675 3300 2433 615 3048 -- -- --    Emesis  --  -- --  --  -- --  --  -- --    Emesis - Number of Times -- -- -- 1 x -- 1 x -- -- --    Stool/Urine  --  -- --  2000 -- 2000  --  -- --    Measurable Stool (ml) -- -- -- 2000 -- 2000 -- -- --    Total Output 2625 675 3300 4433 615 5048 -- -- --       Net I/O     -1457.1 -267 -1724.1 -3603.5 -126 -3729.5 -- -- --        Weight: 63.4 kg (139 lb 12.4 oz)  Recent Labs      02/23/18   1800   02/25/18   0557  02/25/18   1810  02/26/18   0550   SODIUM  145   < >  139  135  135   POTASSIUM  3.5*   < >  3.2*  3.8  3.5*   CHLORIDE  109   < >  107  104  105   CO2  30   < >  27  24  24   BUN  26*   < >  26*  18  20   CREATININE  <0.20*   < >  <0.20*  <0.20*  <0.20*   MAGNESIUM  1.6   --   1.5   --    --    CALCIUM  8.0*   < >  8.1*  8.0*  7.7*    < > = values in this interval not displayed.       GI/Nutrition:  Exam: distended, gastric tube noted without erythema but ongoing bilious drainage, abdomen is soft and non-tender   Imaging: Available data reviewed    KUB 2/25: (Personally reviewed) No evidence of bowel obstruction.  Liver Function  Recent Labs      02/25/18   0557  02/25/18   1810  02/26/18   0550   PREALBUMIN   --    --   23.0   GLUCOSE  86  83  81       Heme:  Recent Labs      02/24/18   0539   18   0557  18   0550   RBC  2.85*  2.69*  2.61*   HEMOGLOBIN  8.1*  7.5*  7.6*   HEMATOCRIT  24.5*  23.5*  22.5*   PLATELETCT  68*  94*  139*   IRON   --   28*   --    TOTIRONBC   --   252   --        Infectious Disease:  Monitored Temp  Av.2 °C (97.1 °F)  Min: 35.6 °C (96.1 °F)  Max: 36.4 °C (97.5 °F)  Micro: reviewed. C. diff is negative. BAL with Proteus, and Klebsiella   Recent Labs      18   0539  18   0557  18   0550   WBC  7.0  7.3  7.1   NEUTSPOLYS  75.90*  61.40   --    LYMPHOCYTES  19.80*  30.70   --    MONOCYTES  2.60  5.30   --    EOSINOPHILS  1.70  2.60   --    BASOPHILS  0.00  0.00   --      Current Facility-Administered Medications   Medication Dose Frequency Provider Last Rate Last Dose   • furosemide (LASIX) injection 20 mg  20 mg Q DAY Jon Reynoso D.O.   20 mg at 18   • potassium bicarbonate (KLYTE) 25 MEQ effervescent tablet TBEF 50 mEq  50 mEq DAILY Jon Reynoso D.O.   50 mEq at 18   • predniSONE (DELTASONE) tablet 10 mg  10 mg DAILY Jon Reynoso D.O.   10 mg at 18   • loperamide (IMODIUM) 1 MG/5ML solution 2 mg  2 mg 4X/DAY PRN JASPAL Medina.SAM.       • PHENobarbital solution 120 mg  120 mg BID Marty Garcia M.D.   120 mg at 18   • omeprazole 2 mg/mL in sodium bicarbonate (PRILOSEC) oral susp 40 mg  40 mg DAILY JASPAL Berry.O.   40 mg at 18   • vasopressin (VASOSTRICT) 20 Units in  mL Infusion  0.03 Units/min Continuous Rivas Brooks M.D. 9 mL/hr at 18 0.03 Units/min at 18   • levalbuterol (XOPENEX) 1.25 MG/3ML nebulizer solution 1.25 mg  1.25 mg Q6HRS (RT) Jon Reynoso D.O.   1.25 mg at 18 0625   • Respiratory Care per Protocol   Continuous RT Moedsto Appiah Jr., D.O.       • polyethylene glycol/lytes (MIRALAX) PACKET 1 Packet  1 Packet QDAY PRN Modesto Appiah Jr., D.O.        And   • magnesium hydroxide (MILK OF MAGNESIA) suspension 30  mL  30 mL QDAY PRN JASPAL Alaniz Jr..MICHELLE        And   • bisacodyl (DULCOLAX) suppository 10 mg  10 mg QDAY PRN JASPAL Alaniz Jr..O.       • chlorhexidine (PERIDEX) 0.12 % solution 15 mL  15 mL BID JASPAL Alaniz Jr..OBucky   15 mL at 02/25/18 2022   • lidocaine (XYLOCAINE) 1%  injection  1-2 mL Q30 MIN PRN JASPAL Alaniz Jr..SAM.       • MD ALERT...Adult ICU Electrolyte Replacement per Pharmacy Protocol   pharmacy to dose JASPAL Alaniz Jr..O.       • fentaNYL (SUBLIMAZE) injection 25 mcg  25 mcg Q HOUR PRN FARHAT Alaniz Jr.O.        Or   • fentaNYL (SUBLIMAZE) injection 50 mcg  50 mcg Q HOUR PRN JASPAL Alaniz Jr..O.        Or   • fentaNYL (SUBLIMAZE) injection 100 mcg  100 mcg Q HOUR PRN JASPAL Alaniz Jr..O.       • ipratropium-albuterol (DUONEB) nebulizer solution 3 mL  3 mL Q2HRS PRN (RT) JASPAL Alaniz Jr..O.       • insulin regular (HUMULIN R) injection 1-6 Units  1-6 Units Q6HRS JASPAL Alaniz Jr..O.   Stopped at 02/23/18 1800    And   • glucose 4 g chewable tablet 16 g  16 g Q15 MIN PRN JASPAL Alaniz Jr..OBucky        And   • dextrose 50% (D50W) injection 25 mL  25 mL Q15 MIN PRN JASPAL Alaniz Jr..O.       • levalbuterol (XOPENEX) 1.25 MG/3ML nebulizer solution 1.25 mg  1.25 mg Q4HRS PRN Duy Whitaker M.D.   1.25 mg at 02/20/18 0626   • miconazole 2%-zinc oxide (RADHA) topical cream   PRN Duy Whitaker M.D.       • acetaminophen (TYLENOL) tablet 650 mg  650 mg Q6HRS PRN Duy Whitaker M.D.   650 mg at 02/22/18 1839   • oxyCODONE immediate-release (ROXICODONE) tablet 5 mg  5 mg Q3HRS PRN Duy Whitaker M.D.        And   • oxyCODONE immediate release (ROXICODONE) tablet 10 mg  10 mg Q3HRS PRN Duy Whitaker M.D.       • ondansetron (ZOFRAN) syringe/vial injection 4 mg  4 mg Q4HRS PRN Duy Whitaker M.D.   4 mg at 02/25/18 1140   • ondansetron (ZOFRAN ODT) dispertab 4 mg  4 mg Q4HRS PRN Duy Whitaker M.D.       • promethazine (PHENERGAN) tablet 12.5-25 mg   12.5-25 mg Q4HRS PRN Duy Whitaker M.D.       • promethazine (PHENERGAN) suppository 12.5-25 mg  12.5-25 mg Q4HRS PRN Duy Whitaker M.D.       • prochlorperazine (COMPAZINE) injection 5-10 mg  5-10 mg Q4HRS PRN Duy Whitaker M.D.       • LORazepam (ATIVAN) injection 4 mg  4 mg Q10 MIN PRN Duy Whitaker M.D.   2 mg at 02/23/18 1251   • levETIRAcetam (KEPPRA) 1,500 mg in  mL IVPB  1,500 mg Q12HRS Duy Whitaker M.D.   Stopped at 02/25/18 2037   • lacosamide (VIMPAT) 200 mg in  mL ivpb  200 mg Q12HRS Maci Jade M.D.   Stopped at 02/25/18 2124     Last reviewed on 2/18/2018  7:51 PM by Lois Camejo, Pharmacy Intern    Quality  Measures:  Labs reviewed, Medications reviewed and Radiology images reviewed  Waldrop catheter: Critically Ill - Requiring Accurate Measurement of Urinary Output  Central line in place: Shock and Vasopressors      DVT prophylaxis - mechanical: Contra-indicated  Ulcer prophylaxis: Yes      Assessment and plan:  Acute hypoxemic respiratory failure    - Continue full vent support with daily spontaneous breathing trials   - RT/O2 protocol, encourage mobilization   - Limit sedatives, aspiration precautions   - Repeat chest x-ray in morning  Septic shock from pulmonary source   - Continue vasopressin drip to maintain MAP greater than 60   - Start midodrine   - Completed antibiotics, s/p sepsis protocol  History of traumatic brain injury, chronic seizure disorder with associated status epilepticus   - Continue antiepileptics per neurology   - Seizure precautions  Decubitus ulcerations of the left hip including stage IV disease   - Wound care, optimize nutrition  Pneumonia (Proteus and Klebsiella)   - S/P antibiotics  Mild fluid overload - diuresis  Possible paralytic ileus versus feeding tube malfunction   - CT abdomen/pelvis today with oral contrast, start IV Reglan   - May require GI consultation if worsens or does not improve   - Keep nothing by mouth for now  Iron  deficiency anemia - iron supplementation  Thrombocytopenia  Hypokalemia - repletion  Prophylaxis (SCDs only), nothing by mouth    Discussed patient condition and risk of morbidity and/or mortality with Family, RN, RT, Pharmacy, Charge nurse / hot rounds and hospitalist  The patient remains critically ill.  Critical care time = 41 minutes in directly providing and coordinating critical care and extensive data review.  No time overlap and excludes procedures.    Jeremy M Gonda, M.D.

## 2018-02-26 NOTE — DISCHARGE PLANNING
Care Transition Team Assessment    Pt has TBI hx. Pt is a complete assist. [place] which may limit the pt's access to follow up supports. Pt will also need transportation coordinated prior to dc. The pt's mother and guardian has requested that the hospital utilizes the ambulance service from [facility]. Pt's dc need are currently unknown       Information Source  Orientation : Unable to Assess  Information Given By: Parent  Informant's Name: Susana Edwards  Who is responsible for making decisions for patient? : Guardian  Name(s) of Primary Decision Maker: Susana Edwards    Readmission Evaluation  Is this a readmission?: No    Elopement Risk  Legal Hold: No  Ambulatory or Self Mobile in Wheelchair: No-Not an Elopement Risk  Elopement Risk: Not at Risk for Elopement    Interdisciplinary Discharge Planning  Patient or legal guardian wants to designate a caregiver (see row info): No (alexandra- Will ask Mother )    Discharge Preparedness  What is your plan after discharge?: Uncertain - pending medical team collaboration  What are your discharge supports?: Parent  Prior Functional Level: Bladder Incontinence, Bowel Incontinence, Needs Assist with Activities of Daily Living, Needs Assist with Medication Management, Wheelchair Dependent  Difficulity with ADLs: Bathing, Brushing teeth, Dressing, Eating, Toileting, Walking  Difficulty with ADLs Comment: Pt is a complete assist  Difficulity with IADLs: Cooking, Driving, Keeping track of finances, Laundry, Managing medication, Shopping, Using the telephone or computer  Difficulity with IADL Comments: Pt is a compete assist    Functional Assesment  Prior Functional Level: Bladder Incontinence, Bowel Incontinence, Needs Assist with Activities of Daily Living, Needs Assist with Medication Management, Wheelchair Dependent    Finances  Financial Barriers to Discharge: No  Prescription Coverage: Yes    Vision / Hearing Impairment  Right Eye Vision:  (ALEXANDRA)  Left Eye Vision:   (TRAVIS)         Advance Directive  Advance Directive?: DPOA for Health Care  Durable Power of  Name and Contact : Susana Edwards         Psychological Assessment  History of Substance Abuse: None  History of Psychiatric Problems: No  Non-compliant with Treatment: No  Newly Diagnosed Illness: No    Discharge Risks or Barriers  Discharge risks or barriers?: Transportation, Post-acute placement / services  Patient risk factors: Cognitive / sensory / physical deficit, Lack of outside supports, Vulnerable adult    Anticipated Discharge Information  Anticipated discharge disposition: Discharge needs currently unknown

## 2018-02-26 NOTE — CARE PLAN
Problem: Safety  Goal: Will remain free from injury  Outcome: PROGRESSING AS EXPECTED  No injuries since this admission    Problem: Bowel/Gastric:  Goal: Normal bowel function is maintained or improved  Outcome: PROGRESSING AS EXPECTED  Giving immodium to slow down stool    Problem: Respiratory:  Goal: Respiratory status will improve  Outcome: PROGRESSING AS EXPECTED  SBT's today

## 2018-02-26 NOTE — CARE PLAN
Problem: Ventilation Defect:  Goal: Ability to achieve and maintain unassisted ventilation or tolerate decreased levels of ventilator support  Outcome: PROGRESSING AS EXPECTED    Intervention: Support and monitor invasive and noninvasive mechanical ventilation  Adult Ventilation Update    Total Vent Days: 9  Chronic trach    Patient Lines/Drains/Airways Status    Active Airway     Name: Placement date: Placement time: Site: Days:    Airway Group Standard Cuffed Trach Tracheostomy 6.0 07/02/17   1403   Tracheostomy   238    Airway Group Tracheostomy 6.0       Tracheostomy                 Vent settings: 16 460 35% +8    Sputum/Suction   Cough: Productive (02/25/18 2208)  Sputum Amount: Moderate (02/25/18 2208)  Sputum Color: Clear;White (02/25/18 2208)  Sputum Consistency: Thick;Thin (02/25/18 2208)        Events/Summary/Plan: Pt stable on vent, will continue to monitor.

## 2018-02-26 NOTE — DISCHARGE PLANNING
Received Hospice consult. Met with pt's mother and guardian, Susana at bedside. Susana expressed that it was the choice of the family to continue with active treatment of the pt. Choice form for Hospice not signed by POA.

## 2018-02-26 NOTE — CARE PLAN
Problem: Safety  Goal: Will remain free from injury  Outcome: PROGRESSING AS EXPECTED  Bed rails up with bed rotation going, frequent rounding, monitor alarms set and checked    Problem: Skin Integrity  Goal: Risk for impaired skin integrity will decrease  Outcome: PROGRESSING SLOWER THAN EXPECTED  Assessed known wounds and dressed appropriately and per WOC orders.

## 2018-02-26 NOTE — DISCHARGE PLANNING
Received Hospice Consult. TC to pt's mother and guardian, Susana [phone]. Left message requesting that she call this SW back.

## 2018-02-27 ENCOUNTER — APPOINTMENT (OUTPATIENT)
Dept: RADIOLOGY | Facility: MEDICAL CENTER | Age: 38
DRG: 853 | End: 2018-02-27
Attending: INTERNAL MEDICINE
Payer: COMMERCIAL

## 2018-02-27 LAB
ANION GAP SERPL CALC-SCNC: 6 MMOL/L (ref 0–11.9)
ANION GAP SERPL CALC-SCNC: 7 MMOL/L (ref 0–11.9)
BASOPHILS # BLD AUTO: 0.2 % (ref 0–1.8)
BASOPHILS # BLD: 0.02 K/UL (ref 0–0.12)
BUN SERPL-MCNC: 14 MG/DL (ref 8–22)
BUN SERPL-MCNC: 15 MG/DL (ref 8–22)
CALCIUM SERPL-MCNC: 7.4 MG/DL (ref 8.5–10.5)
CALCIUM SERPL-MCNC: 7.6 MG/DL (ref 8.5–10.5)
CHLORIDE SERPL-SCNC: 100 MMOL/L (ref 96–112)
CHLORIDE SERPL-SCNC: 99 MMOL/L (ref 96–112)
CO2 SERPL-SCNC: 21 MMOL/L (ref 20–33)
CO2 SERPL-SCNC: 21 MMOL/L (ref 20–33)
CREAT SERPL-MCNC: <0.2 MG/DL (ref 0.5–1.4)
CREAT SERPL-MCNC: <0.2 MG/DL (ref 0.5–1.4)
EOSINOPHIL # BLD AUTO: 0.18 K/UL (ref 0–0.51)
EOSINOPHIL NFR BLD: 2.1 % (ref 0–6.9)
ERYTHROCYTE [DISTWIDTH] IN BLOOD BY AUTOMATED COUNT: 51 FL (ref 35.9–50)
GLUCOSE SERPL-MCNC: 86 MG/DL (ref 65–99)
GLUCOSE SERPL-MCNC: 88 MG/DL (ref 65–99)
HCT VFR BLD AUTO: 21.6 % (ref 42–52)
HGB BLD-MCNC: 7.3 G/DL (ref 14–18)
IMM GRANULOCYTES # BLD AUTO: 0.12 K/UL (ref 0–0.11)
IMM GRANULOCYTES NFR BLD AUTO: 1.4 % (ref 0–0.9)
LYMPHOCYTES # BLD AUTO: 2.1 K/UL (ref 1–4.8)
LYMPHOCYTES NFR BLD: 24.8 % (ref 22–41)
MCH RBC QN AUTO: 29.4 PG (ref 27–33)
MCHC RBC AUTO-ENTMCNC: 33.8 G/DL (ref 33.7–35.3)
MCV RBC AUTO: 87.1 FL (ref 81.4–97.8)
MONOCYTES # BLD AUTO: 0.68 K/UL (ref 0–0.85)
MONOCYTES NFR BLD AUTO: 8 % (ref 0–13.4)
NEUTROPHILS # BLD AUTO: 5.36 K/UL (ref 1.82–7.42)
NEUTROPHILS NFR BLD: 63.5 % (ref 44–72)
NRBC # BLD AUTO: 0 K/UL
NRBC BLD-RTO: 0 /100 WBC
PLATELET # BLD AUTO: 209 K/UL (ref 164–446)
PMV BLD AUTO: 11.9 FL (ref 9–12.9)
POTASSIUM SERPL-SCNC: 3.6 MMOL/L (ref 3.6–5.5)
POTASSIUM SERPL-SCNC: 3.8 MMOL/L (ref 3.6–5.5)
RBC # BLD AUTO: 2.48 M/UL (ref 4.7–6.1)
SODIUM SERPL-SCNC: 125 MMOL/L (ref 135–145)
SODIUM SERPL-SCNC: 126 MMOL/L (ref 135–145)
SODIUM SERPL-SCNC: 128 MMOL/L (ref 135–145)
WBC # BLD AUTO: 8.5 K/UL (ref 4.8–10.8)

## 2018-02-27 PROCEDURE — A9270 NON-COVERED ITEM OR SERVICE: HCPCS | Performed by: INTERNAL MEDICINE

## 2018-02-27 PROCEDURE — 80048 BASIC METABOLIC PNL TOTAL CA: CPT | Mod: 91

## 2018-02-27 PROCEDURE — 770022 HCHG ROOM/CARE - ICU (200)

## 2018-02-27 PROCEDURE — 700101 HCHG RX REV CODE 250: Performed by: INTERNAL MEDICINE

## 2018-02-27 PROCEDURE — 700111 HCHG RX REV CODE 636 W/ 250 OVERRIDE (IP): Performed by: PSYCHIATRY & NEUROLOGY

## 2018-02-27 PROCEDURE — 700102 HCHG RX REV CODE 250 W/ 637 OVERRIDE(OP): Performed by: INTERNAL MEDICINE

## 2018-02-27 PROCEDURE — 700111 HCHG RX REV CODE 636 W/ 250 OVERRIDE (IP): Performed by: INTERNAL MEDICINE

## 2018-02-27 PROCEDURE — A9270 NON-COVERED ITEM OR SERVICE: HCPCS | Performed by: PSYCHIATRY & NEUROLOGY

## 2018-02-27 PROCEDURE — 700105 HCHG RX REV CODE 258: Performed by: INTERNAL MEDICINE

## 2018-02-27 PROCEDURE — 85025 COMPLETE CBC W/AUTO DIFF WBC: CPT

## 2018-02-27 PROCEDURE — 94003 VENT MGMT INPAT SUBQ DAY: CPT

## 2018-02-27 PROCEDURE — 71045 X-RAY EXAM CHEST 1 VIEW: CPT

## 2018-02-27 PROCEDURE — 99233 SBSQ HOSP IP/OBS HIGH 50: CPT | Performed by: HOSPITALIST

## 2018-02-27 PROCEDURE — 84295 ASSAY OF SERUM SODIUM: CPT

## 2018-02-27 PROCEDURE — A6209 FOAM DRSG <=16 SQ IN W/O BDR: HCPCS | Performed by: INTERNAL MEDICINE

## 2018-02-27 PROCEDURE — C9254 INJECTION, LACOSAMIDE: HCPCS | Performed by: PSYCHIATRY & NEUROLOGY

## 2018-02-27 PROCEDURE — 700105 HCHG RX REV CODE 258: Performed by: PSYCHIATRY & NEUROLOGY

## 2018-02-27 PROCEDURE — 700102 HCHG RX REV CODE 250 W/ 637 OVERRIDE(OP): Performed by: PSYCHIATRY & NEUROLOGY

## 2018-02-27 PROCEDURE — 94640 AIRWAY INHALATION TREATMENT: CPT

## 2018-02-27 RX ORDER — LABETALOL HYDROCHLORIDE 5 MG/ML
INJECTION, SOLUTION INTRAVENOUS
Status: ACTIVE
Start: 2018-02-27 | End: 2018-02-27

## 2018-02-27 RX ORDER — MIDODRINE HYDROCHLORIDE 5 MG/1
10 TABLET ORAL
Status: DISCONTINUED | OUTPATIENT
Start: 2018-02-27 | End: 2018-03-03

## 2018-02-27 RX ORDER — MIDODRINE HYDROCHLORIDE 5 MG/1
5 TABLET ORAL ONCE
Status: DISPENSED | OUTPATIENT
Start: 2018-02-27 | End: 2018-02-28

## 2018-02-27 RX ADMIN — IRON SUCROSE 200 MG: 20 INJECTION, SOLUTION INTRAVENOUS at 08:26

## 2018-02-27 RX ADMIN — SODIUM CHLORIDE 1500 MG: 9 INJECTION, SOLUTION INTRAVENOUS at 09:08

## 2018-02-27 RX ADMIN — VASOPRESSIN 0.03 UNITS/MIN: 20 INJECTION INTRAVENOUS at 06:14

## 2018-02-27 RX ADMIN — LEVALBUTEROL HYDROCHLORIDE 1.25 MG: 1.25 SOLUTION RESPIRATORY (INHALATION) at 02:20

## 2018-02-27 RX ADMIN — PHENOBARBITAL 120 MG: 20 ELIXIR ORAL at 10:09

## 2018-02-27 RX ADMIN — METOCLOPRAMIDE 10 MG: 5 INJECTION, SOLUTION INTRAMUSCULAR; INTRAVENOUS at 16:26

## 2018-02-27 RX ADMIN — LEVALBUTEROL HYDROCHLORIDE 1.25 MG: 1.25 SOLUTION RESPIRATORY (INHALATION) at 07:09

## 2018-02-27 RX ADMIN — SODIUM CHLORIDE 1500 MG: 9 INJECTION, SOLUTION INTRAVENOUS at 23:42

## 2018-02-27 RX ADMIN — ACETAMINOPHEN 650 MG: 325 TABLET, FILM COATED ORAL at 02:20

## 2018-02-27 RX ADMIN — OMEPRAZOLE 40 MG: 20 CAPSULE, DELAYED RELEASE ORAL at 09:19

## 2018-02-27 RX ADMIN — VASOPRESSIN 0.01 UNITS/MIN: 20 INJECTION INTRAVENOUS at 17:33

## 2018-02-27 RX ADMIN — CHLORHEXIDINE GLUCONATE 15 ML: 1.2 RINSE ORAL at 20:48

## 2018-02-27 RX ADMIN — MIDODRINE HYDROCHLORIDE 10 MG: 5 TABLET ORAL at 12:12

## 2018-02-27 RX ADMIN — METOCLOPRAMIDE 10 MG: 5 INJECTION, SOLUTION INTRAMUSCULAR; INTRAVENOUS at 23:43

## 2018-02-27 RX ADMIN — SODIUM CHLORIDE 200 MG: 9 INJECTION, SOLUTION INTRAVENOUS at 08:26

## 2018-02-27 RX ADMIN — BISACODYL 10 MG: 10 SUPPOSITORY RECTAL at 15:37

## 2018-02-27 RX ADMIN — SODIUM CHLORIDE 200 MG: 9 INJECTION, SOLUTION INTRAVENOUS at 21:38

## 2018-02-27 RX ADMIN — METOCLOPRAMIDE 10 MG: 5 INJECTION, SOLUTION INTRAMUSCULAR; INTRAVENOUS at 05:10

## 2018-02-27 RX ADMIN — LEVALBUTEROL HYDROCHLORIDE 1.25 MG: 1.25 SOLUTION RESPIRATORY (INHALATION) at 18:29

## 2018-02-27 RX ADMIN — MIDODRINE HYDROCHLORIDE 5 MG: 5 TABLET ORAL at 08:26

## 2018-02-27 RX ADMIN — POTASSIUM BICARBONATE 50 MEQ: 25 TABLET, EFFERVESCENT ORAL at 15:03

## 2018-02-27 RX ADMIN — LEVALBUTEROL HYDROCHLORIDE 1.25 MG: 1.25 SOLUTION RESPIRATORY (INHALATION) at 15:36

## 2018-02-27 RX ADMIN — PHENOBARBITAL 120 MG: 20 ELIXIR ORAL at 20:48

## 2018-02-27 RX ADMIN — ACETAMINOPHEN 650 MG: 325 TABLET, FILM COATED ORAL at 15:03

## 2018-02-27 RX ADMIN — POTASSIUM BICARBONATE 50 MEQ: 25 TABLET, EFFERVESCENT ORAL at 08:26

## 2018-02-27 RX ADMIN — PREDNISONE 5 MG: 5 TABLET ORAL at 08:26

## 2018-02-27 RX ADMIN — CHLORHEXIDINE GLUCONATE 15 ML: 1.2 RINSE ORAL at 08:26

## 2018-02-27 RX ADMIN — METOCLOPRAMIDE 10 MG: 5 INJECTION, SOLUTION INTRAMUSCULAR; INTRAVENOUS at 12:23

## 2018-02-27 RX ADMIN — MIDODRINE HYDROCHLORIDE 10 MG: 5 TABLET ORAL at 16:26

## 2018-02-27 NOTE — CARE PLAN
Problem: Ventilation Defect:  Goal: Ability to achieve and maintain unassisted ventilation or tolerate decreased levels of ventilator support  Outcome: PROGRESSING AS EXPECTED    Intervention: Support and monitor invasive and noninvasive mechanical ventilation  Adult Ventilation Update    Total Vent Days: 10    Patient Lines/Drains/Airways Status    Active Airway     Name: Placement date: Placement time: Site: Days:    Airway Group Standard Cuffed Trach Tracheostomy 6.0 07/02/17   1403   Tracheostomy   239    Airway Group Tracheostomy 6.0       Tracheostomy                 Vent settings: 16 460 8 35%  Xopenex Q6    Sputum/Suction   Cough: Productive (02/27/18 0220)  Sputum Amount: Small (02/27/18 0220)  Sputum Color: Clear;White (02/27/18 0220)  Sputum Consistency: Thick;Thin (02/27/18 0220)      Events/Summary/Plan: Pt stable on vent, will continue to monitor.

## 2018-02-27 NOTE — CARE PLAN
Problem: Respiratory:  Goal: Respiratory status will improve    Intervention: Assess and monitor pulmonary status  Work with RT to maintain o2 sats above 90%, weaning trials as indicated, suctioning as needed, HOB at 30 degrees.       Problem: Skin Integrity  Goal: Risk for impaired skin integrity will decrease    Intervention: Assess risk factors for impaired skin integrity and/or pressure ulcers  Assess skin during shift, turning every two hours, dressing changes q shift, using pillows for support.

## 2018-02-27 NOTE — PALLIATIVE CARE
"PALLIATIVE CARE FOLLOW-UP:    Discussed with Dr. Gonda.    Pt SBT, BP 98/46.    Met mother Susana in lobby.  Susana expressed her distress \"about things going on here.\"  Issue is a family dynamic with pt's biological father, half sisters and cousin who have come to see pt \"after 21 years and then one sneaked into the ICU.\"  Encouraged Susana to have isdiro with family for her own and Arnaud's peace, and also reinforced that staff can call security if there are issues with family which Susana stated happened at the hospital 21 years ago.    Regarding POC, mother is hopeful pt can wean from vent and off some of his seizure meds and then she plans to take him home and provide care like she has.    Plan:  Re-address GOC/possible hospice support as clinical picture evolves    Thank you for allowing Palliative Care to support this pt and their family.  Contact [phone] for additional assistance, questions or concerns.  "

## 2018-02-27 NOTE — PROGRESS NOTES
Pulmonary Critical Care Progress Note        Date of admission: 2/18/2018    Chief Complaint: Respiratory failure, septic shock    History of Present Illness:  Mr. Edwards is a 37-year-old male with past medical history of traumatic brain injury at age 17, seizures diagnosed in June of 2017. His baseline mental status appears to be nodding and minimal interacting without vocalization. Who was brought to the ICU as a direct admit from [facility] where he was brought today for hypoxia. Reportedly the patient was being weanied off of his phenobarbital by his neurologist when he began having more frequent seizures, approximately one week ago his neurologist increased his Keppra dosing however the seizures continued. His mother did use CBD and THC with some success in slowing the seizures. Since 2 days ago the patient had multiple aspiration events following these seizures. His mother evaluated him with a home oxygen saturation monitor and he was noted to be hypoxic, he was brought to [facility] where he was found to have 28% bands with leukopenia. Chest x-ray was obtained demonstrating bilateral infiltrates right greater than left. He was placed on mechanical ventilation and his oxygenation was unable to be improved more than 85%, he was hypotensive and given multiple boluses of IV fluids without improvement. He was started on levo fed and given multiple doses of Ativan for seizures and transferred to our ICU for higher level of care. He arrives on the ventilator and on pressors critically ill.    Please note this history is obtained by my partner Dr. Appiah 2/18/2018.    ROS:  Respiratory: unable to perform due to the patient's inability to effectively communicate, Cardiac: unable to perform due to the patient's inability to effectively communicate, GI: unable to perform due to the patient's inability to effectively communicate.  All other systems negative. No change today    Interval Events:  24  hour interval history reviewed    - remains hypotensive on vasopressin gtt, midodrine @ 5 --> 10   - no obvious seizure activity noted   - CT A/P unremarkable, tolerating home regimen TFs   - lasix ongoing, good UOP   - low Na to 126   - low K   - SBT with complete apnea despite multiple attempts    PFSH:  No change.    Respiratory:  Liriano Vent Mode: APVCMV, Rate (breaths/min): 16, Vt Target (mL): 460, PEEP/CPAP: 8, FiO2: 35, Static Compliance (ml / cm H2O): 40.8, Control VTE (exp VT): 466  Pulse Oximetry: 97 % PIP 14          Exam: trach tube noted, unlabored respirations, no intercostal retractions or accessory muscle use, rhonchi bibasilar and diminished breath sounds mild.   ImagingCXR  I have personally reviewed the chest x-ray my impression is  (compared to prior films) unchanged diffuse bilateral infiltrates, trach/right IJ/G-tube in good position          Invalid input(s): [id number]    HemoDynamics:  Pulse: (!) 57, Heart Rate (Monitored): (!) 55  NIBP: (!) 85/44   vasopressin drip at 0.03, midodrine at 5 mg 3 times a day    Exam: no murmur, trace bilateral pedal edema, regular rhythm (Sinus), sinus bradycardia.     Echo on 2/19/2018:  CONCLUSIONS  Normal right and left ventricular size and function.   No significant valve disease or flow abnormalities.   Dilated inferior vena cava without inspiratory collapse. The patient is   intubated.  Recent Labs      02/25/18   0557  02/26/18   0550   TROPONINI  <0.01  <0.01       Neuro:  GCS Total [place] Coma Score: 10 off all sedation drips, continues on antiepileptic drugs       Exam: PERRL, minimal withdrawal to deep painful stimuli, no further seizure activity noted    Imaging: Available data reviewed    EEG 2/25: EEG monitoring shows intermittent  epileptiform over the right hemisphere  Improved compared with that of yesterday    Fluids:  Intake/Output       02/25/18 0700 - 02/26/18 0659 02/26/18 0700 - 02/27/18 0659 02/27/18 0700 - 02/28/18 0659       0700-1859 1900-0659 Total 0700-1859 1900-0659 Total 0700-1859 1900-0659 Total       Intake    I.V.  79.5  69 148.5  524  428 952  --  -- --    Vasopressin Volume 79.5 69 148.5 144 108 252 -- -- --    IV Piggyback Volume (IV Piggyback) -- -- -- 200 200 400 -- -- --    IV Volume (0.9% Normal Saline) -- -- -- 180 120 300 -- -- --    Other  --  -- --  640  240 880  --  -- --    Medications (P.O./ Enteral Liquids) -- -- -- 640 240 880 -- -- --    Enteral  750  420 1170  450  570 1020  --  -- --    Enteral Volume 260 240 500 285 300 585 -- -- --    Free Water / Tube Flush 490 180 670 165 270 435 -- -- --    Total Intake 829.5 489 1318.5 1614 1238 2852 -- -- --       Output    Urine  2433  615 3048  1845  415 2260  --  -- --    Indwelling Cathether 2433 615 3048 2542 275 1465 -- -- --    Emesis  --  -- --  --  -- --  --  -- --    Emesis - Number of Times 1 x -- 1 x -- -- -- -- -- --    Drains  --  -- --  0  -- 0  --  -- --    Residual Amount (ml) (Discarded) -- -- -- 0 -- 0 -- -- --    Stool/Urine  2000 -- 2000  --  0 0  --  -- --    Measurable Stool (ml) 2000 -- 2000 -- 0 0 -- -- --    Total Output 4433 615 5048 2269 369 5311 -- -- --       Net I/O     -3603.5 -126 -3729.5 -231 823 592 -- -- --        Weight: 62.8 kg (138 lb 7.2 oz)  Recent Labs      02/25/18   0557   02/26/18   1610  02/27/18   0040  02/27/18   0440   SODIUM  139   < >  134*  128*  126*   POTASSIUM  3.2*   < >  3.6  3.8  3.6   CHLORIDE  107   < >  103  100  99   CO2  27   < >  24  21  21   BUN  26*   < >  16  14  15   CREATININE  <0.20*   < >  <0.20*  <0.20*  <0.20*   MAGNESIUM  1.5   --    --    --    --    CALCIUM  8.1*   < >  7.6*  7.6*  7.4*    < > = values in this interval not displayed.       GI/Nutrition:  Exam: G-tube noted without surrounding erythema, rectal tube in place with minimal output, slightly softer abdomen and no obvious tenderness, hypoactive bowel sounds   Imaging: Available data reviewed    KUB 2/25: (Personally reviewed) No  evidence of bowel obstruction.   CT abdomen and pelvis with oral contrast :  1.  No evidence of bowel obstruction or perforation.  2.  Appendix not visualized.  3.  Small to moderate volume ascites of uncertain etiology.  4.  Bilateral pleural effusions with associated atelectasis.  5.  Ill-defined parenchymal opacities in the LEFT lower lung suggesting multifocal pneumonia.  6.  Scoliosis.    Liver Function  Recent Labs      18   0550  18   1610  18   0040  18   0440   PREALBUMIN  23.0  23.0   --    --    GLUCOSE  81  92  88  86       Heme:  Recent Labs      18   0557  18   0550  18   0440   RBC  2.69*  2.61*  2.48*   HEMOGLOBIN  7.5*  7.6*  7.3*   HEMATOCRIT  23.5*  22.5*  21.6*   PLATELETCT  94*  139*  209   IRON  28*   --    --    [facility]  252   --    --        Infectious Disease:  Monitored Temp  Av.1 °C (96.9 °F)  Min: 35.6 °C (96.1 °F)  Max: 36.6 °C (97.9 °F)  Micro: reviewed. C. diff is negative. BAL with Proteus, and Klebsiella. Repeat cultures negative  Recent Labs      18   0557  18   0550  18   0440   WBC  7.3  7.1  8.5   NEUTSPOLYS  61.40  57.50  63.50   LYMPHOCYTES  30.70  40.70  24.80   MONOCYTES  5.30  1.80  8.00   EOSINOPHILS  2.60  0.00  2.10   BASOPHILS  0.00  0.00  0.20     Current Facility-Administered Medications   Medication Dose Frequency Provider Last Rate Last Dose   • potassium bicarbonate (KLYTE) 25 MEQ effervescent tablet TBEF 50 mEq  50 mEq Once Jeremy M Gonda, M.D.       • midodrine (PROAMATINE) tablet 5 mg  5 mg TID WITH MEALS Jeremy M Gonda, M.D.   5 mg at 18 1603   • predniSONE (DELTASONE) tablet 5 mg  5 mg DAILY Jeremy M Gonda, M.D.       • iron sucrose (VENOFER) injection 200 mg  200 mg DAILY Jeremy M Gonda, M.D.   200 mg at 18 1158   • metoclopramide (REGLAN) injection 10 mg  10 mg Q6HRS Jeremy M Gonda, M.D.   10 mg at 18 0510   • iohexol (OMNIPAQUE) 350 mg/mL  50 mL Once Jeremy M Gonda, M.D.        • furosemide (LASIX) injection 20 mg  20 mg Q DAY JASPAL Berry.O.   20 mg at 02/26/18 0819   • potassium bicarbonate (KLYTE) 25 MEQ effervescent tablet TBEF 50 mEq  50 mEq DAILY JASPAL Berry.O.   50 mEq at 02/26/18 1013   • loperamide (IMODIUM) 1 MG/5ML solution 2 mg  2 mg 4X/DAY PRN Clarence Morfin D.O.   2 mg at 02/26/18 0820   • PHENobarbital solution 120 mg  120 mg BID Marty Garcia M.D.   120 mg at 02/26/18 2123   • omeprazole 2 mg/mL in sodium bicarbonate (PRILOSEC) oral susp 40 mg  40 mg DAILY JASPAL [email or web address]   40 mg at 02/26/18 0820   • vasopressin (VASOSTRICT) 20 Units in  mL Infusion  0.03 Units/min Continuous Rivas Brooks M.D. 9 mL/hr at 02/27/18 0614 0.03 Units/min at 02/27/18 0614   • levalbuterol (XOPENEX) 1.25 MG/3ML nebulizer solution 1.25 mg  1.25 mg Q6HRS (RT) JASPAL Berry.OBucky   1.25 mg at 02/27/18 0709   • Respiratory Care per Protocol   Continuous RT JASPAL Alaniz Jr..O.       • polyethylene glycol/lytes (MIRALAX) PACKET 1 Packet  1 Packet QDAY PRN JASPAL Alaniz Jr..SAM.        And   • magnesium hydroxide (MILK OF MAGNESIA) suspension 30 mL  30 mL QDAY PRN JASPAL Alaniz Jr..O.        And   • bisacodyl (DULCOLAX) suppository 10 mg  10 mg QDAY PRN JASPAL Alaniz Jr..O.       • chlorhexidine (PERIDEX) 0.12 % solution 15 mL  15 mL BID JASPAL Aalniz Jr..O.   15 mL at 02/26/18 2123   • lidocaine (XYLOCAINE) 1%  injection  1-2 mL Q30 MIN PRN JASPAL Alaniz Jr..O.       • MD ALERT...Adult ICU Electrolyte Replacement per Pharmacy Protocol   pharmacy to dose Modesto M Bijal Jr., D.O.       • ipratropium-albuterol (DUONEB) nebulizer solution 3 mL  3 mL Q2HRS PRN (RT) Modesto Appiah Jr., D.O.       • levalbuterol (XOPENEX) 1.25 MG/3ML nebulizer solution 1.25 mg  1.25 mg Q4HRS PRN uDy Whitaker M.D.   1.25 mg at 02/20/18 0626   • miconazole 2%-zinc oxide (RADHA) topical cream   PRN Duy Whitaker M.D.       • acetaminophen (TYLENOL)  tablet 650 mg  650 mg Q6HRS PRN Duy Whitaker M.D.   650 mg at 02/27/18 0220   • oxyCODONE immediate-release (ROXICODONE) tablet 5 mg  5 mg Q3HRS PRN Duy Whitaker M.D.        And   • oxyCODONE immediate release (ROXICODONE) tablet 10 mg  10 mg Q3HRS PRN Duy Whitaker M.D.       • ondansetron (ZOFRAN) syringe/vial injection 4 mg  4 mg Q4HRS PRN Duy Whitaker M.D.   4 mg at 02/25/18 1140   • ondansetron (ZOFRAN ODT) dispertab 4 mg  4 mg Q4HRS PRN Duy Whitaker M.D.       • promethazine (PHENERGAN) tablet 12.5-25 mg  12.5-25 mg Q4HRS PRBRIDGETTE Whitaker M.D.       • promethazine (PHENERGAN) suppository 12.5-25 mg  12.5-25 mg Q4HRS PRN Duy Whitaker M.D.       • prochlorperazine (COMPAZINE) injection 5-10 mg  5-10 mg Q4HRS PRN Duy Whitaker M.D.       • LORazepam (ATIVAN) injection 4 mg  4 mg Q10 MIN PRN Duy Whitaker M.D.   2 mg at 02/23/18 1251   • levETIRAcetam (KEPPRA) 1,500 mg in  mL IVPB  1,500 mg Q12HRS Duy Whitaker M.D.   Stopped at 02/26/18 2138   • lacosamide (VIMPAT) 200 mg in  mL ivpb  200 mg Q12HRS Maci Jade M.D.   Stopped at 02/26/18 3186     Last reviewed on 2/18/2018  7:51 PM by Lois Camejo, Pharmacy Intern    Quality  Measures:   Labs reviewed, Medications reviewed and Radiology images reviewed   Waldrop catheter: Critically Ill - Requiring Accurate Measurement of Urinary Output   Central line in place: Shock and Vasopressors    DVT Prophylaxis: Contraindicated - High bleeding risk  DVT prophylaxis - mechanical: SCDs   Ulcer prophylaxis: Yes    Assessed for rehab: Patient unable to tolerate rehabilitation therapeutic regimen    Assessment and plan:  Acute hypoxemic respiratory failure - VDRF since 2/18, chronic trach   - Continue full vent support with daily spontaneous breathing trials   - RT/O2 protocol, encourage mobilization   - Avoid all sedatives, aspiration precautions   - Check ABG given decreased mentation  Septic shock from pulmonary source, now  unspecified place a distributive shock (possible phenobarbital related)   - Continue vasopressin drip to maintain MAP greater than 60   - Increase midodrine   - Completed antibiotics, s/p sepsis protocol  History of traumatic brain injury, chronic seizure disorder with associated status epilepticus   - Continue antiepileptics per neurology, ? Decrease phenobarbital as may be over sedating and causing hypotension   - Seizure precautions  Decubitus ulcerations of the left hip including stage IV disease   - Wound care, optimize nutrition  Pneumonia (Proteus and Klebsiella)   - S/P antibiotics  Mild fluid overload -improving, hold diuretics for today   Possible paralytic ileus versus feeding tube malfunction   - Continue IV Reglan scheduled   - May require GI consultation if worsens or does not improve   - Resume tube feeding trial with home regimen   - Remove rectal tube and monitor for bowel movement  Iron deficiency anemia - iron supplementation  Thrombocytopenia  Hypokalemia - repletion daily  Prophylaxis (SCDs only), enteral nutrition    Discussed patient condition and risk of morbidity and/or mortality with Family, RN, RT, Pharmacy, Charge nurse / hot rounds and hospitalist and neurology  The patient remains critically ill.  Critical care time = 31 minutes in directly providing and coordinating critical care and extensive data review.  No time overlap and excludes procedures.    Jeremy M Gonda, M.D.

## 2018-02-27 NOTE — CARE PLAN
Problem: Safety  Goal: Will remain free from injury  Outcome: PROGRESSING AS EXPECTED  No injuries this admission    Problem: Bowel/Gastric:  Goal: Normal bowel function is maintained or improved  Outcome: PROGRESSING AS EXPECTED  Rectal tube dc'd will monitor for bms    Problem: Fluid Volume:  Goal: Will maintain balanced intake and output  Outcome: PROGRESSING AS EXPECTED  Monitoring for equal intake and output, lasix dc'd

## 2018-02-27 NOTE — WOUND TEAM
[facility]  Inpatient Services  Wound & Skin Care Progress note    Admission Date:  2018   HPI, PMH, SH: Reviewed  Unit where seen by Wound Team: [id number]/    WOUND CONSULT RELATED TO: pressure injury follow up    SUBJECTIVE:   Non-verbal    Self Report / Pain Level: no s/s of distress      OBJECTIVE: on BEATRICE bed, heels floated off pillows, waffle overlay  WOUND TYPE, LOCATION, CHARACTERISTICS (Pressure ulcers: location, stage, POA or date identified)  Pressure Ulcer  Stage 4 POA Ischium Left Lateral  Periwound Skin: Discolored  Drainage : Scant, Serosanguinous  Tissue Type and %:    100% red/pink  Wound Edges:    open    Odor:     None   Exposed structure(s):   No   Signs and Symptoms of Infection: none    Pressure Ulcer  St 2 POA Foot;Digit 1 Left  Periwound Skin: Intact  Drainage : None  Tissue Type and %:    100% red  Wound Edges:    open    Odor:     None   Exposed structure(s):   No   Signs and Symptoms of Infection: none    Pressure Ulcer  POA Unstageable Ischium Left Medial  Periwound Skin: Discolored  Drainage : None     Tissue Type and %:    100% red/dark purple/brown  Wound Edges:    attached    Odor:     None   Exposed structure(s):   No   Signs and Symptoms of Infection: none    Measurements: taken 18   Ischium Lateral/medial  1st toe L  Length (cm):  3.5/3    1  Width (cm):  3.5/0.7    1.5  Depth (cm):  0.6/(nm)   (nm)    Tracts/underminin9164-7452=2 cm None     INTERVENTIONS BY WOUND TEAM:cleaned wounds with NS, dried. 1st toe with hydrogel covered with hypafix. Lateral ischium hydrogel to wound bed covered with hydrofera blue, making sure to tuck into undermining. Medial ischium appied hydrogel and hypafix then covered both with mepilex. Pt repositioned.      Ischium Left Lateral-Dressing Options:  (Intrasite hydrogel, hydrofera blue, mepilex)  Foot;Digit 1 Left-Dressing Options:  (Intrasite hydrogel, hypafix)  Ischium Left Medial-Dressing Options: Intrasite, hypafix,  Mepilex    Interdisciplinary consultation:   With Nursing;With Family    EVALUATION: pt has POA pressure injuries: L ischium; St 4 slightly improved  DTI has now become unstageable and edges starting to lift. L 1st toe is now a Stage 2. Groin improved. Mother Susana has requested to change drsgs more frequently to L ischium and has been assisting RN to perform 2x in daytime and would like 1x @ noc. Orders written to reflect this, since there is some improvement of older L ischial wound and L 1st toe now St 2.     Factors affecting wound healing: HTN, contractures, decreased mobility, sepsis  Goals: decreased wound size 0.5% each week,     NURSING PLAN OF CARE ORDERS (X):    Dressing changes: See Dressing Maintenance orders: x modified  Skin care: See Skin Care orders: x  Rectal tube care: See Rectal Tube Care orders:   Other orders:      WOUND TEAM PLAN OF CARE (X):   NPWT change 3 x week:        Dressing changes by wound team:       Follow up as needed:   x  Wound worsening  Other (explain):    Anticipated discharge plans (X):  SNF:           Home Care:           Outpatient Wound Center:            Self Care:            Other:    tbd

## 2018-02-27 NOTE — PROGRESS NOTES
[facility] Hospitalist Progress Note    Date of Service: 2018    Chief Complaint  37 y.o. male admitted 2018 with shortness of breath.    Interval Problem Update  Mr. Edwards has a past medical history of traumatic brain injury and seizure disorder that recently underwent a slow taper off phenobarbital. He subsequently developed seizures over the previous 2 weeks prior to presentation to the ER. He was found to be hypoxic with aspiration pneumonitis and required intubation with ICU admission. He has been treated with antibiotics.    RN notes no further seizure activity noted since .  He remains on vasopressin due to hypotension. He is tolerating his tube feeds. 400 ml urine over night. His Hb remains low. I met with his mother, Susana, at bedside and we discussed Ruben's condition and care.  Consultants/Specialty  Critical Care. I discussed her condition with Dr. Gonda on ICU Hot Rounds.  Neurology    Disposition  ICU        Review of Systems   Unable to perform ROS: Intubated      Physical Exam  Laboratory/Imaging   Hemodynamics  No data recorded.   Monitored Temp: 36.4 °C (97.5 °F)  Pulse  Av.1  Min: 50  Max: 119 Heart Rate (Monitored): (!) 57  NIBP: (!) 85/44      Respiratory  Liriano Vent Mode: APVCMV, Rate (breaths/min): 16, PEEP/CPAP: 8, PEEP/CPAP: 8, FiO2: 35, P Peak (PIP): 23, P MEAN: 12   Respiration: 16, Pulse Oximetry: 97 %     Work Of Breathing / Effort: Vented  RUL Breath Sounds: Expiratory Wheezes;Crackles, RML Breath Sounds: Expiratory Wheezes;Crackles, RLL Breath Sounds: Diminished, JONATAN Breath Sounds: Expiratory Wheezes;Crackles, LLL Breath Sounds: Diminished    Fluids    Intake/Output Summary (Last 24 hours) at 18 0715  Last data filed at 18 0600   Gross per 24 hour   Intake             2797 ml   Output             2260 ml   Net              537 ml       Nutrition  Orders Placed This Encounter   Procedures   • Diet NPO     Standing Status:   Standing     Number of  Occurrences:   1     Order Specific Question:   Restrict to:     Answer:   Strict [1]     Physical Exam   Constitutional: No distress.   HENT:   Head: Normocephalic and atraumatic.   Neck:   Tracheostomy, right IJ central line.   Cardiovascular:   Regular, no murmur   Pulmonary/Chest:   Vent, good air movement, few crackles.    Abdominal:   PEG tube, mild distention   +bowel sounds   Genitourinary:   Genitourinary Comments: catheter   Musculoskeletal: He exhibits no edema or tenderness.   Contractures of extremities x 4.   Poor muscle tone.   Neurological:   awake   Skin: No rash noted. He is not diaphoretic. There is pallor.   Nursing note and vitals reviewed.      Recent Labs      02/25/18   0557  02/26/18   0550  02/27/18   0440   WBC  7.3  7.1  8.5   RBC  2.69*  2.61*  2.48*   HEMOGLOBIN  7.5*  7.6*  7.3*   HEMATOCRIT  23.5*  22.5*  21.6*   MCV  87.4  86.2  87.1   MCH  27.9  29.1  29.4   MCHC  31.9*  33.8  33.8   RDW  53.4*  50.8*  51.0*   PLATELETCT  94*  139*  209   MPV  12.6  12.5  11.9     Recent Labs      02/26/18   1610  02/27/18   0040  02/27/18   0440   SODIUM  134*  128*  126*   POTASSIUM  3.6  3.8  3.6   CHLORIDE  103  100  99   CO2  24  21  21   GLUCOSE  92  88  86   BUN  16  14  15   CREATININE  <0.20*  <0.20*  <0.20*   CALCIUM  7.6*  7.6*  7.4*                      Assessment/Plan     * Septic shock (CMS-HCC)   Assessment & Plan    Source is consistent with aspiration pneumonia.  Requiring ongoing IV pressors and s/p IV fluids. Midodrine has been started.  - finished full course of doxycycline, Rocephin and Flagyl  The organ system failure associated with this disease process is the respiratory system as is evidenced by the need for mechanical ventilation.         Seizure (CMS-HCC)   Assessment & Plan    S/p continuous EEG  Continue Vimpat, Keppra, phenobarbital   Neurology consulted.        Hypernatremia- (present on admission)   Assessment & Plan    - resolved with free water flushes  - Repeat BMP  in the morning        Hypomagnesemia   Assessment & Plan    - Resolved        Decubitus ulcer of ischium, left, stage IV (CMS-HCC)   Assessment & Plan    Present upon admission.  Continue wound care        Azotemia   Assessment & Plan    - Resolved        Acute on chronic respiratory failure with hypoxemia (CMS-HCC)   Assessment & Plan    Requiring intubation on 2/18.  History of prior tracheostomy  Vent management per intensivist        Aspiration pneumonia (CMS-HCC)- (present on admission)   Assessment & Plan    Source of septic shock  S/p doxycycline, Rocephin and Flagyl  Status post bronchoscopy  CT chest on 2/26:  1.  No evidence of bowel obstruction or perforation.  2.  Appendix not visualized.  3.  Small to moderate volume ascites of uncertain etiology.  4.  Bilateral pleural effusions with associated atelectasis.  5.  Ill-defined parenchymal opacities in the LEFT lower lung suggesting multifocal pneumonia.  6.  Scoliosis        Diarrhea   Assessment & Plan    - Resolved          Protein-calorie malnutrition, severe (CMS-HCC)   Assessment & Plan    Now tolerating tube feeds        Physical debility   Assessment & Plan    - Chronic post traumatic brain injury  - At baseline        TBI (traumatic brain injury) (CMS-HCC)- (present on admission)   Assessment & Plan    - Chronic, nonverbal        Contraction, joint, multiple sites   Assessment & Plan    - Chronic, PT/OT  - has baclofen pump, needs interrogating which is pending        GI bleed   Assessment & Plan    Possible  Carmencita-White tear, patient did have multiple episodes of severe vomiting  If he develops further bleeding, an EGD may be appropriate.           Quality-Core Measures   Reviewed items::  Labs reviewed, Medications reviewed and Radiology images reviewed  Waldrop catheter::  Unconscious / Sedated Patient on a Ventilator  DVT prophylaxis pharmacological::  Contraindicated - High bleeding risk

## 2018-02-27 NOTE — CARE PLAN
Problem: Ventilation Defect:  Goal: Ability to achieve and maintain unassisted ventilation or tolerate decreased levels of ventilator support    Intervention: Support and monitor invasive and noninvasive mechanical ventilation  Adult Ventilation Update    Total Vent Days: 9  Vent:  x 16, 35% +8    Patient Lines/Drains/Airways Status    Active Airway     Name: Placement date: Placement time: Site: Days:    Airway Group Standard Cuffed Trach Tracheostomy 6.0 07/02/17   1403   Tracheostomy   239    Airway Group Tracheostomy 6.0       Tracheostomy                 Mobility Group  Activity Performed: Unable to mobilize     Events/Summary/Plan: Patient stable on vent. No changes. Didn't tolerate SBT. Apnea.

## 2018-02-28 ENCOUNTER — APPOINTMENT (OUTPATIENT)
Dept: RADIOLOGY | Facility: MEDICAL CENTER | Age: 38
DRG: 853 | End: 2018-02-28
Attending: INTERNAL MEDICINE
Payer: COMMERCIAL

## 2018-02-28 PROBLEM — E87.0 HYPERNATREMIA: Status: RESOLVED | Noted: 2018-02-19 | Resolved: 2018-02-28

## 2018-02-28 PROBLEM — D50.0 ANEMIA, BLOOD LOSS: Status: ACTIVE | Noted: 2018-02-28

## 2018-02-28 LAB
ACTION RANGE TRIGGERED IACRT: NO
ANION GAP SERPL CALC-SCNC: 0 MMOL/L (ref 0–11.9)
ANION GAP SERPL CALC-SCNC: 9 MMOL/L (ref 0–11.9)
ANION GAP SERPL CALC-SCNC: 9 MMOL/L (ref 0–11.9)
BASE EXCESS BLDA CALC-SCNC: -2 MMOL/L (ref -4–3)
BASOPHILS # BLD AUTO: 0.2 % (ref 0–1.8)
BASOPHILS # BLD: 0.02 K/UL (ref 0–0.12)
BODY TEMPERATURE: ABNORMAL DEGREES
BUN SERPL-MCNC: 10 MG/DL (ref 8–22)
BUN SERPL-MCNC: 12 MG/DL (ref 8–22)
BUN SERPL-MCNC: 13 MG/DL (ref 8–22)
CALCIUM SERPL-MCNC: 7.4 MG/DL (ref 8.5–10.5)
CALCIUM SERPL-MCNC: 8.3 MG/DL (ref 8.5–10.5)
CALCIUM SERPL-MCNC: 8.4 MG/DL (ref 8.5–10.5)
CHLORIDE SERPL-SCNC: 105 MMOL/L (ref 96–112)
CHLORIDE SERPL-SCNC: 114 MMOL/L (ref 96–112)
CHLORIDE SERPL-SCNC: 96 MMOL/L (ref 96–112)
CO2 BLDA-SCNC: 22 MMOL/L (ref 20–33)
CO2 SERPL-SCNC: 23 MMOL/L (ref 20–33)
CO2 SERPL-SCNC: 23 MMOL/L (ref 20–33)
CO2 SERPL-SCNC: 25 MMOL/L (ref 20–33)
CREAT SERPL-MCNC: <0.2 MG/DL (ref 0.5–1.4)
EOSINOPHIL # BLD AUTO: 0.16 K/UL (ref 0–0.51)
EOSINOPHIL NFR BLD: 1.9 % (ref 0–6.9)
ERYTHROCYTE [DISTWIDTH] IN BLOOD BY AUTOMATED COUNT: 50.4 FL (ref 35.9–50)
GLUCOSE SERPL-MCNC: 113 MG/DL (ref 65–99)
GLUCOSE SERPL-MCNC: 122 MG/DL (ref 65–99)
GLUCOSE SERPL-MCNC: 93 MG/DL (ref 65–99)
HCO3 BLDA-SCNC: 21.1 MMOL/L (ref 17–25)
HCT VFR BLD AUTO: 22.4 % (ref 42–52)
HGB BLD-MCNC: 7.4 G/DL (ref 14–18)
IMM GRANULOCYTES # BLD AUTO: 0.13 K/UL (ref 0–0.11)
IMM GRANULOCYTES NFR BLD AUTO: 1.5 % (ref 0–0.9)
INST. QUALIFIED PATIENT IIQPT: YES
LYMPHOCYTES # BLD AUTO: 1.67 K/UL (ref 1–4.8)
LYMPHOCYTES NFR BLD: 19.6 % (ref 22–41)
MAGNESIUM SERPL-MCNC: 1.4 MG/DL (ref 1.5–2.5)
MCH RBC QN AUTO: 28.9 PG (ref 27–33)
MCHC RBC AUTO-ENTMCNC: 33 G/DL (ref 33.7–35.3)
MCV RBC AUTO: 87.5 FL (ref 81.4–97.8)
MONOCYTES # BLD AUTO: 0.68 K/UL (ref 0–0.85)
MONOCYTES NFR BLD AUTO: 8 % (ref 0–13.4)
NEUTROPHILS # BLD AUTO: 5.85 K/UL (ref 1.82–7.42)
NEUTROPHILS NFR BLD: 68.8 % (ref 44–72)
NRBC # BLD AUTO: 0.02 K/UL
NRBC BLD-RTO: 0.2 /100 WBC
O2/TOTAL GAS SETTING VFR VENT: 35 %
PCO2 BLDA: 30.5 MMHG (ref 26–37)
PCO2 TEMP ADJ BLDA: 30 MMHG (ref 26–37)
PH BLDA: 7.45 [PH] (ref 7.4–7.5)
PH TEMP ADJ BLDA: 7.45 [PH] (ref 7.4–7.5)
PHENOBARB SERPL-MCNC: 21.4 UG/ML (ref 15–40)
PHOSPHATE SERPL-MCNC: 2.2 MG/DL (ref 2.5–4.5)
PLATELET # BLD AUTO: 315 K/UL (ref 164–446)
PMV BLD AUTO: 11.2 FL (ref 9–12.9)
PO2 BLDA: 54 MMHG (ref 64–87)
PO2 TEMP ADJ BLDA: 53 MMHG (ref 64–87)
POTASSIUM SERPL-SCNC: 3.6 MMOL/L (ref 3.6–5.5)
POTASSIUM SERPL-SCNC: 3.9 MMOL/L (ref 3.6–5.5)
POTASSIUM SERPL-SCNC: 4 MMOL/L (ref 3.6–5.5)
RBC # BLD AUTO: 2.56 M/UL (ref 4.7–6.1)
SAO2 % BLDA: 90 % (ref 93–99)
SODIUM SERPL-SCNC: 119 MMOL/L (ref 135–145)
SODIUM SERPL-SCNC: 137 MMOL/L (ref 135–145)
SODIUM SERPL-SCNC: 148 MMOL/L (ref 135–145)
SPECIMEN DRAWN FROM PATIENT: ABNORMAL
WBC # BLD AUTO: 8.5 K/UL (ref 4.8–10.8)

## 2018-02-28 PROCEDURE — 700111 HCHG RX REV CODE 636 W/ 250 OVERRIDE (IP): Performed by: INTERNAL MEDICINE

## 2018-02-28 PROCEDURE — 99233 SBSQ HOSP IP/OBS HIGH 50: CPT | Performed by: HOSPITALIST

## 2018-02-28 PROCEDURE — 94003 VENT MGMT INPAT SUBQ DAY: CPT

## 2018-02-28 PROCEDURE — 770022 HCHG ROOM/CARE - ICU (200)

## 2018-02-28 PROCEDURE — 700102 HCHG RX REV CODE 250 W/ 637 OVERRIDE(OP): Performed by: INTERNAL MEDICINE

## 2018-02-28 PROCEDURE — 700111 HCHG RX REV CODE 636 W/ 250 OVERRIDE (IP): Performed by: PSYCHIATRY & NEUROLOGY

## 2018-02-28 PROCEDURE — 700105 HCHG RX REV CODE 258: Performed by: INTERNAL MEDICINE

## 2018-02-28 PROCEDURE — 80048 BASIC METABOLIC PNL TOTAL CA: CPT

## 2018-02-28 PROCEDURE — 82803 BLOOD GASES ANY COMBINATION: CPT

## 2018-02-28 PROCEDURE — C9254 INJECTION, LACOSAMIDE: HCPCS | Performed by: PSYCHIATRY & NEUROLOGY

## 2018-02-28 PROCEDURE — 94640 AIRWAY INHALATION TREATMENT: CPT

## 2018-02-28 PROCEDURE — 700101 HCHG RX REV CODE 250: Performed by: INTERNAL MEDICINE

## 2018-02-28 PROCEDURE — 700102 HCHG RX REV CODE 250 W/ 637 OVERRIDE(OP): Performed by: PSYCHIATRY & NEUROLOGY

## 2018-02-28 PROCEDURE — 80184 ASSAY OF PHENOBARBITAL: CPT

## 2018-02-28 PROCEDURE — 85025 COMPLETE CBC W/AUTO DIFF WBC: CPT

## 2018-02-28 PROCEDURE — 36600 WITHDRAWAL OF ARTERIAL BLOOD: CPT

## 2018-02-28 PROCEDURE — A9270 NON-COVERED ITEM OR SERVICE: HCPCS | Performed by: INTERNAL MEDICINE

## 2018-02-28 PROCEDURE — 71045 X-RAY EXAM CHEST 1 VIEW: CPT

## 2018-02-28 PROCEDURE — A9270 NON-COVERED ITEM OR SERVICE: HCPCS | Performed by: PSYCHIATRY & NEUROLOGY

## 2018-02-28 PROCEDURE — 700105 HCHG RX REV CODE 258: Performed by: PSYCHIATRY & NEUROLOGY

## 2018-02-28 PROCEDURE — 84100 ASSAY OF PHOSPHORUS: CPT

## 2018-02-28 PROCEDURE — 83735 ASSAY OF MAGNESIUM: CPT

## 2018-02-28 RX ORDER — DESMOPRESSIN ACETATE 4 UG/ML
2 INJECTION, SOLUTION INTRAVENOUS; SUBCUTANEOUS ONCE
Status: COMPLETED | OUTPATIENT
Start: 2018-02-28 | End: 2018-02-28

## 2018-02-28 RX ORDER — FLUDROCORTISONE ACETATE 0.1 MG/1
0.1 TABLET ORAL EVERY MORNING
Status: DISCONTINUED | OUTPATIENT
Start: 2018-02-28 | End: 2018-02-28

## 2018-02-28 RX ORDER — ADENOSINE 3 MG/ML
INJECTION, SOLUTION INTRAVENOUS
Status: DISCONTINUED
Start: 2018-02-28 | End: 2018-02-28

## 2018-02-28 RX ORDER — LACOSAMIDE 50 MG/1
200 TABLET ORAL 2 TIMES DAILY
Status: DISCONTINUED | OUTPATIENT
Start: 2018-02-28 | End: 2018-03-18 | Stop reason: HOSPADM

## 2018-02-28 RX ORDER — MAGNESIUM SULFATE HEPTAHYDRATE 40 MG/ML
4 INJECTION, SOLUTION INTRAVENOUS ONCE
Status: COMPLETED | OUTPATIENT
Start: 2018-02-28 | End: 2018-02-28

## 2018-02-28 RX ORDER — PHENOBARBITAL 20 MG/5ML
150 ELIXIR ORAL EVERY EVENING
Status: DISCONTINUED | OUTPATIENT
Start: 2018-02-28 | End: 2018-03-03

## 2018-02-28 RX ORDER — DEXTROSE MONOHYDRATE 50 MG/ML
INJECTION, SOLUTION INTRAVENOUS CONTINUOUS
Status: DISCONTINUED | OUTPATIENT
Start: 2018-02-28 | End: 2018-03-02

## 2018-02-28 RX ORDER — LEVETIRACETAM 100 MG/ML
1500 SOLUTION ORAL EVERY 12 HOURS
Status: DISCONTINUED | OUTPATIENT
Start: 2018-02-28 | End: 2018-03-18 | Stop reason: HOSPADM

## 2018-02-28 RX ORDER — METOCLOPRAMIDE 10 MG/1
10 TABLET ORAL EVERY 6 HOURS
Status: COMPLETED | OUTPATIENT
Start: 2018-02-28 | End: 2018-03-01

## 2018-02-28 RX ADMIN — CHLORHEXIDINE GLUCONATE 15 ML: 1.2 RINSE ORAL at 21:35

## 2018-02-28 RX ADMIN — METOCLOPRAMIDE 10 MG: 5 INJECTION, SOLUTION INTRAMUSCULAR; INTRAVENOUS at 05:25

## 2018-02-28 RX ADMIN — MAGNESIUM SULFATE IN WATER 4 G: 40 INJECTION, SOLUTION INTRAVENOUS at 07:32

## 2018-02-28 RX ADMIN — DIBASIC SODIUM PHOSPHATE, MONOBASIC POTASSIUM PHOSPHATE AND MONOBASIC SODIUM PHOSPHATE 1 TABLET: 852; 155; 130 TABLET ORAL at 21:30

## 2018-02-28 RX ADMIN — IRON SUCROSE 200 MG: 20 INJECTION, SOLUTION INTRAVENOUS at 07:33

## 2018-02-28 RX ADMIN — METOCLOPRAMIDE HYDROCHLORIDE 10 MG: 10 TABLET ORAL at 18:37

## 2018-02-28 RX ADMIN — ENOXAPARIN SODIUM 40 MG: 100 INJECTION SUBCUTANEOUS at 09:09

## 2018-02-28 RX ADMIN — LEVETIRACETAM 1500 MG: 100 SOLUTION ORAL at 21:36

## 2018-02-28 RX ADMIN — DEXTROSE MONOHYDRATE: 50 INJECTION, SOLUTION INTRAVENOUS at 23:41

## 2018-02-28 RX ADMIN — DIBASIC SODIUM PHOSPHATE, MONOBASIC POTASSIUM PHOSPHATE AND MONOBASIC SODIUM PHOSPHATE 1 TABLET: 852; 155; 130 TABLET ORAL at 09:09

## 2018-02-28 RX ADMIN — BISACODYL 10 MG: 10 SUPPOSITORY RECTAL at 15:14

## 2018-02-28 RX ADMIN — SODIUM CHLORIDE 200 MG: 9 INJECTION, SOLUTION INTRAVENOUS at 08:26

## 2018-02-28 RX ADMIN — MIDODRINE HYDROCHLORIDE 10 MG: 5 TABLET ORAL at 07:33

## 2018-02-28 RX ADMIN — METOCLOPRAMIDE HYDROCHLORIDE 10 MG: 10 TABLET ORAL at 23:41

## 2018-02-28 RX ADMIN — PHENOBARBITAL 120 MG: 20 ELIXIR ORAL at 07:32

## 2018-02-28 RX ADMIN — NOREPINEPHRINE BITARTRATE 5 MCG/MIN: 1 INJECTION INTRAVENOUS at 09:39

## 2018-02-28 RX ADMIN — LACOSAMIDE 200 MG: 50 TABLET, FILM COATED ORAL at 21:30

## 2018-02-28 RX ADMIN — PREDNISONE 5 MG: 5 TABLET ORAL at 08:26

## 2018-02-28 RX ADMIN — METOCLOPRAMIDE HYDROCHLORIDE 10 MG: 10 TABLET ORAL at 11:47

## 2018-02-28 RX ADMIN — LEVALBUTEROL HYDROCHLORIDE 1.25 MG: 1.25 SOLUTION RESPIRATORY (INHALATION) at 06:09

## 2018-02-28 RX ADMIN — CHLORHEXIDINE GLUCONATE 15 ML: 1.2 RINSE ORAL at 07:33

## 2018-02-28 RX ADMIN — ACETAMINOPHEN 650 MG: 325 TABLET, FILM COATED ORAL at 09:46

## 2018-02-28 RX ADMIN — MIDODRINE HYDROCHLORIDE 10 MG: 5 TABLET ORAL at 18:36

## 2018-02-28 RX ADMIN — PHENOBARBITAL 150 MG: 20 ELIXIR ORAL at 21:30

## 2018-02-28 RX ADMIN — MIDODRINE HYDROCHLORIDE 10 MG: 5 TABLET ORAL at 11:47

## 2018-02-28 RX ADMIN — DESMOPRESSIN ACETATE 2 MCG: 4 INJECTION, SOLUTION INTRAVENOUS; SUBCUTANEOUS at 23:43

## 2018-02-28 RX ADMIN — FLUDROCORTISONE ACETATE 0.1 MG: 0.1 TABLET ORAL at 09:15

## 2018-02-28 RX ADMIN — OMEPRAZOLE 40 MG: 20 CAPSULE, DELAYED RELEASE ORAL at 07:32

## 2018-02-28 RX ADMIN — LEVALBUTEROL HYDROCHLORIDE 1.25 MG: 1.25 SOLUTION RESPIRATORY (INHALATION) at 11:12

## 2018-02-28 RX ADMIN — DIBASIC SODIUM PHOSPHATE, MONOBASIC POTASSIUM PHOSPHATE AND MONOBASIC SODIUM PHOSPHATE 1 TABLET: 852; 155; 130 TABLET ORAL at 13:58

## 2018-02-28 RX ADMIN — LEVALBUTEROL HYDROCHLORIDE 1.25 MG: 1.25 SOLUTION RESPIRATORY (INHALATION) at 02:34

## 2018-02-28 RX ADMIN — LEVALBUTEROL HYDROCHLORIDE 1.25 MG: 1.25 SOLUTION RESPIRATORY (INHALATION) at 20:00

## 2018-02-28 RX ADMIN — POTASSIUM BICARBONATE 50 MEQ: 25 TABLET, EFFERVESCENT ORAL at 07:33

## 2018-02-28 RX ADMIN — SODIUM CHLORIDE 1500 MG: 9 INJECTION, SOLUTION INTRAVENOUS at 07:32

## 2018-02-28 NOTE — PROGRESS NOTES
Ruben has had no significant change.    Exam unchanged.  Eyes open, not tracking.     Phenobarbital level:  21.4    Impression:  Ruben is a 37 year old man with a remote history of TBI with resultant spastic quadraplegia, vegetative state and intractable seizures.  1.  Continue vimpat and Keppra.  2.  Phenobarbital is at an ideal level between 20 and 30.  I would not expect this level to cause any respiratory suppression.  Given the extremely long half-life of phenobarbital I don't think he has reached steady state yet and would anticipate the level continue to rise over time.  Therefore I would recommend converting his dosing to once a day at night at a dose of 150mg qhs to avoid a continued rise in drug levels that may lead to oversedation.  3.  Will sign off, please call if any further concerns.

## 2018-02-28 NOTE — PROGRESS NOTES
Neurology Progress Note        Subjective:  Have been asked to follow-up Ruben in neurological consultation. There is been no clinical change over the last 24 hours. No concern for clinical seizures and been noted. They are attempting to wean him off the vent.    Objective:  Vitals:  Vitals:    02/27/18 1400 02/27/18 1430 02/27/18 1500 02/27/18 1537   Pulse: 63 61 61    Resp: (!) 11 (!) 11 15    Temp:       SpO2: 97% 97% 97% 96%   Weight:       Height:         General:  Awake does not track or interact  Mental Status: Awake, no verbal output or following of commands  Cranial Nerves:  PERRL, appears to have a slight left gaze preference, having nystagmus  Motor: Spastic quadriplegia      Recent Labs      02/25/18   0557  02/26/18   0550  02/27/18   0440   WBC  7.3  7.1  8.5   RBC  2.69*  2.61*  2.48*   HEMOGLOBIN  7.5*  7.6*  7.3*   HEMATOCRIT  23.5*  22.5*  21.6*   MCV  87.4  86.2  87.1   MCH  27.9  29.1  29.4   RDW  53.4*  50.8*  51.0*   PLATELETCT  94*  139*  209   MPV  12.6  12.5  11.9   NEUTSPOLYS  61.40  57.50  63.50   LYMPHOCYTES  30.70  40.70  24.80   MONOCYTES  5.30  1.80  8.00   EOSINOPHILS  2.60  0.00  2.10   BASOPHILS  0.00  0.00  0.20   RBCMORPHOLO  Present  Present   --      Recent Labs      02/26/18   1610  02/27/18   0040  02/27/18   0440   SODIUM  134*  128*  126*   POTASSIUM  3.6  3.8  3.6   CHLORIDE  103  100  99   CO2  24  21  21   GLUCOSE  92  88  86   BUN  16  14  15         Impression: Pramod is a 37-year-old man is neurovegetative state after a traumatic brain injury 20 years ago. He was admitted with increasing seizure activity after attempting to wean phenobarbital.      Recommendations:  1. Continue Keppra and Vimpat  2.  Will check phenobarbital level, with a goal range of 20-30. We'll adjust phenobarbital dosing based on the current level.

## 2018-02-28 NOTE — CARE PLAN
Problem: Ventilation Defect:  Goal: Ability to achieve and maintain unassisted ventilation or tolerate decreased levels of ventilator support  Outcome: PROGRESSING AS EXPECTED    Intervention: Support and monitor invasive and noninvasive mechanical ventilation  Adult Ventilation Update    Total Vent Days: 11  Chronic trach    Patient Lines/Drains/Airways Status    Active Airway     Name: Placement date: Placement time: Site: Days:    Airway Group Standard Cuffed Trach Tracheostomy 6.0 07/02/17   1403   Tracheostomy   240    Airway Group Tracheostomy 6.0       Tracheostomy                 Vent settings: 12 460 8 45%  Xopenex Q6    Sputum/Suction   Cough:  (unable to assess) (02/28/18 0400)  Sputum Amount: Small (02/28/18 0234)  Sputum Color: Clear;White (02/28/18 0234)  Sputum Consistency: Thick;Thin (02/28/18 0234)    Mobility Group  Activity Performed: Unable to mobilize (02/27/18 2000)  Pt Calls for Assistance: No (02/27/18 2000)  Reason Not Mobilized: Other (comment) (baseline bedridden) (02/27/18 2000)  Mobilization Comments:  (Per MD) (02/27/18 0805)    Events/Summary/Plan: Abg drawn, increased fio2 to 45%. Pt stable on vent, will continue to monitor.

## 2018-02-28 NOTE — PROGRESS NOTES
Pulmonary Critical Care Progress Note        Date of admission: 2/18/2018    Chief Complaint: Respiratory failure, septic shock    History of Present Illness:  Mr. Edwards is a 37-year-old male with past medical history of traumatic brain injury at age 17, seizures diagnosed in June of 2017. His baseline mental status appears to be nodding and minimal interacting without vocalization. Who was brought to the ICU as a direct admit from [facility] where he was brought today for hypoxia. Reportedly the patient was being weanied off of his phenobarbital by his neurologist when he began having more frequent seizures, approximately one week ago his neurologist increased his Keppra dosing however the seizures continued. His mother did use CBD and THC with some success in slowing the seizures. Since 2 days ago the patient had multiple aspiration events following these seizures. His mother evaluated him with a home oxygen saturation monitor and he was noted to be hypoxic, he was brought to [facility] where he was found to have 28% bands with leukopenia. Chest x-ray was obtained demonstrating bilateral infiltrates right greater than left. He was placed on mechanical ventilation and his oxygenation was unable to be improved more than 85%, he was hypotensive and given multiple boluses of IV fluids without improvement. He was started on levo fed and given multiple doses of Ativan for seizures and transferred to our ICU for higher level of care. He arrives on the ventilator and on pressors critically ill.    Please note this history is obtained by my partner Dr. Appiah 2/18/2018.    ROS:  Respiratory: unable to perform due to the patient's inability to effectively communicate, Cardiac: unable to perform due to the patient's inability to effectively communicate, GI: unable to perform due to the patient's inability to effectively communicate.  All other systems negative. Unchanged    Interval Events:  24 hour  interval history reviewed    - phenobarb level pending   - vaso down to 0.02 on higher dose of midodrine   - good BM with rectal tube removal   - tolerating TFs without residuals   - drop in Na to 119   - low phos/Mag   - Appears slightly more awake today and comfortable    PFSH:  No change.    Respiratory:  Liriano Vent Mode: APVCMV, Rate (breaths/min): 12, Vt Target (mL): 460, PEEP/CPAP: 8, FiO2: 45, Static Compliance (ml / cm H2O): 75.6, Control VTE (exp VT): 453  Pulse Oximetry: 97 % PIP 19, good lung compliance          Exam: trach tube without erythema, mild yellowish peritubal secretions, unlabored respirations, no intercostal retractions or accessory muscle use and rhonchi bibasilar.     ImagingCXR  I have personally reviewed the chest x-ray my impression is  (compared to prior films) unchanged diffuse bilateral infiltrates greatest in the left side, trach and right IJ CVL in good position    Recent Labs      02/28/18   0458   ISTATAPH  7.448   ISTATAPCO2  30.5   ISTATAPO2  54*   ISTATATCO2  22   [id number]  90*   ISTATARTHCO3  21.1   ISTATARTBE  -2   ISTATTEMP  97.9 F   ISTATFIO2  35   ISTATSPEC  Arterial   ISTATAPHTC  7.454   [id number]  53*    ABG interpretation: Mild respiratory alkalosis with poor oxygenation    HemoDynamics:  Pulse: 63, Heart Rate (Monitored): 61  NIBP: (!) 90/58   vasopressin drip at 0.02, midodrine at 10 mg 3 times a day    Exam: no murmur, trace bilateral pedal edema, regular rhythm (Sinus), sinus bradycardia - no change     Echo on 2/19/2018:  CONCLUSIONS  Normal right and left ventricular size and function.   No significant valve disease or flow abnormalities.   Dilated inferior vena cava without inspiratory collapse. The patient is   intubated.  Recent Labs      02/26/18   0550   TROPONINI  <0.01       Neuro:  GCS Total Aleida Coma Score: 10        Exam: PERRL, opening eyes spontaneously, not following commands, calm, no seizure-like activity    Imaging: Available data  reviewed    EEG 2/25: EEG monitoring shows intermittent  epileptiform over the right hemisphere  Improved compared with that of yesterday    Fluids:  Intake/Output       02/26/18 0700 - 02/27/18 0659 02/27/18 0700 - 02/28/18 0659 02/28/18 0700 - 03/01/18 0659      7291-5197 3817-0012 Total 6617-9815 0427-0048 Total 0700-1859 1900-0659 Total       Intake    I.V.  524  428 952  --  231.9 231.9  --  -- --    Vasopressin Volume 144 108 252 -- 111.9 111.9 -- -- --    IV Piggyback Volume (IV Piggyback) 200 200 400 -- -- -- -- -- --    IV Volume (0.9% Normal Saline) 180 120 300 -- 120 120 -- -- --    Other  640  240 880  120  -- 120  --  -- --    Medications (P.O./ Enteral Liquids) 640 240 880 120 -- 120 -- -- --    Enteral  450  570 1020  700  885 1585  --  -- --    Enteral Volume 285 300 585  -- -- --    Free Water / Tube Flush 165 270 435 180 180 360 -- -- --    Total Intake 1614 1238 2852 820 1116.9 1936.9 -- -- --       Output    Urine  1845  415 2260  710  695 1405  --  -- --    Indwelling Cathether 8777 314 7975 -- 695 695 -- -- --    Void (ml) -- -- -- 710 -- 710 -- -- --    Drains  0  -- 0  --  -- --  --  -- --    Residual Amount (ml) (Discarded) 0 -- 0 -- -- -- -- -- --    Stool/Urine  --  0 0  --  -- --  --  -- --    Measurable Stool (ml) -- 0 0 -- -- -- -- -- --    Total Output 6191 415 8464  -- -- --       Net I/O     -231 823 592 110 421.9 531.9 -- -- --        Weight: 62.4 kg (137 lb 9.1 oz)  Recent Labs      02/27/18   0040  02/27/18   0440  02/27/18   1805  02/28/18   0457   SODIUM  128*  126*  125*  119*   POTASSIUM  3.8  3.6   --   4.0   CHLORIDE  100  99   --   96   CO2  21  21   --   23   BUN  14  15   --   13   CREATININE  <0.20*  <0.20*   --   <0.20*   MAGNESIUM   --    --    --   1.4*   PHOSPHORUS   --    --    --   2.2*   CALCIUM  7.6*  7.4*   --   7.4*       GI/Nutrition:  Exam: abdomen is soft and non-tender, normal active bowel sounds, G-Tube  no sign of infection    Imaging: Available data reviewed    KUB : No evidence of bowel obstruction.   CT abdomen and pelvis with oral contrast :  1.  No evidence of bowel obstruction or perforation.  2.  Appendix not visualized.  3.  Small to moderate volume ascites of uncertain etiology.  4.  Bilateral pleural effusions with associated atelectasis.  5.  Ill-defined parenchymal opacities in the LEFT lower lung suggesting multifocal pneumonia.  6.  Scoliosis.    Liver Function  Recent Labs      18   0550  18   1610  18   0040  18   0440  18   045   PREALBUMIN  23.0  23.0   --    --    --    GLUCOSE  81  92  88  86  93       Heme:  Recent Labs      18   0550  18   0440  18   045   RBC  2.61*  2.48*  2.56*   HEMOGLOBIN  7.6*  7.3*  7.4*   HEMATOCRIT  22.5*  21.6*  22.4*   PLATELETCT  139*  209  315       Infectious Disease:  Monitored Temp  Av.1 °C (96.9 °F)  Min: 35 °C (95 °F)  Max: 37.2 °C (99 °F)  Temp  Av.2 °C (97.2 °F)  Min: 36.1 °C (96.9 °F)  Max: 36.4 °C (97.5 °F)  Micro: reviewed. C. diff is negative. BAL with Proteus, and Klebsiella. Repeat cultures negative  Recent Labs      18   0550  180  18   WBC  7.1  8.5  8.5   NEUTSPOLYS  57.50  63.50  68.80   LYMPHOCYTES  40.70  24.80  19.60*   MONOCYTES  1.80  8.00  8.00   EOSINOPHILS  0.00  2.10  1.90   BASOPHILS  0.00  0.20  0.20     Current Facility-Administered Medications   Medication Dose Frequency Provider Last Rate Last Dose   • magnesium sulfate IVPB premix 4 g  4 g Once Jeremy M Gonda, M.D. 25 mL/hr at 18 0732 4 g at 18 0732   • midodrine (PROAMATINE) tablet 10 mg  10 mg TID WITH MEALS Jeremy M Gonda, M.D.   10 mg at 18 0733   • midodrine (PROAMATINE) tablet 5 mg  5 mg Once Jeremy M Gonda, M.D.   Stopped at 18 1215   • predniSONE (DELTASONE) tablet 5 mg  5 mg DAILY Jeremy M Gonda, M.D.   5 mg at 18 0826   • iron sucrose (VENOFER) injection 200 mg  200  mg DAILY Jeremy M Gonda, M.D.   200 mg at 02/28/18 0733   • metoclopramide (REGLAN) injection 10 mg  10 mg Q6HRS Jeremy M Gonda, M.D.   10 mg at 02/28/18 0525   • potassium bicarbonate (KLYTE) 25 MEQ effervescent tablet TBEF 50 mEq  50 mEq DAILY JASPAL [email or web address]   50 mEq at 02/28/18 0733   • loperamide (IMODIUM) 1 MG/5ML solution 2 mg  2 mg 4X/DAY PRN JASPAL Medina.O.   2 mg at 02/26/18 0820   • PHENobarbital solution 120 mg  120 mg BID Marty Garcia M.D.   120 mg at 02/28/18 0732   • omeprazole 2 mg/mL in sodium bicarbonate (PRILOSEC) oral susp 40 mg  40 mg DAILY Jon Reynoso D.O.   40 mg at 02/28/18 0732   • vasopressin (VASOSTRICT) 20 Units in  mL Infusion  0.03 Units/min Continuous Rivas Brooks M.D. 9 mL/hr at 02/28/18 0343 0.03 Units/min at 02/28/18 0343   • levalbuterol (XOPENEX) 1.25 MG/3ML nebulizer solution 1.25 mg  1.25 mg Q6HRS (RT) Jon Reynoso D.O.   1.25 mg at 02/28/18 0609   • Respiratory Care per Protocol   Continuous RT Modesto Appiah Jr., D.O.       • polyethylene glycol/lytes (MIRALAX) PACKET 1 Packet  1 Packet QDAY PRN Modesto Appiah Jr., D.O.        And   • magnesium hydroxide (MILK OF MAGNESIA) suspension 30 mL  30 mL QDAY PRN Modesto Appiah Jr., D.O.        And   • bisacodyl (DULCOLAX) suppository 10 mg  10 mg QDAY PRN Modesto Appiah Jr., D.O.   10 mg at 02/27/18 1537   • chlorhexidine (PERIDEX) 0.12 % solution 15 mL  15 mL BID Modesto Appiah Jr., D.O.   15 mL at 02/28/18 0733   • lidocaine (XYLOCAINE) 1%  injection  1-2 mL Q30 MIN PRN Modesto Appiah Jr., D.O.       • MD ALERT...Adult ICU Electrolyte Replacement per Pharmacy Protocol   pharmacy to dose Modesto Appiah Jr., D.O.       • ipratropium-albuterol (DUONEB) nebulizer solution 3 mL  3 mL Q2HRS PRN (RT) Modesto Appiah Jr., D.O.       • levalbuterol (XOPENEX) 1.25 MG/3ML nebulizer solution 1.25 mg  1.25 mg Q4HRS PRN Duy Whitaker M.D.   1.25 mg at 02/20/18 0626   • miconazole 2%-zinc oxide (RADHA)  topical cream   PRN Duy Whitaker M.D.       • acetaminophen (TYLENOL) tablet 650 mg  650 mg Q6HRS PRN Duy Whitaker M.D.   650 mg at 02/27/18 1503   • oxyCODONE immediate-release (ROXICODONE) tablet 5 mg  5 mg Q3HRS PRN Duy Whitaker M.D.        And   • oxyCODONE immediate release (ROXICODONE) tablet 10 mg  10 mg Q3HRS PRN Duy Whitaker M.D.       • ondansetron (ZOFRAN) syringe/vial injection 4 mg  4 mg Q4HRS PRN Duy Whitaker M.D.   4 mg at 02/25/18 1140   • ondansetron (ZOFRAN ODT) dispertab 4 mg  4 mg Q4HRS PRN Duy Whitaker M.D.       • promethazine (PHENERGAN) tablet 12.5-25 mg  12.5-25 mg Q4HRS PRN Duy Whitaker M.D.       • promethazine (PHENERGAN) suppository 12.5-25 mg  12.5-25 mg Q4HRS PRN Duy Whitaker M.D.       • prochlorperazine (COMPAZINE) injection 5-10 mg  5-10 mg Q4HRS PRN Duy Whitaker M.D.       • LORazepam (ATIVAN) injection 4 mg  4 mg Q10 MIN PRN Duy Whitaker M.D.   2 mg at 02/23/18 1251   • levETIRAcetam (KEPPRA) 1,500 mg in  mL IVPB  1,500 mg Q12HRS Duy Whitaker M.D. 400 mL/hr at 02/28/18 0732 1,500 mg at 02/28/18 0732   • lacosamide (VIMPAT) 200 mg in  mL ivpb  200 mg Q12HRS Maci Jade M.D.   Stopped at 02/27/18 1322     Last reviewed on 2/18/2018  7:51 PM by Lois Camejo, Pharmacy Intern    Quality  Measures:  Labs reviewed, Medications reviewed and Radiology images reviewed  Waldrop catheter: Critically Ill - Requiring Accurate Measurement of Urinary Output  Central line in place: Shock and Vasopressors    DVT Prophylaxis: Contraindicated - High bleeding risk  DVT prophylaxis - mechanical: SCDs  Ulcer prophylaxis: Yes    Assessed for rehab: Patient unable to tolerate rehabilitation therapeutic regimen    Assessment and plan:  Acute hypoxemic respiratory failure - VDRF since 2/18, chronic trach   - Continue full vent support with daily spontaneous breathing trials, trial 4 hours ×2 with rest overnight   - RT/O2 protocol, encourage  mobilization   - Continue to Avoid all sedatives and maintain aspiration precautions   - Every other day chest x-ray  Septic shock from pulmonary source, now unspecified place a distributive shock (possible phenobarbital related)   -Switch to norepinephrine drip to maintain MAP greater than 60 given hyponatremia   - Continue midodrine, add Florinef   - Completed antibiotics, s/p sepsis protocol  History of traumatic brain injury, chronic seizure disorder with associated status epilepticus   - Continue antiepileptics per neurology, phenobarbital level pending   - Seizure precautions  Decubitus ulcerations of the left hip including stage IV disease   - Wound care, optimize nutrition  Pneumonia (Proteus and Klebsiella)   - S/P antibiotics  Mild fluid overload - improving, continue to hold diuretics for now  Possible paralytic ileus versus feeding tube malfunction - improving   - Continue IV Reglan scheduled for another 24 hours   - Continue tube feeding trial with home regimen   - Bowel protocol  Hyponatremia - Salt Tabs, free water restriction, recheck BMP this afternoon  Iron deficiency anemia - iron supplementation  Thrombocytopenia - resolved  Hypokalemia/mag - repletion daily  Prophylaxis (SCDs only), enteral nutrition    Discussed patient condition and risk of morbidity and/or mortality with Family, RN, RT, Pharmacy, Charge nurse / hot rounds and hospitalist and neurology  The patient remains critically ill.  Critical care time = 33 minutes in directly providing and coordinating critical care and extensive data review.  No time overlap and excludes procedures.    Jeremy M Gonda, M.D.

## 2018-02-28 NOTE — CARE PLAN
Problem: Safety  Goal: Will remain free from falls  Outcome: PROGRESSING AS EXPECTED  No falls this admission    Problem: Bowel/Gastric:  Goal: Normal bowel function is maintained or improved  Outcome: PROGRESSING AS EXPECTED  Patient had large BM yesterday 2/27    Problem: Knowledge Deficit  Goal: Knowledge of disease process/condition, treatment plan, diagnostic tests, and medications will improve  Outcome: PROGRESSING AS EXPECTED  Educating often with caregiver and mom

## 2018-02-28 NOTE — PROGRESS NOTES
[facility] Hospitalist Progress Note    Date of Service: 2018    Chief Complaint  37 y.o. male admitted 2018 with shortness of breath.    Interval Problem Update  Mr. Edwards has a past medical history of traumatic brain injury and seizure disorder that recently underwent a slow taper off phenobarbital. He subsequently developed seizures over the previous 2 weeks prior to presentation to the ER. He was found to be hypoxic with aspiration pneumonitis and required intubation with ICU admission. He has been treated with antibiotics.    RN notes no further seizure activity noted since . His nurse notes bradycardia.   He is on levophed due to hypotension. He is tolerating his home tube feeds. 700 ml urine over night. He tolerated SBTs on . His Na went down again today. On  I met with his mother, Susana, at bedside and we discussed Ruben's condition and plan of care. Today a family friend was at bedside.  Consultants/Specialty  Critical Care. I discussed her condition with Dr. Gonda on ICU Hot Rounds.  Neurology    Disposition  ICU        Review of Systems   Unable to perform ROS: Intubated      Physical Exam  Laboratory/Imaging   Hemodynamics  Temp (24hrs), Av.2 °C (97.2 °F), Min:36.1 °C (96.9 °F), Max:36.4 °C (97.5 °F)   Temperature: 36.1 °C (96.9 °F), Monitored Temp: 36.6 °C (97.9 °F)  Pulse  Av.7  Min: 50  Max: 119 Heart Rate (Monitored): 61  NIBP: (!) 90/58      Respiratory  Liriano Vent Mode: APVCMV, Rate (breaths/min): 12, PEEP/CPAP: 8, PEEP/CPAP: 8, FiO2: 45, P Peak (PIP): 16, P MEAN: 10   Respiration: 14, Pulse Oximetry: 97 %     Work Of Breathing / Effort: Vented  RUL Breath Sounds: Clear, RML Breath Sounds: Clear, RLL Breath Sounds: Diminished, JONATAN Breath Sounds: Coarse Crackles, LLL Breath Sounds: Diminished    Fluids    Intake/Output Summary (Last 24 hours) at 18 0737  Last data filed at 18 0600   Gross per 24 hour   Intake           1901.9 ml   Output             1405 ml    Net            496.9 ml       Nutrition  Orders Placed This Encounter   Procedures   • Diet NPO     Standing Status:   Standing     Number of Occurrences:   1     Order Specific Question:   Restrict to:     Answer:   Strict [1]     Physical Exam   Constitutional: No distress.   HENT:   Head: Normocephalic and atraumatic.   Neck:   Tracheostomy, right IJ central line.   Cardiovascular:   Regular, no murmur   Pulmonary/Chest:   Vent, good air movement, few crackles.    Abdominal: Soft.   PEG tube, mild distention   +bowel sounds   Genitourinary:   Genitourinary Comments: catheter   Musculoskeletal: He exhibits deformity. He exhibits no edema.   Extensive contractures of extremities x 4.   Poor muscle tone.   Neurological:   Awake though he does not make eye contact nor follow commands.    Skin: No rash noted. He is not diaphoretic. There is pallor.   Nursing note and vitals reviewed.      Recent Labs      02/26/18   0550  02/27/18   0440  02/28/18   0457   WBC  7.1  8.5  8.5   RBC  2.61*  2.48*  2.56*   HEMOGLOBIN  7.6*  7.3*  7.4*   HEMATOCRIT  22.5*  21.6*  22.4*   MCV  86.2  87.1  87.5   MCH  29.1  29.4  28.9   MCHC  33.8  33.8  33.0*   RDW  50.8*  51.0*  50.4*   PLATELETCT  139*  209  315   MPV  12.5  11.9  11.2     Recent Labs      02/27/18   0040  02/27/18   0440  02/27/18   1805  02/28/18   0457   SODIUM  128*  126*  125*  119*   POTASSIUM  3.8  3.6   --   4.0   CHLORIDE  100  99   --   96   CO2  21  21   --   23   GLUCOSE  88  86   --   93   BUN  14  15   --   13   CREATININE  <0.20*  <0.20*   --   <0.20*   CALCIUM  7.6*  7.4*   --   7.4*                      Assessment/Plan     * Septic shock (CMS-[facility])- (present on admission)   Assessment & Plan    Source is consistent with aspiration pneumonia.  Requiring ongoing IV pressors and s/p IV fluids. Midodrine and florinef have been started.  s/p full course of doxycycline, Rocephin and Flagyl  The organ system failure associated with this disease process is the  respiratory system as is evidenced by the need for mechanical ventilation.         Seizure (CMS-HCC)- (present on admission)   Assessment & Plan    S/p continuous EEG  Continue Vimpat, Keppra, phenobarbital all via PEG tube  Neurology consulted.        Hypomagnesemia- (present on admission)   Assessment & Plan    - Resolved        Decubitus ulcer of ischium, left, stage IV (CMS-HCC)- (present on admission)   Assessment & Plan    Present upon admission.  Continue wound care        Azotemia- (present on admission)   Assessment & Plan    - Resolved        Acute on chronic respiratory failure with hypoxemia (CMS-HCC)- (present on admission)   Assessment & Plan    Requiring intubation on 2/18.  History of prior tracheostomy  Vent management per intensivist        Aspiration pneumonia (CMS-HCC)- (present on admission)   Assessment & Plan    Source of septic shock  S/p doxycycline, Rocephin and Flagyl  Status post bronchoscopy  CT chest on 2/26:  1.  No evidence of bowel obstruction or perforation.  2.  Appendix not visualized.  3.  Small to moderate volume ascites of uncertain etiology.  4.  Bilateral pleural effusions with associated atelectasis.  5.  Ill-defined parenchymal opacities in the LEFT lower lung suggesting multifocal pneumonia.  6.  Scoliosis        Anemia, blood loss- (present on admission)   Assessment & Plan    Hb is down to 7  Follow daily Hb        Diarrhea- (present on admission)   Assessment & Plan    - Resolved          Protein-calorie malnutrition, severe (CMS-HCC)- (present on admission)   Assessment & Plan    This is a clinical diagnosis in the setting of infection and vomiting/diarrhea for which feeds had been held  He is now tolerating tube feeds        Physical debility- (present on admission)   Assessment & Plan    - Chronic post traumatic brain injury  - At baseline        TBI (traumatic brain injury) (CMS-HCC)- (present on admission)   Assessment & Plan    - Chronic, nonverbal         Hyponatremia- (present on admission)   Assessment & Plan    Na had been elevated and is now low at 119.  Stop vasopressin.        Contraction, joint, multiple sites- (present on admission)   Assessment & Plan    - Chronic, PT/OT  - has baclofen pump, needs interrogating which is pending        GI bleed- (present on admission)   Assessment & Plan    Possible  Carmencita-White tear, patient did have multiple episodes of severe vomiting  If he develops further bleeding, an EGD may be appropriate.           Quality-Core Measures   Reviewed items::  Labs reviewed, Medications reviewed and Radiology images reviewed  Waldrop catheter::  Unconscious / Sedated Patient on a Ventilator  DVT prophylaxis pharmacological::  Contraindicated - High bleeding risk

## 2018-02-28 NOTE — FLOWSHEET NOTE
Ventilator Weaning Update    Patient is on vent day 11.  SBT was tolerated for a minimum of 4 hours on settings of 10/8.    Recommendation: SBT per Dr order.     Events/Summary/Plan: back to rest settings.  (02/28/18 4332)

## 2018-03-01 LAB
ANION GAP SERPL CALC-SCNC: 4 MMOL/L (ref 0–11.9)
ANION GAP SERPL CALC-SCNC: 5 MMOL/L (ref 0–11.9)
ANION GAP SERPL CALC-SCNC: 6 MMOL/L (ref 0–11.9)
ANION GAP SERPL CALC-SCNC: 7 MMOL/L (ref 0–11.9)
BASOPHILS # BLD AUTO: 0.3 % (ref 0–1.8)
BASOPHILS # BLD: 0.03 K/UL (ref 0–0.12)
BUN SERPL-MCNC: 12 MG/DL (ref 8–22)
BUN SERPL-MCNC: 12 MG/DL (ref 8–22)
BUN SERPL-MCNC: 13 MG/DL (ref 8–22)
BUN SERPL-MCNC: 14 MG/DL (ref 8–22)
BUN SERPL-MCNC: 14 MG/DL (ref 8–22)
BUN SERPL-MCNC: 8 MG/DL (ref 8–22)
CALCIUM SERPL-MCNC: 7.8 MG/DL (ref 8.5–10.5)
CALCIUM SERPL-MCNC: 7.9 MG/DL (ref 8.5–10.5)
CALCIUM SERPL-MCNC: 7.9 MG/DL (ref 8.5–10.5)
CALCIUM SERPL-MCNC: 8 MG/DL (ref 8.5–10.5)
CALCIUM SERPL-MCNC: 8.1 MG/DL (ref 8.5–10.5)
CALCIUM SERPL-MCNC: 8.6 MG/DL (ref 8.5–10.5)
CHLORIDE SERPL-SCNC: 104 MMOL/L (ref 96–112)
CHLORIDE SERPL-SCNC: 106 MMOL/L (ref 96–112)
CHLORIDE SERPL-SCNC: 108 MMOL/L (ref 96–112)
CHLORIDE SERPL-SCNC: 109 MMOL/L (ref 96–112)
CHLORIDE SERPL-SCNC: 112 MMOL/L (ref 96–112)
CHLORIDE SERPL-SCNC: 116 MMOL/L (ref 96–112)
CO2 SERPL-SCNC: 24 MMOL/L (ref 20–33)
CO2 SERPL-SCNC: 25 MMOL/L (ref 20–33)
CO2 SERPL-SCNC: 26 MMOL/L (ref 20–33)
CO2 SERPL-SCNC: 27 MMOL/L (ref 20–33)
CREAT SERPL-MCNC: <0.2 MG/DL (ref 0.5–1.4)
EOSINOPHIL # BLD AUTO: 0.1 K/UL (ref 0–0.51)
EOSINOPHIL NFR BLD: 1.1 % (ref 0–6.9)
ERYTHROCYTE [DISTWIDTH] IN BLOOD BY AUTOMATED COUNT: 55.3 FL (ref 35.9–50)
GLUCOSE SERPL-MCNC: 105 MG/DL (ref 65–99)
GLUCOSE SERPL-MCNC: 126 MG/DL (ref 65–99)
GLUCOSE SERPL-MCNC: 131 MG/DL (ref 65–99)
GLUCOSE SERPL-MCNC: 139 MG/DL (ref 65–99)
GLUCOSE SERPL-MCNC: 151 MG/DL (ref 65–99)
GLUCOSE SERPL-MCNC: 93 MG/DL (ref 65–99)
HCT VFR BLD AUTO: 27.4 % (ref 42–52)
HGB BLD-MCNC: 8.6 G/DL (ref 14–18)
IMM GRANULOCYTES # BLD AUTO: 0.11 K/UL (ref 0–0.11)
IMM GRANULOCYTES NFR BLD AUTO: 1.3 % (ref 0–0.9)
LYMPHOCYTES # BLD AUTO: 1.43 K/UL (ref 1–4.8)
LYMPHOCYTES NFR BLD: 16.3 % (ref 22–41)
MCH RBC QN AUTO: 28.7 PG (ref 27–33)
MCHC RBC AUTO-ENTMCNC: 31.4 G/DL (ref 33.7–35.3)
MCV RBC AUTO: 91.3 FL (ref 81.4–97.8)
MONOCYTES # BLD AUTO: 0.97 K/UL (ref 0–0.85)
MONOCYTES NFR BLD AUTO: 11 % (ref 0–13.4)
NEUTROPHILS # BLD AUTO: 6.14 K/UL (ref 1.82–7.42)
NEUTROPHILS NFR BLD: 70 % (ref 44–72)
NRBC # BLD AUTO: 0.04 K/UL
NRBC BLD-RTO: 0.5 /100 WBC
PLATELET # BLD AUTO: 599 K/UL (ref 164–446)
PMV BLD AUTO: 9.4 FL (ref 9–12.9)
POTASSIUM SERPL-SCNC: 3.2 MMOL/L (ref 3.6–5.5)
POTASSIUM SERPL-SCNC: 3.4 MMOL/L (ref 3.6–5.5)
POTASSIUM SERPL-SCNC: 3.6 MMOL/L (ref 3.6–5.5)
POTASSIUM SERPL-SCNC: 3.6 MMOL/L (ref 3.6–5.5)
POTASSIUM SERPL-SCNC: 4.2 MMOL/L (ref 3.6–5.5)
POTASSIUM SERPL-SCNC: 4.3 MMOL/L (ref 3.6–5.5)
RBC # BLD AUTO: 3 M/UL (ref 4.7–6.1)
SODIUM SERPL-SCNC: 135 MMOL/L (ref 135–145)
SODIUM SERPL-SCNC: 138 MMOL/L (ref 135–145)
SODIUM SERPL-SCNC: 141 MMOL/L (ref 135–145)
SODIUM SERPL-SCNC: 141 MMOL/L (ref 135–145)
SODIUM SERPL-SCNC: 143 MMOL/L (ref 135–145)
SODIUM SERPL-SCNC: 147 MMOL/L (ref 135–145)
WBC # BLD AUTO: 8.8 K/UL (ref 4.8–10.8)

## 2018-03-01 PROCEDURE — 700102 HCHG RX REV CODE 250 W/ 637 OVERRIDE(OP): Performed by: INTERNAL MEDICINE

## 2018-03-01 PROCEDURE — 700111 HCHG RX REV CODE 636 W/ 250 OVERRIDE (IP): Performed by: INTERNAL MEDICINE

## 2018-03-01 PROCEDURE — 94003 VENT MGMT INPAT SUBQ DAY: CPT

## 2018-03-01 PROCEDURE — A9270 NON-COVERED ITEM OR SERVICE: HCPCS | Performed by: INTERNAL MEDICINE

## 2018-03-01 PROCEDURE — 302101 FENESTRATED FOAM: Performed by: INTERNAL MEDICINE

## 2018-03-01 PROCEDURE — 770022 HCHG ROOM/CARE - ICU (200)

## 2018-03-01 PROCEDURE — 99233 SBSQ HOSP IP/OBS HIGH 50: CPT | Performed by: HOSPITALIST

## 2018-03-01 PROCEDURE — A9270 NON-COVERED ITEM OR SERVICE: HCPCS | Performed by: PSYCHIATRY & NEUROLOGY

## 2018-03-01 PROCEDURE — 700101 HCHG RX REV CODE 250: Performed by: INTERNAL MEDICINE

## 2018-03-01 PROCEDURE — 700102 HCHG RX REV CODE 250 W/ 637 OVERRIDE(OP): Performed by: PSYCHIATRY & NEUROLOGY

## 2018-03-01 PROCEDURE — 94640 AIRWAY INHALATION TREATMENT: CPT

## 2018-03-01 PROCEDURE — 80048 BASIC METABOLIC PNL TOTAL CA: CPT | Mod: 91

## 2018-03-01 PROCEDURE — 700105 HCHG RX REV CODE 258: Performed by: INTERNAL MEDICINE

## 2018-03-01 PROCEDURE — 85025 COMPLETE CBC W/AUTO DIFF WBC: CPT

## 2018-03-01 RX ADMIN — METOCLOPRAMIDE HYDROCHLORIDE 10 MG: 10 TABLET ORAL at 17:28

## 2018-03-01 RX ADMIN — CHLORHEXIDINE GLUCONATE 15 ML: 1.2 RINSE ORAL at 21:01

## 2018-03-01 RX ADMIN — IRON SUCROSE 200 MG: 20 INJECTION, SOLUTION INTRAVENOUS at 08:17

## 2018-03-01 RX ADMIN — LACOSAMIDE 200 MG: 50 TABLET, FILM COATED ORAL at 21:01

## 2018-03-01 RX ADMIN — METOCLOPRAMIDE HYDROCHLORIDE 10 MG: 10 TABLET ORAL at 05:54

## 2018-03-01 RX ADMIN — DEXTROSE MONOHYDRATE: 50 INJECTION, SOLUTION INTRAVENOUS at 03:12

## 2018-03-01 RX ADMIN — LEVALBUTEROL HYDROCHLORIDE 1.25 MG: 1.25 SOLUTION RESPIRATORY (INHALATION) at 02:12

## 2018-03-01 RX ADMIN — LEVETIRACETAM 1500 MG: 100 SOLUTION ORAL at 21:01

## 2018-03-01 RX ADMIN — POTASSIUM BICARBONATE 50 MEQ: 25 TABLET, EFFERVESCENT ORAL at 21:01

## 2018-03-01 RX ADMIN — METOCLOPRAMIDE HYDROCHLORIDE 10 MG: 10 TABLET ORAL at 11:11

## 2018-03-01 RX ADMIN — PHENOBARBITAL 150 MG: 20 ELIXIR ORAL at 21:01

## 2018-03-01 RX ADMIN — DEXTROSE MONOHYDRATE: 50 INJECTION, SOLUTION INTRAVENOUS at 20:00

## 2018-03-01 RX ADMIN — OMEPRAZOLE 40 MG: 20 CAPSULE, DELAYED RELEASE ORAL at 08:15

## 2018-03-01 RX ADMIN — LEVALBUTEROL HYDROCHLORIDE 1.25 MG: 1.25 SOLUTION RESPIRATORY (INHALATION) at 18:56

## 2018-03-01 RX ADMIN — MIDODRINE HYDROCHLORIDE 10 MG: 5 TABLET ORAL at 11:11

## 2018-03-01 RX ADMIN — LEVALBUTEROL HYDROCHLORIDE 1.25 MG: 1.25 SOLUTION RESPIRATORY (INHALATION) at 15:39

## 2018-03-01 RX ADMIN — LEVETIRACETAM 1500 MG: 100 SOLUTION ORAL at 08:18

## 2018-03-01 RX ADMIN — POTASSIUM BICARBONATE 50 MEQ: 25 TABLET, EFFERVESCENT ORAL at 09:47

## 2018-03-01 RX ADMIN — VASOPRESSIN 0.03 UNITS/MIN: 20 INJECTION INTRAVENOUS at 03:12

## 2018-03-01 RX ADMIN — ENOXAPARIN SODIUM 40 MG: 100 INJECTION SUBCUTANEOUS at 08:16

## 2018-03-01 RX ADMIN — CHLORHEXIDINE GLUCONATE 15 ML: 1.2 RINSE ORAL at 08:15

## 2018-03-01 RX ADMIN — LACOSAMIDE 200 MG: 50 TABLET, FILM COATED ORAL at 08:15

## 2018-03-01 RX ADMIN — LEVALBUTEROL HYDROCHLORIDE 1.25 MG: 1.25 SOLUTION RESPIRATORY (INHALATION) at 07:01

## 2018-03-01 RX ADMIN — MIDODRINE HYDROCHLORIDE 10 MG: 5 TABLET ORAL at 08:15

## 2018-03-01 RX ADMIN — MIDODRINE HYDROCHLORIDE 10 MG: 5 TABLET ORAL at 17:28

## 2018-03-01 NOTE — PROGRESS NOTES
Pulmonary Critical Care Progress Note        Date of admission: 2/18/2018    Chief Complaint: Respiratory failure, septic shock    History of Present Illness:  Mr. Edwards is a 37-year-old male with past medical history of traumatic brain injury at age 17, seizures diagnosed in June of 2017. His baseline mental status appears to be nodding and minimal interacting without vocalization. Who was brought to the ICU as a direct admit from [facility] where he was brought today for hypoxia. Reportedly the patient was being weanied off of his phenobarbital by his neurologist when he began having more frequent seizures, approximately one week ago his neurologist increased his Keppra dosing however the seizures continued. His mother did use CBD and THC with some success in slowing the seizures. Since 2 days ago the patient had multiple aspiration events following these seizures. His mother evaluated him with a home oxygen saturation monitor and he was noted to be hypoxic, he was brought to [facility] where he was found to have 28% bands with leukopenia. Chest x-ray was obtained demonstrating bilateral infiltrates right greater than left. He was placed on mechanical ventilation and his oxygenation was unable to be improved more than 85%, he was hypotensive and given multiple boluses of IV fluids without improvement. He was started on levo fed and given multiple doses of Ativan for seizures and transferred to our ICU for higher level of care. He arrives on the ventilator and on pressors critically ill.    Please note this history is obtained by my partner Dr. Appiah 2/18/2018.    ROS:  Respiratory: unable to perform due to the patient's inability to effectively communicate, Cardiac: unable to perform due to the patient's inability to effectively communicate, GI: unable to perform due to the patient's inability to effectively communicate.  All other systems negative. Unchanged    Interval Events:  24 hour  interval history reviewed    - dumped lots of urine when vasopressin stopped, ddAVP given   - florinef discontinued   - levo 3, vaso 0.03   - Na jumped up significantly, now on D5W @ 150 with last Na 141   - low K   - no neuro changes   - at goal TFs per home regimen   - increased plts 599   - ongoing BMs   - wound care for ulcers   - SBT: 10/8 x 8 hrs --> trial 5/8 (tolerate TVs 200-300 if rate ok)    PFSH:  No change.    Respiratory:  Liriano Vent Mode: Spont, Rate (breaths/min): 12, Vt Target (mL): 460, PEEP/CPAP: 8, FiO2: 35, P Support: 10, Static Compliance (ml / cm H2O): 57, Control VTE (exp VT): 465  Pulse Oximetry: 100 % PIP 22, good lung compliance          Exam: trach tube noted, unlabored respirations, no intercostal retractions or accessory muscle use and rhonchi bibasilar.     ImagingCXR  I have personally reviewed the chest x-ray my impression is  (compared to prior films) no film today    Recent Labs      02/28/18   0458   ISTATAPH  7.448   ISTATAPCO2  30.5   ISTATAPO2  54*   ISTATATCO2  22   EIFBHGF2FIT  90*   ISTATARTHCO3  21.1   ISTATARTBE  -2   ISTATTEMP  97.9 F   ISTATFIO2  35   ISTATSPEC  Arterial   ISTATAPHTC  7.454   [id number]  53*    ABG interpretation: None today    HemoDynamics:  Pulse: 61, Heart Rate (Monitored): 86  NIBP: 105/57   vasopressin drip at 0.03, midodrine at 10 mg 3 times a day, norepinephrine at 3 µg    Exam: regular rate and rhythm, regular rhythm (Sinus)      Echo on 2/19/2018:  CONCLUSIONS  Normal right and left ventricular size and function.   No significant valve disease or flow abnormalities.   Dilated inferior vena cava without inspiratory collapse. The patient is   intubated.        Neuro:  GCS Total Aleida Coma Score: 10        Exam: PERRL, opening eyes spontaneously, not following commands, calm, no seizure-like activity - unchanged today    Imaging: Available data reviewed    EEG 2/25: EEG monitoring shows intermittent  epileptiform over the right  hemisphere  Improved compared with that of yesterday    Fluids:  Intake/Output       02/27/18 0700 - 02/28/18 0659 02/28/18 0700 - 03/01/18 0659 03/01/18 0700 - 03/02/18 0659      4423-2579 2612-2351 Total 0700-1859 1900-0659 Total 0700-1859 1900-0659 Total       Intake    I.V.  --  231.9 231.9  393.6  1170.5 1564.1  --  -- --    Vasopressin Volume -- 111.9 111.9 26.9 -- 26.9 -- -- --    Norepinephrine Volume -- -- -- 46.7 120.5 167.2 -- -- --    IV Piggyback Volume (IV Piggyback) -- -- -- 300 -- 300 -- -- --    IV Volume (0.9% Normal Saline) -- 120 120 20 -- 20 -- -- --    IV Volume (D5W) -- -- -- -- 1050 1050 -- -- --    Other  120  -- 120  90  150 240  --  -- --    Medications (P.O./ Enteral Liquids) 120 -- 120 90 150 240 -- -- --    Enteral  700  885 1585  825  900 1725  --  -- --    Enteral Volume   -- -- --    Free Water / Tube Flush 180 180 360 165 240 405 -- -- --    Total Intake 820 1116.9 1936.9 1308.6 2220.5 3529.1 -- -- --       Output    Urine  710  900 1610  5350  1850 7200  --  -- --    Indwelling Cathether --  1850 7200 -- -- --    Void (ml) 710 -- 710 -- -- -- -- -- --    Stool  --  -- --  --  -- --  --  -- --    Number of Times Stooled -- -- -- -- 2 x 2 x -- -- --    Total Output  5350 1850 7200 -- -- --       Net I/O     110 216.9 326.9 -4041.5 370.5 -3670.9 -- -- --        Weight: 55.6 kg (122 lb 9.2 oz)  Recent Labs      02/28/18   0457   02/28/18   2200  03/01/18   0155  03/01/18   0545   SODIUM  119*   < >  148*  147*  141   POTASSIUM  4.0   < >  3.6  3.6  3.4*   CHLORIDE  96   < >  114*  116*  112   CO2  23   < >  25  24  25   BUN  13   < >  12  12  13   CREATININE  <0.20*   < >  <0.20*  <0.20*  <0.20*   MAGNESIUM  1.4*   --    --    --    --    PHOSPHORUS  2.2*   --    --    --    --    CALCIUM  7.4*   < >  8.3*  8.1*  7.9*    < > = values in this interval not displayed.       GI/Nutrition:  Exam: abdomen is soft and non-tender, normal active  bowel sounds, without rebound, G-Tube  no sign of infection   Imaging: Available data reviewed    KUB : No evidence of bowel obstruction.   CT abdomen and pelvis with oral contrast :  1.  No evidence of bowel obstruction or perforation.  2.  Appendix not visualized.  3.  Small to moderate volume ascites of uncertain etiology.  4.  Bilateral pleural effusions with associated atelectasis.  5.  Ill-defined parenchymal opacities in the LEFT lower lung suggesting multifocal pneumonia.  6.  Scoliosis.    Liver Function  Recent Labs      18   1610   18   2200  18   0155  18   0545   PREALBUMIN  23.0   --    --    --    --    GLUCOSE  92   < >  122*  131*  151*    < > = values in this interval not displayed.       Heme:  Recent Labs      18   0440  18   0457  18   0545   RBC  2.48*  2.56*  3.00*   HEMOGLOBIN  7.3*  7.4*  8.6*   HEMATOCRIT  21.6*  22.4*  27.4*   PLATELETCT  209  315  599*       Infectious Disease:  Monitored Temp  Av.9 °C (98.5 °F)  Min: 36 °C (96.8 °F)  Max: 37.8 °C (100 °F)  Micro: reviewed. C. diff is negative. BAL with Proteus, and Klebsiella. Repeat cultures negative  Recent Labs      18   0440  18   0457  18   0545   WBC  8.5  8.5  8.8   NEUTSPOLYS  63.50  68.80  70.00   LYMPHOCYTES  24.80  19.60*  16.30*   MONOCYTES  8.00  8.00  11.00   EOSINOPHILS  2.10  1.90  1.10   BASOPHILS  0.20  0.20  0.30     Current Facility-Administered Medications   Medication Dose Frequency Provider Last Rate Last Dose   • vasopressin (VASOSTRICT) 20 Units in  mL Infusion  0.03 Units/min Continuous Modesto Appiah Jr., FARHATO. 9 mL/hr at 18 0312 0.03 Units/min at 18   • norepinephrine (LEVOPHED) 8 mg in  mL Infusion  0-30 mcg/min Continuous Jeremy M Gonda, M.D. 7.5 mL/hr at 18 0547 4 mcg/min at 18 0547   • enoxaparin (LOVENOX) inj 40 mg  40 mg DAILY Jeremy M Gonda, M.D.   40 mg at 18 09   • metoclopramide  (REGLAN) tablet 10 mg  10 mg Q6HRS Jeremy M Gonda, M.D.   10 mg at 03/01/18 0554   • lacosamide (VIMPAT) tablet 200 mg  200 mg BID Jeremy M Gonda, M.D.   200 mg at 02/28/18 2130   • levETIRAcetam (KEPPRA) 100 MG/ML solution 1,500 mg  1,500 mg Q12HRS Jeremy M Gonda, M.D.   1,500 mg at 02/28/18 2136   • PHENobarbital solution 150 mg  150 mg Q EVENING Jon Rehman M.D.   150 mg at 02/28/18 2130   • D5W infusion   Continuous Modesto Appiah Jr., D.O. 150 mL/hr at 03/01/18 0720     • midodrine (PROAMATINE) tablet 10 mg  10 mg TID WITH MEALS Jeremy M Gonda, M.D.   10 mg at 02/28/18 1836   • iron sucrose (VENOFER) injection 200 mg  200 mg DAILY Jeremy M Gonda, M.D.   200 mg at 02/28/18 0733   • potassium bicarbonate (KLYTE) 25 MEQ effervescent tablet TBEF 50 mEq  50 mEq DAILY Jon Reynoso D.O.   50 mEq at 02/28/18 0733   • loperamide (IMODIUM) 1 MG/5ML solution 2 mg  2 mg 4X/DAY PRN Clarence Morfin D.O.   2 mg at 02/26/18 0820   • omeprazole 2 mg/mL in sodium bicarbonate (PRILOSEC) oral susp 40 mg  40 mg DAILY Jon Reynoso D.O.   40 mg at 02/28/18 0732   • levalbuterol (XOPENEX) 1.25 MG/3ML nebulizer solution 1.25 mg  1.25 mg Q6HRS (RT) Jon Reynoso D.O.   1.25 mg at 03/01/18 0701   • Respiratory Care per Protocol   Continuous RT Modesto Appiah Jr., D.O.       • polyethylene glycol/lytes (MIRALAX) PACKET 1 Packet  1 Packet QDAY PRN Modesto Appiah Jr., D.O.        And   • magnesium hydroxide (MILK OF MAGNESIA) suspension 30 mL  30 mL QDAY PRN JASPAL Alaniz Jr..O.        And   • bisacodyl (DULCOLAX) suppository 10 mg  10 mg QDAY PRN JASPAL Alaniz Jr..O.   10 mg at 02/28/18 1514   • chlorhexidine (PERIDEX) 0.12 % solution 15 mL  15 mL BID JASPAL Alaniz Jr..O.   15 mL at 02/28/18 2135   • lidocaine (XYLOCAINE) 1%  injection  1-2 mL Q30 MIN PRN JASPAL Alaniz Jr..SAM.       • MD ALERT...Adult ICU Electrolyte Replacement per Pharmacy Protocol   pharmacy to dose JASPAL Alaniz Jr..SAM.        • ipratropium-albuterol (DUONEB) nebulizer solution 3 mL  3 mL Q2HRS PRN (RT) Modesto Appiah Jr., D.OBucky       • levalbuterol (XOPENEX) 1.25 MG/3ML nebulizer solution 1.25 mg  1.25 mg Q4HRS PRN Duy Whitaker M.D.   1.25 mg at 02/20/18 0626   • miconazole 2%-zinc oxide (RADHA) topical cream   PRN Duy Whitaker M.D.       • acetaminophen (TYLENOL) tablet 650 mg  650 mg Q6HRS PRN Duy Whitaker M.D.   650 mg at 02/28/18 0946   • oxyCODONE immediate-release (ROXICODONE) tablet 5 mg  5 mg Q3HRS PRN Duy Whitaker M.D.        And   • oxyCODONE immediate release (ROXICODONE) tablet 10 mg  10 mg Q3HRS PRN Duy Whitaker M.D.       • ondansetron (ZOFRAN) syringe/vial injection 4 mg  4 mg Q4HRS PRN Duy Whitaker M.D.   4 mg at 02/25/18 1140   • ondansetron (ZOFRAN ODT) dispertab 4 mg  4 mg Q4HRS PRN Duy Whitaker M.D.       • promethazine (PHENERGAN) tablet 12.5-25 mg  12.5-25 mg Q4HRS PRN Duy Whitaker M.D.       • promethazine (PHENERGAN) suppository 12.5-25 mg  12.5-25 mg Q4HRS PRN Duy Whitaker M.D.       • prochlorperazine (COMPAZINE) injection 5-10 mg  5-10 mg Q4HRS PRN Duy Whitaker M.D.       • LORazepam (ATIVAN) injection 4 mg  4 mg Q10 MIN PRN Duy Whitaker M.D.   2 mg at 02/23/18 1251     Last reviewed on 2/18/2018  7:51 PM by Lois Camejo, Pharmacy Intern    Quality  Measures:   Labs reviewed, Medications reviewed and Radiology images reviewed   Waldrop catheter: Critically Ill - Requiring Accurate Measurement of Urinary Output   Central line in place: Shock and Vasopressors     DVT Prophylaxis: Contraindicated - High bleeding risk   DVT prophylaxis - mechanical: SCDs   Ulcer prophylaxis: Yes     Assessed for rehab: Patient unable to tolerate rehabilitation therapeutic regimen    Assessment and plan:  Acute hypoxemic respiratory failure - VDRF since 2/18, chronic trach   - Continue full vent support with daily spontaneous breathing trials, attempt T piece trial today   - RT/O2 protocol,  encourage mobilization   - Continue to Avoid all sedatives and maintain aspiration precautions   - Every other day chest x-ray  Septic shock from pulmonary source, now unspecified place a distributive shock   - Continue norepinephrine drip to maintain SBP greater than 80, discontinue vasopressin again   - Continue midodrine   - Completed antibiotics, s/p sepsis protocol  History of traumatic brain injury, chronic seizure disorder with associated status epilepticus   - Continue antiepileptics (phenobarbital dose decreased) per neurology, phenobarbital level at goal   - Seizure precautions  Decubitus ulcerations of the left hip including stage IV disease   - Wound care, optimize nutrition  Pneumonia (Proteus and Klebsiella)   - S/P antibiotics  Mild fluid overload - improving, continue to hold diuretics for now  Possible paralytic ileus versus feeding tube malfunction - improved   - Discontinue Reglan   - Continue tube feeding trial with home regimen   - Bowel protocol  Hyponatremia now hypernatremia - discontinue Salt Tabs, trend BMP closely  Iron deficiency anemia - iron supplementation  Hypokalemia/mag - repletion daily  Prophylaxis (SCDs only), enteral nutrition    Discussed patient condition and risk of morbidity and/or mortality with Family, RN, RT, Pharmacy, Charge nurse / hot rounds and hospitalist and neurology  The patient remains critically ill.  Critical care time = 35 minutes in directly providing and coordinating critical care and extensive data review.  No time overlap and excludes procedures.    Jeremy M Gonda, M.D.

## 2018-03-01 NOTE — CARE PLAN
Problem: Safety  Goal: Will remain free from injury  Safety and fall precautions in place, bed in lowest position. Pt room near nursing station.        Problem: Bowel/Gastric:  Goal: Normal bowel function is maintained or improved  Last bowel movement tonight at 2000. Patient on bowel management protocol receiving medications per MAR.

## 2018-03-01 NOTE — PROGRESS NOTES
[facility] Hospitalist Progress Note    Date of Service: 3/1/2018    Chief Complaint  37 y.o. male admitted 2018 with shortness of breath.    Interval Problem Update  Mr. Edwards has a past medical history of traumatic brain injury and seizure disorder that recently underwent a slow taper off phenobarbital. He subsequently developed seizures over the previous 2 weeks prior to presentation to the ER. He was found to be hypoxic with aspiration pneumonitis and required intubation with ICU admission. He has been treated with antibiotics.    RN notes no further seizure activity noted since .   He is on levophed due to hypotension. He is tolerating his home tube feeds. 700 ml urine over night. He is undergoing Spontaneous breathing trials as tolerated. His Na went up substantially this morning thus D5 was started and no changes in his neuro status. I met with his mother, Susana, today at bedside and we discussed Ruben's condition and sodium changes.  Consultants/Specialty  Critical Care. I discussed her condition with Dr. Gonda on ICU Hot Rounds.  Neurology    Disposition  ICU        Review of Systems   Unable to perform ROS: Intubated      Physical Exam  Laboratory/Imaging   Hemodynamics  No data recorded.   Monitored Temp: 37.6 °C (99.7 °F)  Pulse  Av.6  Min: 48  Max: 119 Heart Rate (Monitored): 86  NIBP: 107/61      Respiratory  Liriano Vent Mode: Spont, Rate (breaths/min): 12, PEEP/CPAP: 8, PEEP/CPAP: 8, FiO2: 35, P Peak (PIP): 21, P MEAN: 11   Respiration: 14, Pulse Oximetry: 100 %     Work Of Breathing / Effort: Vented  RUL Breath Sounds: Coarse Crackles, RML Breath Sounds: Coarse Crackles, RLL Breath Sounds: Diminished, JONATAN Breath Sounds: Coarse Crackles, LLL Breath Sounds: Diminished    Fluids    Intake/Output Summary (Last 24 hours) at 18 0802  Last data filed at 18 0800   Gross per 24 hour   Intake          3580.79 ml   Output             7100 ml   Net         -3519.21 ml        Nutrition  Orders Placed This Encounter   Procedures   • Diet NPO     Standing Status:   Standing     Number of Occurrences:   1     Order Specific Question:   Restrict to:     Answer:   Strict [1]     Physical Exam   Constitutional: No distress.   HENT:   Head: Atraumatic.   Crowded teeth   Neck:   Tracheostomy, right IJ central line.   Cardiovascular:   Regular, no murmur   Pulmonary/Chest:   Vent, good air movement, few crackles.    Abdominal: Soft. There is no tenderness.   PEG tube, mild distention   +bowel sounds   Genitourinary:   Genitourinary Comments: catheter   Musculoskeletal: He exhibits deformity. He exhibits no edema.   Extensive contractures of extremities x 4.   Poor muscle tone.   Neurological:   Awake though he does not make eye contact nor follow commands. No withdrawal to pain.   Skin: No rash noted. He is not diaphoretic. There is pallor.   Nursing note and vitals reviewed.      Recent Labs      02/27/18   0440  02/28/18   0457  03/01/18   0545   WBC  8.5  8.5  8.8   RBC  2.48*  2.56*  3.00*   HEMOGLOBIN  7.3*  7.4*  8.6*   HEMATOCRIT  21.6*  22.4*  27.4*   MCV  87.1  87.5  91.3   MCH  29.4  28.9  28.7   MCHC  33.8  33.0*  31.4*   RDW  51.0*  50.4*  55.3*   PLATELETCT  209  315  599*   MPV  11.9  11.2  9.4     Recent Labs      02/28/18   2200  03/01/18   0155  03/01/18   0545   SODIUM  148*  147*  141   POTASSIUM  3.6  3.6  3.4*   CHLORIDE  114*  116*  112   CO2  25  24  25   GLUCOSE  122*  131*  151*   BUN  12  12  13   CREATININE  <0.20*  <0.20*  <0.20*   CALCIUM  8.3*  8.1*  7.9*                      Assessment/Plan     * Septic shock (CMS-HCC)- (present on admission)   Assessment & Plan    Source is consistent with aspiration pneumonia.  Requiring ongoing IV pressors and s/p IV fluids. Midodrine and florinef have been started.  s/p full course of doxycycline, Rocephin and Flagyl  The organ system failure associated with this disease process is the respiratory system as is evidenced by  the need for mechanical ventilation.         Seizure (CMS-HCC)- (present on admission)   Assessment & Plan    S/p continuous EEG  Continue Vimpat, Keppra, phenobarbital all via PEG tube  Neurology consulted.        Hypomagnesemia- (present on admission)   Assessment & Plan    - Resolved        Decubitus ulcer of ischium, left, stage IV (CMS-HCC)- (present on admission)   Assessment & Plan    Present upon admission.  Continue wound care        Azotemia- (present on admission)   Assessment & Plan    - Resolved        Acute on chronic respiratory failure with hypoxemia (CMS-HCC)- (present on admission)   Assessment & Plan    Requiring intubation on 2/18.  History of prior tracheostomy  Weaning trials  Vent management per intensivist        Aspiration pneumonia (CMS-HCC)- (present on admission)   Assessment & Plan    Source of septic shock  S/p doxycycline, Rocephin and Flagyl  Status post bronchoscopy  CT chest on 2/26:  1.  No evidence of bowel obstruction or perforation.  2.  Appendix not visualized.  3.  Small to moderate volume ascites of uncertain etiology.  4.  Bilateral pleural effusions with associated atelectasis.  5.  Ill-defined parenchymal opacities in the LEFT lower lung suggesting multifocal pneumonia.  6.  Scoliosis        Anemia, blood loss- (present on admission)   Assessment & Plan    Hb is down to 7  Follow daily Hb        Diarrhea- (present on admission)   Assessment & Plan    - Resolved          Protein-calorie malnutrition, severe (CMS-HCC)- (present on admission)   Assessment & Plan    This is a clinical diagnosis in the setting of infection and vomiting/diarrhea for which feeds had been held  He is now tolerating tube feeds        Physical debility- (present on admission)   Assessment & Plan    - Chronic post traumatic brain injury  - At baseline        TBI (traumatic brain injury) (CMS-HCC)- (present on admission)   Assessment & Plan    - Chronic, nonverbal        Hyponatremia- (present on  admission)   Assessment & Plan    Na had been elevated and went down to 119 then went as high as 148 thus 119 may have been a spurious lab error.   The [place]  Salt that his mother gives him daily has been held.   q4 hour BMPs.          Contraction, joint, multiple sites- (present on admission)   Assessment & Plan    - Chronic, PT/OT  - has baclofen pump, needs interrogating which is pending        GI bleed- (present on admission)   Assessment & Plan    Possible  Carmencita-White tear, patient did have multiple episodes of severe vomiting  If he develops further bleeding, an EGD may be appropriate.           Quality-Core Measures   Reviewed items::  Labs reviewed, Medications reviewed and Radiology images reviewed  Waldrop catheter::  Unconscious / Sedated Patient on a Ventilator  DVT prophylaxis pharmacological::  Contraindicated - High bleeding risk

## 2018-03-01 NOTE — CARE PLAN
Problem: Safety  Goal: Will remain free from injury  Outcome: PROGRESSING AS EXPECTED  Frequent rounding in place, bed rails up while bed rotating, monitor alarms and bed alarm set    Problem: Skin Integrity  Goal: Risk for impaired skin integrity will decrease  Outcome: PROGRESSING SLOWER THAN EXPECTED  WOC orders in place, skin assessed, pt turned per mothers requests/way.

## 2018-03-01 NOTE — PROGRESS NOTES
2255: Paged Dr. Webb to update on sodium increase from 137 to 148 within the last 3 hours.    2307: Spoke to Dr. Webb. Updates given. MD will place new orders.

## 2018-03-01 NOTE — CARE PLAN
Problem: Ventilation Defect:  Goal: Ability to achieve and maintain unassisted ventilation or tolerate decreased levels of ventilator support    Intervention: Manage ventilation therapy by monitoring diagnostic test results  Adult Ventilation Update    Total Vent Days: 12    Patient Lines/Drains/Airways Status    Active Airway     Name: Placement date: Placement time: Site: Days:    Airway Group Standard Cuffed Trach Tracheostomy 6.0 07/02/17   1403   Tracheostomy   241    Airway Group Tracheostomy 6.0       Tracheostomy                         Rapid Shallow Breathing Index (RR/VT): 36 (02/27/18 1537)  Plateau Pressure (Q Shift): 33 (02/25/18 2208)  Static Compliance (ml / cm H2O): 44.6 (03/01/18 0212)    Patient failed trials because of Barriers to Wean:  (EEG, seizures) (02/22/18 0625)  Barriers to SBT Weaning Trial Stopped due to:: Pt weaned for 1 hour and returned to rest settings per protocol (02/28/18 2001)  Length of Weaning Trial Length of Weaning Trial (Hours): 4 (02/28/18 2001)        Sputum/Suction   Cough: Productive (03/01/18 0212)  Sputum Amount: Small (03/01/18 0212)  Sputum Color: White (03/01/18 0212)  Sputum Consistency: Frothy (03/01/18 0212)    Mobility Group  Activity Performed: Unable to mobilize (02/28/18 2000)  Pt Calls for Assistance: No (02/28/18 2000)  Reason Not Mobilized: Other (comment) (Baseline non-mobilzation due to contractures) (02/28/18 2000)  Mobilization Comments: Per pt's mother (02/28/18 2000)    Events/Summary/Plan: Pt. placed back on ventilator to rest. (02/28/18 2001) Pt. Wean for 1 hours in am and 4 hours in pm.

## 2018-03-02 ENCOUNTER — APPOINTMENT (OUTPATIENT)
Dept: RADIOLOGY | Facility: MEDICAL CENTER | Age: 38
DRG: 853 | End: 2018-03-02
Attending: INTERNAL MEDICINE
Payer: COMMERCIAL

## 2018-03-02 LAB
ANION GAP SERPL CALC-SCNC: 4 MMOL/L (ref 0–11.9)
ANION GAP SERPL CALC-SCNC: 6 MMOL/L (ref 0–11.9)
ANION GAP SERPL CALC-SCNC: 7 MMOL/L (ref 0–11.9)
ANION GAP SERPL CALC-SCNC: 7 MMOL/L (ref 0–11.9)
BASOPHILS # BLD AUTO: 0.4 % (ref 0–1.8)
BASOPHILS # BLD: 0.03 K/UL (ref 0–0.12)
BUN SERPL-MCNC: 6 MG/DL (ref 8–22)
BUN SERPL-MCNC: 7 MG/DL (ref 8–22)
BUN SERPL-MCNC: 7 MG/DL (ref 8–22)
BUN SERPL-MCNC: 8 MG/DL (ref 8–22)
CALCIUM SERPL-MCNC: 7.9 MG/DL (ref 8.5–10.5)
CALCIUM SERPL-MCNC: 8.3 MG/DL (ref 8.5–10.5)
CALCIUM SERPL-MCNC: 8.3 MG/DL (ref 8.5–10.5)
CALCIUM SERPL-MCNC: 8.9 MG/DL (ref 8.5–10.5)
CHLORIDE SERPL-SCNC: 107 MMOL/L (ref 96–112)
CHLORIDE SERPL-SCNC: 108 MMOL/L (ref 96–112)
CHLORIDE SERPL-SCNC: 109 MMOL/L (ref 96–112)
CHLORIDE SERPL-SCNC: 112 MMOL/L (ref 96–112)
CO2 SERPL-SCNC: 26 MMOL/L (ref 20–33)
CO2 SERPL-SCNC: 27 MMOL/L (ref 20–33)
CO2 SERPL-SCNC: 28 MMOL/L (ref 20–33)
CO2 SERPL-SCNC: 29 MMOL/L (ref 20–33)
CREAT SERPL-MCNC: <0.2 MG/DL (ref 0.5–1.4)
EOSINOPHIL # BLD AUTO: 0.17 K/UL (ref 0–0.51)
EOSINOPHIL NFR BLD: 2.3 % (ref 0–6.9)
ERYTHROCYTE [DISTWIDTH] IN BLOOD BY AUTOMATED COUNT: 56.1 FL (ref 35.9–50)
GLUCOSE SERPL-MCNC: 103 MG/DL (ref 65–99)
GLUCOSE SERPL-MCNC: 105 MG/DL (ref 65–99)
GLUCOSE SERPL-MCNC: 90 MG/DL (ref 65–99)
GLUCOSE SERPL-MCNC: 96 MG/DL (ref 65–99)
HCT VFR BLD AUTO: 24.8 % (ref 42–52)
HGB BLD-MCNC: 7.9 G/DL (ref 14–18)
IMM GRANULOCYTES # BLD AUTO: 0.07 K/UL (ref 0–0.11)
IMM GRANULOCYTES NFR BLD AUTO: 0.9 % (ref 0–0.9)
LYMPHOCYTES # BLD AUTO: 2.14 K/UL (ref 1–4.8)
LYMPHOCYTES NFR BLD: 28.7 % (ref 22–41)
MCH RBC QN AUTO: 29.3 PG (ref 27–33)
MCHC RBC AUTO-ENTMCNC: 31.9 G/DL (ref 33.7–35.3)
MCV RBC AUTO: 91.9 FL (ref 81.4–97.8)
MONOCYTES # BLD AUTO: 1 K/UL (ref 0–0.85)
MONOCYTES NFR BLD AUTO: 13.4 % (ref 0–13.4)
NEUTROPHILS # BLD AUTO: 4.04 K/UL (ref 1.82–7.42)
NEUTROPHILS NFR BLD: 54.3 % (ref 44–72)
NRBC # BLD AUTO: 0 K/UL
NRBC BLD-RTO: 0 /100 WBC
PLATELET # BLD AUTO: 556 K/UL (ref 164–446)
PMV BLD AUTO: 9.5 FL (ref 9–12.9)
POTASSIUM SERPL-SCNC: 3.6 MMOL/L (ref 3.6–5.5)
POTASSIUM SERPL-SCNC: 3.8 MMOL/L (ref 3.6–5.5)
POTASSIUM SERPL-SCNC: 4.4 MMOL/L (ref 3.6–5.5)
POTASSIUM SERPL-SCNC: 5.6 MMOL/L (ref 3.6–5.5)
RBC # BLD AUTO: 2.7 M/UL (ref 4.7–6.1)
SODIUM SERPL-SCNC: 139 MMOL/L (ref 135–145)
SODIUM SERPL-SCNC: 142 MMOL/L (ref 135–145)
SODIUM SERPL-SCNC: 144 MMOL/L (ref 135–145)
SODIUM SERPL-SCNC: 145 MMOL/L (ref 135–145)
WBC # BLD AUTO: 7.5 K/UL (ref 4.8–10.8)

## 2018-03-02 PROCEDURE — 770022 HCHG ROOM/CARE - ICU (200)

## 2018-03-02 PROCEDURE — 700101 HCHG RX REV CODE 250: Performed by: INTERNAL MEDICINE

## 2018-03-02 PROCEDURE — 80048 BASIC METABOLIC PNL TOTAL CA: CPT | Mod: 91

## 2018-03-02 PROCEDURE — 85025 COMPLETE CBC W/AUTO DIFF WBC: CPT

## 2018-03-02 PROCEDURE — 99233 SBSQ HOSP IP/OBS HIGH 50: CPT | Performed by: HOSPITALIST

## 2018-03-02 PROCEDURE — 700102 HCHG RX REV CODE 250 W/ 637 OVERRIDE(OP): Performed by: INTERNAL MEDICINE

## 2018-03-02 PROCEDURE — 700111 HCHG RX REV CODE 636 W/ 250 OVERRIDE (IP): Performed by: INTERNAL MEDICINE

## 2018-03-02 PROCEDURE — 94640 AIRWAY INHALATION TREATMENT: CPT

## 2018-03-02 PROCEDURE — A9270 NON-COVERED ITEM OR SERVICE: HCPCS | Performed by: PSYCHIATRY & NEUROLOGY

## 2018-03-02 PROCEDURE — A9270 NON-COVERED ITEM OR SERVICE: HCPCS | Performed by: INTERNAL MEDICINE

## 2018-03-02 PROCEDURE — 71045 X-RAY EXAM CHEST 1 VIEW: CPT

## 2018-03-02 PROCEDURE — 700102 HCHG RX REV CODE 250 W/ 637 OVERRIDE(OP): Performed by: PSYCHIATRY & NEUROLOGY

## 2018-03-02 PROCEDURE — 700105 HCHG RX REV CODE 258: Performed by: INTERNAL MEDICINE

## 2018-03-02 RX ORDER — SODIUM CHLORIDE, SODIUM LACTATE, POTASSIUM CHLORIDE, CALCIUM CHLORIDE 600; 310; 30; 20 MG/100ML; MG/100ML; MG/100ML; MG/100ML
1000 INJECTION, SOLUTION INTRAVENOUS ONCE
Status: COMPLETED | OUTPATIENT
Start: 2018-03-02 | End: 2018-03-02

## 2018-03-02 RX ADMIN — PHENOBARBITAL 150 MG: 20 ELIXIR ORAL at 20:31

## 2018-03-02 RX ADMIN — CHLORHEXIDINE GLUCONATE 15 ML: 1.2 RINSE ORAL at 09:50

## 2018-03-02 RX ADMIN — OMEPRAZOLE 40 MG: 20 CAPSULE, DELAYED RELEASE ORAL at 09:50

## 2018-03-02 RX ADMIN — LEVETIRACETAM 1500 MG: 100 SOLUTION ORAL at 20:31

## 2018-03-02 RX ADMIN — ENOXAPARIN SODIUM 40 MG: 100 INJECTION SUBCUTANEOUS at 09:50

## 2018-03-02 RX ADMIN — LEVALBUTEROL HYDROCHLORIDE 1.25 MG: 1.25 SOLUTION RESPIRATORY (INHALATION) at 03:48

## 2018-03-02 RX ADMIN — POTASSIUM BICARBONATE 50 MEQ: 25 TABLET, EFFERVESCENT ORAL at 15:07

## 2018-03-02 RX ADMIN — MIDODRINE HYDROCHLORIDE 10 MG: 5 TABLET ORAL at 11:03

## 2018-03-02 RX ADMIN — LACOSAMIDE 200 MG: 50 TABLET, FILM COATED ORAL at 09:50

## 2018-03-02 RX ADMIN — LEVALBUTEROL HYDROCHLORIDE 1.25 MG: 1.25 SOLUTION RESPIRATORY (INHALATION) at 06:55

## 2018-03-02 RX ADMIN — LEVALBUTEROL HYDROCHLORIDE 1.25 MG: 1.25 SOLUTION RESPIRATORY (INHALATION) at 20:17

## 2018-03-02 RX ADMIN — LEVALBUTEROL HYDROCHLORIDE 1.25 MG: 1.25 SOLUTION RESPIRATORY (INHALATION) at 14:05

## 2018-03-02 RX ADMIN — DEXTROSE MONOHYDRATE: 50 INJECTION, SOLUTION INTRAVENOUS at 06:00

## 2018-03-02 RX ADMIN — POTASSIUM BICARBONATE 50 MEQ: 25 TABLET, EFFERVESCENT ORAL at 20:31

## 2018-03-02 RX ADMIN — MIDODRINE HYDROCHLORIDE 10 MG: 5 TABLET ORAL at 16:48

## 2018-03-02 RX ADMIN — SODIUM CHLORIDE, POTASSIUM CHLORIDE, SODIUM LACTATE AND CALCIUM CHLORIDE 1000 ML: 600; 310; 30; 20 INJECTION, SOLUTION INTRAVENOUS at 05:13

## 2018-03-02 RX ADMIN — CHLORHEXIDINE GLUCONATE 15 ML: 1.2 RINSE ORAL at 20:31

## 2018-03-02 RX ADMIN — LACOSAMIDE 200 MG: 50 TABLET, FILM COATED ORAL at 20:31

## 2018-03-02 RX ADMIN — LEVETIRACETAM 1500 MG: 100 SOLUTION ORAL at 09:50

## 2018-03-02 RX ADMIN — POTASSIUM BICARBONATE 50 MEQ: 25 TABLET, EFFERVESCENT ORAL at 09:50

## 2018-03-02 RX ADMIN — IRON SUCROSE 200 MG: 20 INJECTION, SOLUTION INTRAVENOUS at 09:51

## 2018-03-02 RX ADMIN — BISACODYL 10 MG: 10 SUPPOSITORY RECTAL at 12:05

## 2018-03-02 RX ADMIN — MIDODRINE HYDROCHLORIDE 10 MG: 5 TABLET ORAL at 08:01

## 2018-03-02 RX ADMIN — POLYETHYLENE GLYCOL 3350 1 PACKET: 17 POWDER, FOR SOLUTION ORAL at 15:27

## 2018-03-02 NOTE — CARE PLAN
Problem: Ventilation Defect:  Goal: Ability to achieve and maintain unassisted ventilation or tolerate decreased levels of ventilator support    Intervention: Support and monitor invasive and noninvasive mechanical ventilation  Adult Ventilation Update    Total Vent Days: 12    Patient Lines/Drains/Airways Status    Active Airway     Name: Placement date: Placement time: Site: Days:    Airway Group Standard Cuffed Trach Tracheostomy 6.0 07/02/17   1403   Tracheostomy   242    Airway Group Tracheostomy 6.0       Tracheostomy                      Rapid Shallow Breathing Index (RR/VT): 36 (02/27/18 1537)  Plateau Pressure (Q Shift): 33 (02/25/18 2208)  Static Compliance (ml / cm H2O): 141 (03/01/18 1043)    Patient failed trials because of Barriers to Wean:  (EEG, seizures) (02/22/18 0625)  Barriers to SBT Weaning Trial Stopped due to:: Pt weaned for 1 hour and returned to rest settings per protocol (02/28/18 2001)  Length of Weaning Trial Length of Weaning Trial (Hours): 4 (03/01/18 1119)      Cough: Moist;Productive (03/01/18 1600)  Sputum Amount: Small (03/01/18 1600)  Sputum Color: Clear;White (03/01/18 1600)  Sputum Consistency: Thick;Thin (03/01/18 1600)    Mobility Group  Activity Performed: Unable to mobilize (03/01/18 0800)      Events/Summary/Plan: pt placed on tpiece 10L/40%. SVN tx given (03/01/18 5755)

## 2018-03-02 NOTE — CARE PLAN
Problem: Ventilation Defect:  Goal: Ability to achieve and maintain unassisted ventilation or tolerate decreased levels of ventilator support    Intervention: Support and monitor invasive and noninvasive mechanical ventilation  Adult Ventilation Update    Total Vent Days: 13    Patient Lines/Drains/Airways Status    Active Airway     Name: Placement date: Placement time: Site: Days:    Airway Group Standard Cuffed Trach Tracheostomy 6.0 07/02/17   1403   Tracheostomy   242    Airway Group Tracheostomy 6.0       Tracheostomy                 In the last 24 hours, the patient tolerated T-piece for approx. 8 hours.    Cough: Productive (03/02/18 0348)  Sputum Amount: Small (03/02/18 0400)  Sputum Color: Clear;White (03/02/18 0400)  Sputum Consistency: Thick;Thin (03/02/18 0400)    Mobility Group  Activity Performed: Unable to mobilize (03/01/18 2000)  Reason Not Mobilized: Other (comment) (03/01/18 2000)  Mobilization Comments: per patient's mother/POA (03/01/18 2000)    Events/Summary/Plan: Placed back on vent to rest after approx. 8 hours on T-piece (03/01/18 0333)

## 2018-03-02 NOTE — EEG PROGRESS NOTE
INTENSIVE VIDEO ELECTROENCEPHALOGRAM REPORT      This Video EEG was done from 2/23/2018 to 2/25/2018      INDICATION: seizure      TECHNIQUE:  Routine electroencephalogram (EEG) was performed in accordance with the international 10-20 system. The study was reviewed in bipolar and referential montages. The recording examined the patient during wakeful and drowsy state(s).     DESCRIPTION OF THE RECORD:    On 2/23/2018 The EEG monitoring shows persistent periodic epileptiform over the right hemisphere  at times, meets the criteria of electrographic seizure from the right    On 2/24/2018 The EEG monitoring shows persistent periodic epileptiform over the right hemisphere  Will further increase PHB ( per the family's wish), control the seizures and then considering transferring back home- at times, meets the criteria of electrographic seizure from the right    On 2/25/2018 The EEG monitoring shows intermittent periodic epileptiform over the right hemisphere  Will further increase PHB ( per the family's wish), control the seizures and then considering transferring back home-EEG monitoring shows intermittent  epileptiform over the right hemisphere  Improved compared with that of yesterday      ACTIVATION PROCEDURES:        ICTAL AND/OR INTERICTAL FINDINGS:         EKG: sampling of the EKG recording demonstrated sinus rhythm.        INTERPRETATION:      ________________________________________________________________________    This scalp EEG from 2/23/2018 to 2/25/2018 is consistent with     1. Intractable seizure from the right hemisphere-- improved toward the end of EEG monitoring ( the frequency and amplitudes of the epileptiform over the right hemisphere decreased with addition of PHB)    2. Diffuse nonspecific cortical dysfunction - severe      ________________________________________________________________________          ________________________________________________________________________      Billing  documentation reflecting total daily recordings:   2/23/2018 (22 hrs and 29 mins)  2/24/2018 (24 hrs)  2/25/2018 (4 hrs and 12 mins)  ________________________________________________________________________    EEG 02/24/18 12:50 PM        EEG monitoring shows persistent periodic epileptiform over the right hemisphere  Will further increase PHB ( per the family's wish), control the seizures and then considering transferring back home                  EEG 02/25/18 8:12 AM        EEG monitoring shows intermittent  epileptiform over the right hemisphere  Improved compared with that of yesterday     Will adjust PHB, control the seizures and then considering transferring back home

## 2018-03-02 NOTE — PROGRESS NOTES
Pulmonary Critical Care Progress Note        Date of admission: 2/18/2018    Chief Complaint: Respiratory failure, septic shock    History of Present Illness:  Mr. Edwards is a 37-year-old male with past medical history of traumatic brain injury at age 17, seizures diagnosed in June of 2017. His baseline mental status appears to be nodding and minimal interacting without vocalization. Who was brought to the ICU as a direct admit from [facility] where he was brought today for hypoxia. Reportedly the patient was being weanied off of his phenobarbital by his neurologist when he began having more frequent seizures, approximately one week ago his neurologist increased his Keppra dosing however the seizures continued. His mother did use CBD and THC with some success in slowing the seizures. Since 2 days ago the patient had multiple aspiration events following these seizures. His mother evaluated him with a home oxygen saturation monitor and he was noted to be hypoxic, he was brought to [facility] where he was found to have 28% bands with leukopenia. Chest x-ray was obtained demonstrating bilateral infiltrates right greater than left. He was placed on mechanical ventilation and his oxygenation was unable to be improved more than 85%, he was hypotensive and given multiple boluses of IV fluids without improvement. He was started on levo fed and given multiple doses of Ativan for seizures and transferred to our ICU for higher level of care. He arrives on the ventilator and on pressors critically ill.    Please note this history is obtained by my partner Dr. Appiah 2/18/2018.    ROS:  Respiratory: unable to perform due to the patient's inability to effectively communicate, Cardiac: unable to perform due to the patient's inability to effectively communicate, GI: unable to perform due to the patient's inability to effectively communicate.  All other systems negative. Unchanged    Interval Events:  24 hour  interval history reviewed    - no seizure-like activity   - off vaso since yesterday and now off levophed this morning   - resolved edema   - tolerating T-piece   - tolerating TFs still   - Na stable at 139   - high UOP this morning again   - low K   - D5 @ 150 ml/hr, remains off salt    PFSH:  No change.    Respiratory:  Liriano Vent Mode: APVCMV, Rate (breaths/min): 12, Vt Target (mL): 460, PEEP/CPAP: 8, FiO2: 40, Control VTE (exp VT): 471 tolerated 8 hrs on T-piece 4x4  Pulse Oximetry: 99 %          Exam: trach tube noted, unlabored respirations, no intercostal retractions or accessory muscle use, rhonchi bibasilar and diminished breath sounds right greater than left.     ImagingCXR  I have personally reviewed the chest x-ray my impression is  (compared to prior films) unchanged diffuse bilateral infiltrates with bibasilar consolidations, right greater than left effusions, trach and right IJ CVL in good position   Bedside limited chest ultrasound reveals small to moderate right-sided pleural effusion that may be amenable to thoracentesis  Recent Labs      02/28/18   0458   ISTATAPH  7.448   ISTATAPCO2  30.5   ISTATAPO2  54*   ISTATATCO2  22   [id number]  90*   ISTATARTHCO3  21.1   ISTATARTBE  -2   ISTATTEMP  97.9 F   ISTATFIO2  35   ISTATSPEC  Arterial   ISTATAPHTC  7.454   [id number]  53*    ABG interpretation: None today    HemoDynamics:  Pulse: (!) 56, Heart Rate (Monitored): (!) 56  NIBP: (!) 77/39    midodrine at 10 mg 3 times a day    Exam: regular rate and rhythm, regular rhythm (Sinus)      Echo on 2/19/2018:  CONCLUSIONS  Normal right and left ventricular size and function.   No significant valve disease or flow abnormalities.   Dilated inferior vena cava without inspiratory collapse. The patient is   intubated.        Neuro:  GCS Total [place] Coma Score: 10        Exam: PERRL, awakens and attempts to look around the room although not tracking visually, not following commands, calm, no seizure-like  activity    Imaging: Available data reviewed    EEG 2/25: EEG monitoring shows intermittent  epileptiform over the right hemisphere  Improved compared with that of yesterday    Fluids:  Intake/Output       02/28/18 0700 - 03/01/18 0659 03/01/18 0700 - 03/02/18 0659 03/02/18 0700 - 03/03/18 0659      8729-9237 0614-9153 Total [id number] 1900-0659 Total 0700-1859 1900-0659 Total       Intake    I.V.  393.6  1195.7 1589.3  1865.1  2811.5 4676.6  --  -- --    Vasopressin Volume 26.9 25.2 52.1 34.4 -- 34.4 -- -- --    Norepinephrine Volume 46.7 120.5 167.2 30.8 11.5 42.3 -- -- --    IV Piggyback Volume (IV Piggyback) 300 -- 300 -- 1000 1000 -- -- --    IV Volume (0.9% Normal Saline) 20 -- 20 -- -- -- -- -- --    IV Volume (D5W) -- 1050 1050 1800 1800 3600 -- -- --    Other  90  150 240  60  -- 60  --  -- --    Medications (P.O./ Enteral Liquids) 90 150 240 60 -- 60 -- -- --    Enteral  825  900 1725  840  -- 840  --  -- --    Enteral Volume  780 -- 780 -- -- --    Free Water / Tube Flush 165 240 405 60 -- 60 -- -- --    Total Intake 1308.6 2245.7 3554.3 2765.1 2811.5 5576.6 -- -- --       Output    Urine  5350  1850 7200  900  2850 3750  --  -- --    Indwelling Cathether 5350 1850 7200 900 2850 3750 -- -- --    Stool  --  -- --  --  -- --  --  -- --    Number of Times Stooled -- 2 x 2 x -- -- -- -- -- --    Total Output 5350 1850 7200 900 2850 3750 -- -- --       Net I/O     -4041.5 395.7 -3645.7 1865.1 -38.5 1826.6 -- -- --        Weight: 56.6 kg (124 lb 12.5 oz)  Recent Labs      02/28/18   0457   03/01/18   2235  03/02/18   0210  03/02/18   0520   SODIUM  119*   < >  143  144  139   POTASSIUM  4.0   < >  4.3  3.8  3.6   CHLORIDE  96   < >  109  109  107   CO2  23   < >  27  28  26   BUN  13   < >  8  7*  7*   CREATININE  <0.20*   < >  <0.20*  <0.20*  <0.20*   MAGNESIUM  1.4*   --    --    --    --    PHOSPHORUS  2.2*   --    --    --    --    CALCIUM  7.4*   < >  8.6  8.3*  8.3*    < > = values in this  interval not displayed.       GI/Nutrition:  Exam: nondistended, abdomen is soft and non-tender, normal active bowel sounds, G-Tube  no sign of infection   Imaging: Available data reviewed    KUB : No evidence of bowel obstruction.   CT abdomen and pelvis with oral contrast :  1.  No evidence of bowel obstruction or perforation.  2.  Appendix not visualized.  3.  Small to moderate volume ascites of uncertain etiology.  4.  Bilateral pleural effusions with associated atelectasis.  5.  Ill-defined parenchymal opacities in the LEFT lower lung suggesting multifocal pneumonia.  6.  Scoliosis.    Liver Function  Recent Labs      185  18   0210  18   0520   GLUCOSE  93  103*  105*       Heme:  Recent Labs      18   0545  18   0520   RBC  2.56*  3.00*  2.70*   HEMOGLOBIN  7.4*  8.6*  7.9*   HEMATOCRIT  22.4*  27.4*  24.8*   PLATELETCT  315  599*  556*       Infectious Disease:  Monitored Temp  Av.3 °C (97.4 °F)  Min: 35.5 °C (95.9 °F)  Max: 37.4 °C (99.3 °F)  Micro: reviewed. C. diff is negative. BAL with Proteus, and Klebsiella. Repeat cultures negative  Recent Labs      18   0545  18   0520   WBC  8.5  8.8  7.5   NEUTSPOLYS  68.80  70.00  54.30   LYMPHOCYTES  19.60*  16.30*  28.70   MONOCYTES  8.00  11.00  13.40   EOSINOPHILS  1.90  1.10  2.30   BASOPHILS  0.20  0.30  0.40     Current Facility-Administered Medications   Medication Dose Frequency Provider Last Rate Last Dose   • vasopressin (VASOSTRICT) 20 Units in  mL Infusion  0.03 Units/min Continuous Modesto Appiah Jr., D.O.   Stopped at 18 0949   • potassium bicarbonate (KLYTE) 25 MEQ effervescent tablet TBEF 50 mEq  50 mEq BID Jeremy M Gonda, M.D.   50 mEq at 18 2101   • norepinephrine (LEVOPHED) 8 mg in  mL Infusion  0-30 mcg/min Continuous Jeremy M Gonda, M.D. 0.9 mL/hr at 18 0602 0.5 mcg/min at 18   • enoxaparin (LOVENOX) inj 40  mg  40 mg DAILY Jeremy M Gonda, M.D.   40 mg at 03/01/18 0816   • lacosamide (VIMPAT) tablet 200 mg  200 mg BID Jeremy M Gonda, M.D.   200 mg at 03/01/18 2101   • levETIRAcetam (KEPPRA) 100 MG/ML solution 1,500 mg  1,500 mg Q12HRS Jeremy M Gonda, M.D.   1,500 mg at 03/01/18 2101   • PHENobarbital solution 150 mg  150 mg Q EVENING Jon Rehman M.D.   150 mg at 03/01/18 2101   • D5W infusion   Continuous Modesto Appiah Jr., D.O. 150 mL/hr at 03/02/18 0600     • midodrine (PROAMATINE) tablet 10 mg  10 mg TID WITH MEALS Jeremy M Gonda, M.D.   10 mg at 03/01/18 1728   • iron sucrose (VENOFER) injection 200 mg  200 mg DAILY Jeremy M Gonda, M.D.   200 mg at 03/01/18 0817   • loperamide (IMODIUM) 1 MG/5ML solution 2 mg  2 mg 4X/DAY PRN Clarence Morfin D.OBucky   2 mg at 02/26/18 0820   • omeprazole 2 mg/mL in sodium bicarbonate (PRILOSEC) oral susp 40 mg  40 mg DAILY Jon Reynoso D.O.   40 mg at 03/01/18 0815   • levalbuterol (XOPENEX) 1.25 MG/3ML nebulizer solution 1.25 mg  1.25 mg Q6HRS (RT) Jon Reynoso D.O.   1.25 mg at 03/02/18 0655   • Respiratory Care per Protocol   Continuous RT Modesto Appiah Jr., D.O.       • polyethylene glycol/lytes (MIRALAX) PACKET 1 Packet  1 Packet QDAY PRN Modesto Appiah Jr., D.O.        And   • magnesium hydroxide (MILK OF MAGNESIA) suspension 30 mL  30 mL QDAY PRN Modesto Appiah Jr., D.O.        And   • bisacodyl (DULCOLAX) suppository 10 mg  10 mg QDAY PRN Modesto Appiah Jr., D.O.   10 mg at 02/28/18 1514   • chlorhexidine (PERIDEX) 0.12 % solution 15 mL  15 mL BID JASPAL Alaniz Jr..O.   15 mL at 03/01/18 2101   • lidocaine (XYLOCAINE) 1%  injection  1-2 mL Q30 MIN PRN JASPAL Alaniz Jr..O.       • MD ALERT...Adult ICU Electrolyte Replacement per Pharmacy Protocol   pharmacy to dose JASPAL Alaniz Jr..O.       • ipratropium-albuterol (DUONEB) nebulizer solution 3 mL  3 mL Q2HRS PRN (RT) JASPAL Alaniz Jr..O.       • levalbuterol (XOPENEX) 1.25 MG/3ML  nebulizer solution 1.25 mg  1.25 mg Q4HRS PRN Duy Whitaker M.D.   1.25 mg at 02/20/18 0626   • miconazole 2%-zinc oxide (RADHA) topical cream   PRN Duy Whitaker M.D.       • acetaminophen (TYLENOL) tablet 650 mg  650 mg Q6HRS PRN Duy Whitaker M.D.   650 mg at 02/28/18 0946   • oxyCODONE immediate-release (ROXICODONE) tablet 5 mg  5 mg Q3HRS PRN Duy Whitaker M.D.        And   • oxyCODONE immediate release (ROXICODONE) tablet 10 mg  10 mg Q3HRS PRN Duy Whitaker M.D.       • ondansetron (ZOFRAN) syringe/vial injection 4 mg  4 mg Q4HRS PRN Duy Whitaker M.D.   4 mg at 02/25/18 1140   • ondansetron (ZOFRAN ODT) dispertab 4 mg  4 mg Q4HRS PRN Duy Whitaker M.D.       • promethazine (PHENERGAN) tablet 12.5-25 mg  12.5-25 mg Q4HRS PRN Duy Whitaker M.D.       • promethazine (PHENERGAN) suppository 12.5-25 mg  12.5-25 mg Q4HRS PRN Duy Whitaker M.D.       • prochlorperazine (COMPAZINE) injection 5-10 mg  5-10 mg Q4HRS PRN Duy Whitaker M.D.       • LORazepam (ATIVAN) injection 4 mg  4 mg Q10 MIN PRN Duy Whitaker M.D.   2 mg at 02/23/18 1251     Last reviewed on 2/18/2018  7:51 PM by Lois Camejo, Pharmacy Intern    Quality  Measures:  Labs reviewed, Medications reviewed and Radiology images reviewed  Waldrop catheter: Critically Ill - Requiring Accurate Measurement of Urinary Output  Central line in place: Shock and Vasopressors    DVT Prophylaxis: Enoxaparin (Lovenox)  DVT prophylaxis - mechanical: SCDs  Ulcer prophylaxis: Yes    Assessed for rehab: Patient unable to tolerate rehabilitation therapeutic regimen    Assessment and plan:  Acute hypoxemic respiratory failure - VDRF since 2/18, chronic trach   - Continue full vent support with daily spontaneous breathing trials,  continue with progressive lengthening of T-piece    - RT/O2 protocol, encourage mobilization   - Continue to Avoid all sedatives and maintain aspiration precautions   - QOD chest x-ray   - We'll discuss with mother potential  bronchoscopy and thoracenteses to help optimize pulmonary function given persistent abnormal chest imaging  Septic shock from pulmonary source, now unspecified place a distributive shock - improved    - Continue midodrine   - Completed antibiotics, s/p sepsis protocol  History of traumatic brain injury, chronic seizure disorder with associated status epilepticus   - Continue antiepileptics (phenobarbital dose decreased) per neurology, phenobarbital level at goal   - Seizure precautions  Decubitus ulcerations of the left hip including stage IV disease   - Wound care, optimize nutrition  Pneumonia (Proteus and Klebsiella)   - S/P antibiotics  Mild fluid overload - improving  Possible paralytic ileus versus feeding tube malfunction - improved   - Continue tube feeding trial with home regimen   - Bowel protocol  Hyponatremia now hypernatremia - improving,    - hold off on salt for now   - Discontinue D5 water  Iron deficiency anemia - iron supplementation  Hypokalemia/mag - repletion daily  Prophylaxis (SCDs only), enteral nutrition    Discussed patient condition and risk of morbidity and/or mortality with Family, RN, RT, Pharmacy, Charge nurse / hot rounds and hospitalist  The patient remains critically ill.  Critical care time = 34 minutes in directly providing and coordinating critical care and extensive data review.  No time overlap and excludes procedures.    Jeremy M Gonda, M.D.

## 2018-03-02 NOTE — CARE PLAN
Problem: Safety  Goal: Will remain free from falls  Bed in lowest, locked position with both lower side rails up. Bed alarm engaged.     Problem: Respiratory:  Goal: Respiratory status will improve  Vent weaning as tolerated in collaboration with RT and MD.

## 2018-03-02 NOTE — PROCEDURES
DATE OF SERVICE:  02/22/2018    This is an inpatient video EEG from February 23nd to February 25th.    INTENSIVE VIDEO ELECTROENCEPHALOGRAM REPORT        This Video EEG was done from 2/23/2018 to 2/25/2018        INDICATION: seizure        TECHNIQUE:  Routine electroencephalogram (EEG) was performed in accordance with the international 10-20 system. The study was reviewed in bipolar and referential montages. The recording examined the patient during wakeful and drowsy state(s).      DESCRIPTION OF THE RECORD:     On 2/23/2018 The EEG monitoring shows persistent periodic epileptiform over the right hemisphere  at times, meets the criteria of electrographic seizure from the right     On 2/24/2018 The EEG monitoring shows persistent periodic epileptiform over the right hemisphere  Will further increase PHB ( per the family's wish), control the seizures and then considering transferring back home- at times, meets the criteria of electrographic seizure from the right     On 2/25/2018 The EEG monitoring shows intermittent periodic epileptiform over the right hemisphere  Will further increase PHB ( per the family's wish), control the seizures and then considering transferring back home-EEG monitoring shows intermittent  epileptiform over the right hemisphere  Improved compared with that of yesterday        ACTIVATION PROCEDURES:          ICTAL AND/OR INTERICTAL FINDINGS:          EKG: sampling of the EKG recording demonstrated sinus rhythm.          INTERPRETATION:        ________________________________________________________________________     This scalp EEG from 2/23/2018 to 2/25/2018 is consistent with      1. Intractable seizure from the right hemisphere-- improved toward the end of EEG monitoring ( the frequency and amplitudes of the epileptiform over the right hemisphere decreased with addition of PHB)     2. Diffuse nonspecific cortical dysfunction -  severe        ________________________________________________________________________              ________________________________________________________________________        Billing documentation reflecting total daily recordings:   2/23/2018 (22 hrs and 29 mins)  2/24/2018 (24 hrs)  2/25/2018 (4 hrs and 12 mins)  ________________________________________________________________________     EEG 02/24/18 12:50 PM        EEG monitoring shows persistent periodic epileptiform over the right hemisphere  Will further increase PHB ( per the family's wish), control the seizures and then considering transferring back home                   EEG 02/25/18 8:12 AM        EEG monitoring shows intermittent  epileptiform over the right hemisphere  Improved compared with that of yesterday     Will adjust PHB, control the seizures and then considering transferring back home            ____________________________________     MD TEA YUN / MAXINE    DD:  03/01/2018 22:48:55  DT:  03/02/2018 00:29:05    D#:  [id number]  Job#:  [id number]

## 2018-03-02 NOTE — WOUND TEAM
[facility]  Inpatient Services  Initial Wound & Skin Care Evaluation    Admission Date: 02/18/2018          HPI, PMH, SH: Reviewed  Unit where seen by Wound Team: [id number]    WOUND CONSULT RELATED TO: pressure injury right heel    SUBJECTIVE: unable to speak with patient due to TBI, patient is at his baseline cognition    Self Report / Pain Level: no physical signs    OBJECTIVE: Patient on ICU bed, waffle overlay to mattress in place. Patient being repositioned by nursing staff  WOUND TYPE, LOCATION, CHARACTERISTICS (Pressure ulcers: location, stage, POA or date identified)    Wound Type/Location: stage I right heel posterior    Periwound: intact     Drainage: none       Tissue Type and %: 100% red   Wound Edges: closed     Odor: none      Exposed structure(s): none   S&S of Infection: none     Measurements: taken 03/02/2018    Length: 1.5 cm   Width:  1 cm   Depth: 0    Tracts/undermining: none        INTERVENTIONS BY WOUND TEAM: Right heel assessed, patient's bilateral feet with severe contractures. Stage I noted to right heel, measured and photographed. Nursing is floating bilateral heels with pillows. Left open to air. Skin intact, no drainage.    Interdisciplinary Collaboration: staff RN, patient    EVALUATION: Patient has severe contractures to both feet, nursing unable to place patient in heel float boots due to this. Nursing to float bilateral heels on pillows to decrease pressure to area at all times.    Factors affecting wound healing: immobility  Goals: maintain intact skin    NURSING PLAN OF CARE: (X)  Dressing changes: See Dressing Maintenance orders:   Skin care: See Skin Care orders: X  Rectal tube care: See Rectal Tube Care orders:   Other orders:    RSKIN: CURRENT (X) ORDERED (O)  Q shift Neo:  X  Q shift pressure point assessments:  X  Atmosair        BEATRICE      Bariatric BEATRICE      Bariatric foam        Heel float boots       Heels floated on pillows X    Barrier wipes      Barrier  Cream      Barrier paste      Sacral silicone dressing      Silicone O2 tubing      Anchorfast      Trach with Optifoam split foam       Waffle cushion      Rectal tube or BMS      Antifungal tx    Turn q 2 hours X  Up to chair    Ambulate   PT/OT     Dietician      PO     TF   TPN     PVN    NPO   # days   Other       WOUND TEAM PLAN OF CARE (X):   NPWT change 3 x week:        Dressing changes by wound team:       Follow up as needed: X      Other (explain):    Anticipated discharge plans (X):  SNF:           Home Care:           Outpatient Wound Center:            Self Care:            Other:  TBD

## 2018-03-02 NOTE — PROGRESS NOTES
[facility] Hospitalist Progress Note    Date of Service: 3/2/2018    Chief Complaint  37 y.o. male admitted 2018 with shortness of breath.    Interval Problem Update  Mr. Edwards has a past medical history of traumatic brain injury and seizure disorder that recently underwent a slow taper off phenobarbital. He subsequently developed seizures over the previous 2 weeks prior to presentation to the ER. He was found to be hypoxic with aspiration pneumonitis and required intubation with ICU admission. He has been treated with antibiotics.     He is off levophed this morning He is tolerating his home tube feeds. His heart rate has been from 40's to low hundreds.   RN notes no seizure activity. He was on T piece for 8 hours yesterday and on the vent last night. He is off the vent again today. I met with his mother, Susana, today and we discussed his condition including off pressors, improved Na levels, and off the vent during the day.  Consultants/Specialty  Critical Care. I discussed her condition with Dr. Gonda on ICU Hot Rounds.  Neurology    Disposition  ICU        Review of Systems   Unable to perform ROS: Intubated      Physical Exam  Laboratory/Imaging   Hemodynamics  No data recorded.   Monitored Temp: 36.3 °C (97.3 °F)  Pulse  Av.6  Min: 48  Max: 119 Heart Rate (Monitored): (!) 56  NIBP: (!) 77/39      Respiratory  Liriano Vent Mode: APVCMV, Rate (breaths/min): 12, PEEP/CPAP: 8, PEEP/CPAP: 8, FiO2: 40, P Peak (PIP): 28, P MEAN: 12   Respiration: (!) 9, Pulse Oximetry: 99 %, O2 Daily Delivery Respiratory : T-Piece     Given By:: Trache, Work Of Breathing / Effort: Mild  RUL Breath Sounds: Coarse Crackles, RML Breath Sounds: Coarse Crackles, RLL Breath Sounds: Diminished, JONATAN Breath Sounds: Coarse Crackles, LLL Breath Sounds: Diminished    Fluids    Intake/Output Summary (Last 24 hours) at 18 0735  Last data filed at 18 0600   Gross per 24 hour   Intake          5576.64 ml   Output             3750  ml   Net          1826.64 ml       Nutrition  Orders Placed This Encounter   Procedures   • Diet NPO     Standing Status:   Standing     Number of Occurrences:   1     Order Specific Question:   Restrict to:     Answer:   Strict [1]     Physical Exam   Constitutional: No distress.   HENT:   Head: Atraumatic.   Crowded teeth  Dry mucous membranes.   He opens his eyes   Neck:   Tracheostomy, right IJ central line.   Cardiovascular: Normal heart sounds.    Regular, no murmur   Pulmonary/Chest:   Vent, good air movement, few crackles.    Abdominal: Soft. He exhibits distension. There is no tenderness.   PEG tube, mild distention   +bowel sounds   Genitourinary:   Genitourinary Comments: catheter   Musculoskeletal: He exhibits deformity. He exhibits no edema or tenderness.   Extensive contractures of extremities x 4.   Poor muscle tone.   Neurological:   Awake though he does not make eye contact nor follow commands. No withdrawal to pain.   Skin: Skin is warm and dry. No rash noted. He is not diaphoretic. There is pallor.   Nursing note and vitals reviewed.      Recent Labs      02/28/18   0457  03/01/18   0545  03/02/18   0520   WBC  8.5  8.8  7.5   RBC  2.56*  3.00*  2.70*   HEMOGLOBIN  7.4*  8.6*  7.9*   HEMATOCRIT  22.4*  27.4*  24.8*   MCV  87.5  91.3  91.9   MCH  28.9  28.7  29.3   MCHC  33.0*  31.4*  31.9*   RDW  50.4*  55.3*  56.1*   PLATELETCT  315  599*  556*   MPV  11.2  9.4  9.5     Recent Labs      03/01/18   2235  03/02/18   0210  03/02/18   0520   SODIUM  143  144  139   POTASSIUM  4.3  3.8  3.6   CHLORIDE  109  109  107   CO2  27  28  26   GLUCOSE  93  103*  105*   BUN  8  7*  7*   CREATININE  <0.20*  <0.20*  <0.20*   CALCIUM  8.6  8.3*  8.3*                      Assessment/Plan     * Septic shock (CMS-[facility])- (present on admission)   Assessment & Plan    Source is consistent with aspiration pneumonia.  Requiring ongoing IV pressors and s/p IV fluids. Midodrine and florinef have been started.  s/p full  course of doxycycline, Rocephin and Flagyl  The organ system failure associated with this disease process is the respiratory system as is evidenced by the need for mechanical ventilation.         Seizure (CMS-HCC)- (present on admission)   Assessment & Plan    S/p continuous EEG  Continue Vimpat, Keppra, phenobarbital 150 mg nightly all via PEG tube  Neurology consulted.  Continuous EEG results:  This scalp EEG from 2/23/2018 to 2/25/2018 is consistent with      1. Intractable seizure from the right hemisphere-- improved toward the end of EEG monitoring ( the frequency and amplitudes of the epileptiform over the right hemisphere decreased with addition of PHB)     2. Diffuse nonspecific cortical dysfunction - severe        Hypomagnesemia- (present on admission)   Assessment & Plan    - Resolved        Decubitus ulcer of ischium, left, stage IV (CMS-HCC)- (present on admission)   Assessment & Plan    Present upon admission.  Continue wound care        Azotemia- (present on admission)   Assessment & Plan    - Resolved        Acute on chronic respiratory failure with hypoxemia (CMS-HCC)- (present on admission)   Assessment & Plan    Requiring initiation of the vent on 2/18.  History of prior tracheostomy  T piece trials during the days        Aspiration pneumonia (CMS-HCC)- (present on admission)   Assessment & Plan    Source of septic shock  S/p doxycycline, Rocephin and Flagyl  Status post bronchoscopy  CT chest on 2/26:  1.  No evidence of bowel obstruction or perforation.  2.  Appendix not visualized.  3.  Small to moderate volume ascites of uncertain etiology.  4.  Bilateral pleural effusions with associated atelectasis.  5.  Ill-defined parenchymal opacities in the LEFT lower lung suggesting multifocal pneumonia.  6.  Scoliosis        Anemia, blood loss- (present on admission)   Assessment & Plan    Hb is down to 7  Follow daily Hb        Diarrhea- (present on admission)   Assessment & Plan    - Resolved           Protein-calorie malnutrition, severe (CMS-[facility])- (present on admission)   Assessment & Plan    This is a clinical diagnosis in the setting of infection and vomiting/diarrhea for which feeds had been held  He is now tolerating tube feeds        Physical debility- (present on admission)   Assessment & Plan    - Chronic post traumatic brain injury  - At baseline        TBI (traumatic brain injury) (CMS-HCC)- (present on admission)   Assessment & Plan    - Chronic, nonverbal        Hyponatremia- (present on admission)   Assessment & Plan    Na had been elevated and went down to 119 then went as high as 148 thus 119 may have been a spurious lab error.   The Himalayan  Salt that his mother gives him daily has been held.   q8 hour BMPs.  Na is 145          Contraction, joint, multiple sites- (present on admission)   Assessment & Plan    - Chronic, PT/OT  - has baclofen pump, needs interrogating which is pending        GI bleed- (present on admission)   Assessment & Plan    Possible  Carmencita-White tear, patient did have multiple episodes of severe vomiting  If he develops further bleeding, an EGD may be appropriate.           Quality-Core Measures   Reviewed items::  Labs reviewed, Medications reviewed and Radiology images reviewed  Waldrop catheter::  Unconscious / Sedated Patient on a Ventilator  DVT prophylaxis pharmacological::  Contraindicated - High bleeding risk

## 2018-03-02 NOTE — CARE PLAN
Problem: Bowel/Gastric:  Goal: Normal bowel function is maintained or improved  Outcome: PROGRESSING SLOWER THAN EXPECTED  No BM in 24hrs. Suppository given. Pt maintaining adequate hydration and feedings. BMP q 8 hrs.

## 2018-03-03 ENCOUNTER — APPOINTMENT (OUTPATIENT)
Dept: RADIOLOGY | Facility: MEDICAL CENTER | Age: 38
DRG: 853 | End: 2018-03-03
Attending: INTERNAL MEDICINE
Payer: COMMERCIAL

## 2018-03-03 LAB
ANION GAP SERPL CALC-SCNC: 8 MMOL/L (ref 0–11.9)
APPEARANCE FLD: NORMAL
BASOPHILS # BLD AUTO: 0.7 % (ref 0–1.8)
BASOPHILS # BLD: 0.05 K/UL (ref 0–0.12)
BODY FLD TYPE: NORMAL
BUN SERPL-MCNC: 10 MG/DL (ref 8–22)
CALCIUM SERPL-MCNC: 8.3 MG/DL (ref 8.5–10.5)
CHLORIDE SERPL-SCNC: 107 MMOL/L (ref 96–112)
CO2 SERPL-SCNC: 25 MMOL/L (ref 20–33)
COLOR FLD: YELLOW
CREAT SERPL-MCNC: <0.2 MG/DL (ref 0.5–1.4)
CSF COMMENTS 1658: NORMAL
EOSINOPHIL # BLD AUTO: 0.25 K/UL (ref 0–0.51)
EOSINOPHIL NFR BLD: 3.3 % (ref 0–6.9)
ERYTHROCYTE [DISTWIDTH] IN BLOOD BY AUTOMATED COUNT: 59.7 FL (ref 35.9–50)
GLUCOSE FLD-MCNC: 89 MG/DL
GLUCOSE SERPL-MCNC: 81 MG/DL (ref 65–99)
GRAM STN SPEC: NORMAL
GRAM STN SPEC: NORMAL
HCT VFR BLD AUTO: 28.3 % (ref 42–52)
HGB BLD-MCNC: 8.7 G/DL (ref 14–18)
HISTIOCYTES NFR FLD: 6 %
IMM GRANULOCYTES # BLD AUTO: 0.04 K/UL (ref 0–0.11)
IMM GRANULOCYTES NFR BLD AUTO: 0.5 % (ref 0–0.9)
LDH FLD L TO P-CCNC: 119 U/L
LDH SERPL L TO P-CCNC: 152 U/L (ref 107–266)
LYMPHOCYTES # BLD AUTO: 2 K/UL (ref 1–4.8)
LYMPHOCYTES NFR BLD: 26.5 % (ref 22–41)
LYMPHOCYTES NFR FLD: 16 %
MCH RBC QN AUTO: 28.5 PG (ref 27–33)
MCHC RBC AUTO-ENTMCNC: 30.7 G/DL (ref 33.7–35.3)
MCV RBC AUTO: 92.8 FL (ref 81.4–97.8)
MESOTHL CELL NFR FLD: 7 %
MONOCYTES # BLD AUTO: 0.84 K/UL (ref 0–0.85)
MONOCYTES NFR BLD AUTO: 11.1 % (ref 0–13.4)
MONONUC CELLS NFR FLD: 21 %
NEUTROPHILS # BLD AUTO: 4.38 K/UL (ref 1.82–7.42)
NEUTROPHILS NFR BLD: 57.9 % (ref 44–72)
NEUTROPHILS NFR FLD: 50 %
NRBC # BLD AUTO: 0 K/UL
NRBC BLD-RTO: 0 /100 WBC
PH FLD: 7 [PH]
PLATELET # BLD AUTO: 657 K/UL (ref 164–446)
PMV BLD AUTO: 8.8 FL (ref 9–12.9)
POTASSIUM SERPL-SCNC: 4.5 MMOL/L (ref 3.6–5.5)
PROT FLD-MCNC: 3.4 G/DL
PROT SERPL-MCNC: 6.3 G/DL (ref 6–8.2)
RBC # BLD AUTO: 3.05 M/UL (ref 4.7–6.1)
RBC # FLD: 2000 CELLS/UL
SIGNIFICANT IND 70042: NORMAL
SIGNIFICANT IND 70042: NORMAL
SITE SITE: NORMAL
SITE SITE: NORMAL
SODIUM SERPL-SCNC: 140 MMOL/L (ref 135–145)
SOURCE SOURCE: NORMAL
SOURCE SOURCE: NORMAL
WBC # BLD AUTO: 7.6 K/UL (ref 4.8–10.8)
WBC # FLD: 2219 CELLS/UL

## 2018-03-03 PROCEDURE — 84155 ASSAY OF PROTEIN SERUM: CPT

## 2018-03-03 PROCEDURE — 700102 HCHG RX REV CODE 250 W/ 637 OVERRIDE(OP): Performed by: INTERNAL MEDICINE

## 2018-03-03 PROCEDURE — A9270 NON-COVERED ITEM OR SERVICE: HCPCS | Performed by: INTERNAL MEDICINE

## 2018-03-03 PROCEDURE — 83615 LACTATE (LD) (LDH) ENZYME: CPT

## 2018-03-03 PROCEDURE — 83986 ASSAY PH BODY FLUID NOS: CPT

## 2018-03-03 PROCEDURE — 85025 COMPLETE CBC W/AUTO DIFF WBC: CPT

## 2018-03-03 PROCEDURE — 94640 AIRWAY INHALATION TREATMENT: CPT

## 2018-03-03 PROCEDURE — 94003 VENT MGMT INPAT SUBQ DAY: CPT

## 2018-03-03 PROCEDURE — 700102 HCHG RX REV CODE 250 W/ 637 OVERRIDE(OP): Performed by: HOSPITALIST

## 2018-03-03 PROCEDURE — 82945 GLUCOSE OTHER FLUID: CPT

## 2018-03-03 PROCEDURE — A9270 NON-COVERED ITEM OR SERVICE: HCPCS | Performed by: HOSPITALIST

## 2018-03-03 PROCEDURE — 87070 CULTURE OTHR SPECIMN AEROBIC: CPT

## 2018-03-03 PROCEDURE — 302101 FENESTRATED FOAM: Performed by: INTERNAL MEDICINE

## 2018-03-03 PROCEDURE — 0B968ZZ DRAINAGE OF RIGHT LOWER LOBE BRONCHUS, VIA NATURAL OR ARTIFICIAL OPENING ENDOSCOPIC: ICD-10-PCS | Performed by: INTERNAL MEDICINE

## 2018-03-03 PROCEDURE — 88305 TISSUE EXAM BY PATHOLOGIST: CPT

## 2018-03-03 PROCEDURE — 302977 HCHG BRONCHOSCOPY PROC-THERAPEUTIC

## 2018-03-03 PROCEDURE — 700101 HCHG RX REV CODE 250: Performed by: INTERNAL MEDICINE

## 2018-03-03 PROCEDURE — 84157 ASSAY OF PROTEIN OTHER: CPT

## 2018-03-03 PROCEDURE — 0W9B3ZZ DRAINAGE OF LEFT PLEURAL CAVITY, PERCUTANEOUS APPROACH: ICD-10-PCS | Performed by: INTERNAL MEDICINE

## 2018-03-03 PROCEDURE — 87186 SC STD MICRODIL/AGAR DIL: CPT

## 2018-03-03 PROCEDURE — 87205 SMEAR GRAM STAIN: CPT | Mod: 91

## 2018-03-03 PROCEDURE — 87077 CULTURE AEROBIC IDENTIFY: CPT | Mod: 91

## 2018-03-03 PROCEDURE — 700111 HCHG RX REV CODE 636 W/ 250 OVERRIDE (IP): Performed by: INTERNAL MEDICINE

## 2018-03-03 PROCEDURE — 302978 HCHG BRONCHOSCOPY-DIAGNOSTIC

## 2018-03-03 PROCEDURE — 87075 CULTR BACTERIA EXCEPT BLOOD: CPT

## 2018-03-03 PROCEDURE — 71045 X-RAY EXAM CHEST 1 VIEW: CPT

## 2018-03-03 PROCEDURE — 770022 HCHG ROOM/CARE - ICU (200)

## 2018-03-03 PROCEDURE — 88112 CYTOPATH CELL ENHANCE TECH: CPT

## 2018-03-03 PROCEDURE — 32555 ASPIRATE PLEURA W/ IMAGING: CPT

## 2018-03-03 PROCEDURE — 99233 SBSQ HOSP IP/OBS HIGH 50: CPT | Performed by: HOSPITALIST

## 2018-03-03 PROCEDURE — 89051 BODY FLUID CELL COUNT: CPT

## 2018-03-03 PROCEDURE — 80048 BASIC METABOLIC PNL TOTAL CA: CPT

## 2018-03-03 RX ORDER — FUROSEMIDE 10 MG/ML
20 INJECTION INTRAMUSCULAR; INTRAVENOUS
Status: DISCONTINUED | OUTPATIENT
Start: 2018-03-03 | End: 2018-03-04

## 2018-03-03 RX ORDER — PHENOBARBITAL 20 MG/5ML
240 ELIXIR ORAL EVERY EVENING
Status: DISCONTINUED | OUTPATIENT
Start: 2018-03-03 | End: 2018-03-03

## 2018-03-03 RX ORDER — LACOSAMIDE 50 MG/1
50 TABLET ORAL ONCE
Status: COMPLETED | OUTPATIENT
Start: 2018-03-03 | End: 2018-03-03

## 2018-03-03 RX ORDER — BISACODYL 10 MG
10 SUPPOSITORY, RECTAL RECTAL DAILY
Status: DISCONTINUED | OUTPATIENT
Start: 2018-03-03 | End: 2018-03-06

## 2018-03-03 RX ORDER — PHENOBARBITAL 20 MG/5ML
240 ELIXIR ORAL EVERY EVENING
Status: DISCONTINUED | OUTPATIENT
Start: 2018-03-03 | End: 2018-03-05

## 2018-03-03 RX ORDER — MIDODRINE HYDROCHLORIDE 5 MG/1
2.5 TABLET ORAL
Status: DISCONTINUED | OUTPATIENT
Start: 2018-03-03 | End: 2018-03-04

## 2018-03-03 RX ADMIN — LEVALBUTEROL HYDROCHLORIDE 1.25 MG: 1.25 SOLUTION RESPIRATORY (INHALATION) at 18:43

## 2018-03-03 RX ADMIN — BISACODYL 10 MG: 10 SUPPOSITORY RECTAL at 07:21

## 2018-03-03 RX ADMIN — ENOXAPARIN SODIUM 40 MG: 100 INJECTION SUBCUTANEOUS at 07:29

## 2018-03-03 RX ADMIN — LACOSAMIDE 150 MG: 50 TABLET, FILM COATED ORAL at 20:22

## 2018-03-03 RX ADMIN — LEVALBUTEROL HYDROCHLORIDE 1.25 MG: 1.25 SOLUTION RESPIRATORY (INHALATION) at 13:32

## 2018-03-03 RX ADMIN — LEVALBUTEROL HYDROCHLORIDE 1.25 MG: 1.25 SOLUTION RESPIRATORY (INHALATION) at 02:38

## 2018-03-03 RX ADMIN — FUROSEMIDE 20 MG: 10 INJECTION, SOLUTION INTRAMUSCULAR; INTRAVENOUS at 16:55

## 2018-03-03 RX ADMIN — FENTANYL CITRATE 100 MCG: 50 INJECTION, SOLUTION INTRAMUSCULAR; INTRAVENOUS at 10:00

## 2018-03-03 RX ADMIN — PHENOBARBITAL 240 MG: 20 ELIXIR ORAL at 20:22

## 2018-03-03 RX ADMIN — POTASSIUM BICARBONATE 50 MEQ: 25 TABLET, EFFERVESCENT ORAL at 07:28

## 2018-03-03 RX ADMIN — OMEPRAZOLE 40 MG: 20 CAPSULE, DELAYED RELEASE ORAL at 07:28

## 2018-03-03 RX ADMIN — CHLORHEXIDINE GLUCONATE 15 ML: 1.2 RINSE ORAL at 20:22

## 2018-03-03 RX ADMIN — MIDODRINE HYDROCHLORIDE 2.5 MG: 5 TABLET ORAL at 11:30

## 2018-03-03 RX ADMIN — LACOSAMIDE 200 MG: 50 TABLET, FILM COATED ORAL at 07:28

## 2018-03-03 RX ADMIN — CHLORHEXIDINE GLUCONATE 15 ML: 1.2 RINSE ORAL at 09:00

## 2018-03-03 RX ADMIN — LEVALBUTEROL HYDROCHLORIDE 1.25 MG: 1.25 SOLUTION RESPIRATORY (INHALATION) at 06:58

## 2018-03-03 RX ADMIN — LEVETIRACETAM 1500 MG: 100 SOLUTION ORAL at 20:21

## 2018-03-03 RX ADMIN — LACOSAMIDE 50 MG: 50 TABLET, FILM COATED ORAL at 22:01

## 2018-03-03 RX ADMIN — LEVETIRACETAM 1500 MG: 100 SOLUTION ORAL at 07:29

## 2018-03-03 RX ADMIN — PHENOBARBITAL 240 MG: 20 ELIXIR ORAL at 18:00

## 2018-03-03 NOTE — CARE PLAN
Problem: Skin Integrity  Goal: Risk for impaired skin integrity will decrease  Neo skin risk assessment and complete skin assessment completed at beginning of shift. Pt is turned and repositioned q2h and PRN. Appropriate wound protocols in place.

## 2018-03-03 NOTE — PROGRESS NOTES
Med rec updated but PARTIALLY complete  Asked to follow up due to Pt having a Baclofen pain pump  Pt's mother Susana states Pt's pump is due to be changed in 2nd week of April  Mother states she will bring in Pt's pain pump information tomorrow  Will full update and complete med rec at that time

## 2018-03-03 NOTE — CARE PLAN
Problem: Oxygenation/Respiratory Function  Goal: Patient will Achieve/Maintain Optimum Respiratory Rate/Effort  Collaboration with RT.

## 2018-03-03 NOTE — CARE PLAN
Problem: Ventilation Defect:  Goal: Ability to achieve and maintain unassisted ventilation or tolerate decreased levels of ventilator support    Intervention: Support and monitor invasive and noninvasive mechanical ventilation  Adult Ventilation Update    Total Vent Days: 13    Patient Lines/Drains/Airways Status    Active Airway     Name: Placement date: Placement time: Site: Days:    Airway Group Standard Cuffed Trach Tracheostomy 6.0 07/02/17   1403   Tracheostomy   243    Airway Group Tracheostomy 6.0       Tracheostomy                   Cough: Productive (03/02/18 0656)  Sputum Amount: Small (03/02/18 1607)  Sputum Color: Clear;White (03/02/18 1607)  Sputum Consistency: Thick;Thin (03/02/18 1607)    Mobility Group  Activity Performed: Unable to mobilize (03/01/18 2000)  Pt Calls for Assistance: No (03/01/18 0800)  Reason Not Mobilized: Bed rest (03/02/18 1607)  Mobilization Comments: per patient's mother/POA (03/01/18 2000)    Events/Summary/Plan: Pt tolerating t-piece well throughout shift, will attempt 24 hrs tomorrow.   (03/02/18 1050)

## 2018-03-03 NOTE — FLOWSHEET NOTE
SVN     03/03/18 1338   Events/Summary/Plan   Events/Summary/Plan SVN   Interdisciplinary Plan of Care-Goals (Indications)   Obstructive Ventilatory Defect or Pulmonary Disease without Obvious Obstruction History / Diagnosis   Bronchopulmonary Hygiene Indications Difficulty with Secretion Clearance   Interdisciplinary Plan of Care-Outcomes    Bronchopulmonary Hygiene Outcome Optimal Hydration with Moderate or Less Sputum Production   RT Assessment of Delivered Medications   Evaluation of Medication Delivery Daily Yes-- Pt /Family has been Instructed in use of Respiratory Medications and Adverse Reactions   SVN Group   #SVN Performed Yes   Given By: Trache   Aerosol Therapy / Airway Management   #Aerosol Therapy / Airway Management T-Piece   Aerosol Humidity Temp (celsius) 40   Respiratory WDL   Respiratory (WDL) X   Chest Exam   Work Of Breathing / Effort Mild   Respiration 15   Pulse 83   Heart Rate (Monitored) 81   Breath Sounds   Pre/Post Intervention Pre Intervention Assessment   RUL Breath Sounds Clear   RML Breath Sounds Coarse Crackles   RLL Breath Sounds Coarse Crackles   JONATAN Breath Sounds Clear   LLL Breath Sounds Coarse Crackles   Secretions   Cough Productive   How Sputum Obtained Tracheal   Sputum Amount Moderate   Sputum Color White   Sputum Consistency Thick   Suction Frequency Suctioned Once or Twice Per Encounter   Oximetry   Continuous Oximetry Yes   O2 Alarms Set & Reviewed Yes   Oxygen   Pulse Oximetry 99 %   O2 (LPM) 10   O2 (FiO2) 40   O2 Daily Delivery Respiratory  T-Piece

## 2018-03-03 NOTE — PROCEDURES
Procedure Note    Date: 3/3/2018  Time: 10:15 AM    Procedure: Diagnostic and therapeutic thoracentesis  Site: Left chest (fluid localized with US guidance in realtime)    Indication: Moderate-large pleural effusion, respiratory failure  Consent: Informed consent obtained from patient or designated decision maker after explaining the benefits/risks of the procedure including but not limited to bleeding, infection, nerve or other deep structure injury, pneumothorax/hemothorax, arrythmia, or death. Patient or surrogate expressed understanding and agreement and signed consent which can be found in the patient's chart.    Procedure: After obtaining consent, a time-out was performed. Appropriate site confirmed with ultrasound and patient positioned, prepped, and draped in sterile fashion. All those present wearing mask/cap and those physically participating remained adhering to sterile fashion with mask/cap and gloves. 5 mL of local anesthetic injected (1% lidocaine without epinephrine) achieving appropriate comfort level for patient. Large pleural fluid pocket localized and accessed using continuous ultrasound guidance and a thoracentesis catheter placed using Seldinger technique into the pleural cavity without complication. Able to easily aspirate straw-colored with some blood fluid. Fluid collected for analysis and 800 mL of fluid drained into pleurovac bottles. Significant reduction in fluid collection and re-expansion of the lung seen on US. Catheter removed and site dressed with bandage.  Patient tolerated procedure well without any difficulties and remains in care of bedside nurse. CXR will be performed to confirm improvement in effusion and to evaluate for pneumothorax. Samples sent to lab.    EBL: Minimal  Complications: None  CXR: Pending    Jeremy Gonda, MD  Critical Care Medicine

## 2018-03-03 NOTE — CARE PLAN
Problem: Safety  Goal: Will remain free from injury  Fall prevention and safety protocols in place    Problem: Venous Thromboembolism (VTW)/Deep Vein Thrombosis (DVT) Prevention:  Goal: Patient will participate in Venous Thrombosis (VTE)/Deep Vein Thrombosis (DVT)Prevention Measures   03/03/18 0800   Mechanical/VTE Prophylaxis   Mechanical Prophylaxis  SCDs, Sequential Compression Device   SCDs, Sequential Compression Device On   OTHER   Risk Assessment Score 5   VTE RISK Very High   Pharmacologic Prophylaxis Used LMWH: Enoxaparin(Lovenox)       Problem: Bowel/Gastric:  Goal: Normal bowel function is maintained or improved   03/01/18 0600 03/03/18 1100   OTHER   Last BM --  03/03/18   Number of Times Stooled 1 --        Bowel protocol    Problem: Respiratory:  Goal: Respiratory status will improve  Pulm toilet    Problem: Skin Integrity  Goal: Risk for impaired skin integrity will decrease  Turning freq, wound care

## 2018-03-03 NOTE — CARE PLAN
Problem: Ventilation Defect:  Goal: Ability to achieve and maintain unassisted ventilation or tolerate decreased levels of ventilator support  Outcome: PROGRESSING AS EXPECTED  Adult Ventilation Update    Total Vent Days: 14    Patient Lines/Drains/Airways Status    Active Airway     Name: Placement date: Placement time: Site: Days:    Airway Group Standard Cuffed Trach Tracheostomy 6.0 07/02/17   1403   Tracheostomy   244                 In the last 24 hours, the patient tolerated T-piece for 15.5 hrs on 10 LPM @ 40% .       Static Compliance (ml / cm H2O): 36.3 (03/02/18 2217)    Sputum/Suction  Cough: Productive (03/03/18 0000)  Sputum Amount: Small (03/03/18 0000)  Sputum Color: Clear;White (03/03/18 0000)  Sputum Consistency: Thick (03/03/18 0000)    Mobility Group  Activity Performed: Unable to mobilize (03/02/18 2217)  Pt Calls for Assistance: No (03/02/18 2217)  Reason Not Mobilized: Bed rest (03/02/18 2217)  Mobilization Comments: per patient's mother/POA (03/01/18 2000)    Events/Summary/Plan: Pt returned to rest settings on vent/ Xopenex Q6 hrs (03/02/18 2217)

## 2018-03-03 NOTE — PROGRESS NOTES
Dr. Gonda at bedside for Thoracentesis. Timeout called. All agree. Left thoracentesis performed without complication.

## 2018-03-03 NOTE — PROGRESS NOTES
[facility] Hospitalist Progress Note    Date of Service: 3/3/2018    Chief Complaint  37 y.o. male admitted 2018 with shortness of breath.    Interval Problem Update  Mr. Edwards has a past medical history of traumatic brain injury and seizure disorder that recently underwent a slow taper off phenobarbital. He subsequently developed seizures over the previous 2 weeks prior to presentation to the ER. He was found to be hypoxic with aspiration pneumonitis and required intubation with ICU admission. He has been treated with antibiotics.     He is tolerating his home tube feeds as advised by his mother. His BP has gone up to 160 thus midodrine was stopped.    He was on T piece for 15 1/2 hours yesterday and on the vent last night. He is off the vent again today. He had a bronchoscopy this morning with thoracentesis. I met with his mother, Susana, today and we discussed the twitches of the left arm and face. I met with Dr. Rehman as we discussed increasing the phenobarbital.  Consultants/Specialty  Critical Care. I discussed her condition with Dr. Gonda on ICU Hot Rounds.  Neurology    Disposition  ICU        Review of Systems   Unable to perform ROS: Intubated      Physical Exam  Laboratory/Imaging   Hemodynamics  No data recorded.   Monitored Temp: 36.1 °C (97 °F)  Pulse  Av.1  Min: 48  Max: 119 Heart Rate (Monitored): 89  NIBP: 150/83      Respiratory  Liriano Vent Mode: APVCMV, Rate (breaths/min): 12, PEEP/CPAP: 8, PEEP/CPAP: 8, FiO2: 40, P Peak (PIP): 24, P MEAN: 11 #Aerosol Therapy / Airway Management: T-Piece, Aerosol Humidity Temp (celsius): 35 Respiration: (!) 11, Pulse Oximetry: 98 %, O2 Daily Delivery Respiratory : T-Piece     Given By:: Trache, Work Of Breathing / Effort: Vented  RUL Breath Sounds: Diminished, RML Breath Sounds: Coarse Crackles, RLL Breath Sounds: Coarse Crackles, JONATAN Breath Sounds: Diminished, LLL Breath Sounds: Coarse Crackles    Fluids    Intake/Output Summary (Last 24 hours) at  03/03/18 0741  Last data filed at 03/03/18 0600   Gross per 24 hour   Intake          1814.27 ml   Output             4855 ml   Net         -3040.73 ml       Nutrition  Orders Placed This Encounter   Procedures   • Diet NPO     Standing Status:   Standing     Number of Occurrences:   1     Order Specific Question:   Restrict to:     Answer:   Strict [1]     Physical Exam   Constitutional: No distress.   HENT:   Head: Atraumatic.   Crowded teeth  Dry mucous membranes.   He opens his eyes   Neck:   Tracheostomy, right IJ central line.   Cardiovascular: Normal heart sounds.    Sinus rhythm, no murmur   Pulmonary/Chest:   moderate air movement, clear on T piece   Abdominal: Soft. He exhibits no distension. There is no tenderness.   PEG tube, mild distention   +bowel sounds   Genitourinary:   Genitourinary Comments: catheter   Musculoskeletal: He exhibits deformity. He exhibits no edema or tenderness.   Extensive contractures of extremities x 4.   Poor muscle tone.   Neurological:   Awake though he does not make eye contact nor follow commands.   Twitching of the left face and left arm.  Horizontal nystagmus in both directions.     Skin: Skin is warm and dry. No rash noted. He is not diaphoretic. There is pallor.   Nursing note and vitals reviewed.      Recent Labs      03/01/18   0545  03/02/18   0520  03/03/18   0540   WBC  8.8  7.5  7.6   RBC  3.00*  2.70*  3.05*   HEMOGLOBIN  8.6*  7.9*  8.7*   HEMATOCRIT  27.4*  24.8*  28.3*   MCV  91.3  91.9  92.8   MCH  28.7  29.3  28.5   MCHC  31.4*  31.9*  30.7*   RDW  55.3*  56.1*  59.7*   PLATELETCT  599*  556*  657*   MPV  9.4  9.5  8.8*     Recent Labs      03/02/18   1351  03/02/18   2205  03/03/18   0540   SODIUM  145  142  140   POTASSIUM  4.4  5.6*  4.5   CHLORIDE  112  108  107   CO2  29  27  25   GLUCOSE  90  96  81   BUN  6*  8  10   CREATININE  <0.20*  <0.20*  <0.20*   CALCIUM  7.9*  8.9  8.3*                      Assessment/Plan     * Septic shock (CMS-[facility])-  (present on admission)   Assessment & Plan    Source is consistent with aspiration pneumonia.  s/p IV pressors and s/p IV fluids. Midodrine will be tapered.  s/p full course of doxycycline, Rocephin and Flagyl  The organ system failure associated with this disease process is the respiratory system as is evidenced by the need for mechanical ventilation.         Seizure (CMS-HCC)- (present on admission)   Assessment & Plan    S/p continuous EEG  Continue Vimpat, Keppra, phenobarbital 150 mg will be increased to 240 mg nightly via PEG tube  Neurology consulted.  Continuous EEG results:  This scalp EEG from 2/23/2018 to 2/25/2018 is consistent with      1. Intractable seizure from the right hemisphere-- improved toward the end of EEG monitoring ( the frequency and amplitudes of the epileptiform over the right hemisphere decreased with addition of PHB)     2. Diffuse nonspecific cortical dysfunction - severe        Hypomagnesemia- (present on admission)   Assessment & Plan    - Resolved        Decubitus ulcer of ischium, left, stage IV (CMS-HCC)- (present on admission)   Assessment & Plan    Present upon admission.  Continue wound care        Azotemia- (present on admission)   Assessment & Plan    - Resolved with fluids        Acute on chronic respiratory failure with hypoxemia (CMS-HCC)- (present on admission)   Assessment & Plan    Requiring initiation of the vent on 2/18.  History of prior tracheostomy  T piece during the days and vent at night.        Aspiration pneumonia (CMS-HCC)- (present on admission)   Assessment & Plan    Source of septic shock  S/p doxycycline, Rocephin and Flagyl  Status post bronchoscopy  CT chest on 2/26:  1.  No evidence of bowel obstruction or perforation.  2.  Appendix not visualized.  3.  Small to moderate volume ascites of uncertain etiology.  4.  Bilateral pleural effusions with associated atelectasis.  5.  Ill-defined parenchymal opacities in the LEFT lower lung suggesting multifocal  pneumonia.  6.  Scoliosis        Anemia, blood loss- (present on admission)   Assessment & Plan    Hb has been to 7-8  Follow daily Hb        Diarrhea- (present on admission)   Assessment & Plan    - Resolved          Protein-calorie malnutrition, severe (CMS-HCC)- (present on admission)   Assessment & Plan    This is a clinical diagnosis in the setting of infection and vomiting/diarrhea for which feeds had been held  He is now tolerating tube feeds        Physical debility- (present on admission)   Assessment & Plan    - Chronic post traumatic brain injury  - At baseline        TBI (traumatic brain injury) (CMS-[facility])- (present on admission)   Assessment & Plan    - Chronic, nonverbal        Hyponatremia- (present on admission)   Assessment & Plan    Na had been elevated and went down to 119 then went as high as 148 thus 119 may have been a spurious lab error.   The Himalayan  Salt that his mother gives him daily has been held.   q8 hour BMPs.  Na is 140          Contraction, joint, multiple sites- (present on admission)   Assessment & Plan    - Chronic, PT/OT  - has baclofen pump, needs interrogating which is pending        GI bleed- (present on admission)   Assessment & Plan    Possible  Carmencita-White tear, patient did have multiple episodes of severe vomiting  If he develops further bleeding, an EGD may be appropriate.           Quality-Core Measures   Reviewed items::  Labs reviewed, Medications reviewed and Radiology images reviewed  Waldrop catheter::  Unconscious / Sedated Patient on a Ventilator  DVT prophylaxis pharmacological::  Contraindicated - High bleeding risk

## 2018-03-03 NOTE — PROGRESS NOTES
Pulmonary Critical Care Progress Note        Date of admission: 2/18/2018    Chief Complaint: Respiratory failure, septic shock    History of Present Illness:  Mr. Edwards is a 37-year-old male with past medical history of traumatic brain injury at age 17, seizures diagnosed in June of 2017. His baseline mental status appears to be nodding and minimal interacting without vocalization. Who was brought to the ICU as a direct admit from [facility] where he was brought today for hypoxia. Reportedly the patient was being weanied off of his phenobarbital by his neurologist when he began having more frequent seizures, approximately one week ago his neurologist increased his Keppra dosing however the seizures continued. His mother did use CBD and THC with some success in slowing the seizures. Since 2 days ago the patient had multiple aspiration events following these seizures. His mother evaluated him with a home oxygen saturation monitor and he was noted to be hypoxic, he was brought to [facility] where he was found to have 28% bands with leukopenia. Chest x-ray was obtained demonstrating bilateral infiltrates right greater than left. He was placed on mechanical ventilation and his oxygenation was unable to be improved more than 85%, he was hypotensive and given multiple boluses of IV fluids without improvement. He was started on levo fed and given multiple doses of Ativan for seizures and transferred to our ICU for higher level of care. He arrives on the ventilator and on pressors critically ill.    Please note this history is obtained by my partner Dr. Appiah 2/18/2018.    ROS:  Respiratory: unable to perform due to the patient's inability to effectively communicate, Cardiac: unable to perform due to the patient's inability to effectively communicate, GI: unable to perform due to the patient's inability to effectively communicate.  All other systems negative. Unchanged    Interval Events:  24 hour  interval history reviewed    - remains off pressors since yesterday   - neuro at baseline   - Increasing plts   - HTN now, holding midodrine   - tolerating home nutrition   - Na 140   - T-piece x15.5 hr (40%/10 lpm)     PFSH:  No change.    Respiratory:  Liriano Vent Mode: APVCMV, Rate (breaths/min): 12, Vt Target (mL): 460, PEEP/CPAP: 8, FiO2: 40, Static Compliance (ml / cm H2O): 35.5, Control VTE (exp VT): 457   Pulse Oximetry: 98 % T piece 10 L/m, 40% FiO2          Exam: trach tube noted without surrounding erythema, strong cough with moderate secretions (thin), unlabored respirations, no intercostal retractions or accessory muscle use, rhonchi bibasilar and diminished breath sounds bilateral bases.     ImagingCXR  I have personally reviewed the chest x-ray my impression is  (compared to prior films) no film today   Bedside limited chest ultrasound reveals small right-sided effusion not amenable to thoracentesis today, moderate size left effusion        Invalid input(s): [id number] ABG interpretation: None today    HemoDynamics:  Pulse: 69, Heart Rate (Monitored): 89  NIBP: 150/83        Exam: regular rate and rhythm, regular rhythm (Sinus), no ectopy      Echo on 2/19/2018:  CONCLUSIONS  Normal right and left ventricular size and function.   No significant valve disease or flow abnormalities.   Dilated inferior vena cava without inspiratory collapse. The patient is   intubated.        Neuro:  GCS Total Aleida Coma Score: 9        Exam: PERRL, awakens and attempts to look around the room although not tracking visually, not following commands, calm, no seizure-like activity although trace left facial twitch, horizontal nystagmus after stimulation    Imaging: Available data reviewed    EEG 2/25: EEG monitoring shows intermittent  epileptiform over the right hemisphere  Improved compared with that of yesterday    Fluids:  Intake/Output       03/01/18 0700 - 03/02/18 0659 03/02/18 0700 - 03/03/18 0659 03/03/18  0700 - 03/04/18 0659      0700-1859 1900-0659 Total 0700-1859 1900-0659 Total 0700-1859 1900-0659 Total       Intake    I.V.  1865.1  2811.5 4676.6  301.8  60 361.8  --  -- --    Vasopressin Volume 34.4 -- 34.4 -- -- -- -- -- --    Norepinephrine Volume 30.8 11.5 42.3 1.8 -- 1.8 -- -- --    IV Piggyback Volume (IV Piggyback) -- 1000 1000 -- -- -- -- -- --    IV Volume (0.9% Normal Saline) -- -- -- -- 60 60 -- -- --    IV Volume (D5W) 1800 1800 3600 300 -- 300 -- -- --    Other  60  -- 60  60  112.5 172.5  --  -- --    Medications (P.O./ Enteral Liquids) 60 -- 60 60 112.5 172.5 -- -- --    Enteral  840  -- 840  960  320 1280  --  -- --    Enteral Volume 780 -- 780 720 240 960 -- -- --    Free Water / Tube Flush 60 -- 60 240 80 320 -- -- --    Total Intake 2765.1 2811.5 5576.6 1321.8 492.5 1814.3 -- -- --       Output    Urine  900  2850 3750  3325  1530 4855  --  -- --    Indwelling Cathether 900 2850 3750 3325 1530 4855 -- -- --    Total Output 900 2850 3750 3325 1530 4855 -- -- --       Net I/O     1865.1 -38.5 1826.6 -2003.2 -1037.5 -3040.7 -- -- --        Weight: 55.3 kg (121 lb 14.6 oz)  Recent Labs      03/02/18   1351  03/02/18   2205  03/03/18   0540   SODIUM  145  142  140   POTASSIUM  4.4  5.6*  4.5   CHLORIDE  112  108  107   CO2  29  27  25   BUN  6*  8  10   CREATININE  <0.20*  <0.20*  <0.20*   CALCIUM  7.9*  8.9  8.3*       GI/Nutrition:  Exam: nondistended, abdomen is soft and non-tender, normal active bowel sounds, G-Tube  no sign of infection   Imaging: Available data reviewed    KUB 2/25: No evidence of bowel obstruction.   CT abdomen and pelvis with oral contrast 2/26:  1.  No evidence of bowel obstruction or perforation.  2.  Appendix not visualized.  3.  Small to moderate volume ascites of uncertain etiology.  4.  Bilateral pleural effusions with associated atelectasis.  5.  Ill-defined parenchymal opacities in the LEFT lower lung suggesting multifocal pneumonia.  6.  Scoliosis.    Liver  Function  Recent Labs      18   1351  18   2205  18   0540   GLUCOSE  90  96  81       Heme:  Recent Labs      18   0545  18   0520  18   0540   RBC  3.00*  2.70*  3.05*   HEMOGLOBIN  8.6*  7.9*  8.7*   HEMATOCRIT  27.4*  24.8*  28.3*   PLATELETCT  599*  556*  657*       Infectious Disease:  Monitored Temp  Av.4 °C (97.5 °F)  Min: 35.8 °C (96.4 °F)  Max: 37.1 °C (98.8 °F)  Micro: reviewed. C. diff is negative. BAL with Proteus, and Klebsiella. Repeat cultures negative  Recent Labs      18   0545  18   0520  18   0540   WBC  8.8  7.5  7.6   NEUTSPOLYS  70.00  54.30  57.90   LYMPHOCYTES  16.30*  28.70  26.50   MONOCYTES  11.00  13.40  11.10   EOSINOPHILS  1.10  2.30  3.30   BASOPHILS  0.30  0.40  0.70     Current Facility-Administered Medications   Medication Dose Frequency Provider Last Rate Last Dose   • potassium bicarbonate (KLYTE) 25 MEQ effervescent tablet TBEF 50 mEq  50 mEq TID Jeremy M Gonda, M.D.   50 mEq at 18   • norepinephrine (LEVOPHED) 8 mg in  mL Infusion  0-30 mcg/min Continuous Jeremy M Gonda, M.D.   Stopped at 18   • enoxaparin (LOVENOX) inj 40 mg  40 mg DAILY Jeremy M Gonda, M.D.   40 mg at 18   • lacosamide (VIMPAT) tablet 200 mg  200 mg BID Jeremy M Gonda, M.D.   200 mg at 18   • levETIRAcetam (KEPPRA) 100 MG/ML solution 1,500 mg  1,500 mg Q12HRS Jeremy M Gonda, M.D.   1,500 mg at 18   • PHENobarbital solution 150 mg  150 mg Q EVENING Jon Rehman M.D.   150 mg at 18   • midodrine (PROAMATINE) tablet 10 mg  10 mg TID WITH MEALS Jeremy M Gonda, M.D.   Stopped at 18   • loperamide (IMODIUM) 1 MG/5ML solution 2 mg  2 mg 4X/DAY PRN Clarence Morfin D.O.   2 mg at 18 0820   • omeprazole 2 mg/mL in sodium bicarbonate (PRILOSEC) oral susp 40 mg  40 mg DAILY Jon Reynoso, D.O.   40 mg at 18 0728   • levalbuterol (XOPENEX) 1.25 MG/3ML nebulizer  solution 1.25 mg  1.25 mg Q6HRS (RT) FARHAT BerryOBucky   1.25 mg at 03/03/18 0658   • Respiratory Care per Protocol   Continuous RT JASPAL Alaniz Jr..O.       • polyethylene glycol/lytes (MIRALAX) PACKET 1 Packet  1 Packet QDAY PRN JASPLA Alaniz Jr..O.   1 Packet at 03/02/18 1527    And   • magnesium hydroxide (MILK OF MAGNESIA) suspension 30 mL  30 mL QDAY PRN Modesto Appiah Jr. D.O.        And   • bisacodyl (DULCOLAX) suppository 10 mg  10 mg QDAY PRN Modesto Appiah Jr. D.O.   10 mg at 03/03/18 0721   • chlorhexidine (PERIDEX) 0.12 % solution 15 mL  15 mL BID JASPAL Alaniz Jr..O.   15 mL at 03/03/18 0900   • lidocaine (XYLOCAINE) 1%  injection  1-2 mL Q30 MIN PRN JASPAL Alaniz Jr..O.       • MD ALERT...Adult ICU Electrolyte Replacement per Pharmacy Protocol   pharmacy to dose JASPAL Alaniz Jr..O.       • ipratropium-albuterol (DUONEB) nebulizer solution 3 mL  3 mL Q2HRS PRN (RT) AJSPAL Alaniz Jr..O.       • levalbuterol (XOPENEX) 1.25 MG/3ML nebulizer solution 1.25 mg  1.25 mg Q4HRS PRN Duy Whitaker M.D.   1.25 mg at 02/20/18 0626   • miconazole 2%-zinc oxide (RADHA) topical cream   PRN Duy Whitaker M.D.       • acetaminophen (TYLENOL) tablet 650 mg  650 mg Q6HRS PRN Duy Whitaker M.D.   650 mg at 02/28/18 0946   • oxyCODONE immediate-release (ROXICODONE) tablet 5 mg  5 mg Q3HRS PRN Duy Whitaker M.D.        And   • oxyCODONE immediate release (ROXICODONE) tablet 10 mg  10 mg Q3HRS PRN Duy Whitaker M.D.       • ondansetron (ZOFRAN) syringe/vial injection 4 mg  4 mg Q4HRS PRN Duy Whitaker M.D.   4 mg at 02/25/18 1140   • ondansetron (ZOFRAN ODT) dispertab 4 mg  4 mg Q4HRS PRN Duy Whitaker M.D.       • promethazine (PHENERGAN) tablet 12.5-25 mg  12.5-25 mg Q4HRS PRN Duy Whitaker M.D.       • promethazine (PHENERGAN) suppository 12.5-25 mg  12.5-25 mg Q4HRS PRN Duy Whitaker M.D.       • prochlorperazine (COMPAZINE) injection 5-10 mg  5-10 mg Q4HRS PRN  Duy Whitaker M.D.       • LORazepam (ATIVAN) injection 4 mg  4 mg Q10 MIN PRN Duy Whitaker M.D.   2 mg at 02/23/18 1251     Last reviewed on 2/18/2018  7:51 PM by Lois Camejo, Pharmacy Intern    Quality  Measures:   Labs reviewed, Medications reviewed and Radiology images reviewed   Waldrop catheter: Critically Ill - Requiring Accurate Measurement of Urinary Output  Central line in place: Need for access     DVT Prophylaxis: Enoxaparin (Lovenox)   DVT prophylaxis - mechanical: SCDs   Ulcer prophylaxis: Yes    Assessed for rehab: Patient returned to prior level of function, rehabilitation not indicated at this time    Assessment and plan:  Acute hypoxemic respiratory failure - VDRF since 2/18, chronic trach   - Continue full vent support with daily spontaneous breathing trials,  continue with progressive lengthening of T-piece    - RT/O2 protocol, encourage mobilization   - Continue to Avoid all sedatives and maintain aspiration precautions   - QOD chest x-ray   - Left thoracentesis and bronchoscopy today to help optimize lung function  Septic shock from pulmonary source, now unspecified place a distributive shock - improved    - Decreased dose of midodrine to 2.5 mg 3 times a day   - Completed antibiotics, s/p sepsis protocol  History of traumatic brain injury, chronic seizure disorder with associated status epilepticus   - Continue antiepileptics per neurology, monitor for further seizure activity   - Seizure precautions  Decubitus ulcerations of the left hip including stage IV disease   - Wound care, optimize nutrition  Pneumonia (Proteus and Klebsiella)   - S/P antibiotics  Mild fluid overload - improving   - Will resume Lasix today and monitor sodium and strict I/O closely  Possible paralytic ileus versus feeding tube malfunction - resolved   - Continue tube feeding trial with home regimen   - Bowel protocol, start daily Dulcolax suppository  Hyponatremia now hypernatremia - improved    - hold off on  salt for now   - Monitor with diuresis daily  Iron deficiency anemia - s/p iron supplementation  Hypokalemia/mag - repletion daily as needed  Prophylaxis, enteral nutrition    Discussed patient condition and risk of morbidity and/or mortality with Family, RN, RT, Pharmacy, Charge nurse / hot rounds and hospitalist  The patient remains critically ill.  Critical care time = 37 minutes in directly providing and coordinating critical care and extensive data review.  No time overlap and excludes procedures.    Jeremy M Gonda, M.D.

## 2018-03-03 NOTE — PROCEDURES
Procedure Note    Date: 3/3/2018  Time: 10 AM    Procedure: Bronchoscopy with bronchoalveolar lavage    Indication: Persistent atelectasis/pulmonary collapse  Consent: Informed consent obtained from patient or designated decision maker after explaining the benefits/risks of the procedure including but not limited to bleeding, infection, airway trauma or loss therof, pneumothorax/hemothorax, arrythmia, or death. Patient or surrogate expressed understanding and agreement and signed consent which can be found in the patient's chart.    Procedure: After obtaining consent, a time-out was performed. Respiratory therapy and nursing at bedside throughout procedure. Patient provided sedation and analgesia throughout the procedure. Placed on full ventilator support with an FiO2 of 100% throughout the procedure. Using a fiberoptic bronchoscope, trachea entered via 8.0 tracheostomy tube. 10 mL of local anesthetic sprayed at the kiersten (2% lidocaine) achieving appropriate comfort level for patient. Airways visualized directly and the following intervention was performed: Diagnostic and therapeutic bronchoalveolar lavage. Findings as below. Patient tolerated procedure well without any difficulties and left in care of bedside nurse/RT.     Medications: Fentanyl 100 µg IV  Findings: Upper airway - Not visualized as bronchoscope passed through ETT.        Trachea to kiersten -normal appearing mucosa without lesions or mass, tracheostomy tip measured 4 cm from the kiersten.        R proximal and distal airways -edematous, erythematous appearing mucosa without mass/lesion/anatomic variance, secretions: Thick, small, tan/bloody        L proximal and distal airways -edematous, erythematous appearing mucosa without mass/lesion/anatomic variance, secretions: Thick, small, tan/bloody        Samples -right lower lobe bronchoalveolar lavage    Complications: None  CXR (if applicable): Pending    Jeremy Gonda, MD  Critical Care  Medicine

## 2018-03-04 ENCOUNTER — APPOINTMENT (OUTPATIENT)
Dept: RADIOLOGY | Facility: MEDICAL CENTER | Age: 38
DRG: 853 | End: 2018-03-04
Attending: INTERNAL MEDICINE
Payer: COMMERCIAL

## 2018-03-04 LAB
ANION GAP SERPL CALC-SCNC: 6 MMOL/L (ref 0–11.9)
ANION GAP SERPL CALC-SCNC: 7 MMOL/L (ref 0–11.9)
BASOPHILS # BLD AUTO: 0.8 % (ref 0–1.8)
BASOPHILS # BLD: 0.06 K/UL (ref 0–0.12)
BUN SERPL-MCNC: 11 MG/DL (ref 8–22)
BUN SERPL-MCNC: 12 MG/DL (ref 8–22)
CALCIUM SERPL-MCNC: 8.3 MG/DL (ref 8.5–10.5)
CALCIUM SERPL-MCNC: 8.9 MG/DL (ref 8.5–10.5)
CHLORIDE SERPL-SCNC: 99 MMOL/L (ref 96–112)
CHLORIDE SERPL-SCNC: 99 MMOL/L (ref 96–112)
CO2 SERPL-SCNC: 27 MMOL/L (ref 20–33)
CO2 SERPL-SCNC: 29 MMOL/L (ref 20–33)
CREAT SERPL-MCNC: <0.2 MG/DL (ref 0.5–1.4)
CREAT SERPL-MCNC: <0.2 MG/DL (ref 0.5–1.4)
EOSINOPHIL # BLD AUTO: 0.18 K/UL (ref 0–0.51)
EOSINOPHIL NFR BLD: 2.3 % (ref 0–6.9)
ERYTHROCYTE [DISTWIDTH] IN BLOOD BY AUTOMATED COUNT: 63.7 FL (ref 35.9–50)
GLUCOSE SERPL-MCNC: 101 MG/DL (ref 65–99)
GLUCOSE SERPL-MCNC: 85 MG/DL (ref 65–99)
HCT VFR BLD AUTO: 27.1 % (ref 42–52)
HGB BLD-MCNC: 8.4 G/DL (ref 14–18)
IMM GRANULOCYTES # BLD AUTO: 0.04 K/UL (ref 0–0.11)
IMM GRANULOCYTES NFR BLD AUTO: 0.5 % (ref 0–0.9)
LYMPHOCYTES # BLD AUTO: 1.46 K/UL (ref 1–4.8)
LYMPHOCYTES NFR BLD: 18.4 % (ref 22–41)
MCH RBC QN AUTO: 28.6 PG (ref 27–33)
MCHC RBC AUTO-ENTMCNC: 31 G/DL (ref 33.7–35.3)
MCV RBC AUTO: 92.2 FL (ref 81.4–97.8)
MONOCYTES # BLD AUTO: 0.9 K/UL (ref 0–0.85)
MONOCYTES NFR BLD AUTO: 11.4 % (ref 0–13.4)
NEUTROPHILS # BLD AUTO: 5.28 K/UL (ref 1.82–7.42)
NEUTROPHILS NFR BLD: 66.6 % (ref 44–72)
NRBC # BLD AUTO: 0 K/UL
NRBC BLD-RTO: 0 /100 WBC
PLATELET # BLD AUTO: 627 K/UL (ref 164–446)
PMV BLD AUTO: 8.7 FL (ref 9–12.9)
POTASSIUM SERPL-SCNC: 3.3 MMOL/L (ref 3.6–5.5)
POTASSIUM SERPL-SCNC: 3.5 MMOL/L (ref 3.6–5.5)
RBC # BLD AUTO: 2.94 M/UL (ref 4.7–6.1)
SODIUM SERPL-SCNC: 133 MMOL/L (ref 135–145)
SODIUM SERPL-SCNC: 134 MMOL/L (ref 135–145)
WBC # BLD AUTO: 7.9 K/UL (ref 4.8–10.8)

## 2018-03-04 PROCEDURE — 700102 HCHG RX REV CODE 250 W/ 637 OVERRIDE(OP): Performed by: INTERNAL MEDICINE

## 2018-03-04 PROCEDURE — 770022 HCHG ROOM/CARE - ICU (200)

## 2018-03-04 PROCEDURE — 94760 N-INVAS EAR/PLS OXIMETRY 1: CPT

## 2018-03-04 PROCEDURE — 700111 HCHG RX REV CODE 636 W/ 250 OVERRIDE (IP): Performed by: INTERNAL MEDICINE

## 2018-03-04 PROCEDURE — 94003 VENT MGMT INPAT SUBQ DAY: CPT

## 2018-03-04 PROCEDURE — 85025 COMPLETE CBC W/AUTO DIFF WBC: CPT

## 2018-03-04 PROCEDURE — A9270 NON-COVERED ITEM OR SERVICE: HCPCS | Performed by: INTERNAL MEDICINE

## 2018-03-04 PROCEDURE — 94640 AIRWAY INHALATION TREATMENT: CPT

## 2018-03-04 PROCEDURE — 80048 BASIC METABOLIC PNL TOTAL CA: CPT | Mod: 91

## 2018-03-04 PROCEDURE — 700102 HCHG RX REV CODE 250 W/ 637 OVERRIDE(OP): Performed by: HOSPITALIST

## 2018-03-04 PROCEDURE — 700101 HCHG RX REV CODE 250: Performed by: INTERNAL MEDICINE

## 2018-03-04 PROCEDURE — 71045 X-RAY EXAM CHEST 1 VIEW: CPT

## 2018-03-04 PROCEDURE — A9270 NON-COVERED ITEM OR SERVICE: HCPCS | Performed by: HOSPITALIST

## 2018-03-04 PROCEDURE — 99233 SBSQ HOSP IP/OBS HIGH 50: CPT | Performed by: HOSPITALIST

## 2018-03-04 RX ORDER — MIDODRINE HYDROCHLORIDE 5 MG/1
5 TABLET ORAL
Status: DISCONTINUED | OUTPATIENT
Start: 2018-03-04 | End: 2018-03-05

## 2018-03-04 RX ADMIN — BISACODYL 10 MG: 10 SUPPOSITORY RECTAL at 07:14

## 2018-03-04 RX ADMIN — CHLORHEXIDINE GLUCONATE 15 ML: 1.2 RINSE ORAL at 20:26

## 2018-03-04 RX ADMIN — LEVALBUTEROL HYDROCHLORIDE 1.25 MG: 1.25 SOLUTION RESPIRATORY (INHALATION) at 06:24

## 2018-03-04 RX ADMIN — ACETAMINOPHEN 650 MG: 325 TABLET, FILM COATED ORAL at 17:11

## 2018-03-04 RX ADMIN — MIDODRINE HYDROCHLORIDE 2.5 MG: 5 TABLET ORAL at 12:26

## 2018-03-04 RX ADMIN — PHENOBARBITAL 240 MG: 20 ELIXIR ORAL at 23:10

## 2018-03-04 RX ADMIN — LEVALBUTEROL HYDROCHLORIDE 1.25 MG: 1.25 SOLUTION RESPIRATORY (INHALATION) at 14:07

## 2018-03-04 RX ADMIN — LACOSAMIDE 200 MG: 50 TABLET, FILM COATED ORAL at 07:14

## 2018-03-04 RX ADMIN — CHLORHEXIDINE GLUCONATE 15 ML: 1.2 RINSE ORAL at 07:14

## 2018-03-04 RX ADMIN — POTASSIUM BICARBONATE 50 MEQ: 25 TABLET, EFFERVESCENT ORAL at 14:42

## 2018-03-04 RX ADMIN — OMEPRAZOLE 40 MG: 20 CAPSULE, DELAYED RELEASE ORAL at 07:15

## 2018-03-04 RX ADMIN — POTASSIUM BICARBONATE 25 MEQ: 25 TABLET, EFFERVESCENT ORAL at 20:27

## 2018-03-04 RX ADMIN — FUROSEMIDE 20 MG: 10 INJECTION, SOLUTION INTRAMUSCULAR; INTRAVENOUS at 07:15

## 2018-03-04 RX ADMIN — MIDODRINE HYDROCHLORIDE 2.5 MG: 5 TABLET ORAL at 06:33

## 2018-03-04 RX ADMIN — LEVETIRACETAM 1500 MG: 100 SOLUTION ORAL at 07:15

## 2018-03-04 RX ADMIN — LEVALBUTEROL HYDROCHLORIDE 1.25 MG: 1.25 SOLUTION RESPIRATORY (INHALATION) at 02:33

## 2018-03-04 RX ADMIN — MIDODRINE HYDROCHLORIDE 5 MG: 5 TABLET ORAL at 17:11

## 2018-03-04 RX ADMIN — POTASSIUM BICARBONATE 50 MEQ: 25 TABLET, EFFERVESCENT ORAL at 07:14

## 2018-03-04 RX ADMIN — ENOXAPARIN SODIUM 40 MG: 100 INJECTION SUBCUTANEOUS at 07:14

## 2018-03-04 RX ADMIN — LEVETIRACETAM 1500 MG: 100 SOLUTION ORAL at 20:27

## 2018-03-04 RX ADMIN — LACOSAMIDE 200 MG: 50 TABLET, FILM COATED ORAL at 20:27

## 2018-03-04 RX ADMIN — LEVALBUTEROL HYDROCHLORIDE 1.25 MG: 1.25 SOLUTION RESPIRATORY (INHALATION) at 18:15

## 2018-03-04 NOTE — CARE PLAN
Problem: Ventilation Defect:  Goal: Ability to achieve and maintain unassisted ventilation or tolerate decreased levels of ventilator support  Outcome: PROGRESSING AS EXPECTED  Adult Ventilation Update    Total Vent Days: 15    Patient Lines/Drains/Airways Status    Active Airway     Name: Placement date: Placement time: Site: Days:    Airway Group Standard Cuffed Trach Tracheostomy 6.0 07/02/17   1403   Tracheostomy   244                      Patient tolerated T-piece 10 LPM at 40% a total of 15 hrs and was returned to rest settings.     Plateau Pressure (Q Shift): 19 (03/03/18 2210)  Static Compliance (ml / cm H2O): 39.1 (03/03/18 2210)    Sputum/Suction   Cough: Productive (03/03/18 2210)  Sputum Amount: Small (03/03/18 2210)  Sputum Color: Clear;White (03/03/18 2210)  Sputum Consistency: Thin (03/03/18 2210)    Mobility Group  Activity Performed: Unable to mobilize (03/03/18 2000)  Pt Calls for Assistance: No (03/03/18 2000)  Reason Not Mobilized: Bed rest (03/03/18 2000)  Mobilization Comments: per pt's mother (03/03/18 2000)    Events/Summary/Plan: Returned to rest settings (03/03/18 2210)

## 2018-03-04 NOTE — PROGRESS NOTES
Pulmonary Critical Care Progress Note        Date of admission: 2/18/2018    Chief Complaint: Respiratory failure, septic shock    History of Present Illness:  Mr. Edwards is a 37-year-old male with past medical history of traumatic brain injury at age 17, seizures diagnosed in June of 2017. His baseline mental status appears to be nodding and minimal interacting without vocalization. Who was brought to the ICU as a direct admit from [facility] where he was brought today for hypoxia. Reportedly the patient was being weanied off of his phenobarbital by his neurologist when he began having more frequent seizures, approximately one week ago his neurologist increased his Keppra dosing however the seizures continued. His mother did use CBD and THC with some success in slowing the seizures. Since 2 days ago the patient had multiple aspiration events following these seizures. His mother evaluated him with a home oxygen saturation monitor and he was noted to be hypoxic, he was brought to [facility] where he was found to have 28% bands with leukopenia. Chest x-ray was obtained demonstrating bilateral infiltrates right greater than left. He was placed on mechanical ventilation and his oxygenation was unable to be improved more than 85%, he was hypotensive and given multiple boluses of IV fluids without improvement. He was started on levo fed and given multiple doses of Ativan for seizures and transferred to our ICU for higher level of care. He arrives on the ventilator and on pressors critically ill.    Please note this history is obtained by my partner Dr. Appiah 2/18/2018.    ROS:  Respiratory: unable to perform due to the patient's inability to effectively communicate, Cardiac: unable to perform due to the patient's inability to effectively communicate, GI: unable to perform due to the patient's inability to effectively communicate.  All other systems negative. Unchanged    Interval Events:  24 hour  interval history reviewed    - T-piece 15 hrs 10/40%   - TFs increased to almost goal per home regimen   - plts dropped slightly to 627   - Na dropped again to 133 from 140 with lasix (dumped 1200mL urine)   - low K   - yesterday twitching so phenobarb dose increased for nighttime, none today   - Underwent bronchoscopy and left-sided thoracentesis with subsequent improvement in radiographic findings and tolerance of T piece    PFSH:  No change.    Respiratory:  Liriano Vent Mode: APVCMV, Rate (breaths/min): 12, Vt Target (mL): 460, PEEP/CPAP: 8, FiO2: 40, Static Compliance (ml / cm H2O): 48.3, Control VTE (exp VT): 466   Pulse Oximetry: 98 % T piece 10 L/m, 40% FiO2, breathing comfortably          Exam: tracheostomy tube in good position without surrounding erythema, unlabored respirations, no intercostal retractions or accessory muscle use and rhonchi bibasilar.     ImagingCXR  I have personally reviewed the chest x-ray my impression is  (compared to prior films) improved right consolidative changes with decreasing edema, trace effusion, unchanged bilateral lower lobe atelectasis, tracheostomy tube and right IJ CVL in good position        Invalid input(s): [id number] ABG interpretation: None today    HemoDynamics:  Pulse: 78, Heart Rate (Monitored): 81  NIBP: 106/52        Exam: regular rate and rhythm, regular rhythm (Sinus), no ectopy      Echo on 2/19/2018:  CONCLUSIONS  Normal right and left ventricular size and function.   No significant valve disease or flow abnormalities.   Dilated inferior vena cava without inspiratory collapse. The patient is   intubated.        Neuro:  GCS Total Aleida Coma Score: 9        Exam: PERRL, eyes are open, times to visually track, does not follow commands, no further twitching seen on my exam    Imaging: Available data reviewed    EEG 2/25: EEG monitoring shows intermittent  epileptiform over the right hemisphere  Improved compared with that of  yesterday    Fluids:  Intake/Output       03/02/18 0700 - 03/03/18 0659 03/03/18 0700 - 03/04/18 0659 03/04/18 0700 - 03/05/18 0659      0700-1859 1900-0659 Total 0700-1859 1900-0659 Total 0700-1859 1900-0659 Total       Intake    I.V.  301.8  60 361.8  60  55 115  --  -- --    Norepinephrine Volume 1.8 -- 1.8 -- -- -- -- -- --    IV Volume (0.9% Normal Saline) -- 60 60 60 55 115 -- -- --    IV Volume (D5W) 300 -- 300 -- -- -- -- -- --    Other  60  112.5 172.5  --  170 170  --  -- --    Medications (P.O./ Enteral Liquids) 60 112.5 172.5 -- 170 170 -- -- --    Enteral  960  320 1280  1110  540 1650  --  -- --    Enteral Volume 720 240 960  -- -- --    Free Water / Tube Flush 240 80 320 450 180 630 -- -- --    Total Intake 1321.8 492.5 1814.3 7622 357 7875 -- -- --       Output    Urine  3325  1530 4855  2975  1100 4075  --  -- --    Indwelling Cathether 3325 1530 4855 2975 1100 4075 -- -- --    Other  --  -- --  600  -- 600  --  -- --    Other -- -- -- 600 -- 600 -- -- --    Total Output 3325 1530 4855 3575 1100 4675 -- -- --       Net I/O     -2003.2 -1037.5 -3040.7 -2405 -335 -2740 -- -- --        Weight: 51 kg (112 lb 7 oz)  Recent Labs      03/02/18 2205 03/03/18   0540  03/04/18   0445   SODIUM  142  140  133*   POTASSIUM  5.6*  4.5  3.3*   CHLORIDE  108  107  99   CO2  27  25  27   BUN  8  10  11   CREATININE  <0.20*  <0.20*  <0.20*   CALCIUM  8.9  8.3*  8.3*       GI/Nutrition:  Exam: nondistended, abdomen is soft and non-tender, normal active bowel sounds, G-Tube  no sign of infection, palpable baclofen pump in right lower quadrant   Imaging: Available data reviewed    KUB 2/25: No evidence of bowel obstruction.   CT abdomen and pelvis with oral contrast 2/26:  1.  No evidence of bowel obstruction or perforation.  2.  Appendix not visualized.  3.  Small to moderate volume ascites of uncertain etiology.  4.  Bilateral pleural effusions with associated atelectasis.  5.  Ill-defined parenchymal  opacities in the LEFT lower lung suggesting multifocal pneumonia.  6.  Scoliosis.    Liver Function  Recent Labs      18   0540  18   0445   GLUCOSE  96  81  85       Heme:  Recent Labs      18   0540  18   0445   RBC  2.70*  3.05*  2.94*   HEMOGLOBIN  7.9*  8.7*  8.4*   HEMATOCRIT  24.8*  28.3*  27.1*   PLATELETCT  556*  657*  627*       Infectious Disease:  Monitored Temp  Av.1 °C (98.7 °F)  Min: 36.3 °C (97.34 °F)  Max: 37.6 °C (99.7 °F)  Micro: reviewed. C. diff is negative. BAL with Proteus, and Klebsiella. BAL 3/3: Lactose and non-lactose fermenting gram-negative rods heavy growth, pleural fluid 3/3 no growth to date  Recent Labs      18   0540  18   0445   WBC  7.5  7.6  7.9   NEUTSPOLYS  54.30  57.90  66.60   LYMPHOCYTES  28.70  26.50  18.40*   MONOCYTES  13.40  11.10  11.40   EOSINOPHILS  2.30  3.30  2.30   BASOPHILS  0.40  0.70  0.80     Current Facility-Administered Medications   Medication Dose Frequency Provider Last Rate Last Dose   • potassium bicarbonate (KLYTE) 25 MEQ effervescent tablet TBEF 50 mEq  50 mEq Once Jeremy M Gonda, M.D.        And   • potassium bicarbonate (KLYTE) 25 MEQ effervescent tablet TBEF 25 mEq  25 mEq Once Jeremy M Gonda, M.D.       • midodrine (PROAMATINE) tablet 2.5 mg  2.5 mg TID WITH MEALS Jeremy M Gonda, M.D.   2.5 mg at 18 0633   • potassium bicarbonate (KLYTE) 25 MEQ effervescent tablet TBEF 50 mEq  50 mEq DAILY Jeremy M Gonda, M.D.   50 mEq at 18 0714   • fentaNYL (SUBLIMAZE) injection 100 mcg  100 mcg Once Jeremy M Gonda, M.D.       • Pain Pump (patient supplied) Device   Continuous Aidan M Shashank, M.D.       • bisacodyl (DULCOLAX) suppository 10 mg  10 mg DAILY Jeremy M Gonda, M.D.   10 mg at 18   • furosemide (LASIX) injection 20 mg  20 mg Q DAY Jeremy M Gonda, M.D.   20 mg at 18   • PHENobarbital solution 240 mg  240 mg Q EVENING Aidan M  SHOSHANA Encarnacion   240 mg at 03/03/18 2022   • enoxaparin (LOVENOX) inj 40 mg  40 mg DAILY Jeremy M Gonda, M.D.   40 mg at 03/04/18 0714   • lacosamide (VIMPAT) tablet 200 mg  200 mg BID Jeremy M Gonda, M.D.   200 mg at 03/04/18 0714   • levETIRAcetam (KEPPRA) 100 MG/ML solution 1,500 mg  1,500 mg Q12HRS Jeremy M Gonda, M.D.   1,500 mg at 03/04/18 0715   • loperamide (IMODIUM) 1 MG/5ML solution 2 mg  2 mg 4X/DAY PRN FARHAT MedinaOBucky   2 mg at 02/26/18 0820   • omeprazole 2 mg/mL in sodium bicarbonate (PRILOSEC) oral susp 40 mg  40 mg DAILY JASPAL Berry.OBucky   40 mg at 03/04/18 0715   • levalbuterol (XOPENEX) 1.25 MG/3ML nebulizer solution 1.25 mg  1.25 mg Q6HRS (RT) Jon Reynoso D.O.   1.25 mg at 03/04/18 0624   • Respiratory Care per Protocol   Continuous RT JASPAL Alaniz Jr..O.       • polyethylene glycol/lytes (MIRALAX) PACKET 1 Packet  1 Packet QDAY PRN Modesto Appiah Jr. D.O.   1 Packet at 03/02/18 1527    And   • magnesium hydroxide (MILK OF MAGNESIA) suspension 30 mL  30 mL QDAY PRN Modesto Appiah Jr., D.O.        And   • bisacodyl (DULCOLAX) suppository 10 mg  10 mg QDAY PRN Modesto Appiah Jr. D.O.   10 mg at 03/03/18 0721   • chlorhexidine (PERIDEX) 0.12 % solution 15 mL  15 mL BID Modesto Appiah Jr., D.O.   15 mL at 03/04/18 0714   • lidocaine (XYLOCAINE) 1%  injection  1-2 mL Q30 MIN PRN Modesto Appiah Jr., JASPAL.O.       • MD ALERT...Adult ICU Electrolyte Replacement per Pharmacy Protocol   pharmacy to dose JASPAL Alaniz Jr..O.       • ipratropium-albuterol (DUONEB) nebulizer solution 3 mL  3 mL Q2HRS PRN (RT) JASPAL Alaniz Jr..O.       • levalbuterol (XOPENEX) 1.25 MG/3ML nebulizer solution 1.25 mg  1.25 mg Q4HRS PRN Duy Whitaker M.D.   1.25 mg at 02/20/18 0626   • miconazole 2%-zinc oxide (RADHA) topical cream   PRN Duy Whitaker M.D.       • acetaminophen (TYLENOL) tablet 650 mg  650 mg Q6HRS PRN Duy Whitaker M.D.   650 mg at 02/28/18 0946   • oxyCODONE  immediate-release (ROXICODONE) tablet 5 mg  5 mg Q3HRS PRN Duy Whitaker M.D.        And   • oxyCODONE immediate release (ROXICODONE) tablet 10 mg  10 mg Q3HRS PRN Duy Whitaker M.D.       • ondansetron (ZOFRAN) syringe/vial injection 4 mg  4 mg Q4HRS PRN Duy Whitaker M.D.   4 mg at 02/25/18 1140   • ondansetron (ZOFRAN ODT) dispertab 4 mg  4 mg Q4HRS PRN Duy Whitaker M.D.       • promethazine (PHENERGAN) tablet 12.5-25 mg  12.5-25 mg Q4HRS PRN Duy Whitaker M.D.       • promethazine (PHENERGAN) suppository 12.5-25 mg  12.5-25 mg Q4HRS PRN Duy Whitaker M.D.       • prochlorperazine (COMPAZINE) injection 5-10 mg  5-10 mg Q4HRS PRN Duy Whitaker M.D.       • LORazepam (ATIVAN) injection 4 mg  4 mg Q10 MIN PRN Duy Whitaker M.D.   2 mg at 02/23/18 1251     Last reviewed on 2/18/2018  7:51 PM by Lois Camejo, Pharmacy Intern    Quality  Measures:  Labs reviewed, Medications reviewed and Radiology images reviewed  Waldrop catheter: Critically Ill - Requiring Accurate Measurement of Urinary Output  Central line in place: Need for access    DVT Prophylaxis: Enoxaparin (Lovenox)  DVT prophylaxis - mechanical: SCDs  Ulcer prophylaxis: Yes    Assessed for rehab: Patient returned to prior level of function, rehabilitation not indicated at this time    Assessment and plan:  Acute hypoxemic respiratory failure - VDRF since 2/18, chronic trach   - Continue full vent support with daily with progressive lengthening of T-piece (goal 24 hours today)   - RT/O2 protocol, encourage mobilization   - Continue to Avoid all sedatives and maintain aspiration precautions   - QOD chest x-ray   - Follow-up on BAL results from 3/3,? Colonization as does not appear clinically to be infected  Left exudative pleural effusion per lytes criteria S/P thoracentesis 3/3   - Follow-up on final culture, cytology  Septic shock from pulmonary source, now unspecified place a distributive shock - resolved   -  Increase midodrine to 5 mg  3 times a day   - Completed antibiotics, s/p sepsis protocol  History of traumatic brain injury, chronic seizure disorder with associated status epilepticus   - Continue antiepileptics per neurology, monitor for further seizure activity   - Seizure precautions  Decubitus ulcerations of the left hip including stage IV disease   - Wound care, optimize nutrition  Pneumonia (Proteus and Klebsiella)   - S/P antibiotics  Mild fluid overload - improved   - Discontinue Lasix and monitor strict I/O   Possible paralytic ileus versus feeding tube malfunction - resolved   - Continue tube feeding trial with advanced to goal home regimen   - Bowel protocol, daily Dulcolax suppository  Hyponatremia    - Discontinue Lasix, okay to start back on salt per mother (home regimen)  Iron deficiency anemia - s/p iron supplementation  Hypokalemia/mag - repletion daily as needed  Prophylaxis, enteral nutrition    Discussed patient condition and risk of morbidity and/or mortality with Family, RN, RT, Pharmacy, Charge nurse / hot rounds and hospitalist  The patient remains critically ill.  Critical care time = 32 minutes in directly providing and coordinating critical care and extensive data review.  No time overlap and excludes procedures.    Jeremy M Gonda, M.D.

## 2018-03-04 NOTE — CARE PLAN
Problem: Skin Integrity  Goal: Risk for impaired skin integrity will decrease  Neo skin risk assessment and complete skin assessment completed at beginning of shift. Pt is turned and repositioned q2h and PRN. Appropriate wound protocols in place.    Problem: Oxygenation/Respiratory Function  Goal: Patient will Achieve/Maintain Optimum Respiratory Rate/Effort  Collaboration with RT.

## 2018-03-04 NOTE — CARE PLAN
Problem: Skin Integrity  Goal: Risk for impaired skin integrity will decrease  Outcome: PROGRESSING AS EXPECTED  Turn q 2 hr, supine/ right. Wound care at least 2 times a day. Moisturizer applied to skin.

## 2018-03-04 NOTE — PALLIATIVE CARE
"PALLIATIVE CARE FOLLOW-UP:    Pt's mother and RN at bedside discussing pt signs of seizing -- per mother, \"a twitch by his mouth and in his left shoulder.\"  This is not apparent to me though pt's WOB seems to increase and become more irregular.  Mother stated her feeling that \"We're coming up on day three of weaning the phenobarbital so I'm not surprised.\"  Queried mother's concern about PHB to which she responded that it is the liver side effects and her desire to use CBD oil \"as another pathway to handle the seizures, because I have seen it work.\"      Discussed the possibility that she may need to come to the point of acceptance that her son's seizure control right now is worth the trade-off of potential side effects.  Mom verbalized understanding.    Updated: Neurologist    Plan:  PC to continue to provide support.  Mother's goal is for pt to return home within the week.    Thank you for allowing Palliative Care to support this pt and his family.  Contact [phone] for additional assistance, questions or concerns.  "

## 2018-03-04 NOTE — PROGRESS NOTES
[facility] Hospitalist Progress Note    Date of Service: 3/4/2018    Chief Complaint  37 y.o. male admitted 2018 with shortness of breath.    Interval Problem Update  Mr. Edwards has a past medical history of traumatic brain injury and seizure disorder that recently underwent a slow taper off phenobarbital. He subsequently developed seizures over the previous 2 weeks prior to presentation to the ER. He was found to be hypoxic with aspiration pneumonitis and required intubation with ICU admission. He has been treated with antibiotics.     He is tolerating his home tube feeds which were advanced to near his home regimen as advised by his mother. His BP has gone up to 160 thus midodrine was stopped.    He was on T piece again today. He is on the vent again last night. He had a bronchoscopy 3/3 with thoracentesis with 800 ml off left lung. The phenobarbital dose was increased on 3/3 due to left facial and arm. He continues to have twitching of the left face and arm again this afternoon. I discussed the twitching with his mother and the fact that we will refrain from increasing the phenobarbital at this point.   1,100 ml urine over night.     Consultants/Specialty  Critical Care. I discussed her condition with Dr. Gonda on ICU Hot Rounds.  Neurology    Disposition  ICU  LTACH referral      Review of Systems   Unable to perform ROS: Intubated      Physical Exam  Laboratory/Imaging   Hemodynamics  No data recorded.   Monitored Temp: 36.3 °C (97.34 °F)  Pulse  Av  Min: 48  Max: 119 Heart Rate (Monitored): 81  NIBP: 106/52      Respiratory  Liriano Vent Mode: APVCMV, Rate (breaths/min): 12, PEEP/CPAP: 8, PEEP/CPAP: 8, FiO2: 40, P Peak (PIP): 21, P MEAN: 11 #Aerosol Therapy / Airway Management: T-Piece, Aerosol Humidity Temp (celsius):  (Warming up) Respiration: 13, Pulse Oximetry: 98 %, O2 Daily Delivery Respiratory : T-Piece     Given By:: Trache, Work Of Breathing / Effort: Mild  RUL Breath Sounds: Crackles, RML  Breath Sounds: Crackles, RLL Breath Sounds: Diminished, JONATAN Breath Sounds: Crackles, LLL Breath Sounds: Diminished    Fluids    Intake/Output Summary (Last 24 hours) at 03/04/18 0747  Last data filed at 03/04/18 0600   Gross per 24 hour   Intake             1935 ml   Output             4675 ml   Net            -2740 ml       Nutrition  Orders Placed This Encounter   Procedures   • Diet NPO     Standing Status:   Standing     Number of Occurrences:   1     Order Specific Question:   Restrict to:     Answer:   Strict [1]     Physical Exam   Constitutional: No distress.   contracted   HENT:   Crowded teeth  Dry mucous membranes.   He opens his eyes   Eyes:   Occasional nystagmus    Neck:   Tracheostomy, right IJ central line.   Cardiovascular: Normal heart sounds.    Sinus rhythm, no murmur   Pulmonary/Chest:   moderate air movement, clear on T piece   Abdominal: Soft. He exhibits no distension. There is no tenderness.   PEG tube     Genitourinary:   Genitourinary Comments: catheter   Musculoskeletal: He exhibits deformity. He exhibits no edema or tenderness.   Extensive contractures of extremities x 4.   Poor muscle tone.   Neurological:   Awake though he does not make eye contact nor follow commands.   Twitching of the left face and left shoulder      Skin: Skin is warm and dry. No rash noted. He is not diaphoretic. There is pallor.   Nursing note and vitals reviewed.      Recent Labs      03/02/18   0520  03/03/18   0540  03/04/18   0445   WBC  7.5  7.6  7.9   RBC  2.70*  3.05*  2.94*   HEMOGLOBIN  7.9*  8.7*  8.4*   HEMATOCRIT  24.8*  28.3*  27.1*   MCV  91.9  92.8  92.2   MCH  29.3  28.5  28.6   MCHC  31.9*  30.7*  31.0*   RDW  56.1*  59.7*  63.7*   PLATELETCT  556*  657*  627*   MPV  9.5  8.8*  8.7*     Recent Labs      03/02/18   2205  03/03/18   0540  03/04/18   0445   SODIUM  142  140  133*   POTASSIUM  5.6*  4.5  3.3*   CHLORIDE  108  107  99   CO2  27  25  27   GLUCOSE  96  81  85   BUN  8  10  11    CREATININE  <0.20*  <0.20*  <0.20*   CALCIUM  8.9  8.3*  8.3*                      Assessment/Plan     * Septic shock (CMS-HCC)- (present on admission)   Assessment & Plan    Source is consistent with aspiration pneumonia.  s/p IV pressors and s/p IV fluids. Midodrine has been tapered.  s/p full course of doxycycline, Rocephin and Flagyl  The organ system failure associated with this disease process is the respiratory system as is evidenced by the need for mechanical ventilation.         Seizure (CMS-HCC)- (present on admission)   Assessment & Plan    S/p continuous EEG  Continue Vimpat, Keppra, phenobarbital 150 mg was increased to 240 mg nightly via PEG tube  His seizure activity has improved though he has intermittent episodes.  Neurology consulted.  Continuous EEG results:  This scalp EEG from 2/23/2018 to 2/25/2018 is consistent with      1. Intractable seizure from the right hemisphere-- improved toward the end of EEG monitoring ( the frequency and amplitudes of the epileptiform over the right hemisphere decreased with addition of PHB)     2. Diffuse nonspecific cortical dysfunction - severe        Hypomagnesemia- (present on admission)   Assessment & Plan    - Resolved        Decubitus ulcer of ischium, left, stage IV (CMS-HCC)- (present on admission)   Assessment & Plan    Present upon admission.  Continue wound care        Azotemia- (present on admission)   Assessment & Plan    - Resolved with fluids        Acute on chronic respiratory failure with hypoxemia (CMS-HCC)- (present on admission)   Assessment & Plan    Requiring initiation of the vent on 2/18.  History of prior tracheostomy  T piece during the days and vent at night.  LTACH referral         Aspiration pneumonia (CMS-[facility])- (present on admission)   Assessment & Plan    Source of septic shock  S/p doxycycline, Rocephin and Flagyl  Status post bronchoscopy  CT chest on 2/26:  1.  No evidence of bowel obstruction or perforation.  2.  Appendix not  visualized.  3.  Small to moderate volume ascites of uncertain etiology.  4.  Bilateral pleural effusions with associated atelectasis.  5.  Ill-defined parenchymal opacities in the LEFT lower lung suggesting multifocal pneumonia.  6.  Scoliosis        Anemia, blood loss- (present on admission)   Assessment & Plan    Hb has been to 7-8  Follow daily Hb        Diarrhea- (present on admission)   Assessment & Plan    - Resolved          Protein-calorie malnutrition, severe (CMS-HCC)- (present on admission)   Assessment & Plan    This is a clinical diagnosis in the setting of infection and vomiting/diarrhea for which feeds had been held  He is now tolerating tube feeds        Physical debility- (present on admission)   Assessment & Plan    - Chronic post traumatic brain injury  - At baseline        TBI (traumatic brain injury) (CMS-HCC)- (present on admission)   Assessment & Plan    - Chronic, nonverbal        Hyponatremia- (present on admission)   Assessment & Plan    Na had been elevated and went down to 119 then went as high as 148 thus 119 may have been a spurious lab error.   The Himalayan  Salt that his mother gives him daily has been held.   Daily BMPs.  Na is 134          Contraction, joint, multiple sites- (present on admission)   Assessment & Plan    - Chronic  - has baclofen pump        GI bleed- (present on admission)   Assessment & Plan    Possible  Carmencita-White tear, patient did have multiple episodes of severe vomiting  If he develops further bleeding, an EGD may be appropriate.           Quality-Core Measures   Reviewed items::  Labs reviewed, Medications reviewed and Radiology images reviewed  Waldrop catheter::  Unconscious / Sedated Patient on a Ventilator  DVT prophylaxis pharmacological::  Contraindicated - High bleeding risk

## 2018-03-05 LAB
ALBUMIN SERPL BCP-MCNC: 3.2 G/DL (ref 3.2–4.9)
ALBUMIN/GLOB SERPL: 0.9 G/DL
ALP SERPL-CCNC: 169 U/L (ref 30–99)
ALT SERPL-CCNC: 35 U/L (ref 2–50)
ANION GAP SERPL CALC-SCNC: 6 MMOL/L (ref 0–11.9)
AST SERPL-CCNC: 20 U/L (ref 12–45)
BASOPHILS # BLD AUTO: 0.6 % (ref 0–1.8)
BASOPHILS # BLD: 0.04 K/UL (ref 0–0.12)
BILIRUB SERPL-MCNC: 0.4 MG/DL (ref 0.1–1.5)
BUN SERPL-MCNC: 14 MG/DL (ref 8–22)
CALCIUM SERPL-MCNC: 8.5 MG/DL (ref 8.5–10.5)
CHLORIDE SERPL-SCNC: 100 MMOL/L (ref 96–112)
CO2 SERPL-SCNC: 28 MMOL/L (ref 20–33)
CREAT SERPL-MCNC: 0.2 MG/DL (ref 0.5–1.4)
CRP SERPL HS-MCNC: 11.14 MG/DL (ref 0–0.75)
EOSINOPHIL # BLD AUTO: 0.24 K/UL (ref 0–0.51)
EOSINOPHIL NFR BLD: 3.7 % (ref 0–6.9)
ERYTHROCYTE [DISTWIDTH] IN BLOOD BY AUTOMATED COUNT: 67.3 FL (ref 35.9–50)
GLOBULIN SER CALC-MCNC: 3.7 G/DL (ref 1.9–3.5)
GLUCOSE SERPL-MCNC: 93 MG/DL (ref 65–99)
HCT VFR BLD AUTO: 29.4 % (ref 42–52)
HGB BLD-MCNC: 9.4 G/DL (ref 14–18)
IMM GRANULOCYTES # BLD AUTO: 0.04 K/UL (ref 0–0.11)
IMM GRANULOCYTES NFR BLD AUTO: 0.6 % (ref 0–0.9)
LYMPHOCYTES # BLD AUTO: 1.48 K/UL (ref 1–4.8)
LYMPHOCYTES NFR BLD: 22.7 % (ref 22–41)
MCH RBC QN AUTO: 29.5 PG (ref 27–33)
MCHC RBC AUTO-ENTMCNC: 32 G/DL (ref 33.7–35.3)
MCV RBC AUTO: 92.2 FL (ref 81.4–97.8)
MONOCYTES # BLD AUTO: 1 K/UL (ref 0–0.85)
MONOCYTES NFR BLD AUTO: 15.3 % (ref 0–13.4)
NEUTROPHILS # BLD AUTO: 3.73 K/UL (ref 1.82–7.42)
NEUTROPHILS NFR BLD: 57.1 % (ref 44–72)
NRBC # BLD AUTO: 0 K/UL
NRBC BLD-RTO: 0 /100 WBC
PLATELET # BLD AUTO: 599 K/UL (ref 164–446)
PMV BLD AUTO: 8.5 FL (ref 9–12.9)
POTASSIUM SERPL-SCNC: 3.4 MMOL/L (ref 3.6–5.5)
PREALB SERPL-MCNC: 20 MG/DL (ref 18–38)
PROCALCITONIN SERPL-MCNC: 0.09 NG/ML
PROT SERPL-MCNC: 6.9 G/DL (ref 6–8.2)
RBC # BLD AUTO: 3.19 M/UL (ref 4.7–6.1)
SODIUM SERPL-SCNC: 134 MMOL/L (ref 135–145)
WBC # BLD AUTO: 6.5 K/UL (ref 4.8–10.8)

## 2018-03-05 PROCEDURE — A9270 NON-COVERED ITEM OR SERVICE: HCPCS | Performed by: INTERNAL MEDICINE

## 2018-03-05 PROCEDURE — A9270 NON-COVERED ITEM OR SERVICE: HCPCS | Performed by: HOSPITALIST

## 2018-03-05 PROCEDURE — 700102 HCHG RX REV CODE 250 W/ 637 OVERRIDE(OP): Performed by: INTERNAL MEDICINE

## 2018-03-05 PROCEDURE — 700102 HCHG RX REV CODE 250 W/ 637 OVERRIDE(OP): Performed by: HOSPITALIST

## 2018-03-05 PROCEDURE — 85025 COMPLETE CBC W/AUTO DIFF WBC: CPT

## 2018-03-05 PROCEDURE — 700111 HCHG RX REV CODE 636 W/ 250 OVERRIDE (IP): Performed by: INTERNAL MEDICINE

## 2018-03-05 PROCEDURE — 84134 ASSAY OF PREALBUMIN: CPT

## 2018-03-05 PROCEDURE — 86140 C-REACTIVE PROTEIN: CPT

## 2018-03-05 PROCEDURE — 94640 AIRWAY INHALATION TREATMENT: CPT

## 2018-03-05 PROCEDURE — 80053 COMPREHEN METABOLIC PANEL: CPT

## 2018-03-05 PROCEDURE — 94760 N-INVAS EAR/PLS OXIMETRY 1: CPT

## 2018-03-05 PROCEDURE — 99233 SBSQ HOSP IP/OBS HIGH 50: CPT | Performed by: HOSPITALIST

## 2018-03-05 PROCEDURE — 84145 PROCALCITONIN (PCT): CPT

## 2018-03-05 PROCEDURE — 770022 HCHG ROOM/CARE - ICU (200)

## 2018-03-05 PROCEDURE — 700101 HCHG RX REV CODE 250: Performed by: INTERNAL MEDICINE

## 2018-03-05 RX ORDER — PHENOBARBITAL 20 MG/5ML
120 ELIXIR ORAL 2 TIMES DAILY
Status: DISCONTINUED | OUTPATIENT
Start: 2018-03-06 | End: 2018-03-18 | Stop reason: HOSPADM

## 2018-03-05 RX ORDER — PHENOBARBITAL 20 MG/5ML
240 ELIXIR ORAL ONCE
Status: COMPLETED | OUTPATIENT
Start: 2018-03-05 | End: 2018-03-05

## 2018-03-05 RX ORDER — MIDODRINE HYDROCHLORIDE 5 MG/1
2.5 TABLET ORAL
Status: DISCONTINUED | OUTPATIENT
Start: 2018-03-06 | End: 2018-03-07

## 2018-03-05 RX ADMIN — MIDODRINE HYDROCHLORIDE 5 MG: 5 TABLET ORAL at 08:55

## 2018-03-05 RX ADMIN — LEVALBUTEROL HYDROCHLORIDE 1.25 MG: 1.25 SOLUTION RESPIRATORY (INHALATION) at 14:34

## 2018-03-05 RX ADMIN — LACOSAMIDE 200 MG: 50 TABLET, FILM COATED ORAL at 08:54

## 2018-03-05 RX ADMIN — PHENOBARBITAL 240 MG: 20 ELIXIR ORAL at 20:46

## 2018-03-05 RX ADMIN — OMEPRAZOLE 40 MG: 20 CAPSULE, DELAYED RELEASE ORAL at 08:54

## 2018-03-05 RX ADMIN — POTASSIUM BICARBONATE 50 MEQ: 25 TABLET, EFFERVESCENT ORAL at 08:54

## 2018-03-05 RX ADMIN — LEVETIRACETAM 1500 MG: 100 SOLUTION ORAL at 20:28

## 2018-03-05 RX ADMIN — ACETAMINOPHEN 650 MG: 325 TABLET, FILM COATED ORAL at 15:15

## 2018-03-05 RX ADMIN — LEVALBUTEROL HYDROCHLORIDE 1.25 MG: 1.25 SOLUTION RESPIRATORY (INHALATION) at 02:56

## 2018-03-05 RX ADMIN — LEVALBUTEROL HYDROCHLORIDE 1.25 MG: 1.25 SOLUTION RESPIRATORY (INHALATION) at 20:00

## 2018-03-05 RX ADMIN — LACOSAMIDE 200 MG: 50 TABLET, FILM COATED ORAL at 20:28

## 2018-03-05 RX ADMIN — CHLORHEXIDINE GLUCONATE 15 ML: 1.2 RINSE ORAL at 08:56

## 2018-03-05 RX ADMIN — POTASSIUM BICARBONATE 50 MEQ: 25 TABLET, EFFERVESCENT ORAL at 16:30

## 2018-03-05 RX ADMIN — CHLORHEXIDINE GLUCONATE 15 ML: 1.2 RINSE ORAL at 21:00

## 2018-03-05 RX ADMIN — LEVETIRACETAM 1500 MG: 100 SOLUTION ORAL at 08:56

## 2018-03-05 RX ADMIN — LEVALBUTEROL HYDROCHLORIDE 1.25 MG: 1.25 SOLUTION RESPIRATORY (INHALATION) at 06:19

## 2018-03-05 RX ADMIN — ENOXAPARIN SODIUM 40 MG: 100 INJECTION SUBCUTANEOUS at 08:54

## 2018-03-05 NOTE — CARE PLAN
Problem: Skin Integrity  Goal: Risk for impaired skin integrity will decrease. Wound protocol in place    Outcome: PROGRESSING AS EXPECTED  Turn q 2 hr, supine/ right and float rotation activated on bed.  Wound care at least 2 times a day. Moisturizer applied to skin.     Problem: Oxygenation/Respiratory Function  Goal: Patient will Achieve/Maintain Optimum Respiratory Rate/Effort  Collaboration with Rt.

## 2018-03-05 NOTE — PROGRESS NOTES
Pulmonary Critical Care Progress Note        Date of admission: 2/18/2018    Chief Complaint: Respiratory failure, septic shock    History of Present Illness:  Mr. Edwards is a 37-year-old male with past medical history of traumatic brain injury at age 17, seizures diagnosed in June of 2017. His baseline mental status appears to be nodding and minimal interacting without vocalization. Who was brought to the ICU as a direct admit from [facility] where he was brought today for hypoxia. Reportedly the patient was being weanied off of his phenobarbital by his neurologist when he began having more frequent seizures, approximately one week ago his neurologist increased his Keppra dosing however the seizures continued. His mother did use CBD and THC with some success in slowing the seizures. Since 2 days ago the patient had multiple aspiration events following these seizures. His mother evaluated him with a home oxygen saturation monitor and he was noted to be hypoxic, he was brought to [facility] where he was found to have 28% bands with leukopenia. Chest x-ray was obtained demonstrating bilateral infiltrates right greater than left. He was placed on mechanical ventilation and his oxygenation was unable to be improved more than 85%, he was hypotensive and given multiple boluses of IV fluids without improvement. He was started on levo fed and given multiple doses of Ativan for seizures and transferred to our ICU for higher level of care. He arrives on the ventilator and on pressors critically ill.    Please note this history is obtained by my partner Dr. Appiah 2/18/2018.    ROS:  Respiratory: unable to perform due to the patient's inability to effectively communicate, Cardiac: unable to perform due to the patient's inability to effectively communicate, GI: unable to perform due to the patient's inability to effectively communicate.  All other systems negative. Unchanged    Interval Events:  24 hour  interval history reviewed    - rec'ing phenobarb   - no sz overnight   - SR/ST   - BP controlled   - afebrile   - home TF   - contracted, minimal movement   - trach (chronic), vent day 16, T-piece 26 hours   - moderated clear secreations   - lovenox/PPI P   - completed abx   - citrobacter BAL 3/3   - will check PCT   - replacing K   - baclofen pump; will d/w rep for interrogation   Yesterday:    - T-piece 15 hrs 10/40%   - TFs increased to almost goal per home regimen   - plts dropped slightly to 627   - Na dropped again to 133 from 140 with lasix (dumped 1200mL urine)   - low K   - yesterday twitching so phenobarb dose increased for nighttime, none today   - Underwent bronchoscopy and left-sided thoracentesis with subsequent improvement in radiographic findings and tolerance of T piece    PFSH:  No change.    Respiratory:      Pulse Oximetry: 95 %           Exam: tracheostomy tube in good position without surrounding erythema, unlabored respirations, no intercostal retractions or accessory muscle use and rhonchi bibasilar.     ImagingCXR  I have personally reviewed the chest x-ray my impression is  (compared to prior films) improved right consolidative changes with decreasing edema, trace effusion, unchanged bilateral lower lobe atelectasis, tracheostomy tube and right IJ CVL in good position        Invalid input(s): [id number] ABG interpretation: None today    HemoDynamics:  Pulse: 93, Heart Rate (Monitored): 92  NIBP: 110/55        Exam: regular rate and rhythm, regular rhythm (Sinus), no ectopy      Echo on 2/19/2018:  CONCLUSIONS  Normal right and left ventricular size and function.   No significant valve disease or flow abnormalities.   Dilated inferior vena cava without inspiratory collapse. The patient is   intubated.        Neuro:  GCS Total Aleida Coma Score: 9      Exam: PERRL, eyes are open, times to visually track, does not follow commands  Imaging: Available data reviewed    EEG 2/25: EEG  monitoring shows intermittent  epileptiform over the right hemisphere    Fluids:  Intake/Output       03/03/18 0700 - 03/04/18 0659 03/04/18 0700 - 03/05/18 0659 03/05/18 0700 - 03/06/18 0659      6615-3535 5235-1209 Total 7586-1787 2580-9744 Total 1661-5039 0543-6226 Total       Intake    I.V.  60  55 115  0  -- 0  --  -- --    IV Volume (0.9% Normal Saline) 60 55 115 0 -- 0 -- -- --    Other  --  170 170  5  -- 5  --  -- --    Medications (P.O./ Enteral Liquids) -- 170 170 5 -- 5 -- -- --    Enteral  1110  540 1650  1230  440 1670  --  -- --    Enteral Volume  7596 752 6903 -- -- --    Free Water / Tube Flush 450 180 630 150 60 210 -- -- --    Total Intake 3168 275 2487 0089 803 7985 -- -- --       Output    Urine  2975  1100 4075  2645  850 3495  --  -- --    Indwelling Cathether 2975 1100 4075 2645 850 3495 -- -- --    Other  600  -- 600  --  -- --  --  -- --    Other 600 -- 600 -- -- -- -- -- --    Total Output 3575 1100 4675 2645 850 3495 -- -- --       Net I/O     -3128 -582 -2894 -2899 -117 -4556 -- -- --        Weight: 53.2 kg (117 lb 4.6 oz)  Recent Labs      03/04/18   0445  03/04/18   1359  03/05/18   0530   SODIUM  133*  134*  134*   POTASSIUM  3.3*  3.5*  3.4*   CHLORIDE  99  99  100   CO2  27  29  28   BUN  11  12  14   CREATININE  <0.20*  <0.20*  0.20*   CALCIUM  8.3*  8.9  8.5       GI/Nutrition:  Exam: nondistended, abdomen is soft and non-tender, normal active bowel sounds, G-Tube  no sign of infection, palpable baclofen pump in right lower quadrant   Imaging: Available data reviewed    KUB 2/25: No evidence of bowel obstruction.   CT abdomen and pelvis with oral contrast 2/26:  1.  No evidence of bowel obstruction or perforation.  2.  Appendix not visualized.  3.  Small to moderate volume ascites of uncertain etiology.  4.  Bilateral pleural effusions with associated atelectasis.  5.  Ill-defined parenchymal opacities in the LEFT lower lung suggesting multifocal pneumonia.  6.   Scoliosis.    Liver Function  Recent Labs      18   0445  18   1359  18   0530   ALTSGPT   --    --   35   ASTSGOT   --    --   20   ALKPHOSPHAT   --    --   169*   TBILIRUBIN   --    --   0.4   PREALBUMIN   --    --   20.0   GLUCOSE  85  101*  93       Heme:  Recent Labs      18   0540  18   0445  18   0530   RBC  3.05*  2.94*  3.19*   HEMOGLOBIN  8.7*  8.4*  9.4*   HEMATOCRIT  28.3*  27.1*  29.4*   PLATELETCT  657*  627*  599*       Infectious Disease:  Monitored Temp  Av.9 °C (98.5 °F)  Min: 36.2 °C (97.16 °F)  Max: 37.7 °C (99.9 °F)  Micro: reviewed. C. diff is negative. BAL with Proteus, and Klebsiella. BAL 3/3: Lactose and non-lactose fermenting gram-negative rods heavy growth, pleural fluid 3/3 no growth to date  Recent Labs      18   0540  18   0445  18   0530   WBC  7.6  7.9  6.5   NEUTSPOLYS  57.90  66.60  57.10   LYMPHOCYTES  26.50  18.40*  22.70   MONOCYTES  11.10  11.40  15.30*   EOSINOPHILS  3.30  2.30  3.70   BASOPHILS  0.70  0.80  0.60   ASTSGOT   --    --   20   ALTSGPT   --    --   35   ALKPHOSPHAT   --    --   169*   TBILIRUBIN   --    --   0.4     Current Facility-Administered Medications   Medication Dose Frequency Provider Last Rate Last Dose   • potassium bicarbonate (KLYTE) 25 MEQ effervescent tablet TBEF 50 mEq  50 mEq Once Aidan Encarnacion M.D.       • midodrine (PROAMATINE) tablet 5 mg  5 mg TID WITH MEALS Jeremy M Gonda, M.D.   5 mg at 18 1711   • potassium bicarbonate (KLYTE) 25 MEQ effervescent tablet TBEF 50 mEq  50 mEq DAILY Jeremy M Gonda, M.D.   50 mEq at 18 0714   • Pain Pump (patient supplied) Device   Continuous Jeremy M Gonda, M.D.       • bisacodyl (DULCOLAX) suppository 10 mg  10 mg DAILY Jeremy M Gonda, M.D.   10 mg at 18 0714   • PHENobarbital solution 240 mg  240 mg Q EVENING Aidan Encarnacion M.D.   240 mg at 18 2310   • enoxaparin (LOVENOX) inj 40 mg  40 mg DAILY Jeremy M Gonda, M.D.   40 mg at  03/04/18 0714   • lacosamide (VIMPAT) tablet 200 mg  200 mg BID Jeremy M Gonda, M.D.   200 mg at 03/04/18 2027   • levETIRAcetam (KEPPRA) 100 MG/ML solution 1,500 mg  1,500 mg Q12HRS Jeremy M Gonda, M.D.   1,500 mg at 03/04/18 2027   • loperamide (IMODIUM) 1 MG/5ML solution 2 mg  2 mg 4X/DAY PRN FARHAT MedinaOBucky   2 mg at 02/26/18 0820   • omeprazole 2 mg/mL in sodium bicarbonate (PRILOSEC) oral susp 40 mg  40 mg DAILY JASPAL [email or web address]   40 mg at 03/04/18 0715   • levalbuterol (XOPENEX) 1.25 MG/3ML nebulizer solution 1.25 mg  1.25 mg Q6HRS (RT) JASPAL Berry.OBucky   1.25 mg at 03/05/18 0619   • Respiratory Care per Protocol   Continuous RT JASPAL Alaniz Jr..O.       • polyethylene glycol/lytes (MIRALAX) PACKET 1 Packet  1 Packet QDAY PRN JASPAL Alaniz Jr..O.   1 Packet at 03/02/18 1527    And   • magnesium hydroxide (MILK OF MAGNESIA) suspension 30 mL  30 mL QDAY PRN Modesto Appiah Jr., JASPAL.O.        And   • bisacodyl (DULCOLAX) suppository 10 mg  10 mg QDAY PRN Modesto Appiah Jr., D.O.   10 mg at 03/03/18 0721   • chlorhexidine (PERIDEX) 0.12 % solution 15 mL  15 mL BID JASPAL Alaniz Jr..O.   15 mL at 03/04/18 2026   • lidocaine (XYLOCAINE) 1%  injection  1-2 mL Q30 MIN PRN JASPAL Alaniz Jr..O.       • MD ALERT...Adult ICU Electrolyte Replacement per Pharmacy Protocol   pharmacy to dose JASPAL Alaniz Jr..O.       • ipratropium-albuterol (DUONEB) nebulizer solution 3 mL  3 mL Q2HRS PRN (RT) JASPAL Alaniz Jr..O.       • levalbuterol (XOPENEX) 1.25 MG/3ML nebulizer solution 1.25 mg  1.25 mg Q4HRS PRN Duy Whitaker M.D.   1.25 mg at 02/20/18 0626   • miconazole 2%-zinc oxide (RADHA) topical cream   PRN Duy Whitaker M.D.       • acetaminophen (TYLENOL) tablet 650 mg  650 mg Q6HRS PRN Duy Whitaker M.D.   650 mg at 03/04/18 1711   • oxyCODONE immediate-release (ROXICODONE) tablet 5 mg  5 mg Q3HRS PRN Duy Whitaker M.D.        And   • oxyCODONE immediate release  (ROXICODONE) tablet 10 mg  10 mg Q3HRS PRN Duy Whitaker M.D.       • ondansetron (ZOFRAN) syringe/vial injection 4 mg  4 mg Q4HRS PRN Duy Whitaker M.D.   4 mg at 02/25/18 1140   • ondansetron (ZOFRAN ODT) dispertab 4 mg  4 mg Q4HRS PRN Duy Whitaker M.D.       • promethazine (PHENERGAN) tablet 12.5-25 mg  12.5-25 mg Q4HRS PRN Duy Whitaker M.D.       • promethazine (PHENERGAN) suppository 12.5-25 mg  12.5-25 mg Q4HRS PRN Duy Whitaker M.D.       • prochlorperazine (COMPAZINE) injection 5-10 mg  5-10 mg Q4HRS PRN Duy Whitaker M.D.       • LORazepam (ATIVAN) injection 4 mg  4 mg Q10 MIN PRN Duy Whitaker M.D.   2 mg at 02/23/18 1251     Last reviewed on 2/18/2018  7:51 PM by Lois Camejo, Pharmacy Intern    Quality  Measures:   Labs reviewed, Medications reviewed and Radiology images reviewed   Waldrop catheter: Critically Ill - Requiring Accurate Measurement of Urinary Output   Central line in place: Need for access     DVT Prophylaxis: Enoxaparin (Lovenox)   DVT prophylaxis - mechanical: SCDs   Ulcer prophylaxis: Yes     Assessed for rehab: Patient returned to prior level of function, rehabilitation not indicated at this time    Assessment and plan:  Acute hypoxemic respiratory failure - VDRF since 2/18, chronic trach   - progressive lengthening of T-piece    - RT/O2 protocol, encourage mobilization   - Continue to Avoid all sedatives and maintain aspiration precautions   - QOD chest x-ray   - Follow-up on BAL results from 3/3,? Colonization as does not appear clinically to be infected  Left exudative pleural effusion per lytes criteria S/P thoracentesis 3/3   - Follow-up on final culture, cytology, negative so far  Septic shock from pulmonary source, now unspecified place a distributive shock - resolved   -  Increase midodrine to 5 mg 3 times a day   - Completed antibiotics, s/p sepsis protocol  History of traumatic brain injury, chronic seizure disorder with associated status epilepticus   -  Continue antiepileptics per neurology, monitor for further seizure activity   - Seizure precautions  Decubitus ulcerations of the left hip including stage IV disease   - Wound care, optimize nutrition  Pneumonia (Proteus and Klebsiella)   - S/P antibiotics  Mild fluid overload - improved   - Discontinue Lasix and monitor strict I/O   Possible paralytic ileus versus feeding tube malfunction - resolved   - Continue tube feeding trial with advanced to goal home regimen   - Bowel protocol, daily Dulcolax suppository  Hyponatremia    - Discontinue Lasix, okay to start back on salt per mother (home regimen)  Iron deficiency anemia - s/p iron supplementation  Hypokalemia/mag - repletion daily as needed  Prophylaxis, enteral nutrition    Discussed patient condition and risk of morbidity and/or mortality with Family, RN, RT, Pharmacy, Charge nurse / hot rounds and hospitalist.  He remains at high risk for further deterioration and must remain in ICU; aggressive pulmonary care as above.  The patient remains critically ill.  Critical care time = 34 minutes in directly providing and coordinating critical care and extensive data review.  No time overlap and excludes procedures.    Eleazar Grijalva M.D.

## 2018-03-05 NOTE — CARE PLAN
Problem: Communication  Goal: The ability to communicate needs accurately and effectively will improve  Outcome: PROGRESSING SLOWER THAN EXPECTED      Problem: Safety  Goal: Will remain free from injury  Outcome: PROGRESSING AS EXPECTED    Goal: Will remain free from falls  Outcome: PROGRESSING AS EXPECTED

## 2018-03-05 NOTE — PROGRESS NOTES
[facility] Hospitalist Progress Note    Date of Service: 3/5/2018    Chief Complaint  37 y.o. male admitted 2018 with shortness of breath.    Interval Problem Update  Mr. Edwards has a past medical history of traumatic brain injury and seizure disorder that recently underwent a slow taper off phenobarbital. He subsequently developed seizures over the previous 2 weeks prior to presentation to the ER. He was found to be hypoxic with aspiration pneumonitis and required intubation with ICU admission. He has been treated with antibiotics.     He is tolerating his home tube feeds which were advanced to near his home regimen as advised by his mother.    He was on T piece again today and has remained off the vent last night. He had a bronchoscopy 3/3 with thoracentesis with 800 ml off left lung. Bronch culture is growing Citrobacter. The phenobarbital dose was increased on 3/3 due to left facial and arm. He continue to have twitching of the left face this afternoon.  850 ml urine over night. I met with his mother again today and her goal is to be seizure-free prior to discharge to home. She understands that he may need LTACH prior to discharge.     Consultants/Specialty  Critical Care. I discussed her condition with Dr. Grijalva on ICU Hot Rounds.  Neurology    Disposition  ICU  LTACH referral      Review of Systems   Unable to perform ROS: Intubated      Physical Exam  Laboratory/Imaging   Hemodynamics  No data recorded.   Monitored Temp: 37.1 °C (98.8 °F)  Pulse  Av.8  Min: 48  Max: 119 Heart Rate (Monitored): 92  NIBP: 113/59      Respiratory    #Aerosol Therapy / Airway Management: T-Piece, Aerosol Humidity Temp (celsius): 35 Respiration: 15, Pulse Oximetry: 95 %, O2 Daily Delivery Respiratory : T-Piece     Given By:: Trache, Work Of Breathing / Effort: Mild  RUL Breath Sounds: Crackles;Clear After Suction, RML Breath Sounds: Crackles;Clear After Suction, RLL Breath Sounds: Diminished, JONATAN Breath Sounds:  Crackles;Clear After Suction, LLL Breath Sounds: Diminished    Fluids    Intake/Output Summary (Last 24 hours) at 03/05/18 0738  Last data filed at 03/05/18 0600   Gross per 24 hour   Intake             1675 ml   Output             3495 ml   Net            -1820 ml       Nutrition  Orders Placed This Encounter   Procedures   • Diet NPO     Standing Status:   Standing     Number of Occurrences:   1     Order Specific Question:   Restrict to:     Answer:   Strict [1]     Physical Exam   Constitutional: No distress.   contracted   HENT:   Crowded teeth  Dry mucous membranes.      Eyes: No scleral icterus.   no nystagmus    Neck:   Tracheostomy, right IJ central line.   Cardiovascular: Normal heart sounds.    Sinus rhythm, no murmur   Pulmonary/Chest:   moderate air movement, clear on T piece   Abdominal: Soft. He exhibits no distension. There is no tenderness.   PEG tube     Genitourinary:   Genitourinary Comments: catheter   Musculoskeletal: He exhibits deformity. He exhibits no edema or tenderness.   Extensive contractures of extremities x 4.   Poor muscle tone.   Neurological:   Awake though he does not make eye contact nor follow commands.  Eyes are open.  Twitching of the left face       Skin: Skin is warm and dry. No rash noted. He is not diaphoretic. There is pallor.   Nursing note and vitals reviewed.      Recent Labs      03/03/18   0540  03/04/18   0445  03/05/18   0530   WBC  7.6  7.9  6.5   RBC  3.05*  2.94*  3.19*   HEMOGLOBIN  8.7*  8.4*  9.4*   HEMATOCRIT  28.3*  27.1*  29.4*   MCV  92.8  92.2  92.2   MCH  28.5  28.6  29.5   MCHC  30.7*  31.0*  32.0*   RDW  59.7*  63.7*  67.3*   PLATELETCT  657*  627*  599*   MPV  8.8*  8.7*  8.5*     Recent Labs      03/04/18   0445  03/04/18   1359  03/05/18   0530   SODIUM  133*  134*  134*   POTASSIUM  3.3*  3.5*  3.4*   CHLORIDE  99  99  100   CO2  27  29  28   GLUCOSE  85  101*  93   BUN  11  12  14   CREATININE  <0.20*  <0.20*  0.20*   CALCIUM  8.3*  8.9  8.5                       Assessment/Plan     * Septic shock (CMS-[facility])- (present on admission)   Assessment & Plan    Source is consistent with aspiration pneumonia.  s/p IV pressors and s/p IV fluids. Midodrine will be tapered again to 2.5 mg TID  s/p full course of doxycycline, Rocephin and Flagyl  The organ system failure associated with this disease process is the respiratory system as is evidenced by the need for mechanical ventilation.         Seizure (CMS-HCC)- (present on admission)   Assessment & Plan    S/p continuous EEG  Continue Vimpat, Keppra, phenobarbital 150 mg was increased to 240 mg nightly via PEG tube though he has had breakthrough left facial twitching in the evenings thus the dose will be changed to 120 mg BID.    Neurology consulted.  Continuous EEG results:  This scalp EEG from 2/23/2018 to 2/25/2018 is consistent with      1. Intractable seizure from the right hemisphere-- improved toward the end of EEG monitoring ( the frequency and amplitudes of the epileptiform over the right hemisphere decreased with addition of PHB)     2. Diffuse nonspecific cortical dysfunction - severe        Hypomagnesemia- (present on admission)   Assessment & Plan    - Resolved        Decubitus ulcer of ischium, left, stage IV (CMS-[facility])- (present on admission)   Assessment & Plan    Present upon admission.  Continue wound care        Azotemia- (present on admission)   Assessment & Plan    - Resolved with fluids        Acute on chronic respiratory failure with hypoxemia (CMS-HCC)- (present on admission)   Assessment & Plan    Requiring initiation of the vent on 2/18.  History of prior tracheostomy  He has remained on T piece over night.  LTACH referral placed        Aspiration pneumonia (CMS-[facility])- (present on admission)   Assessment & Plan    Source of septic shock  S/p doxycycline, Rocephin and Flagyl  Status post bronchoscopies. Last bronchoscopy on 3/3 has grown Citrobacter.  CT chest on 2/26:  1.  No evidence of bowel  obstruction or perforation.  2.  Appendix not visualized.  3.  Small to moderate volume ascites of uncertain etiology.  4.  Bilateral pleural effusions with associated atelectasis.  5.  Ill-defined parenchymal opacities in the LEFT lower lung suggesting multifocal pneumonia.  6.  Scoliosis        Anemia, blood loss- (present on admission)   Assessment & Plan    Hb has been to 7-8  Follow daily Hb        Diarrhea- (present on admission)   Assessment & Plan    - Resolved          Protein-calorie malnutrition, severe (CMS-HCC)- (present on admission)   Assessment & Plan    This is a clinical diagnosis in the setting of infection and vomiting/diarrhea for which feeds had been held  He is now tolerating tube feeds        Physical debility- (present on admission)   Assessment & Plan    - Chronic post traumatic brain injury  - At baseline        TBI (traumatic brain injury) (CMS-HCC)- (present on admission)   Assessment & Plan    - Chronic, nonverbal        Hyponatremia- (present on admission)   Assessment & Plan    Na had been elevated and went down to 119 then went as high as 148 thus 119 may have been a spurious lab error.   The Himalayan  Salt that his mother gives him daily has been held.   Daily BMPs.  Na is 134 again today          Contraction, joint, multiple sites- (present on admission)   Assessment & Plan    - Chronic  - has baclofen pump which was refilled in January        GI bleed- (present on admission)   Assessment & Plan    Possible  Carmencita-White tear, patient did have multiple episodes of severe vomiting  If he develops further bleeding, an EGD may be appropriate.           Quality-Core Measures   Reviewed items::  Labs reviewed, Medications reviewed and Radiology images reviewed  Waldrop catheter::  Unconscious / Sedated Patient on a Ventilator  DVT prophylaxis pharmacological::  Contraindicated - High bleeding risk

## 2018-03-05 NOTE — CARE PLAN
Problem: Oxygenation:  Goal: Maintain adequate oxygenation dependent on patient condition  Outcome: PROGRESSING AS EXPECTED  T-piece trial to last for 24 hours 4/28%    Problem: Ventilation Defect:  Goal: Ability to achieve and maintain unassisted ventilation or tolerate decreased levels of ventilator support  Xopenex Q6

## 2018-03-05 NOTE — CARE PLAN
Problem: Oxygenation:  Goal: Maintain adequate oxygenation dependent on patient condition  Outcome: PROGRESSING AS EXPECTED  Patient on 24 hr T-piece weaning trial 4L at 28%.    Problem: Ventilation Defect:  Goal: Ability to achieve and maintain unassisted ventilation or tolerate decreased levels of ventilator support  Outcome: PROGRESSING AS EXPECTED  Q6 Duoneb

## 2018-03-06 ENCOUNTER — APPOINTMENT (OUTPATIENT)
Dept: RADIOLOGY | Facility: MEDICAL CENTER | Age: 38
DRG: 853 | End: 2018-03-06
Attending: INTERNAL MEDICINE
Payer: COMMERCIAL

## 2018-03-06 LAB
ANION GAP SERPL CALC-SCNC: 7 MMOL/L (ref 0–11.9)
BACTERIA FLD AEROBE CULT: NORMAL
BACTERIA SPEC RESP CULT: ABNORMAL
BASOPHILS # BLD AUTO: 0.9 % (ref 0–1.8)
BASOPHILS # BLD: 0.06 K/UL (ref 0–0.12)
BUN SERPL-MCNC: 10 MG/DL (ref 8–22)
CALCIUM SERPL-MCNC: 8.9 MG/DL (ref 8.5–10.5)
CHLORIDE SERPL-SCNC: 103 MMOL/L (ref 96–112)
CO2 SERPL-SCNC: 27 MMOL/L (ref 20–33)
CREAT SERPL-MCNC: <0.2 MG/DL (ref 0.5–1.4)
EOSINOPHIL # BLD AUTO: 0.28 K/UL (ref 0–0.51)
EOSINOPHIL NFR BLD: 4.3 % (ref 0–6.9)
ERYTHROCYTE [DISTWIDTH] IN BLOOD BY AUTOMATED COUNT: 68.6 FL (ref 35.9–50)
GLUCOSE SERPL-MCNC: 90 MG/DL (ref 65–99)
GRAM STN SPEC: ABNORMAL
GRAM STN SPEC: NORMAL
HCT VFR BLD AUTO: 28.9 % (ref 42–52)
HGB BLD-MCNC: 9 G/DL (ref 14–18)
IMM GRANULOCYTES # BLD AUTO: 0.04 K/UL (ref 0–0.11)
IMM GRANULOCYTES NFR BLD AUTO: 0.6 % (ref 0–0.9)
LYMPHOCYTES # BLD AUTO: 1.6 K/UL (ref 1–4.8)
LYMPHOCYTES NFR BLD: 24.5 % (ref 22–41)
MCH RBC QN AUTO: 29.2 PG (ref 27–33)
MCHC RBC AUTO-ENTMCNC: 31.1 G/DL (ref 33.7–35.3)
MCV RBC AUTO: 93.8 FL (ref 81.4–97.8)
MONOCYTES # BLD AUTO: 1.03 K/UL (ref 0–0.85)
MONOCYTES NFR BLD AUTO: 15.8 % (ref 0–13.4)
NEUTROPHILS # BLD AUTO: 3.52 K/UL (ref 1.82–7.42)
NEUTROPHILS NFR BLD: 53.9 % (ref 44–72)
NRBC # BLD AUTO: 0 K/UL
NRBC BLD-RTO: 0 /100 WBC
PLATELET # BLD AUTO: 585 K/UL (ref 164–446)
PMV BLD AUTO: 8.4 FL (ref 9–12.9)
POTASSIUM SERPL-SCNC: 3.4 MMOL/L (ref 3.6–5.5)
RBC # BLD AUTO: 3.08 M/UL (ref 4.7–6.1)
SIGNIFICANT IND 70042: ABNORMAL
SIGNIFICANT IND 70042: NORMAL
SITE SITE: ABNORMAL
SITE SITE: NORMAL
SODIUM SERPL-SCNC: 137 MMOL/L (ref 135–145)
SOURCE SOURCE: ABNORMAL
SOURCE SOURCE: NORMAL
WBC # BLD AUTO: 6.5 K/UL (ref 4.8–10.8)

## 2018-03-06 PROCEDURE — A9270 NON-COVERED ITEM OR SERVICE: HCPCS | Performed by: INTERNAL MEDICINE

## 2018-03-06 PROCEDURE — 700111 HCHG RX REV CODE 636 W/ 250 OVERRIDE (IP): Performed by: INTERNAL MEDICINE

## 2018-03-06 PROCEDURE — 94640 AIRWAY INHALATION TREATMENT: CPT

## 2018-03-06 PROCEDURE — 85025 COMPLETE CBC W/AUTO DIFF WBC: CPT

## 2018-03-06 PROCEDURE — 700102 HCHG RX REV CODE 250 W/ 637 OVERRIDE(OP): Performed by: INTERNAL MEDICINE

## 2018-03-06 PROCEDURE — 71045 X-RAY EXAM CHEST 1 VIEW: CPT

## 2018-03-06 PROCEDURE — 700102 HCHG RX REV CODE 250 W/ 637 OVERRIDE(OP): Performed by: HOSPITALIST

## 2018-03-06 PROCEDURE — A9270 NON-COVERED ITEM OR SERVICE: HCPCS | Performed by: HOSPITALIST

## 2018-03-06 PROCEDURE — 80048 BASIC METABOLIC PNL TOTAL CA: CPT

## 2018-03-06 PROCEDURE — 94760 N-INVAS EAR/PLS OXIMETRY 1: CPT

## 2018-03-06 PROCEDURE — 700101 HCHG RX REV CODE 250: Performed by: INTERNAL MEDICINE

## 2018-03-06 PROCEDURE — 770022 HCHG ROOM/CARE - ICU (200)

## 2018-03-06 PROCEDURE — 99233 SBSQ HOSP IP/OBS HIGH 50: CPT | Performed by: INTERNAL MEDICINE

## 2018-03-06 RX ORDER — POTASSIUM CHLORIDE 20 MEQ/1
40 TABLET, EXTENDED RELEASE ORAL ONCE
Status: COMPLETED | OUTPATIENT
Start: 2018-03-06 | End: 2018-03-06

## 2018-03-06 RX ADMIN — POTASSIUM CHLORIDE 40 MEQ: 1500 TABLET, EXTENDED RELEASE ORAL at 16:05

## 2018-03-06 RX ADMIN — LEVALBUTEROL HYDROCHLORIDE 1.25 MG: 1.25 SOLUTION RESPIRATORY (INHALATION) at 19:18

## 2018-03-06 RX ADMIN — LACOSAMIDE 200 MG: 50 TABLET, FILM COATED ORAL at 09:06

## 2018-03-06 RX ADMIN — ENOXAPARIN SODIUM 40 MG: 100 INJECTION SUBCUTANEOUS at 09:06

## 2018-03-06 RX ADMIN — LEVETIRACETAM 1500 MG: 100 SOLUTION ORAL at 21:11

## 2018-03-06 RX ADMIN — OMEPRAZOLE 40 MG: 20 CAPSULE, DELAYED RELEASE ORAL at 09:06

## 2018-03-06 RX ADMIN — PHENOBARBITAL 120 MG: 20 ELIXIR ORAL at 21:12

## 2018-03-06 RX ADMIN — LEVALBUTEROL HYDROCHLORIDE 1.25 MG: 1.25 SOLUTION RESPIRATORY (INHALATION) at 14:20

## 2018-03-06 RX ADMIN — LORAZEPAM 2 MG: 2 INJECTION INTRAMUSCULAR; INTRAVENOUS at 18:09

## 2018-03-06 RX ADMIN — PHENOBARBITAL 120 MG: 20 ELIXIR ORAL at 09:55

## 2018-03-06 RX ADMIN — LACOSAMIDE 200 MG: 50 TABLET, FILM COATED ORAL at 21:12

## 2018-03-06 RX ADMIN — CHLORHEXIDINE GLUCONATE 15 ML: 1.2 RINSE ORAL at 09:06

## 2018-03-06 RX ADMIN — CHLORHEXIDINE GLUCONATE 15 ML: 1.2 RINSE ORAL at 21:12

## 2018-03-06 RX ADMIN — LORAZEPAM 2 MG: 2 INJECTION INTRAMUSCULAR; INTRAVENOUS at 23:49

## 2018-03-06 RX ADMIN — POTASSIUM BICARBONATE 50 MEQ: 25 TABLET, EFFERVESCENT ORAL at 09:07

## 2018-03-06 RX ADMIN — LEVALBUTEROL HYDROCHLORIDE 1.25 MG: 1.25 SOLUTION RESPIRATORY (INHALATION) at 01:57

## 2018-03-06 RX ADMIN — LEVETIRACETAM 1500 MG: 100 SOLUTION ORAL at 09:06

## 2018-03-06 RX ADMIN — LEVALBUTEROL HYDROCHLORIDE 1.25 MG: 1.25 SOLUTION RESPIRATORY (INHALATION) at 06:06

## 2018-03-06 NOTE — PROGRESS NOTES
Between 2558-2191 3 small seizures were noted.  With face, left shoulder and left fingers involved.  Mother in attendance

## 2018-03-06 NOTE — CARE PLAN
Problem: Oxygenation:  Goal: Maintain adequate oxygenation dependent on patient condition  Outcome: PROGRESSING SLOWER THAN EXPECTED    Intervention: Manage oxygen therapy by monitoring pulse oximetry and/or ABG values  Pt remains on t-piece 4L/28%      Problem: Bronchoconstriction:  Goal: Improve in air movement and diminished wheezing  Outcome: PROGRESSING SLOWER THAN EXPECTED    Intervention: Implement inhaled treatments  Xopenex Q6

## 2018-03-06 NOTE — DISCHARGE PLANNING
Informed by [facility] liaison, Uzma that [facility] is declining the pt. TC to [facility] WRIGHT and requested that she expand the choice to [facility] [place].

## 2018-03-06 NOTE — PROGRESS NOTES
[facility] Hospitalist Progress Note    Date of Service: 3/6/2018    Chief Complaint  37 y.o. male admitted 2018 with shortness of breath.    Interval Problem Update  Patient with a history of traumatic brain injury and seizure disorder, trach in place.   Had underwent taper off of his phenobarbital, family felt he was doing worse.   Upon arrival patient was noted to be hypoxic, deemed aspiration pneumonia due to seizures and patient was placed on a ventilator.   Continuous EEG obtained which showed significant seizures, Versed drip started and phenobarbital restarted.   Currently seizures are improved, neurology has adjusted medications.   Patient is currently tolerating his tube feeds, home blend is being used.   Patient seen and examined in the ICU, ICU care given.  Discussed with family at bedside.  Discussed patient condition and plan with Intensivist, RN, RT and charge nurse / hot rounds.    Seized yesterday but none since 8 pm  Somnolent today  Doesn't follow  NSR  -130, midodrine held  tmax 100.4  Home TF, tolerating  850 out of rosas overnight  [facility]  No mobilization  Off vent for 50 hours  Secretions are thinning  t-piece   Baclofen pump interrogated, functioning normally      Consultants/Specialty  Intensivist  Neurology    Disposition  Patient requires additional treatment in the hospital, may need placement at the time of discharge, LTAC is pending      Review of Systems   Unable to perform ROS: Intubated      Physical Exam  Laboratory/Imaging   Hemodynamics  No data recorded.   Monitored Temp: 37 °C (98.6 °F)  Pulse  Av  Min: 48  Max: 125 Heart Rate (Monitored): 92  NIBP: 109/55      Respiratory    #Aerosol Therapy / Airway Management: T-Piece, Aerosol Humidity Temp (celsius): 35 Respiration: 14, Pulse Oximetry: 92 %, O2 Daily Delivery Respiratory : T-Piece     Given By:: Trache, Work Of Breathing / Effort: Mild  RUL Breath Sounds: Clear After Suction;Coarse Crackles, RML Breath Sounds:  Clear After Suction;Coarse Crackles, RLL Breath Sounds: Clear After Suction;Diminished, JONATAN Breath Sounds: Clear After Suction;Coarse Crackles, LLL Breath Sounds: Clear After Suction;Diminished    Fluids    Intake/Output Summary (Last 24 hours) at 03/06/18 0742  Last data filed at 03/06/18 0600   Gross per 24 hour   Intake             1815 ml   Output             1475 ml   Net              340 ml       Nutrition  Orders Placed This Encounter   Procedures   • Diet NPO     Standing Status:   Standing     Number of Occurrences:   1     Order Specific Question:   Restrict to:     Answer:   Strict [1]     Physical Exam   Constitutional: He appears cachectic. He appears ill. No distress.   HENT:   Head: Normocephalic and atraumatic.   Eyes: Right eye exhibits no discharge. Left eye exhibits no discharge. No scleral icterus.   Neck: Neck supple.   Trach   Cardiovascular: Normal rate and regular rhythm.  Exam reveals no gallop.    No murmur heard.  Pulmonary/Chest: No stridor. He has decreased breath sounds in the right lower field and the left lower field. He has no wheezes. He has no rhonchi.   t-piece    Abdominal: Soft. Bowel sounds are normal. He exhibits no distension.   Genitourinary:   Genitourinary Comments: catheter   Musculoskeletal: He exhibits deformity. He exhibits no edema or tenderness.   Extensive contractures of extremities   Neurological:   Eyes open, doesn't respond or follow      Skin: Skin is warm and dry. No rash noted. He is not diaphoretic. No erythema.   Psychiatric: He is noncommunicative.   Nursing note and vitals reviewed.      Recent Labs      03/04/18   0445  03/05/18   0530  03/06/18   0507   WBC  7.9  6.5  6.5   RBC  2.94*  3.19*  3.08*   HEMOGLOBIN  8.4*  9.4*  9.0*   HEMATOCRIT  27.1*  29.4*  28.9*   MCV  92.2  92.2  93.8   MCH  28.6  29.5  29.2   MCHC  31.0*  32.0*  31.1*   RDW  63.7*  67.3*  68.6*   PLATELETCT  627*  599*  585*   MPV  8.7*  8.5*  8.4*     Recent Labs      03/04/18   6569   03/05/18   0530  03/06/18   0507   SODIUM  134*  134*  137   POTASSIUM  3.5*  3.4*  3.4*   CHLORIDE  99  100  103   CO2  29  28  27   GLUCOSE  101*  93  90   BUN  12  14  10   CREATININE  <0.20*  0.20*  <0.20*   CALCIUM  8.9  8.5  8.9                      Assessment/Plan     * Septic shock (CMS-HCC)- (present on admission)   Assessment & Plan    - Resolved, was due to aspiration pneumonia, finish full course of antibiotics        Seizure (CMS-HCC)- (present on admission)   Assessment & Plan    - Seizure again yesterday, more somnolent today  - Continue Vimpat, Keppra, phenobarbital   - Additional recommendations per neurology        Hypomagnesemia- (present on admission)   Assessment & Plan    - Resolved        Decubitus ulcer of ischium, left, stage IV (CMS-HCC)- (present on admission)   Assessment & Plan    - Continue wound care        Azotemia- (present on admission)   Assessment & Plan    - Resolved with fluids        Acute on chronic respiratory failure with hypoxemia (CMS-HCC)- (present on admission)   Assessment & Plan    - Improved, now on T piece  - Was placed on ventilator initially on 2/18  - Due to aspiration pneumonia during seizures        Aspiration pneumonia (CMS-HCC)- (present on admission)   Assessment & Plan    - Causing septic shock  - Finished full course of antibiotics        Anemia, blood loss- (present on admission)   Assessment & Plan    - Hemoglobin stable  - No sign of gross bleeding        Diarrhea- (present on admission)   Assessment & Plan    - Resolved          Protein-calorie malnutrition, severe (CMS-HCC)- (present on admission)   Assessment & Plan    - Chronic, tolerating tube feeds        Physical debility- (present on admission)   Assessment & Plan    - Chronic post traumatic brain injury  - At baseline        TBI (traumatic brain injury) (CMS-HCC)- (present on admission)   Assessment & Plan    - Chronic, nonverbal        Hyponatremia- (present on admission)   Assessment & Plan     - Resolved          Contraction, joint, multiple sites- (present on admission)   Assessment & Plan    - Chronic  - has baclofen pump which was refilled in January  - Pump has been interrogating and is working properly        GI bleed- (present on admission)   Assessment & Plan    - Resolved          Quality-Core Measures   Reviewed items::  Labs reviewed, Medications reviewed and Radiology images reviewed  Waldrop catheter::  Unconscious / Sedated Patient on a Ventilator  DVT prophylaxis pharmacological::  Enoxaparin (Lovenox)  Ulcer Prophylaxis::  Yes

## 2018-03-06 NOTE — PROGRESS NOTES
Pulmonary Critical Care Progress Note        Date of admission: 2/18/2018    Chief Complaint: Respiratory failure, septic shock    History of Present Illness:  Mr. Edwards is a 37-year-old male with past medical history of traumatic brain injury at age 17, seizures diagnosed in June of 2017. His baseline mental status appears to be nodding and minimal interacting without vocalization. Who was brought to the ICU as a direct admit from [facility] where he was brought today for hypoxia. Reportedly the patient was being weanied off of his phenobarbital by his neurologist when he began having more frequent seizures, approximately one week ago his neurologist increased his Keppra dosing however the seizures continued. His mother did use CBD and THC with some success in slowing the seizures. Since 2 days ago the patient had multiple aspiration events following these seizures. His mother evaluated him with a home oxygen saturation monitor and he was noted to be hypoxic, he was brought to [facility] where he was found to have 28% bands with leukopenia. Chest x-ray was obtained demonstrating bilateral infiltrates right greater than left. He was placed on mechanical ventilation and his oxygenation was unable to be improved more than 85%, he was hypotensive and given multiple boluses of IV fluids without improvement. He was started on levo fed and given multiple doses of Ativan for seizures and transferred to our ICU for higher level of care. He arrives on the ventilator and on pressors critically ill.    Please note this history is obtained by my partner Dr. Appiah 2/18/2018.    ROS:  Respiratory: unable to perform due to the patient's inability to effectively communicate, Cardiac: unable to perform due to the patient's inability to effectively communicate, GI: unable to perform due to the patient's inability to effectively communicate.  All other systems negative. Unchanged    Interval Events:  24 hour  interval history reviewed    - no visible sz after phenobarb at 8PM last night   - not following   - SR   - , midodrine held   - Tm 100.4   - april TF via PEG   - remains off vent ~ 50 hours    - improved secretions   - 4L, 28% T-piece   - completed abx 3/24; not treating citrobacter    - baclofen pump interrogated yesterday; functioning    - d/w neuro today re anti-sz meds     - rec'ing phenobarb   - no sz overnight   - SR/ST   - BP controlled   - afebrile   - home TF   - contracted, minimal movement   - trach (chronic), vent day 16, T-piece 26 hours   - moderated clear secreations   - lovenox/PPI P   - completed abx   - citrobacter BAL 3/3   - will check PCT   - replacing K   - baclofen pump; will d/w rep for interrogation   Yesterday:    - T-piece 15 hrs 10/40%   - TFs increased to almost goal per home regimen   - plts dropped slightly to 627   - Na dropped again to 133 from 140 with lasix (dumped 1200mL urine)   - low K   - yesterday twitching so phenobarb dose increased for nighttime, none today   - Underwent bronchoscopy and left-sided thoracentesis with subsequent improvement in radiographic findings and tolerance of T piece    PFSH:  No change.    Respiratory:      Pulse Oximetry: 92 %           Exam: tracheostomy tube in good position without surrounding erythema, unlabored respirations, no intercostal retractions or accessory muscle use and rhonchi bibasilar.     ImagingCXR  I have personally reviewed the chest x-ray my impression is  (compared to prior films) improved right consolidative changes with decreasing edema, trace effusion, unchanged bilateral lower lobe atelectasis, tracheostomy tube and right IJ CVL in good position        Invalid input(s): [id number] ABG interpretation: None today    HemoDynamics:  Pulse: 93, Heart Rate (Monitored): 92  NIBP: 109/55        Exam: regular rate and rhythm, regular rhythm (Sinus), no ectopy      Echo on 2/19/2018:  CONCLUSIONS  Normal right and left  ventricular size and function.   No significant valve disease or flow abnormalities.   Dilated inferior vena cava without inspiratory collapse. The patient is   intubated.        Neuro:  GCS Total Aleida Coma Score: 9      Exam: PERRL, eyes are open, times to visually track, does not follow commands  Imaging: Available data reviewed    EEG 2/25: EEG monitoring shows intermittent  epileptiform over the right hemisphere    Fluids:  Intake/Output       03/04/18 0700 - 03/05/18 0659 03/05/18 0700 - 03/06/18 0659 03/06/18 0700 - 03/07/18 0659      0869-1570 4959-9094 Total 3459-1640 1618-5490 Total 2310-0675 0980-4539 Total       Intake    I.V.  0  -- 0  --  -- --  --  -- --    IV Volume (0.9% Normal Saline) 0 -- 0 -- -- -- -- -- --    Other  5  -- 5  60  -- 60  --  -- --    Medications (P.O./ Enteral Liquids) 5 -- 5 60 -- 60 -- -- --    Enteral  1230  440 1670  1295  460 1755  230  -- 230    Enteral Volume 8962 479 9147 1485 999 1741 200 -- 200    Free Water / Tube Flush 150 60 210 195 60 255 30 -- 30    Total Intake 1884 139 0453 6885 229 0699 230 -- 230       Output    Urine  2645  850 3495  625  850 1475  --  -- --    Indwelling Cathether 2645 850 3495  -- -- --    Stool  --  -- --  --  -- --  --  -- --    Number of Times Stooled -- -- -- 2 x -- 2 x -- -- --    Total Output 2645 850 3495  -- -- --       Net I/O     -1410 -410 -1820 730 -390 340 230 -- 230        Weight: 54.8 kg (120 lb 13 oz)  Recent Labs      03/04/18   1359  03/05/18   0530  03/06/18   0507   SODIUM  134*  134*  137   POTASSIUM  3.5*  3.4*  3.4*   CHLORIDE  99  100  103   CO2  29  28  27   BUN  12  14  10   CREATININE  <0.20*  0.20*  <0.20*   CALCIUM  8.9  8.5  8.9       GI/Nutrition:  Exam: nondistended, abdomen is soft and non-tender, normal active bowel sounds, G-Tube  no sign of infection, palpable baclofen pump in right lower quadrant   Imaging: Available data reviewed    KUB 2/25: No evidence of bowel  obstruction.   CT abdomen and pelvis with oral contrast :  1.  No evidence of bowel obstruction or perforation.  2.  Appendix not visualized.  3.  Small to moderate volume ascites of uncertain etiology.  4.  Bilateral pleural effusions with associated atelectasis.  5.  Ill-defined parenchymal opacities in the LEFT lower lung suggesting multifocal pneumonia.  6.  Scoliosis.    Liver Function  Recent Labs      18   1359  18   0530  18   0507   ALTSGPT   --   35   --    ASTSGOT   --   20   --    ALKPHOSPHAT   --   169*   --    TBILIRUBIN   --   0.4   --    PREALBUMIN   --   20.0   --    GLUCOSE  101*  93  90       Heme:  Recent Labs      18   0445  18   0530  18   0507   RBC  2.94*  3.19*  3.08*   HEMOGLOBIN  8.4*  9.4*  9.0*   HEMATOCRIT  27.1*  29.4*  28.9*   PLATELETCT  627*  599*  585*       Infectious Disease:  Monitored Temp  Av.3 °C (99.1 °F)  Min: 36.8 °C (98.2 °F)  Max: 38 °C (100.4 °F)  Micro: reviewed. C. diff is negative. BAL with Proteus, and Klebsiella. BAL 3/3: Lactose and non-lactose fermenting gram-negative rods heavy growth, pleural fluid 3/3 no growth to date  Recent Labs      18   0445  18   0530  18   0507   WBC  7.9  6.5  6.5   NEUTSPOLYS  66.60  57.10  53.90   LYMPHOCYTES  18.40*  22.70  24.50   MONOCYTES  11.40  15.30*  15.80*   EOSINOPHILS  2.30  3.70  4.30   BASOPHILS  0.80  0.60  0.90   ASTSGOT   --   20   --    ALTSGPT   --   35   --    ALKPHOSPHAT   --   169*   --    TBILIRUBIN   --   0.4   --      Current Facility-Administered Medications   Medication Dose Frequency Provider Last Rate Last Dose   • potassium chloride SA (Kdur) tablet 40 mEq  40 mEq Once Eleazar Grijalva M.D.       • PHENobarbital solution 120 mg  120 mg BID Aidan Encarnacoin M.D.       • midodrine (PROAMATINE) tablet 2.5 mg  2.5 mg TID WITH MEALS Aidan Encarnacion M.D.       • potassium bicarbonate (KLYTE) 25 MEQ effervescent tablet TBEF 50 mEq  50 mEq DAILY George DONOHUE  Gonda, M.D.   50 mEq at 03/05/18 0854   • Pain Pump (patient supplied) Device   Continuous Jeremy M Gonda, M.D.       • bisacodyl (DULCOLAX) suppository 10 mg  10 mg DAILY Jeremy M Gonda, M.D.   Stopped at 03/05/18 0900   • enoxaparin (LOVENOX) inj 40 mg  40 mg DAILY Jeremy M Gonda, M.D.   40 mg at 03/05/18 0854   • lacosamide (VIMPAT) tablet 200 mg  200 mg BID Jeremy M Gonda, M.D.   200 mg at 03/05/18 2028   • levETIRAcetam (KEPPRA) 100 MG/ML solution 1,500 mg  1,500 mg Q12HRS Jeremy M Gonda, M.D.   1,500 mg at 03/05/18 2028   • loperamide (IMODIUM) 1 MG/5ML solution 2 mg  2 mg 4X/DAY PRN FARHAT MedinaOBucky   2 mg at 02/26/18 0820   • omeprazole 2 mg/mL in sodium bicarbonate (PRILOSEC) oral susp 40 mg  40 mg DAILY Jon Reynoso D.OBucky   40 mg at 03/05/18 0854   • levalbuterol (XOPENEX) 1.25 MG/3ML nebulizer solution 1.25 mg  1.25 mg Q6HRS (RT) JASPAL Berry.O.   1.25 mg at 03/06/18 0606   • Respiratory Care per Protocol   Continuous RT JASPAL Alaniz Jr..O.       • polyethylene glycol/lytes (MIRALAX) PACKET 1 Packet  1 Packet QDAY PRN Modesto Appiah Jr. D.O.   1 Packet at 03/02/18 1527    And   • magnesium hydroxide (MILK OF MAGNESIA) suspension 30 mL  30 mL QDAY PRN Modesto Appiah Jr. D.O.        And   • bisacodyl (DULCOLAX) suppository 10 mg  10 mg QDAY PRN Modesto Appiah Jr. D.O.   10 mg at 03/03/18 0721   • chlorhexidine (PERIDEX) 0.12 % solution 15 mL  15 mL BID Modesto Appiah Jr. D.O.   15 mL at 03/05/18 2100   • lidocaine (XYLOCAINE) 1%  injection  1-2 mL Q30 MIN PRN JASPAL Alaniz Jr..SAM.       • MD ALERT...Adult ICU Electrolyte Replacement per Pharmacy Protocol   pharmacy to dose JASPAL Alaniz Jr..SAM.       • ipratropium-albuterol (DUONEB) nebulizer solution 3 mL  3 mL Q2HRS PRN (RT) JASPAL Alaniz Jr..SAM.       • levalbuterol (XOPENEX) 1.25 MG/3ML nebulizer solution 1.25 mg  1.25 mg Q4HRS PRN Duy Whitaker M.D.   1.25 mg at 02/20/18 0626   • miconazole 2%-zinc oxide (RADHA)  topical cream   PRN Duy Whitaker M.D.       • acetaminophen (TYLENOL) tablet 650 mg  650 mg Q6HRS PRN Duy Whitaker M.D.   650 mg at 03/05/18 1515   • oxyCODONE immediate-release (ROXICODONE) tablet 5 mg  5 mg Q3HRS PRN Duy Whitaker M.D.        And   • oxyCODONE immediate release (ROXICODONE) tablet 10 mg  10 mg Q3HRS PRN Duy Whitaker M.D.       • ondansetron (ZOFRAN) syringe/vial injection 4 mg  4 mg Q4HRS PRN Duy Whitaker M.D.   4 mg at 02/25/18 1140   • ondansetron (ZOFRAN ODT) dispertab 4 mg  4 mg Q4HRS PRN Duy Whitaker M.D.       • promethazine (PHENERGAN) tablet 12.5-25 mg  12.5-25 mg Q4HRS PRN Duy Whitaker M.D.       • promethazine (PHENERGAN) suppository 12.5-25 mg  12.5-25 mg Q4HRS PRN Duy Whitaker M.D.       • prochlorperazine (COMPAZINE) injection 5-10 mg  5-10 mg Q4HRS PRN Duy Whitaker M.D.       • LORazepam (ATIVAN) injection 4 mg  4 mg Q10 MIN PRN Duy Whitaker M.D.   2 mg at 02/23/18 1251     Last reviewed on 2/18/2018  7:51 PM by Lois Camejo, Pharmacy Intern    Quality  Measures:  Labs reviewed, Medications reviewed and Radiology images reviewed                    Assessment and plan:  Acute hypoxemic respiratory failure - VDRF since 2/18, chronic trach   - T-piece    - RT/O2 protocol, encourage mobilization   - minimize sedatives and maintain aspiration precautions   - intermittent chest x-rays  Left exudative pleural effusion per lytes criteria S/P thoracentesis 3/3   - cx neg  Septic shock from pulmonary source, now unspecified place a distributive shock - improved   - midodrine    - Completed antibiotics, s/p sepsis protocol   - citrobacter suspected colonizer   History of traumatic brain injury, chronic seizure disorder with associated status epilepticus   - Continue antiepileptics per neurology   - ongoing seizure activity   - will d/w neuro today; updated mom  Decubitus ulcerations of the left hip including stage IV disease   - Wound care, optimize  nutrition  Pneumonia (Proteus and Klebsiella)   - S/P antibiotics  Mild fluid overload - improved   - Discontinue Lasix and monitor strict I/O   Possible paralytic ileus versus feeding tube malfunction - resolved   - Continue tube feeding trial with advanced to goal home regimen   - Bowel protocol, daily Dulcolax suppository  Hyponatremia    - follow  Iron deficiency anemia - s/p iron supplementation  Hypokalemia/mag - repletion daily as needed  Prophylaxis, enteral nutrition    Discussed patient condition and risk of morbidity and/or mortality with Family, RN, RT, Pharmacy, Charge nurse / hot rounds and hospitalist.  He remains at high risk for further deterioration and must remain in ICU; aggressive pulmonary care as above.  The patient remains critically ill; I have actively managed acute seizures; additional benzo given today; he remains at risk for further resp deterioration; plan as above  The patient remains critically ill.  Critical care time = 32 minutes in directly providing and coordinating critical care and extensive data review.  No time overlap and excludes procedures.    Eleazar Grijalva M.D.

## 2018-03-06 NOTE — RESPIRATORY CARE
Pt. Has been off the vent for over 48 hours.  Pt. Is tolerating T-Piece 4/28% without any complications

## 2018-03-06 NOTE — CARE PLAN
Problem: Skin Integrity  Goal: Risk for impaired skin integrity will decrease  Outcome: PROGRESSING AS EXPECTED  Turn q 2 hr, supine/ right. Wound care at least 2 times a day. Moisturizer applied to skin.       Problem: Oxygenation/Respiratory Function  Goal: Patient will Achieve/Maintain Optimum Respiratory Rate/Effort  Collaboration with Rt.   Compliant with Tpiece

## 2018-03-06 NOTE — PROGRESS NOTES
Spoke with pt's mother who reported that she does not know the instructions for the baclofen pump but gave the nurse the phone # and information for the pharmacist who does know the information  Spoke with the nurse regarding pump, they are currently in the process of getting the baclofen pump assessed to see how much the pump is infusing daily

## 2018-03-06 NOTE — DIETARY
Nutrition support weekly update:  Day 16 of admit.  Ruben Edwards is a 37 y.o. male with admitting DX of Acute Hypoxic Respiratory Failure.    · Tube feeding initiated on 2/20. Current TF via G-tube.  · Pt is currently being fed using home regimen of tube feeding that mom makes.    · Home formula consists of yogurt, infant oatmeal, 1 scoop toddler formula, 1 scoop egg white protein, 1 tsp of super greens, milk alternative for liquid base and two different protein powder/meal replacements with 400 mL of free water added.    · Pt receives small volume feedings at home.  · Approximate kcals from home formula: ~ 9617-1599 and  grams of protein per day.  · Discussed alternative source of organic home tube feed formula with mom (Iwona Hernandez).    · One pouch (341 g) provides 450 kcals, and 23 grams of protein.  Pt's estimated needs likely met with 6 pouches per day, providing 2700 kcals, and 138 grams of protein per day.    Assessment:  Weight: 54.8 kg (bed scale wt 3/6); decreased 1.1 kg from admit wt of 55.9 kg. Pt with variable fluid balance since admit.    Evaluation:   1. Pt with hx of TBI, spastic quadriplegia.  2. Pt no longer on the vent.  3. Potassium 3.4, Creatinine <0.20; Klyte on MAR.   4. Current feeding is likely meeting pt's estimated nutritional needs, but unable to verify exact amount as formula is made at home.    Recommendations/Plan:  1. Continue home TF formula and rate per MD approval.  2. Fluids per MD.    RD will continue to follow.

## 2018-03-06 NOTE — CARE PLAN
Problem: Oxygenation:  Goal: Maintain adequate oxygenation dependent on patient condition  Pt. Is currently on T-piece 4/28% and maintaining sats in the upper 90's    Problem: Bronchoconstriction:  Goal: Improve in air movement and diminished wheezing  Xopenex Q6

## 2018-03-06 NOTE — CARE PLAN
Problem: Safety  Goal: Will remain free from injury  Outcome: PROGRESSING AS EXPECTED    Goal: Will remain free from falls  Outcome: PROGRESSING AS EXPECTED      Problem: Pain Management  Goal: Pain level will decrease to patient's comfort goal  Outcome: PROGRESSING AS EXPECTED

## 2018-03-07 ENCOUNTER — APPOINTMENT (OUTPATIENT)
Dept: RADIOLOGY | Facility: MEDICAL CENTER | Age: 38
DRG: 853 | End: 2018-03-07
Attending: INTERNAL MEDICINE
Payer: COMMERCIAL

## 2018-03-07 LAB
AMMONIA PLAS-SCNC: 56 UMOL/L (ref 11–45)
ANION GAP SERPL CALC-SCNC: 8 MMOL/L (ref 0–11.9)
ANISOCYTOSIS BLD QL SMEAR: ABNORMAL
BASOPHILS # BLD AUTO: 0.9 % (ref 0–1.8)
BASOPHILS # BLD: 0.06 K/UL (ref 0–0.12)
BUN SERPL-MCNC: 11 MG/DL (ref 8–22)
CALCIUM SERPL-MCNC: 8.5 MG/DL (ref 8.5–10.5)
CHLORIDE SERPL-SCNC: 100 MMOL/L (ref 96–112)
CO2 SERPL-SCNC: 26 MMOL/L (ref 20–33)
COMMENT 1642: NORMAL
CREAT SERPL-MCNC: <0.2 MG/DL (ref 0.5–1.4)
EOSINOPHIL # BLD AUTO: 0.33 K/UL (ref 0–0.51)
EOSINOPHIL NFR BLD: 4.8 % (ref 0–6.9)
ERYTHROCYTE [DISTWIDTH] IN BLOOD BY AUTOMATED COUNT: 68.7 FL (ref 35.9–50)
GLUCOSE SERPL-MCNC: 91 MG/DL (ref 65–99)
HCT VFR BLD AUTO: 28.8 % (ref 42–52)
HGB BLD-MCNC: 8.9 G/DL (ref 14–18)
IMM GRANULOCYTES # BLD AUTO: 0.03 K/UL (ref 0–0.11)
IMM GRANULOCYTES NFR BLD AUTO: 0.4 % (ref 0–0.9)
LYMPHOCYTES # BLD AUTO: 1.73 K/UL (ref 1–4.8)
LYMPHOCYTES NFR BLD: 25.3 % (ref 22–41)
MACROCYTES BLD QL SMEAR: ABNORMAL
MCH RBC QN AUTO: 29 PG (ref 27–33)
MCHC RBC AUTO-ENTMCNC: 30.9 G/DL (ref 33.7–35.3)
MCV RBC AUTO: 93.8 FL (ref 81.4–97.8)
MICROCYTES BLD QL SMEAR: ABNORMAL
MONOCYTES # BLD AUTO: 1.06 K/UL (ref 0–0.85)
MONOCYTES NFR BLD AUTO: 15.5 % (ref 0–13.4)
MORPHOLOGY BLD-IMP: NORMAL
NEUTROPHILS # BLD AUTO: 3.63 K/UL (ref 1.82–7.42)
NEUTROPHILS NFR BLD: 53.1 % (ref 44–72)
NRBC # BLD AUTO: 0 K/UL
NRBC BLD-RTO: 0 /100 WBC
PHENOBARB SERPL-MCNC: 24.1 UG/ML (ref 15–40)
PLATELET # BLD AUTO: 550 K/UL (ref 164–446)
PLATELET BLD QL SMEAR: NORMAL
PMV BLD AUTO: 8.3 FL (ref 9–12.9)
POTASSIUM SERPL-SCNC: 3.4 MMOL/L (ref 3.6–5.5)
RBC # BLD AUTO: 3.07 M/UL (ref 4.7–6.1)
RBC BLD AUTO: PRESENT
SODIUM SERPL-SCNC: 134 MMOL/L (ref 135–145)
WBC # BLD AUTO: 6.8 K/UL (ref 4.8–10.8)

## 2018-03-07 PROCEDURE — A9270 NON-COVERED ITEM OR SERVICE: HCPCS | Performed by: HOSPITALIST

## 2018-03-07 PROCEDURE — 700102 HCHG RX REV CODE 250 W/ 637 OVERRIDE(OP): Performed by: INTERNAL MEDICINE

## 2018-03-07 PROCEDURE — A9270 NON-COVERED ITEM OR SERVICE: HCPCS | Performed by: INTERNAL MEDICINE

## 2018-03-07 PROCEDURE — 94640 AIRWAY INHALATION TREATMENT: CPT

## 2018-03-07 PROCEDURE — A9579 GAD-BASE MR CONTRAST NOS,1ML: HCPCS | Performed by: INTERNAL MEDICINE

## 2018-03-07 PROCEDURE — 80184 ASSAY OF PHENOBARBITAL: CPT

## 2018-03-07 PROCEDURE — 99233 SBSQ HOSP IP/OBS HIGH 50: CPT | Performed by: INTERNAL MEDICINE

## 2018-03-07 PROCEDURE — 70553 MRI BRAIN STEM W/O & W/DYE: CPT

## 2018-03-07 PROCEDURE — 700101 HCHG RX REV CODE 250: Performed by: INTERNAL MEDICINE

## 2018-03-07 PROCEDURE — 95951 EEG-24 HR: CPT | Performed by: PSYCHIATRY & NEUROLOGY

## 2018-03-07 PROCEDURE — 770022 HCHG ROOM/CARE - ICU (200)

## 2018-03-07 PROCEDURE — 700111 HCHG RX REV CODE 636 W/ 250 OVERRIDE (IP): Performed by: INTERNAL MEDICINE

## 2018-03-07 PROCEDURE — 80177 DRUG SCRN QUAN LEVETIRACETAM: CPT

## 2018-03-07 PROCEDURE — 82140 ASSAY OF AMMONIA: CPT

## 2018-03-07 PROCEDURE — 700102 HCHG RX REV CODE 250 W/ 637 OVERRIDE(OP): Performed by: HOSPITALIST

## 2018-03-07 PROCEDURE — 80048 BASIC METABOLIC PNL TOTAL CA: CPT

## 2018-03-07 PROCEDURE — 700111 HCHG RX REV CODE 636 W/ 250 OVERRIDE (IP): Performed by: PSYCHIATRY & NEUROLOGY

## 2018-03-07 PROCEDURE — 80339 ANTIEPILEPTICS NOS 1-3: CPT

## 2018-03-07 PROCEDURE — 700105 HCHG RX REV CODE 258: Performed by: PSYCHIATRY & NEUROLOGY

## 2018-03-07 PROCEDURE — 85025 COMPLETE CBC W/AUTO DIFF WBC: CPT

## 2018-03-07 PROCEDURE — 4A10X4Z MONITORING OF CENTRAL NERVOUS ELECTRICAL ACTIVITY, EXTERNAL APPROACH: ICD-10-PCS | Performed by: PSYCHIATRY & NEUROLOGY

## 2018-03-07 PROCEDURE — 700117 HCHG RX CONTRAST REV CODE 255: Performed by: INTERNAL MEDICINE

## 2018-03-07 RX ORDER — POTASSIUM CHLORIDE 20 MEQ/1
40 TABLET, EXTENDED RELEASE ORAL ONCE
Status: COMPLETED | OUTPATIENT
Start: 2018-03-07 | End: 2018-03-07

## 2018-03-07 RX ADMIN — LEVETIRACETAM 1500 MG: 100 SOLUTION ORAL at 20:45

## 2018-03-07 RX ADMIN — PHENOBARBITAL 120 MG: 20 ELIXIR ORAL at 20:44

## 2018-03-07 RX ADMIN — VALPROATE SODIUM 500 MG: 100 INJECTION, SOLUTION INTRAVENOUS at 20:43

## 2018-03-07 RX ADMIN — LACOSAMIDE 200 MG: 50 TABLET, FILM COATED ORAL at 08:30

## 2018-03-07 RX ADMIN — PHENOBARBITAL 120 MG: 20 ELIXIR ORAL at 08:30

## 2018-03-07 RX ADMIN — LEVALBUTEROL HYDROCHLORIDE 1.25 MG: 1.25 SOLUTION RESPIRATORY (INHALATION) at 02:10

## 2018-03-07 RX ADMIN — LEVETIRACETAM 1500 MG: 100 SOLUTION ORAL at 08:31

## 2018-03-07 RX ADMIN — POTASSIUM BICARBONATE 50 MEQ: 25 TABLET, EFFERVESCENT ORAL at 08:29

## 2018-03-07 RX ADMIN — CHLORHEXIDINE GLUCONATE 15 ML: 1.2 RINSE ORAL at 20:45

## 2018-03-07 RX ADMIN — LEVALBUTEROL HYDROCHLORIDE 1.25 MG: 1.25 SOLUTION RESPIRATORY (INHALATION) at 14:38

## 2018-03-07 RX ADMIN — GADODIAMIDE 15 ML: 287 INJECTION INTRAVENOUS at 10:01

## 2018-03-07 RX ADMIN — LACOSAMIDE 200 MG: 50 TABLET, FILM COATED ORAL at 20:43

## 2018-03-07 RX ADMIN — LORAZEPAM 2 MG: 2 INJECTION INTRAMUSCULAR; INTRAVENOUS at 10:54

## 2018-03-07 RX ADMIN — LEVALBUTEROL HYDROCHLORIDE 1.25 MG: 1.25 SOLUTION RESPIRATORY (INHALATION) at 06:09

## 2018-03-07 RX ADMIN — OMEPRAZOLE 40 MG: 20 CAPSULE, DELAYED RELEASE ORAL at 08:30

## 2018-03-07 RX ADMIN — LEVALBUTEROL HYDROCHLORIDE 1.25 MG: 1.25 SOLUTION RESPIRATORY (INHALATION) at 18:54

## 2018-03-07 RX ADMIN — POTASSIUM CHLORIDE 40 MEQ: 1500 TABLET, EXTENDED RELEASE ORAL at 19:00

## 2018-03-07 RX ADMIN — CHLORHEXIDINE GLUCONATE 15 ML: 1.2 RINSE ORAL at 12:00

## 2018-03-07 RX ADMIN — ENOXAPARIN SODIUM 40 MG: 100 INJECTION SUBCUTANEOUS at 08:30

## 2018-03-07 NOTE — PROGRESS NOTES
Pulmonary Critical Care Progress Note        Date of admission: 2/18/2018    Chief Complaint: Respiratory failure, septic shock    History of Present Illness:  Mr. Edwards is a 37-year-old male with past medical history of traumatic brain injury at age 17, seizures diagnosed in June of 2017. His baseline mental status appears to be nodding and minimal interacting without vocalization. Who was brought to the ICU as a direct admit from [facility] where he was brought today for hypoxia. Reportedly the patient was being weanied off of his phenobarbital by his neurologist when he began having more frequent seizures, approximately one week ago his neurologist increased his Keppra dosing however the seizures continued. His mother did use CBD and THC with some success in slowing the seizures. Since 2 days ago the patient had multiple aspiration events following these seizures. His mother evaluated him with a home oxygen saturation monitor and he was noted to be hypoxic, he was brought to [facility] where he was found to have 28% bands with leukopenia. Chest x-ray was obtained demonstrating bilateral infiltrates right greater than left. He was placed on mechanical ventilation and his oxygenation was unable to be improved more than 85%, he was hypotensive and given multiple boluses of IV fluids without improvement. He was started on levo fed and given multiple doses of Ativan for seizures and transferred to our ICU for higher level of care. He arrives on the ventilator and on pressors critically ill.      ROS:  Respiratory: unable to perform due to the patient's inability to effectively communicate, Cardiac: unable to perform due to the patient's inability to effectively communicate, GI: unable to perform due to the patient's inability to effectively communicate.  All other systems negative. Unchanged    Interval Events:  24 hour interval history reviewed    - sz last night ~11:45 PM, L arm/head sz  activity; given ativan 2 mg   - april TF   - TLC   - flotation/rotation protocol   - T-piece 30%, no cxr today   - lovenox, PPI   - remains off abx   - replacing K, IV and PO   - d/c midodrine today   Yesterday:   - no visible sz after phenobarb at 8PM last night   - not following   - SR   - , midodrine held   - Tm 100.4   - april TF via PEG   - remains off vent ~ 50 hours    - improved secretions   - 4L, 28% T-piece   - completed abx 3/24; not treating citrobacter    - baclofen pump interrogated yesterday; functioning    - d/w neuro today re anti-sz meds      PFSH:  No change.    Respiratory:      Pulse Oximetry: 97 %           Exam: tracheostomy tube in good position without surrounding erythema, unlabored respirations, no intercostal retractions or accessory muscle use and rhonchi bibasilar.   ImagingAvailable data reviewed (compared to prior films)        Invalid input(s): [id number]     HemoDynamics:  Pulse: 88, Heart Rate (Monitored): 87  NIBP: 109/54        Exam: regular rate and rhythm, regular rhythm (Sinus), no ectopy          Neuro:  GCS Total [place] Coma Score: 9   Exam: PERRL, eyes are open, does not follow commands, intermittent sz's noted  Imaging: Available data reviewed       Fluids:  Intake/Output       03/05/18 0700 - 03/06/18 0659 03/06/18 0700 - 03/07/18 0659 03/07/18 0700 - 03/08/18 0659      1435-5220 2299-1102 Total 4158-4081 8574-3609 Total 6190-9345 8834-9348 Total       Intake    Other  60  -- 60  150  45 195  60  -- 60    Medications (P.O./ Enteral Liquids) 60 -- 60 150 45 195 60 -- 60    Enteral  1295  460 1755  1500  260 1760  270  -- 270    Enteral Volume 3496 365 5633 7436 056 8157 240 -- 240    Free Water / Tube Flush 195 60 255 180 60 240 30 -- 30    Total Intake 9591 984 0079 4356 553 2976 330 -- 330       Output    Urine  625  850 1475  755  925 1680  --  -- --    Indwelling Cathether   -- -- --    Drains  --  -- --  --  -- --  0  -- 0    Residual  Amount (ml) (Discarded) -- -- -- -- -- -- 0 -- 0    Stool  --  -- --  --  -- --  --  -- --    Number of Times Stooled 2 x -- 2 x 1 x -- 1 x -- -- --    Total Output   0 -- 0       Net I/O     730 -390 340 895 -620 275 330 -- 330        Weight: 53.4 kg (117 lb 11.6 oz)  Recent Labs      18   0520   SODIUM  134*  137  134*   POTASSIUM  3.4*  3.4*  3.4*   CHLORIDE  100  103  100   CO2  28  27  26   BUN  14  10  11   CREATININE  0.20*  <0.20*  <0.20*   CALCIUM  8.5  8.9  8.5       GI/Nutrition:  Exam: nondistended, abdomen is soft and non-tender, normal active bowel sounds, G-Tube  no sign of infection, palpable baclofen pump in right lower quadrant   Imaging: Available data reviewed    KUB : No evidence of bowel obstruction.   CT abdomen and pelvis with oral contrast :  1.  No evidence of bowel obstruction or perforation.  2.  Appendix not visualized.  3.  Small to moderate volume ascites of uncertain etiology.  4.  Bilateral pleural effusions with associated atelectasis.  5.  Ill-defined parenchymal opacities in the LEFT lower lung suggesting multifocal pneumonia.  6.  Scoliosis.    Liver Function  Recent Labs      18   0520   ALTSGPT  35   --    --    ASTSGOT  20   --    --    ALKPHOSPHAT  169*   --    --    TBILIRUBIN  0.4   --    --    PREALBUMIN  20.0   --    --    GLUCOSE  93  90  91       Heme:  Recent Labs      18   0520   RBC  3.19*  3.08*  3.07*   HEMOGLOBIN  9.4*  9.0*  8.9*   HEMATOCRIT  29.4*  28.9*  28.8*   PLATELETCT  599*  585*  550*       Infectious Disease:  Monitored Temp  Av.3 °C (99.1 °F)  Min: 36.7 °C (98.1 °F)  Max: 37.8 °C (100 °F)  Micro: reviewed.   Recent Labs      18   0530  18   0507  18   0520   WBC  6.5  6.5  6.8   NEUTSPOLYS  57.10  53.90   --    LYMPHOCYTES  22.70  24.50   --    MONOCYTES  15.30*  15.80*   --     EOSINOPHILS  3.70  4.30   --    BASOPHILS  0.60  0.90   --    ASTSGOT  20   --    --    ALTSGPT  35   --    --    ALKPHOSPHAT  169*   --    --    TBILIRUBIN  0.4   --    --      Current Facility-Administered Medications   Medication Dose Frequency Provider Last Rate Last Dose   • potassium chloride SA (Kdur) tablet 40 mEq  40 mEq Once Eleazar Grijalva M.D.       • PHENobarbital solution 120 mg  120 mg BID Aidan Encarnacion M.D.   120 mg at 03/06/18 2112   • midodrine (PROAMATINE) tablet 2.5 mg  2.5 mg TID WITH MEALS Aidan Encarnacion M.D.   Stopped at 03/06/18 0730   • potassium bicarbonate (KLYTE) 25 MEQ effervescent tablet TBEF 50 mEq  50 mEq DAILY Jeremy M Gonda, M.D.   50 mEq at 03/06/18 0907   • Pain Pump (patient supplied) Device   Continuous Jeremy M Gonda, M.D.       • enoxaparin (LOVENOX) inj 40 mg  40 mg DAILY Jeremy M Gonda, M.D.   40 mg at 03/06/18 0906   • lacosamide (VIMPAT) tablet 200 mg  200 mg BID Jeremy M Gonda, M.D.   200 mg at 03/06/18 2112   • levETIRAcetam (KEPPRA) 100 MG/ML solution 1,500 mg  1,500 mg Q12HRS Jeremy M Gonda, M.D.   1,500 mg at 03/06/18 2111   • loperamide (IMODIUM) 1 MG/5ML solution 2 mg  2 mg 4X/DAY PRN Clarence Morfin D.O.   2 mg at 02/26/18 0820   • omeprazole 2 mg/mL in sodium bicarbonate (PRILOSEC) oral susp 40 mg  40 mg DAILY Jon Reynoso D.O.   40 mg at 03/06/18 0906   • levalbuterol (XOPENEX) 1.25 MG/3ML nebulizer solution 1.25 mg  1.25 mg Q6HRS (RT) Jon Reynoso D.O.   1.25 mg at 03/07/18 0609   • Respiratory Care per Protocol   Continuous RT Modesto M Bijal Jr., D.O.       • polyethylene glycol/lytes (MIRALAX) PACKET 1 Packet  1 Packet QDAY PRN JASPAL Alaniz Jr..O.   1 Packet at 03/02/18 1527    And   • magnesium hydroxide (MILK OF MAGNESIA) suspension 30 mL  30 mL QDAY PRN Modesto Appiah Jr., D.O.        And   • bisacodyl (DULCOLAX) suppository 10 mg  10 mg QDAY PRN Modesto Appiah Jr., D.O.   Stopped at 03/06/18 1026   • chlorhexidine (PERIDEX) 0.12 %  solution 15 mL  15 mL BID JASPAL Alaniz Jr..OBucky   15 mL at 03/06/18 2112   • lidocaine (XYLOCAINE) 1%  injection  1-2 mL Q30 MIN PRN JASPAL Alaniz Jr..SAM.       • MD ALERT...Adult ICU Electrolyte Replacement per Pharmacy Protocol   pharmacy to dose JASPAL Alaniz Jr..SAM.       • ipratropium-albuterol (DUONEB) nebulizer solution 3 mL  3 mL Q2HRS PRN (RT) JASPAL Alaniz Jr..SAM.       • levalbuterol (XOPENEX) 1.25 MG/3ML nebulizer solution 1.25 mg  1.25 mg Q4HRS PRN Duy Whitaker M.D.   1.25 mg at 03/06/18 1420   • miconazole 2%-zinc oxide (RADHA) topical cream   PRN Duy Whitaker M.D.       • acetaminophen (TYLENOL) tablet 650 mg  650 mg Q6HRS PRN Duy Whitaker M.D.   650 mg at 03/05/18 1515   • oxyCODONE immediate-release (ROXICODONE) tablet 5 mg  5 mg Q3HRS PRN Duy Whitaker M.D.        And   • oxyCODONE immediate release (ROXICODONE) tablet 10 mg  10 mg Q3HRS PRN Duy Whitaker M.D.       • ondansetron (ZOFRAN) syringe/vial injection 4 mg  4 mg Q4HRS PRN Duy Whitaker M.D.   4 mg at 02/25/18 1140   • ondansetron (ZOFRAN ODT) dispertab 4 mg  4 mg Q4HRS PRN Duy Whitaker M.D.       • promethazine (PHENERGAN) tablet 12.5-25 mg  12.5-25 mg Q4HRS PRN Duy Whitaker M.D.       • promethazine (PHENERGAN) suppository 12.5-25 mg  12.5-25 mg Q4HRS PRN Duy Whitaker M.D.       • prochlorperazine (COMPAZINE) injection 5-10 mg  5-10 mg Q4HRS PRN Duy Whitaker M.D.       • LORazepam (ATIVAN) injection 4 mg  4 mg Q10 MIN PRN Duy Whitaker M.D.   2 mg at 03/06/18 2349     Last reviewed on 2/18/2018  7:51 PM by Lois Camejo, Pharmacy Intern    Quality  Measures:   Labs reviewed, Medications reviewed and Radiology images reviewed                    Assessment and plan:  Acute hypoxemic respiratory failure - VDRF since 2/18, chronic trach   - T-piece    - RT/O2 protocol, encourage mobilization   - minimize sedatives and maintain aspiration precautions   - intermittent chest x-rays  Left exudative  pleural effusion per lytes criteria S/P thoracentesis 3/3   - cx neg  Septic shock from pulmonary source, now unspecified place a distributive shock - improved   - midodrine held, d/c today   - Completed antibiotics, s/p sepsis protocol   - citrobacter suspected colonizer   History of traumatic brain injury, chronic seizure disorder with associated status epilepticus   - Continue antiepileptics per neurology   - ongoing seizure activity   - will d/w neuro - see below  Decubitus ulcerations of the left hip including stage IV disease   - Wound care, optimize nutrition  Pneumonia (Proteus and Klebsiella)   - S/P antibiotics  Mild fluid overload - improved   - Discontinue Lasix and monitor strict I/O   Possible paralytic ileus versus feeding tube malfunction - resolved   - Continue tube feeding trial with advanced to goal home regimen   - Bowel protocol, daily Dulcolax suppository  Hyponatremia    - follow  Iron deficiency anemia - s/p iron supplementation  Hypokalemia/mag - repletion daily as needed  Prophylaxis, enteral nutrition    I discussed the case with neurology in detail today. I will repeat brain imaging with an MRI of the brain. Further EEG likely to include continuous EEG monitoring and further decision regarding antiepileptic therapy per neurology.    Discussed patient condition and risk of morbidity and/or mortality with Family, RN, RT, Pharmacy, Charge nurse / hot rounds and hospitalist.    The patient does remain critically ill. I have managed IV seizure medications and ongoing respiratory support. He has a tracheostomy requiring frequent suctioning and aggressive care to avoid further respiratory deterioration he is at high risk for further respiratory deterioration. He is having ongoing seizures requiring additional antiepileptics acute intervention for these.  The patient remains critically ill.  Critical care time = 36 minutes in directly providing and coordinating critical care and extensive data  review.  No time overlap and excludes procedures.    Eleazar Grijalva M.D.

## 2018-03-07 NOTE — CONSULTS
No chief complaint on file.      Problem List Items Addressed This Visit     Acute on chronic respiratory failure with hypoxemia (CMS-HCC)     - Improved, now on T piece  - Was placed on ventilator initially on 2/18  - Due to aspiration pneumonia during seizures         Relevant Orders    REFERRAL TO long-term ACUTE CARE    Seizure (CMS-HCC)     - Seizure again yesterday, more somnolent today  - Continue Vimpat, Keppra, phenobarbital   - Additional recommendations per neurology         Relevant Medications    PHENobarbital 20 MG/5ML Elixir    PHENobarbital solution 30 mg (Completed)    PHENobarbital solution 90 mg (Completed)    PHENobarbital solution 120 mg (Completed)    lacosamide (VIMPAT) tablet 200 mg    levETIRAcetam (KEPPRA) 100 MG/ML solution 1,500 mg    lacosamide (VIMPAT) tablet 50 mg (Completed)    PHENobarbital solution 120 mg    PHENobarbital solution 240 mg (Completed)    Other Relevant Orders    REFERRAL TO HOSPICE          History of present illness:  Ruben Edwards 37 y.o. male admitted to [facility] on 2/18/18 for respiratory failure and sepsis. The patient has had a prolonged stay. 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 He lives at home with his family and requires 24 hrs care. He never had seizures after his head injury, and they began in June of 2017, after a bout of aspiration / respiratory failure. He was placed on Keppra and phenobarbital at that time and mom says he was discharged home and did well for several months, back to his baseline. Then, his local neurologist in [place], [place] noted some elevation of his liver enzymes and started to wean phenobarbital slowy, thinking it was the culprit, then he started having seizures again about a month ago. Keppra was increased then but unsuccessfully, he aspirated and was brought here for further care.     Seizures are described as hemiconvulsions on the left hemibody and they start in the face. Many times seizures are  just in the face. His seizures have become refractory. The patient has remained in the hospital for over two weeks and has been seen by different neurologists. He was last seen by Dr. Garcia. His EEG has been significantly abnormal but perhaps a bit better when it was last disconnected. The patient continues to have seizures, including a left hemiconvulsion last night and facial twitching this am.     I was called by Dr. Grijalva as a second opinion to meet with patient's mother and discuss management.     He is s/p trach.     The understanding is that sepsis is now controlled.     He had an MRI brain done this am.     He is currently on Keppra 1500 mg q 12 hrs, Phenobarbital 120 mg po bid (added in hospital), and Vimpat 200 mg q 12 hrs (added while in hospital). He has never tried other drugs. Mother reports he has a h/o hyponatremia treated with Himalayan salt.         Past medical history:   TBI.  Epilepsy.     Past surgical history:   Past Surgical History:   Procedure Laterality Date   • LARYNGOSCOPY  7/2/2017    Procedure: LARYNGOSCOPY;  Surgeon: Catherine Osorio M.D.;  Location: SURGERY [facility];  Service:    • BRONCHOSCOPY  7/2/2017    Procedure: BRONCHOSCOPY;  Surgeon: Catherine Osorio M.D.;  Location: SURGERY [facility];  Service:    • TRACHEOSTOMY  7/2/2017    Procedure: TRACHEOSTOMY;  Surgeon: Catherine Osorio M.D.;  Location: SURGERY [facility];  Service:    • ESOPHAGOSCOPY  7/2/2017    Procedure: ESOPHAGOSCOPY;  Surgeon: Catherine Osorio M.D.;  Location: SURGERY [facility];  Service:        Family history:   Non contributory.     Social history:   Social History     Social History   • Marital status: Single     Spouse name: N/A   • Number of children: N/A   • Years of education: N/A     Occupational History   • Not on file.     Social History Main Topics   • Smoking status: Not on file   • Smokeless tobacco: Not on file   • Alcohol use Not on file   • Drug use: Unknown   •  Sexual activity: Not on file     Other Topics Concern   • Not on file     Social History Narrative   • No narrative on file       Current medications:   Current Facility-Administered Medications   Medication Dose   • potassium chloride SA (Kdur) tablet 40 mEq  40 mEq   • PHENobarbital solution 120 mg  120 mg   • potassium bicarbonate (KLYTE) 25 MEQ effervescent tablet TBEF 50 mEq  50 mEq   • Pain Pump (patient supplied) Device     • enoxaparin (LOVENOX) inj 40 mg  40 mg   • lacosamide (VIMPAT) tablet 200 mg  200 mg   • levETIRAcetam (KEPPRA) 100 MG/ML solution 1,500 mg  1,500 mg   • loperamide (IMODIUM) 1 MG/5ML solution 2 mg  2 mg   • omeprazole 2 mg/mL in sodium bicarbonate (PRILOSEC) oral susp 40 mg  40 mg   • levalbuterol (XOPENEX) 1.25 MG/3ML nebulizer solution 1.25 mg  1.25 mg   • Respiratory Care per Protocol     • polyethylene glycol/lytes (MIRALAX) PACKET 1 Packet  1 Packet    And   • magnesium hydroxide (MILK OF MAGNESIA) suspension 30 mL  30 mL    And   • bisacodyl (DULCOLAX) suppository 10 mg  10 mg   • chlorhexidine (PERIDEX) 0.12 % solution 15 mL  15 mL   • lidocaine (XYLOCAINE) 1%  injection  1-2 mL   • MD ALERT...Adult ICU Electrolyte Replacement per Pharmacy Protocol     • ipratropium-albuterol (DUONEB) nebulizer solution 3 mL  3 mL   • levalbuterol (XOPENEX) 1.25 MG/3ML nebulizer solution 1.25 mg  1.25 mg   • miconazole 2%-zinc oxide (RADHA) topical cream     • acetaminophen (TYLENOL) tablet 650 mg  650 mg   • oxyCODONE immediate-release (ROXICODONE) tablet 5 mg  5 mg    And   • oxyCODONE immediate release (ROXICODONE) tablet 10 mg  10 mg   • ondansetron (ZOFRAN) syringe/vial injection 4 mg  4 mg   • ondansetron (ZOFRAN ODT) dispertab 4 mg  4 mg   • promethazine (PHENERGAN) tablet 12.5-25 mg  12.5-25 mg   • promethazine (PHENERGAN) suppository 12.5-25 mg  12.5-25 mg   • prochlorperazine (COMPAZINE) injection 5-10 mg  5-10 mg   • LORazepam (ATIVAN) injection 4 mg  4 mg       Medication  Allergy:  Allergies   Allergen Reactions   • Sulfa Drugs Unspecified     Per caretaker, mother is allergic to sulfa so believes her son could be as well         Review of systems:   Unable to obtain.      Physical examination:   Vitals:    03/07/18 1025 03/07/18 1034 03/07/18 1100 03/07/18 1200   Pulse: 91 93 (!) 101 94   Resp: 17 16 18 17   Temp:       SpO2: 94% 96% 99% 94%   Weight:       Height:         S/p trach.  He is not sedated, although he received a dose of ativan this am.  Head is midline.  Neck is rigid but unable to determine if due to nuchal rigidity vs widespread spasticity, which is chronic.  Skin exam shows no rash.  He did not follow any commands, he is non verbal.  Pupils are 2 mm and sluggish.  Eyes are midline, no nystagmus.  Face appeared symmetric.  Has corneals bilaterally.  He has contractures in all his limbs, and is severely spastic and atrophic everywhere.  His DTRs could not be elicitic.  His plantar responses were mute.        ANCILLARY DATA REVIEWED:       Lab Data Review:  Recent Results (from the past 24 hour(s))   CBC with Differential    Collection Time: 03/07/18  5:20 AM   Result Value Ref Range    WBC 6.8 4.8 - 10.8 K/uL    RBC 3.07 (L) 4.70 - 6.10 M/uL    Hemoglobin 8.9 (L) 14.0 - 18.0 g/dL    Hematocrit 28.8 (L) 42.0 - 52.0 %    MCV 93.8 81.4 - 97.8 fL    MCH 29.0 27.0 - 33.0 pg    MCHC 30.9 (L) 33.7 - 35.3 g/dL    RDW 68.7 (H) 35.9 - 50.0 fL    Platelet Count 550 (H) 164 - 446 K/uL    MPV 8.3 (L) 9.0 - 12.9 fL    Nucleated RBC 0.00 /100 WBC    NRBC (Absolute) 0.00 K/uL    Neutrophils-Polys 53.10 44.00 - 72.00 %    Lymphocytes 25.30 22.00 - 41.00 %    Monocytes 15.50 (H) 0.00 - 13.40 %    Eosinophils 4.80 0.00 - 6.90 %    Basophils 0.90 0.00 - 1.80 %    Immature Granulocytes 0.40 0.00 - 0.90 %    Lymphs (Absolute) 1.73 1.00 - 4.80 K/uL    Monos (Absolute) 1.06 (H) 0.00 - 0.85 K/uL    Eos (Absolute) 0.33 0.00 - 0.51 K/uL    Baso (Absolute) 0.06 0.00 - 0.12 K/uL    Immature  Granulocytes (abs) 0.03 0.00 - 0.11 K/uL    Neutrophils (Absolute) 3.63 1.82 - 7.42 K/uL    Anisocytosis 1+     Macrocytosis 1+     Microcytosis 1+    BASIC METABOLIC PANEL    Collection Time: 03/07/18  5:20 AM   Result Value Ref Range    Sodium 134 (L) 135 - 145 mmol/L    Potassium 3.4 (L) 3.6 - 5.5 mmol/L    Chloride 100 96 - 112 mmol/L    Co2 26 20 - 33 mmol/L    Glucose 91 65 - 99 mg/dL    Bun 11 8 - 22 mg/dL    Creatinine <0.20 (L) 0.50 - 1.40 mg/dL    Calcium 8.5 8.5 - 10.5 mg/dL    Anion Gap 8.0 0.0 - 11.9   ESTIMATED GFR    Collection Time: 03/07/18  5:20 AM   Result Value Ref Range    GFR If African American >60 >60 mL/min/1.73 m 2    GFR If Non African American >60 >60 mL/min/1.73 m 2   PERIPHERAL SMEAR REVIEW    Collection Time: 03/07/18  5:20 AM   Result Value Ref Range    Peripheral Smear Review see below    PLATELET ESTIMATE    Collection Time: 03/07/18  5:20 AM   Result Value Ref Range    Plt Estimation Increased    MORPHOLOGY    Collection Time: 03/07/18  5:20 AM   Result Value Ref Range    RBC Morphology Present    DIFFERENTIAL COMMENT    Collection Time: 03/07/18  5:20 AM   Result Value Ref Range    Comments-Diff see below      Last set of liver enzymes on 3/5/18, only mildly elevated alk phos.      Records reviewed:   Chart reviewed.     Imaging:   MRI brain w/wo, 3/7/18  Per my review, widespread atrophy, possible AV fistula on the right hemisphere.   Images shown to pt's mother.       EEG:  This is an abnormal 24 hours video electroencephalogram recording in a sedated and/or encephalopathic patient.  An asymmetric background is noted, with significant slowing and attenuation of the left hemisphere and slowing of the right hemisphere. These findings suggest widespread underlying structural abnormalities. Continuous right frontotemporal spikes/sharps. Intermittently noted, recurrent and brief seizures which are localized to the right frontotemporal region, with intermittent mostly short runs of  right PLEDS. No clear clinical changes were noted during the seizures. The seizures captured are shorter and last typically less than a minute, but do recur often throughout the study. There has been perhaps a modest improvement with adjustment of the antiepileptic regimen.    Towards the end of the study, the patient appears no longer sedated.           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   Admission for respiratory failure, aspiration pneumonia, septic shock and worsening of seizures.  Onset of seizures June 2018 with aspiration and respiratory failure episode, was doing relatively well on phenobarbital and Keppra at home, then seizures recurred and have been refractory since phenobarbital initially weaned. Despite adding phenobarbital and increasing dose as well as addition of vimpat and continuation of keppra, he continues to have recurrent focal seizures.  His MRI brain is severely abnormal, with widespread atrophy. A high pressure system such as AV fistula in the right hemisphere is suspected based on my review. I am awaiting official report, he may need IR consult. This may be the culprit of his refractory seizures and may be related to remote h/o trauma. Risk factors for seizure refractoriness include also bouts of respiratory failure, infection/shock, and possible superimposed anoxic brain injury, although the latter is no confirmed.  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  I had a lengthy discussion with pt's mother at bedside. She would like aggressive management of the seizures. We discussed options: surgery (including possible IR interventions), medications, VNS. Mother had used CBD oil at home for three days before admission and stated seizures reduced, unfortunately this is not available here and she cannot bring from home either.   We discussed several drugs which can be added to his regimen to  attempt for seizure control. These options included: depakote, topamax, zonegran. She would like for him not to be sedated again. She has decided to add depakote at this time. We discussed side effects extensively, including risk for abnormal liver enzymes, pancreatitis and others.   For sake of completion, meningitis should be ruled out, although not fully favored. The primary team will coordinate for LP. Markers for chronic infections including fungus and TB should be sent out.   We will resume continuous VEEG.        EDUCATION AND COUNSELING:  -Education was provided to family regarding diagnosis and prognosis. The chronic and unpredictable nature of the condition were discussed. There is increased risk for additional events, which may carry potential for significant injuries and death. Discussed frequent seizure triggers: sleep deprivation, medication non-compliance, use of illegal drugs/alcohol, stress, and others.   -We reviewed in detail the current antiepileptic regimen. Potential side effects of antiepileptics were discussed at length, including but no limited to: hypersensitivity reactions (rash and others, some of which can be fatal), visual field changes (some of which may be irreversible), glaucoma, diplopia, kidney stones, osteopenia/osteoporosis/bone fractures, hyperthermia/anhydrosis, hyponatremia, tremors/abnormal movements, ataxia, dizziness, fatigue, increased risk for falls, risk for cardiac arrhythmias/syncope, gastrointestinal side effects(hepatitis, pancreatitis, gastritis, ulcers), gingival hypertrophy/bleeding, drowsiness, sedation, anxiety/nervousness, increased risk for suicide, increased risk for depression, and psychosis.   -We also reviewed drug-drug interactions and their potential effect on seizure control and medication side effects.      I discussed the findings and recommendations with Dr. Grijalva.     Mother agrees with plan, as outlined.         Harris Bolton MD    Epilepsy and Neurodiagnostics.   Clinical  of Neurology [facility].   Diplomate in Neurology, Epilepsy, and Electrodiagnostic Medicine.   Office: [phone]  Fax: [phone]      I spent a total of 90 mins in the care of this patient today.

## 2018-03-07 NOTE — PROGRESS NOTES
[facility] Hospitalist Progress Note    Date of Service: 3/7/2018    Chief Complaint  37 y.o. male admitted 2018 with shortness of breath.    Interval Problem Update  Patient with a history of traumatic brain injury and seizure disorder, trach in place.   Had underwent taper off of his phenobarbital, family felt he was doing worse.   Upon arrival patient was noted to be hypoxic, deemed aspiration pneumonia due to seizures and patient was placed on a ventilator.   Continuous EEG obtained which showed significant seizures, Versed drip started and phenobarbital restarted.   Patient is currently tolerating his tube feeds, home blend is being used.   Patient seen and examined in the ICU, ICU care given.  Discussed with family at bedside.  Discussed patient condition and plan with Intensivist, RN, RT and charge nurse / hot rounds.    Large left sided seizure overnight 1145 pm, ativan 2 mg given with resolution  Unresponsive at baseline  NSR, tach with sz  SBP   Tolerating home formula tube feed  1680 out 3/6 via rosas  RIJ  No mobilization  MRI brain today  t-piece   Baclofen pump interrogated, functioning normally      Consultants/Specialty  Intensivist  Neurology    Disposition  Patient requires additional treatment in the hospital, may need placement at the time of discharge, LTAC is pending      Review of Systems   Unable to perform ROS: Intubated      Physical Exam  Laboratory/Imaging   Hemodynamics  No data recorded.   Monitored Temp: 36.8 °C (98.2 °F)  Pulse  Av  Min: 47  Max: 125 Heart Rate (Monitored): 87  NIBP: 109/54      Respiratory    #Aerosol Therapy / Airway Management: T-Piece, Aerosol Humidity Temp (celsius): 35 Respiration: 16, Pulse Oximetry: 97 %, O2 Daily Delivery Respiratory : T-Piece     Given By:: Trache, Work Of Breathing / Effort: Mild  RUL Breath Sounds: Clear, RML Breath Sounds: Clear, RLL Breath Sounds: Diminished, JONATAN Breath Sounds: Clear, LLL Breath Sounds:  Diminished    Fluids    Intake/Output Summary (Last 24 hours) at 03/07/18 0737  Last data filed at 03/07/18 0600   Gross per 24 hour   Intake             1955 ml   Output             1680 ml   Net              275 ml       Nutrition  Orders Placed This Encounter   Procedures   • Diet NPO     Standing Status:   Standing     Number of Occurrences:   1     Order Specific Question:   Restrict to:     Answer:   Strict [1]     Physical Exam   Constitutional: He appears cachectic. He appears ill.   HENT:   Head: Normocephalic and atraumatic.   Mouth/Throat: No oropharyngeal exudate.   Eyes: Right eye exhibits no discharge. Left eye exhibits no discharge.   Neck: Neck supple. No tracheal deviation present.   Trach   Cardiovascular: Normal rate and regular rhythm.    No murmur heard.  Pulmonary/Chest: No stridor. He has decreased breath sounds in the right lower field and the left lower field. He has no wheezes. He has rhonchi.   t-piece    Abdominal: Soft. He exhibits no distension.   Baclofen pump    Genitourinary:   Genitourinary Comments: catheter   Musculoskeletal: He exhibits deformity. He exhibits no edema.   Extensive contractures of extremities   Neurological:   Eyes open, doesn't respond or follow      Skin: Skin is warm and dry. He is not diaphoretic. No erythema.   Psychiatric: He is noncommunicative.   Nursing note and vitals reviewed.      Recent Labs      03/05/18   0530  03/06/18   0507  03/07/18   0520   WBC  6.5  6.5  6.8   RBC  3.19*  3.08*  3.07*   HEMOGLOBIN  9.4*  9.0*  8.9*   HEMATOCRIT  29.4*  28.9*  28.8*   MCV  92.2  93.8  93.8   MCH  29.5  29.2  29.0   MCHC  32.0*  31.1*  30.9*   RDW  67.3*  68.6*  68.7*   PLATELETCT  599*  585*  550*   MPV  8.5*  8.4*  8.3*     Recent Labs      03/05/18   0530  03/06/18   0507  03/07/18   0520   SODIUM  134*  137  134*   POTASSIUM  3.4*  3.4*  3.4*   CHLORIDE  100  103  100   CO2  28  27  26   GLUCOSE  93  90  91   BUN  14  10  11   CREATININE  0.20*  <0.20*   <0.20*   CALCIUM  8.5  8.9  8.5                      Assessment/Plan     * Septic shock (CMS-HCC)- (present on admission)   Assessment & Plan    - Resolved, was due to aspiration pneumonia, finish full course of antibiotics        Seizure (CMS-HCC)- (present on admission)   Assessment & Plan    - Seizure again yesterday, significant  - Continue Vimpat, Keppra, phenobarbital   - Neurology to see today, will likely need to adjust medications  - MRI brain pending        Hypomagnesemia- (present on admission)   Assessment & Plan    - Resolved        Decubitus ulcer of ischium, left, stage IV (CMS-HCC)- (present on admission)   Assessment & Plan    - Continue wound care        Azotemia- (present on admission)   Assessment & Plan    - Resolved with fluids        Acute on chronic respiratory failure with hypoxemia (CMS-HCC)- (present on admission)   Assessment & Plan    - Improved, now on T piece  - Was placed on ventilator initially on 2/18  - Due to aspiration pneumonia during seizures        Aspiration pneumonia (CMS-HCC)- (present on admission)   Assessment & Plan    - Causing septic shock  - Finished full course of antibiotics        Anemia, blood loss- (present on admission)   Assessment & Plan    - Hemoglobin stable  - No sign of gross bleeding        Diarrhea- (present on admission)   Assessment & Plan    - Resolved          Protein-calorie malnutrition, severe (CMS-HCC)- (present on admission)   Assessment & Plan    - Chronic, tolerating tube feeds        Physical debility- (present on admission)   Assessment & Plan    - Chronic post traumatic brain injury  - At baseline        TBI (traumatic brain injury) (CMS-HCC)- (present on admission)   Assessment & Plan    - Chronic, nonverbal        Hyponatremia- (present on admission)   Assessment & Plan    - Resolved          Contraction, joint, multiple sites- (present on admission)   Assessment & Plan    - Chronic  - has baclofen pump which was refilled in January  -  Pump has been interrogating and is working properly        GI bleed- (present on admission)   Assessment & Plan    - Resolved          Quality-Core Measures   Reviewed items::  Labs reviewed, Medications reviewed and Radiology images reviewed  Waldrop catheter::  Unconscious / Sedated Patient on a Ventilator  DVT prophylaxis pharmacological::  Enoxaparin (Lovenox)  Ulcer Prophylaxis::  Yes

## 2018-03-07 NOTE — CARE PLAN
Problem: Oxygenation:  Goal: Maintain adequate oxygenation dependent on patient condition  4/28% aerosol   Intervention: Manage oxygen therapy by monitoring pulse oximetry and/or ABG values  Xopenex Q6

## 2018-03-08 ENCOUNTER — APPOINTMENT (OUTPATIENT)
Dept: RADIOLOGY | Facility: MEDICAL CENTER | Age: 38
DRG: 853 | End: 2018-03-08
Attending: INTERNAL MEDICINE
Payer: COMMERCIAL

## 2018-03-08 LAB
AMMONIA PLAS-SCNC: 93 UMOL/L (ref 11–45)
ANION GAP SERPL CALC-SCNC: 7 MMOL/L (ref 0–11.9)
APTT PPP: 35.4 SEC (ref 24.7–36)
BACTERIA SPEC ANAEROBE CULT: NORMAL
BASOPHILS # BLD AUTO: 1.2 % (ref 0–1.8)
BASOPHILS # BLD: 0.09 K/UL (ref 0–0.12)
BUN SERPL-MCNC: 12 MG/DL (ref 8–22)
CALCIUM SERPL-MCNC: 8.6 MG/DL (ref 8.5–10.5)
CHLORIDE SERPL-SCNC: 101 MMOL/L (ref 96–112)
CO2 SERPL-SCNC: 26 MMOL/L (ref 20–33)
CREAT SERPL-MCNC: <0.2 MG/DL (ref 0.5–1.4)
EOSINOPHIL # BLD AUTO: 0.29 K/UL (ref 0–0.51)
EOSINOPHIL NFR BLD: 3.9 % (ref 0–6.9)
ERYTHROCYTE [DISTWIDTH] IN BLOOD BY AUTOMATED COUNT: 67.1 FL (ref 35.9–50)
GLUCOSE SERPL-MCNC: 90 MG/DL (ref 65–99)
HCT VFR BLD AUTO: 28.5 % (ref 42–52)
HGB BLD-MCNC: 9.1 G/DL (ref 14–18)
IMM GRANULOCYTES # BLD AUTO: 0.06 K/UL (ref 0–0.11)
IMM GRANULOCYTES NFR BLD AUTO: 0.8 % (ref 0–0.9)
INR PPP: 0.99 (ref 0.87–1.13)
LYMPHOCYTES # BLD AUTO: 1.67 K/UL (ref 1–4.8)
LYMPHOCYTES NFR BLD: 22.7 % (ref 22–41)
MAGNESIUM SERPL-MCNC: 1.8 MG/DL (ref 1.5–2.5)
MCH RBC QN AUTO: 29.4 PG (ref 27–33)
MCHC RBC AUTO-ENTMCNC: 31.9 G/DL (ref 33.7–35.3)
MCV RBC AUTO: 92.2 FL (ref 81.4–97.8)
MONOCYTES # BLD AUTO: 1.06 K/UL (ref 0–0.85)
MONOCYTES NFR BLD AUTO: 14.4 % (ref 0–13.4)
NEUTROPHILS # BLD AUTO: 4.18 K/UL (ref 1.82–7.42)
NEUTROPHILS NFR BLD: 57 % (ref 44–72)
NRBC # BLD AUTO: 0 K/UL
NRBC BLD-RTO: 0 /100 WBC
PLATELET # BLD AUTO: 508 K/UL (ref 164–446)
PMV BLD AUTO: 8.1 FL (ref 9–12.9)
POTASSIUM SERPL-SCNC: 3.8 MMOL/L (ref 3.6–5.5)
PROTHROMBIN TIME: 12.8 SEC (ref 12–14.6)
RBC # BLD AUTO: 3.09 M/UL (ref 4.7–6.1)
SIGNIFICANT IND 70042: NORMAL
SITE SITE: NORMAL
SODIUM SERPL-SCNC: 134 MMOL/L (ref 135–145)
SOURCE SOURCE: NORMAL
WBC # BLD AUTO: 7.4 K/UL (ref 4.8–10.8)

## 2018-03-08 PROCEDURE — A9270 NON-COVERED ITEM OR SERVICE: HCPCS | Performed by: INTERNAL MEDICINE

## 2018-03-08 PROCEDURE — 700102 HCHG RX REV CODE 250 W/ 637 OVERRIDE(OP): Performed by: INTERNAL MEDICINE

## 2018-03-08 PROCEDURE — A9270 NON-COVERED ITEM OR SERVICE: HCPCS | Performed by: HOSPITALIST

## 2018-03-08 PROCEDURE — 85730 THROMBOPLASTIN TIME PARTIAL: CPT

## 2018-03-08 PROCEDURE — 700101 HCHG RX REV CODE 250: Performed by: INTERNAL MEDICINE

## 2018-03-08 PROCEDURE — 95951 HCHG EEG-VIDEO-24HR: CPT

## 2018-03-08 PROCEDURE — 700105 HCHG RX REV CODE 258: Performed by: PSYCHIATRY & NEUROLOGY

## 2018-03-08 PROCEDURE — 94640 AIRWAY INHALATION TREATMENT: CPT

## 2018-03-08 PROCEDURE — 83735 ASSAY OF MAGNESIUM: CPT

## 2018-03-08 PROCEDURE — 700102 HCHG RX REV CODE 250 W/ 637 OVERRIDE(OP): Performed by: PSYCHIATRY & NEUROLOGY

## 2018-03-08 PROCEDURE — A9270 NON-COVERED ITEM OR SERVICE: HCPCS | Performed by: PSYCHIATRY & NEUROLOGY

## 2018-03-08 PROCEDURE — 85610 PROTHROMBIN TIME: CPT

## 2018-03-08 PROCEDURE — 85025 COMPLETE CBC W/AUTO DIFF WBC: CPT

## 2018-03-08 PROCEDURE — 80048 BASIC METABOLIC PNL TOTAL CA: CPT

## 2018-03-08 PROCEDURE — 700102 HCHG RX REV CODE 250 W/ 637 OVERRIDE(OP): Performed by: HOSPITALIST

## 2018-03-08 PROCEDURE — 99233 SBSQ HOSP IP/OBS HIGH 50: CPT | Performed by: INTERNAL MEDICINE

## 2018-03-08 PROCEDURE — 71045 X-RAY EXAM CHEST 1 VIEW: CPT

## 2018-03-08 PROCEDURE — 82140 ASSAY OF AMMONIA: CPT

## 2018-03-08 PROCEDURE — 770022 HCHG ROOM/CARE - ICU (200)

## 2018-03-08 PROCEDURE — 700111 HCHG RX REV CODE 636 W/ 250 OVERRIDE (IP): Performed by: INTERNAL MEDICINE

## 2018-03-08 PROCEDURE — 700111 HCHG RX REV CODE 636 W/ 250 OVERRIDE (IP): Performed by: PSYCHIATRY & NEUROLOGY

## 2018-03-08 RX ORDER — MAGNESIUM SULFATE HEPTAHYDRATE 40 MG/ML
2 INJECTION, SOLUTION INTRAVENOUS ONCE
Status: COMPLETED | OUTPATIENT
Start: 2018-03-08 | End: 2018-03-08

## 2018-03-08 RX ORDER — TOPIRAMATE 25 MG/1
50 TABLET ORAL EVERY 12 HOURS
Status: DISCONTINUED | OUTPATIENT
Start: 2018-03-08 | End: 2018-03-11

## 2018-03-08 RX ADMIN — LEVETIRACETAM 1500 MG: 100 SOLUTION ORAL at 20:30

## 2018-03-08 RX ADMIN — LEVALBUTEROL HYDROCHLORIDE 1.25 MG: 1.25 SOLUTION RESPIRATORY (INHALATION) at 02:53

## 2018-03-08 RX ADMIN — POTASSIUM BICARBONATE 50 MEQ: 25 TABLET, EFFERVESCENT ORAL at 12:39

## 2018-03-08 RX ADMIN — MAGNESIUM SULFATE IN WATER 2 G: 40 INJECTION, SOLUTION INTRAVENOUS at 11:14

## 2018-03-08 RX ADMIN — PHENOBARBITAL 120 MG: 20 ELIXIR ORAL at 20:29

## 2018-03-08 RX ADMIN — BISACODYL 10 MG: 10 SUPPOSITORY RECTAL at 09:23

## 2018-03-08 RX ADMIN — LEVALBUTEROL HYDROCHLORIDE 1.25 MG: 1.25 SOLUTION RESPIRATORY (INHALATION) at 14:23

## 2018-03-08 RX ADMIN — LACOSAMIDE 200 MG: 50 TABLET, FILM COATED ORAL at 09:23

## 2018-03-08 RX ADMIN — PHENOBARBITAL 120 MG: 20 ELIXIR ORAL at 09:23

## 2018-03-08 RX ADMIN — LEVALBUTEROL HYDROCHLORIDE 1.25 MG: 1.25 SOLUTION RESPIRATORY (INHALATION) at 06:27

## 2018-03-08 RX ADMIN — LEVETIRACETAM 1500 MG: 100 SOLUTION ORAL at 09:24

## 2018-03-08 RX ADMIN — CHLORHEXIDINE GLUCONATE 15 ML: 1.2 RINSE ORAL at 09:23

## 2018-03-08 RX ADMIN — LACOSAMIDE 200 MG: 50 TABLET, FILM COATED ORAL at 20:29

## 2018-03-08 RX ADMIN — OMEPRAZOLE 40 MG: 20 CAPSULE, DELAYED RELEASE ORAL at 09:23

## 2018-03-08 RX ADMIN — TOPIRAMATE 50 MG: 25 TABLET, FILM COATED ORAL at 16:33

## 2018-03-08 RX ADMIN — CHLORHEXIDINE GLUCONATE 15 ML: 1.2 RINSE ORAL at 20:29

## 2018-03-08 RX ADMIN — LEVALBUTEROL HYDROCHLORIDE 1.25 MG: 1.25 SOLUTION RESPIRATORY (INHALATION) at 18:19

## 2018-03-08 RX ADMIN — POTASSIUM BICARBONATE 50 MEQ: 25 TABLET, EFFERVESCENT ORAL at 09:23

## 2018-03-08 RX ADMIN — VALPROATE SODIUM 500 MG: 100 INJECTION, SOLUTION INTRAVENOUS at 09:21

## 2018-03-08 NOTE — PROCEDURES
VIDEO ELECTROENCEPHALOGRAM / EPILEPSY MONITORING UNIT REPORT           DOS:   3/7/2018 - 3/8/2018 (total recording for 15 hours and 45 minutes).         INDICATION:  Ruben Edwards 37 y.o. male presenting with recurrent seizures.      CURRENT ANTIEPILEPTIC REGIMEN: Levetiracetam 1500 mg q 12 hrs, Lacosamide 200 mg q 12 hrs, Phenobarbital 120 mg q 12 hrs. Depakote 500 mg q 12 hrs added during the study.      TECHNIQUE: A 30-channel, extended video electroencephalogram (VEEG) was performed in accordance with the international 10-20 system. This digital study was reviewed in bipolar and referential montages. The recording examined an encephalopathic patient during awake and sleep.      DESCRIPTION OF THE RECORD:  During wakefulness, there is slowing of the background with asymmetric diffuse theta / delta activity, and mostly delta activity (superimposed slowing) in the right hemisphere. During sleep, there is diffuse slow delta activity.      ACTIVATION PROCEDURES:   Not performed.      ICTAL AND/OR INTERICTAL FINDINGS:   Frequent right frontotemporal spikes. Rarely, these may become briefly periodic (right PLEDS) but without clear evidence for seizures during the study.      EVENT(S):  None marked for review.      EKG: sampling review of EKG recording demonstrated sinus rhythm.         INTERPRETATION:   This is an abnormal extended video electroencephalogram recording in an encephalopathic patient during awake and sleep states. There is diffuse cerebral dysfunction, suggestive of an encephalopathic state. Superimposed slowing in the right hemisphere is likely due to underlying structural abnormality. Frequent right frontotemporal spikes, with rare brief runs of right PLEDS, but without clear evidence for seizures during the study. The study is significantly improved from prior recordings, however the patient remains at very high risk for seizure recurrence. Clinical and radiological correlation is recommended.             Harris Bolton MD  Medical Director, Epilepsy and Neurodiagnostics.   Clinical  of Neurology [facility].   Diplomate in Neurology, Epilepsy, and Electrodiagnostic Medicine.   Office: [phone]  Fax: [phone]       MARLON GOODMAN    DD:  03/08/2018 07:23:52  DT:  03/08/2018 08:02:37    D#:  [id number]  Job#:  [id number]

## 2018-03-08 NOTE — PROGRESS NOTES
Cc: seizures, abnormal MRI brain, hyperammonemia.     Problem List Items Addressed This Visit     Acute on chronic respiratory failure with hypoxemia (CMS-HCC)     - Improved, now on T piece  - Was placed on ventilator initially on 2/18  - Due to aspiration pneumonia during seizures         Relevant Orders    REFERRAL TO care home ACUTE CARE    Seizure (CMS-HCC)     - Seizure again yesterday, significant  - Continue Vimpat, Keppra, phenobarbital   - Neurology to see today, will likely need to adjust medications  - MRI brain pending         Relevant Medications    PHENobarbital 20 MG/5ML Elixir    PHENobarbital solution 30 mg (Completed)    PHENobarbital solution 90 mg (Completed)    PHENobarbital solution 120 mg (Completed)    lacosamide (VIMPAT) tablet 200 mg    levETIRAcetam (KEPPRA) 100 MG/ML solution 1,500 mg    lacosamide (VIMPAT) tablet 50 mg (Completed)    PHENobarbital solution 120 mg    PHENobarbital solution 240 mg (Completed)    topiramate (TOPAMAX) tablet 50 mg (Start on 3/8/2018  2:45 PM)    Other Relevant Orders    REFERRAL TO HOSPICE          Interim history:  Ruben Edwards remains in ICU. He is s/p trach. No clinical seizures reported by RN. Mother was not there when I visited.   Running low grade temps.     Past medical history:   TBI, seizures.     Past surgical history:   Past Surgical History:   Procedure Laterality Date   • LARYNGOSCOPY  7/2/2017    Procedure: LARYNGOSCOPY;  Surgeon: Catherine Osorio M.D.;  Location: [facility];  Service:    • BRONCHOSCOPY  7/2/2017    Procedure: BRONCHOSCOPY;  Surgeon: Catherine Osorio M.D.;  Location: [facility];  Service:    • TRACHEOSTOMY  7/2/2017    Procedure: TRACHEOSTOMY;  Surgeon: Catherine Osorio M.D.;  Location: [facility];  Service:    • ESOPHAGOSCOPY  7/2/2017    Procedure: ESOPHAGOSCOPY;  Surgeon: Catherine Osorio M.D.;  Location: [facility];  Service:        Family history:   Non  contributory.     Social history:   Social History     Social History   • Marital status: Single     Spouse name: N/A   • Number of children: N/A   • Years of education: N/A     Occupational History   • Not on file.     Social History Main Topics   • Smoking status: Not on file   • Smokeless tobacco: Not on file   • Alcohol use Not on file   • Drug use: Unknown   • Sexual activity: Not on file     Other Topics Concern   • Not on file     Social History Narrative   • No narrative on file       Current medications:   Current Facility-Administered Medications   Medication Dose   • topiramate (TOPAMAX) tablet 50 mg  50 mg   • PHENobarbital solution 120 mg  120 mg   • potassium bicarbonate (KLYTE) 25 MEQ effervescent tablet TBEF 50 mEq  50 mEq   • Pain Pump (patient supplied) Device     • enoxaparin (LOVENOX) inj 40 mg  40 mg   • lacosamide (VIMPAT) tablet 200 mg  200 mg   • levETIRAcetam (KEPPRA) 100 MG/ML solution 1,500 mg  1,500 mg   • loperamide (IMODIUM) 1 MG/5ML solution 2 mg  2 mg   • omeprazole 2 mg/mL in sodium bicarbonate (PRILOSEC) oral susp 40 mg  40 mg   • levalbuterol (XOPENEX) 1.25 MG/3ML nebulizer solution 1.25 mg  1.25 mg   • Respiratory Care per Protocol     • polyethylene glycol/lytes (MIRALAX) PACKET 1 Packet  1 Packet    And   • magnesium hydroxide (MILK OF MAGNESIA) suspension 30 mL  30 mL    And   • bisacodyl (DULCOLAX) suppository 10 mg  10 mg   • chlorhexidine (PERIDEX) 0.12 % solution 15 mL  15 mL   • lidocaine (XYLOCAINE) 1%  injection  1-2 mL   • MD ALERT...Adult ICU Electrolyte Replacement per Pharmacy Protocol     • ipratropium-albuterol (DUONEB) nebulizer solution 3 mL  3 mL   • levalbuterol (XOPENEX) 1.25 MG/3ML nebulizer solution 1.25 mg  1.25 mg   • miconazole 2%-zinc oxide (RADHA) topical cream     • acetaminophen (TYLENOL) tablet 650 mg  650 mg   • oxyCODONE immediate-release (ROXICODONE) tablet 5 mg  5 mg    And   • oxyCODONE immediate release (ROXICODONE) tablet 10 mg  10 mg   •  ondansetron (ZOFRAN) syringe/vial injection 4 mg  4 mg   • ondansetron (ZOFRAN ODT) dispertab 4 mg  4 mg   • promethazine (PHENERGAN) tablet 12.5-25 mg  12.5-25 mg   • promethazine (PHENERGAN) suppository 12.5-25 mg  12.5-25 mg   • prochlorperazine (COMPAZINE) injection 5-10 mg  5-10 mg   • LORazepam (ATIVAN) injection 4 mg  4 mg       Medication Allergy:  Allergies   Allergen Reactions   • Sulfa Drugs Unspecified     Per caretaker, mother is allergic to sulfa so believes her son could be as well       Review of systems:   Unable to obtain.       Physical examination:   Vitals:    03/08/18 1100 03/08/18 1200 03/08/18 1300 03/08/18 1400   Pulse: (!) 111 98 (!) 102 98   Resp: (!) 21 19 (!) 24 19   Temp:       SpO2: 96% 98% 95% 91%   Weight:       Height:         S/p trach.  He is not sedated, although he appears more encephalopathic this am.   Head is midline.  Neck is rigid but unable to determine if due to nuchal rigidity vs widespread spasticity, which is chronic.  Skin exam shows no rash.  He did not follow any commands, he is non verbal.  Pupils are 2 mm and sluggish.  Eyes are midline, no nystagmus.  Face appeared symmetric.  Has corneals bilaterally.  He has contractures in all his limbs, and is severely spastic and atrophic everywhere.  His DTRs could not be elicitic.  His plantar responses were mute.      ANCILLARY DATA REVIEWED:     Lab Data Review:  Recent Results (from the past 24 hour(s))   PHENOBARBITAL    Collection Time: 03/07/18  4:37 PM   Result Value Ref Range    Phenobarbital 24.1 15.0 - 40.0 ug/mL   AMMONIA    Collection Time: 03/07/18  4:37 PM   Result Value Ref Range    Ammonia 56 (H) 11 - 45 umol/L   CBC with Differential    Collection Time: 03/08/18  5:37 AM   Result Value Ref Range    WBC 7.4 4.8 - 10.8 K/uL    RBC 3.09 (L) 4.70 - 6.10 M/uL    Hemoglobin 9.1 (L) 14.0 - 18.0 g/dL    Hematocrit 28.5 (L) 42.0 - 52.0 %    MCV 92.2 81.4 - 97.8 fL    MCH 29.4 27.0 - 33.0 pg    MCHC 31.9 (L) 33.7 -  35.3 g/dL    RDW 67.1 (H) 35.9 - 50.0 fL    Platelet Count 508 (H) 164 - 446 K/uL    MPV 8.1 (L) 9.0 - 12.9 fL    Neutrophils-Polys 57.00 44.00 - 72.00 %    Lymphocytes 22.70 22.00 - 41.00 %    Monocytes 14.40 (H) 0.00 - 13.40 %    Eosinophils 3.90 0.00 - 6.90 %    Basophils 1.20 0.00 - 1.80 %    Immature Granulocytes 0.80 0.00 - 0.90 %    Nucleated RBC 0.00 /100 WBC    Neutrophils (Absolute) 4.18 1.82 - 7.42 K/uL    Lymphs (Absolute) 1.67 1.00 - 4.80 K/uL    Monos (Absolute) 1.06 (H) 0.00 - 0.85 K/uL    Eos (Absolute) 0.29 0.00 - 0.51 K/uL    Baso (Absolute) 0.09 0.00 - 0.12 K/uL    Immature Granulocytes (abs) 0.06 0.00 - 0.11 K/uL    NRBC (Absolute) 0.00 K/uL   BASIC METABOLIC PANEL    Collection Time: 03/08/18  5:37 AM   Result Value Ref Range    Sodium 134 (L) 135 - 145 mmol/L    Potassium 3.8 3.6 - 5.5 mmol/L    Chloride 101 96 - 112 mmol/L    Co2 26 20 - 33 mmol/L    Glucose 90 65 - 99 mg/dL    Bun 12 8 - 22 mg/dL    Creatinine <0.20 (L) 0.50 - 1.40 mg/dL    Calcium 8.6 8.5 - 10.5 mg/dL    Anion Gap 7.0 0.0 - 11.9   MAGNESIUM    Collection Time: 03/08/18  5:37 AM   Result Value Ref Range    Magnesium 1.8 1.5 - 2.5 mg/dL   ESTIMATED GFR    Collection Time: 03/08/18  5:37 AM   Result Value Ref Range    GFR If African American >60 >60 mL/min/1.73 m 2    GFR If Non African American >60 >60 mL/min/1.73 m 2   PROTHROMBIN TIME    Collection Time: 03/08/18 10:15 AM   Result Value Ref Range    PT 12.8 12.0 - 14.6 sec    INR 0.99 0.87 - 1.13   APTT    Collection Time: 03/08/18 10:15 AM   Result Value Ref Range    APTT 35.4 24.7 - 36.0 sec   AMMONIA    Collection Time: 03/08/18 12:35 PM   Result Value Ref Range    Ammonia 93 (H) 11 - 45 umol/L       Last set of liver enzymes on 3/5/18, only mildly elevated alk phos.        Records reviewed:   Chart reviewed.      Imaging:   MRI brain w/wo, 3/7/18  1.  Moderate cerebral atrophy beyond that expected for the patient's age.  2.  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  3.   Old hemorrhagic infarcts left basal ganglia, left thalamus, left subthalamic region.  4.  Marked atrophy and encephalomalacic change in the left cerebral peduncle with T2 hyperintensity and volume loss extending down the left anterior quadrant of the pop into the left medullary pyramid. 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  5.  Widespread areas of curvilinear and gyriform enhancement involving the right frontal lobe, right parietal lobe, right occipital lobe, along with curvilinear enhancement in the right basal ganglia. These findings are most consistent with subacute   sequela of cerebral infarction or cortical laminar necrosis. Cortical laminar necrosis has been reported associated with prolonged seizures/status epilepticus. Subacute sequela of encephalitis could have a similar appearance.  6.  Advanced supratentorial white matter disease consistent with microvascular ischemic change versus demyelination or gliosis. This results in some volume loss of deep white matter.  7.  Curvilinear hemosiderin deposition in the right temporal lobe deep white matter consistent with old hemorrhage.  8.  No acute cerebral infarction or acute hemorrhage evident.  9.  No evidence of mesial temporal sclerosis.    (Per my review of images, widespread atrophy. No evidence for acute infarct, changes probably chronic. I was concerned about AV fistula on the right hemisphere but neuroradiologist is not. We agreed on obtaining MRA head to further evaluate).           EEG:  This is an abnormal extended video electroencephalogram recording in an encephalopathic patient during awake and sleep states. There is diffuse cerebral dysfunction, suggestive of an encephalopathic state. Superimposed slowing in the right hemisphere is likely due to underlying structural abnormality. Frequent right frontotemporal spikes, with rare brief runs of right PLEDS, but without clear evidence for seizures during the study. The study  is significantly improved from prior recordings, however the patient remains at very high risk for seizure recurrence. Clinical and radiological correlation is recommended.               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 More encephalopathic this am, likely due to worsening hyperammonemia. Still some low grade temps, r/o meningitis.   Admission for respiratory failure, aspiration pneumonia, septic shock and worsening of seizures.  Onset of seizures June 2018 with aspiration and respiratory failure episode, was doing relatively well on phenobarbital and Keppra at home, then seizures recurred and have been refractory since phenobarbital initially weaned. Despite adding phenobarbital and increasing dose as well as addition of vimpat and continuation of keppra, he continued to have recurrent focal seizures, although no seizures since yesterday, also EEG overnight did not show any seizures.   His MRI brain is severely abnormal, with widespread atrophy. I was suspicious of a high pressure system such as AV fistula in the right hemisphere however neuroradiologist not concerned, agreed on getting MRA head to further characterize. Changes in the right hemisphere appear chronic without evidence for acute/subacute infarct.   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  For his hyperammonemia, this may be related to sepsis and worsened by depakote. We will d/c depakote and will start topamax 50 mg po bid. Sw RN to discuss with primary team to start lactulose and will need repeat ammonia level in am.   For sake of completion, meningitis should be ruled out, although not fully favored. The primary team will coordinate for LP. Markers for chronic infections including fungus and TB should be sent out.   We will continue with VEEG.    Current AED regimen:   -Keppra 1500 mg q 12 hrs.   -Phenobarbital 120 mg q 12 hrs.   -Vimpat 200 mg  q 12 hrs.   -Will add topamax 50 mg q 12  Hrs.   Ok to use ativan 0.5 mg IV prn for seizures.      His prognosis is overall poor.        EDUCATION AND COUNSELING:  -Education was provided to family regarding diagnosis and prognosis. The chronic and unpredictable nature of the condition were discussed. There is increased risk for additional events, which may carry potential for significant injuries and death. Discussed frequent seizure triggers: sleep deprivation, medication non-compliance, use of illegal drugs/alcohol, stress, and others.   -We reviewed in detail the current antiepileptic regimen. Potential side effects of antiepileptics were discussed at length, including but no limited to: hypersensitivity reactions (rash and others, some of which can be fatal), visual field changes (some of which may be irreversible), glaucoma, diplopia, kidney stones, osteopenia/osteoporosis/bone fractures, hyperthermia/anhydrosis, hyponatremia, tremors/abnormal movements, ataxia, dizziness, fatigue, increased risk for falls, risk for cardiac arrhythmias/syncope, gastrointestinal side effects(hepatitis, pancreatitis, gastritis, ulcers), gingival hypertrophy/bleeding, drowsiness, sedation, anxiety/nervousness, increased risk for suicide, increased risk for depression, and psychosis.   -We also reviewed drug-drug interactions and their potential effect on seizure control and medication side effects.       I discussed with RN.      Mother agreed with plan, as outlined.        Harris Bolton MD   Epilepsy and Neurodiagnostics.   Clinical  of Neurology [facility].   Diplomate in Neurology, Epilepsy, and Electrodiagnostic Medicine.   Office: [phone]  Fax: [phone]      I spent a total of 40 mins in the care of this pt today.

## 2018-03-08 NOTE — CARE PLAN
Problem: Bowel/Gastric:  Goal: Normal bowel function is maintained or improved  Outcome: PROGRESSING AS EXPECTED  Daily BMs, suppository per mother's request    Problem: Respiratory:  Goal: Respiratory status will improve  Outcome: PROGRESSING AS EXPECTED  T piece 5L/30%

## 2018-03-08 NOTE — EEG PROGRESS NOTE
VIDEO ELECTROENCEPHALOGRAM / EPILEPSY MONITORING UNIT REPORT           DOS:   3/7/2018 - 3/8/2018 (total recording for 15 hours and 45 minutes).         INDICATION:  Ruben Edwards 37 y.o. male presenting with recurrent seizures.      CURRENT ANTIEPILEPTIC REGIMEN: Levetiracetam 1500 mg q 12 hrs, Lacosamide 200 mg q 12 hrs, Phenobarbital 120 mg q 12 hrs. Depakote 500 mg q 12 hrs added during the study.      TECHNIQUE: A 30-channel, extended video electroencephalogram (VEEG) was performed in accordance with the international 10-20 system. This digital study was reviewed in bipolar and referential montages. The recording examined an encephalopathic patient during awake and sleep.      DESCRIPTION OF THE RECORD:  During wakefulness, there is slowing of the background with asymmetric diffuse theta / delta activity, and mostly delta activity (superimposed slowing) in the right hemisphere. During sleep, there is diffuse slow delta activity.      ACTIVATION PROCEDURES:   Not performed.      ICTAL AND/OR INTERICTAL FINDINGS:   Frequent right frontotemporal spikes. Rarely, these may become briefly periodic (right PLEDS) but without clear evidence for seizures during the study.      EVENT(S):  None marked for review.      EKG: sampling review of EKG recording demonstrated sinus rhythm.         INTERPRETATION:   This is an abnormal extended video electroencephalogram recording in an encephalopathic patient during awake and sleep states. There is diffuse cerebral dysfunction, suggestive of an encephalopathic state. Superimposed slowing in the right hemisphere is likely due to underlying structural abnormality. Frequent right frontotemporal spikes, with rare brief runs of right PLEDS, but without clear evidence for seizures during the study. The study is significantly improved from prior recordings, however the patient remains at very high risk for seizure recurrence. Clinical and radiological correlation is recommended.            Harris Bolton MD  Medical Director, Epilepsy and Neurodiagnostics.   Clinical  of Neurology [facility].   Diplomate in Neurology, Epilepsy, and Electrodiagnostic Medicine.   Office: [phone]  Fax: [phone]

## 2018-03-09 ENCOUNTER — APPOINTMENT (OUTPATIENT)
Dept: RADIOLOGY | Facility: MEDICAL CENTER | Age: 38
DRG: 853 | End: 2018-03-09
Attending: INTERNAL MEDICINE
Payer: COMMERCIAL

## 2018-03-09 ENCOUNTER — APPOINTMENT (OUTPATIENT)
Dept: RADIOLOGY | Facility: MEDICAL CENTER | Age: 38
DRG: 853 | End: 2018-03-09
Attending: PSYCHIATRY & NEUROLOGY
Payer: COMMERCIAL

## 2018-03-09 LAB
AMMONIA PLAS-SCNC: 35 UMOL/L (ref 11–45)
ANION GAP SERPL CALC-SCNC: 9 MMOL/L (ref 0–11.9)
BASOPHILS # BLD AUTO: 1 % (ref 0–1.8)
BASOPHILS # BLD: 0.07 K/UL (ref 0–0.12)
BUN SERPL-MCNC: 16 MG/DL (ref 8–22)
BURR CELLS/RBC NFR CSF MANUAL: 0 %
CALCIUM SERPL-MCNC: 8.7 MG/DL (ref 8.5–10.5)
CHLORIDE SERPL-SCNC: 100 MMOL/L (ref 96–112)
CLARITY CSF: CLEAR
CO2 SERPL-SCNC: 24 MMOL/L (ref 20–33)
COLOR CSF: COLORLESS
COLOR SPUN CSF: COLORLESS
CREAT SERPL-MCNC: <0.2 MG/DL (ref 0.5–1.4)
EOSINOPHIL # BLD AUTO: 0.26 K/UL (ref 0–0.51)
EOSINOPHIL NFR BLD: 3.6 % (ref 0–6.9)
ERYTHROCYTE [DISTWIDTH] IN BLOOD BY AUTOMATED COUNT: 63.9 FL (ref 35.9–50)
GLUCOSE CSF-MCNC: 69 MG/DL (ref 40–80)
GLUCOSE SERPL-MCNC: 94 MG/DL (ref 65–99)
GRAM STN SPEC: NORMAL
HCT VFR BLD AUTO: 28.9 % (ref 42–52)
HGB BLD-MCNC: 9.3 G/DL (ref 14–18)
IMM GRANULOCYTES # BLD AUTO: 0.06 K/UL (ref 0–0.11)
IMM GRANULOCYTES NFR BLD AUTO: 0.8 % (ref 0–0.9)
LEVETIRACETAM SERPL-MCNC: 17 UG/ML (ref 12–46)
LYMPHOCYTES # BLD AUTO: 1.71 K/UL (ref 1–4.8)
LYMPHOCYTES NFR BLD: 23.9 % (ref 22–41)
LYMPHOCYTES NFR CSF: 58 %
MAGNESIUM SERPL-MCNC: 2 MG/DL (ref 1.5–2.5)
MCH RBC QN AUTO: 29.5 PG (ref 27–33)
MCHC RBC AUTO-ENTMCNC: 32.2 G/DL (ref 33.7–35.3)
MCV RBC AUTO: 91.7 FL (ref 81.4–97.8)
MONOCYTES # BLD AUTO: 0.96 K/UL (ref 0–0.85)
MONOCYTES NFR BLD AUTO: 13.4 % (ref 0–13.4)
MONONUC CELLS NFR CSF: 42 %
NEUTROPHILS # BLD AUTO: 4.1 K/UL (ref 1.82–7.42)
NEUTROPHILS NFR BLD: 57.3 % (ref 44–72)
NRBC # BLD AUTO: 0 K/UL
NRBC BLD-RTO: 0 /100 WBC
PLATELET # BLD AUTO: 522 K/UL (ref 164–446)
PMV BLD AUTO: 8.3 FL (ref 9–12.9)
POTASSIUM SERPL-SCNC: 3.7 MMOL/L (ref 3.6–5.5)
PROT CSF-MCNC: 39 MG/DL (ref 15–45)
RBC # BLD AUTO: 3.15 M/UL (ref 4.7–6.1)
RBC # CSF: 10 CELLS/UL
SIGNIFICANT IND 70042: NORMAL
SITE SITE: NORMAL
SODIUM SERPL-SCNC: 133 MMOL/L (ref 135–145)
SOURCE SOURCE: NORMAL
SPECIMEN VOL CSF: 10 ML
TUBE # CSF: 3
TUBE # CSF: 3
WBC # BLD AUTO: 7.2 K/UL (ref 4.8–10.8)
WBC # CSF: 1 CELLS/UL (ref 0–10)

## 2018-03-09 PROCEDURE — 82140 ASSAY OF AMMONIA: CPT

## 2018-03-09 PROCEDURE — 94640 AIRWAY INHALATION TREATMENT: CPT

## 2018-03-09 PROCEDURE — A9270 NON-COVERED ITEM OR SERVICE: HCPCS | Performed by: INTERNAL MEDICINE

## 2018-03-09 PROCEDURE — A9270 NON-COVERED ITEM OR SERVICE: HCPCS | Performed by: HOSPITALIST

## 2018-03-09 PROCEDURE — 87206 SMEAR FLUORESCENT/ACID STAI: CPT

## 2018-03-09 PROCEDURE — 700102 HCHG RX REV CODE 250 W/ 637 OVERRIDE(OP): Performed by: INTERNAL MEDICINE

## 2018-03-09 PROCEDURE — 87205 SMEAR GRAM STAIN: CPT

## 2018-03-09 PROCEDURE — 82945 GLUCOSE OTHER FLUID: CPT

## 2018-03-09 PROCEDURE — 84157 ASSAY OF PROTEIN OTHER: CPT

## 2018-03-09 PROCEDURE — 87015 SPECIMEN INFECT AGNT CONCNTJ: CPT

## 2018-03-09 PROCEDURE — 95951 HCHG EEG-VIDEO-24HR: CPT

## 2018-03-09 PROCEDURE — 87102 FUNGUS ISOLATION CULTURE: CPT

## 2018-03-09 PROCEDURE — 87070 CULTURE OTHR SPECIMN AEROBIC: CPT

## 2018-03-09 PROCEDURE — 85025 COMPLETE CBC W/AUTO DIFF WBC: CPT

## 2018-03-09 PROCEDURE — 700102 HCHG RX REV CODE 250 W/ 637 OVERRIDE(OP): Performed by: HOSPITALIST

## 2018-03-09 PROCEDURE — 700101 HCHG RX REV CODE 250

## 2018-03-09 PROCEDURE — 87483 CNS DNA AMP PROBE TYPE 12-25: CPT

## 2018-03-09 PROCEDURE — 72265 MYELOGRAPHY L-S SPINE: CPT

## 2018-03-09 PROCEDURE — A9270 NON-COVERED ITEM OR SERVICE: HCPCS | Performed by: PSYCHIATRY & NEUROLOGY

## 2018-03-09 PROCEDURE — 302101 FENESTRATED FOAM: Performed by: INTERNAL MEDICINE

## 2018-03-09 PROCEDURE — 89051 BODY FLUID CELL COUNT: CPT

## 2018-03-09 PROCEDURE — 87116 MYCOBACTERIA CULTURE: CPT

## 2018-03-09 PROCEDURE — 70544 MR ANGIOGRAPHY HEAD W/O DYE: CPT

## 2018-03-09 PROCEDURE — 80048 BASIC METABOLIC PNL TOTAL CA: CPT

## 2018-03-09 PROCEDURE — 700111 HCHG RX REV CODE 636 W/ 250 OVERRIDE (IP): Performed by: INTERNAL MEDICINE

## 2018-03-09 PROCEDURE — B01BZZZ FLUOROSCOPY OF SPINAL CORD: ICD-10-PCS | Performed by: RADIOLOGY

## 2018-03-09 PROCEDURE — 700102 HCHG RX REV CODE 250 W/ 637 OVERRIDE(OP): Performed by: PSYCHIATRY & NEUROLOGY

## 2018-03-09 PROCEDURE — 700101 HCHG RX REV CODE 250: Performed by: INTERNAL MEDICINE

## 2018-03-09 PROCEDURE — 770022 HCHG ROOM/CARE - ICU (200)

## 2018-03-09 PROCEDURE — 83735 ASSAY OF MAGNESIUM: CPT

## 2018-03-09 PROCEDURE — 99233 SBSQ HOSP IP/OBS HIGH 50: CPT | Performed by: INTERNAL MEDICINE

## 2018-03-09 PROCEDURE — A6209 FOAM DRSG <=16 SQ IN W/O BDR: HCPCS | Performed by: INTERNAL MEDICINE

## 2018-03-09 PROCEDURE — 009U3ZX DRAINAGE OF SPINAL CANAL, PERCUTANEOUS APPROACH, DIAGNOSTIC: ICD-10-PCS | Performed by: RADIOLOGY

## 2018-03-09 RX ORDER — LIDOCAINE HYDROCHLORIDE 10 MG/ML
INJECTION, SOLUTION INFILTRATION; PERINEURAL
Status: COMPLETED
Start: 2018-03-09 | End: 2018-03-09

## 2018-03-09 RX ADMIN — BISACODYL 10 MG: 10 SUPPOSITORY RECTAL at 08:10

## 2018-03-09 RX ADMIN — PHENOBARBITAL 120 MG: 20 ELIXIR ORAL at 10:47

## 2018-03-09 RX ADMIN — LEVALBUTEROL HYDROCHLORIDE 1.25 MG: 1.25 SOLUTION RESPIRATORY (INHALATION) at 18:21

## 2018-03-09 RX ADMIN — TOPIRAMATE 50 MG: 25 TABLET, FILM COATED ORAL at 04:40

## 2018-03-09 RX ADMIN — LEVALBUTEROL HYDROCHLORIDE 1.25 MG: 1.25 SOLUTION RESPIRATORY (INHALATION) at 02:31

## 2018-03-09 RX ADMIN — TOPIRAMATE 50 MG: 25 TABLET, FILM COATED ORAL at 17:41

## 2018-03-09 RX ADMIN — ACETAMINOPHEN 650 MG: 325 TABLET, FILM COATED ORAL at 22:25

## 2018-03-09 RX ADMIN — LEVALBUTEROL HYDROCHLORIDE 1.25 MG: 1.25 SOLUTION RESPIRATORY (INHALATION) at 14:38

## 2018-03-09 RX ADMIN — LACOSAMIDE 200 MG: 50 TABLET, FILM COATED ORAL at 19:47

## 2018-03-09 RX ADMIN — LACOSAMIDE 200 MG: 50 TABLET, FILM COATED ORAL at 08:10

## 2018-03-09 RX ADMIN — CHLORHEXIDINE GLUCONATE 15 ML: 1.2 RINSE ORAL at 08:10

## 2018-03-09 RX ADMIN — PHENOBARBITAL 120 MG: 20 ELIXIR ORAL at 19:46

## 2018-03-09 RX ADMIN — OMEPRAZOLE 40 MG: 20 CAPSULE, DELAYED RELEASE ORAL at 08:10

## 2018-03-09 RX ADMIN — POTASSIUM BICARBONATE 50 MEQ: 25 TABLET, EFFERVESCENT ORAL at 13:38

## 2018-03-09 RX ADMIN — POTASSIUM BICARBONATE 50 MEQ: 25 TABLET, EFFERVESCENT ORAL at 08:10

## 2018-03-09 RX ADMIN — LORAZEPAM 4 MG: 2 INJECTION INTRAMUSCULAR; INTRAVENOUS at 10:46

## 2018-03-09 RX ADMIN — CHLORHEXIDINE GLUCONATE 15 ML: 1.2 RINSE ORAL at 19:47

## 2018-03-09 RX ADMIN — LEVETIRACETAM 1500 MG: 100 SOLUTION ORAL at 19:47

## 2018-03-09 RX ADMIN — LEVETIRACETAM 1500 MG: 100 SOLUTION ORAL at 08:11

## 2018-03-09 RX ADMIN — LEVALBUTEROL HYDROCHLORIDE 1.25 MG: 1.25 SOLUTION RESPIRATORY (INHALATION) at 06:23

## 2018-03-09 RX ADMIN — LIDOCAINE HYDROCHLORIDE: 10 INJECTION, SOLUTION INFILTRATION; PERINEURAL at 11:15

## 2018-03-09 NOTE — FLOWSHEET NOTE
03/08/18 1819   Events/Summary/Plan   Events/Summary/Plan SVn adn Aersol check    Interdisciplinary Plan of Care-Goals (Indications)   Obstructive Ventilatory Defect or Pulmonary Disease without Obvious Obstruction History / Diagnosis   Bronchopulmonary Hygiene Indications Difficulty with Secretion Clearance   Interdisciplinary Plan of Care-Outcomes    Bronchodilator Outcome Patient at Stable Baseline   Bronchopulmonary Hygiene Outcome Optimal Hydration with Moderate or Less Sputum Production   Education   Education Yes - Pt. / Family has been Instructed in use of Respiratory Equipment;Yes - Pt. / Family has been Instructed in use of Respiratory Medications and Adverse Reactions   RT Assessment of Delivered Medications   Evaluation of Medication Delivery Daily Yes-- Pt /Family has been Instructed in use of Respiratory Medications and Adverse Reactions   SVN Group   #SVN Performed Yes   Given By: Trache   Aerosol Therapy / Airway Management   #Aerosol Therapy / Airway Management T-Piece   Aerosol Humidity Temp (celsius) 35   Respiratory WDL   Respiratory (WDL) X   Chest Exam   Work Of Breathing / Effort Mild   Respiration 15   Pulse 98   Heart Rate (Monitored) 96   Breath Sounds   Pre/Post Intervention Post Intervention Assessment   RUL Breath Sounds Clear After Suction   RML Breath Sounds Clear After Suction   RLL Breath Sounds Clear After Suction   JONATAN Breath Sounds Clear After Suction   LLL Breath Sounds Clear After Suction   Secretions   Cough Productive   How Sputum Obtained Spontaneous;Suction;Tracheal   Sputum Amount Small   Sputum Color Clear;White   Sputum Consistency Thick   Suction Frequency Suctioned Once or Twice Per Encounter   Oximetry   Continuous Oximetry Yes   Oxygen   Pulse Oximetry 95 %   O2 (LPM) 5   O2 (FiO2) 30   O2 Daily Delivery Respiratory  T-Piece

## 2018-03-09 NOTE — PROGRESS NOTES
Pulmonary Critical Care Progress Note        Date of admission: 2/18/2018    Chief Complaint: Respiratory failure, septic shock    History of Present Illness:  Mr. Edwards is a 37-year-old male with past medical history of traumatic brain injury at age 17, seizures diagnosed in June of 2017. His baseline mental status appears to be nodding and minimal interacting without vocalization. Who was brought to the ICU as a direct admit from [facility] where he was brought today for hypoxia. Reportedly the patient was being weanied off of his phenobarbital by his neurologist when he began having more frequent seizures, approximately one week ago his neurologist increased his Keppra dosing however the seizures continued. His mother did use CBD and THC with some success in slowing the seizures. Since 2 days ago the patient had multiple aspiration events following these seizures. His mother evaluated him with a home oxygen saturation monitor and he was noted to be hypoxic, he was brought to [facility] where he was found to have 28% bands with leukopenia. Chest x-ray was obtained demonstrating bilateral infiltrates right greater than left. He was placed on mechanical ventilation and his oxygenation was unable to be improved more than 85%, he was hypotensive and given multiple boluses of IV fluids without improvement. He was started on levo fed and given multiple doses of Ativan for seizures and transferred to our ICU for higher level of care. He arrives on the ventilator and on pressors critically ill.      ROS:  Respiratory: unable to perform due to the patient's inability to effectively communicate, Cardiac: unable to perform due to the patient's inability to effectively communicate, GI: unable to perform due to the patient's inability to effectively communicate.  All other systems negative. Unchanged    Interval Events:  24 hour interval history reviewed    - no sz, EEG in place   - VSS, neuro unchanged;  unresponsive   - april TF   - DTIs unchanged   - trach, 30% T-piece   - replace K   - LP per IR today  Yesterday   - SR/ST   - continuous EEG   - Tm 100.4   - neuro unchanged   - LP today   - T-piece; off vent since 3/4, 30%   - replace K and Mg   - VPA added    PFSH:  No change.    Respiratory:      Pulse Oximetry: 96 %           Exam: tracheostomy tube in good position without surrounding erythema, unlabored respirations, no intercostal retractions or accessory muscle use and rhonchi bibasilar.   ImagingAvailable data reviewed         Invalid input(s): [id number]     HemoDynamics:  Pulse: 93, Heart Rate (Monitored): 93  NIBP: 118/85        Exam: regular rate and rhythm, regular rhythm (Sinus), no ectopy          Neuro:  GCS Total Aleida Coma Score: 9   Exam: PERRL, eyes are open, does not follow commands, no sz; occasionally tracks  Imaging: Available data reviewed       Fluids:  Intake/Output       03/07/18 0700 - 03/08/18 0659 03/08/18 0700 - 03/09/18 0659 03/09/18 0700 - 03/10/18 0659      2929-3966 0101-3836 Total 7725-5330 8798-8672 Total 9720-8076 8041-4902 Total       Intake    I.V.  --  50 50  100  -- 100  --  -- --    IV Piggyback Volume (IV Piggyback) -- 50 50 100 -- 100 -- -- --    Other  60  45 105  90  90 180  --  -- --    Medications (P.O./ Enteral Liquids) 60 45 105 90 90 180 -- -- --    Enteral  1620  30 1650  1620  520 2140  --  -- --    Enteral Volume 1440 -- 1440 9438 830 6356 -- -- --    Free Water / Tube Flush 180 30 210 180 120 300 -- -- --    Total Intake 9972 078 9667 7952 697 8136 -- -- --       Output    Urine  975  855 1830  1550  1200 2750  --  -- --    Indwelling Cathether  1550 1200 2750 -- -- --    Drains  0  -- 0  --  -- --  --  -- --    Residual Amount (ml) (Discarded) 0 -- 0 -- -- -- -- -- --    Stool  --  -- --  --  -- --  --  -- --    Number of Times Stooled 1 x -- 1 x 1 x 1 x 2 x -- -- --    Total Output 351 536 [phone] 1550 1200 3750 -- -- --       Net I/O     466  -730 -25 260 -590 -330 -- -- --        Weight: 52 kg (114 lb 10.2 oz)  Recent Labs      18   0520  18   0537  18   0443   SODIUM  134*  134*  133*   POTASSIUM  3.4*  3.8  3.7   CHLORIDE  100  101  100   CO2  26  26  24   BUN  11  12  16   CREATININE  <0.20*  <0.20*  <0.20*   MAGNESIUM   --   1.8  2.0   CALCIUM  8.5  8.6  8.7       GI/Nutrition:  Exam: nondistended, abdomen is soft and non-tender, normal active bowel sounds, G-Tube  no sign of infection, palpable baclofen pump in right lower quadrant   Imaging: Available data reviewed    KUB : No evidence of bowel obstruction.   CT abdomen and pelvis with oral contrast :  1.  No evidence of bowel obstruction or perforation.  2.  Appendix not visualized.  3.  Small to moderate volume ascites of uncertain etiology.  4.  Bilateral pleural effusions with associated atelectasis.  5.  Ill-defined parenchymal opacities in the LEFT lower lung suggesting multifocal pneumonia.  6.  Scoliosis.    Liver Function  Recent Labs      18   0518   0537  18   0443   GLUCOSE  91  90  94       Heme:  Recent Labs      18   0518   0537  18   1015  18   0443   RBC  3.07*  3.09*   --   3.15*   HEMOGLOBIN  8.9*  9.1*   --   9.3*   HEMATOCRIT  28.8*  28.5*   --   28.9*   PLATELETCT  550*  508*   --   522*   PROTHROMBTM   --    --   12.8   --    APTT   --    --   35.4   --    INR   --    --   0.99   --        Infectious Disease:  Monitored Temp  Av.4 °C (99.3 °F)  Min: 36.7 °C (98.1 °F)  Max: 37.8 °C (100.04 °F)  Micro: reviewed.   Recent Labs      18   0537  18   0443   WBC  6.8  7.4  7.2   NEUTSPOLYS  53.10  57.00  57.30   LYMPHOCYTES  25.30  22.70  23.90   MONOCYTES  15.50*  14.40*  13.40   EOSINOPHILS  4.80  3.90  3.60   BASOPHILS  0.90  1.20  1.00     Current Facility-Administered Medications   Medication Dose Frequency Provider Last Rate Last Dose   • topiramate (TOPAMAX) tablet  50 mg  50 mg Q12HRS Harris Bolton M.D.   50 mg at 03/09/18 0440   • PHENobarbital solution 120 mg  120 mg BID Aidan Encarnacion M.D.   120 mg at 03/08/18 2029   • potassium bicarbonate (KLYTE) 25 MEQ effervescent tablet TBEF 50 mEq  50 mEq DAILY Jeremy M Gonda, M.D.   50 mEq at 03/09/18 0810   • Pain Pump (patient supplied) Device   Continuous Jeremy M Gonda, M.D.       • enoxaparin (LOVENOX) inj 40 mg  40 mg DAILY Jeremy M Gonda, M.D.   Stopped at 03/08/18 0921   • lacosamide (VIMPAT) tablet 200 mg  200 mg BID Jeremy M Gonda, M.D.   200 mg at 03/09/18 0810   • levETIRAcetam (KEPPRA) 100 MG/ML solution 1,500 mg  1,500 mg Q12HRS Jeremy M Gonda, M.D.   1,500 mg at 03/09/18 0811   • loperamide (IMODIUM) 1 MG/5ML solution 2 mg  2 mg 4X/DAY PRN FARHAT MedinaOBucky   2 mg at 02/26/18 0820   • omeprazole 2 mg/mL in sodium bicarbonate (PRILOSEC) oral susp 40 mg  40 mg DAILY Jon Reynoso D.O.   40 mg at 03/09/18 0810   • levalbuterol (XOPENEX) 1.25 MG/3ML nebulizer solution 1.25 mg  1.25 mg Q6HRS (RT) Jon Reynoso D.O.   1.25 mg at 03/09/18 0623   • Respiratory Care per Protocol   Continuous RT Modesto Appiah Jr., D.O.       • polyethylene glycol/lytes (MIRALAX) PACKET 1 Packet  1 Packet QDAY PRN Modesto Appiah Jr., D.O.   1 Packet at 03/02/18 1527    And   • magnesium hydroxide (MILK OF MAGNESIA) suspension 30 mL  30 mL QDAY PRN Modesto Appiah Jr., D.O.        And   • bisacodyl (DULCOLAX) suppository 10 mg  10 mg QDAY PRN Modesto Appiah Jr., D.O.   10 mg at 03/09/18 0810   • chlorhexidine (PERIDEX) 0.12 % solution 15 mL  15 mL BID JASPAL Alaniz Jr..MICHELLE   15 mL at 03/09/18 0810   • lidocaine (XYLOCAINE) 1%  injection  1-2 mL Q30 MIN PRN JASPAL Alaniz Jr..SAM.       • MD ALERT...Adult ICU Electrolyte Replacement per Pharmacy Protocol   pharmacy to dose JASPAL Alaniz Jr..SAM.       • ipratropium-albuterol (DUONEB) nebulizer solution 3 mL  3 mL Q2HRS PRN (RT) JASPAL Alaniz Jr..SAM.       • levalbuterol  (XOPENEX) 1.25 MG/3ML nebulizer solution 1.25 mg  1.25 mg Q4HRS PRN Duy Whitaker M.D.   1.25 mg at 03/06/18 1420   • miconazole 2%-zinc oxide (RADHA) topical cream   PRN Duy Whitaker M.D.       • acetaminophen (TYLENOL) tablet 650 mg  650 mg Q6HRS PRN Duy Whitaker M.D.   650 mg at 03/05/18 1515   • oxyCODONE immediate-release (ROXICODONE) tablet 5 mg  5 mg Q3HRS PRN Duy Whitaker M.D.        And   • oxyCODONE immediate release (ROXICODONE) tablet 10 mg  10 mg Q3HRS PRN Duy Whitaker M.D.       • ondansetron (ZOFRAN) syringe/vial injection 4 mg  4 mg Q4HRS PRN Duy Whitaker M.D.   4 mg at 02/25/18 1140   • ondansetron (ZOFRAN ODT) dispertab 4 mg  4 mg Q4HRS PRN Duy Whitaker M.D.       • promethazine (PHENERGAN) tablet 12.5-25 mg  12.5-25 mg Q4HRS PRN Duy Whitaker M.D.       • promethazine (PHENERGAN) suppository 12.5-25 mg  12.5-25 mg Q4HRS PRN Duy Whitaker M.D.       • prochlorperazine (COMPAZINE) injection 5-10 mg  5-10 mg Q4HRS PRN Duy Whitaker M.D.       • LORazepam (ATIVAN) injection 4 mg  4 mg Q10 MIN PRN Duy Whitaker M.D.   2 mg at 03/07/18 1054     Last reviewed on 2/18/2018  7:51 PM by Lois Camejo, Pharmacy Intern    Quality  Measures:   Labs reviewed, Medications reviewed and Radiology images reviewed                    Assessment and plan:  Acute hypoxemic respiratory failure - VDRF since 2/18, chronic trach   - T-piece    - RT/O2 protocol, encourage mobilization   - minimize sedatives and maintain aspiration precautions   - intermittent chest x-rays  Left exudative pleural effusion per lytes criteria S/P thoracentesis 3/3   - cx neg  Septic shock from pulmonary source, now unspecified place a distributive shock - improved   - midodrine held, d/c today   - Completed antibiotics, s/p sepsis protocol   - citrobacter suspected colonizer   History of traumatic brain injury, chronic seizure disorder with associated status epilepticus   - Continue antiepileptics per  neurology   - ongoing seizure activity   - appreciated Neuro input   - MRI noted   - NH3 better; off vpa   - LP neg  Decubitus ulcerations of the left hip including stage IV disease   - Wound care, optimize nutrition  Pneumonia (Proteus and Klebsiella)   - S/P antibiotics  Mild fluid overload - improved   - Discontinue Lasix and monitor strict I/O   Possible paralytic ileus versus feeding tube malfunction - resolved   - Continue tube feeding trial with advanced to goal home regimen   - Bowel protocol, daily Dulcolax suppository  Hyponatremia    - follow  Iron deficiency anemia - s/p iron supplementation  Hypokalemia/mag - repletion daily as needed  Prophylaxis, enteral nutrition        Discussed patient condition and risk of morbidity and/or mortality with Family, RN, RT, Pharmacy, Charge nurse / hot rounds and hospitalist.        Eleazar Grijalva M.D.

## 2018-03-09 NOTE — FLOWSHEET NOTE
03/08/18 2241   Events/Summary/Plan   Events/Summary/Plan Aersol check   Education   Education Yes - Pt. / Family has been Instructed in use of Respiratory Equipment   Aerosol Therapy / Airway Management   #Aerosol Therapy / Airway Management T-Piece   Aerosol Humidity Temp (celsius) 35   Respiratory WDL   Respiratory (WDL) X   Chest Exam   Work Of Breathing / Effort Mild   Respiration 15   Pulse 93   Heart Rate (Monitored) 93   Breath Sounds   Pre/Post Intervention Pre Intervention Assessment   RUL Breath Sounds Clear After Suction   RML Breath Sounds Clear After Suction   RLL Breath Sounds Clear After Suction   JONATAN Breath Sounds Clear After Suction   LLL Breath Sounds Clear After Suction   Secretions   Cough Productive   How Sputum Obtained Spontaneous;Suction;Tracheal   Sputum Amount Scant   Sputum Color Clear;White   Sputum Consistency Thick   Suction Frequency Suctioned Once or Twice Per Encounter   Oximetry   Continuous Oximetry Yes   Oxygen   Pulse Oximetry 98 %   O2 (LPM) 5   O2 (FiO2) 30   O2 Daily Delivery Respiratory  T-Piece

## 2018-03-09 NOTE — PROGRESS NOTES
[facility] Hospitalist Progress Note    Date of Service: 3/9/2018    Chief Complaint  37 y.o. male admitted 2018 with shortness of breath.    Interval Problem Update  Patient with a history of traumatic brain injury and seizure disorder, trach in place.   Had underwent taper off of his phenobarbital, family felt he was doing worse.   Upon arrival patient was noted to be hypoxic, deemed aspiration pneumonia due to seizures and patient was placed on a ventilator.   Continuous EEG obtained which showed significant seizures, Versed drip started and phenobarbital restarted.   Patient is currently tolerating his tube feeds, home blend is being used.   Patient seen and examined in the ICU, ICU care given.  Discussed patient condition and plan with Intensivist, RN, RT and charge nurse / hot rounds.    Continuous EEG going, no sign of new sz  NSR to tach  -130  t-piece 5L 30%  Tolerating home TF  Afebrile   Adequate uop, 1200 out  Bed rest  RIJ  Give k  LP today     Consultants/Specialty  Intensivist  Neurology    Disposition  Patient requires additional treatment in the hospital, may need placement at the time of discharge, LTAC is pending      Review of Systems   Unable to perform ROS: Intubated      Physical Exam  Laboratory/Imaging   Hemodynamics  No data recorded.   Monitored Temp: 36.7 °C (98.1 °F)  Pulse  Av.1  Min: 47  Max: 125 Heart Rate (Monitored): 93  NIBP: 118/85      Respiratory    #Aerosol Therapy / Airway Management: T-Piece, Aerosol Humidity Temp (celsius): 35 Respiration: 15, Pulse Oximetry: 96 %, O2 Daily Delivery Respiratory : T-Piece     Given By:: Trache, Work Of Breathing / Effort: Mild  RUL Breath Sounds: Clear After Suction, RML Breath Sounds: Clear After Suction, RLL Breath Sounds: Clear After Suction, JONATAN Breath Sounds: Clear After Suction, LLL Breath Sounds: Clear After Suction    Fluids    Intake/Output Summary (Last 24 hours) at 18 6264  Last data filed at 18 0600    Gross per 24 hour   Intake             2420 ml   Output             2750 ml   Net             -330 ml       Nutrition  Orders Placed This Encounter   Procedures   • Diet NPO     Standing Status:   Standing     Number of Occurrences:   1     Order Specific Question:   Restrict to:     Answer:   Strict [1]     Physical Exam   Constitutional: He appears cachectic. He appears ill.   HENT:   Head: Normocephalic and atraumatic.   Mouth/Throat: No oropharyngeal exudate.   Eyes: Right eye exhibits no discharge. Left eye exhibits no discharge.   Neck: Neck supple. No tracheal deviation present.   Trach   Cardiovascular: Normal rate and regular rhythm.    No murmur heard.  Pulmonary/Chest: No stridor. No apnea. He has decreased breath sounds in the right lower field and the left lower field. He has rhonchi.   t-piece    Abdominal: Soft. Bowel sounds are normal.   Baclofen pump    Genitourinary:   Genitourinary Comments: catheter   Musculoskeletal: He exhibits deformity. He exhibits no edema.   Extensive contractures of extremities   Neurological:   Eyes open, doesn't respond or follow      Skin: Skin is warm and dry. He is not diaphoretic. No erythema.   Psychiatric: He is noncommunicative.   Nursing note and vitals reviewed.      Recent Labs      03/07/18   0520  03/08/18   0537  03/09/18   0443   WBC  6.8  7.4  7.2   RBC  3.07*  3.09*  3.15*   HEMOGLOBIN  8.9*  9.1*  9.3*   HEMATOCRIT  28.8*  28.5*  28.9*   MCV  93.8  92.2  91.7   MCH  29.0  29.4  29.5   MCHC  30.9*  31.9*  32.2*   RDW  68.7*  67.1*  63.9*   PLATELETCT  550*  508*  522*   MPV  8.3*  8.1*  8.3*     Recent Labs      03/07/18   0520  03/08/18   0537  03/09/18   0443   SODIUM  134*  134*  133*   POTASSIUM  3.4*  3.8  3.7   CHLORIDE  100  101  100   CO2  26  26  24   GLUCOSE  91  90  94   BUN  11  12  16   CREATININE  <0.20*  <0.20*  <0.20*   CALCIUM  8.5  8.6  8.7     Recent Labs      03/08/18   1015   APTT  35.4   INR  0.99                  Assessment/Plan      * Septic shock (CMS-HCC)- (present on admission)   Assessment & Plan    - Resolved, was due to aspiration pneumonia, finish full course of antibiotics        Seizure (CMS-HCC)- (present on admission)   Assessment & Plan    - Seizures improved, continuous EEG again  - Continue Vimpat, Keppra, phenobarbital, and valproic acid per neurology  - MRI brain concerning for changes per Dr. Bolton, lumbar puncture pending today        Hypomagnesemia- (present on admission)   Assessment & Plan    - Resolved        Decubitus ulcer of ischium, left, stage IV (CMS-HCC)- (present on admission)   Assessment & Plan    - Continue wound care        Azotemia- (present on admission)   Assessment & Plan    - Resolved with fluids        Acute on chronic respiratory failure with hypoxemia (CMS-HCC)- (present on admission)   Assessment & Plan    - Improved, now on T piece  - Was placed on ventilator initially on 2/18  - Due to aspiration pneumonia during seizures        Aspiration pneumonia (CMS-HCC)- (present on admission)   Assessment & Plan    - Causing septic shock  - Finished full course of antibiotics        Anemia, blood loss- (present on admission)   Assessment & Plan    - Hemoglobin stable  - No sign of gross bleeding        Diarrhea- (present on admission)   Assessment & Plan    - Resolved          Protein-calorie malnutrition, severe (CMS-HCC)- (present on admission)   Assessment & Plan    - Chronic, tolerating tube feeds        Physical debility- (present on admission)   Assessment & Plan    - Chronic post traumatic brain injury  - At baseline        TBI (traumatic brain injury) (CMS-HCC)- (present on admission)   Assessment & Plan    - Chronic, nonverbal        Hyponatremia- (present on admission)   Assessment & Plan    - Resolved          Contraction, joint, multiple sites- (present on admission)   Assessment & Plan    - Chronic  - has baclofen pump which was refilled in January  - Pump has been interrogating and is working  properly        GI bleed- (present on admission)   Assessment & Plan    - Resolved          Quality-Core Measures   Reviewed items::  Labs reviewed, Medications reviewed and Radiology images reviewed  Waldrop catheter::  Unconscious / Sedated Patient on a Ventilator  DVT prophylaxis pharmacological::  Enoxaparin (Lovenox)  Ulcer Prophylaxis::  Yes

## 2018-03-09 NOTE — PROCEDURES
VIDEO ELECTROENCEPHALOGRAM / EPILEPSY MONITORING UNIT REPORT           DOS:   3/8/2018 - 3/9/2018 (total recording for 23 hours and 45 minutes).      INDICATION:  Ruben Edwards 37 y.o. male presenting with recurrent seizures.      CURRENT ANTIEPILEPTIC REGIMEN: Levetiracetam 1500 mg q 12 hrs, Lacosamide 200 mg q 12 hrs, Phenobarbital 120 mg q 12 hrs. Valproic Acid was stopped due to hyperammonemia. Started on Topiramate 50 mg q 12 hrs.      TECHNIQUE: A 30-channel, 24 hrs video electroencephalogram (VEEG) was performed in accordance with the international 10-20 system. This digital study was reviewed in bipolar and referential montages. The recording examined an encephalopathic patient during awake and sleep.      DESCRIPTION OF THE RECORD:  During wakefulness, there is slowing of the background with asymmetric diffuse theta / delta activity, and mostly delta activity (superimposed slowing) in the right hemisphere. During sleep, there is diffuse slow delta activity.      ACTIVATION PROCEDURES:   Not performed.      ICTAL AND/OR INTERICTAL FINDINGS:   Frequent right frontotemporal spikes. Rarely, these may become briefly periodic (right PLEDS) but without clear evidence for seizures during the study.      EVENT(S):  None marked for review.      EKG: sampling review of EKG recording demonstrated sinus rhythm with sinus tachycardia.         INTERPRETATION:   This is an abnormal 24 hrs video electroencephalogram recording in an encephalopathic patient during awake and sleep states. There is diffuse cerebral dysfunction, suggestive of an encephalopathic state. Superimposed slowing in the right hemisphere is likely due to underlying structural abnormality. Frequent right frontotemporal spikes, with rare brief runs of right PLEDS, but without clear evidence for seizures during the study. The study remains improved when compared to initial recordings, however the patient remains at very high risk for seizure recurrence.  Clinical and radiological correlation is recommended.            Harris Bolton MD  Medical Director, Epilepsy and Neurodiagnostics.   Clinical  of Neurology [facility].   Diplomate in Neurology, Epilepsy, and Electrodiagnostic Medicine.   Office: [phone]  Fax: [phone]    MARLON GOODMAN    DD:  03/09/2018 07:43:31  DT:  03/09/2018 08:08:26    D#:  [id number]  Job#:  [id number]

## 2018-03-09 NOTE — PROGRESS NOTES
"LATE ENTRY:    Mother of pt arrived bedside at approximately 1400. Pt was cleaned and placed on fresh sheets after BM, all wounds redressed per wound care order. Upon mothers arrival, she became threatening towards staff, stating \"I've had healthcare workers licenses revoked in the past,\" demanding this RN change pt's sheets in her presence, bathe pt again, and redress wounds. This RN educated mother that it would not be beneficial to pt's skin breakdown to repeatedly remove and redress wounds, pt's mother then stated \"are we going to have a problem? If so, I can make sure we don't.\" This RN attempted to de-escalate situation without success, this RN left room after providing pt care. Manager notified, when this RN re-entered the room, pt's mother much more agreeable to care.  "

## 2018-03-09 NOTE — PROGRESS NOTES
Per Dr. Grijalva, IR needs to perform lumbar puncture as fluoro is requesting pt is able to lay still and supine, due to anatomical deformities, pt unable to lay supine.

## 2018-03-09 NOTE — EEG PROGRESS NOTE
VIDEO ELECTROENCEPHALOGRAM / EPILEPSY MONITORING UNIT REPORT           DOS:   3/8/2018 - 3/9/2018 (total recording for 23 hours and 45 minutes).      INDICATION:  Ruben Edwards 37 y.o. male presenting with recurrent seizures.      CURRENT ANTIEPILEPTIC REGIMEN: Levetiracetam 1500 mg q 12 hrs, Lacosamide 200 mg q 12 hrs, Phenobarbital 120 mg q 12 hrs. Valproic Acid was stopped due to hyperammonemia. Started on Topiramate 50 mg q 12 hrs.      TECHNIQUE: A 30-channel, 24 hrs video electroencephalogram (VEEG) was performed in accordance with the international 10-20 system. This digital study was reviewed in bipolar and referential montages. The recording examined an encephalopathic patient during awake and sleep.      DESCRIPTION OF THE RECORD:  During wakefulness, there is slowing of the background with asymmetric diffuse theta / delta activity, and mostly delta activity (superimposed slowing) in the right hemisphere. During sleep, there is diffuse slow delta activity.      ACTIVATION PROCEDURES:   Not performed.      ICTAL AND/OR INTERICTAL FINDINGS:   Frequent right frontotemporal spikes. Rarely, these may become briefly periodic (right PLEDS) but without clear evidence for seizures during the study.      EVENT(S):  None marked for review.      EKG: sampling review of EKG recording demonstrated sinus rhythm with sinus tachycardia.         INTERPRETATION:   This is an abnormal 24 hrs video electroencephalogram recording in an encephalopathic patient during awake and sleep states. There is diffuse cerebral dysfunction, suggestive of an encephalopathic state. Superimposed slowing in the right hemisphere is likely due to underlying structural abnormality. Frequent right frontotemporal spikes, with rare brief runs of right PLEDS, but without clear evidence for seizures during the study. The study remains improved when compared to initial recordings, however the patient remains at very high risk for seizure recurrence.  Clinical and radiological correlation is recommended.            Harris Bolton MD  Medical Director, Epilepsy and Neurodiagnostics.   Clinical  of Neurology [facility] of [facility].   Diplomate in Neurology, Epilepsy, and Electrodiagnostic Medicine.   Office: [phone]  Fax: [phone]

## 2018-03-09 NOTE — CARE PLAN
Problem: Bowel/Gastric:  Goal: Normal bowel function is maintained or improved  Outcome: PROGRESSING AS EXPECTED  Dulcolax suppository given. Pt had 1 large brown soft BM at 1055.

## 2018-03-09 NOTE — PROGRESS NOTES
IR Procedure Note:    Site Marked and Confirmed with MD, patient and RN pre procedure. Dr. Noel performed a lumbar puncture.10cc aspirate taken to lab by Fernando GARVEY.  The pt tolerated the procedure well.  Gauze and tegadern applied to lumbar puncture, CDI and soft; pressure held x 2 minutes.  Pt alertat baseline post procedure, vital signs stable during procedure and transport, see flow sheet for vital signs.  Report given to Sandie GARVEY.  RN transported pt to MRI with monitor.

## 2018-03-09 NOTE — PROGRESS NOTES
Per Elvis Heredia's conversation with RN: Lumbar puncture to be done 3/9/18. Pt to be NPO at midnight and blood thinners are to be held

## 2018-03-09 NOTE — CARE PLAN
Problem: Oxygenation:  Goal: Maintain adequate oxygenation dependent on patient condition  Outcome: PROGRESSING AS EXPECTED  Patient O2 will be titrated to maintain saturation by pulse oximetry >90%. Current fiO2 30% and 5 lpm T-Piece.      Problem: Ventilation Defect:  Goal: Ability to achieve and maintain unassisted ventilation or tolerate decreased levels of ventilator support  Outcome: PROGRESSING AS EXPECTED  Pt progressing well on T-Piece at this time   Intervention: Perform ventilator associated pneumonia prevention interventions  Oral and tracheal suctioning performed, Oral care with oral debridement solution completed this shift. trach cuff pressure verified with each ventilator check, circuit drained as needed, gloves worn during all patient and equipment contact, ventilator wiped down Q shift, Head of bed elevated >30%.       Problem: Bronchoconstriction:  Goal: Improve in air movement and diminished wheezing  Outcome: PROGRESSING AS EXPECTED  Rhonchi auscultated and pt was clear post suctioning, No wheezing auscultated post intervention     Improvement in airflow    Improved vital signs and measures of gas exchange  Intervention: Implement inhaled treatments  Xopenex 1.25 Q6    Intervention: Evaluate and manage medication effects  Improved patient appearance after treatment.
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
## 2018-03-10 ENCOUNTER — APPOINTMENT (OUTPATIENT)
Dept: RADIOLOGY | Facility: MEDICAL CENTER | Age: 38
DRG: 853 | End: 2018-03-10
Attending: INTERNAL MEDICINE
Payer: COMMERCIAL

## 2018-03-10 LAB
AMMONIA PLAS-SCNC: 41 UMOL/L (ref 11–45)
ANION GAP SERPL CALC-SCNC: 7 MMOL/L (ref 0–11.9)
BASOPHILS # BLD AUTO: 1.2 % (ref 0–1.8)
BASOPHILS # BLD: 0.08 K/UL (ref 0–0.12)
BUN SERPL-MCNC: 16 MG/DL (ref 8–22)
C GATTII+NEOFOR DNA CSF QL NAA+NON-PROBE: NOT DETECTED
CALCIUM SERPL-MCNC: 8.6 MG/DL (ref 8.5–10.5)
CHLORIDE SERPL-SCNC: 102 MMOL/L (ref 96–112)
CMV DNA CSF QL NAA+NON-PROBE: NOT DETECTED
CO2 SERPL-SCNC: 24 MMOL/L (ref 20–33)
CREAT SERPL-MCNC: <0.2 MG/DL (ref 0.5–1.4)
E COLI K1 DNA CSF QL NAA+NON-PROBE: NOT DETECTED
EOSINOPHIL # BLD AUTO: 0.23 K/UL (ref 0–0.51)
EOSINOPHIL NFR BLD: 3.4 % (ref 0–6.9)
ERYTHROCYTE [DISTWIDTH] IN BLOOD BY AUTOMATED COUNT: 64.2 FL (ref 35.9–50)
EV RNA CSF QL NAA+NON-PROBE: NOT DETECTED
GLUCOSE SERPL-MCNC: 92 MG/DL (ref 65–99)
GP B STREP DNA CSF QL NAA+NON-PROBE: NOT DETECTED
HAEM INFLU DNA CSF QL NAA+NON-PROBE: NOT DETECTED
HCT VFR BLD AUTO: 30.6 % (ref 42–52)
HGB BLD-MCNC: 9.9 G/DL (ref 14–18)
HHV6 DNA CSF QL NAA+NON-PROBE: NOT DETECTED
HSV1 DNA CSF QL NAA+NON-PROBE: NOT DETECTED
HSV2 DNA CSF QL NAA+NON-PROBE: NOT DETECTED
IMM GRANULOCYTES # BLD AUTO: 0.06 K/UL (ref 0–0.11)
IMM GRANULOCYTES NFR BLD AUTO: 0.9 % (ref 0–0.9)
L MONOCYTOG DNA CSF QL NAA+NON-PROBE: NOT DETECTED
LYMPHOCYTES # BLD AUTO: 1.86 K/UL (ref 1–4.8)
LYMPHOCYTES NFR BLD: 27.8 % (ref 22–41)
MAGNESIUM SERPL-MCNC: 1.9 MG/DL (ref 1.5–2.5)
MCH RBC QN AUTO: 29.8 PG (ref 27–33)
MCHC RBC AUTO-ENTMCNC: 32.4 G/DL (ref 33.7–35.3)
MCV RBC AUTO: 92.2 FL (ref 81.4–97.8)
MONOCYTES # BLD AUTO: 0.83 K/UL (ref 0–0.85)
MONOCYTES NFR BLD AUTO: 12.4 % (ref 0–13.4)
N MEN DNA CSF QL NAA+NON-PROBE: NOT DETECTED
NEUTROPHILS # BLD AUTO: 3.62 K/UL (ref 1.82–7.42)
NEUTROPHILS NFR BLD: 54.3 % (ref 44–72)
NRBC # BLD AUTO: 0 K/UL
NRBC BLD-RTO: 0 /100 WBC
PARECHOVIRUS A RNA CSF QL NAA+NON-PROBE: NOT DETECTED
PLATELET # BLD AUTO: 510 K/UL (ref 164–446)
PMV BLD AUTO: 8.1 FL (ref 9–12.9)
POTASSIUM SERPL-SCNC: 3.9 MMOL/L (ref 3.6–5.5)
RBC # BLD AUTO: 3.32 M/UL (ref 4.7–6.1)
RHODAMINE-AURAMINE STN SPEC: NORMAL
S PNEUM DNA CSF QL NAA+NON-PROBE: NOT DETECTED
SIGNIFICANT IND 70042: NORMAL
SITE SITE: NORMAL
SODIUM SERPL-SCNC: 133 MMOL/L (ref 135–145)
SOURCE SOURCE: NORMAL
TEST NAME 95000: NORMAL
VZV DNA CSF QL NAA+NON-PROBE: NOT DETECTED
WBC # BLD AUTO: 6.7 K/UL (ref 4.8–10.8)

## 2018-03-10 PROCEDURE — 700101 HCHG RX REV CODE 250: Performed by: INTERNAL MEDICINE

## 2018-03-10 PROCEDURE — 700102 HCHG RX REV CODE 250 W/ 637 OVERRIDE(OP): Performed by: PSYCHIATRY & NEUROLOGY

## 2018-03-10 PROCEDURE — 700102 HCHG RX REV CODE 250 W/ 637 OVERRIDE(OP): Performed by: INTERNAL MEDICINE

## 2018-03-10 PROCEDURE — 700102 HCHG RX REV CODE 250 W/ 637 OVERRIDE(OP): Performed by: HOSPITALIST

## 2018-03-10 PROCEDURE — 94640 AIRWAY INHALATION TREATMENT: CPT

## 2018-03-10 PROCEDURE — 95951 HCHG EEG-VIDEO-24HR: CPT

## 2018-03-10 PROCEDURE — 82140 ASSAY OF AMMONIA: CPT

## 2018-03-10 PROCEDURE — 770022 HCHG ROOM/CARE - ICU (200)

## 2018-03-10 PROCEDURE — A9270 NON-COVERED ITEM OR SERVICE: HCPCS | Performed by: INTERNAL MEDICINE

## 2018-03-10 PROCEDURE — 80048 BASIC METABOLIC PNL TOTAL CA: CPT

## 2018-03-10 PROCEDURE — 83735 ASSAY OF MAGNESIUM: CPT

## 2018-03-10 PROCEDURE — 99233 SBSQ HOSP IP/OBS HIGH 50: CPT | Performed by: INTERNAL MEDICINE

## 2018-03-10 PROCEDURE — 700111 HCHG RX REV CODE 636 W/ 250 OVERRIDE (IP): Performed by: INTERNAL MEDICINE

## 2018-03-10 PROCEDURE — 71045 X-RAY EXAM CHEST 1 VIEW: CPT

## 2018-03-10 PROCEDURE — A9270 NON-COVERED ITEM OR SERVICE: HCPCS | Performed by: PSYCHIATRY & NEUROLOGY

## 2018-03-10 PROCEDURE — A9270 NON-COVERED ITEM OR SERVICE: HCPCS | Performed by: HOSPITALIST

## 2018-03-10 PROCEDURE — 85025 COMPLETE CBC W/AUTO DIFF WBC: CPT

## 2018-03-10 RX ORDER — MAGNESIUM SULFATE HEPTAHYDRATE 40 MG/ML
2 INJECTION, SOLUTION INTRAVENOUS ONCE
Status: COMPLETED | OUTPATIENT
Start: 2018-03-10 | End: 2018-03-10

## 2018-03-10 RX ADMIN — LEVALBUTEROL HYDROCHLORIDE 1.25 MG: 1.25 SOLUTION RESPIRATORY (INHALATION) at 18:33

## 2018-03-10 RX ADMIN — PHENOBARBITAL 120 MG: 20 ELIXIR ORAL at 20:58

## 2018-03-10 RX ADMIN — LEVETIRACETAM 1500 MG: 100 SOLUTION ORAL at 20:59

## 2018-03-10 RX ADMIN — TOPIRAMATE 50 MG: 25 TABLET, FILM COATED ORAL at 05:00

## 2018-03-10 RX ADMIN — CHLORHEXIDINE GLUCONATE 15 ML: 1.2 RINSE ORAL at 07:31

## 2018-03-10 RX ADMIN — ENOXAPARIN SODIUM 40 MG: 100 INJECTION SUBCUTANEOUS at 07:32

## 2018-03-10 RX ADMIN — LACOSAMIDE 200 MG: 50 TABLET, FILM COATED ORAL at 07:31

## 2018-03-10 RX ADMIN — CHLORHEXIDINE GLUCONATE 15 ML: 1.2 RINSE ORAL at 20:58

## 2018-03-10 RX ADMIN — PHENOBARBITAL 120 MG: 20 ELIXIR ORAL at 07:32

## 2018-03-10 RX ADMIN — POTASSIUM BICARBONATE 50 MEQ: 25 TABLET, EFFERVESCENT ORAL at 07:31

## 2018-03-10 RX ADMIN — LEVALBUTEROL HYDROCHLORIDE 1.25 MG: 1.25 SOLUTION RESPIRATORY (INHALATION) at 02:16

## 2018-03-10 RX ADMIN — LEVALBUTEROL HYDROCHLORIDE 1.25 MG: 1.25 SOLUTION RESPIRATORY (INHALATION) at 08:19

## 2018-03-10 RX ADMIN — ACETAMINOPHEN 650 MG: 325 TABLET, FILM COATED ORAL at 15:44

## 2018-03-10 RX ADMIN — TOPIRAMATE 50 MG: 25 TABLET, FILM COATED ORAL at 16:29

## 2018-03-10 RX ADMIN — OMEPRAZOLE 40 MG: 20 CAPSULE, DELAYED RELEASE ORAL at 07:31

## 2018-03-10 RX ADMIN — LEVALBUTEROL HYDROCHLORIDE 1.25 MG: 1.25 SOLUTION RESPIRATORY (INHALATION) at 14:30

## 2018-03-10 RX ADMIN — LACOSAMIDE 200 MG: 50 TABLET, FILM COATED ORAL at 20:58

## 2018-03-10 RX ADMIN — BISACODYL 10 MG: 10 SUPPOSITORY RECTAL at 07:31

## 2018-03-10 RX ADMIN — LEVETIRACETAM 1500 MG: 100 SOLUTION ORAL at 07:32

## 2018-03-10 RX ADMIN — MAGNESIUM SULFATE IN WATER 2 G: 40 INJECTION, SOLUTION INTRAVENOUS at 07:30

## 2018-03-10 NOTE — CARE PLAN
Problem: Skin Integrity  Goal: Risk for impaired skin integrity will decrease  Outcome: PROGRESSING SLOWER THAN EXPECTED  Pt unable to mobilize. PTA three pressure ulcers were visualized. Wound care has been consulted. Dressing changes t.i.d. 2 RN skin assessment q shift.

## 2018-03-10 NOTE — EEG PROGRESS NOTE
VIDEO ELECTROENCEPHALOGRAM / EPILEPSY MONITORING UNIT REPORT           DOS:   3/9/2018 - 3/10/2018 (total recording for 21 hours and 21 minutes).      INDICATION:  Ruben Edwards 37 y.o. male presenting with recurrent seizures.      CURRENT ANTIEPILEPTIC REGIMEN: Levetiracetam 1500 mg q 12 hrs, Lacosamide 200 mg q 12 hrs, Phenobarbital 120 mg q 12 hrs, Topiramate 50 mg q 12 hrs.      TECHNIQUE: A 30-channel, 24 hrs video electroencephalogram (VEEG) was performed in accordance with the international 10-20 system. This digital study was reviewed in bipolar and referential montages. The recording examined an encephalopathic patient during awake and sleep.      DESCRIPTION OF THE RECORD:  During wakefulness, there is slowing of the background with asymmetric diffuse theta / delta activity, and mostly delta activity (superimposed slowing) in the right hemisphere. During sleep, there is diffuse slow delta activity.      ACTIVATION PROCEDURES:   Not performed.      ICTAL AND/OR INTERICTAL FINDINGS:   Frequent right frontotemporal spikes. Rarely, these may become briefly periodic (right PLEDS) but without clear evidence for seizures during the study.      EVENT(S):  None marked for review.      EKG: sampling review of EKG recording demonstrated sinus rhythm with sinus tachycardia.         INTERPRETATION:   This is an abnormal 24 hrs video electroencephalogram recording in an encephalopathic patient during awake and sleep states. There is diffuse cerebral dysfunction, suggestive of an encephalopathic state. Superimposed slowing in the right hemisphere is likely due to underlying structural abnormality. Frequent right frontotemporal spikes, with rare brief runs of right PLEDS, but without clear evidence for seizures during the study. The study remains improved when compared to initial recordings, however the patient remains at very high risk for seizure recurrence. Clinical and radiological correlation is recommended.             Harris Bolton MD  Medical Director, Epilepsy and Neurodiagnostics.   Clinical  of Neurology [facility].   Diplomate in Neurology, Epilepsy, and Electrodiagnostic Medicine.   Office: [phone]  Fax: [phone]

## 2018-03-10 NOTE — PROGRESS NOTES
[facility] Hospitalist Progress Note    Date of Service: 3/10/2018    Chief Complaint  37 y.o. male admitted 2018 with shortness of breath.    Interval Problem Update  Patient with a history of traumatic brain injury and seizure disorder, trach in place.   Had underwent taper off of his phenobarbital, family felt he was doing worse.   Upon arrival patient was noted to be hypoxic, deemed aspiration pneumonia due to seizures and patient was placed on a ventilator.   Pt again with seizures, neuro added meds.  Patient is currently tolerating his tube feeds, home blend is being used.   Patient seen and examined in the ICU, ICU care given.  Discussed patient condition and plan with Intensivist, RN, RT and charge nurse / hot rounds.    Continuous EEG going, no sign of new sz  NSR to tach  -130  2 BM overnight  Tolerating home TF  1300 out of rosas  RIJ  Afebrile  5L 30% on t-piece  Minimal secretions  Give mg     Consultants/Specialty  Intensivist  Neurology    Disposition  Patient requires additional treatment in the hospital, may need placement at the time of discharge, LTAC is pending      Review of Systems   Unable to perform ROS: Intubated      Physical Exam  Laboratory/Imaging   Hemodynamics  No data recorded.   Monitored Temp: 37 °C (98.6 °F)  Pulse  Av  Min: 47  Max: 125 Heart Rate (Monitored): (!) 101  NIBP: 131/76      Respiratory    #Aerosol Therapy / Airway Management: T-Piece, Aerosol Humidity Temp (celsius): 31 Respiration: 17, Pulse Oximetry: 95 %, O2 Daily Delivery Respiratory : T-Piece     Given By:: Trache, Work Of Breathing / Effort: Mild  RUL Breath Sounds: Rhonchi, RML Breath Sounds: Rhonchi, RLL Breath Sounds: Diminished, JONATAN Breath Sounds: Rhonchi, LLL Breath Sounds: Diminished    Fluids    Intake/Output Summary (Last 24 hours) at 03/10/18 0748  Last data filed at 03/10/18 0600   Gross per 24 hour   Intake             2030 ml   Output             3250 ml   Net            -1220 ml        Nutrition  Orders Placed This Encounter   Procedures   • Diet NPO     Standing Status:   Standing     Number of Occurrences:   1     Order Specific Question:   Restrict to:     Answer:   Strict [1]     Physical Exam   Constitutional: He appears cachectic. He appears ill. No distress.   HENT:   Head: Normocephalic and atraumatic.   Eyes: Right eye exhibits no discharge. Left eye exhibits no discharge. No scleral icterus.   Neck: Neck supple.   Trach   Cardiovascular: Normal rate and regular rhythm.  Exam reveals no gallop.    No murmur heard.  Pulmonary/Chest: No stridor. No apnea. He has decreased breath sounds in the right lower field and the left lower field. He has no wheezes. He has rhonchi.   t-piece    Abdominal: Soft. Bowel sounds are normal. He exhibits no distension.   Baclofen pump    Genitourinary:   Genitourinary Comments: catheter   Musculoskeletal: He exhibits deformity. He exhibits no edema.   Extensive contractures of extremities   Neurological:   Eyes open, doesn't respond or follow      Skin: Skin is warm and dry. He is not diaphoretic.   Psychiatric: He is noncommunicative.   Nursing note and vitals reviewed.      Recent Labs      03/08/18   0537  03/09/18   0443  03/10/18   0455   WBC  7.4  7.2  6.7   RBC  3.09*  3.15*  3.32*   HEMOGLOBIN  9.1*  9.3*  9.9*   HEMATOCRIT  28.5*  28.9*  30.6*   MCV  92.2  91.7  92.2   MCH  29.4  29.5  29.8   MCHC  31.9*  32.2*  32.4*   RDW  67.1*  63.9*  64.2*   PLATELETCT  508*  522*  510*   MPV  8.1*  8.3*  8.1*     Recent Labs      03/08/18   0537  03/09/18   0443  03/10/18   0455   SODIUM  134*  133*  133*   POTASSIUM  3.8  3.7  3.9   CHLORIDE  101  100  102   CO2  26  24  24   GLUCOSE  90  94  92   BUN  12  16  16   CREATININE  <0.20*  <0.20*  <0.20*   CALCIUM  8.6  8.7  8.6     Recent Labs      03/08/18   1015   APTT  35.4   INR  0.99                  Assessment/Plan     * Septic shock (CMS-HCC)- (present on admission)   Assessment & Plan    - Resolved,  was due to aspiration pneumonia, finish full course of antibiotics        Seizure (CMS-HCC)- (present on admission)   Assessment & Plan    - Seizures improved, continuous EEG again  - Continue Vimpat, Keppra, phenobarbital, and valproic acid per neurology  - MRI brain concerning for changes per Dr. Bolton, no cause noted on CSF        Hypomagnesemia- (present on admission)   Assessment & Plan    - Resolved        Decubitus ulcer of ischium, left, stage IV (CMS-HCC)- (present on admission)   Assessment & Plan    - Continue wound care        Azotemia- (present on admission)   Assessment & Plan    - Resolved with fluids        Acute on chronic respiratory failure with hypoxemia (CMS-HCC)- (present on admission)   Assessment & Plan    - Improved, now on T piece  - Was placed on ventilator initially on 2/18  - Due to aspiration pneumonia during seizures        Aspiration pneumonia (CMS-HCC)- (present on admission)   Assessment & Plan    - Causing septic shock  - Finished full course of antibiotics        Anemia, blood loss- (present on admission)   Assessment & Plan    - Hemoglobin stable  - No sign of gross bleeding        Diarrhea- (present on admission)   Assessment & Plan    - Resolved          Protein-calorie malnutrition, severe (CMS-HCC)- (present on admission)   Assessment & Plan    - Chronic, tolerating tube feeds        Physical debility- (present on admission)   Assessment & Plan    - Chronic post traumatic brain injury  - At baseline        TBI (traumatic brain injury) (CMS-HCC)- (present on admission)   Assessment & Plan    - Chronic, nonverbal        Hyponatremia- (present on admission)   Assessment & Plan    - Resolved          Contraction, joint, multiple sites- (present on admission)   Assessment & Plan    - Chronic  - has baclofen pump which was refilled in January  - Pump has been interrogating and is working properly        GI bleed- (present on admission)   Assessment & Plan    - Resolved           Quality-Core Measures   Reviewed items::  Labs reviewed, Medications reviewed and Radiology images reviewed  Waldrop catheter::  Unconscious / Sedated Patient on a Ventilator  DVT prophylaxis pharmacological::  Enoxaparin (Lovenox)  Ulcer Prophylaxis::  Yes

## 2018-03-10 NOTE — PROGRESS NOTES
Pulmonary Critical Care Progress Note        Date of admission: 2/18/2018    Chief Complaint: Respiratory failure, septic shock    History of Present Illness:  Mr. Edwards is a 37-year-old male with past medical history of traumatic brain injury at age 17, seizures diagnosed in June of 2017. His baseline mental status appears to be nodding and minimal interacting without vocalization. Who was brought to the ICU as a direct admit from [facility] where he was brought today for hypoxia. Reportedly the patient was being weanied off of his phenobarbital by his neurologist when he began having more frequent seizures, approximately one week ago his neurologist increased his Keppra dosing however the seizures continued. His mother did use CBD and THC with some success in slowing the seizures. Since 2 days ago the patient had multiple aspiration events following these seizures. His mother evaluated him with a home oxygen saturation monitor and he was noted to be hypoxic, he was brought to [facility] where he was found to have 28% bands with leukopenia. Chest x-ray was obtained demonstrating bilateral infiltrates right greater than left. He was placed on mechanical ventilation and his oxygenation was unable to be improved more than 85%, he was hypotensive and given multiple boluses of IV fluids without improvement. He was started on levo fed and given multiple doses of Ativan for seizures and transferred to our ICU for higher level of care. He arrives on the ventilator and on pressors critically ill.      ROS:  Respiratory: unable to perform due to the patient's inability to effectively communicate, Cardiac: unable to perform due to the patient's inability to effectively communicate, GI: unable to perform due to the patient's inability to effectively communicate.  All other systems negative. Unchanged    Interval Events:  24 hour interval history reviewed    - normotensive   - neuro no change,  unresponsive, no obvious sz   - april TF   - 5L, 30%, T-piece, mini secretions   - replace Mg++   - on continuous EEG; mom thinks she sees sz  Yesterday   - no sz, EEG in place   - VSS, neuro unchanged; unresponsive   - april TF   - DTIs unchanged   - trach, 30% T-piece   - replace K   - LP per IR today      PFSH:  No change.    Respiratory:      Pulse Oximetry: 97 %           Exam: tracheostomy tube in good position without surrounding erythema, unlabored respirations, no intercostal retractions or accessory muscle use and rhonchi bibasilar. No wheeze  ImagingAvailable data reviewed         Invalid input(s): [id number]     HemoDynamics:  Pulse: 91, Heart Rate (Monitored): 88  NIBP: 106/63        Exam: regular rate and rhythm, regular rhythm (Sinus), no ectopy          Neuro:  GCS Total Russell Coma Score: 9   Exam: PERRL, eyes are open, does not follow commands, EEG in place  Imaging: Available data reviewed       Fluids:  Intake/Output       03/08/18 0700 - 03/09/18 0659 03/09/18 0700 - 03/10/18 0659 03/10/18 0700 - 03/11/18 0659      9014-3446 8596-1096 Total 5527-6087 5924-2705 Total 5693-3031 7815-6162 Total       Intake    I.V.  100  -- 100  --  -- --  --  -- --    IV Piggyback Volume (IV Piggyback) 100 -- 100 -- -- -- -- -- --    Other  90  90 180  30  100 130  --  -- --    Medications (P.O./ Enteral Liquids) 90 90 180 30 100 130 -- -- --    Enteral  1620  520 2140  1380  520 1900  270  -- 270    Enteral Volume 3174 085 5196 5772 044 0261 240 -- 240    Free Water / Tube Flush 180 120 300 180 120 300 30 -- 30    Total Intake 1190 492 2924 3924 596 2111 270 -- 270       Output    Urine  1550  1200 2750  1950  1300 3250  160  -- 160    Indwelling Cathether 1550 1200 2750 1950 1300 3250 160 -- 160    Stool  --  -- --  --  -- --  --  -- --    Number of Times Stooled 1 x 1 x 2 x 2 x 2 x 4 x -- -- --    Total Output 1550 1200 9890 1950 1300 3250 160 -- 160       Net I/O     757 -792 -709 -005 -296 -1226 110 -- 110            Recent Labs      03/08/18   0537  03/09/18   0443  03/10/18   0455   SODIUM  134*  133*  133*   POTASSIUM  3.8  3.7  3.9   CHLORIDE  101  100  102   CO2  26  24  24   BUN  12  16  16   CREATININE  <0.20*  <0.20*  <0.20*   MAGNESIUM  1.8  2.0  1.9   CALCIUM  8.6  8.7  8.6       GI/Nutrition:  Exam: nondistended, abdomen is soft and non-tender, normal active bowel sounds, G-Tube  no sign of infection, palpable baclofen pump in right lower quadrant   Imaging: Available data reviewed    KUB : No evidence of bowel obstruction.   CT abdomen and pelvis with oral contrast :  1.  No evidence of bowel obstruction or perforation.  2.  Appendix not visualized.  3.  Small to moderate volume ascites of uncertain etiology.  4.  Bilateral pleural effusions with associated atelectasis.  5.  Ill-defined parenchymal opacities in the LEFT lower lung suggesting multifocal pneumonia.  6.  Scoliosis.    Liver Function  Recent Labs      03/08/18   0537  03/09/18   0443  03/10/18   0455   GLUCOSE  90  94  92       Heme:  Recent Labs      18   1015  03/09/18   0443  03/10/18   0455   RBC  3.09*   --   3.15*  3.32*   HEMOGLOBIN  9.1*   --   9.3*  9.9*   HEMATOCRIT  28.5*   --   28.9*  30.6*   PLATELETCT  508*   --   522*  510*   PROTHROMBTM   --   12.8   --    --    APTT   --   35.4   --    --    INR   --   0.99   --    --        Infectious Disease:  Monitored Temp  Av.1 °C (98.7 °F)  Min: 35.6 °C (96.08 °F)  Max: 38.1 °C (100.58 °F)  Micro: reviewed.   Recent Labs      03/08/18   0537  03/09/18   0443  03/10/18   0455   WBC  7.4  7.2  6.7   NEUTSPOLYS  57.00  57.30  54.30   LYMPHOCYTES  22.70  23.90  27.80   MONOCYTES  14.40*  13.40  12.40   EOSINOPHILS  3.90  3.60  3.40   BASOPHILS  1.20  1.00  1.20     Current Facility-Administered Medications   Medication Dose Frequency Provider Last Rate Last Dose   • magnesium sulfate IVPB premix 2 g  2 g Once Eleazar Grijalva M.D. 25 mL/hr at 03/10/18 0730 2  g at 03/10/18 0730   • topiramate (TOPAMAX) tablet 50 mg  50 mg Q12HRS Harris Bolton M.D.   50 mg at 03/10/18 0500   • PHENobarbital solution 120 mg  120 mg BID Aidan Encarnacion M.D.   120 mg at 03/10/18 0732   • potassium bicarbonate (KLYTE) 25 MEQ effervescent tablet TBEF 50 mEq  50 mEq DAILY Jeremy M Gonda, M.D.   50 mEq at 03/10/18 0731   • Pain Pump (patient supplied) Device   Continuous Jeremy M Gonda, M.D.       • enoxaparin (LOVENOX) inj 40 mg  40 mg DAILY Jeremy M Gonda, M.D.   40 mg at 03/10/18 0732   • lacosamide (VIMPAT) tablet 200 mg  200 mg BID Jeremy M Gonda, M.D.   200 mg at 03/10/18 0731   • levETIRAcetam (KEPPRA) 100 MG/ML solution 1,500 mg  1,500 mg Q12HRS Jeremy M Gonda, M.D.   1,500 mg at 03/10/18 0732   • loperamide (IMODIUM) 1 MG/5ML solution 2 mg  2 mg 4X/DAY PRN Clarence Morfin D.O.   2 mg at 02/26/18 0820   • omeprazole 2 mg/mL in sodium bicarbonate (PRILOSEC) oral susp 40 mg  40 mg DAILY Jon Reynoso D.O.   40 mg at 03/10/18 0731   • levalbuterol (XOPENEX) 1.25 MG/3ML nebulizer solution 1.25 mg  1.25 mg Q6HRS (RT) Jon Reynoso D.O.   1.25 mg at 03/10/18 0819   • Respiratory Care per Protocol   Continuous RT Modesto Appiah Jr., D.O.       • polyethylene glycol/lytes (MIRALAX) PACKET 1 Packet  1 Packet QDAY PRN Modesto Appiah Jr., D.O.   1 Packet at 03/02/18 1527    And   • magnesium hydroxide (MILK OF MAGNESIA) suspension 30 mL  30 mL QDAY PRN Modesto Appiah Jr., D.O.        And   • bisacodyl (DULCOLAX) suppository 10 mg  10 mg QDAY PRN Modesto Appiah Jr., D.O.   10 mg at 03/10/18 0731   • chlorhexidine (PERIDEX) 0.12 % solution 15 mL  15 mL BID JASPAL Alaniz Jr..O.   15 mL at 03/10/18 0731   • lidocaine (XYLOCAINE) 1%  injection  1-2 mL Q30 MIN PRN JASPAL Alaniz Jr..O.       • MD ALERT...Adult ICU Electrolyte Replacement per Pharmacy Protocol   pharmacy to dose Modesto Appiah Jr. D.O.       • ipratropium-albuterol (DUONEB) nebulizer solution 3 mL  3 mL Q2HRS PRN (RT)  Modesto Appiah Jr., D.O.       • levalbuterol (XOPENEX) 1.25 MG/3ML nebulizer solution 1.25 mg  1.25 mg Q4HRS PRN Duy Whitaker M.D.   1.25 mg at 03/06/18 1420   • miconazole 2%-zinc oxide (RADHA) topical cream   PRN Duy Whitaker M.D.       • acetaminophen (TYLENOL) tablet 650 mg  650 mg Q6HRS PRN Duy Whitaker M.D.   650 mg at 03/09/18 2225   • oxyCODONE immediate-release (ROXICODONE) tablet 5 mg  5 mg Q3HRS PRN Duy Whitaker M.D.        And   • oxyCODONE immediate release (ROXICODONE) tablet 10 mg  10 mg Q3HRS PRN Duy Whitaker M.D.       • ondansetron (ZOFRAN) syringe/vial injection 4 mg  4 mg Q4HRS PRN Duy Whitaker M.D.   4 mg at 02/25/18 1140   • ondansetron (ZOFRAN ODT) dispertab 4 mg  4 mg Q4HRS PRN Duy Whitaker M.D.       • promethazine (PHENERGAN) tablet 12.5-25 mg  12.5-25 mg Q4HRS PRN Duy Whitaker M.D.       • promethazine (PHENERGAN) suppository 12.5-25 mg  12.5-25 mg Q4HRS PRN Duy Whitaker M.D.       • prochlorperazine (COMPAZINE) injection 5-10 mg  5-10 mg Q4HRS PRN Duy Whitaker M.D.       • LORazepam (ATIVAN) injection 4 mg  4 mg Q10 MIN PRN Duy Whitaker M.D.   4 mg at 03/09/18 1046     Last reviewed on 2/18/2018  7:51 PM by Lois Camejo, Pharmacy Intern    Quality  Measures:  Labs reviewed, Medications reviewed and Radiology images reviewed                    Assessment and plan:  Acute hypoxemic respiratory failure - VDRF since 2/18, chronic trach   - T-piece    - RT/O2 protocol, encourage mobilization   - minimize sedatives and maintain aspiration precautions   - intermittent chest x-rays   - move to 21% aerosol   Left exudative pleural effusion per lytes criteria S/P thoracentesis 3/3   - cx neg  Septic shock from pulmonary source, now unspecified place a distributive shock - improved   - midodrine held, d/c today   - Completed antibiotics, s/p sepsis protocol   - citrobacter suspected colonizer   History of traumatic brain injury, chronic seizure disorder with  associated status epilepticus   - Continue antiepileptics per neurology   - ongoing seizure activity; terminated now on continuous EEG on 4 AEDs   - appreciated Neuro input   - MRI reviewed   - did not april VPA, high NH3   - LP neg  Decubitus ulcerations of the left hip including stage IV disease   - Wound care, optimize nutrition  Pneumonia (Proteus and Klebsiella)   - S/P antibiotics  Mild fluid overload - improved   - now euvolemic  Possible paralytic ileus versus feeding tube malfunction - resolved   - Continue tube feeding trial with advanced to goal home regimen   - Bowel protocol, daily Dulcolax suppository  Hyponatremia    - follow  Iron deficiency anemia - s/p iron supplementation  Hypokalemia/mag - repletion daily as needed  Prophylaxis, enteral nutrition    Mom thinks she sees sz activity - none on EEG per Neuro; reviewed with mom.  Discussed patient condition and risk of morbidity and/or mortality with Family, RN, RT, Pharmacy, Charge nurse / hot rounds and hospitalist.        Eleazar Grijalva M.D.

## 2018-03-10 NOTE — PROGRESS NOTES
Pt transported to IR with Fernando LECHUGA RN, Sandie ANGULO RN and pt transport. Pt on the monitor, Tpiece at 6 L, VSS.    1210 Pt to MRI with monitor, Sandie ANGULO RN and MRI tech. Tpiece at 6 L. VSS.    1320 Pt returned to S128 on monitor and Tpiece at 6L with Sandie ANGULO RN and pt transport. VSS.

## 2018-03-10 NOTE — PROCEDURES
VIDEO ELECTROENCEPHALOGRAM / EPILEPSY MONITORING UNIT REPORT           DOS:   3/9/2018 - 3/10/2018 (total recording for 21 hours and 21 minutes).      INDICATION:  Ruben Edwards 37 y.o. male presenting with recurrent seizures.      CURRENT ANTIEPILEPTIC REGIMEN: Levetiracetam 1500 mg q 12 hrs, Lacosamide 200 mg q 12 hrs, Phenobarbital 120 mg q 12 hrs, Topiramate 50 mg q 12 hrs.      TECHNIQUE: A 30-channel, 24 hrs video electroencephalogram (VEEG) was performed in accordance with the international 10-20 system. This digital study was reviewed in bipolar and referential montages. The recording examined an encephalopathic patient during awake and sleep.      DESCRIPTION OF THE RECORD:  During wakefulness, there is slowing of the background with asymmetric diffuse theta / delta activity, and mostly delta activity (superimposed slowing) in the right hemisphere. During sleep, there is diffuse slow delta activity.      ACTIVATION PROCEDURES:   Not performed.      ICTAL AND/OR INTERICTAL FINDINGS:   Frequent right frontotemporal spikes. Rarely, these may become briefly periodic (right PLEDS) but without clear evidence for seizures during the study.      EVENT(S):  None marked for review.      EKG: sampling review of EKG recording demonstrated sinus rhythm with sinus tachycardia.         INTERPRETATION:   This is an abnormal 24 hrs video electroencephalogram recording in an encephalopathic patient during awake and sleep states. There is diffuse cerebral dysfunction, suggestive of an encephalopathic state. Superimposed slowing in the right hemisphere is likely due to underlying structural abnormality. Frequent right frontotemporal spikes, with rare brief runs of right PLEDS, but without clear evidence for seizures during the study. The study remains improved when compared to initial recordings, however the patient remains at very high risk for seizure recurrence. Clinical and radiological correlation is recommended.             Harris Bolton MD  Medical Director, Epilepsy and Neurodiagnostics.   Clinical  of Neurology [facility].   Diplomate in Neurology, Epilepsy, and Electrodiagnostic Medicine.   Office: [phone]  Fax: [phone]    MARLON GOODMAN    DD:  03/10/2018 07:29:24  DT:  03/10/2018 07:39:53    D#:  [id number]  Job#:  [id number]

## 2018-03-10 NOTE — CARE PLAN
Problem: Oxygenation:  Goal: Maintain adequate oxygenation dependent on patient condition  Outcome: PROGRESSING AS EXPECTED  Pt on t-piece 5 lpm at 30%.     Problem: Bronchoconstriction:  Goal: Improve in air movement and diminished wheezing  Outcome: PROGRESSING AS EXPECTED  Q6 Xopenex 1.25

## 2018-03-10 NOTE — PROGRESS NOTES
Pt mother believes that her son is having a seizure characterized by L corner mouth quivering, rapid blinking and L arm and hand movement. MD Grijalva notified.     1333 MD at bedside. MD Bolton notified of possible seizure activity.     1340 MD Bolton does not see seizure activity on continuous EEG.

## 2018-03-11 LAB
AMMONIA PLAS-SCNC: 50 UMOL/L (ref 11–45)
ANION GAP SERPL CALC-SCNC: 9 MMOL/L (ref 0–11.9)
BASOPHILS # BLD AUTO: 1.1 % (ref 0–1.8)
BASOPHILS # BLD: 0.09 K/UL (ref 0–0.12)
BUN SERPL-MCNC: 18 MG/DL (ref 8–22)
CALCIUM SERPL-MCNC: 8.5 MG/DL (ref 8.5–10.5)
CHLORIDE SERPL-SCNC: 99 MMOL/L (ref 96–112)
CO2 SERPL-SCNC: 23 MMOL/L (ref 20–33)
CREAT SERPL-MCNC: <0.2 MG/DL (ref 0.5–1.4)
EOSINOPHIL # BLD AUTO: 0.26 K/UL (ref 0–0.51)
EOSINOPHIL NFR BLD: 3.2 % (ref 0–6.9)
ERYTHROCYTE [DISTWIDTH] IN BLOOD BY AUTOMATED COUNT: 64.2 FL (ref 35.9–50)
GLUCOSE SERPL-MCNC: 93 MG/DL (ref 65–99)
HCT VFR BLD AUTO: 30.6 % (ref 42–52)
HGB BLD-MCNC: 9.9 G/DL (ref 14–18)
IMM GRANULOCYTES # BLD AUTO: 0.1 K/UL (ref 0–0.11)
IMM GRANULOCYTES NFR BLD AUTO: 1.2 % (ref 0–0.9)
LYMPHOCYTES # BLD AUTO: 2.08 K/UL (ref 1–4.8)
LYMPHOCYTES NFR BLD: 25.9 % (ref 22–41)
MAGNESIUM SERPL-MCNC: 2 MG/DL (ref 1.5–2.5)
MCH RBC QN AUTO: 29.3 PG (ref 27–33)
MCHC RBC AUTO-ENTMCNC: 32.4 G/DL (ref 33.7–35.3)
MCV RBC AUTO: 90.5 FL (ref 81.4–97.8)
MONOCYTES # BLD AUTO: 0.89 K/UL (ref 0–0.85)
MONOCYTES NFR BLD AUTO: 11.1 % (ref 0–13.4)
NEUTROPHILS # BLD AUTO: 4.62 K/UL (ref 1.82–7.42)
NEUTROPHILS NFR BLD: 57.5 % (ref 44–72)
NRBC # BLD AUTO: 0 K/UL
NRBC BLD-RTO: 0 /100 WBC
PLATELET # BLD AUTO: 534 K/UL (ref 164–446)
PMV BLD AUTO: 8.3 FL (ref 9–12.9)
POTASSIUM SERPL-SCNC: 3.9 MMOL/L (ref 3.6–5.5)
RBC # BLD AUTO: 3.38 M/UL (ref 4.7–6.1)
SODIUM SERPL-SCNC: 131 MMOL/L (ref 135–145)
WBC # BLD AUTO: 8 K/UL (ref 4.8–10.8)

## 2018-03-11 PROCEDURE — 80048 BASIC METABOLIC PNL TOTAL CA: CPT

## 2018-03-11 PROCEDURE — 85025 COMPLETE CBC W/AUTO DIFF WBC: CPT

## 2018-03-11 PROCEDURE — 700101 HCHG RX REV CODE 250: Performed by: PSYCHIATRY & NEUROLOGY

## 2018-03-11 PROCEDURE — A9270 NON-COVERED ITEM OR SERVICE: HCPCS | Performed by: HOSPITALIST

## 2018-03-11 PROCEDURE — 700101 HCHG RX REV CODE 250: Performed by: INTERNAL MEDICINE

## 2018-03-11 PROCEDURE — A9270 NON-COVERED ITEM OR SERVICE: HCPCS | Performed by: INTERNAL MEDICINE

## 2018-03-11 PROCEDURE — 770022 HCHG ROOM/CARE - ICU (200)

## 2018-03-11 PROCEDURE — 95951 HCHG EEG-VIDEO-24HR: CPT

## 2018-03-11 PROCEDURE — 94640 AIRWAY INHALATION TREATMENT: CPT

## 2018-03-11 PROCEDURE — 700102 HCHG RX REV CODE 250 W/ 637 OVERRIDE(OP): Performed by: PSYCHIATRY & NEUROLOGY

## 2018-03-11 PROCEDURE — 700111 HCHG RX REV CODE 636 W/ 250 OVERRIDE (IP): Performed by: INTERNAL MEDICINE

## 2018-03-11 PROCEDURE — A4357 BEDSIDE DRAINAGE BAG: HCPCS | Performed by: INTERNAL MEDICINE

## 2018-03-11 PROCEDURE — 700102 HCHG RX REV CODE 250 W/ 637 OVERRIDE(OP): Performed by: INTERNAL MEDICINE

## 2018-03-11 PROCEDURE — 82140 ASSAY OF AMMONIA: CPT

## 2018-03-11 PROCEDURE — A9270 NON-COVERED ITEM OR SERVICE: HCPCS | Performed by: PSYCHIATRY & NEUROLOGY

## 2018-03-11 PROCEDURE — 700102 HCHG RX REV CODE 250 W/ 637 OVERRIDE(OP): Performed by: HOSPITALIST

## 2018-03-11 PROCEDURE — 99233 SBSQ HOSP IP/OBS HIGH 50: CPT | Performed by: INTERNAL MEDICINE

## 2018-03-11 PROCEDURE — 83735 ASSAY OF MAGNESIUM: CPT

## 2018-03-11 RX ORDER — BISACODYL 10 MG
10 SUPPOSITORY, RECTAL RECTAL
Status: DISCONTINUED | OUTPATIENT
Start: 2018-03-11 | End: 2018-03-18 | Stop reason: HOSPADM

## 2018-03-11 RX ORDER — BISACODYL 10 MG
5 SUPPOSITORY, RECTAL RECTAL DAILY
Status: DISCONTINUED | OUTPATIENT
Start: 2018-03-12 | End: 2018-03-18 | Stop reason: HOSPADM

## 2018-03-11 RX ORDER — POLYETHYLENE GLYCOL 3350 17 G/17G
1 POWDER, FOR SOLUTION ORAL
Status: DISCONTINUED | OUTPATIENT
Start: 2018-03-11 | End: 2018-03-11

## 2018-03-11 RX ORDER — POTASSIUM CHLORIDE 20 MEQ/1
20 TABLET, EXTENDED RELEASE ORAL ONCE
Status: ACTIVE | OUTPATIENT
Start: 2018-03-11 | End: 2018-03-12

## 2018-03-11 RX ORDER — BISACODYL 10 MG
5 SUPPOSITORY, RECTAL RECTAL
Status: DISCONTINUED | OUTPATIENT
Start: 2018-03-11 | End: 2018-03-11

## 2018-03-11 RX ORDER — TOPIRAMATE 100 MG/1
100 TABLET, FILM COATED ORAL EVERY 12 HOURS
Status: DISCONTINUED | OUTPATIENT
Start: 2018-03-11 | End: 2018-03-18 | Stop reason: HOSPADM

## 2018-03-11 RX ORDER — POLYETHYLENE GLYCOL 3350 17 G/17G
1 POWDER, FOR SOLUTION ORAL
Status: DISCONTINUED | OUTPATIENT
Start: 2018-03-11 | End: 2018-03-18 | Stop reason: HOSPADM

## 2018-03-11 RX ADMIN — LACOSAMIDE 200 MG: 50 TABLET, FILM COATED ORAL at 21:43

## 2018-03-11 RX ADMIN — POTASSIUM BICARBONATE 50 MEQ: 25 TABLET, EFFERVESCENT ORAL at 09:20

## 2018-03-11 RX ADMIN — LEVALBUTEROL HYDROCHLORIDE 1.25 MG: 1.25 SOLUTION RESPIRATORY (INHALATION) at 03:22

## 2018-03-11 RX ADMIN — LEVALBUTEROL HYDROCHLORIDE 1.25 MG: 1.25 SOLUTION RESPIRATORY (INHALATION) at 18:54

## 2018-03-11 RX ADMIN — CHLORHEXIDINE GLUCONATE 15 ML: 1.2 RINSE ORAL at 09:20

## 2018-03-11 RX ADMIN — LEVALBUTEROL HYDROCHLORIDE 1.25 MG: 1.25 SOLUTION RESPIRATORY (INHALATION) at 14:41

## 2018-03-11 RX ADMIN — TOPIRAMATE 100 MG: 100 TABLET, FILM COATED ORAL at 17:47

## 2018-03-11 RX ADMIN — PHENOBARBITAL 120 MG: 20 ELIXIR ORAL at 11:16

## 2018-03-11 RX ADMIN — CHLORHEXIDINE GLUCONATE 15 ML: 1.2 RINSE ORAL at 21:44

## 2018-03-11 RX ADMIN — LEVETIRACETAM 1500 MG: 100 SOLUTION ORAL at 21:44

## 2018-03-11 RX ADMIN — PHENOBARBITAL 120 MG: 20 ELIXIR ORAL at 21:44

## 2018-03-11 RX ADMIN — POTASSIUM BICARBONATE 25 MEQ: 25 TABLET, EFFERVESCENT ORAL at 16:26

## 2018-03-11 RX ADMIN — TOPIRAMATE 100 MG: 100 TABLET, FILM COATED ORAL at 09:36

## 2018-03-11 RX ADMIN — OMEPRAZOLE 40 MG: 20 CAPSULE, DELAYED RELEASE ORAL at 09:20

## 2018-03-11 RX ADMIN — LACOSAMIDE 200 MG: 50 TABLET, FILM COATED ORAL at 09:20

## 2018-03-11 RX ADMIN — BISACODYL 10 MG: 10 SUPPOSITORY RECTAL at 09:45

## 2018-03-11 RX ADMIN — LEVETIRACETAM 1500 MG: 100 SOLUTION ORAL at 09:21

## 2018-03-11 RX ADMIN — ENOXAPARIN SODIUM 40 MG: 100 INJECTION SUBCUTANEOUS at 09:20

## 2018-03-11 RX ADMIN — TOPIRAMATE 50 MG: 25 TABLET, FILM COATED ORAL at 05:40

## 2018-03-11 RX ADMIN — LEVALBUTEROL HYDROCHLORIDE 1.25 MG: 1.25 SOLUTION RESPIRATORY (INHALATION) at 07:55

## 2018-03-11 NOTE — PROGRESS NOTES
[facility] Hospitalist Progress Note    Date of Service: 3/11/2018    Chief Complaint  37 y.o. male admitted 2018 with shortness of breath.    Interval Problem Update  Patient with a history of traumatic brain injury and seizure disorder, trach in place.   Had underwent taper off of his phenobarbital, family felt he was doing worse.   Upon arrival patient was noted to be hypoxic, deemed aspiration pneumonia due to seizures and patient was placed on a ventilator.   Pt again with seizures, neuro added meds.  Patient is currently tolerating his tube feeds, home blend is being used.   Patient seen and examined in the ICU, ICU care given.  Discussed patient condition and plan with Intensivist, RN, RT and charge nurse / hot rounds.    Continuous EEG going, no sign of new sz  Some tracking  -140  NSR  Tolerating home TF   Waldrop   Afebrile  RIJ  4L 28%, last  Night 2 hours on aerosol mask, will do all day today  Give k     Consultants/Specialty  Intensivist  Neurology    Disposition  Patient requires additional treatment in the hospital, may need placement at the time of discharge, LTAC is pending      Review of Systems   Unable to perform ROS: Intubated      Physical Exam  Laboratory/Imaging   Hemodynamics  No data recorded.   Monitored Temp: 37.1 °C (98.78 °F)  Pulse  Av.9  Min: 47  Max: 125 Heart Rate (Monitored): 95  NIBP: 140/80      Respiratory    #Aerosol Therapy / Airway Management: T-Piece, Aerosol Humidity Temp (celsius): 35 Respiration: 19, Pulse Oximetry: 95 %, O2 Daily Delivery Respiratory : T-Piece     Given By:: Trache, Work Of Breathing / Effort: Mild  RUL Breath Sounds: Rhonchi, RML Breath Sounds: Rhonchi, RLL Breath Sounds: Diminished, JONATAN Breath Sounds: Rhonchi, LLL Breath Sounds: Diminished    Fluids    Intake/Output Summary (Last 24 hours) at 18 0741  Last data filed at 18 0500   Gross per 24 hour   Intake             2335 ml   Output             1930 ml   Net              405  ml       Nutrition  Orders Placed This Encounter   Procedures   • Diet NPO     Standing Status:   Standing     Number of Occurrences:   1     Order Specific Question:   Restrict to:     Answer:   Strict [1]     Physical Exam   Constitutional: He appears cachectic. He appears ill.   HENT:   Head: Normocephalic and atraumatic.   Mouth/Throat: No oropharyngeal exudate.   Eyes: Right eye exhibits no discharge. Left eye exhibits no discharge.   Neck: Neck supple. No tracheal deviation present.   Trach   Cardiovascular: Normal rate and regular rhythm.    No murmur heard.  Pulmonary/Chest: No stridor. No apnea. He has decreased breath sounds in the right lower field and the left lower field. He has no wheezes. He has rhonchi. He has no rales.   Abdominal: Soft. Bowel sounds are normal.   Baclofen pump    Genitourinary:   Genitourinary Comments: catheter   Musculoskeletal: He exhibits deformity. He exhibits no edema.   Extensive contractures of extremities   Neurological:   Eyes open, doesn't respond or follow      Skin: Skin is warm and dry. He is not diaphoretic. No erythema.   Psychiatric: He is noncommunicative.   Nursing note and vitals reviewed.      Recent Labs      03/09/18 0443  03/10/18   0455  03/11/18   0411   WBC  7.2  6.7  8.0   RBC  3.15*  3.32*  3.38*   HEMOGLOBIN  9.3*  9.9*  9.9*   HEMATOCRIT  28.9*  30.6*  30.6*   MCV  91.7  92.2  90.5   MCH  29.5  29.8  29.3   MCHC  32.2*  32.4*  32.4*   RDW  63.9*  64.2*  64.2*   PLATELETCT  522*  510*  534*   MPV  8.3*  8.1*  8.3*     Recent Labs      03/09/18   0443  03/10/18   0455  03/11/18   0411   SODIUM  133*  133*  131*   POTASSIUM  3.7  3.9  3.9   CHLORIDE  100  102  99   CO2  24  24  23   GLUCOSE  94  92  93   BUN  16  16  18   CREATININE  <0.20*  <0.20*  <0.20*   CALCIUM  8.7  8.6  8.5     Recent Labs      03/08/18   1015   APTT  35.4   INR  0.99                  Assessment/Plan     Seizure (CMS-[facility])- (present on admission)   Assessment & Plan    - Seizures  improved, continuous EEG again  - Continue Vimpat, Keppra, phenobarbital, and topamax per neurology  - MRI brain concerning for changes per Dr. Bolton, no cause noted on CSF        Hypomagnesemia- (present on admission)   Assessment & Plan    - Resolved        Decubitus ulcer of ischium, left, stage IV (CMS-HCC)- (present on admission)   Assessment & Plan    - Continue wound care        Azotemia- (present on admission)   Assessment & Plan    - Resolved with fluids        Acute on chronic respiratory failure with hypoxemia (CMS-HCC)- (present on admission)   Assessment & Plan    - Improved, now on low flow O2  - Was placed on ventilator initially on 2/18  - Due to aspiration pneumonia during seizures        Aspiration pneumonia (CMS-HCC)- (present on admission)   Assessment & Plan    - Causing septic shock  - Finished full course of antibiotics        Anemia, blood loss- (present on admission)   Assessment & Plan    - Hemoglobin stable  - No sign of gross bleeding        Diarrhea- (present on admission)   Assessment & Plan    - Resolved          Protein-calorie malnutrition, severe (CMS-HCC)- (present on admission)   Assessment & Plan    - Chronic, tolerating tube feeds        Physical debility- (present on admission)   Assessment & Plan    - Chronic post traumatic brain injury  - At baseline        TBI (traumatic brain injury) (CMS-HCC)- (present on admission)   Assessment & Plan    - Chronic, nonverbal        Hyponatremia- (present on admission)   Assessment & Plan    - Resolved          Septic shock (CMS-HCC)- (present on admission)   Assessment & Plan    - Resolved, was due to aspiration pneumonia, finish full course of antibiotics        Contraction, joint, multiple sites- (present on admission)   Assessment & Plan    - Chronic  - has baclofen pump which was refilled in January  - Pump has been interrogating and is working properly        GI bleed- (present on admission)   Assessment & Plan    - Resolved           Quality-Core Measures   Reviewed items::  Labs reviewed, Medications reviewed and Radiology images reviewed  Waldrop catheter::  Unconscious / Sedated Patient on a Ventilator  DVT prophylaxis pharmacological::  Enoxaparin (Lovenox)  Ulcer Prophylaxis::  Yes

## 2018-03-11 NOTE — PROGRESS NOTES
Bedside report received. Patient adjusted for comfort. Bed in lowest position, head of bed at 30 degrees.

## 2018-03-11 NOTE — CARE PLAN
Problem: Safety  Goal: Will remain free from injury  Outcome: PROGRESSING AS EXPECTED  Patient call light within reach, bed in lowest position.    Problem: Bowel/Gastric:  Goal: Normal bowel function is maintained or improved  Outcome: PROGRESSING AS EXPECTED  Patient had a bowel movement today    Problem: Skin Integrity  Goal: Risk for impaired skin integrity will decrease  Outcome: PROGRESSING SLOWER THAN EXPECTED  Dressing changes as ordered, turning every two hours  To either right or suspine

## 2018-03-11 NOTE — PROGRESS NOTES
Pulmonary Critical Care Progress Note        Date of admission: 2/18/2018    Chief Complaint: Respiratory failure, septic shock    History of Present Illness:  Mr. Edwards is a 37-year-old male with past medical history of traumatic brain injury at age 17, seizures diagnosed in June of 2017. His baseline mental status appears to be nodding and minimal interacting without vocalization. Who was brought to the ICU as a direct admit from [facility] where he was brought today for hypoxia. Reportedly the patient was being weanied off of his phenobarbital by his neurologist when he began having more frequent seizures, approximately one week ago his neurologist increased his Keppra dosing however the seizures continued. His mother did use CBD and THC with some success in slowing the seizures. Since 2 days ago the patient had multiple aspiration events following these seizures. His mother evaluated him with a home oxygen saturation monitor and he was noted to be hypoxic, he was brought to [facility] where he was found to have 28% bands with leukopenia. Chest x-ray was obtained demonstrating bilateral infiltrates right greater than left. He was placed on mechanical ventilation and his oxygenation was unable to be improved more than 85%, he was hypotensive and given multiple boluses of IV fluids without improvement. He was started on levo fed and given multiple doses of Ativan for seizures and transferred to our ICU for higher level of care. He arrives on the ventilator and on pressors critically ill.      ROS:  Respiratory: unable to perform due to the patient's inability to effectively communicate, Cardiac: unable to perform due to the patient's inability to effectively communicate, GI: unable to perform due to the patient's inability to effectively communicate.  All other systems negative. Unchanged    Interval Events:  24 hour interval history reviewed    - afebrile   - HR 90 - 100   - 28%, april aerosol  mask   - lovenox, PPI, no abx   - replace K   - continuous EEG, no current sz  Yesterday   - normotensive   - neuro no change, unresponsive, no obvious sz   - april TF   - 5L, 30%, T-piece, mini secretions   - replace Mg++   - on continuous EEG; mom thinks she sees sz    PFSH:  No change.    Respiratory:      Pulse Oximetry: 96 %           Exam: tracheostomy tube in good position without surrounding erythema, unlabored respirations, no intercostal retractions or accessory muscle use and rhonchi bibasilar. No wheeze  ImagingAvailable data reviewed         Invalid input(s): [id number]     HemoDynamics:  Pulse: 92, Heart Rate (Monitored): 92  NIBP: 140/80        Exam: regular rate and rhythm, regular rhythm (Sinus), no ectopy          Neuro:  GCS Total Aleida Coma Score: 9   Exam: PERRL, eyes are open, does not follow commands, EEG in place  Imaging: Available data reviewed       Fluids:  Intake/Output       03/09/18 0700 - 03/10/18 0659 03/10/18 0700 - 03/11/18 0659 03/11/18 0700 - 03/12/18 0659      9241-3402 4018-8458 Total 6785-8813 7653-2493 Total 3857-3550 7101-8270 Total       Intake    Other  30  100 130  30  60 90  --  -- --    Medications (P.O./ Enteral Liquids) 30 100 130 30 60 90 -- -- --    Enteral  1380  520 1900  1680  565 2245  --  -- --    Enteral Volume 3674 303 6848 6601 800 4905 -- -- --    Free Water / Tube Flush 180 120 300 240 165 405 -- -- --    Total Intake 9527 985 5744 7749 504 6281 -- -- --       Output    Urine  1950  1300 3250  830  1100 1930  --  -- --    Indwelling Cathether 1950 1300 3250 830 1100 1930 -- -- --    Drains  --  -- --  --  0 0  --  -- --    Residual Amount (ml) (Discarded) -- -- -- -- 0 0 -- -- --    Stool  --  -- --  --  -- --  --  -- --    Number of Times Stooled 2 x 2 x 4 x -- -- -- -- -- --    Total Output 1950 1300 3250 830 1100 1930 -- -- --       Net I/O     -446 -830 -9922 288 -329 999 -- -- --        Weight: 50.3 kg (110 lb 14.3 oz)  Recent Labs      03/09/18    0443  03/10/18   0455  03/11/18   0411   SODIUM  133*  133*  131*   POTASSIUM  3.7  3.9  3.9   CHLORIDE  100  102  99   CO2  24  24  23   BUN  16  16  18   CREATININE  <0.20*  <0.20*  <0.20*   MAGNESIUM  2.0  1.9  2.0   CALCIUM  8.7  8.6  8.5       GI/Nutrition:  Exam: nondistended, abdomen is soft and non-tender, normal active bowel sounds, G-Tube  no sign of infection, palpable baclofen pump in right lower quadrant   Imaging: Available data reviewed    KUB : No evidence of bowel obstruction.   CT abdomen and pelvis with oral contrast :  1.  No evidence of bowel obstruction or perforation.  2.  Appendix not visualized.  3.  Small to moderate volume ascites of uncertain etiology.  4.  Bilateral pleural effusions with associated atelectasis.  5.  Ill-defined parenchymal opacities in the LEFT lower lung suggesting multifocal pneumonia.  6.  Scoliosis.    Liver Function  Recent Labs      03/09/18   0443  03/10/18   0455  03/11/18   0411   GLUCOSE  94  92  93       Heme:  Recent Labs      18   1015  03/09/18   0443  03/10/18   0455  03/11/18   0411   RBC   --   3.15*  3.32*  3.38*   HEMOGLOBIN   --   9.3*  9.9*  9.9*   HEMATOCRIT   --   28.9*  30.6*  30.6*   PLATELETCT   --   522*  510*  534*   PROTHROMBTM  12.8   --    --    --    APTT  35.4   --    --    --    INR  0.99   --    --    --        Infectious Disease:  Monitored Temp  Av.2 °C (99 °F)  Min: 36.8 °C (98.24 °F)  Max: 37.6 °C (99.68 °F)  Micro: reviewed.   Recent Labs      03/09/18   0443  03/10/18   0455  03/11/18   0411   WBC  7.2  6.7  8.0   NEUTSPOLYS  57.30  54.30  57.50   LYMPHOCYTES  23.90  27.80  25.90   MONOCYTES  13.40  12.40  11.10   EOSINOPHILS  3.60  3.40  3.20   BASOPHILS  1.00  1.20  1.10     Current Facility-Administered Medications   Medication Dose Frequency Provider Last Rate Last Dose   • potassium chloride SA (Kdur) tablet 20 mEq  20 mEq Once Eleazar Grijalva M.D.       • topiramate (TOPAMAX) tablet 50 mg  50 mg Q12HRS  Harris Bolton M.D.   50 mg at 03/11/18 0540   • PHENobarbital solution 120 mg  120 mg BID Aidan Encarnacion M.D.   120 mg at 03/10/18 2058   • potassium bicarbonate (KLYTE) 25 MEQ effervescent tablet TBEF 50 mEq  50 mEq DAILY Jeremy M Gonda, M.D.   50 mEq at 03/10/18 0731   • Pain Pump (patient supplied) Device   Continuous Jeremy M Gonda, M.D.       • enoxaparin (LOVENOX) inj 40 mg  40 mg DAILY Jeremy M Gonda, M.D.   40 mg at 03/10/18 0732   • lacosamide (VIMPAT) tablet 200 mg  200 mg BID Jeremy M Gonda, M.D.   200 mg at 03/10/18 2058   • levETIRAcetam (KEPPRA) 100 MG/ML solution 1,500 mg  1,500 mg Q12HRS Jeremy M Gonda, M.D.   1,500 mg at 03/10/18 2059   • loperamide (IMODIUM) 1 MG/5ML solution 2 mg  2 mg 4X/DAY PRN Clarence Morfin D.O.   2 mg at 02/26/18 0820   • omeprazole 2 mg/mL in sodium bicarbonate (PRILOSEC) oral susp 40 mg  40 mg DAILY Jon Reynoso D.O.   40 mg at 03/10/18 0731   • levalbuterol (XOPENEX) 1.25 MG/3ML nebulizer solution 1.25 mg  1.25 mg Q6HRS (RT) Jon Reynoso D.O.   1.25 mg at 03/11/18 0755   • Respiratory Care per Protocol   Continuous RT Modesto Appiah Jr., D.O.       • polyethylene glycol/lytes (MIRALAX) PACKET 1 Packet  1 Packet QDAY PRN Modesto Appiah Jr., D.O.   1 Packet at 03/02/18 1527    And   • magnesium hydroxide (MILK OF MAGNESIA) suspension 30 mL  30 mL QDAY PRN Modesto Appiah Jr., D.O.        And   • bisacodyl (DULCOLAX) suppository 10 mg  10 mg QDAY PRN Modesto Appiah Jr., D.O.   10 mg at 03/10/18 0731   • chlorhexidine (PERIDEX) 0.12 % solution 15 mL  15 mL BID Modesto Appiah Jr. D.O.   15 mL at 03/10/18 2058   • lidocaine (XYLOCAINE) 1%  injection  1-2 mL Q30 MIN PRN JASPAL Alaniz Jr..O.       • MD ALERT...Adult ICU Electrolyte Replacement per Pharmacy Protocol   pharmacy to dose JASPAL Alaniz Jr..O.       • ipratropium-albuterol (DUONEB) nebulizer solution 3 mL  3 mL Q2HRS PRN (RT) JASPAL Alaniz Jr..O.       • levalbuterol (XOPENEX) 1.25 MG/3ML  nebulizer solution 1.25 mg  1.25 mg Q4HRS PRN Duy Whitaker M.D.   1.25 mg at 03/06/18 1420   • miconazole 2%-zinc oxide (RADHA) topical cream   PRN Duy Whitaker M.D.       • acetaminophen (TYLENOL) tablet 650 mg  650 mg Q6HRS PRN Duy Whitaker M.D.   650 mg at 03/10/18 1544   • oxyCODONE immediate-release (ROXICODONE) tablet 5 mg  5 mg Q3HRS PRN Duy Whitaker M.D.        And   • oxyCODONE immediate release (ROXICODONE) tablet 10 mg  10 mg Q3HRS PRN Duy Whitaker M.D.       • ondansetron (ZOFRAN) syringe/vial injection 4 mg  4 mg Q4HRS PRN Duy Whitaker M.D.   4 mg at 02/25/18 1140   • ondansetron (ZOFRAN ODT) dispertab 4 mg  4 mg Q4HRS PRN Duy Whitaker M.D.       • promethazine (PHENERGAN) tablet 12.5-25 mg  12.5-25 mg Q4HRS PRN Duy Whitaker M.D.       • promethazine (PHENERGAN) suppository 12.5-25 mg  12.5-25 mg Q4HRS PRN Duy Whitaker M.D.       • prochlorperazine (COMPAZINE) injection 5-10 mg  5-10 mg Q4HRS PRN Duy Whitaker M.D.       • LORazepam (ATIVAN) injection 4 mg  4 mg Q10 MIN PRN Duy Whitaker M.D.   4 mg at 03/09/18 1046     Last reviewed on 2/18/2018  7:51 PM by Lois Camejo, Pharmacy Intern    Quality  Measures:   Labs reviewed, Medications reviewed and Radiology images reviewed                    Assessment and plan:  Acute hypoxemic respiratory failure - VDRF since 2/18, chronic trach   - T-piece    - RT/O2 protocol, encourage mobilization   - minimize sedatives and maintain aspiration precautions   - intermittent chest x-rays   - move to 21% aerosol   Left exudative pleural effusion per lytes criteria S/P thoracentesis 3/3   - cx neg  Septic shock from pulmonary source, now unspecified place a distributive shock - improved   - midodrine held, d/c today   - Completed antibiotics, s/p sepsis protocol   - citrobacter suspected colonizer   History of traumatic brain injury, chronic seizure disorder with associated status epilepticus   - Continue antiepileptics per  neurology   - ongoing seizure activity; terminated now on continuous EEG on 4 AEDs   - appreciated Neuro input   - MRI reviewed   - did not april VPA, high NH3   - LP neg  Decubitus ulcerations of the left hip including stage IV disease   - Wound care, optimize nutrition  Pneumonia (Proteus and Klebsiella)   - S/P antibiotics  Mild fluid overload - improved   - now euvolemic  Possible paralytic ileus versus feeding tube malfunction - resolved   - Continue tube feeding trial with advanced to goal home regimen   - Bowel protocol, daily Dulcolax suppository  Hyponatremia    - follow  Iron deficiency anemia - s/p iron supplementation  Hypokalemia/mag - repletion daily as needed  Prophylaxis, enteral nutrition  Neurology will discuss AED plan with mom Monday/Tuesday and work on f/u plan.  Move toward room air and d/c planning  Discussed patient condition and risk of morbidity and/or mortality with Family, RN, RT, Pharmacy, Charge nurse / hot rounds and hospitalist.        Eleazar Grijalva M.D.

## 2018-03-11 NOTE — PROGRESS NOTES
Dr Grijalva and Dr Morfin at bedside. Gave report and updates. New orders placed and implemented. Will continue to assess, monitor and provided care

## 2018-03-11 NOTE — EEG PROGRESS NOTE
VIDEO ELECTROENCEPHALOGRAM / EPILEPSY MONITORING UNIT REPORT           DOS:   3/10/2018 - 3/11/2018 (total recording for 22 hours and 59 minutes).      INDICATION:  Ruben Edwards 37 y.o. male presenting with recurrent seizures.      CURRENT ANTIEPILEPTIC REGIMEN: Levetiracetam 1500 mg q 12 hrs, Lacosamide 200 mg q 12 hrs, Phenobarbital 120 mg q 12 hrs, Topiramate 50 mg q 12 hrs.      TECHNIQUE: A 30-channel, 24 hrs video electroencephalogram (VEEG) was performed in accordance with the international 10-20 system. This digital study was reviewed in bipolar and referential montages. The recording examined an encephalopathic patient during awake and sleep.      DESCRIPTION OF THE RECORD:  During wakefulness, there is slowing of the background with asymmetric diffuse theta activity, and mostly delta activity (superimposed slowing) in the right hemisphere. During sleep, there is diffuse slow delta activity.      ACTIVATION PROCEDURES:   Not performed.      ICTAL AND/OR INTERICTAL FINDINGS:   Frequent right frontotemporal spikes. Frequently, these may become briefly periodic (right PLEDS) but without clear evidence for seizures during the study.      EVENT(S):  None marked for review.      EKG: sampling review of EKG recording demonstrated sinus rhythm with sinus tachycardia.         INTERPRETATION:   This is an abnormal 24 hrs video electroencephalogram recording in an encephalopathic patient during awake and sleep states. There is diffuse cerebral dysfunction, suggestive of an encephalopathic state. Superimposed slowing in the right hemisphere is likely due to underlying structural abnormality. Frequent right frontotemporal spikes. Frequently, these may become briefly periodic (right PLEDS) but without clear evidence for seizures during the study. The study is perhaps mildly worse when compared to prior recording as brief runs of right PLEDS more frequent. The patient remains at very high risk for seizure recurrence.  Clinical and radiological correlation is recommended.     Sinus tachycardia is observed through the study and is NOT associated with either PLEDS or epileptiform discharges.      Updates provided to patient's RN.      Harris Bolton MD  Medical Director, Epilepsy and Neurodiagnostics.   Clinical  of Neurology [facility].   Diplomate in Neurology, Epilepsy, and Electrodiagnostic Medicine.   Office: [phone]  Fax: [phone]

## 2018-03-11 NOTE — CARE PLAN
Problem: Skin Integrity  Goal: Risk for impaired skin integrity will decrease    Intervention: Assess risk factors for impaired skin integrity and/or pressure ulcers  Skin assessed at beginning of shift and every shift. Nutrition evaluated. Moisturizer assessed . Sensory assessed. Activity assessed. Mobility addressed with family. And friction and shear addressed. Neo score completed and documented.  Will continue to assess and monitor       Problem: Pain Management  Goal: Pain level will decrease to patient's comfort goal    Intervention: Follow pain managment plan developed in collaboration with patient and Interdisciplinary Team  Pain assessed every two hours and PRN.  Alternative interventions implemented if needed to reduce pain level.  PRN medications also an intervention if needed to reduce pain.  Will continue to assess and monitor.

## 2018-03-11 NOTE — PROGRESS NOTES
Assessment completed.  Checked  Left Arm Iv, was occluded. Removed Iv, patient has Right IJ triple lumen for access.   Changed protective pressure point dressings to bilateral elbows.   Changed peg tube. Changed out trach collar and inner cannula. Adjusted patient for comfort. Respiratory completed treatments.

## 2018-03-11 NOTE — PROGRESS NOTES
Labs drawn and sent to lab. CHG bath completed. Waldrop care completed. Brush teeth and mouth swabbed. Dressing changes done and documented.  Complete linen change completed. Adjusted patient for comfort.

## 2018-03-11 NOTE — PROCEDURES
VIDEO ELECTROENCEPHALOGRAM / EPILEPSY MONITORING UNIT REPORT           DOS:   3/10/2018 - 3/11/2018 (total recording for 22 hours and 59 minutes).      INDICATION:  Ruben Ewdards 37 y.o. male presenting with recurrent seizures.      CURRENT ANTIEPILEPTIC REGIMEN: Levetiracetam 1500 mg q 12 hrs, Lacosamide 200 mg q 12 hrs, Phenobarbital 120 mg q 12 hrs, Topiramate 50 mg q 12 hrs.      TECHNIQUE: A 30-channel, 24 hrs video electroencephalogram (VEEG) was performed in accordance with the international 10-20 system. This digital study was reviewed in bipolar and referential montages. The recording examined an encephalopathic patient during awake and sleep.      DESCRIPTION OF THE RECORD:  During wakefulness, there is slowing of the background with asymmetric diffuse theta activity, and mostly delta activity (superimposed slowing) in the right hemisphere. During sleep, there is diffuse slow delta activity.      ACTIVATION PROCEDURES:   Not performed.      ICTAL AND/OR INTERICTAL FINDINGS:   Frequent right frontotemporal spikes. Frequently, these may become briefly periodic (right PLEDS) but without clear evidence for seizures during the study.      EVENT(S):  None marked for review.      EKG: sampling review of EKG recording demonstrated sinus rhythm with sinus tachycardia.         INTERPRETATION:   This is an abnormal 24 hrs video electroencephalogram recording in an encephalopathic patient during awake and sleep states. There is diffuse cerebral dysfunction, suggestive of an encephalopathic state. Superimposed slowing in the right hemisphere is likely due to underlying structural abnormality. Frequent right frontotemporal spikes. Frequently, these may become briefly periodic (right PLEDS) but without clear evidence for seizures during the study. The study is perhaps mildly worse when compared to prior recording as brief runs of right PLEDS more frequent. The patient remains at very high risk for seizure recurrence.  Clinical and radiological correlation is recommended.      Sinus tachycardia is observed through the study and is NOT associated with either PLEDS or epileptiform discharges.      Updates provided to patient's RN.      Harris Bolton MD  Medical Director, Epilepsy and Neurodiagnostics.   Clinical  of Neurology [facility].   Diplomate in Neurology, Epilepsy, and Electrodiagnostic Medicine.   Office: [phone]  Fax: [phone]       MARLON GOODMAN    DD:  03/11/2018 09:09:52  DT:  03/11/2018 09:31:07    D#:  [id number]  Job#:  [id number]

## 2018-03-11 NOTE — CARE PLAN
Problem: Oxygenation:  Goal: Maintain adequate oxygenation dependent on patient condition  Outcome: PROGRESSING AS EXPECTED  Pt on T-piece 4 lpm at 28%. Pt tolerated cool aerosol trial for 2 hrs on 4 lpm at 28%.    Problem: Bronchoconstriction:  Goal: Improve in air movement and diminished wheezing  Q6 Xopenex 1.25

## 2018-03-12 ENCOUNTER — APPOINTMENT (OUTPATIENT)
Dept: RADIOLOGY | Facility: MEDICAL CENTER | Age: 38
DRG: 853 | End: 2018-03-12
Attending: INTERNAL MEDICINE
Payer: COMMERCIAL

## 2018-03-12 DIAGNOSIS — G40.219 PHARMACORESISTANT PARTIAL EPILEPSY WITH IMPAIRMENT OF CONSCIOUSNESS, INTRACTABLE (HCC): ICD-10-CM

## 2018-03-12 LAB
ALBUMIN SERPL BCP-MCNC: 3.3 G/DL (ref 3.2–4.9)
ALBUMIN/GLOB SERPL: 0.8 G/DL
ALP SERPL-CCNC: 301 U/L (ref 30–99)
ALT SERPL-CCNC: 38 U/L (ref 2–50)
AMMONIA PLAS-SCNC: 46 UMOL/L (ref 11–45)
ANION GAP SERPL CALC-SCNC: 7 MMOL/L (ref 0–11.9)
AST SERPL-CCNC: 27 U/L (ref 12–45)
BACTERIA CSF CULT: NORMAL
BASOPHILS # BLD AUTO: 1 % (ref 0–1.8)
BASOPHILS # BLD: 0.08 K/UL (ref 0–0.12)
BILIRUB SERPL-MCNC: 0.2 MG/DL (ref 0.1–1.5)
BUN SERPL-MCNC: 18 MG/DL (ref 8–22)
CALCIUM SERPL-MCNC: 8.4 MG/DL (ref 8.5–10.5)
CHLORIDE SERPL-SCNC: 101 MMOL/L (ref 96–112)
CO2 SERPL-SCNC: 21 MMOL/L (ref 20–33)
CREAT SERPL-MCNC: <0.2 MG/DL (ref 0.5–1.4)
CRP SERPL HS-MCNC: 8.73 MG/DL (ref 0–0.75)
EOSINOPHIL # BLD AUTO: 0.32 K/UL (ref 0–0.51)
EOSINOPHIL NFR BLD: 4 % (ref 0–6.9)
ERYTHROCYTE [DISTWIDTH] IN BLOOD BY AUTOMATED COUNT: 64.7 FL (ref 35.9–50)
GLOBULIN SER CALC-MCNC: 4.2 G/DL (ref 1.9–3.5)
GLUCOSE SERPL-MCNC: 100 MG/DL (ref 65–99)
GRAM STN SPEC: NORMAL
HCT VFR BLD AUTO: 32.8 % (ref 42–52)
HGB BLD-MCNC: 10.4 G/DL (ref 14–18)
IMM GRANULOCYTES # BLD AUTO: 0.15 K/UL (ref 0–0.11)
IMM GRANULOCYTES NFR BLD AUTO: 1.9 % (ref 0–0.9)
LYMPHOCYTES # BLD AUTO: 1.74 K/UL (ref 1–4.8)
LYMPHOCYTES NFR BLD: 21.6 % (ref 22–41)
MAGNESIUM SERPL-MCNC: 1.9 MG/DL (ref 1.5–2.5)
MCH RBC QN AUTO: 29.2 PG (ref 27–33)
MCHC RBC AUTO-ENTMCNC: 31.7 G/DL (ref 33.7–35.3)
MCV RBC AUTO: 92.1 FL (ref 81.4–97.8)
MONOCYTES # BLD AUTO: 0.85 K/UL (ref 0–0.85)
MONOCYTES NFR BLD AUTO: 10.6 % (ref 0–13.4)
NEUTROPHILS # BLD AUTO: 4.9 K/UL (ref 1.82–7.42)
NEUTROPHILS NFR BLD: 60.9 % (ref 44–72)
NRBC # BLD AUTO: 0 K/UL
NRBC BLD-RTO: 0 /100 WBC
PLATELET # BLD AUTO: 461 K/UL (ref 164–446)
PMV BLD AUTO: 7.9 FL (ref 9–12.9)
POTASSIUM SERPL-SCNC: 3.6 MMOL/L (ref 3.6–5.5)
PREALB SERPL-MCNC: 17 MG/DL (ref 18–38)
PROT SERPL-MCNC: 7.5 G/DL (ref 6–8.2)
RBC # BLD AUTO: 3.56 M/UL (ref 4.7–6.1)
SIGNIFICANT IND 70042: NORMAL
SITE SITE: NORMAL
SODIUM SERPL-SCNC: 129 MMOL/L (ref 135–145)
SOURCE SOURCE: NORMAL
WBC # BLD AUTO: 8 K/UL (ref 4.8–10.8)

## 2018-03-12 PROCEDURE — 700102 HCHG RX REV CODE 250 W/ 637 OVERRIDE(OP): Performed by: INTERNAL MEDICINE

## 2018-03-12 PROCEDURE — A9270 NON-COVERED ITEM OR SERVICE: HCPCS | Performed by: INTERNAL MEDICINE

## 2018-03-12 PROCEDURE — 82140 ASSAY OF AMMONIA: CPT

## 2018-03-12 PROCEDURE — 85025 COMPLETE CBC W/AUTO DIFF WBC: CPT

## 2018-03-12 PROCEDURE — 700101 HCHG RX REV CODE 250: Performed by: INTERNAL MEDICINE

## 2018-03-12 PROCEDURE — 770001 HCHG ROOM/CARE - MED/SURG/GYN PRIV*

## 2018-03-12 PROCEDURE — 95951 HCHG EEG-VIDEO-24HR: CPT

## 2018-03-12 PROCEDURE — 94640 AIRWAY INHALATION TREATMENT: CPT

## 2018-03-12 PROCEDURE — 80053 COMPREHEN METABOLIC PANEL: CPT

## 2018-03-12 PROCEDURE — 86140 C-REACTIVE PROTEIN: CPT

## 2018-03-12 PROCEDURE — 99233 SBSQ HOSP IP/OBS HIGH 50: CPT | Performed by: INTERNAL MEDICINE

## 2018-03-12 PROCEDURE — 84134 ASSAY OF PREALBUMIN: CPT

## 2018-03-12 PROCEDURE — 700111 HCHG RX REV CODE 636 W/ 250 OVERRIDE (IP): Performed by: INTERNAL MEDICINE

## 2018-03-12 PROCEDURE — A9270 NON-COVERED ITEM OR SERVICE: HCPCS | Performed by: HOSPITALIST

## 2018-03-12 PROCEDURE — 71045 X-RAY EXAM CHEST 1 VIEW: CPT

## 2018-03-12 PROCEDURE — 83735 ASSAY OF MAGNESIUM: CPT

## 2018-03-12 PROCEDURE — 700101 HCHG RX REV CODE 250: Performed by: PSYCHIATRY & NEUROLOGY

## 2018-03-12 PROCEDURE — 700102 HCHG RX REV CODE 250 W/ 637 OVERRIDE(OP): Performed by: HOSPITALIST

## 2018-03-12 RX ORDER — DIAZEPAM 10 MG/2G
1 GEL RECTAL
Qty: 6 EACH | Refills: 2 | Status: SHIPPED | OUTPATIENT
Start: 2018-03-12 | End: 2018-04-30

## 2018-03-12 RX ORDER — MAGNESIUM SULFATE HEPTAHYDRATE 40 MG/ML
2 INJECTION, SOLUTION INTRAVENOUS ONCE
Status: COMPLETED | OUTPATIENT
Start: 2018-03-12 | End: 2018-03-12

## 2018-03-12 RX ADMIN — LEVALBUTEROL HYDROCHLORIDE 1.25 MG: 1.25 SOLUTION RESPIRATORY (INHALATION) at 20:09

## 2018-03-12 RX ADMIN — LACOSAMIDE 200 MG: 50 TABLET, FILM COATED ORAL at 07:40

## 2018-03-12 RX ADMIN — ACETAMINOPHEN 650 MG: 325 TABLET, FILM COATED ORAL at 15:16

## 2018-03-12 RX ADMIN — CHLORHEXIDINE GLUCONATE 15 ML: 1.2 RINSE ORAL at 20:40

## 2018-03-12 RX ADMIN — LEVALBUTEROL HYDROCHLORIDE 1.25 MG: 1.25 SOLUTION RESPIRATORY (INHALATION) at 14:31

## 2018-03-12 RX ADMIN — LEVALBUTEROL HYDROCHLORIDE 1.25 MG: 1.25 SOLUTION RESPIRATORY (INHALATION) at 02:06

## 2018-03-12 RX ADMIN — TOPIRAMATE 100 MG: 100 TABLET, FILM COATED ORAL at 15:57

## 2018-03-12 RX ADMIN — OMEPRAZOLE 40 MG: 20 CAPSULE, DELAYED RELEASE ORAL at 07:39

## 2018-03-12 RX ADMIN — POTASSIUM BICARBONATE 50 MEQ: 25 TABLET, EFFERVESCENT ORAL at 07:40

## 2018-03-12 RX ADMIN — PHENOBARBITAL 120 MG: 20 ELIXIR ORAL at 15:16

## 2018-03-12 RX ADMIN — BISACODYL 5 MG: 10 SUPPOSITORY RECTAL at 07:39

## 2018-03-12 RX ADMIN — PHENOBARBITAL 120 MG: 20 ELIXIR ORAL at 20:41

## 2018-03-12 RX ADMIN — ENOXAPARIN SODIUM 40 MG: 100 INJECTION SUBCUTANEOUS at 07:39

## 2018-03-12 RX ADMIN — TOPIRAMATE 100 MG: 100 TABLET, FILM COATED ORAL at 05:13

## 2018-03-12 RX ADMIN — POTASSIUM BICARBONATE 50 MEQ: 25 TABLET, EFFERVESCENT ORAL at 15:13

## 2018-03-12 RX ADMIN — ACETAMINOPHEN 650 MG: 325 TABLET, FILM COATED ORAL at 21:10

## 2018-03-12 RX ADMIN — ACETAMINOPHEN 650 MG: 325 TABLET, FILM COATED ORAL at 08:52

## 2018-03-12 RX ADMIN — LEVETIRACETAM 1500 MG: 100 SOLUTION ORAL at 20:40

## 2018-03-12 RX ADMIN — LEVALBUTEROL HYDROCHLORIDE 1.25 MG: 1.25 SOLUTION RESPIRATORY (INHALATION) at 08:16

## 2018-03-12 RX ADMIN — CHLORHEXIDINE GLUCONATE 15 ML: 1.2 RINSE ORAL at 07:39

## 2018-03-12 RX ADMIN — LACOSAMIDE 200 MG: 50 TABLET, FILM COATED ORAL at 20:40

## 2018-03-12 RX ADMIN — MAGNESIUM SULFATE IN WATER 2 G: 40 INJECTION, SOLUTION INTRAVENOUS at 07:40

## 2018-03-12 RX ADMIN — LEVETIRACETAM 1500 MG: 100 SOLUTION ORAL at 07:39

## 2018-03-12 NOTE — EEG PROGRESS NOTE
VIDEO ELECTROENCEPHALOGRAM / EPILEPSY MONITORING UNIT REPORT           DOS:   3/12/2018 (total recording for 5 hours and 22 minutes).      INDICATION:  Ruben Edwards 37 y.o. male presenting with recurrent seizures.      CURRENT ANTIEPILEPTIC REGIMEN: Levetiracetam 1500 mg q 12 hrs, Lacosamide 200 mg q 12 hrs, Phenobarbital 120 mg q 12 hrs, Topiramate 100 mg q 12 hrs.      TECHNIQUE: A 30-channel, extended video electroencephalogram (VEEG) was performed in accordance with the international 10-20 system. This digital study was reviewed in bipolar and referential montages. The recording examined an encephalopathic patient during awake and sleep.      DESCRIPTION OF THE RECORD:  During wakefulness, there is slowing of the background with asymmetric diffuse theta activity, and mostly delta activity (superimposed slowing) in the right hemisphere. During sleep, there is diffuse slow delta activity.      ACTIVATION PROCEDURES:   Not performed.      ICTAL AND/OR INTERICTAL FINDINGS:   Frequent right frontotemporal spikes. Rarely, these may become briefly periodic (right PLEDS) but without clear evidence for seizures during the study.      EVENT(S):  None marked for review.      EKG: sampling review of EKG recording demonstrated sinus rhythm with sinus tachycardia.         INTERPRETATION:   This is an abnormal extended video electroencephalogram recording in an encephalopathic patient during awake and sleep states. There is diffuse cerebral dysfunction, suggestive of an encephalopathic state. Superimposed slowing in the right hemisphere is likely due to underlying structural abnormality. Frequent right frontotemporal spikes. Rarely, these may become briefly periodic (right PLEDS) but without clear evidence for seizures during the study. The study is stable when compared to prior recording. The patient remains at risk for seizure recurrence. Clinical and radiological correlation is recommended.      Sinus tachycardia is observed  through the study and is NOT associated with either PLEDS or epileptiform discharges.      Updates provided to patient's RN.      Harris Bolton MD  Medical Director, Epilepsy and Neurodiagnostics.   Clinical  of Neurology [facility].   Diplomate in Neurology, Epilepsy, and Electrodiagnostic Medicine.   Office: [phone]  Fax: [phone]

## 2018-03-12 NOTE — PROCEDURES
VIDEO ELECTROENCEPHALOGRAM / EPILEPSY MONITORING UNIT REPORT           DOS:   3/12/2018 (total recording for 5 hours and 22 minutes).      INDICATION:  Ruben Edwards 37 y.o. male presenting with recurrent seizures.      CURRENT ANTIEPILEPTIC REGIMEN: Levetiracetam 1500 mg q 12 hrs, Lacosamide 200 mg q 12 hrs, Phenobarbital 120 mg q 12 hrs, Topiramate 100 mg q 12 hrs.      TECHNIQUE: A 30-channel, extended video electroencephalogram (VEEG) was performed in accordance with the international 10-20 system. This digital study was reviewed in bipolar and referential montages. The recording examined an encephalopathic patient during awake and sleep.      DESCRIPTION OF THE RECORD:  During wakefulness, there is slowing of the background with asymmetric diffuse theta activity, and mostly delta activity (superimposed slowing) in the right hemisphere. During sleep, there is diffuse slow delta activity.      ACTIVATION PROCEDURES:   Not performed.      ICTAL AND/OR INTERICTAL FINDINGS:   Frequent right frontotemporal spikes. Rarely, these may become briefly periodic (right PLEDS) but without clear evidence for seizures during the study.      EVENT(S):  None marked for review.      EKG: sampling review of EKG recording demonstrated sinus rhythm with sinus tachycardia.         INTERPRETATION:   This is an abnormal extended video electroencephalogram recording in an encephalopathic patient during awake and sleep states. There is diffuse cerebral dysfunction, suggestive of an encephalopathic state. Superimposed slowing in the right hemisphere is likely due to underlying structural abnormality. Frequent right frontotemporal spikes. Rarely, these may become briefly periodic (right PLEDS) but without clear evidence for seizures during the study. The study is stable when compared to prior recording. The patient remains at risk for seizure recurrence. Clinical and radiological correlation is recommended.      Sinus tachycardia is observed  through the study and is NOT associated with either PLEDS or epileptiform discharges.      Updates provided to patient's RN.      Harris Bolton MD  Medical Director, Epilepsy and Neurodiagnostics.   Clinical  of Neurology [facility].   Diplomate in Neurology, Epilepsy, and Electrodiagnostic Medicine.   Office: [phone]  Fax: [phone]    MARLON GOODMAN    DD:  03/12/2018 14:26:49  DT:  03/12/2018 14:42:42    D#:  [id number]  Job#:  [id number]

## 2018-03-12 NOTE — PROGRESS NOTES
Pulmonary Critical Care Progress Note        Date of admission: 2/18/2018    Chief Complaint: Respiratory failure, septic shock    History of Present Illness:  Mr. Edwards is a 37-year-old male with past medical history of traumatic brain injury at age 17, seizures diagnosed in June of 2017. His baseline mental status appears to be nodding and minimal interacting without vocalization. Who was brought to the ICU as a direct admit from [facility] where he was brought today for hypoxia. Reportedly the patient was being weanied off of his phenobarbital by his neurologist when he began having more frequent seizures, approximately one week ago his neurologist increased his Keppra dosing however the seizures continued. His mother did use CBD and THC with some success in slowing the seizures. Since 2 days ago the patient had multiple aspiration events following these seizures. His mother evaluated him with a home oxygen saturation monitor and he was noted to be hypoxic, he was brought to [facility] where he was found to have 28% bands with leukopenia. Chest x-ray was obtained demonstrating bilateral infiltrates right greater than left. He was placed on mechanical ventilation and his oxygenation was unable to be improved more than 85%, he was hypotensive and given multiple boluses of IV fluids without improvement. He was started on levo fed and given multiple doses of Ativan for seizures and transferred to our ICU for higher level of care. He arrives on the ventilator and on pressors critically ill.      ROS:  unable to perform due to the patient's inability to effectively communicate     Interval Events:  24 hour interval history reviewed      24 hr EEG results noted from 3/11  Pending new 24 EEG  GCS 9 - prior TBI - baseline  Tracks?  SR/ST 80-100s  Bp 100-140s  UO adequate with condom cath  BM  pressure sore L ischium and stage IV better near ischium  R IJ CVC  Trach collar 28%  Secretions - moderate    Lovenox/PPI  K placement    Discussed case at bedside at great length with mother      YESTERDAY   - normotensive   - neuro no change, unresponsive, no obvious sz   - april TF   - 5L, 30%, T-piece, mini secretions   - replace Mg++   - on continuous EEG; mom thinks she sees sz    PFSH:  No change.    General:  Appears more chronic than acutely ill, appears older than stated age, nonverbal, multiple contractures - all old    Skin:  Warm and dry, no diaphoresis    Respiratory:      Pulse Oximetry: 98 %  Tracheostomy site clean and dry  HMTC 28%          Exam: tracheostomy tube in good position without surrounding erythema, unlabored respirations, no intercostal retractions or accessory muscle use and rhonchi bibasilar. No wheezes, breath sounds diminished at the bases    ImagingAvailable data reviewed         Invalid input(s): [id number]     HemoDynamics:  Pulse: 92, Heart Rate (Monitored): 92  NIBP: 116/62        Exam: regular rate and rhythm, regular rhythm (Sinus), no ectopy, no edema          Neuro:  GCS Total Makinen Coma Score: 9   Exam: PERRL, eyes are open, does not follow commands, EEG in place for continuous monitoring  Imaging: Available data reviewed     3/11 Interp 24 hr EEG:  This is an abnormal 24 hrs video electroencephalogram recording in an encephalopathic patient during awake and sleep states. There is diffuse cerebral dysfunction, suggestive of an encephalopathic state. Superimposed slowing in the right hemisphere is likely due to underlying structural abnormality. Frequent right frontotemporal spikes. Frequently, these may become briefly periodic (right PLEDS) but without clear evidence for seizures during the study. The study is perhaps mildly worse when compared to prior recording as brief runs of right PLEDS more frequent. The patient remains at very high risk for seizure recurrence. Clinical and radiological correlation is recommended.     3/12 Interp 24 hr EEG:   Pending    Fluids:  Intake/Output       03/10/18 0700 - 03/11/18 0659 03/11/18 0700 - 03/12/18 0659 03/12/18 0700 - 03/13/18 0659      0700-1859 1900-0659 Total 0700-1859 1900-0659 Total 0700-1859 1900-0659 Total       Intake    Other  30  60 90  260  90 350  --  -- --    Medications (P.O./ Enteral Liquids) 30 60 90 260 90 350 -- -- --    Enteral  1680  565 2245  1710  290 2000  --  -- --    Enteral Volume 6657 656 7738 2864 606 3908 -- -- --    Free Water / Tube Flush 240 165 405 270 90 360 -- -- --    Total Intake 5113 492 1944 0144 859 7693 -- -- --       Output    Urine  830  1100 1930  1125  650 1775  --  -- --    Condom Catheter -- -- -- -- 650 650 -- -- --    Number of Times Incontinent of Urine -- -- -- -- 1 x 1 x -- -- --    Indwelling Cathether 830 1100 1930 875 -- 875 -- -- --    Void (ml) -- -- -- 250 -- 250 -- -- --    Drains  --  0 0  --  -- --  --  -- --    Residual Amount (ml) (Discarded) -- 0 0 -- -- -- -- -- --    Stool  --  -- --  --  -- --  --  -- --    Number of Times Stooled -- -- -- 1 x -- 1 x -- -- --    Total Output 830 1100 1930 1830 774 3923 -- -- --       Net I/O     880 -475 405 845 -270 575 -- -- --           Recent Labs      03/10/18   0455  03/11/18   0411  03/12/18   0411   SODIUM  133*  131*  129*   POTASSIUM  3.9  3.9  3.6   CHLORIDE  102  99  101   CO2  24  23  21   BUN  16  18  18   CREATININE  <0.20*  <0.20*  <0.20*   MAGNESIUM  1.9  2.0  1.9   CALCIUM  8.6  8.5  8.4*       GI/Nutrition:  Exam: nondistended, abdomen is soft and non-tender, normal active bowel sounds, G-Tube  no sign of infection, palpable baclofen pump in right lower quadrant - no change    Imaging: Available data reviewed     KUB 2/25: No evidence of bowel obstruction.    CT abdomen and pelvis with oral contrast 2/26:  1.  No evidence of bowel obstruction or perforation.  2.  Appendix not visualized.  3.  Small to moderate volume ascites of uncertain etiology.  4.  Bilateral pleural effusions with associated  atelectasis.  5.  Ill-defined parenchymal opacities in the LEFT lower lung suggesting multifocal pneumonia.  6.  Scoliosis.    Liver Function  Recent Labs      03/10/18   0455  03/11/18   0411  03/12/18   0411   ALTSGPT   --    --   38   ASTSGOT   --    --   27   ALKPHOSPHAT   --    --   301*   TBILIRUBIN   --    --   0.2   PREALBUMIN   --    --   17.0*   GLUCOSE  92  93  100*       Heme:  Recent Labs      03/10/18   0455  03/11/18   0411  03/12/18   0411   RBC  3.32*  3.38*  3.56*   HEMOGLOBIN  9.9*  9.9*  10.4*   HEMATOCRIT  30.6*  30.6*  32.8*   PLATELETCT  510*  534*  461*       Infectious Disease:  Monitored Temp  Av.3 °C (99.1 °F)  Min: 37 °C (98.6 °F)  Max: 37.5 °C (99.5 °F)  Temp  Av.8 °C (98.3 °F)  Min: 36.5 °C (97.7 °F)  Max: 37.2 °C (98.9 °F)  Micro: reviewed.   Recent Labs      03/10/18   0455  03/11/18   0411  03/12/18   0411   WBC  6.7  8.0  8.0   NEUTSPOLYS  54.30  57.50  60.90   LYMPHOCYTES  27.80  25.90  21.60*   MONOCYTES  12.40  11.10  10.60   EOSINOPHILS  3.40  3.20  4.00   BASOPHILS  1.20  1.10  1.00   ASTSGOT   --    --   27   ALTSGPT   --    --   38   ALKPHOSPHAT   --    --   301*   TBILIRUBIN   --    --   0.2     Current Facility-Administered Medications   Medication Dose Frequency Provider Last Rate Last Dose   • potassium chloride SA (Kdur) tablet 20 mEq  20 mEq Once Eleazar Grijalva M.D.   Stopped at 18 1532   • topiramate (TOPAMAX) tablet 100 mg  100 mg Q12HRS Harris Bolton M.D.   100 mg at 18 0513   • bisacodyl (DULCOLAX) suppository 10 mg  10 mg QDAY PRN Eleazar Grijalva M.D.        And   • polyethylene glycol/lytes (MIRALAX) PACKET 1 Packet  1 Packet QDAY PRN Eleazar Grijalva M.D.        And   • magnesium hydroxide (MILK OF MAGNESIA) suspension 30 mL  30 mL QDAY PRN Eleazar Grijalva M.D.       • bisacodyl (DULCOLAX) suppository 5 mg  5 mg DAILY Eleazar Grijalva M.D.       • PHENobarbital solution 120 mg  120 mg BID Aidan Encarnacion M.D.   120 mg at 18 6384   •  potassium bicarbonate (KLYTE) 25 MEQ effervescent tablet TBEF 50 mEq  50 mEq DAILY Jeremy M Gonda, M.D.   50 mEq at 03/11/18 0920   • Pain Pump (patient supplied) Device   Continuous Jeremy M Gonda, M.D.       • enoxaparin (LOVENOX) inj 40 mg  40 mg DAILY Jeremy M Gonda, M.D.   40 mg at 03/11/18 0920   • lacosamide (VIMPAT) tablet 200 mg  200 mg BID Jeremy M Gonda, M.D.   200 mg at 03/11/18 2143   • levETIRAcetam (KEPPRA) 100 MG/ML solution 1,500 mg  1,500 mg Q12HRS Jeremy M Gonda, M.D.   1,500 mg at 03/11/18 2144   • loperamide (IMODIUM) 1 MG/5ML solution 2 mg  2 mg 4X/DAY PRN FARHAT MedinaOBucky   2 mg at 02/26/18 0820   • omeprazole 2 mg/mL in sodium bicarbonate (PRILOSEC) oral susp 40 mg  40 mg DAILY Jon Reynoso D.O.   40 mg at 03/11/18 0920   • levalbuterol (XOPENEX) 1.25 MG/3ML nebulizer solution 1.25 mg  1.25 mg Q6HRS (RT) JASPAL Berry.O.   1.25 mg at 03/12/18 0206   • Respiratory Care per Protocol   Continuous RT JASPAL Alaniz Jr..O.       • chlorhexidine (PERIDEX) 0.12 % solution 15 mL  15 mL BID JASPAL Alaniz Jr..O.   15 mL at 03/11/18 2144   • lidocaine (XYLOCAINE) 1%  injection  1-2 mL Q30 MIN PRN JASPAL Alaniz Jr..O.       • MD ALERT...Adult ICU Electrolyte Replacement per Pharmacy Protocol   pharmacy to dose JASPAL Alaniz Jr..O.       • ipratropium-albuterol (DUONEB) nebulizer solution 3 mL  3 mL Q2HRS PRN (RT) JASPAL Alaniz Jr..O.       • levalbuterol (XOPENEX) 1.25 MG/3ML nebulizer solution 1.25 mg  1.25 mg Q4HRS PRN Duy Whitaker M.D.   1.25 mg at 03/06/18 1420   • miconazole 2%-zinc oxide (RADHA) topical cream   PRN Duy Whitaker M.D.       • acetaminophen (TYLENOL) tablet 650 mg  650 mg Q6HRS PRN Duy Whitaker M.D.   650 mg at 03/10/18 1544   • oxyCODONE immediate-release (ROXICODONE) tablet 5 mg  5 mg Q3HRS PRN Duy Whitaker M.D.        And   • oxyCODONE immediate release (ROXICODONE) tablet 10 mg  10 mg Q3HRS PRN Duy Whitaker M.D.       •  ondansetron (ZOFRAN) syringe/vial injection 4 mg  4 mg Q4HRS PRN Duy Whitaker M.D.   4 mg at 02/25/18 1140   • ondansetron (ZOFRAN ODT) dispertab 4 mg  4 mg Q4HRS PRN Duy Whitaker M.D.       • promethazine (PHENERGAN) tablet 12.5-25 mg  12.5-25 mg Q4HRS PRN Duy Whitaker M.D.       • promethazine (PHENERGAN) suppository 12.5-25 mg  12.5-25 mg Q4HRS PRN Duy Whitaker M.D.       • prochlorperazine (COMPAZINE) injection 5-10 mg  5-10 mg Q4HRS PRN Duy Whitaker M.D.       • LORazepam (ATIVAN) injection 4 mg  4 mg Q10 MIN PRN Duy Whitaker M.D.   4 mg at 03/09/18 1046     Last reviewed on 2/18/2018  7:51 PM by Lois Camejo, Pharmacy Intern    Quality  Measures:   Labs reviewed, Medications reviewed and Radiology images reviewed                    Assessment and plan:  Acute hypoxemic respiratory failure - VDRF since 2/18, chronic trach   - T-piece - HMTC   - RT/O2 protocol, encourage mobilization   - minimize sedatives and maintain aspiration precautions   - intermittent chest x-rays   - move towards 21% aerosol as tolerated, that's what he is on at home   - Aggressive pulmonary toilet, TX FOB when necessary   - Mobilize as tolerated  Left exudative pleural effusion per lytes criteria S/P thoracentesis 3/3   - Cx neg   - Serial chest x-ray negative for recurrence so far   - Repeat thoracentesis when necessary, if recurrent consider CT chest as well  Septic shock from pulmonary source, now unspecified place a distributive shock - improved   - midodrine held, d/c today   - Completed antibiotics, s/p sepsis protocol   - Citrobacter suspected colonizer - monitor for acute infection  History of traumatic brain injury, chronic seizure disorder with associated status epilepticus   - Continue antiepileptics per neurology   - ongoing seizure activity; terminated now on continuous EEG on 4 AEDs   - appreciated Neuro input   - MRI reviewed   - did not april VPA, high NH3   - LP neg 3/9  Decubitus ulcerations of the  left hip including stage IV disease   - Wound care, optimize nutrition   - Mobilize off wounds as tolerated  Pneumonia (Proteus and Klebsiella)   - S/P antibiotics   - Monitor fever curve, white count and for other signs of recurrent infection  Mild fluid overload - improved   - now euvolemic   - Monitor exam and I/Os  Possible paralytic ileus versus feeding tube malfunction - resolved   - Continue tube feeding trial with advanced to goal home regimen   - Bowel protocol, daily Dulcolax suppository  Hyponatremia    - follow  Iron deficiency anemia - s/p iron supplementation  Hypokalemia/mag - monitor and replete daily as needed  Prophylaxis/enteral nutrition    Await follow-up from neurology, in particular interpretation of 24-hour EEG.  Consider transfer to neuro floor when okay with neurology and when off continuous EEG monitoring.  Mother concern of sinus tachycardia, reviewed with her at length at the bedside.    Discussed patient condition and risk of morbidity and/or mortality with Family, RN, RT, Pharmacy, Dietary, , Charge nurse / hot rounds and hospitalist.        Clarence Pro M.D.
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
## 2018-03-12 NOTE — PROGRESS NOTES
[facility] Hospitalist Progress Note    Date of Service: 3/12/2018    Chief Complaint  37 y.o. male admitted 2018 with shortness of breath.    Interval Problem Update  Patient with a history of traumatic brain injury and seizure disorder, trach in place.   Had underwent taper off of his phenobarbital, family felt he was doing worse.   Upon arrival patient was noted to be hypoxic, deemed aspiration pneumonia due to seizures and patient was placed on a ventilator.   Pt again with seizures, neuro added meds.  Patient is currently tolerating his tube feeds, home blend is being used.   Patient seen and examined in the ICU, ICU care given.  Discussed patient condition and plan with Intensivist, RN, RT and charge nurse / hot rounds.    Continuous EEG going, no sign of new sz  Tracking a little  NSR to tach  -140  Removed rosas 3/10  Last bm yesterday  Adequate uop  Chronic wounds  RIJ  Tolerating home TF  2L 28% trach collar, at home no O2  Moderate secretions  Give k and mg     Consultants/Specialty  Intensivist  Neurology    Disposition  Patient requires additional treatment in the hospital, may need placement at the time of discharge, LTAC is pending      Review of Systems   Unable to perform ROS: Intubated      Physical Exam  Laboratory/Imaging   Hemodynamics  Temp (24hrs), Av.8 °C (98.3 °F), Min:36.5 °C (97.7 °F), Max:37.2 °C (98.9 °F)   Temperature: 36.5 °C (97.7 °F), Monitored Temp: 37.2 °C (99 °F)  Pulse  Av.7  Min: 47  Max: 125 Heart Rate (Monitored): 90  NIBP: 113/60      Respiratory    #Aerosol Therapy / Airway Management: Trache Collar, Aerosol Humidity Temp (celsius): 35 Respiration: 18, Pulse Oximetry: 98 %, O2 Daily Delivery Respiratory : Trache Collar     Given By:: Trache, Work Of Breathing / Effort: Mild  RUL Breath Sounds: Rhonchi, RML Breath Sounds: Rhonchi, RLL Breath Sounds: Diminished, JONATAN Breath Sounds: Rhonchi, LLL Breath Sounds: Diminished    Fluids    Intake/Output Summary (Last  24 hours) at 03/12/18 0744  Last data filed at 03/12/18 0600   Gross per 24 hour   Intake             2700 ml   Output             1775 ml   Net              925 ml       Nutrition  Orders Placed This Encounter   Procedures   • Diet NPO     Standing Status:   Standing     Number of Occurrences:   1     Order Specific Question:   Restrict to:     Answer:   Strict [1]     Physical Exam   Constitutional: He appears cachectic. He appears ill. No distress.   HENT:   Head: Normocephalic and atraumatic.   Mouth/Throat: No oropharyngeal exudate.   Eyes: Right eye exhibits no discharge. Left eye exhibits no discharge.   Neck: Neck supple. No tracheal deviation present.   Trach   Cardiovascular: Normal rate and regular rhythm.    Pulmonary/Chest: No stridor. No apnea. He has decreased breath sounds in the right lower field and the left lower field. He has no rhonchi.   Abdominal: Soft. Bowel sounds are normal. He exhibits no distension.   Baclofen pump    Genitourinary:   Genitourinary Comments: catheter   Musculoskeletal: He exhibits deformity.   Extensive contractures of extremities   Neurological:   Eyes open, doesn't respond or follow      Skin: Skin is warm and dry. He is not diaphoretic.   Psychiatric: He is noncommunicative.   Nursing note and vitals reviewed.      Recent Labs      03/10/18   0455  03/11/18   0411  03/12/18   0411   WBC  6.7  8.0  8.0   RBC  3.32*  3.38*  3.56*   HEMOGLOBIN  9.9*  9.9*  10.4*   HEMATOCRIT  30.6*  30.6*  32.8*   MCV  92.2  90.5  92.1   MCH  29.8  29.3  29.2   MCHC  32.4*  32.4*  31.7*   RDW  64.2*  64.2*  64.7*   PLATELETCT  510*  534*  461*   MPV  8.1*  8.3*  7.9*     Recent Labs      03/10/18   0455  03/11/18 0411 03/12/18 0411   SODIUM  133*  131*  129*   POTASSIUM  3.9  3.9  3.6   CHLORIDE  102  99  101   CO2  24  23  21   GLUCOSE  92  93  100*   BUN  16  18  18   CREATININE  <0.20*  <0.20*  <0.20*   CALCIUM  8.6  8.5  8.4*                      Assessment/Plan     Seizure  (CMS-HCC)- (present on admission)   Assessment & Plan    - Seizures improved, continuous EEG again  - Continue Vimpat, Keppra, phenobarbital, and topamax per neurology  - MRI brain concerning for changes per Dr. Bolton, no cause noted on CSF        Hypomagnesemia- (present on admission)   Assessment & Plan    - Resolved        Decubitus ulcer of ischium, left, stage IV (CMS-HCC)- (present on admission)   Assessment & Plan    - Continue wound care        Azotemia- (present on admission)   Assessment & Plan    - Resolved with fluids        Acute on chronic respiratory failure with hypoxemia (CMS-HCC)- (present on admission)   Assessment & Plan    - Improved, now on low flow O2  - Was placed on ventilator initially on 2/18  - Due to aspiration pneumonia during seizures  - Patient was on oxygen at home, try to wean off oxygen prior to discharge        Aspiration pneumonia (CMS-HCC)- (present on admission)   Assessment & Plan    - Causing septic shock  - Finished full course of antibiotics        Anemia, blood loss- (present on admission)   Assessment & Plan    - Hemoglobin stable  - No sign of gross bleeding        Diarrhea- (present on admission)   Assessment & Plan    - Resolved          Protein-calorie malnutrition, severe (CMS-HCC)- (present on admission)   Assessment & Plan    - Chronic, tolerating tube feeds        Physical debility- (present on admission)   Assessment & Plan    - Chronic post traumatic brain injury  - At baseline        TBI (traumatic brain injury) (CMS-HCC)- (present on admission)   Assessment & Plan    - Chronic, nonverbal        Hyponatremia- (present on admission)   Assessment & Plan    - Resolved          Septic shock (CMS-HCC)- (present on admission)   Assessment & Plan    - Resolved, was due to aspiration pneumonia, finish full course of antibiotics        Contraction, joint, multiple sites- (present on admission)   Assessment & Plan    - Chronic  - has baclofen pump which was refilled in  January  - Pump has been interrogating and is working properly        GI bleed- (present on admission)   Assessment & Plan    - Resolved          Quality-Core Measures   Reviewed items::  Labs reviewed, Medications reviewed and Radiology images reviewed  Waldrop catheter::  Unconscious / Sedated Patient on a Ventilator  DVT prophylaxis pharmacological::  Enoxaparin (Lovenox)  Ulcer Prophylaxis::  Yes

## 2018-03-12 NOTE — PROCEDURES
VIDEO ELECTROENCEPHALOGRAM / EPILEPSY MONITORING UNIT REPORT           DOS:   3/11/2018 - 3/12/2018 (total recording for 23 hours and 59 minutes).      INDICATION:  Ruben Edwards 37 y.o. male presenting with recurrent seizures.      CURRENT ANTIEPILEPTIC REGIMEN: Levetiracetam 1500 mg q 12 hrs, Lacosamide 200 mg q 12 hrs, Phenobarbital 120 mg q 12 hrs, Topiramate increased to 100 mg q 12 hrs.      TECHNIQUE: A 30-channel, 24 hrs video electroencephalogram (VEEG) was performed in accordance with the international 10-20 system. This digital study was reviewed in bipolar and referential montages. The recording examined an encephalopathic patient during awake and sleep.      DESCRIPTION OF THE RECORD:  During wakefulness, there is slowing of the background with asymmetric diffuse theta activity, and mostly delta activity (superimposed slowing) in the right hemisphere. During sleep, there is diffuse slow delta activity.      ACTIVATION PROCEDURES:   Not performed.      ICTAL AND/OR INTERICTAL FINDINGS:   Frequent right frontotemporal spikes. Rarely, these may become briefly periodic (right PLEDS) but without clear evidence for seizures during the study.      EVENT(S):  None marked for review.      EKG: sampling review of EKG recording demonstrated sinus rhythm with sinus tachycardia.         INTERPRETATION:   This is an abnormal 24 hrs video electroencephalogram recording in an encephalopathic patient during awake and sleep states. There is diffuse cerebral dysfunction, suggestive of an encephalopathic state. Superimposed slowing in the right hemisphere is likely due to underlying structural abnormality. Frequent right frontotemporal spikes. Rarely, these may become briefly periodic (right PLEDS) but without clear evidence for seizures during the study. The study is improved when compared to prior recording. The patient remains at very high risk for seizure recurrence. Clinical and radiological correlation is recommended.       Sinus tachycardia is observed through the study and is NOT associated with either PLEDS or epileptiform discharges.      Updates provided to patient's RN.      Harris Bolton MD  Medical Director, Epilepsy and Neurodiagnostics.   Clinical  of Neurology [facility].   Diplomate in Neurology, Epilepsy, and Electrodiagnostic Medicine.   Office: [phone]  Fax: [phone]       MARLON GOODMAN    DD:  03/12/2018 06:39:36  DT:  03/12/2018 07:14:38    D#:  [id number]  Job#:  [id number]

## 2018-03-12 NOTE — CARE PLAN
Problem: Bowel/Gastric:  Goal: Normal bowel function is maintained or improved  Outcome: PROGRESSING AS EXPECTED  5mg Dulcolax suppository given. 1  3/12/2018 at 0950.

## 2018-03-12 NOTE — FLOWSHEET NOTE
03/11/18 1906   Chest Exam   Respiration 20   Pulse 90   Heart Rate (Monitored) 91   Oxygen   Pulse Oximetry 97 %

## 2018-03-12 NOTE — EEG PROGRESS NOTE
VIDEO ELECTROENCEPHALOGRAM / EPILEPSY MONITORING UNIT REPORT           DOS:   3/11/2018 - 3/12/2018 (total recording for 23 hours and 59 minutes).      INDICATION:  Ruben Edwards 37 y.o. male presenting with recurrent seizures.      CURRENT ANTIEPILEPTIC REGIMEN: Levetiracetam 1500 mg q 12 hrs, Lacosamide 200 mg q 12 hrs, Phenobarbital 120 mg q 12 hrs, Topiramate increased to 100 mg q 12 hrs.      TECHNIQUE: A 30-channel, 24 hrs video electroencephalogram (VEEG) was performed in accordance with the international 10-20 system. This digital study was reviewed in bipolar and referential montages. The recording examined an encephalopathic patient during awake and sleep.      DESCRIPTION OF THE RECORD:  During wakefulness, there is slowing of the background with asymmetric diffuse theta activity, and mostly delta activity (superimposed slowing) in the right hemisphere. During sleep, there is diffuse slow delta activity.      ACTIVATION PROCEDURES:   Not performed.      ICTAL AND/OR INTERICTAL FINDINGS:   Frequent right frontotemporal spikes. Rarely, these may become briefly periodic (right PLEDS) but without clear evidence for seizures during the study.      EVENT(S):  None marked for review.      EKG: sampling review of EKG recording demonstrated sinus rhythm with sinus tachycardia.         INTERPRETATION:   This is an abnormal 24 hrs video electroencephalogram recording in an encephalopathic patient during awake and sleep states. There is diffuse cerebral dysfunction, suggestive of an encephalopathic state. Superimposed slowing in the right hemisphere is likely due to underlying structural abnormality. Frequent right frontotemporal spikes. Rarely, these may become briefly periodic (right PLEDS) but without clear evidence for seizures during the study. The study is improved when compared to prior recording. The patient remains at very high risk for seizure recurrence. Clinical and radiological correlation is recommended.       Sinus tachycardia is observed through the study and is NOT associated with either PLEDS or epileptiform discharges.      Updates provided to patient's RN.      Harris Bolton MD  Medical Director, Epilepsy and Neurodiagnostics.   Clinical  of Neurology [facility].   Diplomate in Neurology, Epilepsy, and Electrodiagnostic Medicine.   Office: [phone]  Fax: [phone]

## 2018-03-13 LAB
AMMONIA PLAS-SCNC: 48 UMOL/L (ref 11–45)
ANION GAP SERPL CALC-SCNC: 7 MMOL/L (ref 0–11.9)
BASOPHILS # BLD AUTO: 1 % (ref 0–1.8)
BASOPHILS # BLD: 0.09 K/UL (ref 0–0.12)
BUN SERPL-MCNC: 19 MG/DL (ref 8–22)
CALCIUM SERPL-MCNC: 8.3 MG/DL (ref 8.5–10.5)
CHLORIDE SERPL-SCNC: 101 MMOL/L (ref 96–112)
CO2 SERPL-SCNC: 21 MMOL/L (ref 20–33)
CREAT SERPL-MCNC: <0.2 MG/DL (ref 0.5–1.4)
EOSINOPHIL # BLD AUTO: 0.35 K/UL (ref 0–0.51)
EOSINOPHIL NFR BLD: 4 % (ref 0–6.9)
ERYTHROCYTE [DISTWIDTH] IN BLOOD BY AUTOMATED COUNT: 65.1 FL (ref 35.9–50)
GLUCOSE SERPL-MCNC: 101 MG/DL (ref 65–99)
HCT VFR BLD AUTO: 31.3 % (ref 42–52)
HGB BLD-MCNC: 9.9 G/DL (ref 14–18)
IMM GRANULOCYTES # BLD AUTO: 0.14 K/UL (ref 0–0.11)
IMM GRANULOCYTES NFR BLD AUTO: 1.6 % (ref 0–0.9)
LYMPHOCYTES # BLD AUTO: 1.76 K/UL (ref 1–4.8)
LYMPHOCYTES NFR BLD: 20.1 % (ref 22–41)
MAGNESIUM SERPL-MCNC: 1.8 MG/DL (ref 1.5–2.5)
MCH RBC QN AUTO: 29.5 PG (ref 27–33)
MCHC RBC AUTO-ENTMCNC: 31.6 G/DL (ref 33.7–35.3)
MCV RBC AUTO: 93.2 FL (ref 81.4–97.8)
MONOCYTES # BLD AUTO: 0.75 K/UL (ref 0–0.85)
MONOCYTES NFR BLD AUTO: 8.6 % (ref 0–13.4)
NEUTROPHILS # BLD AUTO: 5.68 K/UL (ref 1.82–7.42)
NEUTROPHILS NFR BLD: 64.7 % (ref 44–72)
NRBC # BLD AUTO: 0 K/UL
NRBC BLD-RTO: 0 /100 WBC
PLATELET # BLD AUTO: 460 K/UL (ref 164–446)
PMV BLD AUTO: 8.2 FL (ref 9–12.9)
POTASSIUM SERPL-SCNC: 3.7 MMOL/L (ref 3.6–5.5)
RBC # BLD AUTO: 3.36 M/UL (ref 4.7–6.1)
SODIUM SERPL-SCNC: 129 MMOL/L (ref 135–145)
WBC # BLD AUTO: 8.8 K/UL (ref 4.8–10.8)

## 2018-03-13 PROCEDURE — 700101 HCHG RX REV CODE 250: Performed by: INTERNAL MEDICINE

## 2018-03-13 PROCEDURE — A9270 NON-COVERED ITEM OR SERVICE: HCPCS | Performed by: INTERNAL MEDICINE

## 2018-03-13 PROCEDURE — 700111 HCHG RX REV CODE 636 W/ 250 OVERRIDE (IP): Performed by: INTERNAL MEDICINE

## 2018-03-13 PROCEDURE — 302101 FENESTRATED FOAM: Performed by: INTERNAL MEDICINE

## 2018-03-13 PROCEDURE — 700102 HCHG RX REV CODE 250 W/ 637 OVERRIDE(OP): Performed by: INTERNAL MEDICINE

## 2018-03-13 PROCEDURE — 94640 AIRWAY INHALATION TREATMENT: CPT

## 2018-03-13 PROCEDURE — 85025 COMPLETE CBC W/AUTO DIFF WBC: CPT

## 2018-03-13 PROCEDURE — 82140 ASSAY OF AMMONIA: CPT

## 2018-03-13 PROCEDURE — 83735 ASSAY OF MAGNESIUM: CPT

## 2018-03-13 PROCEDURE — 80048 BASIC METABOLIC PNL TOTAL CA: CPT

## 2018-03-13 PROCEDURE — 99233 SBSQ HOSP IP/OBS HIGH 50: CPT | Performed by: INTERNAL MEDICINE

## 2018-03-13 PROCEDURE — 700102 HCHG RX REV CODE 250 W/ 637 OVERRIDE(OP): Performed by: HOSPITALIST

## 2018-03-13 PROCEDURE — 700101 HCHG RX REV CODE 250: Performed by: PSYCHIATRY & NEUROLOGY

## 2018-03-13 PROCEDURE — 770001 HCHG ROOM/CARE - MED/SURG/GYN PRIV*

## 2018-03-13 PROCEDURE — A9270 NON-COVERED ITEM OR SERVICE: HCPCS | Performed by: HOSPITALIST

## 2018-03-13 RX ORDER — MAGNESIUM SULFATE HEPTAHYDRATE 40 MG/ML
2 INJECTION, SOLUTION INTRAVENOUS ONCE
Status: COMPLETED | OUTPATIENT
Start: 2018-03-13 | End: 2018-03-13

## 2018-03-13 RX ADMIN — TOPIRAMATE 100 MG: 100 TABLET, FILM COATED ORAL at 17:30

## 2018-03-13 RX ADMIN — LEVALBUTEROL HYDROCHLORIDE 1.25 MG: 1.25 SOLUTION RESPIRATORY (INHALATION) at 06:52

## 2018-03-13 RX ADMIN — LEVALBUTEROL HYDROCHLORIDE 1.25 MG: 1.25 SOLUTION RESPIRATORY (INHALATION) at 02:21

## 2018-03-13 RX ADMIN — BISACODYL 5 MG: 10 SUPPOSITORY RECTAL at 08:42

## 2018-03-13 RX ADMIN — CHLORHEXIDINE GLUCONATE 15 ML: 1.2 RINSE ORAL at 20:50

## 2018-03-13 RX ADMIN — LEVETIRACETAM 1500 MG: 100 SOLUTION ORAL at 20:50

## 2018-03-13 RX ADMIN — LEVALBUTEROL HYDROCHLORIDE 1.25 MG: 1.25 SOLUTION RESPIRATORY (INHALATION) at 18:08

## 2018-03-13 RX ADMIN — TOPIRAMATE 100 MG: 100 TABLET, FILM COATED ORAL at 05:31

## 2018-03-13 RX ADMIN — PHENOBARBITAL 120 MG: 20 ELIXIR ORAL at 20:50

## 2018-03-13 RX ADMIN — CHLORHEXIDINE GLUCONATE 15 ML: 1.2 RINSE ORAL at 08:40

## 2018-03-13 RX ADMIN — PHENOBARBITAL 120 MG: 20 ELIXIR ORAL at 11:18

## 2018-03-13 RX ADMIN — MAGNESIUM SULFATE IN WATER 2 G: 40 INJECTION, SOLUTION INTRAVENOUS at 08:40

## 2018-03-13 RX ADMIN — LACOSAMIDE 200 MG: 50 TABLET, FILM COATED ORAL at 20:50

## 2018-03-13 RX ADMIN — ENOXAPARIN SODIUM 40 MG: 100 INJECTION SUBCUTANEOUS at 08:40

## 2018-03-13 RX ADMIN — ACETAMINOPHEN 650 MG: 325 TABLET, FILM COATED ORAL at 03:10

## 2018-03-13 RX ADMIN — POTASSIUM BICARBONATE 25 MEQ: 25 TABLET, EFFERVESCENT ORAL at 12:00

## 2018-03-13 RX ADMIN — POTASSIUM BICARBONATE 50 MEQ: 25 TABLET, EFFERVESCENT ORAL at 08:41

## 2018-03-13 RX ADMIN — LACOSAMIDE 200 MG: 50 TABLET, FILM COATED ORAL at 08:43

## 2018-03-13 RX ADMIN — LEVALBUTEROL HYDROCHLORIDE 1.25 MG: 1.25 SOLUTION RESPIRATORY (INHALATION) at 14:42

## 2018-03-13 RX ADMIN — OMEPRAZOLE 40 MG: 20 CAPSULE, DELAYED RELEASE ORAL at 08:41

## 2018-03-13 RX ADMIN — LEVETIRACETAM 1500 MG: 100 SOLUTION ORAL at 09:07

## 2018-03-13 NOTE — PROGRESS NOTES
Pulmonary Critical Care Progress Note        Date of admission: 2/18/2018    Chief Complaint: Respiratory failure, septic shock    History of Present Illness:  Mr. Edwards is a 37-year-old male with past medical history of traumatic brain injury at age 17, seizures diagnosed in June of 2017. His baseline mental status appears to be nodding and minimal interacting without vocalization. Who was brought to the ICU as a direct admit from [facility] where he was brought today for hypoxia. Reportedly the patient was being weanied off of his phenobarbital by his neurologist when he began having more frequent seizures, approximately one week ago his neurologist increased his Keppra dosing however the seizures continued. His mother did use CBD and THC with some success in slowing the seizures. Since 2 days ago the patient had multiple aspiration events following these seizures. His mother evaluated him with a home oxygen saturation monitor and he was noted to be hypoxic, he was brought to [facility] where he was found to have 28% bands with leukopenia. Chest x-ray was obtained demonstrating bilateral infiltrates right greater than left. He was placed on mechanical ventilation and his oxygenation was unable to be improved more than 85%, he was hypotensive and given multiple boluses of IV fluids without improvement. He was started on levo fed and given multiple doses of Ativan for seizures and transferred to our ICU for higher level of care. He arrives on the ventilator and on pressors critically ill.      ROS:  unable to perform due to the patient's inability to effectively communicate     Interval Events:  24 hour interval history reviewed      No further Sz per 24 hrs EEG - controlled with 4 drug regimen  Dr Bolton reviewed with mother  HR now 80-90s  GCS 9 - TBI 20 yrs ago  Eyes open - not tracking - baseline  Hemodynamics reviewed  Tm 99.5 - WBC normal  TF goal - home regimen - BM  HMTC 28%  Infrequent  suctioning - Q4-6  Resume home bowel care regimen    Serial follow-up  Tolerating room air trial  Secretions manageable, suctioning every 4-6 hours  No new seizure-like activity     YESTERDAY  24 hr EEG results noted from 3/11  Pending new 24 EEG  GCS 9 - prior TBI - baseline  Tracks?  SR/ST 80-100s  Bp 100-140s  UO adequate with condom cath  BM  pressure sore L ischium and stage IV better near ischium  R IJ CVC  Trach collar 28%  Secretions - moderate   Lovenox/PPI  K placement    Discussed case at bedside at great length with mother    PFSH:  No change.    General:  Appears more chronic than acutely ill, appears older than stated age, nonverbal, multiple contractures - all old - no change    Skin:  Warm and dry, no diaphoresis, multiple contractures all chronic, okay capillary refill    Respiratory:      Pulse Oximetry: 98 %  Tracheostomy site clean and dry  HMTC 28%          Exam: tracheostomy tube in good position without surrounding erythema, unlabored respirations, no intercostal retractions or accessory muscle use and rhonchi bibasilar. No wheezes, breath sounds diminished at the bases    ImagingAvailable data reviewed         Invalid input(s): [id number]     HemoDynamics:  Pulse: 94, Heart Rate (Monitored): 90  NIBP: 107/58        Exam: regular rate and rhythm, regular rhythm (Sinus), no ectopy, no edema, pulses intact        Neuro:  GCS Total Aleida Coma Score: 9   Exam: PERRL, eyes are open, does not follow commands, no change  Imaging: Available data reviewed     3/11 Interp 24 hr EEG:  This is an abnormal 24 hrs video electroencephalogram recording in an encephalopathic patient during awake and sleep states. There is diffuse cerebral dysfunction, suggestive of an encephalopathic state. Superimposed slowing in the right hemisphere is likely due to underlying structural abnormality. Frequent right frontotemporal spikes. Frequently, these may become briefly periodic (right PLEDS) but without clear  evidence for seizures during the study. The study is perhaps mildly worse when compared to prior recording as brief runs of right PLEDS more frequent. The patient remains at very high risk for seizure recurrence. Clinical and radiological correlation is recommended.     3/12 Interp 24 hr EEG:   This is an abnormal extended video electroencephalogram recording in an encephalopathic patient during awake and sleep states. There is diffuse cerebral dysfunction, suggestive of an encephalopathic state. Superimposed slowing in the right hemisphere is likely due to underlying structural abnormality. Frequent right frontotemporal spikes. Rarely, these may become briefly periodic (right PLEDS) but without clear evidence for seizures during the study. The study is stable when compared to prior recording. The patient remains at risk for seizure recurrence. Clinical and radiological correlation is recommended.     Fluids:  Intake/Output       03/11/18 0700 - 03/12/18 0659 03/12/18 0700 - 03/13/18 0659 03/13/18 0700 - 03/14/18 0659      9832-2696 9793-9218 Total 3289-2502 0607-5348 Total 8695-4287 1356-8325 Total       Intake    Other  260  120 380  60  -- 60  --  -- --    Medications (P.O./ Enteral Liquids) 260 120 380 60 -- 60 -- -- --    Enteral  1710  610 2320  1680  580 2260  --  -- --    Enteral Volume 8567 006 0230 7939 158 6515 -- -- --    Free Water / Tube Flush 270 210 480 240 180 420 -- -- --    Total Intake 1511 333 0399 8482 807 1231 -- -- --       Output    Urine  1125  650 1775  1200  0 1200  --  -- --    Condom Catheter --  0 1200 -- -- --    Number of Times Incontinent of Urine -- 1 x 1 x -- -- -- -- -- --    Indwelling Cathether 875 -- 875 -- -- -- -- -- --    Void (ml) 250 -- 250 -- -- -- -- -- --    Stool  --  -- --  --  -- --  --  -- --    Number of Times Stooled 1 x -- 1 x -- -- -- -- -- --    Total Output 7034 480 5819 1200 0 1200 -- -- --       Net I/O     845 80 925  -- -- --            Recent Labs      03/10/18   0455  03/11/18   0411  03/12/18   0411   SODIUM  133*  131*  129*   POTASSIUM  3.9  3.9  3.6   CHLORIDE  102  99  101   CO2  24  23  21   BUN    18   CREATININE  <0.20*  <0.20*  <0.20*   MAGNESIUM  1.9  2.0  1.9   CALCIUM  8.6  8.5  8.4*       GI/Nutrition:  Exam: nondistended, abdomen is soft and non-tender, normal active bowel sounds, G-Tube  no sign of infection, palpable baclofen pump in right lower quadrant - no delta    Imaging: Available data reviewed     KUB : No evidence of bowel obstruction.    CT abdomen and pelvis with oral contrast :  1.  No evidence of bowel obstruction or perforation.  2.  Appendix not visualized.  3.  Small to moderate volume ascites of uncertain etiology.  4.  Bilateral pleural effusions with associated atelectasis.  5.  Ill-defined parenchymal opacities in the LEFT lower lung suggesting multifocal pneumonia.  6.  Scoliosis.    Liver Function  Recent Labs      03/10/18   0455  03/11/18   0411  03/12/18   0411   ALTSGPT   --    --   38   ASTSGOT   --    --   27   ALKPHOSPHAT   --    --   301*   TBILIRUBIN   --    --   0.2   PREALBUMIN   --    --   17.0*   GLUCOSE  92  93  100*       Heme:  Recent Labs      03/10/18   0455  03/11/18   0411  03/12/18   0411   RBC  3.32*  3.38*  3.56*   HEMOGLOBIN  9.9*  9.9*  10.4*   HEMATOCRIT  30.6*  30.6*  32.8*   PLATELETCT  510*  534*  461*       Infectious Disease:  Temp  Av.9 °C (98.5 °F)  Min: 36.4 °C (97.5 °F)  Max: 37.4 °C (99.3 °F)  Micro: reviewed.   Recent Labs      03/10/18   0455  03/11/18   0411  03/12/18   0411   WBC  6.7  8.0  8.0   NEUTSPOLYS  54.30  57.50  60.90   LYMPHOCYTES  27.80  25.90  21.60*   MONOCYTES  12.40  11.10  10.60   EOSINOPHILS  3.40  3.20  4.00   BASOPHILS  1.20  1.10  1.00   ASTSGOT   --    --   27   ALTSGPT   --    --   38   ALKPHOSPHAT   --    --   301*   TBILIRUBIN   --    --   0.2     Current Facility-Administered Medications   Medication Dose Frequency  Provider Last Rate Last Dose   • topiramate (TOPAMAX) tablet 100 mg  100 mg Q12HRS Harris Bolton M.D.   100 mg at 03/13/18 0531   • bisacodyl (DULCOLAX) suppository 10 mg  10 mg QDAY PRN Eleazar Grijalva M.D.        And   • polyethylene glycol/lytes (MIRALAX) PACKET 1 Packet  1 Packet QDAY PRN Eleazar Grijalva M.D.        And   • magnesium hydroxide (MILK OF MAGNESIA) suspension 30 mL  30 mL QDAY PRN Eleazar Grijalva M.D.       • bisacodyl (DULCOLAX) suppository 5 mg  5 mg DAILY Eleazar Grijalva M.D.   5 mg at 03/12/18 0739   • PHENobarbital solution 120 mg  120 mg BID Aidan Encarnacion M.D.   120 mg at 03/12/18 2041   • potassium bicarbonate (KLYTE) 25 MEQ effervescent tablet TBEF 50 mEq  50 mEq DAILY Jeremy M Gonda, M.D.   50 mEq at 03/12/18 0740   • Pain Pump (patient supplied) Device   Continuous Jeremy M Gonda, M.D.       • enoxaparin (LOVENOX) inj 40 mg  40 mg DAILY Jeremy M Gonda, M.D.   40 mg at 03/12/18 0739   • lacosamide (VIMPAT) tablet 200 mg  200 mg BID Jeremy M Gonda, M.D.   200 mg at 03/12/18 2040   • levETIRAcetam (KEPPRA) 100 MG/ML solution 1,500 mg  1,500 mg Q12HRS Jeremy M Gonda, M.D.   1,500 mg at 03/12/18 2040   • loperamide (IMODIUM) 1 MG/5ML solution 2 mg  2 mg 4X/DAY PRN Clarence Morfin D.O.   2 mg at 02/26/18 0820   • omeprazole 2 mg/mL in sodium bicarbonate (PRILOSEC) oral susp 40 mg  40 mg DAILY Jon Reynoso D.O.   40 mg at 03/12/18 0739   • levalbuterol (XOPENEX) 1.25 MG/3ML nebulizer solution 1.25 mg  1.25 mg Q6HRS (RT) Jon Reynoso D.O.   1.25 mg at 03/13/18 0221   • Respiratory Care per Protocol   Continuous RT JASPAL Alaniz Jr..SAM.       • chlorhexidine (PERIDEX) 0.12 % solution 15 mL  15 mL BID JASPAL Alaniz Jr..O.   15 mL at 03/12/18 2040   • lidocaine (XYLOCAINE) 1%  injection  1-2 mL Q30 MIN PRN JASPAL Alaniz Jr..SAM.       • MD ALERT...Adult ICU Electrolyte Replacement per Pharmacy Protocol   pharmacy to dose JASPAL Alaniz Jr..O.       • ipratropium-albuterol  (DUONEB) nebulizer solution 3 mL  3 mL Q2HRS PRN (RT) Modesto Appiah Jr. D.OBucky       • levalbuterol (XOPENEX) 1.25 MG/3ML nebulizer solution 1.25 mg  1.25 mg Q4HRS PRN Duy Whitaker M.D.   1.25 mg at 03/06/18 1420   • miconazole 2%-zinc oxide (RADHA) topical cream   PRN Duy Whitaker M.D.       • acetaminophen (TYLENOL) tablet 650 mg  650 mg Q6HRS PRN Duy Whitaker M.D.   650 mg at 03/13/18 0310   • oxyCODONE immediate-release (ROXICODONE) tablet 5 mg  5 mg Q3HRS PRN Duy Whitaker M.D.        And   • oxyCODONE immediate release (ROXICODONE) tablet 10 mg  10 mg Q3HRS PRN Duy Whitaker M.D.       • ondansetron (ZOFRAN) syringe/vial injection 4 mg  4 mg Q4HRS PRN Duy Whitaker M.D.   4 mg at 02/25/18 1140   • ondansetron (ZOFRAN ODT) dispertab 4 mg  4 mg Q4HRS PRN Duy Whitaker M.D.       • promethazine (PHENERGAN) tablet 12.5-25 mg  12.5-25 mg Q4HRS PRN Duy Whitaker M.D.       • promethazine (PHENERGAN) suppository 12.5-25 mg  12.5-25 mg Q4HRS PRN Duy Whitaker M.D.       • prochlorperazine (COMPAZINE) injection 5-10 mg  5-10 mg Q4HRS PRN Duy Whitaker M.D.       • LORazepam (ATIVAN) injection 4 mg  4 mg Q10 MIN PRN Duy Whitaker M.D.   4 mg at 03/09/18 1046     Last reviewed on 2/18/2018  7:51 PM by Lois Camejo, Pharmacy Intern    Quality  Measures:  Labs reviewed, Medications reviewed and Radiology images reviewed                    Assessment and plan:  Acute hypoxemic respiratory failure - VDRF since 2/18, chronic trach   - T-piece - HMTC   - Room air trial   - Meet with mother, try to get back to identical humidifier/trach management system she uses at home   - RT/O2 protocol, encourage mobilization - limited   - minimize sedatives and maintain aspiration precautions   - intermittent chest x-rays   - Aggressive pulmonary toilet, TX FOB when necessary    - Mobilize as tolerated  Left exudative pleural effusion per lytes criteria S/P thoracentesis 3/3   - Cx neg   - Serial chest x-ray  negative for recurrence so far   - Repeat thoracentesis when necessary, if recurrent consider CT chest as well  Septic shock from pulmonary source, now unspecified place a distributive shock - improved   - midodrine held, d/c today   - Completed antibiotics, s/p sepsis protocol   - Citrobacter suspected colonizer - monitor for acute infection  History of traumatic brain injury, chronic seizure disorder with associated status epilepticus   - Continue antiepileptics per neurology   - ongoing seizure activity; terminated now on continuous EEG on 4 AEDs   - appreciated Neuro input   - MRI reviewed   - did not april VPA, high NH3   - LP neg 3/9  Decubitus ulcerations of the left hip including stage IV disease   - Wound care, optimize nutrition   - Mobilize off wounds as tolerated  Pneumonia (Proteus and Klebsiella)   - S/P antibiotics   - Monitor fever curve, white count and for other signs of recurrent infection  Mild fluid overload - improved   - now euvolemic   - Monitor exam and I/Os  Possible paralytic ileus versus feeding tube malfunction - resolved   - Continue tube feeding trial with advanced to goal home regimen   - Bowel protocol, daily Dulcolax suppository  Hyponatremia    - follow  Iron deficiency anemia - s/p iron supplementation  Hypokalemia/mag - monitor and replete daily as needed  Prophylaxis/enteral nutrition    Continuous EEG monitoring discontinued, seizures controlled with this point on 4 drug regimen  Sinus tachycardia that mother was concerned about yesterday has improved  Secretions and suctioning manageable per RN/RT  Room air trial today tolerated  Will try to convert medication, nutrition and trach management regimens to what the patient has been receiving at home from his mother   working on DC planning  Probably needs outpatient wound care follow-up at home to assist patient's mother    Discussed patient condition and risk of morbidity and/or mortality with Family, RN, RT, Pharmacy,  Dietary, , Charge nurse / hot rounds and hospitalist.        Clarence Pro M.D.

## 2018-03-13 NOTE — PALLIATIVE CARE
PALLIATIVE CARE FOLLOW-UP:    Discussed d/c plan with pt's mother Susana who shared her concern of pt seizing again but also her comfort with Dr. Bolton managing pt's care as outpt.  Susana re-stated her belief that pt could have a miracle from God and that she plans to care for him until then.    Plan:  Home with family    Thank you for allowing Palliative Care to support this pt and his family.  Contact [phone] for additional assistance, questions or concerns.

## 2018-03-13 NOTE — DIETARY
Nutrition support weekly update:      Day 23 of admit.  Ruben Edwards is a 37 y.o. male with admitting DX of Acute Hypoxic Respiratory Failure.    · Tube feeding initiated on 2/20. Current TF via G-tube.    · Pt remains on home tube feeding formula, which mom makes.  · Home formula consists of yogurt, infant oatmeal, 1 scoop toddler formula, 1 scoop egg white protein, 1 tsp of super greens, milk alternative for liquid base and two different protein powder/meal replacements with 400 mL of free water added.    · Pt receives small volume feedings at home.  · Approximate kcals from home formula: ~ 9355-0842 and  grams of protein per day.    Assessment:  Weight 47.8 kg (105 lbs) - bed scale wt 3/13 - Decreased 8.1 kg from admit wt of 55.9 kg; pt is -8.7 L of fluid per I/O flow sheet.      Evaluation:   1. Spoke with mom at bedside.    2. Pt wt PTA was ~ 55 kg (122 lbs).  3. Mom has increased home tube feedings while here to 2 feedings (200 mL each) and 6 feedings (240 mL) for a total of 8 feedings per day (1840 mL per day) to promote wt gain and maintain lean muscle mass in pt.  4. Discussed alternative source of organic home tube feed formula with mom (L[employer]id [employer]).  5. One pouch (341 g) provides 450 kcals, and 23 grams of protein.  Pt's estimated needs likely met with 6 pouches per day, providing 2700 kcals, and 138 grams of protein per day.  6. Current feeding remains appropriate.    Recommendations/Plan:  1. Continue TF formula and rate per MD approval.  2. Fluids per MD.    RD will continue to follow.

## 2018-03-13 NOTE — CARE PLAN
Problem: Knowledge Deficit  Goal: Knowledge of disease process/condition, treatment plan, diagnostic tests, and medications will improve    Intervention: Assess knowledge level of disease process/condition, treatment plan, diagnostic tests, and medications  Pt's mother updated on POC. Demonstrated understanding by teach back. All questions answered.         Problem: Skin Integrity  Goal: Risk for impaired skin integrity will decrease    Intervention: Assess risk factors for impaired skin integrity and/or pressure ulcers  Pt at risk for skin breakdown r/t bedrest and injury. Pt turned minimum q 2 hr while on bedrest, skin assessed over bony prominences, skin protectant applied, pillows placed. Assessed for efficacy of interventions frequently.

## 2018-03-13 NOTE — PROGRESS NOTES
[facility] Hospitalist Progress Note    Date of Service: 3/13/2018    Chief Complaint  37 y.o. male admitted 2018 with shortness of breath.    Interval Problem Update  Patient with a history of traumatic brain injury and seizure disorder, trach in place.   Had underwent taper off of his phenobarbital, family felt he was doing worse.   Upon arrival patient was noted to be hypoxic, deemed aspiration pneumonia due to seizures and patient was placed on a ventilator.   Pt again with seizures, neuro added meds.  Patient is currently tolerating his tube feeds, home blend is being used.   Patient seen and examined in the ICU, ICU care given.  Discussed patient condition and plan with Intensivist, RN, RT and charge nurse / hot rounds.    Continuous EEG off  Opens eyes, doesn't track  NSR to tach  -140  tmax 99.5  Tolerating home TF  Last bm this am  Adequate uop   No mobility   4L 28% trach collar, RA trial   Give k and mg  Resume home bowel care      Consultants/Specialty  Intensivist  Neurology    Disposition  Patient requires additional treatment in the hospital, may need placement at the time of discharge, LTAC is pending      Review of Systems   Unable to perform ROS: Intubated      Physical Exam  Laboratory/Imaging   Hemodynamics  Temp (24hrs), Av.1 °C (98.7 °F), Min:36.4 °C (97.5 °F), Max:37.5 °C (99.5 °F)   Temperature: 37.5 °C (99.5 °F)  Pulse  Av.5  Min: 47  Max: 125 Heart Rate (Monitored): 90  NIBP: 113/59      Respiratory    #Aerosol Therapy / Airway Management: Trache Collar, Aerosol Humidity Temp (celsius): 35 Respiration: (!) 24, Pulse Oximetry: 97 %, O2 Daily Delivery Respiratory : Trache Collar     Given By:: Trache, Work Of Breathing / Effort: Mild  RUL Breath Sounds: Clear After Suction, RML Breath Sounds: Clear After Suction, RLL Breath Sounds: Diminished, JONATAN Breath Sounds: Clear After Suction, LLL Breath Sounds: Diminished    Fluids    Intake/Output Summary (Last 24 hours) at 18  0732  Last data filed at 03/13/18 0600   Gross per 24 hour   Intake             2470 ml   Output             2150 ml   Net              320 ml       Nutrition  Orders Placed This Encounter   Procedures   • Diet NPO     Standing Status:   Standing     Number of Occurrences:   1     Order Specific Question:   Restrict to:     Answer:   Strict [1]     Physical Exam   Constitutional: He appears cachectic. He appears ill.   HENT:   Head: Normocephalic and atraumatic.   Eyes: Right eye exhibits no discharge. Left eye exhibits no discharge. No scleral icterus.   Neck: Neck supple.   Trach   Cardiovascular: Normal rate and regular rhythm.    Pulmonary/Chest: Effort normal. No stridor. No apnea. No respiratory distress.   Abdominal: Soft. He exhibits no distension.   Baclofen pump    Musculoskeletal: He exhibits deformity.   Extensive contractures of extremities   Neurological:   Eyes open, doesn't respond or follow      Skin: Skin is warm and dry. He is not diaphoretic. No erythema.   Psychiatric: He is noncommunicative.   Nursing note and vitals reviewed.      Recent Labs      03/11/18 0411 03/12/18 0411 03/13/18 0449   WBC  8.0  8.0  8.8   RBC  3.38*  3.56*  3.36*   HEMOGLOBIN  9.9*  10.4*  9.9*   HEMATOCRIT  30.6*  32.8*  31.3*   MCV  90.5  92.1  93.2   MCH  29.3  29.2  29.5   MCHC  32.4*  31.7*  31.6*   RDW  64.2*  64.7*  65.1*   PLATELETCT  534*  461*  460*   MPV  8.3*  7.9*  8.2*     Recent Labs      03/11/18 0411 03/12/18 0411 03/13/18 0449   SODIUM  131*  129*  129*   POTASSIUM  3.9  3.6  3.7   CHLORIDE  99  101  101   CO2  23  21  21   GLUCOSE  93  100*  101*   BUN  18  18  19   CREATININE  <0.20*  <0.20*  <0.20*   CALCIUM  8.5  8.4*  8.3*                      Assessment/Plan     Seizure (CMS-[facility])- (present on admission)   Assessment & Plan    - Seizures improved, continuous EEG again  - Continue Vimpat, Keppra, phenobarbital, and topamax per neurology  - MRI brain concerning for changes per   Che, no cause noted on CSF        Hypomagnesemia- (present on admission)   Assessment & Plan    - Resolved        Decubitus ulcer of ischium, left, stage IV (CMS-HCC)- (present on admission)   Assessment & Plan    - Continue wound care        Azotemia- (present on admission)   Assessment & Plan    - Resolved with fluids        Acute on chronic respiratory failure with hypoxemia (CMS-HCC)- (present on admission)   Assessment & Plan    - Improved, now on low flow O2, family would prefer to go home without oxygen, weaning, trial of RA today  - Was placed on ventilator initially on 2/18  - Due to aspiration pneumonia during seizures  - Patient was on oxygen at home, try to wean off oxygen prior to discharge        Aspiration pneumonia (CMS-HCC)- (present on admission)   Assessment & Plan    - Causing septic shock  - Finished full course of antibiotics        Anemia, blood loss- (present on admission)   Assessment & Plan    - Hemoglobin stable  - No sign of gross bleeding        Diarrhea- (present on admission)   Assessment & Plan    - Resolved          Protein-calorie malnutrition, severe (CMS-HCC)- (present on admission)   Assessment & Plan    - Chronic, tolerating tube feeds        Physical debility- (present on admission)   Assessment & Plan    - Chronic post traumatic brain injury  - At baseline        TBI (traumatic brain injury) (CMS-HCC)- (present on admission)   Assessment & Plan    - Chronic, nonverbal        Hyponatremia- (present on admission)   Assessment & Plan    - Resolved          Septic shock (CMS-HCC)- (present on admission)   Assessment & Plan    - Resolved, was due to aspiration pneumonia, finish full course of antibiotics        Contraction, joint, multiple sites- (present on admission)   Assessment & Plan    - Chronic  - has baclofen pump which was refilled in January  - Pump has been interrogating and is working properly        GI bleed- (present on admission)   Assessment & Plan    - Resolved           Quality-Core Measures   Reviewed items::  Labs reviewed, Medications reviewed and Radiology images reviewed  Waldrop catheter::  Unconscious / Sedated Patient on a Ventilator  DVT prophylaxis pharmacological::  Enoxaparin (Lovenox)  Ulcer Prophylaxis::  Yes
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
## 2018-03-13 NOTE — FLOWSHEET NOTE
03/13/18 0219   Events/Summary/Plan   Events/Summary/Plan tx given   Aerosol Therapy / Airway Management   #Aerosol Therapy / Airway Management Trache Collar   Aerosol Humidity Temp (celsius) 35   Chest Exam   Respiration 14   Heart Rate (Monitored) 90   Breath Sounds   RUL Breath Sounds Clear After Suction   RML Breath Sounds Clear After Suction   RLL Breath Sounds Diminished   JONATAN Breath Sounds Clear After Suction   LLL Breath Sounds Diminished   Secretions   Sputum Amount Moderate   Sputum Color White;Yellow   Sputum Consistency Thick;Thin   Suction Frequency Suctioned Once or Twice Per Encounter   Oximetry   Continuous Oximetry Yes   Oxygen   Pulse Oximetry 98 %   O2 (LPM) 3   O2 (FiO2) 28   O2 Daily Delivery Respiratory  Trache Collar   Vent Inline Medication   #(Single Treatment) Vent Inline Medication Yes

## 2018-03-14 ENCOUNTER — TELEPHONE (OUTPATIENT)
Dept: NEUROLOGY | Facility: MEDICAL CENTER | Age: 38
End: 2018-03-14

## 2018-03-14 ENCOUNTER — APPOINTMENT (OUTPATIENT)
Dept: RADIOLOGY | Facility: MEDICAL CENTER | Age: 38
DRG: 853 | End: 2018-03-14
Attending: INTERNAL MEDICINE
Payer: COMMERCIAL

## 2018-03-14 LAB
AMMONIA PLAS-SCNC: 41 UMOL/L (ref 11–45)
ANION GAP SERPL CALC-SCNC: 11 MMOL/L (ref 0–11.9)
BASOPHILS # BLD AUTO: 1 % (ref 0–1.8)
BASOPHILS # BLD: 0.09 K/UL (ref 0–0.12)
BUN SERPL-MCNC: 21 MG/DL (ref 8–22)
CALCIUM SERPL-MCNC: 8.8 MG/DL (ref 8.5–10.5)
CHLORIDE SERPL-SCNC: 105 MMOL/L (ref 96–112)
CO2 SERPL-SCNC: 19 MMOL/L (ref 20–33)
CREAT SERPL-MCNC: <0.2 MG/DL (ref 0.5–1.4)
EOSINOPHIL # BLD AUTO: 0.45 K/UL (ref 0–0.51)
EOSINOPHIL NFR BLD: 4.8 % (ref 0–6.9)
ERYTHROCYTE [DISTWIDTH] IN BLOOD BY AUTOMATED COUNT: 67.4 FL (ref 35.9–50)
FUNGUS SPEC CULT: ABNORMAL
FUNGUS SPEC CULT: ABNORMAL
GLUCOSE SERPL-MCNC: 83 MG/DL (ref 65–99)
HCT VFR BLD AUTO: 34.2 % (ref 42–52)
HGB BLD-MCNC: 10.7 G/DL (ref 14–18)
IMM GRANULOCYTES # BLD AUTO: 0.16 K/UL (ref 0–0.11)
IMM GRANULOCYTES NFR BLD AUTO: 1.7 % (ref 0–0.9)
LYMPHOCYTES # BLD AUTO: 1.59 K/UL (ref 1–4.8)
LYMPHOCYTES NFR BLD: 16.9 % (ref 22–41)
MAGNESIUM SERPL-MCNC: 1.9 MG/DL (ref 1.5–2.5)
MCH RBC QN AUTO: 29.4 PG (ref 27–33)
MCHC RBC AUTO-ENTMCNC: 31.3 G/DL (ref 33.7–35.3)
MCV RBC AUTO: 94 FL (ref 81.4–97.8)
MONOCYTES # BLD AUTO: 0.72 K/UL (ref 0–0.85)
MONOCYTES NFR BLD AUTO: 7.7 % (ref 0–13.4)
NEUTROPHILS # BLD AUTO: 6.38 K/UL (ref 1.82–7.42)
NEUTROPHILS NFR BLD: 67.9 % (ref 44–72)
NRBC # BLD AUTO: 0 K/UL
NRBC BLD-RTO: 0 /100 WBC
PLATELET # BLD AUTO: 472 K/UL (ref 164–446)
PMV BLD AUTO: 8.1 FL (ref 9–12.9)
POTASSIUM SERPL-SCNC: 3.8 MMOL/L (ref 3.6–5.5)
RBC # BLD AUTO: 3.64 M/UL (ref 4.7–6.1)
SIGNIFICANT IND 70042: ABNORMAL
SITE SITE: ABNORMAL
SODIUM SERPL-SCNC: 135 MMOL/L (ref 135–145)
SOURCE SOURCE: ABNORMAL
WBC # BLD AUTO: 9.4 K/UL (ref 4.8–10.8)

## 2018-03-14 PROCEDURE — 85025 COMPLETE CBC W/AUTO DIFF WBC: CPT

## 2018-03-14 PROCEDURE — 94640 AIRWAY INHALATION TREATMENT: CPT

## 2018-03-14 PROCEDURE — 770001 HCHG ROOM/CARE - MED/SURG/GYN PRIV*

## 2018-03-14 PROCEDURE — 700102 HCHG RX REV CODE 250 W/ 637 OVERRIDE(OP): Performed by: INTERNAL MEDICINE

## 2018-03-14 PROCEDURE — 83735 ASSAY OF MAGNESIUM: CPT

## 2018-03-14 PROCEDURE — 700101 HCHG RX REV CODE 250: Performed by: INTERNAL MEDICINE

## 2018-03-14 PROCEDURE — A9270 NON-COVERED ITEM OR SERVICE: HCPCS | Performed by: INTERNAL MEDICINE

## 2018-03-14 PROCEDURE — 82140 ASSAY OF AMMONIA: CPT

## 2018-03-14 PROCEDURE — A9270 NON-COVERED ITEM OR SERVICE: HCPCS | Performed by: HOSPITALIST

## 2018-03-14 PROCEDURE — A9270 NON-COVERED ITEM OR SERVICE: HCPCS | Performed by: PSYCHIATRY & NEUROLOGY

## 2018-03-14 PROCEDURE — 80048 BASIC METABOLIC PNL TOTAL CA: CPT

## 2018-03-14 PROCEDURE — 700102 HCHG RX REV CODE 250 W/ 637 OVERRIDE(OP): Performed by: PSYCHIATRY & NEUROLOGY

## 2018-03-14 PROCEDURE — 700102 HCHG RX REV CODE 250 W/ 637 OVERRIDE(OP): Performed by: HOSPITALIST

## 2018-03-14 PROCEDURE — 99233 SBSQ HOSP IP/OBS HIGH 50: CPT | Performed by: INTERNAL MEDICINE

## 2018-03-14 PROCEDURE — 700101 HCHG RX REV CODE 250: Performed by: PSYCHIATRY & NEUROLOGY

## 2018-03-14 PROCEDURE — 71045 X-RAY EXAM CHEST 1 VIEW: CPT

## 2018-03-14 PROCEDURE — 700111 HCHG RX REV CODE 636 W/ 250 OVERRIDE (IP): Performed by: INTERNAL MEDICINE

## 2018-03-14 RX ORDER — MAGNESIUM SULFATE HEPTAHYDRATE 40 MG/ML
2 INJECTION, SOLUTION INTRAVENOUS ONCE
Status: COMPLETED | OUTPATIENT
Start: 2018-03-14 | End: 2018-03-14

## 2018-03-14 RX ADMIN — LACOSAMIDE 200 MG: 50 TABLET, FILM COATED ORAL at 21:25

## 2018-03-14 RX ADMIN — CHLORHEXIDINE GLUCONATE 15 ML: 1.2 RINSE ORAL at 09:24

## 2018-03-14 RX ADMIN — TOPIRAMATE 100 MG: 100 TABLET, FILM COATED ORAL at 17:49

## 2018-03-14 RX ADMIN — CHLORHEXIDINE GLUCONATE 15 ML: 1.2 RINSE ORAL at 21:25

## 2018-03-14 RX ADMIN — ENOXAPARIN SODIUM 30 MG: 100 INJECTION SUBCUTANEOUS at 09:24

## 2018-03-14 RX ADMIN — POTASSIUM BICARBONATE 50 MEQ: 25 TABLET, EFFERVESCENT ORAL at 09:24

## 2018-03-14 RX ADMIN — OMEPRAZOLE 40 MG: 20 CAPSULE, DELAYED RELEASE ORAL at 09:24

## 2018-03-14 RX ADMIN — PHENOBARBITAL 120 MG: 20 ELIXIR ORAL at 09:23

## 2018-03-14 RX ADMIN — LEVETIRACETAM 1500 MG: 100 SOLUTION ORAL at 21:26

## 2018-03-14 RX ADMIN — MAGNESIUM SULFATE IN WATER 2 G: 40 INJECTION, SOLUTION INTRAVENOUS at 09:28

## 2018-03-14 RX ADMIN — LEVETIRACETAM 1500 MG: 100 SOLUTION ORAL at 09:26

## 2018-03-14 RX ADMIN — LEVALBUTEROL HYDROCHLORIDE 1.25 MG: 1.25 SOLUTION RESPIRATORY (INHALATION) at 06:14

## 2018-03-14 RX ADMIN — PHENOBARBITAL 120 MG: 20 ELIXIR ORAL at 21:25

## 2018-03-14 RX ADMIN — TOPIRAMATE 100 MG: 100 TABLET, FILM COATED ORAL at 04:56

## 2018-03-14 RX ADMIN — BISACODYL 5 MG: 10 SUPPOSITORY RECTAL at 09:25

## 2018-03-14 RX ADMIN — LEVALBUTEROL HYDROCHLORIDE 1.25 MG: 1.25 SOLUTION RESPIRATORY (INHALATION) at 02:11

## 2018-03-14 RX ADMIN — LACOSAMIDE 200 MG: 50 TABLET, FILM COATED ORAL at 09:24

## 2018-03-14 ASSESSMENT — LIFESTYLE VARIABLES: DO YOU DRINK ALCOHOL: NO

## 2018-03-14 NOTE — PROGRESS NOTES
1850- During bedside report RN instructed by mother, Susana to administer Arnica for HR >95.  Arnica supplied by mother.    1945 - RN spoke with Pharmacist to verify that Arnica is not contraindicated with Pt medications.  No contraindications per Pharmacist.    1955 - RN paged Gonda, MD to inquire about appropriateness of Arnica administration.  Gonda, MD unfamiliar with at home regiment.  No orders received

## 2018-03-14 NOTE — CARE PLAN
Problem: Infection  Goal: Will remain free from infection    Intervention: Assess signs and symptoms of infection  RN monitors Pt VS and lab values to observe for S/S of infection.  Hand hygiene implemented before and after Pt care.       Problem: Skin Integrity  Goal: Risk for impaired skin integrity will decrease    Intervention: Assess risk factors for impaired skin integrity and/or pressure ulcers  RN ensures to turn Pt Q2 hours and use extra pillows for support and positioning.  Waffle pillow/mattress in place if indicated.  RN to ensure Pt is not laying on any lines or other objects that might cause tissue injury.  Mepilex used on sacrum when necessary. RN ensures to change PU dressing Q8 per Wound order

## 2018-03-14 NOTE — TELEPHONE ENCOUNTER
[facility] pharmacy called stated this pt's rx diazepam (DIASTAT-ACUDIAL) 10 MG kit can not be covered by insurance that insurance can on cover 1 kit for 7 days and 5 refills. I stated that was ok since insurance will not cover more as long as pt gets it. Pharmacy verbally understood.

## 2018-03-14 NOTE — PROGRESS NOTES
Pulmonary Critical Care Progress Note        Date of admission: 2/18/2018    Chief Complaint: Respiratory failure, septic shock    History of Present Illness:  Mr. Edwards is a 37-year-old male with past medical history of traumatic brain injury at age 17, seizures diagnosed in June of 2017. His baseline mental status appears to be nodding and minimal interacting without vocalization. Who was brought to the ICU as a direct admit from [facility] where he was brought today for hypoxia. Reportedly the patient was being weanied off of his phenobarbital by his neurologist when he began having more frequent seizures, approximately one week ago his neurologist increased his Keppra dosing however the seizures continued. His mother did use CBD and THC with some success in slowing the seizures. Since 2 days ago the patient had multiple aspiration events following these seizures. His mother evaluated him with a home oxygen saturation monitor and he was noted to be hypoxic, he was brought to [facility] where he was found to have 28% bands with leukopenia. Chest x-ray was obtained demonstrating bilateral infiltrates right greater than left. He was placed on mechanical ventilation and his oxygenation was unable to be improved more than 85%, he was hypotensive and given multiple boluses of IV fluids without improvement. He was started on levo fed and given multiple doses of Ativan for seizures and transferred to our ICU for higher level of care. He arrives on the ventilator and on pressors critically ill.      ROS:  unable to perform due to the patient's inability to effectively communicate     Interval Events:  24 hour interval history reviewed      No change neuro status  No new Sz  Tm 99.1  Room air trial tolerated since 3/13  SR/ST 80-100s  Bp 120-140s  TF per protocols  UO adequate  Condom cath  No mobility per mom  Mom wants   Awaiting transfer  Lovenox  K/Mg repletion by protocols     YESTERDAY  No  further Sz per 24 hrs EEG - controlled with 4 drug regimen  Dr Bolton reviewed with mother  HR now 80-90s  GCS 9 - TBI 20 yrs ago  Eyes open - not tracking - baseline  Hemodynamics reviewed  Tm 99.5 - WBC normal  TF goal - home regimen - BM  HMTC 28%  Infrequent suctioning - Q4-6  Resume home bowel care regimen    Serial follow-up  Tolerating room air trial  Secretions manageable, suctioning every 4-6 hours  No new seizure-like activity      PFSH:  No change.    General:  Appears more chronic than acutely ill, appears older than stated age, nonverbal, multiple contractures - all old -unchanged    Skin:  Warm and dry, no diaphoresis, multiple contractures all chronic, okay capillary refill - no change    Respiratory:      Pulse Oximetry: 93 %  Tracheostomy site clean and dry  Minimal clear secretions - suctioned every 6H  HMTC 21%          Exam: tracheostomy tube in good position without surrounding erythema, unlabored respirations, no intercostal retractions or accessory muscle use and rhonchi bibasilar.   No wheezes, breath sounds diminished at the bases    ImagingAvailable data reviewed         Invalid input(s): [id number]     HemoDynamics:  Pulse: 95, Heart Rate (Monitored): 95  NIBP: 110/63        Exam: regular rate and rhythm, regular rhythm (Sinus), no ectopy, no edema, pulses intact, no delta        Neuro:  GCS Total [place] Coma Score: 9   Exam: PERRL, eyes are open, does not follow commands, no change  Imaging: Available data reviewed     3/11 Interp 24 hr EEG:  This is an abnormal 24 hrs video electroencephalogram recording in an encephalopathic patient during awake and sleep states. There is diffuse cerebral dysfunction, suggestive of an encephalopathic state. Superimposed slowing in the right hemisphere is likely due to underlying structural abnormality. Frequent right frontotemporal spikes. Frequently, these may become briefly periodic (right PLEDS) but without clear evidence for seizures during the  study. The study is perhaps mildly worse when compared to prior recording as brief runs of right PLEDS more frequent. The patient remains at very high risk for seizure recurrence. Clinical and radiological correlation is recommended.     3/12 Interp 24 hr EEG:   This is an abnormal extended video electroencephalogram recording in an encephalopathic patient during awake and sleep states. There is diffuse cerebral dysfunction, suggestive of an encephalopathic state. Superimposed slowing in the right hemisphere is likely due to underlying structural abnormality. Frequent right frontotemporal spikes. Rarely, these may become briefly periodic (right PLEDS) but without clear evidence for seizures during the study. The study is stable when compared to prior recording. The patient remains at risk for seizure recurrence. Clinical and radiological correlation is recommended.     Fluids:  Intake/Output       03/12/18 0700 - 03/13/18 0659 03/13/18 0700 - 03/14/18 0659 03/14/18 0700 - 03/15/18 0659      3825-9605 7503-6033 Total 3699-6191 6280-5816 Total 6751-2468 7306-4377 Total       Intake    I.V.  --  -- --  100  -- 100  --  -- --    Magnesium Sulfate Volume -- -- -- 100 -- 100 -- -- --    Other  60  30 90  270  90 360  --  -- --    Medications (P.O./ Enteral Liquids) 60 30 90 270 90 360 -- -- --    Enteral  1680  700 2380  1560  380 1940  --  -- --    Enteral Volume 4250 548 6799 4170 363 0202 -- -- --    Free Water / Tube Flush 240 300 540 120 180 300 -- -- --    Total Intake 3083 126 4892 3333 366 9979 -- -- --       Output    Urine  1200  950 2150  1800  50 1850  --  -- --    Condom Catheter 1186 226 4946 1800 50 1850 -- -- --    Stool  --  -- --  --  -- --  --  -- --    Number of Times Stooled -- -- -- 1 x -- 1 x -- -- --    Total Output 2344 858 0193 1800 50 1850 -- -- --       Net I/O     540 -220 320 130 420 550 -- -- --           Recent Labs      03/12/18   0411  03/13/18   0449   SODIUM  129*  129*   POTASSIUM  3.6   3.7   CHLORIDE  101  101   CO2  21  21   BUN  18  19   CREATININE  <0.20*  <0.20*   MAGNESIUM  1.9  1.8   CALCIUM  8.4*  8.3*       GI/Nutrition:  Exam: nondistended, abdomen is soft and non-tender, normal active bowel sounds, G-Tube  no sign of infection, palpable baclofen pump in right lower quadrant - no change    Imaging: Available data reviewed     KUB : No evidence of bowel obstruction.    CT abdomen and pelvis with oral contrast :  1.  No evidence of bowel obstruction or perforation.  2.  Appendix not visualized.  3.  Small to moderate volume ascites of uncertain etiology.  4.  Bilateral pleural effusions with associated atelectasis.  5.  Ill-defined parenchymal opacities in the LEFT lower lung suggesting multifocal pneumonia.  6.  Scoliosis.    Liver Function  Recent Labs      18   ALTSGPT  38   --    ASTSGOT  27   --    ALKPHOSPHAT  301*   --    TBILIRUBIN  0.2   --    PREALBUMIN  17.0*   --    GLUCOSE  100*  101*       Heme:  Recent Labs      18   RBC  3.56*  3.36*   HEMOGLOBIN  10.4*  9.9*   HEMATOCRIT  32.8*  31.3*   PLATELETCT  461*  460*       Infectious Disease:  Temp  Av.9 °C (98.5 °F)  Min: 36.7 °C (98 °F)  Max: 37.2 °C (98.9 °F)  Micro: reviewed.   Recent Labs      18   WBC  8.0  8.8   NEUTSPOLYS  60.90  64.70   LYMPHOCYTES  21.60*  20.10*   MONOCYTES  10.60  8.60   EOSINOPHILS  4.00  4.00   BASOPHILS  1.00  1.00   ASTSGOT  27   --    ALTSGPT  38   --    ALKPHOSPHAT  301*   --    TBILIRUBIN  0.2   --      Current Facility-Administered Medications   Medication Dose Frequency Provider Last Rate Last Dose   • potassium bicarbonate (KLYTE) 25 MEQ effervescent tablet TBEF 25 mEq  25 mEq Once Clarence Pro M.D.       • topiramate (TOPAMAX) tablet 100 mg  100 mg Q12HRS Harris Bolton M.D.   100 mg at 18 0456   • bisacodyl (DULCOLAX) suppository 10 mg  10 mg QDAY PRN Eleazar Grijalva M.D.         And   • polyethylene glycol/lytes (MIRALAX) PACKET 1 Packet  1 Packet QDAY PRN Eleazar Grijalva M.D.        And   • magnesium hydroxide (MILK OF MAGNESIA) suspension 30 mL  30 mL QDAY PRN Eleazar Grijalva M.D.       • bisacodyl (DULCOLAX) suppository 5 mg  5 mg DAILY Eleazar Grijalva M.D.   5 mg at 03/13/18 0842   • PHENobarbital solution 120 mg  120 mg BID Aidan Encarnacion M.D.   120 mg at 03/13/18 2050   • potassium bicarbonate (KLYTE) 25 MEQ effervescent tablet TBEF 50 mEq  50 mEq DAILY Jeremy M Gonda, M.D.   25 mEq at 03/13/18 1200   • Pain Pump (patient supplied) Device   Continuous Jeremy M Gonda, M.D.       • enoxaparin (LOVENOX) inj 40 mg  40 mg DAILY Jeremy M Gonda, M.D.   40 mg at 03/13/18 0840   • lacosamide (VIMPAT) tablet 200 mg  200 mg BID Jeremy M Gonda, M.D.   200 mg at 03/13/18 2050   • levETIRAcetam (KEPPRA) 100 MG/ML solution 1,500 mg  1,500 mg Q12HRS Jeremy M Gonda, M.D.   1,500 mg at 03/13/18 2050   • loperamide (IMODIUM) 1 MG/5ML solution 2 mg  2 mg 4X/DAY PRN Clarence Morfin DBuckyOBucky   2 mg at 02/26/18 0820   • omeprazole 2 mg/mL in sodium bicarbonate (PRILOSEC) oral susp 40 mg  40 mg DAILY Jon Reynoso D.O.   40 mg at 03/13/18 0841   • levalbuterol (XOPENEX) 1.25 MG/3ML nebulizer solution 1.25 mg  1.25 mg Q6HRS (RT) Jon Reynoso D.O.   1.25 mg at 03/14/18 0211   • Respiratory Care per Protocol   Continuous RT Modesto Appiah Jr., D.O.       • chlorhexidine (PERIDEX) 0.12 % solution 15 mL  15 mL BID Modesto Appiah Jr., D.O.   15 mL at 03/13/18 2050   • lidocaine (XYLOCAINE) 1%  injection  1-2 mL Q30 MIN PRN JASPAL Alaniz Jr..O.       • MD ALERT...Adult ICU Electrolyte Replacement per Pharmacy Protocol   pharmacy to dose JASPAL Alaniz Jr..SAM.       • ipratropium-albuterol (DUONEB) nebulizer solution 3 mL  3 mL Q2HRS PRN (RT) JASPAL Alaniz Jr..OBucky       • levalbuterol (XOPENEX) 1.25 MG/3ML nebulizer solution 1.25 mg  1.25 mg Q4HRS PRN Duy Whitaker M.D.   1.25 mg at 03/06/18 1420    • miconazole 2%-zinc oxide (RADHA) topical cream   PRN Duy Whitaker M.D.       • acetaminophen (TYLENOL) tablet 650 mg  650 mg Q6HRS PRN Duy Whitaker M.D.   650 mg at 03/13/18 0310   • oxyCODONE immediate-release (ROXICODONE) tablet 5 mg  5 mg Q3HRS PRN Duy Whitaker M.D.        And   • oxyCODONE immediate release (ROXICODONE) tablet 10 mg  10 mg Q3HRS PRN Duy Whitaker M.D.       • ondansetron (ZOFRAN) syringe/vial injection 4 mg  4 mg Q4HRS PRN Duy Whitaker M.D.   4 mg at 02/25/18 1140   • ondansetron (ZOFRAN ODT) dispertab 4 mg  4 mg Q4HRS PRN Duy Whitaker M.D.       • promethazine (PHENERGAN) tablet 12.5-25 mg  12.5-25 mg Q4HRS PRN Duy Whitaker M.D.       • promethazine (PHENERGAN) suppository 12.5-25 mg  12.5-25 mg Q4HRS PRN Duy Whitaker M.D.       • prochlorperazine (COMPAZINE) injection 5-10 mg  5-10 mg Q4HRS PRN Duy Whitaker M.D.       • LORazepam (ATIVAN) injection 4 mg  4 mg Q10 MIN PRN Duy Whitaker M.D.   4 mg at 03/09/18 1046     Last reviewed on 2/18/2018  7:51 PM by Lois Camejo, Pharmacy Intern    Quality  Measures:   Labs reviewed, Medications reviewed and Radiology images reviewed                    Assessment and plan:  Acute hypoxemic respiratory failure - VDRF since 2/18, chronic trach   - T-piece - HMTC   - Room air trial   - Meet with mother, try to get back to identical humidifier/trach management system she uses at home   - RT/O2 protocol, encourage mobilization - limited   - minimize sedatives and maintain aspiration precautions   - intermittent chest x-rays   - Aggressive pulmonary toilet, TX FOB when necessary    - Mobilize as tolerated  Left exudative pleural effusion per lytes criteria S/P thoracentesis 3/3   - Cx neg   - Serial chest x-ray negative for recurrence so far   - Repeat thoracentesis when necessary, if recurrent consider CT chest as well  Septic shock from pulmonary source, now unspecified place a distributive shock - improved   - midodrine  held, d/c today   - Completed antibiotics, s/p sepsis protocol   - Citrobacter suspected colonizer - monitor for acute infection  History of traumatic brain injury, chronic seizure disorder with associated status epilepticus   - Continue antiepileptics per neurology   - ongoing seizure activity; terminated now on continuous EEG on 4 AEDs   - appreciated Neuro input   - MRI reviewed   - did not april VPA, high NH3   - LP neg 3/9  Decubitus ulcerations of the left hip including stage IV disease   - Wound care, optimize nutrition   - Mobilize off wounds as tolerated  Pneumonia (Proteus and Klebsiella)   - S/P antibiotics   - Monitor fever curve, white count and for other signs of recurrent infection  Mild fluid overload - improved   - now euvolemic   - Monitor exam and I/Os  Possible paralytic ileus versus feeding tube malfunction - resolved   - Continue tube feeding trial with advanced to goal home regimen   - Bowel protocol, daily Dulcolax suppository  Hyponatremia    - follow  Iron deficiency anemia - s/p iron supplementation  Hypokalemia/mag - monitor and replete daily as needed  Prophylaxis/enteral nutrition    Transfer to Neuro  Awaiting transfer to neuro floor  Secretions and suctioning manageable per RN/RT  Room air trial tolerated since 3/13  Will try to convert medication, nutrition and trach management regimens to what the patient has been receiving at home from his mother  Stop PPI, is not needed prophylaxis clinically   working on DC planning - mother voices that she is prepared to take her son home again  Probably needs outpatient wound care follow-up at home to assist patient's mother    Discussed patient condition and risk of morbidity and/or mortality with Family, RN, RT, Pharmacy, Dietary, , Charge nurse / hot rounds and hospitalist.        Clarence Pro M.D.

## 2018-03-14 NOTE — CARE PLAN
"Problem: Oxygenation:  Goal: Maintain adequate oxygenation dependent on patient condition    Intervention: Manage oxygen therapy by monitoring pulse oximetry and/or ABG values  Pt cont to april RA trial (4 LPM flow,  in use and set to 21%). Per mom, Pt does not take Tx at home and only use HME \"nose\" when up in chair. Pt not up to chair d/t pressure ulcers per mom. Pt's Q6 Tx titrated to PRN. Pt april home reg well. Min tan/white secretions noted t/o shift. Pt has #6 Maryley in place.        "

## 2018-03-15 PROBLEM — K92.2 GI BLEED: Status: RESOLVED | Noted: 2018-02-19 | Resolved: 2018-03-15

## 2018-03-15 PROBLEM — R19.7 DIARRHEA: Status: RESOLVED | Noted: 2018-02-19 | Resolved: 2018-03-15

## 2018-03-15 PROBLEM — E87.1 HYPONATREMIA: Status: RESOLVED | Noted: 2017-06-17 | Resolved: 2018-03-15

## 2018-03-15 PROBLEM — A41.9 SEPTIC SHOCK (HCC): Status: RESOLVED | Noted: 2018-02-19 | Resolved: 2018-03-15

## 2018-03-15 PROBLEM — R79.89 AZOTEMIA: Status: RESOLVED | Noted: 2018-02-19 | Resolved: 2018-03-15

## 2018-03-15 PROBLEM — J96.21 ACUTE ON CHRONIC RESPIRATORY FAILURE WITH HYPOXEMIA (HCC): Status: RESOLVED | Noted: 2018-02-19 | Resolved: 2018-03-15

## 2018-03-15 PROBLEM — J69.0 ASPIRATION PNEUMONIA (HCC): Status: RESOLVED | Noted: 2017-06-17 | Resolved: 2018-03-15

## 2018-03-15 PROBLEM — R65.21 SEPTIC SHOCK (HCC): Status: RESOLVED | Noted: 2018-02-19 | Resolved: 2018-03-15

## 2018-03-15 PROBLEM — E83.42 HYPOMAGNESEMIA: Status: RESOLVED | Noted: 2018-02-19 | Resolved: 2018-03-15

## 2018-03-15 LAB
AMMONIA PLAS-SCNC: 47 UMOL/L (ref 11–45)
ANION GAP SERPL CALC-SCNC: 9 MMOL/L (ref 0–11.9)
BASOPHILS # BLD AUTO: 0.9 % (ref 0–1.8)
BASOPHILS # BLD: 0.09 K/UL (ref 0–0.12)
BUN SERPL-MCNC: 20 MG/DL (ref 8–22)
CALCIUM SERPL-MCNC: 8.6 MG/DL (ref 8.5–10.5)
CHLORIDE SERPL-SCNC: 103 MMOL/L (ref 96–112)
CO2 SERPL-SCNC: 22 MMOL/L (ref 20–33)
CREAT SERPL-MCNC: <0.2 MG/DL (ref 0.5–1.4)
EOSINOPHIL # BLD AUTO: 0.64 K/UL (ref 0–0.51)
EOSINOPHIL NFR BLD: 6.6 % (ref 0–6.9)
ERYTHROCYTE [DISTWIDTH] IN BLOOD BY AUTOMATED COUNT: 67.5 FL (ref 35.9–50)
GLUCOSE SERPL-MCNC: 88 MG/DL (ref 65–99)
HCT VFR BLD AUTO: 33.6 % (ref 42–52)
HGB BLD-MCNC: 10.6 G/DL (ref 14–18)
IMM GRANULOCYTES # BLD AUTO: 0.14 K/UL (ref 0–0.11)
IMM GRANULOCYTES NFR BLD AUTO: 1.5 % (ref 0–0.9)
LYMPHOCYTES # BLD AUTO: 1.96 K/UL (ref 1–4.8)
LYMPHOCYTES NFR BLD: 20.3 % (ref 22–41)
MAGNESIUM SERPL-MCNC: 1.8 MG/DL (ref 1.5–2.5)
MCH RBC QN AUTO: 29.5 PG (ref 27–33)
MCHC RBC AUTO-ENTMCNC: 31.5 G/DL (ref 33.7–35.3)
MCV RBC AUTO: 93.6 FL (ref 81.4–97.8)
MONOCYTES # BLD AUTO: 0.66 K/UL (ref 0–0.85)
MONOCYTES NFR BLD AUTO: 6.8 % (ref 0–13.4)
NEUTROPHILS # BLD AUTO: 6.16 K/UL (ref 1.82–7.42)
NEUTROPHILS NFR BLD: 63.9 % (ref 44–72)
NRBC # BLD AUTO: 0 K/UL
NRBC BLD-RTO: 0 /100 WBC
PLATELET # BLD AUTO: 487 K/UL (ref 164–446)
PMV BLD AUTO: 8 FL (ref 9–12.9)
POTASSIUM SERPL-SCNC: 3.6 MMOL/L (ref 3.6–5.5)
RBC # BLD AUTO: 3.59 M/UL (ref 4.7–6.1)
SODIUM SERPL-SCNC: 134 MMOL/L (ref 135–145)
WBC # BLD AUTO: 9.7 K/UL (ref 4.8–10.8)

## 2018-03-15 PROCEDURE — 700102 HCHG RX REV CODE 250 W/ 637 OVERRIDE(OP): Performed by: INTERNAL MEDICINE

## 2018-03-15 PROCEDURE — A9270 NON-COVERED ITEM OR SERVICE: HCPCS | Performed by: INTERNAL MEDICINE

## 2018-03-15 PROCEDURE — 82140 ASSAY OF AMMONIA: CPT

## 2018-03-15 PROCEDURE — A9270 NON-COVERED ITEM OR SERVICE: HCPCS | Performed by: HOSPITALIST

## 2018-03-15 PROCEDURE — 99233 SBSQ HOSP IP/OBS HIGH 50: CPT | Performed by: INTERNAL MEDICINE

## 2018-03-15 PROCEDURE — A6209 FOAM DRSG <=16 SQ IN W/O BDR: HCPCS | Performed by: INTERNAL MEDICINE

## 2018-03-15 PROCEDURE — 700111 HCHG RX REV CODE 636 W/ 250 OVERRIDE (IP): Performed by: INTERNAL MEDICINE

## 2018-03-15 PROCEDURE — 770022 HCHG ROOM/CARE - ICU (200)

## 2018-03-15 PROCEDURE — 85025 COMPLETE CBC W/AUTO DIFF WBC: CPT

## 2018-03-15 PROCEDURE — 700101 HCHG RX REV CODE 250: Performed by: PSYCHIATRY & NEUROLOGY

## 2018-03-15 PROCEDURE — 80048 BASIC METABOLIC PNL TOTAL CA: CPT

## 2018-03-15 PROCEDURE — 83735 ASSAY OF MAGNESIUM: CPT

## 2018-03-15 PROCEDURE — 700102 HCHG RX REV CODE 250 W/ 637 OVERRIDE(OP): Performed by: HOSPITALIST

## 2018-03-15 PROCEDURE — 302101 FENESTRATED FOAM: Performed by: INTERNAL MEDICINE

## 2018-03-15 PROCEDURE — 94640 AIRWAY INHALATION TREATMENT: CPT

## 2018-03-15 RX ORDER — TOPIRAMATE 100 MG/1
100 TABLET, FILM COATED ORAL EVERY 12 HOURS
Qty: 60 TAB | Refills: 0 | Status: SHIPPED | OUTPATIENT
Start: 2018-03-15 | End: 2018-05-11 | Stop reason: SDUPTHER

## 2018-03-15 RX ORDER — PHENOBARBITAL 20 MG/5ML
120 ELIXIR ORAL 2 TIMES DAILY
Qty: 1860 ML | Refills: 0 | Status: SHIPPED | OUTPATIENT
Start: 2018-03-15 | End: 2018-03-15

## 2018-03-15 RX ORDER — LACOSAMIDE 200 MG/1
200 TABLET ORAL 2 TIMES DAILY
Qty: 60 TAB | Refills: 0 | Status: SHIPPED | OUTPATIENT
Start: 2018-03-15 | End: 2018-03-15

## 2018-03-15 RX ORDER — PHENOBARBITAL 20 MG/5ML
120 ELIXIR ORAL 2 TIMES DAILY
Qty: 1860 ML | Refills: 0 | Status: SHIPPED | OUTPATIENT
Start: 2018-03-15 | End: 2018-04-15

## 2018-03-15 RX ORDER — LEVETIRACETAM 100 MG/ML
1500 SOLUTION ORAL EVERY 12 HOURS
Qty: 930 ML | Refills: 0 | Status: SHIPPED | OUTPATIENT
Start: 2018-03-15 | End: 2018-04-15

## 2018-03-15 RX ORDER — LACOSAMIDE 200 MG/1
200 TABLET ORAL 2 TIMES DAILY
Qty: 60 TAB | Refills: 0 | Status: SHIPPED | OUTPATIENT
Start: 2018-03-15 | End: 2018-04-15

## 2018-03-15 RX ORDER — MAGNESIUM SULFATE HEPTAHYDRATE 40 MG/ML
2 INJECTION, SOLUTION INTRAVENOUS ONCE
Status: COMPLETED | OUTPATIENT
Start: 2018-03-15 | End: 2018-03-15

## 2018-03-15 RX ORDER — POTASSIUM CHLORIDE 20 MEQ/1
40 TABLET, EXTENDED RELEASE ORAL ONCE
Status: COMPLETED | OUTPATIENT
Start: 2018-03-15 | End: 2018-03-15

## 2018-03-15 RX ADMIN — LEVETIRACETAM 1500 MG: 100 SOLUTION ORAL at 07:51

## 2018-03-15 RX ADMIN — BISACODYL 5 MG: 10 SUPPOSITORY RECTAL at 07:51

## 2018-03-15 RX ADMIN — CHLORHEXIDINE GLUCONATE 15 ML: 1.2 RINSE ORAL at 07:50

## 2018-03-15 RX ADMIN — LACOSAMIDE 200 MG: 50 TABLET, FILM COATED ORAL at 20:46

## 2018-03-15 RX ADMIN — PHENOBARBITAL 120 MG: 20 ELIXIR ORAL at 09:11

## 2018-03-15 RX ADMIN — ENOXAPARIN SODIUM 30 MG: 100 INJECTION SUBCUTANEOUS at 07:51

## 2018-03-15 RX ADMIN — LEVETIRACETAM 1500 MG: 100 SOLUTION ORAL at 20:46

## 2018-03-15 RX ADMIN — MAGNESIUM SULFATE IN WATER 2 G: 40 INJECTION, SOLUTION INTRAVENOUS at 07:51

## 2018-03-15 RX ADMIN — PHENOBARBITAL 120 MG: 20 ELIXIR ORAL at 20:46

## 2018-03-15 RX ADMIN — CHLORHEXIDINE GLUCONATE 15 ML: 1.2 RINSE ORAL at 20:46

## 2018-03-15 RX ADMIN — TOPIRAMATE 100 MG: 100 TABLET, FILM COATED ORAL at 05:48

## 2018-03-15 RX ADMIN — POTASSIUM CHLORIDE 40 MEQ: 1500 TABLET, EXTENDED RELEASE ORAL at 07:51

## 2018-03-15 RX ADMIN — TOPIRAMATE 100 MG: 100 TABLET, FILM COATED ORAL at 16:25

## 2018-03-15 RX ADMIN — LACOSAMIDE 200 MG: 50 TABLET, FILM COATED ORAL at 07:50

## 2018-03-15 NOTE — CARE PLAN
Problem: Infection  Goal: Will remain free from infection  Outcome: PROGRESSING AS EXPECTED    Intervention: Assess signs and symptoms of infection  Mother educated on s/s of infection and infection prevention. Verbalize understanding        Problem: Bowel/Gastric:  Goal: Normal bowel function is maintained or improved  Outcome: PROGRESSING AS EXPECTED  Pt had large soft BM today.

## 2018-03-15 NOTE — PROGRESS NOTES
Pulmonary Critical Care Progress Note        Date of admission: 2/18/2018    Chief Complaint: Respiratory failure, septic shock    History of Present Illness:  Mr. Edwards is a 37-year-old male with past medical history of traumatic brain injury at age 17, seizures diagnosed in June of 2017. His baseline mental status appears to be nodding and minimal interacting without vocalization. Who was brought to the ICU as a direct admit from [facility] where he was brought today for hypoxia. Reportedly the patient was being weanied off of his phenobarbital by his neurologist when he began having more frequent seizures, approximately one week ago his neurologist increased his Keppra dosing however the seizures continued. His mother did use CBD and THC with some success in slowing the seizures. Since 2 days ago the patient had multiple aspiration events following these seizures. His mother evaluated him with a home oxygen saturation monitor and he was noted to be hypoxic, he was brought to [facility] where he was found to have 28% bands with leukopenia. Chest x-ray was obtained demonstrating bilateral infiltrates right greater than left. He was placed on mechanical ventilation and his oxygenation was unable to be improved more than 85%, he was hypotensive and given multiple boluses of IV fluids without improvement. He was started on levo fed and given multiple doses of Ativan for seizures and transferred to our ICU for higher level of care. He arrives on the ventilator and on pressors critically ill.      ROS:  unable to perform due to the patient's inability to effectively communicate     Interval Events:  24 hour interval history reviewed      RA x 2 days  Min secretions - clear  Suction Q6H  Hemodynamics no change  Tm 98.8 WBC normal  Neurologically no change - no Sz activity last 24 hrs  Tolerating home TF regimen  Wounds remain stable for D/c  UO adequate - condom cath  Na 134  Mg       YESTERDAY  No  change neuro status  No new Sz  Tm 99.1  Room air trial tolerated since 3/13  SR/ST 80-100s  Bp 120-140s  TF per protocols  UO adequate  Condom cath  No mobility per mom  Mom wants   Awaiting transfer  Lovenox  K/Mg repletion by protocols    PFSH:  No change.    General:  Appears more chronic than acutely ill, appears older than stated age, nonverbal, multiple contractures - all old -unchanged    Skin:  Warm and dry, no diaphoresis, multiple contractures all chronic, okay capillary refill - no change    Respiratory:      Pulse Oximetry: 97 %  Tracheostomy site clean and dry  Minimal clear secretions - suctioned every 6H  HMTC 21%          Exam: tracheostomy tube in good position without surrounding erythema, unlabored respirations, no intercostal retractions or accessory muscle use and rhonchi bibasilar.   No wheezes, breath sounds diminished at the bases    ImagingAvailable data reviewed         Invalid input(s): [id number]     HemoDynamics:  Pulse: 84, Heart Rate (Monitored): 84  NIBP: 119/68        Exam: regular rate and rhythm, regular rhythm (Sinus), no ectopy, no edema, pulses intact, no change        Neuro:  GCS Total [place] Coma Score: 9   Exam: PERRL, eyes are open, does not track, does not follow commands, no delta  Imaging: Available data reviewed     3/11 Interp 24 hr EEG:  This is an abnormal 24 hrs video electroencephalogram recording in an encephalopathic patient during awake and sleep states. There is diffuse cerebral dysfunction, suggestive of an encephalopathic state. Superimposed slowing in the right hemisphere is likely due to underlying structural abnormality. Frequent right frontotemporal spikes. Frequently, these may become briefly periodic (right PLEDS) but without clear evidence for seizures during the study. The study is perhaps mildly worse when compared to prior recording as brief runs of right PLEDS more frequent. The patient remains at very high risk for seizure recurrence.  Clinical and radiological correlation is recommended.     3/12 Interp 24 hr EEG:   This is an abnormal extended video electroencephalogram recording in an encephalopathic patient during awake and sleep states. There is diffuse cerebral dysfunction, suggestive of an encephalopathic state. Superimposed slowing in the right hemisphere is likely due to underlying structural abnormality. Frequent right frontotemporal spikes. Rarely, these may become briefly periodic (right PLEDS) but without clear evidence for seizures during the study. The study is stable when compared to prior recording. The patient remains at risk for seizure recurrence. Clinical and radiological correlation is recommended.     Fluids:  Intake/Output       03/13/18 0700 - 03/14/18 0659 03/14/18 0700 - 03/15/18 0659 03/15/18 0700 - 03/16/18 0659      [id number] 5488-0839 Total 2485-5362 8614-8420 Total 5388-1460 6220-6463 Total       Intake    P.O.  --  -- --  --  0 0  --  -- --    P.O. -- -- -- -- 0 0 -- -- --    I.V.  100  -- 100  --  -- --  --  -- --    Magnesium Sulfate Volume 100 -- 100 -- -- -- -- -- --    Other  270  120 390  200  90 290  --  -- --    Medications (P.O./ Enteral Liquids) 270 120 390 200 90 290 -- -- --    Enteral  1560  700 2260  1640  380 2020  --  -- --    Enteral Volume 8738 064 3890 5487 078 6963 -- -- --    Free Water / Tube Flush 120 300 420 200 180 380 -- -- --    Total Intake 7836 705 9479 0066 354 4014 -- -- --       Output    Urine  1800  950 2750  775  600 1375  --  -- --    Condom Catheter 9940 677 9972  -- -- --    Stool/Urine  --  -- --  1  0 1  --  -- --    Measurable Stool (ml) -- -- -- 1 0 1 -- -- --    Stool  --  -- --  --  -- --  --  -- --    Number of Times Stooled 1 x -- 1 x 1 x 0 x 1 x -- -- --    Total Output 3887 334 8987  -- -- --       Net I/O     130 -130 0 1064 -130 934 -- -- --        Weight: 50.8 kg (111 lb 15.9 oz)  Recent Labs      03/13/18   0449  03/14/18   0510  03/15/18    0500   SODIUM  129*  135  134*   POTASSIUM  3.7  3.8  3.6   CHLORIDE  101  105  103   CO2  21  19*  22   BUN  19  21  20   CREATININE  <0.20*  <0.20*  <0.20*   MAGNESIUM  1.8  1.9  1.8   CALCIUM  8.3*  8.8  8.6       GI/Nutrition:  Exam: nondistended, abdomen is soft and non-tender, normal active bowel sounds, G-Tube  no sign of infection, palpable baclofen pump in right lower quadrant - unchanged    Imaging: Available data reviewed     KUB : No evidence of bowel obstruction.    CT abdomen and pelvis with oral contrast :  1.  No evidence of bowel obstruction or perforation.  2.  Appendix not visualized.  3.  Small to moderate volume ascites of uncertain etiology.  4.  Bilateral pleural effusions with associated atelectasis.  5.  Ill-defined parenchymal opacities in the LEFT lower lung suggesting multifocal pneumonia.  6.  Scoliosis.    Liver Function  Recent Labs      18   0510  03/15/18   0500   GLUCOSE  101*  83  88       Heme:  Recent Labs      18   0510  03/15/18   0500   RBC  3.36*  3.64*  3.59*   HEMOGLOBIN  9.9*  10.7*  10.6*   HEMATOCRIT  31.3*  34.2*  33.6*   PLATELETCT  460*  472*  487*       Infectious Disease:  Temp  Av.4 °C (97.6 °F)  Min: 36.2 °C (97.2 °F)  Max: 36.8 °C (98.3 °F)  Micro: reviewed.   Recent Labs      18   0510  03/15/18   0500   WBC  8.8  9.4  9.7   NEUTSPOLYS  64.70  67.90   --    LYMPHOCYTES  20.10*  16.90*   --    MONOCYTES  8.60  7.70   --    EOSINOPHILS  4.00  4.80   --    BASOPHILS  1.00  1.00   --      Current Facility-Administered Medications   Medication Dose Frequency Provider Last Rate Last Dose   • enoxaparin (LOVENOX) inj 30 mg  30 mg DAILY Clarence Pro M.D.   30 mg at 18 0924   • topiramate (TOPAMAX) tablet 100 mg  100 mg Q12HRS Harris Bolton M.D.   100 mg at 18 1749   • bisacodyl (DULCOLAX) suppository 10 mg  10 mg QDAY PRN Eleazar rGijalva M.D.        And   • polyethylene  glycol/lytes (MIRALAX) PACKET 1 Packet  1 Packet QDAY PRN Eleazar Grijalva M.D.        And   • magnesium hydroxide (MILK OF MAGNESIA) suspension 30 mL  30 mL QDAY PRN Eleazar Grijalva M.D.       • bisacodyl (DULCOLAX) suppository 5 mg  5 mg DAILY Eleazar Grijalva M.D.   5 mg at 03/14/18 0925   • PHENobarbital solution 120 mg  120 mg BID Aidan Encarnacion M.D.   120 mg at 03/14/18 2125   • Pain Pump (patient supplied) Device   Continuous Jeremy M Gonda, M.D.       • lacosamide (VIMPAT) tablet 200 mg  200 mg BID Jeremy M Gonda, M.D.   200 mg at 03/14/18 2125   • levETIRAcetam (KEPPRA) 100 MG/ML solution 1,500 mg  1,500 mg Q12HRS Jeremy M Gonda, M.D.   1,500 mg at 03/14/18 2126   • loperamide (IMODIUM) 1 MG/5ML solution 2 mg  2 mg 4X/DAY PRN JASPAL [email or web address]   2 mg at 02/26/18 0820   • Respiratory Care per Protocol   Continuous RT Modesto Appiah Jr., D.O.       • chlorhexidine (PERIDEX) 0.12 % solution 15 mL  15 mL BID Modesto Appiah Jr., D.O.   15 mL at 03/14/18 2125   • MD ALERT...Adult ICU Electrolyte Replacement per Pharmacy Protocol   pharmacy to dose Modesto Appiah Jr., D.O.       • levalbuterol (XOPENEX) 1.25 MG/3ML nebulizer solution 1.25 mg  1.25 mg Q4HRS PRN Duy Whitaker M.D.   1.25 mg at 03/06/18 1420   • miconazole 2%-zinc oxide (RADHA) topical cream   PRN Duy Whitaker M.D.       • acetaminophen (TYLENOL) tablet 650 mg  650 mg Q6HRS PRN Duy Whitaker M.D.   650 mg at 03/13/18 0310   • oxyCODONE immediate-release (ROXICODONE) tablet 5 mg  5 mg Q3HRS PRN Duy Whitaker M.D.        And   • oxyCODONE immediate release (ROXICODONE) tablet 10 mg  10 mg Q3HRS PRN Duy Whitaker M.D.       • ondansetron (ZOFRAN) syringe/vial injection 4 mg  4 mg Q4HRS PRN Duy Whitaker M.D.   4 mg at 02/25/18 1140   • ondansetron (ZOFRAN ODT) dispertab 4 mg  4 mg Q4HRS PRN Duy Whitaker M.D.       • promethazine (PHENERGAN) tablet 12.5-25 mg  12.5-25 mg Q4HRS PRN Duy Whitaker M.D.       • promethazine (PHENERGAN)  suppository 12.5-25 mg  12.5-25 mg Q4HRS PRN Duy Whitaker M.D.       • prochlorperazine (COMPAZINE) injection 5-10 mg  5-10 mg Q4HRS PRN Duy Whitaker M.D.       • LORazepam (ATIVAN) injection 4 mg  4 mg Q10 MIN PRN Duy Whitaker M.D.   4 mg at 03/09/18 1046     Last reviewed on 2/18/2018  7:51 PM by Lois Camejo, Pharmacy Intern    Quality  Measures:  Labs reviewed, Medications reviewed and Radiology images reviewed                    Assessment and plan:  Acute hypoxemic respiratory failure - VDRF since 2/18, chronic trach   - T-piece - HMTC   - Room air 3/13   - Meet with mother, try to get back to identical humidifier/trach management system she uses at home   - RT/O2 protocol, encourage mobilization - limited   - minimize sedatives and maintain aspiration precautions   - intermittent chest x-rays   - Aggressive pulmonary toilet, TX FOB when necessary    - Mobilize as tolerated - limited by mom  Left exudative pleural effusion per lytes criteria S/P thoracentesis 3/3   - Cx neg   - Serial chest x-ray negative for recurrence so far   - Repeat thoracentesis when necessary, if recurrent consider CT chest as well  Septic shock from pulmonary source, now unspecified place a distributive shock - improved   - midodrine held, d/c today   - Completed antibiotics, s/p sepsis protocol   - Citrobacter suspected colonizer - monitor for acute infection - prob will reoccur - mom aware  History of traumatic brain injury, chronic seizure disorder with associated status epilepticus   - Continue antiepileptics per neurology   - ongoing seizure activity; terminated now on continuous EEG on 4 AEDs   - appreciated Neuro input   - MRI reviewed   - did not april VPA, high NH3   - LP neg 3/9  Decubitus ulcerations of the left hip including stage IV disease   - Wound care, optimize nutrition   - Mobilize off wounds as tolerated  Pneumonia (Proteus and Klebsiella)   - S/P antibiotics   - Monitor fever curve, white count and for  other signs of recurrent infection  Mild fluid overload - improved   - now euvolemic   - Monitor exam and I/Os  Possible paralytic ileus versus feeding tube malfunction - resolved   - Continue tube feeding trial with advanced to goal home regimen   - Bowel protocol, daily Dulcolax suppository  Hyponatremia  - mild   - follow  Iron deficiency anemia - s/p iron supplementation  Hypokalemia/mag - monitor and replete daily as needed  Prophylaxis/enteral nutrition    Transfer to Neuro  Awaiting transfer to neuro floor  Secretions and suctioning remain manageable per RN/RT  Room air trial tolerated since 3/13  Will try to convert medication, nutrition and trach management regimens to what the patient has been receiving at home from his mother     working on DC planning - mother voices that she is prepared to take her son home again  Probably needs outpatient wound care follow-up at home to assist patient's mother    Possible D/c in AM    Discussed patient condition and risk of morbidity and/or mortality with Family, RN, RT, Pharmacy, Dietary, , Charge nurse / hot rounds and hospitalist.        Clarence Pro M.D.

## 2018-03-15 NOTE — CARE PLAN
Problem: Bowel/Gastric:  Goal: Normal bowel function is maintained or improved  Outcome: PROGRESSING AS EXPECTED  Pt given adequate nutrition and hydration. Labs checked daily.

## 2018-03-15 NOTE — DISCHARGE PLANNING
Message was left for Maximo Alaniz [phone] to call regarding pt's request for ambulance transport to [place] tomorrow.

## 2018-03-15 NOTE — PROGRESS NOTES
[facility] Hospitalist Progress Note    Date of Service: 3/15/2018    Chief Complaint  37 y.o. male admitted 2018 with shortness of breath.    Interval Problem Update  Patient with a history of traumatic brain injury and seizure disorder, trach in place.   Had underwent taper off of his phenobarbital, family felt he was doing worse.   Upon arrival patient was noted to be hypoxic, deemed aspiration pneumonia due to seizures and patient was placed on a ventilator.   Pt again with seizures, neuro added meds.  Patient is currently tolerating his tube feeds, home blend is being used.   Discussed with family at bedside.  Patient seen and examined in the ICU, ICU care given.  Discussed patient condition and plan with Intensivist, RN, RT and charge nurse / hot rounds.    No overnight events  NSR  Tolerating RA     Consultants/Specialty  Intensivist  Neurology    Disposition  Patient requires additional treatment in the hospital, plan for home       Review of Systems   Unable to perform ROS: Intubated      Physical Exam  Laboratory/Imaging   Hemodynamics  Temp (24hrs), Av.4 °C (97.6 °F), Min:36.2 °C (97.2 °F), Max:36.8 °C (98.3 °F)   Temperature: 36.8 °C (98.3 °F)  Pulse  Av.5  Min: 47  Max: 125 Heart Rate (Monitored): 87  NIBP: 118/67      Respiratory    #Aerosol Therapy / Airway Management: T-Piece, Aerosol Humidity Temp (celsius): 35.1 Respiration: 16, Pulse Oximetry: 97 %, O2 Daily Delivery Respiratory : T-Piece     Work Of Breathing / Effort: Mild  RUL Breath Sounds: Rhonchi, RML Breath Sounds: Diminished, RLL Breath Sounds: Diminished, JONATAN Breath Sounds: Rhonchi, LLL Breath Sounds: Diminished    Fluids    Intake/Output Summary (Last 24 hours) at 03/15/18 0749  Last data filed at 03/15/18 0600   Gross per 24 hour   Intake             2690 ml   Output             1876 ml   Net              814 ml       Nutrition  Orders Placed This Encounter   Procedures   • Diet NPO     Standing Status:   Standing     Number  of Occurrences:   1     Order Specific Question:   Restrict to:     Answer:   Strict [1]     Physical Exam   Constitutional: He appears cachectic. He appears ill.   HENT:   Head: Normocephalic and atraumatic.   Eyes: Conjunctivae are normal. Right eye exhibits no discharge. Left eye exhibits no discharge. No scleral icterus.   Neck: Neck supple.   Trach   Cardiovascular: Normal rate and regular rhythm.    Pulmonary/Chest: Effort normal. No stridor. No apnea, no tachypnea and no bradypnea. No respiratory distress.   Abdominal: Soft. He exhibits no distension.   Baclofen pump    Musculoskeletal: He exhibits deformity. He exhibits no edema.   Extensive contractures of extremities   Neurological:   Eyes open, doesn't respond or follow      Skin: Skin is warm and dry. He is not diaphoretic. No erythema.   Psychiatric: He is noncommunicative.   Nursing note and vitals reviewed.      Recent Labs      03/13/18   0449 03/14/18   0510  03/15/18   0500   WBC  8.8  9.4  9.7   RBC  3.36*  3.64*  3.59*   HEMOGLOBIN  9.9*  10.7*  10.6*   HEMATOCRIT  31.3*  34.2*  33.6*   MCV  93.2  94.0  93.6   MCH  29.5  29.4  29.5   MCHC  31.6*  31.3*  31.5*   RDW  65.1*  67.4*  67.5*   PLATELETCT  460*  472*  487*   MPV  8.2*  8.1*  8.0*     Recent Labs      03/13/18   0449  03/14/18   0510  03/15/18   0500   SODIUM  129*  135  134*   POTASSIUM  3.7  3.8  3.6   CHLORIDE  101  105  103   CO2  21  19*  22   GLUCOSE  101*  83  88   BUN  19  21  20   CREATININE  <0.20*  <0.20*  <0.20*   CALCIUM  8.3*  8.8  8.6                      Assessment/Plan     Seizure (CMS-HCC)- (present on admission)   Assessment & Plan    - Seizures improved, continuous EEG again  - Continue Vimpat, Keppra, phenobarbital, and topamax per neurology  - MRI brain concerning for changes per Dr. Bolton, no cause noted on CSF        Decubitus ulcer of ischium, left, stage IV (CMS-HCC)- (present on admission)   Assessment & Plan    - Continue wound care        Anemia, blood loss-  (present on admission)   Assessment & Plan    - Hemoglobin stable  - No sign of gross bleeding        Protein-calorie malnutrition, severe (CMS-[facility])- (present on admission)   Assessment & Plan    - Chronic, tolerating tube feeds        Physical debility- (present on admission)   Assessment & Plan    - Chronic post traumatic brain injury  - At baseline        TBI (traumatic brain injury) (CMS-[facility])- (present on admission)   Assessment & Plan    - Chronic, nonverbal        Contraction, joint, multiple sites- (present on admission)   Assessment & Plan    - Chronic  - has baclofen pump which was refilled in January  - Pump has been interrogating and is working properly          Quality-Core Measures   Reviewed items::  Labs reviewed, Medications reviewed and Radiology images reviewed  Waldrop catheter::  Unconscious / Sedated Patient on a Ventilator  DVT prophylaxis pharmacological::  Enoxaparin (Lovenox)

## 2018-03-15 NOTE — DISCHARGE PLANNING
[facility] as verbally requested by CORTES [place]. Pt to pay for oxygen tank and regular out of pocket $75.

## 2018-03-15 NOTE — FLOWSHEET NOTE
03/15/18 1030   Interdisciplinary Plan of Care-Outcomes    Bronchopulmonary Hygiene Outcome Optimal Hydration with Moderate or Less Sputum Production   Aerosol Therapy / Airway Management   #Aerosol Therapy / Airway Management T-Piece   Aerosol Humidity Temp (celsius) 35   Respiratory WDL   Respiratory (WDL) X   Chest Exam   Work Of Breathing / Effort Mild   Respiration (!) 21   Pulse 89   Heart Rate (Monitored) 88   Breath Sounds   RUL Breath Sounds Clear After Suction   RML Breath Sounds Clear After Suction   RLL Breath Sounds Diminished   JONATAN Breath Sounds Clear After Suction   LLL Breath Sounds Diminished   Secretions   Cough Productive   How Sputum Obtained Tracheal   Sputum Amount Small   Sputum Color Clear   Sputum Consistency Thick   Suction Frequency Suctioned Once or Twice Per Encounter   Oximetry   Continuous Oximetry Yes   Oxygen   Pulse Oximetry 93 %   O2 (FiO2) 21  (only humidification to trach per home reg)   O2 Daily Delivery Respiratory  T-Piece

## 2018-03-15 NOTE — DISCHARGE PLANNING
Spoke top Maximo Alaniz with the ambulance service in [place], they are all set to provide transport for pt contingent on weather tomorrow. Maximo e-mailed me a form that needs to be completed by MD for transport. Spoke to Lamont at [facility] to confirm it would be okay to have another ambulance agency provide the transport for pt, Lamont checked with the supervisor and said it would be okay.

## 2018-03-16 ENCOUNTER — APPOINTMENT (OUTPATIENT)
Dept: RADIOLOGY | Facility: MEDICAL CENTER | Age: 38
DRG: 853 | End: 2018-03-16
Attending: INTERNAL MEDICINE
Payer: COMMERCIAL

## 2018-03-16 DIAGNOSIS — G40.219 PHARMACORESISTANT PARTIAL EPILEPSY WITH IMPAIRMENT OF CONSCIOUSNESS, INTRACTABLE (HCC): ICD-10-CM

## 2018-03-16 DIAGNOSIS — G40.901 STATUS EPILEPTICUS (HCC): ICD-10-CM

## 2018-03-16 LAB
AMMONIA PLAS-SCNC: 63 UMOL/L (ref 11–45)
ANION GAP SERPL CALC-SCNC: 6 MMOL/L (ref 0–11.9)
ANISOCYTOSIS BLD QL SMEAR: ABNORMAL
BASOPHILS # BLD AUTO: 0.8 % (ref 0–1.8)
BASOPHILS # BLD: 0.07 K/UL (ref 0–0.12)
BUN SERPL-MCNC: 23 MG/DL (ref 8–22)
CALCIUM SERPL-MCNC: 8.9 MG/DL (ref 8.5–10.5)
CHLORIDE SERPL-SCNC: 106 MMOL/L (ref 96–112)
CO2 SERPL-SCNC: 22 MMOL/L (ref 20–33)
CREAT SERPL-MCNC: <0.2 MG/DL (ref 0.5–1.4)
EOSINOPHIL # BLD AUTO: 0.63 K/UL (ref 0–0.51)
EOSINOPHIL NFR BLD: 6.9 % (ref 0–6.9)
ERYTHROCYTE [DISTWIDTH] IN BLOOD BY AUTOMATED COUNT: 70.4 FL (ref 35.9–50)
GLUCOSE SERPL-MCNC: 92 MG/DL (ref 65–99)
HCT VFR BLD AUTO: 35 % (ref 42–52)
HGB BLD-MCNC: 11.1 G/DL (ref 14–18)
LYMPHOCYTES # BLD AUTO: 1.75 K/UL (ref 1–4.8)
LYMPHOCYTES NFR BLD: 19 % (ref 22–41)
MACROCYTES BLD QL SMEAR: ABNORMAL
MAGNESIUM SERPL-MCNC: 2 MG/DL (ref 1.5–2.5)
MANUAL DIFF BLD: NORMAL
MCH RBC QN AUTO: 29.7 PG (ref 27–33)
MCHC RBC AUTO-ENTMCNC: 31.7 G/DL (ref 33.7–35.3)
MCV RBC AUTO: 93.6 FL (ref 81.4–97.8)
MONOCYTES # BLD AUTO: 0.31 K/UL (ref 0–0.85)
MONOCYTES NFR BLD AUTO: 3.4 % (ref 0–13.4)
MORPHOLOGY BLD-IMP: NORMAL
MYELOCYTES NFR BLD MANUAL: 0.9 %
NEUTROPHILS # BLD AUTO: 6.35 K/UL (ref 1.82–7.42)
NEUTROPHILS NFR BLD: 69 % (ref 44–72)
NRBC # BLD AUTO: 0 K/UL
NRBC BLD-RTO: 0 /100 WBC
PLATELET # BLD AUTO: 516 K/UL (ref 164–446)
PLATELET BLD QL SMEAR: NORMAL
PMV BLD AUTO: 7.8 FL (ref 9–12.9)
POLYCHROMASIA BLD QL SMEAR: NORMAL
POTASSIUM SERPL-SCNC: 3.6 MMOL/L (ref 3.6–5.5)
RBC # BLD AUTO: 3.74 M/UL (ref 4.7–6.1)
RBC BLD AUTO: PRESENT
SODIUM SERPL-SCNC: 134 MMOL/L (ref 135–145)
WBC # BLD AUTO: 9.2 K/UL (ref 4.8–10.8)

## 2018-03-16 PROCEDURE — 700102 HCHG RX REV CODE 250 W/ 637 OVERRIDE(OP): Performed by: INTERNAL MEDICINE

## 2018-03-16 PROCEDURE — 80048 BASIC METABOLIC PNL TOTAL CA: CPT

## 2018-03-16 PROCEDURE — 770022 HCHG ROOM/CARE - ICU (200)

## 2018-03-16 PROCEDURE — A9270 NON-COVERED ITEM OR SERVICE: HCPCS | Performed by: HOSPITALIST

## 2018-03-16 PROCEDURE — A9270 NON-COVERED ITEM OR SERVICE: HCPCS | Performed by: INTERNAL MEDICINE

## 2018-03-16 PROCEDURE — 99233 SBSQ HOSP IP/OBS HIGH 50: CPT | Performed by: INTERNAL MEDICINE

## 2018-03-16 PROCEDURE — 94640 AIRWAY INHALATION TREATMENT: CPT

## 2018-03-16 PROCEDURE — 83735 ASSAY OF MAGNESIUM: CPT

## 2018-03-16 PROCEDURE — 85007 BL SMEAR W/DIFF WBC COUNT: CPT

## 2018-03-16 PROCEDURE — 71045 X-RAY EXAM CHEST 1 VIEW: CPT

## 2018-03-16 PROCEDURE — 85027 COMPLETE CBC AUTOMATED: CPT

## 2018-03-16 PROCEDURE — 82140 ASSAY OF AMMONIA: CPT

## 2018-03-16 PROCEDURE — 700102 HCHG RX REV CODE 250 W/ 637 OVERRIDE(OP): Performed by: HOSPITALIST

## 2018-03-16 PROCEDURE — 700111 HCHG RX REV CODE 636 W/ 250 OVERRIDE (IP): Performed by: INTERNAL MEDICINE

## 2018-03-16 PROCEDURE — 700101 HCHG RX REV CODE 250: Performed by: PSYCHIATRY & NEUROLOGY

## 2018-03-16 RX ORDER — LACOSAMIDE 200 MG/1
200 TABLET ORAL 2 TIMES DAILY
Qty: 60 TAB | Refills: 5 | Status: SHIPPED | OUTPATIENT
Start: 2018-03-16 | End: 2018-05-11 | Stop reason: SDUPTHER

## 2018-03-16 RX ORDER — POTASSIUM CHLORIDE 20 MEQ/1
40 TABLET, EXTENDED RELEASE ORAL ONCE
Status: COMPLETED | OUTPATIENT
Start: 2018-03-16 | End: 2018-03-16

## 2018-03-16 RX ADMIN — CHLORHEXIDINE GLUCONATE 15 ML: 1.2 RINSE ORAL at 08:24

## 2018-03-16 RX ADMIN — POTASSIUM CHLORIDE 40 MEQ: 1500 TABLET, EXTENDED RELEASE ORAL at 08:23

## 2018-03-16 RX ADMIN — LACOSAMIDE 200 MG: 50 TABLET, FILM COATED ORAL at 20:26

## 2018-03-16 RX ADMIN — PHENOBARBITAL 120 MG: 20 ELIXIR ORAL at 20:27

## 2018-03-16 RX ADMIN — LEVETIRACETAM 1500 MG: 100 SOLUTION ORAL at 08:23

## 2018-03-16 RX ADMIN — PHENOBARBITAL 120 MG: 20 ELIXIR ORAL at 08:24

## 2018-03-16 RX ADMIN — BISACODYL 5 MG: 10 SUPPOSITORY RECTAL at 08:23

## 2018-03-16 RX ADMIN — CHLORHEXIDINE GLUCONATE 15 ML: 1.2 RINSE ORAL at 20:27

## 2018-03-16 RX ADMIN — LACOSAMIDE 200 MG: 50 TABLET, FILM COATED ORAL at 08:23

## 2018-03-16 RX ADMIN — TOPIRAMATE 100 MG: 100 TABLET, FILM COATED ORAL at 05:20

## 2018-03-16 RX ADMIN — ENOXAPARIN SODIUM 30 MG: 100 INJECTION SUBCUTANEOUS at 08:24

## 2018-03-16 RX ADMIN — TOPIRAMATE 100 MG: 100 TABLET, FILM COATED ORAL at 18:21

## 2018-03-16 RX ADMIN — LEVETIRACETAM 1500 MG: 100 SOLUTION ORAL at 20:27

## 2018-03-16 NOTE — CARE PLAN
Problem: Safety  Goal: Will remain free from falls  Outcome: PROGRESSING AS EXPECTED  Bed in lowest position, bed alarm on, non-skid socks on, side rails up X2, call light within reach.    Problem: Venous Thromboembolism (VTW)/Deep Vein Thrombosis (DVT) Prevention:  Goal: Patient will participate in Venous Thrombosis (VTE)/Deep Vein Thrombosis (DVT)Prevention Measures  Outcome: PROGRESSING AS EXPECTED  anticoagulation medication given per mar

## 2018-03-16 NOTE — FLOWSHEET NOTE
03/16/18 1011   Interdisciplinary Plan of Care-Outcomes    Bronchopulmonary Hygiene Outcome Optimal Hydration with Moderate or Less Sputum Production   Education   Education Yes - Pt. / Family has been Instructed in use of Respiratory Equipment   Aerosol Therapy / Airway Management   #Aerosol Therapy / Airway Management T-Piece   Aerosol Humidity Temp (celsius) 35   Respiratory WDL   Respiratory (WDL) X   Chest Exam   Work Of Breathing / Effort Mild   Respiration 15   Pulse 100   Heart Rate (Monitored) 97   Breath Sounds   RUL Breath Sounds Clear After Suction   RML Breath Sounds Clear After Suction   RLL Breath Sounds Diminished   JONATAN Breath Sounds Clear After Suction   LLL Breath Sounds Diminished   Secretions   Cough Productive   How Sputum Obtained Tracheal   Sputum Amount Small   Sputum Color Clear;White   Sputum Consistency Thick   Suction Frequency Suctioned Once or Twice Per Encounter   Oximetry   Continuous Oximetry Yes   Oxygen   Pulse Oximetry 96 %   O2 (LPM) 0   O2 (FiO2) 21   O2 Daily Delivery Respiratory  T-Piece

## 2018-03-16 NOTE — PROGRESS NOTES
[facility] Hospitalist Progress Note    Date of Service: 3/16/2018    Chief Complaint  37 y.o. male admitted 2018 with shortness of breath.    Interval Problem Update  Patient with a history of traumatic brain injury and seizure disorder, trach in place.   Had underwent taper off of his phenobarbital, family felt he was doing worse.   Upon arrival patient was noted to be hypoxic, deemed aspiration pneumonia due to seizures and patient was placed on a ventilator.   Pt again with seizures, neuro added meds.  Patient is currently tolerating his tube feeds, home blend is being used.   Discussed with family at bedside.  Patient seen and examined in the ICU, ICU care given.  Discussed patient condition and plan with Intensivist, RN, RT and charge nurse / hot rounds.    No overnight events  NSR  Tolerating RA  Adequate uop  Afebrile  -120  Clears secretions with occasional suctioning     Consultants/Specialty  Intensivist  Neurology    Disposition  Patient requires additional treatment in the hospital, plan for home       Review of Systems   Unable to perform ROS: Intubated      Physical Exam  Laboratory/Imaging   Hemodynamics  Temp (24hrs), Av.7 °C (98.1 °F), Min:36.4 °C (97.5 °F), Max:37.1 °C (98.8 °F)   Temperature: 36.7 °C (98 °F)  Pulse  Av.6  Min: 47  Max: 125 Heart Rate (Monitored): 78  NIBP: 113/65      Respiratory    #Aerosol Therapy / Airway Management: T-Piece, Aerosol Humidity Temp (celsius): 35 Respiration: 17, Pulse Oximetry: 95 %, O2 Daily Delivery Respiratory : T-Piece     Work Of Breathing / Effort: Mild  RUL Breath Sounds: Clear After Suction, RML Breath Sounds: Clear After Suction, RLL Breath Sounds: Diminished, JONATAN Breath Sounds: Clear After Suction, LLL Breath Sounds: Diminished    Fluids    Intake/Output Summary (Last 24 hours) at 18 0744  Last data filed at 18 0600   Gross per 24 hour   Intake             2200 ml   Output              700 ml   Net             1500 ml        Nutrition  Orders Placed This Encounter   Procedures   • Diet NPO     Standing Status:   Standing     Number of Occurrences:   1     Order Specific Question:   Restrict to:     Answer:   Strict [1]     Physical Exam   Constitutional: He appears cachectic. He appears ill. No distress.   HENT:   Head: Normocephalic and atraumatic.   Mouth/Throat: No oropharyngeal exudate.   Eyes: Right eye exhibits no discharge. Left eye exhibits no discharge. No scleral icterus.   Neck: Neck supple. No tracheal deviation present.   Trach   Cardiovascular: Normal rate and regular rhythm.  Exam reveals no gallop.    Pulmonary/Chest: Effort normal. No stridor. No apnea, no tachypnea and no bradypnea. No respiratory distress. He has no wheezes.   Abdominal: Soft. Bowel sounds are normal. He exhibits no distension.   Baclofen pump    Musculoskeletal: He exhibits deformity. He exhibits no edema.   Extensive contractures of extremities   Neurological:   Eyes open, doesn't respond or follow      Skin: Skin is warm and dry. He is not diaphoretic.   Psychiatric: He is noncommunicative.   Nursing note and vitals reviewed.      Recent Labs      03/14/18   0510  03/15/18   0500  03/16/18   0510   WBC  9.4  9.7  9.2   RBC  3.64*  3.59*  3.74*   HEMOGLOBIN  10.7*  10.6*  11.1*   HEMATOCRIT  34.2*  33.6*  35.0*   MCV  94.0  93.6  93.6   MCH  29.4  29.5  29.7   MCHC  31.3*  31.5*  31.7*   RDW  67.4*  67.5*  70.4*   PLATELETCT  472*  487*  516*   MPV  8.1*  8.0*  7.8*     Recent Labs      03/14/18   0510  03/15/18   0500  03/16/18   0510   SODIUM  135  134*  134*   POTASSIUM  3.8  3.6  3.6   CHLORIDE  105  103  106   CO2  19*  22  22   GLUCOSE  83  88  92   BUN  21  20  23*   CREATININE  <0.20*  <0.20*  <0.20*   CALCIUM  8.8  8.6  8.9                      Assessment/Plan     Seizure (CMS-[facility])- (present on admission)   Assessment & Plan    - Seizures improved, continuous EEG again  - Continue Vimpat, Keppra, phenobarbital, and topamax per  neurology  - MRI brain concerning for changes per Dr. Bolton, no cause noted on CSF        Decubitus ulcer of ischium, left, stage IV (CMS-HCC)- (present on admission)   Assessment & Plan    - Continue wound care        Anemia, blood loss- (present on admission)   Assessment & Plan    - Hemoglobin stable  - No sign of gross bleeding        Protein-calorie malnutrition, severe (CMS-HCC)- (present on admission)   Assessment & Plan    - Chronic, tolerating tube feeds        Physical debility- (present on admission)   Assessment & Plan    - Chronic post traumatic brain injury  - At baseline        TBI (traumatic brain injury) (CMS-HCC)- (present on admission)   Assessment & Plan    - Chronic, nonverbal        Contraction, joint, multiple sites- (present on admission)   Assessment & Plan    - Chronic  - has baclofen pump which was refilled in January  - Pump has been interrogating and is working properly          Quality-Core Measures   Reviewed items::  Labs reviewed, Medications reviewed and Radiology images reviewed  Waldrop catheter::  Unconscious / Sedated Patient on a Ventilator  DVT prophylaxis pharmacological::  Enoxaparin (Lovenox)

## 2018-03-16 NOTE — DISCHARGE PLANNING
Received call from Briseida at [facility] [place] states they received referral with no order.  Call referred to ADRIANA Champion.
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
## 2018-03-16 NOTE — PROGRESS NOTES
Spoke to Genna Pharmacist, [facility] pharmacy rejected script for Vimpat. Insurance called. They stated that they need a transition refill script written and prior authorization needed from MD; otherwise, pt will be receiving 3 days worth of medication instead of 30 day supply. Pt mother worried that she will not have 30 day supply on Monday from her pharmacy.
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
## 2018-03-16 NOTE — DISCHARGE PLANNING
Faxed hand written Order (Scanned in ) to [employer].Spoke to Sundeep Ramirez ([phone]) with (Ambulance service in [facility]) to arrange Transport.    Transport has been arranged for this Sunday at 1:00 PM.    Physician certification Statement (found in Media) fax to # [phone] along with pt's facesheet. Please see note this [facility] wrote on 03/15/2018 regarding ambulance transportation.     [employer] is to deliver a tank which pt will pay out of pocket for to the hospital. Written order was faxed to [employer] and is found in the .

## 2018-03-17 LAB
AMMONIA PLAS-SCNC: 67 UMOL/L (ref 11–45)
ANION GAP SERPL CALC-SCNC: 6 MMOL/L (ref 0–11.9)
BASOPHILS # BLD AUTO: 1.2 % (ref 0–1.8)
BASOPHILS # BLD: 0.11 K/UL (ref 0–0.12)
BUN SERPL-MCNC: 24 MG/DL (ref 8–22)
CALCIUM SERPL-MCNC: 8.3 MG/DL (ref 8.5–10.5)
CHLORIDE SERPL-SCNC: 105 MMOL/L (ref 96–112)
CO2 SERPL-SCNC: 22 MMOL/L (ref 20–33)
CREAT SERPL-MCNC: <0.2 MG/DL (ref 0.5–1.4)
EOSINOPHIL # BLD AUTO: 0.8 K/UL (ref 0–0.51)
EOSINOPHIL NFR BLD: 9 % (ref 0–6.9)
ERYTHROCYTE [DISTWIDTH] IN BLOOD BY AUTOMATED COUNT: 70.2 FL (ref 35.9–50)
GLUCOSE SERPL-MCNC: 92 MG/DL (ref 65–99)
HCT VFR BLD AUTO: 35.7 % (ref 42–52)
HGB BLD-MCNC: 11.3 G/DL (ref 14–18)
IMM GRANULOCYTES # BLD AUTO: 0.1 K/UL (ref 0–0.11)
IMM GRANULOCYTES NFR BLD AUTO: 1.1 % (ref 0–0.9)
LYMPHOCYTES # BLD AUTO: 1.78 K/UL (ref 1–4.8)
LYMPHOCYTES NFR BLD: 20 % (ref 22–41)
MAGNESIUM SERPL-MCNC: 1.8 MG/DL (ref 1.5–2.5)
MCH RBC QN AUTO: 30 PG (ref 27–33)
MCHC RBC AUTO-ENTMCNC: 31.7 G/DL (ref 33.7–35.3)
MCV RBC AUTO: 94.7 FL (ref 81.4–97.8)
MONOCYTES # BLD AUTO: 0.63 K/UL (ref 0–0.85)
MONOCYTES NFR BLD AUTO: 7.1 % (ref 0–13.4)
MORPHOLOGY BLD-IMP: NORMAL
NEUTROPHILS # BLD AUTO: 5.47 K/UL (ref 1.82–7.42)
NEUTROPHILS NFR BLD: 61.6 % (ref 44–72)
NRBC # BLD AUTO: 0 K/UL
NRBC BLD-RTO: 0 /100 WBC
PLATELET # BLD AUTO: 498 K/UL (ref 164–446)
PMV BLD AUTO: 8.1 FL (ref 9–12.9)
POTASSIUM SERPL-SCNC: 3.9 MMOL/L (ref 3.6–5.5)
RBC # BLD AUTO: 3.77 M/UL (ref 4.7–6.1)
SODIUM SERPL-SCNC: 133 MMOL/L (ref 135–145)
WBC # BLD AUTO: 8.9 K/UL (ref 4.8–10.8)

## 2018-03-17 PROCEDURE — 700102 HCHG RX REV CODE 250 W/ 637 OVERRIDE(OP): Performed by: INTERNAL MEDICINE

## 2018-03-17 PROCEDURE — 700102 HCHG RX REV CODE 250 W/ 637 OVERRIDE(OP): Performed by: HOSPITALIST

## 2018-03-17 PROCEDURE — A9270 NON-COVERED ITEM OR SERVICE: HCPCS | Performed by: HOSPITALIST

## 2018-03-17 PROCEDURE — 94640 AIRWAY INHALATION TREATMENT: CPT

## 2018-03-17 PROCEDURE — 82140 ASSAY OF AMMONIA: CPT

## 2018-03-17 PROCEDURE — 83735 ASSAY OF MAGNESIUM: CPT

## 2018-03-17 PROCEDURE — 700111 HCHG RX REV CODE 636 W/ 250 OVERRIDE (IP): Performed by: INTERNAL MEDICINE

## 2018-03-17 PROCEDURE — 80048 BASIC METABOLIC PNL TOTAL CA: CPT

## 2018-03-17 PROCEDURE — 700101 HCHG RX REV CODE 250: Performed by: PSYCHIATRY & NEUROLOGY

## 2018-03-17 PROCEDURE — A9270 NON-COVERED ITEM OR SERVICE: HCPCS | Performed by: INTERNAL MEDICINE

## 2018-03-17 PROCEDURE — 85025 COMPLETE CBC W/AUTO DIFF WBC: CPT

## 2018-03-17 PROCEDURE — 770022 HCHG ROOM/CARE - ICU (200)

## 2018-03-17 PROCEDURE — 99231 SBSQ HOSP IP/OBS SF/LOW 25: CPT | Performed by: HOSPITALIST

## 2018-03-17 RX ORDER — MAGNESIUM SULFATE HEPTAHYDRATE 40 MG/ML
2 INJECTION, SOLUTION INTRAVENOUS ONCE
Status: COMPLETED | OUTPATIENT
Start: 2018-03-17 | End: 2018-03-17

## 2018-03-17 RX ADMIN — TOPIRAMATE 100 MG: 100 TABLET, FILM COATED ORAL at 04:22

## 2018-03-17 RX ADMIN — TOPIRAMATE 100 MG: 100 TABLET, FILM COATED ORAL at 19:29

## 2018-03-17 RX ADMIN — PHENOBARBITAL 120 MG: 20 ELIXIR ORAL at 20:39

## 2018-03-17 RX ADMIN — CHLORHEXIDINE GLUCONATE 15 ML: 1.2 RINSE ORAL at 20:39

## 2018-03-17 RX ADMIN — PHENOBARBITAL 120 MG: 20 ELIXIR ORAL at 10:00

## 2018-03-17 RX ADMIN — LEVETIRACETAM 1500 MG: 100 SOLUTION ORAL at 08:35

## 2018-03-17 RX ADMIN — CHLORHEXIDINE GLUCONATE 15 ML: 1.2 RINSE ORAL at 08:35

## 2018-03-17 RX ADMIN — BISACODYL 5 MG: 10 SUPPOSITORY RECTAL at 08:35

## 2018-03-17 RX ADMIN — MAGNESIUM SULFATE IN WATER 2 G: 40 INJECTION, SOLUTION INTRAVENOUS at 08:37

## 2018-03-17 RX ADMIN — LACOSAMIDE 200 MG: 50 TABLET, FILM COATED ORAL at 20:39

## 2018-03-17 RX ADMIN — ENOXAPARIN SODIUM 30 MG: 100 INJECTION SUBCUTANEOUS at 08:35

## 2018-03-17 RX ADMIN — LACOSAMIDE 200 MG: 50 TABLET, FILM COATED ORAL at 08:35

## 2018-03-17 RX ADMIN — LEVETIRACETAM 1500 MG: 100 SOLUTION ORAL at 20:40
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
## 2018-03-17 NOTE — FLOWSHEET NOTE
03/17/18 1003   Interdisciplinary Plan of Care-Outcomes    Bronchopulmonary Hygiene Outcome Optimal Hydration with Moderate or Less Sputum Production   Aerosol Therapy / Airway Management   #Aerosol Therapy / Airway Management T-Piece   Aerosol Humidity Temp (celsius) 35   Respiratory WDL   Respiratory (WDL) X   Chest Exam   Work Of Breathing / Effort Mild   Respiration (!) 35   Pulse (!) 111   Heart Rate (Monitored) (!) 110   Breath Sounds   RUL Breath Sounds Crackles   RML Breath Sounds Crackles   RLL Breath Sounds Crackles   JONATAN Breath Sounds Crackles   LLL Breath Sounds Crackles   Secretions   Cough Productive   How Sputum Obtained Tracheal   Sputum Amount Moderate   Sputum Color White   Sputum Consistency Thick   Suction Frequency Suctioned Three or Four Times Per Encounter   Oximetry   Continuous Oximetry Yes   Oxygen   Pulse Oximetry 91 %   O2 (LPM) 4   O2 (FiO2) 21   O2 Daily Delivery Respiratory  T-Piece

## 2018-03-18 ENCOUNTER — APPOINTMENT (OUTPATIENT)
Dept: RADIOLOGY | Facility: MEDICAL CENTER | Age: 38
DRG: 853 | End: 2018-03-18
Attending: INTERNAL MEDICINE
Payer: COMMERCIAL

## 2018-03-18 VITALS
BODY MASS INDEX: 17.44 KG/M2 | HEART RATE: 90 BPM | RESPIRATION RATE: 18 BRPM | HEIGHT: 68 IN | WEIGHT: 115.08 LBS | OXYGEN SATURATION: 94 % | TEMPERATURE: 98.8 F

## 2018-03-18 LAB
AMMONIA PLAS-SCNC: 56 UMOL/L (ref 11–45)
ANION GAP SERPL CALC-SCNC: 6 MMOL/L (ref 0–11.9)
BASOPHILS # BLD AUTO: 1 % (ref 0–1.8)
BASOPHILS # BLD: 0.09 K/UL (ref 0–0.12)
BUN SERPL-MCNC: 22 MG/DL (ref 8–22)
CALCIUM SERPL-MCNC: 8.4 MG/DL (ref 8.5–10.5)
CHLORIDE SERPL-SCNC: 104 MMOL/L (ref 96–112)
CO2 SERPL-SCNC: 22 MMOL/L (ref 20–33)
CREAT SERPL-MCNC: <0.2 MG/DL (ref 0.5–1.4)
EOSINOPHIL # BLD AUTO: 0.82 K/UL (ref 0–0.51)
EOSINOPHIL NFR BLD: 8.7 % (ref 0–6.9)
ERYTHROCYTE [DISTWIDTH] IN BLOOD BY AUTOMATED COUNT: 73.4 FL (ref 35.9–50)
GLUCOSE SERPL-MCNC: 91 MG/DL (ref 65–99)
HCT VFR BLD AUTO: 35.7 % (ref 42–52)
HGB BLD-MCNC: 11.4 G/DL (ref 14–18)
IMM GRANULOCYTES # BLD AUTO: 0.09 K/UL (ref 0–0.11)
IMM GRANULOCYTES NFR BLD AUTO: 1 % (ref 0–0.9)
LYMPHOCYTES # BLD AUTO: 1.91 K/UL (ref 1–4.8)
LYMPHOCYTES NFR BLD: 20.3 % (ref 22–41)
MAGNESIUM SERPL-MCNC: 1.9 MG/DL (ref 1.5–2.5)
MCH RBC QN AUTO: 30.3 PG (ref 27–33)
MCHC RBC AUTO-ENTMCNC: 31.9 G/DL (ref 33.7–35.3)
MCV RBC AUTO: 94.9 FL (ref 81.4–97.8)
MONOCYTES # BLD AUTO: 0.62 K/UL (ref 0–0.85)
MONOCYTES NFR BLD AUTO: 6.6 % (ref 0–13.4)
MORPHOLOGY BLD-IMP: NORMAL
NEUTROPHILS # BLD AUTO: 5.88 K/UL (ref 1.82–7.42)
NEUTROPHILS NFR BLD: 62.4 % (ref 44–72)
NRBC # BLD AUTO: 0 K/UL
NRBC BLD-RTO: 0 /100 WBC
PLATELET # BLD AUTO: 497 K/UL (ref 164–446)
PMV BLD AUTO: 8 FL (ref 9–12.9)
POTASSIUM SERPL-SCNC: 3.8 MMOL/L (ref 3.6–5.5)
RBC # BLD AUTO: 3.76 M/UL (ref 4.7–6.1)
SODIUM SERPL-SCNC: 132 MMOL/L (ref 135–145)
WBC # BLD AUTO: 9.4 K/UL (ref 4.8–10.8)

## 2018-03-18 PROCEDURE — A9270 NON-COVERED ITEM OR SERVICE: HCPCS | Performed by: INTERNAL MEDICINE

## 2018-03-18 PROCEDURE — 700102 HCHG RX REV CODE 250 W/ 637 OVERRIDE(OP): Performed by: INTERNAL MEDICINE

## 2018-03-18 PROCEDURE — 83735 ASSAY OF MAGNESIUM: CPT

## 2018-03-18 PROCEDURE — 700111 HCHG RX REV CODE 636 W/ 250 OVERRIDE (IP): Performed by: INTERNAL MEDICINE

## 2018-03-18 PROCEDURE — 94640 AIRWAY INHALATION TREATMENT: CPT

## 2018-03-18 PROCEDURE — A9270 NON-COVERED ITEM OR SERVICE: HCPCS | Performed by: HOSPITALIST

## 2018-03-18 PROCEDURE — 99239 HOSP IP/OBS DSCHRG MGMT >30: CPT | Performed by: HOSPITALIST

## 2018-03-18 PROCEDURE — 71045 X-RAY EXAM CHEST 1 VIEW: CPT

## 2018-03-18 PROCEDURE — 85025 COMPLETE CBC W/AUTO DIFF WBC: CPT

## 2018-03-18 PROCEDURE — 700101 HCHG RX REV CODE 250: Performed by: PSYCHIATRY & NEUROLOGY

## 2018-03-18 PROCEDURE — 80048 BASIC METABOLIC PNL TOTAL CA: CPT

## 2018-03-18 PROCEDURE — 700102 HCHG RX REV CODE 250 W/ 637 OVERRIDE(OP): Performed by: HOSPITALIST

## 2018-03-18 PROCEDURE — 82140 ASSAY OF AMMONIA: CPT

## 2018-03-18 RX ORDER — MAGNESIUM SULFATE HEPTAHYDRATE 40 MG/ML
2 INJECTION, SOLUTION INTRAVENOUS ONCE
Status: COMPLETED | OUTPATIENT
Start: 2018-03-18 | End: 2018-03-18

## 2018-03-18 RX ADMIN — MAGNESIUM SULFATE IN WATER 2 G: 40 INJECTION, SOLUTION INTRAVENOUS at 08:30

## 2018-03-18 RX ADMIN — PHENOBARBITAL 120 MG: 20 ELIXIR ORAL at 09:55

## 2018-03-18 RX ADMIN — TOPIRAMATE 100 MG: 100 TABLET, FILM COATED ORAL at 04:18

## 2018-03-18 RX ADMIN — BISACODYL 5 MG: 10 SUPPOSITORY RECTAL at 08:31

## 2018-03-18 RX ADMIN — CHLORHEXIDINE GLUCONATE 15 ML: 1.2 RINSE ORAL at 08:32

## 2018-03-18 RX ADMIN — LEVETIRACETAM 1500 MG: 100 SOLUTION ORAL at 08:33

## 2018-03-18 RX ADMIN — POTASSIUM BICARBONATE 25 MEQ: 25 TABLET, EFFERVESCENT ORAL at 08:31

## 2018-03-18 RX ADMIN — ENOXAPARIN SODIUM 30 MG: 100 INJECTION SUBCUTANEOUS at 08:31

## 2018-03-18 RX ADMIN — LACOSAMIDE 200 MG: 50 TABLET, FILM COATED ORAL at 08:31

## 2018-03-18 ASSESSMENT — LIFESTYLE VARIABLES
DO YOU DRINK ALCOHOL: NO
EVER_SMOKED: NEVER

## 2018-03-18 ASSESSMENT — PATIENT HEALTH QUESTIONNAIRE - PHQ9
2. FEELING DOWN, DEPRESSED, IRRITABLE, OR HOPELESS: NOT AT ALL
SUM OF ALL RESPONSES TO PHQ9 QUESTIONS 1 AND 2: 0
SUM OF ALL RESPONSES TO PHQ QUESTIONS 1-9: 0
1. LITTLE INTEREST OR PLEASURE IN DOING THINGS: NOT AT ALL

## 2018-03-18 NOTE — CARE PLAN
Problem: Oxygenation:  Goal: Maintain adequate oxygenation dependent on patient condition  Outcome: PROGRESSING AS EXPECTED  Pt tolerating RA T-piece well. 4LPM at 21%.   6.0 Maryley  PRN Xopenex 1.25

## 2018-03-18 NOTE — PROGRESS NOTES
Pt discharged to home via EMT per discharge order - pt prepared for discharge. All PIVs removed, discharge paperwork completed and reviewed with mother, all questions/concerns addressed. EMT at bedside, updated on pt condition. Care to be resumed by SUDHIR Alvarado. Transported off unit at ~1315.

## 2018-03-18 NOTE — DISCHARGE INSTRUCTIONS
Discharge Instructions    Discharged to home by medical transport with relative. Discharged via ambulance, hospital escort: Refused.  Special equipment needed: Oxygen    Be sure to schedule a follow-up appointment with your primary care doctor or any specialists as instructed.     Discharge Plan:        I understand that a diet low in cholesterol, fat, and sodium is recommended for good health. Unless I have been given specific instructions below for another diet, I accept this instruction as my diet prescription.   Other diet: Resume previous tube feed regimen    Special Instructions: None    · Is patient discharged on Warfarin / Coumadin?   No     Depression / Suicide Risk    As you are discharged from this [facility] facility, it is important to learn how to keep safe from harming yourself.    Recognize the warning signs:  · Abrupt changes in personality, positive or negative- including increase in energy   · Giving away possessions  · Change in eating patterns- significant weight changes-  positive or negative  · Change in sleeping patterns- unable to sleep or sleeping all the time   · Unwillingness or inability to communicate  · Depression  · Unusual sadness, discouragement and loneliness  · Talk of wanting to die  · Neglect of personal appearance   · Rebelliousness- reckless behavior  · Withdrawal from people/activities they love  · Confusion- inability to concentrate     If you or a loved one observes any of these behaviors or has concerns about self-harm, here's what you can do:  · Talk about it- your feelings and reasons for harming yourself  · Remove any means that you might use to hurt yourself (examples: pills, rope, extension cords, firearm)  · Get professional help from the community (Mental Health, Substance Abuse, psychological counseling)  · Do not be alone:Call your Safe Contact- someone whom you trust who will be there for you.  · Call your local CRISIS HOTLINE 789-6003 or 669-769-0667  · Call  your local Children's Mobile Crisis Response Team [place] (186) 879-5656 or www.linkedÃ¼.[employer]  · Call the toll free National Suicide Prevention Hotlines   · National Suicide Prevention Lifeline 935-372-ESIY (9061)  · National Hope Line Network 800-SUICIDE (979-0235)

## 2018-03-18 NOTE — CARE PLAN
Problem: Bowel/Gastric:  Goal: Will not experience complications related to bowel motility  Outcome: PROGRESSING AS EXPECTED  Pt receiving TF boluses via peg tube per home regimen - active BS x4 quads, recent BM, receives daily suppository. Will continue to monitor.    Problem: Pain Management  Goal: Pain level will decrease to patient's comfort goal  Outcome: PROGRESSING AS EXPECTED  Pt non-verbal, does not follow commands. No s/s of pain at this time. Will continue to monitor.

## 2018-03-19 ENCOUNTER — TELEPHONE (OUTPATIENT)
Dept: NEUROLOGY | Facility: MEDICAL CENTER | Age: 38
End: 2018-03-19

## 2018-03-19 NOTE — TELEPHONE ENCOUNTER
I called [facility] pharmacy did a pa verbally over the phone for pt's Vimpat, pharmacy stated it'll be 24 hrs before I hear a decision.

## 2018-03-20 ENCOUNTER — TELEPHONE (OUTPATIENT)
Dept: NEUROLOGY | Facility: MEDICAL CENTER | Age: 38
End: 2018-03-20

## 2018-03-20 NOTE — TELEPHONE ENCOUNTER
I received a fax from [facility] pharmacy stated pt's Vimpat 200mg was approved I called pt's local pharmacy in [place] to notify them and they stated prescription was good and approved on their end. I then called pt's mother Susana I told her the Vimpat was approved and can be ready for  tomorrow afternoon. Susana verbally understood.

## 2018-03-20 NOTE — TELEPHONE ENCOUNTER
[facility] pharmacy called me stated they needed a verbal for pt's diazepam (DIASTAT-ACUDIAL) 10 MG kit. Pt had swithed pharmacies from [facility] to [facility]. I gave them the verbal.
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
## 2018-04-04 LAB
FUNGUS SPEC CULT: NORMAL
SIGNIFICANT IND 70042: NORMAL
SITE SITE: NORMAL
SOURCE SOURCE: NORMAL

## 2018-04-13 NOTE — ADDENDUM NOTE
Encounter addended by: Rivas Ortiz D.O. on: 4/13/2018 10:05 AM<BR>    Actions taken: Order list changed

## 2018-04-16 LAB
MYCOBACTERIUM SPEC CULT: NORMAL
RHODAMINE-AURAMINE STN SPEC: NORMAL
RHODAMINE-AURAMINE STN SPEC: NORMAL
SIGNIFICANT IND 70042: NORMAL
SITE SITE: NORMAL
SOURCE SOURCE: NORMAL

## 2018-04-19 ENCOUNTER — TELEPHONE (OUTPATIENT)
Dept: NEUROLOGY | Facility: MEDICAL CENTER | Age: 38
End: 2018-04-19

## 2018-04-19 NOTE — TELEPHONE ENCOUNTER
Ruben's mother called stted that his pcp Dr Corral is having him do a GGT test and a liver ultrasound because his aculine counts were high and the dr wants to make sure its not related to all his medications.mother just wanted to let Dr carrero know.

## 2018-05-11 DIAGNOSIS — G40.219 PHARMACORESISTANT PARTIAL EPILEPSY WITH IMPAIRMENT OF CONSCIOUSNESS, INTRACTABLE (HCC): ICD-10-CM

## 2018-05-11 DIAGNOSIS — G40.901 STATUS EPILEPTICUS (HCC): ICD-10-CM

## 2018-05-11 RX ORDER — TOPIRAMATE 100 MG/1
100 TABLET, FILM COATED ORAL EVERY 12 HOURS
Qty: 60 TAB | Refills: 0 | Status: SHIPPED | OUTPATIENT
Start: 2018-05-11 | End: 2018-05-15 | Stop reason: SDUPTHER

## 2018-05-11 NOTE — TELEPHONE ENCOUNTER
Was the patient seen in the last year in this department? No     Does patient have an active prescription for medications requested? Yes     Received Request Via: Pharmacy       Patient has an appointment 06/19/18 with Dr Bolton.

## 2018-05-11 NOTE — TELEPHONE ENCOUNTER
Pt's mother called me asking if Dr Bolton refill his vimpat, levetracetam and topiramate. I stated he refilled the topiramate and it went to [facility] mother stated no all his rx go to [place]. I stated I would call [place] pharmacy and verbally call in the topiramate. Mother told me I needed to tell the pharmacy that Dr Bolton will fill the keppra by tomorrow or Monday I stated I can not do that if he hasn't responded to my refill request just yet. I called [place] pharmacy and verbally called in the toperamate, pharmacist verbally understood.

## 2018-05-11 NOTE — TELEPHONE ENCOUNTER
Caregiver called requesting Dr Bolton refill patient's Levetiracetam 100 mg/ml solution, he is out. Please advise.

## 2018-05-12 RX ORDER — LACOSAMIDE 200 MG/1
200 TABLET ORAL 2 TIMES DAILY
Qty: 60 TAB | Refills: 5 | Status: SHIPPED | OUTPATIENT
Start: 2018-05-12 | End: 2018-11-11

## 2018-05-14 ENCOUNTER — TELEPHONE (OUTPATIENT)
Dept: NEUROLOGY | Facility: MEDICAL CENTER | Age: 38
End: 2018-05-14

## 2018-05-14 NOTE — TELEPHONE ENCOUNTER
Per Dr carrero's ok I verbally called in pt's rx levetiracetam 100 mg/ml solution, I did not get a pharmacist I left a verbal message of the rx and our number if they need further information.

## 2018-05-15 ENCOUNTER — TELEPHONE (OUTPATIENT)
Dept: NEUROLOGY | Facility: MEDICAL CENTER | Age: 38
End: 2018-05-15

## 2018-05-15 DIAGNOSIS — G40.219 PHARMACORESISTANT PARTIAL EPILEPSY WITH IMPAIRMENT OF CONSCIOUSNESS, INTRACTABLE (HCC): ICD-10-CM

## 2018-05-15 RX ORDER — TOPIRAMATE 100 MG/1
TABLET, FILM COATED ORAL
Qty: 90 TAB | Refills: 11 | Status: SHIPPED | OUTPATIENT
Start: 2018-05-15 | End: 2018-08-24 | Stop reason: SDUPTHER

## 2018-05-15 NOTE — TELEPHONE ENCOUNTER
Per Dr Bolton I called mother and stated          Yeah, I would recommend he is examined by a doctor and treated for wound care.   RA        Mother verbally understood and stated she will keep us updated.

## 2018-05-15 NOTE — PROGRESS NOTES
Mother called. Pt having tiny motor seizures of left arm movement, she thinks related to him having a bed sore that she thinks is infected. He is not getting wound care. No fevers. Recommend she calls primary and discuss bed wound, may need abx and wound care. Mother will call them now. Will increase topamax in the meantime to 100/150. He was not having any seizures since discharged home. Family compliant with meds. No other issues. Mother will keep us posted.

## 2018-05-15 NOTE — TELEPHONE ENCOUNTER
Mother talked to nurse at [facility] because the pcp was out of town and the nurse suggested mother take patient in to get his bed sores checked out because they do not now what antibiotic to prescribed him. Mother states she gave pt tylenol and he's doing well and sleeping. Mother is asking Dr Bolton what to do about getting the antibiotic for pt? Does she call ambulance to take patient to the hospital? Please advise.

## 2018-05-15 NOTE — TELEPHONE ENCOUNTER
Mother called stated her and pt are in ambulance now to [facility], pt has had no sz since she gave him tylenol 4-5 hours ago he is resting well. Mother will call us later for update.

## 2018-05-16 ENCOUNTER — TELEPHONE (OUTPATIENT)
Dept: NEUROLOGY | Facility: MEDICAL CENTER | Age: 38
End: 2018-05-16

## 2018-05-16 NOTE — TELEPHONE ENCOUNTER
Mother called stated patient still in hospital, no seizures since yesterday morning and patient was put on antibiotic and a culture was taken and mother stated results should be in soon, there was a little bit of fluid in his left lung. Mother states patient is doing well though and will keep us updated if there is any change.

## 2018-05-22 ENCOUNTER — TELEPHONE (OUTPATIENT)
Dept: NEUROLOGY | Facility: MEDICAL CENTER | Age: 38
End: 2018-05-22

## 2018-05-22 NOTE — TELEPHONE ENCOUNTER
Patient's mother called stated patient is still in hospital and had numerous tests done he had an mri done and it showed osteomyelitis. Proteus mirbilis, gemella morbillorum from the culture taken from patient, he was put on special antibiotic through iv for 6 weeks because of what they found in the culture. Patient will be released in two days. No seizures.

## 2018-05-25 ENCOUNTER — TELEPHONE (OUTPATIENT)
Dept: NEUROLOGY | Facility: MEDICAL CENTER | Age: 38
End: 2018-05-25

## 2018-05-25 NOTE — TELEPHONE ENCOUNTER
Called back per dr Bolton, he will be in on Tuesday, he states he gave them Rx supply for 6 months, two months ago. Pt should not be out. They should not be calling last min for refills.    Called back spoke with Mother of pt, she states that pt still has 2 days of the medication and will try to get the pharmacy to give her supply till Tuesday.   And asking if dr Bolton please refill Phenobarbital, states that this medication was not given before by dr Bolton.  Advised will ask dr Bolton when he is back  Next week.   Agreed

## 2018-05-25 NOTE — TELEPHONE ENCOUNTER
The home nurse (Funmi) for pt called requesting a prescription for Phenobarbital 100mg to take 15 ml PO every 12 hrs.  Advised nurse that dr Bolton is out of the office today and this may not be able to get this done. Will try to get a hold of dr Bolton. Another provide will not take care of this because this pt has not been seen in the office yet.

## 2018-05-29 DIAGNOSIS — G40.219 PHARMACORESISTANT PARTIAL EPILEPSY WITH IMPAIRMENT OF CONSCIOUSNESS, INTRACTABLE (HCC): ICD-10-CM

## 2018-05-29 RX ORDER — PHENOBARBITAL 20 MG/5ML
120 ELIXIR ORAL 2 TIMES DAILY
Qty: 1800 ML | Refills: 11 | Status: SHIPPED | OUTPATIENT
Start: 2018-05-29 | End: 2019-04-16 | Stop reason: SDUPTHER

## 2018-05-30 ENCOUNTER — TELEPHONE (OUTPATIENT)
Dept: NEUROLOGY | Facility: MEDICAL CENTER | Age: 38
End: 2018-05-30

## 2018-05-30 NOTE — TELEPHONE ENCOUNTER
Phone: [phone]    Susana (mom) called states she would like to speak with dr Bolton regarding some episodes pt is having.  After wound visit, he gets a seizure for like 30 sec. He does it every 3 days. He has a yeast inf now on the area from changing it so much. For the next hour after wound care he has 2 more short seizures, heart rate is about 70, doesn't get any higher. Mom states she gives him 2 tylenol prior working on yeast infection or wound care to help. She thinks seizures could be disturbance on pain. Mom wants to know if there is something she should be doing or keep doing as she is now. Please advise

## 2018-06-07 ENCOUNTER — TELEPHONE (OUTPATIENT)
Dept: NEUROLOGY | Facility: MEDICAL CENTER | Age: 38
End: 2018-06-07

## 2018-06-07 NOTE — TELEPHONE ENCOUNTER
Susana gallardo stated patient is doing really well with his medications and the bed sores have improved. Mother stated he has an appointment in [facility] 3 days before his appointment with dr carrero in which mother is asking if dr carerro really needs to see pt on 06/19/18 just because she does not want to put him through the stress of juggling him from appointment to appointment and the long drive to [place] being since he's doing so well. Please advise, thank you.

## 2018-06-07 NOTE — TELEPHONE ENCOUNTER
Verbally called in pt's topamax 100 mg tab to [facility] for 11 refills, take 1 tab daily in am and 1.5 tabs po qhs. Pharmacist verbally understood.

## 2018-06-08 NOTE — TELEPHONE ENCOUNTER
I called Susana and per dr carrero we can r/s if pt is stable she stated she will r/s his appt and if anyhting changes with him she will call us right away.

## 2018-07-09 ENCOUNTER — TELEPHONE (OUTPATIENT)
Dept: NEUROLOGY | Facility: MEDICAL CENTER | Age: 38
End: 2018-07-09

## 2018-07-09 NOTE — TELEPHONE ENCOUNTER
Patient's mom is requesting a refill on pt's Levetiracetam 100mg/ml sol, also mom wanted me to let Dr Bolton know Ruben is doing well he had a few tests done and they have showed improvement on pt's bed sore infections he had a while back, no seizure activity in a while. Mother is very happy.

## 2018-07-10 ENCOUNTER — TELEPHONE (OUTPATIENT)
Dept: NEUROLOGY | Facility: MEDICAL CENTER | Age: 38
End: 2018-07-10

## 2018-07-10 RX ORDER — LEVETIRACETAM 100 MG/ML
SOLUTION ORAL
Refills: 0 | COMMUNITY
Start: 2018-06-07 | End: 2018-11-05 | Stop reason: SDUPTHER

## 2018-07-10 NOTE — TELEPHONE ENCOUNTER
Pharmacy called patient requesting refill Levetiracetam 100 mg/ml sol take 15 mls by mouth every 12 hours. To [facility] in [place]. Please advise, thanks.

## 2018-07-10 NOTE — TELEPHONE ENCOUNTER
Patients caregiver is requesting a refill on his levETIRAcetam (KEPPRA) 100 MG/ML Solution, Take 15 mL by mouth every 12 hours for 31 days. - Oral. Please advise, thanks.

## 2018-07-10 NOTE — TELEPHONE ENCOUNTER
Mom called stated patient needed a refill on his levetiracitam 100 mg/ml I called [facility] spoke with pharmacist verbally refilled levetiracetam 1oomg/ml sol take 15 mls by mouth every 12 hours, refills until pt's next appt with dr carrero on 10/30/18, pharmacist verbally understood.

## 2018-07-24 ENCOUNTER — TELEPHONE (OUTPATIENT)
Dept: NEUROLOGY | Facility: MEDICAL CENTER | Age: 38
End: 2018-07-24

## 2018-07-24 NOTE — TELEPHONE ENCOUNTER
Mother called stated pt needed refill on his Topamax 100mg tab. I called [facility] pharmacy and spoke with Anna mcneal a verbal refill for Topamax 100 mg tab take 1 tab po daily in am and 1.5 tabs po qhs, with 90 day supply 11 refills. Anna verbally understood.

## 2018-08-23 ENCOUNTER — TELEPHONE (OUTPATIENT)
Dept: NEUROLOGY | Facility: MEDICAL CENTER | Age: 38
End: 2018-08-23

## 2018-08-23 NOTE — TELEPHONE ENCOUNTER
Mom called stated she just got off the phone with Dr lane orthopedic surgeon who will be performing the surgery on pt's leg and stated to him that Dr Ireland stated to mom to cancel pt's appt to ave the surgery that it would be wise because it is a wast of time. Mom stated that dr child said he never said either take the leg off or he dies. Mom is very upset about the things Dr Ireland is telling her and wanted me to document it.

## 2018-08-23 NOTE — TELEPHONE ENCOUNTER
I spoke with patient's mother she states she is not happy with Dr Ireland's care with pt, the provider stated to mom the mri pt had showed there is an infection in his pelvic area and one of his legs must be removed. The sed count going up very slow according to Dr Ireland. Mother stated there is a strange color fluorescent green coming out of the wound where pt's infection is at its not puss just wet. And now its coming out under pt's arm pit, mother thinks its fungal in which she discussed with Dr Ireland. Provider wants pt to starts anitfungal medication but wants to speak with Dr Bolton first to see if it will interfere with his sz meds. Mom stated Dr Ireland stated to her that either they amputate his leg off or take him off his antibiotics a let him slowly die. Mom wants to know what pt's sed count should be at. Please advise, thanks.

## 2018-08-24 ENCOUNTER — TELEPHONE (OUTPATIENT)
Dept: NEUROLOGY | Facility: MEDICAL CENTER | Age: 38
End: 2018-08-24

## 2018-08-24 DIAGNOSIS — G40.219 PHARMACORESISTANT PARTIAL EPILEPSY WITH IMPAIRMENT OF CONSCIOUSNESS, INTRACTABLE (HCC): ICD-10-CM

## 2018-08-24 NOTE — TELEPHONE ENCOUNTER
"Pt's mother tawny called [facility] on 08/23/18 stated pt had a \" light \" seizure that afternoon, mom is concerned that it may be an interference from patient's antibiotics he is taking.    Tawny called today 08/24/18 12:05pm, wanted to let Dr Bolton know that patient just had another small seizure. She states they are coming more frequent.  "

## 2018-08-24 NOTE — TELEPHONE ENCOUNTER
I called [facility] pharmacy spoke with nery CERVANTES gave her a verbal script for topiramate 200 mg tab, take 1 tab po bid w/ 11 refills. Pharmacist verbally understood. I called tawny to let her know that I called in the new script that Dr Bolton ordered and she wanted to know if she should be concerned that pt is having ore seizures? And if the antibiotic may be causing the seizures? Please advise, thank you.

## 2018-08-27 NOTE — TELEPHONE ENCOUNTER
I called mother to let her know per dr carrero: We have to take a day at a time. Two things may be causing more seizures: infection, which is persistent and maybe worse, and possibly antibiotics, but we don't have much option now other than continue treatment of infection and continue to adjust seizure meds as we go.   RA     Mom verbally understood all information that was given and stated pt has not had any seizures and is doing well.

## 2018-08-31 ENCOUNTER — TELEPHONE (OUTPATIENT)
Dept: NEUROLOGY | Facility: MEDICAL CENTER | Age: 38
End: 2018-08-31

## 2018-08-31 NOTE — TELEPHONE ENCOUNTER
Mother called stated pt had his appointment today at [facility]. He had a culture done mom states it might not be fungal but will have the results next week by Tuesday or Wednesday also pt's SED count ws 32 and not sure if she should be concerned. Pt has appt scheduled w/ orthopedic surgeon on 09/05/18. Mom will call after to update us.

## 2018-09-04 ENCOUNTER — TELEPHONE (OUTPATIENT)
Dept: NEUROLOGY | Facility: MEDICAL CENTER | Age: 38
End: 2018-09-04

## 2018-09-04 NOTE — TELEPHONE ENCOUNTER
Pt's mom called stated Dr Ireland prescribed pt an antibiotic called Levofloxacin 500 mg take 1 time a day mom states she thinks it was prescribed because of pt's sed rate that jumped from 20 to 32. She will be picking up the medication later today but wanted to let dr carrero know and make sure it is ok for pt to take. Please advise.

## 2018-09-04 NOTE — TELEPHONE ENCOUNTER
I called [facility], spoke with the pharmacist Chun gave him a verbal refill for vimpat 200 mg tab disp 60 w/ 3 refills take 2 by mouth 2 x a day. Pharmacist verbally understood.

## 2018-09-04 NOTE — TELEPHONE ENCOUNTER
I called pt's mother tawny stated per dr carrero that the antibiotic is safe to take, will not worsen seizures. Mom verbally understood.

## 2018-09-04 NOTE — TELEPHONE ENCOUNTER
Mom stated the medication Levoflooxacin 500 mg was prescribed to pt by Dr Ireland and her medical assistant stated to mom to let Dr carrero know because it will interact with pt's seizures. Mom is not sure if she should give to pt because he has a dr al tomorrow and it will be a two hour drive, she is requesting dr carrero's medical advice, also the culture came back and the fungal pt has is called Pseudomonas.

## 2018-09-12 ENCOUNTER — TELEPHONE (OUTPATIENT)
Dept: NEUROLOGY | Facility: MEDICAL CENTER | Age: 38
End: 2018-09-12

## 2018-09-12 NOTE — TELEPHONE ENCOUNTER
I returned pt's mother called stated pt saw Dr Barney in a office visit the other day pt will have surgery to scrap the bone where the infection is and then see how it heals after before deciding to amputate his leg. Mom states no seizures and pt is doing very well. Mom will call to give update after pt has surgery.

## 2018-10-11 ENCOUNTER — TELEPHONE (OUTPATIENT)
Dept: NEUROLOGY | Facility: MEDICAL CENTER | Age: 38
End: 2018-10-11

## 2018-10-11 NOTE — TELEPHONE ENCOUNTER
Patient's mother called stated pt's bed sores are getting better but has developed a yeast infection possibly from the antibiotics he has been taking. But has not had any seizures, sleeping well. Patient has an upcoming follow up w/ Dr carrero on 10/30/18 and mom wants to know if Dr Carrero needs to see Ruben , she states he is doing well but to get him here to [place] she would have to bring him in an ambulance. Please advise, thank you.

## 2018-10-15 NOTE — TELEPHONE ENCOUNTER
I called mom to let her know per Dr Bolton its ok to skip pt's upcoming appt and keep us posted. Mom is concerned ulysses may be on too many antibiotics but she will keep us posted.

## 2018-10-30 ENCOUNTER — APPOINTMENT (OUTPATIENT)
Dept: NEUROLOGY | Facility: MEDICAL CENTER | Age: 38
End: 2018-10-30
Payer: COMMERCIAL

## 2018-11-05 ENCOUNTER — TELEPHONE (OUTPATIENT)
Dept: NEUROLOGY | Facility: MEDICAL CENTER | Age: 38
End: 2018-11-05

## 2018-11-06 RX ORDER — LEVETIRACETAM 100 MG/ML
SOLUTION ORAL
Qty: 240 ML | Refills: 11 | Status: SHIPPED | OUTPATIENT
Start: 2018-11-06 | End: 2018-11-09 | Stop reason: SDUPTHER

## 2018-11-06 NOTE — TELEPHONE ENCOUNTER
"Mom is requesting a 90 day script for pt's levetiracetam so she does not \" have to bug us all the time \". I sent a refill request for this mediation this morning. Please advise, thank you.  "

## 2018-11-07 ENCOUNTER — TELEPHONE (OUTPATIENT)
Dept: NEUROLOGY | Facility: MEDICAL CENTER | Age: 38
End: 2018-11-07

## 2018-11-07 NOTE — TELEPHONE ENCOUNTER
Mom called this morning lvm stating patient had 3 seizures yesterday, 5 mins apart lasting about 3 seconds. She stated Dr Ramirez took patient off the antibiotics and his liver enzymes are high, mom would like to ask Dr Bolton what she should do about that and she also stated patient's sed rate was at 23. Please advise, thank you.

## 2018-11-07 NOTE — TELEPHONE ENCOUNTER
Farida from Dr Rodríguez office called wanted to let Dr Bolton know that Dr Vasquez who is taking over pt's care until Dr Ramirez comes back from vacation put pt back on augmentine 875 mg 2 times daily and also levequin.

## 2018-11-07 NOTE — TELEPHONE ENCOUNTER
Patients pharmacy called stated mom is requesting a months worth of the levetiracetam because 240 mls only lasts 8 days for the pt, if its ok with Dr Pearce and I can call it in. Please advise.

## 2018-11-09 ENCOUNTER — TELEPHONE (OUTPATIENT)
Dept: NEUROLOGY | Facility: MEDICAL CENTER | Age: 38
End: 2018-11-09

## 2018-11-09 DIAGNOSIS — G40.109 LOCALIZATION-RELATED EPILEPSY (HCC): ICD-10-CM

## 2018-11-09 RX ORDER — LEVETIRACETAM 100 MG/ML
SOLUTION ORAL
Qty: 900 ML | Refills: 11 | Status: SHIPPED | OUTPATIENT
Start: 2018-11-09 | End: 2019-04-16 | Stop reason: SDUPTHER

## 2018-11-09 NOTE — TELEPHONE ENCOUNTER
[facility] and mom is requesting 2 pints of levetiracetam 100mg/ml solution so it can last 30 days instead of 240 for 8 days. If its ok with Dr Bolton? I can call it in. Edmar advise, thanks.

## 2018-12-13 ENCOUNTER — TELEPHONE (OUTPATIENT)
Dept: NEUROLOGY | Facility: MEDICAL CENTER | Age: 38
End: 2018-12-13

## 2018-12-13 NOTE — TELEPHONE ENCOUNTER
Mom called stated patient is doing well, no seizures. Patient was taking off all antibiotics and had seizures a few days later. Patient was put back on antibiotics, mom wants to know if that interacts with pt's seizure medications? Patient is suppose to come off the antibiotics in a month, should patient taper off of antibiotics or is it ok to completely stop? Mom thinks the seizures happened because he was taken off his antibiotics cold turkey. Please advise, thank you.

## 2019-01-10 ENCOUNTER — TELEPHONE (OUTPATIENT)
Dept: NEUROLOGY | Facility: MEDICAL CENTER | Age: 39
End: 2019-01-10

## 2019-01-10 NOTE — TELEPHONE ENCOUNTER
I tried several times calling [facility] to refill pt's vimpat was on hold for a long time but never got through to anyone,i will keep calling. I called pt's mom to let her know, she stated pt is on a few antibiotics and wanted me to ask Dr Bolton if pt should go off the antibiotics one by one or is it safe to go off all together? Pt is suppose to be going off the antibiotics soon so mom just wanted Dr Bolton's advice.

## 2019-01-10 NOTE — TELEPHONE ENCOUNTER
Spoke with [facility] and gave verbal refill for vimpat 200 mg with 5 refills. Pharmacist verbally understood.

## 2019-01-11 ENCOUNTER — TELEPHONE (OUTPATIENT)
Dept: NEUROLOGY | Facility: MEDICAL CENTER | Age: 39
End: 2019-01-11

## 2019-01-11 NOTE — TELEPHONE ENCOUNTER
I would recommend she follows direction of his doctor managing the antibiotics. Coming off antibiotics, either one by one or altogether wont' affect the seizures.   Tell her we have samples if you cannot get in touch in pharmacy.   RA     Mom verbally understood but stated the last time he was put off antibiotics his sed rate was at 12. Mom suggests she may have pt go off antibiotics one at a time.

## 2019-02-07 NOTE — CARE PLAN
[facility] Hospital Medicine  Discharge Summary      Patient Name: Cassia Kelly  MRN: [id number]  Admission Date: 2/4/2019  Hospital Length of Stay: 3 days  Discharge Date and Time:  02/07/2019 1:43 PM  Attending Physician: Silvia Pearson MD   Discharging Provider: Dariana Galvan MD  Primary Care Provider: Marco A Rondon MD  [facility] Medicine Team: [facility] 5 Dariana Galvan MD    HPI:   62 yo with COPD and a 40 pack year history of smoking who presents to ED from clinic with hypoxia. Patient reports a 4 day history of worsening shortness of breath. She states that her medication was changed from Advair to Trelegy, is not sure if associated but has had intermittent COPD symptoms over the last several weeks. Completed a course of Doxycycline several weeks ago with interval improvement. Since 2/1, pt has increase in her cough with white sputum. She has been using her 's pulse oximeter at home with oxygen saturations in the mid 80s and his supplemental oxygen at times with moderate improvement. She denies fever, chills, hemoptysis, nasal congestion, chest pain, nausea/vomiting, abdominal pain, changes in urination, peripheral edema or additional complaints.     She was seen in clinic and treated with nebs and solumedrol with symptomatic improvement and self-ambulated to the ED.         * No surgery found *      Hospital Course:   02/05/2019 NAEON. Pt on oxygen, desaturates to 88% off oxygen. Pt progressing, decreased cough.   02/06/2019: NAEON. Patient saturating at 89-91% without oxygen. SPO2 decreased to 87% while walking. Plan to d/c with home oxygen for use with activity.  02/07/2019 NAEON. Pt saturating >90% at rest, but desaturates with activity to 84%. Will discharge with azithromycin.      Review of Systems   Constitutional: Negative for chills and fever.   HENT: Negative.    Eyes: Negative.    Respiratory: Negative for cough, sputum production and shortness of  Problem: Respiratory:  Goal: Respiratory status will improve    Intervention: Assess and monitor pulmonary status  Work with RT to maintain pts o2 sats above 90%, weaning trials as tolerated, suctioning as needed, HOB greater than 30 degrees.       Problem: Skin Integrity  Goal: Risk for impaired skin integrity will decrease    Intervention: Assess risk factors for impaired skin integrity and/or pressure ulcers  Assess skin during shift, turning every two hours, using pillows for support.          breath.    Cardiovascular: Negative for chest pain and leg swelling.   Gastrointestinal: Negative for abdominal pain, nausea and vomiting.   Genitourinary: Negative for dysuria.   Musculoskeletal: Negative.    Skin: Negative.    Neurological: Negative for dizziness, weakness and headaches.   Psychiatric/Behavioral: Negative.      Physical Exam   Vitals:    02/07/19 0953   BP:    Pulse: 66   Resp: 18   Temp:    Constitutional: She is oriented to person, place, and time. She appears well-developed and well-nourished. No distress.   Head: Normocephalic and atraumatic.   Eyes: Pupils are equal, round, and reactive to light.   Cardiovascular: Normal rate, regular rhythm and normal heart sounds.   Pulmonary/Chest: No respiratory distress. She has no wheezes.   Decreased breath sounds globally.    Abdominal: Soft. There is no tenderness.   Neurological: She is alert and oriented to person, place, and time.   Skin: Capillary refill takes 2 to 3 seconds. She is not diaphoretic.     Consults:     No new Assessment & Plan notes have been filed under this hospital service since the last note was generated.  Service: Hospital Medicine    Final Active Diagnoses:    Diagnosis Date Noted POA    PRINCIPAL PROBLEM:  Chronic obstructive pulmonary disease with acute exacerbation [J44.1]  Yes    Tobacco abuse disorder [Z72.0] 11/23/2015 Yes      Problems Resolved During this Admission:       Discharged Condition: good    Disposition: Home or Self Care    Follow Up:  Follow-up Information     Marco A Rondon MD.    Specialty:  Internal Medicine  Why:  Appt scheduled for 2/15/19 @ 2p with Nurse Practioner  Contact information:  [address]  [place] [id number] [phone]             Erna Park MD.    Specialty:  Pulmonary Disease  Why:  Pulmonary clinic Nurse will call patient to schedule her 1 week follow-up  Contact information:  [address] [place] [id number] [phone]                 Patient  "Instructions:      OXYGEN FOR HOME USE     Order Specific Question Answer Comments   Liter Flow 2    Duration With activity    Qualifying SpO2: 84%    Testing done at: Exercise/Activity    Route nasal cannula    Device home concentrator with portable unit    Patient condition with qualifying saturation COPD    Height: 5' 3" (1.6 m)    Weight: 69.9 kg (154 lb)    Does patient have medical equipment at home? walker, rolling    Does patient have medical equipment at home? rollator    Does patient have medical equipment at home? shower chair    Alternative treatment measures have been tried or considered and deemed clinically ineffective. Yes    Vendor: [facility]    Expected Date of Delivery: 2/7/2019        Significant Diagnostic Studies: Labs:   BMP:   Recent Labs   Lab 02/06/19  0532 02/07/19  0619   GLU 95 92    140   K 3.9 3.9    105   CO2 26 26   BUN 18 19   CREATININE 0.6 0.6   CALCIUM 9.2 9.0       Pending Diagnostic Studies:     None         Medications:  Reconciled Home Medications:      Medication List      START taking these medications    azithromycin 250 MG tablet  Commonly known as:  Z-PALMA  Take 1 tablet (250 mg total) by mouth once daily. for 3 doses  Start taking on:  2/8/2019     predniSONE 20 MG tablet  Commonly known as:  DELTASONE  Take 2 tablets (40 mg total) by mouth once daily. for 2 doses  Start taking on:  2/8/2019        CONTINUE taking these medications    * albuterol 90 mcg/actuation inhaler  Commonly known as:  PROVENTIL/VENTOLIN HFA  Inhale 2 puffs into the lungs every 4 (four) hours as needed for Wheezing or Shortness of Breath.     * albuterol 2.5 mg /3 mL (0.083 %) nebulizer solution  Commonly known as:  PROVENTIL  Take 3 mLs (2.5 mg total) by nebulization every 6 (six) hours as needed for Wheezing.     ALEVE ORAL  Take 1 tablet by mouth daily as needed (headache).     fluticasone-umeclidin-vilanter 100-62.5-25 mcg Dsdv  Commonly known as:  TRELEGY ELLIPTA  Inhale 1 puff " into the lungs once daily.         * This list has 2 medication(s) that are the same as other medications prescribed for you. Read the directions carefully, and ask your doctor or other care provider to review them with you.            STOP taking these medications    ibuprofen 200 MG tablet  Commonly known as:  ADVIL,MOTRIN            Indwelling Lines/Drains at time of discharge:   Lines/Drains/Airways          None          Time spent on the discharge of patient: 40 minutes  Patient was seen and examined on the date of discharge and determined to be suitable for discharge.         Dariana Galvan MD  Department of Hospital Medicine  [facility]

## 2019-02-21 NOTE — CARE PLAN
----- Message from JAMAAL Pedroza sent at 2/21/2019  9:15 AM CST -----  UDS collected 02/11/19 is appropriate with current medication regimen  Please add note to MAR   Problem: Ventilation Defect:  Goal: Ability to achieve and maintain unassisted ventilation or tolerate decreased levels of ventilator support  Outcome: PROGRESSING SLOWER THAN EXPECTED  Adult Ventilation Update    Total Vent Days: 8  CMV16/460/8/35%    Patient Lines/Drains/Airways Status    Active Airway     Name: Placement date: Placement time: Site: Days:    Airway Group Standard Cuffed Trach Tracheostomy 6.0 07/02/17   1403   Tracheostomy   237    Airway Group Tracheostomy 6.0       Tracheostomy                 In the last 24 hours, No SBT due to seizures.             Plateau Pressure (Q Shift): 25.9 (02/24/18 1926)  Static Compliance (ml / cm H2O): 35 (02/24/18 2237)    Patient failed trials because of Barriers to Wean:  (EEG, seizures) (02/22/18 0625)  Barriers to SBT    Length of Weaning Trial    Sputum/Suction  Cough: Congested;Moist (02/24/18 2000)  Sputum Amount: Moderate (02/24/18 2000)  Sputum Color: Clear (02/24/18 2000)  Sputum Consistency: Thick (02/24/18 2000)    Mobility Group  Activity Performed: Unable to mobilize (02/24/18 2000)  Pt Calls for Assistance: No (02/24/18 2000)  Reason Not Mobilized: Bed rest (02/24/18 2000)  Mobilization Comments: per MD (02/24/18 2000)    Events/Summary/Plan: stable (02/24/18 1926)

## 2019-03-07 ENCOUNTER — TELEPHONE (OUTPATIENT)
Dept: NEUROLOGY | Facility: MEDICAL CENTER | Age: 39
End: 2019-03-07

## 2019-03-07 NOTE — TELEPHONE ENCOUNTER
Spoke with pharmacist at [facility] and gave verbal refill for phenobarbital 20 mg/5 ml elixir w/ 6 refills, pharmacist stated we could not doing anymore than 6 refills.

## 2019-04-16 ENCOUNTER — OFFICE VISIT (OUTPATIENT)
Dept: NEUROLOGY | Facility: MEDICAL CENTER | Age: 39
End: 2019-04-16

## 2019-04-16 VITALS
SYSTOLIC BLOOD PRESSURE: 104 MMHG | WEIGHT: 127 LBS | TEMPERATURE: 96.8 F | BODY MASS INDEX: 18.18 KG/M2 | RESPIRATION RATE: 12 BRPM | DIASTOLIC BLOOD PRESSURE: 78 MMHG | HEIGHT: 70 IN | OXYGEN SATURATION: 94 % | HEART RATE: 52 BPM

## 2019-04-16 DIAGNOSIS — F79 INTELLECTUAL DISABILITY: ICD-10-CM

## 2019-04-16 DIAGNOSIS — G80.9 CEREBRAL PALSY, UNSPECIFIED TYPE (HCC): ICD-10-CM

## 2019-04-16 DIAGNOSIS — L89.90 PRESSURE INJURY OF SKIN, UNSPECIFIED INJURY STAGE, UNSPECIFIED LOCATION: ICD-10-CM

## 2019-04-16 DIAGNOSIS — G93.40 ENCEPHALOPATHY: ICD-10-CM

## 2019-04-16 DIAGNOSIS — R74.8 ABNORMAL LIVER ENZYMES: ICD-10-CM

## 2019-04-16 DIAGNOSIS — G40.109 LOCALIZATION-RELATED EPILEPSY (HCC): ICD-10-CM

## 2019-04-16 DIAGNOSIS — E55.9 VITAMIN D DEFICIENCY: ICD-10-CM

## 2019-04-16 DIAGNOSIS — G40.219 PHARMACORESISTANT PARTIAL EPILEPSY WITH IMPAIRMENT OF CONSCIOUSNESS, INTRACTABLE (HCC): ICD-10-CM

## 2019-04-16 DIAGNOSIS — M24.50 FLEXION CONTRACTURES: ICD-10-CM

## 2019-04-16 PROCEDURE — 99215 OFFICE O/P EST HI 40 MIN: CPT | Performed by: PSYCHIATRY & NEUROLOGY

## 2019-04-16 RX ORDER — LEVOFLOXACIN 500 MG/1
500 TABLET, FILM COATED ORAL DAILY
COMMUNITY
End: 2019-07-19

## 2019-04-16 RX ORDER — LEVETIRACETAM 100 MG/ML
SOLUTION ORAL
Qty: 900 ML | Refills: 11 | Status: SHIPPED | OUTPATIENT
Start: 2019-04-16 | End: 2019-07-19

## 2019-04-16 RX ORDER — LACOSAMIDE 200 MG/1
200 TABLET ORAL 2 TIMES DAILY
Qty: 60 TAB | Refills: 5 | Status: SHIPPED | OUTPATIENT
Start: 2019-04-16 | End: 2019-07-02 | Stop reason: SDUPTHER

## 2019-04-16 RX ORDER — LACOSAMIDE 200 MG/1
200 TABLET ORAL 2 TIMES DAILY
COMMUNITY
End: 2019-04-16 | Stop reason: SDUPTHER

## 2019-04-16 RX ORDER — PHENOBARBITAL 20 MG/5ML
120 ELIXIR ORAL 2 TIMES DAILY
Qty: 1800 ML | Refills: 11 | Status: SHIPPED | OUTPATIENT
Start: 2019-04-16 | End: 2019-07-19

## 2019-04-16 ASSESSMENT — PATIENT HEALTH QUESTIONNAIRE - PHQ9: CLINICAL INTERPRETATION OF PHQ2 SCORE: 0
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
## 2019-05-03 ENCOUNTER — TELEPHONE (OUTPATIENT)
Dept: NEUROLOGY | Facility: MEDICAL CENTER | Age: 39
End: 2019-05-03

## 2019-05-03 NOTE — TELEPHONE ENCOUNTER
Mom wanted to know when she should have patient get the labs done sine he is not scheduled to come back for a year. Please advise, thank you.

## 2019-07-02 DIAGNOSIS — G40.219 PHARMACORESISTANT PARTIAL EPILEPSY WITH IMPAIRMENT OF CONSCIOUSNESS, INTRACTABLE (HCC): ICD-10-CM

## 2019-07-02 RX ORDER — LACOSAMIDE 200 MG/1
200 TABLET ORAL 2 TIMES DAILY
Qty: 60 TAB | Refills: 5 | Status: SHIPPED | OUTPATIENT
Start: 2019-07-02 | End: 2019-07-19

## 2019-07-02 NOTE — TELEPHONE ENCOUNTER
Was the patient seen in the last year in this department? Yes    Does patient have an active prescription for medications requested? Yes    Received Request Via: Pharmacy     [place] [facility] has suddenly shut down and patient needs a refill to [facility] [place] [facility].

## 2019-07-15 ENCOUNTER — TELEPHONE (OUTPATIENT)
Dept: NEUROLOGY | Facility: MEDICAL CENTER | Age: 39
End: 2019-07-15

## 2019-07-15 NOTE — TELEPHONE ENCOUNTER
Mom stated yesterday afternoon she had just got one taking the patient a bath and was cleaning him up and he had a small seizure. Mom stated its been about a year since his last seizure and wanted to know if it was because she overstimulated him? She stated pt was a little irritated that she was cleaning him, like he was bothered. Mom also wanted to let you know that pt's crp recent count is at 12.

## 2019-07-18 ENCOUNTER — TELEPHONE (OUTPATIENT)
Dept: NEUROLOGY | Facility: MEDICAL CENTER | Age: 39
End: 2019-07-18

## 2019-07-18 NOTE — TELEPHONE ENCOUNTER
It is ok to have a seizure from time to time. Mom not to worry about stimulating him, she takes great care of him and has to bathe him. My only concern is that sometimes his seizures are indicative of a silent infection somewhere. If he has anymore, he will need to be checked for infections, including osteomyelitis and UTI, pneumonia.   Mom to keep us posted.   RA     Mom verbally understood all information given.

## 2019-07-19 ENCOUNTER — TELEPHONE (OUTPATIENT)
Dept: NEUROLOGY | Facility: MEDICAL CENTER | Age: 39
End: 2019-07-19

## 2019-07-19 ENCOUNTER — HOSPITAL ENCOUNTER (INPATIENT)
Facility: MEDICAL CENTER | Age: 39
LOS: 13 days | DRG: 100 | End: 2019-08-03
Attending: EMERGENCY MEDICINE | Admitting: INTERNAL MEDICINE
Payer: COMMERCIAL

## 2019-07-19 ENCOUNTER — APPOINTMENT (OUTPATIENT)
Dept: RADIOLOGY | Facility: MEDICAL CENTER | Age: 39
DRG: 100 | End: 2019-07-19
Attending: EMERGENCY MEDICINE
Payer: COMMERCIAL

## 2019-07-19 DIAGNOSIS — J18.9 PNEUMONIA OF LEFT LOWER LOBE DUE TO INFECTIOUS ORGANISM: ICD-10-CM

## 2019-07-19 DIAGNOSIS — R56.9 SEIZURE (HCC): ICD-10-CM

## 2019-07-19 DIAGNOSIS — G40.901 STATUS EPILEPTICUS (HCC): ICD-10-CM

## 2019-07-19 DIAGNOSIS — G40.909 SEIZURE DISORDER (HCC): ICD-10-CM

## 2019-07-19 LAB
ALBUMIN SERPL BCP-MCNC: 3.9 G/DL (ref 3.2–4.9)
ALBUMIN/GLOB SERPL: 1 G/DL
ALP SERPL-CCNC: 282 U/L (ref 30–99)
ALT SERPL-CCNC: 37 U/L (ref 2–50)
ANION GAP SERPL CALC-SCNC: 10 MMOL/L (ref 0–11.9)
APPEARANCE UR: ABNORMAL
AST SERPL-CCNC: 25 U/L (ref 12–45)
BACTERIA #/AREA URNS HPF: NEGATIVE /HPF
BASOPHILS # BLD AUTO: 0.5 % (ref 0–1.8)
BASOPHILS # BLD: 0.03 K/UL (ref 0–0.12)
BILIRUB SERPL-MCNC: 0.3 MG/DL (ref 0.1–1.5)
BILIRUB UR QL STRIP.AUTO: NEGATIVE
BUN SERPL-MCNC: 17 MG/DL (ref 8–22)
CALCIUM SERPL-MCNC: 8.9 MG/DL (ref 8.5–10.5)
CHLORIDE SERPL-SCNC: 103 MMOL/L (ref 96–112)
CO2 SERPL-SCNC: 21 MMOL/L (ref 20–33)
COLOR UR: YELLOW
CREAT SERPL-MCNC: 0.24 MG/DL (ref 0.5–1.4)
CRP SERPL HS-MCNC: 1.56 MG/DL (ref 0–0.75)
EOSINOPHIL # BLD AUTO: 0.22 K/UL (ref 0–0.51)
EOSINOPHIL NFR BLD: 3.5 % (ref 0–6.9)
EPI CELLS #/AREA URNS HPF: NEGATIVE /HPF
ERYTHROCYTE [DISTWIDTH] IN BLOOD BY AUTOMATED COUNT: 62.9 FL (ref 35.9–50)
ERYTHROCYTE [SEDIMENTATION RATE] IN BLOOD BY WESTERGREN METHOD: 17 MM/HOUR (ref 0–15)
GLOBULIN SER CALC-MCNC: 3.8 G/DL (ref 1.9–3.5)
GLUCOSE SERPL-MCNC: 82 MG/DL (ref 65–99)
GLUCOSE UR STRIP.AUTO-MCNC: NEGATIVE MG/DL
HCT VFR BLD AUTO: 52.2 % (ref 42–52)
HGB BLD-MCNC: 16.6 G/DL (ref 14–18)
HYALINE CASTS #/AREA URNS LPF: ABNORMAL /LPF
IMM GRANULOCYTES # BLD AUTO: 0.03 K/UL (ref 0–0.11)
IMM GRANULOCYTES NFR BLD AUTO: 0.5 % (ref 0–0.9)
KETONES UR STRIP.AUTO-MCNC: NEGATIVE MG/DL
LACTATE BLD-SCNC: 2.1 MMOL/L (ref 0.5–2)
LEUKOCYTE ESTERASE UR QL STRIP.AUTO: NEGATIVE
LYMPHOCYTES # BLD AUTO: 1.94 K/UL (ref 1–4.8)
LYMPHOCYTES NFR BLD: 30.5 % (ref 22–41)
MCH RBC QN AUTO: 30.2 PG (ref 27–33)
MCHC RBC AUTO-ENTMCNC: 31.8 G/DL (ref 33.7–35.3)
MCV RBC AUTO: 95.1 FL (ref 81.4–97.8)
MICRO URNS: ABNORMAL
MONOCYTES # BLD AUTO: 0.73 K/UL (ref 0–0.85)
MONOCYTES NFR BLD AUTO: 11.5 % (ref 0–13.4)
NEUTROPHILS # BLD AUTO: 3.42 K/UL (ref 1.82–7.42)
NEUTROPHILS NFR BLD: 53.5 % (ref 44–72)
NITRITE UR QL STRIP.AUTO: NEGATIVE
NRBC # BLD AUTO: 0 K/UL
NRBC BLD-RTO: 0 /100 WBC
PH UR STRIP.AUTO: 8 [PH]
PLATELET # BLD AUTO: 259 K/UL (ref 164–446)
PMV BLD AUTO: 9.8 FL (ref 9–12.9)
POTASSIUM SERPL-SCNC: 4.2 MMOL/L (ref 3.6–5.5)
PROT SERPL-MCNC: 7.7 G/DL (ref 6–8.2)
PROT UR QL STRIP: NEGATIVE MG/DL
RBC # BLD AUTO: 5.49 M/UL (ref 4.7–6.1)
RBC # URNS HPF: ABNORMAL /HPF
RBC UR QL AUTO: NEGATIVE
SODIUM SERPL-SCNC: 134 MMOL/L (ref 135–145)
SP GR UR STRIP.AUTO: 1.03
UROBILINOGEN UR STRIP.AUTO-MCNC: 1 MG/DL
WBC # BLD AUTO: 6.4 K/UL (ref 4.8–10.8)
WBC #/AREA URNS HPF: ABNORMAL /HPF

## 2019-07-19 PROCEDURE — 80053 COMPREHEN METABOLIC PANEL: CPT

## 2019-07-19 PROCEDURE — 85652 RBC SED RATE AUTOMATED: CPT

## 2019-07-19 PROCEDURE — A9270 NON-COVERED ITEM OR SERVICE: HCPCS | Performed by: HOSPITALIST

## 2019-07-19 PROCEDURE — 87040 BLOOD CULTURE FOR BACTERIA: CPT | Mod: 91

## 2019-07-19 PROCEDURE — 700102 HCHG RX REV CODE 250 W/ 637 OVERRIDE(OP): Performed by: HOSPITALIST

## 2019-07-19 PROCEDURE — 99285 EMERGENCY DEPT VISIT HI MDM: CPT

## 2019-07-19 PROCEDURE — G0378 HOSPITAL OBSERVATION PER HR: HCPCS

## 2019-07-19 PROCEDURE — 81001 URINALYSIS AUTO W/SCOPE: CPT

## 2019-07-19 PROCEDURE — 700111 HCHG RX REV CODE 636 W/ 250 OVERRIDE (IP)

## 2019-07-19 PROCEDURE — 85025 COMPLETE CBC W/AUTO DIFF WBC: CPT

## 2019-07-19 PROCEDURE — 71045 X-RAY EXAM CHEST 1 VIEW: CPT

## 2019-07-19 PROCEDURE — 87086 URINE CULTURE/COLONY COUNT: CPT

## 2019-07-19 PROCEDURE — 96365 THER/PROPH/DIAG IV INF INIT: CPT

## 2019-07-19 PROCEDURE — 99223 1ST HOSP IP/OBS HIGH 75: CPT | Performed by: HOSPITALIST

## 2019-07-19 PROCEDURE — 96375 TX/PRO/DX INJ NEW DRUG ADDON: CPT

## 2019-07-19 PROCEDURE — 86140 C-REACTIVE PROTEIN: CPT

## 2019-07-19 PROCEDURE — 700101 HCHG RX REV CODE 250: Performed by: HOSPITALIST

## 2019-07-19 PROCEDURE — 94640 AIRWAY INHALATION TREATMENT: CPT

## 2019-07-19 PROCEDURE — 83605 ASSAY OF LACTIC ACID: CPT

## 2019-07-19 PROCEDURE — 700111 HCHG RX REV CODE 636 W/ 250 OVERRIDE (IP): Performed by: EMERGENCY MEDICINE

## 2019-07-19 PROCEDURE — 700105 HCHG RX REV CODE 258: Performed by: EMERGENCY MEDICINE

## 2019-07-19 RX ORDER — LACOSAMIDE 200 MG/1
200 TABLET ORAL 2 TIMES DAILY
Status: ON HOLD | COMMUNITY
End: 2019-08-03 | Stop reason: SDUPTHER

## 2019-07-19 RX ORDER — PHENOBARBITAL 20 MG/5ML
120 ELIXIR ORAL 2 TIMES DAILY
Status: DISCONTINUED | OUTPATIENT
Start: 2019-07-19 | End: 2019-08-03 | Stop reason: HOSPADM

## 2019-07-19 RX ORDER — LACOSAMIDE 100 MG/1
200 TABLET ORAL 2 TIMES DAILY
Status: DISCONTINUED | OUTPATIENT
Start: 2019-07-19 | End: 2019-07-20

## 2019-07-19 RX ORDER — TOPIRAMATE 100 MG/1
200 TABLET, FILM COATED ORAL 2 TIMES DAILY
Status: DISCONTINUED | OUTPATIENT
Start: 2019-07-19 | End: 2019-07-22

## 2019-07-19 RX ORDER — PHENOBARBITAL 20 MG/5ML
120 ELIXIR ORAL 2 TIMES DAILY
COMMUNITY
End: 2021-04-08 | Stop reason: SDUPTHER

## 2019-07-19 RX ORDER — LORAZEPAM 2 MG/ML
1 INJECTION INTRAMUSCULAR ONCE
Status: COMPLETED | OUTPATIENT
Start: 2019-07-19 | End: 2019-07-19

## 2019-07-19 RX ORDER — CHOLECALCIFEROL (VITAMIN D3) 125 MCG
1000 CAPSULE ORAL DAILY
Status: DISCONTINUED | OUTPATIENT
Start: 2019-07-20 | End: 2019-08-03 | Stop reason: HOSPADM

## 2019-07-19 RX ORDER — LORAZEPAM 2 MG/ML
INJECTION INTRAMUSCULAR
Status: COMPLETED
Start: 2019-07-19 | End: 2019-07-19

## 2019-07-19 RX ORDER — LEVETIRACETAM 500 MG/1
1500 TABLET ORAL EVERY 12 HOURS
Status: DISCONTINUED | OUTPATIENT
Start: 2019-07-19 | End: 2019-08-03 | Stop reason: HOSPADM

## 2019-07-19 RX ORDER — LEVETIRACETAM 100 MG/ML
1500 SOLUTION ORAL EVERY 12 HOURS
COMMUNITY
End: 2020-09-29

## 2019-07-19 RX ORDER — BISACODYL 10 MG
10 SUPPOSITORY, RECTAL RECTAL
Status: DISCONTINUED | OUTPATIENT
Start: 2019-07-19 | End: 2019-07-20

## 2019-07-19 RX ORDER — AMOXICILLIN 250 MG
2 CAPSULE ORAL 2 TIMES DAILY
Status: DISCONTINUED | OUTPATIENT
Start: 2019-07-20 | End: 2019-07-20

## 2019-07-19 RX ORDER — POLYETHYLENE GLYCOL 3350 17 G/17G
1 POWDER, FOR SOLUTION ORAL
Status: DISCONTINUED | OUTPATIENT
Start: 2019-07-19 | End: 2019-07-20

## 2019-07-19 RX ORDER — SODIUM CHLORIDE 9 MG/ML
INJECTION, SOLUTION INTRAVENOUS CONTINUOUS
Status: DISCONTINUED | OUTPATIENT
Start: 2019-07-19 | End: 2019-07-19

## 2019-07-19 RX ADMIN — BISACODYL 10 MG RECTAL SUPPOSITORY 10 MG: at 20:29

## 2019-07-19 RX ADMIN — LORAZEPAM 1 MG: 2 INJECTION INTRAMUSCULAR at 14:35

## 2019-07-19 RX ADMIN — AMPICILLIN AND SULBACTAM 3 G: 1; 2 INJECTION, POWDER, FOR SOLUTION INTRAMUSCULAR; INTRAVENOUS at 16:59

## 2019-07-19 RX ADMIN — LACOSAMIDE 200 MG: 100 TABLET, FILM COATED ORAL at 20:01

## 2019-07-19 RX ADMIN — LEVETIRACETAM 1500 MG: 500 TABLET, FILM COATED ORAL at 20:01

## 2019-07-19 RX ADMIN — PHENOBARBITAL 120 MG: 20 ELIXIR ORAL at 20:47

## 2019-07-19 RX ADMIN — TOPIRAMATE 200 MG: 100 TABLET, FILM COATED ORAL at 20:01

## 2019-07-19 RX ADMIN — SODIUM CHLORIDE: 9 INJECTION, SOLUTION INTRAVENOUS at 15:10

## 2019-07-19 RX ADMIN — LORAZEPAM 1 MG: 2 INJECTION INTRAMUSCULAR; INTRAVENOUS at 14:35

## 2019-07-19 ASSESSMENT — LIFESTYLE VARIABLES: DO YOU DRINK ALCOHOL: NO

## 2019-07-19 NOTE — ED NOTES
Attempted straight cath without success, pt just voided in diaper.  Pt cleaned up and linens changed.    IVF started per MAR.

## 2019-07-19 NOTE — TELEPHONE ENCOUNTER
Mom stated patient is currently at [facility] ed. He has had 5 total seizures today. She wanted to inform Dr Boltno.

## 2019-07-19 NOTE — TELEPHONE ENCOUNTER
Mom stated patient had a small  Seizure this morning and may have had one yesterday but not sure. She state she will take patient to get blood work done but anted to know if Dr Bolton would rather have patient go to [facility] to get it done. Please advise, thank you.

## 2019-07-19 NOTE — ED NOTES
Pt BIB EMS from home.  Pt has been having seizures at home with one five days ago and family called pt neurologist Dr Rubalcava.  Pt had 2 prior to arrival and than family called EMS because Dr Rubalcava said that pt might have an infection d/t new seizures.  Pt has h/o TBI in 1998 with seizures but has been seizure free with medications.  Pt has trach and is cared for by his mother at home.  Mother denies any fevers or change in sputum.  Pt mother at bedside.  ERP to see.

## 2019-07-20 PROCEDURE — A9270 NON-COVERED ITEM OR SERVICE: HCPCS | Performed by: PSYCHIATRY & NEUROLOGY

## 2019-07-20 PROCEDURE — 96367 TX/PROPH/DG ADDL SEQ IV INF: CPT

## 2019-07-20 PROCEDURE — 95951 EEG: CPT | Mod: 26 | Performed by: PSYCHIATRY & NEUROLOGY

## 2019-07-20 PROCEDURE — 99243 OFF/OP CNSLTJ NEW/EST LOW 30: CPT

## 2019-07-20 PROCEDURE — 700111 HCHG RX REV CODE 636 W/ 250 OVERRIDE (IP): Performed by: PSYCHIATRY & NEUROLOGY

## 2019-07-20 PROCEDURE — 99233 SBSQ HOSP IP/OBS HIGH 50: CPT | Performed by: INTERNAL MEDICINE

## 2019-07-20 PROCEDURE — C9254 INJECTION, LACOSAMIDE: HCPCS | Performed by: PSYCHIATRY & NEUROLOGY

## 2019-07-20 PROCEDURE — 4A10X4Z MONITORING OF CENTRAL NERVOUS ELECTRICAL ACTIVITY, EXTERNAL APPROACH: ICD-10-PCS | Performed by: PSYCHIATRY & NEUROLOGY

## 2019-07-20 PROCEDURE — G0378 HOSPITAL OBSERVATION PER HR: HCPCS

## 2019-07-20 PROCEDURE — A9270 NON-COVERED ITEM OR SERVICE: HCPCS | Performed by: HOSPITALIST

## 2019-07-20 PROCEDURE — 700105 HCHG RX REV CODE 258: Performed by: PSYCHIATRY & NEUROLOGY

## 2019-07-20 PROCEDURE — 700102 HCHG RX REV CODE 250 W/ 637 OVERRIDE(OP): Performed by: PSYCHIATRY & NEUROLOGY

## 2019-07-20 PROCEDURE — 94640 AIRWAY INHALATION TREATMENT: CPT

## 2019-07-20 PROCEDURE — 700102 HCHG RX REV CODE 250 W/ 637 OVERRIDE(OP): Performed by: HOSPITALIST

## 2019-07-20 PROCEDURE — 700101 HCHG RX REV CODE 250: Performed by: HOSPITALIST

## 2019-07-20 RX ORDER — POTASSIUM CITRATE 5 MEQ/1
5 TABLET, EXTENDED RELEASE ORAL DAILY
Status: ON HOLD | COMMUNITY
End: 2022-02-19

## 2019-07-20 RX ORDER — AMOXICILLIN 250 MG
2 CAPSULE ORAL 2 TIMES DAILY
Status: DISCONTINUED | OUTPATIENT
Start: 2019-07-20 | End: 2019-08-03 | Stop reason: HOSPADM

## 2019-07-20 RX ORDER — BISACODYL 10 MG
10 SUPPOSITORY, RECTAL RECTAL
Status: DISCONTINUED | OUTPATIENT
Start: 2019-07-20 | End: 2019-08-03 | Stop reason: HOSPADM

## 2019-07-20 RX ORDER — POLYETHYLENE GLYCOL 3350 17 G/17G
1 POWDER, FOR SOLUTION ORAL
Status: DISCONTINUED | OUTPATIENT
Start: 2019-07-20 | End: 2019-08-03 | Stop reason: HOSPADM

## 2019-07-20 RX ORDER — LACOSAMIDE 100 MG/1
300 TABLET ORAL 2 TIMES DAILY
Status: DISCONTINUED | OUTPATIENT
Start: 2019-07-20 | End: 2019-08-03 | Stop reason: HOSPADM

## 2019-07-20 RX ADMIN — PHENOBARBITAL 120 MG: 20 ELIXIR ORAL at 18:00

## 2019-07-20 RX ADMIN — LEVETIRACETAM 1500 MG: 500 TABLET, FILM COATED ORAL at 05:26

## 2019-07-20 RX ADMIN — CYANOCOBALAMIN TAB 500 MCG 1000 MCG: 500 TAB at 05:26

## 2019-07-20 RX ADMIN — LACOSAMIDE 300 MG: 100 TABLET, FILM COATED ORAL at 18:29

## 2019-07-20 RX ADMIN — TOPIRAMATE 200 MG: 100 TABLET, FILM COATED ORAL at 18:28

## 2019-07-20 RX ADMIN — LACOSAMIDE 200 MG: 100 TABLET, FILM COATED ORAL at 05:26

## 2019-07-20 RX ADMIN — TOPIRAMATE 200 MG: 100 TABLET, FILM COATED ORAL at 05:26

## 2019-07-20 RX ADMIN — LEVETIRACETAM 1500 MG: 500 TABLET, FILM COATED ORAL at 18:29

## 2019-07-20 RX ADMIN — PHENOBARBITAL 120 MG: 20 ELIXIR ORAL at 07:50

## 2019-07-20 RX ADMIN — SODIUM CHLORIDE 100 MG: 9 INJECTION, SOLUTION INTRAVENOUS at 12:45

## 2019-07-20 NOTE — ED PROVIDER NOTES
ED Provider Note    CHIEF COMPLAINT  Chief Complaint   Patient presents with   • Seizure   • Rash     to chest for the last 9 days.       LAKE Edwards is a 39 y.o. male who presents to the emergency department brought in by his mother via ambulance because of multiple seizures.  The patient has a severe disability due to traumatic brain injury and has a seizure disorder requiring multiple medications for control.  He has not had a seizure in about a year until the last week or so and he starting to have more frequent seizures and today has had 6 seizures each lasting between 30 seconds and 2 minutes long according to his mother.  The patient is seizing when I walked into the room.  In addition apparently his outpatient doctor has been following a C-reactive protein which has been going up over the last couple of months but it is not exactly clear why it is going up.  The patient himself is nonverbal and can provide no history.  Mom is noted a very slight rash over the chest for 9 or 10 days now and this was initially attributed to a heat rash because it has been very hot.    REVIEW OF SYSTEMS no fever no apparent difficulty or changes in sputum no changes in bowel or bladder function.  The patient can provide no review of systems information    PAST MEDICAL HISTORY  Past Medical History:   Diagnosis Date   • Seizure (HCC)    • Traumatic brain injury (HCC) 05/15/1998       FAMILY HISTORY  No family history on file.    SOCIAL HISTORY  Social History     Social History   • Marital status: Single     Spouse name: N/A   • Number of children: N/A   • Years of education: N/A     Social History Main Topics   • Smoking status: Never Smoker   • Smokeless tobacco: Never Used   • Alcohol use No   • Drug use: Yes     Types: Oral      Comment: Mother tried CBD treatment for siezures in past   • Sexual activity: Not on file     Other Topics Concern   • Not on file     Social History Narrative   • No narrative on file  "      SURGICAL HISTORY  Past Surgical History:   Procedure Laterality Date   • LARYNGOSCOPY  7/2/2017    Procedure: LARYNGOSCOPY;  Surgeon: Catherine Osorio M.D.;  Location: SURGERY [facility];  Service:    • BRONCHOSCOPY  7/2/2017    Procedure: BRONCHOSCOPY;  Surgeon: Catherine Osorio M.D.;  Location: SURGERY [facility];  Service:    • TRACHEOSTOMY  7/2/2017    Procedure: TRACHEOSTOMY;  Surgeon: Catherine Osorio M.D.;  Location: SURGERY [facility];  Service:    • ESOPHAGOSCOPY  7/2/2017    Procedure: ESOPHAGOSCOPY;  Surgeon: Catherine Osorio M.D.;  Location: SURGERY [facility];  Service:        CURRENT MEDICATIONS  Home Medications     Reviewed by Gregory Pulido (Pharmacy Tech) on 07/19/19 at 1729  Med List Status: Complete   Medication Last Dose Status   Cyanocobalamin (VITAMIN B-12) 1000 MCG/15ML Liquid 7/19/2019 Active   lacosamide (VIMPAT) 200 MG Tab tablet 7/19/2019 Active   levETIRAcetam (KEPPRA) 100 MG/ML Solution 7/19/2019 Active   Pain Pump (PATIENT SUPPLIED) XX RENE continuous Active   PHENobarbital 20 MG/5ML Elixir 7/19/2019 Active   topiramate (TOPAMAX) 200 MG tablet 7/19/2019 Active                ALLERGIES  Allergies   Allergen Reactions   • Sulfa Drugs Unspecified     Per caretaker, mother is allergic to sulfa so believes her son could be as well       PHYSICAL EXAM  VITAL SIGNS: /65   Pulse (!) 59   Temp 36.5 °C (97.7 °F) (Temporal)   Resp 16   Ht 1.778 m (5' 10\")   Wt 58.1 kg (128 lb)   SpO2 96%   BMI 18.37 kg/m²    Oxygen saturation is interpreted as adequate on room air  Constitutional: Patient is nonverbal severely disabled does not communicate or follow instructions  HENT: Mucous membranes are dry  Eyes: No erythema discharge or jaundice  Neck: There is a tracheostomy site  Cardiovascular: Regular mild bradycardia  Lungs: Mildly rhonchorous breath sounds bilaterally but he does not appear short of breath  Abdomen/Back: Soft nondistended very " active bowel sounds there is a feeding tube site that appears intact  Skin: Warm and dry, there is a very slight follicular rash overlying the chest lesions are light pink and easily blanchable there are no petechiae or purpura no vesicles or pustules.  There is well-healed decubitus ulceration sites on the left gluteal area and sacral area I do not see any new erythema pus discharge or wound breakdown.  Musculoskeletal: Contractures of the extremities are present consistent with his disability.  Neurologic: The patient had fine diffuse tremor consistent with seizure when I walked into the room and this resolved after intravenous Ativan    Laboratory  CBC shows white blood cell count of 6.4 hemoglobin is adequate at 16.6 platelet count is 259 the ESR is elevated at 17, complete metabolic panel is generally unremarkable the alk phos is slightly elevated at 282 lactic acid level is minimally elevated at 2.1 the C-reactive protein is elevated at 1.56    Radiology  DX-CHEST-PORTABLE (1 VIEW)   Final Result      The perihilar/left lung base atelectasis with pneumonitis/pneumonia not excluded.   Small left pleural effusion.        MEDICAL DECISION MAKING and DISPOSITION  In the emergency department an IV was established and the patient was placed on the cardiac monitor.  The patient indeed had seizure activity when I was in the room involving the legs and arms and he was given intravenous Ativan and this led to resolution and he had no further seizure activity in the ER.  Because of the abnormal chest x-ray I was concerned about possible aspiration pneumonia and he was given intravenous Unasyn.  I reviewed the case with the hospitalist and the patient is for further evaluation and treatment    IMPRESSION  1.  Multiple seizures, rule out status epilepticus  2.  Abnormal chest x-ray rule out pneumonia  3.  Altered mental state  4.  Elevated ESR and C-reactive protein of unknown etiology           Electronically signed by:  Faustino Feng, 7/19/2019 8:19 PM

## 2019-07-20 NOTE — ED NOTES
Med Rec Updated and Complete per Pt's family at bedside with some RX bottles (returned)  Allergies Reviewed  No PO ABX last 14 days.    Pt has a Baclofen Pain Pump in place with the following criteria:    Patient's Pain Pump (placed and maintained as an outpatient)    Medications/concentrations:   Baclofen 3000mcg/ml    Route: Intrathecal    Date of Placement: 05/03/2014    Last changed: 03/18/19    Continuous infusion rates (Drug/Rate):  635.5mcg/day    Patient activation dose: N/A  Patient activation lockout interval: N/A  Maximum activations per day: N/A    Next refill due 09/13/19

## 2019-07-20 NOTE — ASSESSMENT & PLAN NOTE
Tenuous outpatient regimen prescribed by Dr. Bolton, long standing h/o seizures  -consider palliative care consult, remains mostly in a vegetative state  -known to have pharmaco-resistant seizures

## 2019-07-20 NOTE — RESPIRATORY CARE
COPD EDUCATION by COPD CLINICAL EDUCATOR  7/20/2019 at 7:38 AM by Poonam Villarreal     Patient reviewed by COPD education team. Patient does not qualify for the COPD program.

## 2019-07-20 NOTE — PROGRESS NOTES
Pt has had seven seizures since arrival to the floor. The first one was at 1908, and lasted for a minute. The latest one was at 2056 and lasted for 45 minutes. Seizures are not generalized, jerking and stiffness were noticed on his left upper extremity and left lower extremity. Hospitalist notified. Orders placed for PRN ativan, If seizure is longer than 2 minutes. Will monitor.    2115. Per pt's mom, pt has had another seizure at 2110, that lasted for 20 seconds. States shakes were noticed over his left lower extremity only. Will monitor.    0015 Attempted to turn pt, mom declined at this time. Will continue to monitor.    0515am: Pt was seizure free between 2115 to 0500am.                  Per mom,  He has had seven focal seizures between 0500am to 0600am. These were triggered by suctioning, bereket cares                and repositioning. His seizures were characterized by jerks and shakes mainly of his left upper arm only.

## 2019-07-20 NOTE — ASSESSMENT & PLAN NOTE
With history of tracheostomy  Continue his G-tube feeding per his mom's recommendations  Is mentation appears to be effectively baseline  He has a baclofen pain pump

## 2019-07-20 NOTE — CONSULTS
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 He is a patient of Dr Bolton and is on multiple AEDs. His mom tells me that he never had seizures after his TBI and they had started in June of 2017 after he had aspiration pneumonia. He was placed on LEV and Phenobarbital and did well for several months however had elevation of his liver enzymes and his neurologist ( in [facility]) started weaning off phenobarbital slowly and he started having more seizures. LEV was then increased but this did not help. He started following with Dr Bolton who was able to get his seizures under control with   His typical events are hem convulsions of the left hemibody starting in the face, many times involving just his face.   He had been seizure free for an entire year however he started having multiple events consisting of left arm twitching lasting 1-2 minutes without facial twitching or full convulsions.  They started one week ago and had been numerous and daily.   Since this am he had at least 20-30 spells.  His current AEDs are:      topiramate (TOPAMAX) 200 MG tablet      PHENobarbital 20 MG/5ML Elixir     levETIRAcetam (KEPPRA) 100 MG/ML Solution     lacosamide (VIMPAT) 200 MG Tab tablet     LEV 1500 mg bid   Vimpat 200 mg bid   Phenobarbital 120 mg bid  Topamax 200 mg bid     ROS:   Unable to obtain (persistent vegetative state nonverbal patient)       Past Medical History:   Past Medical History:   Diagnosis Date   • Seizure (HCC)    • Traumatic brain injury (HCC) 05/15/1998       Past Surgical History:   Past Surgical History:   Procedure Laterality Date   • LARYNGOSCOPY  7/2/2017    Procedure: LARYNGOSCOPY;  Surgeon: Catherine Osorio M.D.;  Location: SURGERY [facility];  Service:    • BRONCHOSCOPY  7/2/2017     "Procedure: BRONCHOSCOPY;  Surgeon: Catherine Osorio M.D.;  Location: SURGERY [facility];  Service:    • TRACHEOSTOMY  7/2/2017    Procedure: TRACHEOSTOMY;  Surgeon: Catherine Osorio M.D.;  Location: SURGERY [facility];  Service:    • ESOPHAGOSCOPY  7/2/2017    Procedure: ESOPHAGOSCOPY;  Surgeon: Catherine Osorio M.D.;  Location: SURGERY [facility];  Service:        Social History:   Social History     Social History   • Marital status: Single     Spouse name: N/A   • Number of children: N/A   • Years of education: N/A     Occupational History   • Not on file.     Social History Main Topics   • Smoking status: Never Smoker   • Smokeless tobacco: Never Used   • Alcohol use No   • Drug use: Yes     Types: Oral      Comment: Mother tried CBD treatment for siezures in past   • Sexual activity: Not on file     Other Topics Concern   • Not on file     Social History Narrative   • No narrative on file       Family History:   No family history on file.    Allergies:   Allergies   Allergen Reactions   • Sulfa Drugs Unspecified     Per caretaker, mother is allergic to sulfa so believes her son could be as well       Current Facility-Administered Medications:   •  cyanocobalamin  •  lacosamide  •  levETIRAcetam  •  Pain Pump  •  PHENobarbital  •  topiramate  •  senna-docusate **AND** polyethylene glycol/lytes **AND** magnesium hydroxide **AND** bisacodyl  •  enoxaparin    Physical Exam:   Nonverbal trach in place   Appears comfortable and not in distress   Encounter Vitals  Standard Vitals  Vitals  Blood Pressure: 120/72  Temperature: 36.3 °C (97.4 °F)  Temp src: Temporal  Pulse: (!) 55  Respiration: 18  Pulse Oximetry: 94 %  Height: 177.8 cm (5' 10\")  Weight: 58.1 kg (128 lb)  Encounter Vitals  Temperature: 36.3 °C (97.4 °F)  Temp src: Temporal  Blood Pressure: 120/72  BP Location: Right, Lower Leg  Patient BP Position: Supine  Pulse: (!) 55  Respiration: 18  Pulse Oximetry: 94 %  O2 (LPM): 4  O2 " "Delivery: Tracheal Mask  Weight: 58.1 kg (128 lb)  Weight Source: (!) Stated Weight  Reason weight was either estimated or stated: Other (please comment) (pt mother)  Height: 177.8 cm (5' 10\")  BMI (Calculated): 18.37  Pulmonary-Specific Vitals     Durable Medical Equipment-Specific Vitals  DME  O2 (LPM): 4  O2 Delivery: Tracheal Mask    S/p trach   Head midline   Spasticity widespread  No rash   Nonverbal, eyes open midgaze does not follow any commands   Blinks to threat   Pupils 3 mm and reacive equal blaterally   corneals +  No nystagmus   Symmetric face  Contractures in all extremities with spasticity, atrophy generalized  DTRs could not be elicited   Plantars mute  Movements observed numerous L arm myoclonic jerks   No generalization   No tongue biting   Labs:  Recent Labs      07/19/19   1404   WBC  6.4   RBC  5.49   HEMOGLOBIN  16.6   HEMATOCRIT  52.2*   MCV  95.1   MCH  30.2   MCHC  31.8*   RDW  62.9*   PLATELETCT  259   MPV  9.8     Recent Labs      07/19/19   1404   SODIUM  134*   POTASSIUM  4.2   CHLORIDE  103   CO2  21   GLUCOSE  82   BUN  17   CREATININE  0.24*   CALCIUM  8.9                     Recent Labs      07/19/19   1404   SODIUM  134*   POTASSIUM  4.2   CHLORIDE  103   CO2  21   GLUCOSE  82   BUN  17     Recent Labs      07/19/19   1404   SODIUM  134*   POTASSIUM  4.2   CHLORIDE  103   CO2  21   BUN  17   CREATININE  0.24*   CALCIUM  8.9         Results for orders placed or performed during the hospital encounter of 02/18/18   Echocardiogram Comp W/O Cont   Result Value Ref Range    Eject.Frac. MOD BP 66.03     Eject.Frac. MOD 4C 64.91     Eject.Frac. MOD 2C 66.35     Left Ventrical Ejection Fraction 65        From dr jacome note   Imaging:   MRI brain w/wo, 3/7/18  widespread atrophy, possible AV fistula on the right hemisphere.   EEG:  This is an abnormal 24 hours video electroencephalogram recording in a sedated and/or encephalopathic patient.  An asymmetric background is noted, with " significant slowing and attenuation of the left hemisphere and slowing of the right hemisphere. These findings suggest widespread underlying structural abnormalities. Continuous right frontotemporal spikes/sharps. Intermittently noted, recurrent and brief seizures which are localized to the right frontotemporal region, with intermittent mostly short runs of right PLEDS. No clear clinical changes were noted during the seizures. The seizures captured are shorter and last typically less than a minute, but do recur often throughout the study. There has been perhaps a modest improvement with adjustment of the antiepileptic regimen.    Towards the end of the study, the patient appears no longer sedated.        Assessment/Plan:  40 yo M with history of TBI in persistent vegetative state and refractory epilepsy admitted for numerous seizure like episodes with following semiology: L arm myoclonic like jerks.  Episodes appear like focal seizures vs pseudoseizures vs tics  Risk factors for intractability of his seizures are infections, RS failure, superimposed anoxia of the brain (not confirmed)     At this time it is important to delineate the nature of his current spells   I would not change huis AED's or doses at this time  VEEG for monitoring- he has multiple spells and it would be relatively easy to capture several events.  Workup for infectio underway  at this time 2-5 WBC in urine culture pending , no leukocytosis  CXR with perihilar/left lung base atelectasis with pneumonitis/pneumonia not excluded and small L pleural effusion     I had discussed this with patient's mom extensively and she was pleased with the discussion and had all her questions answered.    We will arrange for vEEG.      Thank you for consulting Neurology and please call with questions.      So Henderson MD PhD   Board certified neurologist

## 2019-07-20 NOTE — CARE PLAN
Problem: Safety  Goal: Will remain free from injury  Outcome: PROGRESSING AS EXPECTED  Seizure precautions in place. Mom at bedside    Problem: Respiratory:  Goal: Respiratory status will improve  Outcome: PROGRESSING AS EXPECTED  Pt has a tracheostomy, oxygen saturation normal

## 2019-07-20 NOTE — PROGRESS NOTES
0955:  Assumed care of patient at 0700.  Report received at bedside.  Patient is nonverbal and quadripalegic.  Mother stays at bedside and is caretaker, coordinates care, ok per Dr. Encarnacion.  Mother does tube feeds and expresses needs for patient appropriately.  Patient's mother is refusing the krissy lift in order to get an accurate weight on patient. Hourly rounding in place.    1300:  Per patient's mother, Susana, patient has been having non-stop seizure like activity.  Dr. Bolton contacted and orders received.    1430: RN sitting with patient while mother, Susana, goes to eat lunch.  Patient is currently comfortable and VS stable.  Mother's recorded times of seizure activity to date are as follows: 0650, 0700, 0705, 0800, 0810, 0825, 0830, 0850, 0858, 1040, 1045, 1055, 1100, 1140, 1145, 1148 (witnessed by RN), 1204, 1206, 1345, 1355 1400, 1415, 1432 (witnessed by RN).    1700:  Patient continues to have frequent episodes of seizure like activity.  Per Dr. Bolton, staff and mother at bedside are to continue hitting the button on the EEG to asim.  Patient's potassium supplement has been restarted via home med rec.  Dr. Bolton can be reached in case of emergency, otherwise, he will follow-up first thing in the morning.

## 2019-07-20 NOTE — CARE PLAN
Problem: Bronchopulmonary Hygiene:  Goal: Increase mobilization of retained secretions  Outcome: PROGRESSING AS EXPECTED  Pt remains on trach collar 4lpm/28%

## 2019-07-20 NOTE — PROGRESS NOTES
Hospital Medicine Daily Progress Note    Date of Service  7/20/2019    Chief Complaint  39 y.o. male admitted 7/19/2019 with intractable seizures in the setting of severe TBI when younger and CP    Hospital Course    see below      Interval Problem Update  Seizures-cEEG in place. Mom very concerned about ongoing seizures. Spoke with DR Douglass, confirmed possible seizure activity and he increased vimpat.     Consultants/Specialty  neurology    Code Status  fcfc    Disposition  Neurology    Review of Systems  Review of Systems   Unable to perform ROS: Acuity of condition        Physical Exam  Temp:  [35.8 °C (96.4 °F)-36.3 °C (97.4 °F)] 36.2 °C (97.1 °F)  Pulse:  [53-94] 56  Resp:  [16-18] 18  BP: (120-145)/(63-80) 132/63  SpO2:  [93 %-96 %] 95 %    Physical Exam   Constitutional: He appears well-developed and well-nourished. No distress.   Non verbal, does not respond to any commands   HENT:   Head: Normocephalic and atraumatic.   Mouth/Throat: No oropharyngeal exudate.   Trach mask   Eyes: Pupils are equal, round, and reactive to light. No scleral icterus.   Neck: Normal range of motion. Neck supple. No thyromegaly present.   Cardiovascular: Normal rate, regular rhythm, normal heart sounds and intact distal pulses.    No murmur heard.  Pulmonary/Chest: Effort normal and breath sounds normal. No respiratory distress. He has no wheezes.   Abdominal: Soft. Bowel sounds are normal. He exhibits no distension. There is no tenderness.   Thin   Musculoskeletal: Normal range of motion. He exhibits no edema or tenderness.   Neurological: He is alert. No cranial nerve deficit. Coordination abnormal.   Contractures noted   Skin: Skin is warm and dry. No rash noted.   Psychiatric: He has a normal mood and affect.   Nursing note and vitals reviewed.      Fluids    Intake/Output Summary (Last 24 hours) at 07/20/19 1408  Last data filed at 07/20/19 0600   Gross per 24 hour   Intake              400 ml   Output              625 ml    Net             -225 ml       Laboratory  Recent Labs      07/19/19   1404   WBC  6.4   RBC  5.49   HEMOGLOBIN  16.6   HEMATOCRIT  52.2*   MCV  95.1   MCH  30.2   MCHC  31.8*   RDW  62.9*   PLATELETCT  259   MPV  9.8     Recent Labs      07/19/19   1404   SODIUM  134*   POTASSIUM  4.2   CHLORIDE  103   CO2  21   GLUCOSE  82   BUN  17   CREATININE  0.24*   CALCIUM  8.9                   Imaging  DX-CHEST-PORTABLE (1 VIEW)   Final Result      The perihilar/left lung base atelectasis with pneumonitis/pneumonia not excluded.   Small left pleural effusion.           Assessment/Plan  * Status epilepticus (HCC)- (present on admission)   Assessment & Plan    Despite renal barbital, Keppra, Vimpat, and Topamax continues to have multiple seizures consistent with focal seizures  He will be admitted to the neurology floor and EEG has been ordered  Is been followed as an outpatient by Dr. Bolton, spoke with neurologist, ongoing seizurs, increased vimpat  -no clear evidence of infection at this time     Epilepsy (HCC)- (present on admission)   Assessment & Plan    Tenuous outpatient regimen prescribed by Dr. Bolton, long standing h/o seizures  -consider palliative care consult, remains mostly in a vegetative state  -known to have pharmaco-resistant seizures     TBI (traumatic brain injury) (HCC)- (present on admission)   Assessment & Plan    With history of tracheostomy  Continue his G-tube feeding per his mom's recommendations  Is mentation appears to be effectively baseline  He has a baclofen pain pump     Contraction, joint, multiple sites- (present on admission)   Assessment & Plan    Contractures are stable          VTE prophylaxis: lovenox

## 2019-07-20 NOTE — PROGRESS NOTES
Pt admitted from ER for Status Epilepticus. He is non verbal. G tube and Trach noted, on 4 liters of oxygen, oxygen saturation normal at this time. Suctioned as needed.     Two RN skin assessment completed with Gato Alejandro RN. 2 healed pressure ulcer sites noted on his left hip and buttocks. Scabs and redness noted on right foot. No other skin issues noted. On waffle mattress, turns every two hours and as needed. Will continue to monitor.

## 2019-07-20 NOTE — DIETARY
Nutrition note:  Pt with BMI <19 (18.37); however, pt has h/o severe TBI and is PEG dependent for nutrition at home.    Admit wt is estimated/stated - NEED ACTUAL WEIGHT please.    Pt is NPO, no TF order at this time.    RD attempted to speak with mom x 2 today; however, pt is currently getting a vEEG d/t seizures.    Please order TF consult when ok with MD to start feeds.

## 2019-07-20 NOTE — PROCEDURES
VIDEO ELECTROENCEPHALOGRAM REPORT      Referring provider: Dr. Robb.     DOS: 7/20/2019 (total recording of 19 hours and 53 minutes).     INDICATION:  Ruben Edwards 39 y.o. male presenting with seizures.     CURRENT ANTIEPILEPTIC REGIMEN: Topiramate 200 mg bid, Phenobarbital 120 mg bid, Lacosamide increased to 300 mg bid during the study, Levetiracetam 1500 mg bid.     TECHNIQUE: 30 channel 24 hrs video electroencephalogram (EEG) was performed in accordance with the international 10-20 system. The study was reviewed in bipolar and referential montages. The recording examined the patient during wakeful and drowsy/sleep state(s).     DESCRIPTION OF THE RECORD:  During the wakefulness, the background showed a symmetrical 8 Hz alpha activity posteriorly with amplitude of 70 mV.  There was reactivity to eye closure/opening.  A normal anterior-posterior gradient was noted with faster beta frequencies seen anteriorly.  During drowsiness, theta/delta frequencies were seen.    During the sleep state, background shows diffuse high-amplitude 4-5 Hz delta activity.  Symmetrical high-amplitude sleep spindles and vertex sharps were seen in the leads over the central regions.     ACTIVATION PROCEDURES:   Not performed.     ICTAL AND/OR INTERICTAL FINDINGS:   There is intermittent slowing in the right frontal region and frequent right frontal spikes / sharps. Several events of left arm / hand and head myoclonus were reported by either patient's mother or nursing staff by push of the event button. These spells were brief in nature and epileptic. Review of eeg demonstrated briefly rhythmic or periodic right frontal spikes / sharps with right frontal slowing during the events, which only lasted several seconds at a time.     EKG: sampling of the EKG recording demonstrated sinus rhythm.     INTERPRETATION:  This is an abnormal 24 hrs video EEG recording in the awake, drowsy, and sleep state(s).  There is intermittent slowing in the  right frontal region and frequent right frontal spikes / sharps, suggesting underlying structural abnormality and cortical irritability. Several events of left arm / hand and head myoclonus were reported by either patient's mother or nursing staff by push of the event button. These spells were brief in nature and epileptic. Review of eeg demonstrated briefly rhythmic or periodic right frontal spikes / sharps with right frontal slowing during the events, which only lasted several seconds at a time. Events consisting with brief focal right frontal seizures. Clinical and radiological correlation is recommended.    Updates provided to patient's RN, attending physician and patient's mother.         Harris Bolton MD   Epilepsy and Neurodiagnostics.   Clinical  of Neurology [employer].   Diplomate in Neurology, Epilepsy, and Electrodiagnostic Medicine.   Office: [phone]  Fax: [phone]

## 2019-07-20 NOTE — H&P
Hospital Medicine History & Physical Note    Date of Service  7/19/2019    Primary Care Physician  Aleksandra Moore    Code Status  full    Chief Complaint  seizures    History of Presenting Illness  39 y.o. male who presented 7/19/2019 with multiple seizures. Mr. Edwards has a past mental history of severe traumatic brain injury with resultant tracheostomy, G-tube feeding, that has a history of epilepsy followed by Dr. Bolton, neurology.. He has been stable on his regimen of 4 antiepileptic medications till 5 days ago he had a 35-second seizure witnessed by his mother.  She called Dr. Bolton who recommended close monitoring and to return if he continued to have more seizures.  Today at 630 he had about 1/42 seizure had another one about 40 seconds at 1130 was brought here where he had a seizure at 13:30 35 seconds followed by a 2-minute seizure at 14:00 for which she was given IV Ativan.  His mother states that the shaking is predominantly in the right leg and not generalized in nature.  His mother states that he has usual temperature is 97.9 and house quite warm she took his temperature is 98.4 which is warm for him.  Is been suctioning him via his tracheostomy and has had only clear secretions.  He will be admitted for EEG monitoring for the working diagnosis of status epilepticus.    Review of Systems  Review of Systems   Unable to perform ROS: Mental acuity       Past Medical History   has a past medical history of Seizure (HCC) and Traumatic brain injury (HCC) (05/15/1998).    Surgical History   has a past surgical history that includes laryngoscopy (7/2/2017); bronchoscopy (7/2/2017); tracheostomy (7/2/2017); and esophagoscopy (7/2/2017).     Family History  Notes are alive and healthy    Social History   reports that he has never smoked. He has never used smokeless tobacco. He reports that he uses drugs, including Oral. He reports that he does not drink alcohol.  Lives with his mother and  father    Allergies  Allergies   Allergen Reactions   • Sulfa Drugs Unspecified     Per caretaker, mother is allergic to sulfa so believes her son could be as well       Medications  Prior to Admission Medications   Prescriptions Last Dose Informant Patient Reported? Taking?   Cyanocobalamin (VITAMIN B-12) 1000 MCG/15ML Liquid 2019 at AM Family Member Yes No   Si,000 mcg by Gastrostomy Tube route every day.   PHENobarbital 20 MG/5ML Elixir 2019 at AM Rx Bottle (For Med Information) Yes Yes   Si mg by Per G Tube route 2 times a day.   Pain Pump (PATIENT SUPPLIED) XX RENE continuous Historical Yes Yes   Sig: by Injection route Continuous. Patient's Pain Pump (placed and maintained as an outpatient)  Medications/concentrations:   Baclofen 3000mcg/ml  Route: Intrathecal  Date of Placement: 2014  Last changed: 19  Continuous infusion rates (Drug/Rate):  635.5mcg/day  Patient activation dose: N/A  Patient activation lockout interval: N/A  Maximum activations per day: N/A  Next refill due: 19   lacosamide (VIMPAT) 200 MG Tab tablet 2019 at AM Family Member Yes Yes   Si mg by Per G Tube route 2 Times a Day.   levETIRAcetam (KEPPRA) 100 MG/ML Solution 2019 at AM Rx Bottle (For Med Information) Yes Yes   Si,500 mg by Per G Tube route every 12 hours.   topiramate (TOPAMAX) 200 MG tablet 2019 at AM Family Member Yes Yes   Sig: Take 200 mg by mouth 2 times a day. Per G Tube      Facility-Administered Medications: None       Physical Exam  Temp:  [36.5 °C (97.7 °F)] 36.5 °C (97.7 °F)  Pulse:  [54-94] 59  Resp:  [16-20] 16  BP: (114)/(65) 114/65  SpO2:  [93 %-96 %] 96 %    Physical Exam   Constitutional: No distress.   HENT:   Teeth are quite overlapping  Dry mucous membranes  Mouth is open   Eyes: Right eye exhibits no discharge. Left eye exhibits no discharge. No scleral icterus.   Neck:   Tracheostomy, he is not tender to move his neck   Cardiovascular: Normal rate  and regular rhythm.    No murmur heard.  Pulmonary/Chest:   Good air movement no rhonchi   Abdominal: He exhibits distension.   Right sided pain pump  G-tube   Genitourinary:   Genitourinary Comments: Condom catheter is on   Musculoskeletal: He exhibits no tenderness.   Contractures of the arms and legs  Slight edema   Neurological:   He does not follow commands   Skin: Skin is warm and dry. He is not diaphoretic.   He has small circular red lesions in the midline in the lower abdomen a few in the upper chest and one on the left thigh these are not herpetic in nature   Psychiatric:   He is nonverbal   Nursing note and vitals reviewed.      Laboratory:  Recent Labs      07/19/19   1404   WBC  6.4   RBC  5.49   HEMOGLOBIN  16.6   HEMATOCRIT  52.2*   MCV  95.1   MCH  30.2   MCHC  31.8*   RDW  62.9*   PLATELETCT  259   MPV  9.8     Recent Labs      07/19/19   1404   SODIUM  134*   POTASSIUM  4.2   CHLORIDE  103   CO2  21   GLUCOSE  82   BUN  17   CREATININE  0.24*   CALCIUM  8.9     Recent Labs      07/19/19   1404   ALTSGPT  37   ASTSGOT  25   ALKPHOSPHAT  282*   TBILIRUBIN  0.3   GLUCOSE  82         No results for input(s): NTPROBNP in the last 72 hours.      No results for input(s): TROPONINT in the last 72 hours.    Urinalysis:    No results found     Imaging:  DX-CHEST-PORTABLE (1 VIEW)   Final Result      The perihilar/left lung base atelectasis with pneumonitis/pneumonia not excluded.   Small left pleural effusion.            Assessment/Plan:  I anticipate this patient will require at least two midnights for appropriate medical management, necessitating inpatient admission.    * Status epilepticus (HCC)- (present on admission)   Assessment & Plan    Despite renal barbital, Keppra, Vimpat, and Topamax continues to have multiple seizures consistent with focal seizures  He will be admitted to the neurology floor and EEG has been ordered  Is been followed as an outpatient by Dr. Bolton     Epilepsy ([facility])- (present on  admission)   Assessment & Plan    Tenuous outpatient regimen prescribed by Dr. Bolton     TBI (traumatic brain injury) ([facility])- (present on admission)   Assessment & Plan    With history of tracheostomy  Continue his G-tube feeding per his mom's recommendations  Is mentation appears to be effectively baseline  He has a baclofen pain pump     Contraction, joint, multiple sites- (present on admission)   Assessment & Plan    Contractures are stable         VTE prophylaxis: lovenox

## 2019-07-21 LAB
ALBUMIN SERPL BCP-MCNC: 3.7 G/DL (ref 3.2–4.9)
ALBUMIN/GLOB SERPL: 1 G/DL
ALP SERPL-CCNC: 260 U/L (ref 30–99)
ALT SERPL-CCNC: 29 U/L (ref 2–50)
ANION GAP SERPL CALC-SCNC: 10 MMOL/L (ref 0–11.9)
AST SERPL-CCNC: 17 U/L (ref 12–45)
BILIRUB SERPL-MCNC: 0.4 MG/DL (ref 0.1–1.5)
BUN SERPL-MCNC: 12 MG/DL (ref 8–22)
CALCIUM SERPL-MCNC: 8.7 MG/DL (ref 8.5–10.5)
CHLORIDE SERPL-SCNC: 105 MMOL/L (ref 96–112)
CO2 SERPL-SCNC: 19 MMOL/L (ref 20–33)
CREAT SERPL-MCNC: <0.2 MG/DL (ref 0.5–1.4)
GLOBULIN SER CALC-MCNC: 3.6 G/DL (ref 1.9–3.5)
GLUCOSE SERPL-MCNC: 101 MG/DL (ref 65–99)
MAGNESIUM SERPL-MCNC: 1.7 MG/DL (ref 1.5–2.5)
POTASSIUM SERPL-SCNC: 3.4 MMOL/L (ref 3.6–5.5)
PROT SERPL-MCNC: 7.3 G/DL (ref 6–8.2)
SODIUM SERPL-SCNC: 134 MMOL/L (ref 135–145)

## 2019-07-21 PROCEDURE — 95951 EEG: CPT | Performed by: PSYCHIATRY & NEUROLOGY

## 2019-07-21 PROCEDURE — 700102 HCHG RX REV CODE 250 W/ 637 OVERRIDE(OP): Performed by: PSYCHIATRY & NEUROLOGY

## 2019-07-21 PROCEDURE — 94640 AIRWAY INHALATION TREATMENT: CPT

## 2019-07-21 PROCEDURE — 36415 COLL VENOUS BLD VENIPUNCTURE: CPT

## 2019-07-21 PROCEDURE — A9270 NON-COVERED ITEM OR SERVICE: HCPCS | Performed by: HOSPITALIST

## 2019-07-21 PROCEDURE — 83735 ASSAY OF MAGNESIUM: CPT

## 2019-07-21 PROCEDURE — 99232 SBSQ HOSP IP/OBS MODERATE 35: CPT | Performed by: INTERNAL MEDICINE

## 2019-07-21 PROCEDURE — 700102 HCHG RX REV CODE 250 W/ 637 OVERRIDE(OP): Performed by: INTERNAL MEDICINE

## 2019-07-21 PROCEDURE — 700101 HCHG RX REV CODE 250: Performed by: HOSPITALIST

## 2019-07-21 PROCEDURE — 95951 EEG: CPT | Mod: 26 | Performed by: PSYCHIATRY & NEUROLOGY

## 2019-07-21 PROCEDURE — A9270 NON-COVERED ITEM OR SERVICE: HCPCS | Performed by: PSYCHIATRY & NEUROLOGY

## 2019-07-21 PROCEDURE — 700102 HCHG RX REV CODE 250 W/ 637 OVERRIDE(OP): Performed by: HOSPITALIST

## 2019-07-21 PROCEDURE — A9270 NON-COVERED ITEM OR SERVICE: HCPCS | Performed by: INTERNAL MEDICINE

## 2019-07-21 PROCEDURE — 770006 HCHG ROOM/CARE - MED/SURG/GYN SEMI*

## 2019-07-21 PROCEDURE — 94760 N-INVAS EAR/PLS OXIMETRY 1: CPT

## 2019-07-21 PROCEDURE — 80053 COMPREHEN METABOLIC PANEL: CPT

## 2019-07-21 RX ORDER — LORAZEPAM 2 MG/ML
2 INJECTION INTRAMUSCULAR EVERY 6 HOURS PRN
Status: DISCONTINUED | OUTPATIENT
Start: 2019-07-21 | End: 2019-07-22

## 2019-07-21 RX ADMIN — LEVETIRACETAM 1500 MG: 500 TABLET, FILM COATED ORAL at 06:12

## 2019-07-21 RX ADMIN — LACOSAMIDE 300 MG: 100 TABLET, FILM COATED ORAL at 06:12

## 2019-07-21 RX ADMIN — POTASSIUM BICARBONATE 12.5 MEQ: 978 TABLET, EFFERVESCENT ORAL at 06:13

## 2019-07-21 RX ADMIN — TOPIRAMATE 200 MG: 100 TABLET, FILM COATED ORAL at 06:13

## 2019-07-21 RX ADMIN — PHENOBARBITAL 120 MG: 20 ELIXIR ORAL at 18:49

## 2019-07-21 RX ADMIN — LEVETIRACETAM 1500 MG: 500 TABLET, FILM COATED ORAL at 18:04

## 2019-07-21 RX ADMIN — LACOSAMIDE 300 MG: 100 TABLET, FILM COATED ORAL at 18:04

## 2019-07-21 RX ADMIN — CYANOCOBALAMIN TAB 500 MCG 1000 MCG: 500 TAB at 06:13

## 2019-07-21 RX ADMIN — PHENOBARBITAL 120 MG: 20 ELIXIR ORAL at 06:14

## 2019-07-21 RX ADMIN — TOPIRAMATE 200 MG: 100 TABLET, FILM COATED ORAL at 18:04

## 2019-07-21 NOTE — PROGRESS NOTES
Patient managed by Dr Bolton.  Video EEG in place confirming R frontal focal seizures.  Vimpat Increased to 300 mg big. Patient will be managed by Dr Bolton who directly communicates with the primary team and patient's mom.

## 2019-07-21 NOTE — CARE PLAN
Problem: Bronchopulmonary Hygiene:  Goal: Increase mobilization of retained secretions  Outcome: PROGRESSING AS EXPECTED  Aerosol Trach Collar  4L/28%

## 2019-07-21 NOTE — PROGRESS NOTES
Pt resting comfortably at this time. He is alert, eyes open, non verbal but not following commands. Pt has a trach, 4 liters of oxygen, oxygen saturation normal. Suctioned, very light clear secretions removed. Peg tube noted, insertion site clean, dry and intact. Implanted Baclofen pump also noted in right abdomen. Pt is quadriplegic, has contractures. Turns as needed. Plan of care discussed with Mum ( at bedside) Will monitor.    24 hours VEEG in progress.    2340: Pt just had a brief seizure like activity that lasted about 30 secs. It involved twitching of his face and left upper arm. Will monitor.    0039: Pt has had 4 seizure like activities in the past hour, each lasting less than a minute. Seizures has been focal, involves shaking and twitching of face and left upper extremity. He has not bitten his tongue. Will monitor.    0648: Pt has had several seizure like activities last night. Seizures has been focal. Twitching, shaking and jerks noted mainly over left upper extremity and face. Seizures lasted not more than a minute. Suctioned as needed. Tolerated meds. Mom at bedside, she is very helpful with cares. Will monitor.    Pt has had 300 cc urine output this shift.

## 2019-07-21 NOTE — PROGRESS NOTES
2 RN Skin assessment completed with Day shift RN. Healed decubitus ulcers noted over left hip and buttocks. Peg tube noted over left upper abdomen. Pt has a tracheostomy. Upper extremities are contracted. Pt on waffle mattress, turns as needed. Mom at bedside. Will monitor.

## 2019-07-21 NOTE — PROCEDURES
VIDEO ELECTROENCEPHALOGRAM REPORT        Referring provider: Dr. Robb.      DOS: 7/21/2019 (total recording of 23 hours and 58 minutes).      INDICATION:  Ruben Edwards 39 y.o. male presenting with recurrent seizures.      CURRENT ANTIEPILEPTIC REGIMEN: Topiramate 200 mg bid, Phenobarbital 120 mg bid, Lacosamide 300 mg bid, Levetiracetam 1500 mg bid.      TECHNIQUE: 30 channel 24 hrs video electroencephalogram (EEG) was performed in accordance with the international 10-20 system. The study was reviewed in bipolar and referential montages. The recording examined the patient during wakeful and drowsy/sleep state(s).      DESCRIPTION OF THE RECORD:  During the wakefulness, the background showed a symmetrical 8 Hz alpha activity posteriorly with amplitude of 70 mV.  There was reactivity to eye closure/opening.  A normal anterior-posterior gradient was noted with faster beta frequencies seen anteriorly.  During drowsiness, theta/delta frequencies were seen.     During the sleep state, background shows diffuse high-amplitude 4-5 Hz delta activity.  Symmetrical high-amplitude sleep spindles and vertex sharps were seen in the leads over the central regions.      ACTIVATION PROCEDURES:   Not performed.      ICTAL AND/OR INTERICTAL FINDINGS:   There is intermittent slowing in the right frontal region and frequent right frontal spikes / sharps. A few events of left arm / hand and head myoclonus were reported by either patient's mother or nursing staff by push of the event button. These spells were brief in nature and epileptic. Review of eeg demonstrated briefly rhythmic or periodic right frontal spikes / sharps with right frontal slowing during the events, which only lasted several seconds at a time. There has been improvement after increase of Lacosamide.       EKG: sampling of the EKG recording demonstrated sinus rhythm.      INTERPRETATION:  This is an abnormal 24 hrs video EEG recording in the awake, drowsy, and sleep  state(s).  There is intermittent slowing in the right frontal region and frequent right frontal spikes / sharps, suggesting underlying structural abnormality and cortical irritability. A few events of left arm / hand and head myoclonus were reported by either patient's mother or nursing staff by push of the event button. These spells were brief in nature and epileptic. Review of eeg demonstrated briefly rhythmic or periodic right frontal spikes / sharps with right frontal slowing during the events, which only lasted several seconds at a time. Events consisting with brief focal right frontal seizures. There has been improvement after increase of Lacosamide. Clinical and radiological correlation is recommended.     Updates provided to patient's RN, attending physician and patient's mother.            Harris Bolotn MD   Epilepsy and Neurodiagnostics.   Clinical  of Neurology [facility].   Diplomate in Neurology, Epilepsy, and Electrodiagnostic Medicine.   Office: [phone]  Fax: [phone]

## 2019-07-21 NOTE — CARE PLAN
Problem: Respiratory:  Goal: Respiratory status will improve  Outcome: PROGRESSING AS EXPECTED  Trached, on 4 liters of oxygen, pulse oximetry placed.

## 2019-07-21 NOTE — CARE PLAN
Problem: Safety  Goal: Will remain free from injury  Outcome: PROGRESSING AS EXPECTED      Problem: Urinary Elimination:  Goal: Ability to reestablish a normal urinary elimination pattern will improve  Outcome: PROGRESSING AS EXPECTED  Condom catheter in place     Problem: Safety:  Goal: Will remain free from injury  Outcome: PROGRESSING AS EXPECTED

## 2019-07-21 NOTE — PROGRESS NOTES
Hospital Medicine Daily Progress Note    Date of Service  7/21/2019    Chief Complaint  39 y.o. male admitted 7/19/2019 with intractable seizures in the setting of severe TBI when younger and CP    Hospital Course    see below      Interval Problem Update  Seizures-cEEG in place. Very concerned about ongoing seizures throughtout the night and this AM. Neurology increased vimpat further. No other changes. RF at baseline. K+ is ok.     Consultants/Specialty  neurology    Code Status  fcfc    Disposition  Neurology    Review of Systems  Review of Systems   Unable to perform ROS: Acuity of condition        Physical Exam  Temp:  [36.2 °C (97.1 °F)-36.7 °C (98.1 °F)] 36.7 °C (98.1 °F)  Pulse:  [55-84] 63  Resp:  [16-19] 16  BP: (129-146)/(63-84) 146/84  SpO2:  [93 %-97 %] 93 %    Physical Exam   Constitutional: He appears well-developed and well-nourished. No distress.   Non verbal, does not respond to any commands   HENT:   Head: Normocephalic and atraumatic.   Mouth/Throat: No oropharyngeal exudate.   Trach mask   Eyes: Pupils are equal, round, and reactive to light. No scleral icterus.   Neck: Normal range of motion. Neck supple. No thyromegaly present.   Cardiovascular: Normal rate, regular rhythm, normal heart sounds and intact distal pulses.    No murmur heard.  Pulmonary/Chest: Effort normal and breath sounds normal. No respiratory distress. He has no wheezes.   Abdominal: Soft. Bowel sounds are normal. He exhibits no distension. There is no tenderness.   Thin  G tube site appears C/D/I   Musculoskeletal: Normal range of motion. He exhibits no tenderness.   Neurological: He is alert. No cranial nerve deficit. Coordination abnormal.   Contractures noted   Skin: Skin is warm and dry. No rash noted.   Nursing note and vitals reviewed.      Fluids    Intake/Output Summary (Last 24 hours) at 07/21/19 1127  Last data filed at 07/21/19 0039   Gross per 24 hour   Intake               30 ml   Output              300 ml    Net             -270 ml       Laboratory  Recent Labs      07/19/19   1404   WBC  6.4   RBC  5.49   HEMOGLOBIN  16.6   HEMATOCRIT  52.2*   MCV  95.1   MCH  30.2   MCHC  31.8*   RDW  62.9*   PLATELETCT  259   MPV  9.8     Recent Labs      07/19/19   1404  07/21/19   0351   SODIUM  134*  134*   POTASSIUM  4.2  3.4*   CHLORIDE  103  105   CO2  21  19*   GLUCOSE  82  101*   BUN  17  12   CREATININE  0.24*  <0.20*   CALCIUM  8.9  8.7                   Imaging  DX-CHEST-PORTABLE (1 VIEW)   Final Result      The perihilar/left lung base atelectasis with pneumonitis/pneumonia not excluded.   Small left pleural effusion.           Assessment/Plan  * Status epilepticus (HCC)- (present on admission)   Assessment & Plan    Despite renal barbital, Keppra, Vimpat, and Topamax continues to have multiple seizures consistent with focal seizures  He will be admitted to the neurology floor and EEG has been ordered, Dr Bolton increased vimpat further today  -no clear seizures noted on cEEG  Is been followed as an outpatient by Dr. Bolton, spoke with neurologist, ongoing seizurs, increased vimpat  -no clear evidence of infection at this time  -I strongly recommend an ethics consult and palliative care consult      Epilepsy (HCC)- (present on admission)   Assessment & Plan    Tenuous outpatient regimen prescribed by Dr. Bolton, long standing h/o seizures  -consider palliative care consult, remains mostly in a vegetative state  -known to have pharmaco-resistant seizures     TBI (traumatic brain injury) (HCC)- (present on admission)   Assessment & Plan    With history of tracheostomy  Continue his G-tube feeding per his mom's recommendations  Is mentation appears to be effectively baseline  He has a baclofen pain pump     Contraction, joint, multiple sites- (present on admission)   Assessment & Plan    Contractures are stable          VTE prophylaxis: lovenox

## 2019-07-21 NOTE — CARE PLAN
Problem: Safety  Goal: Will remain free from injury  Outcome: PROGRESSING AS EXPECTED  Mom at bedside. Frequent rounding by staff.

## 2019-07-22 LAB
ANION GAP SERPL CALC-SCNC: 9 MMOL/L (ref 0–11.9)
BACTERIA UR CULT: NORMAL
BUN SERPL-MCNC: 16 MG/DL (ref 8–22)
CALCIUM SERPL-MCNC: 8.7 MG/DL (ref 8.5–10.5)
CHLORIDE SERPL-SCNC: 108 MMOL/L (ref 96–112)
CO2 SERPL-SCNC: 21 MMOL/L (ref 20–33)
CREAT SERPL-MCNC: <0.2 MG/DL (ref 0.5–1.4)
GLUCOSE SERPL-MCNC: 109 MG/DL (ref 65–99)
MAGNESIUM SERPL-MCNC: 1.9 MG/DL (ref 1.5–2.5)
POTASSIUM SERPL-SCNC: 3.5 MMOL/L (ref 3.6–5.5)
PREALB SERPL-MCNC: 26 MG/DL (ref 18–38)
SIGNIFICANT IND 70042: NORMAL
SITE SITE: NORMAL
SODIUM SERPL-SCNC: 138 MMOL/L (ref 135–145)
SOURCE SOURCE: NORMAL

## 2019-07-22 PROCEDURE — 94760 N-INVAS EAR/PLS OXIMETRY 1: CPT

## 2019-07-22 PROCEDURE — 99232 SBSQ HOSP IP/OBS MODERATE 35: CPT | Performed by: INTERNAL MEDICINE

## 2019-07-22 PROCEDURE — A9270 NON-COVERED ITEM OR SERVICE: HCPCS | Performed by: HOSPITALIST

## 2019-07-22 PROCEDURE — 84134 ASSAY OF PREALBUMIN: CPT

## 2019-07-22 PROCEDURE — 94640 AIRWAY INHALATION TREATMENT: CPT

## 2019-07-22 PROCEDURE — 700102 HCHG RX REV CODE 250 W/ 637 OVERRIDE(OP): Performed by: HOSPITALIST

## 2019-07-22 PROCEDURE — 770006 HCHG ROOM/CARE - MED/SURG/GYN SEMI*

## 2019-07-22 PROCEDURE — 700111 HCHG RX REV CODE 636 W/ 250 OVERRIDE (IP): Performed by: PSYCHIATRY & NEUROLOGY

## 2019-07-22 PROCEDURE — 700102 HCHG RX REV CODE 250 W/ 637 OVERRIDE(OP): Performed by: INTERNAL MEDICINE

## 2019-07-22 PROCEDURE — 95951 EEG: CPT | Performed by: PSYCHIATRY & NEUROLOGY

## 2019-07-22 PROCEDURE — 700101 HCHG RX REV CODE 250: Performed by: HOSPITALIST

## 2019-07-22 PROCEDURE — 700101 HCHG RX REV CODE 250: Performed by: PSYCHIATRY & NEUROLOGY

## 2019-07-22 PROCEDURE — A9270 NON-COVERED ITEM OR SERVICE: HCPCS | Performed by: INTERNAL MEDICINE

## 2019-07-22 PROCEDURE — 80048 BASIC METABOLIC PNL TOTAL CA: CPT

## 2019-07-22 PROCEDURE — 36415 COLL VENOUS BLD VENIPUNCTURE: CPT

## 2019-07-22 PROCEDURE — 83735 ASSAY OF MAGNESIUM: CPT

## 2019-07-22 PROCEDURE — 95951 EEG: CPT | Mod: 26 | Performed by: PSYCHIATRY & NEUROLOGY

## 2019-07-22 PROCEDURE — 99233 SBSQ HOSP IP/OBS HIGH 50: CPT | Mod: 25 | Performed by: PSYCHIATRY & NEUROLOGY

## 2019-07-22 PROCEDURE — 700102 HCHG RX REV CODE 250 W/ 637 OVERRIDE(OP): Performed by: PSYCHIATRY & NEUROLOGY

## 2019-07-22 PROCEDURE — A9270 NON-COVERED ITEM OR SERVICE: HCPCS | Performed by: PSYCHIATRY & NEUROLOGY

## 2019-07-22 RX ORDER — LORAZEPAM 2 MG/ML
1 INJECTION INTRAMUSCULAR EVERY 4 HOURS PRN
Status: DISCONTINUED | OUTPATIENT
Start: 2019-07-22 | End: 2019-07-26

## 2019-07-22 RX ORDER — LORAZEPAM 2 MG/ML
1 INJECTION INTRAMUSCULAR ONCE
Status: COMPLETED | OUTPATIENT
Start: 2019-07-22 | End: 2019-07-22

## 2019-07-22 RX ORDER — TOPIRAMATE 100 MG/1
300 TABLET, FILM COATED ORAL
Status: DISCONTINUED | OUTPATIENT
Start: 2019-07-22 | End: 2019-08-03 | Stop reason: HOSPADM

## 2019-07-22 RX ORDER — TOPIRAMATE 100 MG/1
200 TABLET, FILM COATED ORAL DAILY
Status: DISCONTINUED | OUTPATIENT
Start: 2019-07-23 | End: 2019-08-03 | Stop reason: HOSPADM

## 2019-07-22 RX ADMIN — SENNOSIDES, DOCUSATE SODIUM 2 TABLET: 50; 8.6 TABLET, FILM COATED ORAL at 06:07

## 2019-07-22 RX ADMIN — BISACODYL 10 MG RECTAL SUPPOSITORY 10 MG: at 06:07

## 2019-07-22 RX ADMIN — TOPIRAMATE 200 MG: 100 TABLET, FILM COATED ORAL at 06:07

## 2019-07-22 RX ADMIN — LEVETIRACETAM 1500 MG: 500 TABLET, FILM COATED ORAL at 06:07

## 2019-07-22 RX ADMIN — LACOSAMIDE 300 MG: 100 TABLET, FILM COATED ORAL at 06:07

## 2019-07-22 RX ADMIN — LACOSAMIDE 300 MG: 100 TABLET, FILM COATED ORAL at 18:09

## 2019-07-22 RX ADMIN — PHENOBARBITAL 120 MG: 20 ELIXIR ORAL at 06:07

## 2019-07-22 RX ADMIN — TOPIRAMATE 300 MG: 100 TABLET, FILM COATED ORAL at 21:24

## 2019-07-22 RX ADMIN — LEVETIRACETAM 1500 MG: 500 TABLET, FILM COATED ORAL at 18:09

## 2019-07-22 RX ADMIN — CYANOCOBALAMIN TAB 500 MCG 1000 MCG: 500 TAB at 06:07

## 2019-07-22 RX ADMIN — PHENOBARBITAL 120 MG: 20 ELIXIR ORAL at 18:09

## 2019-07-22 RX ADMIN — LORAZEPAM 1 MG: 2 INJECTION INTRAMUSCULAR; INTRAVENOUS at 16:16

## 2019-07-22 NOTE — PROCEDURES
VIDEO ELECTROENCEPHALOGRAM REPORT        Referring provider: Dr. Robb.      DOS: 7/22/2019 (total recording of 23 hours and 30 minutes).      INDICATION:  Ruben Edwards 39 y.o. male presenting with recurrent seizures.      CURRENT ANTIEPILEPTIC REGIMEN: Topiramate increased to 200 mg in am and 300 mg qhs. Continues on Phenobarbital 120 mg bid, Lacosamide 300 mg bid, Levetiracetam 1500 mg bid. Received Lorazepam prn during the study.      TECHNIQUE: 30 channel 24 hrs video electroencephalogram (EEG) was performed in accordance with the international 10-20 system. The study was reviewed in bipolar and referential montages. The recording examined the patient during wakeful and drowsy/sleep state(s).      DESCRIPTION OF THE RECORD:  During the wakefulness, the background showed a symmetrical 8 Hz alpha activity posteriorly with amplitude of 70 mV.  There was reactivity to eye closure/opening.  A normal anterior-posterior gradient was noted with faster beta frequencies seen anteriorly.  During drowsiness, theta/delta frequencies were seen.     During the sleep state, background shows diffuse high-amplitude 4-5 Hz delta activity.  Symmetrical high-amplitude sleep spindles and vertex sharps were seen in the leads over the central regions.      ACTIVATION PROCEDURES:   Not performed.      ICTAL AND/OR INTERICTAL FINDINGS:   There is intermittent slowing in the right frontal region and frequent right frontal spikes / sharps. A few events of left arm / hand and head myoclonus were reported by either patient's mother or nursing staff by push of the event button. These spells were brief in nature and epileptic. Review of eeg demonstrated briefly rhythmic or periodic right frontal spikes / sharps with right frontal slowing during the events, which only lasted several seconds at a time. A handful of more severe spells were reported by patient's mother on 7/22/2019 in the afternoon. These spells started with myoclonus in the  left arm and spread to the face and leg, then involving the trunk and head as well. Review of the eeg demonstrated rhythmic spikes / sharps in the right frontal region, then spreading to the left frontal region as well, and masked by muscle artifact.       EKG: sampling of the EKG recording demonstrated sinus rhythm.      INTERPRETATION:  This is an abnormal 24 hrs video EEG recording in the awake, drowsy, and sleep state(s).  There is intermittent slowing in the right frontal region and frequent right frontal spikes / sharps, suggesting underlying structural abnormality and cortical irritability. A few events of left arm / hand and head myoclonus were captured. A handful of spells progressed to more widespread seizures, spreading to the left hemisphere and producing jerks involving the left hemibody but also the head, trunk as well. All the spells were brief and epileptic in nature. No evidence for status epilepticus, however, there was worsening of the eeg when compared to the prior recording. Clinical and radiological correlation is recommended.     Updates provided to patient's RN, attending physician, and patient's mother.            Harris Bolton MD   Epilepsy and Neurodiagnostics.   Clinical  of Neurology [facility].   Diplomate in Neurology, Epilepsy, and Electrodiagnostic Medicine.   Office: [phone]  Fax: [phone]

## 2019-07-22 NOTE — CARE PLAN
Problem: Safety  Goal: Will remain free from injury  Outcome: PROGRESSING AS EXPECTED  Bed alarm on, family at bedside, call light within reach, personal belongings within reach, q2 turns in place.     Problem: Infection  Goal: Will remain free from infection  Outcome: PROGRESSING AS EXPECTED  q2 turns in place, trache suction done as needed, oral care provided.     Problem: Safety:  Goal: Will remain free from injury  Outcome: PROGRESSING AS EXPECTED  Bed alarm on, family at bedside, call light within reach, personal belongings within reach, q2 turns in place.

## 2019-07-22 NOTE — CARE PLAN
Problem: Safety  Goal: Will remain free from injury  Outcome: PROGRESSING AS EXPECTED  Mom at bedside. Frequent rounding by staff    Problem: Safety:  Goal: Will remain free from injury  Outcome: PROGRESSING AS EXPECTED  Mom at bedside. Frequent rounding by staff

## 2019-07-22 NOTE — PROGRESS NOTES
Assumed patient care at 0700 and received bedside report. Unable to assess orientation, numbness and tingling,  chest pain, shortness of breath or blurry/double vision. Patient unable to ambulate. Patient incontinent of bowel and bladder. Patient is tolerating families homemade tubefeed bolus feedings. Plan of care discussed, education provided on all administered medications, hourly rounding and Q4 neuro checks in place.

## 2019-07-22 NOTE — DIETARY
"Nutrition Support Assessment:  Day 1 of admit.  Ruben Edwards is a 39 y.o. male with admitting DX of seizure     Current problem list:  1. Epilepsy  2. TBI  3. Severe protein calorie malnutrition per non-hospital problems     RD visited pt mother to discuss pt home regimen of tube feeds received through PEG. Mother provided minimal information to RD despite attempts for elaboration. Mother states we should have information already on file as she hasn't changed it since the last time. States pt receives 2-3 bottles of [employer] and 2x home made protein shakes made with powdered egg white. Stated pt sees someone outpatient and does well with regimen. States provides 400 mL free water in addition to the flushes with medication, continues to reiterate that pt's hydration levels always look fine and the TF regimen is fine. Chayo pt has had some weight gain of about 15 lbs and has maintained this for about 4 months now. Mother chayo does not want to increase his weight further than this but current weight has been normal for pt.     Assessment:  Estimated Nutritional Needs based on:   Height: 177.8 cm (5' 10\")  Weight: 61.9 kg (136 lb 7.4 oz)  Weight to Use in Calculations: 61.9 kg (136 lb 7.4 oz) via bed Scale  Body mass index is 19.58 kg/m²., BMI classification: Underweight    Calculation/Equation: MSJ x 1.2   Total Calories / day: 1850 - 2050  (Calories / k-33 )  Total Grams Protein / day: 80 - 93  (Grams Protein / k.3 - 1.5)     Evaluation:   1. PEG dependent, receives homemade TF regimen per mother. Mother states same regimen as used during last hospital visit. Using chart review from RD note on 3/13/18, pt receiving 2x shakes of yogurt, infant oatmeal, 1 scoop toddler formula, 1 scoop egg white protein, 1 tsp of super greens, milk alternative for liquid base and 2-3x bottles of [employer] (does not identify type). Also adds 400 ml free water. Estimated kcal/protein received was [id number] and  g " protein per day per RD note on 3/13/18. Mother states has not changed regimen since last inpatient visit.   2. TF per PEG-dependent  3. Per computer hx, pt weighed 127 lbs (57.6 kg) on 4/16/19, indicating weight increase of 9 lbs within 3 months. This aligns with report from mother, pt with trace RLE edema and 1+ LLE edema.  4. Labs: K 3.5, Glucose 109, Creatinine <0.20  5. Meds: vitamin B12, lacosamide, phenobarbital, K-lyte, senokot, continuous Pain Pump (placed and maintained as outpatient)  6. Last BM: 7/22  7. Current feeding likely appropriate, unable to verify exact amount as formula is made at home.     Malnutrition Risk: Pt appears thin, does not meet criteria for malnutrition per ASPEN guidelines     Recommendations/Plan:  1. Continue at home TF formula and rate per MD approval  2. Fluids per MD    RD continue to follow

## 2019-07-22 NOTE — PROGRESS NOTES
Chief Complaint   Patient presents with   • Seizure   • Rash     to chest for the last 9 days.       Problem List Items Addressed This Visit     Seizure (HCC)    Relevant Medications    lacosamide (VIMPAT) 200 MG Tab tablet    levETIRAcetam (KEPPRA) 100 MG/ML Solution    PHENobarbital 20 MG/5ML Elixir    topiramate (TOPAMAX) 200 MG tablet    levETIRAcetam (KEPPRA) tablet 1,500 mg    PHENobarbital solution 120 mg    lacosamide (VIMPAT) tablet 300 mg    lacosamide (VIMPAT) 100 mg in  mL ivpb (Completed)    topiramate (TOPAMAX) tablet 200 mg (Start on 7/23/2019  6:00 AM)    topiramate (TOPAMAX) tablet 300 mg (Start on 7/22/2019  9:00 PM)      Other Visit Diagnoses     Seizure disorder (HCC)        Relevant Medications    lacosamide (VIMPAT) 200 MG Tab tablet    levETIRAcetam (KEPPRA) 100 MG/ML Solution    PHENobarbital 20 MG/5ML Elixir    topiramate (TOPAMAX) 200 MG tablet    levETIRAcetam (KEPPRA) tablet 1,500 mg    PHENobarbital solution 120 mg    lacosamide (VIMPAT) tablet 300 mg    lacosamide (VIMPAT) 100 mg in  mL ivpb (Completed)    topiramate (TOPAMAX) tablet 200 mg (Start on 7/23/2019  6:00 AM)    topiramate (TOPAMAX) tablet 300 mg (Start on 7/22/2019  9:00 PM)    Pneumonia of left lower lobe due to infectious organism (HCC)        Relevant Medications    ampicillin/sulbactam (UNASYN) 3 g in  mL IVPB (Completed)          Interim history:  Ruben Edwards remains hospitalized.  His mother was at bedside.  The patient continues on video EEG.  The patient continues on phenobarbital, Topamax, Keppra, and Vimpat, the latter was increased over the weekend to 300 mg twice a day.  The patient's mother reports about a 60% improvement in the focal seizures with that, however the patient continues to have spells of left hand myoclonus, particularly with stimulation of the patient.  Per patient's mother, he is at baseline.  No fevers or any signs of infection so far.        Past medical history:   Past  Medical History:   Diagnosis Date   • Seizure (HCC)    • Traumatic brain injury (HCC) 05/15/1998       Past surgical history:   Past Surgical History:   Procedure Laterality Date   • LARYNGOSCOPY  7/2/2017    Procedure: LARYNGOSCOPY;  Surgeon: Catherine Osorio M.D.;  Location: SURGERY [facility];  Service:    • BRONCHOSCOPY  7/2/2017    Procedure: BRONCHOSCOPY;  Surgeon: Catherine Osorio M.D.;  Location: SURGERY [facility];  Service:    • TRACHEOSTOMY  7/2/2017    Procedure: TRACHEOSTOMY;  Surgeon: Catherine Osorio M.D.;  Location: SURGERY [facility];  Service:    • ESOPHAGOSCOPY  7/2/2017    Procedure: ESOPHAGOSCOPY;  Surgeon: Catherine Osorio M.D.;  Location: SURGERY [facility];  Service:        Family history:   No family history on file.    Social history:   Social History     Social History   • Marital status: Single     Spouse name: N/A   • Number of children: N/A   • Years of education: N/A     Occupational History   • Not on file.     Social History Main Topics   • Smoking status: Never Smoker   • Smokeless tobacco: Never Used   • Alcohol use No   • Drug use: Yes     Types: Oral      Comment: Mother tried CBD treatment for siezures in past   • Sexual activity: Not on file     Other Topics Concern   • Not on file     Social History Narrative   • No narrative on file       Current medications:   Current Facility-Administered Medications   Medication Dose   • [START ON 7/23/2019] topiramate (TOPAMAX) tablet 200 mg  200 mg   • topiramate (TOPAMAX) tablet 300 mg  300 mg   • LORazepam (ATIVAN) injection 2 mg  2 mg   • lacosamide (VIMPAT) tablet 300 mg  300 mg   • Pharmacy Consult: Enteral tube insertion - review meds/change route/product selection     • senna-docusate (PERICOLACE or SENOKOT S) 8.6-50 MG per tablet 2 Tab  2 Tab    And   • polyethylene glycol/lytes (MIRALAX) PACKET 1 Packet  1 Packet    And   • magnesium hydroxide (MILK OF MAGNESIA) suspension 30 mL  30 mL    And   •  "bisacodyl (DULCOLAX) suppository 10 mg  10 mg   • Pain Pump (patient supplied) Device     • potassium bicarbonate (KLYTE) effervescent tablet 12.5 mEq  12.5 mEq   • cyanocobalamin (VITAMIN B-12) tablet 1,000 mcg  1,000 mcg   • levETIRAcetam (KEPPRA) tablet 1,500 mg  1,500 mg   • PHENobarbital solution 120 mg  120 mg   • enoxaparin (LOVENOX) inj 40 mg  40 mg       Medication Allergy:  Allergies   Allergen Reactions   • Sulfa Drugs Unspecified     Per caretaker, mother is allergic to sulfa so believes her son could be as well         Review of systems:   Unable to obtain.    Physical examination:   Vitals:    07/22/19 0712 07/22/19 0800 07/22/19 1127 07/22/19 1500   BP:  143/84     Pulse: 68 66 69    Resp: 20 18 18    Temp:  36.2 °C (97.2 °F)     TempSrc:  Axillary     SpO2: 96% 95% 97%    Weight:       Height:    1.778 m (5' 10\")     He does not appear in any acute distress.  The patient is nonverbal, unable to follow commands.  Memory cannot be assessed.  He is awake, unable to assess for alertness or orientation.  Eyes are midline, there is no nystagmus on primary gaze.  His pupils are about 4 to 5 mm bilaterally and are sluggish to react to light.  His face appears symmetric.  He has contractures and atrophy in all extremities.  Examination of the skin does not show any rashes.  The patient has a feeding tube in place.        ANCILLARY DATA REVIEWED:       Lab Data Review:  Recent Results (from the past 24 hour(s))   Basic Metabolic Panel    Collection Time: 07/22/19  3:12 AM   Result Value Ref Range    Sodium 138 135 - 145 mmol/L    Potassium 3.5 (L) 3.6 - 5.5 mmol/L    Chloride 108 96 - 112 mmol/L    Co2 21 20 - 33 mmol/L    Glucose 109 (H) 65 - 99 mg/dL    Bun 16 8 - 22 mg/dL    Creatinine <0.20 (L) 0.50 - 1.40 mg/dL    Calcium 8.7 8.5 - 10.5 mg/dL    Anion Gap 9.0 0.0 - 11.9   MAGNESIUM    Collection Time: 07/22/19  3:12 AM   Result Value Ref Range    Magnesium 1.9 1.5 - 2.5 mg/dL   ESTIMATED GFR    Collection " Time: 19  3:12 AM   Result Value Ref Range    GFR If African American >60 >60 mL/min/1.73 m 2    GFR If Non African American >60 >60 mL/min/1.73 m 2         Records reviewed:   Chart reviewed.    Imaging:   MRI brain w/wo, 3/7/18  1.  Moderate cerebral atrophy beyond that expected for the patient's age.  2.  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  3.  Old hemorrhagic infarcts left basal ganglia, left thalamus, left subthalamic region.  4.  Marked atrophy and encephalomalacic change in the left cerebral peduncle with T2 hyperintensity and volume loss extending down the left anterior quadrant of the pop into the left medullary pyramid. 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  5.  Widespread areas of curvilinear and gyriform enhancement involving the right frontal lobe, right parietal lobe, right occipital lobe, along with curvilinear enhancement in the right basal ganglia. These findings are most consistent with subacute   sequela of cerebral infarction or cortical laminar necrosis. Cortical laminar necrosis has been reported associated with prolonged seizures/status epilepticus. Subacute sequela of encephalitis could have a similar appearance.  6.  Advanced supratentorial white matter disease consistent with microvascular ischemic change versus demyelination or gliosis. This results in some volume loss of deep white matter.  7.  Curvilinear hemosiderin deposition in the right temporal lobe deep white matter consistent with old hemorrhage.  8.  No acute cerebral infarction or acute hemorrhage evident.  9.  No evidence of mesial temporal sclerosis.   (I personally reviewed images).      MRA head wo, 3/9/18  MRA OF THE [place] WITHIN NORMAL LIMITS.  (I personally reviewed images).         EE:  This is an abnormal 24 hrs video electroencephalogram recording in an encephalopathic patient during awake and sleep states. There is diffuse cerebral dysfunction, suggestive of an  encephalopathic state. Superimposed slowing in the right hemisphere is likely due to underlying structural abnormality. Frequent right frontotemporal spikes, with rare brief runs of right PLEDS, but without clear evidence for seizures during the study. The study remains significantly improved when compared to initial recordings, however the patient remains at very high risk for seizure recurrence. Clinical and radiological correlation is recommended.      Video EEG, 7/21/2019:  INTERPRETATION:  This is an abnormal 24 hrs video EEG recording in the awake,   drowsy, and sleep state(s).  There is intermittent slowing in the   right frontal region and frequent right frontal spikes / sharps,   suggesting underlying structural abnormality and cortical   irritability. A few events of left arm / hand and head myoclonus   were reported by either patient's mother or nursing staff by push   of the event button. These spells were brief in nature and   epileptic. Review of eeg demonstrated briefly rhythmic or   periodic right frontal spikes / sharps with right frontal slowing   during the events, which only lasted several seconds at a time.   Events consisting with brief focal right frontal seizures. There   has been improvement after increase of Lacosamide. Clinical and   radiological correlation is recommended.           ASSESSMENT AND PLAN:  History of severe traumatic brain injury and either a persistent vegetative state versus minimally conscious state.  Pharmaco-resistant, structural, focal onset epilepsy.  History of status epilepticus and PLEDs.  New onset of myoclonic jerks involving the left upper extremity, are epileptic in nature, and have a right frontal EEG correlation.  The spells are brief, the patient is not in status, and appeared to be induced by stimulation of the patient.  The patient is currently on multiple antiepileptics, which have controlled the patient's epilepsy for a while, except for the recent  onset of these new seizures.  There is no evidence of infection so far.  The patient is at baseline otherwise, as stated by the patient's mother.  We had increased the Vimpat from 200 mg twice a day, to 300 mg twice a day.  The patient's mother indicates that she has seen about a 60% reduction in the focal seizures with the increase of Vimpat.  Today, we discussed further changes to his antiepileptic regimen, increasing Topamax versus adding a new antiepileptic.  The patient's mother has agreed to increase in Topamax to 200 mg in the morning and 300 mg at bedtime, while continuing the rest of his antiepileptics unchanged.  We will DC continuous video EEG for now, as his spells can be followed clinically.  Of note, the patient had a history of hyperammonemia with the use of Depakote, which was discontinued in the past.        FOLLOW-UP:   In my office, after discharge.    EDUCATION AND COUNSELING:  -Education was provided to the family regarding diagnosis and prognosis. The chronic and unpredictable nature of the condition were discussed. There is increased risk for additional events, which may carry potential for significant injuries and death. Discussed frequent seizure triggers: sleep deprivation, medication non-compliance, use of illegal drugs/alcohol, stress, and others.   -We reviewed in detail the current antiepileptic regimen. Potential side effects of antiepileptics were discussed at length, including but no limited to: hypersensitivity reactions (rash and others, some of which can be fatal), visual field changes (some of which may be irreversible), glaucoma, diplopia, kidney stones, osteopenia/osteoporosis/bone fractures, hyperthermia/anhydrosis, hyponatremia, tremors/abnormal movements, ataxia, dizziness, fatigue, increased risk for falls, risk for cardiac arrhythmias/syncope, gastrointestinal side effects(hepatitis, pancreatitis, gastritis, ulcers), gingival hypertrophy/bleeding, drowsiness, sedation,  anxiety/nervousness, increased risk for suicide, increased risk for depression, and psychosis.   -We also reviewed drug-drug interactions and their potential effect on seizure control and medication side effects.      The patient's mother with plan, as outlined.         Harris Bolton MD   Epilepsy and Neurodiagnostics.   Clinical  of Neurology [facility].   Diplomate in Neurology, Epilepsy, and Electrodiagnostic Medicine.   Office: [phone]  Fax: [phone]      BILLING DOCUMENTATION:     I have performed physical exam and reviewed and updated ROS and plan today 7/22/2019. In review of that note, there are no new changes except as documented above.     Counseling:  I spent greater than 50% time face-to-face time of a total of 41 minutes visit. Over 50% of the time of the visit today was spent on counseling and or coordination of care wtih the patient and/or family, with greater than 50% of the total discussing my assessment and plan as stated above.

## 2019-07-22 NOTE — PROGRESS NOTES
Pt resting in bed. Turned to the right. His eyes are opened but not following commands. Trach, peg tube noted. vEEG in progress. Per mom, pt has not had a seizure/twitches since around 2 pm today. Plan of care discussed with mom, she voiced understanding. Will continue to monitor closely.    Pt has had less seizure like activity/twitches last night compared to previous night. Suctioned and turned as needed. Mom at bedside, helps with cares. Will monitor.

## 2019-07-22 NOTE — PROGRESS NOTES
2 RN Skin assessment completed. Healed pressure ulcers noted over left hip and buttocks. Tracheostomy, peg tube and rosas catheter noted. Pt on waffle mattress. Turns as needed. Mom at bedside. Will monitor.

## 2019-07-23 ENCOUNTER — APPOINTMENT (OUTPATIENT)
Dept: RADIOLOGY | Facility: MEDICAL CENTER | Age: 39
DRG: 100 | End: 2019-07-23
Attending: HOSPITALIST
Payer: COMMERCIAL

## 2019-07-23 LAB
ALBUMIN SERPL BCP-MCNC: 3.9 G/DL (ref 3.2–4.9)
ALBUMIN/GLOB SERPL: 1.1 G/DL
ALP SERPL-CCNC: 257 U/L (ref 30–99)
ALT SERPL-CCNC: 31 U/L (ref 2–50)
ANION GAP SERPL CALC-SCNC: 11 MMOL/L (ref 0–11.9)
AST SERPL-CCNC: 18 U/L (ref 12–45)
BASOPHILS # BLD AUTO: 0.4 % (ref 0–1.8)
BASOPHILS # BLD: 0.03 K/UL (ref 0–0.12)
BILIRUB SERPL-MCNC: 0.3 MG/DL (ref 0.1–1.5)
BUN SERPL-MCNC: 18 MG/DL (ref 8–22)
CALCIUM SERPL-MCNC: 8.9 MG/DL (ref 8.5–10.5)
CHLORIDE SERPL-SCNC: 107 MMOL/L (ref 96–112)
CO2 SERPL-SCNC: 20 MMOL/L (ref 20–33)
CREAT SERPL-MCNC: 0.24 MG/DL (ref 0.5–1.4)
CRP SERPL HS-MCNC: 2.24 MG/DL (ref 0–0.75)
EOSINOPHIL # BLD AUTO: 0.17 K/UL (ref 0–0.51)
EOSINOPHIL NFR BLD: 2.3 % (ref 0–6.9)
ERYTHROCYTE [DISTWIDTH] IN BLOOD BY AUTOMATED COUNT: 62.3 FL (ref 35.9–50)
GLOBULIN SER CALC-MCNC: 3.6 G/DL (ref 1.9–3.5)
GLUCOSE SERPL-MCNC: 119 MG/DL (ref 65–99)
HCT VFR BLD AUTO: 50 % (ref 42–52)
HGB BLD-MCNC: 16.6 G/DL (ref 14–18)
IMM GRANULOCYTES # BLD AUTO: 0.01 K/UL (ref 0–0.11)
IMM GRANULOCYTES NFR BLD AUTO: 0.1 % (ref 0–0.9)
LYMPHOCYTES # BLD AUTO: 1.48 K/UL (ref 1–4.8)
LYMPHOCYTES NFR BLD: 20.4 % (ref 22–41)
MCH RBC QN AUTO: 31 PG (ref 27–33)
MCHC RBC AUTO-ENTMCNC: 33.2 G/DL (ref 33.7–35.3)
MCV RBC AUTO: 93.5 FL (ref 81.4–97.8)
MONOCYTES # BLD AUTO: 0.54 K/UL (ref 0–0.85)
MONOCYTES NFR BLD AUTO: 7.5 % (ref 0–13.4)
NEUTROPHILS # BLD AUTO: 5.01 K/UL (ref 1.82–7.42)
NEUTROPHILS NFR BLD: 69.3 % (ref 44–72)
NRBC # BLD AUTO: 0 K/UL
NRBC BLD-RTO: 0 /100 WBC
PLATELET # BLD AUTO: 259 K/UL (ref 164–446)
PMV BLD AUTO: 9.6 FL (ref 9–12.9)
POTASSIUM SERPL-SCNC: 3.5 MMOL/L (ref 3.6–5.5)
PREALB SERPL-MCNC: 27 MG/DL (ref 18–38)
PROT SERPL-MCNC: 7.5 G/DL (ref 6–8.2)
RBC # BLD AUTO: 5.35 M/UL (ref 4.7–6.1)
SODIUM SERPL-SCNC: 138 MMOL/L (ref 135–145)
WBC # BLD AUTO: 7.2 K/UL (ref 4.8–10.8)

## 2019-07-23 PROCEDURE — 700111 HCHG RX REV CODE 636 W/ 250 OVERRIDE (IP): Performed by: HOSPITALIST

## 2019-07-23 PROCEDURE — 99233 SBSQ HOSP IP/OBS HIGH 50: CPT | Performed by: PSYCHIATRY & NEUROLOGY

## 2019-07-23 PROCEDURE — 770001 HCHG ROOM/CARE - MED/SURG/GYN PRIV*

## 2019-07-23 PROCEDURE — 85025 COMPLETE CBC W/AUTO DIFF WBC: CPT

## 2019-07-23 PROCEDURE — 95951 EEG: CPT | Mod: 26 | Performed by: PSYCHIATRY & NEUROLOGY

## 2019-07-23 PROCEDURE — 700102 HCHG RX REV CODE 250 W/ 637 OVERRIDE(OP): Performed by: HOSPITALIST

## 2019-07-23 PROCEDURE — 700101 HCHG RX REV CODE 250: Performed by: PSYCHIATRY & NEUROLOGY

## 2019-07-23 PROCEDURE — 94640 AIRWAY INHALATION TREATMENT: CPT

## 2019-07-23 PROCEDURE — A9270 NON-COVERED ITEM OR SERVICE: HCPCS | Performed by: PSYCHIATRY & NEUROLOGY

## 2019-07-23 PROCEDURE — 80053 COMPREHEN METABOLIC PANEL: CPT

## 2019-07-23 PROCEDURE — 95951 EEG: CPT | Performed by: PSYCHIATRY & NEUROLOGY

## 2019-07-23 PROCEDURE — 94760 N-INVAS EAR/PLS OXIMETRY 1: CPT

## 2019-07-23 PROCEDURE — A9270 NON-COVERED ITEM OR SERVICE: HCPCS | Performed by: INTERNAL MEDICINE

## 2019-07-23 PROCEDURE — 86140 C-REACTIVE PROTEIN: CPT

## 2019-07-23 PROCEDURE — 700101 HCHG RX REV CODE 250: Performed by: HOSPITALIST

## 2019-07-23 PROCEDURE — 99233 SBSQ HOSP IP/OBS HIGH 50: CPT | Performed by: HOSPITALIST

## 2019-07-23 PROCEDURE — A9270 NON-COVERED ITEM OR SERVICE: HCPCS | Performed by: HOSPITALIST

## 2019-07-23 PROCEDURE — 84134 ASSAY OF PREALBUMIN: CPT

## 2019-07-23 PROCEDURE — 700102 HCHG RX REV CODE 250 W/ 637 OVERRIDE(OP): Performed by: INTERNAL MEDICINE

## 2019-07-23 PROCEDURE — 700111 HCHG RX REV CODE 636 W/ 250 OVERRIDE (IP): Performed by: PSYCHIATRY & NEUROLOGY

## 2019-07-23 PROCEDURE — 36415 COLL VENOUS BLD VENIPUNCTURE: CPT

## 2019-07-23 PROCEDURE — 700102 HCHG RX REV CODE 250 W/ 637 OVERRIDE(OP): Performed by: PSYCHIATRY & NEUROLOGY

## 2019-07-23 RX ORDER — DOXYCYCLINE 100 MG/1
100 TABLET ORAL EVERY 12 HOURS
Status: DISCONTINUED | OUTPATIENT
Start: 2019-07-23 | End: 2019-07-24

## 2019-07-23 RX ORDER — ERYTHROMYCIN 5 MG/G
OINTMENT OPHTHALMIC EVERY 6 HOURS
Status: DISPENSED | OUTPATIENT
Start: 2019-07-23 | End: 2019-07-28

## 2019-07-23 RX ADMIN — LEVETIRACETAM 1500 MG: 500 TABLET, FILM COATED ORAL at 06:24

## 2019-07-23 RX ADMIN — CYANOCOBALAMIN TAB 500 MCG 1000 MCG: 500 TAB at 06:24

## 2019-07-23 RX ADMIN — PHENOBARBITAL 120 MG: 20 ELIXIR ORAL at 07:16

## 2019-07-23 RX ADMIN — TOPIRAMATE 300 MG: 100 TABLET, FILM COATED ORAL at 20:51

## 2019-07-23 RX ADMIN — PHENOBARBITAL 120 MG: 20 ELIXIR ORAL at 18:13

## 2019-07-23 RX ADMIN — PERAMPANEL 2 MG: 2 TABLET ORAL at 12:02

## 2019-07-23 RX ADMIN — DOXYCYCLINE 100 MG: 100 TABLET, FILM COATED ORAL at 18:14

## 2019-07-23 RX ADMIN — LORAZEPAM 1 MG: 2 INJECTION INTRAMUSCULAR; INTRAVENOUS at 11:13

## 2019-07-23 RX ADMIN — LACOSAMIDE 300 MG: 100 TABLET, FILM COATED ORAL at 06:23

## 2019-07-23 RX ADMIN — LACOSAMIDE 300 MG: 100 TABLET, FILM COATED ORAL at 18:14

## 2019-07-23 RX ADMIN — POTASSIUM BICARBONATE 12.5 MEQ: 978 TABLET, EFFERVESCENT ORAL at 12:33

## 2019-07-23 RX ADMIN — ERYTHROMYCIN: 5 OINTMENT OPHTHALMIC at 18:13

## 2019-07-23 RX ADMIN — LEVETIRACETAM 1500 MG: 500 TABLET, FILM COATED ORAL at 18:14

## 2019-07-23 RX ADMIN — TOPIRAMATE 200 MG: 100 TABLET, FILM COATED ORAL at 06:25

## 2019-07-23 NOTE — PROGRESS NOTES
Chief Complaint   Patient presents with   • Seizure   • Rash     to chest for the last 9 days.       Problem List Items Addressed This Visit     Seizure (HCC)    Relevant Medications    lacosamide (VIMPAT) 200 MG Tab tablet    levETIRAcetam (KEPPRA) 100 MG/ML Solution    PHENobarbital 20 MG/5ML Elixir    topiramate (TOPAMAX) 200 MG tablet    levETIRAcetam (KEPPRA) tablet 1,500 mg    PHENobarbital solution 120 mg    lacosamide (VIMPAT) tablet 300 mg    lacosamide (VIMPAT) 100 mg in  mL ivpb (Completed)    topiramate (TOPAMAX) tablet 200 mg    topiramate (TOPAMAX) tablet 300 mg    perampanel (FYCOMPA) tablet 2 mg (Start on 7/23/2019 12:00 PM)    perampanel (FYCOMPA) tablet 4 mg (Start on 7/23/2019  9:00 PM)      Other Visit Diagnoses     Seizure disorder (HCC)        Relevant Medications    lacosamide (VIMPAT) 200 MG Tab tablet    levETIRAcetam (KEPPRA) 100 MG/ML Solution    PHENobarbital 20 MG/5ML Elixir    topiramate (TOPAMAX) 200 MG tablet    levETIRAcetam (KEPPRA) tablet 1,500 mg    PHENobarbital solution 120 mg    lacosamide (VIMPAT) tablet 300 mg    lacosamide (VIMPAT) 100 mg in  mL ivpb (Completed)    topiramate (TOPAMAX) tablet 200 mg    topiramate (TOPAMAX) tablet 300 mg    perampanel (FYCOMPA) tablet 2 mg (Start on 7/23/2019 12:00 PM)    perampanel (FYCOMPA) tablet 4 mg (Start on 7/23/2019  9:00 PM)    Pneumonia of left lower lobe due to infectious organism (HCC)        Relevant Medications    ampicillin/sulbactam (UNASYN) 3 g in  mL IVPB (Completed)          Interim history:  Ruben Edwards remains hospitalized. Continues to have focal seizures, no significant improvement. No fevers. Mother states this is not usual, his seizures had been well controlled for over a year. No pressure ulcers.  Per mother at bedside, the patient had about an 8-hour window yesterday after Ativan was given, where he had no seizures.  Today he has had multiple focal seizures on the left side, also including the  abdomen.  He was given another dose of Ativan IV while I was there, as the patient had multiple clusters of small seizures.    Seizures continue to occur, despite recent adjustment of the doses of Vimpat and Topamax.    Past medical history:   Past Medical History:   Diagnosis Date   • Seizure (HCC)    • Traumatic brain injury (HCC) 05/15/1998       Past surgical history:   Past Surgical History:   Procedure Laterality Date   • LARYNGOSCOPY  7/2/2017    Procedure: LARYNGOSCOPY;  Surgeon: Catherine Osorio M.D.;  Location: SURGERY [facility];  Service:    • BRONCHOSCOPY  7/2/2017    Procedure: BRONCHOSCOPY;  Surgeon: Catherine Osorio M.D.;  Location: SURGERY [facility];  Service:    • TRACHEOSTOMY  7/2/2017    Procedure: TRACHEOSTOMY;  Surgeon: Catherine Osorio M.D.;  Location: SURGERY [facility];  Service:    • ESOPHAGOSCOPY  7/2/2017    Procedure: ESOPHAGOSCOPY;  Surgeon: Catherine Osorio M.D.;  Location: SURGERY [facility];  Service:        Family history:   No family history on file.    Social history:   Social History     Social History   • Marital status: Single     Spouse name: N/A   • Number of children: N/A   • Years of education: N/A     Occupational History   • Not on file.     Social History Main Topics   • Smoking status: Never Smoker   • Smokeless tobacco: Never Used   • Alcohol use No   • Drug use: Yes     Types: Oral      Comment: Mother tried CBD treatment for siezures in past   • Sexual activity: Not on file     Other Topics Concern   • Not on file     Social History Narrative   • No narrative on file       Current medications:   Current Facility-Administered Medications   Medication Dose   • perampanel (FYCOMPA) tablet 2 mg  2 mg   • perampanel (FYCOMPA) tablet 4 mg  4 mg   • topiramate (TOPAMAX) tablet 200 mg  200 mg   • topiramate (TOPAMAX) tablet 300 mg  300 mg   • LORazepam (ATIVAN) injection 1 mg  1 mg   • lacosamide (VIMPAT) tablet 300 mg  300 mg   • Pharmacy  Consult: Enteral tube insertion - review meds/change route/product selection     • senna-docusate (PERICOLACE or SENOKOT S) 8.6-50 MG per tablet 2 Tab  2 Tab    And   • polyethylene glycol/lytes (MIRALAX) PACKET 1 Packet  1 Packet    And   • magnesium hydroxide (MILK OF MAGNESIA) suspension 30 mL  30 mL    And   • bisacodyl (DULCOLAX) suppository 10 mg  10 mg   • Pain Pump (patient supplied) Device     • potassium bicarbonate (KLYTE) effervescent tablet 12.5 mEq  12.5 mEq   • cyanocobalamin (VITAMIN B-12) tablet 1,000 mcg  1,000 mcg   • levETIRAcetam (KEPPRA) tablet 1,500 mg  1,500 mg   • PHENobarbital solution 120 mg  120 mg   • enoxaparin (LOVENOX) inj 40 mg  40 mg       Medication Allergy:  Allergies   Allergen Reactions   • Sulfa Drugs Unspecified     Per caretaker, mother is allergic to sulfa so believes her son could be as well         Review of systems:   Unable to obtain.    Physical examination:   Vitals:    07/23/19 0302 07/23/19 0637 07/23/19 0718 07/23/19 0803   BP:  142/88  150/88   Pulse: 68 60 62 60   Resp: 18 19 18 16   Temp:  36.7 °C (98.1 °F)  36.7 °C (98.1 °F)   TempSrc:  Temporal  Axillary   SpO2: 94% 94% 95% 94%   Weight:       Height:         He does not appear in any acute distress.  The patient is nonverbal, unable to follow commands.  Memory cannot be assessed.  He is awake, unable to assess for alertness or orientation.  Eyes are midline, there is no nystagmus on primary gaze.  His pupils are about 4 to 5 mm bilaterally and are sluggish to react to light.  His face appears symmetric.  He has contractures and atrophy in all extremities.  Examination of the skin does not show any rashes.  The patient has a feeding tube in place.           ANCILLARY DATA REVIEWED:       Lab Data Review:  Recent Results (from the past 24 hour(s))   CRP Quantitive (Non-Cardiac)    Collection Time: 07/23/19  8:46 AM   Result Value Ref Range    Stat C-Reactive Protein 2.24 (H) 0.00 - 0.75 mg/dL   Prealbumin     Collection Time: 19  8:46 AM   Result Value Ref Range    Pre-Albumin 27.0 18.0 - 38.0 mg/dL         Records reviewed:   Chart reviewed.     Imaging:   MRI brain w/wo, 3/7/18  1.  Moderate cerebral atrophy beyond that expected for the patient's age.  2.  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  3.  Old hemorrhagic infarcts left basal ganglia, left thalamus, left subthalamic region.  4.  Marked atrophy and encephalomalacic change in the left cerebral peduncle with T2 hyperintensity and volume loss extending down the left anterior quadrant of the pop into the left medullary pyramid. 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  5.  Widespread areas of curvilinear and gyriform enhancement involving the right frontal lobe, right parietal lobe, right occipital lobe, along with curvilinear enhancement in the right basal ganglia. These findings are most consistent with subacute   sequela of cerebral infarction or cortical laminar necrosis. Cortical laminar necrosis has been reported associated with prolonged seizures/status epilepticus. Subacute sequela of encephalitis could have a similar appearance.  6.  Advanced supratentorial white matter disease consistent with microvascular ischemic change versus demyelination or gliosis. This results in some volume loss of deep white matter.  7.  Curvilinear hemosiderin deposition in the right temporal lobe deep white matter consistent with old hemorrhage.  8.  No acute cerebral infarction or acute hemorrhage evident.  9.  No evidence of mesial temporal sclerosis.   (I personally reviewed images).      MRA head wo, 3/9/18  MRA OF THE [place] WITHIN NORMAL LIMITS.  (I personally reviewed images).         EE:  This is an abnormal 24 hrs video electroencephalogram recording in an encephalopathic patient during awake and sleep states. There is diffuse cerebral dysfunction, suggestive of an encephalopathic state. Superimposed slowing in the right  hemisphere is likely due to underlying structural abnormality. Frequent right frontotemporal spikes, with rare brief runs of right PLEDS, but without clear evidence for seizures during the study. The study remains significantly improved when compared to initial recordings, however the patient remains at very high risk for seizure recurrence. Clinical and radiological correlation is recommended.      Video EEG, 7/21/2019:  INTERPRETATION:  This is an abnormal 24 hrs video EEG recording in the awake,   drowsy, and sleep state(s).  There is intermittent slowing in the   right frontal region and frequent right frontal spikes / sharps,   suggesting underlying structural abnormality and cortical   irritability. A few events of left arm / hand and head myoclonus   were reported by either patient's mother or nursing staff by push   of the event button. These spells were brief in nature and   epileptic. Review of eeg demonstrated briefly rhythmic or   periodic right frontal spikes / sharps with right frontal slowing   during the events, which only lasted several seconds at a time.   Events consisting with brief focal right frontal seizures. There   has been improvement after increase of Lacosamide. Clinical and   radiological correlation is recommended.              ASSESSMENT AND PLAN:  History of severe traumatic brain injury and either a persistent vegetative state versus minimally conscious state.  Pharmaco-resistant, structural, focal onset epilepsy.  History of status epilepticus and PLEDs.  Recent new onset of myoclonic jerks involving the left upper extremity, are epileptic in nature, and have a right frontal EEG correlation.  The spells are brief, the patient is not in status, and appeared to be induced by stimulation of the patient.  The patient is currently on multiple antiepileptics, which had controlled the patient's epilepsy for a while, except for the recent onset of these new seizures.  There is no evidence  of infection so far, although I have placed a phone call to the attending physician to possibly obtain a CT chest to r/o pna as the seizures in the past have presented in setting of infections, she agrees. The patient is at baseline otherwise, as stated by the patient's mother.  We had increased the Vimpat from 200 mg twice a day, to 300 mg twice a day, and I also increased Topamax to 200/300 yesterday, but he continues to have spells. Today, we discussed further changes to his antiepileptic regimen, increasing Topamax further, adding Ativan, and/or adding another AED. The pt's mother has requested addition of Fycompa, we will start Fycompa today.  The patient's mother has agreed to increase in Topamax to 200 mg in the morning and 300 mg at bedtime, while continuing the rest of his antiepileptics unchanged.  We will continue with video EEG for now.   Of note, the patient had a history of hyperammonemia with the use of Depakote, which was discontinued in the past.    I discussed case with RN, mother, and attending physician.        FOLLOW-UP:   In my office, after discharge.           EDUCATION AND COUNSELING:  -Education was provided to the family regarding diagnosis and prognosis. The chronic and unpredictable nature of the condition were discussed. There is increased risk for additional events, which may carry potential for significant injuries and death. Discussed frequent seizure triggers: sleep deprivation, medication non-compliance, use of illegal drugs/alcohol, stress, and others.   -We reviewed in detail the current antiepileptic regimen. Potential side effects of antiepileptics were discussed at length, including but no limited to: hypersensitivity reactions (rash and others, some of which can be fatal), visual field changes (some of which may be irreversible), glaucoma, diplopia, kidney stones, osteopenia/osteoporosis/bone fractures, hyperthermia/anhydrosis, hyponatremia, tremors/abnormal movements,  ataxia, dizziness, fatigue, increased risk for falls, risk for cardiac arrhythmias/syncope, gastrointestinal side effects(hepatitis, pancreatitis, gastritis, ulcers), gingival hypertrophy/bleeding, drowsiness, sedation, anxiety/nervousness, increased risk for suicide, increased risk for depression, and psychosis.   -We also reviewed drug-drug interactions and their potential effect on seizure control and medication side effects.       The patient's mother with plan, as outlined.         Harris Bolton MD   Epilepsy and Neurodiagnostics.   Clinical  of Neurology [facility].   Diplomate in Neurology, Epilepsy, and Electrodiagnostic Medicine.   Office: [phone]  Fax: [phone]      BILLING DOCUMENTATION:     I have performed physical exam and reviewed and updated ROS and plan today 7/23/2019. In review of that note, there are no new changes except as documented above.     Counseling:  I spent greater than 50% time face-to-face time of a total of 44 minutes visit. Over 50% of the time of the visit today was spent on counseling and or coordination of care wtih the patient and/or family, with greater than 50% of the total discussing my assessment and plan as stated above.

## 2019-07-23 NOTE — PROGRESS NOTES
2 RN skin check with Gabrielle GARVEY.   Skin intact.   Old healed decub to left hip and left gluteal  Sacrum intact and blanchable.   All extremities contracted.   Peg tube site to RUQ  Trach with dressing CDI  Condom catheter in place.  Waffle overlay in use with Q2 turns.

## 2019-07-23 NOTE — PROGRESS NOTES
Patient has had multiple small jerking motion type activity this morning. Some eye fluttering, trunk contractions, and mainly involving the left arm with shaking and jerking movements that last roughly between 45 seconds to 1:40 seconds.

## 2019-07-23 NOTE — PROCEDURES
VIDEO ELECTROENCEPHALOGRAM REPORT        Referring provider: Dr. Robb.      DOS: 7/23/2019 (total recording of 23 hours and 7 minutes).      INDICATION:  Ruben Edwards 39 y.o. male presenting with recurrent seizures.      CURRENT ANTIEPILEPTIC REGIMEN: Topiramate 200 mg in am and 300 mg qhs. Continues on Phenobarbital 120 mg bid, Lacosamide 300 mg bid, Levetiracetam 1500 mg bid. Received Lorazepam prn during the study.      TECHNIQUE: 30 channel 24 hrs video electroencephalogram (EEG) was performed in accordance with the international 10-20 system. The study was reviewed in bipolar and referential montages. The recording examined the patient during wakeful and drowsy/sleep state(s).      DESCRIPTION OF THE RECORD:  During the wakefulness, the background showed a symmetrical 8 Hz alpha activity posteriorly with amplitude of 70 mV.  There was reactivity to eye closure/opening.  A normal anterior-posterior gradient was noted with faster beta frequencies seen anteriorly.  During drowsiness, theta/delta frequencies were seen.     During the sleep state, background shows diffuse high-amplitude 4-5 Hz delta activity.  Symmetrical high-amplitude sleep spindles and vertex sharps were seen in the leads over the central regions.      ACTIVATION PROCEDURES:   Not performed.      ICTAL AND/OR INTERICTAL FINDINGS:   There is intermittent slowing in the right frontal region and frequent right frontal spikes / sharps. A few events of left arm / hand and head myoclonus were reported by either patient's mother or nursing staff by push of the event button. These spells were brief in nature and epileptic. Review of eeg demonstrated briefly rhythmic or periodic right frontal spikes / sharps and/or right frontal rhythmic slowing (delta) during the events, which only lasted a few seconds at a time.      EKG: sampling of the EKG recording demonstrated sinus rhythm.      INTERPRETATION:  This is an abnormal 24 hrs video EEG recording in  the awake, drowsy, and sleep state(s).  There is intermittent slowing in the right frontal region and frequent right frontal spikes / sharps. A few events of left arm / hand and head myoclonus were reported by either patient's mother or nursing staff by push of the event button. These spells were brief in nature and epileptic. Review of eeg demonstrated briefly rhythmic or periodic right frontal spikes / sharps and/or right frontal rhythmic slowing (delta) during the events, which only lasted a few seconds at a time.  No evidence for status epilepticus. There has been improvement of the eeg when compared to the prior recording. Clinical and radiological correlation is recommended.     Updates provided to patient's RN, attending physician, and patient's mother.            Harris Bolton MD   Epilepsy and Neurodiagnostics.   Clinical  of Neurology [facility] [employer].   Diplomate in Neurology, Epilepsy, and Electrodiagnostic Medicine.   Office: [phone]  Fax: [phone]

## 2019-07-23 NOTE — CARE PLAN
Problem: Safety  Goal: Will remain free from injury  Outcome: PROGRESSING AS EXPECTED  Seizure/fall precautions in place; Family at bedside at all times; Bed locked and in lowest position; Room near nurses station;     Problem: Urinary Elimination:  Goal: Ability to reestablish a normal urinary elimination pattern will improve  Outcome: PROGRESSING SLOWER THAN EXPECTED  Patient incontinent of urine; Condom catheter in place;     Problem: Safety:  Goal: Will remain free from injury  Outcome: PROGRESSING AS EXPECTED  Seizure/fall precautions in place; Family at bedside at all times; Bed locked and in lowest position; Room near nurses station;

## 2019-07-23 NOTE — PROGRESS NOTES
Patient's mom would like to have morning lab re-timed for later in the morning. Labs re-timed for 0900.

## 2019-07-24 ENCOUNTER — APPOINTMENT (OUTPATIENT)
Dept: RADIOLOGY | Facility: MEDICAL CENTER | Age: 39
DRG: 100 | End: 2019-07-24
Attending: HOSPITALIST
Payer: COMMERCIAL

## 2019-07-24 PROBLEM — H10.33 ACUTE BACTERIAL CONJUNCTIVITIS OF BOTH EYES: Status: ACTIVE | Noted: 2019-07-24

## 2019-07-24 LAB
ANION GAP SERPL CALC-SCNC: 9 MMOL/L (ref 0–11.9)
BACTERIA BLD CULT: NORMAL
BACTERIA BLD CULT: NORMAL
BASOPHILS # BLD AUTO: 0.5 % (ref 0–1.8)
BASOPHILS # BLD: 0.03 K/UL (ref 0–0.12)
BUN SERPL-MCNC: 12 MG/DL (ref 8–22)
CALCIUM SERPL-MCNC: 8.4 MG/DL (ref 8.5–10.5)
CHLORIDE SERPL-SCNC: 109 MMOL/L (ref 96–112)
CO2 SERPL-SCNC: 20 MMOL/L (ref 20–33)
CREAT SERPL-MCNC: 0.21 MG/DL (ref 0.5–1.4)
EOSINOPHIL # BLD AUTO: 0.27 K/UL (ref 0–0.51)
EOSINOPHIL NFR BLD: 4.3 % (ref 0–6.9)
ERYTHROCYTE [DISTWIDTH] IN BLOOD BY AUTOMATED COUNT: 63.3 FL (ref 35.9–50)
GLUCOSE SERPL-MCNC: 88 MG/DL (ref 65–99)
HCT VFR BLD AUTO: 39.5 % (ref 42–52)
HGB BLD-MCNC: 12.4 G/DL (ref 14–18)
IMM GRANULOCYTES # BLD AUTO: 0.01 K/UL (ref 0–0.11)
IMM GRANULOCYTES NFR BLD AUTO: 0.2 % (ref 0–0.9)
LYMPHOCYTES # BLD AUTO: 1.64 K/UL (ref 1–4.8)
LYMPHOCYTES NFR BLD: 26.1 % (ref 22–41)
MCH RBC QN AUTO: 32.1 PG (ref 27–33)
MCHC RBC AUTO-ENTMCNC: 33.2 G/DL (ref 33.7–35.3)
MCV RBC AUTO: 96.4 FL (ref 81.4–97.8)
MONOCYTES # BLD AUTO: 0.55 K/UL (ref 0–0.85)
MONOCYTES NFR BLD AUTO: 8.8 % (ref 0–13.4)
NEUTROPHILS # BLD AUTO: 3.78 K/UL (ref 1.82–7.42)
NEUTROPHILS NFR BLD: 60.1 % (ref 44–72)
NRBC # BLD AUTO: 0 K/UL
NRBC BLD-RTO: 0 /100 WBC
PLATELET # BLD AUTO: 178 K/UL (ref 164–446)
PMV BLD AUTO: 9.5 FL (ref 9–12.9)
POTASSIUM SERPL-SCNC: 3.9 MMOL/L (ref 3.6–5.5)
PROCALCITONIN SERPL-MCNC: <0.05 NG/ML
RBC # BLD AUTO: 3.93 M/UL (ref 4.7–6.1)
SIGNIFICANT IND 70042: NORMAL
SIGNIFICANT IND 70042: NORMAL
SITE SITE: NORMAL
SITE SITE: NORMAL
SODIUM SERPL-SCNC: 138 MMOL/L (ref 135–145)
SOURCE SOURCE: NORMAL
SOURCE SOURCE: NORMAL
WBC # BLD AUTO: 6.3 K/UL (ref 4.8–10.8)

## 2019-07-24 PROCEDURE — 700117 HCHG RX CONTRAST REV CODE 255: Performed by: HOSPITALIST

## 2019-07-24 PROCEDURE — A9270 NON-COVERED ITEM OR SERVICE: HCPCS | Performed by: HOSPITALIST

## 2019-07-24 PROCEDURE — 95951 EEG: CPT | Mod: 52 | Performed by: PSYCHIATRY & NEUROLOGY

## 2019-07-24 PROCEDURE — 700111 HCHG RX REV CODE 636 W/ 250 OVERRIDE (IP): Performed by: HOSPITALIST

## 2019-07-24 PROCEDURE — 87205 SMEAR GRAM STAIN: CPT

## 2019-07-24 PROCEDURE — A9270 NON-COVERED ITEM OR SERVICE: HCPCS | Performed by: INTERNAL MEDICINE

## 2019-07-24 PROCEDURE — 770001 HCHG ROOM/CARE - MED/SURG/GYN PRIV*

## 2019-07-24 PROCEDURE — 700101 HCHG RX REV CODE 250: Performed by: PSYCHIATRY & NEUROLOGY

## 2019-07-24 PROCEDURE — 700105 HCHG RX REV CODE 258: Performed by: HOSPITALIST

## 2019-07-24 PROCEDURE — 99233 SBSQ HOSP IP/OBS HIGH 50: CPT | Performed by: HOSPITALIST

## 2019-07-24 PROCEDURE — 700102 HCHG RX REV CODE 250 W/ 637 OVERRIDE(OP): Performed by: PSYCHIATRY & NEUROLOGY

## 2019-07-24 PROCEDURE — 700102 HCHG RX REV CODE 250 W/ 637 OVERRIDE(OP): Performed by: HOSPITALIST

## 2019-07-24 PROCEDURE — 94640 AIRWAY INHALATION TREATMENT: CPT

## 2019-07-24 PROCEDURE — 84145 PROCALCITONIN (PCT): CPT

## 2019-07-24 PROCEDURE — A9270 NON-COVERED ITEM OR SERVICE: HCPCS | Performed by: PSYCHIATRY & NEUROLOGY

## 2019-07-24 PROCEDURE — 95951 EEG: CPT | Mod: 26,52 | Performed by: PSYCHIATRY & NEUROLOGY

## 2019-07-24 PROCEDURE — 87186 SC STD MICRODIL/AGAR DIL: CPT

## 2019-07-24 PROCEDURE — 95951 EEG: CPT | Performed by: PSYCHIATRY & NEUROLOGY

## 2019-07-24 PROCEDURE — 71260 CT THORAX DX C+: CPT

## 2019-07-24 PROCEDURE — 80048 BASIC METABOLIC PNL TOTAL CA: CPT

## 2019-07-24 PROCEDURE — 87070 CULTURE OTHR SPECIMN AEROBIC: CPT

## 2019-07-24 PROCEDURE — 85025 COMPLETE CBC W/AUTO DIFF WBC: CPT

## 2019-07-24 PROCEDURE — 87077 CULTURE AEROBIC IDENTIFY: CPT | Mod: 91

## 2019-07-24 PROCEDURE — 99233 SBSQ HOSP IP/OBS HIGH 50: CPT | Mod: 25 | Performed by: PSYCHIATRY & NEUROLOGY

## 2019-07-24 PROCEDURE — 700102 HCHG RX REV CODE 250 W/ 637 OVERRIDE(OP): Performed by: INTERNAL MEDICINE

## 2019-07-24 RX ORDER — MICONAZOLE NITRATE 20 MG/G
CREAM TOPICAL 2 TIMES DAILY
Status: DISCONTINUED | OUTPATIENT
Start: 2019-07-24 | End: 2019-07-25

## 2019-07-24 RX ADMIN — PHENOBARBITAL 120 MG: 20 ELIXIR ORAL at 18:11

## 2019-07-24 RX ADMIN — LACOSAMIDE 300 MG: 100 TABLET, FILM COATED ORAL at 18:10

## 2019-07-24 RX ADMIN — PIPERACILLIN AND TAZOBACTAM 3.38 G: 3; .375 INJECTION, POWDER, LYOPHILIZED, FOR SOLUTION INTRAVENOUS; PARENTERAL at 19:57

## 2019-07-24 RX ADMIN — PHENOBARBITAL 120 MG: 20 ELIXIR ORAL at 06:29

## 2019-07-24 RX ADMIN — PIPERACILLIN AND TAZOBACTAM 3.38 G: 3; .375 INJECTION, POWDER, LYOPHILIZED, FOR SOLUTION INTRAVENOUS; PARENTERAL at 23:46

## 2019-07-24 RX ADMIN — ERYTHROMYCIN: 5 OINTMENT OPHTHALMIC at 06:26

## 2019-07-24 RX ADMIN — IOHEXOL 75 ML: 350 INJECTION, SOLUTION INTRAVENOUS at 15:55

## 2019-07-24 RX ADMIN — MICONAZOLE NITRATE: 20 CREAM TOPICAL at 18:10

## 2019-07-24 RX ADMIN — TOPIRAMATE 200 MG: 100 TABLET, FILM COATED ORAL at 06:26

## 2019-07-24 RX ADMIN — TOPIRAMATE 300 MG: 100 TABLET, FILM COATED ORAL at 20:57

## 2019-07-24 RX ADMIN — DOXYCYCLINE 100 MG: 100 TABLET, FILM COATED ORAL at 18:10

## 2019-07-24 RX ADMIN — CYANOCOBALAMIN TAB 500 MCG 1000 MCG: 500 TAB at 06:26

## 2019-07-24 RX ADMIN — ERYTHROMYCIN: 5 OINTMENT OPHTHALMIC at 18:11

## 2019-07-24 RX ADMIN — BISACODYL 10 MG RECTAL SUPPOSITORY 10 MG: at 08:25

## 2019-07-24 RX ADMIN — ERYTHROMYCIN: 5 OINTMENT OPHTHALMIC at 23:47

## 2019-07-24 RX ADMIN — LEVETIRACETAM 1500 MG: 500 TABLET, FILM COATED ORAL at 06:25

## 2019-07-24 RX ADMIN — LEVETIRACETAM 1500 MG: 500 TABLET, FILM COATED ORAL at 18:11

## 2019-07-24 RX ADMIN — LACOSAMIDE 300 MG: 100 TABLET, FILM COATED ORAL at 06:31

## 2019-07-24 RX ADMIN — CEFTRIAXONE SODIUM 1 G: 1 INJECTION, POWDER, FOR SOLUTION INTRAMUSCULAR; INTRAVENOUS at 10:33

## 2019-07-24 RX ADMIN — ENOXAPARIN SODIUM 40 MG: 100 INJECTION SUBCUTANEOUS at 06:25

## 2019-07-24 RX ADMIN — POTASSIUM BICARBONATE 25 MEQ: 978 TABLET, EFFERVESCENT ORAL at 08:25

## 2019-07-24 RX ADMIN — DOXYCYCLINE 100 MG: 100 TABLET, FILM COATED ORAL at 06:26

## 2019-07-24 RX ADMIN — ERYTHROMYCIN: 5 OINTMENT OPHTHALMIC at 13:22

## 2019-07-24 NOTE — PROGRESS NOTES
"Spoke with mother in length at beginning of shift change regarding medications and plan of care.   Mother had concerns with starting new seizures medications and wanted to speak with Dr. Bolton about the med Fycompa.     Via phone, Dr. Bolton answered all the mothers questions. Verbal okay for this nurse to discontinue and/or not give tonight dose of Fycompa and will readdress in the morning.     2130 CT unable to perform scan due to IV access. Unsuccessful attempt to obtain new IV. Will wait for ICU US placement.    0100 US on floor to place IV, mother at bedside refused to disturb patient at this time. Pt appears to be resting without activity. Mother requesting to \"reschedule\" placement of IV and push back CT. Educated mother that this would all depend on CT's availability along with the US guided IV. Mother agreeable to this plan of action.  "

## 2019-07-24 NOTE — PROGRESS NOTES
Hospital Medicine Daily Progress Note    Date of Service  7/24/2019    Chief Complaint  39 y.o. male admitted 7/19/2019 with intractable seizures in the setting of severe TBI when younger and CP    Hospital Course    7/23: with continuous EEG monitoring in place, d/w Dr. Bolton, added Fycompa to his current seizure regiment of keppra, phenobarbital, topamaz, vimpat.  Reviewed CXR from 7/19 and agree with diffuse infiltrate on my review left lung.  Patient appears diaphoretic on exam, ordered CT chest with contrast to rule out pneumonia.   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  UA reviewed no bacteria, has condom cath only.  Baclofen pump nontender on exam.  + yellow exudate bilateral eyelids, started erythromycin opthalmic ointment qid x 5 days.  Also, mother had me re-evaluate patient's left groin for green discharge from his pores.  No obvious skin breakdown or abscess on exam.  There is hyperpigmentation noted in left groin compared to right groin only.  Placed on Rocephin IV and doxycycline via NGT until CT chest results.  7/24:  Patient appeared less diaphoretic today, overall improved on Rocephin IV.  His bilateral conjunctival discharge was clearing on the erythromycin opthalmic ointment.  The mother had called patient's PCP for the skin infection name which she had written trichomycosis axillaris.  I have left a message for Dr. Ireland to discuss this infection since mother is convinced that only levaquin will cure it.  CT chest pending at this time.   Repeat exam per mother request to witness seizure activity.  Reviewed CT chest finding + pneumonia, ordered sputum induced culture by RT and changed doxy/Rocephin to zosyn IV to better cover for aspiration pneumonia.  Mother understands the antibiotics take some time to work in order to resolve his pnuemonia.        Consultants/Specialty  neurology    Code Status  fc    Disposition  Has home  tracheostomy set up at home with oxygen per mother.  PEG tube feeds in place, baclofen pump.  Home with mother once seizures controlled.  Condom catheter for urine.    Review of Systems  Review of Systems   Unable to perform ROS: Acuity of condition        Physical Exam  Temp:  [35.1 °C (95.1 °F)-36.9 °C (98.4 °F)] 35.8 °C (96.5 °F)  Pulse:  [62-73] 66  Resp:  [16-19] 16  BP: (127-146)/(75-90) 143/88  SpO2:  [94 %-96 %] 95 %    Physical Exam   Constitutional: He appears well-developed and well-nourished. No distress.   Non verbal, does not respond to any commands   HENT:   Head: Normocephalic and atraumatic.   Mouth/Throat: No oropharyngeal exudate.   Trach mask   Eyes: Pupils are equal, round, and reactive to light. No scleral icterus.   Neck: Normal range of motion. Neck supple. No thyromegaly present.   Cardiovascular: Normal rate, regular rhythm, normal heart sounds and intact distal pulses.    No murmur heard.  Pulmonary/Chest: Effort normal and breath sounds normal. No respiratory distress. He has no wheezes.   Abdominal: Soft. Bowel sounds are normal. He exhibits no distension. There is no tenderness.   Thin  G tube site appears C/D/I   Musculoskeletal: Normal range of motion. He exhibits no tenderness.   Neurological: He is alert. No cranial nerve deficit. Coordination abnormal.   Contractures noted   Skin: Skin is warm and dry. No rash noted.   Hyperpigmentation left groin only compared to right, no evidence of abscess or open skin lesion seen as well as bilateral axillary areas.   Nursing note and vitals reviewed.  No change in physical exam today on 7/22/19 compared to 7/21/19    Fluids    Intake/Output Summary (Last 24 hours) at 07/24/19 1904  Last data filed at 07/24/19 0000   Gross per 24 hour   Intake                0 ml   Output              400 ml   Net             -400 ml       Laboratory  Recent Labs      07/23/19   0846  07/24/19   1031   WBC  7.2  6.3   RBC  5.35  3.93*   HEMOGLOBIN  16.6  12.4*    HEMATOCRIT  50.0  39.5*   MCV  93.5  96.4   MCH  31.0  32.1   MCHC  33.2*  33.2*   RDW  62.3*  63.3*   PLATELETCT  259  178   MPV  9.6  9.5     Recent Labs      07/22/19   0312  07/23/19   0846  07/24/19   1031   SODIUM  138  138  138   POTASSIUM  3.5*  3.5*  3.9   CHLORIDE  108  107  109   CO2  21  20  20   GLUCOSE  109*  119*  88   BUN  16  18  12   CREATININE  <0.20*  0.24*  0.21*   CALCIUM  8.7  8.9  8.4*                   Imaging  CT-CHEST (THORAX) WITH   Final Result      1.  BILATERAL lower lobe atelectasis with possible superimposed pneumonia in the LEFT lower lobe   2.  Faint patchy airspace disease in the RIGHT upper and lower lobes which could be pneumonia   3.  Trace LEFT pleural effusion   4.  Enlarged RIGHT hilar lymph node            IR-US GUIDED PIV   Final Result    Ultrasound-guided PERIPHERAL IV INSERTION performed by    qualified nursing staff as above.            DX-CHEST-PORTABLE (1 VIEW)   Final Result      The perihilar/left lung base atelectasis with pneumonitis/pneumonia not excluded.   Small left pleural effusion.           Assessment/Plan  * Status epilepticus (HCC)- (present on admission)   Assessment & Plan    Despite renal barbital, Keppra, Vimpat, and Topamax continues to have multiple seizures consistent with focal seizures  He will be admitted to the neurology floor and EEG has been ordered, increased dose of vimpat appears to be working at this time  followed as an outpatient by Dr. Bolton, spoke with neurologist  7/23:  d/w Dr. Bolton, added Fycompa to his current seizure regiment of keppra, phenobarbital, topamaz, vimpat.  Reviewed CXR from 7/19 and agree with diffuse infiltrate on my review left lung.  Patient appears diaphoretic on exam, ordered CT chest with contrast to rule out pneumonia.   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  UA reviewed no bacteria, has condom cath only.  Baclofen pump nontender on exam.  + yellow  exudate bilateral eyelids, started erythromycin opthalmic ointment qid x 5 days.           Epilepsy ([facility])- (present on admission)   Assessment & Plan    Tenuous outpatient regimen prescribed by Dr. Bolton, long standing h/o seizures  -consider palliative care consult, remains mostly in a vegetative state  -known to have pharmaco-resistant seizures     TBI (traumatic brain injury) ([facility])- (present on admission)   Assessment & Plan    With history of tracheostomy  Continue his G-tube feeding per his mom's recommendations  Is mentation appears to be effectively baseline  He has a baclofen pain pump     Aspiration pneumonia ([facility])- (present on admission)   Assessment & Plan    History of silent aspiration with tracheostomy and PEG tube feeds.  CTA chest with left sided pneumonia.  Changed Rocephin/doxy to zosyn IV  Sputum induced culture by RT via trach aspirate.     Acute bacterial conjunctivitis of both eyes- (present on admission)   Assessment & Plan    7/23 started on erythromycin ointment qid x 5 days.     Hypokalemia- (present on admission)   Assessment & Plan    Increase K bisphosphonate 12.5 q 48 hour to 25 daily via PEG tube.     Contraction, joint, multiple sites- (present on admission)   Assessment & Plan    Contractures are stable  No skin breakdown seen on exam.          VTE prophylaxis: lovenox

## 2019-07-24 NOTE — PROCEDURES
VIDEO ELECTROENCEPHALOGRAM REPORT        Referring provider: Dr. Robb.      DOS: 7/24/2019 (total recording of 2 hours and 40 minutes).      INDICATION:  Ruben Edwards 39 y.o. male presenting with recurrent seizures.      CURRENT ANTIEPILEPTIC REGIMEN: Topiramate 200 mg in am and 300 mg qhs, phenobarbital 120 mg bid, Lacosamide 300 mg bid, Levetiracetam 1500 mg bid.      TECHNIQUE: 30 channel extended video electroencephalogram (EEG) was performed in accordance with the international 10-20 system. The study was reviewed in bipolar and referential montages. The recording examined the patient during wakeful and drowsy/sleep state(s).      DESCRIPTION OF THE RECORD:  During the wakefulness, the background showed a symmetrical 8 Hz alpha activity posteriorly with amplitude of 70 mV.  There was reactivity to eye closure/opening.  A normal anterior-posterior gradient was noted with faster beta frequencies seen anteriorly.  During drowsiness, theta/delta frequencies were seen.     During the sleep state, background shows diffuse high-amplitude 4-5 Hz delta activity.  Symmetrical high-amplitude sleep spindles and vertex sharps were seen in the leads over the central regions.      ACTIVATION PROCEDURES:   Not performed.      ICTAL AND/OR INTERICTAL FINDINGS:   There is intermittent slowing in the right frontal region and frequent right frontal spikes / sharps. A few events of left arm / hand and head myoclonus were reported by either patient's mother or nursing staff by push of the event button. These spells were brief in nature and epileptic. Review of eeg demonstrated briefly rhythmic or periodic right frontal spikes / sharps and/or right frontal rhythmic slowing (delta) during the events, which only lasted a few seconds at a time.      EKG: sampling of the EKG recording demonstrated sinus rhythm.      INTERPRETATION:  This is an abnormal extended video EEG recording in the awake, drowsy, and sleep state(s).  There is  intermittent slowing in the right frontal region and frequent right frontal spikes / sharps. A few events of left arm / hand and head myoclonus were reported by either patient's mother or nursing staff by push of the event button. These spells were brief in nature and epileptic. Review of eeg demonstrated briefly rhythmic or periodic right frontal spikes / sharps and/or right frontal rhythmic slowing (delta) during the events, which only lasted a few seconds at a time.  No evidence for status epilepticus.  The EEG remained stable, when compared to the prior recording. Clinical and radiological correlation is recommended.     Updates provided to patient's RN, attending physician, and patient's mother.            Harris Bolton MD   Epilepsy and Neurodiagnostics.   Clinical  of Neurology [facility] [employer].   Diplomate in Neurology, Epilepsy, and Electrodiagnostic Medicine.   Office: [phone]  Fax: [phone]

## 2019-07-24 NOTE — PROGRESS NOTES
Spoke with the EEG tech regarding disconnecting pt from EEG for scheduled 2100 CT scan. EEG was disconnected. RT at bedside. Pt to CT with primary RN, transport, and mother via hospital bed.

## 2019-07-24 NOTE — PROGRESS NOTES
Chief Complaint   Patient presents with   • Seizure   • Rash     to chest for the last 9 days.       Problem List Items Addressed This Visit     Seizure (HCC)    Relevant Medications    lacosamide (VIMPAT) 200 MG Tab tablet    levETIRAcetam (KEPPRA) 100 MG/ML Solution    PHENobarbital 20 MG/5ML Elixir    topiramate (TOPAMAX) 200 MG tablet    levETIRAcetam (KEPPRA) tablet 1,500 mg    PHENobarbital solution 120 mg    lacosamide (VIMPAT) tablet 300 mg    lacosamide (VIMPAT) 100 mg in  mL ivpb (Completed)    topiramate (TOPAMAX) tablet 200 mg    topiramate (TOPAMAX) tablet 300 mg    perampanel (FYCOMPA) tablet 2 mg (Completed)    perampanel (FYCOMPA) tablet 4 mg      Other Visit Diagnoses     Seizure disorder (HCC)        Relevant Medications    lacosamide (VIMPAT) 200 MG Tab tablet    levETIRAcetam (KEPPRA) 100 MG/ML Solution    PHENobarbital 20 MG/5ML Elixir    topiramate (TOPAMAX) 200 MG tablet    levETIRAcetam (KEPPRA) tablet 1,500 mg    PHENobarbital solution 120 mg    lacosamide (VIMPAT) tablet 300 mg    lacosamide (VIMPAT) 100 mg in  mL ivpb (Completed)    topiramate (TOPAMAX) tablet 200 mg    topiramate (TOPAMAX) tablet 300 mg    perampanel (FYCOMPA) tablet 2 mg (Completed)    perampanel (FYCOMPA) tablet 4 mg    Pneumonia of left lower lobe due to infectious organism (HCC)        Relevant Medications    ampicillin/sulbactam (UNASYN) 3 g in  mL IVPB (Completed)    cefTRIAXone (ROCEPHIN) 1 g in  mL IVPB    doxycycline monohydrate (ADOXA) tablet 100 mg          Interim history:  Ruben Edwards remains hospitalized.  He was started on doxycycline yesterday, but has not been able to received Rocephin, as he does not have an IV.  According to the patient's nurse there is plan to place a PICC line this morning.  The mother noted.  Of about 8 hours overnight, where the patient did not have any seizures, certainly an improvement.  The seizures are still small, involving the left hand/left arm, and  sometimes the left side of the face and abdomen as well.  No generalized tonic-clonic seizures reported.  The patient continues with his antiepileptic medications unchanged.  No fevers reported.  He is scheduled to undergo CT chest this morning. Mother at bedside.     Past medical history:   Past Medical History:   Diagnosis Date   • Seizure (HCC)    • Traumatic brain injury (HCC) 05/15/1998       Past surgical history:   Past Surgical History:   Procedure Laterality Date   • LARYNGOSCOPY  7/2/2017    Procedure: LARYNGOSCOPY;  Surgeon: Catherine Osorio M.D.;  Location: SURGERY [facility];  Service:    • BRONCHOSCOPY  7/2/2017    Procedure: BRONCHOSCOPY;  Surgeon: Catherine Osorio M.D.;  Location: SURGERY [facility];  Service:    • TRACHEOSTOMY  7/2/2017    Procedure: TRACHEOSTOMY;  Surgeon: Catherine Osorio M.D.;  Location: SURGERY [facility];  Service:    • ESOPHAGOSCOPY  7/2/2017    Procedure: ESOPHAGOSCOPY;  Surgeon: Catherine Osorio M.D.;  Location: SURGERY [facility];  Service:        Family history:   No family history on file.    Social history:   Social History     Social History   • Marital status: Single     Spouse name: N/A   • Number of children: N/A   • Years of education: N/A     Occupational History   • Not on file.     Social History Main Topics   • Smoking status: Never Smoker   • Smokeless tobacco: Never Used   • Alcohol use No   • Drug use: Yes     Types: Oral      Comment: Mother tried CBD treatment for siezures in past   • Sexual activity: Not on file     Other Topics Concern   • Not on file     Social History Narrative   • No narrative on file       Current medications:   Current Facility-Administered Medications   Medication Dose   • perampanel (FYCOMPA) tablet 4 mg  4 mg   • erythromycin ophthalmic ointment     • potassium bicarbonate (KLYTE) effervescent tablet 25 mEq  25 mEq   • cefTRIAXone (ROCEPHIN) 1 g in  mL IVPB  1 g   • doxycycline monohydrate  (ADOXA) tablet 100 mg  100 mg   • topiramate (TOPAMAX) tablet 200 mg  200 mg   • topiramate (TOPAMAX) tablet 300 mg  300 mg   • LORazepam (ATIVAN) injection 1 mg  1 mg   • lacosamide (VIMPAT) tablet 300 mg  300 mg   • Pharmacy Consult: Enteral tube insertion - review meds/change route/product selection     • senna-docusate (PERICOLACE or SENOKOT S) 8.6-50 MG per tablet 2 Tab  2 Tab    And   • polyethylene glycol/lytes (MIRALAX) PACKET 1 Packet  1 Packet    And   • magnesium hydroxide (MILK OF MAGNESIA) suspension 30 mL  30 mL    And   • bisacodyl (DULCOLAX) suppository 10 mg  10 mg   • Pain Pump (patient supplied) Device     • cyanocobalamin (VITAMIN B-12) tablet 1,000 mcg  1,000 mcg   • levETIRAcetam (KEPPRA) tablet 1,500 mg  1,500 mg   • PHENobarbital solution 120 mg  120 mg   • enoxaparin (LOVENOX) inj 40 mg  40 mg       Medication Allergy:  Allergies   Allergen Reactions   • Sulfa Drugs Unspecified     Per caretaker, mother is allergic to sulfa so believes her son could be as well         Review of systems:   Unable to obtain.       Physical examination:   Vitals:    07/24/19 0423 07/24/19 0618 07/24/19 0741 07/24/19 0800   BP:  127/79  135/85   Pulse: 65 65 62 70   Resp: 18 17 18 19   Temp:  36.9 °C (98.4 °F)  36.3 °C (97.3 °F)   TempSrc:  Temporal  Temporal   SpO2: 94% 94% 95% 95%   Weight:       Height:         He does not appear in any acute distress.  The patient is nonverbal, unable to follow commands.  Memory cannot be assessed.  He is awake, unable to assess for alertness or orientation.  Eyes are midline, there is no nystagmus on primary gaze.  His pupils are about 4 to 5 mm bilaterally and are sluggish to react to light.  His face appears symmetric.  He has contractures and atrophy in all extremities.  Examination of the skin does not show any rashes.  The patient has a feeding tube in place.  He is diaphoretic this morning.         ANCILLARY DATA REVIEWED:       Lab Data Review:  Reviewed in  chart.    Records reviewed:   Chart reviewed.     Imaging:   MRI brain w/wo, 3/7/18  1.  Moderate cerebral atrophy beyond that expected for the patient's age.  2.  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  3.  Old hemorrhagic infarcts left basal ganglia, left thalamus, left subthalamic region.  4.  Marked atrophy and encephalomalacic change in the left cerebral peduncle with T2 hyperintensity and volume loss extending down the left anterior quadrant of the pop into the left medullary pyramid. 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  5.  Widespread areas of curvilinear and gyriform enhancement involving the right frontal lobe, right parietal lobe, right occipital lobe, along with curvilinear enhancement in the right basal ganglia. These findings are most consistent with subacute   sequela of cerebral infarction or cortical laminar necrosis. Cortical laminar necrosis has been reported associated with prolonged seizures/status epilepticus. Subacute sequela of encephalitis could have a similar appearance.  6.  Advanced supratentorial white matter disease consistent with microvascular ischemic change versus demyelination or gliosis. This results in some volume loss of deep white matter.  7.  Curvilinear hemosiderin deposition in the right temporal lobe deep white matter consistent with old hemorrhage.  8.  No acute cerebral infarction or acute hemorrhage evident.  9.  No evidence of mesial temporal sclerosis.   (I personally reviewed images).      MRA head wo, 3/9/18  MRA OF THE [place] WITHIN NORMAL LIMITS.  (I personally reviewed images).         EE:  This is an abnormal 24 hrs video electroencephalogram recording in an encephalopathic patient during awake and sleep states. There is diffuse cerebral dysfunction, suggestive of an encephalopathic state. Superimposed slowing in the right hemisphere is likely due to underlying structural abnormality. Frequent right frontotemporal  spikes, with rare brief runs of right PLEDS, but without clear evidence for seizures during the study. The study remains significantly improved when compared to initial recordings, however the patient remains at very high risk for seizure recurrence. Clinical and radiological correlation is recommended.      Video EEG, 7/21/2019:  INTERPRETATION:  This is an abnormal 24 hrs video EEG recording in the awake,   drowsy, and sleep state(s).  There is intermittent slowing in the   right frontal region and frequent right frontal spikes / sharps,   suggesting underlying structural abnormality and cortical   irritability. A few events of left arm / hand and head myoclonus   were reported by either patient's mother or nursing staff by push   of the event button. These spells were brief in nature and   epileptic. Review of eeg demonstrated briefly rhythmic or   periodic right frontal spikes / sharps with right frontal slowing   during the events, which only lasted several seconds at a time.   Events consisting with brief focal right frontal seizures. There   has been improvement after increase of Lacosamide. Clinical and   radiological correlation is recommended.         ASSESSMENT AND PLAN:  History of severe traumatic brain injury and either a persistent vegetative state versus minimally conscious state.  Pharmaco-resistant, structural, focal onset epilepsy.  History of status epilepticus and PLEDs.  Recent new onset of myoclonic jerks involving the left upper extremity, are epileptic in nature, and have a right frontal EEG correlation.  The spells are brief, the patient is not in status, and appeared to be induced by stimulation of the patient.  The patient is currently on multiple antiepileptics, which had controlled the patient's epilepsy for a while, except for the recent onset of these new seizures.  There is no evidence of infection on blood culture or UA, but I discussed the chest XR findings with the attending  physician and the pt was started on antibiotics and is awaiting completion of a CT chest to r/o pna this am. The concern is that his seizures in the past have presented in setting of infections, she agrees. The patient is at baseline otherwise, as stated by the patient's mother.  We had increased the Vimpat from 200 mg twice a day, to 300 mg twice a day, and I also increased Topamax to 200/300 a couple of days ago, but he continues to have spells. Today, we discussed further changes to his antiepileptic regimen, but at the end his mother decided to wait for antibiotics to work. I will continue to follow him. We will d/c VEEG as events can be followed clinically.   Of note, the patient had a history of hyperammonemia with the use of Depakote, which was discontinued in the past.     I discussed case with RN, mother, and attending physician.         FOLLOW-UP:   In my office, after discharge.        EDUCATION AND COUNSELING:  -Education was provided to the patient and/or family regarding diagnosis and prognosis. The chronic and unpredictable nature of the condition were discussed. There is increased risk for additional events, which may carry potential for significant injuries and death. Discussed frequent seizure triggers: sleep deprivation, medication non-compliance, use of illegal drugs/alcohol, stress, and others.   -We reviewed in detail the current antiepileptic regimen. Potential side effects of antiepileptics were discussed at length, including but no limited to: hypersensitivity reactions (rash and others, some of which can be fatal), visual field changes (some of which may be irreversible), glaucoma, diplopia, kidney stones, osteopenia/osteoporosis/bone fractures, hyperthermia/anhydrosis, hyponatremia, tremors/abnormal movements, ataxia, dizziness, fatigue, increased risk for falls, risk for cardiac arrhythmias/syncope, gastrointestinal side effects(hepatitis, pancreatitis, gastritis, ulcers), gingival  hypertrophy/bleeding, drowsiness, sedation, anxiety/nervousness, increased risk for suicide, increased risk for depression, and psychosis.   -We also reviewed drug-drug interactions and their potential effect on seizure control and medication side effects.    -Patient/family educated on risk for SUDEP (Sudden Death in Epilepsy). Counseling was provided on the importance of strict medication and follow up compliance. The patient/family understand the risks associated with non-adherence with the medical plan as outlined, including but not limited to an increased risk for breakthrough seizures, which may contribute to injuries, disability, status epilepticus, and even death.   -Avoid drugs which may lower seizure threshold.  -Avoid sleep deprivation.     Patient's mother agree with plan, as outlined.         Harris Bolton MD   Epilepsy and Neurodiagnostics.   Clinical  of Neurology [facility].   Diplomate in Neurology, Epilepsy, and Electrodiagnostic Medicine.   Office: [phone]  Fax: [phone]      BILLING DOCUMENTATION:     I have performed physical exam and reviewed and updated ROS and plan today 7/24/2019. In review of that note, there are no new changes except as documented above.     Counseling:  I spent greater than 50% time face-to-face time of a total of 45 minutes visit. Over 50% of the time of the visit today was spent on counseling and or coordination of care wtih the patient and/or family, with greater than 50% of the total discussing my assessment and plan as stated above.

## 2019-07-24 NOTE — PROGRESS NOTES
Hospital Medicine Daily Progress Note    Date of Service  7/23/2019    Chief Complaint  39 y.o. male admitted 7/19/2019 with intractable seizures in the setting of severe TBI when younger and CP    Hospital Course    7/23: with continuous EEG monitoring in place, d/w Dr. Bolton, added Fycompa to his current seizure regiment of keppra, phenobarbital, topamaz, vimpat.  Reviewed CXR from 7/19 and agree with diffuse infiltrate on my review left lung.  Patient appears diaphoretic on exam, ordered CT chest with contrast to rule out pneumonia.   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  UA reviewed no bacteria, has condom cath only.  Baclofen pump nontender on exam.  + yellow exudate bilateral eyelids, started erythromycin opthalmic ointment qid x 5 days.          Consultants/Specialty  neurology    Code Status  fc    Disposition  Has home tracheostomy set up at home with oxygen per mother.  PEG tube feeds in place, baclofen pump.  Home with mother once seizures controlled.  Condom catheter for urine.    Review of Systems  Review of Systems   Unable to perform ROS: Acuity of condition        Physical Exam  Temp:  [36.7 °C (98.1 °F)] 36.7 °C (98.1 °F)  Pulse:  [60-72] 72  Resp:  [16-19] 17  BP: (131-158)/(83-88) 158/86  SpO2:  [93 %-96 %] 93 %    Physical Exam   Constitutional: He appears well-developed and well-nourished. No distress.   Non verbal, does not respond to any commands   HENT:   Head: Normocephalic and atraumatic.   Mouth/Throat: No oropharyngeal exudate.   Trach mask   Eyes: Pupils are equal, round, and reactive to light. No scleral icterus.   Neck: Normal range of motion. Neck supple. No thyromegaly present.   Cardiovascular: Normal rate, regular rhythm, normal heart sounds and intact distal pulses.    No murmur heard.  Pulmonary/Chest: Effort normal and breath sounds normal. No respiratory distress. He has no wheezes.   Abdominal: Soft. Bowel sounds are  normal. He exhibits no distension. There is no tenderness.   Thin  G tube site appears C/D/I   Musculoskeletal: Normal range of motion. He exhibits no tenderness.   Neurological: He is alert. No cranial nerve deficit. Coordination abnormal.   Contractures noted   Skin: Skin is warm and dry. No rash noted.   Nursing note and vitals reviewed.  No change in physical exam today on 7/22/19 compared to 7/21/19    Fluids    Intake/Output Summary (Last 24 hours) at 07/23/19 1729  Last data filed at 07/23/19 1133   Gross per 24 hour   Intake              120 ml   Output             2200 ml   Net            -2080 ml       Laboratory  Recent Labs      07/23/19   0846   WBC  7.2   RBC  5.35   HEMOGLOBIN  16.6   HEMATOCRIT  50.0   MCV  93.5   MCH  31.0   MCHC  33.2*   RDW  62.3*   PLATELETCT  259   MPV  9.6     Recent Labs      07/21/19   0351  07/22/19   0312  07/23/19   0846   SODIUM  134*  138  138   POTASSIUM  3.4*  3.5*  3.5*   CHLORIDE  105  108  107   CO2  19*  21  20   GLUCOSE  101*  109*  119*   BUN  12  16  18   CREATININE  <0.20*  <0.20*  0.24*   CALCIUM  8.7  8.7  8.9                   Imaging  DX-CHEST-PORTABLE (1 VIEW)   Final Result      The perihilar/left lung base atelectasis with pneumonitis/pneumonia not excluded.   Small left pleural effusion.      CT-CHEST (THORAX) WITH    (Results Pending)        Assessment/Plan  * Status epilepticus (HCC)- (present on admission)   Assessment & Plan    Despite renal barbital, Keppra, Vimpat, and Topamax continues to have multiple seizures consistent with focal seizures  He will be admitted to the neurology floor and EEG has been ordered, increased dose of vimpat appears to be working at this time  followed as an outpatient by Dr. Bolton, spoke with neurologist  7/23:  d/w Dr. Bolton, added Fycompa to his current seizure regiment of keppra, phenobarbital, topamaz, vimpat.  Reviewed CXR from 7/19 and agree with diffuse infiltrate on my review left lung.  Patient appears  diaphoretic on exam, ordered CT chest with contrast to rule out pneumonia.   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  UA reviewed no bacteria, has condom cath only.  Baclofen pump nontender on exam.  + yellow exudate bilateral eyelids, started erythromycin opthalmic ointment qid x 5 days.           Epilepsy ([facility])- (present on admission)   Assessment & Plan    Tenuous outpatient regimen prescribed by Dr. Bolton, long standing h/o seizures  -consider palliative care consult, remains mostly in a vegetative state  -known to have pharmaco-resistant seizures     TBI (traumatic brain injury) ([facility])- (present on admission)   Assessment & Plan    With history of tracheostomy  Continue his G-tube feeding per his mom's recommendations  Is mentation appears to be effectively baseline  He has a baclofen pain pump     Contraction, joint, multiple sites- (present on admission)   Assessment & Plan    Contractures are stable          VTE prophylaxis: lovenox

## 2019-07-24 NOTE — PROGRESS NOTES
2 Rn skin check    Mother at bedside, refused caregivers to turn patient. Verbal education given regarding the importance of skin breakdown. Mother verbalizes understanding and continues to decline turns by staff.    -Sacral area not assessed.  - trach area clean try and intact  - Left arm pit and left groin both have small pink blanchable area, no drainage noted. Mother keeps towel/washcloth between skin for preventative.  - All extremities are contracted, no skin breakdown noted. LLE +2 edema noted in foot. BL heels soft, boggy but intact.   -PEG tube site dry and intact    Interventions: Q 2 turns encouraged but declined by mother, incontinence care provided as needed, pillows in use for floating BL heels, EEG continuous.

## 2019-07-24 NOTE — CARE PLAN
Problem: Bronchopulmonary Hygiene:  Goal: Increase mobilization of retained secretions    Intervention: Perform bronchopulmonary therapy as indicated by assessment  Pt remains on 4 LPM 28%. Pt has 6.0 cuffless trach in place. Per mom (primary caregiver) Pt april lower temp (32.0 C) on trach collar. Pt w/ min white thin secretions noted during shift.

## 2019-07-25 LAB
ANION GAP SERPL CALC-SCNC: 10 MMOL/L (ref 0–11.9)
BASOPHILS # BLD AUTO: 0.7 % (ref 0–1.8)
BASOPHILS # BLD: 0.04 K/UL (ref 0–0.12)
BUN SERPL-MCNC: 16 MG/DL (ref 8–22)
CALCIUM SERPL-MCNC: 8.8 MG/DL (ref 8.5–10.5)
CHLORIDE SERPL-SCNC: 106 MMOL/L (ref 96–112)
CO2 SERPL-SCNC: 23 MMOL/L (ref 20–33)
CREAT SERPL-MCNC: 0.24 MG/DL (ref 0.5–1.4)
EOSINOPHIL # BLD AUTO: 0.25 K/UL (ref 0–0.51)
EOSINOPHIL NFR BLD: 4.6 % (ref 0–6.9)
ERYTHROCYTE [DISTWIDTH] IN BLOOD BY AUTOMATED COUNT: 60.9 FL (ref 35.9–50)
GLUCOSE SERPL-MCNC: 91 MG/DL (ref 65–99)
GRAM STN SPEC: NORMAL
HCT VFR BLD AUTO: 47.3 % (ref 42–52)
HGB BLD-MCNC: 16 G/DL (ref 14–18)
IMM GRANULOCYTES # BLD AUTO: 0.01 K/UL (ref 0–0.11)
IMM GRANULOCYTES NFR BLD AUTO: 0.2 % (ref 0–0.9)
LYMPHOCYTES # BLD AUTO: 1.68 K/UL (ref 1–4.8)
LYMPHOCYTES NFR BLD: 31 % (ref 22–41)
MCH RBC QN AUTO: 31.5 PG (ref 27–33)
MCHC RBC AUTO-ENTMCNC: 33.7 G/DL (ref 33.7–35.3)
MCV RBC AUTO: 93.5 FL (ref 81.4–97.8)
MONOCYTES # BLD AUTO: 0.55 K/UL (ref 0–0.85)
MONOCYTES NFR BLD AUTO: 10.1 % (ref 0–13.4)
NEUTROPHILS # BLD AUTO: 2.89 K/UL (ref 1.82–7.42)
NEUTROPHILS NFR BLD: 53.4 % (ref 44–72)
NRBC # BLD AUTO: 0 K/UL
NRBC BLD-RTO: 0 /100 WBC
PLATELET # BLD AUTO: 229 K/UL (ref 164–446)
PMV BLD AUTO: 9.4 FL (ref 9–12.9)
POTASSIUM SERPL-SCNC: 3.5 MMOL/L (ref 3.6–5.5)
PROCALCITONIN SERPL-MCNC: <0.05 NG/ML
RBC # BLD AUTO: 5.08 M/UL (ref 4.7–6.1)
SIGNIFICANT IND 70042: NORMAL
SITE SITE: NORMAL
SODIUM SERPL-SCNC: 139 MMOL/L (ref 135–145)
SOURCE SOURCE: NORMAL
WBC # BLD AUTO: 5.4 K/UL (ref 4.8–10.8)

## 2019-07-25 PROCEDURE — 85025 COMPLETE CBC W/AUTO DIFF WBC: CPT

## 2019-07-25 PROCEDURE — A6250 SKIN SEAL PROTECT MOISTURIZR: HCPCS | Performed by: HOSPITALIST

## 2019-07-25 PROCEDURE — 700111 HCHG RX REV CODE 636 W/ 250 OVERRIDE (IP): Performed by: HOSPITALIST

## 2019-07-25 PROCEDURE — 84145 PROCALCITONIN (PCT): CPT

## 2019-07-25 PROCEDURE — 700111 HCHG RX REV CODE 636 W/ 250 OVERRIDE (IP): Performed by: PSYCHIATRY & NEUROLOGY

## 2019-07-25 PROCEDURE — 36415 COLL VENOUS BLD VENIPUNCTURE: CPT

## 2019-07-25 PROCEDURE — A9270 NON-COVERED ITEM OR SERVICE: HCPCS | Performed by: HOSPITALIST

## 2019-07-25 PROCEDURE — 94640 AIRWAY INHALATION TREATMENT: CPT

## 2019-07-25 PROCEDURE — 700102 HCHG RX REV CODE 250 W/ 637 OVERRIDE(OP): Performed by: HOSPITALIST

## 2019-07-25 PROCEDURE — 99233 SBSQ HOSP IP/OBS HIGH 50: CPT | Performed by: PSYCHIATRY & NEUROLOGY

## 2019-07-25 PROCEDURE — 99233 SBSQ HOSP IP/OBS HIGH 50: CPT | Performed by: HOSPITALIST

## 2019-07-25 PROCEDURE — 80048 BASIC METABOLIC PNL TOTAL CA: CPT

## 2019-07-25 PROCEDURE — 700101 HCHG RX REV CODE 250: Performed by: PSYCHIATRY & NEUROLOGY

## 2019-07-25 PROCEDURE — 700105 HCHG RX REV CODE 258: Performed by: HOSPITALIST

## 2019-07-25 PROCEDURE — 770001 HCHG ROOM/CARE - MED/SURG/GYN PRIV*

## 2019-07-25 PROCEDURE — 700102 HCHG RX REV CODE 250 W/ 637 OVERRIDE(OP): Performed by: PSYCHIATRY & NEUROLOGY

## 2019-07-25 PROCEDURE — A9270 NON-COVERED ITEM OR SERVICE: HCPCS | Performed by: PSYCHIATRY & NEUROLOGY

## 2019-07-25 RX ORDER — NYSTATIN 100000 [USP'U]/G
POWDER TOPICAL 2 TIMES DAILY
Status: DISCONTINUED | OUTPATIENT
Start: 2019-07-25 | End: 2019-08-03 | Stop reason: HOSPADM

## 2019-07-25 RX ORDER — BISACODYL 10 MG
10 SUPPOSITORY, RECTAL RECTAL
Status: DISCONTINUED | OUTPATIENT
Start: 2019-07-26 | End: 2019-08-03 | Stop reason: HOSPADM

## 2019-07-25 RX ADMIN — PHENOBARBITAL 120 MG: 20 ELIXIR ORAL at 09:15

## 2019-07-25 RX ADMIN — ERYTHROMYCIN: 5 OINTMENT OPHTHALMIC at 06:20

## 2019-07-25 RX ADMIN — LACOSAMIDE 300 MG: 100 TABLET, FILM COATED ORAL at 18:19

## 2019-07-25 RX ADMIN — TOPIRAMATE 200 MG: 100 TABLET, FILM COATED ORAL at 06:20

## 2019-07-25 RX ADMIN — LEVETIRACETAM 1500 MG: 500 TABLET, FILM COATED ORAL at 18:20

## 2019-07-25 RX ADMIN — PIPERACILLIN AND TAZOBACTAM 3.38 G: 3; .375 INJECTION, POWDER, LYOPHILIZED, FOR SOLUTION INTRAVENOUS; PARENTERAL at 20:40

## 2019-07-25 RX ADMIN — POTASSIUM BICARBONATE 25 MEQ: 978 TABLET, EFFERVESCENT ORAL at 06:20

## 2019-07-25 RX ADMIN — PIPERACILLIN AND TAZOBACTAM 3.38 G: 3; .375 INJECTION, POWDER, LYOPHILIZED, FOR SOLUTION INTRAVENOUS; PARENTERAL at 12:00

## 2019-07-25 RX ADMIN — MICONAZOLE NITRATE: 20 CREAM TOPICAL at 06:19

## 2019-07-25 RX ADMIN — LEVETIRACETAM 1500 MG: 500 TABLET, FILM COATED ORAL at 06:40

## 2019-07-25 RX ADMIN — LACOSAMIDE 300 MG: 100 TABLET, FILM COATED ORAL at 06:19

## 2019-07-25 RX ADMIN — TOPIRAMATE 300 MG: 100 TABLET, FILM COATED ORAL at 20:36

## 2019-07-25 RX ADMIN — NYSTATIN: 100000 POWDER TOPICAL at 21:00

## 2019-07-25 RX ADMIN — PHENOBARBITAL 120 MG: 20 ELIXIR ORAL at 18:18

## 2019-07-25 RX ADMIN — CYANOCOBALAMIN TAB 500 MCG 1000 MCG: 500 TAB at 06:20

## 2019-07-25 RX ADMIN — PIPERACILLIN AND TAZOBACTAM 3.38 G: 3; .375 INJECTION, POWDER, LYOPHILIZED, FOR SOLUTION INTRAVENOUS; PARENTERAL at 06:12

## 2019-07-25 RX ADMIN — LORAZEPAM 1 MG: 2 INJECTION INTRAMUSCULAR; INTRAVENOUS at 14:51

## 2019-07-25 RX ADMIN — ERYTHROMYCIN: 5 OINTMENT OPHTHALMIC at 12:01

## 2019-07-25 NOTE — PROGRESS NOTES
Assumed pt care at 1845. Pt is A&Ox TRAVIS due to persistent vegetative state. Pt not ambulating at this time, bed rest only. Pt's mother always present ABS and actively involved in care, performs most care measures for her son herself rarely accepting assistance from clinical staff. Plan of care discussed, education provided on administered medications. All questions and concerns addresed. Fall, seizure, and aspiration precautions with hourly rounding and Q 4hr neuro checks in place.

## 2019-07-25 NOTE — DIETARY
Nutrition Services:     Spoke with pt's mother, TF continuing per home regimen. Supply is getting low, Mother arranging for more TF brought from home. Mother advocates for Always Prepped TF available to all patients inpatient/outpatient.    RD following

## 2019-07-25 NOTE — PROGRESS NOTES
Patient Mom Susana refused Lovenox, SCD's, Senna, and fycompa, educated regarding importance of intervention, notified provider (Dr. Jacob).

## 2019-07-25 NOTE — PROGRESS NOTES
Suppository ordered for 0600 every other day.   Low air loss mattress ordered. Bed runner called. Currently there are none available.   Interdry ordered for skin creases.

## 2019-07-25 NOTE — PROGRESS NOTES
Susana refused help with trache care as she completes it herself every day.   All supplies have been provided.

## 2019-07-25 NOTE — PROGRESS NOTES
Chief Complaint   Patient presents with   • Seizure   • Rash     to chest for the last 9 days.       Problem List Items Addressed This Visit     Seizure (HCC)    Relevant Medications    lacosamide (VIMPAT) 200 MG Tab tablet    levETIRAcetam (KEPPRA) 100 MG/ML Solution    PHENobarbital 20 MG/5ML Elixir    topiramate (TOPAMAX) 200 MG tablet    levETIRAcetam (KEPPRA) tablet 1,500 mg    PHENobarbital solution 120 mg    lacosamide (VIMPAT) tablet 300 mg    lacosamide (VIMPAT) 100 mg in  mL ivpb (Completed)    topiramate (TOPAMAX) tablet 200 mg    topiramate (TOPAMAX) tablet 300 mg    perampanel (FYCOMPA) tablet 2 mg (Completed)      Other Visit Diagnoses     Seizure disorder (HCC)        Relevant Medications    lacosamide (VIMPAT) 200 MG Tab tablet    levETIRAcetam (KEPPRA) 100 MG/ML Solution    PHENobarbital 20 MG/5ML Elixir    topiramate (TOPAMAX) 200 MG tablet    levETIRAcetam (KEPPRA) tablet 1,500 mg    PHENobarbital solution 120 mg    lacosamide (VIMPAT) tablet 300 mg    lacosamide (VIMPAT) 100 mg in  mL ivpb (Completed)    topiramate (TOPAMAX) tablet 200 mg    topiramate (TOPAMAX) tablet 300 mg    perampanel (FYCOMPA) tablet 2 mg (Completed)    Pneumonia of left lower lobe due to infectious organism (HCC)        Relevant Medications    ampicillin/sulbactam (UNASYN) 3 g in  mL IVPB (Completed)    piperacillin-tazobactam (ZOSYN) 3.375 g in  mL IVPB (Completed)    piperacillin-tazobactam (ZOSYN) 3.375 g in  mL IVPB          Interim history:  Ruben Edwards remains hospitalized.  He underwent CT chest yesterday, which showed possible pneumonia, and a small pleural effusion.  His antibiotics have been adjusted from doxycycline and Rocephin to Zosyn.  The patient's mother at bedside, indicates that the patient had a period of about 10 hours seizure-free, but since early this morning, started having recurrent left hand and face brief jerking seizures.  He apparently has had several of this.  The  mother does ask for Ativan to be used, which has shown to control spells for several hours at a time.          Past medical history:   Past Medical History:   Diagnosis Date   • Seizure (HCC)    • Traumatic brain injury (HCC) 05/15/1998       Past surgical history:   Past Surgical History:   Procedure Laterality Date   • LARYNGOSCOPY  7/2/2017    Procedure: LARYNGOSCOPY;  Surgeon: Catherine Osorio M.D.;  Location: SURGERY [facility];  Service:    • BRONCHOSCOPY  7/2/2017    Procedure: BRONCHOSCOPY;  Surgeon: Catherine Osorio M.D.;  Location: SURGERY [facility];  Service:    • TRACHEOSTOMY  7/2/2017    Procedure: TRACHEOSTOMY;  Surgeon: Catherine Osorio M.D.;  Location: SURGERY [facility];  Service:    • ESOPHAGOSCOPY  7/2/2017    Procedure: ESOPHAGOSCOPY;  Surgeon: Catherine Osorio M.D.;  Location: SURGERY [facility];  Service:        Family history:   No family history on file.    Social history:   Social History     Social History   • Marital status: Single     Spouse name: N/A   • Number of children: N/A   • Years of education: N/A     Occupational History   • Not on file.     Social History Main Topics   • Smoking status: Never Smoker   • Smokeless tobacco: Never Used   • Alcohol use No   • Drug use: Yes     Types: Oral      Comment: Mother tried CBD treatment for siezures in past   • Sexual activity: Not on file     Other Topics Concern   • Not on file     Social History Narrative   • No narrative on file       Current medications:   Current Facility-Administered Medications   Medication Dose   • [START ON 7/26/2019] bisacodyl (DULCOLAX) suppository 10 mg  10 mg   • miconazole 2%-zinc oxide (RADHA) topical cream     • piperacillin-tazobactam (ZOSYN) 3.375 g in  mL IVPB  3.375 g   • erythromycin ophthalmic ointment     • potassium bicarbonate (KLYTE) effervescent tablet 25 mEq  25 mEq   • topiramate (TOPAMAX) tablet 200 mg  200 mg   • topiramate (TOPAMAX) tablet 300 mg  300  mg   • LORazepam (ATIVAN) injection 1 mg  1 mg   • lacosamide (VIMPAT) tablet 300 mg  300 mg   • Pharmacy Consult: Enteral tube insertion - review meds/change route/product selection     • senna-docusate (PERICOLACE or SENOKOT S) 8.6-50 MG per tablet 2 Tab  2 Tab    And   • polyethylene glycol/lytes (MIRALAX) PACKET 1 Packet  1 Packet    And   • magnesium hydroxide (MILK OF MAGNESIA) suspension 30 mL  30 mL    And   • bisacodyl (DULCOLAX) suppository 10 mg  10 mg   • Pain Pump (patient supplied) Device     • cyanocobalamin (VITAMIN B-12) tablet 1,000 mcg  1,000 mcg   • levETIRAcetam (KEPPRA) tablet 1,500 mg  1,500 mg   • PHENobarbital solution 120 mg  120 mg   • enoxaparin (LOVENOX) inj 40 mg  40 mg       Medication Allergy:  Allergies   Allergen Reactions   • Sulfa Drugs Unspecified     Per caretaker, mother is allergic to sulfa so believes her son could be as well         Review of systems:   Unable to obtain.    Physical examination:   Vitals:    07/25/19 0715 07/25/19 0800 07/25/19 1007 07/25/19 1411   BP:  117/66     Pulse: 63 63 72 70   Resp: 15 14 15 16   Temp:  (!) 35.6 °C (96.1 °F)     TempSrc:  Axillary     SpO2: 93% 95% 95% 95%   Weight:       Height:         He does not appear in any acute distress.  The patient is nonverbal, unable to follow commands.  Memory cannot be assessed.  He is awake, unable to assess for alertness or orientation.  Eyes are midline, there is no nystagmus on primary gaze.  His pupils are about 4 to 5 mm bilaterally and are sluggish to react to light.  His face appears symmetric.  He has contractures and atrophy in all extremities.  Examination of the skin does not show any rashes.  The patient has a feeding tube in place.      ANCILLARY DATA REVIEWED:         Lab Data Review:  Reviewed in chart.     Records reviewed:   Chart reviewed.     Imaging:   MRI brain w/wo, 3/7/18  1.  Moderate cerebral atrophy beyond that expected for the patient's age.  2.  Old hemorrhagic infarct right  thalamus.  3.  Old hemorrhagic infarcts left basal ganglia, left thalamus, left subthalamic region.  4.  Marked atrophy and encephalomalacic change in the left cerebral peduncle with T2 hyperintensity and volume loss extending down the left anterior quadrant of the pop into the left medullary pyramid. 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  5.  Widespread areas of curvilinear and gyriform enhancement involving the right frontal lobe, right parietal lobe, right occipital lobe, along with curvilinear enhancement in the right basal ganglia. These findings are most consistent with subacute   sequela of cerebral infarction or cortical laminar necrosis. Cortical laminar necrosis has been reported associated with prolonged seizures/status epilepticus. Subacute sequela of encephalitis could have a similar appearance.  6.  Advanced supratentorial white matter disease consistent with microvascular ischemic change versus demyelination or gliosis. This results in some volume loss of deep white matter.  7.  Curvilinear hemosiderin deposition in the right temporal lobe deep white matter consistent with old hemorrhage.  8.  No acute cerebral infarction or acute hemorrhage evident.  9.  No evidence of mesial temporal sclerosis.   (I personally reviewed images).      MRA head wo, 3/9/18  MRA OF THE [place] WITHIN NORMAL LIMITS.  (I personally reviewed images).         EE:  This is an abnormal 24 hrs video electroencephalogram recording in an encephalopathic patient during awake and sleep states. There is diffuse cerebral dysfunction, suggestive of an encephalopathic state. Superimposed slowing in the right hemisphere is likely due to underlying structural abnormality. Frequent right frontotemporal spikes, with rare brief runs of right PLEDS, but without clear evidence for seizures during the study. The study remains significantly improved when compared to initial recordings, however the  patient remains at very high risk for seizure recurrence. Clinical and radiological correlation is recommended.      Video EEG, 7/21/2019:  INTERPRETATION:  This is an abnormal 24 hrs video EEG recording in the awake,   drowsy, and sleep state(s).  There is intermittent slowing in the   right frontal region and frequent right frontal spikes / sharps,   suggesting underlying structural abnormality and cortical   irritability. A few events of left arm / hand and head myoclonus   were reported by either patient's mother or nursing staff by push   of the event button. These spells were brief in nature and   epileptic. Review of eeg demonstrated briefly rhythmic or   periodic right frontal spikes / sharps with right frontal slowing   during the events, which only lasted several seconds at a time.   Events consisting with brief focal right frontal seizures. There   has been improvement after increase of Lacosamide. Clinical and   radiological correlation is recommended.            ASSESSMENT AND PLAN:  History of severe traumatic brain injury and either a persistent vegetative state versus minimally conscious state.  Pharmaco-resistant, structural, focal onset epilepsy.  History of status epilepticus and PLEDs.  Recent new onset of myoclonic jerks involving the left upper extremity, are epileptic in nature, and have a right frontal EEG correlation.  The spells are brief, the patient is not in status, and appeared to be induced by stimulation of the patient.  The patient is currently on multiple antiepileptics, which had controlled the patient's epilepsy for a while, except for the recent onset of these new seizures.  There is no evidence of infection on blood culture or UA, but there is suspicion for a pneumonia on the chest x-ray, and more recently the CT of the chest.  I have discussed this with the attending physician and the pt is currently on antibiotic, Zosyn has replaced ceftriaxone/doxycycline.  The patient's  seizures in the past have presented in setting of infections. The patient is at baseline otherwise, as stated by the patient's mother.  We had increased the Vimpat from 200 mg twice a day, to 300 mg twice a day, and I also increased Topamax to 200/300 during this admission, but he continues to have spells. Today, we discussed further changes to his antiepileptic regimen, including addition of Fycompa, but at the end his mother decided to wait for antibiotics to work. I will continue to follow him.  The patient is no longer on continues EEG, as the spells can be monitored clinically.      Of note, the patient had a history of hyperammonemia with the use of Depakote, which was discontinued in the past.     I discussed case with RN, mother, and attending physician.        FOLLOW-UP:   The patient will follow-up in my office after discharge.    EDUCATION AND COUNSELING:  -Education was provided to the patient and/or family regarding diagnosis and prognosis. The chronic and unpredictable nature of the condition were discussed. There is increased risk for additional events, which may carry potential for significant injuries and death. Discussed frequent seizure triggers: sleep deprivation, medication non-compliance, use of illegal drugs/alcohol, stress, and others.   -We reviewed in detail the current antiepileptic regimen. Potential side effects of antiepileptics were discussed at length, including but no limited to: hypersensitivity reactions (rash and others, some of which can be fatal), visual field changes (some of which may be irreversible), glaucoma, diplopia, kidney stones, osteopenia/osteoporosis/bone fractures, hyperthermia/anhydrosis, hyponatremia, tremors/abnormal movements, ataxia, dizziness, fatigue, increased risk for falls, risk for cardiac arrhythmias/syncope, gastrointestinal side effects(hepatitis, pancreatitis, gastritis, ulcers), gingival hypertrophy/bleeding, drowsiness, sedation,  anxiety/nervousness, increased risk for suicide, increased risk for depression, and psychosis.   -We also reviewed drug-drug interactions and their potential effect on seizure control and medication side effects.    -Recommend chronic vitamin D supplementation and regular exercise (if not contraindicated).   -Patient/family educated on risk for SUDEP (Sudden Death in Epilepsy). Counseling was provided on the importance of strict medication and follow up compliance. The patient/family understand the risks associated with non-adherence with the medical plan as outlined, including but not limited to an increased risk for breakthrough seizures, which may contribute to injuries, disability, status epilepticus, and even death.   -Seizure precautions.      The patient's mother agrees with plan, as outlined.         Harris Bolton MD   Epilepsy and Neurodiagnostics.   Clinical  of Neurology [facility].   Diplomate in Neurology, Epilepsy, and Electrodiagnostic Medicine.   Office: [phone]  Fax: [phone]    BILLING DOCUMENTATION:     I have performed physical exam and reviewed and updated ROS and plan today 7/25/2019. In review of that note, there are no new changes except as documented above.     Counseling:  I spent greater than 50% time face-to-face time of a total of 40 minutes visit. Over 50% of the time of the visit today was spent on counseling and or coordination of care wtih the patient and/or family, with greater than 50% of the total discussing my assessment and plan as stated above.

## 2019-07-25 NOTE — PROGRESS NOTES
"Patient's mother refused 2 RN skin check, stated \"He doesn't need that and I know his skin better than anyone.\" Educated regarding importance of intervention. Pt's mother is agreeable to possibly checking pt's skin when he \"is more awake.\"  "

## 2019-07-25 NOTE — PROGRESS NOTES
2 Rn skin check   Redness noted to groin area. LLE with 2+ edema and shiny.   Sacrum intact pink and blanchable.   Old healed decub to left hip and gluteal area. All extremities contracted.   Waffle overlay in use.

## 2019-07-25 NOTE — PROGRESS NOTES
Pt resting in bed comfortably on his right side. Pt Mom Susana is at the bedside. Pt had 2 episodes of seizing during this interaction with muscle twitching in face, eyes, and hands. The second episode was approximately 1 minute and 5 seconds.

## 2019-07-25 NOTE — PROGRESS NOTES
Spoke with Susana about expectations of patients care. She stated that she will be doing most of the care and will let us know when she needs help. She stated that she has been doing it for years and that she will continue to do the care. Susana stated that she makes her tube feeds at home and that she will give them to Ruben. She also said that she can turn and change him mostly herself. But will ask if she needs help. She states that his skin is intact and that we can do a limited once over but we are not to disturb him. Skin check refused at this time.      Susana is requesting that the phenobarbital be ordered in advance at least 4 hours before it is due as it is almost always not here when it is due. She was updated that there is another dose available in the med-select and that we will order it after we give that dose.     Susana is also requesting that suppository be ordered for tomorrow morning so she can give it at 0600. Suppository available on the mar in the bowel protocol.     Susana reports that she has tried to hire a few nurses and that occasionally she finds good ones that stay with her for years. However usually she ends up firing them because they don't want to care for her son at the same level that she does. She states that she needs to be able to trust her nurses so she can leave and feel like her son is being cared for.   At this point Susana does not leave her sons side except for short periods of time.

## 2019-07-25 NOTE — PROGRESS NOTES
Spoke with pharmacist Caro about overdue phenobarbital being delivered to the floor. She will contact the narcotic tech and have them bring it up.

## 2019-07-25 NOTE — CARE PLAN
Problem: Knowledge Deficit  Goal: Knowledge of disease process/condition, treatment plan, diagnostic tests, and medications will improve  Outcome: PROGRESSING AS EXPECTED  Mother actively involved in care due to pt's inability to care for self and inability to advocate for self. Mother prefers to care for son herself and does not accept much assistance from clinical staff.    Problem: Skin Integrity  Goal: Risk for impaired skin integrity will decrease  Outcome: PROGRESSING SLOWER THAN EXPECTED  Mother adamantly declined 2RN skin check to be completed. Progress note placed, skin TRAVIS.

## 2019-07-25 NOTE — PROGRESS NOTES
Spoke with Susana (Mom) about seizure activity. Ativan provided to patient per mar at Mom's request.

## 2019-07-25 NOTE — CARE PLAN
Problem: Safety  Goal: Will remain free from injury    Intervention: Implement seizure precautions  Seizure precautions are in place.       Problem: Skin Integrity  Goal: Risk for impaired skin integrity will decrease  Outcome: PROGRESSING AS EXPECTED  Q 2 turn are in place.     Problem: Safety:  Goal: Will remain free from injury    Intervention: Implement seizure precautions  Seizure precautions are in place.

## 2019-07-25 NOTE — ASSESSMENT & PLAN NOTE
History of silent aspiration with tracheostomy and PEG tube feeds.  CTA chest with left sided pneumonia.  Changed Rocephin/doxy to zosyn IV  7/23: Sputum induced culture by RT via trach aspirate with pseudomonas ss zosyn and cipro.

## 2019-07-25 NOTE — CARE PLAN
Problem: Infection  Goal: Will remain free from infection  Outcome: PROGRESSING SLOWER THAN EXPECTED  Yeasty redness to groin noted. MD notified and Glendy cream ordered.     Problem: Bowel/Gastric:  Goal: Normal bowel function is maintained or improved  Outcome: PROGRESSING AS EXPECTED  BM today . Large formed.

## 2019-07-26 LAB
ANION GAP SERPL CALC-SCNC: 9 MMOL/L (ref 0–11.9)
BASOPHILS # BLD AUTO: 0.9 % (ref 0–1.8)
BASOPHILS # BLD: 0.05 K/UL (ref 0–0.12)
BUN SERPL-MCNC: 16 MG/DL (ref 8–22)
CALCIUM SERPL-MCNC: 9 MG/DL (ref 8.5–10.5)
CHLORIDE SERPL-SCNC: 107 MMOL/L (ref 96–112)
CO2 SERPL-SCNC: 21 MMOL/L (ref 20–33)
CREAT SERPL-MCNC: 0.25 MG/DL (ref 0.5–1.4)
EOSINOPHIL # BLD AUTO: 0.33 K/UL (ref 0–0.51)
EOSINOPHIL NFR BLD: 6.1 % (ref 0–6.9)
ERYTHROCYTE [DISTWIDTH] IN BLOOD BY AUTOMATED COUNT: 61.4 FL (ref 35.9–50)
GLUCOSE SERPL-MCNC: 89 MG/DL (ref 65–99)
HCT VFR BLD AUTO: 48.2 % (ref 42–52)
HGB BLD-MCNC: 16.3 G/DL (ref 14–18)
IMM GRANULOCYTES # BLD AUTO: 0.01 K/UL (ref 0–0.11)
IMM GRANULOCYTES NFR BLD AUTO: 0.2 % (ref 0–0.9)
LYMPHOCYTES # BLD AUTO: 1.39 K/UL (ref 1–4.8)
LYMPHOCYTES NFR BLD: 25.7 % (ref 22–41)
MCH RBC QN AUTO: 31.8 PG (ref 27–33)
MCHC RBC AUTO-ENTMCNC: 33.8 G/DL (ref 33.7–35.3)
MCV RBC AUTO: 94 FL (ref 81.4–97.8)
MONOCYTES # BLD AUTO: 0.5 K/UL (ref 0–0.85)
MONOCYTES NFR BLD AUTO: 9.3 % (ref 0–13.4)
NEUTROPHILS # BLD AUTO: 3.12 K/UL (ref 1.82–7.42)
NEUTROPHILS NFR BLD: 57.8 % (ref 44–72)
NRBC # BLD AUTO: 0 K/UL
NRBC BLD-RTO: 0 /100 WBC
PLATELET # BLD AUTO: 220 K/UL (ref 164–446)
PMV BLD AUTO: 9.2 FL (ref 9–12.9)
POTASSIUM SERPL-SCNC: 3.7 MMOL/L (ref 3.6–5.5)
RBC # BLD AUTO: 5.13 M/UL (ref 4.7–6.1)
SODIUM SERPL-SCNC: 137 MMOL/L (ref 135–145)
WBC # BLD AUTO: 5.4 K/UL (ref 4.8–10.8)

## 2019-07-26 PROCEDURE — 700101 HCHG RX REV CODE 250: Performed by: PSYCHIATRY & NEUROLOGY

## 2019-07-26 PROCEDURE — 770001 HCHG ROOM/CARE - MED/SURG/GYN PRIV*

## 2019-07-26 PROCEDURE — 700105 HCHG RX REV CODE 258: Performed by: HOSPITALIST

## 2019-07-26 PROCEDURE — A9270 NON-COVERED ITEM OR SERVICE: HCPCS | Performed by: HOSPITALIST

## 2019-07-26 PROCEDURE — 700111 HCHG RX REV CODE 636 W/ 250 OVERRIDE (IP): Performed by: PSYCHIATRY & NEUROLOGY

## 2019-07-26 PROCEDURE — 94640 AIRWAY INHALATION TREATMENT: CPT

## 2019-07-26 PROCEDURE — 99233 SBSQ HOSP IP/OBS HIGH 50: CPT | Performed by: HOSPITALIST

## 2019-07-26 PROCEDURE — 700111 HCHG RX REV CODE 636 W/ 250 OVERRIDE (IP): Performed by: HOSPITALIST

## 2019-07-26 PROCEDURE — 700102 HCHG RX REV CODE 250 W/ 637 OVERRIDE(OP): Performed by: HOSPITALIST

## 2019-07-26 PROCEDURE — 700102 HCHG RX REV CODE 250 W/ 637 OVERRIDE(OP): Performed by: PSYCHIATRY & NEUROLOGY

## 2019-07-26 PROCEDURE — 80048 BASIC METABOLIC PNL TOTAL CA: CPT

## 2019-07-26 PROCEDURE — 85025 COMPLETE CBC W/AUTO DIFF WBC: CPT

## 2019-07-26 PROCEDURE — A9270 NON-COVERED ITEM OR SERVICE: HCPCS | Performed by: PSYCHIATRY & NEUROLOGY

## 2019-07-26 PROCEDURE — 36415 COLL VENOUS BLD VENIPUNCTURE: CPT

## 2019-07-26 RX ORDER — LORAZEPAM 2 MG/ML
0.5 INJECTION INTRAMUSCULAR
Status: DISCONTINUED | OUTPATIENT
Start: 2019-07-26 | End: 2019-07-27

## 2019-07-26 RX ADMIN — POTASSIUM BICARBONATE 25 MEQ: 978 TABLET, EFFERVESCENT ORAL at 06:36

## 2019-07-26 RX ADMIN — LEVETIRACETAM 1500 MG: 500 TABLET, FILM COATED ORAL at 18:03

## 2019-07-26 RX ADMIN — PHENOBARBITAL 120 MG: 20 ELIXIR ORAL at 18:02

## 2019-07-26 RX ADMIN — PIPERACILLIN AND TAZOBACTAM 3.38 G: 3; .375 INJECTION, POWDER, LYOPHILIZED, FOR SOLUTION INTRAVENOUS; PARENTERAL at 12:20

## 2019-07-26 RX ADMIN — LORAZEPAM 1 MG: 2 INJECTION INTRAMUSCULAR; INTRAVENOUS at 03:44

## 2019-07-26 RX ADMIN — ERYTHROMYCIN: 5 OINTMENT OPHTHALMIC at 12:20

## 2019-07-26 RX ADMIN — TOPIRAMATE 300 MG: 100 TABLET, FILM COATED ORAL at 21:03

## 2019-07-26 RX ADMIN — NYSTATIN: 100000 POWDER TOPICAL at 06:49

## 2019-07-26 RX ADMIN — LORAZEPAM 0.5 MG: 2 INJECTION INTRAMUSCULAR; INTRAVENOUS at 15:00

## 2019-07-26 RX ADMIN — LORAZEPAM 0.5 MG: 2 INJECTION INTRAMUSCULAR; INTRAVENOUS at 11:08

## 2019-07-26 RX ADMIN — LACOSAMIDE 300 MG: 100 TABLET, FILM COATED ORAL at 06:40

## 2019-07-26 RX ADMIN — PIPERACILLIN AND TAZOBACTAM 4.5 G: 4; .5 INJECTION, POWDER, LYOPHILIZED, FOR SOLUTION INTRAVENOUS; PARENTERAL at 21:03

## 2019-07-26 RX ADMIN — PERAMPANEL 4 MG: 4 TABLET ORAL at 21:14

## 2019-07-26 RX ADMIN — LEVETIRACETAM 1500 MG: 500 TABLET, FILM COATED ORAL at 06:37

## 2019-07-26 RX ADMIN — LORAZEPAM 0.5 MG: 2 INJECTION INTRAMUSCULAR; INTRAVENOUS at 12:45

## 2019-07-26 RX ADMIN — PIPERACILLIN AND TAZOBACTAM 3.38 G: 3; .375 INJECTION, POWDER, LYOPHILIZED, FOR SOLUTION INTRAVENOUS; PARENTERAL at 04:51

## 2019-07-26 RX ADMIN — ERYTHROMYCIN: 5 OINTMENT OPHTHALMIC at 06:43

## 2019-07-26 RX ADMIN — CYANOCOBALAMIN TAB 500 MCG 1000 MCG: 500 TAB at 06:41

## 2019-07-26 RX ADMIN — LACOSAMIDE 300 MG: 100 TABLET, FILM COATED ORAL at 18:03

## 2019-07-26 RX ADMIN — ERYTHROMYCIN: 5 OINTMENT OPHTHALMIC at 18:10

## 2019-07-26 RX ADMIN — PHENOBARBITAL 120 MG: 20 ELIXIR ORAL at 06:31

## 2019-07-26 RX ADMIN — TOPIRAMATE 200 MG: 100 TABLET, FILM COATED ORAL at 06:42

## 2019-07-26 RX ADMIN — NYSTATIN: 100000 POWDER TOPICAL at 18:07

## 2019-07-26 RX ADMIN — Medication 10 MG: at 06:43

## 2019-07-26 NOTE — CARE PLAN
Problem: Venous Thromboembolism (VTW)/Deep Vein Thrombosis (DVT) Prevention:  Goal: Patient will participate in Venous Thrombosis (VTE)/Deep Vein Thrombosis (DVT)Prevention Measures  Outcome: NOT MET  Education provided to Susana (mom) about the importance of DVT prophylaxis. Mom still adamant about not using SCD's or lovenox.     Problem: Skin Integrity  Goal: Risk for impaired skin integrity will decrease  Outcome: PROGRESSING AS EXPECTED  Q 2 hour turns in place.   Skin check completed.

## 2019-07-26 NOTE — PROGRESS NOTES
Hospital Medicine Daily Progress Note    Date of Service  7/26/2019    Chief Complaint  39 y.o. male admitted 7/19/2019 with intractable seizures in the setting of severe TBI when younger and CP    Hospital Course    7/23: with continuous EEG monitoring in place, d/w Dr. Bolton, added Fycompa to his current seizure regiment of keppra, phenobarbital, topamaz, vimpat.  Reviewed CXR from 7/19 and agree with diffuse infiltrate on my review left lung.  Patient appears diaphoretic on exam, ordered CT chest with contrast to rule out pneumonia.   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  UA reviewed no bacteria, has condom cath only.  Baclofen pump nontender on exam.  + yellow exudate bilateral eyelids, started erythromycin opthalmic ointment qid x 5 days.  Also, mother had me re-evaluate patient's left groin for green discharge from his pores.  No obvious skin breakdown or abscess on exam.  There is hyperpigmentation noted in left groin compared to right groin only.  Placed on Rocephin IV and doxycycline via NGT until CT chest results.  7/24:  Patient appeared less diaphoretic today, overall improved on Rocephin IV.  His bilateral conjunctival discharge was clearing on the erythromycin opthalmic ointment.  The mother had called patient's PCP for the skin infection name which she had written trichomycosis axillaris.  I have left a message for Dr. Ireland to discuss this infection since mother is convinced that only levaquin will cure it.  CT chest pending at this time.   Repeat exam per mother request to witness seizure activity.  Reviewed CT chest finding + pneumonia, ordered sputum induced culture by RT and changed doxy/Rocephin to zosyn IV to better cover for aspiration pneumonia.  Mother understands the antibiotics take some time to work in order to resolve his pnuemonia.  7/25:  Overall improved appearance today, able to make eye contact.  No seizures  overnight, then after stimulation with shaving by mother, started with left arm and facial twitching seizure.  No change in oxygenation during witnessed seizure ~1 minute length.  No further green d/c on left groin wipe per mother.  Added nystatin powder to groin instead of RADHA cream to reduce moisture.  Spoke with Dr. Ireland PCP who states patient has had pseudomonas skin infection in the past, but agrees with zosyn IV for abx choice for aspiration pna as well.  7/26: + pseudomonas noted on tracheal aspirate culture, ss cipro and zosyn.  D/w mother and Dr. Bolton, Dr. Bolton has authorized to start Fycompa 4mg qhs for short term use while resolving his aspiration pneumonia with ongoing seizures.             Consultants/Specialty  neurology    Code Status  fc    Disposition  Has home tracheostomy set up at home with oxygen per mother.  PEG tube feeds in place, baclofen pump.  Home with mother once seizures controlled.  Condom catheter for urine.    Review of Systems  Review of Systems   Unable to perform ROS: Acuity of condition        Physical Exam  Temp:  [34.3 °C (93.7 °F)-36.1 °C (96.9 °F)] 36.1 °C (96.9 °F)  Pulse:  [50-84] 78  Resp:  [13-18] 18  BP: (102-125)/(57-73) 104/57  SpO2:  [94 %-97 %] 97 %    Physical Exam   Constitutional: He appears well-developed and well-nourished. No distress.   Non verbal, does not respond to any commands   HENT:   Head: Normocephalic and atraumatic.   Mouth/Throat: No oropharyngeal exudate.   Trach mask   Eyes: Pupils are equal, round, and reactive to light. No scleral icterus.   Neck: Normal range of motion. Neck supple. No thyromegaly present.   Cardiovascular: Normal rate, regular rhythm, normal heart sounds and intact distal pulses.    No murmur heard.  Pulmonary/Chest: Effort normal and breath sounds normal. No respiratory distress. He has no wheezes.   Abdominal: Soft. Bowel sounds are normal. He exhibits no distension. There is no tenderness.   Thin  G tube site appears  C/D/I   Musculoskeletal: Normal range of motion. He exhibits no tenderness.   Neurological: He is alert. No cranial nerve deficit. Coordination abnormal.   Contractures noted   Skin: Skin is warm and dry. No rash noted.   Hyperpigmentation left groin only compared to right, no evidence of abscess or open skin lesion seen as well as bilateral axillary areas.   Nursing note and vitals reviewed.  No change in physical exam today on 7/22/19 compared to 7/21/19    Fluids    Intake/Output Summary (Last 24 hours) at 07/26/19 1536  Last data filed at 07/26/19 1000   Gross per 24 hour   Intake                0 ml   Output              720 ml   Net             -720 ml       Laboratory  Recent Labs      07/24/19   1031  07/25/19   0245  07/26/19   0323   WBC  6.3  5.4  5.4   RBC  3.93*  5.08  5.13   HEMOGLOBIN  12.4*  16.0  16.3   HEMATOCRIT  39.5*  47.3  48.2   MCV  96.4  93.5  94.0   MCH  32.1  31.5  31.8   MCHC  33.2*  33.7  33.8   RDW  63.3*  60.9*  61.4*   PLATELETCT  178  229  220   MPV  9.5  9.4  9.2     Recent Labs      07/24/19   1031  07/25/19   0245  07/26/19   0323   SODIUM  138  139  137   POTASSIUM  3.9  3.5*  3.7   CHLORIDE  109  106  107   CO2  20  23  21   GLUCOSE  88  91  89   BUN  12  16  16   CREATININE  0.21*  0.24*  0.25*   CALCIUM  8.4*  8.8  9.0                   Imaging  CT-CHEST (THORAX) WITH   Final Result      1.  BILATERAL lower lobe atelectasis with possible superimposed pneumonia in the LEFT lower lobe   2.  Faint patchy airspace disease in the RIGHT upper and lower lobes which could be pneumonia   3.  Trace LEFT pleural effusion   4.  Enlarged RIGHT hilar lymph node            IR-US GUIDED PIV   Final Result    Ultrasound-guided PERIPHERAL IV INSERTION performed by    qualified nursing staff as above.            DX-CHEST-PORTABLE (1 VIEW)   Final Result      The perihilar/left lung base atelectasis with pneumonitis/pneumonia not excluded.   Small left pleural effusion.            Assessment/Plan  * Status epilepticus (HCC)- (present on admission)   Assessment & Plan    Despite renal barbital, Keppra, Vimpat, and Topamax continues to have multiple seizures consistent with focal seizures  He will be admitted to the neurology floor and EEG has been ordered, increased dose of vimpat appears to be working at this time  followed as an outpatient by Dr. Bolton, spoke with neurologist  7/23:  d/w Dr. Bolton, added Fycompa to his current seizure regiment of keppra, phenobarbital, topamaz, vimpat.  Reviewed CXR from 7/19 and agree with diffuse infiltrate on my review left lung.  Patient appears diaphoretic on exam, ordered CT chest with contrast to rule out pneumonia.   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  UA reviewed no bacteria, has condom cath only.  Baclofen pump nontender on exam.  + yellow exudate bilateral eyelids, started erythromycin opthalmic ointment qid x 5 days.           Epilepsy (HCC)- (present on admission)   Assessment & Plan    Tenuous outpatient regimen prescribed by Dr. Bolton, long standing h/o seizures  -consider palliative care consult, remains mostly in a vegetative state  -known to have pharmaco-resistant seizures  Dr. Bolton authorized start of short term Zycompa 4mg qhs for ongoing seizures while under current treatment for      TBI (traumatic brain injury) (HCC)- (present on admission)   Assessment & Plan    With history of tracheostomy  Continue his G-tube feeding per his mom's recommendations  Is mentation appears to be effectively baseline  He has a baclofen pain pump     Aspiration pneumonia (HCC)- (present on admission)   Assessment & Plan    History of silent aspiration with tracheostomy and PEG tube feeds.  CTA chest with left sided pneumonia.  Changed Rocephin/doxy to zosyn IV  7/23: Sputum induced culture by RT via trach aspirate with pseudomonas ss zosyn and cipro.     Acute bacterial conjunctivitis of  both eyes- (present on admission)   Assessment & Plan    7/23 started on erythromycin ointment qid x 5 days.     Hypokalemia- (present on admission)   Assessment & Plan    Increase K bisphosphonate 12.5 q 48 hour to 25 daily via PEG tube.     Contraction, joint, multiple sites- (present on admission)   Assessment & Plan    Contractures are stable  No skin breakdown seen on exam.          VTE prophylaxis: lovenox

## 2019-07-26 NOTE — PROGRESS NOTES
Continued seizure activity beginning at 0320, per mom. Susana stated that patient had increasing seizure activity since the initial seizures at 0200. Patient's heart rate increased up to 120, O2 sats went as low as 88, BP went as high as 145/81. Patient medicated with 1 mg Ativan at 0344. Susana expressed concerns of aspiration, patient suctioned and is now resting. Vitals are stable at this time.

## 2019-07-26 NOTE — PROGRESS NOTES
Received report from night nurse at the bedside. Assume care of Pt at 0700. Susana (mom) at bedside, she reports that she will continue to do all of his care including trache care, oral care, and turning and that she will call for any help that she needs.     She has completed half of his morning feeding and would like to do his suppository in about 30 minutes with assistance from us.     Coffee warmed up for Susana at her request.

## 2019-07-26 NOTE — PROGRESS NOTES
Patient had 4 episodes of seizing, beginning at 0200. Muscles contracted/twitching, eyes fluttering/twitching. Seizure activity lasted between 30 seconds - 1 minute at a time. Per mom, seizures began after stimulus (suctioning). Mother refused Ativan or any other intervention other than suction. Vital signs stable.

## 2019-07-26 NOTE — PROGRESS NOTES
Assumed care of patient at 1900. Patient is resting comfortably in bed, mother Susana is at bedside. Spoke with Susana about the plan of care, she stated that she will do most of the care and will ask for help as needed. Skin check and assessment refused by mother other than visual assessment and vitals. Evening medications were given. G tube and IV are intact and have been flushed. Susana states that she will perform trach care, as she does it every day. Per Susana, no other needs at this time. Hourly rounding in place, seizures precautions in place, low air loss mattress in place.

## 2019-07-26 NOTE — PROGRESS NOTES
Hospital Medicine Daily Progress Note    Date of Service  7/25/2019    Chief Complaint  39 y.o. male admitted 7/19/2019 with intractable seizures in the setting of severe TBI when younger and CP    Hospital Course    7/23: with continuous EEG monitoring in place, d/w Dr. Bolton, added Fycompa to his current seizure regiment of keppra, phenobarbital, topamaz, vimpat.  Reviewed CXR from 7/19 and agree with diffuse infiltrate on my review left lung.  Patient appears diaphoretic on exam, ordered CT chest with contrast to rule out pneumonia.   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  UA reviewed no bacteria, has condom cath only.  Baclofen pump nontender on exam.  + yellow exudate bilateral eyelids, started erythromycin opthalmic ointment qid x 5 days.  Also, mother had me re-evaluate patient's left groin for green discharge from his pores.  No obvious skin breakdown or abscess on exam.  There is hyperpigmentation noted in left groin compared to right groin only.  Placed on Rocephin IV and doxycycline via NGT until CT chest results.  7/24:  Patient appeared less diaphoretic today, overall improved on Rocephin IV.  His bilateral conjunctival discharge was clearing on the erythromycin opthalmic ointment.  The mother had called patient's PCP for the skin infection name which she had written trichomycosis axillaris.  I have left a message for Dr. Ireland to discuss this infection since mother is convinced that only levaquin will cure it.  CT chest pending at this time.   Repeat exam per mother request to witness seizure activity.  Reviewed CT chest finding + pneumonia, ordered sputum induced culture by RT and changed doxy/Rocephin to zosyn IV to better cover for aspiration pneumonia.  Mother understands the antibiotics take some time to work in order to resolve his pnuemonia.  7/25:  Overall improved appearance today, able to make eye contact.  No seizures  overnight, then after stimulation with shaving by mother, started with left arm and facial twitching seizure.  No change in oxygenation during witnessed seizure ~1 minute length.  No further green d/c on left groin wipe per mother.  Added nystatin powder to groin instead of RADHA cream to reduce moisture.  Spoke with Dr. Ireland PCP who states patient has had pseudomonas skin infection in the past, but agrees with zosyn IV for abx choice for aspiration pna as well.        Consultants/Specialty  neurology    Code Status  fc    Disposition  Has home tracheostomy set up at home with oxygen per mother.  PEG tube feeds in place, baclofen pump.  Home with mother once seizures controlled.  Condom catheter for urine.    Review of Systems  Review of Systems   Unable to perform ROS: Acuity of condition        Physical Exam  Temp:  [35.6 °C (96.1 °F)-36.2 °C (97.2 °F)] 35.6 °C (96.1 °F)  Pulse:  [61-72] 65  Resp:  [12-16] 15  BP: (117-125)/(66-73) 125/73  SpO2:  [93 %-95 %] 94 %    Physical Exam   Constitutional: He appears well-developed and well-nourished. No distress.   Non verbal, does not respond to any commands   HENT:   Head: Normocephalic and atraumatic.   Mouth/Throat: No oropharyngeal exudate.   Trach mask   Eyes: Pupils are equal, round, and reactive to light. No scleral icterus.   Neck: Normal range of motion. Neck supple. No thyromegaly present.   Cardiovascular: Normal rate, regular rhythm, normal heart sounds and intact distal pulses.    No murmur heard.  Pulmonary/Chest: Effort normal and breath sounds normal. No respiratory distress. He has no wheezes.   Abdominal: Soft. Bowel sounds are normal. He exhibits no distension. There is no tenderness.   Thin  G tube site appears C/D/I   Musculoskeletal: Normal range of motion. He exhibits no tenderness.   Neurological: He is alert. No cranial nerve deficit. Coordination abnormal.   Contractures noted   Skin: Skin is warm and dry. No rash noted.   Hyperpigmentation  left groin only compared to right, no evidence of abscess or open skin lesion seen as well as bilateral axillary areas.   Nursing note and vitals reviewed.  No change in physical exam today on 7/22/19 compared to 7/21/19    Fluids    Intake/Output Summary (Last 24 hours) at 07/25/19 1725  Last data filed at 07/25/19 0930   Gross per 24 hour   Intake              250 ml   Output             1600 ml   Net            -1350 ml       Laboratory  Recent Labs      07/23/19   0846  07/24/19   1031  07/25/19   0245   WBC  7.2  6.3  5.4   RBC  5.35  3.93*  5.08   HEMOGLOBIN  16.6  12.4*  16.0   HEMATOCRIT  50.0  39.5*  47.3   MCV  93.5  96.4  93.5   MCH  31.0  32.1  31.5   MCHC  33.2*  33.2*  33.7   RDW  62.3*  63.3*  60.9*   PLATELETCT  259  178  229   MPV  9.6  9.5  9.4     Recent Labs      07/23/19   0846  07/24/19   1031  07/25/19   0245   SODIUM  138  138  139   POTASSIUM  3.5*  3.9  3.5*   CHLORIDE  107  109  106   CO2  20  20  23   GLUCOSE  119*  88  91   BUN  18  12  16   CREATININE  0.24*  0.21*  0.24*   CALCIUM  8.9  8.4*  8.8                   Imaging  CT-CHEST (THORAX) WITH   Final Result      1.  BILATERAL lower lobe atelectasis with possible superimposed pneumonia in the LEFT lower lobe   2.  Faint patchy airspace disease in the RIGHT upper and lower lobes which could be pneumonia   3.  Trace LEFT pleural effusion   4.  Enlarged RIGHT hilar lymph node            IR-US GUIDED PIV   Final Result    Ultrasound-guided PERIPHERAL IV INSERTION performed by    qualified nursing staff as above.            DX-CHEST-PORTABLE (1 VIEW)   Final Result      The perihilar/left lung base atelectasis with pneumonitis/pneumonia not excluded.   Small left pleural effusion.           Assessment/Plan  * Status epilepticus (HCC)- (present on admission)   Assessment & Plan    Despite renal barbital, Keppra, Vimpat, and Topamax continues to have multiple seizures consistent with focal seizures  He will be admitted to the neurology  floor and EEG has been ordered, increased dose of vimpat appears to be working at this time  followed as an outpatient by Dr. Bolton, spoke with neurologist  7/23:  d/w Dr. Bolton, added Fycompa to his current seizure regiment of keppra, phenobarbital, topamaz, vimpat.  Reviewed CXR from 7/19 and agree with diffuse infiltrate on my review left lung.  Patient appears diaphoretic on exam, ordered CT chest with contrast to rule out pneumonia.   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  UA reviewed no bacteria, has condom cath only.  Baclofen pump nontender on exam.  + yellow exudate bilateral eyelids, started erythromycin opthalmic ointment qid x 5 days.           Epilepsy (HCC)- (present on admission)   Assessment & Plan    Tenuous outpatient regimen prescribed by Dr. Bolton, long standing h/o seizures  -consider palliative care consult, remains mostly in a vegetative state  -known to have pharmaco-resistant seizures     TBI (traumatic brain injury) ([facility])- (present on admission)   Assessment & Plan    With history of tracheostomy  Continue his G-tube feeding per his mom's recommendations  Is mentation appears to be effectively baseline  He has a baclofen pain pump     Aspiration pneumonia ([facility])- (present on admission)   Assessment & Plan    History of silent aspiration with tracheostomy and PEG tube feeds.  CTA chest with left sided pneumonia.  Changed Rocephin/doxy to zosyn IV  Sputum induced culture by RT via trach aspirate.     Acute bacterial conjunctivitis of both eyes- (present on admission)   Assessment & Plan    7/23 started on erythromycin ointment qid x 5 days.     Hypokalemia- (present on admission)   Assessment & Plan    Increase K bisphosphonate 12.5 q 48 hour to 25 daily via PEG tube.     Contraction, joint, multiple sites- (present on admission)   Assessment & Plan    Contractures are stable  No skin breakdown seen on exam.          VTE prophylaxis:  lovenox

## 2019-07-26 NOTE — PROGRESS NOTES
Susana refused Lovenox and stool softeners for Ruben. Susana also provides her own feeds for Ruben from home, which she will give prior to morning med pass. She also refused assistance with turns, and self regulated the low air loss mattress to ensure that Ruben was properly positioned throughout the night.

## 2019-07-27 LAB
ANION GAP SERPL CALC-SCNC: 7 MMOL/L (ref 0–11.9)
BACTERIA SPEC RESP CULT: ABNORMAL
BASOPHILS # BLD AUTO: 0.7 % (ref 0–1.8)
BASOPHILS # BLD: 0.03 K/UL (ref 0–0.12)
BUN SERPL-MCNC: 16 MG/DL (ref 8–22)
CALCIUM SERPL-MCNC: 8.6 MG/DL (ref 8.5–10.5)
CHLORIDE SERPL-SCNC: 109 MMOL/L (ref 96–112)
CO2 SERPL-SCNC: 23 MMOL/L (ref 20–33)
CREAT SERPL-MCNC: 0.24 MG/DL (ref 0.5–1.4)
EOSINOPHIL # BLD AUTO: 0.26 K/UL (ref 0–0.51)
EOSINOPHIL NFR BLD: 5.7 % (ref 0–6.9)
ERYTHROCYTE [DISTWIDTH] IN BLOOD BY AUTOMATED COUNT: 62.4 FL (ref 35.9–50)
GLUCOSE SERPL-MCNC: 90 MG/DL (ref 65–99)
GRAM STN SPEC: ABNORMAL
HCT VFR BLD AUTO: 48.2 % (ref 42–52)
HGB BLD-MCNC: 15.7 G/DL (ref 14–18)
IMM GRANULOCYTES # BLD AUTO: 0.02 K/UL (ref 0–0.11)
IMM GRANULOCYTES NFR BLD AUTO: 0.4 % (ref 0–0.9)
LYMPHOCYTES # BLD AUTO: 1.33 K/UL (ref 1–4.8)
LYMPHOCYTES NFR BLD: 29.2 % (ref 22–41)
MCH RBC QN AUTO: 31.5 PG (ref 27–33)
MCHC RBC AUTO-ENTMCNC: 32.6 G/DL (ref 33.7–35.3)
MCV RBC AUTO: 96.6 FL (ref 81.4–97.8)
MONOCYTES # BLD AUTO: 0.4 K/UL (ref 0–0.85)
MONOCYTES NFR BLD AUTO: 8.8 % (ref 0–13.4)
NEUTROPHILS # BLD AUTO: 2.52 K/UL (ref 1.82–7.42)
NEUTROPHILS NFR BLD: 55.2 % (ref 44–72)
NRBC # BLD AUTO: 0 K/UL
NRBC BLD-RTO: 0 /100 WBC
PLATELET # BLD AUTO: 200 K/UL (ref 164–446)
PMV BLD AUTO: 9.2 FL (ref 9–12.9)
POTASSIUM SERPL-SCNC: 3.3 MMOL/L (ref 3.6–5.5)
PROCALCITONIN SERPL-MCNC: <0.05 NG/ML
RBC # BLD AUTO: 4.99 M/UL (ref 4.7–6.1)
SIGNIFICANT IND 70042: ABNORMAL
SITE SITE: ABNORMAL
SODIUM SERPL-SCNC: 139 MMOL/L (ref 135–145)
SOURCE SOURCE: ABNORMAL
WBC # BLD AUTO: 4.6 K/UL (ref 4.8–10.8)

## 2019-07-27 PROCEDURE — 700102 HCHG RX REV CODE 250 W/ 637 OVERRIDE(OP): Performed by: HOSPITALIST

## 2019-07-27 PROCEDURE — 700101 HCHG RX REV CODE 250: Performed by: PSYCHIATRY & NEUROLOGY

## 2019-07-27 PROCEDURE — 80048 BASIC METABOLIC PNL TOTAL CA: CPT

## 2019-07-27 PROCEDURE — 84145 PROCALCITONIN (PCT): CPT

## 2019-07-27 PROCEDURE — 700111 HCHG RX REV CODE 636 W/ 250 OVERRIDE (IP): Performed by: HOSPITALIST

## 2019-07-27 PROCEDURE — 770001 HCHG ROOM/CARE - MED/SURG/GYN PRIV*

## 2019-07-27 PROCEDURE — 700105 HCHG RX REV CODE 258: Performed by: HOSPITALIST

## 2019-07-27 PROCEDURE — A9270 NON-COVERED ITEM OR SERVICE: HCPCS | Performed by: PSYCHIATRY & NEUROLOGY

## 2019-07-27 PROCEDURE — 99233 SBSQ HOSP IP/OBS HIGH 50: CPT | Performed by: HOSPITALIST

## 2019-07-27 PROCEDURE — 700102 HCHG RX REV CODE 250 W/ 637 OVERRIDE(OP): Performed by: PSYCHIATRY & NEUROLOGY

## 2019-07-27 PROCEDURE — 94640 AIRWAY INHALATION TREATMENT: CPT

## 2019-07-27 PROCEDURE — A9270 NON-COVERED ITEM OR SERVICE: HCPCS | Performed by: HOSPITALIST

## 2019-07-27 PROCEDURE — 36415 COLL VENOUS BLD VENIPUNCTURE: CPT

## 2019-07-27 PROCEDURE — 85025 COMPLETE CBC W/AUTO DIFF WBC: CPT

## 2019-07-27 RX ORDER — LORAZEPAM 2 MG/ML
.5-1 INJECTION INTRAMUSCULAR
Status: DISCONTINUED | OUTPATIENT
Start: 2019-07-27 | End: 2019-08-03 | Stop reason: HOSPADM

## 2019-07-27 RX ADMIN — TOPIRAMATE 300 MG: 100 TABLET, FILM COATED ORAL at 21:44

## 2019-07-27 RX ADMIN — POTASSIUM BICARBONATE 25 MEQ: 978 TABLET, EFFERVESCENT ORAL at 17:54

## 2019-07-27 RX ADMIN — LORAZEPAM 0.5 MG: 2 INJECTION INTRAMUSCULAR; INTRAVENOUS at 04:18

## 2019-07-27 RX ADMIN — PERAMPANEL 4 MG: 4 TABLET ORAL at 21:44

## 2019-07-27 RX ADMIN — TOPIRAMATE 200 MG: 100 TABLET, FILM COATED ORAL at 06:27

## 2019-07-27 RX ADMIN — POTASSIUM BICARBONATE 25 MEQ: 978 TABLET, EFFERVESCENT ORAL at 06:28

## 2019-07-27 RX ADMIN — PIPERACILLIN AND TAZOBACTAM 4.5 G: 4; .5 INJECTION, POWDER, LYOPHILIZED, FOR SOLUTION INTRAVENOUS; PARENTERAL at 05:11

## 2019-07-27 RX ADMIN — LORAZEPAM 1 MG: 2 INJECTION INTRAMUSCULAR; INTRAVENOUS at 16:50

## 2019-07-27 RX ADMIN — LACOSAMIDE 300 MG: 100 TABLET, FILM COATED ORAL at 17:54

## 2019-07-27 RX ADMIN — PHENOBARBITAL 120 MG: 20 ELIXIR ORAL at 17:54

## 2019-07-27 RX ADMIN — LORAZEPAM 0.5 MG: 2 INJECTION INTRAMUSCULAR; INTRAVENOUS at 02:47

## 2019-07-27 RX ADMIN — PIPERACILLIN AND TAZOBACTAM 4.5 G: 4; .5 INJECTION, POWDER, LYOPHILIZED, FOR SOLUTION INTRAVENOUS; PARENTERAL at 12:52

## 2019-07-27 RX ADMIN — PHENOBARBITAL 120 MG: 20 ELIXIR ORAL at 08:29

## 2019-07-27 RX ADMIN — LEVETIRACETAM 1500 MG: 500 TABLET, FILM COATED ORAL at 17:54

## 2019-07-27 RX ADMIN — PIPERACILLIN AND TAZOBACTAM 4.5 G: 4; .5 INJECTION, POWDER, LYOPHILIZED, FOR SOLUTION INTRAVENOUS; PARENTERAL at 21:43

## 2019-07-27 RX ADMIN — ERYTHROMYCIN: 5 OINTMENT OPHTHALMIC at 06:29

## 2019-07-27 RX ADMIN — LACOSAMIDE 300 MG: 100 TABLET, FILM COATED ORAL at 06:28

## 2019-07-27 RX ADMIN — NYSTATIN: 100000 POWDER TOPICAL at 17:56

## 2019-07-27 RX ADMIN — LEVETIRACETAM 1500 MG: 500 TABLET, FILM COATED ORAL at 06:28

## 2019-07-27 RX ADMIN — NYSTATIN: 100000 POWDER TOPICAL at 06:29

## 2019-07-27 RX ADMIN — CYANOCOBALAMIN TAB 500 MCG 1000 MCG: 500 TAB at 06:27

## 2019-07-27 NOTE — CARE PLAN
Problem: Safety  Goal: Will remain free from injury  Outcome: PROGRESSING AS EXPECTED  Seizure/fall precautions in place; bed locked and in lowest position, family at bedside, room near nurses station and hourly rounding in place.     Problem: Knowledge Deficit  Goal: Knowledge of the prescribed therapeutic regimen will improve  Outcome: PROGRESSING AS EXPECTED  Family updated on plan of care. Will continue seizure precautions and medicate patient for seizures prn. Family verbalizes understanding.     Problem: Safety:  Goal: Will remain free from injury  Outcome: PROGRESSING AS EXPECTED  Seizure/fall precautions in place; bed locked and in lowest position, family at bedside, room near nurses station and hourly rounding in place.     Problem: Knowledge Deficit:  Goal: Knowledge of the prescribed therapeutic regimen will improve  Outcome: PROGRESSING AS EXPECTED  Family updated on plan of care. Will continue seizure precautions and medicate patient for seizures prn. Family verbalizes understanding.

## 2019-07-27 NOTE — PROGRESS NOTES
Pt continues to seize frequently throughout the day. Ativan provided to patient per mar at Susana (Mom's) request.

## 2019-07-27 NOTE — PROGRESS NOTES
"RT received call from RN to adjust the humidification for patient. Upon entering the room the patients mother asked this RT to adjust temperature and explained that she normal adjusts it but pt is still having thick secretions. This RT explained to the patients mother that only medical staff can adjust equipment and to please call when concern arises, patients mother then became upset with RT and stated, \"I've been caring for my son longer than you have been working here, I am the only reason you have a job\". RT acknowledged family members concern and stated that I am only here to help and provide care to her and her son but, that medical equipment can cause harm to patient if not managed properly by trained professionals. Patients mother began to become aggressive and verbally abusive to RT stating, \" I know people above you in this hospital and I have all the doctors on my side that will tell you to listen to me\", also \" We are going to butt heads, I know what is best\". RT expressed to patients mother that I understand her frustrations and am willing to work with her needs. Patients mother than asked to speak w/ respiratory supervisor, RT acknowledged patients mothers request and explained that I will adjust the temperature and go. Patients mother then began to hover over RT and ask how to do adjustments and what setting she specifically wanted it on. Respiratory Therapy Charge has been notified.  "

## 2019-07-27 NOTE — CARE PLAN
Problem: Safety:  Goal: Encourage identification and reduction of causative stimuli  Outcome: PROGRESSING SLOWER THAN EXPECTED  Discussed seizure precautions with Susana and the safety measure that are normally taken. Susana declined to have siderails wrapped as Ruben does not have grand mal seizures.    Problem: Psychosocial Needs:  Goal: Ability to verbalize feelings about condition will improve  Outcome: PROGRESSING AS EXPECTED    Intervention: Encourage verbalization of feelings regarding condition or disease  Encouraged Susana to verbalize her feeling and express frustrations.

## 2019-07-27 NOTE — PROGRESS NOTES
Updated Elizabeth on patients secretions being harder to suction. Requested she come and adjust the humidification.

## 2019-07-27 NOTE — PROGRESS NOTES
Patient seen resting comfortably in bed.   Susana (patient's mother) at bedside.  Plan of care updated. Will continue IV abx. Mother verbalizes understanding.  Mother would like to continue to do patient's care (I.e trache care, turns, etc.) and will call if assistance is needed.  Low air loss mattress in use.  Medications administered without difficulty.   All needs addressed at this time.   Call light within reach, bed locked and in lowest position, family at bedside, room near nurses station and hourly rounding in place.

## 2019-07-27 NOTE — PROGRESS NOTES
2 RN skin check completed with MARE Rubi  No areas of skin breakdown noted.   Skin on bilateral heals pink and blanching  Skin to left ischium pink and blanching with old scars from previous decubitus ulcers.

## 2019-07-27 NOTE — PROGRESS NOTES
0220: Patient having frequent seizures (documented in flow sheet). Patient's mom (Susana) refusing administration of prn Ativan at this time despite education of importance (frequency of seizures).     0242: Patient's mom agreeable to administration of prn Ativan. Will administer prn dose and continue to monitor seizure activity.

## 2019-07-28 LAB
ANION GAP SERPL CALC-SCNC: 10 MMOL/L (ref 0–11.9)
BASOPHILS # BLD AUTO: 0.5 % (ref 0–1.8)
BASOPHILS # BLD: 0.04 K/UL (ref 0–0.12)
BUN SERPL-MCNC: 15 MG/DL (ref 8–22)
CALCIUM SERPL-MCNC: 8.7 MG/DL (ref 8.5–10.5)
CHLORIDE SERPL-SCNC: 109 MMOL/L (ref 96–112)
CO2 SERPL-SCNC: 22 MMOL/L (ref 20–33)
CREAT SERPL-MCNC: <0.2 MG/DL (ref 0.5–1.4)
EOSINOPHIL # BLD AUTO: 0.32 K/UL (ref 0–0.51)
EOSINOPHIL NFR BLD: 3.7 % (ref 0–6.9)
ERYTHROCYTE [DISTWIDTH] IN BLOOD BY AUTOMATED COUNT: 62.8 FL (ref 35.9–50)
GLUCOSE SERPL-MCNC: 81 MG/DL (ref 65–99)
HCT VFR BLD AUTO: 49 % (ref 42–52)
HGB BLD-MCNC: 15.6 G/DL (ref 14–18)
IMM GRANULOCYTES # BLD AUTO: 0.03 K/UL (ref 0–0.11)
IMM GRANULOCYTES NFR BLD AUTO: 0.3 % (ref 0–0.9)
LYMPHOCYTES # BLD AUTO: 1.76 K/UL (ref 1–4.8)
LYMPHOCYTES NFR BLD: 20.1 % (ref 22–41)
MCH RBC QN AUTO: 30.6 PG (ref 27–33)
MCHC RBC AUTO-ENTMCNC: 31.8 G/DL (ref 33.7–35.3)
MCV RBC AUTO: 96.1 FL (ref 81.4–97.8)
MONOCYTES # BLD AUTO: 0.63 K/UL (ref 0–0.85)
MONOCYTES NFR BLD AUTO: 7.2 % (ref 0–13.4)
NEUTROPHILS # BLD AUTO: 5.97 K/UL (ref 1.82–7.42)
NEUTROPHILS NFR BLD: 68.2 % (ref 44–72)
NRBC # BLD AUTO: 0 K/UL
NRBC BLD-RTO: 0 /100 WBC
PLATELET # BLD AUTO: 242 K/UL (ref 164–446)
PMV BLD AUTO: 9.4 FL (ref 9–12.9)
POTASSIUM SERPL-SCNC: 3.6 MMOL/L (ref 3.6–5.5)
RBC # BLD AUTO: 5.1 M/UL (ref 4.7–6.1)
SODIUM SERPL-SCNC: 141 MMOL/L (ref 135–145)
WBC # BLD AUTO: 8.8 K/UL (ref 4.8–10.8)

## 2019-07-28 PROCEDURE — 85025 COMPLETE CBC W/AUTO DIFF WBC: CPT

## 2019-07-28 PROCEDURE — A9270 NON-COVERED ITEM OR SERVICE: HCPCS | Performed by: PSYCHIATRY & NEUROLOGY

## 2019-07-28 PROCEDURE — 700111 HCHG RX REV CODE 636 W/ 250 OVERRIDE (IP): Performed by: HOSPITALIST

## 2019-07-28 PROCEDURE — A9270 NON-COVERED ITEM OR SERVICE: HCPCS | Performed by: HOSPITALIST

## 2019-07-28 PROCEDURE — 700102 HCHG RX REV CODE 250 W/ 637 OVERRIDE(OP): Performed by: PSYCHIATRY & NEUROLOGY

## 2019-07-28 PROCEDURE — 94640 AIRWAY INHALATION TREATMENT: CPT

## 2019-07-28 PROCEDURE — 80048 BASIC METABOLIC PNL TOTAL CA: CPT

## 2019-07-28 PROCEDURE — 700102 HCHG RX REV CODE 250 W/ 637 OVERRIDE(OP): Performed by: HOSPITALIST

## 2019-07-28 PROCEDURE — 36415 COLL VENOUS BLD VENIPUNCTURE: CPT

## 2019-07-28 PROCEDURE — 700101 HCHG RX REV CODE 250: Performed by: PSYCHIATRY & NEUROLOGY

## 2019-07-28 PROCEDURE — 700105 HCHG RX REV CODE 258: Performed by: HOSPITALIST

## 2019-07-28 PROCEDURE — 99233 SBSQ HOSP IP/OBS HIGH 50: CPT | Performed by: HOSPITALIST

## 2019-07-28 PROCEDURE — 770001 HCHG ROOM/CARE - MED/SURG/GYN PRIV*

## 2019-07-28 RX ADMIN — LEVETIRACETAM 1500 MG: 500 TABLET, FILM COATED ORAL at 06:29

## 2019-07-28 RX ADMIN — PHENOBARBITAL 120 MG: 20 ELIXIR ORAL at 18:00

## 2019-07-28 RX ADMIN — PERAMPANEL 4 MG: 4 TABLET ORAL at 21:11

## 2019-07-28 RX ADMIN — PHENOBARBITAL 120 MG: 20 ELIXIR ORAL at 06:30

## 2019-07-28 RX ADMIN — TOPIRAMATE 200 MG: 100 TABLET, FILM COATED ORAL at 06:30

## 2019-07-28 RX ADMIN — PIPERACILLIN AND TAZOBACTAM 4.5 G: 4; .5 INJECTION, POWDER, LYOPHILIZED, FOR SOLUTION INTRAVENOUS; PARENTERAL at 21:11

## 2019-07-28 RX ADMIN — POTASSIUM BICARBONATE 25 MEQ: 978 TABLET, EFFERVESCENT ORAL at 18:26

## 2019-07-28 RX ADMIN — LACOSAMIDE 300 MG: 100 TABLET, FILM COATED ORAL at 06:30

## 2019-07-28 RX ADMIN — LORAZEPAM 1 MG: 2 INJECTION INTRAMUSCULAR; INTRAVENOUS at 01:18

## 2019-07-28 RX ADMIN — PIPERACILLIN AND TAZOBACTAM 4.5 G: 4; .5 INJECTION, POWDER, LYOPHILIZED, FOR SOLUTION INTRAVENOUS; PARENTERAL at 06:19

## 2019-07-28 RX ADMIN — LORAZEPAM 0.5 MG: 2 INJECTION INTRAMUSCULAR; INTRAVENOUS at 03:50

## 2019-07-28 RX ADMIN — LORAZEPAM 1 MG: 2 INJECTION INTRAMUSCULAR; INTRAVENOUS at 16:18

## 2019-07-28 RX ADMIN — PIPERACILLIN AND TAZOBACTAM 4.5 G: 4; .5 INJECTION, POWDER, LYOPHILIZED, FOR SOLUTION INTRAVENOUS; PARENTERAL at 13:02

## 2019-07-28 RX ADMIN — TOPIRAMATE 300 MG: 100 TABLET, FILM COATED ORAL at 21:12

## 2019-07-28 RX ADMIN — CYANOCOBALAMIN TAB 500 MCG 1000 MCG: 500 TAB at 06:29

## 2019-07-28 RX ADMIN — LACOSAMIDE 300 MG: 100 TABLET, FILM COATED ORAL at 18:26

## 2019-07-28 RX ADMIN — ERYTHROMYCIN: 5 OINTMENT OPHTHALMIC at 06:31

## 2019-07-28 RX ADMIN — LORAZEPAM 0.5 MG: 2 INJECTION INTRAMUSCULAR; INTRAVENOUS at 14:43

## 2019-07-28 RX ADMIN — POTASSIUM BICARBONATE 25 MEQ: 978 TABLET, EFFERVESCENT ORAL at 06:30

## 2019-07-28 RX ADMIN — LEVETIRACETAM 1500 MG: 500 TABLET, FILM COATED ORAL at 18:26

## 2019-07-28 RX ADMIN — Medication 10 MG: at 06:30

## 2019-07-28 RX ADMIN — LORAZEPAM 1 MG: 2 INJECTION INTRAMUSCULAR; INTRAVENOUS at 18:23

## 2019-07-28 NOTE — CARE PLAN
Problem: Safety  Goal: Will remain free from falls    Intervention: Implement fall precautions  Patient free of falls and fall precautions in place.      Problem: Skin Integrity  Goal: Risk for impaired skin integrity will decrease  Outcome: PROGRESSING AS EXPECTED  Gave education to caretaker/mother on importance of turning. Still refusing Q2 turns.

## 2019-07-28 NOTE — CARE PLAN
Problem: Safety  Goal: Will remain free from injury  Outcome: PROGRESSING AS EXPECTED  Seizure/fall precautions in place. Bed locked and in lowest position, room near nurses station, and hourly rounding in place. Family at bedside.     Problem: Knowledge Deficit  Goal: Knowledge of disease process/condition, treatment plan, diagnostic tests, and medications will improve  Outcome: PROGRESSING AS EXPECTED  Patient's mother at bedside. Mother updated on plan of care. Will continue IV antibiotics and continue to monitor/treat seizures. Patient's family verbalizes understanding.     Problem: Safety:  Goal: Will remain free from injury  Outcome: PROGRESSING AS EXPECTED  Seizure/fall precautions in place. Bed locked and in lowest position, room near nurses station, and hourly rounding in place. Family at bedside.

## 2019-07-28 NOTE — PROGRESS NOTES
Hospital Medicine Daily Progress Note    Date of Service  7/28/2019    Chief Complaint  39 y.o. male admitted 7/19/2019 with intractable seizures in the setting of severe TBI when younger and CP    Hospital Course    7/23: with continuous EEG monitoring in place, d/w Dr. Bolton, added Fycompa to his current seizure regiment of keppra, phenobarbital, topamaz, vimpat.  Reviewed CXR from 7/19 and agree with diffuse infiltrate on my review left lung.  Patient appears diaphoretic on exam, ordered CT chest with contrast to rule out pneumonia.   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  UA reviewed no bacteria, has condom cath only.  Baclofen pump nontender on exam.  + yellow exudate bilateral eyelids, started erythromycin opthalmic ointment qid x 5 days.  Also, mother had me re-evaluate patient's left groin for green discharge from his pores.  No obvious skin breakdown or abscess on exam.  There is hyperpigmentation noted in left groin compared to right groin only.  Placed on Rocephin IV and doxycycline via NGT until CT chest results.  7/24:  Patient appeared less diaphoretic today, overall improved on Rocephin IV.  His bilateral conjunctival discharge was clearing on the erythromycin opthalmic ointment.  The mother had called patient's PCP for the skin infection name which she had written trichomycosis axillaris.  I have left a message for Dr. Ireland to discuss this infection since mother is convinced that only levaquin will cure it.  CT chest pending at this time.   Repeat exam per mother request to witness seizure activity.  Reviewed CT chest finding + pneumonia, ordered sputum induced culture by RT and changed doxy/Rocephin to zosyn IV to better cover for aspiration pneumonia.  Mother understands the antibiotics take some time to work in order to resolve his pnuemonia.  7/25:  Overall improved appearance today, able to make eye contact.  No seizures  overnight, then after stimulation with shaving by mother, started with left arm and facial twitching seizure.  No change in oxygenation during witnessed seizure ~1 minute length.  No further green d/c on left groin wipe per mother.  Added nystatin powder to groin instead of RADHA cream to reduce moisture.  Spoke with Dr. Ireland PCP who states patient has had pseudomonas skin infection in the past, but agrees with zosyn IV for abx choice for aspiration pna as well.  7/26: + pseudomonas noted on tracheal aspirate culture, ss cipro and zosyn.  D/w mother and Dr. Bolton, Dr. Bolton has authorized to start Fycompa 4mg qhs for short term use while resolving his aspiration pneumonia with ongoing seizures.  7/27:  Spoke with mother at bedside about persistent seizures may take more time to resolve with ongoing pseudomonas infection.  Called [employer] for rep to investigate baclofen pump to ensure not malfunctioning and causing persistent intractable seizures.  He continues to require ativan IV prn for recurrent seizures to left arm and face, witnessed by shaking and brought on by stimulation.  7/28:  Patient without seizure since 0600 this am., last ativan given at 0330.  He had multiple seizures last night per bedside RN and mother.  However, mother gave bath, dulcolax suppository and he did not have seizure.  VSS.             Consultants/Specialty  neurology    Code Status  fc    Disposition  Has home tracheostomy set up at home with oxygen per mother.  PEG tube feeds in place, baclofen pump.  Home with mother once seizures controlled.  Condom catheter for urine.    Review of Systems  Review of Systems   Unable to perform ROS: Acuity of condition        Physical Exam  Temp:  [36.2 °C (97.1 °F)-36.7 °C (98.1 °F)] 36.7 °C (98 °F)  Pulse:  [61-77] 77  Resp:  [17-19] 18  BP: (124-127)/(64-67) 127/67  SpO2:  [94 %-98 %] 95 %    Physical Exam   Constitutional: He appears well-developed and well-nourished. No distress.   Non  verbal, does not respond to any commands   HENT:   Head: Normocephalic and atraumatic.   Mouth/Throat: No oropharyngeal exudate.   Trach mask   Eyes: Pupils are equal, round, and reactive to light. No scleral icterus.   Neck: Normal range of motion. Neck supple. No thyromegaly present.   Cardiovascular: Normal rate, regular rhythm, normal heart sounds and intact distal pulses.    No murmur heard.  Pulmonary/Chest: Effort normal and breath sounds normal. No respiratory distress. He has no wheezes.   Abdominal: Soft. Bowel sounds are normal. He exhibits no distension. There is no tenderness.   Thin  G tube site appears C/D/I   Musculoskeletal: Normal range of motion. He exhibits no tenderness.   Neurological: He is alert. No cranial nerve deficit. Coordination abnormal.   Contractures noted   Skin: Skin is warm and dry. No rash noted.   Hyperpigmentation left groin only compared to right, no evidence of abscess or open skin lesion seen as well as bilateral axillary areas.   Nursing note and vitals reviewed.  No change in physical exam today on 7/22/19 compared to 7/21/19    Fluids    Intake/Output Summary (Last 24 hours) at 07/28/19 1412  Last data filed at 07/27/19 2145   Gross per 24 hour   Intake               80 ml   Output              750 ml   Net             -670 ml       Laboratory  Recent Labs      07/26/19   0323  07/27/19   0349  07/28/19   0405   WBC  5.4  4.6*  8.8   RBC  5.13  4.99  5.10   HEMOGLOBIN  16.3  15.7  15.6   HEMATOCRIT  48.2  48.2  49.0   MCV  94.0  96.6  96.1   MCH  31.8  31.5  30.6   MCHC  33.8  32.6*  31.8*   RDW  61.4*  62.4*  62.8*   PLATELETCT  220  200  242   MPV  9.2  9.2  9.4     Recent Labs      07/26/19   0323  07/27/19   0349  07/28/19   0405   SODIUM  137  139  141   POTASSIUM  3.7  3.3*  3.6   CHLORIDE  107  109  109   CO2  21  23  22   GLUCOSE  89  90  81   BUN  16  16  15   CREATININE  0.25*  0.24*  <0.20*   CALCIUM  9.0  8.6  8.7                   Imaging  CT-CHEST (THORAX)  WITH   Final Result      1.  BILATERAL lower lobe atelectasis with possible superimposed pneumonia in the LEFT lower lobe   2.  Faint patchy airspace disease in the RIGHT upper and lower lobes which could be pneumonia   3.  Trace LEFT pleural effusion   4.  Enlarged RIGHT hilar lymph node            IR-US GUIDED PIV   Final Result    Ultrasound-guided PERIPHERAL IV INSERTION performed by    qualified nursing staff as above.            DX-CHEST-PORTABLE (1 VIEW)   Final Result      The perihilar/left lung base atelectasis with pneumonitis/pneumonia not excluded.   Small left pleural effusion.           Assessment/Plan  * Status epilepticus (HCC)- (present on admission)   Assessment & Plan    Despite renal barbital, Keppra, Vimpat, and Topamax continues to have multiple seizures consistent with focal seizures  He will be admitted to the neurology floor and EEG has been ordered, increased dose of vimpat appears to be working at this time  followed as an outpatient by Dr. Bolton, spoke with neurologist  7/23:  d/w Dr. Bolton, added Fycompa to his current seizure regiment of keppra, phenobarbital, topamaz, vimpat.  Reviewed CXR from 7/19 and agree with diffuse infiltrate on my review left lung.  Patient appears diaphoretic on exam, ordered CT chest with contrast to rule out pneumonia.   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  UA reviewed no bacteria, has condom cath only.  Baclofen pump nontender on exam.  + yellow exudate bilateral eyelids, started erythromycin opthalmic ointment qid x 5 days.           Epilepsy (HCC)- (present on admission)   Assessment & Plan    Tenuous outpatient regimen prescribed by Dr. Bolton, long standing h/o seizures  -consider palliative care consult, remains mostly in a vegetative state  -known to have pharmaco-resistant seizures  Dr. Bolton authorized start of short term Zycompa 4mg qhs for ongoing seizures while under current treatment for       TBI (traumatic brain injury) (HCC)- (present on admission)   Assessment & Plan    With history of tracheostomy  Continue his G-tube feeding per his mom's recommendations  Is mentation appears to be effectively baseline  He has a baclofen pain pump     Aspiration pneumonia (HCC)- (present on admission)   Assessment & Plan    History of silent aspiration with tracheostomy and PEG tube feeds.  CTA chest with left sided pneumonia.  Changed Rocephin/doxy to zosyn IV  7/23: Sputum induced culture by RT via trach aspirate with pseudomonas ss zosyn and cipro.     Acute bacterial conjunctivitis of both eyes- (present on admission)   Assessment & Plan    7/23 started on erythromycin ointment qid x 5 days.     Hypokalemia- (present on admission)   Assessment & Plan    Increase K bisphosphonate 12.5 q 48 hour to 25 daily via PEG tube.     Contraction, joint, multiple sites- (present on admission)   Assessment & Plan    Contractures are stable  No skin breakdown seen on exam.          VTE prophylaxis: Mother refuses to allow any lovenox injections or SCDs.  She understands the risks of DVTs while immobile.  She states she works with his legs daily.

## 2019-07-28 NOTE — CARE PLAN
Problem: Bronchopulmonary Hygiene:  Goal: Increase mobilization of retained secretions    Intervention: Perform bronchopulmonary therapy as indicated by assessment  Pt has #6 cuffless Shiley Trach. Pt w/ scant white secretions noted during shift. Pt on 4 LPM 28% trach collar.

## 2019-07-29 LAB
ANION GAP SERPL CALC-SCNC: 9 MMOL/L (ref 0–11.9)
BASOPHILS # BLD AUTO: 0.4 % (ref 0–1.8)
BASOPHILS # BLD: 0.03 K/UL (ref 0–0.12)
BUN SERPL-MCNC: 17 MG/DL (ref 8–22)
CALCIUM SERPL-MCNC: 8.7 MG/DL (ref 8.5–10.5)
CHLORIDE SERPL-SCNC: 108 MMOL/L (ref 96–112)
CO2 SERPL-SCNC: 23 MMOL/L (ref 20–33)
CREAT SERPL-MCNC: 0.23 MG/DL (ref 0.5–1.4)
CRP SERPL HS-MCNC: 3.66 MG/DL (ref 0–0.75)
EOSINOPHIL # BLD AUTO: 0.26 K/UL (ref 0–0.51)
EOSINOPHIL NFR BLD: 3.3 % (ref 0–6.9)
ERYTHROCYTE [DISTWIDTH] IN BLOOD BY AUTOMATED COUNT: 62 FL (ref 35.9–50)
GLUCOSE SERPL-MCNC: 83 MG/DL (ref 65–99)
HCT VFR BLD AUTO: 48.2 % (ref 42–52)
HGB BLD-MCNC: 15.7 G/DL (ref 14–18)
IMM GRANULOCYTES # BLD AUTO: 0.02 K/UL (ref 0–0.11)
IMM GRANULOCYTES NFR BLD AUTO: 0.3 % (ref 0–0.9)
LYMPHOCYTES # BLD AUTO: 1.51 K/UL (ref 1–4.8)
LYMPHOCYTES NFR BLD: 18.9 % (ref 22–41)
MCH RBC QN AUTO: 31.3 PG (ref 27–33)
MCHC RBC AUTO-ENTMCNC: 32.6 G/DL (ref 33.7–35.3)
MCV RBC AUTO: 96.2 FL (ref 81.4–97.8)
MONOCYTES # BLD AUTO: 0.59 K/UL (ref 0–0.85)
MONOCYTES NFR BLD AUTO: 7.4 % (ref 0–13.4)
NEUTROPHILS # BLD AUTO: 5.57 K/UL (ref 1.82–7.42)
NEUTROPHILS NFR BLD: 69.7 % (ref 44–72)
NRBC # BLD AUTO: 0 K/UL
NRBC BLD-RTO: 0 /100 WBC
PLATELET # BLD AUTO: 224 K/UL (ref 164–446)
PMV BLD AUTO: 9.4 FL (ref 9–12.9)
POTASSIUM SERPL-SCNC: 3.4 MMOL/L (ref 3.6–5.5)
PREALB SERPL-MCNC: 25 MG/DL (ref 18–38)
RBC # BLD AUTO: 5.01 M/UL (ref 4.7–6.1)
SODIUM SERPL-SCNC: 140 MMOL/L (ref 135–145)
WBC # BLD AUTO: 8 K/UL (ref 4.8–10.8)

## 2019-07-29 PROCEDURE — 86140 C-REACTIVE PROTEIN: CPT

## 2019-07-29 PROCEDURE — 84134 ASSAY OF PREALBUMIN: CPT

## 2019-07-29 PROCEDURE — 700102 HCHG RX REV CODE 250 W/ 637 OVERRIDE(OP): Performed by: PSYCHIATRY & NEUROLOGY

## 2019-07-29 PROCEDURE — 85025 COMPLETE CBC W/AUTO DIFF WBC: CPT

## 2019-07-29 PROCEDURE — A9270 NON-COVERED ITEM OR SERVICE: HCPCS | Performed by: INTERNAL MEDICINE

## 2019-07-29 PROCEDURE — 700102 HCHG RX REV CODE 250 W/ 637 OVERRIDE(OP): Performed by: HOSPITALIST

## 2019-07-29 PROCEDURE — 700105 HCHG RX REV CODE 258: Performed by: HOSPITALIST

## 2019-07-29 PROCEDURE — A9270 NON-COVERED ITEM OR SERVICE: HCPCS | Performed by: PSYCHIATRY & NEUROLOGY

## 2019-07-29 PROCEDURE — 80048 BASIC METABOLIC PNL TOTAL CA: CPT

## 2019-07-29 PROCEDURE — A9270 NON-COVERED ITEM OR SERVICE: HCPCS | Performed by: HOSPITALIST

## 2019-07-29 PROCEDURE — 36415 COLL VENOUS BLD VENIPUNCTURE: CPT

## 2019-07-29 PROCEDURE — 99233 SBSQ HOSP IP/OBS HIGH 50: CPT | Performed by: PSYCHIATRY & NEUROLOGY

## 2019-07-29 PROCEDURE — 700101 HCHG RX REV CODE 250: Performed by: PSYCHIATRY & NEUROLOGY

## 2019-07-29 PROCEDURE — 700102 HCHG RX REV CODE 250 W/ 637 OVERRIDE(OP): Performed by: INTERNAL MEDICINE

## 2019-07-29 PROCEDURE — 770001 HCHG ROOM/CARE - MED/SURG/GYN PRIV*

## 2019-07-29 PROCEDURE — 94640 AIRWAY INHALATION TREATMENT: CPT

## 2019-07-29 PROCEDURE — 700111 HCHG RX REV CODE 636 W/ 250 OVERRIDE (IP): Performed by: HOSPITALIST

## 2019-07-29 PROCEDURE — 99233 SBSQ HOSP IP/OBS HIGH 50: CPT | Performed by: HOSPITALIST

## 2019-07-29 RX ORDER — CLONAZEPAM 0.5 MG/1
0.5 TABLET ORAL 2 TIMES DAILY
Status: DISCONTINUED | OUTPATIENT
Start: 2019-07-29 | End: 2019-07-30

## 2019-07-29 RX ADMIN — TOPIRAMATE 300 MG: 100 TABLET, FILM COATED ORAL at 21:09

## 2019-07-29 RX ADMIN — PIPERACILLIN AND TAZOBACTAM 4.5 G: 4; .5 INJECTION, POWDER, LYOPHILIZED, FOR SOLUTION INTRAVENOUS; PARENTERAL at 21:09

## 2019-07-29 RX ADMIN — TOPIRAMATE 200 MG: 100 TABLET, FILM COATED ORAL at 06:37

## 2019-07-29 RX ADMIN — PHENOBARBITAL 120 MG: 20 ELIXIR ORAL at 17:54

## 2019-07-29 RX ADMIN — NYSTATIN: 100000 POWDER TOPICAL at 18:00

## 2019-07-29 RX ADMIN — LEVETIRACETAM 1500 MG: 500 TABLET, FILM COATED ORAL at 06:33

## 2019-07-29 RX ADMIN — CLONAZEPAM 0.5 MG: 0.5 TABLET ORAL at 15:16

## 2019-07-29 RX ADMIN — SENNOSIDES, DOCUSATE SODIUM 2 TABLET: 50; 8.6 TABLET, FILM COATED ORAL at 18:00

## 2019-07-29 RX ADMIN — LEVETIRACETAM 1500 MG: 500 TABLET, FILM COATED ORAL at 17:54

## 2019-07-29 RX ADMIN — POTASSIUM BICARBONATE 25 MEQ: 978 TABLET, EFFERVESCENT ORAL at 17:54

## 2019-07-29 RX ADMIN — CYANOCOBALAMIN TAB 500 MCG 1000 MCG: 500 TAB at 06:33

## 2019-07-29 RX ADMIN — PHENOBARBITAL 120 MG: 20 ELIXIR ORAL at 08:30

## 2019-07-29 RX ADMIN — LACOSAMIDE 300 MG: 100 TABLET, FILM COATED ORAL at 06:33

## 2019-07-29 RX ADMIN — POTASSIUM BICARBONATE 25 MEQ: 978 TABLET, EFFERVESCENT ORAL at 06:34

## 2019-07-29 RX ADMIN — PIPERACILLIN AND TAZOBACTAM 4.5 G: 4; .5 INJECTION, POWDER, LYOPHILIZED, FOR SOLUTION INTRAVENOUS; PARENTERAL at 15:16

## 2019-07-29 RX ADMIN — LACOSAMIDE 300 MG: 100 TABLET, FILM COATED ORAL at 17:54

## 2019-07-29 RX ADMIN — PIPERACILLIN AND TAZOBACTAM 4.5 G: 4; .5 INJECTION, POWDER, LYOPHILIZED, FOR SOLUTION INTRAVENOUS; PARENTERAL at 07:15

## 2019-07-29 NOTE — PROGRESS NOTES
Patient awake, alert, and at baseline.  No seizure activity at this time, although RN reporting off stated that patient had a seizure at ~0630.  Phenobarbital not available, pharmacy called.  Charge nurse notified off issue with med availability, floor pharmacist also notified.  Mother suctioned patient during report, demonstrating proficiency.  Patient is resting comfortably and his mother denies any needs.

## 2019-07-29 NOTE — DIETARY
Nutrition support weekly update:  Day 8 of admit.  Ruben Edwards is a 39 y.o. male with admitting DX of Seizure.  Tube feeding initiated on 7/22. Current TF via PEG is home regmien approved per MD, of 2x protein shakes and 2-3x bottles of Ventiva, which provides an estimated 5919-0444 kcal,  g protein.     Assessment:  Weight: 71.3 kg-via Bed Scale, wt up since admit by 9.4 kg. Edema described as trace to RLE/LLE at admit. Edema charted 7/28 to RLE as 1+ and LLE as 2+, indicating a potential worsening in edema. Per I/Os is -8.4L.      Re-estimate of nutritional needs not indicated at this time.     Evaluation:   1. Noted per RN, pt experiencing multiple seizures at this time. Noted pt without seizure since 0330 on 7/28.    2. Attempted to speak with pt mother regarding TF tolerance and regimen though was on phone during attempt. Discussed with RN, she states that pt is tolerating the home TF regimen and seems to be tolerating juts fine.   3. Labs: K 3.4, Creatinine 0.23  4. Meds: ducolax, vitamin B12, phenobarbital, K-LYTE, senokot-refused, continuous pain pump-patient supplied.   5. Last BM: 7/26   6. Current feeding of home regimen appropriate at this time/ approved per MD    Malnutrition risk noted 7/22: Pt appears thin, does not meet criteria for malnutrition per ASPEN guidelines    Recommendations/Plan:  1. Continue TF per home regimen and rate per MD approval  2. Fluids per MD    RD following

## 2019-07-29 NOTE — CARE PLAN
Problem: Bronchopulmonary Hygiene:  Goal: Increase mobilization of retained secretions    Intervention: Perform bronchopulmonary therapy as indicated by assessment  6.0 cuffless shiley on 4L/28% trache collar. Pt's mother performing all trache care and suctioning at this time per her request.

## 2019-07-29 NOTE — CARE PLAN
Problem: Bronchopulmonary Hygiene:  Goal: Increase mobilization of retained secretions  Outcome: PROGRESSING AS EXPECTED  Patient with #6 cuffless shiley trach on HHTC at 4L and 28% FiO2; tolerating well

## 2019-07-29 NOTE — PROGRESS NOTES
Chief Complaint   Patient presents with   • Seizure   • Rash     to chest for the last 9 days.       Problem List Items Addressed This Visit     Seizure (HCC)    Relevant Medications    lacosamide (VIMPAT) 200 MG Tab tablet    levETIRAcetam (KEPPRA) 100 MG/ML Solution    PHENobarbital 20 MG/5ML Elixir    topiramate (TOPAMAX) 200 MG tablet    levETIRAcetam (KEPPRA) tablet 1,500 mg    PHENobarbital solution 120 mg    lacosamide (VIMPAT) tablet 300 mg    lacosamide (VIMPAT) 100 mg in  mL ivpb (Completed)    topiramate (TOPAMAX) tablet 200 mg    topiramate (TOPAMAX) tablet 300 mg    perampanel (FYCOMPA) tablet 2 mg (Completed)    clonazePAM (KLONOPIN) tablet 0.5 mg (Start on 7/29/2019  2:30 PM)      Other Visit Diagnoses     Seizure disorder (HCC)        Relevant Medications    lacosamide (VIMPAT) 200 MG Tab tablet    levETIRAcetam (KEPPRA) 100 MG/ML Solution    PHENobarbital 20 MG/5ML Elixir    topiramate (TOPAMAX) 200 MG tablet    levETIRAcetam (KEPPRA) tablet 1,500 mg    PHENobarbital solution 120 mg    lacosamide (VIMPAT) tablet 300 mg    lacosamide (VIMPAT) 100 mg in  mL ivpb (Completed)    topiramate (TOPAMAX) tablet 200 mg    topiramate (TOPAMAX) tablet 300 mg    perampanel (FYCOMPA) tablet 2 mg (Completed)    clonazePAM (KLONOPIN) tablet 0.5 mg (Start on 7/29/2019  2:30 PM)    Pneumonia of left lower lobe due to infectious organism (HCC)        Relevant Medications    ampicillin/sulbactam (UNASYN) 3 g in  mL IVPB (Completed)    piperacillin-tazobactam (ZOSYN) 3.375 g in  mL IVPB (Completed)    piperacillin-tazobactam (ZOSYN) 4.5 g in  mL IVPB          Interim history:  Ruben Edwards remains hospitalized. Still frequent clusters of focal seizures, particularly with stimulation. Ativan prn provides a 6-8 hrs of seizure freedom. Continues on antibiotics. Mother at bedside.     Had a rash, which is now resolved.       Past medical history:   Past Medical History:   Diagnosis Date   •  Seizure (HCC)    • Traumatic brain injury (HCC) 05/15/1998       Past surgical history:   Past Surgical History:   Procedure Laterality Date   • LARYNGOSCOPY  7/2/2017    Procedure: LARYNGOSCOPY;  Surgeon: Catherine Osorio M.D.;  Location: SURGERY [facility];  Service:    • BRONCHOSCOPY  7/2/2017    Procedure: BRONCHOSCOPY;  Surgeon: Catherine Osorio M.D.;  Location: SURGERY [facility];  Service:    • TRACHEOSTOMY  7/2/2017    Procedure: TRACHEOSTOMY;  Surgeon: Catherine Osorio M.D.;  Location: SURGERY [facility];  Service:    • ESOPHAGOSCOPY  7/2/2017    Procedure: ESOPHAGOSCOPY;  Surgeon: Catherine Osorio M.D.;  Location: SURGERY [facility];  Service:        Family history:   No family history on file.    Social history:   Social History     Social History   • Marital status: Single     Spouse name: N/A   • Number of children: N/A   • Years of education: N/A     Occupational History   • Not on file.     Social History Main Topics   • Smoking status: Never Smoker   • Smokeless tobacco: Never Used   • Alcohol use No   • Drug use: Yes     Types: Oral      Comment: Mother tried CBD treatment for siezures in past   • Sexual activity: Not on file     Other Topics Concern   • Not on file     Social History Narrative   • No narrative on file       Current medications:   Current Facility-Administered Medications   Medication Dose   • clonazePAM (KLONOPIN) tablet 0.5 mg  0.5 mg   • potassium bicarbonate (KLYTE) effervescent tablet 25 mEq  25 mEq   • LORazepam (ATIVAN) injection 0.5-1 mg  0.5-1 mg   • piperacillin-tazobactam (ZOSYN) 4.5 g in  mL IVPB  4.5 g   • bisacodyl (DULCOLAX) suppository 10 mg  10 mg   • nystatin (MYCOSTATIN) powder     • topiramate (TOPAMAX) tablet 200 mg  200 mg   • topiramate (TOPAMAX) tablet 300 mg  300 mg   • lacosamide (VIMPAT) tablet 300 mg  300 mg   • Pharmacy Consult: Enteral tube insertion - review meds/change route/product selection     • senna-docusate  (PERICOLACE or SENOKOT S) 8.6-50 MG per tablet 2 Tab  2 Tab    And   • polyethylene glycol/lytes (MIRALAX) PACKET 1 Packet  1 Packet    And   • magnesium hydroxide (MILK OF MAGNESIA) suspension 30 mL  30 mL    And   • bisacodyl (DULCOLAX) suppository 10 mg  10 mg   • Pain Pump (patient supplied) Device     • cyanocobalamin (VITAMIN B-12) tablet 1,000 mcg  1,000 mcg   • levETIRAcetam (KEPPRA) tablet 1,500 mg  1,500 mg   • PHENobarbital solution 120 mg  120 mg   • enoxaparin (LOVENOX) inj 40 mg  40 mg       Medication Allergy:  Allergies   Allergen Reactions   • Sulfa Drugs Unspecified     Per caretaker, mother is allergic to sulfa so believes her son could be as well         Review of systems:   Unable to obtain.       Physical examination:   Vitals:    07/29/19 0216 07/29/19 0727 07/29/19 0800 07/29/19 1119   BP:   137/70    Pulse: 63 65 64 68   Resp: 18 18 18 16   Temp:   36.4 °C (97.6 °F)    TempSrc:   Temporal    SpO2: 93% 90% 91% 94%   Weight:       Height:         He does not appear in any acute distress.  The patient is nonverbal, unable to follow commands.  Memory cannot be assessed.  He is awake, unable to assess for alertness or orientation.  Eyes are midline, there is no nystagmus on primary gaze.  His pupils are about 4 to 5 mm bilaterally and are sluggish to react to light.  His face appears symmetric.  He has contractures and atrophy in all extremities.  Examination of the skin does not show any rashes.  The patient has a feeding tube in place.        ANCILLARY DATA REVIEWED:       Lab Data Review:  Recent Results (from the past 24 hour(s))   CBC WITH DIFFERENTIAL    Collection Time: 07/29/19  4:11 AM   Result Value Ref Range    WBC 8.0 4.8 - 10.8 K/uL    RBC 5.01 4.70 - 6.10 M/uL    Hemoglobin 15.7 14.0 - 18.0 g/dL    Hematocrit 48.2 42.0 - 52.0 %    MCV 96.2 81.4 - 97.8 fL    MCH 31.3 27.0 - 33.0 pg    MCHC 32.6 (L) 33.7 - 35.3 g/dL    RDW 62.0 (H) 35.9 - 50.0 fL    Platelet Count 224 164 - 446 K/uL     MPV 9.4 9.0 - 12.9 fL    Neutrophils-Polys 69.70 44.00 - 72.00 %    Lymphocytes 18.90 (L) 22.00 - 41.00 %    Monocytes 7.40 0.00 - 13.40 %    Eosinophils 3.30 0.00 - 6.90 %    Basophils 0.40 0.00 - 1.80 %    Immature Granulocytes 0.30 0.00 - 0.90 %    Nucleated RBC 0.00 /100 WBC    Neutrophils (Absolute) 5.57 1.82 - 7.42 K/uL    Lymphs (Absolute) 1.51 1.00 - 4.80 K/uL    Monos (Absolute) 0.59 0.00 - 0.85 K/uL    Eos (Absolute) 0.26 0.00 - 0.51 K/uL    Baso (Absolute) 0.03 0.00 - 0.12 K/uL    Immature Granulocytes (abs) 0.02 0.00 - 0.11 K/uL    NRBC (Absolute) 0.00 K/uL   Basic Metabolic Panel    Collection Time: 07/29/19  4:11 AM   Result Value Ref Range    Sodium 140 135 - 145 mmol/L    Potassium 3.4 (L) 3.6 - 5.5 mmol/L    Chloride 108 96 - 112 mmol/L    Co2 23 20 - 33 mmol/L    Glucose 83 65 - 99 mg/dL    Bun 17 8 - 22 mg/dL    Creatinine 0.23 (L) 0.50 - 1.40 mg/dL    Calcium 8.7 8.5 - 10.5 mg/dL    Anion Gap 9.0 0.0 - 11.9   ESTIMATED GFR    Collection Time: 07/29/19  4:11 AM   Result Value Ref Range    GFR If African American >60 >60 mL/min/1.73 m 2    GFR If Non African American >60 >60 mL/min/1.73 m 2         Records reviewed:   Chart reviewed.     Imaging:   MRI brain w/wo, 3/7/18  1.  Moderate cerebral atrophy beyond that expected for the patient's age.  2.  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  3.  Old hemorrhagic infarcts left basal ganglia, left thalamus, left subthalamic region.  4.  Marked atrophy and encephalomalacic change in the left cerebral peduncle with T2 hyperintensity and volume loss extending down the left anterior quadrant of the pop into the left medullary pyramid. 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  5.  Widespread areas of curvilinear and gyriform enhancement involving the right frontal lobe, right parietal lobe, right occipital lobe, along with curvilinear enhancement in the right basal ganglia. These findings are most consistent with subacute   sequela of  cerebral infarction or cortical laminar necrosis. Cortical laminar necrosis has been reported associated with prolonged seizures/status epilepticus. Subacute sequela of encephalitis could have a similar appearance.  6.  Advanced supratentorial white matter disease consistent with microvascular ischemic change versus demyelination or gliosis. This results in some volume loss of deep white matter.  7.  Curvilinear hemosiderin deposition in the right temporal lobe deep white matter consistent with old hemorrhage.  8.  No acute cerebral infarction or acute hemorrhage evident.  9.  No evidence of mesial temporal sclerosis.   (I personally reviewed images).      MRA head wo, 3/9/18  MRA OF THE [place] WITHIN NORMAL LIMITS.  (I personally reviewed images).         EE:  This is an abnormal 24 hrs video electroencephalogram recording in an encephalopathic patient during awake and sleep states. There is diffuse cerebral dysfunction, suggestive of an encephalopathic state. Superimposed slowing in the right hemisphere is likely due to underlying structural abnormality. Frequent right frontotemporal spikes, with rare brief runs of right PLEDS, but without clear evidence for seizures during the study. The study remains significantly improved when compared to initial recordings, however the patient remains at very high risk for seizure recurrence. Clinical and radiological correlation is recommended.      Video EEG, 2019:  INTERPRETATION:  This is an abnormal 24 hrs video EEG recording in the awake,   drowsy, and sleep state(s).  There is intermittent slowing in the   right frontal region and frequent right frontal spikes / sharps,   suggesting underlying structural abnormality and cortical   irritability. A few events of left arm / hand and head myoclonus   were reported by either patient's mother or nursing staff by push   of the event button. These spells were brief in nature and   epileptic. Review of eeg  demonstrated briefly rhythmic or   periodic right frontal spikes / sharps with right frontal slowing   during the events, which only lasted several seconds at a time.   Events consisting with brief focal right frontal seizures. There   has been improvement after increase of Lacosamide. Clinical and   radiological correlation is recommended.              ASSESSMENT AND PLAN:  History of severe traumatic brain injury and either a persistent vegetative state versus minimally conscious state.  Pharmaco-resistant, structural, focal onset epilepsy.  History of status epilepticus and PLEDs.  Recent new onset of myoclonic jerks involving the left upper extremity, are epileptic in nature, and have a right frontal EEG correlation.  The spells are brief, the patient is not in status, and appeared to be induced by stimulation of the patient.  The patient is currently on multiple antiepileptics, which had controlled the patient's epilepsy for a while, except for the recent onset of these new seizures.  There is no evidence of infection on blood cultures or UA, but he is being treated for pneumonia. The patient's seizures in the past have presented in setting of infections. The patient is at baseline otherwise, as stated by the patient's mother.  We had increased the Vimpat from 200 mg twice a day, to 300 mg twice a day, and I also increased Topamax to 200/300 during this admission, but he continued to have spells, then we added Fycompa 4 mg qhs but no benefit over the last few days. Mother states what calms seizures down for several hrs is Ativan, and she agrees to a trial of Clonazepam 0.5 mg bid. She is aware of side effects, including CNS depression. Mother ok with discontinuing Fycompa for now since no benefit at current dose.      Of note, the patient had a history of hyperammonemia with the use of Depakote, which was discontinued in the past.     I discussed case with RN, mother, and attending physician.           FOLLOW-UP:   In my office, after discharge.         EDUCATION AND COUNSELING:  -Education was provided to the patient and/or family regarding diagnosis and prognosis. The chronic and unpredictable nature of the condition were discussed. There is increased risk for additional events, which may carry potential for significant injuries and death. Discussed frequent seizure triggers: sleep deprivation, medication non-compliance, use of illegal drugs/alcohol, stress, and others.   -We reviewed in detail the current antiepileptic regimen. Potential side effects of antiepileptics were discussed at length, including but no limited to: hypersensitivity reactions (rash and others, some of which can be fatal), visual field changes (some of which may be irreversible), glaucoma, diplopia, kidney stones, osteopenia/osteoporosis/bone fractures, hyperthermia/anhydrosis, hyponatremia, tremors/abnormal movements, ataxia, dizziness, fatigue, increased risk for falls, risk for cardiac arrhythmias/syncope, gastrointestinal side effects(hepatitis, pancreatitis, gastritis, ulcers), gingival hypertrophy/bleeding, drowsiness, sedation, anxiety/nervousness, increased risk for suicide, increased risk for depression, and psychosis.   -We also reviewed drug-drug interactions and their potential effect on seizure control and medication side effects.    -Recommend chronic vitamin D supplementation and regular exercise (if not contraindicated).   -Patient/family educated on risk for SUDEP (Sudden Death in Epilepsy). Counseling was provided on the importance of strict medication and follow up compliance. The patient/family understand the risks associated with non-adherence with the medical plan as outlined, including but not limited to an increased risk for breakthrough seizures, which may contribute to injuries, disability, status epilepticus, and even death.       Patient's mother agrees with plan, as outlined.         Harris Bolton,  MD   Epilepsy and Neurodiagnostics.   Clinical  of Neurology [facility].   Diplomate in Neurology, Epilepsy, and Electrodiagnostic Medicine.   Office: [phone]  Fax: [phone]    BILLING DOCUMENTATION:     I have performed physical exam and reviewed and updated ROS and plan today 7/29/2019. In review of that note, there are no new changes except as documented above.     Counseling:  I spent greater than 50% time face-to-face time of a total of 38 minutes visit. Over 50% of the time of the visit today was spent on counseling and or coordination of care wtih the patient and/or family, with greater than 50% of the total discussing my assessment and plan as stated above.

## 2019-07-29 NOTE — PROGRESS NOTES
Assume care of patient. POC discussed with mother. 2 rn skin check refused by mother. Stimulation triggers seizures per mother.  in place. Hourly rounding in place.

## 2019-07-29 NOTE — CARE PLAN
Problem: Infection  Goal: Will remain free from infection  Outcome: PROGRESSING SLOWER THAN EXPECTED  Will monitor for s/s of infection.    Problem: Respiratory:  Goal: Respiratory status will improve  Outcome: PROGRESSING AS EXPECTED  Will monitor respiratory status.  in place.

## 2019-07-29 NOTE — PROGRESS NOTES
Per mom patient had a total of 24 seizures on this RN's shift, medicated x3 at mothers discretion. Patient had a window from 0700 until 1430 without any seizures.

## 2019-07-30 LAB
ANION GAP SERPL CALC-SCNC: 10 MMOL/L (ref 0–11.9)
BASOPHILS # BLD AUTO: 0.8 % (ref 0–1.8)
BASOPHILS # BLD: 0.04 K/UL (ref 0–0.12)
BUN SERPL-MCNC: 15 MG/DL (ref 8–22)
CALCIUM SERPL-MCNC: 8.7 MG/DL (ref 8.5–10.5)
CHLORIDE SERPL-SCNC: 108 MMOL/L (ref 96–112)
CO2 SERPL-SCNC: 24 MMOL/L (ref 20–33)
CREAT SERPL-MCNC: 0.2 MG/DL (ref 0.5–1.4)
EOSINOPHIL # BLD AUTO: 0.29 K/UL (ref 0–0.51)
EOSINOPHIL NFR BLD: 6 % (ref 0–6.9)
ERYTHROCYTE [DISTWIDTH] IN BLOOD BY AUTOMATED COUNT: 62.4 FL (ref 35.9–50)
GLUCOSE SERPL-MCNC: 90 MG/DL (ref 65–99)
HCT VFR BLD AUTO: 47.3 % (ref 42–52)
HGB BLD-MCNC: 15 G/DL (ref 14–18)
IMM GRANULOCYTES # BLD AUTO: 0.01 K/UL (ref 0–0.11)
IMM GRANULOCYTES NFR BLD AUTO: 0.2 % (ref 0–0.9)
LYMPHOCYTES # BLD AUTO: 1.55 K/UL (ref 1–4.8)
LYMPHOCYTES NFR BLD: 32 % (ref 22–41)
MCH RBC QN AUTO: 30.4 PG (ref 27–33)
MCHC RBC AUTO-ENTMCNC: 31.7 G/DL (ref 33.7–35.3)
MCV RBC AUTO: 95.7 FL (ref 81.4–97.8)
MONOCYTES # BLD AUTO: 0.51 K/UL (ref 0–0.85)
MONOCYTES NFR BLD AUTO: 10.5 % (ref 0–13.4)
NEUTROPHILS # BLD AUTO: 2.44 K/UL (ref 1.82–7.42)
NEUTROPHILS NFR BLD: 50.5 % (ref 44–72)
NRBC # BLD AUTO: 0 K/UL
NRBC BLD-RTO: 0 /100 WBC
PLATELET # BLD AUTO: 227 K/UL (ref 164–446)
PMV BLD AUTO: 9.2 FL (ref 9–12.9)
POTASSIUM SERPL-SCNC: 3.7 MMOL/L (ref 3.6–5.5)
RBC # BLD AUTO: 4.94 M/UL (ref 4.7–6.1)
SODIUM SERPL-SCNC: 142 MMOL/L (ref 135–145)
WBC # BLD AUTO: 4.8 K/UL (ref 4.8–10.8)

## 2019-07-30 PROCEDURE — 700102 HCHG RX REV CODE 250 W/ 637 OVERRIDE(OP): Performed by: HOSPITALIST

## 2019-07-30 PROCEDURE — 99233 SBSQ HOSP IP/OBS HIGH 50: CPT | Performed by: PSYCHIATRY & NEUROLOGY

## 2019-07-30 PROCEDURE — 700102 HCHG RX REV CODE 250 W/ 637 OVERRIDE(OP): Performed by: PSYCHIATRY & NEUROLOGY

## 2019-07-30 PROCEDURE — 80048 BASIC METABOLIC PNL TOTAL CA: CPT

## 2019-07-30 PROCEDURE — 700101 HCHG RX REV CODE 250: Performed by: PSYCHIATRY & NEUROLOGY

## 2019-07-30 PROCEDURE — A9270 NON-COVERED ITEM OR SERVICE: HCPCS | Performed by: PSYCHIATRY & NEUROLOGY

## 2019-07-30 PROCEDURE — A9270 NON-COVERED ITEM OR SERVICE: HCPCS | Performed by: HOSPITALIST

## 2019-07-30 PROCEDURE — 36415 COLL VENOUS BLD VENIPUNCTURE: CPT

## 2019-07-30 PROCEDURE — 85025 COMPLETE CBC W/AUTO DIFF WBC: CPT

## 2019-07-30 PROCEDURE — 770001 HCHG ROOM/CARE - MED/SURG/GYN PRIV*

## 2019-07-30 PROCEDURE — 700105 HCHG RX REV CODE 258: Performed by: HOSPITALIST

## 2019-07-30 PROCEDURE — 99232 SBSQ HOSP IP/OBS MODERATE 35: CPT | Performed by: HOSPITALIST

## 2019-07-30 PROCEDURE — 94640 AIRWAY INHALATION TREATMENT: CPT

## 2019-07-30 PROCEDURE — 700111 HCHG RX REV CODE 636 W/ 250 OVERRIDE (IP): Performed by: HOSPITALIST

## 2019-07-30 RX ORDER — CLONAZEPAM 0.5 MG/1
0.25 TABLET ORAL 2 TIMES DAILY
Status: DISCONTINUED | OUTPATIENT
Start: 2019-07-30 | End: 2019-07-31

## 2019-07-30 RX ADMIN — PHENOBARBITAL 120 MG: 20 ELIXIR ORAL at 17:34

## 2019-07-30 RX ADMIN — POTASSIUM BICARBONATE 25 MEQ: 978 TABLET, EFFERVESCENT ORAL at 17:33

## 2019-07-30 RX ADMIN — CLONAZEPAM 0.25 MG: 0.5 TABLET ORAL at 17:34

## 2019-07-30 RX ADMIN — LACOSAMIDE 300 MG: 100 TABLET, FILM COATED ORAL at 06:37

## 2019-07-30 RX ADMIN — LACOSAMIDE 300 MG: 100 TABLET, FILM COATED ORAL at 17:33

## 2019-07-30 RX ADMIN — Medication 10 MG: at 06:38

## 2019-07-30 RX ADMIN — TOPIRAMATE 200 MG: 100 TABLET, FILM COATED ORAL at 06:38

## 2019-07-30 RX ADMIN — LEVETIRACETAM 1500 MG: 500 TABLET, FILM COATED ORAL at 06:36

## 2019-07-30 RX ADMIN — PHENOBARBITAL 120 MG: 20 ELIXIR ORAL at 07:59

## 2019-07-30 RX ADMIN — PIPERACILLIN AND TAZOBACTAM 4.5 G: 4; .5 INJECTION, POWDER, LYOPHILIZED, FOR SOLUTION INTRAVENOUS; PARENTERAL at 06:38

## 2019-07-30 RX ADMIN — CYANOCOBALAMIN TAB 500 MCG 1000 MCG: 500 TAB at 06:37

## 2019-07-30 RX ADMIN — CLONAZEPAM 0.5 MG: 0.5 TABLET ORAL at 06:37

## 2019-07-30 RX ADMIN — PIPERACILLIN AND TAZOBACTAM 4.5 G: 4; .5 INJECTION, POWDER, LYOPHILIZED, FOR SOLUTION INTRAVENOUS; PARENTERAL at 13:19

## 2019-07-30 RX ADMIN — LEVETIRACETAM 1500 MG: 500 TABLET, FILM COATED ORAL at 17:33

## 2019-07-30 RX ADMIN — POTASSIUM BICARBONATE 25 MEQ: 978 TABLET, EFFERVESCENT ORAL at 06:36

## 2019-07-30 RX ADMIN — TOPIRAMATE 300 MG: 100 TABLET, FILM COATED ORAL at 20:05

## 2019-07-30 RX ADMIN — PIPERACILLIN AND TAZOBACTAM 4.5 G: 4; .5 INJECTION, POWDER, LYOPHILIZED, FOR SOLUTION INTRAVENOUS; PARENTERAL at 20:15

## 2019-07-30 NOTE — CARE PLAN
Problem: Respiratory:  Goal: Respiratory status will improve  Outcome: PROGRESSING AS EXPECTED  Will monitor respiratory status.  in place.

## 2019-07-30 NOTE — CARE PLAN
Problem: Knowledge Deficit  Goal: Knowledge of disease process/condition, treatment plan, diagnostic tests, and medications will improve  Outcome: PROGRESSING AS EXPECTED  Reviewing plan of care, activities, and medication with patient.  Encouraging patient to ask questions and participate in plan of care.  Providing answers to all questions.  Continuing with current plan of care.  Hourly rounding in practice.

## 2019-07-30 NOTE — PROGRESS NOTES
Chief Complaint   Patient presents with   • Seizure   • Rash     to chest for the last 9 days.       Problem List Items Addressed This Visit     Seizure (HCC)    Relevant Medications    lacosamide (VIMPAT) 200 MG Tab tablet    levETIRAcetam (KEPPRA) 100 MG/ML Solution    PHENobarbital 20 MG/5ML Elixir    topiramate (TOPAMAX) 200 MG tablet    levETIRAcetam (KEPPRA) tablet 1,500 mg    PHENobarbital solution 120 mg    lacosamide (VIMPAT) tablet 300 mg    lacosamide (VIMPAT) 100 mg in  mL ivpb (Completed)    topiramate (TOPAMAX) tablet 200 mg    topiramate (TOPAMAX) tablet 300 mg    perampanel (FYCOMPA) tablet 2 mg (Completed)    clonazePAM (KLONOPIN) tablet 0.25 mg (Start on 7/30/2019  6:00 PM)      Other Visit Diagnoses     Seizure disorder (HCC)        Relevant Medications    lacosamide (VIMPAT) 200 MG Tab tablet    levETIRAcetam (KEPPRA) 100 MG/ML Solution    PHENobarbital 20 MG/5ML Elixir    topiramate (TOPAMAX) 200 MG tablet    levETIRAcetam (KEPPRA) tablet 1,500 mg    PHENobarbital solution 120 mg    lacosamide (VIMPAT) tablet 300 mg    lacosamide (VIMPAT) 100 mg in  mL ivpb (Completed)    topiramate (TOPAMAX) tablet 200 mg    topiramate (TOPAMAX) tablet 300 mg    perampanel (FYCOMPA) tablet 2 mg (Completed)    clonazePAM (KLONOPIN) tablet 0.25 mg (Start on 7/30/2019  6:00 PM)    Pneumonia of left lower lobe due to infectious organism (HCC)        Relevant Medications    ampicillin/sulbactam (UNASYN) 3 g in  mL IVPB (Completed)    piperacillin-tazobactam (ZOSYN) 3.375 g in  mL IVPB (Completed)    piperacillin-tazobactam (ZOSYN) 4.5 g in  mL IVPB          Interim history:  Ruben Edwards remains hospitalized.  His mother is at bedside.  He was started yesterday on clonazepam, first dose of 0.5 mg apparently sedated the patient, and the mother requested to have the dose to 0.25 twice a day.  The mother reports significant benefit with the addition of the clonazepam, only a few, mild and  brief left hand jerking episodes this morning with stimulation, but over 60 to 70% improvement.  He is no longer on Fycompa.  He continues with the rest of the antiepileptics unchanged.  No clear side effects.  He is alert during the day and remains afebrile.    The family is afraid to take the patient home too soon, and would like for him to be observed for period of perhaps 48 hours were seizures continued to improve.    He remains on antibiotics.    His previous rash is now gone.      Past medical history:   Past Medical History:   Diagnosis Date   • Seizure (HCC)    • Traumatic brain injury (HCC) 05/15/1998       Past surgical history:   Past Surgical History:   Procedure Laterality Date   • LARYNGOSCOPY  7/2/2017    Procedure: LARYNGOSCOPY;  Surgeon: Catherine Osorio M.D.;  Location: SURGERY [facility];  Service:    • BRONCHOSCOPY  7/2/2017    Procedure: BRONCHOSCOPY;  Surgeon: Catherine Osorio M.D.;  Location: SURGERY [facility];  Service:    • TRACHEOSTOMY  7/2/2017    Procedure: TRACHEOSTOMY;  Surgeon: Catherine Osorio M.D.;  Location: SURGERY [facility];  Service:    • ESOPHAGOSCOPY  7/2/2017    Procedure: ESOPHAGOSCOPY;  Surgeon: Catherine Osorio M.D.;  Location: SURGERY [facility];  Service:        Family history:   No family history on file.    Social history:   Social History     Social History   • Marital status: Single     Spouse name: N/A   • Number of children: N/A   • Years of education: N/A     Occupational History   • Not on file.     Social History Main Topics   • Smoking status: Never Smoker   • Smokeless tobacco: Never Used   • Alcohol use No   • Drug use: Yes     Types: Oral      Comment: Mother tried CBD treatment for siezures in past   • Sexual activity: Not on file     Other Topics Concern   • Not on file     Social History Narrative   • No narrative on file       Current medications:   Current Facility-Administered Medications   Medication Dose   •  clonazePAM (KLONOPIN) tablet 0.25 mg  0.25 mg   • potassium bicarbonate (KLYTE) effervescent tablet 25 mEq  25 mEq   • LORazepam (ATIVAN) injection 0.5-1 mg  0.5-1 mg   • piperacillin-tazobactam (ZOSYN) 4.5 g in  mL IVPB  4.5 g   • bisacodyl (DULCOLAX) suppository 10 mg  10 mg   • nystatin (MYCOSTATIN) powder     • topiramate (TOPAMAX) tablet 200 mg  200 mg   • topiramate (TOPAMAX) tablet 300 mg  300 mg   • lacosamide (VIMPAT) tablet 300 mg  300 mg   • Pharmacy Consult: Enteral tube insertion - review meds/change route/product selection     • senna-docusate (PERICOLACE or SENOKOT S) 8.6-50 MG per tablet 2 Tab  2 Tab    And   • polyethylene glycol/lytes (MIRALAX) PACKET 1 Packet  1 Packet    And   • magnesium hydroxide (MILK OF MAGNESIA) suspension 30 mL  30 mL    And   • bisacodyl (DULCOLAX) suppository 10 mg  10 mg   • Pain Pump (patient supplied) Device     • cyanocobalamin (VITAMIN B-12) tablet 1,000 mcg  1,000 mcg   • levETIRAcetam (KEPPRA) tablet 1,500 mg  1,500 mg   • PHENobarbital solution 120 mg  120 mg   • enoxaparin (LOVENOX) inj 40 mg  40 mg       Medication Allergy:  Allergies   Allergen Reactions   • Sulfa Drugs Unspecified     Per caretaker, mother is allergic to sulfa so believes her son could be as well         Review of systems:   Unable to obtain.    Physical examination:   Vitals:    07/30/19 0315 07/30/19 0633 07/30/19 0800 07/30/19 1050   BP:   116/71    Pulse: (!) 53 (!) 56 62 61   Resp: 18 18 17 18   Temp:   36.2 °C (97.2 °F)    TempSrc:   Temporal    SpO2: 94% 94% 95% 94%   Weight:       Height:         He does not appear in any acute distress.  The patient is nonverbal, unable to follow commands.  Memory cannot be assessed.  He is awake, unable to assess for alertness or orientation.  Eyes are midline, there is no nystagmus on primary gaze.  His pupils are about 4 to 5 mm bilaterally and are sluggish to react to light.  His face appears symmetric.  He has contractures and atrophy in  all extremities.  Examination of the skin does not show any rashes.  The patient has a feeding tube in place.      ANCILLARY DATA REVIEWED:       Lab Data Review:  Recent Results (from the past 24 hour(s))   CBC WITH DIFFERENTIAL    Collection Time: 07/30/19  4:21 AM   Result Value Ref Range    WBC 4.8 4.8 - 10.8 K/uL    RBC 4.94 4.70 - 6.10 M/uL    Hemoglobin 15.0 14.0 - 18.0 g/dL    Hematocrit 47.3 42.0 - 52.0 %    MCV 95.7 81.4 - 97.8 fL    MCH 30.4 27.0 - 33.0 pg    MCHC 31.7 (L) 33.7 - 35.3 g/dL    RDW 62.4 (H) 35.9 - 50.0 fL    Platelet Count 227 164 - 446 K/uL    MPV 9.2 9.0 - 12.9 fL    Neutrophils-Polys 50.50 44.00 - 72.00 %    Lymphocytes 32.00 22.00 - 41.00 %    Monocytes 10.50 0.00 - 13.40 %    Eosinophils 6.00 0.00 - 6.90 %    Basophils 0.80 0.00 - 1.80 %    Immature Granulocytes 0.20 0.00 - 0.90 %    Nucleated RBC 0.00 /100 WBC    Neutrophils (Absolute) 2.44 1.82 - 7.42 K/uL    Lymphs (Absolute) 1.55 1.00 - 4.80 K/uL    Monos (Absolute) 0.51 0.00 - 0.85 K/uL    Eos (Absolute) 0.29 0.00 - 0.51 K/uL    Baso (Absolute) 0.04 0.00 - 0.12 K/uL    Immature Granulocytes (abs) 0.01 0.00 - 0.11 K/uL    NRBC (Absolute) 0.00 K/uL   Basic Metabolic Panel    Collection Time: 07/30/19  4:21 AM   Result Value Ref Range    Sodium 142 135 - 145 mmol/L    Potassium 3.7 3.6 - 5.5 mmol/L    Chloride 108 96 - 112 mmol/L    Co2 24 20 - 33 mmol/L    Glucose 90 65 - 99 mg/dL    Bun 15 8 - 22 mg/dL    Creatinine 0.20 (L) 0.50 - 1.40 mg/dL    Calcium 8.7 8.5 - 10.5 mg/dL    Anion Gap 10.0 0.0 - 11.9   ESTIMATED GFR    Collection Time: 07/30/19  4:21 AM   Result Value Ref Range    GFR If African American >60 >60 mL/min/1.73 m 2    GFR If Non African American >60 >60 mL/min/1.73 m 2         Records reviewed:       Records reviewed:   Chart reviewed.     Imaging:   MRI brain w/wo, 3/7/18  1.  Moderate cerebral atrophy beyond that expected for the patient's age.  2.  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  3.  Old hemorrhagic  infarcts left basal ganglia, left thalamus, left subthalamic region.  4.  Marked atrophy and encephalomalacic change in the left cerebral peduncle with T2 hyperintensity and volume loss extending down the left anterior quadrant of the pop into the left medullary pyramid. 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  5.  Widespread areas of curvilinear and gyriform enhancement involving the right frontal lobe, right parietal lobe, right occipital lobe, along with curvilinear enhancement in the right basal ganglia. These findings are most consistent with subacute   sequela of cerebral infarction or cortical laminar necrosis. Cortical laminar necrosis has been reported associated with prolonged seizures/status epilepticus. Subacute sequela of encephalitis could have a similar appearance.  6.  Advanced supratentorial white matter disease consistent with microvascular ischemic change versus demyelination or gliosis. This results in some volume loss of deep white matter.  7.  Curvilinear hemosiderin deposition in the right temporal lobe deep white matter consistent with old hemorrhage.  8.  No acute cerebral infarction or acute hemorrhage evident.  9.  No evidence of mesial temporal sclerosis.   (I personally reviewed images).      MRA head wo, 3/9/18  MRA OF THE [place] WITHIN NORMAL LIMITS.  (I personally reviewed images).         EE:  This is an abnormal 24 hrs video electroencephalogram recording in an encephalopathic patient during awake and sleep states. There is diffuse cerebral dysfunction, suggestive of an encephalopathic state. Superimposed slowing in the right hemisphere is likely due to underlying structural abnormality. Frequent right frontotemporal spikes, with rare brief runs of right PLEDS, but without clear evidence for seizures during the study. The study remains significantly improved when compared to initial recordings, however the patient remains at very high risk for  seizure recurrence. Clinical and radiological correlation is recommended.      Video EEG, 7/21/2019:  INTERPRETATION:  This is an abnormal 24 hrs video EEG recording in the awake,   drowsy, and sleep state(s).  There is intermittent slowing in the   right frontal region and frequent right frontal spikes / sharps,   suggesting underlying structural abnormality and cortical   irritability. A few events of left arm / hand and head myoclonus   were reported by either patient's mother or nursing staff by push   of the event button. These spells were brief in nature and   epileptic. Review of eeg demonstrated briefly rhythmic or   periodic right frontal spikes / sharps with right frontal slowing   during the events, which only lasted several seconds at a time.   Events consisting with brief focal right frontal seizures. There   has been improvement after increase of Lacosamide. Clinical and   radiological correlation is recommended.               ASSESSMENT AND PLAN:  History of severe traumatic brain injury and either a persistent vegetative state versus minimally conscious state.  Pharmaco-resistant, structural, focal onset epilepsy.  History of status epilepticus and PLEDs.  Recent new onset of myoclonic jerks involving the left upper extremity, are epileptic in nature, and have a right frontal EEG correlation.  The spells are brief, the patient is not in status, and appeared to be induced by stimulation of the patient.  The patient is currently on multiple antiepileptics, which had controlled the patient's epilepsy for a while, except for the recent onset of these new focal seizures.  CT of the chest shows pneumonia, and he was placed on antibiotics, which continue at this point.  The patient is at baseline otherwise, as stated by the patient's mother.  We had increased the Vimpat from 200 mg twice a day, to 300 mg twice a day, and I also increased Topamax to 200/300 during this admission, but he continued to have  spells, then we added Fycompa 4 mg qhs but no benefit over the last few days, and Fycompa was discontinued.  Yesterday, his mother agreed to a low-dose of clonazepam and.  He started clonazepam 0.5 mg twice daily, but after the first dose, the mother reported that the patient was too sedated, and she requested to half the dose to 0.25 twice a day.  There has been a significant benefit with the addition of clonazepam and and seizures have reduced greatly, continues to have 3-4 every several hours.  The seizures are brief, only focal jerking of the left hand/arm.  The patient's family would like for the patient to be monitored for another 48 hours, potential discharge on Thursday, which I agree with.     Of note, the patient had a history of hyperammonemia with the use of Depakote, which was discontinued in the past.     He will be okay to discharge patient on Thursday, if seizures continue to improve.    I discussed case with RN, and patient's mother.          FOLLOW-UP:   In my office, after discharge.      EDUCATION AND COUNSELING:  -Education was provided to the patient and/or family regarding diagnosis and prognosis. The chronic and unpredictable nature of the condition were discussed. There is increased risk for additional events, which may carry potential for significant injuries and death. Discussed frequent seizure triggers: sleep deprivation, medication non-compliance, use of illegal drugs/alcohol, stress, and others.   -We reviewed in detail the current antiepileptic regimen. Potential side effects of antiepileptics were discussed at length, including but no limited to: hypersensitivity reactions (rash and others, some of which can be fatal), visual field changes (some of which may be irreversible), glaucoma, diplopia, kidney stones, osteopenia/osteoporosis/bone fractures, hyperthermia/anhydrosis, hyponatremia, tremors/abnormal movements, ataxia, dizziness, fatigue, increased risk for falls, risk for  cardiac arrhythmias/syncope, gastrointestinal side effects(hepatitis, pancreatitis, gastritis, ulcers), gingival hypertrophy/bleeding, drowsiness, sedation, anxiety/nervousness, increased risk for suicide, increased risk for depression, and psychosis.   -We also reviewed drug-drug interactions and their potential effect on seizure control and medication side effects.    -Recommend chronic vitamin D supplementation and regular exercise (if not contraindicated).   -Patient/family educated on risk for SUDEP (Sudden Death in Epilepsy). Counseling was provided on the importance of strict medication and follow up compliance. The patient/family understand the risks associated with non-adherence with the medical plan as outlined, including but not limited to an increased risk for breakthrough seizures, which may contribute to injuries, disability, status epilepticus, and even death.     Patient's mother agrees with plan, as outlined.         Harris Bolton MD   Epilepsy and Neurodiagnostics.   Clinical  of Neurology [facility].   Diplomate in Neurology, Epilepsy, and Electrodiagnostic Medicine.   Office: [phone]  Fax: [phone]      BILLING DOCUMENTATION:     I have performed physical exam and reviewed and updated ROS and plan today 7/30/2019. In review of that note, there are no new changes except as documented above.     Counseling:  I spent greater than 50% time face-to-face time of a total of 42 minutes visit. Over 50% of the time of the visit today was spent on counseling and or coordination of care wtih the patient and/or family, with greater than 50% of the total discussing my assessment and plan as stated above.

## 2019-07-30 NOTE — PROGRESS NOTES
Assume care of patient. POC discussed with mother. 2 rn skin check refused by mother. Stimulation triggers seizures per mother. Pt is incontinent of bowel/bladder. Hourly rounding in place.

## 2019-07-30 NOTE — PROGRESS NOTES
Hospital Medicine Daily Progress Note    Date of Service  7/29/2019    Chief Complaint  39 y.o. male admitted 7/19/2019 with intractable seizures in the setting of severe TBI when younger and CP    Hospital Course    7/23: with continuous EEG monitoring in place, d/w Dr. Bolton, added Fycompa to his current seizure regiment of keppra, phenobarbital, topamaz, vimpat.  Reviewed CXR from 7/19 and agree with diffuse infiltrate on my review left lung.  Patient appears diaphoretic on exam, ordered CT chest with contrast to rule out pneumonia.   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  UA reviewed no bacteria, has condom cath only.  Baclofen pump nontender on exam.  + yellow exudate bilateral eyelids, started erythromycin opthalmic ointment qid x 5 days.  Also, mother had me re-evaluate patient's left groin for green discharge from his pores.  No obvious skin breakdown or abscess on exam.  There is hyperpigmentation noted in left groin compared to right groin only.  Placed on Rocephin IV and doxycycline via NGT until CT chest results.  7/24:  Patient appeared less diaphoretic today, overall improved on Rocephin IV.  His bilateral conjunctival discharge was clearing on the erythromycin opthalmic ointment.  The mother had called patient's PCP for the skin infection name which she had written trichomycosis axillaris.  I have left a message for Dr. Ireland to discuss this infection since mother is convinced that only levaquin will cure it.  CT chest pending at this time.   Repeat exam per mother request to witness seizure activity.  Reviewed CT chest finding + pneumonia, ordered sputum induced culture by RT and changed doxy/Rocephin to zosyn IV to better cover for aspiration pneumonia.  Mother understands the antibiotics take some time to work in order to resolve his pnuemonia.  7/25:  Overall improved appearance today, able to make eye contact.  No seizures  overnight, then after stimulation with shaving by mother, started with left arm and facial twitching seizure.  No change in oxygenation during witnessed seizure ~1 minute length.  No further green d/c on left groin wipe per mother.  Added nystatin powder to groin instead of RADHA cream to reduce moisture.  Spoke with Dr. Ireland PCP who states patient has had pseudomonas skin infection in the past, but agrees with zosyn IV for abx choice for aspiration pna as well.  7/26: + pseudomonas noted on tracheal aspirate culture, ss cipro and zosyn.  D/w mother and Dr. Bolton, Dr. Bolton has authorized to start Fycompa 4mg qhs for short term use while resolving his aspiration pneumonia with ongoing seizures.  7/27:  Spoke with mother at bedside about persistent seizures may take more time to resolve with ongoing pseudomonas infection.  Called [employer] for rep to investigate baclofen pump to ensure not malfunctioning and causing persistent intractable seizures.  He continues to require ativan IV prn for recurrent seizures to left arm and face, witnessed by shaking and brought on by stimulation.  7/28:  Patient without seizure since 0600 this am., last ativan given at 0330.  He had multiple seizures last night per bedside RN and mother.  However, mother gave bath, dulcolax suppository and he did not have seizure.  VSS.  7/29:  Multiple seizures in last 24 hours, seen with Dr. Bolton.  Plan to add klonopin bid to control seizures since ativan does stop the seizures in the past.  Stop date placed for zosyn IV for 8/2, but can change to po cipro to finish 7 day course if seizures controlled x 24 hours.               Consultants/Specialty  neurology    Code Status  fc    Disposition  Has home tracheostomy set up at home with oxygen per mother.  PEG tube feeds in place, baclofen pump.  Home with mother once seizures controlled.  Condom catheter for urine.    Review of Systems  Review of Systems   Unable to perform ROS: Acuity of  condition        Physical Exam  Temp:  [36.1 °C (96.9 °F)-36.7 °C (98 °F)] 36.1 °C (96.9 °F)  Pulse:  [63-80] 65  Resp:  [16-18] 18  BP: (108-137)/(49-75) 131/68  SpO2:  [90 %-96 %] 94 %    Physical Exam   Constitutional: He appears well-developed and well-nourished. No distress.   Non verbal, does not respond to any commands   HENT:   Head: Normocephalic and atraumatic.   Mouth/Throat: No oropharyngeal exudate.   Trach mask   Eyes: Pupils are equal, round, and reactive to light. No scleral icterus.   Neck: Normal range of motion. Neck supple. No thyromegaly present.   Cardiovascular: Normal rate, regular rhythm, normal heart sounds and intact distal pulses.    No murmur heard.  Pulmonary/Chest: Effort normal and breath sounds normal. No respiratory distress. He has no wheezes.   Abdominal: Soft. Bowel sounds are normal. He exhibits no distension. There is no tenderness.   Thin  G tube site appears C/D/I   Musculoskeletal: Normal range of motion. He exhibits no tenderness.   Neurological: He is alert. No cranial nerve deficit. Coordination abnormal.   Contractures noted   Skin: Skin is warm and dry. No rash noted.   Hyperpigmentation left groin only compared to right, no evidence of abscess or open skin lesion seen as well as bilateral axillary areas.   Nursing note and vitals reviewed.  No change in physical exam today on 7/22/19 compared to 7/21/19    Fluids    Intake/Output Summary (Last 24 hours) at 07/29/19 1732  Last data filed at 07/29/19 0900   Gross per 24 hour   Intake              210 ml   Output                0 ml   Net              210 ml       Laboratory  Recent Labs      07/27/19   0349  07/28/19   0405  07/29/19   0411   WBC  4.6*  8.8  8.0   RBC  4.99  5.10  5.01   HEMOGLOBIN  15.7  15.6  15.7   HEMATOCRIT  48.2  49.0  48.2   MCV  96.6  96.1  96.2   MCH  31.5  30.6  31.3   MCHC  32.6*  31.8*  32.6*   RDW  62.4*  62.8*  62.0*   PLATELETCT  200  242  224   MPV  9.2  9.4  9.4     Recent Labs       07/27/19   0349  07/28/19   0405  07/29/19   0411   SODIUM  139  141  140   POTASSIUM  3.3*  3.6  3.4*   CHLORIDE  109  109  108   CO2  23  22  23   GLUCOSE  90  81  83   BUN  16  15  17   CREATININE  0.24*  <0.20*  0.23*   CALCIUM  8.6  8.7  8.7                   Imaging  CT-CHEST (THORAX) WITH   Final Result      1.  BILATERAL lower lobe atelectasis with possible superimposed pneumonia in the LEFT lower lobe   2.  Faint patchy airspace disease in the RIGHT upper and lower lobes which could be pneumonia   3.  Trace LEFT pleural effusion   4.  Enlarged RIGHT hilar lymph node            IR-US GUIDED PIV   Final Result    Ultrasound-guided PERIPHERAL IV INSERTION performed by    qualified nursing staff as above.            DX-CHEST-PORTABLE (1 VIEW)   Final Result      The perihilar/left lung base atelectasis with pneumonitis/pneumonia not excluded.   Small left pleural effusion.           Assessment/Plan  * Status epilepticus (HCC)- (present on admission)   Assessment & Plan    Despite renal barbital, Keppra, Vimpat, and Topamax continues to have multiple seizures consistent with focal seizures  He will be admitted to the neurology floor and EEG has been ordered, increased dose of vimpat appears to be working at this time  followed as an outpatient by Dr. Bolton, spoke with neurologist  7/23:  d/w Dr. Bolton, added Fycompa to his current seizure regiment of keppra, phenobarbital, topamaz, vimpat.  Reviewed CXR from 7/19 and agree with diffuse infiltrate on my review left lung.  Patient appears diaphoretic on exam, ordered CT chest with contrast to rule out pneumonia.   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  UA reviewed no bacteria, has condom cath only.  Baclofen pump nontender on exam.  + yellow exudate bilateral eyelids, started erythromycin opthalmic ointment qid x 5 days.           Epilepsy (HCC)- (present on admission)   Assessment & Plan    Tenuous  outpatient regimen prescribed by Dr. Bolton, long standing h/o seizures  -consider palliative care consult, remains mostly in a vegetative state  -known to have pharmaco-resistant seizures  Dr. Bolton authorized start of short term Zycompa 4mg qhs for ongoing seizures while under current treatment for      TBI (traumatic brain injury) (HCC)- (present on admission)   Assessment & Plan    With history of tracheostomy  Continue his G-tube feeding per his mom's recommendations  Is mentation appears to be effectively baseline  He has a baclofen pain pump     Aspiration pneumonia (HCC)- (present on admission)   Assessment & Plan    History of silent aspiration with tracheostomy and PEG tube feeds.  CTA chest with left sided pneumonia.  Changed Rocephin/doxy to zosyn IV  7/23: Sputum induced culture by RT via trach aspirate with pseudomonas ss zosyn and cipro.     Acute bacterial conjunctivitis of both eyes- (present on admission)   Assessment & Plan    7/23 started on erythromycin ointment qid x 5 days.     Hypokalemia- (present on admission)   Assessment & Plan    Increase K bisphosphonate 12.5 q 48 hour to 25 daily via PEG tube.     Contraction, joint, multiple sites- (present on admission)   Assessment & Plan    Contractures are stable  No skin breakdown seen on exam.          VTE prophylaxis: Mother refuses to allow any lovenox injections or SCDs.  She understands the risks of DVTs while immobile.  She states she works with his legs daily.

## 2019-07-30 NOTE — PROGRESS NOTES
Hospital Medicine Daily Progress Note    Date of Service  7/30/2019    Chief Complaint  39 y.o. male admitted 7/19/2019 with intractable seizures in the setting of severe TBI when younger and CP    Hospital Course    7/23: with continuous EEG monitoring in place, d/w Dr. Bolton, added Fycompa to his current seizure regiment of keppra, phenobarbital, topamaz, vimpat.  Reviewed CXR from 7/19 and agree with diffuse infiltrate on my review left lung.  Patient appears diaphoretic on exam, ordered CT chest with contrast to rule out pneumonia.   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  UA reviewed no bacteria, has condom cath only.  Baclofen pump nontender on exam.  + yellow exudate bilateral eyelids, started erythromycin opthalmic ointment qid x 5 days.  Also, mother had me re-evaluate patient's left groin for green discharge from his pores.  No obvious skin breakdown or abscess on exam.  There is hyperpigmentation noted in left groin compared to right groin only.  Placed on Rocephin IV and doxycycline via NGT until CT chest results.  7/24:  Patient appeared less diaphoretic today, overall improved on Rocephin IV.  His bilateral conjunctival discharge was clearing on the erythromycin opthalmic ointment.  The mother had called patient's PCP for the skin infection name which she had written trichomycosis axillaris.  I have left a message for Dr. Ireland to discuss this infection since mother is convinced that only levaquin will cure it.  CT chest pending at this time.   Repeat exam per mother request to witness seizure activity.  Reviewed CT chest finding + pneumonia, ordered sputum induced culture by RT and changed doxy/Rocephin to zosyn IV to better cover for aspiration pneumonia.  Mother understands the antibiotics take some time to work in order to resolve his pnuemonia.  7/25:  Overall improved appearance today, able to make eye contact.  No seizures  overnight, then after stimulation with shaving by mother, started with left arm and facial twitching seizure.  No change in oxygenation during witnessed seizure ~1 minute length.  No further green d/c on left groin wipe per mother.  Added nystatin powder to groin instead of RADHA cream to reduce moisture.  Spoke with Dr. Ireland PCP who states patient has had pseudomonas skin infection in the past, but agrees with zosyn IV for abx choice for aspiration pna as well.  7/26: + pseudomonas noted on tracheal aspirate culture, ss cipro and zosyn.  D/w mother and Dr. Bolton, Dr. Bolton has authorized to start Fycompa 4mg qhs for short term use while resolving his aspiration pneumonia with ongoing seizures.  7/27:  Spoke with mother at bedside about persistent seizures may take more time to resolve with ongoing pseudomonas infection.  Called [employer] for rep to investigate baclofen pump to ensure not malfunctioning and causing persistent intractable seizures.  He continues to require ativan IV prn for recurrent seizures to left arm and face, witnessed by shaking and brought on by stimulation.  7/28:  Patient without seizure since 0600 this am., last ativan given at 0330.  He had multiple seizures last night per bedside RN and mother.  However, mother gave bath, dulcolax suppository and he did not have seizure.  VSS.  7/29:  Multiple seizures in last 24 hours, seen with Dr. Bolton.  Plan to add klonopin bid to control seizures since ativan does stop the seizures in the past.  Stop date placed for zosyn IV for 8/2, but can change to po cipro to finish 7 day course if seizures controlled x 24 hours.       7/30   Patient had 4 small seizures this morning.  As per mother who is at the bedside the seizure frequency has been decreasing.  Patient continues on IV Zosyn.  I discussed with mother that changing to Cipro may not be the best option for this patient has Cipro can lower the seizure threshold           Consultants/Specialty  neurology    Code Status  fc    Disposition  Has home tracheostomy set up at home with oxygen per mother.  PEG tube feeds in place, baclofen pump.  Home with mother once seizures controlled.  Condom catheter for urine.    Review of Systems  Review of Systems   Unable to perform ROS: Acuity of condition        Physical Exam  Temp:  [36.1 °C (96.9 °F)-36.2 °C (97.2 °F)] 36.2 °C (97.2 °F)  Pulse:  [53-72] 62  Resp:  [16-18] 17  BP: (116-131)/(67-71) 116/71  SpO2:  [35 %-96 %] 95 %    Physical Exam   Constitutional: He appears well-developed and well-nourished. No distress.   Non verbal, does not respond to any commands   HENT:   Head: Normocephalic and atraumatic.   Mouth/Throat: No oropharyngeal exudate.   Trach mask   Eyes: Pupils are equal, round, and reactive to light. No scleral icterus.   Neck: Normal range of motion. Neck supple. No thyromegaly present.   Cardiovascular: Normal rate, regular rhythm, normal heart sounds and intact distal pulses.    No murmur heard.  Pulmonary/Chest: Effort normal and breath sounds normal. No respiratory distress. He has no wheezes.   Abdominal: Soft. Bowel sounds are normal. He exhibits no distension. There is no tenderness.   Thin  G tube site appears C/D/I   Musculoskeletal: Normal range of motion. He exhibits edema (Bilateral foot). He exhibits no tenderness.   Neurological: He is alert. No cranial nerve deficit. Coordination abnormal.   Contractures noted   Skin: Skin is warm and dry. No rash noted.   Hyperpigmentation left groin only compared to right, no evidence of abscess or open skin lesion seen as well as bilateral axillary areas.   Nursing note and vitals reviewed.      Fluids    Intake/Output Summary (Last 24 hours) at 07/30/19 0993  Last data filed at 07/30/19 0609   Gross per 24 hour   Intake              120 ml   Output                0 ml   Net              120 ml       Laboratory  Recent Labs      07/28/19   2174  07/29/19   2789   07/30/19   0421   WBC  8.8  8.0  4.8   RBC  5.10  5.01  4.94   HEMOGLOBIN  15.6  15.7  15.0   HEMATOCRIT  49.0  48.2  47.3   MCV  96.1  96.2  95.7   MCH  30.6  31.3  30.4   MCHC  31.8*  32.6*  31.7*   RDW  62.8*  62.0*  62.4*   PLATELETCT  242  224  227   MPV  9.4  9.4  9.2     Recent Labs      07/28/19   0405  07/29/19   0411  07/30/19   0421   SODIUM  141  140  142   POTASSIUM  3.6  3.4*  3.7   CHLORIDE  109  108  108   CO2  22  23  24   GLUCOSE  81  83  90   BUN  15  17  15   CREATININE  <0.20*  0.23*  0.20*   CALCIUM  8.7  8.7  8.7                   Imaging  CT-CHEST (THORAX) WITH   Final Result      1.  BILATERAL lower lobe atelectasis with possible superimposed pneumonia in the LEFT lower lobe   2.  Faint patchy airspace disease in the RIGHT upper and lower lobes which could be pneumonia   3.  Trace LEFT pleural effusion   4.  Enlarged RIGHT hilar lymph node            IR-US GUIDED PIV   Final Result    Ultrasound-guided PERIPHERAL IV INSERTION performed by    qualified nursing staff as above.            DX-CHEST-PORTABLE (1 VIEW)   Final Result      The perihilar/left lung base atelectasis with pneumonitis/pneumonia not excluded.   Small left pleural effusion.           Assessment/Plan  * Status epilepticus (HCC)- (present on admission)   Assessment & Plan    Continue current antiseizure medication       Treat infection       Epilepsy (HCC)- (present on admission)   Assessment & Plan    Tenuous outpatient regimen prescribed by Dr. Bolton, long standing h/o seizures  -consider palliative care consult, remains mostly in a vegetative state  -known to have pharmaco-resistant seizures       TBI (traumatic brain injury) (HCC)- (present on admission)   Assessment & Plan    With history of tracheostomy  Continue his G-tube feeding per his mom's recommendations  Is mentation appears to be effectively baseline  He has a baclofen pain pump     Aspiration pneumonia (HCC)- (present on admission)   Assessment & Plan     History of silent aspiration with tracheostomy and PEG tube feeds.  CTA chest with left sided pneumonia.  Changed Rocephin/doxy to zosyn IV  7/23: Sputum induced culture by RT via trach aspirate with pseudomonas ss zosyn and cipro.     Acute bacterial conjunctivitis of both eyes- (present on admission)   Assessment & Plan    7/23 started on erythromycin ointment qid x 5 days.     Hypokalemia- (present on admission)   Assessment & Plan     K bisphosphonate  25 daily via PEG tube.     Contraction, joint, multiple sites- (present on admission)   Assessment & Plan    Contractures are stable  No skin breakdown seen on exam.          VTE prophylaxis: Mother refuses to allow any lovenox injections or SCDs.  She understands the risks of DVTs while immobile.  She states she works with his legs daily.

## 2019-07-31 PROBLEM — B36.9 FUNGAL RASH OF TORSO: Status: ACTIVE | Noted: 2019-07-31

## 2019-07-31 LAB
ANION GAP SERPL CALC-SCNC: 8 MMOL/L (ref 0–11.9)
BASOPHILS # BLD AUTO: 0.5 % (ref 0–1.8)
BASOPHILS # BLD: 0.03 K/UL (ref 0–0.12)
BUN SERPL-MCNC: 14 MG/DL (ref 8–22)
CALCIUM SERPL-MCNC: 8.9 MG/DL (ref 8.5–10.5)
CHLORIDE SERPL-SCNC: 111 MMOL/L (ref 96–112)
CO2 SERPL-SCNC: 22 MMOL/L (ref 20–33)
CREAT SERPL-MCNC: <0.2 MG/DL (ref 0.5–1.4)
EOSINOPHIL # BLD AUTO: 0.34 K/UL (ref 0–0.51)
EOSINOPHIL NFR BLD: 5.6 % (ref 0–6.9)
ERYTHROCYTE [DISTWIDTH] IN BLOOD BY AUTOMATED COUNT: 63.1 FL (ref 35.9–50)
GLUCOSE SERPL-MCNC: 99 MG/DL (ref 65–99)
HCT VFR BLD AUTO: 49.3 % (ref 42–52)
HGB BLD-MCNC: 15.7 G/DL (ref 14–18)
IMM GRANULOCYTES # BLD AUTO: 0.02 K/UL (ref 0–0.11)
IMM GRANULOCYTES NFR BLD AUTO: 0.3 % (ref 0–0.9)
LYMPHOCYTES # BLD AUTO: 1.72 K/UL (ref 1–4.8)
LYMPHOCYTES NFR BLD: 28.3 % (ref 22–41)
MCH RBC QN AUTO: 30.7 PG (ref 27–33)
MCHC RBC AUTO-ENTMCNC: 31.8 G/DL (ref 33.7–35.3)
MCV RBC AUTO: 96.5 FL (ref 81.4–97.8)
MONOCYTES # BLD AUTO: 0.55 K/UL (ref 0–0.85)
MONOCYTES NFR BLD AUTO: 9 % (ref 0–13.4)
NEUTROPHILS # BLD AUTO: 3.42 K/UL (ref 1.82–7.42)
NEUTROPHILS NFR BLD: 56.3 % (ref 44–72)
NRBC # BLD AUTO: 0 K/UL
NRBC BLD-RTO: 0 /100 WBC
PLATELET # BLD AUTO: 243 K/UL (ref 164–446)
PMV BLD AUTO: 9.3 FL (ref 9–12.9)
POTASSIUM SERPL-SCNC: 3.7 MMOL/L (ref 3.6–5.5)
RBC # BLD AUTO: 5.11 M/UL (ref 4.7–6.1)
SODIUM SERPL-SCNC: 141 MMOL/L (ref 135–145)
WBC # BLD AUTO: 6.1 K/UL (ref 4.8–10.8)

## 2019-07-31 PROCEDURE — 700105 HCHG RX REV CODE 258: Performed by: HOSPITALIST

## 2019-07-31 PROCEDURE — A9270 NON-COVERED ITEM OR SERVICE: HCPCS | Performed by: PSYCHIATRY & NEUROLOGY

## 2019-07-31 PROCEDURE — 99232 SBSQ HOSP IP/OBS MODERATE 35: CPT | Performed by: HOSPITALIST

## 2019-07-31 PROCEDURE — 700102 HCHG RX REV CODE 250 W/ 637 OVERRIDE(OP): Performed by: HOSPITALIST

## 2019-07-31 PROCEDURE — 80048 BASIC METABOLIC PNL TOTAL CA: CPT

## 2019-07-31 PROCEDURE — A9270 NON-COVERED ITEM OR SERVICE: HCPCS | Performed by: HOSPITALIST

## 2019-07-31 PROCEDURE — 94640 AIRWAY INHALATION TREATMENT: CPT

## 2019-07-31 PROCEDURE — 700102 HCHG RX REV CODE 250 W/ 637 OVERRIDE(OP): Performed by: PSYCHIATRY & NEUROLOGY

## 2019-07-31 PROCEDURE — 700101 HCHG RX REV CODE 250: Performed by: PSYCHIATRY & NEUROLOGY

## 2019-07-31 PROCEDURE — 770001 HCHG ROOM/CARE - MED/SURG/GYN PRIV*

## 2019-07-31 PROCEDURE — 85025 COMPLETE CBC W/AUTO DIFF WBC: CPT

## 2019-07-31 PROCEDURE — 36415 COLL VENOUS BLD VENIPUNCTURE: CPT

## 2019-07-31 PROCEDURE — 700111 HCHG RX REV CODE 636 W/ 250 OVERRIDE (IP): Performed by: HOSPITALIST

## 2019-07-31 PROCEDURE — 99233 SBSQ HOSP IP/OBS HIGH 50: CPT | Performed by: PSYCHIATRY & NEUROLOGY

## 2019-07-31 RX ORDER — CLONAZEPAM 0.5 MG/1
0.25 TABLET ORAL DAILY
Status: DISCONTINUED | OUTPATIENT
Start: 2019-08-01 | End: 2019-08-03 | Stop reason: HOSPADM

## 2019-07-31 RX ORDER — CLONAZEPAM 0.5 MG/1
0.5 TABLET ORAL EVERY EVENING
Status: DISCONTINUED | OUTPATIENT
Start: 2019-07-31 | End: 2019-08-03 | Stop reason: HOSPADM

## 2019-07-31 RX ADMIN — CYANOCOBALAMIN TAB 500 MCG 1000 MCG: 500 TAB at 07:22

## 2019-07-31 RX ADMIN — PIPERACILLIN AND TAZOBACTAM 4.5 G: 4; .5 INJECTION, POWDER, LYOPHILIZED, FOR SOLUTION INTRAVENOUS; PARENTERAL at 12:49

## 2019-07-31 RX ADMIN — LEVETIRACETAM 1500 MG: 500 TABLET, FILM COATED ORAL at 07:20

## 2019-07-31 RX ADMIN — PIPERACILLIN AND TAZOBACTAM 4.5 G: 4; .5 INJECTION, POWDER, LYOPHILIZED, FOR SOLUTION INTRAVENOUS; PARENTERAL at 20:58

## 2019-07-31 RX ADMIN — LEVETIRACETAM 1500 MG: 500 TABLET, FILM COATED ORAL at 18:00

## 2019-07-31 RX ADMIN — PHENOBARBITAL 120 MG: 20 ELIXIR ORAL at 17:50

## 2019-07-31 RX ADMIN — POTASSIUM BICARBONATE 25 MEQ: 978 TABLET, EFFERVESCENT ORAL at 17:50

## 2019-07-31 RX ADMIN — PHENOBARBITAL 120 MG: 20 ELIXIR ORAL at 07:20

## 2019-07-31 RX ADMIN — TOPIRAMATE 200 MG: 100 TABLET, FILM COATED ORAL at 07:22

## 2019-07-31 RX ADMIN — CLONAZEPAM 0.5 MG: 0.5 TABLET ORAL at 17:51

## 2019-07-31 RX ADMIN — PIPERACILLIN AND TAZOBACTAM 4.5 G: 4; .5 INJECTION, POWDER, LYOPHILIZED, FOR SOLUTION INTRAVENOUS; PARENTERAL at 06:20

## 2019-07-31 RX ADMIN — LACOSAMIDE 300 MG: 100 TABLET, FILM COATED ORAL at 07:21

## 2019-07-31 RX ADMIN — POTASSIUM BICARBONATE 25 MEQ: 978 TABLET, EFFERVESCENT ORAL at 07:22

## 2019-07-31 RX ADMIN — CLONAZEPAM 0.25 MG: 0.5 TABLET ORAL at 07:23

## 2019-07-31 RX ADMIN — NYSTATIN: 100000 POWDER TOPICAL at 18:00

## 2019-07-31 RX ADMIN — LACOSAMIDE 300 MG: 100 TABLET, FILM COATED ORAL at 17:49

## 2019-07-31 RX ADMIN — TOPIRAMATE 300 MG: 100 TABLET, FILM COATED ORAL at 21:00

## 2019-07-31 RX ADMIN — NYSTATIN: 100000 POWDER TOPICAL at 07:38

## 2019-07-31 NOTE — PROGRESS NOTES
Nonverbal, positioned for comfort and recently received perineal care and bed change from Mother Meza. Expiratory wheezing auscultated. Offered sterile suctioning. Susana, Mother opts to provide suctioning to patient herself. 2RN skin check refused at this time by Susana Mother who also requests labs be drawn after 6am, so patient can sleep. Complied.

## 2019-07-31 NOTE — PROGRESS NOTES
"IR was consulted to replace patients rose button g tube because patients mother was unhappy with current tube. No tube dysfunction. IR jacobo SHAY and Doc ANGELES sent to bedside to eval tube and speak with mom. Mother stated that she wanted a \"collapsible g tube\". When told that we didn't carry this g tube and that we could look into ordering it, but it may take a few days, mother stated she would go back to GI doctor in [place] and have them exchange it there.   Poonam Martel RN  "

## 2019-07-31 NOTE — DISCHARGE PLANNING
"Anticipated Disposition:  Home with Help    Action:   Met with Pt and his Mother Susana in the room. Susana denies any discharge needs at this point. She stated \" Ruben will be discharged this Saturday and I already set up ambulance with [facility] with my insurance. We have FT supplies and Tracostomy care supplies at home, I've doing this for almost 20 years. I am the caregiver/friend/mother/avocate and everything\"  We had a long conversation, emotional support provided.   Assessment competed.           Barriers to Discharge:   Medical clearance, still on Antibiotics will finish this Saturday.       Plan:  DC home when medically clear, follow up with attending physician for medical clearance.     "

## 2019-07-31 NOTE — PROGRESS NOTES
Hospital Medicine Daily Progress Note    Date of Service  7/31/2019    Chief Complaint  39 y.o. male admitted 7/19/2019 with intractable seizures in the setting of severe TBI when younger and CP    Hospital Course    7/23: with continuous EEG monitoring in place, d/w Dr. Bolton, added Fycompa to his current seizure regiment of keppra, phenobarbital, topamaz, vimpat.  Reviewed CXR from 7/19 and agree with diffuse infiltrate on my review left lung.  Patient appears diaphoretic on exam, ordered CT chest with contrast to rule out pneumonia.   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  .    7/29:  Multiple seizures in last 24 hours, seen with Dr. Bolton.  Plan to add klonopin bid to control seizures since ativan does stop the seizures in the past.  Stop date placed for zosyn IV for 8/2, but can change to po cipro to finish 7 day course if seizures controlled x 24 hours.       7/30   Patient had 4 small seizures this morning.  As per mother who is at the bedside the seizure frequency has been decreasing.  Patient continues on IV Zosyn.  I discussed with mother that changing to Cipro may not be the best option for this patient has Cipro can lower the seizure threshold    7/31 seizure still recurring.    Afebrile    Case discussed with mother at bedside-she agrees that patient should complete antibiotics here in the hospital with IV Zosyn    She requested a referral to interventional radiology to see whether not they could replaced the button PEG, as it is loose.  As per IR technician, they do not have any different button PEG and what the patient currently has.          Consultants/Specialty  neurology    Code Status  full    Disposition  Has home tracheostomy set up at home with oxygen per mother.  PEG tube feeds in place, baclofen pump.  Home with mother once seizures controlled.  Condom catheter for urine.    Review of Systems  Review of Systems   Unable to perform  ROS: Acuity of condition        Physical Exam  Temp:  [35.9 °C (96.7 °F)-36.3 °C (97.3 °F)] 36 °C (96.8 °F)  Pulse:  [50-68] 58  Resp:  [16-19] 16  BP: (109-125)/() 120/100  SpO2:  [91 %-97 %] 95 %    Physical Exam   Constitutional: He appears well-developed and well-nourished. No distress.   Non verbal, does not respond to any commands   HENT:   Head: Normocephalic and atraumatic.   Mouth/Throat: No oropharyngeal exudate.   Trach mask   Eyes: Pupils are equal, round, and reactive to light. No scleral icterus.   Neck: Normal range of motion. Neck supple. No thyromegaly present.   Cardiovascular: Normal rate, regular rhythm, normal heart sounds and intact distal pulses.   No murmur heard.  Pulmonary/Chest: Effort normal and breath sounds normal. No respiratory distress. He has no wheezes.   Abdominal: Soft. Bowel sounds are normal. He exhibits no distension. There is no tenderness.   Thin  G tube site -mild surrounding erythema   Genitourinary:   Genitourinary Comments: Groin area has erythema consistent with a fungal rash   Musculoskeletal: Normal range of motion. He exhibits edema (Bilateral foot). He exhibits no tenderness.   Both axillary areas have the erythematous areas considered with fungal   Neurological: He is alert. No cranial nerve deficit. Coordination abnormal.   Contractures noted   Skin: Skin is warm and dry. No rash noted.   Hyperpigmentation left groin only compared to right, no evidence of abscess or open skin lesion seen as well as bilateral axillary areas.   Nursing note and vitals reviewed.      Fluids  No intake or output data in the 24 hours ending 07/31/19 1329    Laboratory  Recent Labs     07/29/19  0411 07/30/19  0421 07/31/19  0712   WBC 8.0 4.8 6.1   RBC 5.01 4.94 5.11   HEMOGLOBIN 15.7 15.0 15.7   HEMATOCRIT 48.2 47.3 49.3   MCV 96.2 95.7 96.5   MCH 31.3 30.4 30.7   MCHC 32.6* 31.7* 31.8*   RDW 62.0* 62.4* 63.1*   PLATELETCT 224 227 243   MPV 9.4 9.2 9.3     Recent Labs      07/29/19  0411 07/30/19  0421 07/31/19  0712   SODIUM 140 142 141   POTASSIUM 3.4* 3.7 3.7   CHLORIDE 108 108 111   CO2 23 24 22   GLUCOSE 83 90 99   BUN 17 15 14   CREATININE 0.23* 0.20* <0.20*   CALCIUM 8.7 8.7 8.9                   Imaging  CT-CHEST (THORAX) WITH   Final Result      1.  BILATERAL lower lobe atelectasis with possible superimposed pneumonia in the LEFT lower lobe   2.  Faint patchy airspace disease in the RIGHT upper and lower lobes which could be pneumonia   3.  Trace LEFT pleural effusion   4.  Enlarged RIGHT hilar lymph node            IR-US GUIDED PIV   Final Result    Ultrasound-guided PERIPHERAL IV INSERTION performed by    qualified nursing staff as above.            DX-CHEST-PORTABLE (1 VIEW)   Final Result      The perihilar/left lung base atelectasis with pneumonitis/pneumonia not excluded.   Small left pleural effusion.           Assessment/Plan  * Status epilepticus ([facility])- (present on admission)  Assessment & Plan  Continue current antiseizure medication       Treat infection      Fungal rash of torso  Assessment & Plan  Nystatin powder to axillary area    Acute bacterial conjunctivitis of both eyes- (present on admission)  Assessment & Plan  7/23 started on erythromycin ointment qid x 5 days.    Epilepsy ([facility])- (present on admission)  Assessment & Plan  Tenuous outpatient regimen prescribed by Dr. Bolton, long standing h/o seizures  -consider palliative care consult, remains mostly in a vegetative state  -known to have pharmaco-resistant seizures      Contraction, joint, multiple sites- (present on admission)  Assessment & Plan  Contractures are stable  No skin breakdown seen on exam.    Hypokalemia- (present on admission)  Assessment & Plan   K bisphosphonate  25 daily via PEG tube.    TBI (traumatic brain injury) ([facility])- (present on admission)  Assessment & Plan  With history of tracheostomy  Continue his G-tube feeding per his mom's recommendations  Is mentation appears to be  effectively baseline  He has a baclofen pain pump    Aspiration pneumonia (HCC)- (present on admission)  Assessment & Plan  History of silent aspiration with tracheostomy and PEG tube feeds.  CTA chest with left sided pneumonia.  Changed Rocephin/doxy to zosyn IV  7/23: Sputum induced culture by RT via trach aspirate with pseudomonas ss zosyn and cipro.       VTE prophylaxis: Compression stocking.

## 2019-07-31 NOTE — PROGRESS NOTES
Chief Complaint   Patient presents with   • Seizure   • Rash     to chest for the last 9 days.       Problem List Items Addressed This Visit     Seizure (HCC)    Relevant Medications    lacosamide (VIMPAT) 200 MG Tab tablet    levETIRAcetam (KEPPRA) 100 MG/ML Solution    PHENobarbital 20 MG/5ML Elixir    topiramate (TOPAMAX) 200 MG tablet    levETIRAcetam (KEPPRA) tablet 1,500 mg    PHENobarbital solution 120 mg    lacosamide (VIMPAT) tablet 300 mg    lacosamide (VIMPAT) 100 mg in  mL ivpb (Completed)    topiramate (TOPAMAX) tablet 200 mg    topiramate (TOPAMAX) tablet 300 mg    perampanel (FYCOMPA) tablet 2 mg (Completed)    clonazePAM (KLONOPIN) tablet 0.25 mg (Start on 8/1/2019  6:00 AM)    clonazePAM (KLONOPIN) tablet 0.5 mg (Start on 7/31/2019  6:00 PM)      Other Visit Diagnoses     Seizure disorder (HCC)        Relevant Medications    lacosamide (VIMPAT) 200 MG Tab tablet    levETIRAcetam (KEPPRA) 100 MG/ML Solution    PHENobarbital 20 MG/5ML Elixir    topiramate (TOPAMAX) 200 MG tablet    levETIRAcetam (KEPPRA) tablet 1,500 mg    PHENobarbital solution 120 mg    lacosamide (VIMPAT) tablet 300 mg    lacosamide (VIMPAT) 100 mg in  mL ivpb (Completed)    topiramate (TOPAMAX) tablet 200 mg    topiramate (TOPAMAX) tablet 300 mg    perampanel (FYCOMPA) tablet 2 mg (Completed)    clonazePAM (KLONOPIN) tablet 0.25 mg (Start on 8/1/2019  6:00 AM)    clonazePAM (KLONOPIN) tablet 0.5 mg (Start on 7/31/2019  6:00 PM)    Pneumonia of left lower lobe due to infectious organism (HCC)        Relevant Medications    ampicillin/sulbactam (UNASYN) 3 g in  mL IVPB (Completed)    piperacillin-tazobactam (ZOSYN) 3.375 g in  mL IVPB (Completed)    piperacillin-tazobactam (ZOSYN) 4.5 g in  mL IVPB          Interim history:  Ruben Jerry remains hospitalized. Some mild focal seizures with left hand jerking but clonazepam has been of great benefit, mother requesting to increase slightly. Having movements  of the left arm, mother states these are not seizures but he does when unhappy or uncomfortable. He remains on antibiotics.       Past medical history:   Past Medical History:   Diagnosis Date   • Seizure (HCC)    • Traumatic brain injury (HCC) 05/15/1998       Past surgical history:   Past Surgical History:   Procedure Laterality Date   • LARYNGOSCOPY  7/2/2017    Procedure: LARYNGOSCOPY;  Surgeon: Catherine Osorio M.D.;  Location: SURGERY [facility];  Service:    • BRONCHOSCOPY  7/2/2017    Procedure: BRONCHOSCOPY;  Surgeon: Catherine Osorio M.D.;  Location: SURGERY [facility];  Service:    • TRACHEOSTOMY  7/2/2017    Procedure: TRACHEOSTOMY;  Surgeon: Catherine Osorio M.D.;  Location: SURGERY [facility];  Service:    • ESOPHAGOSCOPY  7/2/2017    Procedure: ESOPHAGOSCOPY;  Surgeon: Catherine Osorio M.D.;  Location: SURGERY [facility];  Service:        Family history:   No family history on file.    Social history:   Social History     Socioeconomic History   • Marital status: Single     Spouse name: Not on file   • Number of children: Not on file   • Years of education: Not on file   • Highest education level: Not on file   Occupational History   • Not on file   Social Needs   • Financial resource strain: Not on file   • Food insecurity:     Worry: Not on file     Inability: Not on file   • Transportation needs:     Medical: Not on file     Non-medical: Not on file   Tobacco Use   • Smoking status: Never Smoker   • Smokeless tobacco: Never Used   Substance and Sexual Activity   • Alcohol use: No   • Drug use: Yes     Types: Oral     Comment: Mother tried CBD treatment for siezures in past   • Sexual activity: Not on file   Lifestyle   • Physical activity:     Days per week: Not on file     Minutes per session: Not on file   • Stress: Not on file   Relationships   • Social connections:     Talks on phone: Not on file     Gets together: Not on file     Attends [employer] service: Not  on file     Active member of club or organization: Not on file     Attends meetings of clubs or organizations: Not on file     Relationship status: Not on file   • Intimate partner violence:     Fear of current or ex partner: Not on file     Emotionally abused: Not on file     Physically abused: Not on file     Forced sexual activity: Not on file   Other Topics Concern   • Not on file   Social History Narrative   • Not on file       Current medications:   Current Facility-Administered Medications   Medication Dose   • [START ON 8/1/2019] clonazePAM (KLONOPIN) tablet 0.25 mg  0.25 mg   • clonazePAM (KLONOPIN) tablet 0.5 mg  0.5 mg   • potassium bicarbonate (KLYTE) effervescent tablet 25 mEq  25 mEq   • LORazepam (ATIVAN) injection 0.5-1 mg  0.5-1 mg   • piperacillin-tazobactam (ZOSYN) 4.5 g in  mL IVPB  4.5 g   • bisacodyl (DULCOLAX) suppository 10 mg  10 mg   • nystatin (MYCOSTATIN) powder     • topiramate (TOPAMAX) tablet 200 mg  200 mg   • topiramate (TOPAMAX) tablet 300 mg  300 mg   • lacosamide (VIMPAT) tablet 300 mg  300 mg   • Pharmacy Consult: Enteral tube insertion - review meds/change route/product selection     • senna-docusate (PERICOLACE or SENOKOT S) 8.6-50 MG per tablet 2 Tab  2 Tab    And   • polyethylene glycol/lytes (MIRALAX) PACKET 1 Packet  1 Packet    And   • magnesium hydroxide (MILK OF MAGNESIA) suspension 30 mL  30 mL    And   • bisacodyl (DULCOLAX) suppository 10 mg  10 mg   • Pain Pump (patient supplied) Device     • cyanocobalamin (VITAMIN B-12) tablet 1,000 mcg  1,000 mcg   • levETIRAcetam (KEPPRA) tablet 1,500 mg  1,500 mg   • PHENobarbital solution 120 mg  120 mg       Medication Allergy:  Allergies   Allergen Reactions   • Sulfa Drugs Unspecified     Per caretaker, mother is allergic to sulfa so believes her son could be as well         Review of systems:   Unable to obtain.       Physical examination:   Vitals:    07/30/19 2244 07/31/19 0743 07/31/19 0752 07/31/19 1055   BP:  125/76  120/100    Pulse: (!) 50 (!) 55 (!) 56 (!) 58   Resp: 16 16 19 16   Temp:   36 °C (96.8 °F)    TempSrc:   Temporal    SpO2: 93% 94% 95% 95%   Weight:       Height:         He does not appear in any acute distress.  Has some intermittent kind of hemibalistic movements of the left arm, mother states he does this when he is uncomfortable / upset but these are not seizures she states  The patient is nonverbal, unable to follow commands.  Memory cannot be assessed.  He is awake, unable to assess for alertness or orientation.  Eyes are midline, there is no nystagmus on primary gaze.  His pupils are about 4 to 5 mm bilaterally and are sluggish to react to light.  His face appears symmetric.  He has contractures and atrophy in all extremities.  Examination of the skin does not show any rashes.  The patient has a feeding tube in place.           ANCILLARY DATA REVIEWED:       Lab Data Review:  Recent Results (from the past 24 hour(s))   Basic Metabolic Panel    Collection Time: 07/31/19  7:12 AM   Result Value Ref Range    Sodium 141 135 - 145 mmol/L    Potassium 3.7 3.6 - 5.5 mmol/L    Chloride 111 96 - 112 mmol/L    Co2 22 20 - 33 mmol/L    Glucose 99 65 - 99 mg/dL    Bun 14 8 - 22 mg/dL    Creatinine <0.20 (L) 0.50 - 1.40 mg/dL    Calcium 8.9 8.5 - 10.5 mg/dL    Anion Gap 8.0 0.0 - 11.9   CBC WITH DIFFERENTIAL    Collection Time: 07/31/19  7:12 AM   Result Value Ref Range    WBC 6.1 4.8 - 10.8 K/uL    RBC 5.11 4.70 - 6.10 M/uL    Hemoglobin 15.7 14.0 - 18.0 g/dL    Hematocrit 49.3 42.0 - 52.0 %    MCV 96.5 81.4 - 97.8 fL    MCH 30.7 27.0 - 33.0 pg    MCHC 31.8 (L) 33.7 - 35.3 g/dL    RDW 63.1 (H) 35.9 - 50.0 fL    Platelet Count 243 164 - 446 K/uL    MPV 9.3 9.0 - 12.9 fL    Neutrophils-Polys 56.30 44.00 - 72.00 %    Lymphocytes 28.30 22.00 - 41.00 %    Monocytes 9.00 0.00 - 13.40 %    Eosinophils 5.60 0.00 - 6.90 %    Basophils 0.50 0.00 - 1.80 %    Immature Granulocytes 0.30 0.00 - 0.90 %    Nucleated RBC 0.00  /100 WBC    Neutrophils (Absolute) 3.42 1.82 - 7.42 K/uL    Lymphs (Absolute) 1.72 1.00 - 4.80 K/uL    Monos (Absolute) 0.55 0.00 - 0.85 K/uL    Eos (Absolute) 0.34 0.00 - 0.51 K/uL    Baso (Absolute) 0.03 0.00 - 0.12 K/uL    Immature Granulocytes (abs) 0.02 0.00 - 0.11 K/uL    NRBC (Absolute) 0.00 K/uL   ESTIMATED GFR    Collection Time: 07/31/19  7:12 AM   Result Value Ref Range    GFR If African American >60 >60 mL/min/1.73 m 2    GFR If Non African American >60 >60 mL/min/1.73 m 2         Records reviewed:   Chart reviewed.     Imaging:   MRI brain w/wo, 3/7/18  1.  Moderate cerebral atrophy beyond that expected for the patient's age.  2.  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  3.  Old hemorrhagic infarcts left basal ganglia, left thalamus, left subthalamic region.  4.  Marked atrophy and encephalomalacic change in the left cerebral peduncle with T2 hyperintensity and volume loss extending down the left anterior quadrant of the pop into the left medullary pyramid. 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  5.  Widespread areas of curvilinear and gyriform enhancement involving the right frontal lobe, right parietal lobe, right occipital lobe, along with curvilinear enhancement in the right basal ganglia. These findings are most consistent with subacute   sequela of cerebral infarction or cortical laminar necrosis. Cortical laminar necrosis has been reported associated with prolonged seizures/status epilepticus. Subacute sequela of encephalitis could have a similar appearance.  6.  Advanced supratentorial white matter disease consistent with microvascular ischemic change versus demyelination or gliosis. This results in some volume loss of deep white matter.  7.  Curvilinear hemosiderin deposition in the right temporal lobe deep white matter consistent with old hemorrhage.  8.  No acute cerebral infarction or acute hemorrhage evident.  9.  No evidence of mesial temporal sclerosis.   (I  personally reviewed images).      MRA head wo, 3/9/18  MRA OF THE [place] WITHIN NORMAL LIMITS.  (I personally reviewed images).         EE:  This is an abnormal 24 hrs video electroencephalogram recording in an encephalopathic patient during awake and sleep states. There is diffuse cerebral dysfunction, suggestive of an encephalopathic state. Superimposed slowing in the right hemisphere is likely due to underlying structural abnormality. Frequent right frontotemporal spikes, with rare brief runs of right PLEDS, but without clear evidence for seizures during the study. The study remains significantly improved when compared to initial recordings, however the patient remains at very high risk for seizure recurrence. Clinical and radiological correlation is recommended.      Video EEG, 2019:  INTERPRETATION:  This is an abnormal 24 hrs video EEG recording in the awake,   drowsy, and sleep state(s).  There is intermittent slowing in the   right frontal region and frequent right frontal spikes / sharps,   suggesting underlying structural abnormality and cortical   irritability. A few events of left arm / hand and head myoclonus   were reported by either patient's mother or nursing staff by push   of the event button. These spells were brief in nature and   epileptic. Review of eeg demonstrated briefly rhythmic or   periodic right frontal spikes / sharps with right frontal slowing   during the events, which only lasted several seconds at a time.   Events consisting with brief focal right frontal seizures. There   has been improvement after increase of Lacosamide. Clinical and   radiological correlation is recommended.               ASSESSMENT AND PLAN:  History of severe traumatic brain injury and either a persistent vegetative state versus minimally conscious state.  Pharmaco-resistant, structural, focal onset epilepsy.  History of status epilepticus and PLEDs.  Recent new onset of myoclonic jerks  involving the left upper extremity, are epileptic in nature, and have a right frontal EEG correlation.  The spells are brief, the patient is not in status, and appeared to be induced by stimulation of the patient.  The patient is currently on multiple antiepileptics, which had controlled the patient's epilepsy for a while, except for the recent onset of these new seizures.  There is no evidence of infection on blood cultures or UA, but he is being treated for pneumonia. The patient's seizures in the past have presented in setting of infections. The patient is at baseline otherwise, as stated by the patient's mother.  We had increased the Vimpat from 200 mg twice a day, to 300 mg twice a day, and I also increased Topamax to 200/300 during this admission, but he continued to have spells, then we added Fycompa 4 mg qhs but no benefit over the last few days. A trial of Clonazepam has been helpful, still having some breakthrough clusters of focal seizures, mother ok with keeping 0.25 mg in am and increasing to 0.5 mg qhs. She is aware of side effects, including CNS depression. Fycompa was discontinued.     Current intermittent left arm movements do not look like prior jerking activity, mother states he does those when he is unhappy or uncomfortable and she does not believe these are seizures.      Of note, the patient had a history of hyperammonemia with the use of Depakote, which was discontinued in the past.     I discussed case with RN, mother.           EDUCATION AND COUNSELING:  -Education was provided to the patient and/or family regarding diagnosis and prognosis. The chronic and unpredictable nature of the condition were discussed. There is increased risk for additional events, which may carry potential for significant injuries and death. Discussed frequent seizure triggers: sleep deprivation, medication non-compliance, use of illegal drugs/alcohol, stress, and others.   -We reviewed in detail the current  antiepileptic regimen. Potential side effects of antiepileptics were discussed at length, including but no limited to: hypersensitivity reactions (rash and others, some of which can be fatal), visual field changes (some of which may be irreversible), glaucoma, diplopia, kidney stones, osteopenia/osteoporosis/bone fractures, hyperthermia/anhydrosis, hyponatremia, tremors/abnormal movements, ataxia, dizziness, fatigue, increased risk for falls, risk for cardiac arrhythmias/syncope, gastrointestinal side effects(hepatitis, pancreatitis, gastritis, ulcers), gingival hypertrophy/bleeding, drowsiness, sedation, anxiety/nervousness, increased risk for suicide, increased risk for depression, and psychosis.   -We also reviewed drug-drug interactions and their potential effect on seizure control and medication side effects.     -Recommend chronic vitamin D supplementation and regular exercise (if not contraindicated).   -Family educated on risk for SUDEP (Sudden Death in Epilepsy). Counseling was provided on the importance of strict medication and follow up compliance. The patient/family understand the risks associated with non-adherence with the medical plan as outlined, including but not limited to an increased risk for breakthrough seizures, which may contribute to injuries, disability, status epilepticus, and even death.   -Avoid sleep deprivation.     Patient's mother agrees with plan, as outlined.         Harris Bolton MD   Epilepsy and Neurodiagnostics.   Clinical  of Neurology [facility].   Diplomate in Neurology, Epilepsy, and Electrodiagnostic Medicine.   Office: [phone]  Fax: [phone]      BILLING DOCUMENTATION:     I have performed physical exam and reviewed and updated ROS and plan today 7/31/2019. In review of that note, there are no new changes except as documented above.     Counseling:  I spent greater than 50% time face-to-face time  of a total of 39 minutes visit. Over 50% of the time of the visit today was spent on counseling and or coordination of care wtih the patient and/or family, with greater than 50% of the total discussing my assessment and plan as stated above.

## 2019-07-31 NOTE — CARE PLAN
Problem: Urinary Elimination:  Goal: Ability to reestablish a normal urinary elimination pattern will improve    Intervention: Assess and monitor for signs and symptoms of urinary retention  Bladder non-distended. Patient recently incontinent of urine and since changed per Susana, Mother.      Problem: Skin Integrity  Goal: Risk for impaired skin integrity will decrease    Intervention: Assess and monitor skin integrity, appearance and/or temperature  2 RN skin check refused. See Progress Note.

## 2019-07-31 NOTE — DISCHARGE PLANNING
Care Transition Team Assessment    Information Source  Orientation : Unable to Assess  Information Given By: Caregiver  Informant's Name: Susana  Who is responsible for making decisions for patient? : Legal next of kin  Name(s) of Primary Decision Maker: Susana     Readmission Evaluation  Is this a readmission?: No    Elopement Risk  Legal Hold: No  Ambulatory or Self Mobile in Wheelchair: No-Not an Elopement Risk  Elopement Risk: Not at Risk for Elopement    Interdisciplinary Discharge Planning  Does Admitting Nurse Feel This Could be a Complex Discharge?: No  Primary Care Physician: Dr.Berkholder Gladys MEDEIROS  Lives with - Patient's Self Care Capacity: Parents  Patient or legal guardian wants to designate a caregiver (see row info): No  Support Systems: Parent  Housing / Facility: [address]  Do You Take your Prescribed Medications Regularly: Yes  Able to Return to Previous ADL's: No  Mobility Issues: Yes  Prior Services: Continuous (24 Hour) Care Giving Family  Patient Expects to be Discharged to:: Home  Assistance Needed: No  Durable Medical Equipment: Other - Specify(track care,Feeding tube. )    Discharge Preparedness  What is your plan after discharge?: Home with help  What are your discharge supports?: Parent  Prior Functional Level: Total Assist    Functional Assesment  Prior Functional Level: Total Assist    Finances  Financial Barriers to Discharge: No  Prescription Coverage: Yes                   Domestic Abuse  Have you ever been the victim of abuse or violence?: No         Discharge Risks or Barriers  Discharge risks or barriers?: No    Anticipated Discharge Information  Anticipated discharge disposition: Home  Discharge Address: [place].

## 2019-08-01 LAB
ANION GAP SERPL CALC-SCNC: 10 MMOL/L (ref 0–11.9)
BASOPHILS # BLD AUTO: 0.7 % (ref 0–1.8)
BASOPHILS # BLD: 0.04 K/UL (ref 0–0.12)
BUN SERPL-MCNC: 14 MG/DL (ref 8–22)
CALCIUM SERPL-MCNC: 8.6 MG/DL (ref 8.5–10.5)
CHLORIDE SERPL-SCNC: 107 MMOL/L (ref 96–112)
CO2 SERPL-SCNC: 20 MMOL/L (ref 20–33)
CREAT SERPL-MCNC: 0.25 MG/DL (ref 0.5–1.4)
EOSINOPHIL # BLD AUTO: 0.34 K/UL (ref 0–0.51)
EOSINOPHIL NFR BLD: 5.9 % (ref 0–6.9)
ERYTHROCYTE [DISTWIDTH] IN BLOOD BY AUTOMATED COUNT: 63.8 FL (ref 35.9–50)
GLUCOSE SERPL-MCNC: 112 MG/DL (ref 65–99)
HCT VFR BLD AUTO: 50.4 % (ref 42–52)
HGB BLD-MCNC: 16.1 G/DL (ref 14–18)
IMM GRANULOCYTES # BLD AUTO: 0.01 K/UL (ref 0–0.11)
IMM GRANULOCYTES NFR BLD AUTO: 0.2 % (ref 0–0.9)
LYMPHOCYTES # BLD AUTO: 1.7 K/UL (ref 1–4.8)
LYMPHOCYTES NFR BLD: 29.4 % (ref 22–41)
MCH RBC QN AUTO: 30.9 PG (ref 27–33)
MCHC RBC AUTO-ENTMCNC: 31.9 G/DL (ref 33.7–35.3)
MCV RBC AUTO: 96.7 FL (ref 81.4–97.8)
MONOCYTES # BLD AUTO: 0.37 K/UL (ref 0–0.85)
MONOCYTES NFR BLD AUTO: 6.4 % (ref 0–13.4)
NEUTROPHILS # BLD AUTO: 3.32 K/UL (ref 1.82–7.42)
NEUTROPHILS NFR BLD: 57.4 % (ref 44–72)
NRBC # BLD AUTO: 0 K/UL
NRBC BLD-RTO: 0 /100 WBC
PLATELET # BLD AUTO: 222 K/UL (ref 164–446)
PMV BLD AUTO: 9.2 FL (ref 9–12.9)
POTASSIUM SERPL-SCNC: 4.1 MMOL/L (ref 3.6–5.5)
RBC # BLD AUTO: 5.21 M/UL (ref 4.7–6.1)
SODIUM SERPL-SCNC: 137 MMOL/L (ref 135–145)
WBC # BLD AUTO: 5.8 K/UL (ref 4.8–10.8)

## 2019-08-01 PROCEDURE — 770001 HCHG ROOM/CARE - MED/SURG/GYN PRIV*

## 2019-08-01 PROCEDURE — 99232 SBSQ HOSP IP/OBS MODERATE 35: CPT | Performed by: HOSPITALIST

## 2019-08-01 PROCEDURE — 94640 AIRWAY INHALATION TREATMENT: CPT

## 2019-08-01 PROCEDURE — 700105 HCHG RX REV CODE 258: Performed by: HOSPITALIST

## 2019-08-01 PROCEDURE — 700111 HCHG RX REV CODE 636 W/ 250 OVERRIDE (IP): Performed by: HOSPITALIST

## 2019-08-01 PROCEDURE — 700102 HCHG RX REV CODE 250 W/ 637 OVERRIDE(OP): Performed by: PSYCHIATRY & NEUROLOGY

## 2019-08-01 PROCEDURE — 80048 BASIC METABOLIC PNL TOTAL CA: CPT

## 2019-08-01 PROCEDURE — A9270 NON-COVERED ITEM OR SERVICE: HCPCS | Performed by: PSYCHIATRY & NEUROLOGY

## 2019-08-01 PROCEDURE — A9270 NON-COVERED ITEM OR SERVICE: HCPCS | Performed by: HOSPITALIST

## 2019-08-01 PROCEDURE — 36415 COLL VENOUS BLD VENIPUNCTURE: CPT

## 2019-08-01 PROCEDURE — 700101 HCHG RX REV CODE 250: Performed by: PSYCHIATRY & NEUROLOGY

## 2019-08-01 PROCEDURE — 700102 HCHG RX REV CODE 250 W/ 637 OVERRIDE(OP): Performed by: HOSPITALIST

## 2019-08-01 PROCEDURE — 85025 COMPLETE CBC W/AUTO DIFF WBC: CPT

## 2019-08-01 PROCEDURE — 99233 SBSQ HOSP IP/OBS HIGH 50: CPT | Performed by: PSYCHIATRY & NEUROLOGY

## 2019-08-01 RX ADMIN — PHENOBARBITAL 120 MG: 20 ELIXIR ORAL at 06:20

## 2019-08-01 RX ADMIN — LACOSAMIDE 300 MG: 100 TABLET, FILM COATED ORAL at 06:08

## 2019-08-01 RX ADMIN — PIPERACILLIN AND TAZOBACTAM 4.5 G: 4; .5 INJECTION, POWDER, LYOPHILIZED, FOR SOLUTION INTRAVENOUS; PARENTERAL at 21:36

## 2019-08-01 RX ADMIN — NYSTATIN: 100000 POWDER TOPICAL at 06:32

## 2019-08-01 RX ADMIN — PIPERACILLIN AND TAZOBACTAM 4.5 G: 4; .5 INJECTION, POWDER, LYOPHILIZED, FOR SOLUTION INTRAVENOUS; PARENTERAL at 12:58

## 2019-08-01 RX ADMIN — NYSTATIN: 100000 POWDER TOPICAL at 18:30

## 2019-08-01 RX ADMIN — LEVETIRACETAM 1500 MG: 500 TABLET, FILM COATED ORAL at 17:42

## 2019-08-01 RX ADMIN — Medication 10 MG: at 06:33

## 2019-08-01 RX ADMIN — LEVETIRACETAM 1500 MG: 500 TABLET, FILM COATED ORAL at 06:05

## 2019-08-01 RX ADMIN — CYANOCOBALAMIN TAB 500 MCG 1000 MCG: 500 TAB at 06:12

## 2019-08-01 RX ADMIN — PHENOBARBITAL 120 MG: 20 ELIXIR ORAL at 17:50

## 2019-08-01 RX ADMIN — CLONAZEPAM 0.25 MG: 0.5 TABLET ORAL at 06:11

## 2019-08-01 RX ADMIN — POTASSIUM BICARBONATE 25 MEQ: 978 TABLET, EFFERVESCENT ORAL at 17:42

## 2019-08-01 RX ADMIN — CLONAZEPAM 0.5 MG: 0.5 TABLET ORAL at 17:45

## 2019-08-01 RX ADMIN — POTASSIUM BICARBONATE 25 MEQ: 978 TABLET, EFFERVESCENT ORAL at 06:15

## 2019-08-01 RX ADMIN — LACOSAMIDE 300 MG: 100 TABLET, FILM COATED ORAL at 17:42

## 2019-08-01 RX ADMIN — TOPIRAMATE 200 MG: 100 TABLET, FILM COATED ORAL at 06:07

## 2019-08-01 RX ADMIN — PIPERACILLIN AND TAZOBACTAM 4.5 G: 4; .5 INJECTION, POWDER, LYOPHILIZED, FOR SOLUTION INTRAVENOUS; PARENTERAL at 04:55

## 2019-08-01 RX ADMIN — TOPIRAMATE 300 MG: 100 TABLET, FILM COATED ORAL at 21:36

## 2019-08-01 NOTE — CARE PLAN
Problem: Bowel/Gastric:  Goal: Normal bowel function is maintained or improved  Outcome: PROGRESSING AS EXPECTED  Note:   Bowel protocol in place      Problem: Urinary Elimination:  Goal: Ability to reestablish a normal urinary elimination pattern will improve  Outcome: PROGRESSING AS EXPECTED  Note:   Pt incontinent of urine

## 2019-08-01 NOTE — PROGRESS NOTES
Hospital Medicine Daily Progress Note    Date of Service  8/1/2019    Chief Complaint  39 y.o. male admitted 7/19/2019 with intractable seizures in the setting of severe TBI when younger and CP    Hospital Course    7/23: with continuous EEG monitoring in place, d/w Dr. Bolton, added Fycompa to his current seizure regiment of keppra, phenobarbital, topamaz, vimpat.  Reviewed CXR from 7/19 and agree with diffuse infiltrate on my review left lung.  Patient appears diaphoretic on exam, ordered CT chest with contrast to rule out pneumonia.   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  .    7/29:  Multiple seizures in last 24 hours, seen with Dr. Bolton.  Plan to add klonopin bid to control seizures since ativan does stop the seizures in the past.  Stop date placed for zosyn IV for 8/2, but can change to po cipro to finish 7 day course if seizures controlled x 24 hours.       7/30   Patient had 4 small seizures this morning.  As per mother who is at the bedside the seizure frequency has been decreasing.  Patient continues on IV Zosyn.  I discussed with mother that changing to Cipro may not be the best option for this patient has Cipro can lower the seizure threshold    7/31 seizure still recurring.    Afebrile    Case discussed with mother at bedside-she agrees that patient should complete antibiotics here in the hospital with IV Zosyn    She requested a referral to interventional radiology to see whether not they could replaced the button PEG, as it is loose.  As per IR technician, they do not have any different button PEG and what the patient currently has.       8/1  Case discussed with mother at bedside    Case discussed with     Afebrile    No new events overnight    Patient last seizure was Wednesday night ( 7/31)  8 pm    Patient's last dose of IV antibiotics is on Friday night    Plan for discharge Saturday  morning        Consultants/Specialty  neurology    Code Status  full    Disposition  Has home tracheostomy set up at home with oxygen per mother.  PEG tube feeds in place, baclofen pump.  Home with mother once seizures controlled.  Condom catheter for urine.    Review of Systems  Review of Systems   Unable to perform ROS: Acuity of condition        Physical Exam  Temp:  [36 °C (96.8 °F)-36.7 °C (98 °F)] 36.2 °C (97.1 °F)  Pulse:  [49-62] 58  Resp:  [14-19] 16  BP: (107-135)/(60-66) 135/66  SpO2:  [93 %-94 %] 94 %    Physical Exam   Constitutional: He appears well-developed and well-nourished. No distress.   Non verbal, does not respond to any commands   HENT:   Head: Normocephalic and atraumatic.   Mouth/Throat: No oropharyngeal exudate.   Trach mask   Eyes: Pupils are equal, round, and reactive to light. No scleral icterus.   Neck: Normal range of motion. Neck supple. No thyromegaly present.   Cardiovascular: Normal rate, regular rhythm, normal heart sounds and intact distal pulses.   No murmur heard.  Pulmonary/Chest: Effort normal and breath sounds normal. No respiratory distress. He has no wheezes.   Abdominal: Soft. Bowel sounds are normal. He exhibits no distension. There is no tenderness.   Thin  G tube site -mild surrounding erythema   Genitourinary:   Genitourinary Comments: Groin area has erythema consistent with a fungal rash   Musculoskeletal: Normal range of motion. He exhibits edema (Bilateral foot). He exhibits no tenderness.   Both axillary areas have the erythematous areas considered with fungal   Neurological: He is alert. No cranial nerve deficit. Coordination abnormal.   Contractures noted   Skin: Skin is warm and dry. No rash noted.   Hyperpigmentation left groin only compared to right, no evidence of abscess or open skin lesion seen as well as bilateral axillary areas.   Nursing note and vitals reviewed.      Fluids    Intake/Output Summary (Last 24 hours) at 8/1/2019 1825  Last data filed at  7/31/2019 2031  Gross per 24 hour   Intake 60 ml   Output --   Net 60 ml       Laboratory  Recent Labs     07/30/19  0421 07/31/19  0712 08/01/19  0654   WBC 4.8 6.1 5.8   RBC 4.94 5.11 5.21   HEMOGLOBIN 15.0 15.7 16.1   HEMATOCRIT 47.3 49.3 50.4   MCV 95.7 96.5 96.7   MCH 30.4 30.7 30.9   MCHC 31.7* 31.8* 31.9*   RDW 62.4* 63.1* 63.8*   PLATELETCT 227 243 222   MPV 9.2 9.3 9.2     Recent Labs     07/30/19  0421 07/31/19  0712 08/01/19  0654   SODIUM 142 141 137   POTASSIUM 3.7 3.7 4.1   CHLORIDE 108 111 107   CO2 24 22 20   GLUCOSE 90 99 112*   BUN 15 14 14   CREATININE 0.20* <0.20* 0.25*   CALCIUM 8.7 8.9 8.6                   Imaging  CT-CHEST (THORAX) WITH   Final Result      1.  BILATERAL lower lobe atelectasis with possible superimposed pneumonia in the LEFT lower lobe   2.  Faint patchy airspace disease in the RIGHT upper and lower lobes which could be pneumonia   3.  Trace LEFT pleural effusion   4.  Enlarged RIGHT hilar lymph node            IR-US GUIDED PIV   Final Result    Ultrasound-guided PERIPHERAL IV INSERTION performed by    qualified nursing staff as above.            DX-CHEST-PORTABLE (1 VIEW)   Final Result      The perihilar/left lung base atelectasis with pneumonitis/pneumonia not excluded.   Small left pleural effusion.           Assessment/Plan  * Status epilepticus (HCC)- (present on admission)  Assessment & Plan  Continue current antiseizure medication       Treat infection      Fungal rash of torso  Assessment & Plan  Nystatin powder to axillary area    Acute bacterial conjunctivitis of both eyes- (present on admission)  Assessment & Plan  7/23 started on erythromycin ointment qid x 5 days.    Epilepsy (HCC)- (present on admission)  Assessment & Plan  Tenuous outpatient regimen prescribed by Dr. Bolton, long standing h/o seizures  -consider palliative care consult, remains mostly in a vegetative state  -known to have pharmaco-resistant seizures      Contraction, joint, multiple sites-  (present on admission)  Assessment & Plan  Contractures are stable  No skin breakdown seen on exam.    Hypokalemia- (present on admission)  Assessment & Plan   K bisphosphonate  25 daily via PEG tube.    TBI (traumatic brain injury) (HCC)- (present on admission)  Assessment & Plan  With history of tracheostomy  Continue his G-tube feeding per his mom's recommendations  Is mentation appears to be effectively baseline  He has a baclofen pain pump    Aspiration pneumonia ([facility])- (present on admission)  Assessment & Plan  History of silent aspiration with tracheostomy and PEG tube feeds.  CTA chest with left sided pneumonia.  Changed Rocephin/doxy to zosyn IV  7/23: Sputum induced culture by RT via trach aspirate with pseudomonas ss zosyn and cipro.       VTE prophylaxis: Compression stocking.

## 2019-08-02 ENCOUNTER — TELEPHONE (OUTPATIENT)
Dept: NEUROLOGY | Facility: MEDICAL CENTER | Age: 39
End: 2019-08-02

## 2019-08-02 LAB
ANION GAP SERPL CALC-SCNC: 8 MMOL/L (ref 0–11.9)
BUN SERPL-MCNC: 14 MG/DL (ref 8–22)
CALCIUM SERPL-MCNC: 8.9 MG/DL (ref 8.5–10.5)
CHLORIDE SERPL-SCNC: 106 MMOL/L (ref 96–112)
CO2 SERPL-SCNC: 23 MMOL/L (ref 20–33)
CREAT SERPL-MCNC: 0.24 MG/DL (ref 0.5–1.4)
GLUCOSE SERPL-MCNC: 104 MG/DL (ref 65–99)
POTASSIUM SERPL-SCNC: 3.7 MMOL/L (ref 3.6–5.5)
SODIUM SERPL-SCNC: 137 MMOL/L (ref 135–145)

## 2019-08-02 PROCEDURE — 700105 HCHG RX REV CODE 258: Performed by: HOSPITALIST

## 2019-08-02 PROCEDURE — 80048 BASIC METABOLIC PNL TOTAL CA: CPT

## 2019-08-02 PROCEDURE — 700101 HCHG RX REV CODE 250: Performed by: PSYCHIATRY & NEUROLOGY

## 2019-08-02 PROCEDURE — 99233 SBSQ HOSP IP/OBS HIGH 50: CPT | Performed by: PSYCHIATRY & NEUROLOGY

## 2019-08-02 PROCEDURE — 700102 HCHG RX REV CODE 250 W/ 637 OVERRIDE(OP): Performed by: PSYCHIATRY & NEUROLOGY

## 2019-08-02 PROCEDURE — 700102 HCHG RX REV CODE 250 W/ 637 OVERRIDE(OP): Performed by: HOSPITALIST

## 2019-08-02 PROCEDURE — 700111 HCHG RX REV CODE 636 W/ 250 OVERRIDE (IP): Performed by: HOSPITALIST

## 2019-08-02 PROCEDURE — A9270 NON-COVERED ITEM OR SERVICE: HCPCS | Performed by: HOSPITALIST

## 2019-08-02 PROCEDURE — 99232 SBSQ HOSP IP/OBS MODERATE 35: CPT | Performed by: HOSPITALIST

## 2019-08-02 PROCEDURE — A9270 NON-COVERED ITEM OR SERVICE: HCPCS | Performed by: PSYCHIATRY & NEUROLOGY

## 2019-08-02 PROCEDURE — 770001 HCHG ROOM/CARE - MED/SURG/GYN PRIV*

## 2019-08-02 PROCEDURE — 36415 COLL VENOUS BLD VENIPUNCTURE: CPT

## 2019-08-02 PROCEDURE — 94640 AIRWAY INHALATION TREATMENT: CPT

## 2019-08-02 RX ADMIN — CLONAZEPAM 0.5 MG: 0.5 TABLET ORAL at 18:11

## 2019-08-02 RX ADMIN — POTASSIUM BICARBONATE 25 MEQ: 978 TABLET, EFFERVESCENT ORAL at 18:11

## 2019-08-02 RX ADMIN — PHENOBARBITAL 120 MG: 20 ELIXIR ORAL at 06:29

## 2019-08-02 RX ADMIN — LEVETIRACETAM 1500 MG: 500 TABLET, FILM COATED ORAL at 06:30

## 2019-08-02 RX ADMIN — PIPERACILLIN AND TAZOBACTAM 4.5 G: 4; .5 INJECTION, POWDER, LYOPHILIZED, FOR SOLUTION INTRAVENOUS; PARENTERAL at 13:26

## 2019-08-02 RX ADMIN — TOPIRAMATE 200 MG: 100 TABLET, FILM COATED ORAL at 06:30

## 2019-08-02 RX ADMIN — CYANOCOBALAMIN TAB 500 MCG 1000 MCG: 500 TAB at 06:31

## 2019-08-02 RX ADMIN — PHENOBARBITAL 120 MG: 20 ELIXIR ORAL at 18:11

## 2019-08-02 RX ADMIN — LACOSAMIDE 300 MG: 100 TABLET, FILM COATED ORAL at 06:31

## 2019-08-02 RX ADMIN — PIPERACILLIN AND TAZOBACTAM 4.5 G: 4; .5 INJECTION, POWDER, LYOPHILIZED, FOR SOLUTION INTRAVENOUS; PARENTERAL at 04:54

## 2019-08-02 RX ADMIN — NYSTATIN: 100000 POWDER TOPICAL at 06:47

## 2019-08-02 RX ADMIN — PIPERACILLIN AND TAZOBACTAM 4.5 G: 4; .5 INJECTION, POWDER, LYOPHILIZED, FOR SOLUTION INTRAVENOUS; PARENTERAL at 20:55

## 2019-08-02 RX ADMIN — LEVETIRACETAM 1500 MG: 500 TABLET, FILM COATED ORAL at 18:11

## 2019-08-02 RX ADMIN — CLONAZEPAM 0.25 MG: 0.5 TABLET ORAL at 06:31

## 2019-08-02 RX ADMIN — LACOSAMIDE 300 MG: 100 TABLET, FILM COATED ORAL at 18:11

## 2019-08-02 RX ADMIN — POTASSIUM BICARBONATE 25 MEQ: 978 TABLET, EFFERVESCENT ORAL at 06:30

## 2019-08-02 RX ADMIN — TOPIRAMATE 300 MG: 100 TABLET, FILM COATED ORAL at 20:55

## 2019-08-02 NOTE — PROGRESS NOTES
Assumed care of pt at approximately 1900. Pt nonverbal; pt's mother Susana at bedside. Susana updated on plan of care, questions and concerns addressed. Susana declining skin check at this time, requesting minimal stimuli as she does not want to agitate pt or induce seizure. Pt/ pt's mother reporting no other needs at this time. Bed locked and in lowest position, bed alarm on. Call light and belongings within reach. Hourly rounding in place.

## 2019-08-02 NOTE — TELEPHONE ENCOUNTER
Per Dr Bolton I called [facility] on [address] gave verbal to pharmacist to fill clonazepam 0.5 mg #60 5 refills 1/2 tab am, 1 tab po qhs. Pharmacist verbally understood.  I called [facility] pharmacy gave verbal to Petey to fill vimpat 100 mg #180 with 5 refills, 3 tabs po bid, topamax 100 mg #150 11 refills, 200 mg am, 300 mg evening. Petey verbally understood.  I called pt mom and informed her.

## 2019-08-02 NOTE — CARE PLAN
Respiratory Update    Treatment modality:     Heated aerosol trach collar 4 lpm 28%    Pt tolerating current treatments well with no adverse reactions.

## 2019-08-02 NOTE — DISCHARGE PLANNING
Anticipated Discharge Disposition: Home     Action: Pt's case discussed during IDT rounds.  Pt will receive a dose of IV Zosyn tomorrow morning and then pt will be medically cleared to d/c home.  Pt's mother, Susana has been pt's primary caregiver for the last 20 years and states she has everything she needs at home.  LSW met with Susana at bedside to confirm transportation for tomorrow.  Susana states she has made transportation arrangements through her insurance for [facility] to send an ambulance at 1030 or 1100 tomorrow morning.  LSW contacted [facility] at [phone] and left voicemail for Supervisor Alex to confirm transportation tomorrow.      Barriers to Discharge: None     Plan: Pt expected to d/c tomorrow morning between 1030 and 1100 via [facility] ambulance - arranged by pt's mother, Susana.  Awaiting return call from Alex at [facility] for transportation confirmation.

## 2019-08-02 NOTE — PROGRESS NOTES
Hospital Medicine Daily Progress Note    Date of Service  8/2/2019    Chief Complaint  39 y.o. male admitted 7/19/2019 with intractable seizures in the setting of severe TBI when younger and CP    Hospital Course    7/23: with continuous EEG monitoring in place, d/w Dr. Bolton, added Fycompa to his current seizure regiment of keppra, phenobarbital, topamaz, vimpat.  Reviewed CXR from 7/19 and agree with diffuse infiltrate on my review left lung.  Patient appears diaphoretic on exam, ordered CT chest with contrast to rule out pneumonia.   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  .    7/29:  Multiple seizures in last 24 hours, seen with Dr. Bolton.  Plan to add klonopin bid to control seizures since ativan does stop the seizures in the past.  Stop date placed for zosyn IV for 8/2, but can change to po cipro to finish 7 day course if seizures controlled x 24 hours.       7/30   Patient had 4 small seizures this morning.  As per mother who is at the bedside the seizure frequency has been decreasing.  Patient continues on IV Zosyn.  I discussed with mother that changing to Cipro may not be the best option for this patient has Cipro can lower the seizure threshold    7/31 seizure still recurring.    Afebrile    Case discussed with mother at bedside-she agrees that patient should complete antibiotics here in the hospital with IV Zosyn    She requested a referral to interventional radiology to see whether not they could replaced the button PEG, as it is loose.  As per IR technician, they do not have any different button PEG and what the patient currently has.       8/1  Case discussed with mother at bedside    Case discussed with     Afebrile    No new events overnight    Patient last seizure was Wednesday night ( 7/31)  8 pm    Patient's last dose of IV antibiotics is on Friday night    Plan for discharge Saturday morning      8/2    Only one small mild  seizure since yesterday    Mother is concerned about a leaking IV and concerned about missed doses of Zosyn    I discussed having a discussion with the mother we agreed that the patient will remain tomorrow morning to get his opportunity to give 1 additional IV Zosyn dose and that has been ordered.  Social IV was stopped after the 5 AM dose tomorrow morning    Afebrile          Consultants/Specialty  neurology    Code Status  full    Disposition  Has home tracheostomy set up at home with oxygen per mother.  PEG tube feeds in place, baclofen pump.  Home with mother once seizures controlled.  Condom catheter for urine.    Review of Systems  Review of Systems   Unable to perform ROS: Acuity of condition        Physical Exam  Temp:  [36.1 °C (97 °F)-36.4 °C (97.6 °F)] 36.4 °C (97.5 °F)  Pulse:  [58-64] 64  Resp:  [16-20] 20  BP: (117-131)/(60-77) 117/65  SpO2:  [93 %-95 %] 94 %    Physical Exam   Constitutional: He appears well-developed and well-nourished. No distress.   Non verbal, does not respond to any commands   HENT:   Head: Normocephalic and atraumatic.   Mouth/Throat: No oropharyngeal exudate.   Trach mask   Eyes: Pupils are equal, round, and reactive to light. No scleral icterus.   Neck: Normal range of motion. Neck supple. No thyromegaly present.   Cardiovascular: Normal rate, regular rhythm, normal heart sounds and intact distal pulses.   No murmur heard.  Pulmonary/Chest: Effort normal and breath sounds normal. No respiratory distress. He has no wheezes.   Abdominal: Soft. Bowel sounds are normal. He exhibits no distension. There is no tenderness.   Thin  G tube site -mild surrounding erythema   Genitourinary:   Genitourinary Comments: Groin area has erythema consistent with a fungal rash   Musculoskeletal: Normal range of motion. He exhibits edema (Bilateral foot). He exhibits no tenderness.   Both axillary areas have the erythematous areas considered with fungal   Neurological: He is alert. No cranial  nerve deficit. Coordination abnormal.   Contractures noted   Skin: Skin is warm and dry. No rash noted.   Hyperpigmentation left groin only compared to right, no evidence of abscess or open skin lesion seen as well as bilateral axillary areas.   Nursing note and vitals reviewed.      Fluids    Intake/Output Summary (Last 24 hours) at 8/2/2019 1324  Last data filed at 8/2/2019 0136  Gross per 24 hour   Intake 195 ml   Output --   Net 195 ml       Laboratory  Recent Labs     07/31/19  0712 08/01/19  0654   WBC 6.1 5.8   RBC 5.11 5.21   HEMOGLOBIN 15.7 16.1   HEMATOCRIT 49.3 50.4   MCV 96.5 96.7   MCH 30.7 30.9   MCHC 31.8* 31.9*   RDW 63.1* 63.8*   PLATELETCT 243 222   MPV 9.3 9.2     Recent Labs     07/31/19 0712 08/01/19  0654 08/02/19  1201   SODIUM 141 137 137   POTASSIUM 3.7 4.1 3.7   CHLORIDE 111 107 106   CO2 22 20 23   GLUCOSE 99 112* 104*   BUN 14 14 14   CREATININE <0.20* 0.25* 0.24*   CALCIUM 8.9 8.6 8.9                   Imaging  CT-CHEST (THORAX) WITH   Final Result      1.  BILATERAL lower lobe atelectasis with possible superimposed pneumonia in the LEFT lower lobe   2.  Faint patchy airspace disease in the RIGHT upper and lower lobes which could be pneumonia   3.  Trace LEFT pleural effusion   4.  Enlarged RIGHT hilar lymph node            IR-US GUIDED PIV   Final Result    Ultrasound-guided PERIPHERAL IV INSERTION performed by    qualified nursing staff as above.            DX-CHEST-PORTABLE (1 VIEW)   Final Result      The perihilar/left lung base atelectasis with pneumonitis/pneumonia not excluded.   Small left pleural effusion.           Assessment/Plan  * Status epilepticus (HCC)- (present on admission)  Assessment & Plan  Continue current antiseizure medication       Treat infection      Fungal rash of torso  Assessment & Plan  Nystatin powder to axillary area    Acute bacterial conjunctivitis of both eyes- (present on admission)  Assessment & Plan  7/23 started on erythromycin ointment qid x 5  days.    Epilepsy ([facility])- (present on admission)  Assessment & Plan  Tenuous outpatient regimen prescribed by Dr. Bolton, long standing h/o seizures  -consider palliative care consult, remains mostly in a vegetative state  -known to have pharmaco-resistant seizures      Contraction, joint, multiple sites- (present on admission)  Assessment & Plan  Contractures are stable  No skin breakdown seen on exam.    Hypokalemia- (present on admission)  Assessment & Plan   K bisphosphonate  25 daily via PEG tube.    TBI (traumatic brain injury) ([facility])- (present on admission)  Assessment & Plan  With history of tracheostomy  Continue his G-tube feeding per his mom's recommendations  Is mentation appears to be effectively baseline  He has a baclofen pain pump    Aspiration pneumonia ([facility])- (present on admission)  Assessment & Plan  History of silent aspiration with tracheostomy and PEG tube feeds.  CTA chest with left sided pneumonia.  Changed Rocephin/doxy to zosyn IV  7/23: Sputum induced culture by RT via trach aspirate with pseudomonas ss zosyn and cipro.       VTE prophylaxis: Compression stocking.  Plan for discharge after a.m. Zosyn

## 2019-08-02 NOTE — PROGRESS NOTES
Assumed care of patient at 0700.    Received report from night RN.    Pt nonverbal, shows no signs of pain.  Mother at bedside.  Pt resting comfortably in bed.   Bed alarm on.  Hourly rounding implemented.

## 2019-08-02 NOTE — CARE PLAN
Problem: Urinary Elimination:  Goal: Ability to reestablish a normal urinary elimination pattern will improve  Outcome: PROGRESSING AS EXPECTED  Note:   Pt incontinent of urine     Problem: Communication  Goal: The ability to communicate needs accurately and effectively will improve  Outcome: PROGRESSING SLOWER THAN EXPECTED  Note:   Pt nonverbal; mother at bedside to help with needs; hourly rounding in place.

## 2019-08-02 NOTE — PROGRESS NOTES
Chief Complaint   Patient presents with   • Seizure   • Rash     to chest for the last 9 days.       Problem List Items Addressed This Visit     Seizure (HCC)    Relevant Medications    lacosamide (VIMPAT) 200 MG Tab tablet    levETIRAcetam (KEPPRA) 100 MG/ML Solution    PHENobarbital 20 MG/5ML Elixir    topiramate (TOPAMAX) 200 MG tablet    levETIRAcetam (KEPPRA) tablet 1,500 mg    PHENobarbital solution 120 mg    lacosamide (VIMPAT) tablet 300 mg    lacosamide (VIMPAT) 100 mg in  mL ivpb (Completed)    topiramate (TOPAMAX) tablet 200 mg    topiramate (TOPAMAX) tablet 300 mg    perampanel (FYCOMPA) tablet 2 mg (Completed)    clonazePAM (KLONOPIN) tablet 0.25 mg    clonazePAM (KLONOPIN) tablet 0.5 mg      Other Visit Diagnoses     Seizure disorder (HCC)        Relevant Medications    lacosamide (VIMPAT) 200 MG Tab tablet    levETIRAcetam (KEPPRA) 100 MG/ML Solution    PHENobarbital 20 MG/5ML Elixir    topiramate (TOPAMAX) 200 MG tablet    levETIRAcetam (KEPPRA) tablet 1,500 mg    PHENobarbital solution 120 mg    lacosamide (VIMPAT) tablet 300 mg    lacosamide (VIMPAT) 100 mg in  mL ivpb (Completed)    topiramate (TOPAMAX) tablet 200 mg    topiramate (TOPAMAX) tablet 300 mg    perampanel (FYCOMPA) tablet 2 mg (Completed)    clonazePAM (KLONOPIN) tablet 0.25 mg    clonazePAM (KLONOPIN) tablet 0.5 mg    Pneumonia of left lower lobe due to infectious organism (HCC)        Relevant Medications    ampicillin/sulbactam (UNASYN) 3 g in  mL IVPB (Completed)    piperacillin-tazobactam (ZOSYN) 3.375 g in  mL IVPB (Completed)    piperacillin-tazobactam (ZOSYN) 4.5 g in  mL IVPB          Interim history:  Ruben Edwards remains hospitalized.  He continues on antibiotics.  No fevers.  Mother believe the infection (pneumonia) has improved.  He had one small jerking of the left arm seizure yesterday, lasting only for a couple of seconds, and one this afternoon.  Otherwise he has remained mostly  seizure-free, no clusters.  Seizures have been focal, and isolated.  The patient continues on multiple antiepileptics.  No clear side effects.  Per mom he is arousing, at baseline otherwise.  No rash.    Past medical history:   Past Medical History:   Diagnosis Date   • Seizure (HCC)    • Traumatic brain injury (HCC) 05/15/1998       Past surgical history:   Past Surgical History:   Procedure Laterality Date   • LARYNGOSCOPY  7/2/2017    Procedure: LARYNGOSCOPY;  Surgeon: Catherine Osorio M.D.;  Location: SURGERY [facility];  Service:    • BRONCHOSCOPY  7/2/2017    Procedure: BRONCHOSCOPY;  Surgeon: Catherine Osorio M.D.;  Location: SURGERY [facility];  Service:    • TRACHEOSTOMY  7/2/2017    Procedure: TRACHEOSTOMY;  Surgeon: Catherine Osorio M.D.;  Location: SURGERY [facility];  Service:    • ESOPHAGOSCOPY  7/2/2017    Procedure: ESOPHAGOSCOPY;  Surgeon: Catherine Osorio M.D.;  Location: SURGERY [facility];  Service:        Family history:   No family history on file.    Social history:   Social History     Socioeconomic History   • Marital status: Single     Spouse name: Not on file   • Number of children: Not on file   • Years of education: Not on file   • Highest education level: Not on file   Occupational History   • Not on file   Social Needs   • Financial resource strain: Not on file   • Food insecurity:     Worry: Not on file     Inability: Not on file   • Transportation needs:     Medical: Not on file     Non-medical: Not on file   Tobacco Use   • Smoking status: Never Smoker   • Smokeless tobacco: Never Used   Substance and Sexual Activity   • Alcohol use: No   • Drug use: Yes     Types: Oral     Comment: Mother tried CBD treatment for siezures in past   • Sexual activity: Not on file   Lifestyle   • Physical activity:     Days per week: Not on file     Minutes per session: Not on file   • Stress: Not on file   Relationships   • Social connections:     Talks on phone: Not  on file     Gets together: Not on file     Attends [employer] service: Not on file     Active member of club or organization: Not on file     Attends meetings of clubs or organizations: Not on file     Relationship status: Not on file   • Intimate partner violence:     Fear of current or ex partner: Not on file     Emotionally abused: Not on file     Physically abused: Not on file     Forced sexual activity: Not on file   Other Topics Concern   • Not on file   Social History Narrative   • Not on file       Current medications:   Current Facility-Administered Medications   Medication Dose   • clonazePAM (KLONOPIN) tablet 0.25 mg  0.25 mg   • clonazePAM (KLONOPIN) tablet 0.5 mg  0.5 mg   • potassium bicarbonate (KLYTE) effervescent tablet 25 mEq  25 mEq   • LORazepam (ATIVAN) injection 0.5-1 mg  0.5-1 mg   • piperacillin-tazobactam (ZOSYN) 4.5 g in  mL IVPB  4.5 g   • bisacodyl (DULCOLAX) suppository 10 mg  10 mg   • nystatin (MYCOSTATIN) powder     • topiramate (TOPAMAX) tablet 200 mg  200 mg   • topiramate (TOPAMAX) tablet 300 mg  300 mg   • lacosamide (VIMPAT) tablet 300 mg  300 mg   • Pharmacy Consult: Enteral tube insertion - review meds/change route/product selection     • senna-docusate (PERICOLACE or SENOKOT S) 8.6-50 MG per tablet 2 Tab  2 Tab    And   • polyethylene glycol/lytes (MIRALAX) PACKET 1 Packet  1 Packet    And   • magnesium hydroxide (MILK OF MAGNESIA) suspension 30 mL  30 mL    And   • bisacodyl (DULCOLAX) suppository 10 mg  10 mg   • Pain Pump (patient supplied) Device     • cyanocobalamin (VITAMIN B-12) tablet 1,000 mcg  1,000 mcg   • levETIRAcetam (KEPPRA) tablet 1,500 mg  1,500 mg   • PHENobarbital solution 120 mg  120 mg       Medication Allergy:  Allergies   Allergen Reactions   • Sulfa Drugs Unspecified     Per caretaker, mother is allergic to sulfa so believes her son could be as well         Review of systems:   Unable to obtain    Physical examination:   Vitals:    08/02/19 0237  08/02/19 0800 08/02/19 1236 08/02/19 1600   BP:  117/65  133/80   Pulse: 60 62 64 60   Resp: 18 18 20 18   Temp:  36.4 °C (97.5 °F)  36.4 °C (97.6 °F)   TempSrc:  Temporal  Temporal   SpO2: 93% 93% 94% 95%   Weight:       Height:         He does not appear in any acute distress.  Mildly diaphoretic today.  The patient is nonverbal, unable to follow commands.  No movements on exam.   Memory cannot be assessed.  He is awake, unable to assess for alertness or orientation.  Eyes are midline, there is no nystagmus on primary gaze.  His pupils are about 4 to 5 mm bilaterally and are sluggish to react to light.  His face appears symmetric.  He has contractures and atrophy in all extremities.  Examination of the skin does not show any rashes.  The patient has a feeding tube in place.        ANCILLARY DATA REVIEWED:       Lab Data Review:  Recent Results (from the past 24 hour(s))   Basic Metabolic Panel    Collection Time: 08/02/19 12:01 PM   Result Value Ref Range    Sodium 137 135 - 145 mmol/L    Potassium 3.7 3.6 - 5.5 mmol/L    Chloride 106 96 - 112 mmol/L    Co2 23 20 - 33 mmol/L    Glucose 104 (H) 65 - 99 mg/dL    Bun 14 8 - 22 mg/dL    Creatinine 0.24 (L) 0.50 - 1.40 mg/dL    Calcium 8.9 8.5 - 10.5 mg/dL    Anion Gap 8.0 0.0 - 11.9   ESTIMATED GFR    Collection Time: 08/02/19 12:01 PM   Result Value Ref Range    GFR If African American >60 >60 mL/min/1.73 m 2    GFR If Non African American >60 >60 mL/min/1.73 m 2         Records reviewed:   Chart reviewed.     Imaging:   MRI brain w/wo, 3/7/18  1.  Moderate cerebral atrophy beyond that expected for the patient's age.  2.  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  3.  Old hemorrhagic infarcts left basal ganglia, left thalamus, left subthalamic region.  4.  Marked atrophy and encephalomalacic change in the left cerebral peduncle with T2 hyperintensity and volume loss extending down the left anterior quadrant of the pop into the left medullary pyramid. 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  5.  Widespread areas of curvilinear and gyriform enhancement involving the right frontal lobe, right parietal lobe, right occipital lobe, along with curvilinear enhancement in the right basal ganglia. These findings are most consistent with subacute   sequela of cerebral infarction or cortical laminar necrosis. Cortical laminar necrosis has been reported associated with prolonged seizures/status epilepticus. Subacute sequela of encephalitis could have a similar appearance.  6.  Advanced supratentorial white matter disease consistent with microvascular ischemic change versus demyelination or gliosis. This results in some volume loss of deep white matter.  7.  Curvilinear hemosiderin deposition in the right temporal lobe deep white matter consistent with old hemorrhage.  8.  No acute cerebral infarction or acute hemorrhage evident.  9.  No evidence of mesial temporal sclerosis.   (I personally reviewed images).      MRA head wo, 3/9/18  MRA OF THE [place] [facility] [place] WITHIN NORMAL LIMITS.  (I personally reviewed images).         EE:  This is an abnormal 24 hrs video electroencephalogram recording in an encephalopathic patient during awake and sleep states. There is diffuse cerebral dysfunction, suggestive of an encephalopathic state. Superimposed slowing in the right hemisphere is likely due to underlying structural abnormality. Frequent right frontotemporal spikes, with rare brief runs of right PLEDS, but without clear evidence for seizures during the study. The study remains significantly improved when compared to initial recordings, however the patient remains at very high risk for seizure recurrence. Clinical and radiological correlation is recommended.      Video EEG, 2019:  INTERPRETATION:  This is an abnormal 24 hrs video EEG recording in the awake,   drowsy, and sleep state(s).  There is intermittent slowing in the   right frontal region and frequent  right frontal spikes / sharps,   suggesting underlying structural abnormality and cortical   irritability. A few events of left arm / hand and head myoclonus   were reported by either patient's mother or nursing staff by push   of the event button. These spells were brief in nature and   epileptic. Review of eeg demonstrated briefly rhythmic or   periodic right frontal spikes / sharps with right frontal slowing   during the events, which only lasted several seconds at a time.   Events consisting with brief focal right frontal seizures. There   has been improvement after increase of Lacosamide. Clinical and   radiological correlation is recommended.               ASSESSMENT AND PLAN:  History of severe traumatic brain injury and either a persistent vegetative state versus minimally conscious state.  Pharmaco-resistant, structural, focal onset epilepsy.  History of status epilepticus and PLEDs.  Recent new onset of myoclonic jerks involving the left upper extremity, are epileptic in nature, and have a right frontal EEG correlation.  The spells are brief, the patient is not in status, and appeared to be induced by stimulation of the patient.  The patient is currently on multiple antiepileptics, which had controlled the patient's epilepsy for a while, except for the recent onset of these new focal seizures. Seizures now appear relatively controlled on new aed regimen. CT of the chest showed pneumonia, and he was placed on antibiotics, which continue at this point.  The patient is at baseline otherwise, as stated by the patient's mother.  We had increased the Vimpat from 200 mg twice a day, to 300 mg twice a day, and I also increased Topamax to 200/300 during this admission, however he continued to have spells, then we added Fycompa 4 mg qhs but no benefit over a few days, and Fycompa was discontinued. We added clonazepam, currently taking 0.25 mg in am and 0.5 mg qhs.  He has been mostly seizure-free, with mild isolated  jerking of the left hand, mother not concerned, she believed he is much improved. Plan to d/c Saturday on current regimen with outpt fu.      Of note, the patient had a history of hyperammonemia with the use of Depakote, which was discontinued in the past.     Home AED regimen:   Phenobarbital 120 mg p.o. twice daily  Keppra 1500 mg p.o. twice daily  Vimpat 300 mg p.o. twice daily   Topamax 200 mg in the morning and 300 mg nightly  Clonazepam 0.25 mg in the morning and 0.5 mg nightly    Outpatient prescriptions for seizure medications were placed.      I discussed case patient's mother.       FOLLOW-UP:   In my office, after discharge.          EDUCATION AND COUNSELING:  -Education was provided to the patient and/or family regarding diagnosis and prognosis. The chronic and unpredictable nature of the condition were discussed. There is increased risk for additional events, which may carry potential for significant injuries and death. Discussed frequent seizure triggers: sleep deprivation, medication non-compliance, use of illegal drugs/alcohol, stress, and others.   -We reviewed in detail the current antiepileptic regimen. Potential side effects of antiepileptics were discussed at length, including but no limited to: hypersensitivity reactions (rash and others, some of which can be fatal), visual field changes (some of which may be irreversible), glaucoma, diplopia, kidney stones, osteopenia/osteoporosis/bone fractures, hyperthermia/anhydrosis, hyponatremia, tremors/abnormal movements, ataxia, dizziness, fatigue, increased risk for falls, risk for cardiac arrhythmias/syncope, gastrointestinal side effects(hepatitis, pancreatitis, gastritis, ulcers), gingival hypertrophy/bleeding, drowsiness, sedation, anxiety/nervousness, increased risk for suicide, increased risk for depression, and psychosis.   -We also reviewed drug-drug interactions and their potential effect on seizure control and medication side effects.     -Recommend chronic vitamin D supplementation and regular exercise (if not contraindicated).   -Patient/family educated on risk for SUDEP (Sudden Death in Epilepsy). Counseling was provided on the importance of strict medication and follow up compliance. The patient/family understand the risks associated with non-adherence with the medical plan as outlined, including but not limited to an increased risk for breakthrough seizures, which may contribute to injuries, disability, status epilepticus, and even death.   -The patient's family understands and agrees that due to the complexity of his/her diagnosis, results of any testing and further recommendations will typically be discussed/made during a face to face encounter in my office. The patient and/or family further understands it is their responsibility to keep proper follow up.     Patient's mother agrees with plan, as outlined.         Harris Bolton MD   Epilepsy and Neurodiagnostics.   Clinical  of Neurology [facility].   Diplomate in Neurology, Epilepsy, and Electrodiagnostic Medicine.   Office: [phone]  Fax: [phone]      BILLING DOCUMENTATION:     I have performed physical exam and reviewed and updated ROS and plan today 8/2/2019. In review of that note, there are no new changes except as documented above.     Counseling:  I spent greater than 50% time face-to-face time of a total of 43 minutes visit. Over 50% of the time of the visit today was spent on counseling and or coordination of care wtih the patient and/or family, with greater than 50% of the total discussing my assessment and plan as stated above.

## 2019-08-02 NOTE — TELEPHONE ENCOUNTER
Per pt mother I called [facility] spoke with kervin, gave verbal to refill phenobarbital 120 mg solution with 4 refills. The vimpat 200 mg am 300 mg, insurance will not cover the 300 mg so we changed it so insurance will pay but patient will still get enough to take 300 mg at bedtime.

## 2019-08-02 NOTE — PROGRESS NOTES
Chief Complaint   Patient presents with   • Seizure   • Rash     to chest for the last 9 days.       Problem List Items Addressed This Visit     Seizure (HCC)    Relevant Medications    lacosamide (VIMPAT) 200 MG Tab tablet    levETIRAcetam (KEPPRA) 100 MG/ML Solution    PHENobarbital 20 MG/5ML Elixir    topiramate (TOPAMAX) 200 MG tablet    levETIRAcetam (KEPPRA) tablet 1,500 mg    PHENobarbital solution 120 mg    lacosamide (VIMPAT) tablet 300 mg    lacosamide (VIMPAT) 100 mg in  mL ivpb (Completed)    topiramate (TOPAMAX) tablet 200 mg    topiramate (TOPAMAX) tablet 300 mg    perampanel (FYCOMPA) tablet 2 mg (Completed)    clonazePAM (KLONOPIN) tablet 0.25 mg    clonazePAM (KLONOPIN) tablet 0.5 mg      Other Visit Diagnoses     Seizure disorder (HCC)        Relevant Medications    lacosamide (VIMPAT) 200 MG Tab tablet    levETIRAcetam (KEPPRA) 100 MG/ML Solution    PHENobarbital 20 MG/5ML Elixir    topiramate (TOPAMAX) 200 MG tablet    levETIRAcetam (KEPPRA) tablet 1,500 mg    PHENobarbital solution 120 mg    lacosamide (VIMPAT) tablet 300 mg    lacosamide (VIMPAT) 100 mg in  mL ivpb (Completed)    topiramate (TOPAMAX) tablet 200 mg    topiramate (TOPAMAX) tablet 300 mg    perampanel (FYCOMPA) tablet 2 mg (Completed)    clonazePAM (KLONOPIN) tablet 0.25 mg    clonazePAM (KLONOPIN) tablet 0.5 mg    Pneumonia of left lower lobe due to infectious organism (HCC)        Relevant Medications    ampicillin/sulbactam (UNASYN) 3 g in  mL IVPB (Completed)    piperacillin-tazobactam (ZOSYN) 3.375 g in  mL IVPB (Completed)    piperacillin-tazobactam (ZOSYN) 4.5 g in  mL IVPB          Interim history:  Ruben Edwards remains hospitalized. Per mother, 36 hrs of no seizures! She is very happy. States new regimen in working. It is anticipated pt will be d/c home on Saturday. No new complaints. No rash. Pt waking up and at baseline per mother. Movements gone.     Past medical history:   Past Medical  History:   Diagnosis Date   • Seizure (HCC)    • Traumatic brain injury (HCC) 05/15/1998       Past surgical history:   Past Surgical History:   Procedure Laterality Date   • LARYNGOSCOPY  7/2/2017    Procedure: LARYNGOSCOPY;  Surgeon: Catherine Osorio M.D.;  Location: SURGERY [facility];  Service:    • BRONCHOSCOPY  7/2/2017    Procedure: BRONCHOSCOPY;  Surgeon: Catherine Osorio M.D.;  Location: SURGERY [facility];  Service:    • TRACHEOSTOMY  7/2/2017    Procedure: TRACHEOSTOMY;  Surgeon: Catherine Osorio M.D.;  Location: SURGERY [facility];  Service:    • ESOPHAGOSCOPY  7/2/2017    Procedure: ESOPHAGOSCOPY;  Surgeon: Catherine Osorio M.D.;  Location: SURGERY [facility];  Service:        Family history:   No family history on file.    Social history:   Social History     Socioeconomic History   • Marital status: Single     Spouse name: Not on file   • Number of children: Not on file   • Years of education: Not on file   • Highest education level: Not on file   Occupational History   • Not on file   Social Needs   • Financial resource strain: Not on file   • Food insecurity:     Worry: Not on file     Inability: Not on file   • Transportation needs:     Medical: Not on file     Non-medical: Not on file   Tobacco Use   • Smoking status: Never Smoker   • Smokeless tobacco: Never Used   Substance and Sexual Activity   • Alcohol use: No   • Drug use: Yes     Types: Oral     Comment: Mother tried CBD treatment for siezures in past   • Sexual activity: Not on file   Lifestyle   • Physical activity:     Days per week: Not on file     Minutes per session: Not on file   • Stress: Not on file   Relationships   • Social connections:     Talks on phone: Not on file     Gets together: Not on file     Attends [employer] service: Not on file     Active member of club or organization: Not on file     Attends meetings of clubs or organizations: Not on file     Relationship status: Not on file   •  Intimate partner violence:     Fear of current or ex partner: Not on file     Emotionally abused: Not on file     Physically abused: Not on file     Forced sexual activity: Not on file   Other Topics Concern   • Not on file   Social History Narrative   • Not on file       Current medications:   Current Facility-Administered Medications   Medication Dose   • clonazePAM (KLONOPIN) tablet 0.25 mg  0.25 mg   • clonazePAM (KLONOPIN) tablet 0.5 mg  0.5 mg   • potassium bicarbonate (KLYTE) effervescent tablet 25 mEq  25 mEq   • LORazepam (ATIVAN) injection 0.5-1 mg  0.5-1 mg   • piperacillin-tazobactam (ZOSYN) 4.5 g in  mL IVPB  4.5 g   • bisacodyl (DULCOLAX) suppository 10 mg  10 mg   • nystatin (MYCOSTATIN) powder     • topiramate (TOPAMAX) tablet 200 mg  200 mg   • topiramate (TOPAMAX) tablet 300 mg  300 mg   • lacosamide (VIMPAT) tablet 300 mg  300 mg   • Pharmacy Consult: Enteral tube insertion - review meds/change route/product selection     • senna-docusate (PERICOLACE or SENOKOT S) 8.6-50 MG per tablet 2 Tab  2 Tab    And   • polyethylene glycol/lytes (MIRALAX) PACKET 1 Packet  1 Packet    And   • magnesium hydroxide (MILK OF MAGNESIA) suspension 30 mL  30 mL    And   • bisacodyl (DULCOLAX) suppository 10 mg  10 mg   • Pain Pump (patient supplied) Device     • cyanocobalamin (VITAMIN B-12) tablet 1,000 mcg  1,000 mcg   • levETIRAcetam (KEPPRA) tablet 1,500 mg  1,500 mg   • PHENobarbital solution 120 mg  120 mg       Medication Allergy:  Allergies   Allergen Reactions   • Sulfa Drugs Unspecified     Per caretaker, mother is allergic to sulfa so believes her son could be as well         Review of systems:   Unable to obtain.      Physical examination:   Vitals:    08/01/19 1108 08/01/19 1258 08/01/19 1454 08/01/19 1600   BP:    131/60   Pulse: (!) 58  (!) 59 61   Resp: 16  16 18   Temp:  36.2 °C (97.1 °F)  36.1 °C (97 °F)   TempSrc:  Temporal  Temporal   SpO2: 94%  93% 93%   Weight:       Height:         He  does not appear in any acute distress.  The patient is nonverbal, unable to follow commands.  No movements on exam.   Memory cannot be assessed.  He is awake, unable to assess for alertness or orientation.  Eyes are midline, there is no nystagmus on primary gaze.  His pupils are about 4 to 5 mm bilaterally and are sluggish to react to light.  His face appears symmetric.  He has contractures and atrophy in all extremities.  Examination of the skin does not show any rashes.  The patient has a feeding tube in place.        ANCILLARY DATA REVIEWED:       Lab Data Review:  Recent Results (from the past 24 hour(s))   Basic Metabolic Panel    Collection Time: 08/01/19  6:54 AM   Result Value Ref Range    Sodium 137 135 - 145 mmol/L    Potassium 4.1 3.6 - 5.5 mmol/L    Chloride 107 96 - 112 mmol/L    Co2 20 20 - 33 mmol/L    Glucose 112 (H) 65 - 99 mg/dL    Bun 14 8 - 22 mg/dL    Creatinine 0.25 (L) 0.50 - 1.40 mg/dL    Calcium 8.6 8.5 - 10.5 mg/dL    Anion Gap 10.0 0.0 - 11.9   CBC WITH DIFFERENTIAL    Collection Time: 08/01/19  6:54 AM   Result Value Ref Range    WBC 5.8 4.8 - 10.8 K/uL    RBC 5.21 4.70 - 6.10 M/uL    Hemoglobin 16.1 14.0 - 18.0 g/dL    Hematocrit 50.4 42.0 - 52.0 %    MCV 96.7 81.4 - 97.8 fL    MCH 30.9 27.0 - 33.0 pg    MCHC 31.9 (L) 33.7 - 35.3 g/dL    RDW 63.8 (H) 35.9 - 50.0 fL    Platelet Count 222 164 - 446 K/uL    MPV 9.2 9.0 - 12.9 fL    Neutrophils-Polys 57.40 44.00 - 72.00 %    Lymphocytes 29.40 22.00 - 41.00 %    Monocytes 6.40 0.00 - 13.40 %    Eosinophils 5.90 0.00 - 6.90 %    Basophils 0.70 0.00 - 1.80 %    Immature Granulocytes 0.20 0.00 - 0.90 %    Nucleated RBC 0.00 /100 WBC    Neutrophils (Absolute) 3.32 1.82 - 7.42 K/uL    Lymphs (Absolute) 1.70 1.00 - 4.80 K/uL    Monos (Absolute) 0.37 0.00 - 0.85 K/uL    Eos (Absolute) 0.34 0.00 - 0.51 K/uL    Baso (Absolute) 0.04 0.00 - 0.12 K/uL    Immature Granulocytes (abs) 0.01 0.00 - 0.11 K/uL    NRBC (Absolute) 0.00 K/uL   ESTIMATED GFR     Collection Time: 19  6:54 AM   Result Value Ref Range    GFR If African American >60 >60 mL/min/1.73 m 2    GFR If Non African American >60 >60 mL/min/1.73 m 2         Records reviewed:   Chart reviewed.     Imaging:   MRI brain w/wo, 3/7/18  1.  Moderate cerebral atrophy beyond that expected for the patient's age.  2.  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  3.  Old hemorrhagic infarcts left basal ganglia, left thalamus, left subthalamic region.  4.  Marked atrophy and encephalomalacic change in the left cerebral peduncle with T2 hyperintensity and volume loss extending down the left anterior quadrant of the pop into the left medullary pyramid. 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  5.  Widespread areas of curvilinear and gyriform enhancement involving the right frontal lobe, right parietal lobe, right occipital lobe, along with curvilinear enhancement in the right basal ganglia. These findings are most consistent with subacute   sequela of cerebral infarction or cortical laminar necrosis. Cortical laminar necrosis has been reported associated with prolonged seizures/status epilepticus. Subacute sequela of encephalitis could have a similar appearance.  6.  Advanced supratentorial white matter disease consistent with microvascular ischemic change versus demyelination or gliosis. This results in some volume loss of deep white matter.  7.  Curvilinear hemosiderin deposition in the right temporal lobe deep white matter consistent with old hemorrhage.  8.  No acute cerebral infarction or acute hemorrhage evident.  9.  No evidence of mesial temporal sclerosis.   (I personally reviewed images).      MRA head wo, 3/9/18  MRA OF THE [place] OF [place] WITHIN NORMAL LIMITS.  (I personally reviewed images).         EE:  This is an abnormal 24 hrs video electroencephalogram recording in an encephalopathic patient during awake and sleep states. There is diffuse cerebral dysfunction,  suggestive of an encephalopathic state. Superimposed slowing in the right hemisphere is likely due to underlying structural abnormality. Frequent right frontotemporal spikes, with rare brief runs of right PLEDS, but without clear evidence for seizures during the study. The study remains significantly improved when compared to initial recordings, however the patient remains at very high risk for seizure recurrence. Clinical and radiological correlation is recommended.      Video EEG, 7/21/2019:  INTERPRETATION:  This is an abnormal 24 hrs video EEG recording in the awake,   drowsy, and sleep state(s).  There is intermittent slowing in the   right frontal region and frequent right frontal spikes / sharps,   suggesting underlying structural abnormality and cortical   irritability. A few events of left arm / hand and head myoclonus   were reported by either patient's mother or nursing staff by push   of the event button. These spells were brief in nature and   epileptic. Review of eeg demonstrated briefly rhythmic or   periodic right frontal spikes / sharps with right frontal slowing   during the events, which only lasted several seconds at a time.   Events consisting with brief focal right frontal seizures. There   has been improvement after increase of Lacosamide. Clinical and   radiological correlation is recommended.               ASSESSMENT AND PLAN:  History of severe traumatic brain injury and either a persistent vegetative state versus minimally conscious state.  Pharmaco-resistant, structural, focal onset epilepsy.  History of status epilepticus and PLEDs.  Recent new onset of myoclonic jerks involving the left upper extremity, are epileptic in nature, and have a right frontal EEG correlation.  The spells are brief, the patient is not in status, and appeared to be induced by stimulation of the patient.  The patient is currently on multiple antiepileptics, which had controlled the patient's epilepsy for a while,  except for the recent onset of these new focal seizures. Seizures now appear controlled on new aed regimen. CT of the chest shows pneumonia, and he was placed on antibiotics, which continue at this point.  The patient is at baseline otherwise, as stated by the patient's mother.  We had increased the Vimpat from 200 mg twice a day, to 300 mg twice a day, and I also increased Topamax to 200/300 during this admission, but he continued to have spells, then we added Fycompa 4 mg qhs but no benefit over the last few days, and Fycompa was discontinued. We added clonazepam, which yesterday was increased to 0.25 mg in am and 0.5 mg qhs. He is now sz free for over 24 hrs. Plan to d/c Saturday on current regimen with outpt fu.      Of note, the patient had a history of hyperammonemia with the use of Depakote, which was discontinued in the past.     I discussed case with RN, and patient's mother.         FOLLOW-UP:   In my office after d/c.       EDUCATION AND COUNSELING:  -Education was provided to the patient and/or family regarding diagnosis and prognosis. The chronic and unpredictable nature of the condition were discussed. There is increased risk for additional events, which may carry potential for significant injuries and death. Discussed frequent seizure triggers: sleep deprivation, medication non-compliance, use of illegal drugs/alcohol, stress, and others.   -We reviewed in detail the current antiepileptic regimen. Potential side effects of antiepileptics were discussed at length, including but no limited to: hypersensitivity reactions (rash and others, some of which can be fatal), visual field changes (some of which may be irreversible), glaucoma, diplopia, kidney stones, osteopenia/osteoporosis/bone fractures, hyperthermia/anhydrosis, hyponatremia, tremors/abnormal movements, ataxia, dizziness, fatigue, increased risk for falls, risk for cardiac arrhythmias/syncope, gastrointestinal side effects(hepatitis,  pancreatitis, gastritis, ulcers), gingival hypertrophy/bleeding, drowsiness, sedation, anxiety/nervousness, increased risk for suicide, increased risk for depression, and psychosis.   -We also reviewed drug-drug interactions and their potential effect on seizure control and medication side effects.    -Recommend chronic vitamin D supplementation and regular exercise (if not contraindicated).   -Patient/family educated on risk for SUDEP (Sudden Death in Epilepsy). Counseling was provided on the importance of strict medication and follow up compliance. The patient/family understand the risks associated with non-adherence with the medical plan as outlined, including but not limited to an increased risk for breakthrough seizures, which may contribute to injuries, disability, status epilepticus, and even death.   -The patient';s family understands and agrees that due to the complexity of his/her diagnosis, results of any testing and further recommendations will typically be discussed/made during a face to face encounter in my office. The patient and/or family further understands it is their responsibility to keep proper follow up.     Patient';s mother agrees with plan, as outlined.         Harris Bolton MD   Epilepsy and Neurodiagnostics.   Clinical  of Neurology [facility].   Diplomate in Neurology, Epilepsy, and Electrodiagnostic Medicine.   Office: [phone]  Fax: [phone]      BILLING DOCUMENTATION:     I have performed physical exam and reviewed and updated ROS and plan today 8/1/2019. In review of that note, there are no new changes except as documented above.     Counseling:  I spent greater than 50% time face-to-face time of a total of 41minutes visit. Over 50% of the time of the visit today was spent on counseling and or coordination of care wtih the patient and/or family, with greater than 50% of the total discussing my assessment and  plan as stated above.

## 2019-08-03 VITALS
WEIGHT: 150.13 LBS | OXYGEN SATURATION: 94 % | TEMPERATURE: 97.5 F | HEIGHT: 70 IN | SYSTOLIC BLOOD PRESSURE: 144 MMHG | DIASTOLIC BLOOD PRESSURE: 66 MMHG | HEART RATE: 68 BPM | BODY MASS INDEX: 21.49 KG/M2 | RESPIRATION RATE: 16 BRPM

## 2019-08-03 PROBLEM — H10.33 ACUTE BACTERIAL CONJUNCTIVITIS OF BOTH EYES: Status: RESOLVED | Noted: 2019-07-24 | Resolved: 2019-08-03

## 2019-08-03 PROBLEM — G40.901 STATUS EPILEPTICUS (HCC): Status: RESOLVED | Noted: 2017-06-20 | Resolved: 2019-08-03

## 2019-08-03 PROBLEM — J69.0 ASPIRATION PNEUMONIA (HCC): Status: RESOLVED | Noted: 2017-06-17 | Resolved: 2019-08-03

## 2019-08-03 PROBLEM — E87.6 HYPOKALEMIA: Status: RESOLVED | Noted: 2017-06-20 | Resolved: 2019-08-03

## 2019-08-03 PROCEDURE — 99239 HOSP IP/OBS DSCHRG MGMT >30: CPT | Performed by: HOSPITALIST

## 2019-08-03 PROCEDURE — A9270 NON-COVERED ITEM OR SERVICE: HCPCS | Performed by: PSYCHIATRY & NEUROLOGY

## 2019-08-03 PROCEDURE — 700101 HCHG RX REV CODE 250: Performed by: PSYCHIATRY & NEUROLOGY

## 2019-08-03 PROCEDURE — 700111 HCHG RX REV CODE 636 W/ 250 OVERRIDE (IP): Performed by: HOSPITALIST

## 2019-08-03 PROCEDURE — A9270 NON-COVERED ITEM OR SERVICE: HCPCS | Performed by: HOSPITALIST

## 2019-08-03 PROCEDURE — 700102 HCHG RX REV CODE 250 W/ 637 OVERRIDE(OP): Performed by: HOSPITALIST

## 2019-08-03 PROCEDURE — 700102 HCHG RX REV CODE 250 W/ 637 OVERRIDE(OP): Performed by: PSYCHIATRY & NEUROLOGY

## 2019-08-03 PROCEDURE — 700105 HCHG RX REV CODE 258: Performed by: HOSPITALIST

## 2019-08-03 PROCEDURE — 94640 AIRWAY INHALATION TREATMENT: CPT

## 2019-08-03 RX ORDER — CLONAZEPAM 0.5 MG/1
TABLET ORAL
Start: 2019-08-03 | End: 2019-08-31

## 2019-08-03 RX ORDER — LACOSAMIDE 200 MG/1
300 TABLET ORAL 2 TIMES DAILY
Qty: 60 TAB | Refills: 2 | Status: SHIPPED | OUTPATIENT
Start: 2019-08-03 | End: 2019-09-02

## 2019-08-03 RX ADMIN — CLONAZEPAM 0.25 MG: 0.5 TABLET ORAL at 06:37

## 2019-08-03 RX ADMIN — LACOSAMIDE 300 MG: 100 TABLET, FILM COATED ORAL at 06:37

## 2019-08-03 RX ADMIN — TOPIRAMATE 200 MG: 100 TABLET, FILM COATED ORAL at 06:36

## 2019-08-03 RX ADMIN — CYANOCOBALAMIN TAB 500 MCG 1000 MCG: 500 TAB at 06:36

## 2019-08-03 RX ADMIN — PIPERACILLIN AND TAZOBACTAM 4.5 G: 4; .5 INJECTION, POWDER, LYOPHILIZED, FOR SOLUTION INTRAVENOUS; PARENTERAL at 05:12

## 2019-08-03 RX ADMIN — POTASSIUM BICARBONATE 25 MEQ: 978 TABLET, EFFERVESCENT ORAL at 06:00

## 2019-08-03 RX ADMIN — PHENOBARBITAL 120 MG: 20 ELIXIR ORAL at 06:38

## 2019-08-03 RX ADMIN — LEVETIRACETAM 1500 MG: 500 TABLET, FILM COATED ORAL at 06:37

## 2019-08-03 NOTE — PROGRESS NOTES
Assumed care of pt at approximately 1900. Pt nonverbal, resting comfortably; no grimacing or signs of pain noted. Pt's mother Susana at bedside; updates on plan of care provided, questions and concerns addressed. Susana declining to have skin check performed. Pt/ pt's mother reporting no other needs at this time. Bed locked and in lowest position. Call light and belongings within reach. Hourly rounding in place.

## 2019-08-03 NOTE — DISCHARGE INSTRUCTIONS
Discharge Instructions    Discharged to home by ambulance with relative. Discharged via ambulance, hospital escort: Yes.  Special equipment needed: Not Applicable    Be sure to schedule a follow-up appointment with your primary care doctor or any specialists as instructed.     Discharge Plan:   Diet Plan: Discussed  Activity Level: Discussed  Confirmed Follow up Appointment: Patient to Call and Schedule Appointment  Confirmed Symptoms Management: Discussed  Medication Reconciliation Updated: Yes    I understand that a diet low in cholesterol, fat, and sodium is recommended for good health. Unless I have been given specific instructions below for another diet, I accept this instruction as my diet prescription.   Other diet: Tube feeding diet already established    Special Instructions: None    · Is patient discharged on Warfarin / Coumadin?   No     Depression / Suicide Risk    As you are discharged from this [facility] facility, it is important to learn how to keep safe from harming yourself.    Recognize the warning signs:  · Abrupt changes in personality, positive or negative- including increase in energy   · Giving away possessions  · Change in eating patterns- significant weight changes-  positive or negative  · Change in sleeping patterns- unable to sleep or sleeping all the time   · Unwillingness or inability to communicate  · Depression  · Unusual sadness, discouragement and loneliness  · Talk of wanting to die  · Neglect of personal appearance   · Rebelliousness- reckless behavior  · Withdrawal from people/activities they love  · Confusion- inability to concentrate     If you or a loved one observes any of these behaviors or has concerns about self-harm, here's what you can do:  · Talk about it- your feelings and reasons for harming yourself  · Remove any means that you might use to hurt yourself (examples: pills, rope, extension cords, firearm)  · Get professional help from the community (Mental Health,  Substance Abuse, psychological counseling)  · Do not be alone:Call your Safe Contact- someone whom you trust who will be there for you.  · Call your local CRISIS HOTLINE 422-9526 or 547-439-9741  · Call your local Children's Mobile Crisis Response Team [place] (635) 630-3487 or www.[facility]  · Call the toll free National Suicide Prevention Hotlines   · National Suicide Prevention Lifeline 797-149-JQFD (0468)  · National Hope Line Network 800-SUICIDE (001-8156)

## 2019-08-03 NOTE — PROGRESS NOTES
Took bedside report at shift change. Pt resting, nonverbal, no signs of distress. Mother at bedside. Mother continues to refuse skin checks citing possible overstimulation resulting in seizures. Call light and personal belongings in reach. Will continue to monitor and provide hourly rounding. Plan is to discharge to home today.

## 2019-08-03 NOTE — CARE PLAN
Problem: Safety:  Goal: Encourage identification and reduction of causative stimuli  Outcome: PROGRESSING AS EXPECTED  Note:   Low stimuli environment maintained; dim lights, noise reduction, comfort measures provided.     Problem: Communication  Goal: The ability to communicate needs accurately and effectively will improve  Outcome: PROGRESSING SLOWER THAN EXPECTED  Note:   Pt at baseline however nonverbal/ unable to make needs known effectively.

## 2019-08-04 NOTE — DISCHARGE SUMMARY
Discharge Summary    CHIEF COMPLAINT ON ADMISSION  Chief Complaint   Patient presents with   • Seizure   • Rash     to chest for the last 9 days.       Reason for Admission  Seizure    Admission Date  7/19/2019    CODE STATUS  Prior    HPI & HOSPITAL COURSE     7/23: with continuous EEG monitoring in place, d/w Dr. Bolton, added Fycompa to his current seizure regiment of keppra, phenobarbital, topamaz, vimpat.  Reviewed CXR from 7/19 and agree with diffuse infiltrate on my review left lung.  Patient appears diaphoretic on exam, ordered CT chest with contrast to rule out pneumonia.   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  .    7/29:  Multiple seizures in last 24 hours, seen with Dr. Bolton.  Plan to add klonopin bid to control seizures since ativan does stop the seizures in the past.  Stop date placed for zosyn IV for 8/2, but can change to po cipro to finish 7 day course if seizures controlled x 24 hours.       7/30   Patient had 4 small seizures this morning.  As per mother who is at the bedside the seizure frequency has been decreasing.  Patient continues on IV Zosyn.  I discussed with mother that changing to Cipro may not be the best option for this patient has Cipro can lower the seizure threshold    7/31 seizure still recurring.    Afebrile    Case discussed with mother at bedside-she agrees that patient should complete antibiotics here in the hospital with IV Zosyn    She requested a referral to interventional radiology to see whether not they could replaced the button PEG, as it is loose.  As per IR technician, they do not have any different button PEG and what the patient currently has.         The patient did not get the PEG replaced .  There was concern about a dislodged IV and whether not patient missed some doses of IV Zosyn.  I advised the mother that we use the overshoot in regards to the amount of doses for antibiotic therapy but we did give the  patient 1 additional dose while he was here in the hospital    The patient had no further signs of active infection.  Patient's seizures had markedly reduced.  Dr. Masterson of neurology had been titrating his antiseizure medication throughout the hospital stay    He was cleared for discharge on 8/3      Therefore, he is discharged in fair and stable condition to home with close outpatient follow-up.    The patient met 2-midnight criteria for an inpatient stay at the time of discharge.    Discharge Date  8/3/2019    FOLLOW UP ITEMS POST DISCHARGE      DISCHARGE DIAGNOSES  Principal Problem (Resolved):    Status epilepticus (HCC) POA: Yes  Active Problems:    TBI (traumatic brain injury) (HCC) POA: Yes    Contraction, joint, multiple sites POA: Yes    Epilepsy ([facility]) POA: Yes    Fungal rash of torso POA: Clinically Undetermined  Resolved Problems:    Aspiration pneumonia ([facility]) POA: Yes    Hypokalemia POA: Yes    Acute bacterial conjunctivitis of both eyes POA: Yes      FOLLOW UP  Future Appointments   Date Time Provider Department Center   4/16/2020 11:00 AM Harris Bolton M.D. [facility] None     Harris Bolton M.D.  [address] [id number] [phone]    Call  As needed, If symptoms worsen    Aleksandra Moore, A.P.N.  [address] [place] [id number] [phone]            MEDICATIONS ON DISCHARGE     Medication List      START taking these medications      Instructions   clonazePAM 0.5 MG Tabs  Commonly known as:  KLONOPIN   1/2  tab GT in morning    1 tab GT every evening        CHANGE how you take these medications      Instructions   lacosamide 200 MG Tabs tablet  What changed:  how much to take  Commonly known as:  VIMPAT   300 mg by Per G Tube route 2 Times a Day for 30 days.  Dose:  300 mg     topiramate 200 MG tablet  What changed:    · how much to take  · how to take this  · when to take this  · additional instructions  Commonly known as:  TOPAMAX  Notes to patient:  DOSES VARY FROM  MORNING TO EVENING PLEASE SEE INSTRUCTIONS   200mg Per G Tube in morning 300mg via GT in evening        CONTINUE taking these medications      Instructions   levETIRAcetam 100 MG/ML Soln  Commonly known as:  KEPPRA   1,500 mg by Per G Tube route every 12 hours.  Dose:  1,500 mg     Pain Pump Amie  Commonly known as:  patient supplied   by Injection route Continuous. Patient's Pain Pump (placed and maintained as an outpatient)  Medications/concentrations:   Baclofen 3000mcg/ml  Route: Intrathecal  Date of Placement: 2014  Last changed: 19  Continuous infusion rates (Drug/Rate):  635.5mcg/day  Patient activation dose: N/A  Patient activation lockout interval: N/A  Maximum activations per day: N/A  Next refill due: 19     PHENobarbital 20 MG/5ML Elix   120 mg by Per G Tube route 2 times a day.  Dose:  120 mg     potassium citrate 5 MEQ (540 MG) Tbcr  Commonly known as:  UROCIT-K   Take 5 mEq by mouth every day.  Dose:  5 mEq     Vitamin B-12 1000 MCG/15ML Liqd   1,000 mcg by Gastrostomy Tube route every day.  Dose:  1,000 mcg            Allergies  Allergies   Allergen Reactions   • Sulfa Drugs Unspecified     Per caretaker, mother is allergic to sulfa so believes her son could be as well       DIET  No orders of the defined types were placed in this encounter.      ACTIVITY  As tolerated.  Weight bearing as tolerated    CONSULTATIONS  Dr carrero neuro    PROCEDURES  CULTURE RESPIRATORY W/ [facility]   Collected: 19   Resulting lab: [facility]   Value: SPUTUM INDUCED   *Additional information available - narrative   NEUROSCIENCES [address] [id number] Ruben Edwards  MRN: [id number], : 1980, Sex: M  Adm: 2019, D/C: 8/3/2019   Contains abnormal data CULTURE RESPIRATORY W/ [facility]N   Order: [id number]   Status:  Final result   Visible to patient:  No (Not Released) Next appt:  2020 at 11:00 AM in Neurology (Harris Carrero M.D.)   Specimen  Information: Sputum Induced; Respirate        Component 11d ago   Significant Indicator POSPositive (POS)    Source RESP    Site SPUTUM INDUCED    Culture Result Light growth usual upper respiratory floraAbnormal     Gram Stain Result CORRECTED REPORT   Correct result:Few WBCs.   Moderate Gram negative rods.   Few Gram positive cocci.   Few Gram positive rods.   Erroneous result:Few WBCs.   Moderate Gram negative rods.   Few Gram positive cocci.   Few Gram positive rods.   Sputum Gram stain quality score is <1,   probableoropharyngeal contamination. Culture   not performed.   Recollect if clinically indicated.   >25 Squamous epithelial cells /LPF.    Culture Result Abnormal    Pseudomonas aeruginosa   Moderate growth   P.aeruginosa may develop resistance during prolonged therapy   with all antibiotics. Isolates that are initially susceptible   may become resistant within three to four days after   initiation of therapy. Testing of repeat isolates may be   warranted.     Culture Result Abnormal    Klebsiella pneumoniae   Light growth     Resulting Agency M   Susceptibility      Pseudomonas aeruginosa Klebsiella pneumoniae     JIN JIN     Amikacin <=16 mcg/mL Sensitive       Ampicillin   >16 mcg/mL Resistant     Cefepime <=8 mcg/mL Sensitive <=8 mcg/mL Sensitive     Cefotaxime   <=2 mcg/mL Sensitive     Cefotetan   <=16 mcg/mL Sensitive     Ceftazidime 4 mcg/mL Sensitive <=1 mcg/mL Sensitive     Ceftriaxone   <=8 mcg/mL Sensitive     Cefuroxime   <=4 mcg/mL Sensitive     Ciprofloxacin <=1 mcg/mL Sensitive <=1 mcg/mL Sensitive     Ertapenem   <=1 mcg/mL Sensitive     Gentamicin 8 mcg/mL Intermediate <=4 mcg/mL Sensitive     Imipenem 2 mcg/mL Sensitive       Meropenem <=1 mcg/mL Sensitive       Pip/Tazobactam <=16 mcg/mL Sensitive <=16 mcg/mL Sensitive     Tigecycline   <=2 mcg/mL Sensitive     Tobramycin <=4 mcg/mL Sensitive <=4 mcg/mL Sensitive     Trimeth/Sulfa   <=2/38 mcg/mL Sensitive                Narrative    Performed by: JAYDE   Collected By:[id number] CUCO MADRIGAL  Collected By:[id number] CUCO MADRIGAL               LABORATORY  Lab Results   Component Value Date    SODIUM 137 08/02/2019    POTASSIUM 3.7 08/02/2019    CHLORIDE 106 08/02/2019    CO2 23 08/02/2019    GLUCOSE 104 (H) 08/02/2019    BUN 14 08/02/2019    CREATININE 0.24 (L) 08/02/2019        Lab Results   Component Value Date    WBC 5.8 08/01/2019    HEMOGLOBIN 16.1 08/01/2019    HEMATOCRIT 50.4 08/01/2019    PLATELETCT 222 08/01/2019        Total time of the discharge process exceeds 38 minutes.

## 2019-08-07 ENCOUNTER — TELEPHONE (OUTPATIENT)
Dept: NEUROLOGY | Facility: MEDICAL CENTER | Age: 39
End: 2019-08-07

## 2019-08-07 NOTE — TELEPHONE ENCOUNTER
uSsana states that pt this morning had a seizure, very short 15 sec. Had another at 12:45pm, pt started having lime green in groin area mom gave him the left over levofloxacine. Pcp ok'd for pt to continue taking the antibiotic also per Dr Bolton give pt half of the clonazepam after sz mom did. Had 4 more seizures in an hour span of 1:25pm - 2:25pm today, all short. Has not had any seizures since, doing better after the clonazepam.  Mom wants to give it 3-5 with the antibiotic to see if it clears.  Mom is asking if should continue giving the extra half a tab that Dr Bolton instructed?    I spoke with Dr Bolton, informed him of pt situation, per Dr Bolton inform mom to monitor pt for the next couple of days and keep us posted. Mom verbally understood.

## 2019-08-12 ENCOUNTER — TELEPHONE (OUTPATIENT)
Dept: NEUROLOGY | Facility: MEDICAL CENTER | Age: 39
End: 2019-08-12

## 2019-08-12 NOTE — TELEPHONE ENCOUNTER
Pt's mom wanted to update you, pt had a small sz last Tuesday, wed had 15 sz and started the levofloxin, thurs the green infection was gone had sz that morning and 5 small sz in pm. Has had no sz since as of mondays afternoon. Pt still on the antibiotic with infection gone.

## 2019-08-21 ENCOUNTER — TELEPHONE (OUTPATIENT)
Dept: NEUROLOGY | Facility: MEDICAL CENTER | Age: 39
End: 2019-08-21

## 2019-08-21 NOTE — TELEPHONE ENCOUNTER
Patient's mother stated pt has had no seizures in two weeks, he's off the antibiotics and the infection is gone. She is wondering, pt is taking topiramate 200 mg 2 tabs in morning and 3 tabs at night, he is more sleepy than usual. Can it be changed to 2 tabs at night or should she leave as is? Also the clonazepam pt takes 1/2 in morning and 1 tab at night, ok for 1/2 at night, does pt need to be on the medication? Or leave as is? Please advise.

## 2019-08-22 ENCOUNTER — TELEPHONE (OUTPATIENT)
Dept: NEUROLOGY | Facility: MEDICAL CENTER | Age: 39
End: 2019-08-22

## 2019-08-22 NOTE — TELEPHONE ENCOUNTER
I would recommend waiting for another two weeks, if no more seizures, then we can make changes to his meds.   MARLON Meza verbally understood and will call mid sept to give us an update.

## 2019-10-24 ENCOUNTER — TELEPHONE (OUTPATIENT)
Dept: NEUROLOGY | Facility: MEDICAL CENTER | Age: 39
End: 2019-10-24

## 2019-10-24 NOTE — TELEPHONE ENCOUNTER
I spoke with ligia at [facility] gave verbal to refill the keppra 100 mg/ml with 6 refills. Ligia asked if they can refill the vimpat 5 days early due to a mix up last month with refilling the medication. I gave ok.

## 2019-11-21 ENCOUNTER — TELEPHONE (OUTPATIENT)
Dept: NEUROLOGY | Facility: MEDICAL CENTER | Age: 39
End: 2019-11-21

## 2019-11-21 NOTE — TELEPHONE ENCOUNTER
I spoke with pharmacist chuck at [facility] and gave verbal to refill pt's vimpat 100 mg w/ 4 additional refills.

## 2019-12-17 ENCOUNTER — TELEPHONE (OUTPATIENT)
Dept: NEUROLOGY | Facility: MEDICAL CENTER | Age: 39
End: 2019-12-17

## 2019-12-17 NOTE — TELEPHONE ENCOUNTER
I spoke with the pharmacist at [facility] and gave verbal to refill vimpat 100 ml and phenobarbital both with 4 additional refills. Pharmacist verbally understood.

## 2020-03-11 ENCOUNTER — TELEPHONE (OUTPATIENT)
Dept: NEUROLOGY | Facility: MEDICAL CENTER | Age: 40
End: 2020-03-11

## 2020-03-11 NOTE — TELEPHONE ENCOUNTER
Per pt's mother pt has not been on or needed the antibiotic for 6 months now. Pt has had 5 small seizures in 6 months, mom is concerned with patients oxygen at night she stated he breaths slow when sleeping and his oxygen get low. She gives him oxygen then he's fine after, she is asking if she can wean pt off some of his medications maybe one by one, clonazepam or topiramate. Please advise. Thank you.

## 2020-03-20 ENCOUNTER — TELEPHONE (OUTPATIENT)
Dept: NEUROLOGY | Facility: MEDICAL CENTER | Age: 40
End: 2020-03-20

## 2020-03-20 NOTE — TELEPHONE ENCOUNTER
If he is still having some seizures, I would be hesitant to do so.   If she is concerned about the oxygen, he may need supplemental oxygen at night, his pcp can address that.   Mom to keep us posted.   RA    Patient's mother verbally understood and would like to monitor pt for another month, revaluate him. She stated pt has only had seizures when he's either had surgery or an infection. She feels like she has the infection situation down and knows how to treat it. She will call back in a month for an update.

## 2020-04-08 ENCOUNTER — TELEPHONE (OUTPATIENT)
Dept: NEUROLOGY | Facility: MEDICAL CENTER | Age: 40
End: 2020-04-08

## 2020-04-09 ENCOUNTER — TELEPHONE (OUTPATIENT)
Dept: NEUROLOGY | Facility: MEDICAL CENTER | Age: 40
End: 2020-04-09

## 2020-04-09 NOTE — TELEPHONE ENCOUNTER
Gave verbal to pharmacist Moris to refill topiramate, keppra, vimpat 100 mg, clonazepam and phenobarbital. He verbally understood.

## 2020-04-16 ENCOUNTER — OFFICE VISIT (OUTPATIENT)
Dept: NEUROLOGY | Facility: MEDICAL CENTER | Age: 40
End: 2020-04-16

## 2020-04-16 VITALS — HEIGHT: 71 IN | TEMPERATURE: 97.7 F | WEIGHT: 125 LBS | HEART RATE: 67 BPM | BODY MASS INDEX: 17.5 KG/M2

## 2020-04-16 DIAGNOSIS — E55.9 VITAMIN D DEFICIENCY: ICD-10-CM

## 2020-04-16 DIAGNOSIS — G40.219 PHARMACORESISTANT PARTIAL EPILEPSY WITH IMPAIRMENT OF CONSCIOUSNESS, INTRACTABLE (HCC): ICD-10-CM

## 2020-04-16 DIAGNOSIS — G93.40 ENCEPHALOPATHY: ICD-10-CM

## 2020-04-16 DIAGNOSIS — R79.89 ABNORMAL LFTS: ICD-10-CM

## 2020-04-16 DIAGNOSIS — G40.919 PHARMACORESISTANT INTRACTABLE EPILEPSY (HCC): ICD-10-CM

## 2020-04-16 DIAGNOSIS — G40.109 LOCALIZATION-RELATED EPILEPSY (HCC): ICD-10-CM

## 2020-04-16 DIAGNOSIS — R74.8 ABNORMAL LIVER ENZYMES: ICD-10-CM

## 2020-04-16 DIAGNOSIS — F79 INTELLECTUAL DISABILITY: ICD-10-CM

## 2020-04-16 PROCEDURE — 99443 PR PHYSICIAN TELEPHONE EVALUATION 21-30 MIN: CPT | Mod: CR | Performed by: PSYCHIATRY & NEUROLOGY

## 2020-04-16 ASSESSMENT — FIBROSIS 4 INDEX: FIB4 SCORE: 0.57

## 2020-04-16 NOTE — PROGRESS NOTES
TELEPHONE VISIT:     The patient is disabled.  His mother was offered a telemedicine visit, but she declined telemedicine due to poor connection where she lives.  She requested a telephone consultation instead.    The patient is stable, he has been seizure-free for several month, no further admissions to hospital since August 2019.  He continues on a regimen that unfortunately involves multiple seizure medications, the mother is very compliant with his medications, and does not skip any doses.  The mother denies any recent infections, no further episodes of osteomyelitis, or pneumonia.  He is afebrile.  Mother cares for him 24 hours a day at home.  He has a G-tube that needs to be changed, and mother is making arrangements at a local hospital.  He had a blood work last in January 2020, mother is aware of abnormal alkaline phosphatase.  During today's consultation, we discussed the possible causes for an elevated alkaline phosphatase, which appears to be higher than in the past.  His transaminases are okay.  He remains on multiple antiepileptic medications to include phenobarbital, Keppra, Vimpat, Topamax, and clonazepam.  The possibility that his normal alkaline phosphatase may be due to a seizure medication exist, and he may be likely.  The mother indicates that in the past he has gone through ultrasounds of the liver and gallbladder and this has been normal so she is not interested in repeating those at this time.  The mother is requesting to potentially lower 1 of the seizure medications, since he has been seizure-free.  Out of his current seizure medications, we discussed the likely phenobarbital or topiramate are more likely to cause liver abnormalities, however she is aware that any of the seizure medications can potentially do this.  The mother is requesting to lowering topiramate, he is currently taking 200 mg in the morning and 300 mg at bedtime, we agreed to lower the topiramate to 200 mg in the morning  and 250 mg at bedtime for 4 weeks, if no seizures, she will lower further to 200 mg twice a day after that.  We will be repeating a CMP in about 2 months time, as well as a vitamin D level.  The mother is in agreement with the plan, and will keep us posted of any future seizures and or side effects.  All her questions were answered to to her satisfaction.    I spent 39 minutes on the phone with the patient's mother (in addition to any time spent placing orders, and reviewing results in the patient's chart) discussing diagnosis, recent labs, prognosis, and management recommendations.  The patient will be scheduled for follow-up in the next few months.

## 2020-06-01 ENCOUNTER — TELEPHONE (OUTPATIENT)
Dept: NEUROLOGY | Facility: MEDICAL CENTER | Age: 40
End: 2020-06-01

## 2020-06-01 NOTE — TELEPHONE ENCOUNTER
Per Susana (mom) ulysses is on 2 wks of second round of titration of sz med (clonazepam). Pt has to have surgery for back pump on 06/19 he will go on 06/02 to have lab work done for that. Mom stated the two lab you order she will have done, let her know if it too early to have done.

## 2020-06-07 NOTE — PROGRESS NOTES
[facility]    Date of Service: 3/14/2018    Chief Complaint  37 y.o. male admitted 2018 with shortness of breath.    Interval Problem Update  Patient with a history of traumatic brain injury and seizure disorder, trach in place.   Had underwent taper off of his phenobarbital, family felt he was doing worse.   Upon arrival patient was noted to be hypoxic, deemed aspiration pneumonia due to seizures and patient was placed on a ventilator.   Pt again with seizures, neuro added meds.  Patient is currently tolerating his tube feeds, home blend is being used.   Patient seen and examined in the ICU, ICU care given.  Discussed patient condition and plan with Intensivist, RN, RT and charge nurse / hot rounds.    Continuous EEG off  Opens eyes, doesn't track  NSR to tach  -130  Afebrile  Tolerating home TF  Adequate  uop   Condom cath  No mobilizing per mom  Tolerating RA since yesterday    Give k and mg     Consultants/Specialty  Intensivist  Neurology    Disposition  Patient requires additional treatment in the hospital, may need placement at the time of discharge, LTAC is pending      Review of Systems   Unable to perform ROS: Intubated      Physical Exam  Laboratory/Imaging   Hemodynamics  Temp (24hrs), Av °C (98.6 °F), Min:36.7 °C (98 °F), Max:37.3 °C (99.1 °F)   Temperature: 37.3 °C (99.1 °F)  Pulse  Av.1  Min: 47  Max: 125 Heart Rate (Monitored): 95  NIBP: 109/57      Respiratory    #Aerosol Therapy / Airway Management: T-Piece, Aerosol Humidity Temp (celsius): 35.1 Respiration: 18, Pulse Oximetry: 95 %, O2 Daily Delivery Respiratory : T-Piece     Given By:: Trache, Work Of Breathing / Effort: Mild  RUL Breath Sounds: Clear After Suction, RML Breath Sounds: Clear After Suction, RLL Breath Sounds: Diminished;Crackles, JONATAN Breath Sounds: Clear After Suction, LLL Breath Sounds: Crackles;Diminished    Fluids    Intake/Output Summary (Last 24 hours) at 18 0737  Last data  filed at 03/14/18 0600   Gross per 24 hour   Intake             2750 ml   Output             2750 ml   Net                0 ml       Nutrition  Orders Placed This Encounter   Procedures   • Diet NPO     Standing Status:   Standing     Number of Occurrences:   1     Order Specific Question:   Restrict to:     Answer:   Strict [1]     Physical Exam   Constitutional: He appears cachectic. He appears ill. No distress.   HENT:   Head: Normocephalic and atraumatic.   Mouth/Throat: No oropharyngeal exudate.   Eyes: Right eye exhibits no discharge. Left eye exhibits no discharge.   Neck: Neck supple. No tracheal deviation present.   Trach   Cardiovascular: Normal rate and regular rhythm.    Pulmonary/Chest: Effort normal. No stridor. No apnea, no tachypnea and no bradypnea. No respiratory distress.   Abdominal: He exhibits no distension.   Baclofen pump    Musculoskeletal: He exhibits deformity. He exhibits no edema.   Extensive contractures of extremities   Neurological:   Eyes open, doesn't respond or follow      Skin: Skin is warm and dry. He is not diaphoretic.   Psychiatric: He is noncommunicative.   Nursing note and vitals reviewed.      Recent Labs      03/12/18 0411 03/13/18 0449 03/14/18   0510   WBC  8.0  8.8  9.4   RBC  3.56*  3.36*  3.64*   HEMOGLOBIN  10.4*  9.9*  10.7*   HEMATOCRIT  32.8*  31.3*  34.2*   MCV  92.1  93.2  94.0   MCH  29.2  29.5  29.4   MCHC  31.7*  31.6*  31.3*   RDW  64.7*  65.1*  67.4*   PLATELETCT  461*  460*  472*   MPV  7.9*  8.2*  8.1*     Recent Labs      03/12/18   0411  03/13/18 0449 03/14/18   0510   SODIUM  129*  129*  135   POTASSIUM  3.6  3.7  3.8   CHLORIDE  101  101  105   CO2  21  21  19*   GLUCOSE  100*  101*  83   BUN  18  19  21   CREATININE  <0.20*  <0.20*  <0.20*   CALCIUM  8.4*  8.3*  8.8                      Assessment/Plan     Seizure (CMS-[facility])- (present on admission)   Assessment & Plan    - Seizures improved, continuous EEG again  - Continue Vimpat, Keppra,  phenobarbital, and topamax per neurology  - MRI brain concerning for changes per Dr. Bolton, no cause noted on CSF        Hypomagnesemia- (present on admission)   Assessment & Plan    - Resolved        Decubitus ulcer of ischium, left, stage IV (CMS-HCC)- (present on admission)   Assessment & Plan    - Continue wound care        Azotemia- (present on admission)   Assessment & Plan    - Resolved with fluids        Acute on chronic respiratory failure with hypoxemia (CMS-HCC)- (present on admission)   Assessment & Plan    - Improved, now on low flow O2, family would prefer to go home without oxygen, doing well on current set up, will adjust so that its patients that appears exact same as at home  - Was placed on ventilator initially on 2/18  - Due to aspiration pneumonia during seizures  - Patient was on oxygen at home, try to wean off oxygen prior to discharge        Aspiration pneumonia (CMS-HCC)- (present on admission)   Assessment & Plan    - Causing septic shock  - Finished full course of antibiotics        Anemia, blood loss- (present on admission)   Assessment & Plan    - Hemoglobin stable  - No sign of gross bleeding        Diarrhea- (present on admission)   Assessment & Plan    - Resolved          Protein-calorie malnutrition, severe (CMS-HCC)- (present on admission)   Assessment & Plan    - Chronic, tolerating tube feeds        Physical debility- (present on admission)   Assessment & Plan    - Chronic post traumatic brain injury  - At baseline        TBI (traumatic brain injury) (CMS-HCC)- (present on admission)   Assessment & Plan    - Chronic, nonverbal        Hyponatremia- (present on admission)   Assessment & Plan    - Resolved          Septic shock (CMS-HCC)- (present on admission)   Assessment & Plan    - Resolved, was due to aspiration pneumonia, finish full course of antibiotics        Contraction, joint, multiple sites- (present on admission)   Assessment & Plan    - Chronic  - has baclofen pump which  was refilled in January  - Pump has been interrogating and is working properly        GI bleed- (present on admission)   Assessment & Plan    - Resolved          Quality-Core Measures   Reviewed items::  Labs reviewed, Medications reviewed and Radiology images reviewed  Waldrop catheter::  Unconscious / Sedated Patient on a Ventilator  DVT prophylaxis pharmacological::  Enoxaparin (Lovenox)         18 year old with DVT  will plan for thrombectomy  start AC

## 2020-06-12 ENCOUNTER — TELEPHONE (OUTPATIENT)
Dept: NEUROLOGY | Facility: MEDICAL CENTER | Age: 40
End: 2020-06-12

## 2020-06-12 NOTE — TELEPHONE ENCOUNTER
** I returned the pt's mother call and she stated that they have been titrating the topamax for the past 2 months with no seizure. She said the blood test was done non fasting earlier than Dr carrero recommended date so she wants to know the result of the test and if it needs to be done again fasting . And she is asking if she can  titrate the topomax again or the phenobarbital** AD

## 2020-06-23 ENCOUNTER — TELEPHONE (OUTPATIENT)
Dept: NEUROLOGY | Facility: MEDICAL CENTER | Age: 40
End: 2020-06-23

## 2020-06-23 DIAGNOSIS — R74.8 ABNORMAL LIVER ENZYMES: ICD-10-CM

## 2020-06-23 DIAGNOSIS — G40.219 PHARMACORESISTANT PARTIAL EPILEPSY WITH IMPAIRMENT OF CONSCIOUSNESS, INTRACTABLE (HCC): ICD-10-CM

## 2020-06-23 DIAGNOSIS — E55.9 VITAMIN D DEFICIENCY: ICD-10-CM

## 2020-06-23 RX ORDER — ERGOCALCIFEROL (VITAMIN D2) 200 MCG/ML
8000 DROPS ORAL DAILY
Qty: 1 BOTTLE | Refills: 11 | Status: SHIPPED | OUTPATIENT
Start: 2020-06-23 | End: 2021-04-08 | Stop reason: SDUPTHER

## 2020-06-23 NOTE — TELEPHONE ENCOUNTER
ergocalciferol (CALCIFEROL) 8000 Unit/mL Solution  1 Bottle  11/11 6/23/2020      Sig - Route: Take 1 mL by mouth every day. - Oral     Sent to pharmacy as: Ergocalciferol 200 MCG/ML Oral Solution (CALCIFEROL)       Pharmacist Moris @ [facility] verbally undertood

## 2020-06-23 NOTE — TELEPHONE ENCOUNTER
Order name: ergocalciferol (CALCIFEROL) 8000 Unit/mL Solution    Medication: ERGOCALCIFEROL 200 MCG/ML PO SOLN      Susana is asking if its suppose to be 200 mcg? Or 2000 mcg?    Currently she is giving pt vit d3, 5-6 drops of 125 mcg/ 5000 units a day already. The pharmacist stated when I called in the prescription that he has never seen a vit d script written for that dose but will try to fill it.    Susana stated she will take pt to have the cmp lab done next week in the am a 9 but wants to know if its ok that pt takes his medication prior to have lab done? Also how much should she titrate the topiramate? It was not specified to her.  When liver count spiked before the doctors were concerned about liver count and was told it may have been high because of bone density do you think this could have been true?

## 2020-06-23 NOTE — TELEPHONE ENCOUNTER
I called the pts mother and LVM stating she can continue to olga lidia down the topomax, that the vitamin D is low so Dr Bolton sent precriptio to the pharmacy and to have CMP done again fasting and to give me a call

## 2020-06-24 ENCOUNTER — TELEPHONE (OUTPATIENT)
Dept: NEUROLOGY | Facility: MEDICAL CENTER | Age: 40
End: 2020-06-24

## 2020-06-24 NOTE — TELEPHONE ENCOUNTER
I  Called the pt mother (tawny) informed her to decrease the topomax 50 mg every week and she can give london his medications in the morning of the test even if its a fasting test. And she said to decrease the topomax 50 mg every week is to fast and she is asking to decrease it at slower pace and she wants Dr Bolton's suggestion about it

## 2020-06-24 NOTE — TELEPHONE ENCOUNTER
I called back the pt's mom and told her per Dr Bolton if she doesn't feel comfortable reducing the dose of topiramate every week by 50 mg she can do it every month and tawny said she will do that every three weeks

## 2020-07-08 ENCOUNTER — TELEPHONE (OUTPATIENT)
Dept: NEUROLOGY | Facility: MEDICAL CENTER | Age: 40
End: 2020-07-08

## 2020-07-16 ENCOUNTER — TELEPHONE (OUTPATIENT)
Dept: NEUROLOGY | Facility: MEDICAL CENTER | Age: 40
End: 2020-07-16

## 2020-07-16 NOTE — TELEPHONE ENCOUNTER
Dr. Bolton spoke with pt's mother, communicated that LFTs were elevated. Pt is in ED at [facility] now, Dr. Bolton sw mother and ED physician on speaker. Pt vomiting.  Recommended RUQ US and r/o pancreatitis.   Pt's mother will update us.

## 2020-07-17 ENCOUNTER — TELEPHONE (OUTPATIENT)
Dept: NEUROLOGY | Facility: MEDICAL CENTER | Age: 40
End: 2020-07-17

## 2020-07-17 NOTE — TELEPHONE ENCOUNTER
Ruben Meza's mother called stating that at the hospital yesterday they did CT scan of the abdomen which turned out to be normal and they also did a blood test which was normal. She said the doctors said as a possiblity he could have had seizure just before the vomiting but tawny said she has not witnessed any seizure. She said she is giving him the topiramate 50 mg po bid thinking that it could make ruben comfortable .She said that ruben is doing fine since yesterday afternoon with no vomiting . So she is asking if she needs to keep on giving him the topiramate and if Dr Bolton wants to do an EEG .

## 2020-07-28 ENCOUNTER — TELEPHONE (OUTPATIENT)
Dept: NEUROLOGY | Facility: MEDICAL CENTER | Age: 40
End: 2020-07-28

## 2020-07-28 NOTE — TELEPHONE ENCOUNTER
I returned patients mother tawny's call she stated that london is doing good for the past 3 days with no seizures . He is on Topiramate 100mg PO BID and she said he has finished his course of antibiotics.

## 2020-08-05 ENCOUNTER — TELEPHONE (OUTPATIENT)
Dept: NEUROLOGY | Facility: MEDICAL CENTER | Age: 40
End: 2020-08-05

## 2020-08-05 NOTE — TELEPHONE ENCOUNTER
Ruben's mother tawny called stating that ruben has been doing well and she wants to titrate down the Topiramate from 100 mg PO BID to 50 mg in the morning and  100 mg at night for a week, then 50 mg PO BID for another week and finally 50 mg at night for a while . She is asking Dr Bolton's opinion.

## 2020-08-07 ENCOUNTER — TELEPHONE (OUTPATIENT)
Dept: NEUROLOGY | Facility: MEDICAL CENTER | Age: 40
End: 2020-08-07

## 2020-08-07 NOTE — TELEPHONE ENCOUNTER
I called the pt's mother tawny stated to her per Dr Bolton to taper the topiramate  50 mg in am and 100 mg qhs for two weeks, then 50 mg bid for two weeks, then 50 mg qhs for two weeks, and if no seizures ok to d/c after and Keep us posted . Tawny gave verbal understanding and said since his liver test is fine later on she would like to keep the pt on 50 mg qhs daily and that she will keep us posted

## 2020-09-09 ENCOUNTER — TELEPHONE (OUTPATIENT)
Dept: NEUROLOGY | Facility: MEDICAL CENTER | Age: 40
End: 2020-09-09

## 2020-09-09 NOTE — TELEPHONE ENCOUNTER
I spoke with Javon @ [facility] and gave verbal to refill the vimpat w/ 4 additional refills.

## 2020-09-23 ENCOUNTER — TELEPHONE (OUTPATIENT)
Dept: NEUROLOGY | Facility: MEDICAL CENTER | Age: 40
End: 2020-09-23

## 2020-09-29 RX ORDER — TOPIRAMATE 100 MG/1
TABLET, FILM COATED ORAL
Qty: 150 TAB | Refills: 0 | Status: SHIPPED | OUTPATIENT
Start: 2020-09-29 | End: 2020-11-02

## 2020-09-29 RX ORDER — LEVETIRACETAM 100 MG/ML
SOLUTION ORAL
Qty: 900 ML | Refills: 0 | Status: SHIPPED | OUTPATIENT
Start: 2020-09-29 | End: 2020-11-02

## 2020-10-07 ENCOUNTER — TELEPHONE (OUTPATIENT)
Dept: NEUROLOGY | Facility: MEDICAL CENTER | Age: 40
End: 2020-10-07

## 2020-10-07 NOTE — TELEPHONE ENCOUNTER
Spoke with jaime from [facility],gave verbal to refill the vimpat 100 mg po bid w/ 4 additional refills.

## 2020-11-02 RX ORDER — LEVETIRACETAM 100 MG/ML
SOLUTION ORAL
Qty: 900 ML | Refills: 0 | Status: SHIPPED | OUTPATIENT
Start: 2020-11-02 | End: 2021-04-08 | Stop reason: SDUPTHER

## 2020-11-02 RX ORDER — TOPIRAMATE 100 MG/1
TABLET, FILM COATED ORAL
Qty: 150 TAB | Refills: 0 | Status: SHIPPED | OUTPATIENT
Start: 2020-11-02 | End: 2021-04-08

## 2020-11-02 NOTE — TELEPHONE ENCOUNTER
Received request via: Patient    Was the patient seen in the last year in this department? No     Does the patient have an active prescription (recently filled or refills available) for medication(s) requested? yes

## 2020-11-10 ENCOUNTER — TELEPHONE (OUTPATIENT)
Dept: NEUROLOGY | Facility: MEDICAL CENTER | Age: 40
End: 2020-11-10

## 2020-11-10 NOTE — TELEPHONE ENCOUNTER
Per mom pt has been seizing all day since 6 am this morning, first pt was having seizures every couple hrs now its every 20 ins one sz then another. Pt also has big red asim around his body and looks like infection. Mom is taking pt to the [facility] today. michael.

## 2020-11-11 ENCOUNTER — TELEPHONE (OUTPATIENT)
Dept: NEUROLOGY | Facility: MEDICAL CENTER | Age: 40
End: 2020-11-11

## 2020-11-11 NOTE — TELEPHONE ENCOUNTER
Per pt's mom pt was admitted to [facility], blood work was taken and it was determined pt has cellulitis and a slight uti. Pt was still seizing and was given ativan twice. Last seizure was last night and as of this morning at 8:30 am pt was doing well, no seizures. Pt might be discharged tomorrow.

## 2020-11-18 ENCOUNTER — TELEPHONE (OUTPATIENT)
Dept: NEUROLOGY | Facility: MEDICAL CENTER | Age: 40
End: 2020-11-18

## 2020-11-18 NOTE — TELEPHONE ENCOUNTER
Per pt's mom, pt was discharged from [facility] 11/15. Last seizure was Saturday 11/14, pt is doing well no sz. olei.

## 2020-12-04 ENCOUNTER — TELEPHONE (OUTPATIENT)
Dept: NEUROLOGY | Facility: MEDICAL CENTER | Age: 40
End: 2020-12-04

## 2020-12-04 NOTE — TELEPHONE ENCOUNTER
Per om pt is doing well, no seizures since being home from hospital. Mom stated his seizures happen when he has an infection and is requesting a prescription for Amox-clavin 875 mg. Pt was given that while in hospital and mom says that has worked very good for the pt and she would like to have some on hand in case he were to have another infection.

## 2020-12-08 NOTE — TELEPHONE ENCOUNTER
For antibiotics, she should get those from PCP not me.   Thank her for update. I hope she is well.   RA    Pt's mom verbal understood.

## 2021-01-27 NOTE — PROGRESS NOTES
Hospital Medicine Daily Progress Note    Date of Service  7/27/2019    Chief Complaint  39 y.o. male admitted 7/19/2019 with intractable seizures in the setting of severe TBI when younger and CP    Hospital Course    7/23: with continuous EEG monitoring in place, d/w Dr. Bolton, added Fycompa to his current seizure regiment of keppra, phenobarbital, topamaz, vimpat.  Reviewed CXR from 7/19 and agree with diffuse infiltrate on my review left lung.  Patient appears diaphoretic on exam, ordered CT chest with contrast to rule out pneumonia.   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  UA reviewed no bacteria, has condom cath only.  Baclofen pump nontender on exam.  + yellow exudate bilateral eyelids, started erythromycin opthalmic ointment qid x 5 days.  Also, mother had me re-evaluate patient's left groin for green discharge from his pores.  No obvious skin breakdown or abscess on exam.  There is hyperpigmentation noted in left groin compared to right groin only.  Placed on Rocephin IV and doxycycline via NGT until CT chest results.  7/24:  Patient appeared less diaphoretic today, overall improved on Rocephin IV.  His bilateral conjunctival discharge was clearing on the erythromycin opthalmic ointment.  The mother had called patient's PCP for the skin infection name which she had written trichomycosis axillaris.  I have left a message for Dr. Ireland to discuss this infection since mother is convinced that only levaquin will cure it.  CT chest pending at this time.   Repeat exam per mother request to witness seizure activity.  Reviewed CT chest finding + pneumonia, ordered sputum induced culture by RT and changed doxy/Rocephin to zosyn IV to better cover for aspiration pneumonia.  Mother understands the antibiotics take some time to work in order to resolve his pnuemonia.  7/25:  Overall improved appearance today, able to make eye contact.  No seizures  overnight, then after stimulation with shaving by mother, started with left arm and facial twitching seizure.  No change in oxygenation during witnessed seizure ~1 minute length.  No further green d/c on left groin wipe per mother.  Added nystatin powder to groin instead of RADHA cream to reduce moisture.  Spoke with Dr. Ireland PCP who states patient has had pseudomonas skin infection in the past, but agrees with zosyn IV for abx choice for aspiration pna as well.  7/26: + pseudomonas noted on tracheal aspirate culture, ss cipro and zosyn.  D/w mother and Dr. Bolton, Dr. Bolton has authorized to start Fycompa 4mg qhs for short term use while resolving his aspiration pneumonia with ongoing seizures.  7/27:  Spoke with mother at bedside about persistent seizures may take more time to resolve with ongoing pseudomonas infection.  Called [employer] for rep to investigate baclofen pump to ensure not malfunctioning and causing persistent intractable seizures.  He continues to require ativan IV prn for recurrent seizures to left arm and face, witnessed by shaking and brought on by stimulation.             Consultants/Specialty  neurology    Code Status  fc    Disposition  Has home tracheostomy set up at home with oxygen per mother.  PEG tube feeds in place, baclofen pump.  Home with mother once seizures controlled.  Condom catheter for urine.    Review of Systems  Review of Systems   Unable to perform ROS: Acuity of condition        Physical Exam  Temp:  [36 °C (96.8 °F)-36.3 °C (97.3 °F)] 36.2 °C (97.1 °F)  Pulse:  [53-75] 66  Resp:  [16-20] 18  BP: (110-130)/(64-79) 122/69  SpO2:  [93 %-98 %] 94 %    Physical Exam   Constitutional: He appears well-developed and well-nourished. No distress.   Non verbal, does not respond to any commands   HENT:   Head: Normocephalic and atraumatic.   Mouth/Throat: No oropharyngeal exudate.   Trach mask   Eyes: Pupils are equal, round, and reactive to light. No scleral icterus.   Neck: Normal  range of motion. Neck supple. No thyromegaly present.   Cardiovascular: Normal rate, regular rhythm, normal heart sounds and intact distal pulses.    No murmur heard.  Pulmonary/Chest: Effort normal and breath sounds normal. No respiratory distress. He has no wheezes.   Abdominal: Soft. Bowel sounds are normal. He exhibits no distension. There is no tenderness.   Thin  G tube site appears C/D/I   Musculoskeletal: Normal range of motion. He exhibits no tenderness.   Neurological: He is alert. No cranial nerve deficit. Coordination abnormal.   Contractures noted   Skin: Skin is warm and dry. No rash noted.   Hyperpigmentation left groin only compared to right, no evidence of abscess or open skin lesion seen as well as bilateral axillary areas.   Nursing note and vitals reviewed.  No change in physical exam today on 7/22/19 compared to 7/21/19    Fluids    Intake/Output Summary (Last 24 hours) at 07/27/19 1431  Last data filed at 07/26/19 1549   Gross per 24 hour   Intake                0 ml   Output              150 ml   Net             -150 ml       Laboratory  Recent Labs      07/25/19   0245  07/26/19   0323  07/27/19   0349   WBC  5.4  5.4  4.6*   RBC  5.08  5.13  4.99   HEMOGLOBIN  16.0  16.3  15.7   HEMATOCRIT  47.3  48.2  48.2   MCV  93.5  94.0  96.6   MCH  31.5  31.8  31.5   MCHC  33.7  33.8  32.6*   RDW  60.9*  61.4*  62.4*   PLATELETCT  229  220  200   MPV  9.4  9.2  9.2     Recent Labs      07/25/19   0245  07/26/19   0323  07/27/19   0349   SODIUM  139  137  139   POTASSIUM  3.5*  3.7  3.3*   CHLORIDE  106  107  109   CO2  23  21  23   GLUCOSE  91  89  90   BUN  16  16  16   CREATININE  0.24*  0.25*  0.24*   CALCIUM  8.8  9.0  8.6                   Imaging  CT-CHEST (THORAX) WITH   Final Result      1.  BILATERAL lower lobe atelectasis with possible superimposed pneumonia in the LEFT lower lobe   2.  Faint patchy airspace disease in the RIGHT upper and lower lobes which could be pneumonia   3.  Trace LEFT  pleural effusion   4.  Enlarged RIGHT hilar lymph node            IR-US GUIDED PIV   Final Result    Ultrasound-guided PERIPHERAL IV INSERTION performed by    qualified nursing staff as above.            DX-CHEST-PORTABLE (1 VIEW)   Final Result      The perihilar/left lung base atelectasis with pneumonitis/pneumonia not excluded.   Small left pleural effusion.           Assessment/Plan  * Status epilepticus (HCC)- (present on admission)   Assessment & Plan    Despite renal barbital, Keppra, Vimpat, and Topamax continues to have multiple seizures consistent with focal seizures  He will be admitted to the neurology floor and EEG has been ordered, increased dose of vimpat appears to be working at this time  followed as an outpatient by Dr. Bolton, spoke with neurologist  7/23:  d/w Dr. Bolton, added Fycompa to his current seizure regiment of keppra, phenobarbital, topamaz, vimpat.  Reviewed CXR from 7/19 and agree with diffuse infiltrate on my review left lung.  Patient appears diaphoretic on exam, ordered CT chest with contrast to rule out pneumonia.   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  UA reviewed no bacteria, has condom cath only.  Baclofen pump nontender on exam.  + yellow exudate bilateral eyelids, started erythromycin opthalmic ointment qid x 5 days.           Epilepsy ([facility])- (present on admission)   Assessment & Plan    Tenuous outpatient regimen prescribed by Dr. Bolton, long standing h/o seizures  -consider palliative care consult, remains mostly in a vegetative state  -known to have pharmaco-resistant seizures  Dr. Bolton authorized start of short term Zycompa 4mg qhs for ongoing seizures while under current treatment for      TBI (traumatic brain injury) ([facility])- (present on admission)   Assessment & Plan    With history of tracheostomy  Continue his G-tube feeding per his mom's recommendations  Is mentation appears to be effectively baseline  He has a  baclofen pain pump     Aspiration pneumonia (HCC)- (present on admission)   Assessment & Plan    History of silent aspiration with tracheostomy and PEG tube feeds.  CTA chest with left sided pneumonia.  Changed Rocephin/doxy to zosyn IV  7/23: Sputum induced culture by RT via trach aspirate with pseudomonas ss zosyn and cipro.     Acute bacterial conjunctivitis of both eyes- (present on admission)   Assessment & Plan    7/23 started on erythromycin ointment qid x 5 days.     Hypokalemia- (present on admission)   Assessment & Plan    Increase K bisphosphonate 12.5 q 48 hour to 25 daily via PEG tube.     Contraction, joint, multiple sites- (present on admission)   Assessment & Plan    Contractures are stable  No skin breakdown seen on exam.          VTE prophylaxis: Mother refuses to allow any lovenox injections or SCDs.  She understands the risks of DVTs while immobile.  She states she works with his legs daily.       Self/RN

## 2021-02-08 ENCOUNTER — TELEPHONE (OUTPATIENT)
Dept: NEUROLOGY | Facility: MEDICAL CENTER | Age: 41
End: 2021-02-08

## 2021-03-08 DIAGNOSIS — G40.219 PHARMACORESISTANT PARTIAL EPILEPSY WITH IMPAIRMENT OF CONSCIOUSNESS, INTRACTABLE (HCC): ICD-10-CM

## 2021-03-09 ENCOUNTER — TELEPHONE (OUTPATIENT)
Dept: NEUROLOGY | Facility: MEDICAL CENTER | Age: 41
End: 2021-03-09

## 2021-03-09 NOTE — TELEPHONE ENCOUNTER
I spoke with yogesh at [place] gave verbal to refill vimpat, phenobarbital and clonazepam all with 4 additional refills.

## 2021-03-29 ENCOUNTER — TELEPHONE (OUTPATIENT)
Dept: NEUROLOGY | Facility: MEDICAL CENTER | Age: 41
End: 2021-03-29

## 2021-03-29 NOTE — TELEPHONE ENCOUNTER
Per mom pt had not had a bowel movement in 8-9 days, mom was giving pt topiramate 25 mg in am and 50 mg in pm but gave pt 25 mg in am and 25 mg in pm today and pt had small sz this morning but doing well now and finally had a bowel movement which mom thinks sz might have been caused by decreasing the topiramate and not having a bm .

## 2021-03-30 ENCOUNTER — TELEPHONE (OUTPATIENT)
Dept: NEUROLOGY | Facility: MEDICAL CENTER | Age: 41
End: 2021-03-30

## 2021-04-08 ENCOUNTER — TELEMEDICINE (OUTPATIENT)
Dept: NEUROLOGY | Facility: MEDICAL CENTER | Age: 41
End: 2021-04-08
Attending: PSYCHIATRY & NEUROLOGY

## 2021-04-08 DIAGNOSIS — R74.8 ABNORMAL LIVER ENZYMES: ICD-10-CM

## 2021-04-08 DIAGNOSIS — R25.2 SPASTICITY: ICD-10-CM

## 2021-04-08 DIAGNOSIS — Z87.39 HISTORY OF OSTEOMYELITIS: ICD-10-CM

## 2021-04-08 DIAGNOSIS — E55.9 VITAMIN D DEFICIENCY: ICD-10-CM

## 2021-04-08 DIAGNOSIS — G40.109 LOCALIZATION-RELATED EPILEPSY (HCC): ICD-10-CM

## 2021-04-08 DIAGNOSIS — G93.40 ENCEPHALOPATHY: ICD-10-CM

## 2021-04-08 DIAGNOSIS — R79.89 ABNORMAL LFTS: ICD-10-CM

## 2021-04-08 DIAGNOSIS — G40.219 PHARMACORESISTANT PARTIAL EPILEPSY WITH IMPAIRMENT OF CONSCIOUSNESS, INTRACTABLE (HCC): ICD-10-CM

## 2021-04-08 DIAGNOSIS — F79 INTELLECTUAL DISABILITY: ICD-10-CM

## 2021-04-08 DIAGNOSIS — M24.50 FLEXION CONTRACTURES: ICD-10-CM

## 2021-04-08 PROCEDURE — 99215 OFFICE O/P EST HI 40 MIN: CPT | Mod: 95,CR | Performed by: PSYCHIATRY & NEUROLOGY

## 2021-04-08 RX ORDER — LEVETIRACETAM 100 MG/ML
SOLUTION ORAL
Qty: 900 ML | Refills: 11 | Status: ON HOLD | OUTPATIENT
Start: 2021-04-08 | End: 2022-02-19

## 2021-04-08 RX ORDER — CLONAZEPAM 0.5 MG/1
TABLET ORAL
Qty: 45 TABLET | Refills: 5 | Status: SHIPPED | OUTPATIENT
Start: 2021-04-08 | End: 2021-10-05

## 2021-04-08 RX ORDER — CLONAZEPAM 0.5 MG/1
TABLET ORAL
COMMUNITY
End: 2021-04-08 | Stop reason: SDUPTHER

## 2021-04-08 RX ORDER — TOPIRAMATE 25 MG/1
TABLET ORAL
Qty: 90 TABLET | Refills: 11 | Status: SHIPPED | OUTPATIENT
Start: 2021-04-08 | End: 2022-01-18

## 2021-04-08 RX ORDER — PHENOBARBITAL 20 MG/5ML
120 ELIXIR ORAL 2 TIMES DAILY
Qty: 1800 ML | Refills: 5 | Status: SHIPPED | OUTPATIENT
Start: 2021-04-08 | End: 2021-10-05

## 2021-04-08 RX ORDER — ERGOCALCIFEROL (VITAMIN D2) 200 MCG/ML
8000 DROPS ORAL DAILY
Qty: 30 ML | Refills: 5 | Status: SHIPPED | OUTPATIENT
Start: 2021-04-08 | End: 2022-02-15 | Stop reason: SDUPTHER

## 2021-04-08 ASSESSMENT — PATIENT HEALTH QUESTIONNAIRE - PHQ9: CLINICAL INTERPRETATION OF PHQ2 SCORE: 0

## 2021-04-08 NOTE — PROGRESS NOTES
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, intractable (HCC)        Relevant Medications    topiramate (TOPAMAX) 25 MG Tab    PHENobarbital 20 MG/5ML Elixir    levETIRAcetam (KEPPRA) 100 MG/ML Solution    clonazePAM (KLONOPIN) 0.5 MG Tab    Other Relevant Orders    CBC WITH DIFFERENTIAL    Comp Metabolic Panel    VITAMIN B12    VITAMIN D,25 HYDROXY    TSH    Vitamin D deficiency        Relevant Medications    ergocalciferol (CALCIFEROL) 8000 Unit/mL Solution    Other Relevant Orders    CBC WITH DIFFERENTIAL    Comp Metabolic Panel    VITAMIN B12    VITAMIN D,25 HYDROXY    TSH    Abnormal liver enzymes        Relevant Orders    CBC WITH DIFFERENTIAL    Comp Metabolic Panel    VITAMIN B12    VITAMIN D,25 HYDROXY    TSH    Encephalopathy        Relevant Orders    CBC WITH DIFFERENTIAL    Comp Metabolic Panel    VITAMIN B12    VITAMIN D,25 HYDROXY    TSH    Abnormal LFTs        Intellectual disability        Localization-related epilepsy (HCC)        Relevant Medications    topiramate (TOPAMAX) 25 MG Tab    PHENobarbital 20 MG/5ML Elixir    levETIRAcetam (KEPPRA) 100 MG/ML Solution    clonazePAM (KLONOPIN) 0.5 MG Tab    Flexion contractures        History of osteomyelitis        Spasticity              Interim history:  Ruben Edwards 40 y.o. male presents today for fu via virtual visit, as requested by his mother / caregiver. Pt is non verbal and bed ridden. Some am drowsiness reported by mother, she believes likely related to AEDs and baclofen as he has a baclofen pump. Currently no pressure ulcers and has not have any recent infections, including no osteomyelitis. Had some constipation but much better with fruit / prune juice. No fevers or respiratory symptoms reported. Mother is very compliant with care and meds. No  clear side effects other than some am drowsiness which is not new and improving.       Past medical history:   Past Medical History:   Diagnosis Date   • Seizure (HCC)    • Traumatic brain injury (HCC) 05/15/1998       Past surgical history:   Past Surgical History:   Procedure Laterality Date   • LARYNGOSCOPY  7/2/2017    Procedure: LARYNGOSCOPY;  Surgeon: Catherine Osorio M.D.;  Location: SURGERY [facility];  Service:    • BRONCHOSCOPY  7/2/2017    Procedure: BRONCHOSCOPY;  Surgeon: Catherine Osorio M.D.;  Location: SURGERY [facility];  Service:    • TRACHEOSTOMY  7/2/2017    Procedure: TRACHEOSTOMY;  Surgeon: Catherine Osorio M.D.;  Location: SURGERY [facility];  Service:    • ESOPHAGOSCOPY  7/2/2017    Procedure: ESOPHAGOSCOPY;  Surgeon: Catherine Osorio M.D.;  Location: SURGERY [facility];  Service:        Family history:   History reviewed. No pertinent family history.    Social history:   Social History     Socioeconomic History   • Marital status: Single     Spouse name: Not on file   • Number of children: Not on file   • Years of education: Not on file   • Highest education level: Not on file   Occupational History   • Not on file   Tobacco Use   • Smoking status: Never Smoker   • Smokeless tobacco: Never Used   Substance and Sexual Activity   • Alcohol use: No   • Drug use: Yes     Types: Oral     Comment: Mother tried CBD treatment for siezures in past   • Sexual activity: Not on file   Other Topics Concern   • Not on file   Social History Narrative   • Not on file     Social Determinants of Health     Financial Resource Strain:    • Difficulty of Paying Living Expenses:    Food Insecurity:    • Worried About Running Out of Food in the Last Year:    • Ran Out of Food in the Last Year:    Transportation Needs:    • Lack of Transportation (Medical):    • Lack of Transportation (Non-Medical):    Physical Activity:    • Days of Exercise per Week:    • Minutes of Exercise per  Session:    Stress:    • Feeling of Stress :    Social Connections:    • Frequency of Communication with Friends and Family:    • Frequency of Social Gatherings with Friends and Family:    • Attends [employer] Services:    • Active Member of Clubs or Organizations:    • Attends Club or Organization Meetings:    • Marital Status:    Intimate Partner Violence:    • Fear of Current or Ex-Partner:    • Emotionally Abused:    • Physically Abused:    • Sexually Abused:        Current medications:   Current Outpatient Medications   Medication   • topiramate (TOPAMAX) 25 MG Tab   • PHENobarbital 20 MG/5ML Elixir   • levETIRAcetam (KEPPRA) 100 MG/ML Solution   • ergocalciferol (CALCIFEROL) 8000 Unit/mL Solution   • clonazePAM (KLONOPIN) 0.5 MG Tab   • potassium citrate (UROCIT-K) 5 MEQ (540 MG) Tab CR   • Pain Pump (PATIENT SUPPLIED) XX RENE   • Cyanocobalamin (VITAMIN B-12) 1000 MCG/15ML Liquid     No current facility-administered medications for this visit.       Medication Allergy:  Allergies   Allergen Reactions   • Sulfa Drugs Unspecified     Per caretaker, mother is allergic to sulfa so believes her son could be as well         Review of systems:   As reviewed in today's in history.     Physical examination:   Bed ridden, non verbal, spastic quadriplegia, no rash or skin lesions noted. Breathing normally.     ANCILLARY DATA REVIEWED:       Lab Data Review:  Reviewed in chart.     Records reviewed:   Chart reviewed.     Imaging:   IMPRESSION:  1.  Moderate cerebral atrophy beyond that expected for the patient's age.  2.  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  3.  Old hemorrhagic infarcts left basal ganglia, left thalamus, left subthalamic region.  4.  Marked atrophy and encephalomalacic change in the left cerebral peduncle with T2 hyperintensity and volume loss extending down the left anterior quadrant of the pop into the left medullary pyramid. 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  5.   Widespread areas of curvilinear and gyriform enhancement involving the right frontal lobe, right parietal lobe, right occipital lobe, along with curvilinear enhancement in the right basal ganglia. These findings are most consistent with subacute   sequela of cerebral infarction or cortical laminar necrosis. Cortical laminar necrosis has been reported associated with prolonged seizures/status epilepticus. Subacute sequela of encephalitis could have a similar appearance.  6.  Advanced supratentorial white matter disease consistent with microvascular ischemic change versus demyelination or gliosis. This results in some volume loss of deep white matter.  7.  Curvilinear hemosiderin deposition in the right temporal lobe deep white matter consistent with old hemorrhage.  8.  No acute cerebral infarction or acute hemorrhage evident.  9.  No evidence of mesial temporal sclerosis.      EEG:  Veeg, 7/24/2019:  This is an abnormal extended video EEG recording in the   awake, drowsy, and sleep state(s).  There is intermittent slowing   in the right frontal region and frequent right frontal spikes /   sharps. A few events of left arm / hand and head myoclonus were   reported by either patient's mother or nursing staff by push of   the event button. These spells were brief in nature and   epileptic. Review of eeg demonstrated briefly rhythmic or   periodic right frontal spikes / sharps and/or right frontal   rhythmic slowing (delta) during the events, which only lasted a   few seconds at a time.  No evidence for status epilepticus.  The   EEG remained stable, when compared to the prior recording.   Clinical and radiological correlation is recommended.             ASSESSMENT AND PLAN:  1. 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  Dispense: 90 tablet; Refill: 11  - PHENobarbital 20 MG/5ML  Elixir; 30 mL by Per G Tube route 2 times a day for 180 days.  Dispense: 1800 mL; Refill: 5  - levETIRAcetam (KEPPRA) 100 MG/ML Solution; TAKE 15ML'S BY MOUTH EVERY TWELVE HOURS.  Dispense: 900 mL; Refill: 11  - clonazePAM (KLONOPIN) 0.5 MG Tab; Take 0.5 Tablets by mouth every day AND 1 tablet at bedtime. Do all this for 180 days.  Dispense: 45 tablet; Refill: 5  - CBC WITH DIFFERENTIAL; Future  - Comp Metabolic Panel; Future  - VITAMIN B12; Future  - VITAMIN D,25 HYDROXY; Future  - TSH; Future    2. Vitamin D deficiency  - ergocalciferol (CALCIFEROL) 8000 Unit/mL Solution; Take 1 mL by mouth every day for 165 days.  Dispense: 30 mL; Refill: 5  - CBC WITH DIFFERENTIAL; Future  - Comp Metabolic Panel; Future  - VITAMIN B12; Future  - VITAMIN D,25 HYDROXY; Future  - TSH; Future    3. Abnormal liver enzymes  - CBC WITH DIFFERENTIAL; Future  - Comp Metabolic Panel; Future  - VITAMIN B12; Future  - VITAMIN D,25 HYDROXY; Future  - TSH; Future    4. Encephalopathy  - CBC WITH DIFFERENTIAL; Future  - Comp Metabolic Panel; Future  - VITAMIN B12; Future  - VITAMIN D,25 HYDROXY; Future  - TSH; Future    5. Abnormal LFTs    6. Intellectual disability    7. Localization-related epilepsy (HCC)    8. Flexion contractures    9. History of osteomyelitis    10. Spasticity           CLINICAL DISCUSSION:   History of severe traumatic brain injury and either a persistent vegetative state versus minimally conscious state.  Pharmaco-resistant, structural, focal onset epilepsy.  History of status epilepticus and PLEDs.  Most recent events related to myoclonic jerks involving the left upper extremity, which were epileptic in nature, and had a right frontal EEG correlation. Seizure free for several weeks now, last few seizures when Topiramate was weaned off, but no further seizures on current low dose and is stable. Seizures in past in clusters and triggered by infections.     Fycompa was not beneficial in the past. Of note, the patient had  a history of hyperammonemia with the use of Depakote, which was discontinued in the past.  He is no longer on Vimpat.      Home AED regimen:   Phenobarbital 120 mg p.o. twice daily  Keppra 1500 mg p.o. twice daily  Topamax 200 mg in the morning and 300 mg nightly  Clonazepam 0.25 mg in the morning and 0.5 mg nightly     Will have blood work soon.      Continue with vit D supplement, will fu level.     Pt's mother will keep us posted with any further spells.       FOLLOW-UP:   Return in about 6 months (around 10/8/2021).      EDUCATION AND COUNSELING:  -Education was provided to the patient and/or family regarding diagnosis and prognosis. The chronic and unpredictable nature of the condition were discussed. There is increased risk for additional events, which may carry potential for significant injuries and death. Discussed frequent seizure triggers: sleep deprivation, medication non-compliance, use of illegal drugs/alcohol, stress, and others.   -We reviewed in detail the current antiepileptic regimen. Potential side effects of antiepileptics were discussed at length, including but no limited to: hypersensitivity reactions (rash and others, some of which can be fatal), visual field changes (some of which may be irreversible), glaucoma, diplopia, kidney stones, osteopenia/osteoporosis/bone fractures, hyperthermia/anhydrosis, hyponatremia, tremors/abnormal movements, ataxia, dizziness, fatigue, increased risk for falls, risk for cardiac arrhythmias and syncope, weight changes, hair loss, gastrointestinal side effects(hepatitis, pancreatitis, gastritis, ulcers, gingival hypertrophy/bleeding, drowsiness, sedation, memory loss, trouble finding words, anxiety/nervousness, increased risk for suicide, increased risk for depression, and psychosis.   -We also reviewed drug-drug interactions and their potential effect on seizure control and medication side effects.    -Recommend chronic vitamin D supplementation and regular  exercise (if not contraindicated).   -Patient/family educated on risk for SUDEP (Sudden Death in Epilepsy). Counseling was provided on the importance of strict medication and follow up compliance. The patient/family understand the risks associated with non-adherence with the medical plan as outlined, including but not limited to an increased risk for breakthrough seizures, which may contribute to injuries, disability, status epilepticus, and even death.   -Avoid sleep deprivation.   -Other seizure precautions were discussed.   -The patient;s family understands and agrees that due to the complexity of his/her diagnosis, results of any testing and further recommendations will typically be discussed/made during a face to face encounter in my office. The patient and/or family further understands it is their responsibility to keep proper follow up.     Patient's mother agree with plan, as outlined.         Harris Bolton MD   Epilepsy and Neurodiagnostics.   Clinical  of Neurology [facility].   Diplomate in Neurology, Epilepsy, and Electrodiagnostic Medicine.   Office: [phone]  Fax: [phone]    BILLING DOCUMENTATION:     I have performed physical exam and reviewed and updated ROS and plan today 4/8/2021. In review of that note, there are no new changes except as documented above.     Counseling:  I spent greater than 50% time face-to-face time of a total of 41 minutes visit. Over 50% of the time of the visit today was spent on counseling and or coordination of care wtih the patient and/or family, with greater than 50% of the total discussing my assessment and plan as stated above.     Patient was presented for a telehealth consultation via secure and encrypted videoconferencing technology.     This evaluation was conducted via Zoom using secure and encrypted videoconferencing technology. The patient was in a private location in the [facility]  [place].    The patient's identity was confirmed and verbal consent was obtained for this virtual visit.

## 2021-04-23 ENCOUNTER — TELEPHONE (OUTPATIENT)
Dept: NEUROLOGY | Facility: MEDICAL CENTER | Age: 41
End: 2021-04-23

## 2021-04-23 NOTE — TELEPHONE ENCOUNTER
CBC ok, mildly low platelets but not concerning.   CA is a little low, she can discuss with primary for a calcium supplement.   Liver enzymes are still elevated, with alkaline phosphatase stable but mildly elevated transaminases. She can run this by PCP and see if they want to do anything about it.   B12 is pending.   RA       Mom verbally understood and stated she has the b12 results and pt's level was 17.6900.    Francisco

## 2021-05-10 ENCOUNTER — TELEPHONE (OUTPATIENT)
Dept: NEUROLOGY | Facility: MEDICAL CENTER | Age: 41
End: 2021-05-10

## 2021-05-10 NOTE — TELEPHONE ENCOUNTER
Susana stated pt had seizures on 05/04/2021. I called back, no answer I lvm to return my call.    Mamadou recinos

## 2021-05-12 ENCOUNTER — TELEPHONE (OUTPATIENT)
Dept: NEUROLOGY | Facility: MEDICAL CENTER | Age: 41
End: 2021-05-12

## 2021-05-12 NOTE — TELEPHONE ENCOUNTER
Mom stated pt's pcp Dr Benitez that the lab results/ number for pt's liver & enzymes were very high and suggested mom start pt's on either high protein, extra virgin olive oil or tumeric. Mom had started pt on all 3 at the same time and then pt started having several back to back small seizures, just shaking of the left hand that was not something she has seen before.Mom then stopped all 3, day and a half later the small sz stopped. Started pt on egg white powder a week ago and pt has been fine, no seizures. Patient did some research when metabolism changes that it can trigger seizure in someone like the patient's case. Just davina Cedillo ma

## 2021-07-06 DIAGNOSIS — G40.901 STATUS EPILEPTICUS (HCC): ICD-10-CM

## 2021-07-06 RX ORDER — LACOSAMIDE 200 MG/1
300 TABLET ORAL 2 TIMES DAILY
Qty: 60 TABLET | Refills: 2 | OUTPATIENT
Start: 2021-07-06 | End: 2021-08-05

## 2021-07-08 ENCOUNTER — TELEPHONE (OUTPATIENT)
Dept: NEUROLOGY | Facility: MEDICAL CENTER | Age: 41
End: 2021-07-08

## 2021-07-08 DIAGNOSIS — G40.901 STATUS EPILEPTICUS (HCC): ICD-10-CM

## 2021-07-08 RX ORDER — LACOSAMIDE 200 MG/1
200 TABLET ORAL 2 TIMES DAILY
Qty: 60 TABLET | Refills: 2 | Status: CANCELLED | OUTPATIENT
Start: 2021-07-08 | End: 2021-08-07

## 2021-07-08 NOTE — TELEPHONE ENCOUNTER
"Note from 04/08/2021 visit with Dr. Bolton:     \"He is no longer on Vimpat. \"      Previous note from [facility]:    \"[facility] called and left a VM regarding patients Vimpat stating they are in need of refills. Per note done by Dr. Bolton in April of this year stated that patient is no longer on Vimpat.      Called the pharmacy and spoke to Ligia and she took down the note that patient is no longer on medication\"      Mom called upset stating that this is a mistake and no such change was made. I advised her that all we have to go on his the note from the provider. She asked why she was obtaining refills until now. There were refills called in on 02/08/2021 with 4 additional refills which happened prior to the 04/08 visit.     I advised mom I would call in a 30 day supply of the medication. This would allow time for Dr. Bolton to return to the office and review the chart. She is requesting a call from [facility] as well when she returns to the office.       Vimpat 200mg tablets  #60 per 30 days   Sig: Take 1 tab per G Tub 2 times a day.  0 additional refills     Called in to fede Tong at [facility].     "

## 2021-07-08 NOTE — TELEPHONE ENCOUNTER
[facility] called and left a VM regarding patients Vimpat stating they are in need of refills. Per note done by Dr. Bolton in April of this year stated that patient is no longer on Vimpat.     Called the pharmacy and spoke to Ligia and she took down the note that patient is no longer on medication.

## 2021-07-15 ENCOUNTER — TELEPHONE (OUTPATIENT)
Dept: NEUROLOGY | Facility: MEDICAL CENTER | Age: 41
End: 2021-07-15

## 2021-07-15 NOTE — TELEPHONE ENCOUNTER
I called pt's mother Susana to inform her Dr Bolton signed the refill request to continue refilling vimpat. Pt's father answered stated Susana was not home. Susana did state in our conversation earlier that she wanted to make sure pt was going to have refills on vimpat because last week the pharmacy kept sending requests and they were getting denied. Per Dr Bolton's ;ast virtual visit note it is stated that pt is no longer on vimpat, Susana pt's mother was not very happy, mom stated pt has always been on the medication. I assured her that the issue would be fixed and the pt will have refills on his vimpat going forward.mom also stated she recorded the virtual visit with Dr Bolton back in April 2021.

## 2021-07-30 DIAGNOSIS — G40.219 PHARMACORESISTANT PARTIAL EPILEPSY WITH IMPAIRMENT OF CONSCIOUSNESS, INTRACTABLE (HCC): ICD-10-CM

## 2021-07-30 RX ORDER — LACOSAMIDE 100 MG/1
TABLET, FILM COATED ORAL
Qty: 60 TABLET | Refills: 5 | Status: SHIPPED | OUTPATIENT
Start: 2021-07-30 | End: 2021-12-29

## 2021-10-04 ENCOUNTER — HOSPITAL ENCOUNTER (EMERGENCY)
Facility: MEDICAL CENTER | Age: 41
End: 2021-10-04
Attending: EMERGENCY MEDICINE
Payer: COMMERCIAL

## 2021-10-04 ENCOUNTER — APPOINTMENT (OUTPATIENT)
Dept: RADIOLOGY | Facility: MEDICAL CENTER | Age: 41
End: 2021-10-04
Attending: EMERGENCY MEDICINE
Payer: COMMERCIAL

## 2021-10-04 ENCOUNTER — TELEPHONE (OUTPATIENT)
Dept: NEUROLOGY | Facility: MEDICAL CENTER | Age: 41
End: 2021-10-04

## 2021-10-04 VITALS
HEART RATE: 53 BPM | HEIGHT: 71 IN | TEMPERATURE: 96.8 F | BODY MASS INDEX: 17.5 KG/M2 | WEIGHT: 125 LBS | OXYGEN SATURATION: 94 % | DIASTOLIC BLOOD PRESSURE: 61 MMHG | SYSTOLIC BLOOD PRESSURE: 100 MMHG | RESPIRATION RATE: 28 BRPM

## 2021-10-04 DIAGNOSIS — T17.908A ASPIRATION INTO AIRWAY, INITIAL ENCOUNTER: ICD-10-CM

## 2021-10-04 DIAGNOSIS — J69.0 ASPIRATION PNEUMONIA OF BOTH LUNGS DUE TO GASTRIC SECRETIONS, UNSPECIFIED PART OF LUNG (HCC): ICD-10-CM

## 2021-10-04 LAB
ALBUMIN SERPL BCP-MCNC: 4.2 G/DL (ref 3.2–4.9)
ALBUMIN/GLOB SERPL: 1 G/DL
ALP SERPL-CCNC: 392 U/L (ref 30–99)
ALT SERPL-CCNC: 71 U/L (ref 2–50)
ANION GAP SERPL CALC-SCNC: 11 MMOL/L (ref 7–16)
AST SERPL-CCNC: 49 U/L (ref 12–45)
BASOPHILS # BLD AUTO: 0.1 % (ref 0–1.8)
BASOPHILS # BLD: 0.01 K/UL (ref 0–0.12)
BILIRUB SERPL-MCNC: 0.3 MG/DL (ref 0.1–1.5)
BUN SERPL-MCNC: 22 MG/DL (ref 8–22)
CALCIUM SERPL-MCNC: 9.4 MG/DL (ref 8.5–10.5)
CHLORIDE SERPL-SCNC: 104 MMOL/L (ref 96–112)
CO2 SERPL-SCNC: 24 MMOL/L (ref 20–33)
CREAT SERPL-MCNC: 0.21 MG/DL (ref 0.5–1.4)
EOSINOPHIL # BLD AUTO: 0.04 K/UL (ref 0–0.51)
EOSINOPHIL NFR BLD: 0.5 % (ref 0–6.9)
ERYTHROCYTE [DISTWIDTH] IN BLOOD BY AUTOMATED COUNT: 56.7 FL (ref 35.9–50)
GLOBULIN SER CALC-MCNC: 4.3 G/DL (ref 1.9–3.5)
GLUCOSE SERPL-MCNC: 98 MG/DL (ref 65–99)
HCT VFR BLD AUTO: 53.3 % (ref 42–52)
HGB BLD-MCNC: 18 G/DL (ref 14–18)
IMM GRANULOCYTES # BLD AUTO: 0.02 K/UL (ref 0–0.11)
IMM GRANULOCYTES NFR BLD AUTO: 0.2 % (ref 0–0.9)
LACTATE BLD-SCNC: 1.4 MMOL/L (ref 0.5–2)
LYMPHOCYTES # BLD AUTO: 0.48 K/UL (ref 1–4.8)
LYMPHOCYTES NFR BLD: 5.7 % (ref 22–41)
MCH RBC QN AUTO: 34.9 PG (ref 27–33)
MCHC RBC AUTO-ENTMCNC: 33.8 G/DL (ref 33.7–35.3)
MCV RBC AUTO: 103.3 FL (ref 81.4–97.8)
MONOCYTES # BLD AUTO: 0.33 K/UL (ref 0–0.85)
MONOCYTES NFR BLD AUTO: 3.9 % (ref 0–13.4)
NEUTROPHILS # BLD AUTO: 7.54 K/UL (ref 1.82–7.42)
NEUTROPHILS NFR BLD: 89.6 % (ref 44–72)
NRBC # BLD AUTO: 0 K/UL
NRBC BLD-RTO: 0 /100 WBC
PLATELET # BLD AUTO: 122 K/UL (ref 164–446)
PMV BLD AUTO: 10.6 FL (ref 9–12.9)
POTASSIUM SERPL-SCNC: 4.1 MMOL/L (ref 3.6–5.5)
PROT SERPL-MCNC: 8.5 G/DL (ref 6–8.2)
RBC # BLD AUTO: 5.16 M/UL (ref 4.7–6.1)
SODIUM SERPL-SCNC: 139 MMOL/L (ref 135–145)
WBC # BLD AUTO: 8.4 K/UL (ref 4.8–10.8)

## 2021-10-04 PROCEDURE — 83605 ASSAY OF LACTIC ACID: CPT

## 2021-10-04 PROCEDURE — 71045 X-RAY EXAM CHEST 1 VIEW: CPT

## 2021-10-04 PROCEDURE — 94640 AIRWAY INHALATION TREATMENT: CPT

## 2021-10-04 PROCEDURE — 80053 COMPREHEN METABOLIC PANEL: CPT

## 2021-10-04 PROCEDURE — 87040 BLOOD CULTURE FOR BACTERIA: CPT

## 2021-10-04 PROCEDURE — 85025 COMPLETE CBC W/AUTO DIFF WBC: CPT

## 2021-10-04 PROCEDURE — 94760 N-INVAS EAR/PLS OXIMETRY 1: CPT

## 2021-10-04 PROCEDURE — 99284 EMERGENCY DEPT VISIT MOD MDM: CPT

## 2021-10-04 RX ORDER — AMOXICILLIN AND CLAVULANATE POTASSIUM 875; 125 MG/1; MG/1
1 TABLET, FILM COATED ORAL 2 TIMES DAILY
Qty: 16 TABLET | Refills: 0 | Status: SHIPPED | OUTPATIENT
Start: 2021-10-04 | End: 2021-10-12

## 2021-10-04 RX ORDER — AMOXICILLIN AND CLAVULANATE POTASSIUM 875; 125 MG/1; MG/1
1 TABLET, FILM COATED ORAL 2 TIMES DAILY
Qty: 16 TABLET | Refills: 0 | Status: SHIPPED | OUTPATIENT
Start: 2021-10-04 | End: 2021-10-04 | Stop reason: SDUPTHER

## 2021-10-04 ASSESSMENT — LIFESTYLE VARIABLES: DO YOU DRINK ALCOHOL: NO

## 2021-10-04 NOTE — ED PROVIDER NOTES
ED Provider Note    Scribed for Pascual Cox M.D. by Jonathan Dozier. 10/4/2021, 3:35 PM.    Primary care provider: ALONDRA Pierre  Means of arrival: EMS  History obtained from: Patient's mother  History limited by: None    CHIEF COMPLAINT  Chief Complaint   Patient presents with    Aspiration     hx of TBI. mom is primary care giver. while suctioning trach today food came out.     Seizure     has seizues when he has infections, had one yesterday. mom concerned for infection. started him on abx last night.        LAKE Edwards is a 41 y.o. male who presents to the Emergency Department with his mother complaining of food coming out of his trach when suctioning this morning and she thinks he may have aspirated. She notes he had a similar episode of food coming out of his trach 7-8 days ago. His mother states that he had a seizure two days ago and she placed him on his standing prescription of co-amoxiclav. He has not had a seizure since then. She notes that he only has seizures when he has infections. She states that his last seizure was two and a half years ago. He first began having seizures and aspiration episodes 5-6 years ago. She is concerned that he may have an infection or pneumonia currently. He currently has a sore on his left lower hip. She also notes that his O2 saturation is normally 92-93% but has began to drop to 88-89% in the mornings. 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 She has been his primary care taker for 21 years. About 3-4 years ago he had pressure sore that devolved into osteomyelitis. He had similar seizure episodes during this time. He has some scar tissue in his left lung. His neurologist is Dr. Bolton.     REVIEW OF SYSTEMS  As above otherwise all other systems are negative    PAST MEDICAL HISTORY   has a past medical history of Seizure (HCC) and Traumatic brain injury (HCC) (05/15/1998).    SURGICAL HISTORY   has a past  "surgical history that includes laryngoscopy (7/2/2017); bronchoscopy (7/2/2017); tracheostomy (7/2/2017); and esophagoscopy (7/2/2017).    SOCIAL HISTORY  Social History     Tobacco Use    Smoking status: Never Smoker    Smokeless tobacco: Never Used   Substance Use Topics    Alcohol use: No    Drug use: Yes     Types: Oral     Comment: Mother tried CBD treatment for siezures in past      Social History     Substance and Sexual Activity   Drug Use Yes    Types: Oral    Comment: Mother tried CBD treatment for siezures in past       FAMILY HISTORY  None reported.     CURRENT MEDICATIONS  Home Medications       Reviewed by Biju Santacruz R.N. (Registered Nurse) on 10/04/21 at 1531  Med List Status: Partial     Medication Last Dose Status   clonazePAM (KLONOPIN) 0.5 MG Tab  Active   Cyanocobalamin (VITAMIN B-12) 1000 MCG/15ML Liquid  Active   levETIRAcetam (KEPPRA) 100 MG/ML Solution  Active   Pain Pump (PATIENT SUPPLIED) XX RENE  Active   PHENobarbital 20 MG/5ML Elixir  Active   potassium citrate (UROCIT-K) 5 MEQ (540 MG) Tab CR  Active   topiramate (TOPAMAX) 25 MG Tab  Active   VIMPAT 100 MG Tab tablet  Active                    ALLERGIES  Allergies   Allergen Reactions    Sulfa Drugs Unspecified     Per caretaker, mother is allergic to sulfa so believes her son could be as well       PHYSICAL EXAM  VITAL SIGNS: /68   Pulse (!) 57   Temp (!) 35.6 °C (96 °F)   Resp (!) 11   Ht 1.803 m (5' 11\")   Wt 56.7 kg (125 lb)   SpO2 97%   BMI 17.43 kg/m²     Constitutional: Laying in bed in no acute distress, Well developed, Well nourished, Non-toxic appearance.   HENT: Normocephalic, Atraumatic, Bilateral external ears normal, oropharynx moist, No oral exudates, Nose normal.   Eyes:conjunctiva is normal, there are no signs of exudate.   Neck: Supple, no meningeal signs.  Lymphatic: No lymphadenopathy noted.   Cardiovascular: Regular rate and rhythm without murmurs gallops or rubs.   Thorax & Lungs: Crackles over the " lower left lung. No respiratory distress. Breathing comfortably. Lungs are clear to auscultation bilaterally, there are no wheezes no rales. Chest wall is nontender. Tracheostomy tube in place.   Abdomen: Soft, nontender, nondistended. Bowel sounds are present.   Skin: Warm, Dry, No erythema, No discharge  Back: No tenderness, No CVA tenderness.  Musculoskeletal: Patient does have some contractures.  There is mild pitting edema bilateral lower extremities  Extremities: Contractures on both upper extremities.   GI: G-tube in place. Site for G-tube is clean.  Neurologic:  Non-verbal, does not appear to be able to respond to commands, does respond to tactile stimuli with grunts.   Psychiatric: Laying in bed not responsive.       LABS  Results for orders placed or performed during the hospital encounter of 10/04/21   LACTIC ACID   Result Value Ref Range    Lactic Acid 1.4 0.5 - 2.0 mmol/L   CBC WITH DIFFERENTIAL   Result Value Ref Range    WBC 8.4 4.8 - 10.8 K/uL    RBC 5.16 4.70 - 6.10 M/uL    Hemoglobin 18.0 14.0 - 18.0 g/dL    Hematocrit 53.3 (H) 42.0 - 52.0 %    .3 (H) 81.4 - 97.8 fL    MCH 34.9 (H) 27.0 - 33.0 pg    MCHC 33.8 33.7 - 35.3 g/dL    RDW 56.7 (H) 35.9 - 50.0 fL    Platelet Count 122 (L) 164 - 446 K/uL    MPV 10.6 9.0 - 12.9 fL    Neutrophils-Polys 89.60 (H) 44.00 - 72.00 %    Lymphocytes 5.70 (L) 22.00 - 41.00 %    Monocytes 3.90 0.00 - 13.40 %    Eosinophils 0.50 0.00 - 6.90 %    Basophils 0.10 0.00 - 1.80 %    Immature Granulocytes 0.20 0.00 - 0.90 %    Nucleated RBC 0.00 /100 WBC    Neutrophils (Absolute) 7.54 (H) 1.82 - 7.42 K/uL    Lymphs (Absolute) 0.48 (L) 1.00 - 4.80 K/uL    Monos (Absolute) 0.33 0.00 - 0.85 K/uL    Eos (Absolute) 0.04 0.00 - 0.51 K/uL    Baso (Absolute) 0.01 0.00 - 0.12 K/uL    Immature Granulocytes (abs) 0.02 0.00 - 0.11 K/uL    NRBC (Absolute) 0.00 K/uL   COMP METABOLIC PANEL   Result Value Ref Range    Sodium 139 135 - 145 mmol/L    Potassium 4.1 3.6 - 5.5 mmol/L     Chloride 104 96 - 112 mmol/L    Co2 24 20 - 33 mmol/L    Anion Gap 11.0 7.0 - 16.0    Glucose 98 65 - 99 mg/dL    Bun 22 8 - 22 mg/dL    Creatinine 0.21 (L) 0.50 - 1.40 mg/dL    Calcium 9.4 8.5 - 10.5 mg/dL    AST(SGOT) 49 (H) 12 - 45 U/L    ALT(SGPT) 71 (H) 2 - 50 U/L    Alkaline Phosphatase 392 (H) 30 - 99 U/L    Total Bilirubin 0.3 0.1 - 1.5 mg/dL    Albumin 4.2 3.2 - 4.9 g/dL    Total Protein 8.5 (H) 6.0 - 8.2 g/dL    Globulin 4.3 (H) 1.9 - 3.5 g/dL    A-G Ratio 1.0 g/dL   ESTIMATED GFR   Result Value Ref Range    GFR If African American >60 >60 mL/min/1.73 m 2    GFR If Non African American >60 >60 mL/min/1.73 m 2       All labs reviewed by me.      RADIOLOGY  DX-CHEST-PORTABLE (1 VIEW)   Final Result      1.  Linear increased density in bilateral lung bases suggestive of atelectasis though subtle infiltrate/pneumonitis difficult to exclude.        The radiologist's interpretation of all radiological studies have been reviewed by me.    COURSE & MEDICAL DECISION MAKING  Pertinent Labs & Imaging studies reviewed. (See chart for details)    3:35 PM - Patient seen and examined at bedside. Ordered Dx-Chest, Lactic acid once and every two hours, CMP, CBC with differential, Urinalysis, Urine culture, and Blood culture x2 to evaluate his symptoms. The differential diagnoses include but are not limited to: Aspiration Pneumonia, possibly secondary to pressure sores, although less likely.      4:44 PM - Patient was reevaluated at bedside. He has a stage three pressure ulcer on his left buttock as well as some scaring and a stage one pressure ulcer on his right buttock.     6:08 PM - I reevaluated the patient at bedside.  I discussed the patient's diagnostic study results which show the patient has elevated liver enzymes. I discussed plan for discharge and follow up as outlined below. The patient's mother will follow-up with his PCP and GI. The patient's mother verbalizes they feel comfortable going home. The patient is  stable for discharge at this time and will return for any new or worsening symptoms. Patient's mother verbalizes understanding and support with my plan for discharge.       Decision Making:  Patient presents for evaluation.  Clinically he does have some mild decub's that the mother is attempting to take care of.  Based on the story it does sound like the patient is refluxing and possibly even aspirating.  There is some mild signs of pneumonitis on chest x-ray but no signs of hypoxemia.  At this point we will start the patient on Augmentin for the treatment of this however it was discussed with the mother about the refluxing and recommended to keep the patient up at approximately 10 to 20 degrees especially after feeding in the G-tube.  The patient is to follow the primary care physician for recheck and possible referral to gastroenterology for further treatment and care of the reflux.  Patient is to return as needed.  Mother feels very comfortable with this plan.    DISPOSITION:  Patient will be discharged home in stable condition.    FOLLOW UP:  Aleksandra Moore, A.P.N.  [address] [place] [address] [phone]    Schedule an appointment as soon as possible for a visit   For re-check and referral to GI    George Larry M.D.  [address] [place] [address]  [phone]          OUTPATIENT MEDICATIONS:  Discharge Medication List as of 10/4/2021  7:33 PM        START taking these medications    Details   amoxicillin-clavulanate (AUGMENTIN) 875-125 MG Tab Take 1 Tablet by mouth 2 times a day for 8 days., Disp-16 Tablet, R-0, Normal                FINAL IMPRESSION  1. Aspiration into airway, initial encounter    2. Aspiration pneumonia of both lungs due to gastric secretions, unspecified part of lung (HCC)          Jonathan WOODY (Conchita), am scribing for, and in the presence of, Pascual Cox M.D..    Electronically signed by: Jonathan Porras), 10/4/2021    Pascual WOODY  M.D. personally performed the services described in this documentation, as scribed by Jonathan Dozier in my presence, and it is both accurate and complete. C.    The note accurately reflects work and decisions made by me.  Pascual Cox M.D.  10/4/2021  9:31 PM

## 2021-10-04 NOTE — TELEPHONE ENCOUNTER
"This pt's mother, Susana, called to report that \"Ruben had a tiny seizure yesterday (10/3)\" and also that \"some brown yuckiness came up in his trach' and his oxygen is lower\". She reports having given him amoxicillin in case of aspiration. Susana would like a call from Dr. Bolton when convenient at .  "

## 2021-10-04 NOTE — ED TRIAGE NOTES
"Chief Complaint   Patient presents with   • Aspiration     hx of TBI. mom is primary care giver. while suctioning trach today food came out.    • Seizure     has seizues when he has infections, had one yesterday. mom concerned for infection. started him on abx last night.      BIB EMS for above.     /68   Pulse (!) 57   Temp (!) 35.6 °C (96 °F)   Resp (!) 11   Ht 1.803 m (5' 11\")   Wt 56.7 kg (125 lb)   SpO2 97%   BMI 17.43 kg/m²     "

## 2021-10-05 ENCOUNTER — TELEPHONE (OUTPATIENT)
Dept: NEUROLOGY | Facility: MEDICAL CENTER | Age: 41
End: 2021-10-05

## 2021-10-05 DIAGNOSIS — G40.219 PHARMACORESISTANT PARTIAL EPILEPSY WITH IMPAIRMENT OF CONSCIOUSNESS, INTRACTABLE (HCC): ICD-10-CM

## 2021-10-05 RX ORDER — PHENOBARBITAL 20 MG/5ML
ELIXIR ORAL
Qty: 1800 ML | Refills: 5 | Status: ON HOLD | OUTPATIENT
Start: 2021-10-05 | End: 2022-02-22 | Stop reason: SDUPTHER

## 2021-10-05 RX ORDER — CLONAZEPAM 0.5 MG/1
TABLET ORAL
Qty: 45 TABLET | Refills: 5 | Status: ON HOLD | OUTPATIENT
Start: 2021-10-05 | End: 2022-02-18

## 2021-10-05 NOTE — DISCHARGE PLANNING
RN has notified SW that Pt is medically cleared and needs Medical Transportation back to his home in [place].     PCS completed and faxed to [facility] unable to arrange transportation until 0700 10-.  Due to Pt needing to go to [place].   PCS to [facility] cancelled at this time.     SW contacted [facility], they do not do Trach Care.    JOSE spoke to Pt mother Susana and gave her update.   Susana able to call her contacts in [place] and they are sending an ambulance from their area to pick Pt up.     No other needs at this time.

## 2021-10-05 NOTE — TELEPHONE ENCOUNTER
----- Message from Harris Bolton M.D. sent at 10/4/2021  4:39 PM PDT -----  Regarding: RE: STAT call from Gabi Meza - patient in transport via ambulance to [facility]  I see he is in the ED. They probably call the neuro hospitalist on call. Please call mom in the morning for an update.   Thanks,   RA    ----- Message -----  From: Chuck Duran, Med Ass't  Sent: 10/4/2021   2:31 PM PDT  To: Harris Bolton M.D.  Subject: FW: STAT call from [facility] Meza - patient in tr#    Please see forwarded message. Thank you.    ----- Message -----  From: Erin Bass  Sent: 10/4/2021   1:46 PM PDT  To: Chuck Duran, Med Ass't, #  Subject: STAT call from Gabi Meza - patient in transp#    Mother called Ruben having serious problems, believes he aspirated and is transporting via ambulance to [facility]. She wanted Dr. Bolton to know, her # are [phone], [phone] cell mt

## 2021-10-05 NOTE — ED NOTES
Pt transferred home by [facility] with all belongings. Family present. Stable. Discharge teaching and paperwork provided regarding aspiration and all questions/concerns answered. VSS, rsp assessment stable and PIV removed. Given information regarding home care and reasons to follow up with ED or primary MD. Patient provided with printed amoxil Rx. Patient discharged to the care of [facility] and assisted by dario out of the ED.

## 2021-10-09 LAB
BACTERIA BLD CULT: NORMAL
BACTERIA BLD CULT: NORMAL
SIGNIFICANT IND 70042: NORMAL
SIGNIFICANT IND 70042: NORMAL
SITE SITE: NORMAL
SITE SITE: NORMAL
SOURCE SOURCE: NORMAL
SOURCE SOURCE: NORMAL

## 2021-10-13 ENCOUNTER — APPOINTMENT (OUTPATIENT)
Dept: NEUROLOGY | Facility: MEDICAL CENTER | Age: 41
End: 2021-10-13
Attending: PSYCHIATRY & NEUROLOGY
Payer: COMMERCIAL

## 2021-10-13 ENCOUNTER — TELEPHONE (OUTPATIENT)
Dept: NEUROLOGY | Facility: MEDICAL CENTER | Age: 41
End: 2021-10-13

## 2021-10-13 NOTE — TELEPHONE ENCOUNTER
Called pt to begin virtual visit for Dr. Bolton today, but pt's father felt he was unable to assist with the virtual visit, and was unfamiliar/unable to access "[employer]. He requests that we call him back to reschedule the appointment soon.     Pt's mother requested copies of Ruben's recent lab work. This was mailed out on 10/12/21.

## 2021-12-02 ENCOUNTER — TELEPHONE (OUTPATIENT)
Dept: NEUROLOGY | Facility: MEDICAL CENTER | Age: 41
End: 2021-12-02

## 2021-12-03 ENCOUNTER — TELEPHONE (OUTPATIENT)
Dept: NEUROLOGY | Facility: MEDICAL CENTER | Age: 41
End: 2021-12-03

## 2021-12-07 ENCOUNTER — TELEPHONE (OUTPATIENT)
Dept: NEUROLOGY | Facility: MEDICAL CENTER | Age: 41
End: 2021-12-07

## 2021-12-08 NOTE — TELEPHONE ENCOUNTER
Pt's mother called and said that Ruben had been a bit out of it, and she thought he might have had a small seizure. This incident happened about a week ago, but now she thinks maybe it was not a seizure. She says Ruben is now doing well. However, Susana would like  to look over the labs when we receive them. Told her I would forward them to  when I receive them. Susana understood.

## 2021-12-29 ENCOUNTER — DOCUMENTATION (OUTPATIENT)
Dept: NEUROLOGY | Facility: MEDICAL CENTER | Age: 41
End: 2021-12-29

## 2021-12-29 DIAGNOSIS — G40.219 PHARMACORESISTANT PARTIAL EPILEPSY WITH IMPAIRMENT OF CONSCIOUSNESS, INTRACTABLE (HCC): ICD-10-CM

## 2021-12-29 RX ORDER — LACOSAMIDE 100 MG/1
300 TABLET ORAL 2 TIMES DAILY
Qty: 180 TABLET | Refills: 5 | Status: SHIPPED | OUTPATIENT
Start: 2021-12-29 | End: 2021-12-30

## 2021-12-29 NOTE — PROGRESS NOTES
Spoke with Susana (pt's mom). Pt has had isolated very brief seizures, one every few weeks, but no signs of infection, no clustering of seizures or long lasting seizures. She is not concerned about the seizures. Confirmed with mother twice he is on Vimpat 300 mg bid and no side effects. RX sent to pharmacy. She will keep us posted of any further seizures. Pt has new PCP, had blood work, reviewed by pcp, per mother pt is going to undergo liver US. Mother does not want to make any changes at this time.

## 2021-12-30 ENCOUNTER — TELEPHONE (OUTPATIENT)
Dept: NEUROLOGY | Facility: MEDICAL CENTER | Age: 41
End: 2021-12-30

## 2021-12-30 DIAGNOSIS — G40.219 PHARMACORESISTANT PARTIAL EPILEPSY WITH IMPAIRMENT OF CONSCIOUSNESS, INTRACTABLE (HCC): ICD-10-CM

## 2021-12-30 RX ORDER — LACOSAMIDE 100 MG/1
100 TABLET ORAL 2 TIMES DAILY
Qty: 60 TABLET | Refills: 5 | Status: SHIPPED | OUTPATIENT
Start: 2021-12-30 | End: 2022-04-21 | Stop reason: SDUPTHER

## 2021-12-30 RX ORDER — LACOSAMIDE 200 MG/1
200 TABLET ORAL 2 TIMES DAILY
Qty: 60 TABLET | Refills: 5 | Status: SHIPPED | OUTPATIENT
Start: 2021-12-30 | End: 2022-04-21 | Stop reason: SDUPTHER

## 2021-12-30 NOTE — TELEPHONE ENCOUNTER
Ran, pharmacist from [facility], called and said insurance would not approve the script that  wrote for Ruben's vimpat. He said it would be better if it was broken up like it was in the past, so that it was 2 scripts, one for 200mg and another for 100mg. Please advise.

## 2022-01-14 ENCOUNTER — TELEPHONE (OUTPATIENT)
Dept: NEUROLOGY | Facility: MEDICAL CENTER | Age: 42
End: 2022-01-14

## 2022-01-14 NOTE — TELEPHONE ENCOUNTER
"Pt's mother, Susana, called and said the day before yesterday, around 7PM he had a few leg \"bumps\" movements. She said she believes these are likely seizures since Ruben does not move due to his coma state. Yesterday, he had the same behavior around the same time. She does not think he has an infection. She wanted  to be aware of this. She said this same behavior happened 3 weeks ago, and 3 weeks before that this happened as well. She is seeing a pattern of this happening about every month. She just wanted  to be aware of the situation. She is not super worried, and does not think any medication changes are necessary.  "

## 2022-01-18 ENCOUNTER — TELEPHONE (OUTPATIENT)
Dept: NEUROLOGY | Facility: MEDICAL CENTER | Age: 42
End: 2022-01-18

## 2022-01-18 DIAGNOSIS — G40.219 PHARMACORESISTANT PARTIAL EPILEPSY WITH IMPAIRMENT OF CONSCIOUSNESS, INTRACTABLE (HCC): ICD-10-CM

## 2022-01-18 RX ORDER — TOPIRAMATE 50 MG/1
50 TABLET, FILM COATED ORAL 2 TIMES DAILY
Qty: 60 TABLET | Refills: 11 | Status: SHIPPED | OUTPATIENT
Start: 2022-01-18 | End: 2022-02-04

## 2022-01-18 NOTE — TELEPHONE ENCOUNTER
Pt's mother, Susana, called and said that Ruben is still having seizures, and continues to have seizures in the morning. He has had 3 in the past 5 days. She says these seizures are small. She says this usually happens when she wakes him up to change him. Last one lasted a solid minute. Mother does not suspect infection. She says the only medicine that has been titrated down was topiramate about a year ago. She thinks that Ruben may not like stimulation, and that when she wakes him, he becomes susceptible to tiny seizures. She is wondering if this is something they are going to have to live with, or if there is something that can be done about this. Susana is wondering what advice  has at this point. Please advise.

## 2022-01-19 ENCOUNTER — TELEPHONE (OUTPATIENT)
Dept: NEUROLOGY | Facility: MEDICAL CENTER | Age: 42
End: 2022-01-19

## 2022-02-04 ENCOUNTER — TELEPHONE (OUTPATIENT)
Dept: NEUROLOGY | Facility: MEDICAL CENTER | Age: 42
End: 2022-02-04

## 2022-02-04 DIAGNOSIS — G40.219 PHARMACORESISTANT PARTIAL EPILEPSY WITH IMPAIRMENT OF CONSCIOUSNESS, INTRACTABLE (HCC): ICD-10-CM

## 2022-02-04 RX ORDER — TOPIRAMATE 50 MG/1
TABLET, FILM COATED ORAL
Qty: 75 TABLET | Refills: 11 | Status: ON HOLD | OUTPATIENT
Start: 2022-02-04 | End: 2022-02-19

## 2022-02-04 NOTE — TELEPHONE ENCOUNTER
Called pt's mother, Susana, and let her know that  agreed to do 50mg and 75mg qhs of asm. She said she would start tonight and monitor him from there.

## 2022-02-04 NOTE — TELEPHONE ENCOUNTER
Went about 2 weeks without seizures, but yesterday he had a seizure in the morning, and this morning he had another seizures. She noted that this morning the seizure lasted longer and it was more noticeable, more convulsing. She asked if it would be appropriate to do another 25mg at night.

## 2022-02-15 DIAGNOSIS — E55.9 VITAMIN D DEFICIENCY: ICD-10-CM

## 2022-02-15 RX ORDER — ERGOCALCIFEROL (VITAMIN D2) 200 MCG/ML
8000 DROPS ORAL DAILY
Qty: 30 ML | Refills: 5 | Status: SHIPPED | OUTPATIENT
Start: 2022-02-15 | End: 2022-06-27 | Stop reason: SDUPTHER

## 2022-02-16 ENCOUNTER — TELEPHONE (OUTPATIENT)
Dept: NEUROLOGY | Facility: MEDICAL CENTER | Age: 42
End: 2022-02-16
Payer: COMMERCIAL

## 2022-02-16 NOTE — TELEPHONE ENCOUNTER
Called [facility] back and they said they were only able to dispense 60 mL of vitamin D, not 30 mL. Told pharmacy that was fine. Pharmacist understood.

## 2022-02-18 ENCOUNTER — HOSPITAL ENCOUNTER (INPATIENT)
Facility: MEDICAL CENTER | Age: 42
LOS: 9 days | DRG: 208 | End: 2022-02-27
Attending: INTERNAL MEDICINE | Admitting: STUDENT IN AN ORGANIZED HEALTH CARE EDUCATION/TRAINING PROGRAM
Payer: COMMERCIAL

## 2022-02-18 ENCOUNTER — TELEPHONE (OUTPATIENT)
Dept: NEUROLOGY | Facility: MEDICAL CENTER | Age: 42
End: 2022-02-18
Payer: COMMERCIAL

## 2022-02-18 ENCOUNTER — APPOINTMENT (OUTPATIENT)
Dept: RADIOLOGY | Facility: MEDICAL CENTER | Age: 42
DRG: 208 | End: 2022-02-18
Attending: STUDENT IN AN ORGANIZED HEALTH CARE EDUCATION/TRAINING PROGRAM
Payer: COMMERCIAL

## 2022-02-18 PROBLEM — J80 ACUTE RESPIRATORY DISTRESS SYNDROME (ARDS) DUE TO COVID-19 VIRUS (HCC): Status: ACTIVE | Noted: 2022-02-18

## 2022-02-18 PROBLEM — U07.1 ACUTE RESPIRATORY DISTRESS SYNDROME (ARDS) DUE TO COVID-19 VIRUS (HCC): Status: ACTIVE | Noted: 2022-02-18

## 2022-02-18 PROCEDURE — A9270 NON-COVERED ITEM OR SERVICE: HCPCS | Performed by: INTERNAL MEDICINE

## 2022-02-18 PROCEDURE — A9270 NON-COVERED ITEM OR SERVICE: HCPCS | Performed by: STUDENT IN AN ORGANIZED HEALTH CARE EDUCATION/TRAINING PROGRAM

## 2022-02-18 PROCEDURE — 99223 1ST HOSP IP/OBS HIGH 75: CPT | Performed by: STUDENT IN AN ORGANIZED HEALTH CARE EDUCATION/TRAINING PROGRAM

## 2022-02-18 PROCEDURE — 94640 AIRWAY INHALATION TREATMENT: CPT

## 2022-02-18 PROCEDURE — 770022 HCHG ROOM/CARE - ICU (200)

## 2022-02-18 PROCEDURE — 71045 X-RAY EXAM CHEST 1 VIEW: CPT

## 2022-02-18 PROCEDURE — 700102 HCHG RX REV CODE 250 W/ 637 OVERRIDE(OP): Performed by: INTERNAL MEDICINE

## 2022-02-18 PROCEDURE — 700102 HCHG RX REV CODE 250 W/ 637 OVERRIDE(OP): Performed by: STUDENT IN AN ORGANIZED HEALTH CARE EDUCATION/TRAINING PROGRAM

## 2022-02-18 RX ORDER — TOPIRAMATE 25 MG/1
75 TABLET ORAL EVERY EVENING
Status: DISCONTINUED | OUTPATIENT
Start: 2022-02-18 | End: 2022-02-18

## 2022-02-18 RX ORDER — PHENOBARBITAL 20 MG/5ML
120 ELIXIR ORAL 2 TIMES DAILY
Status: DISCONTINUED | OUTPATIENT
Start: 2022-02-18 | End: 2022-02-27 | Stop reason: HOSPADM

## 2022-02-18 RX ORDER — CLONAZEPAM 0.5 MG/1
0.5 TABLET ORAL DAILY
Status: DISCONTINUED | OUTPATIENT
Start: 2022-02-19 | End: 2022-02-27 | Stop reason: HOSPADM

## 2022-02-18 RX ORDER — CLONAZEPAM 1 MG/1
1 TABLET ORAL EVERY EVENING
Status: DISCONTINUED | OUTPATIENT
Start: 2022-02-19 | End: 2022-02-27 | Stop reason: HOSPADM

## 2022-02-18 RX ORDER — CLONAZEPAM 1 MG/1
0.5 TABLET ORAL EVERY MORNING
Status: ON HOLD | COMMUNITY
End: 2022-02-22 | Stop reason: SDUPTHER

## 2022-02-18 RX ORDER — TOPIRAMATE 25 MG/1
75 TABLET ORAL EVERY EVENING
Status: DISCONTINUED | OUTPATIENT
Start: 2022-02-18 | End: 2022-02-27 | Stop reason: HOSPADM

## 2022-02-18 RX ORDER — LACOSAMIDE 100 MG/1
300 TABLET ORAL 2 TIMES DAILY
Status: DISCONTINUED | OUTPATIENT
Start: 2022-02-18 | End: 2022-02-27 | Stop reason: HOSPADM

## 2022-02-18 RX ORDER — LEVETIRACETAM 100 MG/ML
150 SOLUTION ORAL EVERY 12 HOURS
Status: DISCONTINUED | OUTPATIENT
Start: 2022-02-18 | End: 2022-02-18

## 2022-02-18 RX ORDER — LEVETIRACETAM 100 MG/ML
1500 SOLUTION ORAL EVERY 12 HOURS
Status: DISCONTINUED | OUTPATIENT
Start: 2022-02-18 | End: 2022-02-27 | Stop reason: HOSPADM

## 2022-02-18 RX ORDER — DEXAMETHASONE 6 MG/1
6 TABLET ORAL DAILY
Status: COMPLETED | OUTPATIENT
Start: 2022-02-19 | End: 2022-02-26

## 2022-02-18 RX ORDER — CLONAZEPAM 1 MG/1
1 TABLET ORAL EVERY EVENING
Status: DISCONTINUED | OUTPATIENT
Start: 2022-02-18 | End: 2022-02-18

## 2022-02-18 RX ORDER — CLONAZEPAM 0.5 MG/1
0.5 TABLET ORAL DAILY
Status: DISCONTINUED | OUTPATIENT
Start: 2022-02-19 | End: 2022-02-18

## 2022-02-18 RX ORDER — AZITHROMYCIN 250 MG/1
500 TABLET, FILM COATED ORAL DAILY
Status: DISCONTINUED | OUTPATIENT
Start: 2022-02-19 | End: 2022-02-19

## 2022-02-18 RX ORDER — DEXAMETHASONE 6 MG/1
6 TABLET ORAL DAILY
Status: DISCONTINUED | OUTPATIENT
Start: 2022-02-19 | End: 2022-02-18

## 2022-02-18 RX ORDER — BISACODYL 10 MG
10 SUPPOSITORY, RECTAL RECTAL
Status: DISCONTINUED | OUTPATIENT
Start: 2022-02-18 | End: 2022-02-27 | Stop reason: HOSPADM

## 2022-02-18 RX ORDER — BISACODYL 10 MG
10 SUPPOSITORY, RECTAL RECTAL
Status: DISCONTINUED | OUTPATIENT
Start: 2022-02-18 | End: 2022-02-18

## 2022-02-18 RX ORDER — TOPIRAMATE 100 MG/1
50 TABLET, FILM COATED ORAL DAILY
Status: DISCONTINUED | OUTPATIENT
Start: 2022-02-19 | End: 2022-02-18

## 2022-02-18 RX ORDER — TOPIRAMATE 25 MG/1
50 TABLET ORAL EVERY MORNING
Status: ON HOLD | COMMUNITY
Start: 2022-02-18 | End: 2022-02-19

## 2022-02-18 RX ORDER — POLYETHYLENE GLYCOL 3350 17 G/17G
1 POWDER, FOR SOLUTION ORAL
Status: DISCONTINUED | OUTPATIENT
Start: 2022-02-18 | End: 2022-02-27 | Stop reason: HOSPADM

## 2022-02-18 RX ORDER — AMOXICILLIN 250 MG
2 CAPSULE ORAL 2 TIMES DAILY
Status: DISCONTINUED | OUTPATIENT
Start: 2022-02-18 | End: 2022-02-18

## 2022-02-18 RX ORDER — ONDANSETRON 2 MG/ML
4 INJECTION INTRAMUSCULAR; INTRAVENOUS EVERY 4 HOURS PRN
Status: DISPENSED | OUTPATIENT
Start: 2022-02-18 | End: 2022-02-18

## 2022-02-18 RX ORDER — AMOXICILLIN 250 MG
2 CAPSULE ORAL 2 TIMES DAILY
Status: DISCONTINUED | OUTPATIENT
Start: 2022-02-19 | End: 2022-02-27 | Stop reason: HOSPADM

## 2022-02-18 RX ORDER — ACETAMINOPHEN 325 MG/1
650 TABLET ORAL EVERY 6 HOURS PRN
Status: DISPENSED | OUTPATIENT
Start: 2022-02-18 | End: 2022-02-18

## 2022-02-18 RX ORDER — AZITHROMYCIN 250 MG/1
500 TABLET, FILM COATED ORAL DAILY
Status: DISCONTINUED | OUTPATIENT
Start: 2022-02-19 | End: 2022-02-18

## 2022-02-18 RX ORDER — TOPIRAMATE 100 MG/1
50 TABLET, FILM COATED ORAL DAILY
Status: DISCONTINUED | OUTPATIENT
Start: 2022-02-19 | End: 2022-02-27 | Stop reason: HOSPADM

## 2022-02-18 RX ORDER — LACOSAMIDE 100 MG/1
300 TABLET ORAL 2 TIMES DAILY
Status: DISCONTINUED | OUTPATIENT
Start: 2022-02-18 | End: 2022-02-18

## 2022-02-18 RX ORDER — POLYETHYLENE GLYCOL 3350 17 G/17G
1 POWDER, FOR SOLUTION ORAL
Status: DISCONTINUED | OUTPATIENT
Start: 2022-02-18 | End: 2022-02-18

## 2022-02-18 RX ORDER — LACOSAMIDE 100 MG/1
300 TABLET ORAL 2 TIMES DAILY
Status: DISCONTINUED | OUTPATIENT
Start: 2022-02-19 | End: 2022-02-18

## 2022-02-18 RX ADMIN — TOPIRAMATE 75 MG: 25 TABLET, FILM COATED ORAL at 22:52

## 2022-02-18 RX ADMIN — LACOSAMIDE 300 MG: 100 TABLET, FILM COATED ORAL at 23:46

## 2022-02-18 RX ADMIN — LEVETIRACETAM 1500 MG: 100 SOLUTION ORAL at 22:12

## 2022-02-18 RX ADMIN — CLONAZEPAM 1 MG: 1 TABLET ORAL at 22:19

## 2022-02-18 RX ADMIN — PHENOBARBITAL 120 MG: 20 ELIXIR ORAL at 22:48

## 2022-02-18 ASSESSMENT — PAIN DESCRIPTION - PAIN TYPE: TYPE: CHRONIC PAIN

## 2022-02-18 ASSESSMENT — FIBROSIS 4 INDEX: FIB4 SCORE: 1.95

## 2022-02-18 NOTE — TELEPHONE ENCOUNTER
Susana left  saying that Ruben was admitted in the ED in [facility]. He had low oxygen, around ranging from 78-85%. He had pneumonia, and they found out that his alkaline phosphate levels were very elevated, and that his liver enzymes were also elevated. They believed that it could have been a result of the ASMs, and that Ruben was on too high of a dose. They later found out that Ruben had tested positive for COVID, and that COVID could have caused all these levels to become elevated. Susana, pt's mother, believes that COVID/pneumonia could have been giving him his seizures. He has been seizure free on current regimen for about 2 weeks. They are going to continue doing testing/chest imaging, and continue to treat Ruben for covid & bacterial pneumonia. They will wait to see if elevated levels return to normal before consulting with  to touch Ruben's seizure medications. Susana said she will keep us updated.

## 2022-02-18 NOTE — PROGRESS NOTES
[facility]  Transfer requested by: Dr Cavazos  Chief complaint: Respiratory failure secondary to COVID-19  Pertinent history/ICU Course: 41 year old male with remote history of TBI with a tracheostomy. Presented with lethargy for 3-4 days, found to have hypoxia SpO2 80s, CXR bilateral infs, COVID-19 +. Family practitioner covering their ER described trach to vent after quick escalation of oxygen requirement.   Code Status: full  Consultants involved and pertinent input from consultants: NA  Floor requested: ICU  Accepted by Raj Khan MD    For any question or concerns regarding the care of this patient, please reach out to the assigned intensivist.           Update:  Received an update call from Dr. Cavazos.  It turns out patient was not actually on the ventilator, he is on a high flow trach mask system at 35L flow with 35L O2 bleed in.  Dr. Cavazos isn't sure what the FiO2 is, but that sounds like 100% FiO2 based on the math. Their RT leaves around 7p and they don't have RT overnight.  Patient may be appropriate for tele, but as it's unclear his exact O2 requirements I'll take him in the ICU then we can downgrade if needed.

## 2022-02-19 ENCOUNTER — APPOINTMENT (OUTPATIENT)
Dept: RADIOLOGY | Facility: MEDICAL CENTER | Age: 42
DRG: 208 | End: 2022-02-19
Attending: INTERNAL MEDICINE
Payer: COMMERCIAL

## 2022-02-19 LAB
ALBUMIN SERPL BCP-MCNC: 2.4 G/DL (ref 3.2–4.9)
ALBUMIN/GLOB SERPL: 0.6 G/DL
ALP SERPL-CCNC: 605 U/L (ref 30–99)
ALT SERPL-CCNC: 148 U/L (ref 2–50)
ANION GAP SERPL CALC-SCNC: 9 MMOL/L (ref 7–16)
AST SERPL-CCNC: 166 U/L (ref 12–45)
BASE EXCESS BLDA CALC-SCNC: 2 MMOL/L (ref -4–3)
BASOPHILS # BLD AUTO: 1.2 % (ref 0–1.8)
BASOPHILS # BLD: 0.04 K/UL (ref 0–0.12)
BILIRUB SERPL-MCNC: 1 MG/DL (ref 0.1–1.5)
BODY TEMPERATURE: ABNORMAL DEGREES
BUN SERPL-MCNC: 7 MG/DL (ref 8–22)
CALCIUM SERPL-MCNC: 7.9 MG/DL (ref 8.5–10.5)
CHLORIDE SERPL-SCNC: 105 MMOL/L (ref 96–112)
CO2 BLDA-SCNC: 28 MMOL/L (ref 20–33)
CO2 SERPL-SCNC: 24 MMOL/L (ref 20–33)
CREAT SERPL-MCNC: <0.17 MG/DL (ref 0.5–1.4)
DELSYS IDSYS: ABNORMAL
END TIDAL CARBON DIOXIDE IECO2: 27 MMHG
EOSINOPHIL # BLD AUTO: 0.03 K/UL (ref 0–0.51)
EOSINOPHIL NFR BLD: 0.9 % (ref 0–6.9)
ERYTHROCYTE [DISTWIDTH] IN BLOOD BY AUTOMATED COUNT: 57.5 FL (ref 35.9–50)
GLOBULIN SER CALC-MCNC: 4.2 G/DL (ref 1.9–3.5)
GLUCOSE SERPL-MCNC: 82 MG/DL (ref 65–99)
GRAM STN SPEC: NORMAL
GRAM STN SPEC: NORMAL
HCO3 BLDA-SCNC: 26.8 MMOL/L (ref 17–25)
HCT VFR BLD AUTO: 41.3 % (ref 42–52)
HGB BLD-MCNC: 14.1 G/DL (ref 14–18)
HOROWITZ INDEX BLDA+IHG-RTO: 77 MM[HG]
IMM GRANULOCYTES # BLD AUTO: 0.03 K/UL (ref 0–0.11)
IMM GRANULOCYTES NFR BLD AUTO: 0.9 % (ref 0–0.9)
LYMPHOCYTES # BLD AUTO: 0.68 K/UL (ref 1–4.8)
LYMPHOCYTES NFR BLD: 19.9 % (ref 22–41)
MAGNESIUM SERPL-MCNC: 2.1 MG/DL (ref 1.5–2.5)
MCH RBC QN AUTO: 34.2 PG (ref 27–33)
MCHC RBC AUTO-ENTMCNC: 34.1 G/DL (ref 33.7–35.3)
MCV RBC AUTO: 100.2 FL (ref 81.4–97.8)
MODE IMODE: ABNORMAL
MONOCYTES # BLD AUTO: 0.16 K/UL (ref 0–0.85)
MONOCYTES NFR BLD AUTO: 4.7 % (ref 0–13.4)
NEUTROPHILS # BLD AUTO: 2.47 K/UL (ref 1.82–7.42)
NEUTROPHILS NFR BLD: 72.4 % (ref 44–72)
NRBC # BLD AUTO: 0 K/UL
NRBC BLD-RTO: 0 /100 WBC
O2/TOTAL GAS SETTING VFR VENT: 60 %
PCO2 BLDA: 43.2 MMHG (ref 26–37)
PCO2 TEMP ADJ BLDA: 42.8 MMHG (ref 26–37)
PEEP END EXPIRATORY PRESSURE IPEEP: 10 CMH20
PERCENT MINUTE VOLUME IPMV: 100
PH BLDA: 7.4 [PH] (ref 7.4–7.5)
PH TEMP ADJ BLDA: 7.4 [PH] (ref 7.4–7.5)
PHOSPHATE SERPL-MCNC: 3.5 MG/DL (ref 2.5–4.5)
PLATELET # BLD AUTO: 169 K/UL (ref 164–446)
PMV BLD AUTO: 9.8 FL (ref 9–12.9)
PO2 BLDA: 46 MMHG (ref 64–87)
PO2 TEMP ADJ BLDA: 45 MMHG (ref 64–87)
POTASSIUM SERPL-SCNC: 4.3 MMOL/L (ref 3.6–5.5)
PROCALCITONIN SERPL-MCNC: 0.44 NG/ML
PROT SERPL-MCNC: 6.6 G/DL (ref 6–8.2)
RBC # BLD AUTO: 4.12 M/UL (ref 4.7–6.1)
SAO2 % BLDA: 81 % (ref 93–99)
SIGNIFICANT IND 70042: NORMAL
SIGNIFICANT IND 70042: NORMAL
SITE SITE: NORMAL
SITE SITE: NORMAL
SODIUM SERPL-SCNC: 138 MMOL/L (ref 135–145)
SOURCE SOURCE: NORMAL
SOURCE SOURCE: NORMAL
SPECIMEN DRAWN FROM PATIENT: ABNORMAL
WBC # BLD AUTO: 3.4 K/UL (ref 4.8–10.8)

## 2022-02-19 PROCEDURE — 700102 HCHG RX REV CODE 250 W/ 637 OVERRIDE(OP): Performed by: INTERNAL MEDICINE

## 2022-02-19 PROCEDURE — A9270 NON-COVERED ITEM OR SERVICE: HCPCS | Performed by: INTERNAL MEDICINE

## 2022-02-19 PROCEDURE — 36600 WITHDRAWAL OF ARTERIAL BLOOD: CPT

## 2022-02-19 PROCEDURE — 84100 ASSAY OF PHOSPHORUS: CPT

## 2022-02-19 PROCEDURE — 94799 UNLISTED PULMONARY SVC/PX: CPT

## 2022-02-19 PROCEDURE — 87070 CULTURE OTHR SPECIMN AEROBIC: CPT

## 2022-02-19 PROCEDURE — 99292 CRITICAL CARE ADDL 30 MIN: CPT | Mod: 25 | Performed by: INTERNAL MEDICINE

## 2022-02-19 PROCEDURE — 94640 AIRWAY INHALATION TREATMENT: CPT

## 2022-02-19 PROCEDURE — 31645 BRNCHSC W/THER ASPIR 1ST: CPT | Performed by: INTERNAL MEDICINE

## 2022-02-19 PROCEDURE — 82803 BLOOD GASES ANY COMBINATION: CPT

## 2022-02-19 PROCEDURE — 31624 DX BRONCHOSCOPE/LAVAGE: CPT | Performed by: INTERNAL MEDICINE

## 2022-02-19 PROCEDURE — 700101 HCHG RX REV CODE 250: Performed by: INTERNAL MEDICINE

## 2022-02-19 PROCEDURE — 87077 CULTURE AEROBIC IDENTIFY: CPT | Mod: 91

## 2022-02-19 PROCEDURE — 87106 FUNGI IDENTIFICATION YEAST: CPT

## 2022-02-19 PROCEDURE — 87147 CULTURE TYPE IMMUNOLOGIC: CPT

## 2022-02-19 PROCEDURE — 99291 CRITICAL CARE FIRST HOUR: CPT | Mod: 25 | Performed by: INTERNAL MEDICINE

## 2022-02-19 PROCEDURE — 94003 VENT MGMT INPAT SUBQ DAY: CPT

## 2022-02-19 PROCEDURE — 5A1945Z RESPIRATORY VENTILATION, 24-96 CONSECUTIVE HOURS: ICD-10-PCS | Performed by: STUDENT IN AN ORGANIZED HEALTH CARE EDUCATION/TRAINING PROGRAM

## 2022-02-19 PROCEDURE — 71045 X-RAY EXAM CHEST 1 VIEW: CPT

## 2022-02-19 PROCEDURE — 770022 HCHG ROOM/CARE - ICU (200)

## 2022-02-19 PROCEDURE — 94669 MECHANICAL CHEST WALL OSCILL: CPT

## 2022-02-19 PROCEDURE — 85025 COMPLETE CBC W/AUTO DIFF WBC: CPT

## 2022-02-19 PROCEDURE — 80053 COMPREHEN METABOLIC PANEL: CPT

## 2022-02-19 PROCEDURE — 87186 SC STD MICRODIL/AGAR DIL: CPT | Mod: 91

## 2022-02-19 PROCEDURE — 94002 VENT MGMT INPAT INIT DAY: CPT

## 2022-02-19 PROCEDURE — 84145 PROCALCITONIN (PCT): CPT

## 2022-02-19 PROCEDURE — 700111 HCHG RX REV CODE 636 W/ 250 OVERRIDE (IP): Performed by: STUDENT IN AN ORGANIZED HEALTH CARE EDUCATION/TRAINING PROGRAM

## 2022-02-19 PROCEDURE — 83735 ASSAY OF MAGNESIUM: CPT

## 2022-02-19 PROCEDURE — 700111 HCHG RX REV CODE 636 W/ 250 OVERRIDE (IP): Performed by: INTERNAL MEDICINE

## 2022-02-19 PROCEDURE — A9270 NON-COVERED ITEM OR SERVICE: HCPCS | Performed by: STUDENT IN AN ORGANIZED HEALTH CARE EDUCATION/TRAINING PROGRAM

## 2022-02-19 PROCEDURE — 302136 NUTRITION PUMP: Performed by: INTERNAL MEDICINE

## 2022-02-19 PROCEDURE — 700105 HCHG RX REV CODE 258: Performed by: INTERNAL MEDICINE

## 2022-02-19 PROCEDURE — 302978 HCHG BRONCHOSCOPY-DIAGNOSTIC

## 2022-02-19 PROCEDURE — 87205 SMEAR GRAM STAIN: CPT

## 2022-02-19 PROCEDURE — 700102 HCHG RX REV CODE 250 W/ 637 OVERRIDE(OP): Performed by: STUDENT IN AN ORGANIZED HEALTH CARE EDUCATION/TRAINING PROGRAM

## 2022-02-19 RX ORDER — LEVETIRACETAM 100 MG/ML
2500 SOLUTION ORAL 2 TIMES DAILY
COMMUNITY
End: 2022-04-21 | Stop reason: SDUPTHER

## 2022-02-19 RX ORDER — IPRATROPIUM BROMIDE AND ALBUTEROL SULFATE 2.5; .5 MG/3ML; MG/3ML
3 SOLUTION RESPIRATORY (INHALATION)
Status: DISCONTINUED | OUTPATIENT
Start: 2022-02-19 | End: 2022-02-27 | Stop reason: HOSPADM

## 2022-02-19 RX ORDER — FAMOTIDINE 20 MG/1
20 TABLET, FILM COATED ORAL EVERY 12 HOURS
Status: DISCONTINUED | OUTPATIENT
Start: 2022-02-19 | End: 2022-02-27 | Stop reason: HOSPADM

## 2022-02-19 RX ORDER — IPRATROPIUM BROMIDE AND ALBUTEROL SULFATE 2.5; .5 MG/3ML; MG/3ML
3 SOLUTION RESPIRATORY (INHALATION)
Status: DISCONTINUED | OUTPATIENT
Start: 2022-02-19 | End: 2022-02-19

## 2022-02-19 RX ORDER — LACTOBACILLUS RHAMNOSUS GG 10B CELL
1 CAPSULE ORAL
Status: DISCONTINUED | OUTPATIENT
Start: 2022-02-20 | End: 2022-02-27 | Stop reason: HOSPADM

## 2022-02-19 RX ORDER — TOPIRAMATE 50 MG/1
50-75 TABLET, FILM COATED ORAL 2 TIMES DAILY
COMMUNITY
End: 2022-02-28 | Stop reason: SDUPTHER

## 2022-02-19 RX ORDER — ERYTHROMYCIN 5 MG/G
OINTMENT OPHTHALMIC EVERY 6 HOURS
Status: DISCONTINUED | OUTPATIENT
Start: 2022-02-19 | End: 2022-02-27 | Stop reason: HOSPADM

## 2022-02-19 RX ORDER — VITAMIN B COMPLEX
1000 TABLET ORAL DAILY
Status: DISCONTINUED | OUTPATIENT
Start: 2022-02-19 | End: 2022-02-27 | Stop reason: HOSPADM

## 2022-02-19 RX ADMIN — LEVETIRACETAM 1500 MG: 100 SOLUTION ORAL at 19:52

## 2022-02-19 RX ADMIN — PHENOBARBITAL 120 MG: 20 ELIXIR ORAL at 09:01

## 2022-02-19 RX ADMIN — CLONAZEPAM 1 MG: 1 TABLET ORAL at 20:29

## 2022-02-19 RX ADMIN — CEFEPIME 2 G: 2 INJECTION, POWDER, FOR SOLUTION INTRAVENOUS at 11:09

## 2022-02-19 RX ADMIN — Medication 1000 UNITS: at 10:45

## 2022-02-19 RX ADMIN — LACOSAMIDE 300 MG: 100 TABLET, FILM COATED ORAL at 20:29

## 2022-02-19 RX ADMIN — Medication 10 MG: at 10:28

## 2022-02-19 RX ADMIN — TOPIRAMATE 50 MG: 100 TABLET, FILM COATED ORAL at 10:25

## 2022-02-19 RX ADMIN — AZITHROMYCIN MONOHYDRATE 500 MG: 250 TABLET ORAL at 09:08

## 2022-02-19 RX ADMIN — DEXAMETHASONE 6 MG: 6 TABLET ORAL at 09:13

## 2022-02-19 RX ADMIN — ERYTHROMYCIN: 5 OINTMENT OPHTHALMIC at 17:34

## 2022-02-19 RX ADMIN — CLONAZEPAM 0.5 MG: 0.5 TABLET ORAL at 09:19

## 2022-02-19 RX ADMIN — TOPIRAMATE 75 MG: 25 TABLET, FILM COATED ORAL at 21:10

## 2022-02-19 RX ADMIN — SENNOSIDES AND DOCUSATE SODIUM 2 TABLET: 50; 8.6 TABLET ORAL at 09:16

## 2022-02-19 RX ADMIN — CEFTRIAXONE SODIUM 1 G: 10 INJECTION, POWDER, FOR SOLUTION INTRAVENOUS at 09:14

## 2022-02-19 RX ADMIN — ENOXAPARIN SODIUM 40 MG: 40 INJECTION SUBCUTANEOUS at 09:30

## 2022-02-19 RX ADMIN — PHENOBARBITAL 120 MG: 20 ELIXIR ORAL at 19:52

## 2022-02-19 RX ADMIN — LACOSAMIDE 300 MG: 100 TABLET, FILM COATED ORAL at 09:27

## 2022-02-19 RX ADMIN — CEFEPIME 2 G: 2 INJECTION, POWDER, FOR SOLUTION INTRAVENOUS at 21:10

## 2022-02-19 RX ADMIN — ERYTHROMYCIN: 5 OINTMENT OPHTHALMIC at 13:06

## 2022-02-19 RX ADMIN — FAMOTIDINE 20 MG: 20 TABLET ORAL at 09:23

## 2022-02-19 RX ADMIN — LEVETIRACETAM 1500 MG: 100 SOLUTION ORAL at 08:33

## 2022-02-19 RX ADMIN — FAMOTIDINE 20 MG: 20 TABLET ORAL at 17:31

## 2022-02-19 ASSESSMENT — PAIN DESCRIPTION - PAIN TYPE
TYPE: CHRONIC PAIN

## 2022-02-19 ASSESSMENT — FIBROSIS 4 INDEX: FIB4 SCORE: 1.95

## 2022-02-19 NOTE — CARE PLAN
The patient is Watcher - Medium risk of patient condition declining or worsening    Shift Goals  Clinical Goals: Maintain airway and SpO2, hemodynamic stability  Patient Goals: TRAVIS  Family Goals: Getting home meds ordered    Progress made toward(s) clinical / shift goals:    Problem: Knowledge Deficit - Standard  Goal: Patient and family/care givers will demonstrate understanding of plan of care, disease process/condition, diagnostic tests and medications  Outcome: Progressing     Problem: Fall Risk  Goal: Patient will remain free from falls  Outcome: Progressing     Problem: Communication  Goal: The ability to communicate needs accurately and effectively will improve  Outcome: Progressing     Problem: Infection - Standard  Goal: Patient will remain free from infection  Outcome: Progressing       Patient is not progressing towards the following goals:      Problem: Skin Integrity  Goal: Skin integrity is maintained or improved  Outcome: Not Progressing     Problem: Respiratory  Goal: Patient will achieve/maintain optimum respiratory ventilation and gas exchange  Outcome: Not Progressing     Problem: Wound/ / Incision Healing  Goal: Patient's wound/surgical incision will decrease in size and heals properly  Outcome: Not Progressing

## 2022-02-19 NOTE — PROGRESS NOTES
Spoke at the bedside with patient's mother Susana and her  again regarding plan of care. Despite previous discussion of not using wound care supplies until approved by wound care RN Susana used the supplies to perform wound care and stated that I had said she could. Re-educated her that she is not to perform patient care unless RN is present and it is deemed okay for her to perform. Wound care bag removed from room and locked in drawer as well. Informed Susana that if she continues to perform patient care without approval and RN presence that she would be escorted out by security, she expressed understanding of this. Lastly, updated Susana that she will not be able to provide patient with outside supplements into his tube feed, informed her that I would dispose of tube feed with supplements she had already mixed.

## 2022-02-19 NOTE — ASSESSMENT & PLAN NOTE
Continue the AED regimen noted below:  Phenobarbital 120 mg BID  Clonazepam 0.5 mg qam and 1mg qpm  Lacosamide 30 0mg BID  Levetiracetum 1500 mg BID  Topiramate 50 mg qam and 75 mg pm  No obvious seizure activity

## 2022-02-19 NOTE — PROGRESS NOTES
Received report from MARE Velazquez. Assumed care of patient. Per report received, patient's mother Susana had been using dirty home suctioning equipment to suction patient while removing oxygen and patient's oxygen saturation decreases each time. Susana was also refusing turns for patient, but agreed to have bed assist turn patient. Also per report received, Susana was very concerned with the medications that patient is receiving and wanting them to be given on a very strict timeline. At shift change Susana was found without a mask on and without an isolation gown. She was asked to please put her mask and isolation gown on, which she did without resistance. Susana was also found putting something down patient's PEG tube. Per Marcus, it was pedalyte.     This RN, charge RN Zonia, Dr. Blackburn, and pharmacist Magnolia, went into the room together to discuss plan of care with Susana. Susana explained how there was a delay in medications being administered overnight, as well as medication errors that she caught. It was explained to her that she can not have any home medications in the room and she was agreeable to have them removed. Susana was also told that she can not use her home equipment on patient because we have to make sure that equipment (ie suctioning device) is sterile. She was educated that we have to be the ones to provide invasive care to patient, so to please let the RNs and RT suction patient. She was educated to hit the call light if she needs assistance. She wanted a guarantee that somebody would be in immediately to suction patient if he needed it. It was explained to her that we might not be able to immediately suction the patient due to having other patients, but if her son's oxygen saturation decreases, we will know immediately because of a monitor tech watching the vital signs on her son, and we will respond quickly. Otherwise, if the patient is not in distress, we will be in to answer the call light and  suction Ruben as quickly as we can, while also caring for our other patients. She was hesitant to this plan, but agreeable.     Susana requests vitamin D, a probiotic, an X ray, electrolyte replacement and for Ruben's neurologist to be notified that patient is having seizures. This RN did not witness seizure activity, but Dr. Blackburn said that she would notify the neurologist that the patient is here and/or is having seizures. Dr. Blackburn said that she would look over the chart and order what is deemed appropriate for Ruben's care. Susana is requesting that Ruben's home diet be resumed as soon as possible. Dietary consult placed. It was discussed with Susana that patient needs to be turned from side to side every 2 hours to prevent further skin breakdown; she says that she already worked it out with Marcus overnight and that the bed rotating is what she prefers.    Susana worked with Magnolia (pharmacist) to schedule medications as Susana would like them scheduled. The 0730 ones could not be rescheduled, so it is ok per Dr. Blackburn and Magnolai, to administer morning medications when Susana would like them to be administered. When administering medications, each one is discussed with Susana and she verifies that dosing is correct.     With the help of the Charge RN, Zonia, home medications and home equipment were removed from the room. Medications were sent to pharmacy, and home equipment is in the bottom drawer of the nursing .

## 2022-02-19 NOTE — H&P
Critical Care History & Physical Note    Date of Service  2/18/2022    Primary Care Physician  ALONDRA Pierre    Code Status  Full Code    Chief Complaint  hypoxia    History of Presenting Illness  Ruben Edwards is a 41 y.o. male with history of TBI, cerebral atrophy, epilepsy, spastic etraplegia, aspiration pneumonias, trach and peg who was transferred from OSH on 2/18/2022 for hypoxic respiratory failure 2/2 COVID and possibly bacterial pneumonia. Per mother, patient was more lethargic and required supplemental O2 at home starting around 2/16 and was admitted on 2/17/22. He was found to be COVID positive and had bilateral infiltrates on imaging also c/f possible superimposed bacterial pneumonia. Patient was started on ceftriaxone, azithromycin and dexamethasone.   Patient was placed on 15L T-piece with initial SpO2 in mid 80s that improved to low 90s after suctioning thick sputum from his trach and CPT. He was also noted to have some mucopurulent drainage from G-tube site.    I discussed the plan of care with family, bedside RN, charge RN, pharmacy and RT.    Review of Systems  Review of Systems   Unable to perform ROS: Medical condition       Past Medical History   has a past medical history of Seizure (HCC) and Traumatic brain injury (HCC) (05/15/1998).    Surgical History   has a past surgical history that includes laryngoscopy (7/2/2017); bronchoscopy (7/2/2017); tracheostomy (7/2/2017); and esophagoscopy (7/2/2017).     Family History  family history is not on file.   Family history reviewed with patient. There is no family history that is pertinent to the chief complaint.     Social History   reports that he has never smoked. He has never used smokeless tobacco. He reports current drug use. Drug: Oral. He reports that he does not drink alcohol.    Allergies  Allergies   Allergen Reactions   • Sulfa Drugs Unspecified and Rash     Per caretaker, mother is allergic to sulfa so believes her son  could be as well   • Chlorhexidine Rash     Mom refuses CHG bath       Medications  Prior to Admission Medications   Prescriptions Last Dose Informant Patient Reported? Taking?   Cyanocobalamin (VITAMIN B-12) 1000 MCG/15ML Liquid  Family Member Yes No   Si,000 mcg by Gastrostomy Tube route every day.   PHENobarbital 20 MG/5ML Elixir   No No   Sig: TAKE 30ML BY MOUTH TWICE DAILY.   Pain Pump (PATIENT SUPPLIED) XX RENE  Historical Yes No   Sig: by Injection route Continuous. Patient's Pain Pump (placed and maintained as an outpatient)  Medications/concentrations:   Baclofen 3000mcg/ml  Route: Intrathecal  Date of Placement: 2014  Last changed: 19  Continuous infusion rates (Drug/Rate):  635.5mcg/day  Patient activation dose: N/A  Patient activation lockout interval: N/A  Maximum activations per day: N/A  Next refill due: 19   clonazePAM (KLONOPIN) 0.5 MG Tab   No No   Sig: TAKE 1/2 TABLET BY MOUTH IN THE MORNING AND 1 AT BEDTIME.   ergocalciferol (CALCIFEROL) 8000 Unit/mL Solution   No No   Sig: Take 1 mL by mouth every day for 165 days.   lacosamide (VIMPAT) 100 MG Tab tablet   No No   Sig: Take 1 Tablet by mouth 2 times a day for 180 days.   lacosamide (VIMPAT) 200 MG Tab tablet   No No   Sig: Take 1 Tablet by mouth 2 times a day for 180 days.   levETIRAcetam (KEPPRA) 100 MG/ML Solution   No No   Sig: TAKE 15ML'S BY MOUTH EVERY TWELVE HOURS.   potassium citrate (UROCIT-K) 5 MEQ (540 MG) Tab CR   Yes No   Sig: Take 5 mEq by mouth every day.   topiramate (TOPAMAX) 50 MG tablet   No No   Sig: Take 1 Tablet by mouth every day AND 1.5 Tablets at bedtime.      Facility-Administered Medications: None       Physical Exam  Temp:  [36.1 °C (96.9 °F)-36.4 °C (97.6 °F)] 36.1 °C (96.9 °F)  Pulse:  [71-75] 75  Resp:  [15-22] 15  BP: (119)/(67-77) 119/67  SpO2:  [86 %-90 %] 90 %  Blood Pressure: 119/67   Temperature: 36.4 °C (97.6 °F)   Pulse: 75   Respiration: 15   Pulse Oximetry: (!) 86 %       Physical  Exam  Vitals and nursing note reviewed.   Constitutional:       General: He is not in acute distress.     Comments: Opens eyes and looks around but doesn't follow commands   HENT:      Mouth/Throat:      Mouth: Mucous membranes are moist.      Comments: Uncuffed 6 trach in place with t-piece on. Significant thick secretions from trach  Eyes:      Conjunctiva/sclera: Conjunctivae normal.   Cardiovascular:      Rate and Rhythm: Normal rate.   Pulmonary:      Effort: Pulmonary effort is normal.      Comments: Crackles bilaterally with diminished breath sounds at bases  Abdominal:      Comments: g-tube in place with some mucopurulent discharge from around it   Musculoskeletal:      Comments: BUEs and BLEs are contracted         Laboratory:          No results for input(s): ALTSGPT, ASTSGOT, ALKPHOSPHAT, TBILIRUBIN, DBILIRUBIN, GAMMAGT, AMYLASE, LIPASE, ALB, PREALBUMIN, GLUCOSE in the last 72 hours.      No results for input(s): NTPROBNP in the last 72 hours.      No results for input(s): TROPONINT in the last 72 hours.    Imaging:  DX-CHEST-PORTABLE (1 VIEW)   Final Result      1.  Worsening aeration of the lungs with near complete opacification of the right hemithorax and increasing consolidation in the left lung predominantly in the lower lobe.   2.  There are small bilateral pleural effusion.      OUTSIDE IMAGES-US ABDOMEN   Final Result      OUTSIDE IMAGES-DX CHEST   Final Result          Assessment/Plan:  I anticipate this patient will require at least two midnights for appropriate medical management, necessitating inpatient admission.    * Acute respiratory failure with hypoxia (HCC)- (present on admission)  Assessment & Plan  Patent with ARDS 2/2 COVID pneumonia and likely superimposed bacterial pneumonia - ?aspiration.    - continue 15L flow via t-piece  - continue ceftriaxone and azithromycin at this time  - f/u respiratory culture  - CPT BID  - continue decadron for COVID  - not a candidate for additional  COVID therapies due to elevated LFTs     Epilepsy (HCC)- (present on admission)  Assessment & Plan  Continue the AED regimen noted below - it has been noted on chart review that it was discussed that some of these medications are above the recommended dose ranges but patient's mother states that this has been approved by his neurologist and this is the regimen that prevents his seizures, otherwise he has seizures if they are reduced/changed by us.     Phenobarbital 120mg BID  Clonazepam 0.5mg qam and 1mg qpm  lacosamide 300mg BID  keppra 1500mg BID  topiramate 50mg qam and 50mg pm    Protein-calorie malnutrition, severe (HCC)- (present on admission)  Assessment & Plan  Tube feeds  - nutrition consult    Contraction, joint, multiple sites- (present on admission)  Assessment & Plan  Positioning and turning per protocol        VTE prophylaxis: SCDs/TEDs and enoxaparin ppx         Ariana Vasquez MD  Pulmonary and Critical Care Medicine  [facility]

## 2022-02-19 NOTE — ASSESSMENT & PLAN NOTE
COVID-19 pneumonia  Dexamethasone  Fluid balance - keep euvolemic    Ventilator dependent respiratory failure  Modify ventilator to optimize oxygenation and ventilation  HOB > 30  Chlorhexidine  Perform spontaneous breathing trial when able  Early mobility    PSV trials --> transition to T-piece 2/24    Tracheal aspirate culture psuedomonas (pan sensitive) - continue cefepime x 7 days

## 2022-02-19 NOTE — PROGRESS NOTES
Per Dr. Blackburn, she spoke with the dietician and patient will not be getting his home feeding regimen while in the hospital. Orders received to order the tube feeding order set and to start tube feeds as per that order set. Orders verified and implemented.

## 2022-02-19 NOTE — PROGRESS NOTES
Dr. Dooley notified Pt's mother Susana states that pt's leg twitching during bed bath is seizure activity. Susana declined to let this RN q2 turn the patient on all sides due to current wounds, found a compromise in using the bed's automatic turning system to prevent further skin breakdown.   Due to evening meds being given later than intended, pt's mother Susana declined to let this RN give AM Meds at scheduled time of 0600. Re-timed for 0730.

## 2022-02-19 NOTE — PROGRESS NOTES
Patient's mother Susana was waved down by this nurse for more sterile water. This RN went to patient's room and asked Susana what she needed the sterile water for. She said she was flushing his feeding tube and preparing to give him his tube feed because the dietician came and took note of her tube feed and supplements. Tube feed mixed with supplements was removed from room and this RN spoke with Dr. Blackburn and Magnolia Coelho, escalating to pharmacy leadership regarding supplements and reaching out to dietary at Susana's request to see if home tube feed and/or supplements would be allowed.

## 2022-02-19 NOTE — DIETARY
"Nutrition Support Assessment:  Day 1 of admit.  Ruben Edwards is a 41 y.o. male with admitting DX of acute respiratory distress syndrome d/t COVID-19 virus     Current problem list:  1. Acute respiratory distress syndrome d/t COVID-19 virus  2. Epilepsy  3. Contraction, joint, multiple sites  4. Protein-calorie malnutrition, severe  5. Acute respiratory failure with hypoxia    Spoke with pt's mom Susana outside of pt's room d/t COVID-19 isolation precautions to discuss home tube feeding regimen. Susana states that patients current home TF regimen is:   Mixed in a 800 ml victor manuel jar:  3 cartons of Plan Me Up 1.0 (975 kcals, 48g protein)  1 scoop egg whites protein powder  1/2 scoop 'Full Meals' protein powder  1 tbs wheat germ (some days)  1 small scoop green powder     Susana states she gives bolus feeds of 60 cc every 45 minutes until the tube feeding mixture is empty in victor manuel jar with water flushes of 40 cc after every bolus feeding and 40 cc water flushes after medications. Susana was unable to provide more information as she stated she had to go to rounds.     Per Susana, pt has not had any recent weight changes. Based on weight hx in chart, pt has gained 3.5 lbs since 10/04/2021.    Assessment:  Estimated Nutritional Needs based on:   Height: 177.8 cm (5' 10\")  Weight: 58.3 kg (128 lb 8.5 oz)- bed scale  Weight to Use in Calculations: 58.3 kg (128 lb 8.5 oz)  Body mass index is 18.44 kg/m²., BMI classification: Normal Weight    Calculation/Equation: [place] x 1.2 = 1794 kcals  PSU (vent L/min 9, Tmax 37.3 C)= 1731 kcals  Total Calories / day: 1574- 1807 (Calories / k - 31)  Total Grams Protein / day: 82 - 105 (Grams Protein / k.4 - 1.8)     Evaluation:   1. Consult for tube feeding, pt receives bolus tube feedings at home via PEG tube  2. Pt was intubated today  3. Pt w/ history of TBI and with a tracheostomy per MD note  4. Per MD, pt will not be getting home TF regimen and recommends to use the tube feeding " formula provided at hospital  5. Labs: Bun 7, creatinine <0.17, , , Alk Phos 605, albumin 2.4  6. Meds: dexamethasone, famotidine, Culturelle, senna-docusate, vitamin D3, bowel regimen  7. Skin: Pressure injury buttocks noted, wound team consult pending.   8. GI: Last BM PTA  9. Recommend a peptide-based high protein formula to meet pt's estimated protein needs and to aid in wound healing.     Malnutrition Risk: Unable to fully assess at this time d/t COVID-19 isolation precautions. Pt at risk d/t severe protein-calorie malnutrition diagnosis in hospital problems list.      Recommendations/Plan:  1. Tube feeding Impact Peptide 1.5 continuous @ goal rate of 45 ml/hr which provides 1620 kcals, 102g protein, 832 ml free water.   2. Initiate tube feeding slowly, start @ 10 ml/hr and advance by 10 ml q 12 hours until goal is reached.   3. Fluids per MD   4. Monitor Labs and Weights    RD following.

## 2022-02-19 NOTE — PROCEDURES
Procedure Note    Date: 2/19/2022  Time: 11:00    Procedure: Bronchoscopy with bronchoalveolar lavage and therapeutic aspiration of secretions from bronchi    Indication: acute hypoxic respiratory failure, COVID pneumonia, rule out bacterial pneumonia  Consent: Informed consent obtained from patient or designated decision maker after explaining the benefits/risks of the procedure including but not limited to bleeding, infection, airway trauma or loss therof, pneumothorax/hemothorax, arrythmia, or death. Patient or surrogate expressed understanding and agreement.    Time-out: Written consent was obtained. Immediately prior to procedure, a time out was called to verify the correct patient, procedure, equipment, support staff and site/side marked as required. Pre-procedure pain reported as 0/10 and post-procedure was 0/10.    Procedure: After obtaining consent, a time-out was performed. Respiratory therapy and nursing at bedside throughout procedure. Patient provided sedation and analgesia throughout the procedure. Placed on full ventilator support with an FiO2 of 100% throughout the procedure. Using a fiberoptic bronchoscope, trachea entered via 6.0 trach.  0 mL of local anesthetic sprayed at the kiersten (2% lidocaine) achieving appropriate comfort level for patient. Airways visualized directly and the following intervention was performed: diagnostic and therapeutic lavage with all areas of lungs suctioned to clear. Findings as below. Patient tolerated procedure well without any difficulties and left in care of bedside nurse/RT.     Medications: none, patient in vegetative state  Findings:   Upper airway - Not visualized as bronchoscope passed through tracheostomy.  Trachea to kiersten - slight hyperemic and edematous appearing mucosa without lesions or mass, tracheostomy tip measured 4-5 cm from the kiersten.  Right mainstem, RUL, RML, RLL and distal airways - somewhat edematous appearing mucosa without  mass/lesion/anatomic variance, secretions: copious amounts of white thin secretions noted to all lobes that was lavaged and suctioned to clear  Left mainstem, JONATAN, lingula, LLL and distal airways - somewhat edematous appearing mucosa without mass/lesion/anatomic variance, secretions: copious amounts of white thin secretions noted to all lobes that was lavaged and suctioned to clear        Samples - RLL of bronchi with multiple aliquots of saline used to lavage and collect for BAL    Complications: none  CXR (if applicable): n/a    Darleen Blackburn MD  Pulmonary and Critical Care Medicine

## 2022-02-19 NOTE — CARE PLAN
Problem: Humidified High Flow Nasal Cannula  Goal: Maintain adequate oxygenation dependent on patient condition  Description: Target End Date:  resolve prior to discharge or when underlying condition is resolved/stabilized    1.  Implement humidified high flow oxygen therapy  2.  Titrate high flow oxygen to maintain appropriate SpO2  Outcome: Not Met   50/100

## 2022-02-19 NOTE — PROGRESS NOTES
Late Entry    This RN, Cata RN, Dr. Blackburn, and Magnolia, PharmD, spoke with patient's mother at bedside about plan of care for patient. Educated patient that she can't have home meds present in the room, home medications removed from room and being sent to pharmacy per protocol. Also educated Susana that she can't perform care on the patient without RNs knowledge and approval as she was found to be suctioning patient overnight with own equipment, disconnecting T-piece, and attempting to administer medications. Explained to Susana that all home supplies need to be removed from the room. Susana agreed to removal of supplies and placed them in belongings bag which is stored in locked  outside of patient's room. Susana then showed us a bag of wound care supplies and was instructed by this RN that we would keep the wound care supplies out to have a wound care RN come look at and see if the supplies were appropriate and okay for patient's wounds, Susana was agreeable to this. Pharmacy and the MD reinforced the plan of care. Susana was invited to participate in patient rounds.

## 2022-02-19 NOTE — PROGRESS NOTES
Susana is requesting that patient have a suppository. Ok per Dr. Blackburn to administer the suppository that is already ordered on the MAR. Magnesium hydroxide does not have to be administered first.     Dr. Blackburn notified in hot rounds that patient only has one PIV. Order to be placed in Epic by MD for a picc line.

## 2022-02-19 NOTE — ED NOTES
Med Rec partially completed per patient's mom over the phone   Allergies reviewed  No ORAL antibiotics in last 30 days      Patient has Baclofen Pump, need to call [facility] office. They are only open Mon-Fri [id number] Ph. [phone]

## 2022-02-19 NOTE — PROGRESS NOTES
Monitor Summary    Rhythm: SR  Heart Rate: 60-90s  Ectopy: rare pvcs  Measurements: .12/.08/.36    12 hr chart check
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
## 2022-02-19 NOTE — PROGRESS NOTES
Received bedside report from Lisbeth GARVEY, mom,Susana, at bedside. Full bed bath completed, pt has CHG allergy. Skin note completed by day RN, wound photos taken, consult placed. Pt opens eyes to physical stimulation only, does not follow commands. Pt's mom Susana has all of patient's home meds at bedside, refusing to give this this RN to take to pharmacy for verification.  aware. This RN stressed importance of Mother not giving the pt any of his home meds. This RN completed med rec with Susana. See Charge RN Stacey pelaez for detailed account of events.     Pt's mom Susana repeatedly disconnecting pt's T-piece and high flow to suction trach. In-line suctioning connected and available for use. Education provided about oxygenation and maintaining sterility.  Pt's SpO2 dropped to 81% Fio2 raised back to 100%. Rt notified of changes.

## 2022-02-19 NOTE — PROGRESS NOTES
Susana was very insistent on doing wound care. She said that the Charge RN Zonia told her that it was ok for her to have wound care supplies at bedside. It was explained to her that a wound care consult has been placed, but she was still insistent on doing wound care. She put on some specialized wound cream and covered it with gauze.

## 2022-02-19 NOTE — RESPIRATORY CARE
Results for JAVED IGLESIAS (MRN [id number]) as of 2/19/2022 13:59   Ref. Range 2/19/2022 13:12   Ph Latest Ref Range: 7.400 - 7.500  7.400   Pco2 Latest Ref Range: 26.0 - 37.0 mmHg 43.2 (H)   Po2 Latest Ref Range: 64 - 87 mmHg 46 (LL)   Hco3 Latest Ref Range: 17.0 - 25.0 mmol/L 26.8 (H)   BE Latest Ref Range: -4 - 3 mmol/L 2   Tco2 Latest Ref Range: 20 - 33 mmol/L 28   S02 Latest Ref Range: 93 - 99 % 81 (L)   Ph Temp Jose R Latest Ref Range: 7.400 - 7.500  7.404   Pco2 Temp Co Latest Ref Range: 26.0 - 37.0 mmHg 42.8 (H)   Po2 Temp Cor Latest Ref Range: 64 - 87 mmHg 45 (LL)        Attempted femoral stick, was only able to obtain venous blood

## 2022-02-19 NOTE — PROGRESS NOTES
Late Entry    Called to bedside by patient's nurse due to patient's Mom, Susana being upset about his medications being late and stating she will give him his home medications.  At initial time of discussion with Susana the patient's home medications were in the process of being ordered by Dr. Schmitz.  Reviewed the patient's medication list from the prior facility with Mom to verify doses and frequency.  Identified a few discrepancies from our orders and notified Dr. Schmitz.  One difference was rocephin frequency.  At prior facility, rocephin was ordered BID.  Discussed with Dr. Schmitz.  Per Pharmacy, BID rocephin is not indicated for the patient and contraindicated due to his weight and other reasons.  Communicated this information to his nurse who told Susana.  Susana indicated she understood.  Patient's primary nurse and myself contacted pharmacy and requested medications to be approved and sent up ASAP.   I explained to Susana that she cannot administer the patient's home medications and they need to be taken home.  Dr. Schmitz came to bedside to discuss medication doses and also educated on the need for medications to be dispensed by pharmacy and administered by nursing staff.  Susana indicated she understood.      Returned back to bedside due to medications not arriving and mom repeatedly stating she was going to give the patient's home medications.  At this time some of the medications had been received.  While in the room with patient's nurse and the mom we all realized one of the medications was the wrong dose.  Mom again stated she was going to give the patient his home medications and brought the medications to the bedside table.  At this point, I told Susana she could not administer medications and if she attempted to I would unfortunately have to ask her to leave.  Susana then went on to say the [facility] website states to bring in your medications and we will administer them.  I explained that we  do allow home medications to be used if we do not stock them and that they had to go to pharmacy to be verified.  I told her  that we understand her concerns for her son and that we are all frustrated in the delay of medications but that does not warrant her administering medications that have not been verified.  She stated she understood and promised she would not administer any medications.  I told her I would be documenting the delays and had escalated her concerns to the NAM who would be coming to speak to her.  Dr. Schmitz then came and spoke to her multiple times regarding her concerns and MK Vee also came and spoke with her for an extended amount of time.

## 2022-02-19 NOTE — PROGRESS NOTES
4 Eyes Skin Assessment Completed by MARE Bob and MARE Flores.    Head WDL  Ears Blanching  Nose WDL  Mouth WDL  Neck WDL  Breast/Chest WDL  Shoulder Blades WDL  Spine WDL  (R) Arm/Elbow/Hand WDL  (L) Arm/Elbow/Hand WDL  Abdomen WDL  Groin Redness  Scrotum/Coccyx/Buttocks Non-Blanching  (R) Leg WDL  (L) Leg WDL  (R) Heel/Foot/Toe WDL  (L) Heel/Foot/Toe WDL          Devices In Places ECG, Blood Pressure Cuff, Pulse Ox, Tracheostomy and G Tube      Interventions In Place TAP System, Q2 Turns, Low Air Loss Mattress, Barrier Cream and Dri-Tommy Pads    Possible Skin Injury Yes    Pictures Uploaded Into Epic Yes  Wound Consult Placed Yes  RN Wound Prevention Protocol Ordered Yes

## 2022-02-20 ENCOUNTER — HOSPITAL ENCOUNTER (OUTPATIENT)
Dept: RADIOLOGY | Facility: MEDICAL CENTER | Age: 42
End: 2022-02-20
Attending: INTERNAL MEDICINE
Payer: COMMERCIAL

## 2022-02-20 LAB
ALBUMIN SERPL BCP-MCNC: 2.3 G/DL (ref 3.2–4.9)
ALBUMIN/GLOB SERPL: 0.6 G/DL
ALP SERPL-CCNC: 477 U/L (ref 30–99)
ALT SERPL-CCNC: 87 U/L (ref 2–50)
ANION GAP SERPL CALC-SCNC: 12 MMOL/L (ref 7–16)
ANION GAP SERPL CALC-SCNC: 9 MMOL/L (ref 7–16)
ANISOCYTOSIS BLD QL SMEAR: ABNORMAL
AST SERPL-CCNC: 73 U/L (ref 12–45)
BASE EXCESS BLDA CALC-SCNC: 2 MMOL/L (ref -4–3)
BASOPHILS # BLD AUTO: 1.7 % (ref 0–1.8)
BASOPHILS # BLD: 0.06 K/UL (ref 0–0.12)
BILIRUB SERPL-MCNC: 0.9 MG/DL (ref 0.1–1.5)
BODY TEMPERATURE: NORMAL DEGREES
BUN SERPL-MCNC: 14 MG/DL (ref 8–22)
BUN SERPL-MCNC: 15 MG/DL (ref 8–22)
CALCIUM SERPL-MCNC: 7.7 MG/DL (ref 8.5–10.5)
CALCIUM SERPL-MCNC: 8 MG/DL (ref 8.5–10.5)
CHLORIDE SERPL-SCNC: 101 MMOL/L (ref 96–112)
CHLORIDE SERPL-SCNC: 102 MMOL/L (ref 96–112)
CO2 BLDA-SCNC: 26 MMOL/L (ref 20–33)
CO2 SERPL-SCNC: 21 MMOL/L (ref 20–33)
CO2 SERPL-SCNC: 24 MMOL/L (ref 20–33)
CREAT SERPL-MCNC: <0.17 MG/DL (ref 0.5–1.4)
CREAT SERPL-MCNC: <0.17 MG/DL (ref 0.5–1.4)
CRP SERPL HS-MCNC: 25.26 MG/DL (ref 0–0.75)
DELSYS IDSYS: NORMAL
EOSINOPHIL # BLD AUTO: 0 K/UL (ref 0–0.51)
EOSINOPHIL NFR BLD: 0 % (ref 0–6.9)
ERYTHROCYTE [DISTWIDTH] IN BLOOD BY AUTOMATED COUNT: 51.8 FL (ref 35.9–50)
GLOBULIN SER CALC-MCNC: 3.7 G/DL (ref 1.9–3.5)
GLUCOSE SERPL-MCNC: 127 MG/DL (ref 65–99)
GLUCOSE SERPL-MCNC: 91 MG/DL (ref 65–99)
HCO3 BLDA-SCNC: 24.9 MMOL/L (ref 17–25)
HCT VFR BLD AUTO: 36.1 % (ref 42–52)
HGB BLD-MCNC: 13 G/DL (ref 14–18)
HOROWITZ INDEX BLDA+IHG-RTO: 168 MM[HG]
LYMPHOCYTES # BLD AUTO: 0.33 K/UL (ref 1–4.8)
LYMPHOCYTES NFR BLD: 8.8 % (ref 22–41)
MACROCYTES BLD QL SMEAR: ABNORMAL
MAGNESIUM SERPL-MCNC: 1.9 MG/DL (ref 1.5–2.5)
MANUAL DIFF BLD: NORMAL
MCH RBC QN AUTO: 34.7 PG (ref 27–33)
MCHC RBC AUTO-ENTMCNC: 36 G/DL (ref 33.7–35.3)
MCV RBC AUTO: 96.3 FL (ref 81.4–97.8)
MODE IMODE: NORMAL
MONOCYTES # BLD AUTO: 0.06 K/UL (ref 0–0.85)
MONOCYTES NFR BLD AUTO: 1.7 % (ref 0–13.4)
MORPHOLOGY BLD-IMP: NORMAL
MYELOCYTES NFR BLD MANUAL: 0.9 %
NEUTROPHILS # BLD AUTO: 3.22 K/UL (ref 1.82–7.42)
NEUTROPHILS NFR BLD: 86 % (ref 44–72)
NEUTS BAND NFR BLD MANUAL: 0.9 % (ref 0–10)
NRBC # BLD AUTO: 0 K/UL
NRBC BLD-RTO: 0 /100 WBC
O2/TOTAL GAS SETTING VFR VENT: 40 %
PCO2 BLDA: 33.1 MMHG (ref 26–37)
PCO2 TEMP ADJ BLDA: 32.4 MMHG (ref 26–37)
PEEP END EXPIRATORY PRESSURE IPEEP: 8 CMH20
PERCENT MINUTE VOLUME IPMV: 100
PH BLDA: 7.48 [PH] (ref 7.4–7.5)
PH TEMP ADJ BLDA: 7.49 [PH] (ref 7.4–7.5)
PHOSPHATE SERPL-MCNC: 2.2 MG/DL (ref 2.5–4.5)
PLATELET # BLD AUTO: 146 K/UL (ref 164–446)
PLATELET BLD QL SMEAR: NORMAL
PMV BLD AUTO: 9.6 FL (ref 9–12.9)
PO2 BLDA: 67 MMHG (ref 64–87)
PO2 TEMP ADJ BLDA: 64 MMHG (ref 64–87)
POIKILOCYTOSIS BLD QL SMEAR: NORMAL
POTASSIUM SERPL-SCNC: 3.5 MMOL/L (ref 3.6–5.5)
POTASSIUM SERPL-SCNC: 3.8 MMOL/L (ref 3.6–5.5)
PROT SERPL-MCNC: 6 G/DL (ref 6–8.2)
RBC # BLD AUTO: 3.75 M/UL (ref 4.7–6.1)
RBC BLD AUTO: PRESENT
SAO2 % BLDA: 95 % (ref 93–99)
SODIUM SERPL-SCNC: 134 MMOL/L (ref 135–145)
SODIUM SERPL-SCNC: 135 MMOL/L (ref 135–145)
SPECIMEN DRAWN FROM PATIENT: NORMAL
TARGETS BLD QL SMEAR: NORMAL
WBC # BLD AUTO: 3.7 K/UL (ref 4.8–10.8)

## 2022-02-20 PROCEDURE — 94003 VENT MGMT INPAT SUBQ DAY: CPT

## 2022-02-20 PROCEDURE — A9270 NON-COVERED ITEM OR SERVICE: HCPCS | Performed by: STUDENT IN AN ORGANIZED HEALTH CARE EDUCATION/TRAINING PROGRAM

## 2022-02-20 PROCEDURE — 83735 ASSAY OF MAGNESIUM: CPT

## 2022-02-20 PROCEDURE — 700101 HCHG RX REV CODE 250: Performed by: INTERNAL MEDICINE

## 2022-02-20 PROCEDURE — 85007 BL SMEAR W/DIFF WBC COUNT: CPT

## 2022-02-20 PROCEDURE — 700102 HCHG RX REV CODE 250 W/ 637 OVERRIDE(OP): Performed by: INTERNAL MEDICINE

## 2022-02-20 PROCEDURE — 94799 UNLISTED PULMONARY SVC/PX: CPT

## 2022-02-20 PROCEDURE — 85027 COMPLETE CBC AUTOMATED: CPT

## 2022-02-20 PROCEDURE — 84100 ASSAY OF PHOSPHORUS: CPT

## 2022-02-20 PROCEDURE — 770022 HCHG ROOM/CARE - ICU (200)

## 2022-02-20 PROCEDURE — 71045 X-RAY EXAM CHEST 1 VIEW: CPT

## 2022-02-20 PROCEDURE — 80048 BASIC METABOLIC PNL TOTAL CA: CPT

## 2022-02-20 PROCEDURE — 84145 PROCALCITONIN (PCT): CPT

## 2022-02-20 PROCEDURE — 700111 HCHG RX REV CODE 636 W/ 250 OVERRIDE (IP): Performed by: INTERNAL MEDICINE

## 2022-02-20 PROCEDURE — A9270 NON-COVERED ITEM OR SERVICE: HCPCS | Performed by: INTERNAL MEDICINE

## 2022-02-20 PROCEDURE — 99291 CRITICAL CARE FIRST HOUR: CPT | Performed by: INTERNAL MEDICINE

## 2022-02-20 PROCEDURE — 94669 MECHANICAL CHEST WALL OSCILL: CPT

## 2022-02-20 PROCEDURE — 700102 HCHG RX REV CODE 250 W/ 637 OVERRIDE(OP): Performed by: STUDENT IN AN ORGANIZED HEALTH CARE EDUCATION/TRAINING PROGRAM

## 2022-02-20 PROCEDURE — 82803 BLOOD GASES ANY COMBINATION: CPT

## 2022-02-20 PROCEDURE — 80053 COMPREHEN METABOLIC PANEL: CPT

## 2022-02-20 PROCEDURE — 99292 CRITICAL CARE ADDL 30 MIN: CPT | Performed by: INTERNAL MEDICINE

## 2022-02-20 PROCEDURE — 36600 WITHDRAWAL OF ARTERIAL BLOOD: CPT

## 2022-02-20 PROCEDURE — 700105 HCHG RX REV CODE 258: Performed by: INTERNAL MEDICINE

## 2022-02-20 PROCEDURE — 86140 C-REACTIVE PROTEIN: CPT

## 2022-02-20 PROCEDURE — 700111 HCHG RX REV CODE 636 W/ 250 OVERRIDE (IP): Performed by: STUDENT IN AN ORGANIZED HEALTH CARE EDUCATION/TRAINING PROGRAM

## 2022-02-20 RX ORDER — MAGNESIUM SULFATE HEPTAHYDRATE 40 MG/ML
2 INJECTION, SOLUTION INTRAVENOUS ONCE
Status: COMPLETED | OUTPATIENT
Start: 2022-02-20 | End: 2022-02-20

## 2022-02-20 RX ADMIN — Medication 1 CAPSULE: at 08:07

## 2022-02-20 RX ADMIN — PHENOBARBITAL 120 MG: 20 ELIXIR ORAL at 20:36

## 2022-02-20 RX ADMIN — Medication 1000 UNITS: at 05:43

## 2022-02-20 RX ADMIN — FAMOTIDINE 20 MG: 20 TABLET ORAL at 17:57

## 2022-02-20 RX ADMIN — ERYTHROMYCIN: 5 OINTMENT OPHTHALMIC at 00:13

## 2022-02-20 RX ADMIN — LEVETIRACETAM 1500 MG: 100 SOLUTION ORAL at 08:08

## 2022-02-20 RX ADMIN — TOPIRAMATE 50 MG: 100 TABLET, FILM COATED ORAL at 08:08

## 2022-02-20 RX ADMIN — DEXAMETHASONE 6 MG: 6 TABLET ORAL at 05:43

## 2022-02-20 RX ADMIN — CEFEPIME 2 G: 2 INJECTION, POWDER, FOR SOLUTION INTRAVENOUS at 14:54

## 2022-02-20 RX ADMIN — CEFEPIME 2 G: 2 INJECTION, POWDER, FOR SOLUTION INTRAVENOUS at 22:19

## 2022-02-20 RX ADMIN — ENOXAPARIN SODIUM 40 MG: 40 INJECTION SUBCUTANEOUS at 05:42

## 2022-02-20 RX ADMIN — MAGNESIUM SULFATE HEPTAHYDRATE 2 G: 2 INJECTION, SOLUTION INTRAVENOUS at 08:09

## 2022-02-20 RX ADMIN — CEFEPIME 2 G: 2 INJECTION, POWDER, FOR SOLUTION INTRAVENOUS at 05:42

## 2022-02-20 RX ADMIN — CLONAZEPAM 1 MG: 1 TABLET ORAL at 21:12

## 2022-02-20 RX ADMIN — CLONAZEPAM 0.5 MG: 0.5 TABLET ORAL at 08:07

## 2022-02-20 RX ADMIN — ERYTHROMYCIN: 5 OINTMENT OPHTHALMIC at 23:09

## 2022-02-20 RX ADMIN — ERYTHROMYCIN: 5 OINTMENT OPHTHALMIC at 05:42

## 2022-02-20 RX ADMIN — PHENOBARBITAL 120 MG: 20 ELIXIR ORAL at 08:14

## 2022-02-20 RX ADMIN — LACOSAMIDE 300 MG: 100 TABLET, FILM COATED ORAL at 21:12

## 2022-02-20 RX ADMIN — FAMOTIDINE 20 MG: 20 TABLET ORAL at 05:43

## 2022-02-20 RX ADMIN — TOPIRAMATE 75 MG: 25 TABLET, FILM COATED ORAL at 22:17

## 2022-02-20 RX ADMIN — LEVETIRACETAM 1500 MG: 100 SOLUTION ORAL at 19:46

## 2022-02-20 RX ADMIN — ERYTHROMYCIN: 5 OINTMENT OPHTHALMIC at 11:11

## 2022-02-20 RX ADMIN — LACOSAMIDE 300 MG: 100 TABLET, FILM COATED ORAL at 08:08

## 2022-02-20 RX ADMIN — POTASSIUM PHOSPHATE, MONOBASIC AND POTASSIUM PHOSPHATE, DIBASIC 15 MMOL: 224; 236 INJECTION, SOLUTION, CONCENTRATE INTRAVENOUS at 08:08

## 2022-02-20 RX ADMIN — ERYTHROMYCIN: 5 OINTMENT OPHTHALMIC at 18:31

## 2022-02-20 ASSESSMENT — PAIN DESCRIPTION - PAIN TYPE
TYPE: CHRONIC PAIN
TYPE: ACUTE PAIN
TYPE: CHRONIC PAIN

## 2022-02-20 NOTE — PROGRESS NOTES
This RN discussed with Susana that there is a wound protocol that has been ordered that gives directions on how to dress wounds before the wound team can assess the wound and put in more specific directions for dressing changes. It was discussed that the wounds on the patient's buttocks should be changed according to this protocol. Susana was agreeable and she helped this RN with dressing changes.

## 2022-02-20 NOTE — PROGRESS NOTES
Susana says that patient is having another seizure. Susana noted that patient is staring off into space. Patient noted by this RN to be having jerking motion in his left leg. Susana says that this is the third seizure this shift.

## 2022-02-20 NOTE — CARE PLAN
The patient is Watcher - Medium risk of patient condition declining or worsening    Shift Goals  Clinical Goals: oxygen saturation over 90%  Patient Goals: TRAVIS  Family Goals: give medications on family's timeline    Progress made toward(s) clinical / shift goals:  Patient's oxygen saturation has remained over 90%. Pharmacy and the health care team has worked with the mom to schedule patient's medications in accordance to how she gives them at home.     Patient is not progressing towards the following goals:      Problem: Skin Integrity  Goal: Skin integrity is maintained or improved  Outcome: Not Progressing  Note: Patient has pressure injuries (they look old and are in varying degrees of healing). Waffle cushion placed to help protect skin. Heals floated. Dressings changed a couple times this shift to help protect pressure injuries. Patient is now being turned q2 hours with positioning wedges.      Problem: Communication  Goal: The ability to communicate needs accurately and effectively will improve  Outcome: Not Progressing  Note: Communication between health care team and patient's mom has been fractured and tense. Patient's mother is very involved and cares for her son 24/7 at home and has done so for many many years. It is difficult for her to let go some of that control and let the health care team care for the patient. Please see social notes regarding interactions. Some progress has been made with patient's mom this afternoon.

## 2022-02-20 NOTE — PROGRESS NOTES
Dr. Cohen notified that Susana is wanting to talk to her about starting IV fluids on her son. Dr. Cohen will be by to speak with Susana.
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
## 2022-02-20 NOTE — PROGRESS NOTES
Bladder scan 703. Per patient's mom he may go up to two days without urinating and will urinate over 1L when he does.  Dr. Blackburn notified. Okay to straight cath >1000.

## 2022-02-20 NOTE — PROGRESS NOTES
Pulmonary/Critical Care Medicine   Progress Note    Date of service: 2/19/2022  Time: 1600    There were multiple encounters with the patient's mother, Susana, who is Ruben's caretaker.  Susana has asked to use home remedies and home tube feeds as well as her own medications.    Ethics consult placed.    Discussed with ethics over the phone, who will be in tomorrow to complete a formal consult and develop and care plan; however, at this time [facility] and its providers will continue to practice based on standards of care in the medical community.    Darleen Blackburn MD  Pulmonary and Critical Care Medicine

## 2022-02-20 NOTE — CARE PLAN
Problem: Ventilation  Goal: Ability to achieve and maintain unassisted ventilation or tolerate decreased levels of ventilator support  Description: Target End Date:  4 days     Document on Vent flowsheet    1.  Support and monitor invasive and noninvasive mechanical ventilation  2.  Monitor ventilator weaning response  3.  Perform ventilator associated pneumonia prevention interventions  4.  Manage ventilation therapy by monitoring diagnostic test results  Outcome: Not Met   V 2  Asv 100 +8 60%

## 2022-02-20 NOTE — PROGRESS NOTES
Critical Care Progress Note    Date of admission  2/18/2022    Chief Complaint  41 y.o. male admitted 2/18/2022 with acute hypoxic respiratory failure due to pneumonia and COVID    Hospital Course  Mr. Edwards is a 41 year old male with the past medical history significant for TBI complicated by need for chronic tracheostomy and PEG, epilepsy, spastic quadriplegia, and aspiration pneumonia who was transferred from [facility] for worsening hypoxic respiratory failure due to Covid and possibly bacterial pneumonia.  The patient had been started on ceftriaxone, azithromycin, and dexamethasone at the outside facility.  The patient was transferred to [facility] for higher level of care.  2/19 - VD#1, s/p bronch with BAL, stopped azithro/C3-->cefepime started, no sedation    Interval Problem Update  Reviewed last 24 hour events:   - no events overnight   - non verbal, grimaces to pain, opens eyes   - no sedation   - SR 60-80s   - SBP 90-120s   - afebrile   - peg in place, TFs at 20cc/hr   - BM yesterday   - condom cath with oliguria   - one PIV   - level 1 mobility   - vent day #2   - CXR(reviewed): worsening bilateral opacities seen   - FiO2 60%, PEEP at 8, SBT for an hour   - pepcid, lovenox   - day 2 of cefepime   - BAL negative so far   - day 4/8 of decadron, 2 days at [facility]   - K 3.5   - K 1.9    Yesterday's Events:   - admitted overnight   - contrected, w/d rose mary pain   - SR 60-90s   - -120s   - Tmax 99.2   - lots of secretions noted from trach   - PEG in place-->red and inflamed   - BM pta   - NPO    - condom cath with 500cc overnight   - no drips   - sacral wounds noted-->wound team   - pt is bed bound, totally dependent   - HFNC with 100% via T-piece   - CXR(reviewed): hypoinflated, severe diffuse opacities noted   - lovenox   - day 3 of azithromycin/ceftriaxone-->cefepime   - WBCs 3.4   - K 4.3   - AST//148      Review of Systems  Review of Systems   Unable to perform ROS:  Patient nonverbal        Vital Signs for last 24 hours   Temp:  [36.1 °C (96.9 °F)-37.3 °C (99.2 °F)] 36.5 °C (97.7 °F)  Pulse:  [62-92] 69  Resp:  [15-27] 20  BP: ()/(50-82) 94/50  SpO2:  [84 %-100 %] 99 %    Hemodynamic parameters for last 24 hours       Respiratory Information for the last 24 hours  Vent Mode: ASV  PEEP/CPAP: 8  MAP: 16  Control VTE (exp VT): 361    Physical Exam   Physical Exam  Vitals and nursing note reviewed.   Constitutional:       General: He is not in acute distress.     Appearance: He is ill-appearing and toxic-appearing.   HENT:      Head: Normocephalic and atraumatic.      Right Ear: External ear normal.      Left Ear: External ear normal.      Nose: Nose normal. No rhinorrhea.      Mouth/Throat:      Mouth: Mucous membranes are dry.      Comments: White coating to tongue  Eyes:      General: No scleral icterus.     Conjunctiva/sclera: Conjunctivae normal.      Pupils: Pupils are equal, round, and reactive to light.   Neck:      Comments: Trach in place without surrounding erythema/drainage  Cardiovascular:      Rate and Rhythm: Normal rate and regular rhythm.      Pulses:           Radial pulses are 2+ on the right side and 2+ on the left side.        Dorsalis pedis pulses are 2+ on the right side and 2+ on the left side.      Heart sounds: Heart sounds are distant. No murmur heard.  Pulmonary:      Comments: Breathing with the vent on ASV, coarse throughout  Abdominal:      General: There is no distension.      Palpations: Abdomen is soft.      Tenderness: There is no abdominal tenderness. There is no guarding or rebound.      Comments: PEG tube in place with surrounding erythema/inflammation    Musculoskeletal:         General: Deformity present.      Cervical back: Normal range of motion and neck supple.      Right lower leg: No edema.      Left lower leg: No edema.      Comments: All 4 limbs with contractures   Lymphadenopathy:      Cervical: No cervical adenopathy.   Skin:      General: Skin is dry.      Capillary Refill: Capillary refill takes 2 to 3 seconds.      Findings: No rash.   Neurological:      Comments: Not sedated, vegetative state, opens eyes   Psychiatric:      Comments: Unable to assess     no change to exam    Medications  Current Facility-Administered Medications   Medication Dose Route Frequency Provider Last Rate Last Admin   • Respiratory Therapy Consult   Nebulization Continuous RT Darleen Blackburn M.D.       • famotidine (PEPCID) tablet 20 mg  20 mg Enteral Tube Q12HRS Darleen Blackburn M.D.   20 mg at 02/20/22 0543    Or   • famotidine (PEPCID) injection 20 mg  20 mg Intravenous Q12HRS Darleen Blackburn M.D.       • MD Alert...ICU Electrolyte Replacement per Pharmacy   Other PHARMACY TO DOSE Darleen Blackburn M.D.       • lidocaine (XYLOCAINE) 1 % injection 2 mL  2 mL Tracheal Tube Q30 MIN PRN Darleen Blackburn M.D.       • ipratropium-albuterol (DUONEB) nebulizer solution  3 mL Nebulization Q2HRS PRN (RT) Darleen Blackburn M.D.       • cefepime (Maxipime) 2 g in dextrose 5% 20 mL IV syringe  2 g Intravenous Q8HRS Darleen Blackburn M.D. 240 mL/hr at 02/20/22 0542 2 g at 02/20/22 0542   • erythromycin ophthalmic ointment   Both Eyes Q6HRS Darleen Blackburn M.D.   Given at 02/20/22 0542   • vitamin D3 (cholecalciferol) tablet 1,000 Units  1,000 Units Enteral Tube DAILY Darleen Blackburn M.D.   1,000 Units at 02/20/22 0543   • lactobacillus rhamnosus (CULTURELLE) capsule 1 Capsule  1 Capsule Enteral Tube QDAY with Breakfast Darleen Blackburn M.D.       • Pain Pump (patient supplied) Device   Injection Continuous Darleen Blackburn M.D.   Continue to Floor at 02/19/22 1030   • Pharmacy Consult: Enteral tube insertion - review meds/change route/product selection  1 Each Other PHARMACY TO DOSE Darleen Blackburn M.D.       • enoxaparin (LOVENOX) inj 40 mg  40 mg Subcutaneous DAILY Ariana Vasquez M.D.   40 mg at 02/20/22 0542   • PHENobarbital solution 120 mg  120 mg  Enteral Tube BID Arianageo Vasquez M.D.   120 mg at 02/19/22 1952   • levETIRAcetam (KEPPRA) 100 MG/ML solution 1,500 mg  1,500 mg Enteral Tube Q12HRS Ariana SHOSHANA Vasquez   1,500 mg at 02/19/22 1952   • senna-docusate (PERICOLACE or SENOKOT S) 8.6-50 MG per tablet 2 Tablet  2 Tablet Enteral Tube BID Maggie Ellis M.D.   2 Tablet at 02/19/22 0916    And   • polyethylene glycol/lytes (MIRALAX) PACKET 1 Packet  1 Packet Enteral Tube QDAY PRN Maggie Ellis M.D.        And   • magnesium hydroxide (MILK OF MAGNESIA) suspension 30 mL  30 mL Enteral Tube QDAY PRN Maggie Ellis M.D.        And   • bisacodyl (DULCOLAX) suppository 10 mg  10 mg Rectal QDAY PRN Maggie Ellis M.D.   10 mg at 02/19/22 1028   • topiramate (TOPAMAX) tablet 50 mg  50 mg Enteral Tube DAILY Maggie Ellis M.D.   50 mg at 02/19/22 1025    And   • topiramate (TOPAMAX) tablet 75 mg  75 mg Enteral Tube Q EVENING Maggie Ellis M.D.   75 mg at 02/19/22 2110   • dexamethasone (DECADRON) tablet 6 mg  6 mg Enteral Tube DAILY Ariana SHOSHANA Vasquez   6 mg at 02/20/22 0543   • clonazePAM (KLONOPIN) tablet 0.5 mg  0.5 mg Enteral Tube DAILY Maggie Ellis M.D.   0.5 mg at 02/19/22 0919    And   • clonazePAM (KLONOPIN) tablet 1 mg  1 mg Enteral Tube Q EVENING Maggie Ellis M.D.   1 mg at 02/19/22 2029   • lacosamide (VIMPAT) tablet 300 mg  300 mg Enteral Tube BID Arianageo Vasquez M.D.   300 mg at 02/19/22 2029       Fluids    Intake/Output Summary (Last 24 hours) at 2/20/2022 0657  Last data filed at 2/19/2022 1800  Gross per 24 hour   Intake 685 ml   Output 825 ml   Net -140 ml       Laboratory  Recent Labs     02/19/22  1312 02/20/22  0426   ISTATAPH 7.400 7.483   ISTATAPCO2 43.2* 33.1   ISTATAPO2 46* 67   ISTATATCO2 28 26   NEJQSDQ5PGM 81* 95   ISTATARTHCO3 26.8* 24.9   ISTATARTBE 2 2   ISTATTEMP 98.2 F 97.7 F   ISTATFIO2 60 40   ISTATSPEC Arterial Arterial   ISTATAPHTC 7.404 7.491   [id number] 45* 64         Recent Labs      02/19/22  0510 02/20/22  0535   SODIUM 138 135   POTASSIUM 4.3 3.5*   CHLORIDE 105 102   CO2 24 24   BUN 7* 14   CREATININE <0.17* <0.17*   MAGNESIUM 2.1 1.9   PHOSPHORUS 3.5 2.2*   CALCIUM 7.9* 8.0*     Recent Labs     02/19/22  0510 02/20/22  0535   ALTSGPT 148*  --    ASTSGOT 166*  --    ALKPHOSPHAT 605*  --    TBILIRUBIN 1.0  --    GLUCOSE 82 91     Recent Labs     02/19/22  0510 02/20/22  0535   WBC 3.4* 3.7*   NEUTSPOLYS 72.40*  --    LYMPHOCYTES 19.90*  --    MONOCYTES 4.70  --    EOSINOPHILS 0.90  --    BASOPHILS 1.20  --    ASTSGOT 166*  --    ALTSGPT 148*  --    ALKPHOSPHAT 605*  --    TBILIRUBIN 1.0  --      Recent Labs     02/19/22  0510 02/20/22  0535   RBC 4.12* 3.75*   HEMOGLOBIN 14.1 13.0*   HEMATOCRIT 41.3* 36.1*   PLATELETCT 169 146*       Imaging  Reviewed    Assessment/Plan  * Acute respiratory failure with hypoxia (HCC)- (present on admission)  Assessment & Plan  Likely due to COVID, cannot rule on bacterial pneumonia  Patient placed on full vent support  RT/O2 protocols  Vent bundle protocols  ASV settings with active titration   SBTs, wean FiO2 as tolerated  S/p bronch with BAL on 2/19  Cont cefepime (hx of pseudomonas in the past)    Acute respiratory distress syndrome (ARDS) due to COVID-19 virus (HCC)- (present on admission)  Assessment & Plan  O2 goals > 92%  Aim for euvolemia  Decadron 6mg daily for 10 days  Is not a candidate for baricitinib due to infection  Continue ventilator support  Unable to prone      Epilepsy (HCC)- (present on admission)  Assessment & Plan  Continue the AED regimen noted below:  Phenobarbital 120mg BID  Clonazepam 0.5mg qam and 1mg qpm  lacosamide 300mg BID  keppra 1500mg BID  topiramate 50mg qam and 50mg pm  No obvious seizure activity  Will reach out to neurology team if needed    Protein-calorie malnutrition, severe (HCC)- (present on admission)  Assessment & Plan  Nutrition consult  TFs per their recommendations    Contraction, joint, multiple sites-  (present on admission)  Assessment & Plan  Positioning and turning per protocol       VTE:  Lovenox  Ulcer: H2 Antagonist  Lines: PIVs, condom catheter    I have performed a physical exam and reviewed and updated ROS and Plan today (2/20/2022). In review of yesterday's note (2/19/2022), there are no changes except as documented above.     Discussed patient condition and risk of morbidity and/or mortality with Family, RN, RT, Pharmacy, Charge nurse / hot rounds and QA team    The patient remains critically ill.  I have assessed and reassessed the respiratory status and made ventilator adjustments based upon arterial blood gas analysis, ventilator waveforms and airway mechanics.  I have assessed and reassessed the blood pressure, hemodynamics, cardiovascular status. This patient remains at high risk for worsening cardiopulmonary dysfunction and death without the above critical care interventions.    Critical care time = 80 minutes in directly providing and coordinating critical care and extensive data review.  No time overlap and excludes procedures.

## 2022-02-20 NOTE — HOSPITAL COURSE
"Chief Complaint  41 y.o. male admitted 2/18/2022 with acute hypoxic respiratory failure due to pneumonia and COVID    \"Mr. 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  The patient had been started on ceftriaxone, azithromycin, and dexamethasone at the outside facility.  The patient was transferred to [facility] for higher level of care.\"     Hospital Course  2/19 - VD#1, s/p bronch with BAL, stopped azithro/C3 --> cefepime started, no sedation  2/20 -  2/21 - TA w/ pseudomonas --> continue cefepime, PSV trials, midline catheter placement  2/22 - PSV trials  2/23 - PSV trials  2/24 - T-piece, replete K  2/25 - transfer to [facility]  "

## 2022-02-20 NOTE — ASSESSMENT & PLAN NOTE
O2 goals > 92%  Aim for euvolemia  Decadron 6mg daily for 10 days  Is not a candidate for baricitinib due to infection  Continue ventilator support  Unable to prone

## 2022-02-20 NOTE — PROGRESS NOTES
"Susana informed this RN that patient needed to be cleaned because he has leaked urine from his condom catheter. RN went into the room and cleaned patient, while also putting a waffle cushion under him to protect his skin. Patient had a bowel movement and Susana said that at home she has to \"dig\" out the stool and asked if she could do this. This RN gave permission. Dressings were changed on patient's sacral wounds. This RN and Susana discussed what would be the best way to protect his old, healing pressure injuries and Susana said that she wanted something that would keep the wounds dry while also giving a little cushion. She felt like mepilex makes her son's wounds soggy and wet. It was decided that intradry with mepilex placed over the intradry would be best to help the wounds until the wound team gives specific orders. Susana was very good about asking if she could perform a task before doing it. She was tearful and thanked this RN for allowing her to help. This RN expressed sympathy about how hard it must be to provide all of his care at home and lose that autonomy when they enter the health care system. From this RN's standpoint, it seems like the communication and coordination of care has improved between this RN and Susana.   "

## 2022-02-21 ENCOUNTER — APPOINTMENT (OUTPATIENT)
Dept: RADIOLOGY | Facility: MEDICAL CENTER | Age: 42
DRG: 208 | End: 2022-02-21
Attending: INTERNAL MEDICINE
Payer: COMMERCIAL

## 2022-02-21 PROBLEM — U07.1 ACUTE HYPOXEMIC RESPIRATORY FAILURE DUE TO COVID-19 (HCC): Status: ACTIVE | Noted: 2017-06-20

## 2022-02-21 LAB
ANION GAP SERPL CALC-SCNC: 10 MMOL/L (ref 7–16)
BACTERIA WND AEROBE CULT: ABNORMAL
BASE EXCESS BLDA CALC-SCNC: 1 MMOL/L (ref -4–3)
BASOPHILS # BLD AUTO: 0.6 % (ref 0–1.8)
BASOPHILS # BLD: 0.02 K/UL (ref 0–0.12)
BODY TEMPERATURE: ABNORMAL DEGREES
BUN SERPL-MCNC: 15 MG/DL (ref 8–22)
CALCIUM SERPL-MCNC: 8.3 MG/DL (ref 8.5–10.5)
CHLORIDE SERPL-SCNC: 103 MMOL/L (ref 96–112)
CO2 BLDA-SCNC: 27 MMOL/L (ref 20–33)
CO2 SERPL-SCNC: 21 MMOL/L (ref 20–33)
CREAT SERPL-MCNC: <0.17 MG/DL (ref 0.5–1.4)
CRP SERPL HS-MCNC: 22.27 MG/DL (ref 0–0.75)
DELSYS IDSYS: ABNORMAL
END TIDAL CARBON DIOXIDE IECO2: 27 MMHG
EOSINOPHIL # BLD AUTO: 0.04 K/UL (ref 0–0.51)
EOSINOPHIL NFR BLD: 1.1 % (ref 0–6.9)
ERYTHROCYTE [DISTWIDTH] IN BLOOD BY AUTOMATED COUNT: 50.1 FL (ref 35.9–50)
GLUCOSE SERPL-MCNC: 107 MG/DL (ref 65–99)
GRAM STN SPEC: ABNORMAL
HCO3 BLDA-SCNC: 25.4 MMOL/L (ref 17–25)
HCT VFR BLD AUTO: 35 % (ref 42–52)
HGB BLD-MCNC: 12.9 G/DL (ref 14–18)
HOROWITZ INDEX BLDA+IHG-RTO: 164 MM[HG]
IMM GRANULOCYTES # BLD AUTO: 0.04 K/UL (ref 0–0.11)
IMM GRANULOCYTES NFR BLD AUTO: 1.1 % (ref 0–0.9)
LYMPHOCYTES # BLD AUTO: 0.88 K/UL (ref 1–4.8)
LYMPHOCYTES NFR BLD: 24.7 % (ref 22–41)
MAGNESIUM SERPL-MCNC: 2 MG/DL (ref 1.5–2.5)
MCH RBC QN AUTO: 34.5 PG (ref 27–33)
MCHC RBC AUTO-ENTMCNC: 36.9 G/DL (ref 33.7–35.3)
MCV RBC AUTO: 93.6 FL (ref 81.4–97.8)
MODE IMODE: ABNORMAL
MONOCYTES # BLD AUTO: 0.2 K/UL (ref 0–0.85)
MONOCYTES NFR BLD AUTO: 5.6 % (ref 0–13.4)
NEUTROPHILS # BLD AUTO: 2.38 K/UL (ref 1.82–7.42)
NEUTROPHILS NFR BLD: 66.9 % (ref 44–72)
NRBC # BLD AUTO: 0 K/UL
NRBC BLD-RTO: 0 /100 WBC
O2/TOTAL GAS SETTING VFR VENT: 50 %
PCO2 BLDA: 36.8 MMHG (ref 26–37)
PCO2 TEMP ADJ BLDA: 35.1 MMHG (ref 26–37)
PEEP END EXPIRATORY PRESSURE IPEEP: 8 CMH20
PERCENT MINUTE VOLUME IPMV: 100
PH BLDA: 7.45 [PH] (ref 7.4–7.5)
PH TEMP ADJ BLDA: 7.46 [PH] (ref 7.4–7.5)
PHOSPHATE SERPL-MCNC: 3.1 MG/DL (ref 2.5–4.5)
PLATELET # BLD AUTO: 214 K/UL (ref 164–446)
PMV BLD AUTO: 11.1 FL (ref 9–12.9)
PO2 BLDA: 82 MMHG (ref 64–87)
PO2 TEMP ADJ BLDA: 76 MMHG (ref 64–87)
POTASSIUM SERPL-SCNC: 3.7 MMOL/L (ref 3.6–5.5)
PREALB SERPL-MCNC: 7 MG/DL (ref 18–38)
PROCALCITONIN SERPL-MCNC: 0.39 NG/ML
RBC # BLD AUTO: 3.74 M/UL (ref 4.7–6.1)
SAO2 % BLDA: 97 % (ref 93–99)
SIGNIFICANT IND 70042: ABNORMAL
SITE SITE: ABNORMAL
SODIUM SERPL-SCNC: 134 MMOL/L (ref 135–145)
SOURCE SOURCE: ABNORMAL
SPECIMEN DRAWN FROM PATIENT: ABNORMAL
WBC # BLD AUTO: 3.6 K/UL (ref 4.8–10.8)

## 2022-02-21 PROCEDURE — A9270 NON-COVERED ITEM OR SERVICE: HCPCS | Performed by: INTERNAL MEDICINE

## 2022-02-21 PROCEDURE — 700111 HCHG RX REV CODE 636 W/ 250 OVERRIDE (IP): Performed by: EMERGENCY MEDICINE

## 2022-02-21 PROCEDURE — 700111 HCHG RX REV CODE 636 W/ 250 OVERRIDE (IP): Performed by: STUDENT IN AN ORGANIZED HEALTH CARE EDUCATION/TRAINING PROGRAM

## 2022-02-21 PROCEDURE — 700105 HCHG RX REV CODE 258: Performed by: EMERGENCY MEDICINE

## 2022-02-21 PROCEDURE — 97602 WOUND(S) CARE NON-SELECTIVE: CPT

## 2022-02-21 PROCEDURE — A9270 NON-COVERED ITEM OR SERVICE: HCPCS | Performed by: EMERGENCY MEDICINE

## 2022-02-21 PROCEDURE — 71045 X-RAY EXAM CHEST 1 VIEW: CPT

## 2022-02-21 PROCEDURE — 83735 ASSAY OF MAGNESIUM: CPT

## 2022-02-21 PROCEDURE — 94799 UNLISTED PULMONARY SVC/PX: CPT

## 2022-02-21 PROCEDURE — A9270 NON-COVERED ITEM OR SERVICE: HCPCS | Performed by: STUDENT IN AN ORGANIZED HEALTH CARE EDUCATION/TRAINING PROGRAM

## 2022-02-21 PROCEDURE — 700105 HCHG RX REV CODE 258: Performed by: INTERNAL MEDICINE

## 2022-02-21 PROCEDURE — 700102 HCHG RX REV CODE 250 W/ 637 OVERRIDE(OP): Performed by: EMERGENCY MEDICINE

## 2022-02-21 PROCEDURE — 99292 CRITICAL CARE ADDL 30 MIN: CPT | Performed by: EMERGENCY MEDICINE

## 2022-02-21 PROCEDURE — 700102 HCHG RX REV CODE 250 W/ 637 OVERRIDE(OP): Performed by: STUDENT IN AN ORGANIZED HEALTH CARE EDUCATION/TRAINING PROGRAM

## 2022-02-21 PROCEDURE — 36600 WITHDRAWAL OF ARTERIAL BLOOD: CPT

## 2022-02-21 PROCEDURE — 86140 C-REACTIVE PROTEIN: CPT

## 2022-02-21 PROCEDURE — 82803 BLOOD GASES ANY COMBINATION: CPT

## 2022-02-21 PROCEDURE — 84134 ASSAY OF PREALBUMIN: CPT

## 2022-02-21 PROCEDURE — 700111 HCHG RX REV CODE 636 W/ 250 OVERRIDE (IP): Performed by: INTERNAL MEDICINE

## 2022-02-21 PROCEDURE — 700102 HCHG RX REV CODE 250 W/ 637 OVERRIDE(OP): Performed by: INTERNAL MEDICINE

## 2022-02-21 PROCEDURE — 80048 BASIC METABOLIC PNL TOTAL CA: CPT

## 2022-02-21 PROCEDURE — 84100 ASSAY OF PHOSPHORUS: CPT

## 2022-02-21 PROCEDURE — 94003 VENT MGMT INPAT SUBQ DAY: CPT

## 2022-02-21 PROCEDURE — 94150 VITAL CAPACITY TEST: CPT

## 2022-02-21 PROCEDURE — 770022 HCHG ROOM/CARE - ICU (200)

## 2022-02-21 PROCEDURE — 85025 COMPLETE CBC W/AUTO DIFF WBC: CPT

## 2022-02-21 PROCEDURE — 99291 CRITICAL CARE FIRST HOUR: CPT | Performed by: EMERGENCY MEDICINE

## 2022-02-21 RX ADMIN — LEVETIRACETAM 1500 MG: 100 SOLUTION ORAL at 06:23

## 2022-02-21 RX ADMIN — FAMOTIDINE 20 MG: 20 TABLET ORAL at 18:38

## 2022-02-21 RX ADMIN — TOPIRAMATE 50 MG: 100 TABLET, FILM COATED ORAL at 09:34

## 2022-02-21 RX ADMIN — Medication 1000 UNITS: at 06:23

## 2022-02-21 RX ADMIN — CEFEPIME 2 G: 2 INJECTION, POWDER, FOR SOLUTION INTRAVENOUS at 06:23

## 2022-02-21 RX ADMIN — FAMOTIDINE 20 MG: 20 TABLET ORAL at 06:23

## 2022-02-21 RX ADMIN — ERYTHROMYCIN: 5 OINTMENT OPHTHALMIC at 11:37

## 2022-02-21 RX ADMIN — CLONAZEPAM 1 MG: 1 TABLET ORAL at 21:19

## 2022-02-21 RX ADMIN — CEFEPIME 2 G: 2 INJECTION, POWDER, FOR SOLUTION INTRAVENOUS at 14:20

## 2022-02-21 RX ADMIN — TOPIRAMATE 75 MG: 25 TABLET, FILM COATED ORAL at 22:03

## 2022-02-21 RX ADMIN — POTASSIUM BICARBONATE 25 MEQ: 978 TABLET, EFFERVESCENT ORAL at 08:31

## 2022-02-21 RX ADMIN — LACOSAMIDE 300 MG: 100 TABLET, FILM COATED ORAL at 08:40

## 2022-02-21 RX ADMIN — LEVETIRACETAM 1500 MG: 100 SOLUTION ORAL at 20:24

## 2022-02-21 RX ADMIN — CEFEPIME 2 G: 2 INJECTION, POWDER, FOR SOLUTION INTRAVENOUS at 22:05

## 2022-02-21 RX ADMIN — PHENOBARBITAL 120 MG: 20 ELIXIR ORAL at 20:24

## 2022-02-21 RX ADMIN — Medication 10 MG: at 12:14

## 2022-02-21 RX ADMIN — PHENOBARBITAL 120 MG: 20 ELIXIR ORAL at 08:32

## 2022-02-21 RX ADMIN — ERYTHROMYCIN: 5 OINTMENT OPHTHALMIC at 06:24

## 2022-02-21 RX ADMIN — DEXAMETHASONE 6 MG: 6 TABLET ORAL at 06:31

## 2022-02-21 RX ADMIN — LACOSAMIDE 300 MG: 100 TABLET, FILM COATED ORAL at 21:19

## 2022-02-21 RX ADMIN — ENOXAPARIN SODIUM 40 MG: 40 INJECTION SUBCUTANEOUS at 06:23

## 2022-02-21 RX ADMIN — Medication 1 CAPSULE: at 08:31

## 2022-02-21 RX ADMIN — CLONAZEPAM 0.5 MG: 0.5 TABLET ORAL at 08:41

## 2022-02-21 RX ADMIN — ERYTHROMYCIN: 5 OINTMENT OPHTHALMIC at 18:38

## 2022-02-21 ASSESSMENT — COGNITIVE AND FUNCTIONAL STATUS - GENERAL
STANDING UP FROM CHAIR USING ARMS: TOTAL
TURNING FROM BACK TO SIDE WHILE IN FLAT BAD: UNABLE
HELP NEEDED FOR BATHING: TOTAL
MOVING TO AND FROM BED TO CHAIR: UNABLE
WALKING IN HOSPITAL ROOM: TOTAL
CLIMB 3 TO 5 STEPS WITH RAILING: TOTAL
MOBILITY SCORE: 6
DRESSING REGULAR LOWER BODY CLOTHING: TOTAL
MOVING FROM LYING ON BACK TO SITTING ON SIDE OF FLAT BED: UNABLE
SUGGESTED CMS G CODE MODIFIER DAILY ACTIVITY: CN
SUGGESTED CMS G CODE MODIFIER MOBILITY: CN
TOILETING: TOTAL
DAILY ACTIVITIY SCORE: 6
DRESSING REGULAR UPPER BODY CLOTHING: TOTAL
EATING MEALS: TOTAL
PERSONAL GROOMING: TOTAL

## 2022-02-21 ASSESSMENT — PAIN DESCRIPTION - PAIN TYPE
TYPE: ACUTE PAIN

## 2022-02-21 ASSESSMENT — PULMONARY FUNCTION TESTS: FVC: 0.5

## 2022-02-21 ASSESSMENT — FIBROSIS 4 INDEX: FIB4 SCORE: 1.37

## 2022-02-21 NOTE — CARE PLAN
Vent Day 3    %, Peep 8, Fio2 50%      Problem: Ventilation  Goal: Ability to achieve and maintain unassisted ventilation or tolerate decreased levels of ventilator support  Description: Target End Date:  4 days     Document on Vent flowsheet    1.  Support and monitor invasive and noninvasive mechanical ventilation  2.  Monitor ventilator weaning response  3.  Perform ventilator associated pneumonia prevention interventions  4.  Manage ventilation therapy by monitoring diagnostic test results  Outcome: Progressing

## 2022-02-21 NOTE — DISCHARGE PLANNING
Care Transition Team Discharge Planning    Anticipated Discharge Disposition: wait for medical stability to assess for d/c needs    Action: Lsw attended rounds, and noted pt's family at bedside. Pt is sedated, on a vent, and not A/O.     Barriers to Discharge: medical clearance    Plan: Lsw will continue to follow, and assist w/ d/c planning.

## 2022-02-21 NOTE — CARE PLAN
The patient is Watcher - Medium risk of patient condition declining or worsening    Shift Goals  Clinical Goals: Decreased FiO2  Patient Goals: TRAVIS  Family Goals: participation in care, be updated    Progress made toward(s) clinical / shift goals:    Problem: Knowledge Deficit - Standard  Goal: Patient and family/care givers will demonstrate understanding of plan of care, disease process/condition, diagnostic tests and medications  Outcome: Progressing     Problem: Skin Integrity  Goal: Skin integrity is maintained or improved  Outcome: Progressing     Problem: Fall Risk  Goal: Patient will remain free from falls  Outcome: Progressing     Problem: Communication  Goal: The ability to communicate needs accurately and effectively will improve  Outcome: Progressing     Problem: Respiratory  Goal: Patient will achieve/maintain optimum respiratory ventilation and gas exchange  Outcome: Progressing     Problem: Infection - Standard  Goal: Patient will remain free from infection  Outcome: Progressing     Problem: Wound/ / Incision Healing  Goal: Patient's wound/surgical incision will decrease in size and heals properly  Outcome: Progressing     Problem: Pain - Standard  Goal: Alleviation of pain or a reduction in pain to the patient’s comfort goal  Outcome: Progressing       Patient is not progressing towards the following goals:

## 2022-02-21 NOTE — CARE PLAN
Problem: Ventilation  Goal: Ability to achieve and maintain unassisted ventilation or tolerate decreased levels of ventilator support  Description: Target End Date:  4 days     Document on Vent flowsheet    1.  Support and monitor invasive and noninvasive mechanical ventilation  2.  Monitor ventilator weaning response  3.  Perform ventilator associated pneumonia prevention interventions  4.  Manage ventilation therapy by monitoring diagnostic test results  Outcome: Progressing       Vent day: 2    6.0 Shiley cuffed  Vent settings: asv 100% +8 fio2 60%  SBT x2 today for an hour each time.    Secretions: small to moderate thick yellow    Qid IPV

## 2022-02-21 NOTE — WOUND TEAM
[facility]  Inpatient Services  Initial Wound and Skin Care Evaluation    Admission Date: 2/18/2022     Last order of IP CONSULT TO WOUND CARE was found on 2/18/2022 from Hospital Encounter on 2/17/2022     HPI, PMH, SH: Reviewed    Past Surgical History:   Procedure Laterality Date   • LARYNGOSCOPY  7/2/2017    Procedure: LARYNGOSCOPY;  Surgeon: Catherine Osorio M.D.;  Location: SURGERY [facility];  Service:    • BRONCHOSCOPY  7/2/2017    Procedure: BRONCHOSCOPY;  Surgeon: Catherine Osorio M.D.;  Location: SURGERY [facility];  Service:    • TRACHEOSTOMY  7/2/2017    Procedure: TRACHEOSTOMY;  Surgeon: Catherine Osorio M.D.;  Location: SURGERY [facility];  Service:    • ESOPHAGOSCOPY  7/2/2017    Procedure: ESOPHAGOSCOPY;  Surgeon: Catherine Osorio M.D.;  Location: SURGERY [facility];  Service:      Social History     Tobacco Use   • Smoking status: Never Smoker   • Smokeless tobacco: Never Used   Substance Use Topics   • Alcohol use: No     No chief complaint on file.    Diagnosis: Acute respiratory distress syndrome (ARDS) due to COVID-19 virus (HCC) [U07.1, J80]    Unit where seen by Wound Team: T627/00     WOUND CONSULT/FOLLOW UP RELATED TO: L. Hip and L. IT      WOUND HISTORY:  Patient developed stage IV pressure injuries years ago, and the wounds healed.  Patient voided on the way here to the hospital and his mother and primary care taker believes that it irritated the scar tissue and created small superficial wounds.  The consult was placed on the 18th and evaluated on the 21st.  The wounds look very clean and appear to be healing.      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 (Active)   Wound Image     02/21/22 1309   Site Assessment Red;McConnell 02/21/22 1309   Periwound Assessment Pink;Scar tissue 02/21/22 1309   Margins Defined edges;Attached edges 02/21/22 1309   Closure Adhesive bandage 02/21/22 1309    Drainage Amount Scant 02/21/22 1309   Drainage Description Sanguineous 02/21/22 1309   Treatments Cleansed;Site care;Offloading 02/21/22 1309   Wound Cleansing Approved Wound Cleanser 02/21/22 1309   Periwound Protectant Not Applicable 02/21/22 1309   Dressing Cleansing/Solutions Not Applicable 02/21/22 1309   Dressing Options Petroleum Gauze (clear);Dry Gauze;Tape 02/21/22 1309   Dressing Changed Changed 02/21/22 1309   Dressing Status Clean;Dry;Intact 02/21/22 1309   Dressing Change/Treatment Frequency Daily, and As Needed 02/21/22 1309   NEXT Dressing Change/Treatment Date 02/22/22 02/21/22 1309   NEXT Weekly Photo (Inpatient Only) 02/28/22 02/21/22 1309   Pressure Injury Stage 4 02/21/22 1309   Shape irregular x2 02/21/22 1309   Wound Odor None 02/21/22 1309   Pulses N/A 02/21/22 1309   Exposed Structures None 02/21/22 1309   WOUND NURSE ONLY - Time Spent with Patient (mins) 45 02/21/22 1309   Number of days: 3        Vascular:    KATHRYN:   No results found.    Lab Values:    Lab Results   Component Value Date/Time    WBC 3.6 (L) 02/21/2022 05:15 AM    RBC 3.74 (L) 02/21/2022 05:15 AM    HEMOGLOBIN 12.9 (L) 02/21/2022 05:15 AM    HEMATOCRIT 35.0 (L) 02/21/2022 05:15 AM    CREACTPROT 22.27 (H) 02/21/2022 11:37 AM    SEDRATEWES 17 (H) 07/19/2019 02:04 PM        Culture Results show:  Recent Results (from the past 720 hour(s))   CULTURE WOUND W/ GRAM STAIN    Collection Time: 02/19/22  3:49 AM    Specimen: G-tube; Wound   Result Value Ref Range    Significant Indicator POS (POS)     Source WND     Site G-TUBE     Culture Result - (A)     Gram Stain Result Few Yeast.     Culture Result Candida albicans  Heavy growth   (A)     Culture Result (A)      Methicillin Resistant Staphylococcus aureus  Heavy growth         Susceptibility    Methicillin resistant staphylococcus aureus - JIN     Azithromycin >4 Resistant mcg/mL     Clindamycin >4 Resistant mcg/mL     Cefazolin >16 Resistant mcg/mL     Cefepime >16 Resistant  mcg/mL     Ceftaroline 1 Sensitive mcg/mL     Daptomycin 1 Sensitive mcg/mL     Ampicillin/sulbactam 16/8 Resistant mcg/mL     Erythromycin >4 Resistant mcg/mL     Vancomycin 2 Sensitive mcg/mL     Oxacillin >2 Resistant mcg/mL     Trimeth/Sulfa <=0.5/9.5 Sensitive mcg/mL     Tetracycline <=4 Sensitive mcg/mL       Pain Level/Medicated:  TRAVIS       INTERVENTIONS BY WOUND TEAM:  Chart and images reviewed. Discussed with bedside RN. All areas of concern (based on picture review, LDA review and discussion with bedside RN) have been thoroughly assessed. Documentation of areas based on significant findings. This RN in to assess patient. Performed standard wound care which includes appropriate positioning, dressing removal and non-selective debridement. Pictures and measurements obtained weekly if/when required.  Preparation for Dressing removal: Dressing soaked with Wound cleanser  Non-selectively Debrided with:  wound cleanser and gauze.  Sharp debridement: n/a  Arabella wound: Cleansed with wound cleanser, Prepped with n/a  Primary Dressing: xeroform   Secondary (Outer) Dressing: 4x4 dry gauze and the smallest pieces of tape    Interdisciplinary consultation: Patient, Bedside RN , Patient's mother Susana, Wound RN Kayleen VALDEZ    EVALUATION / RATIONALE FOR TREATMENT:  Most Recent Date:  2/21/22: Wounds appear to be healing with the current treatment the mother is performing.  Wound team is signing off, placing orders per mother's current dressings.  If wounds worsens or fail to heal completely please re-consult wound team.  Thank you

## 2022-02-21 NOTE — CARE PLAN
Problem: Knowledge Deficit - Standard  Goal: Patient and family/care givers will demonstrate understanding of plan of care, disease process/condition, diagnostic tests and medications  Outcome: Progressing     Problem: Skin Integrity  Goal: Skin integrity is maintained or improved  Outcome: Progressing     Problem: Fall Risk  Goal: Patient will remain free from falls  Outcome: Progressing     Problem: Communication  Goal: The ability to communicate needs accurately and effectively will improve  Outcome: Progressing     Problem: Respiratory  Goal: Patient will achieve/maintain optimum respiratory ventilation and gas exchange  Outcome: Progressing     Problem: Infection - Standard  Goal: Patient will remain free from infection  Outcome: Progressing     Problem: Wound/ / Incision Healing  Goal: Patient's wound/surgical incision will decrease in size and heals properly  Outcome: Progressing     Problem: Pain - Standard  Goal: Alleviation of pain or a reduction in pain to the patient’s comfort goal  Outcome: Progressing   The patient is Watcher - Medium risk of patient condition declining or worsening    Shift Goals  Clinical Goals: Decreased FiO2  Patient Goals: TRAVIS  Family Goals: participation in care, be updated    Progress made toward(s) clinical / shift goals:  Patient tolerating SBT. Skin care progressing.    Patient is not progressing towards the following goals:

## 2022-02-21 NOTE — FLOWSHEET NOTE
02/21/22 0721 02/21/22 0827   Spontaneous Breathing Trial (SBT)   Safety screen spontaneous breathing trial (SBT) Proceed with SBT - no exclusion criteria met  --    Spontaneous breathing trial (SBT) outcome  --  Pt weaned for 1 hour and returned to rest settings per protocol - SBT Pass   Length of Weaning Trial (Hours)  --  1 hour   Weaning Parameters   RR (bpm) 17  --    $ FVC / Vital Capacity (liters)    (Not following)  --    NIF (cm H2O)    (Not following)  --    Rapid Shallow Breathing Index (RR/VT) 15  --    Spontaneous VE 7.1  --    Spontaneous   --

## 2022-02-21 NOTE — PROGRESS NOTES
"Critical Care Progress Note    Date of admission  2/18/2022    Chief Complaint  41 y.o. male admitted 2/18/2022 with acute hypoxic respiratory failure due to pneumonia and COVID    \"Mr. 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  The patient had been started on ceftriaxone, azithromycin, and dexamethasone at the outside facility.  The patient was transferred to [facility] for higher level of care.\"     Hospital Course  2/19 - VD#1, s/p bronch with BAL, stopped azithro/C3 --> cefepime started, no sedation  2/20 -  2/21 - TA w/ pseudomonas --> continue cefepime, PSV trials, midline catheter placement    Review of Systems  Review of Systems   Unable to perform ROS: Intubated        Vital Signs for last 24 hours   Temp:  [35.8 °C (96.5 °F)-36.7 °C (98 °F)] 36.6 °C (97.9 °F)  Pulse:  [54-76] 60  Resp:  [13-21] 20  BP: ()/(49-61) 109/55  SpO2:  [89 %-99 %] 98 %    Hemodynamic parameters for last 24 hours       Respiratory Information for the last 24 hours  Vent Mode: ASV  PEEP/CPAP: 8  P Support: 5  MAP: 16  Length of Weaning Trial (Hours): 1 hour  Control VTE (exp VT): 359    Physical Exam   Physical Exam  Constitutional:       General: He is not in acute distress.     Appearance: He is ill-appearing. He is not diaphoretic.   HENT:      Head: Normocephalic.      Right Ear: External ear normal.      Left Ear: External ear normal.      Nose: Nose normal.      Mouth/Throat:      Mouth: Mucous membranes are dry.      Pharynx: Oropharynx is clear.   Eyes:      Conjunctiva/sclera: Conjunctivae normal.   Cardiovascular:      Rate and Rhythm: Normal rate and regular rhythm.      Pulses: Normal pulses.      Heart sounds: " Normal heart sounds.   Pulmonary:      Comments: Trached  Coarse bilateral breath sounds  Abdominal:      Palpations: Abdomen is soft.      Comments: G-tube site with no purulent discharge, no significant erythema   Musculoskeletal:      Right lower leg: No edema.      Left lower leg: No edema.      Comments: Contracted   Skin:     General: Skin is warm and dry.      Capillary Refill: Capillary refill takes less than 2 seconds.   Neurological:      Mental Status: Mental status is at baseline.         Medications  Current Facility-Administered Medications   Medication Dose Route Frequency Provider Last Rate Last Admin   • cefepime (Maxipime) 2 g in dextrose 5% 20 mL IV syringe  2 g Intravenous Q8HRS Pato Constantino M.D.   Stopped at 02/21/22 1425   • Respiratory Therapy Consult   Nebulization Continuous RT Darleen Blackburn M.D.       • famotidine (PEPCID) tablet 20 mg  20 mg Enteral Tube Q12HRS Darleen Blackburn M.D.   20 mg at 02/21/22 0623   • MD Alert...ICU Electrolyte Replacement per Pharmacy   Other PHARMACY TO DOSE Darleen Blackburn M.D.       • lidocaine (XYLOCAINE) 1 % injection 2 mL  2 mL Tracheal Tube Q30 MIN PRN Darleen Blackburn M.D.       • ipratropium-albuterol (DUONEB) nebulizer solution  3 mL Nebulization Q2HRS PRN (RT) Darleen Blackburn M.D.       • erythromycin ophthalmic ointment   Both Eyes Q6HRS Darleen Blackburn M.D.   Given at 02/21/22 1137   • vitamin D3 (cholecalciferol) tablet 1,000 Units  1,000 Units Enteral Tube DAILY Darleen Blackburn M.D.   1,000 Units at 02/21/22 0623   • lactobacillus rhamnosus (CULTURELLE) capsule 1 Capsule  1 Capsule Enteral Tube QDAY with Breakfast Darleen Blackburn M.D.   1 Capsule at 02/21/22 0831   • Pain Pump (patient supplied) Device   Injection Continuous Darleen Blackburn M.D.   Continue to Floor at 02/19/22 1030   • Pharmacy Consult: Enteral tube insertion - review meds/change route/product selection  1 Each Other PHARMACY TO DOSE Darleen Blackburn M.D.        • enoxaparin (LOVENOX) inj 40 mg  40 mg Subcutaneous DAILY Ariana SHOSHANA Vasquez   40 mg at 02/21/22 0623   • PHENobarbital solution 120 mg  120 mg Enteral Tube BID Ariana DIEGO VasquezDBucky   120 mg at 02/21/22 0832   • levETIRAcetam (KEPPRA) 100 MG/ML solution 1,500 mg  1,500 mg Enteral Tube Q12HRS Ariana SHOSHANA Vasquez   1,500 mg at 02/21/22 0623   • senna-docusate (PERICOLACE or SENOKOT S) 8.6-50 MG per tablet 2 Tablet  2 Tablet Enteral Tube BID Maggie Ellis M.D.   2 Tablet at 02/19/22 0916    And   • polyethylene glycol/lytes (MIRALAX) PACKET 1 Packet  1 Packet Enteral Tube QDAY PRN Maggie Ellis M.D.        And   • magnesium hydroxide (MILK OF MAGNESIA) suspension 30 mL  30 mL Enteral Tube QDAY PRN Maggie Ellis M.D.        And   • bisacodyl (DULCOLAX) suppository 10 mg  10 mg Rectal QDAY PRN Maggie Ellis M.D.   10 mg at 02/21/22 1214   • topiramate (TOPAMAX) tablet 50 mg  50 mg Enteral Tube DAILY Maggie Ellis M.D.   50 mg at 02/21/22 0934    And   • topiramate (TOPAMAX) tablet 75 mg  75 mg Enteral Tube Q EVENING Maggie Ellis M.D.   75 mg at 02/20/22 2217   • dexamethasone (DECADRON) tablet 6 mg  6 mg Enteral Tube DAILY Ariana SHOSHANA Vasquez   6 mg at 02/21/22 0631   • clonazePAM (KLONOPIN) tablet 0.5 mg  0.5 mg Enteral Tube DAILY Maggie Ellis M.D.   0.5 mg at 02/21/22 0841    And   • clonazePAM (KLONOPIN) tablet 1 mg  1 mg Enteral Tube Q EVENING Maggie Ellis M.D.   1 mg at 02/20/22 2112   • lacosamide (VIMPAT) tablet 300 mg  300 mg Enteral Tube BID Ariana Vasquez M.D.   300 mg at 02/21/22 0840       Fluids    Intake/Output Summary (Last 24 hours) at 2/21/2022 1602  Last data filed at 2/21/2022 1514  Gross per 24 hour   Intake 950 ml   Output 2150 ml   Net -1200 ml       Laboratory  Recent Labs     02/19/22  1312 02/20/22  0426 02/21/22  0515   ISTATAPH 7.400 7.483 7.447   ISTATAPCO2 43.2* 33.1 36.8   ISTATAPO2 46* 67 82   ISTATATCO2 28 26 27   [id number] 81* 95 97    ISTATARTHCO3 26.8* 24.9 25.4*   ISTATARTBE 2 2 1   ISTATTEMP 98.2 F 97.7 F 96.6 F   ISTATFIO2 60 40 50   ISTATSPEC Arterial Arterial Arterial   ISTATAPHTC 7.404 7.491 7.463   [id number] 45* 64 76         Recent Labs     02/19/22 0510 02/20/22 0535 02/20/22 2243 02/21/22 0515   SODIUM 138 135 134* 134*   POTASSIUM 4.3 3.5* 3.8 3.7   CHLORIDE 105 102 101 103   CO2 24 24 21 21   BUN 7* 14 15 15   CREATININE <0.17* <0.17* <0.17* <0.17*   MAGNESIUM 2.1 1.9  --  2.0   PHOSPHORUS 3.5 2.2*  --  3.1   CALCIUM 7.9* 8.0* 7.7* 8.3*     Recent Labs     02/19/22 0510 02/20/22 0535 02/20/22 2243 02/21/22 0515 02/21/22  1137   ALTSGPT 148*  --  87*  --   --    ASTSGOT 166*  --  73*  --   --    ALKPHOSPHAT 605*  --  477*  --   --    TBILIRUBIN 1.0  --  0.9  --   --    PREALBUMIN  --   --   --   --  7.0*   GLUCOSE 82 91 127* 107*  --      Recent Labs     02/19/22 0510 02/20/22 0535 02/20/22 2243 02/21/22 0515   WBC 3.4* 3.7*  --  3.6*   NEUTSPOLYS 72.40* 86.00*  --  66.90   LYMPHOCYTES 19.90* 8.80*  --  24.70   MONOCYTES 4.70 1.70  --  5.60   EOSINOPHILS 0.90 0.00  --  1.10   BASOPHILS 1.20 1.70  --  0.60   ASTSGOT 166*  --  73*  --    ALTSGPT 148*  --  87*  --    ALKPHOSPHAT 605*  --  477*  --    TBILIRUBIN 1.0  --  0.9  --      Recent Labs     02/19/22 0510 02/20/22 0535 02/21/22  0515   RBC 4.12* 3.75* 3.74*   HEMOGLOBIN 14.1 13.0* 12.9*   HEMATOCRIT 41.3* 36.1* 35.0*   PLATELETCT 169 146* 214       Imaging  X-Ray:  I have personally reviewed the images and compared with prior images.    Assessment/Plan  * Acute hypoxemic respiratory failure due to COVID-19 (HCC)- (present on admission)  Assessment & Plan  COVID-19 pneumonia  Dexamethasone  Fluid balance - keep euvolemic    Ventilator dependent respiratory failure  Modify ventilator to optimize oxygenation and ventilation  HOB > 30  Chlorhexidine  Perform spontaneous breathing trial when able  Early mobility    Tracheal aspirate culture psuedomonas - continue  cefepime    Epilepsy (HCC)- (present on admission)  Assessment & Plan  Continue the AED regimen noted below:  Phenobarbital 120 mg BID  Clonazepam 0.5 mg qam and 1mg qpm  Lacosamide 30 0mg BID  Levetiracetum 1500 mg BID  Topiramate 50 mg qam and 75 mg pm  No obvious seizure activity    Protein-calorie malnutrition, severe (HCC)- (present on admission)  Assessment & Plan  Nutrition consult  TFs per their recommendations    Contraction, joint, multiple sites- (present on admission)  Assessment & Plan  Positioning and turning per protocol     DVT prophylaxis: Enoxaparin  PUD prophylaxis: Famotidine  Glycemic control: Sliding scale insulin  Nutrition: Tube feeds  Lines: None  Waldrop: None  Mobility: Early mobility as tolerated  Goals of care: Full code  Disposition: ICU    I have performed a physical exam and reviewed and updated ROS and Plan today (2/21/2022). In review of yesterday's note (2/20/2022), there are no changes except as documented above.     Discussed patient condition and risk of morbidity and/or mortality with Family, RN, RT, Pharmacy and Charge nurse / hot rounds     The patient remains critically ill.  Critical care time = 85 minutes in directly providing and coordinating critical care and extensive data review.  No time overlap and excludes procedures.

## 2022-02-22 ENCOUNTER — TELEPHONE (OUTPATIENT)
Dept: NEUROLOGY | Facility: MEDICAL CENTER | Age: 42
End: 2022-02-22
Payer: COMMERCIAL

## 2022-02-22 ENCOUNTER — APPOINTMENT (OUTPATIENT)
Dept: RADIOLOGY | Facility: MEDICAL CENTER | Age: 42
DRG: 208 | End: 2022-02-22
Attending: INTERNAL MEDICINE
Payer: COMMERCIAL

## 2022-02-22 DIAGNOSIS — G40.219 PHARMACORESISTANT PARTIAL EPILEPSY WITH IMPAIRMENT OF CONSCIOUSNESS, INTRACTABLE (HCC): ICD-10-CM

## 2022-02-22 LAB
ANION GAP SERPL CALC-SCNC: 7 MMOL/L (ref 7–16)
ANISOCYTOSIS BLD QL SMEAR: ABNORMAL
BACTERIA SPEC RESP CULT: ABNORMAL
BACTERIA SPEC RESP CULT: ABNORMAL
BASE EXCESS BLDA CALC-SCNC: 2 MMOL/L (ref -4–3)
BASOPHILS # BLD AUTO: 0 % (ref 0–1.8)
BASOPHILS # BLD: 0 K/UL (ref 0–0.12)
BODY TEMPERATURE: ABNORMAL DEGREES
BUN SERPL-MCNC: 19 MG/DL (ref 8–22)
CALCIUM SERPL-MCNC: 8.6 MG/DL (ref 8.5–10.5)
CHLORIDE SERPL-SCNC: 107 MMOL/L (ref 96–112)
CO2 BLDA-SCNC: 29 MMOL/L (ref 20–33)
CO2 SERPL-SCNC: 24 MMOL/L (ref 20–33)
CREAT SERPL-MCNC: <0.17 MG/DL (ref 0.5–1.4)
DELSYS IDSYS: ABNORMAL
END TIDAL CARBON DIOXIDE IECO2: 35 MMHG
EOSINOPHIL # BLD AUTO: 0.05 K/UL (ref 0–0.51)
EOSINOPHIL NFR BLD: 0.9 % (ref 0–6.9)
ERYTHROCYTE [DISTWIDTH] IN BLOOD BY AUTOMATED COUNT: 51.3 FL (ref 35.9–50)
GLUCOSE SERPL-MCNC: 108 MG/DL (ref 65–99)
GRAM STN SPEC: ABNORMAL
HCO3 BLDA-SCNC: 27.3 MMOL/L (ref 17–25)
HCT VFR BLD AUTO: 34 % (ref 42–52)
HGB BLD-MCNC: 12.2 G/DL (ref 14–18)
HOROWITZ INDEX BLDA+IHG-RTO: 180 MM[HG]
LYMPHOCYTES # BLD AUTO: 1.34 K/UL (ref 1–4.8)
LYMPHOCYTES NFR BLD: 25.7 % (ref 22–41)
MAGNESIUM SERPL-MCNC: 1.8 MG/DL (ref 1.5–2.5)
MANUAL DIFF BLD: NORMAL
MCH RBC QN AUTO: 34.2 PG (ref 27–33)
MCHC RBC AUTO-ENTMCNC: 35.9 G/DL (ref 33.7–35.3)
MCV RBC AUTO: 95.2 FL (ref 81.4–97.8)
MICROCYTES BLD QL SMEAR: ABNORMAL
MODE IMODE: ABNORMAL
MONOCYTES # BLD AUTO: 0.14 K/UL (ref 0–0.85)
MONOCYTES NFR BLD AUTO: 2.6 % (ref 0–13.4)
MORPHOLOGY BLD-IMP: NORMAL
MYELOCYTES NFR BLD MANUAL: 0.9 %
NEUTROPHILS # BLD AUTO: 3.63 K/UL (ref 1.82–7.42)
NEUTROPHILS NFR BLD: 69.9 % (ref 44–72)
NRBC # BLD AUTO: 0 K/UL
NRBC BLD-RTO: 0 /100 WBC
O2/TOTAL GAS SETTING VFR VENT: 40 %
PCO2 BLDA: 45.4 MMHG (ref 26–37)
PCO2 TEMP ADJ BLDA: 44 MMHG (ref 26–37)
PEEP END EXPIRATORY PRESSURE IPEEP: 8 CMH20
PERCENT MINUTE VOLUME IPMV: 100
PH BLDA: 7.39 [PH] (ref 7.4–7.5)
PH TEMP ADJ BLDA: 7.4 [PH] (ref 7.4–7.5)
PHOSPHATE SERPL-MCNC: 2.1 MG/DL (ref 2.5–4.5)
PLATELET # BLD AUTO: 235 K/UL (ref 164–446)
PLATELET BLD QL SMEAR: NORMAL
PMV BLD AUTO: 9.2 FL (ref 9–12.9)
PO2 BLDA: 72 MMHG (ref 64–87)
PO2 TEMP ADJ BLDA: 68 MMHG (ref 64–87)
POTASSIUM SERPL-SCNC: 3.7 MMOL/L (ref 3.6–5.5)
RBC # BLD AUTO: 3.57 M/UL (ref 4.7–6.1)
RBC BLD AUTO: PRESENT
SAO2 % BLDA: 94 % (ref 93–99)
SIGNIFICANT IND 70042: ABNORMAL
SITE SITE: ABNORMAL
SODIUM SERPL-SCNC: 138 MMOL/L (ref 135–145)
SOURCE SOURCE: ABNORMAL
SPECIMEN DRAWN FROM PATIENT: ABNORMAL
TOXIC GRANULES BLD QL SMEAR: NORMAL
WBC # BLD AUTO: 5.2 K/UL (ref 4.8–10.8)

## 2022-02-22 PROCEDURE — 85027 COMPLETE CBC AUTOMATED: CPT

## 2022-02-22 PROCEDURE — 94150 VITAL CAPACITY TEST: CPT

## 2022-02-22 PROCEDURE — 84100 ASSAY OF PHOSPHORUS: CPT

## 2022-02-22 PROCEDURE — 80048 BASIC METABOLIC PNL TOTAL CA: CPT

## 2022-02-22 PROCEDURE — 85007 BL SMEAR W/DIFF WBC COUNT: CPT

## 2022-02-22 PROCEDURE — A9270 NON-COVERED ITEM OR SERVICE: HCPCS | Performed by: INTERNAL MEDICINE

## 2022-02-22 PROCEDURE — 36600 WITHDRAWAL OF ARTERIAL BLOOD: CPT

## 2022-02-22 PROCEDURE — 700111 HCHG RX REV CODE 636 W/ 250 OVERRIDE (IP): Performed by: INTERNAL MEDICINE

## 2022-02-22 PROCEDURE — 82803 BLOOD GASES ANY COMBINATION: CPT

## 2022-02-22 PROCEDURE — 700105 HCHG RX REV CODE 258: Performed by: EMERGENCY MEDICINE

## 2022-02-22 PROCEDURE — 700102 HCHG RX REV CODE 250 W/ 637 OVERRIDE(OP): Performed by: INTERNAL MEDICINE

## 2022-02-22 PROCEDURE — A9270 NON-COVERED ITEM OR SERVICE: HCPCS | Performed by: STUDENT IN AN ORGANIZED HEALTH CARE EDUCATION/TRAINING PROGRAM

## 2022-02-22 PROCEDURE — 700102 HCHG RX REV CODE 250 W/ 637 OVERRIDE(OP): Performed by: STUDENT IN AN ORGANIZED HEALTH CARE EDUCATION/TRAINING PROGRAM

## 2022-02-22 PROCEDURE — 83735 ASSAY OF MAGNESIUM: CPT

## 2022-02-22 PROCEDURE — 700111 HCHG RX REV CODE 636 W/ 250 OVERRIDE (IP): Performed by: EMERGENCY MEDICINE

## 2022-02-22 PROCEDURE — 700101 HCHG RX REV CODE 250: Performed by: INTERNAL MEDICINE

## 2022-02-22 PROCEDURE — 770022 HCHG ROOM/CARE - ICU (200)

## 2022-02-22 PROCEDURE — 700111 HCHG RX REV CODE 636 W/ 250 OVERRIDE (IP): Performed by: STUDENT IN AN ORGANIZED HEALTH CARE EDUCATION/TRAINING PROGRAM

## 2022-02-22 PROCEDURE — 99291 CRITICAL CARE FIRST HOUR: CPT | Performed by: INTERNAL MEDICINE

## 2022-02-22 PROCEDURE — 94799 UNLISTED PULMONARY SVC/PX: CPT

## 2022-02-22 PROCEDURE — 700105 HCHG RX REV CODE 258: Performed by: INTERNAL MEDICINE

## 2022-02-22 PROCEDURE — 94003 VENT MGMT INPAT SUBQ DAY: CPT

## 2022-02-22 RX ORDER — CLONAZEPAM 1 MG/1
TABLET ORAL
Qty: 45 TABLET | Refills: 5 | Status: SHIPPED | OUTPATIENT
Start: 2022-02-22 | End: 2022-03-31

## 2022-02-22 RX ORDER — PHENOBARBITAL 20 MG/5ML
ELIXIR ORAL
Qty: 1800 ML | Refills: 5 | Status: SHIPPED | OUTPATIENT
Start: 2022-02-22 | End: 2022-04-21 | Stop reason: SDUPTHER

## 2022-02-22 RX ORDER — MAGNESIUM SULFATE HEPTAHYDRATE 40 MG/ML
2 INJECTION, SOLUTION INTRAVENOUS ONCE
Status: COMPLETED | OUTPATIENT
Start: 2022-02-22 | End: 2022-02-22

## 2022-02-22 RX ADMIN — LEVETIRACETAM 1500 MG: 100 SOLUTION ORAL at 18:35

## 2022-02-22 RX ADMIN — Medication 1000 UNITS: at 06:04

## 2022-02-22 RX ADMIN — FAMOTIDINE 20 MG: 20 TABLET ORAL at 06:04

## 2022-02-22 RX ADMIN — MAGNESIUM SULFATE HEPTAHYDRATE 2 G: 40 INJECTION, SOLUTION INTRAVENOUS at 08:00

## 2022-02-22 RX ADMIN — LACOSAMIDE 300 MG: 100 TABLET, FILM COATED ORAL at 19:53

## 2022-02-22 RX ADMIN — ERYTHROMYCIN: 5 OINTMENT OPHTHALMIC at 12:00

## 2022-02-22 RX ADMIN — PHENOBARBITAL 120 MG: 20 ELIXIR ORAL at 18:35

## 2022-02-22 RX ADMIN — DEXAMETHASONE 6 MG: 6 TABLET ORAL at 06:04

## 2022-02-22 RX ADMIN — LEVETIRACETAM 1500 MG: 100 SOLUTION ORAL at 06:05

## 2022-02-22 RX ADMIN — TOPIRAMATE 75 MG: 25 TABLET, FILM COATED ORAL at 21:15

## 2022-02-22 RX ADMIN — TOPIRAMATE 50 MG: 100 TABLET, FILM COATED ORAL at 08:25

## 2022-02-22 RX ADMIN — CEFEPIME 2 G: 2 INJECTION, POWDER, FOR SOLUTION INTRAVENOUS at 15:19

## 2022-02-22 RX ADMIN — CEFEPIME 2 G: 2 INJECTION, POWDER, FOR SOLUTION INTRAVENOUS at 21:16

## 2022-02-22 RX ADMIN — CLONAZEPAM 0.5 MG: 0.5 TABLET ORAL at 07:48

## 2022-02-22 RX ADMIN — FAMOTIDINE 20 MG: 20 TABLET ORAL at 18:35

## 2022-02-22 RX ADMIN — ERYTHROMYCIN: 5 OINTMENT OPHTHALMIC at 00:14

## 2022-02-22 RX ADMIN — ERYTHROMYCIN: 5 OINTMENT OPHTHALMIC at 18:36

## 2022-02-22 RX ADMIN — Medication 1 CAPSULE: at 06:46

## 2022-02-22 RX ADMIN — CLONAZEPAM 1 MG: 1 TABLET ORAL at 19:53

## 2022-02-22 RX ADMIN — POTASSIUM BICARBONATE 25 MEQ: 978 TABLET, EFFERVESCENT ORAL at 09:45

## 2022-02-22 RX ADMIN — LACOSAMIDE 300 MG: 100 TABLET, FILM COATED ORAL at 07:48

## 2022-02-22 RX ADMIN — ENOXAPARIN SODIUM 40 MG: 40 INJECTION SUBCUTANEOUS at 06:04

## 2022-02-22 RX ADMIN — CEFEPIME 2 G: 2 INJECTION, POWDER, FOR SOLUTION INTRAVENOUS at 06:45

## 2022-02-22 RX ADMIN — PHENOBARBITAL 120 MG: 20 ELIXIR ORAL at 06:05

## 2022-02-22 RX ADMIN — ERYTHROMYCIN: 5 OINTMENT OPHTHALMIC at 06:05

## 2022-02-22 RX ADMIN — POTASSIUM PHOSPHATE, MONOBASIC AND POTASSIUM PHOSPHATE, DIBASIC 15 MMOL: 224; 236 INJECTION, SOLUTION, CONCENTRATE INTRAVENOUS at 09:09

## 2022-02-22 ASSESSMENT — FIBROSIS 4 INDEX: FIB4 SCORE: 1.37

## 2022-02-22 ASSESSMENT — LIFESTYLE VARIABLES
DOES PATIENT WANT TO STOP DRINKING: NO
AVERAGE NUMBER OF DAYS PER WEEK YOU HAVE A DRINK CONTAINING ALCOHOL: 0
TOTAL SCORE: 0
HAVE PEOPLE ANNOYED YOU BY CRITICIZING YOUR DRINKING: NO
ALCOHOL_USE: NO
HAVE YOU EVER FELT YOU SHOULD CUT DOWN ON YOUR DRINKING: NO
EVER FELT BAD OR GUILTY ABOUT YOUR DRINKING: NO
HOW MANY TIMES IN THE PAST YEAR HAVE YOU HAD 5 OR MORE DRINKS IN A DAY: 0
TOTAL SCORE: 0
ON A TYPICAL DAY WHEN YOU DRINK ALCOHOL HOW MANY DRINKS DO YOU HAVE: 0
CONSUMPTION TOTAL: NEGATIVE
TOTAL SCORE: 0
EVER HAD A DRINK FIRST THING IN THE MORNING TO STEADY YOUR NERVES TO GET RID OF A HANGOVER: NO

## 2022-02-22 ASSESSMENT — PAIN DESCRIPTION - PAIN TYPE
TYPE: ACUTE PAIN
TYPE: ACUTE PAIN

## 2022-02-22 ASSESSMENT — PULMONARY FUNCTION TESTS
FVC: 0.6
FVC: 0.6

## 2022-02-22 NOTE — TELEPHONE ENCOUNTER
Received request via: Pharmacy    Was the patient seen in the last year in this department? Yes    Does the patient have an active prescription (recently filled or refills available) for medication(s) requested? Yes    Pharmacy requested .5mg clonazepam refill, along with phenobarbital.

## 2022-02-22 NOTE — FLOWSHEET NOTE
02/22/22 0639 02/22/22 1050   Spontaneous Breathing Trial (SBT)   Safety screen spontaneous breathing trial (SBT) Proceed with SBT - no exclusion criteria met  --    Spontaneous breathing trial (SBT) outcome  --  Pt weaned for 1 hour and returned to rest settings per protocol - SBT Pass   Length of Weaning Trial (Hours)  --  4 hours per MD orders   Pt passed SBT but not ready to extubate  --  Not ready - continue daily assessments for extubation   Weaning Parameters   RR (bpm) 11 7   $ FVC / Vital Capacity (liters)  0.6 0.6   NIF (cm H2O)   --    (Not following)   Rapid Shallow Breathing Index (RR/VT) 11 13   Spontaneous VE 5.8 7.4   Spontaneous  455

## 2022-02-22 NOTE — CARE PLAN
The patient is Stable - Low risk of patient condition declining or worsening    Shift Goals  Clinical Goals: decrease fi02, SBT, pain control, comfort  Patient Goals: TRAVIS  Family Goals: participation in care    Progress made toward(s) clinical / shift goals:    Problem: Knowledge Deficit - Standard  Goal: Patient and family/care givers will demonstrate understanding of plan of care, disease process/condition, diagnostic tests and medications  Outcome: Progressing     Problem: Skin Integrity  Goal: Skin integrity is maintained or improved  Outcome: Progressing     Problem: Fall Risk  Goal: Patient will remain free from falls  Outcome: Progressing     Problem: Communication  Goal: The ability to communicate needs accurately and effectively will improve  Outcome: Progressing     Problem: Infection - Standard  Goal: Patient will remain free from infection  Outcome: Progressing     Problem: Wound/ / Incision Healing  Goal: Patient's wound/surgical incision will decrease in size and heals properly  Outcome: Progressing     Problem: Pain - Standard  Goal: Alleviation of pain or a reduction in pain to the patient’s comfort goal  Outcome: Progressing       Patient is not progressing towards the following goals:

## 2022-02-22 NOTE — TELEPHONE ENCOUNTER
Susana called saying Ruben was being transferred to [facility]. He is being treated here for bacterial pneumonia. She says it has been about 2 days seizure free. She wanted  to be aware incase he is able to stop by to see Ruben while he is admitted.

## 2022-02-22 NOTE — PROGRESS NOTES
"Critical Care Progress Note    Date of admission  2/18/2022    Chief Complaint  41 y.o. male admitted 2/18/2022 with acute hypoxic respiratory failure due to pneumonia and COVID    \"Mr. 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 for worsening hypoxic respiratory failure due to Covid and possibly bacterial pneumonia.  The patient had been started on ceftriaxone, azithromycin, and dexamethasone at the outside facility.  The patient was transferred to [facility] for higher level of care.\"     Hospital Course  2/19 - VD#1, s/p bronch with BAL, stopped azithro/C3 --> cefepime started, no sedation  2/20 -  2/21 - TA w/ pseudomonas --> continue cefepime, PSV trials, midline catheter placement    24 hours:  Neuro: at baseline, no seizure, grimacing  HR: 50-70's  SBP: 's  Tmax: afebrile  GI: TF at goal, last BM today  UOP: condom cath 2L   Lines: left 22 g, condom cath  Resp: vent 4 asv 100, double spont time and then tomorrow   Vte: lovenox  PPI/H2:pepcid  Antibx: Cefepimine 4/10 pseudomonas    Replace phos, k and mag  CXR 2/23  Midline  Vent weaning per prior discussion with Dr Constantino    Review of Systems  Review of Systems   Unable to perform ROS: Intubated        Vital Signs for last 24 hours   Temp:  [36.4 °C (97.6 °F)-36.7 °C (98.1 °F)] 36.7 °C (98.1 °F)  Pulse:  [54-79] 68  Resp:  [8-22] 8  BP: ()/(51-67) 122/58  SpO2:  [94 %-99 %] 96 %    Hemodynamic parameters for last 24 hours       Respiratory Information for the last 24 hours  Vent Mode: Spont  PEEP/CPAP: 8  P Support: 5  MAP: 13  Control VTE (exp VT): 454    Physical Exam   Physical Exam  Vitals and nursing note reviewed.   Constitutional:       General: He is not in acute distress.     Appearance: He is ill-appearing. He is not " diaphoretic.   HENT:      Head: Normocephalic.      Right Ear: External ear normal.      Left Ear: External ear normal.      Nose: Nose normal.      Mouth/Throat:      Mouth: Mucous membranes are dry.      Pharynx: Oropharynx is clear.   Eyes:      Conjunctiva/sclera: Conjunctivae normal.   Neck:      Comments: trach  Cardiovascular:      Rate and Rhythm: Normal rate and regular rhythm.      Pulses: Normal pulses.      Heart sounds: Normal heart sounds.   Pulmonary:      Comments: Trached  Coarse bilateral breath sounds w/ occasional wheezing  Abdominal:      General: There is no distension.      Palpations: Abdomen is soft. There is no mass.      Tenderness: There is no abdominal tenderness. There is no guarding or rebound.      Hernia: No hernia is present.      Comments: G-tube site with no purulent discharge, no significant erythema   Musculoskeletal:      Right lower leg: No edema.      Left lower leg: No edema.      Comments: Contracted   Skin:     General: Skin is warm and dry.      Capillary Refill: Capillary refill takes less than 2 seconds.   Neurological:      Mental Status: Mental status is at baseline.         Medications  Current Facility-Administered Medications   Medication Dose Route Frequency Provider Last Rate Last Admin   • potassium phosphate 15 mmol in dextrose 5% 250 mL ivpb  15 mmol Intravenous Once Kev Rinaldi M.D. 62.5 mL/hr at 02/22/22 0909 15 mmol at 02/22/22 0909   • cefepime (Maxipime) 2 g in dextrose 5% 20 mL IV syringe  2 g Intravenous Q8HRS Pato Constantino M.D.   Stopped at 02/22/22 0650   • Respiratory Therapy Consult   Nebulization Continuous RT Darleen Blackburn M.D.       • famotidine (PEPCID) tablet 20 mg  20 mg Enteral Tube Q12HRS Darleen Blackburn M.D.   20 mg at 02/22/22 0604   • MD Alert...ICU Electrolyte Replacement per Pharmacy   Other PHARMACY TO DOSE Darleen Blackburn M.D.       • lidocaine (XYLOCAINE) 1 % injection 2 mL  2 mL Tracheal Tube Q30 MIN PRN Darleen SORENSEN  SHOSHANA Blackburn       • ipratropium-albuterol (DUONEB) nebulizer solution  3 mL Nebulization Q2HRS PRN (RT) Darleen Blackburn M.D.       • erythromycin ophthalmic ointment   Both Eyes Q6HRS Darleen Blackburn M.D.   Given at 02/22/22 0605   • vitamin D3 (cholecalciferol) tablet 1,000 Units  1,000 Units Enteral Tube DAILY Darleen Blackburn M.D.   1,000 Units at 02/22/22 0604   • lactobacillus rhamnosus (CULTURELLE) capsule 1 Capsule  1 Capsule Enteral Tube QDAY with Breakfast Darleen Blackburn M.D.   1 Capsule at 02/22/22 0646   • Pain Pump (patient supplied) Device   Injection Continuous Darleen Blackburn M.D.   Continue to Floor at 02/19/22 1030   • Pharmacy Consult: Enteral tube insertion - review meds/change route/product selection  1 Each Other PHARMACY TO DOSE Darleen Blackburn M.D.       • enoxaparin (LOVENOX) inj 40 mg  40 mg Subcutaneous DAILY Ariana Vasquez M.D.   40 mg at 02/22/22 0604   • PHENobarbital solution 120 mg  120 mg Enteral Tube BID Ariana Vasquez M.D.   120 mg at 02/22/22 0605   • levETIRAcetam (KEPPRA) 100 MG/ML solution 1,500 mg  1,500 mg Enteral Tube Q12HRS Ariana Vasquez M.D.   1,500 mg at 02/22/22 0605   • senna-docusate (PERICOLACE or SENOKOT S) 8.6-50 MG per tablet 2 Tablet  2 Tablet Enteral Tube BID Maggie Ellis M.D.   2 Tablet at 02/19/22 0916    And   • polyethylene glycol/lytes (MIRALAX) PACKET 1 Packet  1 Packet Enteral Tube QDAY PRN Maggie Ellis M.D.        And   • magnesium hydroxide (MILK OF MAGNESIA) suspension 30 mL  30 mL Enteral Tube QDAY PRN Maggie Ellis M.D.        And   • bisacodyl (DULCOLAX) suppository 10 mg  10 mg Rectal QDAY PRN Maggie Ellis M.D.   10 mg at 02/21/22 1214   • topiramate (TOPAMAX) tablet 50 mg  50 mg Enteral Tube DAILY Maggie Ellis M.D.   50 mg at 02/22/22 0825    And   • topiramate (TOPAMAX) tablet 75 mg  75 mg Enteral Tube Q EVENING Maggie Ellis M.D.   75 mg at 02/21/22 2203   • dexamethasone (DECADRON) tablet 6 mg  6 mg  Enteral Tube DAILY Ariana SHOSHANA Vasquez   6 mg at 02/22/22 0604   • clonazePAM (KLONOPIN) tablet 0.5 mg  0.5 mg Enteral Tube DAILY Maggie Ellis M.D.   0.5 mg at 02/22/22 0748    And   • clonazePAM (KLONOPIN) tablet 1 mg  1 mg Enteral Tube Q EVENING Maggie Ellis M.D.   1 mg at 02/21/22 2119   • lacosamide (VIMPAT) tablet 300 mg  300 mg Enteral Tube BID Ariana SHOSHANA Vasquez   300 mg at 02/22/22 0748       Fluids    Intake/Output Summary (Last 24 hours) at 2/22/2022 1044  Last data filed at 2/22/2022 1000  Gross per 24 hour   Intake 1463.13 ml   Output 2525 ml   Net -1061.87 ml       Laboratory  Recent Labs     02/20/22  0426 02/21/22  0515 02/22/22  0328   ISTATAPH 7.483 7.447 7.387*   ISTATAPCO2 33.1 36.8 45.4*   ISTATAPO2 67 82 72   ISTATATCO2 26 27 29   [id number] 95 97 94   ISTATARTHCO3 24.9 25.4* 27.3*   ISTATARTBE 2 1 2   ISTATTEMP 97.7 F 96.6 F 97.3 F   ISTATFIO2 40 50 40   ISTATSPEC Arterial Arterial Arterial   ISTATAPHTC 7.491 7.463 7.397*   [id number] 64 76 68         Recent Labs     02/20/22  0535 02/20/22 2243 02/21/22  0515 02/22/22  0327   SODIUM 135 134* 134* 138   POTASSIUM 3.5* 3.8 3.7 3.7   CHLORIDE 102 101 103 107   CO2 24 21 21 24   BUN 14 15 15 19   CREATININE <0.17* <0.17* <0.17* <0.17*   MAGNESIUM 1.9  --  2.0 1.8   PHOSPHORUS 2.2*  --  3.1 2.1*   CALCIUM 8.0* 7.7* 8.3* 8.6     Recent Labs     02/20/22  2243 02/21/22  0515 02/21/22  1137 02/22/22 0327   ALTSGPT 87*  --   --   --    ASTSGOT 73*  --   --   --    ALKPHOSPHAT 477*  --   --   --    TBILIRUBIN 0.9  --   --   --    PREALBUMIN  --   --  7.0*  --    GLUCOSE 127* 107*  --  108*     Recent Labs     02/20/22  0535 02/20/22  2243 02/21/22  0515 02/22/22 0327   WBC 3.7*  --  3.6* 5.2   NEUTSPOLYS 86.00*  --  66.90 69.90   LYMPHOCYTES 8.80*  --  24.70 25.70   MONOCYTES 1.70  --  5.60 2.60   EOSINOPHILS 0.00  --  1.10 0.90   BASOPHILS 1.70  --  0.60 0.00   ASTSGOT  --  73*  --   --    ALTSGPT  --  87*  --   --    ALKPHOSPHAT   --  477*  --   --    TBILIRUBIN  --  0.9  --   --      Recent Labs     02/20/22  0535 02/21/22  0515 02/22/22  0327   RBC 3.75* 3.74* 3.57*   HEMOGLOBIN 13.0* 12.9* 12.2*   HEMATOCRIT 36.1* 35.0* 34.0*   PLATELETCT 146* 214 235       Imaging  X-Ray:  I have personally reviewed the images and compared with prior images.    Assessment/Plan  * Acute hypoxemic respiratory failure due to COVID-19 (HCC)- (present on admission)  Assessment & Plan  COVID-19 pneumonia  Dexamethasone  Fluid balance - keep euvolemic    Ventilator dependent respiratory failure  Modify ventilator to optimize oxygenation and ventilation  HOB > 30  Chlorhexidine  Perform spontaneous breathing trial when able  Early mobility    Tracheal aspirate culture psuedomonas - continue cefepime    Epilepsy ([facility])- (present on admission)  Assessment & Plan  Continue the AED regimen noted below:  Phenobarbital 120 mg BID  Clonazepam 0.5 mg qam and 1mg qpm  Lacosamide 30 0mg BID  Levetiracetum 1500 mg BID  Topiramate 50 mg qam and 75 mg pm  No obvious seizure activity    Protein-calorie malnutrition, severe ([facility])- (present on admission)  Assessment & Plan  Nutrition consult  TFs per their recommendations    Contraction, joint, multiple sites- (present on admission)  Assessment & Plan  Positioning and turning per protocol    TBI (traumatic brain injury) ([facility])- (present on admission)  Assessment & Plan  Hx of limit sedative     DVT prophylaxis: Enoxaparin  PUD prophylaxis: Famotidine  Glycemic control: Sliding scale insulin  Nutrition: Tube feeds  Lines: None  Waldrop: None  Mobility: Early mobility as tolerated  Goals of care: Full code  Disposition: ICU    I have performed a physical exam and reviewed and updated ROS and Plan today (2/22/2022). In review of yesterday's note (2/21/2022), there are no changes except as documented above.     Discussed patient condition and risk of morbidity and/or mortality with Family, RN, RT, Pharmacy and Charge nurse / hot rounds      The patient remains critically ill titration of ventilator and weaning trials.  Critical care time = 38 minutes in directly providing and coordinating critical care and extensive data review.  No time overlap and excludes procedures.

## 2022-02-22 NOTE — CARE PLAN
The patient is Watcher - Medium risk of patient condition declining or worsening    Shift Goals  Clinical Goals: decrease fi02, SBT, pain control, comfort  Patient Goals: TRAVIS  Family Goals: participation in care    Progress made toward(s) clinical / shift goals:    Problem: Knowledge Deficit - Standard  Goal: Patient and family/care givers will demonstrate understanding of plan of care, disease process/condition, diagnostic tests and medications  Outcome: Progressing     Problem: Skin Integrity  Goal: Skin integrity is maintained or improved  Outcome: Progressing     Problem: Fall Risk  Goal: Patient will remain free from falls  Outcome: Progressing     Problem: Communication  Goal: The ability to communicate needs accurately and effectively will improve  Outcome: Progressing     Problem: Respiratory  Goal: Patient will achieve/maintain optimum respiratory ventilation and gas exchange  Outcome: Progressing     Problem: Infection - Standard  Goal: Patient will remain free from infection  Outcome: Progressing     Problem: Wound/ / Incision Healing  Goal: Patient's wound/surgical incision will decrease in size and heals properly  Outcome: Progressing     Problem: Pain - Standard  Goal: Alleviation of pain or a reduction in pain to the patient’s comfort goal  Outcome: Progressing       Patient is not progressing towards the following goals:       CC/ HPI Patient is a 78 year old male with asthma, no hospital visits, history of atrial fib/flutter,  ablation,  w/recurrent atrial fib/flutter refusing subsequent ablations, on Xarelto, who was admitted with worsening LE edema and palpitations, seen this morning the patient denies acute respiratory complaint.           PAST MEDICAL & SURGICAL HISTORY:  Uncomplicated asthma, unspecified asthma severity  History of hypothyroidism  DM (diabetes mellitus screen)  Essential hypertension  Atrial fibrillation and flutter  S/P AV kishan ablation  History of cardioversion: 2014, 2016    SOCHX:  - tobacco,  -  alcohol        FAMILY HISTORY: FA/MO  - contributory   No pertinent family history in first degree relatives    ROS reviewed below with positive findings marked (+) :  GEN:  fever, chills ENT: tracheostomy,   epistaxis,  sinusitis COR: CAD, CHF,  +HTN, +dysrhythmia PUL: COPD, ILD, +asthma,+ pneumonia GI: PEG, dysphagia, hemorrhage, other ERICKA: kidney disease, electrolyte disorder HEM:  anemia, thrombus, coagulopathy, cancer ENDO:  thyroid disease, +diabetes mellitus CNS:  dementia, stroke, seizure, PSY:  depression, anxiety, other    MEDICATIONS  (STANDING):  atorvastatin 40 milliGRAM(s) Oral at bedtime  buDESOnide    Inhalation Suspension 0.25 milliGRAM(s) Inhalation every 12 hours  dextrose 5%. 1000 milliLiter(s) (50 mL/Hr) IV Continuous <Continuous>  dextrose 50% Injectable 12.5 Gram(s) IV Push once  dextrose 50% Injectable 25 Gram(s) IV Push once  dextrose 50% Injectable 25 Gram(s) IV Push once  furosemide   Injectable 40 milliGRAM(s) IV Push every 12 hours  insulin lispro (HumaLOG) corrective regimen sliding scale   SubCutaneous Before meals and at bedtime  levothyroxine 50 MICROGram(s) Oral daily  metoprolol succinate ER 50 milliGRAM(s) Oral every 12 hours  pantoprazole    Tablet 40 milliGRAM(s) Oral before breakfast  rivaroxaban 20 milliGRAM(s) Oral with dinner    MEDICATIONS  (PRN):  dextrose 40% Gel 15 Gram(s) Oral once PRN Blood Glucose LESS THAN 70 milliGRAM(s)/deciliter  glucagon  Injectable 1 milliGRAM(s) IntraMuscular once PRN Glucose LESS THAN 70 milligrams/deciliter  ipratropium    for Nebulization 500 MICROGram(s) Nebulizer every 6 hours PRN Shortness of Breath and/or Wheezing  levalbuterol Inhalation 0.63 milliGRAM(s) Inhalation every 6 hours PRN SOB/wheezing      Vital Signs Last 24 Hrs  T(C): 37.1 (31 Jul 2020 13:40), Max: 37.1 (31 Jul 2020 13:40)  T(F): 98.7 (31 Jul 2020 13:40), Max: 98.7 (31 Jul 2020 13:40)  HR: 95 (31 Jul 2020 13:16) (73 - 95)  BP: 145/85 (31 Jul 2020 13:16) (110/62 - 145/85)  BP(mean): 98 (30 Jul 2020 18:36) (98 - 98)  RR: 20 (31 Jul 2020 13:16) (16 - 20)  SpO2: 94% (31 Jul 2020 13:16) (84% - 100%)    GENERAL:         comfortable,  - distress.  HEENT:            - trauma,  - icterus,  - injection,  - nasal discharge.  NECK:              - jugular venous distention, - thyromegaly.  LYMPH:           - lymphadenopathy, - masses.  RESP:              + clear,   - rales,   - rhonchi,   - wheezes.   COR:                S1S2   - gallops,  - rubs.  ABD:                bowel sounds,   soft, - tender, - distended,.  EXT/MSC:         - cyanosis,  - clubbing, + edema.    NEURO:           + alert and oriented                             11.2   5.97  )-----------( 103      ( 31 Jul 2020 05:57 )             36.1     07-31    145  |  99  |  38<H>  ----------------------------<  103<H>  3.6   |  34<H>  |  1.57<H>    Ca    9.1      31 Jul 2020 05:57  Mg     1.7     07-31    TPro  6.2  /  Alb  3.8  /  TBili  1.4<H>  /  DBili  x   /  AST  24  /  ALT  25  /  AlkPhos  101  07-31        CT Angio Chest PE Protocol w/ IV Cont (07.28.20)   No central pulmonary embolism. Cannot assess for lobar, segmental or subsegmental branches due to artifact from respiratory motion and suboptimal contrast bolus timing. Unchanged prominent right main pulmonary artery measuring up to 3.6 cm of unclear clinical significance. Two-vessel configuration of the aortic arch. Mild calcified plaque aorta. Mild aortic root calcification. Cardiomegaly. Moderate coronary artery calcification. No pericardial effusion is seen.    Few prominent mediastinal nodes,  *  Para-aortic, 1.0 x 1.0 cm  *  Right upper paratracheal, posterior 1.5 x 1.0 cm,    Small left pleural effusions is seen. Left lower lobe consolidation with air bronchograms and adjacent compressive atelectasis. Trace right lower lobe linear atelectasis.    Limited evaluation of the upper abdomen demonstrates small amount of ascites.    Evaluation of the soft tissues demonstrates bilateral symmetric gynecomastia.    Evaluation of the osseous structures demonstrates degenerative changes in the spine.      IMPRESSION:    1.  No central pulmonary embolism. Cannot assess for lobar, segmental or subsegmental branches due to artifact from respiratory motion and suboptimal contrast bolus timing.  2.  Since November 9, 2016, increased left lower lobe and lingular consolidation and new dependent groundglass airspace opacity with unchanged elevated left hemidiaphragm, possibly atelectasis, cannot exclude infection.  3.  New small left pleural effusion.  4.  Few prominent mediastinal nodes, nonspecific, possibly inflammatory or infectious.        ASSESSMENT/PLAN    1) Atrial fibrillation/flutter  2) Cardiomyopathy  3) Asthma  4) Atelectasis  5) Pulmonary hypertension    Satisfactory SpO2  Bronchodilators:  Atrovent/ albuterol q 4 – 6 hours as needed  Corticosteroids: budesonide  Cardiac/HTN: diuresis as needed, EP management  GI: Rx/ prophylaxis c PPI/H2B  Heme: Rx/VT receiving AC  Discussed with Dr. Cali and the medical team

## 2022-02-22 NOTE — CARE PLAN
Ventilator Daily Summary    Vent Day #3    Ventilator settings changed this shift: 40%    Weaning trials:yes    Respiratory Procedures:no    Plan: Continue current ventilator settings and wean mechanical ventilation as tolerated per physician orders.     Problem: Ventilation  Goal: Ability to achieve and maintain unassisted ventilation or tolerate decreased levels of ventilator support  Description: Target End Date:  4 days     Document on Vent flowsheet    1.  Support and monitor invasive and noninvasive mechanical ventilation  2.  Monitor ventilator weaning response  3.  Perform ventilator associated pneumonia prevention interventions  4.  Manage ventilation therapy by monitoring diagnostic test results  Outcome: Progressing

## 2022-02-22 NOTE — PROGRESS NOTES
VAT RN accessed both arms for midline placement.  Due to contractures and vasculature, we are unable to place midline.  Notified bedside RN, who will notify MD.

## 2022-02-22 NOTE — CARE PLAN
Problem: Nutritional:  Goal: Nutrition support tolerated and meeting greater than 85% of estimated needs  Outcome: Met   Impact Peptide 1.5 @ 45 mL/hr (final goal rate).

## 2022-02-22 NOTE — CARE PLAN
Problem: Ventilation  Goal: Ability to achieve and maintain unassisted ventilation or tolerate decreased levels of ventilator support  Description: Target End Date:  4 days     Document on Vent flowsheet    1.  Support and monitor invasive and noninvasive mechanical ventilation  2.  Monitor ventilator weaning response  3.  Perform ventilator associated pneumonia prevention interventions  4.  Manage ventilation therapy by monitoring diagnostic test results  Outcome: Progressing  Note:    Ventilator Daily Summary    Vent Day # 4  /+8/40%    Ventilator settings changed this shift: none    Weaning trials: pt on Spont until 1815, then placed on rest settings over night    Respiratory Procedures: none    Plan: Continue current ventilator settings and wean mechanical ventilation as tolerated per physician orders.

## 2022-02-23 ENCOUNTER — HOSPITAL ENCOUNTER (OUTPATIENT)
Dept: RADIOLOGY | Facility: MEDICAL CENTER | Age: 42
End: 2022-02-23
Attending: INTERNAL MEDICINE
Payer: COMMERCIAL

## 2022-02-23 LAB
ANION GAP SERPL CALC-SCNC: 8 MMOL/L (ref 7–16)
ANISOCYTOSIS BLD QL SMEAR: ABNORMAL
BASOPHILS # BLD AUTO: 0.9 % (ref 0–1.8)
BASOPHILS # BLD: 0.05 K/UL (ref 0–0.12)
BUN SERPL-MCNC: 23 MG/DL (ref 8–22)
CALCIUM SERPL-MCNC: 8.4 MG/DL (ref 8.5–10.5)
CHLORIDE SERPL-SCNC: 108 MMOL/L (ref 96–112)
CO2 SERPL-SCNC: 26 MMOL/L (ref 20–33)
CREAT SERPL-MCNC: <0.17 MG/DL (ref 0.5–1.4)
EOSINOPHIL # BLD AUTO: 0.1 K/UL (ref 0–0.51)
EOSINOPHIL NFR BLD: 1.9 % (ref 0–6.9)
ERYTHROCYTE [DISTWIDTH] IN BLOOD BY AUTOMATED COUNT: 57.8 FL (ref 35.9–50)
GLUCOSE SERPL-MCNC: 109 MG/DL (ref 65–99)
HCT VFR BLD AUTO: 37.3 % (ref 42–52)
HGB BLD-MCNC: 12.8 G/DL (ref 14–18)
LYMPHOCYTES # BLD AUTO: 0.81 K/UL (ref 1–4.8)
LYMPHOCYTES NFR BLD: 15 % (ref 22–41)
MACROCYTES BLD QL SMEAR: ABNORMAL
MAGNESIUM SERPL-MCNC: 2 MG/DL (ref 1.5–2.5)
MANUAL DIFF BLD: NORMAL
MCH RBC QN AUTO: 34.6 PG (ref 27–33)
MCHC RBC AUTO-ENTMCNC: 34.3 G/DL (ref 33.7–35.3)
MCV RBC AUTO: 100.8 FL (ref 81.4–97.8)
METAMYELOCYTES NFR BLD MANUAL: 3.7 %
MONOCYTES # BLD AUTO: 0.65 K/UL (ref 0–0.85)
MONOCYTES NFR BLD AUTO: 12.1 % (ref 0–13.4)
MORPHOLOGY BLD-IMP: NORMAL
MYELOCYTES NFR BLD MANUAL: 1.9 %
NEUTROPHILS # BLD AUTO: 3.48 K/UL (ref 1.82–7.42)
NEUTROPHILS NFR BLD: 59.8 % (ref 44–72)
NEUTS BAND NFR BLD MANUAL: 4.7 % (ref 0–10)
NRBC # BLD AUTO: 0 K/UL
NRBC BLD-RTO: 0 /100 WBC
PHOSPHATE SERPL-MCNC: 2.1 MG/DL (ref 2.5–4.5)
PLATELET # BLD AUTO: 225 K/UL (ref 164–446)
PLATELET BLD QL SMEAR: NORMAL
PMV BLD AUTO: 9.3 FL (ref 9–12.9)
POIKILOCYTOSIS BLD QL SMEAR: NORMAL
POTASSIUM SERPL-SCNC: 4.2 MMOL/L (ref 3.6–5.5)
RBC # BLD AUTO: 3.7 M/UL (ref 4.7–6.1)
RBC BLD AUTO: PRESENT
SODIUM SERPL-SCNC: 142 MMOL/L (ref 135–145)
TARGETS BLD QL SMEAR: NORMAL
WBC # BLD AUTO: 5.4 K/UL (ref 4.8–10.8)

## 2022-02-23 PROCEDURE — 700111 HCHG RX REV CODE 636 W/ 250 OVERRIDE (IP): Performed by: EMERGENCY MEDICINE

## 2022-02-23 PROCEDURE — 83735 ASSAY OF MAGNESIUM: CPT

## 2022-02-23 PROCEDURE — 80048 BASIC METABOLIC PNL TOTAL CA: CPT

## 2022-02-23 PROCEDURE — 700102 HCHG RX REV CODE 250 W/ 637 OVERRIDE(OP): Performed by: STUDENT IN AN ORGANIZED HEALTH CARE EDUCATION/TRAINING PROGRAM

## 2022-02-23 PROCEDURE — 85007 BL SMEAR W/DIFF WBC COUNT: CPT

## 2022-02-23 PROCEDURE — 94799 UNLISTED PULMONARY SVC/PX: CPT

## 2022-02-23 PROCEDURE — 700102 HCHG RX REV CODE 250 W/ 637 OVERRIDE(OP): Performed by: INTERNAL MEDICINE

## 2022-02-23 PROCEDURE — 84100 ASSAY OF PHOSPHORUS: CPT

## 2022-02-23 PROCEDURE — 71045 X-RAY EXAM CHEST 1 VIEW: CPT

## 2022-02-23 PROCEDURE — 94003 VENT MGMT INPAT SUBQ DAY: CPT

## 2022-02-23 PROCEDURE — 770022 HCHG ROOM/CARE - ICU (200)

## 2022-02-23 PROCEDURE — 700105 HCHG RX REV CODE 258: Performed by: EMERGENCY MEDICINE

## 2022-02-23 PROCEDURE — A9270 NON-COVERED ITEM OR SERVICE: HCPCS | Performed by: INTERNAL MEDICINE

## 2022-02-23 PROCEDURE — 700101 HCHG RX REV CODE 250: Performed by: INTERNAL MEDICINE

## 2022-02-23 PROCEDURE — 700111 HCHG RX REV CODE 636 W/ 250 OVERRIDE (IP): Performed by: STUDENT IN AN ORGANIZED HEALTH CARE EDUCATION/TRAINING PROGRAM

## 2022-02-23 PROCEDURE — 700111 HCHG RX REV CODE 636 W/ 250 OVERRIDE (IP): Performed by: INTERNAL MEDICINE

## 2022-02-23 PROCEDURE — 99291 CRITICAL CARE FIRST HOUR: CPT | Performed by: INTERNAL MEDICINE

## 2022-02-23 PROCEDURE — A9270 NON-COVERED ITEM OR SERVICE: HCPCS | Performed by: STUDENT IN AN ORGANIZED HEALTH CARE EDUCATION/TRAINING PROGRAM

## 2022-02-23 PROCEDURE — 85027 COMPLETE CBC AUTOMATED: CPT

## 2022-02-23 RX ORDER — MAGNESIUM SULFATE HEPTAHYDRATE 40 MG/ML
2 INJECTION, SOLUTION INTRAVENOUS ONCE
Status: COMPLETED | OUTPATIENT
Start: 2022-02-23 | End: 2022-02-23

## 2022-02-23 RX ADMIN — POTASSIUM BICARBONATE 25 MEQ: 977.5 TABLET, EFFERVESCENT ORAL at 18:32

## 2022-02-23 RX ADMIN — LEVETIRACETAM 1500 MG: 100 SOLUTION ORAL at 07:25

## 2022-02-23 RX ADMIN — DIBASIC SODIUM PHOSPHATE, MONOBASIC POTASSIUM PHOSPHATE AND MONOBASIC SODIUM PHOSPHATE 500 MG: 852; 155; 130 TABLET ORAL at 12:39

## 2022-02-23 RX ADMIN — DEXAMETHASONE 6 MG: 6 TABLET ORAL at 06:25

## 2022-02-23 RX ADMIN — CEFEPIME 2 G: 2 INJECTION, POWDER, FOR SOLUTION INTRAVENOUS at 14:21

## 2022-02-23 RX ADMIN — CEFEPIME 2 G: 2 INJECTION, POWDER, FOR SOLUTION INTRAVENOUS at 22:07

## 2022-02-23 RX ADMIN — ENOXAPARIN SODIUM 40 MG: 40 INJECTION SUBCUTANEOUS at 06:21

## 2022-02-23 RX ADMIN — PHENOBARBITAL 120 MG: 20 ELIXIR ORAL at 07:25

## 2022-02-23 RX ADMIN — TOPIRAMATE 50 MG: 100 TABLET, FILM COATED ORAL at 08:49

## 2022-02-23 RX ADMIN — DIBASIC SODIUM PHOSPHATE, MONOBASIC POTASSIUM PHOSPHATE AND MONOBASIC SODIUM PHOSPHATE 500 MG: 852; 155; 130 TABLET ORAL at 08:49

## 2022-02-23 RX ADMIN — Medication 1000 UNITS: at 06:21

## 2022-02-23 RX ADMIN — CLONAZEPAM 0.5 MG: 0.5 TABLET ORAL at 08:09

## 2022-02-23 RX ADMIN — MAGNESIUM SULFATE HEPTAHYDRATE 2 G: 40 INJECTION, SOLUTION INTRAVENOUS at 08:50

## 2022-02-23 RX ADMIN — FAMOTIDINE 20 MG: 20 TABLET ORAL at 18:32

## 2022-02-23 RX ADMIN — LEVETIRACETAM 1500 MG: 100 SOLUTION ORAL at 18:31

## 2022-02-23 RX ADMIN — ERYTHROMYCIN: 5 OINTMENT OPHTHALMIC at 12:39

## 2022-02-23 RX ADMIN — POTASSIUM BICARBONATE 25 MEQ: 977.5 TABLET, EFFERVESCENT ORAL at 10:44

## 2022-02-23 RX ADMIN — FAMOTIDINE 20 MG: 20 TABLET ORAL at 06:21

## 2022-02-23 RX ADMIN — CEFEPIME 2 G: 2 INJECTION, POWDER, FOR SOLUTION INTRAVENOUS at 06:21

## 2022-02-23 RX ADMIN — Medication 1 CAPSULE: at 06:21

## 2022-02-23 RX ADMIN — TOPIRAMATE 75 MG: 25 TABLET, FILM COATED ORAL at 22:07

## 2022-02-23 RX ADMIN — ERYTHROMYCIN: 5 OINTMENT OPHTHALMIC at 06:25

## 2022-02-23 RX ADMIN — LACOSAMIDE 300 MG: 100 TABLET, FILM COATED ORAL at 08:09

## 2022-02-23 RX ADMIN — LACOSAMIDE 300 MG: 100 TABLET, FILM COATED ORAL at 20:08

## 2022-02-23 RX ADMIN — CLONAZEPAM 1 MG: 1 TABLET ORAL at 20:08

## 2022-02-23 RX ADMIN — Medication 10 MG: at 06:21

## 2022-02-23 RX ADMIN — DIBASIC SODIUM PHOSPHATE, MONOBASIC POTASSIUM PHOSPHATE AND MONOBASIC SODIUM PHOSPHATE 500 MG: 852; 155; 130 TABLET ORAL at 18:32

## 2022-02-23 RX ADMIN — PHENOBARBITAL 120 MG: 20 ELIXIR ORAL at 18:32

## 2022-02-23 NOTE — CARE PLAN
The patient is Stable - Low risk of patient condition declining or worsening    Shift Goals  Clinical Goals: increase spont time, pain control, abx  Patient Goals: tua  Family Goals: comfort, get home    Progress made toward(s) clinical / shift goals:  tolerating longer sponts    Problem: Respiratory  Goal: Patient will achieve/maintain optimum respiratory ventilation and gas exchange  Outcome: Progressing       Patient is not progressing towards the following goals:

## 2022-02-23 NOTE — CARE PLAN
Ventilator Daily Summary    Vent Day #5    Ventilator settings changed this shift:none    Weaning trials:yes 12 hours    Respiratory Procedures:none    Plan: Continue current ventilator settings and wean mechanical ventilation as tolerated per physician orders.     Problem: Ventilation  Goal: Ability to achieve and maintain unassisted ventilation or tolerate decreased levels of ventilator support  Description: Target End Date:  4 days     Document on Vent flowsheet    1.  Support and monitor invasive and noninvasive mechanical ventilation  2.  Monitor ventilator weaning response  3.  Perform ventilator associated pneumonia prevention interventions  4.  Manage ventilation therapy by monitoring diagnostic test results  Outcome: Progressing

## 2022-02-23 NOTE — DISCHARGE PLANNING
Care Transition Team Discharge Planning    Anticipated Discharge Disposition: d/c home w/ mom and dad when ready    Action: Lsw attended rounds, and mom was present to receive medical update. Mom will bring in pt's custom w/c so he can start sitting in it at bedside today. This will assist w/ mobility. Pt is on vent day 5.     Barriers to Discharge: medical clearance    Plan: Lsw will continue to follow, and assist w/ d/c planning.

## 2022-02-23 NOTE — CARE PLAN
Ventilator Daily Summary    Vent Day #4    Ventilator settings changed this shift:no    Weaning trials:yes    Respiratory Procedures:no    Plan: Continue current ventilator settings and wean mechanical ventilation as tolerated per physician orders.     Problem: Ventilation  Goal: Ability to achieve and maintain unassisted ventilation or tolerate decreased levels of ventilator support  Description: Target End Date:  4 days     Document on Vent flowsheet    1.  Support and monitor invasive and noninvasive mechanical ventilation  2.  Monitor ventilator weaning response  3.  Perform ventilator associated pneumonia prevention interventions  4.  Manage ventilation therapy by monitoring diagnostic test results  Outcome: Progressing

## 2022-02-23 NOTE — CARE PLAN
Problem: Ventilation  Goal: Ability to achieve and maintain unassisted ventilation or tolerate decreased levels of ventilator support  Description: Target End Date:  4 days     Document on Vent flowsheet    1.  Support and monitor invasive and noninvasive mechanical ventilation  2.  Monitor ventilator weaning response  3.  Perform ventilator associated pneumonia prevention interventions  4.  Manage ventilation therapy by monitoring diagnostic test results  Outcome: Progressing  Note:    Ventilator Daily Summary    Vent Day # 5  /+8/40%    Ventilator settings changed this shift: Pt placed on Rest settings    Weaning trials: pt on Spont from [id number]    Respiratory Procedures: none    Plan: Continue current ventilator settings and wean mechanical ventilation as tolerated per physician orders.

## 2022-02-23 NOTE — PROGRESS NOTES
"Critical Care Progress Note    Date of admission  2/18/2022    Chief Complaint  41 y.o. male admitted 2/18/2022 with acute hypoxic respiratory failure due to pneumonia and COVID    \"Mr. 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  The patient had been started on ceftriaxone, azithromycin, and dexamethasone at the outside facility.  The patient was transferred to [facility] for higher level of care.\"     Hospital Course  2/19 - VD#1, s/p bronch with BAL, stopped azithro/C3 --> cefepime started, no sedation  2/20 -  2/21 - TA w/ pseudomonas --> continue cefepime, PSV trials, midline catheter placement  2/22 vent weaning trials, replace k mag and phos    24 hours:  Neuro: at baseline, contract and grimaces  HR: 54-75  SBP: 100-110's  Tmax: afebrile  GI: TF at goal, BM 2/22  UOP: 2300ml  Lines: condom cath, 2 peripheral IV  Resp: vent 5 spont 2 hours today goal 12 hrs secretions none   Vte: lovenox  PPI/H2:pepcid  Antibx: cefepimine 5/7    Replace k mag and phos  RSBI 14  procal 2/24      Review of Systems  Review of Systems   Unable to perform ROS: Intubated        Vital Signs for last 24 hours   Temp:  [36.4 °C (97.5 °F)-37.1 °C (98.8 °F)] 36.4 °C (97.5 °F)  Pulse:  [54-76] 62  Resp:  [7-25] 12  BP: (106-138)/(53-68) 133/67  SpO2:  [94 %-100 %] 98 %    Hemodynamic parameters for last 24 hours       Respiratory Information for the last 24 hours  Vent Mode: Spont  PEEP/CPAP: 8  P Support: 5  MAP: 16  Length of Weaning Trial (Hours): 4 hours per MD orders  Control VTE (exp VT): 365    Physical Exam   Physical Exam  Vitals and nursing note reviewed.   Constitutional:       General: He is not in acute distress.     Appearance: " He is ill-appearing. He is not diaphoretic.      Comments: On ventilator   HENT:      Head: Normocephalic.      Right Ear: External ear normal.      Left Ear: External ear normal.      Nose: Nose normal.      Mouth/Throat:      Mouth: Mucous membranes are dry.      Pharynx: Oropharynx is clear.   Eyes:      Conjunctiva/sclera: Conjunctivae normal.   Neck:      Comments: trach  Cardiovascular:      Rate and Rhythm: Normal rate and regular rhythm.      Pulses: Normal pulses.      Heart sounds: Normal heart sounds.   Pulmonary:      Comments: Trached  Coarse bilateral breath sounds tolerating spont with RSBI 14  Abdominal:      General: There is no distension.      Palpations: Abdomen is soft. There is no mass.      Tenderness: There is no abdominal tenderness. There is no guarding or rebound.      Hernia: No hernia is present.      Comments: G-tube site with no purulent discharge, no significant erythema, pain pump in RLQ   Musculoskeletal:      Right lower leg: No edema.      Left lower leg: No edema.      Comments: Contracted   Skin:     General: Skin is warm and dry.      Capillary Refill: Capillary refill takes less than 2 seconds.   Neurological:      Mental Status: Mental status is at baseline.         Medications  Current Facility-Administered Medications   Medication Dose Route Frequency Provider Last Rate Last Admin   • magnesium sulfate IVPB premix 2 g  2 g Intravenous Once Kev Rinaldi M.D. 25 mL/hr at 02/23/22 0850 2 g at 02/23/22 0850   • phosphorus (K-Phos-Neutral) per tablet 500 mg  500 mg Enteral Tube Q6HRS Kev Rinaldi M.D.   500 mg at 02/23/22 0849   • potassium bicarbonate (KLYTE) effervescent tablet 25 mEq  25 mEq Enteral Tube BID Kev Rinaldi M.D.       • cefepime (Maxipime) 2 g in dextrose 5% 20 mL IV syringe  2 g Intravenous Q8HRS Pato Constantino M.D.   Stopped at 02/23/22 0626   • Respiratory Therapy Consult   Nebulization Continuous RT Darleen Blackburn M.D.       •  famotidine (PEPCID) tablet 20 mg  20 mg Enteral Tube Q12HRS Darleen Blackburn M.D.   20 mg at 02/23/22 0621   • MD Alert...ICU Electrolyte Replacement per Pharmacy   Other PHARMACY TO DOSE Darleen Blackburn M.D.       • lidocaine (XYLOCAINE) 1 % injection 2 mL  2 mL Tracheal Tube Q30 MIN PRN Darleen Blackburn M.D.       • ipratropium-albuterol (DUONEB) nebulizer solution  3 mL Nebulization Q2HRS PRN (RT) Darleen Blackburn M.D.       • erythromycin ophthalmic ointment   Both Eyes Q6HRS Darleen Blackburn M.D.   Given at 02/23/22 0625   • vitamin D3 (cholecalciferol) tablet 1,000 Units  1,000 Units Enteral Tube DAILY Darleen Blackburn M.D.   1,000 Units at 02/23/22 0621   • lactobacillus rhamnosus (CULTURELLE) capsule 1 Capsule  1 Capsule Enteral Tube QDAY with Breakfast Darleen Blackburn M.D.   1 Capsule at 02/23/22 0621   • Pain Pump (patient supplied) Device   Injection Continuous Darleen Blackburn M.D.   Continue to Floor at 02/19/22 1030   • Pharmacy Consult: Enteral tube insertion - review meds/change route/product selection  1 Each Other PHARMACY TO DOSE Darleen Blackburn M.D.       • enoxaparin (LOVENOX) inj 40 mg  40 mg Subcutaneous DAILY Ariana Vasquez M.D.   40 mg at 02/23/22 0621   • PHENobarbital solution 120 mg  120 mg Enteral Tube BID Ariana Vasquez M.D.   120 mg at 02/23/22 0725   • levETIRAcetam (KEPPRA) 100 MG/ML solution 1,500 mg  1,500 mg Enteral Tube Q12HRS Ariana aVsquez M.D.   1,500 mg at 02/23/22 0725   • senna-docusate (PERICOLACE or SENOKOT S) 8.6-50 MG per tablet 2 Tablet  2 Tablet Enteral Tube BID Maggie Ellis M.D.   2 Tablet at 02/19/22 0916    And   • polyethylene glycol/lytes (MIRALAX) PACKET 1 Packet  1 Packet Enteral Tube QDAY PRN Maggie Ellis M.D.        And   • magnesium hydroxide (MILK OF MAGNESIA) suspension 30 mL  30 mL Enteral Tube QDAY PRN Maggie Ellis M.D.        And   • bisacodyl (DULCOLAX) suppository 10 mg  10 mg Rectal QDAY PRN Maggie Ellis M.D.    10 mg at 02/23/22 0621   • topiramate (TOPAMAX) tablet 50 mg  50 mg Enteral Tube DAILY Maggie Ellis M.D.   50 mg at 02/23/22 0849    And   • topiramate (TOPAMAX) tablet 75 mg  75 mg Enteral Tube Q EVENING Maggie Ellis M.D.   75 mg at 02/22/22 2115   • dexamethasone (DECADRON) tablet 6 mg  6 mg Enteral Tube DAILY Ariana Vasquez M.D.   6 mg at 02/23/22 0625   • clonazePAM (KLONOPIN) tablet 0.5 mg  0.5 mg Enteral Tube DAILY Maggie Ellis M.D.   0.5 mg at 02/23/22 0809    And   • clonazePAM (KLONOPIN) tablet 1 mg  1 mg Enteral Tube Q EVENING Maggie Ellis M.D.   1 mg at 02/22/22 1953   • lacosamide (VIMPAT) tablet 300 mg  300 mg Enteral Tube BID Ariana Vasquez M.D.   300 mg at 02/23/22 0809       Fluids    Intake/Output Summary (Last 24 hours) at 2/23/2022 1033  Last data filed at 2/23/2022 1000  Gross per 24 hour   Intake 1921.88 ml   Output 2375 ml   Net -453.12 ml       Laboratory  Recent Labs     02/21/22  0515 02/22/22  0328   ISTATAPH 7.447 7.387*   ISTATAPCO2 36.8 45.4*   ISTATAPO2 82 72   ISTATATCO2 27 29   RPQJOKE2GIU 97 94   ISTATARTHCO3 25.4* 27.3*   ISTATARTBE 1 2   ISTATTEMP 96.6 F 97.3 F   ISTATFIO2 50 40   ISTATSPEC Arterial Arterial   ISTATAPHTC 7.463 7.397*   [id number] 76 68         Recent Labs     02/21/22  0515 02/22/22  0327 02/23/22  0430   SODIUM 134* 138 142   POTASSIUM 3.7 3.7 4.2   CHLORIDE 103 107 108   CO2 21 24 26   BUN 15 19 23*   CREATININE <0.17* <0.17* <0.17*   MAGNESIUM 2.0 1.8 2.0   PHOSPHORUS 3.1 2.1* 2.1*   CALCIUM 8.3* 8.6 8.4*     Recent Labs     02/20/22 2243 02/21/22 0515 02/21/22 1137 02/22/22 0327 02/23/22 0430   ALTSGPT 87*  --   --   --   --    ASTSGOT 73*  --   --   --   --    ALKPHOSPHAT 477*  --   --   --   --    TBILIRUBIN 0.9  --   --   --   --    PREALBUMIN  --   --  7.0*  --   --    GLUCOSE 127* 107*  --  108* 109*     Recent Labs     02/20/22 2243 02/21/22 0515 02/22/22 0327 02/23/22 0430   WBC  --  3.6* 5.2 5.4   NEUTSPOLYS  --  66.90  69.90 59.80   LYMPHOCYTES  --  24.70 25.70 15.00*   MONOCYTES  --  5.60 2.60 12.10   EOSINOPHILS  --  1.10 0.90 1.90   BASOPHILS  --  0.60 0.00 0.90   ASTSGOT 73*  --   --   --    ALTSGPT 87*  --   --   --    ALKPHOSPHAT 477*  --   --   --    TBILIRUBIN 0.9  --   --   --      Recent Labs     02/21/22  0515 02/22/22  0327 02/23/22  0430   RBC 3.74* 3.57* 3.70*   HEMOGLOBIN 12.9* 12.2* 12.8*   HEMATOCRIT 35.0* 34.0* 37.3*   PLATELETCT 214 235 225       Imaging  X-Ray:  I have personally reviewed the images and compared with prior images.    Assessment/Plan  * Acute hypoxemic respiratory failure due to COVID-19 (HCC)- (present on admission)  Assessment & Plan  COVID-19 pneumonia  Dexamethasone  Fluid balance - keep euvolemic    Ventilator dependent respiratory failure  Modify ventilator to optimize oxygenation and ventilation  HOB > 30  Chlorhexidine  Perform spontaneous breathing trial when able  Early mobility    Tracheal aspirate culture psuedomonas (pan sensitive) - continue cefepime  Serial procal    Epilepsy ([facility])- (present on admission)  Assessment & Plan  Continue the AED regimen noted below:  Phenobarbital 120 mg BID  Clonazepam 0.5 mg qam and 1mg qpm  Lacosamide 30 0mg BID  Levetiracetum 1500 mg BID  Topiramate 50 mg qam and 75 mg pm  No obvious seizure activity    Protein-calorie malnutrition, severe ([facility])- (present on admission)  Assessment & Plan  Nutrition consult  TFs per their recommendations    Contraction, joint, multiple sites- (present on admission)  Assessment & Plan  Positioning and turning per protocol    TBI (traumatic brain injury) ([facility])- (present on admission)  Assessment & Plan  Hx of limit sedative     DVT prophylaxis: Enoxaparin  PUD prophylaxis: Famotidine  Glycemic control: Sliding scale insulin  Nutrition: Tube feeds  Lines: None  [place]: None  Mobility: Early mobility as tolerated  Goals of care: Full code  Disposition: ICU    I have performed a physical exam and reviewed and updated ROS  and Plan today (2/23/2022). In review of yesterday's note (2/22/2022), there are no changes except as documented above.     Discussed patient condition and risk of morbidity and/or mortality with Family, RN, RT, Pharmacy and Charge nurse / hot rounds     The patient remains critically ill titration of ventilator and weaning trials.  Critical care time = 35 minutes in directly providing and coordinating critical care and extensive data review.  No time overlap and excludes procedures.

## 2022-02-23 NOTE — CARE PLAN
The patient is Watcher - Medium risk of patient condition declining or worsening    Shift Goals  Clinical Goals: increase spont time, pain control, abx  Patient Goals: tua  Family Goals: comfort, get home    Progress made toward(s) clinical / shift goals:    Problem: Knowledge Deficit - Standard  Goal: Patient and family/care givers will demonstrate understanding of plan of care, disease process/condition, diagnostic tests and medications  Outcome: Progressing     Problem: Skin Integrity  Goal: Skin integrity is maintained or improved  Outcome: Progressing     Problem: Fall Risk  Goal: Patient will remain free from falls  Outcome: Progressing     Problem: Communication  Goal: The ability to communicate needs accurately and effectively will improve  Outcome: Progressing     Problem: Respiratory  Goal: Patient will achieve/maintain optimum respiratory ventilation and gas exchange  Outcome: Progressing     Problem: Infection - Standard  Goal: Patient will remain free from infection  Outcome: Progressing     Problem: Wound/ / Incision Healing  Goal: Patient's wound/surgical incision will decrease in size and heals properly  Outcome: Progressing     Problem: Pain - Standard  Goal: Alleviation of pain or a reduction in pain to the patient’s comfort goal  Outcome: Progressing       Patient is not progressing towards the following goals:

## 2022-02-23 NOTE — PROGRESS NOTES
Mom tawny asked for belongings from pt's locked nurse  to take to  in car to take home. RN gave her belongings and watched her leave unit with them and come back without them, no home supplies being used in care

## 2022-02-24 LAB
ANION GAP SERPL CALC-SCNC: 7 MMOL/L (ref 7–16)
ANISOCYTOSIS BLD QL SMEAR: ABNORMAL
BASOPHILS # BLD AUTO: 0.9 % (ref 0–1.8)
BASOPHILS # BLD: 0.04 K/UL (ref 0–0.12)
BUN SERPL-MCNC: 29 MG/DL (ref 8–22)
CALCIUM SERPL-MCNC: 8.1 MG/DL (ref 8.5–10.5)
CHLORIDE SERPL-SCNC: 107 MMOL/L (ref 96–112)
CO2 SERPL-SCNC: 28 MMOL/L (ref 20–33)
CREAT SERPL-MCNC: <0.17 MG/DL (ref 0.5–1.4)
EOSINOPHIL # BLD AUTO: 0 K/UL (ref 0–0.51)
EOSINOPHIL NFR BLD: 0 % (ref 0–6.9)
ERYTHROCYTE [DISTWIDTH] IN BLOOD BY AUTOMATED COUNT: 56.2 FL (ref 35.9–50)
GLUCOSE SERPL-MCNC: 117 MG/DL (ref 65–99)
HCT VFR BLD AUTO: 33.1 % (ref 42–52)
HGB BLD-MCNC: 11.5 G/DL (ref 14–18)
LYMPHOCYTES # BLD AUTO: 1.04 K/UL (ref 1–4.8)
LYMPHOCYTES NFR BLD: 22.1 % (ref 22–41)
MACROCYTES BLD QL SMEAR: ABNORMAL
MAGNESIUM SERPL-MCNC: 1.9 MG/DL (ref 1.5–2.5)
MANUAL DIFF BLD: NORMAL
MCH RBC QN AUTO: 34.6 PG (ref 27–33)
MCHC RBC AUTO-ENTMCNC: 34.7 G/DL (ref 33.7–35.3)
MCV RBC AUTO: 99.7 FL (ref 81.4–97.8)
MONOCYTES # BLD AUTO: 0.38 K/UL (ref 0–0.85)
MONOCYTES NFR BLD AUTO: 8 % (ref 0–13.4)
MORPHOLOGY BLD-IMP: NORMAL
MYELOCYTES NFR BLD MANUAL: 1.8 %
NEUTROPHILS # BLD AUTO: 3.16 K/UL (ref 1.82–7.42)
NEUTROPHILS NFR BLD: 63.7 % (ref 44–72)
NEUTS BAND NFR BLD MANUAL: 3.5 % (ref 0–10)
NRBC # BLD AUTO: 0 K/UL
NRBC BLD-RTO: 0 /100 WBC
PHOSPHATE SERPL-MCNC: 2.9 MG/DL (ref 2.5–4.5)
PLATELET # BLD AUTO: 259 K/UL (ref 164–446)
PLATELET BLD QL SMEAR: NORMAL
PMV BLD AUTO: 9.3 FL (ref 9–12.9)
POTASSIUM SERPL-SCNC: 3.7 MMOL/L (ref 3.6–5.5)
PROCALCITONIN SERPL-MCNC: 0.28 NG/ML
RBC # BLD AUTO: 3.32 M/UL (ref 4.7–6.1)
RBC BLD AUTO: PRESENT
SODIUM SERPL-SCNC: 142 MMOL/L (ref 135–145)
WBC # BLD AUTO: 4.7 K/UL (ref 4.8–10.8)

## 2022-02-24 PROCEDURE — 700102 HCHG RX REV CODE 250 W/ 637 OVERRIDE(OP): Performed by: EMERGENCY MEDICINE

## 2022-02-24 PROCEDURE — A9270 NON-COVERED ITEM OR SERVICE: HCPCS | Performed by: INTERNAL MEDICINE

## 2022-02-24 PROCEDURE — 700111 HCHG RX REV CODE 636 W/ 250 OVERRIDE (IP): Performed by: STUDENT IN AN ORGANIZED HEALTH CARE EDUCATION/TRAINING PROGRAM

## 2022-02-24 PROCEDURE — 94640 AIRWAY INHALATION TREATMENT: CPT

## 2022-02-24 PROCEDURE — 83735 ASSAY OF MAGNESIUM: CPT

## 2022-02-24 PROCEDURE — 700101 HCHG RX REV CODE 250: Performed by: INTERNAL MEDICINE

## 2022-02-24 PROCEDURE — 94799 UNLISTED PULMONARY SVC/PX: CPT

## 2022-02-24 PROCEDURE — 700102 HCHG RX REV CODE 250 W/ 637 OVERRIDE(OP): Performed by: STUDENT IN AN ORGANIZED HEALTH CARE EDUCATION/TRAINING PROGRAM

## 2022-02-24 PROCEDURE — 85027 COMPLETE CBC AUTOMATED: CPT

## 2022-02-24 PROCEDURE — 700111 HCHG RX REV CODE 636 W/ 250 OVERRIDE (IP): Performed by: EMERGENCY MEDICINE

## 2022-02-24 PROCEDURE — 84145 PROCALCITONIN (PCT): CPT

## 2022-02-24 PROCEDURE — 700102 HCHG RX REV CODE 250 W/ 637 OVERRIDE(OP): Performed by: INTERNAL MEDICINE

## 2022-02-24 PROCEDURE — 85007 BL SMEAR W/DIFF WBC COUNT: CPT

## 2022-02-24 PROCEDURE — A9270 NON-COVERED ITEM OR SERVICE: HCPCS | Performed by: STUDENT IN AN ORGANIZED HEALTH CARE EDUCATION/TRAINING PROGRAM

## 2022-02-24 PROCEDURE — 94003 VENT MGMT INPAT SUBQ DAY: CPT

## 2022-02-24 PROCEDURE — 80048 BASIC METABOLIC PNL TOTAL CA: CPT

## 2022-02-24 PROCEDURE — 84100 ASSAY OF PHOSPHORUS: CPT

## 2022-02-24 PROCEDURE — 700105 HCHG RX REV CODE 258: Performed by: EMERGENCY MEDICINE

## 2022-02-24 PROCEDURE — 99291 CRITICAL CARE FIRST HOUR: CPT | Performed by: EMERGENCY MEDICINE

## 2022-02-24 PROCEDURE — A9270 NON-COVERED ITEM OR SERVICE: HCPCS | Performed by: EMERGENCY MEDICINE

## 2022-02-24 PROCEDURE — 770022 HCHG ROOM/CARE - ICU (200)

## 2022-02-24 RX ORDER — MAGNESIUM SULFATE HEPTAHYDRATE 40 MG/ML
2 INJECTION, SOLUTION INTRAVENOUS ONCE
Status: COMPLETED | OUTPATIENT
Start: 2022-02-24 | End: 2022-02-24

## 2022-02-24 RX ADMIN — DIBASIC SODIUM PHOSPHATE, MONOBASIC POTASSIUM PHOSPHATE AND MONOBASIC SODIUM PHOSPHATE 500 MG: 852; 155; 130 TABLET ORAL at 01:13

## 2022-02-24 RX ADMIN — ENOXAPARIN SODIUM 40 MG: 40 INJECTION SUBCUTANEOUS at 06:27

## 2022-02-24 RX ADMIN — TOPIRAMATE 75 MG: 25 TABLET, FILM COATED ORAL at 20:03

## 2022-02-24 RX ADMIN — CEFEPIME 2 G: 2 INJECTION, POWDER, FOR SOLUTION INTRAVENOUS at 06:27

## 2022-02-24 RX ADMIN — ERYTHROMYCIN: 5 OINTMENT OPHTHALMIC at 06:36

## 2022-02-24 RX ADMIN — CEFEPIME 2 G: 2 INJECTION, POWDER, FOR SOLUTION INTRAVENOUS at 21:56

## 2022-02-24 RX ADMIN — CEFEPIME 2 G: 2 INJECTION, POWDER, FOR SOLUTION INTRAVENOUS at 14:09

## 2022-02-24 RX ADMIN — FAMOTIDINE 20 MG: 20 TABLET ORAL at 17:52

## 2022-02-24 RX ADMIN — LEVETIRACETAM 1500 MG: 100 SOLUTION ORAL at 06:27

## 2022-02-24 RX ADMIN — LACOSAMIDE 300 MG: 100 TABLET, FILM COATED ORAL at 09:18

## 2022-02-24 RX ADMIN — FAMOTIDINE 20 MG: 20 TABLET ORAL at 06:27

## 2022-02-24 RX ADMIN — ERYTHROMYCIN: 5 OINTMENT OPHTHALMIC at 01:13

## 2022-02-24 RX ADMIN — PHENOBARBITAL 120 MG: 20 ELIXIR ORAL at 08:33

## 2022-02-24 RX ADMIN — POTASSIUM BICARBONATE 50 MEQ: 977.5 TABLET, EFFERVESCENT ORAL at 08:34

## 2022-02-24 RX ADMIN — ERYTHROMYCIN: 5 OINTMENT OPHTHALMIC at 17:52

## 2022-02-24 RX ADMIN — POTASSIUM BICARBONATE 25 MEQ: 977.5 TABLET, EFFERVESCENT ORAL at 06:27

## 2022-02-24 RX ADMIN — TOPIRAMATE 50 MG: 100 TABLET, FILM COATED ORAL at 09:18

## 2022-02-24 RX ADMIN — LACOSAMIDE 300 MG: 100 TABLET, FILM COATED ORAL at 20:02

## 2022-02-24 RX ADMIN — LEVETIRACETAM 1500 MG: 100 SOLUTION ORAL at 18:49

## 2022-02-24 RX ADMIN — POTASSIUM BICARBONATE 25 MEQ: 977.5 TABLET, EFFERVESCENT ORAL at 17:52

## 2022-02-24 RX ADMIN — PHENOBARBITAL 120 MG: 20 ELIXIR ORAL at 18:49

## 2022-02-24 RX ADMIN — CLONAZEPAM 0.5 MG: 0.5 TABLET ORAL at 10:02

## 2022-02-24 RX ADMIN — DEXAMETHASONE 6 MG: 6 TABLET ORAL at 06:27

## 2022-02-24 RX ADMIN — Medication 1 CAPSULE: at 06:28

## 2022-02-24 RX ADMIN — ERYTHROMYCIN: 5 OINTMENT OPHTHALMIC at 23:06

## 2022-02-24 RX ADMIN — CLONAZEPAM 1 MG: 1 TABLET ORAL at 20:02

## 2022-02-24 RX ADMIN — Medication 1000 UNITS: at 06:27

## 2022-02-24 RX ADMIN — MAGNESIUM SULFATE HEPTAHYDRATE 2 G: 40 INJECTION, SOLUTION INTRAVENOUS at 10:02

## 2022-02-24 NOTE — CARE PLAN
The patient is Watcher - Medium risk of patient condition declining or worsening    Shift Goals  Clinical Goals: tolerate 12 hours of spont, mobilize  Patient Goals: alexandra  Family Goals: get home    Progress made toward(s) clinical / shift goals:    Problem: Knowledge Deficit - Standard  Goal: Patient and family/care givers will demonstrate understanding of plan of care, disease process/condition, diagnostic tests and medications  Outcome: Progressing     Problem: Skin Integrity  Goal: Skin integrity is maintained or improved  Outcome: Progressing     Problem: Fall Risk  Goal: Patient will remain free from falls  Outcome: Progressing     Problem: Communication  Goal: The ability to communicate needs accurately and effectively will improve  Outcome: Progressing     Problem: Respiratory  Goal: Patient will achieve/maintain optimum respiratory ventilation and gas exchange  Outcome: Progressing     Problem: Infection - Standard  Goal: Patient will remain free from infection  Outcome: Progressing     Problem: Wound/ / Incision Healing  Goal: Patient's wound/surgical incision will decrease in size and heals properly  Outcome: Progressing     Problem: Pain - Standard  Goal: Alleviation of pain or a reduction in pain to the patient’s comfort goal  Outcome: Progressing       Patient is not progressing towards the following goals:

## 2022-02-24 NOTE — CARE PLAN
Problem: Ventilation  Goal: Ability to achieve and maintain unassisted ventilation or tolerate decreased levels of ventilator support  Description: Target End Date:  4 days     Document on Vent flowsheet    1.  Support and monitor invasive and noninvasive mechanical ventilation  2.  Monitor ventilator weaning response  3.  Perform ventilator associated pneumonia prevention interventions  4.  Manage ventilation therapy by monitoring diagnostic test results  Outcome: Progressing     Problem: Aerosol Therapy  Goal: Improved hydration/ability to mobilize secretions and/or decreased airway edema  Description: Target End Date:  resolve prior to discharge or when underlying condition is resolved/stabilized    1.  Implement heated or cool aerosol therapy  2.  Assessed for optimal hydration, decreased edema and/or improved ability to mobilize secretions  Outcome: Progressing

## 2022-02-24 NOTE — DIETARY
"Nutrition support weekly update:  Day 6 of admit.  Ruben Edwards is a 41 y.o. male with admitting DX of Acute respiratory distress syndrome (ARDS) due to COVID-19 virus (HCC).  Tube feeding initiated on 2/19.  Current TF via PEG: Impact Peptide 1.5 @ goal rate 45 ml/hr, providing 1620 kcal, 102 grams protein, and 832 ml free water per day.  Assessment:  Weight last taken 2/22: 60.1 kg with BMI 19.01.    Estimated nutritional needs:  REE per MSJ x1.2 = 1816 kcal/day  RMR per PSU (vent L/min 7.4, T max/24 hours 36.8) = 1615 kcal/day  1.2-1.5+ grams protein/kg = 72-90+ grams protein/day    Evaluation:   1. Pt remains on vent support via trach.  2. TF @ goal, meeting estimated needs.    3. Weight stable.  4. Labs reviewed. Pre-albumin 7.0 on 2/21. CRP showed slight improvement from 25.6 on 2/20 to 22.27 on 2/21. Current TF regimen is providing high-dose EPA/DHA (5.3 g/day).  5. Meds reviewed.  6. BM 2/23.  7. Reviewed Wound Team note from 2/21: \"Wounds appear to be healing with the current treatment the mother is performing.  Wound team is signing off, placing orders per mother's current dressings.  If wounds worsens or fail to heal completely please re-consult wound team.\"  8. Current feeding regimen remains appropriate.    Recommendations/Plan:  1. Continue TF formula and rate.  2. Fluids per MD.    RD following.  "

## 2022-02-24 NOTE — CARE PLAN
Problem: Ventilation  Goal: Ability to achieve and maintain unassisted ventilation or tolerate decreased levels of ventilator support  Description: Target End Date:  4 days     Document on Vent flowsheet    1.  Support and monitor invasive and noninvasive mechanical ventilation  2.  Monitor ventilator weaning response  3.  Perform ventilator associated pneumonia prevention interventions  4.  Manage ventilation therapy by monitoring diagnostic test results  Outcome: Progressing  Note:    Ventilator Daily Summary    Vent Day # 6  /+8/30%    Ventilator settings changed this shift: FiO2 down to 30%    Weaning trials: pt on SPONT from 0700 to 1900    Respiratory Procedures: none    Plan: Continue current ventilator settings and wean mechanical ventilation as tolerated per physician orders.

## 2022-02-24 NOTE — PROGRESS NOTES
"Critical Care Progress Note    Date of admission  2/18/2022    Chief Complaint  41 y.o. male admitted 2/18/2022 with acute hypoxic respiratory failure due to pneumonia and COVID    \"Mr. 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  The patient had been started on ceftriaxone, azithromycin, and dexamethasone at the outside facility.  The patient was transferred to [facility] for higher level of care.\"     Hospital Course  2/19 - VD#1, s/p bronch with BAL, stopped azithro/C3 --> cefepime started, no sedation  2/20 -  2/21 - TA w/ pseudomonas --> continue cefepime, PSV trials, midline catheter placement  2/22 - PSV trials  2/23 - PSV trials  2/24 - T-piece, replete K    Review of Systems  Review of Systems   Unable to perform ROS: Intubated        Vital Signs for last 24 hours   Temp:  [36.1 °C (96.9 °F)-36.8 °C (98.2 °F)] 36.8 °C (98.2 °F)  Pulse:  [52-79] 67  Resp:  [6-30] 14  BP: ()/(49-83) 107/57  SpO2:  [90 %-100 %] 94 %    Hemodynamic parameters for last 24 hours       Respiratory Information for the last 24 hours  Vent Mode: ASV  PEEP/CPAP: 8  MAP: 16  Control VTE (exp VT): 373    Physical Exam   Physical Exam  Constitutional:       General: He is not in acute distress.     Appearance: He is ill-appearing. He is not toxic-appearing or diaphoretic.   HENT:      Head: Normocephalic.      Right Ear: External ear normal.      Left Ear: External ear normal.      Nose: Nose normal.      Mouth/Throat:      Mouth: Mucous membranes are dry.      Pharynx: Oropharynx is clear.   Eyes:      Conjunctiva/sclera: Conjunctivae normal.   Cardiovascular:      Rate and Rhythm: Normal rate and regular rhythm.      Pulses: Normal " pulses.      Heart sounds: Normal heart sounds.   Pulmonary:      Comments: Trached  Coarse bilateral breath sounds  Abdominal:      Palpations: Abdomen is soft.      Comments: G-tube site with no purulent discharge, no significant erythema   Musculoskeletal:      Right lower leg: No edema.      Left lower leg: No edema.      Comments: Contracted   Skin:     General: Skin is warm and dry.      Capillary Refill: Capillary refill takes less than 2 seconds.   Neurological:      Mental Status: Mental status is at baseline.         Medications  Current Facility-Administered Medications   Medication Dose Route Frequency Provider Last Rate Last Admin   • potassium bicarbonate (KLYTE) effervescent tablet 25 mEq  25 mEq Enteral Tube BID Kev Rinaldi M.D.   25 mEq at 02/24/22 0627   • cefepime (Maxipime) 2 g in dextrose 5% 20 mL IV syringe  2 g Intravenous Q8HRS Pato Constantino M.D.   Stopped at 02/24/22 1414   • Respiratory Therapy Consult   Nebulization Continuous RT Darleen Blackburn M.D.       • famotidine (PEPCID) tablet 20 mg  20 mg Enteral Tube Q12HRS Darleen Blackburn M.D.   20 mg at 02/24/22 0627   • MD Alert...ICU Electrolyte Replacement per Pharmacy   Other PHARMACY TO DOSE Darleen Blackburn M.D.       • lidocaine (XYLOCAINE) 1 % injection 2 mL  2 mL Tracheal Tube Q30 MIN PRN Darleen Blackburn M.D.       • ipratropium-albuterol (DUONEB) nebulizer solution  3 mL Nebulization Q2HRS PRN (RT) Darleen Blackburn M.D.       • erythromycin ophthalmic ointment   Both Eyes Q6HRS Darleen Blackburn M.D.   Given at 02/24/22 0636   • vitamin D3 (cholecalciferol) tablet 1,000 Units  1,000 Units Enteral Tube DAILY Darleen Blackburn M.D.   1,000 Units at 02/24/22 0627   • lactobacillus rhamnosus (CULTURELLE) capsule 1 Capsule  1 Capsule Enteral Tube QDAY with Breakfast Darleen Blackburn M.D.   1 Capsule at 02/24/22 0628   • Pain Pump (patient supplied) Device   Injection Continuous Darleen Blackburn M.D.   Continue to Floor  at 02/19/22 1030   • Pharmacy Consult: Enteral tube insertion - review meds/change route/product selection  1 Each Other PHARMACY TO DOSE Darleen Blackburn M.D.       • enoxaparin (LOVENOX) inj 40 mg  40 mg Subcutaneous DAILY Ariana Mirandaleonel M.D.   40 mg at 02/24/22 0627   • PHENobarbital solution 120 mg  120 mg Enteral Tube BID Ariana MirandaDIEGO castanedaD.   120 mg at 02/24/22 0833   • levETIRAcetam (KEPPRA) 100 MG/ML solution 1,500 mg  1,500 mg Enteral Tube Q12HRS Ariana HerronDIEGO brownleeDBucky   1,500 mg at 02/24/22 0627   • senna-docusate (PERICOLACE or SENOKOT S) 8.6-50 MG per tablet 2 Tablet  2 Tablet Enteral Tube BID Maggie Ellis M.D.   2 Tablet at 02/19/22 0916    And   • polyethylene glycol/lytes (MIRALAX) PACKET 1 Packet  1 Packet Enteral Tube QDAY PRN Maggie Ellis M.D.        And   • magnesium hydroxide (MILK OF MAGNESIA) suspension 30 mL  30 mL Enteral Tube QDAY PRN Maggie Ellis M.D.        And   • bisacodyl (DULCOLAX) suppository 10 mg  10 mg Rectal QDAY PRN Maggie Ellis M.D.   10 mg at 02/23/22 0621   • topiramate (TOPAMAX) tablet 50 mg  50 mg Enteral Tube DAILY Maggie Ellis M.D.   50 mg at 02/24/22 0918    And   • topiramate (TOPAMAX) tablet 75 mg  75 mg Enteral Tube Q EVENING Maggie Ellis M.D.   75 mg at 02/23/22 2207   • dexamethasone (DECADRON) tablet 6 mg  6 mg Enteral Tube DAILY Ariana SHOSHANA Vasquez   6 mg at 02/24/22 0627   • clonazePAM (KLONOPIN) tablet 0.5 mg  0.5 mg Enteral Tube DAILY Maggie Ellis M.D.   0.5 mg at 02/24/22 1002    And   • clonazePAM (KLONOPIN) tablet 1 mg  1 mg Enteral Tube Q EVENING Maggie Ellis M.D.   1 mg at 02/23/22 2008   • lacosamide (VIMPAT) tablet 300 mg  300 mg Enteral Tube BID Ariana Vasquez M.D.   300 mg at 02/24/22 0918       Fluids    Intake/Output Summary (Last 24 hours) at 2/24/2022 1603  Last data filed at 2/24/2022 1521  Gross per 24 hour   Intake 1630 ml   Output 1400 ml   Net 230 ml       Laboratory  Recent Labs     02/22/22  0335    ISTATAPH 7.387*   ISTATAPCO2 45.4*   ISTATAPO2 72   ISTATATCO2 29   OCAICSY7ULE 94   ISTATARTHCO3 27.3*   ISTATARTBE 2   ISTATTEMP 97.3 F   ISTATFIO2 40   ISTATSPEC Arterial   ISTATAPHTC 7.397*   [id number] 68         Recent Labs     02/22/22 0327 02/23/22  0430 02/24/22  0405   SODIUM 138 142 142   POTASSIUM 3.7 4.2 3.7   CHLORIDE 107 108 107   CO2 24 26 28   BUN 19 23* 29*   CREATININE <0.17* <0.17* <0.17*   MAGNESIUM 1.8 2.0 1.9   PHOSPHORUS 2.1* 2.1* 2.9   CALCIUM 8.6 8.4* 8.1*     Recent Labs     02/22/22 0327 02/23/22  0430 02/24/22  0405   GLUCOSE 108* 109* 117*     Recent Labs     02/22/22 0327 02/23/22  0430 02/24/22  0405   WBC 5.2 5.4 4.7*   NEUTSPOLYS 69.90 59.80 63.70   LYMPHOCYTES 25.70 15.00* 22.10   MONOCYTES 2.60 12.10 8.00   EOSINOPHILS 0.90 1.90 0.00   BASOPHILS 0.00 0.90 0.90     Recent Labs     02/22/22 0327 02/23/22  0430 02/24/22  0405   RBC 3.57* 3.70* 3.32*   HEMOGLOBIN 12.2* 12.8* 11.5*   HEMATOCRIT 34.0* 37.3* 33.1*   PLATELETCT 235 225 259       Imaging  X-Ray:  I have personally reviewed the images and compared with prior images.    Assessment/Plan  * Acute hypoxemic respiratory failure due to COVID-19 (HCC)- (present on admission)  Assessment & Plan  COVID-19 pneumonia  Dexamethasone  Fluid balance - keep euvolemic    Ventilator dependent respiratory failure  Modify ventilator to optimize oxygenation and ventilation  HOB > 30  Chlorhexidine  Perform spontaneous breathing trial when able  Early mobility    PSV trials --> transition to T-piece 2/24    Tracheal aspirate culture psuedomonas (pan sensitive) - continue cefepime x 7 days    Epilepsy (HCC)- (present on admission)  Assessment & Plan  Continue the AED regimen noted below:  Phenobarbital 120 mg BID  Clonazepam 0.5 mg qam and 1mg qpm  Lacosamide 30 0mg BID  Levetiracetum 1500 mg BID  Topiramate 50 mg qam and 75 mg pm  No obvious seizure activity    Protein-calorie malnutrition, severe (HCC)- (present on  admission)  Assessment & Plan  Nutrition consult  TFs per their recommendations    Contraction, joint, multiple sites- (present on admission)  Assessment & Plan  Positioning and turning per protocol    Hypokalemia  Assessment & Plan  K 3.7  Replete to keep K > 4.0    TBI (traumatic brain injury) (HCC)- (present on admission)  Assessment & Plan  Hx of  Limit sedative use     DVT prophylaxis: Enoxaparin  PUD prophylaxis: Famotidine  Glycemic control: Sliding scale insulin  Nutrition: Tube feeds  Lines: None  [place]: None  Mobility: Early mobility as tolerated  Goals of care: Full code  Disposition: ICU    I have performed a physical exam and reviewed and updated ROS and Plan today (2/24/2022). In review of yesterday's note (2/23/2022), there are no changes except as documented above.     Discussed patient condition and risk of morbidity and/or mortality with Family, RN, RT, Pharmacy and Charge nurse / hot rounds     The patient remains critically ill.  Critical care time = 40 minutes in directly providing and coordinating critical care and extensive data review.  No time overlap and excludes procedures.

## 2022-02-24 NOTE — CARE PLAN
The patient is Stable - Low risk of patient condition declining or worsening    Shift Goals  Clinical Goals: tolerate 12 hours of spont, mobilize  Patient Goals: alexandra  Family Goals: get home    Progress made toward(s) clinical / shift goals:  hour 9 of 12 hour spont still holding sats, wounds improving, got up to own wheelchair and coughing/clearing sputum    Problem: Respiratory  Goal: Patient will achieve/maintain optimum respiratory ventilation and gas exchange  Outcome: Progressing     Problem: Wound/ / Incision Healing  Goal: Patient's wound/surgical incision will decrease in size and heals properly  Outcome: Progressing       Patient is not progressing towards the following goals: n/a

## 2022-02-25 PROBLEM — J15.1 PNEUMONIA OF BOTH LUNGS DUE TO PSEUDOMONAS SPECIES (HCC): Status: ACTIVE | Noted: 2022-02-25

## 2022-02-25 LAB
ANION GAP SERPL CALC-SCNC: 7 MMOL/L (ref 7–16)
ANISOCYTOSIS BLD QL SMEAR: ABNORMAL
BASOPHILS # BLD AUTO: 0.9 % (ref 0–1.8)
BASOPHILS # BLD: 0.05 K/UL (ref 0–0.12)
BUN SERPL-MCNC: 21 MG/DL (ref 8–22)
CALCIUM SERPL-MCNC: 8.4 MG/DL (ref 8.5–10.5)
CHLORIDE SERPL-SCNC: 106 MMOL/L (ref 96–112)
CO2 SERPL-SCNC: 28 MMOL/L (ref 20–33)
CREAT SERPL-MCNC: <0.17 MG/DL (ref 0.5–1.4)
DOHLE BOD BLD QL SMEAR: NORMAL
EOSINOPHIL # BLD AUTO: 0.11 K/UL (ref 0–0.51)
EOSINOPHIL NFR BLD: 1.8 % (ref 0–6.9)
ERYTHROCYTE [DISTWIDTH] IN BLOOD BY AUTOMATED COUNT: 57.1 FL (ref 35.9–50)
GLUCOSE SERPL-MCNC: 108 MG/DL (ref 65–99)
HCT VFR BLD AUTO: 35.3 % (ref 42–52)
HGB BLD-MCNC: 11.9 G/DL (ref 14–18)
LYMPHOCYTES # BLD AUTO: 1.36 K/UL (ref 1–4.8)
LYMPHOCYTES NFR BLD: 22.3 % (ref 22–41)
MACROCYTES BLD QL SMEAR: ABNORMAL
MAGNESIUM SERPL-MCNC: 1.8 MG/DL (ref 1.5–2.5)
MANUAL DIFF BLD: NORMAL
MCH RBC QN AUTO: 34.2 PG (ref 27–33)
MCHC RBC AUTO-ENTMCNC: 33.7 G/DL (ref 33.7–35.3)
MCV RBC AUTO: 101.4 FL (ref 81.4–97.8)
METAMYELOCYTES NFR BLD MANUAL: 2.7 %
MONOCYTES # BLD AUTO: 0.54 K/UL (ref 0–0.85)
MONOCYTES NFR BLD AUTO: 8.9 % (ref 0–13.4)
MORPHOLOGY BLD-IMP: NORMAL
NEUTROPHILS # BLD AUTO: 3.87 K/UL (ref 1.82–7.42)
NEUTROPHILS NFR BLD: 61.6 % (ref 44–72)
NEUTS BAND NFR BLD MANUAL: 1.8 % (ref 0–10)
NRBC # BLD AUTO: 0 K/UL
NRBC BLD-RTO: 0 /100 WBC
PHOSPHATE SERPL-MCNC: 2.3 MG/DL (ref 2.5–4.5)
PLATELET # BLD AUTO: 307 K/UL (ref 164–446)
PLATELET BLD QL SMEAR: NORMAL
PMV BLD AUTO: 9 FL (ref 9–12.9)
POTASSIUM SERPL-SCNC: 4.2 MMOL/L (ref 3.6–5.5)
RBC # BLD AUTO: 3.48 M/UL (ref 4.7–6.1)
RBC BLD AUTO: PRESENT
SODIUM SERPL-SCNC: 141 MMOL/L (ref 135–145)
TOXIC GRANULES BLD QL SMEAR: SLIGHT
WBC # BLD AUTO: 6.1 K/UL (ref 4.8–10.8)

## 2022-02-25 PROCEDURE — A9270 NON-COVERED ITEM OR SERVICE: HCPCS | Performed by: INTERNAL MEDICINE

## 2022-02-25 PROCEDURE — 700102 HCHG RX REV CODE 250 W/ 637 OVERRIDE(OP): Performed by: INTERNAL MEDICINE

## 2022-02-25 PROCEDURE — 0B9F8ZX DRAINAGE OF RIGHT LOWER LUNG LOBE, VIA NATURAL OR ARTIFICIAL OPENING ENDOSCOPIC, DIAGNOSTIC: ICD-10-PCS | Performed by: HOSPITALIST

## 2022-02-25 PROCEDURE — 99255 IP/OBS CONSLTJ NEW/EST HI 80: CPT | Performed by: HOSPITALIST

## 2022-02-25 PROCEDURE — 85007 BL SMEAR W/DIFF WBC COUNT: CPT

## 2022-02-25 PROCEDURE — 700102 HCHG RX REV CODE 250 W/ 637 OVERRIDE(OP): Performed by: STUDENT IN AN ORGANIZED HEALTH CARE EDUCATION/TRAINING PROGRAM

## 2022-02-25 PROCEDURE — 700111 HCHG RX REV CODE 636 W/ 250 OVERRIDE (IP): Performed by: EMERGENCY MEDICINE

## 2022-02-25 PROCEDURE — 84100 ASSAY OF PHOSPHORUS: CPT

## 2022-02-25 PROCEDURE — 94640 AIRWAY INHALATION TREATMENT: CPT

## 2022-02-25 PROCEDURE — A9270 NON-COVERED ITEM OR SERVICE: HCPCS | Performed by: STUDENT IN AN ORGANIZED HEALTH CARE EDUCATION/TRAINING PROGRAM

## 2022-02-25 PROCEDURE — 85027 COMPLETE CBC AUTOMATED: CPT

## 2022-02-25 PROCEDURE — 770000 HCHG ROOM/CARE - INTERMEDIATE ICU *

## 2022-02-25 PROCEDURE — 700111 HCHG RX REV CODE 636 W/ 250 OVERRIDE (IP): Performed by: STUDENT IN AN ORGANIZED HEALTH CARE EDUCATION/TRAINING PROGRAM

## 2022-02-25 PROCEDURE — 83735 ASSAY OF MAGNESIUM: CPT

## 2022-02-25 PROCEDURE — 700105 HCHG RX REV CODE 258: Performed by: EMERGENCY MEDICINE

## 2022-02-25 PROCEDURE — 80048 BASIC METABOLIC PNL TOTAL CA: CPT

## 2022-02-25 RX ORDER — MAGNESIUM SULFATE HEPTAHYDRATE 40 MG/ML
2 INJECTION, SOLUTION INTRAVENOUS ONCE
Status: COMPLETED | OUTPATIENT
Start: 2022-02-25 | End: 2022-02-25

## 2022-02-25 RX ADMIN — LEVETIRACETAM 1500 MG: 100 SOLUTION ORAL at 18:29

## 2022-02-25 RX ADMIN — POTASSIUM BICARBONATE 25 MEQ: 977.5 TABLET, EFFERVESCENT ORAL at 04:49

## 2022-02-25 RX ADMIN — CLONAZEPAM 0.5 MG: 0.5 TABLET ORAL at 08:02

## 2022-02-25 RX ADMIN — LEVETIRACETAM 1500 MG: 100 SOLUTION ORAL at 06:32

## 2022-02-25 RX ADMIN — LACOSAMIDE 300 MG: 100 TABLET, FILM COATED ORAL at 08:02

## 2022-02-25 RX ADMIN — CEFEPIME 2 G: 2 INJECTION, POWDER, FOR SOLUTION INTRAVENOUS at 21:17

## 2022-02-25 RX ADMIN — PHENOBARBITAL 120 MG: 20 ELIXIR ORAL at 18:29

## 2022-02-25 RX ADMIN — SENNOSIDES AND DOCUSATE SODIUM 2 TABLET: 50; 8.6 TABLET ORAL at 04:50

## 2022-02-25 RX ADMIN — CLONAZEPAM 1 MG: 1 TABLET ORAL at 20:19

## 2022-02-25 RX ADMIN — ERYTHROMYCIN: 5 OINTMENT OPHTHALMIC at 13:10

## 2022-02-25 RX ADMIN — Medication 10 MG: at 07:28

## 2022-02-25 RX ADMIN — CEFEPIME 2 G: 2 INJECTION, POWDER, FOR SOLUTION INTRAVENOUS at 14:08

## 2022-02-25 RX ADMIN — TOPIRAMATE 50 MG: 100 TABLET, FILM COATED ORAL at 08:35

## 2022-02-25 RX ADMIN — CEFEPIME 2 G: 2 INJECTION, POWDER, FOR SOLUTION INTRAVENOUS at 04:50

## 2022-02-25 RX ADMIN — PHENOBARBITAL 120 MG: 20 ELIXIR ORAL at 06:32

## 2022-02-25 RX ADMIN — MAGNESIUM SULFATE HEPTAHYDRATE 2 G: 40 INJECTION, SOLUTION INTRAVENOUS at 07:28

## 2022-02-25 RX ADMIN — ENOXAPARIN SODIUM 40 MG: 40 INJECTION SUBCUTANEOUS at 04:49

## 2022-02-25 RX ADMIN — Medication 1000 UNITS: at 04:49

## 2022-02-25 RX ADMIN — Medication 1 CAPSULE: at 07:28

## 2022-02-25 RX ADMIN — FAMOTIDINE 20 MG: 20 TABLET ORAL at 18:26

## 2022-02-25 RX ADMIN — TOPIRAMATE 75 MG: 25 TABLET, FILM COATED ORAL at 21:39

## 2022-02-25 RX ADMIN — FAMOTIDINE 20 MG: 20 TABLET ORAL at 04:49

## 2022-02-25 RX ADMIN — ERYTHROMYCIN: 5 OINTMENT OPHTHALMIC at 18:30

## 2022-02-25 RX ADMIN — LACOSAMIDE 300 MG: 100 TABLET, FILM COATED ORAL at 20:19

## 2022-02-25 RX ADMIN — DEXAMETHASONE 6 MG: 6 TABLET ORAL at 04:49

## 2022-02-25 RX ADMIN — POTASSIUM BICARBONATE 25 MEQ: 977.5 TABLET, EFFERVESCENT ORAL at 18:26

## 2022-02-25 ASSESSMENT — PAIN DESCRIPTION - PAIN TYPE
TYPE: ACUTE PAIN
TYPE: ACUTE PAIN

## 2022-02-25 ASSESSMENT — FIBROSIS 4 INDEX: FIB4 SCORE: 1.05

## 2022-02-25 NOTE — CARE PLAN
Problem: Aerosol Therapy  Goal: Improved hydration/ability to mobilize secretions and/or decreased airway edema  Description: Target End Date:  resolve prior to discharge or when underlying condition is resolved/stabilized    1.  Implement heated or cool aerosol therapy  2.  Assessed for optimal hydration, decreased edema and/or improved ability to mobilize secretions  Outcome: Progressing  Flowsheets (Taken 2/25/2022 1247)  Indications: Presence of tracheostomy or laryngectomy  Outcome: Optimal Hydration  Note: Titrated to 2L 28% aerosol

## 2022-02-25 NOTE — CARE PLAN
Problem: Skin Integrity  Goal: Skin integrity is maintained or improved  Outcome: Progressing     Problem: Respiratory  Goal: Patient will achieve/maintain optimum respiratory ventilation and gas exchange  Outcome: Progressing     Problem: Pain - Standard  Goal: Alleviation of pain or a reduction in pain to the patient’s comfort goal  Outcome: Progressing   The patient is Stable - Low risk of patient condition declining or worsening    Shift Goals  Clinical Goals: T-piece patient, mobilize  Patient Goals: TRAVIS  Family Goals: get him well enough to go home    Progress made toward(s) clinical / shift goals:      Patient is not progressing towards the following goals:

## 2022-02-25 NOTE — CONSULTS
Hospital Medicine Consultation    Date of Service  2/25/2022    Referring Physician  Pato Constantino M.D.    Consulting Physician  Aidan Encarnacion M.D.    Reason for Consultation  Pseudomonas pneumonia     History of Presenting Illness  41 y.o. male who presented 2/18/2022 with hypoxia.  Mr. Edwards has a past medical history of severe traumatic brain injury as a teenager with resultant cognitive impairment, chronic tracheostomy, chronic G-tube feeding, as well as epilepsy.  His mother noticed that he was more lethargic and had been hypoxic therefore he was brought to the emergency room in [facility] he was found to have COVID-19 positive was initiated on Decadron.  He was transferred to this facility for ICU admission required mechanical ventilation underwent bronchoscopy on 2/19/2022.  The bronchoscopy cultures grew out pansensitive Pseudomonas.  He was initiated on IV cefepime.  He has subsequently been weaned from the mechanical ventilation to T-piece.    2/25/2022: Patient seen and evaluated in the intensive care unit.  He has transfer orders out of the ICU.  I met with mother Susana at bedside.  He is on 2 L.  We discussed strategies about pending discharge in the next few days including his oxygen requirements.  He does have oxygen at home though he does not use it chronically.    Review of Systems  Review of Systems   Unable to perform ROS: Patient nonverbal       Past Medical History   has a past medical history of Seizure ([facility]) and Traumatic brain injury (HCC) (05/15/1998).    Surgical History   has a past surgical history that includes laryngoscopy (7/2/2017); bronchoscopy (7/2/2017); tracheostomy (7/2/2017); and esophagoscopy (7/2/2017).    Family History  No family history of seizures    Social History   reports that he has never smoked. He has never used smokeless tobacco. He reports current drug use. Drug: Oral. He reports that he does not drink alcohol.    Medications  Prior to Admission  Medications   Prescriptions Last Dose Informant Patient Reported? Taking?   ERYTHROMYCIN, OPHTH, OP 2/18/2022 at 1600 Family Member Yes Yes   Sig: Administer 1 Application into affected eye(s) 4 times a day. 3.5 gram tube, 0.5% erythromycin ophthalmic ointment   PHENobarbital 20 MG/5ML Elixir 2/16/2022 at 0730 Family Member No No   Sig: TAKE 30ML BY MOUTH TWICE DAILY.   PHENobarbital 20 MG/5ML Elixir   No No   Sig: TAKE 30ML BY MOUTH TWICE DAILY. 30 days supply and 5 refills.   Pain Pump (PATIENT SUPPLIED) XX RENE Continuous at Continuous Other Facility Yes No   Sig: continuous. Patient's Pain Pump (placed and maintained as an outpatient)    Medications/concentrations: BACLOFEN 3000 mcg/ml  Route: Intrathecal  Last changed: 10/18/21  Refill pump before date: 3/29/22  Continuous infusion rates (Drug/Rate): BACLOFEN 699.4 mcg /day (29.1 mcg/hr)      MD office:[facility] [address]  Phone number:[phone]   clonazePAM (KLONOPIN) 1 MG Tab 2/16/2022 at 0800 Family Member Yes Yes   Sig: Take 0.5 mg by mouth every morning. Pt also takes 1 mg PO every evening. (per MARE Ibarra)   clonazePAM (KLONOPIN) 1 MG Tab   No No   Sig: Take 0.5 Tablets by mouth every morning AND 1 Tablet at bedtime. Do all this for 180 days. Pt also takes 1 mg PO every evening. (per MARE Ibarra)   ergocalciferol (CALCIFEROL) 8000 Unit/mL Solution 2/16/2022 at 0730 Family Member No No   Sig: Take 1 mL by mouth every day for 165 days.   lacosamide (VIMPAT) 100 MG Tab tablet 2/16/2022 at 0800 Family Member No No   Sig: Take 1 Tablet by mouth 2 times a day for 180 days.   lacosamide (VIMPAT) 200 MG Tab tablet 2/16/2022 at 0800 Family Member No No   Sig: Take 1 Tablet by mouth 2 times a day for 180 days.   levETIRAcetam (KEPPRA) 100 MG/ML Solution 2/16/2022 at 0730 Family Member Yes Yes   Sig: Take 2,500 mg by mouth 2 times a day.   topiramate (TOPAMAX) 50 MG tablet 2/16/2022 at 0830 Family Member Yes Yes   Sig: Take 50-75 mg by mouth 2 times a day. 50 mg  in the morning and 75 mg in the evening      Facility-Administered Medications: None       Allergies  Allergies   Allergen Reactions   • Sulfa Drugs Unspecified and Rash     Per caretaker, mother is allergic to sulfa so believes her son could be as well   • Chlorhexidine Rash     Mom refuses CHG bath       Physical Exam  Temp:  [36.4 °C (97.5 °F)-36.6 °C (97.9 °F)] 36.4 °C (97.5 °F)  Pulse:  [66-89] 88  Resp:  [14-26] 23  BP: (100-125)/(51-84) 110/67  SpO2:  [91 %-96 %] 93 %    Physical Exam  Vitals and nursing note reviewed.   Constitutional:       General: He is not in acute distress.     Appearance: He is not toxic-appearing.      Comments: thin   HENT:      Head: Atraumatic.      Comments: Mild temporal wasting     Mouth/Throat:      Mouth: Mucous membranes are dry.      Pharynx: Oropharynx is clear.   Eyes:      General: No scleral icterus.     Conjunctiva/sclera: Conjunctivae normal.   Neck:      Comments: trach  Cardiovascular:      Rate and Rhythm: Normal rate and regular rhythm.   Pulmonary:      Comments: 2 liters oxygen  Normal work of breathing  Abdominal:      Comments: PEG tube   Musculoskeletal:      Comments: Extensive contractures   Skin:     General: Skin is warm and dry.      Coloration: Skin is pale.   Neurological:      Comments: He does not follow commands nor make eye contact   Psychiatric:      Comments: Non-verbal          Fluids  Date 02/25/22 0700 - 02/26/22 0659   Shift 4359-1738 0595-4518 2644-7191 24 Hour Total   INTAKE   I.V. 50   50   NG/   270   Enteral 150   150   Shift Total 470   470   OUTPUT   Urine 1300   1300   Shift Total 1300   1300   Weight (kg) 59.2 59.2 59.2 59.2       Laboratory  Recent Labs     02/23/22  0430 02/24/22  0405 02/25/22  0217   WBC 5.4 4.7* 6.1   RBC 3.70* 3.32* 3.48*   HEMOGLOBIN 12.8* 11.5* 11.9*   HEMATOCRIT 37.3* 33.1* 35.3*   .8* 99.7* 101.4*   MCH 34.6* 34.6* 34.2*   MCHC 34.3 34.7 33.7   RDW 57.8* 56.2* 57.1*   PLATELETCT 225 830 795    MPV 9.3 9.3 9.0     Recent Labs     02/23/22  0430 02/24/22  0405 02/25/22  0217   SODIUM 142 142 141   POTASSIUM 4.2 3.7 4.2   CHLORIDE 108 107 106   CO2 26 28 28   GLUCOSE 109* 117* 108*   BUN 23* 29* 21   CREATININE <0.17* <0.17* <0.17*   CALCIUM 8.4* 8.1* 8.4*                     Imaging  DX-CHEST-PORTABLE (1 VIEW)   Final Result         1.  Pulmonary edema and/or infiltrates are identified, which are somewhat decreased since the prior exam.      DX-CHEST-PORTABLE (1 VIEW)   Final Result         1.  Pulmonary edema and/or infiltrates are identified, which are somewhat decreased since the prior exam.   2.  Small left pleural effusion      DX-CHEST-PORTABLE (1 VIEW)   Final Result      Bilateral pulmonary infiltrates are similar from prior, there may be slight worsening in the right lung base.      DX-CHEST-PORTABLE (1 VIEW)   Final Result      1.  Satisfactory appearance of the tracheostomy tube.   2.  Slightly improved aeration bilaterally but with continued bilateral diffuse parenchymal opacifications most suspicious for multifocal pneumonia.      DX-CHEST-PORTABLE (1 VIEW)   Final Result      1.  Worsening aeration of the lungs with near complete opacification of the right hemithorax and increasing consolidation in the left lung predominantly in the lower lobe.   2.  There are small bilateral pleural effusion.      OUTSIDE IMAGES-US ABDOMEN   Final Result      OUTSIDE IMAGES-DX CHEST   Final Result          Assessment/Plan  * Acute hypoxemic respiratory failure due to COVID-19 ([facility])- (present on admission)  Assessment & Plan  COVID + prior to transport  Requiring mechanical ventilation   Requiring supplemental oxygen therefore he was initiated on decadron    Pneumonia of both lungs due to Pseudomonas species ([facility])- (present on admission)  Assessment & Plan  Bronchoscopy cultures have grown Pseudomonas  IV Cefepime while inpatient and can change to PEG cipro on discharge    Epilepsy ([facility])- (present on  admission)  Assessment & Plan  Continue his outpatient regimen     Protein-calorie malnutrition, severe (HCC)- (present on admission)  Assessment & Plan  This is a clinical diagnosis present upon admit in the setting of COVID and Pseudomonas pneumonia  PEG feeding    Contraction, joint, multiple sites- (present on admission)  Assessment & Plan  Pain pump    TBI (traumatic brain injury) (HCC)- (present on admission)  Assessment & Plan  With chronic contractures and cognitive impairment

## 2022-02-25 NOTE — ASSESSMENT & PLAN NOTE
COVID + prior to transport  Requiring mechanical ventilation   Requiring supplemental oxygen therefore he was initiated on decadron

## 2022-02-25 NOTE — CARE PLAN
Problem: Knowledge Deficit - Standard  Goal: Patient and family/care givers will demonstrate understanding of plan of care, disease process/condition, diagnostic tests and medications  Outcome: Progressing     Problem: Skin Integrity  Goal: Skin integrity is maintained or improved  Outcome: Progressing     Problem: Fall Risk  Goal: Patient will remain free from falls  Outcome: Progressing     Problem: Communication  Goal: The ability to communicate needs accurately and effectively will improve  Outcome: Progressing     Problem: Respiratory  Goal: Patient will achieve/maintain optimum respiratory ventilation and gas exchange  Outcome: Progressing     Problem: Infection - Standard  Goal: Patient will remain free from infection  Outcome: Progressing     Problem: Wound/ / Incision Healing  Goal: Patient's wound/surgical incision will decrease in size and heals properly  Outcome: Progressing     Problem: Pain - Standard  Goal: Alleviation of pain or a reduction in pain to the patient’s comfort goal  Outcome: Progressing   The patient is Stable - Low risk of patient condition declining or worsening    Shift Goals  Clinical Goals: T-piece, moblize, decrease O2  Patient Goals: TRAVIS  Family Goals: get on  home vent settings, mobilize, BM    Progress made toward(s) clinical / shift goals:  Patient has tolerated t-piece since yesterday. Able to wean down O2 and preparing for discharge home.     Patient is not progressing towards the following goals: N/A

## 2022-02-25 NOTE — CARE PLAN
Problem: Knowledge Deficit - Standard  Goal: Patient and family/care givers will demonstrate understanding of plan of care, disease process/condition, diagnostic tests and medications  Outcome: Progressing     Problem: Skin Integrity  Goal: Skin integrity is maintained or improved  Outcome: Progressing     Problem: Fall Risk  Goal: Patient will remain free from falls  Outcome: Progressing     Problem: Respiratory  Goal: Patient will achieve/maintain optimum respiratory ventilation and gas exchange  Outcome: Progressing     Problem: Infection - Standard  Goal: Patient will remain free from infection  Outcome: Progressing     Problem: Wound/ / Incision Healing  Goal: Patient's wound/surgical incision will decrease in size and heals properly  Outcome: Progressing   The patient is Stable - Low risk of patient condition declining or worsening    Shift Goals  Clinical Goals: T-piece patient, mobilize  Patient Goals: TRAVIS  Family Goals: get him well enough to go home    Progress made toward(s) clinical / shift goals:  Patient has been on T-piece all shift and is down to 30% FiO2. Patient tolerates suctioning. Mom at bedside communicates needs well to staff and assists with patient care.     Patient is not progressing towards the following goals:

## 2022-02-26 PROCEDURE — 700105 HCHG RX REV CODE 258: Performed by: EMERGENCY MEDICINE

## 2022-02-26 PROCEDURE — 700102 HCHG RX REV CODE 250 W/ 637 OVERRIDE(OP): Performed by: INTERNAL MEDICINE

## 2022-02-26 PROCEDURE — 770000 HCHG ROOM/CARE - INTERMEDIATE ICU *

## 2022-02-26 PROCEDURE — 700111 HCHG RX REV CODE 636 W/ 250 OVERRIDE (IP): Performed by: STUDENT IN AN ORGANIZED HEALTH CARE EDUCATION/TRAINING PROGRAM

## 2022-02-26 PROCEDURE — A9270 NON-COVERED ITEM OR SERVICE: HCPCS | Performed by: INTERNAL MEDICINE

## 2022-02-26 PROCEDURE — 700111 HCHG RX REV CODE 636 W/ 250 OVERRIDE (IP): Performed by: EMERGENCY MEDICINE

## 2022-02-26 PROCEDURE — A9270 NON-COVERED ITEM OR SERVICE: HCPCS | Performed by: STUDENT IN AN ORGANIZED HEALTH CARE EDUCATION/TRAINING PROGRAM

## 2022-02-26 PROCEDURE — 99233 SBSQ HOSP IP/OBS HIGH 50: CPT | Performed by: HOSPITALIST

## 2022-02-26 PROCEDURE — 94640 AIRWAY INHALATION TREATMENT: CPT

## 2022-02-26 PROCEDURE — 700102 HCHG RX REV CODE 250 W/ 637 OVERRIDE(OP): Performed by: STUDENT IN AN ORGANIZED HEALTH CARE EDUCATION/TRAINING PROGRAM

## 2022-02-26 RX ADMIN — LEVETIRACETAM 1500 MG: 100 SOLUTION ORAL at 06:44

## 2022-02-26 RX ADMIN — FAMOTIDINE 20 MG: 20 TABLET ORAL at 17:46

## 2022-02-26 RX ADMIN — ERYTHROMYCIN: 5 OINTMENT OPHTHALMIC at 11:19

## 2022-02-26 RX ADMIN — TOPIRAMATE 75 MG: 25 TABLET, FILM COATED ORAL at 20:53

## 2022-02-26 RX ADMIN — ERYTHROMYCIN: 5 OINTMENT OPHTHALMIC at 17:47

## 2022-02-26 RX ADMIN — LACOSAMIDE 300 MG: 100 TABLET, FILM COATED ORAL at 20:19

## 2022-02-26 RX ADMIN — TOPIRAMATE 50 MG: 100 TABLET, FILM COATED ORAL at 08:32

## 2022-02-26 RX ADMIN — CLONAZEPAM 1 MG: 1 TABLET ORAL at 20:19

## 2022-02-26 RX ADMIN — ENOXAPARIN SODIUM 40 MG: 40 INJECTION SUBCUTANEOUS at 06:41

## 2022-02-26 RX ADMIN — DEXAMETHASONE 6 MG: 6 TABLET ORAL at 06:41

## 2022-02-26 RX ADMIN — CEFEPIME 2 G: 2 INJECTION, POWDER, FOR SOLUTION INTRAVENOUS at 06:41

## 2022-02-26 RX ADMIN — ERYTHROMYCIN: 5 OINTMENT OPHTHALMIC at 23:31

## 2022-02-26 RX ADMIN — Medication 1 CAPSULE: at 06:42

## 2022-02-26 RX ADMIN — FAMOTIDINE 20 MG: 20 TABLET ORAL at 06:44

## 2022-02-26 RX ADMIN — PHENOBARBITAL 120 MG: 20 ELIXIR ORAL at 18:26

## 2022-02-26 RX ADMIN — LEVETIRACETAM 1500 MG: 100 SOLUTION ORAL at 18:26

## 2022-02-26 RX ADMIN — POTASSIUM BICARBONATE 25 MEQ: 977.5 TABLET, EFFERVESCENT ORAL at 17:46

## 2022-02-26 RX ADMIN — PHENOBARBITAL 120 MG: 20 ELIXIR ORAL at 06:44

## 2022-02-26 RX ADMIN — LACOSAMIDE 300 MG: 100 TABLET, FILM COATED ORAL at 07:45

## 2022-02-26 RX ADMIN — POTASSIUM BICARBONATE 25 MEQ: 977.5 TABLET, EFFERVESCENT ORAL at 06:41

## 2022-02-26 RX ADMIN — CLONAZEPAM 0.5 MG: 0.5 TABLET ORAL at 07:45

## 2022-02-26 RX ADMIN — Medication 1000 UNITS: at 06:42

## 2022-02-26 ASSESSMENT — PAIN DESCRIPTION - PAIN TYPE
TYPE: ACUTE PAIN

## 2022-02-26 NOTE — PROGRESS NOTES
Hospital Medicine Daily Progress Note    Date of Service  2/26/2022    Chief Complaint  Ruben Edwards is a 41 y.o. male admitted 2/18/2022 with lethargy     Hospital Course  Mr. Edwards has a past medical history of severe traumatic brain injury as a teenager with resultant cognitive impairment, chronic tracheostomy, chronic G-tube feeding, as well as epilepsy.  His mother noticed that he was more lethargic and had been hypoxic therefore he was brought to the emergency room in [facility] he was found to have COVID-19 positive was initiated on Decadron.  He was transferred to this facility for ICU admission required mechanical ventilation underwent bronchoscopy on 2/19/2022.  The bronchoscopy cultures grew out pansensitive Pseudomonas.  He was initiated on IV cefepime.  He has subsequently been weaned from the mechanical ventilation to T-piece.    Interval Problem Update  2/25/2022: Patient seen and evaluated in the intensive care unit.  He has transfer orders out of the ICU.  I met with mother Susana at bedside.  He is on 2 L.  We discussed strategies about pending discharge in the next few days including his oxygen requirements.  He does have oxygen at home though he does not use it chronically.  2/26: Mr. Edwards was evaluated and examined in the IMCU. He continues to require supplemental oxygen and desaturated during a taper off this morning. I met with Susana at bedside. He will finish IV Cefepime today and we will plan on discharge home tomorrow via a medical transport;  consulted to assist in this arrangement.     I have personally seen and examined the patient at bedside. I discussed the plan of care with bedside RN.    Consultants/Specialty  critical care    Code Status  Full Code    Disposition  Patient is not medically cleared for discharge.   Anticipate discharge to to home with close outpatient follow-up.  I have placed the appropriate orders for post-discharge needs.    Review of  Systems  Review of Systems   Unable to perform ROS: Mental acuity        Physical Exam  Temp:  [35.9 °C (96.6 °F)-36.6 °C (97.8 °F)] 36.6 °C (97.8 °F)  Pulse:  [68-91] 82  Resp:  [15-27] 23  BP: (102-153)/(56-96) 119/62  SpO2:  [90 %-97 %] 91 %    Physical Exam  Vitals and nursing note reviewed.   Constitutional:       General: He is not in acute distress.     Appearance: He is not toxic-appearing.   HENT:      Head: Normocephalic and atraumatic.      Mouth/Throat:      Mouth: Mucous membranes are dry.      Pharynx: Oropharynx is clear.   Eyes:      General: No scleral icterus.     Conjunctiva/sclera: Conjunctivae normal.   Neck:      Comments: Tracheostomy   Cardiovascular:      Rate and Rhythm: Normal rate and regular rhythm.   Pulmonary:      Comments: Supplemental oxygen  Tachypnea  Moderate air movement  Abdominal:      General: There is no distension.      Palpations: Abdomen is soft.      Tenderness: There is no abdominal tenderness.      Comments: G tube   Musculoskeletal:      Comments: Extensive contractures of his extremities   Low muscle mass   Skin:     General: Skin is warm and dry.      Coloration: Skin is pale.   Neurological:      Comments: He does not follow   Psychiatric:      Comments: Non-verbal  He does not make eye contact         Fluids    Intake/Output Summary (Last 24 hours) at 2/26/2022 0929  Last data filed at 2/26/2022 0800  Gross per 24 hour   Intake 1030 ml   Output 2075 ml   Net -1045 ml       Laboratory  Recent Labs     02/24/22  0405 02/25/22 0217   WBC 4.7* 6.1   RBC 3.32* 3.48*   HEMOGLOBIN 11.5* 11.9*   HEMATOCRIT 33.1* 35.3*   MCV 99.7* 101.4*   MCH 34.6* 34.2*   MCHC 34.7 33.7   RDW 56.2* 57.1*   PLATELETCT 259 307   MPV 9.3 9.0     Recent Labs     02/24/22  0405 02/25/22 0217   SODIUM 142 141   POTASSIUM 3.7 4.2   CHLORIDE 107 106   CO2 28 28   GLUCOSE 117* 108*   BUN 29* 21   CREATININE <0.17* <0.17*   CALCIUM 8.1* 8.4*                   Imaging  DX-CHEST-PORTABLE (1 VIEW)    Final Result         1.  Pulmonary edema and/or infiltrates are identified, which are somewhat decreased since the prior exam.      DX-CHEST-PORTABLE (1 VIEW)   Final Result         1.  Pulmonary edema and/or infiltrates are identified, which are somewhat decreased since the prior exam.   2.  Small left pleural effusion      DX-CHEST-PORTABLE (1 VIEW)   Final Result      Bilateral pulmonary infiltrates are similar from prior, there may be slight worsening in the right lung base.      DX-CHEST-PORTABLE (1 VIEW)   Final Result      1.  Satisfactory appearance of the tracheostomy tube.   2.  Slightly improved aeration bilaterally but with continued bilateral diffuse parenchymal opacifications most suspicious for multifocal pneumonia.      DX-CHEST-PORTABLE (1 VIEW)   Final Result      1.  Worsening aeration of the lungs with near complete opacification of the right hemithorax and increasing consolidation in the left lung predominantly in the lower lobe.   2.  There are small bilateral pleural effusion.      OUTSIDE IMAGES-US ABDOMEN   Final Result      OUTSIDE IMAGES-DX CHEST   Final Result           Assessment/Plan  * Acute hypoxemic respiratory failure due to COVID-19 ([facility])- (present on admission)  Assessment & Plan  COVID + prior to transport  Requiring mechanical ventilation   Requiring supplemental oxygen therefore he was initiated on decadron    Pneumonia of both lungs due to Pseudomonas species ([facility])- (present on admission)  Assessment & Plan  Bronchoscopy cultures have grown Pseudomonas  IV Cefepime through 2/26.    Epilepsy ([facility])- (present on admission)  Assessment & Plan  Continue his outpatient regimen     Protein-calorie malnutrition, severe ([facility])- (present on admission)  Assessment & Plan  This is a clinical diagnosis present upon admit in the setting of COVID and Pseudomonas pneumonia  PEG feeding    Contraction, joint, multiple sites- (present on admission)  Assessment & Plan  Baclofen intrathecal  pump    TBI (traumatic brain injury) (HCC)- (present on admission)  Assessment & Plan  With chronic contractures and cognitive impairment       VTE prophylaxis: enoxaparin ppx    I have performed a physical exam and reviewed and updated ROS and Plan today (2/26/2022). In review of yesterday's note (2/25/2022), there are no changes except as documented above.

## 2022-02-26 NOTE — DISCHARGE PLANNING
TONY notified by MARE Reddy that pt will be discharging tomorrow and will need medical transportation home. LSW met with pt and pt's mother, Susana at bedside. Pt's physical address is [address] [place] [address]. Susana reported that she already has [facility] ambulance services on call for when the pt is medically clear to [place] ([phone]). Susana also stated the pt has O2 at home supplied by [employer]. Susana denies having any other DME needs or discharge planning needs.    Care Transition Team Assessment    Information Source  Orientation Level: Unable to assess  Information Given By: Parent  Who is responsible for making decisions for patient? : Patient    Readmission Evaluation  Is this a readmission?: No    Elopement Risk  Legal Hold: No  Ambulatory or Self Mobile in Wheelchair: No-Not an Elopement Risk  Elopement Risk: Not at Risk for Elopement    Discharge Preparedness  What is your plan after discharge?: Home with help  What are your discharge supports?: Parent  Difficulity with ADLs: Bathing,Brushing teeth,Dressing,Eating,Toileting,Walking  Difficulity with IADLs: Cooking,Driving,Keeping track of finances,Laundry,Managing medication,Shopping,Using the telephone or computer    Finances  Financial Barriers to Discharge: No  Prescription Coverage: Yes    Vision / Hearing Impairment  Right Eye Vision: Other (Comments) (TRAVIS)  Left Eye Vision: Other (Comments) (TRAVIS)    Psychological Assessment  History of Substance Abuse: None  History of Psychiatric Problems: No  Non-compliant with Treatment: No  Newly Diagnosed Illness: No    Discharge Risks or Barriers  Discharge risks or barriers?: No  Patient risk factors: Vulnerable adult    Anticipated Discharge Information  Discharge Disposition: Discharged to home/self care (01)

## 2022-02-26 NOTE — CARE PLAN
Problem: Aerosol Therapy  Goal: Improved hydration/ability to mobilize secretions and/or decreased airway edema  Description: Target End Date:  resolve prior to discharge or when underlying condition is resolved/stabilized    1.  Implement heated or cool aerosol therapy  2.  Assessed for optimal hydration, decreased edema and/or improved ability to mobilize secretions  Outcome: Progressing  Flowsheets (Taken 2/25/2022 1247 by Lisbeth Pittman, RRT)  Indications: Presence of tracheostomy or laryngectomy  Outcome: Optimal Hydration       Respiratory Update    Treatment modality: Aerosol  Frequency: Q4    2L/28%    Pt tolerating current treatments well with no adverse reactions.

## 2022-02-26 NOTE — CARE PLAN
The patient is Stable - Low risk of patient condition declining or worsening    Shift Goals  Clinical Goals: T-piece, mobility, improve skin integrity  Patient Goals: unable to assess  Family Goals: home    Progress made toward(s) clinical / shift goals:    Problem: Fall Risk  Goal: Patient will remain free from falls  Outcome: Progressing     Problem: Respiratory  Goal: Patient will achieve/maintain optimum respiratory ventilation and gas exchange  Outcome: Progressing     Problem: Infection - Standard  Goal: Patient will remain free from infection  Outcome: Progressing     Problem: Pain - Standard  Goal: Alleviation of pain or a reduction in pain to the patient’s comfort goal  Outcome: Progressing

## 2022-02-26 NOTE — CARE PLAN
The patient is Stable - Low risk of patient condition declining or worsening    Shift Goals  Clinical Goals: SPO2% will remain above 90% during shift  Patient Goals: TRAVIS  Family Goals: get him ready for home    Progress made toward(s) clinical / shift goals:  SPO2% is 96% at this time with T-piece, 2 L and 28%.       Problem: Skin Integrity  Goal: Skin integrity is maintained or improved  Outcome: Progressing  Note: Pt turned and positioned every two hours. Incontinence care provided and barrier paste applied as needed.       Problem: Knowledge Deficit - Standard  Goal: Patient and family/care givers will demonstrate understanding of plan of care, disease process/condition, diagnostic tests and medications  Outcome: Progressing  Note: Pt educated regarding plan of care and medications. All questions answered.

## 2022-02-26 NOTE — PROGRESS NOTES
Report given to MARE Castillo and patient moved from 627 to room 607. RT present for transfer. Patient transferred in patient bed.

## 2022-02-27 VITALS
RESPIRATION RATE: 19 BRPM | DIASTOLIC BLOOD PRESSURE: 50 MMHG | BODY MASS INDEX: 18.68 KG/M2 | TEMPERATURE: 97.7 F | HEART RATE: 84 BPM | HEIGHT: 70 IN | SYSTOLIC BLOOD PRESSURE: 96 MMHG | OXYGEN SATURATION: 94 % | WEIGHT: 130.51 LBS

## 2022-02-27 PROCEDURE — 99239 HOSP IP/OBS DSCHRG MGMT >30: CPT | Performed by: HOSPITALIST

## 2022-02-27 PROCEDURE — A9270 NON-COVERED ITEM OR SERVICE: HCPCS | Performed by: STUDENT IN AN ORGANIZED HEALTH CARE EDUCATION/TRAINING PROGRAM

## 2022-02-27 PROCEDURE — 700101 HCHG RX REV CODE 250: Performed by: INTERNAL MEDICINE

## 2022-02-27 PROCEDURE — 700102 HCHG RX REV CODE 250 W/ 637 OVERRIDE(OP): Performed by: INTERNAL MEDICINE

## 2022-02-27 PROCEDURE — A9270 NON-COVERED ITEM OR SERVICE: HCPCS | Performed by: INTERNAL MEDICINE

## 2022-02-27 PROCEDURE — 700102 HCHG RX REV CODE 250 W/ 637 OVERRIDE(OP): Performed by: STUDENT IN AN ORGANIZED HEALTH CARE EDUCATION/TRAINING PROGRAM

## 2022-02-27 PROCEDURE — 31502 CHANGE OF WINDPIPE AIRWAY: CPT

## 2022-02-27 PROCEDURE — 94640 AIRWAY INHALATION TREATMENT: CPT

## 2022-02-27 RX ADMIN — LEVETIRACETAM 1500 MG: 100 SOLUTION ORAL at 08:09

## 2022-02-27 RX ADMIN — CLONAZEPAM 0.5 MG: 0.5 TABLET ORAL at 08:30

## 2022-02-27 RX ADMIN — Medication 1000 UNITS: at 06:41

## 2022-02-27 RX ADMIN — TOPIRAMATE 50 MG: 100 TABLET, FILM COATED ORAL at 08:09

## 2022-02-27 RX ADMIN — POTASSIUM BICARBONATE 25 MEQ: 977.5 TABLET, EFFERVESCENT ORAL at 06:41

## 2022-02-27 RX ADMIN — LACOSAMIDE 300 MG: 100 TABLET, FILM COATED ORAL at 08:09

## 2022-02-27 RX ADMIN — Medication 1 CAPSULE: at 06:41

## 2022-02-27 RX ADMIN — PHENOBARBITAL 120 MG: 20 ELIXIR ORAL at 06:41

## 2022-02-27 RX ADMIN — ERYTHROMYCIN: 5 OINTMENT OPHTHALMIC at 06:00

## 2022-02-27 RX ADMIN — FAMOTIDINE 20 MG: 20 TABLET ORAL at 06:41

## 2022-02-27 NOTE — DISCHARGE INSTRUCTIONS
Discharge Instructions    Discharged to home by car with relative. Discharged via ambulance, hospital escort: Refused.  Special equipment needed: Not Applicable and Oxygen    Be sure to schedule a follow-up appointment with your primary care doctor or any specialists as instructed.     Discharge Plan:   Diet Plan: Discussed  Activity Level: Discussed  Confirmed Follow up Appointment: Patient to Call and Schedule Appointment  Confirmed Symptoms Management: Discussed  Medication Reconciliation Updated: Yes    I understand that a diet low in cholesterol, fat, and sodium is recommended for good health. Unless I have been given specific instructions below for another diet, I accept this instruction as my diet prescription.   Other diet: tube feeding    Special Instructions: None    · Is patient discharged on Warfarin / Coumadin?   No     Depression / Suicide Risk    As you are discharged from this [facility] facility, it is important to learn how to keep safe from harming yourself.    Recognize the warning signs:  · Abrupt changes in personality, positive or negative- including increase in energy   · Giving away possessions  · Change in eating patterns- significant weight changes-  positive or negative  · Change in sleeping patterns- unable to sleep or sleeping all the time   · Unwillingness or inability to communicate  · Depression  · Unusual sadness, discouragement and loneliness  · Talk of wanting to die  · Neglect of personal appearance   · Rebelliousness- reckless behavior  · Withdrawal from people/activities they love  · Confusion- inability to concentrate     If you or a loved one observes any of these behaviors or has concerns about self-harm, here's what you can do:  · Talk about it- your feelings and reasons for harming yourself  · Remove any means that you might use to hurt yourself (examples: pills, rope, extension cords, firearm)  · Get professional help from the community (Mental Health, Substance Abuse,  psychological counseling)  · Do not be alone:Call your Safe Contact- someone whom you trust who will be there for you.  · Call your local CRISIS HOTLINE 478-4302 or 947-714-5502  · Call your local Children's Mobile Crisis Response Team [place] (800) 213-4297 or www.[employer]Ride  · Call the toll free National Suicide Prevention Hotlines   · National Suicide Prevention Lifeline 538-575-QJUX (3534)  · National Hope Line Network 800-SUICIDE (912-0000)

## 2022-02-27 NOTE — DISCHARGE SUMMARY
Discharge Summary    CHIEF COMPLAINT ON ADMISSION  No chief complaint on file.      Reason for Admission  Covid, PNA     Admission Date  2/18/2022    CODE STATUS  Prior    HPI & HOSPITAL COURSE  This is a 41 y.o. male here with decreased level of consciousness.   Mr. Edwards has a past medical history of severe traumatic brain injury as a teenager with resultant cognitive impairment, chronic tracheostomy, chronic G-tube feeding, as well as epilepsy.  His mother noticed that he was more lethargic and had been hypoxic therefore he was brought to the emergency room in [facility] he was found to have COVID-19 positive was initiated on Decadron.  He was transferred to this facility for ICU admission required mechanical ventilation underwent bronchoscopy on 2/19/2022.  The bronchoscopy cultures grew out pansensitive Pseudomonas.  He was initiated on IV cefepime.  He was subsequently weaned from the mechanical ventilation to T-piece. He was treated with a full course of IV Cefepime. He has been tapered though still requires a few liters of oxygen. His mother, Susana notes that they have oxygen at home. She will wean accordingly as discussed. She brought in his home uncuffed trach which was placed prior to discharge. During his hospitalization he was continued on his home antiepileptic meds and his Baclofen intrathecal pump was continued.        Therefore, he is discharged in good and stable condition to home with close outpatient follow-up.    The patient met 2-midnight criteria for an inpatient stay at the time of discharge.    Discharge Date  2/27/2022    FOLLOW UP ITEMS POST DISCHARGE  PCP  Dr. Bolton, epileptologist     DISCHARGE DIAGNOSES  Principal Problem:    Acute hypoxemic respiratory failure due to COVID-19 ([facility]) POA: Yes  Active Problems:    Pneumonia of both lungs due to Pseudomonas species ([facility]) POA: Yes    TBI (traumatic brain injury) ([facility]) POA: Yes    Hypokalemia POA: Unknown    Contraction, joint,  multiple sites POA: Yes    Protein-calorie malnutrition, severe ([facility]) POA: Yes    Epilepsy ([facility]) POA: Yes    Acute respiratory distress syndrome (ARDS) due to COVID-19 virus ([facility]) POA: Yes  Resolved Problems:    * No resolved hospital problems. *      FOLLOW UP  Future Appointments   Date Time Provider Department Center   4/20/2022  9:40 AM Harris Bolton M.D. [facility] None     No follow-up provider specified.    MEDICATIONS ON DISCHARGE     Medication List      CHANGE how you take these medications      Instructions   clonazePAM 1 MG Tabs  What changed: See the new instructions.  Commonly known as: KLONOPIN   Doctor's comments: 30 days plus 5 refills.  Take 0.5 Tablets by mouth every morning AND 1 Tablet at bedtime. Do all this for 180 days. Pt also takes 1 mg PO every evening. (per MARE Ibarra)     PHENobarbital 20 MG/5ML Elix  What changed: See the new instructions.   TAKE 30ML BY MOUTH TWICE DAILY. 30 days supply and 5 refills.        CONTINUE taking these medications      Instructions   ergocalciferol 8000 Unit/mL Soln  Commonly known as: CALCIFEROL   Take 1 mL by mouth every day for 165 days.  Dose: 8,000 Units     ERYTHROMYCIN (OPHTH) OP   Administer 1 Application into affected eye(s) 4 times a day. 3.5 gram tube, 0.5% erythromycin ophthalmic ointment  Dose: 1 Application     * lacosamide 100 MG Tabs tablet  Commonly known as: Vimpat   Take 1 Tablet by mouth 2 times a day for 180 days.  Dose: 100 mg     * lacosamide 200 MG Tabs tablet  Commonly known as: Vimpat   Take 1 Tablet by mouth 2 times a day for 180 days.  Dose: 200 mg     levETIRAcetam 100 MG/ML Soln  Commonly known as: KEPPRA   Take 2,500 mg by mouth 2 times a day.  Dose: 2,500 mg     Pain Pump Amie  Commonly known as: patient supplied   continuous. Patient's Pain Pump (placed and maintained as an outpatient)    Medications/concentrations: BACLOFEN 3000 mcg/ml  Route: Intrathecal  Last changed: 10/18/21  Refill pump before date: 3/29/22  Continuous  infusion rates (Drug/Rate): BACLOFEN 699.4 mcg /day (29.1 mcg/hr)      MD office:[facility] [address]  Phone number:[phone]     Topamax 50 MG tablet  Generic drug: topiramate   Take 50-75 mg by mouth 2 times a day. 50 mg in the morning and 75 mg in the evening  Dose: 50-75 mg         * This list has 2 medication(s) that are the same as other medications prescribed for you. Read the directions carefully, and ask your doctor or other care provider to review them with you.                Allergies  Allergies   Allergen Reactions   • Sulfa Drugs Unspecified and Rash     Per caretaker, mother is allergic to sulfa so believes her son could be as well   • Chlorhexidine Rash     Mom refuses CHG bath       DIET  No orders of the defined types were placed in this encounter.      ACTIVITY  As tolerated    CONSULTATIONS  Critical care    PROCEDURES  Bronchoscopy on 2/19    LABORATORY  Lab Results   Component Value Date    SODIUM 141 02/25/2022    POTASSIUM 4.2 02/25/2022    CHLORIDE 106 02/25/2022    CO2 28 02/25/2022    GLUCOSE 108 (H) 02/25/2022    BUN 21 02/25/2022    CREATININE <0.17 (L) 02/25/2022        Lab Results   Component Value Date    WBC 6.1 02/25/2022    HEMOGLOBIN 11.9 (L) 02/25/2022    HEMATOCRIT 35.3 (L) 02/25/2022    PLATELETCT 307 02/25/2022    imaging studies:  DX-CHEST-PORTABLE (1 VIEW)   Final Result         1.  Pulmonary edema and/or infiltrates are identified, which are somewhat decreased since the prior exam.      DX-CHEST-PORTABLE (1 VIEW)   Final Result         1.  Pulmonary edema and/or infiltrates are identified, which are somewhat decreased since the prior exam.   2.  Small left pleural effusion      DX-CHEST-PORTABLE (1 VIEW)   Final Result      Bilateral pulmonary infiltrates are similar from prior, there may be slight worsening in the right lung base.      DX-CHEST-PORTABLE (1 VIEW)   Final Result      1.  Satisfactory appearance of the tracheostomy tube.   2.  Slightly improved aeration  bilaterally but with continued bilateral diffuse parenchymal opacifications most suspicious for multifocal pneumonia.      DX-CHEST-PORTABLE (1 VIEW)   Final Result      1.  Worsening aeration of the lungs with near complete opacification of the right hemithorax and increasing consolidation in the left lung predominantly in the lower lobe.   2.  There are small bilateral pleural effusion.      OUTSIDE IMAGES-US ABDOMEN   Final Result      OUTSIDE IMAGES-DX CHEST   Final Result            Total time of the discharge process exceeds 34 minutes.

## 2022-02-27 NOTE — PROGRESS NOTES
Pt dc'd home. IV was removed Pt left unit via ambulance service with mom. Personal belongings with pt when leaving unit. Pt given discharge instructions prior to leaving unit including where to  prescriptions and when to follow-up; verbalizes understanding. Copy of discharge instructions with pt and in the chart.

## 2022-02-28 ENCOUNTER — TELEPHONE (OUTPATIENT)
Dept: NEUROLOGY | Facility: MEDICAL CENTER | Age: 42
End: 2022-02-28
Payer: COMMERCIAL

## 2022-02-28 DIAGNOSIS — G40.219 PHARMACORESISTANT PARTIAL EPILEPSY WITH IMPAIRMENT OF CONSCIOUSNESS, INTRACTABLE (HCC): ICD-10-CM

## 2022-02-28 RX ORDER — TOPIRAMATE 50 MG/1
TABLET, FILM COATED ORAL
Qty: 90 TABLET | Refills: 11 | Status: SHIPPED | OUTPATIENT
Start: 2022-02-28 | End: 2022-03-16 | Stop reason: SDUPTHER

## 2022-02-28 NOTE — TELEPHONE ENCOUNTER
Received request via: Patient    Was the patient seen in the last year in this department? Yes    Does the patient have an active prescription (recently filled or refills available) for medication(s) requested? Yes     Susana, pt's mom, called saying they only have two days left of this medication, and pharmacy does not have another authorized refill.

## 2022-02-28 NOTE — TELEPHONE ENCOUNTER
Susana left  saying she was running out of topiramate for Ruben. She said pharmacy did not receive new script. I called her back to acknowledge that I received her VM, and that I have sent  a request for a new script and marked it urgent. I told her I would call her back latest tomorrow morning to update her. She understood.

## 2022-03-01 ENCOUNTER — TELEPHONE (OUTPATIENT)
Dept: NEUROLOGY | Facility: MEDICAL CENTER | Age: 42
End: 2022-03-01
Payer: COMMERCIAL

## 2022-03-01 NOTE — TELEPHONE ENCOUNTER
Susana, pt's mom, called and said yesterday evening Ruben had 3 seizures, 20 minutes apart (clusters). Each lasted a few minutes, she said this is usually indicative of an infection. He had another 9 hours later at 4:30am when Susana woke him up. He had another at 6:30 this morning, but has not had anymore since then. Susana is wondering if this could still be because of Ruben's recent pneumonia infection? Is there anything she should be doing right now, or just keep us updated? She has tried contacting his PCP as well about this issue. He has been off of IV antibiotics for 3 days. Please advise.

## 2022-03-01 NOTE — TELEPHONE ENCOUNTER
Called Susana to let her know  sent topiramate Rx to [facility]. She understood and said she confirmed with pharmacy already that they received it. She then let me know that they do not have phenobarbital. I tried to call by nearby pharmacies ([facility] and [facility]), but they did not have any. Called [facility] in [place] per pt request. Called in phenobarbital there as they did carry it. Let Susana know. She understood and said she would contact them about Ruben's insurance coverage.  
grossly assessed due to/BUE 5/5; trunk 4/5; BLE prox 3-/5 distals 2+/5

## 2022-03-01 NOTE — TELEPHONE ENCOUNTER
Called [facility] [address] and they only had 4 bottles of phenobarbitol. Susana said she needed the months supply as she would have to pay out of pocket here in [place]. Called [facility] pharmacy, who did not have the full amount. Called [facility] pharmacy per Susana and asked them, and they said they could have it by tomorrow. Informed Susana of this, and she agreed to get it there. She said Ruben should be fine for about another week. Called in prescription to [facility]. They said they would call Susana tomorrow to discuss shipping/ time. Informed pt of this. Also let her know that  thought the seizures could be related to infection. They understood. Will hold off on increasing seizure meds for now.

## 2022-03-02 ENCOUNTER — TELEPHONE (OUTPATIENT)
Dept: NEUROLOGY | Facility: MEDICAL CENTER | Age: 42
End: 2022-03-02
Payer: COMMERCIAL

## 2022-03-03 ENCOUNTER — TELEPHONE (OUTPATIENT)
Dept: NEUROLOGY | Facility: MEDICAL CENTER | Age: 42
End: 2022-03-03
Payer: COMMERCIAL

## 2022-03-03 NOTE — TELEPHONE ENCOUNTER
Called [facility] pharmacy at [phone] (number provided by Susana, pt's mom). Pharmacy tech there confirmed that they do not have phenobarbitol in elixir form, and that it is on back order.

## 2022-03-03 NOTE — TELEPHONE ENCOUNTER
Called in pt's Rx for phenobarbital caps to [facility] pharmacy. Kody, pharmacist understood and said he would be able to make the prescription in caps form. Number to pharmacy was [phone].     Called pt's mom, Susana, and informed her of this. She was thankful and understood.

## 2022-03-03 NOTE — TELEPHONE ENCOUNTER
Susana, pt's mom, called and said that the pharmacy was only able to give her enough phenobarbital for 10 days for Ruben. It is currently on back order. I have checked multiple pharmacies, and they are all on back order. No date is currently being provided on when they will get their next shipment of phenobarbital. She is wondering what to do? Is there another medication or form that Ruben should be prescribed since it is on back order? Please advise.

## 2022-03-08 ENCOUNTER — TELEPHONE (OUTPATIENT)
Dept: NEUROLOGY | Facility: MEDICAL CENTER | Age: 42
End: 2022-03-08
Payer: COMMERCIAL

## 2022-03-08 NOTE — TELEPHONE ENCOUNTER
Pt called and left several messages while I was out. Needs keppra refill for Ruben. She also increased Ruben's topamax, he now takes 75mg BID. She said pharmacy would need an updated script for this. They also received the capsule form of phenobarbital. She is wondering if Ruben should take the caps and keep the liquid form of phenobarbital she has as extra, or if she should be giving him both the liquid form until she runs out, as well as the caps form. Please advise.

## 2022-03-08 NOTE — TELEPHONE ENCOUNTER
Called pharmacy and called in keppra and topamax prescription. Updated topamax to 75mg BID. Pharmacist understood.     Called Susana and updated her. Also let her know that  thought it best to finish the liquid form and then start caps. Susana said she spoke with pharmacist who said it might be best to just start caps since liquid was a bit old. Susana said she would likely go that route.

## 2022-03-09 ENCOUNTER — TELEPHONE (OUTPATIENT)
Dept: NEUROLOGY | Facility: MEDICAL CENTER | Age: 42
End: 2022-03-09
Payer: COMMERCIAL

## 2022-03-10 NOTE — TELEPHONE ENCOUNTER
Susana called and said that Ruben was still having little seizures every now and then, she believes they are likely related to the lingering pneumonia infection. Ruben also developed a yeast infection. She wanted to know if  thought it was normal that he was still having seizures, or if this is something to be concerned for. Please advise.

## 2022-03-11 ENCOUNTER — TELEPHONE (OUTPATIENT)
Dept: NEUROLOGY | Facility: MEDICAL CENTER | Age: 42
End: 2022-03-11
Payer: COMMERCIAL

## 2022-03-16 ENCOUNTER — TELEPHONE (OUTPATIENT)
Dept: NEUROLOGY | Facility: MEDICAL CENTER | Age: 42
End: 2022-03-16
Payer: COMMERCIAL

## 2022-03-16 NOTE — TELEPHONE ENCOUNTER
Pt's Mom/POA, Susana called saying she has tried to reach out a few times about upping the Topiramate dose to reduce seizures he has been having due to stimulus. Talked to Dr. Bolton regarding this and he advised that Susana can up Ruben's dose to 200 mg BID if symptoms persist. Susana stated she wants to wait another day to see if the 150 mg BID starts working even more. Ruben's symptoms have gotten better over the last few days. Dr. Bolton did tell me I can put in a request to have the Topiramate filled for 150 mg BID, Susana said that would be great. Also advised Susana that Dr. Bolton recommended taking Ruben to the hospital if his seizures worsen or do not continue to get better.

## 2022-03-17 ENCOUNTER — TELEPHONE (OUTPATIENT)
Dept: NEUROLOGY | Facility: MEDICAL CENTER | Age: 42
End: 2022-03-17
Payer: COMMERCIAL

## 2022-03-17 NOTE — TELEPHONE ENCOUNTER
Susana left s saying that Ruben was still having minor seizures, but overall has improved since topiramate increase. She is wondering how long it would take before she would see the full impact of the topiramate increase. She is wondering if there is anything else they should do, or if for now they should just wait to see if this dosage of topiramate works (150mg BID). She was also told by pharmacist that Ruben should take topiramate every 6 hours. Susana is looking for your input on that. Please advise.

## 2022-03-29 ENCOUNTER — TELEPHONE (OUTPATIENT)
Dept: NEUROLOGY | Facility: MEDICAL CENTER | Age: 42
End: 2022-03-29
Payer: COMMERCIAL

## 2022-03-29 NOTE — TELEPHONE ENCOUNTER
Pt's mom, Susana, called saying that topamax is only being dispense for 15 days currently. Requested that the script be updated to 30 day supply. Ruben takes 3 50mg tabs BID for 300mg daily total. She notes that Ruben has been seizure free for a week now.    Susana also noted that Ruben is a lot more sleepy lately, so sleepy that he doesn't even have the energy to pass a bowel movement. She noted that the klonopin was increased awhile back, and she is currently giving him .5mg in the AM, and .75mg in the PM. She was wondering if either the topamax or klonopin could be adjusted to help with his drowsiness. Please advise.

## 2022-03-30 ENCOUNTER — TELEPHONE (OUTPATIENT)
Dept: NEUROLOGY | Facility: MEDICAL CENTER | Age: 42
End: 2022-03-30
Payer: COMMERCIAL

## 2022-03-30 NOTE — TELEPHONE ENCOUNTER
Called [facility] pharmacy to update topamax and klonopin. They told me it had to be escribed, especially since klonopin is a controlled substance. Per , to be filled by Susana. Patient aware scripts are being updated.    Topamax: Needs to dispensed as 180 tablets to make a 30 day supply for the patient.    Klonopin: Sig needs to be updated to .5mg in AM and .5mg in PM for a total of 1mg daily.    Please advise.

## 2022-03-31 DIAGNOSIS — G40.219 PHARMACORESISTANT PARTIAL EPILEPSY WITH IMPAIRMENT OF CONSCIOUSNESS, INTRACTABLE (HCC): ICD-10-CM

## 2022-03-31 RX ORDER — CLONAZEPAM 0.5 MG/1
0.5 TABLET ORAL 2 TIMES DAILY
Qty: 60 TABLET | Refills: 5 | Status: SHIPPED | OUTPATIENT
Start: 2022-03-31 | End: 2022-04-21 | Stop reason: SDUPTHER

## 2022-03-31 RX ORDER — TOPIRAMATE 50 MG/1
150 TABLET, FILM COATED ORAL 2 TIMES DAILY
Qty: 180 TABLET | Refills: 5 | Status: SHIPPED | OUTPATIENT
Start: 2022-03-31 | End: 2022-04-21 | Stop reason: SDUPTHER

## 2022-04-20 ENCOUNTER — TELEPHONE (OUTPATIENT)
Dept: NEUROLOGY | Facility: MEDICAL CENTER | Age: 42
End: 2022-04-20
Payer: COMMERCIAL

## 2022-04-20 ENCOUNTER — TELEMEDICINE (OUTPATIENT)
Dept: NEUROLOGY | Facility: MEDICAL CENTER | Age: 42
End: 2022-04-20
Attending: PSYCHIATRY & NEUROLOGY
Payer: COMMERCIAL

## 2022-04-20 DIAGNOSIS — G40.219 PHARMACORESISTANT PARTIAL EPILEPSY WITH IMPAIRMENT OF CONSCIOUSNESS, INTRACTABLE (HCC): ICD-10-CM

## 2022-04-20 PROCEDURE — 999999 HB NO CHARGE: Mod: 95 | Performed by: PSYCHIATRY & NEUROLOGY

## 2022-04-20 NOTE — PROGRESS NOTES
Chief Complaint   Patient presents with   • Follow-Up     Virtual Visit       Problem List Items Addressed This Visit    None         THIS CONSULTATION WAS PERFORMED VIA TELEMEDICINE, UTILIZING AN ENCRYPTED TRANSMISSION, TO PREVENT SPREAD OF COVID19. THE PATIENT CONSENTED TO THIS CONSULTATION.    History of present illness:  Ruben Edwards 41 y.o. male is establishing care with me for seizures. Was seeing Dr. Bolton. Last seen via telemedicine in April 2021.     Mom (Susana) is providing history as pt is non verbal and bed ridden.      Seizure started a few years after his head injury in 1998. He has been bed ridden since TBI. Mom states that pt has no way of communicating. There's no movement at all, no eye opening or blinking. He has a G-tube for meds and food. Mom states that pt has no seizures when he doesn't have infection. Last infection (PNA) was last month and had seizures (clustering in a day). He has been seizure free for several weeks now per mom. He is currently on Topamax 150mg BID, phenobarbital 120mg BID, clonazepam 0.5mg BID, Keppra 2500mg BID and Vimpat 300mg BID.  No side effects. Mom states that he used to be on a very low dose topamax prior to his last PNA and did very well, without signs of seizures. She wants to know if we can go down on the dose again in 2 months. He had blood work done this year already. Mom is taking very good care of pt. She is f/u with PCP regularly and is aware of the elevation of liver enzymes which has been chronic. No other concerns.       Past medical history:   Past Medical History:   Diagnosis Date   • Seizure (HCC)    • Traumatic brain injury (HCC) 05/15/1998       Past surgical history:   Past Surgical History:   Procedure Laterality Date   • LARYNGOSCOPY  7/2/2017    Procedure: LARYNGOSCOPY;  Surgeon: Catherine Osorio M.D.;  Location: SURGERY [facility];  Service:    • BRONCHOSCOPY  7/2/2017    Procedure: BRONCHOSCOPY;  Surgeon: Catherine Osorio M.D.;   Location: SURGERY [facility];  Service:    • TRACHEOSTOMY  7/2/2017    Procedure: TRACHEOSTOMY;  Surgeon: Catherine Osorio M.D.;  Location: SURGERY [facility];  Service:    • ESOPHAGOSCOPY  7/2/2017    Procedure: ESOPHAGOSCOPY;  Surgeon: Catherine Osorio M.D.;  Location: SURGERY [facility];  Service:        Family history:   No family history on file.    Social history:   Social History     Socioeconomic History   • Marital status: Single     Spouse name: Not on file   • Number of children: Not on file   • Years of education: Not on file   • Highest education level: Not on file   Occupational History   • Not on file   Tobacco Use   • Smoking status: Never Smoker   • Smokeless tobacco: Never Used   Substance and Sexual Activity   • Alcohol use: No   • Drug use: Yes     Types: Oral     Comment: Mother tried CBD treatment for siezures in past   • Sexual activity: Not on file   Other Topics Concern   • Not on file   Social History Narrative   • Not on file     Social Determinants of Health     Financial Resource Strain: Not on file   Food Insecurity: Not on file   Transportation Needs: Not on file   Physical Activity: Not on file   Stress: Not on file   Social Connections: Not on file   Intimate Partner Violence: Not on file   Housing Stability: Not on file       Current medications:   Current Outpatient Medications   Medication   • topiramate (TOPAMAX) 50 MG tablet   • clonazePAM (KLONOPIN) 0.5 MG Tab   • PHENobarbital 20 MG/5ML Elixir   • ERYTHROMYCIN, OPHTH, OP   • levETIRAcetam (KEPPRA) 100 MG/ML Solution   • ergocalciferol (CALCIFEROL) 8000 Unit/mL Solution   • lacosamide (VIMPAT) 100 MG Tab tablet   • lacosamide (VIMPAT) 200 MG Tab tablet   • Pain Pump (PATIENT SUPPLIED) XX RENE     No current facility-administered medications for this visit.       Medication Allergy:  Allergies   Allergen Reactions   • Sulfa Drugs Unspecified and Rash     Per caretaker, mother is allergic to sulfa so believes  her son could be as well   • Chlorhexidine Rash     Mom refuses CHG bath         Review of systems:   +seizures.        Physical examination:   Pt is bed ridden, non verbal, spastic quadriplegia.       ANCILLARY DATA REVIEWED:       Lab Data Review:  Reviewed in chart. CBC, CMP    Records reviewed:   Reviewed in chart.    Imaging:   MRI brain 2018  1.  Moderate cerebral atrophy beyond that expected for the patient's age.  2.  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  3.  Old hemorrhagic infarcts left basal ganglia, left thalamus, left subthalamic region.  4.  Marked atrophy and encephalomalacic change in the left cerebral peduncle with T2 hyperintensity and volume loss extending down the left anterior quadrant of the pop into the left medullary pyramid. 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  5.  Widespread areas of curvilinear and gyriform enhancement involving the right frontal lobe, right parietal lobe, right occipital lobe, along with curvilinear enhancement in the right basal ganglia. These findings are most consistent with subacute   sequela of cerebral infarction or cortical laminar necrosis. Cortical laminar necrosis has been reported associated with prolonged seizures/status epilepticus. Subacute sequela of encephalitis could have a similar appearance.  6.  Advanced supratentorial white matter disease consistent with microvascular ischemic change versus demyelination or gliosis. This results in some volume loss of deep white matter.  7.  Curvilinear hemosiderin deposition in the right temporal lobe deep white matter consistent with old hemorrhage.  8.  No acute cerebral infarction or acute hemorrhage evident.  9.  No evidence of mesial temporal sclerosis.    EEG:  Veeg, 7/24/2019:  This is an abnormal extended video EEG recording in the   awake, drowsy, and sleep state(s).  There is intermittent slowing   in the right frontal region and frequent right frontal spikes /   sharps. A  few events of left arm / hand and head myoclonus were   reported by either patient's mother or nursing staff by push of   the event button. These spells were brief in nature and   epileptic. Review of eeg demonstrated briefly rhythmic or   periodic right frontal spikes / sharps and/or right frontal   rhythmic slowing (delta) during the events, which only lasted a   few seconds at a time.  No evidence for status epilepticus.  The   EEG remained stable, when compared to the prior recording.   Clinical and radiological correlation is recommended.         ASSESSMENT AND PLAN:    1. 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 Intellectual disability    3. History of traumatic brain injury          CLINICAL DISCUSSION:  Pharmacoresistant epilepsy secondary to hx of TBI. Pt has been persistently in a vegetative state with no movement noticeable (according to mom). Hx of status epilepticus. Seizures are fairly controlled unless he has infections. Last infection was in March 2022 and his ASMs were adjusted by Dr. Bolton (topamax dose was increased again). Pt has been seizure free for weeks now. Mom is again interested in lowering topamax dose in the future.     Past ASM's: unknown.     Current ASM's: Topamax 150mg BID, phenobarbital 120mg BID, clonazepam 0.5mg BID, Keppra 2500mg BID and Vimpat 300mg BID.       Plan:  - continue ASMs for now.     -Will revisit in 2 months if mom wants to wean down topamax dose again.     -Continue vit D supplementation.         FOLLOW-UP:   Return in about 2 months (around 6/21/2022).      EDUCATION AND COUNSELING:  -Education was provided to the patient and/or family regarding diagnosis and prognosis. The chronic and unpredictable nature of the condition were discussed. There is increased risk for additional events, which may carry potential for significant injuries and death. Discussed frequent seizure triggers: sleep deprivation, medication  non-compliance, use of illegal drugs/alcohol, stress, and others.     mom agrees with plan, as outlined.         Susana Flanagan, MSN, APRN, FNP-C  [facility]  Office: [phone]  Fax: [phone]    This encounter was conducted via Zoom.   The patient was in a private location in the [facility].  In pt's home  Verbal consent was obtained. Patient's identity was verified.    BILLING DOCUMENTATION:   Total time spent including chart review before and after visit was 50min. Over 50% of the time of the visit today was spent on counseling and or coordination of care wtih the patient and/or family, with greater than 50% of the total discussing my assessment and plan as stated above.

## 2022-04-20 NOTE — TELEPHONE ENCOUNTER
Called pt's mom, Susana, to let her know that her insurance may not cover a virtual visit. Pt said she was aware of that possibility, and paid out of pocket last time, and was ready to do that again. Pt said she would try to set up MyChart and zoom for her appt today, and wanted to discuss who will be taking care of Ruben going forward during her visit with .

## 2022-04-21 ENCOUNTER — TELEMEDICINE (OUTPATIENT)
Dept: NEUROLOGY | Facility: MEDICAL CENTER | Age: 42
End: 2022-04-21
Attending: NURSE PRACTITIONER
Payer: COMMERCIAL

## 2022-04-21 VITALS
OXYGEN SATURATION: 94 % | HEIGHT: 70 IN | BODY MASS INDEX: 17.61 KG/M2 | WEIGHT: 123 LBS | TEMPERATURE: 97.6 F | HEART RATE: 50 BPM

## 2022-04-21 DIAGNOSIS — F79 INTELLECTUAL DISABILITY: ICD-10-CM

## 2022-04-21 DIAGNOSIS — G40.219 PHARMACORESISTANT PARTIAL EPILEPSY WITH IMPAIRMENT OF CONSCIOUSNESS, INTRACTABLE (HCC): ICD-10-CM

## 2022-04-21 DIAGNOSIS — Z87.820 HISTORY OF TRAUMATIC BRAIN INJURY: ICD-10-CM

## 2022-04-21 PROBLEM — M86.60 CHRONIC OSTEOMYELITIS (HCC): Status: ACTIVE | Noted: 2022-04-21

## 2022-04-21 PROBLEM — R40.3 PERSISTENT VEGETATIVE STATE (HCC): Status: ACTIVE | Noted: 2022-04-21

## 2022-04-21 PROBLEM — R74.8 HIGH ALKALINE PHOSPHATASE: Status: ACTIVE | Noted: 2022-04-21

## 2022-04-21 PROBLEM — M41.9 KYPHOSCOLIOSIS AND SCOLIOSIS: Status: ACTIVE | Noted: 2022-04-21
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
## 2022-04-21 PROBLEM — S09.90XA CLOSED HEAD INJURY: Status: ACTIVE | Noted: 2022-04-21

## 2022-04-21 PROBLEM — G82.50 SPASTIC QUADRIPLEGIA (HCC): Status: ACTIVE | Noted: 2022-04-21

## 2022-04-21 PROBLEM — G31.9 CEREBRAL ATROPHY (HCC): Status: ACTIVE | Noted: 2022-04-21

## 2022-04-21 PROBLEM — R32 URINARY INCONTINENCE: Status: ACTIVE | Noted: 2022-04-21

## 2022-04-21 PROCEDURE — 99215 OFFICE O/P EST HI 40 MIN: CPT | Mod: 95 | Performed by: NURSE PRACTITIONER

## 2022-04-21 RX ORDER — CLONAZEPAM 0.5 MG/1
0.5 TABLET ORAL 2 TIMES DAILY
Qty: 60 TABLET | Refills: 5 | Status: SHIPPED | OUTPATIENT
Start: 2022-04-21 | End: 2022-06-27 | Stop reason: SDUPTHER

## 2022-04-21 RX ORDER — LACOSAMIDE 100 MG/1
100 TABLET ORAL 2 TIMES DAILY
Qty: 60 TABLET | Refills: 5 | Status: SHIPPED | OUTPATIENT
Start: 2022-04-21 | End: 2022-06-27 | Stop reason: SDUPTHER

## 2022-04-21 RX ORDER — TOPIRAMATE 50 MG/1
150 TABLET, FILM COATED ORAL 2 TIMES DAILY
Qty: 180 TABLET | Refills: 5 | Status: SHIPPED | OUTPATIENT
Start: 2022-04-21 | End: 2022-08-05 | Stop reason: SDUPTHER

## 2022-04-21 RX ORDER — NUTRITIONAL SUPPLEMENT/FIBER 0.05 G-1.5
3 LIQUID (ML) ORAL 3 TIMES DAILY
COMMUNITY
Start: 2022-03-30 | End: 2024-03-13

## 2022-04-21 RX ORDER — PHENOBARBITAL 20 MG/5ML
ELIXIR ORAL
Qty: 1800 ML | Refills: 5 | Status: SHIPPED | OUTPATIENT
Start: 2022-04-21 | End: 2022-09-29 | Stop reason: SDUPTHER

## 2022-04-21 RX ORDER — LACOSAMIDE 200 MG/1
200 TABLET ORAL 2 TIMES DAILY
Qty: 60 TABLET | Refills: 5 | Status: SHIPPED | OUTPATIENT
Start: 2022-04-21 | End: 2022-06-27 | Stop reason: SDUPTHER

## 2022-04-21 RX ORDER — BACLOFEN 20 MG/1
20 TABLET ORAL 3 TIMES DAILY
COMMUNITY
Start: 2022-03-14 | End: 2023-02-01

## 2022-04-21 RX ORDER — LEVETIRACETAM 100 MG/ML
2500 SOLUTION ORAL 2 TIMES DAILY
Qty: 1500 ML | Refills: 5 | Status: SHIPPED | OUTPATIENT
Start: 2022-04-21 | End: 2022-05-05 | Stop reason: SDUPTHER

## 2022-04-21 ASSESSMENT — FIBROSIS 4 INDEX: FIB4 SCORE: 1.05

## 2022-04-21 ASSESSMENT — PATIENT HEALTH QUESTIONNAIRE - PHQ9: CLINICAL INTERPRETATION OF PHQ2 SCORE: 0

## 2022-04-22 ENCOUNTER — TELEPHONE (OUTPATIENT)
Dept: NEUROLOGY | Facility: MEDICAL CENTER | Age: 42
End: 2022-04-22

## 2022-04-22 NOTE — TELEPHONE ENCOUNTER
Vimpat 100mg Tablet  Vimpat 200mg Tablet    Tried to submit PA via CMM, no PA required for Vimpat also patient lives in [facility] and has PO BOX will have liaison release to patient pharmacy of record.  - 04/22/2022 8:37am

## 2022-05-05 ENCOUNTER — TELEPHONE (OUTPATIENT)
Dept: NEUROLOGY | Facility: MEDICAL CENTER | Age: 42
End: 2022-05-05
Payer: COMMERCIAL

## 2022-05-05 DIAGNOSIS — G40.219 PHARMACORESISTANT PARTIAL EPILEPSY WITH IMPAIRMENT OF CONSCIOUSNESS, INTRACTABLE (HCC): ICD-10-CM

## 2022-05-05 RX ORDER — LEVETIRACETAM 100 MG/ML
1500 SOLUTION ORAL 2 TIMES DAILY
Qty: 900 ML | Refills: 5 | Status: SHIPPED | OUTPATIENT
Start: 2022-05-05 | End: 2022-09-29 | Stop reason: SDUPTHER

## 2022-05-05 NOTE — TELEPHONE ENCOUNTER
Called pt back and let her know that Susana has updated the prescription, and sent it to pharmacy. She understood.

## 2022-05-05 NOTE — TELEPHONE ENCOUNTER
Pt's mom called saying that Ruben's keppra was increased to 25mL and that this was not discussed. She wants to go back to original dose, and said it was either 10mL or 15mL. Ruben is stable on this dosage. Requesting this be updated with pharmacy. Please advise.

## 2022-05-19 NOTE — PROGRESS NOTES
No chief complaint on file.      Problem List Items Addressed This Visit    None         THIS CONSULTATION WAS PERFORMED VIA TELEMEDICINE, UTILIZING AN ENCRYPTED TRANSMISSION, TO PREVENT SPREAD OF COVID19. THE PATIENT CONSENTED TO THIS CONSULTATION.    Interim History:  Ruben Edwards 41 y.o. male presents today for ***    Mom (Susana) is providing history as pt is non verbal and bed ridden.       Seizure started a few years after his head injury in 1998. He has been bed ridden since TBI. Mom states that pt has no way of communicating. There's no movement at all, no eye opening or blinking. He has a G-tube for meds and food. Mom states that pt has no seizures when he doesn't have infection. Last infection (PNA) was last month and had seizures (clustering in a day). He has been seizure free for several weeks now per mom. He is currently on Topamax 150mg BID, phenobarbital 120mg BID, clonazepam 0.5mg BID, Keppra 2500mg BID and Vimpat 300mg BID.  No side effects. Mom states that he used to be on a very low dose topamax prior to his last PNA and did very well, without signs of seizures. She wants to know if we can go down on the dose again in 2 months. He had blood work done this year already. Mom is taking very good care of pt. She is f/u with PCP regularly and is aware of the elevation of liver enzymes which has been chronic. No other concerns.     Past medical history:   Past Medical History:   Diagnosis Date   • Seizure (HCC)    • Traumatic brain injury (HCC) 05/15/1998       Past surgical history:   Past Surgical History:   Procedure Laterality Date   • LARYNGOSCOPY  7/2/2017    Procedure: LARYNGOSCOPY;  Surgeon: Catherine Osorio M.D.;  Location: [facility];  Service:    • BRONCHOSCOPY  7/2/2017    Procedure: BRONCHOSCOPY;  Surgeon: Catherine Osorio M.D.;  Location: [facility];  Service:    • TRACHEOSTOMY  7/2/2017    Procedure: TRACHEOSTOMY;  Surgeon: Catherine Osorio M.D.;   Location: SURGERY [facility];  Service:    • ESOPHAGOSCOPY  7/2/2017    Procedure: ESOPHAGOSCOPY;  Surgeon: Catherine Osorio M.D.;  Location: SURGERY [facility];  Service:        Family history:   No family history on file.    Social history:   Social History     Socioeconomic History   • Marital status: Single     Spouse name: Not on file   • Number of children: Not on file   • Years of education: Not on file   • Highest education level: Not on file   Occupational History   • Not on file   Tobacco Use   • Smoking status: Never Smoker   • Smokeless tobacco: Never Used   Substance and Sexual Activity   • Alcohol use: No   • Drug use: Yes     Types: Oral     Comment: Mother tried CBD treatment for siezures in past   • Sexual activity: Not on file   Other Topics Concern   • Not on file   Social History Narrative   • Not on file     Social Determinants of Health     Financial Resource Strain: Not on file   Food Insecurity: Not on file   Transportation Needs: Not on file   Physical Activity: Not on file   Stress: Not on file   Social Connections: Not on file   Intimate Partner Violence: Not on file   Housing Stability: Not on file       Current medications:   Current Outpatient Medications   Medication   • levETIRAcetam (KEPPRA) 100 MG/ML Solution   • Nutritional Supplements (brettapproved STANDARD 1.0) Liquid   • baclofen (LIORESAL) 20 MG tablet   • topiramate (TOPAMAX) 50 MG tablet   • PHENobarbital 20 MG/5ML Elixir   • lacosamide (VIMPAT) 200 MG Tab tablet   • lacosamide (VIMPAT) 100 MG Tab tablet   • clonazePAM (KLONOPIN) 0.5 MG Tab   • ergocalciferol (CALCIFEROL) 8000 Unit/mL Solution   • Pain Pump (PATIENT SUPPLIED) XX RENE     No current facility-administered medications for this visit.       Medication Allergy:  Allergies   Allergen Reactions   • Sulfa Drugs Unspecified and Rash     Per caretaker, mother is allergic to sulfa so believes her son could be as well   • Chlorhexidine Rash     Mom refuses CHG  bath         Review of systems:     General: Denies fevers or chills, or nightsweats, or generalized fatigue.    Head: Denies headaches or dizziness or lightheadedness  EENT: Denies vision changes, vision loss or pain, nasal secretion, nasal bleeding, difficulty swallowing, hearing loss, tinnitus, vertigo, ear pain  Respiratory: Denies shortness of breath, cough, sputum, or wheezing  Cardiac: Denies chest pain, palpitations, edema or syncope  Gastrointestinal: Denies nausea, vomiting, no abdominal pain or change in bowel habits, no melena or hematochezia  Urinary: Denies dysuria, frequency, hesitancy, or incontinence.  Dermatologic:  Denies new rash  Musculoskeletal: Denies muscle pain or swelling, no atrophy, no neck and back pain or stiffness.   Neurologic: Denies facial droopiness, muscle weakness (focal or generalized), paresthesias, ataxia, change in speech or language, memory loss, abnormal movements, seizures, loss of consciousness, or episodes of confusion.   Psychiatric: Denies anxiety, depression, mood swings, suicidal or homicidal thoughts       Physical examination:   General: Patient in no acute distress, pleasant and cooperative.  HEENT: Normocephalic, no signs of acute trauma.   Neck: There is normal range of motion.   Skin: no signs of acute rashes or trauma in visible areas  Psychiatric: No hallucinatory behavior. No symptoms of depression or suicidal ideation. Mood and affect appear normal on exam.      NEUROLOGICAL EXAM:   Mental status, orientation: Awake, alert and fully oriented.   Speech and language: speech is clear and fluent. The patient is able to name, repeat and comprehend.   Memory: There is intact recollection of recent and remote events.   Cranial nerve exam:   CN I: Not examined   CN II: Unable to assess  CN III, IV, VI: EOMI  CN V: Unable to assess  CN VII: face symmetric   CN VIII: Unable to assess  CN IX, X: Unable to assess  CN XI: Symmetric shoulder shrug  CN XII: tongue  midline.   Motor exam: Appears to have equal strength in all extremities. No abnormal movements were seen on exam.   Sensory exam Unable to assess  Deep tendon reflexes: Unable to assess  Coordination: movements were fluid, no dysmetria  Gait: The patient was able to get up from seated position on first attempt without requiring assistance. Found to be steady when walking. Movements were fluid with normal arm swing. The patient was able to turn without difficulties or tendency to fall. Romberg exam ***      ANCILLARY DATA REVIEWED:       Lab Data Review:  Reviewed in chart. No results found for this or any previous visit (from the past 24 hour(s)).      Records reviewed:   Reviewed in chart.    Imaging:   MRI brain 2018  1.  Moderate cerebral atrophy beyond that expected for the patient's age.  2.  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  3.  Old hemorrhagic infarcts left basal ganglia, left thalamus, left subthalamic region.  4.  Marked atrophy and encephalomalacic change in the left cerebral peduncle with T2 hyperintensity and volume loss extending down the left anterior quadrant of the pop into the left medullary pyramid. 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  5.  Widespread areas of curvilinear and gyriform enhancement involving the right frontal lobe, right parietal lobe, right occipital lobe, along with curvilinear enhancement in the right basal ganglia. These findings are most consistent with subacute   sequela of cerebral infarction or cortical laminar necrosis. Cortical laminar necrosis has been reported associated with prolonged seizures/status epilepticus. Subacute sequela of encephalitis could have a similar appearance.  6.  Advanced supratentorial white matter disease consistent with microvascular ischemic change versus demyelination or gliosis. This results in some volume loss of deep white matter.  7.  Curvilinear hemosiderin deposition in the right temporal lobe deep white  matter consistent with old hemorrhage.  8.  No acute cerebral infarction or acute hemorrhage evident.  9.  No evidence of mesial temporal sclerosis.     EEG:  Veeg, 7/24/2019:  This is an abnormal extended video EEG recording in the   awake, drowsy, and sleep state(s).  There is intermittent slowing   in the right frontal region and frequent right frontal spikes /   sharps. A few events of left arm / hand and head myoclonus were   reported by either patient's mother or nursing staff by push of   the event button. These spells were brief in nature and   epileptic. Review of eeg demonstrated briefly rhythmic or   periodic right frontal spikes / sharps and/or right frontal   rhythmic slowing (delta) during the events, which only lasted a   few seconds at a time.  No evidence for status epilepticus.  The   EEG remained stable, when compared to the prior recording.   Clinical and radiological correlation is recommended.         ASSESSMENT AND PLAN:    There are no diagnoses linked to this encounter.        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 Pt has been persistently in a vegetative state with no movement noticeable (according to mom). Hx of status epilepticus. Seizures are fairly controlled unless he has infections. Last infection was in March 2022 and his ASMs were adjusted by Dr. Bolton (topamax dose was increased again). Pt has been seizure free for weeks now. Mom is again interested in lowering topamax dose in the future.      Past ASM's: unknown.      Current ASM's: Topamax 150mg BID, phenobarbital 120mg BID, clonazepam 0.5mg BID, Keppra 2500mg BID and Vimpat 300mg BID.         Plan:  - continue ASMs for now.      -Will revisit in 2 months if mom wants to wean down topamax dose again.      -Continue vit D supplementation.         FOLLOW-UP:   No follow-ups on file.      EDUCATION AND COUNSELING:  -Education was provided to the patient and/or family regarding diagnosis and  prognosis. The chronic and unpredictable nature of the condition were discussed. There is increased risk for additional events, which may carry potential for significant injuries and death. Discussed frequent seizure triggers: sleep deprivation, medication non-compliance, use of illegal drugs/alcohol, stress, and others.   -We reviewed in detail the current antiepileptic regimen. Potential side effects of antiepileptics were discussed at length, including but no limited to: hypersensitivity reactions (rash and others, some of which can be fatal), visual field changes (some of which may be irreversible), glaucoma, diplopia, kidney stones, osteopenia/osteoporosis/bone fractures, hyperthermia/anhydrosis, hyponatremia, tremors/abnormal movements, ataxia, dizziness, fatigue, increased risk for falls, risk for cardiac arrhythmias/syncope, gastrointestinal side effects(hepatitis, pancreatitis, gastritis, ulcers), gingival hypertrophy/bleeding, drowsiness, sedation, anxiety/nervousness, increased risk for suicide, increased risk for depression, and psychosis.   -We also reviewed drug-drug interactions and their potential effect on seizure control and medication side effects.    -Recommend chronic vitamin D supplementation and regular exercise (if not contraindicated).   -Patient/family educated on risk for SUDEP (Sudden Death in Epilepsy). Counseling was provided on the importance of strict medication and follow up compliance. The patient/family understand the risks associated with non-adherence with the medical plan as outlined, including but not limited to an increased risk for breakthrough seizures, which may contribute to injuries, disability, status epilepticus, and even death.   -Counseling was also provided on potential effects of alcohol and other drugs, which may lower seizure threshold and/or affect the metabolism of antiepileptic drugs. We recommend avoidance of alcohol and illegal drugs.  -Avoid sleep  "deprivation.   -We extensively discussed the aspects related to safety in drivers who suffer from epilepsy. The patient is encourage to report to the Division of Motor Vehicles of any condition and/or spells related to confusion, disorientation, and/or loss of awareness and/or loss of consciousness; as these may pose a safety issue if they occur while operating a motor vehicle. The patient and/or family are ultimately responsible for exercising caution and abiding to regulations in place.   -Other seizure precautions were discussed at length, including no diving, no skydiving, no climbing or exposure to unprotected heights, no unsupervised swimming, no Jacuzzi or bathing in bathtubs or deep bodies of water. The patient/family have been advised about risks for operating any machinery while suffering from seizures / syncope / epilepsy and/or while taking antiepileptic drugs.   -The patient understands and agrees that due to the complexity of his/her diagnosis, results of any testing and further recommendations will typically be discussed/made during a face to face encounter in my office. The patient and/or family further understands it is their responsibility to keep proper follow up.     Patient/family agree with plan, as outlined.         Susana Flanagan, MSN, APRN, FNP-C  [facility]  Office: [phone]  Fax: [phone]    This encounter was conducted via {Platform used:67691::\"Zoom \"}.   The patient was in a private location in the state of {State:[address]::\"[place]\"}.    Verbal consent was obtained. Patient's identity was verified.    "

## 2022-06-23 ENCOUNTER — APPOINTMENT (OUTPATIENT)
Dept: NEUROLOGY | Facility: MEDICAL CENTER | Age: 42
End: 2022-06-23
Attending: NURSE PRACTITIONER
Payer: COMMERCIAL

## 2022-06-27 ENCOUNTER — TELEMEDICINE (OUTPATIENT)
Dept: NEUROLOGY | Facility: MEDICAL CENTER | Age: 42
End: 2022-06-27
Attending: NURSE PRACTITIONER
Payer: COMMERCIAL

## 2022-06-27 ENCOUNTER — TELEPHONE (OUTPATIENT)
Dept: NEUROLOGY | Facility: MEDICAL CENTER | Age: 42
End: 2022-06-27

## 2022-06-27 VITALS — BODY MASS INDEX: 17.61 KG/M2 | HEIGHT: 70 IN | WEIGHT: 123 LBS

## 2022-06-27 DIAGNOSIS — Z86.69 HISTORY OF STATUS EPILEPTICUS: ICD-10-CM

## 2022-06-27 DIAGNOSIS — Z87.820 HISTORY OF TRAUMATIC BRAIN INJURY: ICD-10-CM

## 2022-06-27 DIAGNOSIS — F79 INTELLECTUAL DISABILITY: ICD-10-CM

## 2022-06-27 DIAGNOSIS — E55.9 VITAMIN D DEFICIENCY: ICD-10-CM

## 2022-06-27 DIAGNOSIS — G40.219 PHARMACORESISTANT PARTIAL EPILEPSY WITH IMPAIRMENT OF CONSCIOUSNESS, INTRACTABLE (HCC): ICD-10-CM

## 2022-06-27 PROCEDURE — 99214 OFFICE O/P EST MOD 30 MIN: CPT | Mod: 95 | Performed by: NURSE PRACTITIONER

## 2022-06-27 RX ORDER — ERGOCALCIFEROL (VITAMIN D2) 200 MCG/ML
8000 DROPS ORAL DAILY
Qty: 30 ML | Refills: 5 | Status: SHIPPED | OUTPATIENT
Start: 2022-06-27 | End: 2022-09-29 | Stop reason: SDUPTHER

## 2022-06-27 RX ORDER — LACOSAMIDE 100 MG/1
100 TABLET ORAL 2 TIMES DAILY
Qty: 60 TABLET | Refills: 5 | Status: SHIPPED | OUTPATIENT
Start: 2022-06-27 | End: 2022-08-05 | Stop reason: SDUPTHER

## 2022-06-27 RX ORDER — LACOSAMIDE 200 MG/1
200 TABLET ORAL 2 TIMES DAILY
Qty: 60 TABLET | Refills: 5 | Status: SHIPPED | OUTPATIENT
Start: 2022-06-27 | End: 2022-08-05 | Stop reason: SDUPTHER

## 2022-06-27 RX ORDER — CLONAZEPAM 0.5 MG/1
0.5 TABLET ORAL 2 TIMES DAILY
Qty: 60 TABLET | Refills: 5 | Status: SHIPPED | OUTPATIENT
Start: 2022-06-27 | End: 2022-09-29 | Stop reason: SDUPTHER

## 2022-06-27 ASSESSMENT — FIBROSIS 4 INDEX: FIB4 SCORE: 1.05

## 2022-06-27 NOTE — PROGRESS NOTES
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 THE PATIENT CONSENTED TO THIS CONSULTATION.    Interim History:  Ruben Edwards 41 y.o. male is f/u for seizure    Susana is providing history as pt is bed ridden and unresponsive.      Susana reports of no seizures for ~ 3 months now. Pt has been free from infection. She regularly gives him his meds including vit D daily without fail. Mom is interested in lowering the topamax dose again as he was down to 25mg in am and 50mg QHS before. No other concerns. Mom states she is checking his VS except for BP. O2 sat 91% at night 93-94% during the day. HR: 40's-50's.         Past medical history:   Past Medical History:   Diagnosis Date   • Seizure (HCC)    • Traumatic brain injury (HCC) 05/15/1998       Past surgical history:   Past Surgical History:   Procedure Laterality Date   • LARYNGOSCOPY  7/2/2017    Procedure: LARYNGOSCOPY;  Surgeon: Catherine Osorio M.D.;  Location: [facility];  Service:    • BRONCHOSCOPY  7/2/2017    Procedure: BRONCHOSCOPY;  Surgeon: Catherine Osorio M.D.;  Location: [facility];  Service:    • TRACHEOSTOMY  7/2/2017    Procedure: TRACHEOSTOMY;  Surgeon: Catherine Osorio M.D.;  Location: [facility];  Service:    • ESOPHAGOSCOPY  7/2/2017     Procedure: ESOPHAGOSCOPY;  Surgeon: Catherine Osorio M.D.;  [facility];  Service:        Family history:   No family history on file.    Social history:   Social History     Socioeconomic History   • Marital status: Single     Spouse name: Not on file   • Number of children: Not on file   • Years of education: Not on file   • Highest education level: Not on file   Occupational History   • Not on file   Tobacco Use   • Smoking status: Never Smoker   • Smokeless tobacco: Never Used   Substance and Sexual Activity   • Alcohol use: No   • Drug use: Yes     Types: Oral     Comment: Mother tried CBD treatment for siezures in past   • Sexual activity: Not on file   Other Topics Concern   • Not on file   Social History Narrative   • Not on file     Social Determinants of Health     Financial Resource Strain: Not on file   Food Insecurity: Not on file   Transportation Needs: Not on file   Physical Activity: Not on file   Stress: Not on file   Social Connections: Not on file   Intimate Partner Violence: Not on file   Housing Stability: Not on file       Current medications:   Current Outpatient Medications   Medication   • levETIRAcetam (KEPPRA) 100 MG/ML Solution   • Nutritional Supplements ([employer] STANDARD 1.0) Liquid   • baclofen (LIORESAL) 20 MG tablet   • topiramate (TOPAMAX) 50 MG tablet   • PHENobarbital 20 MG/5ML Elixir   • lacosamide (VIMPAT) 200 MG Tab tablet   • lacosamide (VIMPAT) 100 MG Tab tablet   • clonazePAM (KLONOPIN) 0.5 MG Tab   • ergocalciferol (CALCIFEROL) 8000 Unit/mL Solution   • Pain Pump (PATIENT SUPPLIED) XX RENE     No current facility-administered medications for this visit.       Medication Allergy:  Allergies   Allergen Reactions   • Sulfa Drugs Unspecified and Rash     Per caretaker, mother is allergic to sulfa so believes her son could be as well   • Chlorhexidine Rash     Mom refuses CHG bath         Review of systems:   + unresponsive, quadriplegic.       Physical examination:   Pt bed ridden, non verbal, unresponsive, quadriplegic,       ANCILLARY DATA REVIEWED:       Lab Data Review:  Reviewed in chart.     Records reviewed:   Reviewed in chart.    Imaging:   MRI brain 2018  1.  Moderate cerebral atrophy beyond that expected for the patient's age.  2.  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  3.  Old hemorrhagic infarcts left basal ganglia, left thalamus, left subthalamic region.  4.  Marked atrophy and encephalomalacic change in the left cerebral peduncle with T2 hyperintensity and volume loss extending down the left anterior quadrant of the pop into the left medullary pyramid. 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  5.  Widespread areas of curvilinear and gyriform enhancement involving the right frontal lobe, right parietal lobe, right occipital lobe, along with curvilinear enhancement in the right basal ganglia. These findings are most consistent with subacute   sequela of cerebral infarction or cortical laminar necrosis. Cortical laminar necrosis has been reported associated with prolonged seizures/status epilepticus. Subacute sequela of encephalitis could have a similar appearance.  6.  Advanced supratentorial white matter disease consistent with microvascular ischemic change versus demyelination or gliosis. This results in some volume loss of deep white matter.  7.  Curvilinear hemosiderin deposition in the right temporal lobe deep white matter consistent with old hemorrhage.  8.  No acute cerebral infarction or acute hemorrhage evident.  9.  No evidence of mesial temporal sclerosis.     EEG:  Veeg, 7/24/2019:  This is an abnormal extended video EEG recording in the   awake, drowsy, and sleep state(s).  There is intermittent slowing   in the right frontal region and frequent right frontal spikes /   sharps. A few events of left arm / hand and head myoclonus were   reported by either patient's mother or nursing staff by push of   the  event button. These spells were brief in nature and   epileptic. Review of eeg demonstrated briefly rhythmic or   periodic right frontal spikes / sharps and/or right frontal   rhythmic slowing (delta) during the events, which only lasted a   few seconds at a time.  No evidence for status epilepticus.  The   EEG remained stable, when compared to the prior recording.   Clinical and radiological correlation is recommended.       ASSESSMENT AND PLAN:    1. 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  Dispense: 60 Tablet; Refill: 5  - lacosamide (VIMPAT) 100 MG Tab tablet; Take 1 Tablet by mouth 2 times a day for 180 days.  Dispense: 60 Tablet; Refill: 5  - clonazePAM (KLONOPIN) 0.5 MG Tab; Take 1 Tablet by mouth 2 times a day for 180 days.  Dispense: 60 Tablet; Refill: 5    2. Intellectual disability    3. History of traumatic brain injury    4. History of status epilepticus    5. Vitamin D deficiency  - ergocalciferol (CALCIFEROL) 8000 Unit/mL Solution; Take 1 mL by mouth every day for 165 days.  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 Pt has been persistently in a vegetative state with no movement noticeable (according to mom). Hx of status epilepticus. Seizures are fairly controlled unless he has infections. Last infection was in March 2022 and his ASMs were adjusted by Dr. Bolton (topamax dose was increased again). Pt continues to be seizure free and mom wants to try slowly lowering his topamax dose this time.     Past ASM's: unknown.      Current ASM's: Topamax (titrate down from 150mg BID), phenobarbital 120mg BID, clonazepam 0.5mg BID, Keppra 2500mg BID and Vimpat 300mg BID.         Plan:  - Topamax: take 125mg in am and 150mg QHS for 1 month, then 125mg BID thereafter. She will update me if there is a  breakthrough seizure     -Continue vit D supplementation.         FOLLOW-UP:   Return in about 3 months (around 9/27/2022).      EDUCATION AND COUNSELING:  -Education was provided to the patient and/or family regarding diagnosis and prognosis. The chronic and unpredictable nature of the condition were discussed. There is increased risk for additional events, which may carry potential for significant injuries and death. Discussed frequent seizure triggers: sleep deprivation, medication non-compliance, use of illegal drugs/alcohol, stress, and others.   -The patient understands and agrees that due to the complexity of his/her diagnosis, results of any testing and further recommendations will typically be discussed/made during a face to face encounter in my office. The patient and/or family further understands it is their responsibility to keep proper follow up.     Mom agrees with plan, as outlined.         Susana Flanagan, MSN, APRN, FNP-C  [facility]  Office: [phone]  Fax: [phone]    This encounter was conducted via Zoom. At their house.   The patient was in a private location in the [facility].    Verbal consent was obtained. Patient's identity was verified.

## 2022-08-05 ENCOUNTER — TELEPHONE (OUTPATIENT)
Dept: NEUROLOGY | Facility: MEDICAL CENTER | Age: 42
End: 2022-08-05
Payer: COMMERCIAL

## 2022-08-05 DIAGNOSIS — G40.219 PHARMACORESISTANT PARTIAL EPILEPSY WITH IMPAIRMENT OF CONSCIOUSNESS, INTRACTABLE (HCC): ICD-10-CM

## 2022-08-05 RX ORDER — LACOSAMIDE 100 MG/1
100 TABLET ORAL 2 TIMES DAILY
Qty: 60 TABLET | Refills: 5 | Status: SHIPPED | OUTPATIENT
Start: 2022-08-05 | End: 2022-08-05 | Stop reason: SDUPTHER

## 2022-08-05 RX ORDER — LACOSAMIDE 100 MG/1
100 TABLET, FILM COATED ORAL 2 TIMES DAILY
Qty: 60 TABLET | Refills: 5 | Status: SHIPPED | OUTPATIENT
Start: 2022-08-05 | End: 2022-09-29 | Stop reason: SDUPTHER

## 2022-08-05 RX ORDER — LACOSAMIDE 200 MG/1
200 TABLET ORAL 2 TIMES DAILY
Qty: 60 TABLET | Refills: 5 | Status: CANCELLED | OUTPATIENT
Start: 2022-08-05 | End: 2023-02-01

## 2022-08-05 RX ORDER — LACOSAMIDE 200 MG/1
200 TABLET ORAL 2 TIMES DAILY
Qty: 60 TABLET | Refills: 5 | Status: SHIPPED | OUTPATIENT
Start: 2022-08-05 | End: 2022-08-05 | Stop reason: SDUPTHER

## 2022-08-05 RX ORDER — LACOSAMIDE 200 MG/1
200 TABLET, FILM COATED ORAL 2 TIMES DAILY
Qty: 60 TABLET | Refills: 5 | Status: SHIPPED | OUTPATIENT
Start: 2022-08-05 | End: 2022-09-29 | Stop reason: SDUPTHER

## 2022-08-05 RX ORDER — LACOSAMIDE 100 MG/1
100 TABLET ORAL 2 TIMES DAILY
Qty: 60 TABLET | Refills: 5 | Status: CANCELLED | OUTPATIENT
Start: 2022-08-05 | End: 2023-02-01

## 2022-08-05 RX ORDER — TOPIRAMATE 50 MG/1
150 TABLET, FILM COATED ORAL 2 TIMES DAILY
Qty: 180 TABLET | Refills: 5 | Status: SHIPPED | OUTPATIENT
Start: 2022-08-05 | End: 2022-09-29 | Stop reason: SDUPTHER

## 2022-08-05 NOTE — TELEPHONE ENCOUNTER
Received request via: Pharmacy    Was the patient seen in the last year in this department? Yes    Does the patient have an active prescription (recently filled or refills available) for medication(s) requested? No     Requesting brand name     Spoke to pharmacy script was not written dispense as written for Vimpat brand name only. They can not release it. Needs to  Resent marked dispense as written.    I assured mom it will be taken care of on Monday morning and I will let her know when it's been done.

## 2022-08-08 ENCOUNTER — TELEPHONE (OUTPATIENT)
Dept: NEUROLOGY | Facility: MEDICAL CENTER | Age: 42
End: 2022-08-08

## 2022-08-08 NOTE — TELEPHONE ENCOUNTER
Lacosamide (Vimpat) 100mg Tablet    No PA required, we do NOT accept RX insurance and patient lives in [place], will have liaison release to patient's pharmacy of record. - 08/08/2022 9:49am    Lacosamide (Vimpat) 200mg Tablet    No PA required, we do NOT accept RX insurance and patient lives in [place], will have liaison release to patient's pharmacy of record. - 08/08/2022 9:49am

## 2022-09-12 ENCOUNTER — TELEPHONE (OUTPATIENT)
Dept: NEUROLOGY | Facility: MEDICAL CENTER | Age: 42
End: 2022-09-12

## 2022-09-14 ENCOUNTER — TELEPHONE (OUTPATIENT)
Dept: NEUROLOGY | Facility: MEDICAL CENTER | Age: 42
End: 2022-09-14
Payer: COMMERCIAL

## 2022-09-28 NOTE — PROGRESS NOTES
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 THE PATIENT CONSENTED TO THIS CONSULTATION.    Interim History:  Ruben Jerry 42 y.o. male is f/u for seizures.     Hx obtained from mom as pt is bedridden and unresponsive.     Pt had multiple seizures since last visit d/t possible infection, interactions with abx and recently was G-tube leakage. Mom has been giving him his ASMs on time including vit D. No known side effects. Mother is monitoring his VS daily. Mom is concerned about the transition and re-establishing care again. No other issues.       Past medical history:   Past Medical History:   Diagnosis Date    Seizure (HCC)     Traumatic brain injury (HCC) 05/15/1998       Past surgical history:   Past Surgical History:   Procedure Laterality Date    LARYNGOSCOPY  7/2/2017    Procedure: LARYNGOSCOPY;  Surgeon: Catherine Osorio M.D.;  Location: [facility];  Service:     BRONCHOSCOPY  7/2/2017    Procedure: BRONCHOSCOPY;  Surgeon: Catherine Osorio M.D.;  Location: [facility];  Service:     TRACHEOSTOMY  7/2/2017    Procedure: TRACHEOSTOMY;  Surgeon: Catherine Osorio M.D.;  Location: [facility];  Service:     ESOPHAGOSCOPY  7/2/2017    Procedure: ESOPHAGOSCOPY;  Surgeon: Catherine Osorio M.D.;  Location: [facility];  Service:        Family history:   No family history on file.    Social history:   Social History      Socioeconomic History    Marital status: Single     Spouse name: Not on file    Number of children: Not on file    Years of education: Not on file    Highest education level: Not on file   Occupational History    Not on file   Tobacco Use    Smoking status: Never    Smokeless tobacco: Never   Vaping Use    Vaping Use: Never used   Substance and Sexual Activity    Alcohol use: No    Drug use: Yes     Types: Oral     Comment: Mother tried CBD treatment for siezures in past    Sexual activity: Not on file   Other Topics Concern    Not on file   Social History Narrative    Not on file     Social Determinants of Health     Financial Resource Strain: Not on file   Food Insecurity: Not on file   Transportation Needs: Not on file   Physical Activity: Not on file   Stress: Not on file   Social Connections: Not on file   Intimate Partner Violence: Not on file   Housing Stability: Not on file       Current medications:   Current Outpatient Medications   Medication    topiramate (TOPAMAX) 50 MG tablet    VIMPAT 200 MG Tab tablet    VIMPAT 100 MG Tab tablet    clonazePAM (KLONOPIN) 0.5 MG Tab    ergocalciferol (CALCIFEROL) 8000 Unit/mL Solution    levETIRAcetam (KEPPRA) 100 MG/ML Solution    Nutritional Supplements ([employer] STANDARD 1.0) Liquid    baclofen (LIORESAL) 20 MG tablet    PHENobarbital 20 MG/5ML Elixir    Pain Pump (PATIENT SUPPLIED) XX RENE     No current facility-administered medications for this visit.       Medication Allergy:  Allergies   Allergen Reactions    Sulfa Drugs Unspecified and Rash     Per caretaker, mother is allergic to sulfa so believes her son could be as well    Chlorhexidine Rash     Mom refuses CHG bath         Review of systems:   +unresponsive, quadriplegic       Physical examination:   Pt bed ridden, unresponsive, quadriplegic        ANCILLARY DATA REVIEWED:       Lab Data Review:  Reviewed in chart. CBC, CMP      Records reviewed:   Reviewed in chart.    Imaging:   MRI brain 2018  1.   Moderate cerebral atrophy beyond that expected for the patient's age.  2.  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  3.  Old hemorrhagic infarcts left basal ganglia, left thalamus, left subthalamic region.  4.  Marked atrophy and encephalomalacic change in the left cerebral peduncle with T2 hyperintensity and volume loss extending down the left anterior quadrant of the pop into the left medullary pyramid. 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  5.  Widespread areas of curvilinear and gyriform enhancement involving the right frontal lobe, right parietal lobe, right occipital lobe, along with curvilinear enhancement in the right basal ganglia. These findings are most consistent with subacute   sequela of cerebral infarction or cortical laminar necrosis. Cortical laminar necrosis has been reported associated with prolonged seizures/status epilepticus. Subacute sequela of encephalitis could have a similar appearance.  6.  Advanced supratentorial white matter disease consistent with microvascular ischemic change versus demyelination or gliosis. This results in some volume loss of deep white matter.  7.  Curvilinear hemosiderin deposition in the right temporal lobe deep white matter consistent with old hemorrhage.  8.  No acute cerebral infarction or acute hemorrhage evident.  9.  No evidence of mesial temporal sclerosis.     EEG:  Veeg, 7/24/2019:  This is an abnormal extended video EEG recording in the   awake, drowsy, and sleep state(s).  There is intermittent slowing   in the right frontal region and frequent right frontal spikes /   sharps. A few events of left arm / hand and head myoclonus were   reported by either patient's mother or nursing staff by push of   the event button. These spells were brief in nature and   epileptic. Review of eeg demonstrated briefly rhythmic or   periodic right frontal spikes / sharps and/or right frontal   rhythmic slowing (delta) during the events, which only  lasted a   few seconds at a time.  No evidence for status epilepticus.  The   EEG remained stable, when compared to the prior recording.   Clinical and radiological correlation is recommended.         ASSESSMENT AND PLAN:    1. 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 History of traumatic brain injury        3. History of status epilepticus        4. Quadriplegia, unspecified (HCC)                  CLINICAL DISCUSSION:  Pharmacoresistant epilepsy secondary to hx of TBI. Pt has been persistently in a vegetative state with no movement noticeable. Hx of status epilepticus. Seizures are fairly controlled unless he has infections. He had multiple breakthrough seizures since last visit possibly d/t infection, interactions with abx and G-tube leakage.      Past ASM's: unknown.      Current ASM's: Topamax 125mg BID, phenobarbital 120mg BID, clonazepam 0.5mg BID, Keppra 2500mg BID and Vimpat 300mg BID.         Plan:  - Mom wants to stay on current ASMs given the recent seizures. She was hoping to eventually decrease the topamax dose in the future.      -Continue vit D supplementation.     -Aware that I will no longer be with [facility] after Sept 2022           FOLLOW-UP:   Return in about 3 months (around 12/29/2022), or if symptoms worsen or fail to improve.      EDUCATION AND COUNSELING:  -Education was provided to the patient and/or family regarding diagnosis and prognosis. The chronic and unpredictable nature of the condition were discussed. There is increased risk for additional events, which may carry potential for significant injuries and death. Discussed frequent seizure triggers: sleep deprivation, medication non-compliance, use of illegal drugs/alcohol, stress,  and others.   -We reviewed in detail the current antiepileptic regimen. Potential side effects of antiepileptics were discussed at length, including but no limited to: hypersensitivity reactions (rash and others, some of which can be fatal), visual field changes (some of which may be irreversible), glaucoma, diplopia, kidney stones, osteopenia/osteoporosis/bone fractures, hyperthermia/anhydrosis, hyponatremia, tremors/abnormal movements, ataxia, dizziness, fatigue, increased risk for falls, risk for cardiac arrhythmias/syncope, gastrointestinal side effects(hepatitis, pancreatitis, gastritis, ulcers), gingival hypertrophy/bleeding, drowsiness, sedation, anxiety/nervousness, increased risk for suicide, increased risk for depression, and psychosis.   -We also reviewed drug-drug interactions and their potential effect on seizure control and medication side effects.    -Recommend chronic vitamin D supplementation and regular exercise (if not contraindicated).   -Patient/family educated on risk for SUDEP (Sudden Death in Epilepsy). Counseling was provided on the importance of strict medication and follow up compliance. The patient/family understand the risks associated with non-adherence with the medical plan as outlined, including but not limited to an increased risk for breakthrough seizures, which may contribute to injuries, disability, status epilepticus, and even death.   -Counseling was also provided on potential effects of alcohol and other drugs, which may lower seizure threshold and/or affect the metabolism of antiepileptic drugs. We recommend avoidance of alcohol and illegal drugs.  -Avoid sleep deprivation.   -We extensively discussed the aspects related to safety in drivers who suffer from epilepsy. The patient is encourage to report to the Division of Motor Vehicles of any condition and/or spells related to confusion, disorientation, and/or loss of awareness and/or loss of consciousness; as these may pose a  safety issue if they occur while operating a motor vehicle. The patient and/or family are ultimately responsible for exercising caution and abiding to regulations in place.   -Other seizure precautions were discussed at length, including no diving, no skydiving, no climbing or exposure to unprotected heights, no unsupervised swimming, no Jacuzzi or bathing in bathtubs or deep bodies of water. The patient/family have been advised about risks for operating any machinery while suffering from seizures / syncope / epilepsy and/or while taking antiepileptic drugs.   -The patient understands and agrees that due to the complexity of his/her diagnosis, results of any testing and further recommendations will typically be discussed/made during a face to face encounter in my office. The patient and/or family further understands it is their responsibility to keep proper follow up.     Mom agrees with plan, as outlined.         Susana Flanagan, MSN, APRN, FNP-C  [facility]  Office: [phone]  Fax: [phone]    This encounter was conducted via Zoom. At home  The patient was in a private location in the [facility].    Verbal consent was obtained. Patient's identity was verified.

## 2022-09-29 ENCOUNTER — TELEMEDICINE (OUTPATIENT)
Dept: NEUROLOGY | Facility: MEDICAL CENTER | Age: 42
End: 2022-09-29
Attending: NURSE PRACTITIONER
Payer: COMMERCIAL

## 2022-09-29 ENCOUNTER — TELEPHONE (OUTPATIENT)
Dept: NEUROLOGY | Facility: MEDICAL CENTER | Age: 42
End: 2022-09-29

## 2022-09-29 VITALS
TEMPERATURE: 97.5 F | OXYGEN SATURATION: 94 % | HEIGHT: 70 IN | DIASTOLIC BLOOD PRESSURE: 68 MMHG | HEART RATE: 94 BPM | WEIGHT: 264.55 LBS | BODY MASS INDEX: 37.87 KG/M2 | SYSTOLIC BLOOD PRESSURE: 116 MMHG

## 2022-09-29 DIAGNOSIS — Z86.69 HISTORY OF STATUS EPILEPTICUS: ICD-10-CM

## 2022-09-29 DIAGNOSIS — G40.219 PHARMACORESISTANT PARTIAL EPILEPSY WITH IMPAIRMENT OF CONSCIOUSNESS, INTRACTABLE (HCC): ICD-10-CM

## 2022-09-29 DIAGNOSIS — E55.9 VITAMIN D DEFICIENCY: ICD-10-CM

## 2022-09-29 DIAGNOSIS — G82.50 QUADRIPLEGIA, UNSPECIFIED (HCC): ICD-10-CM

## 2022-09-29 DIAGNOSIS — Z87.820 HISTORY OF TRAUMATIC BRAIN INJURY: ICD-10-CM

## 2022-09-29 PROCEDURE — 99214 OFFICE O/P EST MOD 30 MIN: CPT | Mod: 95 | Performed by: NURSE PRACTITIONER

## 2022-09-29 RX ORDER — ERGOCALCIFEROL (VITAMIN D2) 200 MCG/ML
5000 DROPS ORAL DAILY
Qty: 18.9 ML | Refills: 5 | Status: SHIPPED | OUTPATIENT
Start: 2022-09-29 | End: 2023-03-28

## 2022-09-29 RX ORDER — LACOSAMIDE 100 MG/1
100 TABLET, FILM COATED ORAL 2 TIMES DAILY
Qty: 60 TABLET | Refills: 5 | Status: SHIPPED | OUTPATIENT
Start: 2022-09-29 | End: 2023-02-01 | Stop reason: SDUPTHER

## 2022-09-29 RX ORDER — LEVETIRACETAM 100 MG/ML
2500 SOLUTION ORAL 2 TIMES DAILY
Qty: 1500 ML | Refills: 5 | Status: SHIPPED | OUTPATIENT
Start: 2022-09-29 | End: 2022-11-18

## 2022-09-29 RX ORDER — AMOXICILLIN AND CLAVULANATE POTASSIUM 875; 125 MG/1; MG/1
1 TABLET, FILM COATED ORAL 2 TIMES DAILY
COMMUNITY
Start: 2022-08-24 | End: 2023-02-01

## 2022-09-29 RX ORDER — LACOSAMIDE 200 MG/1
200 TABLET, FILM COATED ORAL 2 TIMES DAILY
Qty: 60 TABLET | Refills: 5 | Status: SHIPPED | OUTPATIENT
Start: 2022-09-29 | End: 2023-02-01 | Stop reason: SDUPTHER

## 2022-09-29 RX ORDER — CLONAZEPAM 0.5 MG/1
0.5 TABLET ORAL 2 TIMES DAILY
Qty: 60 TABLET | Refills: 5 | Status: SHIPPED | OUTPATIENT
Start: 2022-09-29 | End: 2023-02-01 | Stop reason: SDUPTHER

## 2022-09-29 RX ORDER — POTASSIUM CITRATE MONOHYDRATE 100 %
275 POWDER (GRAM) MISCELLANEOUS PRN
COMMUNITY
End: 2024-03-13

## 2022-09-29 RX ORDER — PHENOBARBITAL 20 MG/5ML
120 ELIXIR ORAL 2 TIMES DAILY
Qty: 1800 ML | Refills: 5 | Status: SHIPPED | OUTPATIENT
Start: 2022-09-29 | End: 2023-02-01 | Stop reason: SDUPTHER

## 2022-09-29 RX ORDER — TOPIRAMATE 50 MG/1
125 TABLET, FILM COATED ORAL 2 TIMES DAILY
Qty: 150 TABLET | Refills: 5 | Status: SHIPPED | OUTPATIENT
Start: 2022-09-29 | End: 2023-02-01 | Stop reason: SDUPTHER

## 2022-09-29 ASSESSMENT — FIBROSIS 4 INDEX: FIB4 SCORE: 1.07

## 2022-09-29 NOTE — TELEPHONE ENCOUNTER
Lacosamide (Vimpat) 100mg Tablet  Lacosamide (Vimpat) 200mg Tablet    No PA required, we do NOT accept RX insurance and patient lives in [place], will have liaison release to patient's pharmacy of record. - 09/29/2022 2:11pm

## 2022-11-08 ENCOUNTER — TELEPHONE (OUTPATIENT)
Dept: NEUROLOGY | Facility: MEDICAL CENTER | Age: 42
End: 2022-11-08
Payer: COMMERCIAL

## 2022-11-08 DIAGNOSIS — G40.219 PHARMACORESISTANT PARTIAL EPILEPSY WITH IMPAIRMENT OF CONSCIOUSNESS, INTRACTABLE (HCC): ICD-10-CM

## 2022-11-08 NOTE — TELEPHONE ENCOUNTER
Caller Name: [facility]   Call Back Number: [phone]    How would the patient prefer to be contacted with a response: Phone call OK to leave a detailed message    [facility] called regarding patient medication. Mother of patient apparently had concerns when picking up the prescription. She states that this script is supposed to be written as 15mg/ml as it has been written in the past. Please advise how patient should be taking this. Thank you!   -LANE Fountain.

## 2022-11-16 ENCOUNTER — TELEPHONE (OUTPATIENT)
Dept: NEUROLOGY | Facility: MEDICAL CENTER | Age: 42
End: 2022-11-16
Payer: COMMERCIAL

## 2022-11-16 NOTE — TELEPHONE ENCOUNTER
Mother (Susana) called and left a voicemail stating that she has not gotten a call back and she is really needing to figure out who is going to be Ruben's new neurologist. Susana states that he has an active infection and she is concerned about what antibiotic he should be using. Please give her a call back to discuss further.  -LANE Fountain.

## 2022-11-17 NOTE — TELEPHONE ENCOUNTER
Patient's mom called she is concerned nobody has gotten back to her. She states that the pharmacy has called here several times and has never gotten a response.    I told mom I would speak to our management and try to come up with acceptable plan of care. We do have hospitalist available after hours.    She verbalized understanding. She stated she will not be available after 2:00 tomorrow but will be all day Friday.

## 2022-11-18 ENCOUNTER — TELEPHONE (OUTPATIENT)
Dept: NEUROLOGY | Facility: MEDICAL CENTER | Age: 42
End: 2022-11-18
Payer: COMMERCIAL

## 2022-11-18 ENCOUNTER — TELEPHONE (OUTPATIENT)
Dept: NEUROLOGY | Facility: MEDICAL CENTER | Age: 42
End: 2022-11-18

## 2022-11-18 DIAGNOSIS — G40.219 PHARMACORESISTANT PARTIAL EPILEPSY WITH IMPAIRMENT OF CONSCIOUSNESS, INTRACTABLE (HCC): ICD-10-CM

## 2022-11-18 RX ORDER — LEVETIRACETAM 100 MG/ML
1500 SOLUTION ORAL 2 TIMES DAILY
Qty: 900 ML | Refills: 2 | Status: SHIPPED | OUTPATIENT
Start: 2022-11-18 | End: 2023-02-01 | Stop reason: SDUPTHER

## 2022-11-18 NOTE — TELEPHONE ENCOUNTER
Called mom Susana with [facility] practice leader On the phone regarding Susana's concerns. The following were address:  No one getting back to her in a timely fashion.   Explained 48 minimum policy response time frame for DR. Wilcox only with a max of 72 hour response period.     Medication change from   levETIRAcetam (KEPPRA) 100 MG/ML Solution (Discontinued) 900 mL 5/5 5/5/2022 9/29/2022    Sig - Route: Take 15 mL by mouth 2 times a day for 180 days. -    To:  Sig: Take 25 mL by mouth 2 times a day for 180 days.         Without her knowledge from last virtual appointment. Dr. Olson will review chart and determine mine medication care plan needed. Susana will get call back from Liliana (MA) to explain order after Dr. Wilcox will review.     Previous virtual visit miscommunication from APRN regarding provider for Ruben provider after her departure. Explained  that Dr. Wilcox would be current provider with possible change when new epilepsy provider come's  to office. Physician  will all to schedule Dr Olson virtual appt at the end of  December beginning of January,     Susana, Patient mother understood ALL responses to concern and policies.

## 2022-11-18 NOTE — PROGRESS NOTES
Note    This is a patient with epilepsy previously seen by Susana Flanagan.  I am reviewing the chart and medical history as there is confusion as to what dose of Keppra the patient should be on.  This provider is no longer working at [facility] so I am unable to discuss the situation with her directly.  But based on the patient's last encounter with Susana Flanagan which was September 29, 2022, the patient had an increase in  seizure frequency some weeks/months leading up to the visit in the setting of infections and/or antibiotic medications which probably lowered seizure threshold.  The note reflected that there would be no changes in medications.  The the note also reflected that the mother did not wish to increase any of his antiseizure medications at the time as well.  However, in review of the medication history the Keppra had been reordered differently without justification documented.  An order was placed for a higher dose of keppra, 25 mL twice per day.  Patient had been taking 15 mL twice per day.  The mother had been continuing to give the Keppra at 15 mL twice per day.  I do not see any justification to increase Keppra if the breakthrough seizures/increase seizure frequency were transient and in the setting of infections/illnesses.  The most appropriate treatment had already been done-treat the underlying infections aggravating the epilepsy.  Therefore I do not see justification in increasing the Keppra to 25 mL twice per day.  I also do not see any documentation justifying that as well. I have rectified  the medication records ro reflect that the patient is and should continue to be taking keppra t 15 mL twice per day.    Patient will follow up with either myself or a new epilepsy provider in the coming months, but we will certainly see him sooner if there are any concerns, especially if there is a new or persistent change in his neurological status that needs to be addressed sooner/     Javon Wilcox  MD  Department of Neurology at [facility]  General Neurologist and Epileptologist  Director of [facility]'s Level III Comprehensive Epilepsy Program  Professor of Clinical Neurology, [facility].   Phone: [phone]  Fax: [phone]  E-mail: [email or web address]

## 2022-11-19 NOTE — TELEPHONE ENCOUNTER
Mana spoke to mom and let her know chart reviewed by Dr Wilcox and his findings. Please see note from DR Wilcox. Sent message to Cata asking to schedule patient for the end of December beginning of January with Dr Wilcox.

## 2023-01-24 ENCOUNTER — TELEPHONE (OUTPATIENT)
Dept: NEUROLOGY | Facility: MEDICAL CENTER | Age: 43
End: 2023-01-24
Payer: COMMERCIAL

## 2023-01-24 NOTE — TELEPHONE ENCOUNTER
Spoke to mom verified appointment 02/01/2023 @ 4:00 with DR Luz and the estimated chrg to be paid at the time of visit will be VVEP [phone]. Care will be transferred to Dr Morris in March. She verbalized understanding.

## 2023-02-01 ENCOUNTER — TELEMEDICINE (OUTPATIENT)
Dept: NEUROLOGY | Facility: MEDICAL CENTER | Age: 43
End: 2023-02-01
Attending: PSYCHIATRY & NEUROLOGY
Payer: COMMERCIAL

## 2023-02-01 DIAGNOSIS — G40.219 PHARMACORESISTANT PARTIAL EPILEPSY WITH IMPAIRMENT OF CONSCIOUSNESS, INTRACTABLE (HCC): ICD-10-CM

## 2023-02-01 PROCEDURE — 99443 PR PHYSICIAN TELEPHONE EVALUATION 21-30 MIN: CPT | Mod: 95 | Performed by: PSYCHIATRY & NEUROLOGY

## 2023-02-01 RX ORDER — PHENOBARBITAL 20 MG/5ML
120 ELIXIR ORAL 2 TIMES DAILY
Qty: 5400 ML | Refills: 3 | Status: SHIPPED | OUTPATIENT
Start: 2023-02-01 | End: 2023-05-02

## 2023-02-01 RX ORDER — LACOSAMIDE 100 MG/1
100 TABLET, FILM COATED ORAL 2 TIMES DAILY
Qty: 180 TABLET | Refills: 3 | Status: SHIPPED | OUTPATIENT
Start: 2023-02-01 | End: 2023-05-02

## 2023-02-01 RX ORDER — LACOSAMIDE 200 MG/1
200 TABLET, FILM COATED ORAL 2 TIMES DAILY
Qty: 180 TABLET | Refills: 3 | Status: SHIPPED | OUTPATIENT
Start: 2023-02-01 | End: 2023-05-02

## 2023-02-01 RX ORDER — LEVETIRACETAM 100 MG/ML
1500 SOLUTION ORAL 2 TIMES DAILY
Qty: 2700 ML | Refills: 3 | Status: SHIPPED | OUTPATIENT
Start: 2023-02-01 | End: 2023-05-02

## 2023-02-01 RX ORDER — TOPIRAMATE 50 MG/1
TABLET, FILM COATED ORAL
Qty: 495 TABLET | Refills: 3 | Status: SHIPPED | OUTPATIENT
Start: 2023-02-01 | End: 2023-03-22 | Stop reason: SDUPTHER

## 2023-02-01 RX ORDER — CLONAZEPAM 0.5 MG/1
0.5 TABLET ORAL 2 TIMES DAILY
Qty: 180 TABLET | Refills: 3 | Status: SHIPPED | OUTPATIENT
Start: 2023-02-01 | End: 2023-05-02

## 2023-02-02 PROBLEM — G82.50 SPASTIC QUADRIPLEGIA (HCC): Status: ACTIVE | Noted: 2017-10-09

## 2023-02-02 PROBLEM — L89.90 PRESSURE INJURY OF SKIN: Status: ACTIVE | Noted: 2022-10-11

## 2023-02-02 PROBLEM — S31.000A SACRAL WOUND: Status: ACTIVE | Noted: 2023-02-02

## 2023-02-02 PROBLEM — R56.9 SEIZURE (HCC): Status: ACTIVE | Noted: 2017-11-07
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
## 2023-03-22 DIAGNOSIS — G40.219 PHARMACORESISTANT PARTIAL EPILEPSY WITH IMPAIRMENT OF CONSCIOUSNESS, INTRACTABLE (HCC): ICD-10-CM

## 2023-03-22 RX ORDER — TOPIRAMATE 50 MG/1
150 TABLET, FILM COATED ORAL 2 TIMES DAILY
Qty: 540 TABLET | Refills: 2 | Status: SHIPPED | OUTPATIENT
Start: 2023-03-22 | End: 2023-06-20

## 2023-04-17 ENCOUNTER — PATIENT MESSAGE (OUTPATIENT)
Dept: NEUROLOGY | Facility: MEDICAL CENTER | Age: 43
End: 2023-04-17

## 2023-04-17 DIAGNOSIS — G40.219 PHARMACORESISTANT PARTIAL EPILEPSY WITH IMPAIRMENT OF CONSCIOUSNESS, INTRACTABLE (HCC): Primary | ICD-10-CM

## 2023-04-18 RX ORDER — TOPIRAMATE 25 MG/1
25 TABLET ORAL NIGHTLY
Qty: 90 TABLET | Refills: 3 | Status: SHIPPED | OUTPATIENT
Start: 2023-04-18 | End: 2023-07-17

## 2023-05-02 ENCOUNTER — PATIENT MESSAGE (OUTPATIENT)
Dept: NEUROLOGY | Facility: MEDICAL CENTER | Age: 43
End: 2023-05-02

## 2023-05-02 NOTE — PROGRESS NOTES
I spoke to Ruben's mother.    At the last visit his medication regimen included:    Current Anti-Seizure Medications:  - Keppra 15 mL BID (1,500 mg BID)  - phenobarbital 20 mg/5mL (120 mg BID)  - lacosamide 300 mg BID  - topiramate 50 mg, 125/150 mg BID (can go to 2-2.5 tablets daily BID during well periods)  - clonazepam 0.5 mg BID      Now, his anti-seizure regimen is as follows:    Current Anti-Seizure Medications:  - Keppra 15 mL BID (1,500 mg BID)  - phenobarbital 20 mg/5mL (120 mg BID)  - lacosamide 300 mg BID  - topiramate 50 mg and 25 mg tablets, 150/175 mg  - clonazepam 0.5 mg BID      Rbuen may have an infection related to the G-tube.  Plan for blood work on Thursday.  If the results are consistent with infection we will continue the current anti-seizure regimen.    If the results are not suggestive of infection we will consider increasing the daily dosage of topiramate by 25 mg/day.    Alex Luz M.D.

## 2023-06-27 ENCOUNTER — TELEPHONE (OUTPATIENT)
Dept: NEUROLOGY | Facility: MEDICAL CENTER | Age: 43
End: 2023-06-27

## 2023-06-27 DIAGNOSIS — G40.804 INTRACTABLE EPILEPSY DUE TO EXTERNAL CAUSES WITHOUT STATUS EPILEPTICUS (HCC): ICD-10-CM

## 2023-06-27 RX ORDER — CLONAZEPAM 0.5 MG/1
0.5 TABLET ORAL 2 TIMES DAILY
Qty: 60 TABLET | Refills: 5 | Status: SHIPPED | OUTPATIENT
Start: 2023-06-27 | End: 2023-12-24

## 2023-06-27 RX ORDER — PHENOBARBITAL 20 MG/5ML
120 ELIXIR ORAL 2 TIMES DAILY
Qty: 1800 ML | Refills: 3 | Status: SHIPPED | OUTPATIENT
Start: 2023-06-27 | End: 2023-10-25

## 2023-06-27 RX ORDER — LACOSAMIDE 100 MG/1
100 TABLET ORAL 2 TIMES DAILY
Qty: 180 TABLET | Refills: 2 | Status: SHIPPED | OUTPATIENT
Start: 2023-06-27 | End: 2023-06-30 | Stop reason: SDUPTHER

## 2023-06-27 RX ORDER — LACOSAMIDE 200 MG/1
200 TABLET ORAL 2 TIMES DAILY
Qty: 180 TABLET | Refills: 2 | Status: SHIPPED | OUTPATIENT
Start: 2023-06-27 | End: 2023-06-30 | Stop reason: SDUPTHER

## 2023-06-27 NOTE — TELEPHONE ENCOUNTER
Lacosamide (Vimpat) 100mg Tablet  Lacosamide (Vimpat) 200mg Tablet     No PA required, we do NOT accept RX insurance and patient lives in [place], will have liaison release to patient's pharmacy of record.

## 2023-06-30 ENCOUNTER — TELEPHONE (OUTPATIENT)
Dept: NEUROLOGY | Facility: MEDICAL CENTER | Age: 43
End: 2023-06-30

## 2023-06-30 DIAGNOSIS — G40.804 INTRACTABLE EPILEPSY DUE TO EXTERNAL CAUSES WITHOUT STATUS EPILEPTICUS (HCC): ICD-10-CM

## 2023-06-30 RX ORDER — LACOSAMIDE 200 MG/1
200 TABLET ORAL 2 TIMES DAILY
Qty: 180 TABLET | Refills: 2 | Status: SHIPPED | OUTPATIENT
Start: 2023-06-30 | End: 2023-07-03 | Stop reason: SDUPTHER

## 2023-06-30 RX ORDER — LACOSAMIDE 100 MG/1
100 TABLET ORAL 2 TIMES DAILY
Qty: 180 TABLET | Refills: 2 | Status: SHIPPED | OUTPATIENT
Start: 2023-06-30 | End: 2023-07-03 | Stop reason: SDUPTHER

## 2023-06-30 NOTE — TELEPHONE ENCOUNTER
rec'd request via msot. Ran t/c 180/90ds. We do not take this members ins. med does not need a pa

## 2023-07-03 DIAGNOSIS — G40.804 INTRACTABLE EPILEPSY DUE TO EXTERNAL CAUSES WITHOUT STATUS EPILEPTICUS (HCC): ICD-10-CM

## 2023-07-03 RX ORDER — LACOSAMIDE 200 MG/1
200 TABLET ORAL 2 TIMES DAILY
Qty: 180 TABLET | Refills: 2 | Status: SHIPPED | OUTPATIENT
Start: 2023-07-03 | End: 2024-03-29

## 2023-07-03 RX ORDER — LACOSAMIDE 100 MG/1
100 TABLET ORAL 2 TIMES DAILY
Qty: 180 TABLET | Refills: 2 | Status: SHIPPED | OUTPATIENT
Start: 2023-07-03 | End: 2024-03-29

## 2024-03-01 NOTE — CARE PLAN
Hospitalist Progress Note   Admit Date:  2024  1:33 PM   Name:  Tracy King   Age:  77 y.o.  Sex:  female  :  1946   MRN:  [id number]   Room:  [facility]/    Presenting/Chief Complaint: Abdominal Pain and Emesis     Reason(s) for Admission: Hyponatremia [E87.1]  Nausea vomiting and diarrhea [R11.2, R19.7]  Hypotension due to hypovolemia [I95.89, E86.1]     Hospital Course:     Tracy King is a 77 y.o. female with medical history of chronic pain who presented with nausea without vomiting associated with diarrhea of 5-6 day duration. Symptoms started out with nausea, which has improved but diarrhea has stayed persistent and worsened up to >4x/day, watery but nonbloody. She went to the urgent care on  and was given Lomotil without any improvement.  She denies any recent traveling or changes in her diet.  She cooked for her family the night prior to symptoms started and nobody has been having similar symptoms.  This has never happened before.  Reported chills and lower quadrant abdominal discomfort but denies fever, SOB, chest pain.     In ED, initially hypotensive 87/62.   Labs notable for Na 117 CO2 17 BUN 27 Cr 1.30 Ca 7.1 (normal albumin)  Pt was given 1L bolus in ED. Hospitalist admission requested.        Subjective & 24hr Events:     Patient felt to have overcorrection of dehydration from nausea vomiting and diarrhea loss.  Given DDAVP .  D5W discontinued.  Serum sodium continues to improve.  Unfortunately she appears to have relative hypotension.  Took ARB by records at home and blood pressure borderline now off blood pressure agents.  No other new complaints.  She is alert and oriented x 3.  Denies dyspnea chest pain any further vomiting or diarrhea.          Assessment & Plan:      Hyponatremia, hypovolemic  3/1-continues to hfostmy-louvfl-xt.     Hypotension  -Hold home ARB  3/1--- Home ARB on hold-felt to be hypovolemic but report of \"overcorrection\".  Continue to  Problem: Ventilation Defect:  Goal: Ability to achieve and maintain unassisted ventilation or tolerate decreased levels of ventilator support    Intervention: Support and monitor invasive and noninvasive mechanical ventilation  Adult Ventilation Update    Total Vent Days: 10  Vent:  x 12, 35% +8    Patient Lines/Drains/Airways Status    Active Airway     Name: Placement date: Placement time: Site: Days:    Airway Group Standard Cuffed Trach Tracheostomy 6.0 07/02/17   1403   Tracheostomy   240    Airway Group Tracheostomy 6.0       Tracheostomy                 In the last 24 hours, the patient tolerated SBT for 6.5 hours on settings of Spont 10/8.    Rapid Shallow Breathing Index (RR/VT): 36     Mobility Group  Activity Performed: Unable to mobilize     Events/Summary/Plan: Patient stable on vent. Decreased Rate to 12. Tolerated SBT very well.           (Room air)    Estimated body mass index is 22.51 kg/m² as calculated from the following:    Height as of this encounter: 1.626 m (5' 4.02\").    Weight as of this encounter: 59.5 kg (131 lb 3.2 oz).    Intake/Output Summary (Last 24 hours) at 3/1/2024 1706  Last data filed at 3/1/2024 0314  Gross per 24 hour   Intake --   Output 150 ml   Net -150 ml         Physical Exam:     general:    Alert no acute distress.  Cooperative.  Head:  Normocephalic, atraumatic  Eyes:  Sclerae appear normal.  Pupils equally round.  ENT:  Nares appear normal.  Mucosa not moist  CV:   RRR.  No gross gallop.  Lungs:   CTAB.  No wheezing, rhonchi, or rales.  Symmetric expansion.  Abdomen:   Soft, nontender, nondistended.  Extremities: No cyanosis or clubbing.  No unilateral lower extremity edema   Neuro:  CN II-XII grossly intact.  No focal motor weakness      I have personally reviewed labs and tests:  Recent Labs:  Recent Results (from the past 48 hour(s))   PLEASE READ & DOCUMENT PPD TEST IN 24 HRS    Collection Time: 02/28/24  6:07 PM   Result Value Ref Range    PPD, (POC) Negative     mm Induration 0 0 - 5 mm   Sodium    Collection Time: 02/28/24  9:06 PM   Result Value Ref Range    Sodium 130 (L) 136 - 146 mmol/L   PTH, Intact    Collection Time: 02/29/24  4:06 AM   Result Value Ref Range    Calcium 6.9 (L) 8.3 - 10.4 MG/DL    Pth Intact 200.9 (H) 18.5 - 88.0 pg/mL   Basic Metabolic Panel w/ Reflex to MG    Collection Time: 02/29/24  4:07 AM   Result Value Ref Range    Sodium 126 (L) 136 - 146 mmol/L    Potassium 3.2 (L) 3.5 - 5.1 mmol/L    Chloride 97 (L) 103 - 113 mmol/L    CO2 21 21 - 32 mmol/L    Anion Gap 8 2 - 11 mmol/L    Glucose 98 65 - 100 mg/dL    BUN 6 (L) 8 - 23 MG/DL    Creatinine 0.70 0.6 - 1.0 MG/DL    Est, Glom Filt Rate >60 >60 ml/min/1.73m2    Calcium 6.8 (L) 8.3 - 10.4 MG/DL   CBC with Auto Differential    Collection Time: 02/29/24  4:07 AM   Result Value Ref Range    WBC 6.5 4.3 - 11.1 K/uL    RBC 3.36 (L) 4.05 -

## 2024-03-13 ENCOUNTER — APPOINTMENT (OUTPATIENT)
Dept: RADIOLOGY | Facility: MEDICAL CENTER | Age: 44
DRG: 205 | End: 2024-03-13
Attending: EMERGENCY MEDICINE
Payer: COMMERCIAL

## 2024-03-13 ENCOUNTER — HOSPITAL ENCOUNTER (INPATIENT)
Facility: MEDICAL CENTER | Age: 44
LOS: 6 days | DRG: 205 | End: 2024-03-19
Attending: EMERGENCY MEDICINE | Admitting: STUDENT IN AN ORGANIZED HEALTH CARE EDUCATION/TRAINING PROGRAM
Payer: COMMERCIAL

## 2024-03-13 DIAGNOSIS — J39.8 TRACHEAL STENOSIS: ICD-10-CM

## 2024-03-13 PROBLEM — Z71.89 ACP (ADVANCE CARE PLANNING): Status: ACTIVE | Noted: 2024-03-13

## 2024-03-13 PROBLEM — G40.909 SEIZURE DISORDER (HCC): Status: ACTIVE | Noted: 2017-11-07

## 2024-03-13 LAB
ALBUMIN SERPL BCP-MCNC: 3.6 G/DL (ref 3.2–4.9)
ALBUMIN/GLOB SERPL: 0.8 G/DL
ALP SERPL-CCNC: 556 U/L (ref 30–99)
ALT SERPL-CCNC: 36 U/L (ref 2–50)
ANION GAP SERPL CALC-SCNC: 11 MMOL/L (ref 7–16)
AST SERPL-CCNC: 29 U/L (ref 12–45)
BASOPHILS # BLD AUTO: 0.2 % (ref 0–1.8)
BASOPHILS # BLD: 0.02 K/UL (ref 0–0.12)
BILIRUB SERPL-MCNC: 0.2 MG/DL (ref 0.1–1.5)
BUN SERPL-MCNC: 13 MG/DL (ref 8–22)
CALCIUM ALBUM COR SERPL-MCNC: 9.1 MG/DL (ref 8.5–10.5)
CALCIUM SERPL-MCNC: 8.8 MG/DL (ref 8.5–10.5)
CHLORIDE SERPL-SCNC: 101 MMOL/L (ref 96–112)
CO2 SERPL-SCNC: 22 MMOL/L (ref 20–33)
CREAT SERPL-MCNC: <0.17 MG/DL (ref 0.5–1.4)
EOSINOPHIL # BLD AUTO: 0.02 K/UL (ref 0–0.51)
EOSINOPHIL NFR BLD: 0.2 % (ref 0–6.9)
ERYTHROCYTE [DISTWIDTH] IN BLOOD BY AUTOMATED COUNT: 53.1 FL (ref 35.9–50)
GFR SERPLBLD CREATININE-BSD FMLA CKD-EPI: 179 ML/MIN/1.73 M 2
GLOBULIN SER CALC-MCNC: 4.8 G/DL (ref 1.9–3.5)
GLUCOSE SERPL-MCNC: 94 MG/DL (ref 65–99)
HCT VFR BLD AUTO: 42.4 % (ref 42–52)
HGB BLD-MCNC: 14.8 G/DL (ref 14–18)
IMM GRANULOCYTES # BLD AUTO: 0.05 K/UL (ref 0–0.11)
IMM GRANULOCYTES NFR BLD AUTO: 0.6 % (ref 0–0.9)
INR PPP: 1.08 (ref 0.87–1.13)
LYMPHOCYTES # BLD AUTO: 0.89 K/UL (ref 1–4.8)
LYMPHOCYTES NFR BLD: 10.5 % (ref 22–41)
MCH RBC QN AUTO: 34.7 PG (ref 27–33)
MCHC RBC AUTO-ENTMCNC: 34.9 G/DL (ref 32.3–36.5)
MCV RBC AUTO: 99.5 FL (ref 81.4–97.8)
MONOCYTES # BLD AUTO: 0.55 K/UL (ref 0–0.85)
MONOCYTES NFR BLD AUTO: 6.5 % (ref 0–13.4)
NEUTROPHILS # BLD AUTO: 6.95 K/UL (ref 1.82–7.42)
NEUTROPHILS NFR BLD: 82 % (ref 44–72)
NRBC # BLD AUTO: 0 K/UL
NRBC BLD-RTO: 0 /100 WBC (ref 0–0.2)
PLATELET # BLD AUTO: 330 K/UL (ref 164–446)
PMV BLD AUTO: 8 FL (ref 9–12.9)
POTASSIUM SERPL-SCNC: 4.1 MMOL/L (ref 3.6–5.5)
PROT SERPL-MCNC: 8.4 G/DL (ref 6–8.2)
PROTHROMBIN TIME: 14.2 SEC (ref 12–14.6)
RBC # BLD AUTO: 4.26 M/UL (ref 4.7–6.1)
SODIUM SERPL-SCNC: 134 MMOL/L (ref 135–145)
WBC # BLD AUTO: 8.5 K/UL (ref 4.8–10.8)

## 2024-03-13 PROCEDURE — A9270 NON-COVERED ITEM OR SERVICE: HCPCS | Performed by: EMERGENCY MEDICINE

## 2024-03-13 PROCEDURE — 71045 X-RAY EXAM CHEST 1 VIEW: CPT

## 2024-03-13 PROCEDURE — 770000 HCHG ROOM/CARE - INTERMEDIATE ICU *

## 2024-03-13 PROCEDURE — 700102 HCHG RX REV CODE 250 W/ 637 OVERRIDE(OP): Performed by: EMERGENCY MEDICINE

## 2024-03-13 PROCEDURE — 80053 COMPREHEN METABOLIC PANEL: CPT

## 2024-03-13 PROCEDURE — 85025 COMPLETE CBC W/AUTO DIFF WBC: CPT

## 2024-03-13 PROCEDURE — 99285 EMERGENCY DEPT VISIT HI MDM: CPT

## 2024-03-13 PROCEDURE — 700105 HCHG RX REV CODE 258

## 2024-03-13 PROCEDURE — 700101 HCHG RX REV CODE 250: Performed by: EMERGENCY MEDICINE

## 2024-03-13 PROCEDURE — 99223 1ST HOSP IP/OBS HIGH 75: CPT | Mod: 25 | Performed by: STUDENT IN AN ORGANIZED HEALTH CARE EDUCATION/TRAINING PROGRAM

## 2024-03-13 PROCEDURE — 36415 COLL VENOUS BLD VENIPUNCTURE: CPT

## 2024-03-13 PROCEDURE — 99497 ADVNCD CARE PLAN 30 MIN: CPT | Performed by: STUDENT IN AN ORGANIZED HEALTH CARE EDUCATION/TRAINING PROGRAM

## 2024-03-13 PROCEDURE — 94640 AIRWAY INHALATION TREATMENT: CPT

## 2024-03-13 PROCEDURE — 85610 PROTHROMBIN TIME: CPT

## 2024-03-13 RX ORDER — ENOXAPARIN SODIUM 100 MG/ML
40 INJECTION SUBCUTANEOUS DAILY
Status: DISCONTINUED | OUTPATIENT
Start: 2024-03-13 | End: 2024-03-19 | Stop reason: HOSPADM

## 2024-03-13 RX ORDER — PHENOBARBITAL 20 MG/5ML
120 ELIXIR ORAL ONCE
Status: DISCONTINUED | OUTPATIENT
Start: 2024-03-13 | End: 2024-03-13

## 2024-03-13 RX ORDER — LACOSAMIDE 100 MG/1
300 TABLET ORAL ONCE
Status: COMPLETED | OUTPATIENT
Start: 2024-03-13 | End: 2024-03-13

## 2024-03-13 RX ORDER — SODIUM CHLORIDE 9 MG/ML
INJECTION, SOLUTION INTRAVENOUS CONTINUOUS
Status: DISCONTINUED | OUTPATIENT
Start: 2024-03-13 | End: 2024-03-15

## 2024-03-13 RX ORDER — SODIUM CHLORIDE 9 MG/ML
1000 INJECTION, SOLUTION INTRAVENOUS ONCE
Status: COMPLETED | OUTPATIENT
Start: 2024-03-13 | End: 2024-03-13

## 2024-03-13 RX ORDER — LEVETIRACETAM 100 MG/ML
1500 SOLUTION ORAL ONCE
Status: COMPLETED | OUTPATIENT
Start: 2024-03-13 | End: 2024-03-13

## 2024-03-13 RX ORDER — LEVETIRACETAM 100 MG/ML
15 SOLUTION ORAL 2 TIMES DAILY
COMMUNITY

## 2024-03-13 RX ORDER — TOPIRAMATE 25 MG/1
25 TABLET ORAL
COMMUNITY

## 2024-03-13 RX ORDER — CLONAZEPAM 0.5 MG/1
0.5 TABLET ORAL ONCE
Status: COMPLETED | OUTPATIENT
Start: 2024-03-13 | End: 2024-03-13

## 2024-03-13 RX ORDER — TOPIRAMATE 50 MG/1
150 TABLET, FILM COATED ORAL 2 TIMES DAILY
COMMUNITY

## 2024-03-13 RX ORDER — PHENOBARBITAL 20 MG/5ML
30 LIQUID ORAL 2 TIMES DAILY
COMMUNITY

## 2024-03-13 RX ORDER — TOPIRAMATE 25 MG/1
25 TABLET ORAL ONCE
Status: DISCONTINUED | OUTPATIENT
Start: 2024-03-13 | End: 2024-03-13

## 2024-03-13 RX ORDER — PHENOBARBITAL 20 MG/5ML
120 ELIXIR ORAL ONCE
Status: COMPLETED | OUTPATIENT
Start: 2024-03-13 | End: 2024-03-13

## 2024-03-13 RX ORDER — CLONAZEPAM 0.5 MG/1
0.5 TABLET ORAL 2 TIMES DAILY
COMMUNITY

## 2024-03-13 RX ORDER — PHENOBARBITAL 32.4 MG/1
120 TABLET ORAL ONCE
Status: DISCONTINUED | OUTPATIENT
Start: 2024-03-13 | End: 2024-03-13

## 2024-03-13 RX ORDER — CHOLECALCIFEROL (VITAMIN D3) 1MM UNIT/G
LIQUID (GRAM) MISCELLANEOUS EVERY MORNING
COMMUNITY

## 2024-03-13 RX ADMIN — LEVETIRACETAM 1500 MG: 100 SOLUTION ORAL at 20:37

## 2024-03-13 RX ADMIN — LACOSAMIDE 300 MG: 100 TABLET, FILM COATED ORAL at 22:01

## 2024-03-13 RX ADMIN — PHENOBARBITAL 120 MG: 20 ELIXIR ORAL at 21:02

## 2024-03-13 RX ADMIN — CLONAZEPAM 0.5 MG: 0.5 TABLET ORAL at 22:00

## 2024-03-13 RX ADMIN — TOPIRAMATE 175 MG: 100 TABLET, FILM COATED ORAL at 21:31

## 2024-03-13 RX ADMIN — SODIUM CHLORIDE 1000 ML: 9 INJECTION, SOLUTION INTRAVENOUS at 17:43

## 2024-03-14 LAB
ANION GAP SERPL CALC-SCNC: 9 MMOL/L (ref 7–16)
BASOPHILS # BLD AUTO: 0.6 % (ref 0–1.8)
BASOPHILS # BLD: 0.04 K/UL (ref 0–0.12)
BUN SERPL-MCNC: 10 MG/DL (ref 8–22)
CALCIUM SERPL-MCNC: 8.2 MG/DL (ref 8.5–10.5)
CHLORIDE SERPL-SCNC: 103 MMOL/L (ref 96–112)
CO2 SERPL-SCNC: 21 MMOL/L (ref 20–33)
CREAT SERPL-MCNC: <0.17 MG/DL (ref 0.5–1.4)
EOSINOPHIL # BLD AUTO: 0.04 K/UL (ref 0–0.51)
EOSINOPHIL NFR BLD: 0.6 % (ref 0–6.9)
ERYTHROCYTE [DISTWIDTH] IN BLOOD BY AUTOMATED COUNT: 53.4 FL (ref 35.9–50)
GFR SERPLBLD CREATININE-BSD FMLA CKD-EPI: 179 ML/MIN/1.73 M 2
GLUCOSE SERPL-MCNC: 94 MG/DL (ref 65–99)
HCT VFR BLD AUTO: 36.6 % (ref 42–52)
HGB BLD-MCNC: 12.4 G/DL (ref 14–18)
IMM GRANULOCYTES # BLD AUTO: 0.02 K/UL (ref 0–0.11)
IMM GRANULOCYTES NFR BLD AUTO: 0.3 % (ref 0–0.9)
LYMPHOCYTES # BLD AUTO: 0.91 K/UL (ref 1–4.8)
LYMPHOCYTES NFR BLD: 14.5 % (ref 22–41)
MCH RBC QN AUTO: 34.3 PG (ref 27–33)
MCHC RBC AUTO-ENTMCNC: 33.9 G/DL (ref 32.3–36.5)
MCV RBC AUTO: 101.1 FL (ref 81.4–97.8)
MONOCYTES # BLD AUTO: 0.41 K/UL (ref 0–0.85)
MONOCYTES NFR BLD AUTO: 6.5 % (ref 0–13.4)
NEUTROPHILS # BLD AUTO: 4.86 K/UL (ref 1.82–7.42)
NEUTROPHILS NFR BLD: 77.5 % (ref 44–72)
NRBC # BLD AUTO: 0 K/UL
NRBC BLD-RTO: 0 /100 WBC (ref 0–0.2)
PLATELET # BLD AUTO: 282 K/UL (ref 164–446)
PMV BLD AUTO: 8.2 FL (ref 9–12.9)
POTASSIUM SERPL-SCNC: 3.8 MMOL/L (ref 3.6–5.5)
RBC # BLD AUTO: 3.62 M/UL (ref 4.7–6.1)
SODIUM SERPL-SCNC: 133 MMOL/L (ref 135–145)
WBC # BLD AUTO: 6.3 K/UL (ref 4.8–10.8)

## 2024-03-14 PROCEDURE — 700101 HCHG RX REV CODE 250: Performed by: STUDENT IN AN ORGANIZED HEALTH CARE EDUCATION/TRAINING PROGRAM

## 2024-03-14 PROCEDURE — 700105 HCHG RX REV CODE 258: Performed by: STUDENT IN AN ORGANIZED HEALTH CARE EDUCATION/TRAINING PROGRAM

## 2024-03-14 PROCEDURE — 97602 WOUND(S) CARE NON-SELECTIVE: CPT

## 2024-03-14 PROCEDURE — 700102 HCHG RX REV CODE 250 W/ 637 OVERRIDE(OP): Performed by: STUDENT IN AN ORGANIZED HEALTH CARE EDUCATION/TRAINING PROGRAM

## 2024-03-14 PROCEDURE — A9270 NON-COVERED ITEM OR SERVICE: HCPCS | Performed by: STUDENT IN AN ORGANIZED HEALTH CARE EDUCATION/TRAINING PROGRAM

## 2024-03-14 PROCEDURE — 770000 HCHG ROOM/CARE - INTERMEDIATE ICU *

## 2024-03-14 PROCEDURE — 99232 SBSQ HOSP IP/OBS MODERATE 35: CPT | Performed by: INTERNAL MEDICINE

## 2024-03-14 PROCEDURE — 94640 AIRWAY INHALATION TREATMENT: CPT

## 2024-03-14 PROCEDURE — 85025 COMPLETE CBC W/AUTO DIFF WBC: CPT

## 2024-03-14 PROCEDURE — 80048 BASIC METABOLIC PNL TOTAL CA: CPT

## 2024-03-14 RX ORDER — CLONAZEPAM 0.5 MG/1
0.5 TABLET ORAL 2 TIMES DAILY
Status: DISCONTINUED | OUTPATIENT
Start: 2024-03-14 | End: 2024-03-19 | Stop reason: HOSPADM

## 2024-03-14 RX ORDER — LEVETIRACETAM 500 MG/1
1500 TABLET ORAL 2 TIMES DAILY
Status: DISCONTINUED | OUTPATIENT
Start: 2024-03-14 | End: 2024-03-16

## 2024-03-14 RX ORDER — LACOSAMIDE 100 MG/1
300 TABLET ORAL 2 TIMES DAILY
Status: DISCONTINUED | OUTPATIENT
Start: 2024-03-14 | End: 2024-03-14

## 2024-03-14 RX ORDER — LACOSAMIDE 100 MG/1
300 TABLET ORAL 2 TIMES DAILY
Status: DISCONTINUED | OUTPATIENT
Start: 2024-03-14 | End: 2024-03-16

## 2024-03-14 RX ORDER — PHENOBARBITAL 20 MG/5ML
120 ELIXIR ORAL 2 TIMES DAILY
Status: DISCONTINUED | OUTPATIENT
Start: 2024-03-14 | End: 2024-03-19 | Stop reason: HOSPADM

## 2024-03-14 RX ADMIN — CLONAZEPAM 0.5 MG: 1 TABLET ORAL at 09:28

## 2024-03-14 RX ADMIN — LACOSAMIDE 300 MG: 100 TABLET, FILM COATED ORAL at 09:28

## 2024-03-14 RX ADMIN — TOPIRAMATE 150 MG: 100 TABLET, FILM COATED ORAL at 08:35

## 2024-03-14 RX ADMIN — PHENOBARBITAL 120 MG: 20 ELIXIR ORAL at 07:43

## 2024-03-14 RX ADMIN — LEVETIRACETAM 1500 MG: 500 TABLET, FILM COATED ORAL at 07:43

## 2024-03-14 RX ADMIN — PHENOBARBITAL 120 MG: 20 ELIXIR ORAL at 19:29

## 2024-03-14 RX ADMIN — SODIUM CHLORIDE: 9 INJECTION, SOLUTION INTRAVENOUS at 01:23

## 2024-03-14 RX ADMIN — TOPIRAMATE 175 MG: 100 TABLET, FILM COATED ORAL at 21:20

## 2024-03-14 RX ADMIN — LACOSAMIDE 300 MG: 100 TABLET, FILM COATED ORAL at 21:51

## 2024-03-14 RX ADMIN — CLONAZEPAM 0.5 MG: 1 TABLET ORAL at 21:51

## 2024-03-14 RX ADMIN — SODIUM CHLORIDE: 9 INJECTION, SOLUTION INTRAVENOUS at 11:36

## 2024-03-14 RX ADMIN — LEVETIRACETAM 1500 MG: 500 TABLET, FILM COATED ORAL at 19:28

## 2024-03-14 ASSESSMENT — COGNITIVE AND FUNCTIONAL STATUS - GENERAL
SUGGESTED CMS G CODE MODIFIER MOBILITY: CN
MOBILITY SCORE: 6
DAILY ACTIVITIY SCORE: 6
MOVING TO AND FROM BED TO CHAIR: TOTAL
CLIMB 3 TO 5 STEPS WITH RAILING: TOTAL
MOVING FROM LYING ON BACK TO SITTING ON SIDE OF FLAT BED: TOTAL
SUGGESTED CMS G CODE MODIFIER DAILY ACTIVITY: CN
HELP NEEDED FOR BATHING: TOTAL
TOILETING: TOTAL
STANDING UP FROM CHAIR USING ARMS: TOTAL
PERSONAL GROOMING: TOTAL
TURNING FROM BACK TO SIDE WHILE IN FLAT BAD: TOTAL
WALKING IN HOSPITAL ROOM: TOTAL
DRESSING REGULAR UPPER BODY CLOTHING: TOTAL
DRESSING REGULAR LOWER BODY CLOTHING: TOTAL
EATING MEALS: TOTAL

## 2024-03-14 ASSESSMENT — PAIN DESCRIPTION - PAIN TYPE
TYPE: ACUTE PAIN

## 2024-03-14 ASSESSMENT — FIBROSIS 4 INDEX: FIB4 SCORE: 0.74

## 2024-03-14 NOTE — H&P
Hospital Medicine History & Physical Note    Date of Service  3/13/2024    Primary Care Physician  COREY Pierre.    Consultants  ENT    Code Status  Full Code    Chief Complaint  Chief Complaint   Patient presents with    Shortness of Breath     Pt transported here by ambulance from [facility] for concerns of tracheostomy obstruction.  Per pt's mother, she has been having difficulty with suctioning pt at home due to suction getting stuck. Pt was seen by his general surgeon and inner cannular changed today but they continue to have difficulty with suctioning the trach. Pt satting in low 90s on room air at this time.  Pt transferred here for ENT consult.        History of Presenting Illness  Ruben Edwards is a 43 y.o. male who presented 3/13/2024 with concerns for tracheostomy obstruction.  Patient had a tracheostomy tube placed over 20 years ago due to head injury.  For the last 2 days, patient has been noted to have several episodes of desaturation, and for shortness of breath, and difficulties with suctioning at home.  Tracheostomy tube was changed by his general surgeon today, however with no improvement of symptoms, probably him to go to outside facility ER for evaluation.    In ER, patient saturating 90% with tracheostomy tube.  ENT was consulted, patient had his tracheostomy tube exchanged with satisfaction.  Patient will be monitored in IMCU    I discussed the plan of care with patient.    Review of Systems  ROS  Unable to obtain due to acuity of condition  Past Medical History   has a past medical history of Seizure (HCC) and Traumatic brain injury (HCC) (05/15/1998).    Surgical History   has a past surgical history that includes laryngoscopy (7/2/2017); bronchoscopy (7/2/2017); tracheostomy (7/2/2017); and esophagoscopy (7/2/2017).     Family History  family history is not on file.   Family history reviewed with patient. There is no family history that is pertinent to the chief  complaint.     Social History   reports that he has never smoked. He has never used smokeless tobacco. He reports current drug use. Drug: Oral. He reports that he does not drink alcohol.    Allergies  Allergies   Allergen Reactions    Sulfa Drugs Unspecified and Rash     Per caretaker, mother is allergic to sulfa so believes her son could be as well    Doxycycline Hyclate      Other reaction(s): Seizure (finding)    Chlorhexidine Rash     Mom refuses CHG bath       Medications  Prior to Admission Medications   Prescriptions Last Dose Informant Patient Reported? Taking?   Cholecalciferol (VITAMIN D3) Liquid 3/13/2024 at am  Yes Yes   Sig: by Per G Tube route every morning. 1 dropperful   PHENobarbital (LUMINAL) 20 MG/5ML Solution 3/13/2024 at 0730  Yes Yes   Si mL by Enteral Tube route 2 times a day.   POTASSIUM CITRATE PO 3/13/2024 at am  Yes Yes   Si mg by Gastric Tube route every morning.   Pain Pump (PATIENT SUPPLIED) XX RENE unk at k Other Facility Yes No   Sig: continuous. Patient's Pain Pump (placed and maintained as an outpatient)    Medications/concentrations: BACLOFEN 3000 mcg/ml  Route: Intrathecal  Last changed: 10/18/21  Refill pump before date: 3/29/22  Continuous infusion rates (Drug/Rate): BACLOFEN 699.4 mcg /day (29.1 mcg/hr)      MD office: [facility]  Phone number:[phone]   clonazePAM (KLONOPIN) 0.5 MG Tab 3/13/2024 at 0915  Yes Yes   Si.5 mg by Enteral Tube route 2 times a day.   lacosamide (VIMPAT) 100 MG Tab tablet 3/13/2024 at 0915  No Yes   Si Tablet by Enteral Tube route 2 times a day for 270 days. Take in addition to 200 mg tablets for a total of 300 mg twice daily.   lacosamide (VIMPAT) 200 MG Tab tablet 3/13/2024 at 0915  No Yes   Si Tablet by Enteral Tube route 2 times a day for 270 days. Take in addition to 100 mg tablets for a total of 300 mg twice daily.   levETIRAcetam (KEPPRA) 100 MG/ML Solution 3/13/2024 at 0730  Yes Yes   Sig: 15 mL by  "Enteral Tube route 2 times a day.   topiramate (TOPAMAX) 25 MG Tab 3/12/2024 at hs  Yes Yes   Si mg by Enteral Tube route at bedtime.   topiramate (TOPAMAX) 50 MG tablet 3/13/2024 at 0815  Yes Yes   Sig: Take 150 mg by mouth every morning.      Facility-Administered Medications: None       Physical Exam  Temp:  [36.1 °C (96.9 °F)] 36.1 °C (96.9 °F)  Pulse:  [70-81] 81  Resp:  [15-24] 23  BP: (116-129)/(77-88) 129/88  SpO2:  [89 %-100 %] 93 %  Blood Pressure: 129/88   Temperature: 36.1 °C (96.9 °F)   Pulse: 81   Respiration: (!) 23   Pulse Oximetry: 93 %       Physical Exam  Constitutional:       Comments: Nonverbal, does not respond to name calling   HENT:      Head: Normocephalic.      Nose: Nose normal.      Mouth/Throat:      Mouth: Mucous membranes are moist.   Eyes:      Pupils: Pupils are equal, round, and reactive to light.   Neck:      Comments: Tracheostomy in place, saturating 93%  Cardiovascular:      Rate and Rhythm: Normal rate and regular rhythm.   Abdominal:      General: Abdomen is flat.      Palpations: Abdomen is soft.      Comments: PEG tube in place   Musculoskeletal:      Cervical back: Neck supple.      Comments: Extremities in contracted position   Neurological:      Mental Status: Mental status is at baseline.       Laboratory:  Recent Labs     24  1738   WBC 8.5   RBC 4.26*   HEMOGLOBIN 14.8   HEMATOCRIT 42.4   MCV 99.5*   MCH 34.7*   MCHC 34.9   RDW 53.1*   PLATELETCT 330   MPV 8.0*     Recent Labs     24  1738   SODIUM 134*   POTASSIUM 4.1   CHLORIDE 101   CO2 22   GLUCOSE 94   BUN 13   CREATININE <0.17*   CALCIUM 8.8     Recent Labs     24  1738   ALTSGPT 36   ASTSGOT 29   ALKPHOSPHAT 556*   TBILIRUBIN 0.2   GLUCOSE 94     Recent Labs     24  1738   INR 1.08     No results for input(s): \"NTPROBNP\" in the last 72 hours.      No results for input(s): \"TROPONINT\" in the last 72 hours.    Imaging:  DX-CHEST-LIMITED (1 VIEW)   Final Result         Mild diffuse " pulmonary opacities may be due to edema or infiltrates. More pronounced opacity and volume loss in the right lung base.      DX-CHEST-PORTABLE (1 VIEW)   Final Result         Tracheotomy tube is seen. Small right pleural effusion.          no X-Ray or EKG requiring interpretation    Assessment/Plan:  Justification for Admission Status  I anticipate this patient will require at least two midnights for appropriate medical management, necessitating inpatient admission because pt required tracheostomy tube exchange     Patient will need a ICU (Adult and Pediatrics) bed on EMERGENCY service .  The need is secondary to frequent respiratory checks.    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  Found to have erosion and granulation  Monitor in IMCU overnight  Keep O2 over 90%    ACP (advance care planning)  Assessment & Plan  16 minutes spent discussing goals of care with patient's mother at bedside.  She was explained that patient has improved after tracheostomy tube was replaced; she was also notified that patient would be monitored in the IMCU for least 24 hours for frequent respiratory checks.  She was asked about CODE STATUS, to which she states that she would like patient to be full code and to have everything done.    Seizure disorder (HCC)  Assessment & Plan  Continue home medication.   Per mother:  Keppra 1500 mg at 7:30 AM and 7:30 PM  Phenobarbital 20 mg/ 5 mL at 7:30 AM and 7:30 PM  Topiramate 150 mg q am at 8:15 AM, and 175 mg at 8:15 PM  Vimpat 300 mg q am and q pm 9:15 AM and 9:15 PM  Klonopin 0.5 mg at 9:15 am and 9:15 PM        VTE prophylaxis: enoxaparin ppx

## 2024-03-14 NOTE — DISCHARGE PLANNING
"Case Management Discharge Planning    Admission Date: 3/13/2024  GMLOS: 2.7  ALOS: 1    6-Clicks ADL Score:    6-Clicks Mobility Score:      Anticipated Discharge Dispo:      DME Needed: Yes        Action(s) Taken:     Pt's physical address is [address] [place] [address]. Pt's mother Susana also pt's caregiver. Susana reported that she already uses [facility] ambulance services for transportation ([phone]). MARE HANEY spoke to mother at bedside who is also pt's caregiver for 20 years. Pt is on service with [employer] for trach supplies/but self pays for oxygen. Mom states has humidifier and condenser at home. States gets tube feed supplies through [employer] and makes cooks pt's feeds.     Pt received new trach: Bivona 6.0 Trach, ID: 7.0 mm OD: 10.6 mm, Length: 120mm, Ref: [id number]. Pt used to have a Shiley trach prior to tube exchange. Per mom, pt does not have the proper set-up to connect with current T-piece set-up or can't use a trach mask because the trach is \"too long\" and pt is \"not receiving adequate humidification.\" RT at bedside to give suggestions for possible set-up with system, but mom has concerns in regards to it.     RN CM contacted [employer] who stated they may have something similar to T-piece but they only have supplies for Shiley trachs.     Bedside RN/MD to contact ENT for suggestions and plan:   1415-MD communication with RN CM that ENT will eval pt and will speak with LYNDON and MD tomorrow.     Escalations Completed: None    Medically Clear: No    Next Steps:     Barriers to Discharge: Medical clearance        "

## 2024-03-14 NOTE — ED NOTES
Bedside report received from off going RN/tech: MARE Beyer, assumed care of patient.  POC discussed with patient. Call light within reach, all needs addressed at this time. ENT changed out trach, cxr at bedside. Mother at bedside.       Fall risk interventions in place: Patient's personal possessions are with in their safe reach and Keep floor surfaces clean and dry (all applicable per Bonnie Fall risk assessment)   Continuous monitoring: Cardiac Leads, Pulse Ox, or Blood Pressure  IVF/IV medications: Not Applicable   Oxygen: How many liters 3L, Traced the line to wall oxygen, and No oxygen tank in room  Bedside sitter: Not Applicable   Isolation: Not Applicable

## 2024-03-14 NOTE — ED NOTES
RT at bedside.   Pt's mother asking if able to give home medication for seizures. ERP notified, pharmacist notified. Pt's mother updated that ERP and pharmacist are aware and will place orders for medication so we are aware of medication use. Pt's mother verbalized understanding.

## 2024-03-14 NOTE — CARE PLAN
The patient is Watcher - Medium risk of patient condition declining or worsening    Shift Goals  Clinical Goals: improved oxygenation  Patient Goals: TRAVIS  Family Goals: go home, call accalcne    Progress made toward(s) clinical / shift goals:    Problem: Knowledge Deficit - Standard  Goal: Patient and family/care givers will demonstrate understanding of plan of care, disease process/condition, diagnostic tests and medications  Outcome: Progressing     Problem: Skin Integrity  Goal: Skin integrity is maintained or improved  Outcome: Progressing     Problem: Fall Risk  Goal: Patient will remain free from falls  Outcome: Progressing     Problem: Hemodynamics  Goal: Patient's hemodynamics, fluid balance and neurologic status will be stable or improve  Outcome: Progressing     Problem: Respiratory  Goal: Patient will achieve/maintain optimum respiratory ventilation and gas exchange  Outcome: Progressing     Problem: Urinary Elimination  Goal: Establish and maintain regular urinary output  Outcome: Progressing       Patient is not progressing towards the following goals:

## 2024-03-14 NOTE — WOUND TEAM
[facility]  Inpatient Services  Initial Wound and Skin Care Evaluation    Admission Date: 3/13/2024     Last order of IP CONSULT TO WOUND CARE was found on 3/13/2024 from Hospital Encounter on 3/13/2024     HPI, PMH, SH: Reviewed    Past Surgical History:   Procedure Laterality Date    LARYNGOSCOPY  7/2/2017    Procedure: LARYNGOSCOPY;  Surgeon: Catherine Osorio M.D.;  Location: SURGERY [facility];  Service:     BRONCHOSCOPY  7/2/2017    Procedure: BRONCHOSCOPY;  Surgeon: Catherine Osorio M.D.;  Location: SURGERY [facility];  Service:     TRACHEOSTOMY  7/2/2017    Procedure: TRACHEOSTOMY;  Surgeon: Catherine Osorio M.D.;  Location: SURGERY [facility];  Service:     ESOPHAGOSCOPY  7/2/2017    Procedure: ESOPHAGOSCOPY;  Surgeon: Catherine Osorio M.D.;  Location: SURGERY [facility];  Service:      Social History     Tobacco Use    Smoking status: Never    Smokeless tobacco: Never   Substance Use Topics    Alcohol use: No     Chief Complaint   Patient presents with    Shortness of Breath     Pt transported here by ambulance from [facility] for concerns of tracheostomy obstruction.  Per pt's mother, she has been having difficulty with suctioning pt at home due to suction getting stuck. Pt was seen by his general surgeon and inner cannular changed today but they continue to have difficulty with suctioning the trach. Pt satting in low 90s on room air at this time.  Pt transferred here for ENT consult.      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 Contact wound team if area fails to progress, deteriorates or with any questions/concerns if something comes up before next scheduled follow up (See below as to whether wound is following and frequency of wound  follow up)   Weekly - Sacrum, L hip, L IT, & RLE    WOUND HISTORY:   43 y.o. male admitted for SOB. PMH TBI, patient nonverbal and known stage 4 pressure injuries. Patient's mother is primary caregiver and she cares for wounds with hydrocolloid and Aquacel in addition to offloading devices and wedges.  Patient with very sensitive skin- unable to use offloading adhesive foams per mother.       WOUND ASSESSMENT/LDA      Wound 03/14/24 Pressure Injury Leg Lateral;Lower Right POA Stage 4 (Active)   Wound Image   03/14/24 1500   Site Assessment Slough;Red 03/14/24 1500   Periwound Assessment Scar tissue 03/14/24 1500   Margins Attached edges 03/14/24 1500   Closure Secondary intention 03/14/24 1500   Drainage Amount Small 03/14/24 1500   Drainage Description Serosanguineous 03/14/24 1500   Treatments Cleansed;Site care;Offloading 03/14/24 1500   Wound Cleansing Approved Wound Cleanser 03/14/24 1500   Periwound Protectant No-sting Skin Prep 03/14/24 1500   Dressing Status Clean;Dry;Intact 03/14/24 1500   Dressing Changed Changed 03/14/24 1500   Dressing Cleansing/Solutions Not Applicable 03/14/24 1500   Dressing Options Honey Colloid;Hydrocolloid Thin 03/14/24 1500   Dressing Change/Treatment Frequency Every 48 hrs, and As Needed 03/14/24 1500   NEXT Dressing Change/Treatment Date 03/16/24 03/14/24 1500   NEXT Weekly Photo (Inpatient Only) 03/21/24 03/14/24 1500   Wound Team Following Weekly 03/14/24 1500   WOUND NURSE ONLY - Pressure Injury Stage 4 03/14/24 1500   Wound Length (cm) 2.5 cm 03/14/24 1500   Wound Width (cm) 1.2 cm 03/14/24 1500   Wound Surface Area (cm^2) 3 cm^2 03/14/24 1500   Shape Irregular 03/14/24 1500   Wound Odor None 03/14/24 1500   WOUND NURSE ONLY - Time Spent with Patient (mins) 60 03/14/24 1500       Wound 03/14/24 Pressure Injury Coccyx;Sacrum POA Stage 4 (Active)   Wound Image    03/14/24 1500   Site Assessment Red;Yellow;Slough 03/14/24 1500   Periwound Assessment Maceration;Scar  tissue;Fragile 03/14/24 1500   Margins Defined edges;Unattached edges 03/14/24 1500   Closure Secondary intention 03/14/24 1500   Drainage Amount Small 03/14/24 1500   Drainage Description Serosanguineous 03/14/24 1500   Treatments Cleansed;Nonselective debridement;Site care 03/14/24 1500   Wound Cleansing Approved Wound Cleanser 03/14/24 1500   Periwound Protectant No-sting Skin Prep 03/14/24 1500   Dressing Status Clean;Dry;Intact 03/14/24 1500   Dressing Changed Changed 03/14/24 1500   Dressing Cleansing/Solutions Not Applicable 03/14/24 1500   Dressing Options Hydrofiber Silver;Hydrocolloid Thin 03/14/24 1500   Dressing Change/Treatment Frequency Every 48 hrs, and As Needed 03/14/24 1500   NEXT Dressing Change/Treatment Date 03/16/24 03/14/24 1500   NEXT Weekly Photo (Inpatient Only) 03/21/24 03/14/24 1500   Wound Team Following Weekly 03/14/24 1500   WOUND NURSE ONLY - Pressure Injury Stage 4 03/14/24 1500   Wound Length (cm) 3.5 cm 03/14/24 1500   Wound Width (cm) 1.2 cm 03/14/24 1500   Wound Depth (cm) 1.3 cm 03/14/24 1500   Wound Surface Area (cm^2) 4.2 cm^2 03/14/24 1500   Wound Volume (cm^3) 5.46 cm^3 03/14/24 1500   Shape Oval 03/14/24 1500   Wound Odor Mild 03/14/24 1500       Wound 03/14/24 Pressure Injury Hip Left POA Stage 4 (Active)   Wound Image    03/14/24 1500   Site Assessment Red 03/14/24 1500   Periwound Assessment Scar tissue;Fragile 03/14/24 1500   Margins Attached edges 03/14/24 1500   Closure Secondary intention 03/14/24 1500   Drainage Amount Small 03/14/24 1500   Drainage Description Serosanguineous 03/14/24 1500   Treatments Cleansed;Nonselective debridement;Site care;Offloading 03/14/24 1500   Wound Cleansing Approved Wound Cleanser 03/14/24 1500   Periwound Protectant No-sting Skin Prep 03/14/24 1500   Dressing Status Clean;Dry;Intact 03/14/24 1500   Dressing Changed Changed 03/14/24 1500   Dressing Cleansing/Solutions Not Applicable 03/14/24 1500   Dressing Options Hydrocolloid Thin  03/14/24 1500   Dressing Change/Treatment Frequency Every 48 hrs, and As Needed 03/14/24 1500   NEXT Dressing Change/Treatment Date 03/16/24 03/14/24 1500   NEXT Weekly Photo (Inpatient Only) 03/21/24 03/14/24 1500   Wound Team Following Weekly 03/14/24 1500   WOUND NURSE ONLY - Pressure Injury Stage 4 03/14/24 1500   Wound Length (cm) 3.5 cm 03/14/24 1500   Wound Width (cm) 1.5 cm 03/14/24 1500   Wound Surface Area (cm^2) 5.25 cm^2 03/14/24 1500   Shape Irregular 03/14/24 1500   Wound Odor None 03/14/24 1500       Wound 03/14/24 Pressure Injury Ischium Left POA Stage 4 (Active)   Wound Image    03/14/24 1500   Site Assessment Red 03/14/24 1500   Periwound Assessment Scar tissue;Satellite lesions;Fragile 03/14/24 1500   Margins Attached edges 03/14/24 1500   Closure Secondary intention 03/14/24 1500   Drainage Amount Small 03/14/24 1500   Drainage Description Serosanguineous 03/14/24 1500   Treatments Cleansed;Nonselective debridement;Site care;Offloading 03/14/24 1500   Wound Cleansing Approved Wound Cleanser 03/14/24 1500   Periwound Protectant No-sting Skin Prep 03/14/24 1500   Dressing Status Clean;Dry;Intact 03/14/24 1500   Dressing Changed Changed 03/14/24 1500   Dressing Cleansing/Solutions Not Applicable 03/14/24 1500   Dressing Options Hydrocolloid Thin 03/14/24 1500   Dressing Change/Treatment Frequency Every 48 hrs, and As Needed 03/14/24 1500   NEXT Dressing Change/Treatment Date 03/16/24 03/14/24 1500   NEXT Weekly Photo (Inpatient Only) 03/21/24 03/14/24 1500   Wound Team Following Weekly 03/14/24 1500   WOUND NURSE ONLY - Pressure Injury Stage 4 03/14/24 1500   Wound Length (cm) 2 cm 03/14/24 1500   Wound Width (cm) 4.5 cm 03/14/24 1500   Wound Surface Area (cm^2) 9 cm^2 03/14/24 1500   Shape Irregular 03/14/24 1500   Wound Odor Mild 03/14/24 1500                 Vascular:    KATHRYN:   No results found.    Lab Values:    Lab Results   Component Value Date/Time    WBC 6.3 03/14/2024 08:06 AM    RBC 3.62  (L) 03/14/2024 08:06 AM    HEMOGLOBIN 12.4 (L) 03/14/2024 08:06 AM    HEMATOCRIT 36.6 (L) 03/14/2024 08:06 AM    CREACTPROT 22.27 (H) 02/21/2022 11:37 AM    SEDRATEWES 17 (H) 07/19/2019 02:04 PM         Culture Results show:  No results found for this or any previous visit (from the past 720 hour(s)).    Pain Level/Medicated:  None, Tolerated without pain medication       INTERVENTIONS BY WOUND TEAM:  Chart and images reviewed. Discussed with bedside RN. All areas of concern (based on picture review, LDA review and discussion with bedside RN) have been thoroughly assessed. Documentation of areas based on significant findings. This RN in to assess patient. Performed standard wound care which includes appropriate positioning, dressing removal and non-selective debridement. Pictures and measurements obtained weekly if/when required.    Wound:  Sacrum  Preparation for Dressing removal: Removed without difficulty  Cleansed/Non-selectively Debrided with:  Wound cleanser and Gauze  Arabella wound: Cleansed with Wound cleanser and Gauze, Prepped with No Sting  Primary Dressing:  Strip of Aquacel Ag  Secondary (Outer) Dressing: Hydrocolloid     Wound:  L Hip & L IT  Preparation for Dressing removal: Removed without difficulty  Cleansed/Non-selectively Debrided with:  Wound cleanser and Gauze  Arabella wound: Cleansed with Wound cleanser and Gauze, Prepped with No Sting  Primary Dressing:  Hydrocolloid  Secondary (Outer) Dressing: NA    Wound:  RLE  Preparation for Dressing removal: Removed without difficulty  Cleansed/Non-selectively Debrided with:  Wound cleanser and Gauze  Arabella wound: Cleansed with Wound cleanser and Gauze, Prepped with No Sting  Primary Dressing:  Honey colloid  Secondary (Outer) Dressing: Hydrocolloid    Advanced Wound Care Discharge Planning  Number of Clinicians necessary to complete wound care: 1-2  Is patient requiring IV pain medications for dressing changes:  No   Length of time for dressing change 20 min.  (This does not include chart review, pre-medication time, set up, clean up or time spent charting.)    Interdisciplinary consultation: Patient, Bedside RN, Pascual FREEDMAN (Wound RN).     EVALUATION / RATIONALE FOR TREATMENT:     Date:  03/14/24  Wound Status:  Initial evaluation    Sacrum: with stage 4 pressure injury. Patient's mother stated it was recently debrided. Some areas of slough persisting. Continuing with patient's home dressing regimen of Aquacel cut into a strip and secured with hydrocolloid. Provided outpatient dressing recommendations as well.  L IT & L Hip: with healing stage 4 pressure injuries. Areas mostly superficial now, but very fragile and prone to re-opening. Continue with patient's home dressing regimen of hydrocolloid.  RLE: with stage 4 pressure injury, largely covered with slough. Continuing with hydrocolloid for autolytic debridement of slough.  R Dorsal Foot: with small stage one pressure injury, continue offloading measures.    Per mother, patient's skin is very sensitive to adhesives. He is unable to use offloading adhesive foams and other types of adhesive bandages.         Goals: Steady decrease in wound area and depth weekly.    NURSING PLAN OF CARE ORDERS:  Dressing changes: See Dressing Care orders    NUTRITION RECOMMENDATIONS   Wound Team Recommendations:  N/A    DIET ORDERS (From admission to next 24h)       Start     Ordered    03/14/24 1428  Diet: Diet Tube Feed; Formula: Jevity; Goal Rate (mL/Hour): 25; Jevity: 1.2 RTH  ALL MEALS        Comments: Start at 25mL/hr. Do not advance until dietitian consult completed.   Question Answer Comment   Diet Diet Tube Feed    Formula: Jevity    Goal Rate (mL/Hour) 25    Jevity: 1.2 RTH        03/14/24 1427                    PREVENTATIVE INTERVENTIONS:    Q shift Neo - performed per nursing policy  Q shift pressure point assessments - performed per nursing policy    Surface/Positioning  ICU Low Airloss - Currently in Place  Reposition q 2  hours - Currently in Place    Offloading/Redistribution  N/A    Respiratory  Trach with Optifoam split foam and T piece - Currently in Place    Anticipated discharge plans:  Self/Family Care        Vac Discharge Needs:  Vac Discharge plan is purely a recommendation from wound team and not a requirement for discharge unless otherwise stated by physician.  Not Applicable Pt not on a wound vac

## 2024-03-14 NOTE — ED PROVIDER NOTES
CHIEF COMPLAINT  Chief Complaint   Patient presents with    Shortness of Breath     Pt transported here by ambulance from [facility] for concerns of tracheostomy obstruction.  Per pt's mother, she has been having difficulty with suctioning pt at home due to suction getting stuck. Pt was seen by his general surgeon and inner cannular changed today but they continue to have difficulty with suctioning the trach. Pt satting in low 90s on room air at this time.  Pt transferred here for ENT consult.        LIMITATION TO HISTORY   Limitation secondary to traumatic brain injury    LAKE Edwards is a 43 y.o. male [place]  Suspected tracheal stenosis/blockage  History is provided by the mom.  She apparently has had his tracheostomy for the last 6 months.  Francis the mother takes meticulous cleaning.  However over the last 2 months months she has noticed that it has been difficult to suction him.  Where it sections been difficult to pass but she been forcing and passing it with with success into the last day or 2 where she is unable to pass more than about 5 cm.  This resulted in her bring it to the surgeon locally.  Surgeon changed her tracheostomy subsequently could not pass the suction more than 5 cm.  At this point while they are debriding tissues in his buttock area which the patient desatted to 39% this is in the office the mother put on a pulse ox.    He resolved upon positioning his chest was brought to the ER where remained stable and then transferred here for further evaluation.  During the course patient an IV was started hyperventilating and started desaturation.  We moved his chest neck to the right lower corner of his chin and he was able to improve his oxygenation    OUTSIDE HISTORIAN(S):  There is the primary historian.    EXTERNAL RECORDS REVIEWED  None    REVIEW OF SYSTEMS  Noted    PAST MEDICAL HISTORY  Past Medical History:   Diagnosis  Date    Seizure (HCC)     Traumatic brain injury (HCC) 05/15/1998       FAMILY HISTORY  No family history on file.    SOCIAL HISTORY  Social History     Tobacco Use    Smoking status: Never    Smokeless tobacco: Never   Vaping Use    Vaping Use: Never used   Substance Use Topics    Alcohol use: No    Drug use: Yes     Types: Oral     Comment: Mother tried CBD treatment for siezures in past     Social History     Substance and Sexual Activity   Drug Use Yes    Types: Oral    Comment: Mother tried CBD treatment for siezures in past       SURGICAL HISTORY  Past Surgical History:   Procedure Laterality Date    LARYNGOSCOPY  7/2/2017    Procedure: LARYNGOSCOPY;  Surgeon: Catherine Osorio M.D.;  Location: SURGERY [facility];  Service:     BRONCHOSCOPY  7/2/2017    Procedure: BRONCHOSCOPY;  Surgeon: Catherine Osorio M.D.;  Location: SURGERY [facility];  Service:     TRACHEOSTOMY  7/2/2017    Procedure: TRACHEOSTOMY;  Surgeon: Catherine Osorio M.D.;  Location: SURGERY [facility];  Service:     ESOPHAGOSCOPY  7/2/2017    Procedure: ESOPHAGOSCOPY;  Surgeon: Catherine Osorio M.D.;  Location: SURGERY [facility];  Service:        CURRENT MEDICATIONS  No current facility-administered medications for this encounter.    Current Outpatient Medications:     lacosamide (VIMPAT) 100 MG Tab tablet, 1 Tablet by Enteral Tube route 2 times a day for 270 days. Take in addition to 200 mg tablets for a total of 300 mg twice daily., Disp: 180 Tablet, Rfl: 2    lacosamide (VIMPAT) 200 MG Tab tablet, 1 Tablet by Enteral Tube route 2 times a day for 270 days. Take in addition to 100 mg tablets for a total of 300 mg twice daily., Disp: 180 Tablet, Rfl: 2    BACLOFEN IT, 3,000 mcg by Intrathecal route as needed., Disp: , Rfl:     Potassium Citrate Powder, 275 mg by Other route as needed., Disp: , Rfl:     Nutritional Supplements ([facility] STANDARD 1.0) Liquid, 3 Bottles by Gastric Tube route 3 times a day., Disp: ,  "Rfl:     Pain Pump (PATIENT SUPPLIED) XX RENE, continuous. Patient's Pain Pump (placed and maintained as an outpatient)  Medications/concentrations: BACLOFEN 3000 mcg/ml Route: Intrathecal Last changed: 10/18/21 Refill pump before date: 3/29/22 Continuous infusion rates (Drug/Rate): BACLOFEN 699.4 mcg /day (29.1 mcg/hr)   MD office:[facility] Phone number:[phone], Disp: , Rfl:     ALLERGIES  Allergies   Allergen Reactions    Sulfa Drugs Unspecified and Rash     Per caretaker, mother is allergic to sulfa so believes her son could be as well    Doxycycline Hyclate      Other reaction(s): Seizure (finding)    Chlorhexidine Rash     Mom refuses CHG bath       PHYSICAL EXAM  VITAL SIGNS: /77   Pulse 78   Temp 36.1 °C (96.9 °F) (Temporal)   Resp 20   Ht 1.778 m (5' 10\")   Wt 56.7 kg (125 lb)   SpO2 91%   BMI 17.94 kg/m²   Reviewed and noted  Constitutional: Well-developed.  Well cared for  HENT: Normocephalic, atraumatic, get of raccoon eyes  Eyes: PERRLA, conjunctiva pink, no scleral icterus.   Cardiovascular: Regular rate and rhythm. No murmurs, rubs or gallops.  No dependent edema or calf tenderness  Respiratory: Breath sounds throughout.  Tracheostomy in place.  No discharge noted at site  Abdominal:  Abdomen soft, non-tender, non distended. No rebound, or guarding.    Musculoskeletal: Patient and contractures.  Neurologic: Patient increased respiratory rate with placement.          MEDICAL DECISION MAKING:  PROBLEMS EVALUATED THIS VISIT:  Tracheostomy issue.  Duration has been likely for 2 months gradual with sudden onset the last few days and inability past suction more than 5 cm.  Episode of desaturation Patient at this point had a separate episode in the ER.  Unable to suction more than 5 cm.         PLAN:  Urgent ENT consult  Consider racemic epinephrine steroids if patient does not improve.  ICU consideration/consultation    RISK:  Patient is at high risk of deterioration will be " admitted to the hospital.         RESULTS    LABS Ordered and Reviewed by Me:  Results for orders placed or performed during the hospital encounter of 03/13/24   CBC WITH DIFFERENTIAL   Result Value Ref Range    WBC 8.5 4.8 - 10.8 K/uL    RBC 4.26 (L) 4.70 - 6.10 M/uL    Hemoglobin 14.8 14.0 - 18.0 g/dL    Hematocrit 42.4 42.0 - 52.0 %    MCV 99.5 (H) 81.4 - 97.8 fL    MCH 34.7 (H) 27.0 - 33.0 pg    MCHC 34.9 32.3 - 36.5 g/dL    RDW 53.1 (H) 35.9 - 50.0 fL    Platelet Count 330 164 - 446 K/uL    MPV 8.0 (L) 9.0 - 12.9 fL    Neutrophils-Polys 82.00 (H) 44.00 - 72.00 %    Lymphocytes 10.50 (L) 22.00 - 41.00 %    Monocytes 6.50 0.00 - 13.40 %    Eosinophils 0.20 0.00 - 6.90 %    Basophils 0.20 0.00 - 1.80 %    Immature Granulocytes 0.60 0.00 - 0.90 %    Nucleated RBC 0.00 0.00 - 0.20 /100 WBC    Neutrophils (Absolute) 6.95 1.82 - 7.42 K/uL    Lymphs (Absolute) 0.89 (L) 1.00 - 4.80 K/uL    Monos (Absolute) 0.55 0.00 - 0.85 K/uL    Eos (Absolute) 0.02 0.00 - 0.51 K/uL    Baso (Absolute) 0.02 0.00 - 0.12 K/uL    Immature Granulocytes (abs) 0.05 0.00 - 0.11 K/uL    NRBC (Absolute) 0.00 K/uL   CMP   Result Value Ref Range    Sodium 134 (L) 135 - 145 mmol/L    Potassium 4.1 3.6 - 5.5 mmol/L    Chloride 101 96 - 112 mmol/L    Co2 22 20 - 33 mmol/L    Anion Gap 11.0 7.0 - 16.0    Glucose 94 65 - 99 mg/dL    Bun 13 8 - 22 mg/dL    Creatinine <0.17 (L) 0.50 - 1.40 mg/dL    Calcium 8.8 8.5 - 10.5 mg/dL    Correct Calcium 9.1 8.5 - 10.5 mg/dL    AST(SGOT) 29 12 - 45 U/L    ALT(SGPT) 36 2 - 50 U/L    Alkaline Phosphatase 556 (H) 30 - 99 U/L    Total Bilirubin 0.2 0.1 - 1.5 mg/dL    Albumin 3.6 3.2 - 4.9 g/dL    Total Protein 8.4 (H) 6.0 - 8.2 g/dL    Globulin 4.8 (H) 1.9 - 3.5 g/dL    A-G Ratio 0.8 g/dL   PT/INR   Result Value Ref Range    PT 14.2 12.0 - 14.6 sec    INR 1.08 0.87 - 1.13   ESTIMATED GFR   Result Value Ref Range    GFR (CKD-EPI) 179 >60 mL/min/1.73 m 2         RADIOLOGY    Radiologist interpretation:    DX-CHEST-LIMITED (1 VIEW)   Final Result         Mild diffuse pulmonary opacities may be due to edema or infiltrates. More pronounced opacity and volume loss in the right lung base.      DX-CHEST-PORTABLE (1 VIEW)   Final Result         Tracheotomy tube is seen. Small right pleural effusion.            ED COURSE:    ED Observation Status? No   No noted need for observation for developing issue    Quickly paged out ENT and discussed the case with Dr. Ghotra    ENT came to the bedside.  We prepped before to make sure that he had the proper equipment to make sure the patient had visualization of his trach.    Also spoke to the intensivist regarding need for admission to the ICU as the patient episode of desaturations and required steroid dilatation of his airway    ENT discussed with me the results that there is some sort of ulceration/area where the tube was getting if issues to place.  A tube was passed over it.  He will contact his ENT partner.  Need may recommend the patient be admitted      INTERVENTIONS BY ME:  Medications   NS infusion (has no administration in time range)   enoxaparin (Lovenox) inj 40 mg (has no administration in time range)   NS (Bolus) 0.9 % infusion 1,000 mL (0 mL Intravenous Stopped 3/13/24 2022)   clonazePAM (KlonoPIN) tablet 0.5 mg (0.5 mg Enteral Tube Given 3/13/24 2200)   levETIRAcetam (Keppra) 100 MG/ML solution 1,500 mg (1,500 mg Enteral Tube Given 3/13/24 2037)   lacosamide (Vimpat) tablet 300 mg (300 mg Enteral Tube Given 3/13/24 2201)   PHENobarbital solution (NICU/PEDS) 120 mg (120 mg Enteral Tube Given 3/13/24 2102)   topiramate (Topamax) tablet 175 mg (175 mg Enteral Tube Given 3/13/24 2131)       Response on recheck:  Patient after new tube placement is much improved.  No episodes and patient appears to be breathing better.    CONSULTANTS/OTHER GROUPS CONTACTED    ENT was consulted immediately.    FINAL DISPO PLAN   Admit to the intermediate ICU    Patient had airway issue.   He had sudden onset of the hypoxia and desaturations.  He required constant monitoring discussion with the ear nose throat surgeon, also discussion with the intensivist, repeat evaluations, radiographic evaluations at this point and bedside management total approximately 35 minutes of critical care.  Likely the patient's area was stabilized the patient will be admitted to the hospital for observation.    FINAL IMPRESSION  1. Tracheal stenosis    2.  Critical care time of 35 minutes

## 2024-03-14 NOTE — ED NOTES
Pt noted to desat down to 81%.  ERP and RT at bedside. RT attempted to suction but meeting resistance with suction.  Small amount of secretions removed.  Pt turned to right side and repositioned per mother's request and able to improve oxygenation up to low 90s.

## 2024-03-14 NOTE — ED NOTES
Pt medicated per MAR. Medication administration gone over with mother at bedside. Corrections made, pharmacist notified. MAR updated.

## 2024-03-14 NOTE — ED NOTES
ENT at bedside to scope pt.  Per ENT, longer tach needs to be placed.  RT notified of ENT's needs.

## 2024-03-14 NOTE — PROGRESS NOTES
4 Eyes Skin Assessment Completed by MARE Lieberman and MARE Gallardo.    Head WDL  Ears Redness and Blanching  Nose WDL  Mouth WDL  Neck redness around trach  Breast/Chest WDL  Shoulder Blades WDL  Spine WDL  (R) Arm/Elbow/Hand WDL  (L) Arm/Elbow/Hand WDL  Abdomen incision redness and scabbing around G tube, impanted medical device to RLQ  Groin WDL  Scrotum/Coccyx/Buttocks redness and blanching, 3 ulcers  (R) Leg Redness, Scab, and Weeping  (L) Leg WDL  (R) Heel/Foot/Toe WDL  (L) Heel/Foot/Toe WDL          Devices In Places ECG, Blood Pressure Cuff, Pulse Ox, SCD's, and Tracheostomy      Interventions In Place Waffle Overlay, TAP System, Pillows, Q2 Turns, and Low Air Loss Mattress    Possible Skin Injury Yes    Pictures Uploaded Into Epic Yes  Wound Consult Placed Yes  RN Wound Prevention Protocol Ordered Yes

## 2024-03-14 NOTE — ED NOTES
Bedside report with MARE Carcamo.  Pt's trach successfully swapped out by ENT.  Stat chest x ray ordered and ERP called to bedside to evaluate pt.

## 2024-03-14 NOTE — THERAPY
Physical Therapy Contact Note    PT consult received and acknowledged. RN reported patient's mother is primary caregiver and they declined need for acute PT intervention while in hospital. Will complete order.     Chinyere Bonds, PT, DPT  [phone]

## 2024-03-14 NOTE — PROGRESS NOTES
Pt arrived to T638 from ED with RN and RT. Pt accompanied by mother who request to stay at bedside at all times.  Full 2 RN Skin assessment completed. Pt has PTA pressure wound to sacrum, left hip region and leg, PTA dressing in place done by mother. Pt also has redness to trach site and  G-tube site. Mother request that they stay in place as she does all the wound care. Mother also request that we do not do two hour turns at this time for patient comfort. Pts mother has been providing full care to patient for over 20 years and wants to participate fully while pt in admitted.

## 2024-03-14 NOTE — DIETARY
"Nutrition Support Assessment:  Day 1 of admit.  Ruben Edwards is a 43 y.o. 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]     Current problem list:    Tracheal stenosis (POA: Yes)    Seizure disorder (HCC) (POA: Unknown)    Assessment:  Estimated Nutritional Needs based on:   Height: 177.8 cm (5' 10\")  Weight: 58.8 kg (129 lb 10.1 oz)  Weight to Use in Calculations: 58.8 kg (129 lb 10.1 oz)  Body mass index is 18.6 kg/m²., BMI classification: Normal    Calculation/Equation: MSJ (1490 kcal) x 1.1 - 1.2 = 1639 - 1788 kcal/day  Total Calories / day: 1639 - 1788  (Calories / k.8 - 30.4)  Total Grams Protein / day: 82 - 94  (Grams Protein / k.4 - 1.6)      Evaluation:   Pt admitted d/t concerns for tracheostomy obstruction.  Indication for nutrition support: Pt admitted w/ PEG tube as sole nutrition route.  RD visited pt at bedside. Pt's mother, Susana, present. She reports pt's TF regimen is [email or web address] 1.0 x 2 cartons daily + 1/2 scoop greens powder (kale+spinach) + prebiotic + 1/2 scoop of plant-based protein powder providing 25 g of protein (total kcal unknown, would provide at least +100 kcal from protein alone). Total kcal of home regimen unknown, total protein = 57 g/day. Discussed w/ Susana that pt has higher protein needs given stage 4/healing wounds, recommended increasing home protein powder by 1/2 scoop/day which would increase total protein from home regimen as reported to 82 g/day. Susana verbalized understanding and agreement.  Updated wt indicates pt's wt has been stable x4 years per chart hx.  Labs: Na 133, crea <0.17. 3/13: alb 3.6   MAR: clonazepam, NS, phenobarbital  Skin: seen by wound team today: stage 4 to RLE, stage 4 to coccyx/sacrum. Healing stage 4 to L hip and L ischium, \"Areas mostly superficial now, but very fragile and prone to re-opening.\" Small stage 1 pressure injury to R dorsal foot  Last BM: none documented this admit  +4L O2 via T-piece  Home formula per pt's mother    "   Malnutrition Risk: stable wt x 4 years per chart hx     Recommendations/Plan:  Ok per Dr. Thomas for pt's mother to continue to administer home regimen. Increase protein from home regimen to 82 g/day per discussion w/ Susana.  Obtain updated measured wt as possible.  Monitor weight trends.    RD following.

## 2024-03-14 NOTE — ASSESSMENT & PLAN NOTE
ENT was consulted and placed extended length Bivona.  Found to have posterior tracheal erosion   Plan to discharge home with Bivona until trachea heals followed by custom tracheostomy tube exchange as outpatient

## 2024-03-14 NOTE — ASSESSMENT & PLAN NOTE
3/15/2024   Continue home medication.   Per mother:  Keppra 1500 mg at 7:30 AM and 7:30 PM  Phenobarbital 20 mg/ 5 mL at 7:30 AM and 7:30 PM  Topiramate 150 mg q am at 8:15 AM, and 175 mg at 8:15 PM  Vimpat 300 mg q am and q pm 9:15 AM and 9:15 PM  Klonopin 0.5 mg at 9:15 am and 9:15 PM  Seizure precautions.  Continue to monitor closely.

## 2024-03-14 NOTE — PROGRESS NOTES
4 Eyes Skin Assessment Completed by MARE Chase and MARE Hoffman.    Head WDL  Ears Redness and Blanching  Nose WDL  Mouth WDL  Neck Redness redness around trach site  Breast/Chest WDL  Shoulder Blades WDL  Spine WDL  (R) Arm/Elbow/Hand WDL  (L) Arm/Elbow/Hand WDL  Abdomen Incision redness and scabbing around G tube, implanted medical device to RLQ   Groin WDL  Scrotum/Coccyx/Buttocks Redness and Blanching PTA wounds x3  (R) Leg Redness, Scab, and Weeping PTA wound with home dressing  (L) Leg WDL  (R) Heel/Foot/Toe WDL  (L) Heel/Foot/Toe WDL          Devices In Places Blood Pressure Cuff, Pulse Ox, Tracheostomy, and G Tube      Interventions In Place Waffle Overlay, Pillows, and Low Air Loss Mattress    Possible Skin Injury Yes    Pictures Uploaded Into Epic Yes  Wound Consult Placed Yes  RN Wound Prevention Protocol Ordered Yes

## 2024-03-14 NOTE — ED NOTES
Med rec complete per pt's rx bottles at bedside- reviewed with pt's mother at bedside. Meds returned to mom.   Pt is on a pain pump- unknown settings at this time.

## 2024-03-14 NOTE — ED NOTES
Pt transferred to [id number] via [place] on cardiac monitor with x2 IMCU RN. Pt baseline bedbound, T-piece in place. RT at bedside. Belongings within possession. Mother at bedside.

## 2024-03-14 NOTE — CONSULTS
Surgery Otolaryngology Consultation    Date of Service  3/13/2024    Referring Physician  Clarence Willard, *    Consulting Physician  George Ghotra M.D.    Reason for Consultation  Tracheostomy difficulty     History of Presenting Illness  43 y.o. male who presented 3/13/2024 with Tracheostomy tube origianlly placed over 20 years ago for head injury. Last couple months been having increased breathing and mucous difficulty. The last couple days is having some desaturation episodes and difficulty with suctioning. Trach was changed by general surgeon locally today, but did not seem to help.     Review of Systems  ROS    Past Medical History   has a past medical history of Seizure (HCC) and Traumatic brain injury (HCC) (05/15/1998).    Surgical History   has a past surgical history that includes laryngoscopy (2017); bronchoscopy (2017); tracheostomy (2017); and esophagoscopy (2017).    Family History  family history is not on file.    Social History   reports that he has never smoked. He has never used smokeless tobacco. He reports current drug use. Drug: Oral. He reports that he does not drink alcohol.    Medications  Prior to Admission Medications   Prescriptions Last Dose Informant Patient Reported? Taking?   Cholecalciferol (VITAMIN D3) Liquid 3/13/2024 at am  Yes Yes   Sig: by Per G Tube route every morning. 1 dropperful   PHENobarbital (LUMINAL) 20 MG/5ML Solution 3/13/2024 at 0730  Yes Yes   Si mL by Enteral Tube route 2 times a day.   POTASSIUM CITRATE PO 3/13/2024 at am  Yes Yes   Si mg by Gastric Tube route every morning.   Pain Pump (PATIENT SUPPLIED) XX RENE unk at [facility] Other Facility Yes No   Sig: continuous. Patient's Pain Pump (placed and maintained as an outpatient)    Medications/concentrations: BACLOFEN 3000 mcg/ml  Route: Intrathecal  Last changed: 10/18/21  Refill pump before date: 3/29/22  Continuous infusion rates (Drug/Rate): BACLOFEN 699.4 mcg /day (29.1  mcg/hr)      MD office:[facility] [address]  Phone number:[phone]   clonazePAM (KLONOPIN) 0.5 MG Tab 3/13/2024 at 0915  Yes Yes   Si.5 mg by Enteral Tube route 2 times a day.   lacosamide (VIMPAT) 100 MG Tab tablet 3/13/2024 at 0915  No Yes   Si Tablet by Enteral Tube route 2 times a day for 270 days. Take in addition to 200 mg tablets for a total of 300 mg twice daily.   lacosamide (VIMPAT) 200 MG Tab tablet 3/13/2024 at 0915  No Yes   Si Tablet by Enteral Tube route 2 times a day for 270 days. Take in addition to 100 mg tablets for a total of 300 mg twice daily.   levETIRAcetam (KEPPRA) 100 MG/ML Solution 3/13/2024 at 0730  Yes Yes   Sig: 15 mL by Enteral Tube route 2 times a day.   topiramate (TOPAMAX) 25 MG Tab 3/12/2024 at hs  Yes Yes   Si mg by Enteral Tube route at bedtime.   topiramate (TOPAMAX) 50 MG tablet 3/13/2024 at 0815  Yes Yes   Sig: Take 150 mg by mouth every morning.      Facility-Administered Medications: None       Allergies  Allergies   Allergen Reactions    Sulfa Drugs Unspecified and Rash     Per caretaker, mother is allergic to sulfa so believes her son could be as well    Doxycycline Hyclate      Other reaction(s): Seizure (finding)    Chlorhexidine Rash     Mom refuses CHG bath       Physical Exam  Temp:  [36.1 °C (96.9 °F)] 36.1 °C (96.9 °F)  Pulse:  [70-80] 80  Resp:  [20-24] 24  BP: (116-118)/(77) 116/77  SpO2:  [89 %-100 %] 100 %    Physical Exam    Fluids      Laboratory  Recent Labs     24  1738   WBC 8.5   RBC 4.26*   HEMOGLOBIN 14.8   HEMATOCRIT 42.4   MCV 99.5*   MCH 34.7*   MCHC 34.9   RDW 53.1*   PLATELETCT 330   MPV 8.0*     Recent Labs     24  1738   SODIUM 134*   POTASSIUM 4.1   CHLORIDE 101   CO2 22   GLUCOSE 94   BUN 13   CREATININE <0.17*   CALCIUM 8.8     Recent Labs     24  7578   INR 1.08               Gen:Brain injury, sating well on room air  Neck:6.0 uncuffed unfen shiley in place - flex bronchoscopy thru trach scoped and  found to be sitting along post esophagous with an ulcerative shelf. Tipping the trach tube tip anteriorly revealed easy visualization of kiersten and return of good breathing.   Abd:s/nt/nd  Ext:maew  Lymphatics:no pedal edema      Imaging  DX-CHEST-PORTABLE (1 VIEW)   Final Result         Tracheotomy tube is seen. Small right pleural effusion.          Assessment/Plan  6.0 uncuf/unfen shitod sitting along post trachea with some erosion and granulation. A soft extended length cuffed bivona 6.0 was switched. And rescoped to be found distal to the obstruction and above the kiersetn with good breathing. Now saturating in the angelina 90's which is his normal instead of the low 90's which he has been at for the last several weeks.   -recommend overnight stay to verify continued breathing

## 2024-03-14 NOTE — ED TRIAGE NOTES
"Chief Complaint   Patient presents with    Shortness of Breath     Pt transported here by ambulance from [facility] for concerns of tracheostomy obstruction.  Per pt's mother, she has been having difficulty with suctioning pt at home due to suction getting stuck. Pt was seen by his general surgeon and inner cannular changed today but they continue to have difficulty with suctioning the trach. Pt satting in low 90s on room air at this time.  Pt transferred here for ENT consult.      /77   Pulse 76   Temp 36.1 °C (96.9 °F) (Temporal)   Resp (!) 21   Ht 1.778 m (5' 10\")   Wt 56.7 kg (125 lb)   SpO2 90%   BMI 17.94 kg/m²     Pt connected to monitor.  Pt's mother at bedside.  RT notified of pt's arrival to ED.    "

## 2024-03-14 NOTE — ED NOTES
Pt linen changed with mother and ED tech, pt adjusted in gurney on waffle mattress to comfort. Pillows placed on pt   Wound pictures taken and uploaded to MAR, sore to sacrum and x2 to L hip.

## 2024-03-15 PROBLEM — J96.01 ACUTE RESPIRATORY FAILURE WITH HYPOXIA (HCC): Status: ACTIVE | Noted: 2024-03-15

## 2024-03-15 LAB
ALBUMIN SERPL BCP-MCNC: 2.5 G/DL (ref 3.2–4.9)
ALBUMIN/GLOB SERPL: 0.6 G/DL
ALP SERPL-CCNC: 434 U/L (ref 30–99)
ALT SERPL-CCNC: 28 U/L (ref 2–50)
ANION GAP SERPL CALC-SCNC: 12 MMOL/L (ref 7–16)
AST SERPL-CCNC: 32 U/L (ref 12–45)
BILIRUB SERPL-MCNC: 0.2 MG/DL (ref 0.1–1.5)
BUN SERPL-MCNC: 7 MG/DL (ref 8–22)
CALCIUM ALBUM COR SERPL-MCNC: 9.2 MG/DL (ref 8.5–10.5)
CALCIUM SERPL-MCNC: 8 MG/DL (ref 8.5–10.5)
CHLORIDE SERPL-SCNC: 110 MMOL/L (ref 96–112)
CO2 SERPL-SCNC: 18 MMOL/L (ref 20–33)
CREAT SERPL-MCNC: <0.17 MG/DL (ref 0.5–1.4)
CRP SERPL HS-MCNC: 8.05 MG/DL (ref 0–0.75)
ERYTHROCYTE [DISTWIDTH] IN BLOOD BY AUTOMATED COUNT: 55.9 FL (ref 35.9–50)
GFR SERPLBLD CREATININE-BSD FMLA CKD-EPI: 179 ML/MIN/1.73 M 2
GLOBULIN SER CALC-MCNC: 4.3 G/DL (ref 1.9–3.5)
GLUCOSE SERPL-MCNC: 81 MG/DL (ref 65–99)
HCT VFR BLD AUTO: 34.7 % (ref 42–52)
HGB BLD-MCNC: 11.4 G/DL (ref 14–18)
MAGNESIUM SERPL-MCNC: 1.9 MG/DL (ref 1.5–2.5)
MCH RBC QN AUTO: 33.8 PG (ref 27–33)
MCHC RBC AUTO-ENTMCNC: 32.9 G/DL (ref 32.3–36.5)
MCV RBC AUTO: 103 FL (ref 81.4–97.8)
PHOSPHATE SERPL-MCNC: 2.9 MG/DL (ref 2.5–4.5)
PLATELET # BLD AUTO: 257 K/UL (ref 164–446)
PMV BLD AUTO: 8.3 FL (ref 9–12.9)
POTASSIUM SERPL-SCNC: 3.6 MMOL/L (ref 3.6–5.5)
PREALB SERPL-MCNC: 15.4 MG/DL (ref 18–38)
PROT SERPL-MCNC: 6.8 G/DL (ref 6–8.2)
RBC # BLD AUTO: 3.37 M/UL (ref 4.7–6.1)
SODIUM SERPL-SCNC: 140 MMOL/L (ref 135–145)
WBC # BLD AUTO: 4.3 K/UL (ref 4.8–10.8)

## 2024-03-15 PROCEDURE — 99233 SBSQ HOSP IP/OBS HIGH 50: CPT | Performed by: INTERNAL MEDICINE

## 2024-03-15 PROCEDURE — 84134 ASSAY OF PREALBUMIN: CPT

## 2024-03-15 PROCEDURE — 84100 ASSAY OF PHOSPHORUS: CPT

## 2024-03-15 PROCEDURE — 94640 AIRWAY INHALATION TREATMENT: CPT

## 2024-03-15 PROCEDURE — A9270 NON-COVERED ITEM OR SERVICE: HCPCS | Performed by: STUDENT IN AN ORGANIZED HEALTH CARE EDUCATION/TRAINING PROGRAM

## 2024-03-15 PROCEDURE — 700101 HCHG RX REV CODE 250: Performed by: STUDENT IN AN ORGANIZED HEALTH CARE EDUCATION/TRAINING PROGRAM

## 2024-03-15 PROCEDURE — 770020 HCHG ROOM/CARE - TELE (206)

## 2024-03-15 PROCEDURE — 700102 HCHG RX REV CODE 250 W/ 637 OVERRIDE(OP): Performed by: STUDENT IN AN ORGANIZED HEALTH CARE EDUCATION/TRAINING PROGRAM

## 2024-03-15 PROCEDURE — 80053 COMPREHEN METABOLIC PANEL: CPT

## 2024-03-15 PROCEDURE — 83735 ASSAY OF MAGNESIUM: CPT

## 2024-03-15 PROCEDURE — 85027 COMPLETE CBC AUTOMATED: CPT

## 2024-03-15 PROCEDURE — 86140 C-REACTIVE PROTEIN: CPT

## 2024-03-15 PROCEDURE — A9270 NON-COVERED ITEM OR SERVICE: HCPCS | Performed by: INTERNAL MEDICINE

## 2024-03-15 PROCEDURE — 700102 HCHG RX REV CODE 250 W/ 637 OVERRIDE(OP): Performed by: INTERNAL MEDICINE

## 2024-03-15 RX ORDER — BISACODYL 10 MG
10 SUPPOSITORY, RECTAL RECTAL DAILY
Status: DISCONTINUED | OUTPATIENT
Start: 2024-03-15 | End: 2024-03-19 | Stop reason: HOSPADM

## 2024-03-15 RX ADMIN — BISACODYL 10 MG: 10 SUPPOSITORY RECTAL at 08:08

## 2024-03-15 RX ADMIN — CLONAZEPAM 0.5 MG: 1 TABLET ORAL at 20:34

## 2024-03-15 RX ADMIN — CLONAZEPAM 0.5 MG: 1 TABLET ORAL at 09:15

## 2024-03-15 RX ADMIN — LEVETIRACETAM 1500 MG: 500 TABLET, FILM COATED ORAL at 19:39

## 2024-03-15 RX ADMIN — TOPIRAMATE 175 MG: 100 TABLET, FILM COATED ORAL at 20:11

## 2024-03-15 RX ADMIN — TOPIRAMATE 150 MG: 100 TABLET, FILM COATED ORAL at 08:57

## 2024-03-15 RX ADMIN — LACOSAMIDE 300 MG: 100 TABLET, FILM COATED ORAL at 20:34

## 2024-03-15 RX ADMIN — LEVETIRACETAM 1500 MG: 500 TABLET, FILM COATED ORAL at 08:08

## 2024-03-15 RX ADMIN — LACOSAMIDE 300 MG: 100 TABLET, FILM COATED ORAL at 09:15

## 2024-03-15 RX ADMIN — PHENOBARBITAL 120 MG: 20 ELIXIR ORAL at 08:08

## 2024-03-15 RX ADMIN — PHENOBARBITAL 120 MG: 20 ELIXIR ORAL at 19:39

## 2024-03-15 ASSESSMENT — PAIN DESCRIPTION - PAIN TYPE
TYPE: ACUTE PAIN

## 2024-03-15 NOTE — PROGRESS NOTES
"S: Ruben Edwards is a 43 y.o. male here for tracheostomy tube difficulties.  He currently has an extended length soft bivona in place and that seems to be functioning well and he is oxygenating well.    O:  /67   Pulse (!) 58   Temp 36.2 °C (97.1 °F) (Temporal)   Resp 13   Ht 1.778 m (5' 10\")   Wt 58.8 kg (129 lb 10.1 oz)   SpO2 91%     Recent Labs     03/13/24  1738 03/14/24  0806 03/15/24  0323   WBC 8.5 6.3 4.3*   RBC 4.26* 3.62* 3.37*   HEMOGLOBIN 14.8 12.4* 11.4*   HEMATOCRIT 42.4 36.6* 34.7*   MCV 99.5* 101.1* 103.0*   MCH 34.7* 34.3* 33.8*   MCHC 34.9 33.9 32.9   RDW 53.1* 53.4* 55.9*   PLATELETCT 330 282 257   MPV 8.0* 8.2* 8.3*     Recent Labs     03/13/24  1738 03/14/24  0806 03/15/24  0323   SODIUM 134* 133* 140   POTASSIUM 4.1 3.8 3.6   CHLORIDE 101 103 110   CO2 22 21 18*   GLUCOSE 94 94 81   BUN 13 10 7*   CREATININE <0.17* <0.17* <0.17*   CALCIUM 8.8 8.2* 8.0*     Recent Labs     03/13/24  1738   INR 1.08     Neck: Bivona securely in place moving good air.    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  I will stop back by later today and consider additional options.  "

## 2024-03-15 NOTE — PROGRESS NOTES
Patient had 3 episodes of jerking of LLE, mother of patient states that these are what typical seizures look like for the patient. Each episode lasting approximately 5-15 seconds. No signs of post ictal changes, decreased oxygen saturations, or vital sign changes during episode.

## 2024-03-15 NOTE — CARE PLAN
The patient is Stable - Low risk of patient condition declining or worsening    Shift Goals  Clinical Goals: Suction PRN, safety, stable VS  Patient Goals: TRAVIS  Family Goals: Safety, rest    Progress made toward(s) clinical / shift goals:      Problem: Knowledge Deficit - Standard  Goal: Patient and family/care givers will demonstrate understanding of plan of care, disease process/condition, diagnostic tests and medications  Description: Target End Date:  1-3 days or as soon as patient condition allows    Document in Patient Education    1.  Patient and family/caregiver oriented to unit, equipment, visitation policy and means for communicating concern  2.  Complete/review Learning Assessment  3.  Assess knowledge level of disease process/condition, treatment plan, diagnostic tests and medications  4.  Explain disease process/condition, treatment plan, diagnostic tests and medications  Outcome: Progressing     Problem: Skin Integrity  Goal: Skin integrity is maintained or improved  Description: Target End Date:  Prior to discharge or change in level of care    Document interventions on Skin Risk/Neo flowsheet groups and corresponding LDA    1.  Assess and monitor skin integrity, appearance and/or temperature  2.  Assess risk factors for impaired skin integrity and/or pressures ulcers  3.  Implement precautions to protect skin integrity in collaboration with interdisciplinary team  4.  Implement pressure ulcer prevention protocol if at risk for skin breakdown  5.  Confirm wound care consult if at risk for skin breakdown  6.  Ensure patient use of pressure relieving devices  (Low air loss bed, waffle overlay, heel protectors, ROHO cushion, etc)  Outcome: Progressing     Problem: Respiratory  Goal: Patient will achieve/maintain optimum respiratory ventilation and gas exchange  Description: Target End Date:  Prior to discharge or change in level of care    Document on Assessment flowsheet    1.  Assess and monitor  rate, rhythm, depth and effort of respiration  2.  Breath sounds assessed qshift and/or as needed  3.  Assess O2 saturation, administer/titrate oxygen as ordered  4.  Position patient for maximum ventilatory efficiency  5.  Turn, cough, and deep breath with splinting to improve effectiveness  6.  Collaborate with RT to administer medication/treatments per order  7.  Encourage use of incentive spirometer and encourage patient to cough after use and utilize splinting techniques if applicable  8.  Airway suctioning  9.  Monitor sputum production for changes in color, consistency and frequency  10. Perform frequent oral hygiene  11. Alternate physical activity with rest periods  Outcome: Progressing

## 2024-03-15 NOTE — PROGRESS NOTES
Hospital Medicine Daily Progress Note    Date of Service  3/14/2024    Chief Complaint  Ruben Edwards is a 43 y.o. male admitted 3/13/2024 with concerns for tracheostomy obstruction    Hospital Course    Ruben Edwards is a 43 y.o. male who presented 3/13/2024 with concerns for tracheostomy obstruction.  Patient had a tracheostomy tube placed over 20 years ago due to head injury.  For the last 2 days, patient has been noted to have several episodes of desaturation, and for shortness of breath, and difficulties with suctioning at home.  Tracheostomy tube was changed by his general surgeon today, however with no improvement of symptoms, probably him to go to outside facility ER for evaluation.     In ER, patient saturating 90% with tracheostomy tube.  ENT was consulted, patient had his tracheostomy tube exchanged with satisfaction.          Interval Problem Update    03/14/24    I evaluated and examined him at the bedside.  Hemodynamically stable.  I discussed plan of care with patient's mother at the bedside regarding Mr. Edwards current medical condition and plan of care.    I discussed plan of care with ENT surgery Dr. Ghotra over phone and recommended to monitor patient overnight and plan is to reevaluate him tomorrow.    I discussed plan of care with patient's mother at the bedside and answered all of her questions.    I discussed plan of care with RD      I have discussed this patient's plan of care and discharge plan at IDT rounds today with Case Management, Nursing, Nursing leadership, and other members of the IDT team.    Consultants/Specialty  ENT    Code Status  Full Code    Disposition  The patient is not medically cleared for discharge to home or a post-acute facility.      I have placed the appropriate orders for post-discharge needs.    Review of Systems  Review of Systems   Unable to perform ROS: Medical condition        Physical Exam  Temp:  [35.7 °C (96.3 °F)-36.3 °C (97.4 °F)] 35.7 °C (96.3 °F)  Pulse:   [62-79] 73  Resp:  [13-40] 19  BP: (105-131)/(57-81) 120/73  SpO2:  [89 %-95 %] 94 %    Physical Exam  Vitals reviewed.   Constitutional:       General: He is not in acute distress.  HENT:      Head: Normocephalic and atraumatic. No contusion.      Nose: Nose normal.      Mouth/Throat:      Pharynx: No oropharyngeal exudate.   Eyes:      Pupils: Pupils are equal, round, and reactive to light.   Cardiovascular:      Rate and Rhythm: Normal rate and regular rhythm.   Pulmonary:      Effort: Pulmonary effort is normal.      Breath sounds: No wheezing or rhonchi.      Comments: Tracheostomy  Abdominal:      General: Bowel sounds are normal.      Palpations: Abdomen is soft.      Comments: PEG tube   Musculoskeletal:         General: Deformity present.      Cervical back: No rigidity. No muscular tenderness.   Skin:     General: Skin is warm and dry.      Coloration: Skin is not jaundiced.   Neurological:      General: No focal deficit present.      Mental Status: He is alert.      Cranial Nerves: No cranial nerve deficit.      Comments: Not following commands   Psychiatric:         Mood and Affect: Mood normal.         Fluids    Intake/Output Summary (Last 24 hours) at 3/14/2024 2114  Last data filed at 3/14/2024 2000  Gross per 24 hour   Intake 457.84 ml   Output 1550 ml   Net -1092.16 ml       Laboratory  Recent Labs     03/13/24  1738 03/14/24  0806   WBC 8.5 6.3   RBC 4.26* 3.62*   HEMOGLOBIN 14.8 12.4*   HEMATOCRIT 42.4 36.6*   MCV 99.5* 101.1*   MCH 34.7* 34.3*   MCHC 34.9 33.9   RDW 53.1* 53.4*   PLATELETCT 330 282   MPV 8.0* 8.2*     Recent Labs     03/13/24  1738 03/14/24  0806   SODIUM 134* 133*   POTASSIUM 4.1 3.8   CHLORIDE 101 103   CO2 22 21   GLUCOSE 94 94   BUN 13 10   CREATININE <0.17* <0.17*   CALCIUM 8.8 8.2*     Recent Labs     03/13/24  1738   INR 1.08               Imaging  DX-CHEST-LIMITED (1 VIEW)   Final Result         Mild diffuse pulmonary opacities may be due to edema or infiltrates. More  pronounced opacity and volume loss in the right lung base.      DX-CHEST-PORTABLE (1 VIEW)   Final Result         Tracheotomy tube is seen. Small right pleural effusion.           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  Found to have erosion and granulation  I discussed plan of care with ENT surgery.  Plan is to continue keeping him in IMCU for close monitoring.  Oxygen saturation looks within normal limits during my evaluation.    ACP (advance care planning)  Assessment & Plan  Full code    Seizure disorder (HCC)  Assessment & Plan  Continue home medication.   Per mother:  Keppra 1500 mg at 7:30 AM and 7:30 PM  Phenobarbital 20 mg/ 5 mL at 7:30 AM and 7:30 PM  Topiramate 150 mg q am at 8:15 AM, and 175 mg at 8:15 PM  Vimpat 300 mg q am and q pm 9:15 AM and 9:15 PM  Klonopin 0.5 mg at 9:15 am and 9:15 PM  Seizure precautions.  Continue to monitor closely.    Hyponatremia- (present on admission)  Assessment & Plan  Mild plan is to recheck sodium level tomorrow.       I discussed plan of care with ENT surgery Dr. Ghotra over phone and recommended to monitor patient overnight and plan is to reevaluate him tomorrow.    I discussed plan of care with RD    I discussed plan of care during multidisciplinary rounds regarding patient's current medical condition and plan of care.      VTE prophylaxis:    enoxaparin ppx      I have performed a physical exam and reviewed and updated ROS and Plan today (3/14/2024). In review of yesterday's note (3/13/2024), there are no changes except as documented above.

## 2024-03-15 NOTE — PROGRESS NOTES
4 Eyes Skin Assessment Completed by MARE Lieberman and MARE Bright.    Head WDL  Ears Redness and Blanching  Nose WDL  Mouth WDL  Neck redness around trach  Breast/Chest WDL  Shoulder Blades WDL  Spine WDL  (R) Arm/Elbow/Hand WDL  (L) Arm/Elbow/Hand WDL  Abdomen incision redness and scabbing around G tube, implanted medical device to RLQ  Groin WDL  Scrotum/Coccyx/Buttocks Redness and Blanching, 3 ulcers  (R) Leg Redness, Scab, and Weeping  (L) Leg WDL  (R) Heel/Foot/Toe WDL  (L) Heel/Foot/Toe WDL          Devices In Places ECG, Blood Pressure Cuff, Pulse Ox, Tracheostomy, and G Tube      Interventions In Place Waffle Overlay, TAP System, Pillows, Q2 Turns, Low Air Loss Mattress, and Heels Loaded W/Pillows    Possible Skin Injury Yes    Pictures Uploaded Into Epic Yes  Wound Consult Placed Yes  RN Wound Prevention Protocol Ordered Yes

## 2024-03-15 NOTE — RESPIRATORY CARE
Spoke with Mom multiple times today about trach equipment options that we have here to connect humidification.  We went over the options together in person and attaching them to Ruben to see what she thinks will work best for him.

## 2024-03-15 NOTE — ASSESSMENT & PLAN NOTE
Currently requiring 3L of oxygen  Patient likely had aspiration   RT protocol  Chest physiotherapy

## 2024-03-15 NOTE — DOCUMENTATION QUERY
[facility]                                                                       Query Response Note      PATIENT:               JAVED IGLESIAS  ACCT #:                  [id number]  MRN:                     [id number]  :                      1980  ADMIT DATE:       3/13/2024 4:57 PM  DISCH DATE:          RESPONDING  PROVIDER #:        [id number]           QUERY TEXT:    Pt admitted with shortness of breath.  Vitals Flowsheet shows Admit SpO2 90% on RA and tachypnea and treatment of 4-10L via T-Piece.  Can a diagnosis be specified to support these findings and treatment?    The patient's Clinical Indicators include:  H&P:  For the last 2 days pt has been noted to have several episodes of desaturation and shortness of breath and difficulties suctioning at home.  In ER saturating 90% with tracheostomy tube  Vitals Flowsheet: Admit SpO2 90% on RA, RR 21, 93% on 4L via T-Piece (PF Ratio: 188); 3/13 RR range: 15-40  Risk Factors: tracheal stenosis   Treatment: supplemental O2 via T Piece, tracheostomy tube exchange    Thank you,  Nadeem Vaca RN, BSN, CCDS  Clinical   Connect via [employer]  Options provided:   -- Acute respiratory failure with hypoxia   -- Chronic respiratory failure with hypoxia   -- Acute on chronic respiratory failure with hypoxia   -- Respiratory Distress   -- Hypoxia   -- Findings of no clinical significance   -- Unable to determine      Query created by: Nadeem Vaca on 3/14/2024 11:51 AM    RESPONSE TEXT:    Acute respiratory failure with hypoxia          Electronically signed by:  BELLA VARMA MD 3/14/2024 6:33 PM

## 2024-03-15 NOTE — CARE PLAN
The patient is Watcher - Medium risk of patient condition declining or worsening    Shift Goals  Clinical Goals: stable VS, suction PRN  Patient Goals: TRAVIS  Family Goals: safety    Progress made toward(s) clinical / shift goals:    Problem: Knowledge Deficit - Standard  Goal: Patient and family/care givers will demonstrate understanding of plan of care, disease process/condition, diagnostic tests and medications  Outcome: Progressing     Problem: Skin Integrity  Goal: Skin integrity is maintained or improved  Outcome: Progressing     Problem: Fall Risk  Goal: Patient will remain free from falls  Outcome: Progressing     Problem: Hemodynamics  Goal: Patient's hemodynamics, fluid balance and neurologic status will be stable or improve  Outcome: Progressing     Problem: Respiratory  Goal: Patient will achieve/maintain optimum respiratory ventilation and gas exchange  Outcome: Progressing     Problem: Pain - Standard  Goal: Alleviation of pain or a reduction in pain to the patient’s comfort goal  Outcome: Progressing       Patient is not progressing towards the following goals:

## 2024-03-16 ENCOUNTER — APPOINTMENT (OUTPATIENT)
Dept: RADIOLOGY | Facility: MEDICAL CENTER | Age: 44
DRG: 205 | End: 2024-03-16
Attending: INTERNAL MEDICINE
Payer: COMMERCIAL

## 2024-03-16 LAB
ANION GAP SERPL CALC-SCNC: 10 MMOL/L (ref 7–16)
BUN SERPL-MCNC: 9 MG/DL (ref 8–22)
CALCIUM SERPL-MCNC: 8.4 MG/DL (ref 8.5–10.5)
CHLORIDE SERPL-SCNC: 108 MMOL/L (ref 96–112)
CO2 SERPL-SCNC: 20 MMOL/L (ref 20–33)
CREAT SERPL-MCNC: <0.17 MG/DL (ref 0.5–1.4)
ERYTHROCYTE [DISTWIDTH] IN BLOOD BY AUTOMATED COUNT: 57.5 FL (ref 35.9–50)
GFR SERPLBLD CREATININE-BSD FMLA CKD-EPI: 179 ML/MIN/1.73 M 2
GLUCOSE SERPL-MCNC: 100 MG/DL (ref 65–99)
HCT VFR BLD AUTO: 37.1 % (ref 42–52)
HGB BLD-MCNC: 12.2 G/DL (ref 14–18)
MCH RBC QN AUTO: 34 PG (ref 27–33)
MCHC RBC AUTO-ENTMCNC: 32.9 G/DL (ref 32.3–36.5)
MCV RBC AUTO: 103.3 FL (ref 81.4–97.8)
PLATELET # BLD AUTO: 265 K/UL (ref 164–446)
PMV BLD AUTO: 8.2 FL (ref 9–12.9)
POTASSIUM SERPL-SCNC: 4.3 MMOL/L (ref 3.6–5.5)
PROCALCITONIN SERPL-MCNC: 0.07 NG/ML
RBC # BLD AUTO: 3.59 M/UL (ref 4.7–6.1)
SODIUM SERPL-SCNC: 138 MMOL/L (ref 135–145)
WBC # BLD AUTO: 6.3 K/UL (ref 4.8–10.8)

## 2024-03-16 PROCEDURE — 84145 PROCALCITONIN (PCT): CPT

## 2024-03-16 PROCEDURE — 99233 SBSQ HOSP IP/OBS HIGH 50: CPT | Performed by: INTERNAL MEDICINE

## 2024-03-16 PROCEDURE — 85027 COMPLETE CBC AUTOMATED: CPT

## 2024-03-16 PROCEDURE — 94640 AIRWAY INHALATION TREATMENT: CPT

## 2024-03-16 PROCEDURE — A9270 NON-COVERED ITEM OR SERVICE: HCPCS | Performed by: INTERNAL MEDICINE

## 2024-03-16 PROCEDURE — 700102 HCHG RX REV CODE 250 W/ 637 OVERRIDE(OP): Performed by: INTERNAL MEDICINE

## 2024-03-16 PROCEDURE — 71045 X-RAY EXAM CHEST 1 VIEW: CPT

## 2024-03-16 PROCEDURE — 700102 HCHG RX REV CODE 250 W/ 637 OVERRIDE(OP): Performed by: STUDENT IN AN ORGANIZED HEALTH CARE EDUCATION/TRAINING PROGRAM

## 2024-03-16 PROCEDURE — A9270 NON-COVERED ITEM OR SERVICE: HCPCS | Performed by: STUDENT IN AN ORGANIZED HEALTH CARE EDUCATION/TRAINING PROGRAM

## 2024-03-16 PROCEDURE — 80048 BASIC METABOLIC PNL TOTAL CA: CPT

## 2024-03-16 PROCEDURE — 700101 HCHG RX REV CODE 250: Performed by: STUDENT IN AN ORGANIZED HEALTH CARE EDUCATION/TRAINING PROGRAM

## 2024-03-16 PROCEDURE — 36415 COLL VENOUS BLD VENIPUNCTURE: CPT

## 2024-03-16 PROCEDURE — 770020 HCHG ROOM/CARE - TELE (206)

## 2024-03-16 RX ORDER — LACOSAMIDE 10 MG/ML
300 INJECTION, SOLUTION INTRAVENOUS 2 TIMES DAILY
Status: DISCONTINUED | OUTPATIENT
Start: 2024-03-17 | End: 2024-03-16

## 2024-03-16 RX ORDER — LACOSAMIDE 100 MG/1
300 TABLET ORAL 2 TIMES DAILY
Status: DISCONTINUED | OUTPATIENT
Start: 2024-03-16 | End: 2024-03-17

## 2024-03-16 RX ORDER — LACOSAMIDE 10 MG/ML
300 INJECTION, SOLUTION INTRAVENOUS 2 TIMES DAILY
Status: DISCONTINUED | OUTPATIENT
Start: 2024-03-16 | End: 2024-03-16

## 2024-03-16 RX ORDER — LACOSAMIDE 100 MG/1
300 TABLET ORAL 2 TIMES DAILY
Status: DISCONTINUED | OUTPATIENT
Start: 2024-03-17 | End: 2024-03-16

## 2024-03-16 RX ADMIN — LACOSAMIDE 300 MG: 100 TABLET, FILM COATED ORAL at 09:15

## 2024-03-16 RX ADMIN — CLONAZEPAM 0.5 MG: 1 TABLET ORAL at 09:15

## 2024-03-16 RX ADMIN — LACOSAMIDE 300 MG: 100 TABLET, FILM COATED ORAL at 20:58

## 2024-03-16 RX ADMIN — PHENOBARBITAL 120 MG: 20 ELIXIR ORAL at 18:17

## 2024-03-16 RX ADMIN — PHENOBARBITAL 120 MG: 20 ELIXIR ORAL at 07:30

## 2024-03-16 RX ADMIN — TOPIRAMATE 150 MG: 100 TABLET, FILM COATED ORAL at 05:17

## 2024-03-16 RX ADMIN — BISACODYL 10 MG: 10 SUPPOSITORY RECTAL at 05:17

## 2024-03-16 RX ADMIN — LEVETIRACETAM 1500 MG: 500 TABLET, FILM COATED ORAL at 07:32

## 2024-03-16 RX ADMIN — TOPIRAMATE 175 MG: 100 TABLET, FILM COATED ORAL at 20:58

## 2024-03-16 RX ADMIN — CLONAZEPAM 0.5 MG: 1 TABLET ORAL at 20:58

## 2024-03-16 RX ADMIN — LEVETIRACETAM 1500 MG: 500 TABLET, FILM COATED ORAL at 18:16

## 2024-03-16 ASSESSMENT — FIBROSIS 4 INDEX: FIB4 SCORE: 1.01

## 2024-03-16 ASSESSMENT — PAIN DESCRIPTION - PAIN TYPE
TYPE: ACUTE PAIN
TYPE: ACUTE PAIN

## 2024-03-16 NOTE — PROGRESS NOTES
Hospital Medicine Daily Progress Note    Date of Service  3/15/2024    Chief Complaint  Ruben Edwards is a 43 y.o. male admitted 3/13/2024 with concerns for tracheostomy obstruction    Hospital Course    Ruben Edwards is a 43 y.o. male who presented 3/13/2024 with concerns for tracheostomy obstruction.  Patient had a tracheostomy tube placed over 20 years ago due to head injury.  For the last 2 days, patient has been noted to have several episodes of desaturation, and for shortness of breath, and difficulties with suctioning at home.  Tracheostomy tube was changed by his general surgeon today, however with no improvement of symptoms, probably him to go to outside facility ER for evaluation.     In ER, patient saturating 90% with tracheostomy tube.  ENT was consulted, patient had his tracheostomy tube exchanged with satisfaction.          Interval Problem Update    03/14/24    I evaluated and examined him at the bedside.  Hemodynamically stable.  I discussed plan of care with patient's mother at the bedside regarding Mr. Edwards current medical condition and plan of care.    I discussed plan of care with ENT surgery Dr. Ghotra over phone and recommended to monitor patient overnight and plan is to reevaluate him tomorrow.    I discussed plan of care with patient's mother at the bedside and answered all of her questions.    I discussed plan of care with RD    03/15/24    I have noted and examined him at the bedside.  I discussed plan of care with patient's mother at the bedside and ENT surgery recommendations.  I discussed with patient's mother that plan is to transfer out from [facility] as he is completely hemodynamically stable.  ENT surgery is working on options for his tracheostomy.  I discussed plan of care with patient family and answered all of their questions.  I discontinued IV fluid as he is tolerating diet without any difficulty.  I reviewed lab workup.      I have discussed this patient's plan of care and discharge  plan at IDT rounds today with Case Management, Nursing, Nursing leadership, and other members of the IDT team.    Consultants/Specialty  ENT    Code Status  Full Code    Disposition  Medically Cleared  I have placed the appropriate orders for post-discharge needs.    Review of Systems  Review of Systems   Unable to perform ROS: Medical condition        Physical Exam  Temp:  [35.7 °C (96.3 °F)-36.3 °C (97.4 °F)] 36.3 °C (97.4 °F)  Pulse:  [58-80] 71  Resp:  [13-22] 20  BP: (107-117)/(59-71) 112/71  SpO2:  [88 %-94 %] 93 %    Physical Exam  Vitals reviewed.   Constitutional:       General: He is not in acute distress.  HENT:      Head: Normocephalic and atraumatic. No contusion.      Nose: Nose normal.      Mouth/Throat:      Pharynx: No oropharyngeal exudate.   Eyes:      Pupils: Pupils are equal, round, and reactive to light.   Cardiovascular:      Rate and Rhythm: Normal rate and regular rhythm.   Pulmonary:      Effort: Pulmonary effort is normal.      Breath sounds: No wheezing or rhonchi.      Comments: Tracheostomy  Abdominal:      General: Bowel sounds are normal.      Palpations: Abdomen is soft.      Comments: PEG tube   Musculoskeletal:         General: Deformity present.      Cervical back: No rigidity. No muscular tenderness.   Skin:     General: Skin is warm and dry.      Coloration: Skin is not jaundiced.   Neurological:      General: No focal deficit present.      Mental Status: He is alert.      Cranial Nerves: No cranial nerve deficit.      Comments: Not following commands   Psychiatric:         Mood and Affect: Mood normal.     I performed physical exam on March 15, 2024 it remains similar without any acute changes.    Fluids    Intake/Output Summary (Last 24 hours) at 3/15/2024 1727  Last data filed at 3/15/2024 1704  Gross per 24 hour   Intake 180 ml   Output 3200 ml   Net -3020 ml       Laboratory  Recent Labs     03/13/24  1738 03/14/24  0806 03/15/24  0323   WBC 8.5 6.3 4.3*   RBC 4.26* 3.62*  3.37*   HEMOGLOBIN 14.8 12.4* 11.4*   HEMATOCRIT 42.4 36.6* 34.7*   MCV 99.5* 101.1* 103.0*   MCH 34.7* 34.3* 33.8*   MCHC 34.9 33.9 32.9   RDW 53.1* 53.4* 55.9*   PLATELETCT 330 282 257   MPV 8.0* 8.2* 8.3*     Recent Labs     03/13/24  1738 03/14/24  0806 03/15/24  0323   SODIUM 134* 133* 140   POTASSIUM 4.1 3.8 3.6   CHLORIDE 101 103 110   CO2 22 21 18*   GLUCOSE 94 94 81   BUN 13 10 7*   CREATININE <0.17* <0.17* <0.17*   CALCIUM 8.8 8.2* 8.0*     Recent Labs     03/13/24  1738   INR 1.08               Imaging  DX-CHEST-LIMITED (1 VIEW)   Final Result         Mild diffuse pulmonary opacities may be due to edema or infiltrates. More pronounced opacity and volume loss in the right lung base.      DX-CHEST-PORTABLE (1 VIEW)   Final Result         Tracheotomy tube is seen. Small right pleural effusion.           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  Found to have erosion and granulation  ENT surgery has been following him  Now plan is to move him out from [facility].  Oxygen saturation looks within normal limits during my evaluation.    Acute respiratory failure with hypoxia (HCC)  Assessment & Plan  Patient presented with acute respiratory failure with hypoxia.  Now significantly improved  He remains a stable pain  Plan is to transfer him out from [facility].    ACP (advance care planning)  Assessment & Plan  Full code    Seizure disorder (HCC)  Assessment & Plan  3/15/2024   Continue home medication.   Per mother:  Keppra 1500 mg at 7:30 AM and 7:30 PM  Phenobarbital 20 mg/ 5 mL at 7:30 AM and 7:30 PM  Topiramate 150 mg q am at 8:15 AM, and 175 mg at 8:15 PM  Vimpat 300 mg q am and q pm 9:15 AM and 9:15 PM  Klonopin 0.5 mg at 9:15 am and 9:15 PM  Seizure precautions.  Continue to monitor closely.    Hyponatremia- (present on admission)  Assessment & Plan  Now resolved        I had a lengthy discussion again with patient's mother  Susana at the bedside regarding Mr. Edwards current medical condition and plan of care.    I discussed plan of care during multidisciplinary rounds regarding patient's current medical condition and plan of care.      >51 minutes total time spent in records review, lab/radiology assessment, patient history and assessment, and directing plan of care with high level medical decision making complexity. See orders.      VTE prophylaxis:    enoxaparin ppx      I have performed a physical exam and reviewed and updated ROS and Plan today (3/15/2024). In review of yesterday's note (3/14/2024), there are no changes except as documented above.

## 2024-03-16 NOTE — PROGRESS NOTES
Hospital Medicine Daily Progress Note    Date of Service  3/16/2024    Chief Complaint  Ruben Edwards is a 43 y.o. male admitted 3/13/2024 with concerns for tracheostomy obstruction    Hospital Course    Ruben Edwards is a 43 y.o. male who presented 3/13/2024 with concerns for tracheostomy obstruction.  Patient had a tracheostomy tube placed over 20 years ago due to head injury.  For the last 2 days, patient has been noted to have several episodes of desaturation, and for shortness of breath, and difficulties with suctioning at home.      In ER, patient saturating 90% with tracheostomy tube.  ENT was consulted, patient had his tracheostomy tube exchanged and Bivona was secured in place.      Interval Problem Update  I evaluated the patient at bedside and met with the patient's mother who is the primary caretaker.  She reports that patient had a seizure-like activity this morning.  She states he usually gets these when there is an underlying infection.  I ordered a chest x-ray which revealed some bilateral pulmonary opacities which appear stable compared to prior x-ray.  I ordered a procalcitonin which returned negative.  He has been afebrile without leukocytosis therefore my suspicion for pneumonia is low.  Patient likely has some aspiration pneumonitis, will continue to monitor his CBC and vitals closely.  Ordered chest physiotherapy      I have discussed this patient's plan of care and discharge plan at IDT rounds today with Case Management, Nursing, Nursing leadership, and other members of the IDT team.    Consultants/Specialty  ENT    Code Status  Full Code    Disposition  The patient is not medically cleared for discharge to home or a post-acute facility.      I have placed the appropriate orders for post-discharge needs.    Review of Systems  Review of Systems   Unable to perform ROS: Medical condition        Physical Exam  Temp:  [36.1 °C (97 °F)-36.5 °C (97.7 °F)] 36.1 °C (97 °F)  Pulse:  [57-75] 66  Resp:   [13-20] 20  BP: (109-142)/(63-85) 118/69  SpO2:  [91 %-96 %] 94 %    Physical Exam  Vitals reviewed.   Constitutional:       General: He is not in acute distress.  HENT:      Head: Normocephalic and atraumatic. No contusion.      Comments: Trach in place     Nose: Nose normal.      Mouth/Throat:      Pharynx: No oropharyngeal exudate.   Eyes:      Pupils: Pupils are equal, round, and reactive to light.   Cardiovascular:      Rate and Rhythm: Normal rate and regular rhythm.   Pulmonary:      Effort: Pulmonary effort is normal.      Breath sounds: No wheezing or rhonchi.   Abdominal:      General: Bowel sounds are normal.      Palpations: Abdomen is soft.      Comments: PEG tube   Musculoskeletal:         General: Deformity present.      Cervical back: No rigidity. No muscular tenderness.   Skin:     General: Skin is warm and dry.      Coloration: Skin is not jaundiced.   Neurological:      General: No focal deficit present.      Mental Status: He is alert.      Cranial Nerves: No cranial nerve deficit.      Comments: Not following commands   Psychiatric:         Mood and Affect: Mood normal.     I performed physical exam on March 15, 2024 it remains similar without any acute changes.    Fluids    Intake/Output Summary (Last 24 hours) at 3/16/2024 1335  Last data filed at 3/15/2024 2000  Gross per 24 hour   Intake 60 ml   Output 600 ml   Net -540 ml       Laboratory  Recent Labs     03/13/24 1738 03/14/24 0806 03/15/24  0323   WBC 8.5 6.3 4.3*   RBC 4.26* 3.62* 3.37*   HEMOGLOBIN 14.8 12.4* 11.4*   HEMATOCRIT 42.4 36.6* 34.7*   MCV 99.5* 101.1* 103.0*   MCH 34.7* 34.3* 33.8*   MCHC 34.9 33.9 32.9   RDW 53.1* 53.4* 55.9*   PLATELETCT 330 282 257   MPV 8.0* 8.2* 8.3*     Recent Labs     03/13/24 1738 03/14/24  0806 03/15/24  0323   SODIUM 134* 133* 140   POTASSIUM 4.1 3.8 3.6   CHLORIDE 101 103 110   CO2 22 21 18*   GLUCOSE 94 94 81   BUN 13 10 7*   CREATININE <0.17* <0.17* <0.17*   CALCIUM 8.8 8.2* 8.0*     Recent  Labs     03/13/24  1738   INR 1.08               Imaging  DX-CHEST-PORTABLE (1 VIEW)   Final Result      No significant change from prior exam.      DX-CHEST-LIMITED (1 VIEW)   Final Result         Mild diffuse pulmonary opacities may be due to edema or infiltrates. More pronounced opacity and volume loss in the right lung base.      DX-CHEST-PORTABLE (1 VIEW)   Final Result         Tracheotomy tube is seen. Small right pleural effusion.           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  Found to have posterior tracheal erosion   Plan to reassess on Monday      Acute respiratory failure with hypoxia (HCC)- (present on admission)  Assessment & Plan  Currently requiring 3L of oxygen  Patient likely had aspiration   RT protocol  Chest physiotherapy    ACP (advance care planning)- (present on admission)  Assessment & Plan  Full code    Seizure disorder (HCC)- (present on admission)  Assessment & Plan  3/15/2024   Continue home medication.   Per mother:  Keppra 1500 mg at 7:30 AM and 7:30 PM  Phenobarbital 20 mg/ 5 mL at 7:30 AM and 7:30 PM  Topiramate 150 mg q am at 8:15 AM, and 175 mg at 8:15 PM  Vimpat 300 mg q am and q pm 9:15 AM and 9:15 PM  Klonopin 0.5 mg at 9:15 am and 9:15 PM  Seizure precautions.  Continue to monitor closely.    Hyponatremia- (present on admission)  Assessment & Plan  Now resolved    Aspiration pneumonitis (HCC)- (present on admission)  Assessment & Plan  Chest x-ray reveals bilateral opacities greater on right side.  Patient is afebrile without leukocytosis and procalcitonin is negative.  Low suspicion for pneumonia at this time.  Continue to monitor closely and will start empiric antibiotics if his condition changes            VTE prophylaxis:     I have performed a physical exam and reviewed and updated ROS and Plan today (3/16/2024). In review of yesterday's note (3/15/2024), there are no changes  except as documented above.    I spent 52 minutes, reviewing the chart, obtaining and/or reviewing separately obtained history. Performing a medically appropriate examination and evaluation.  Counseling and educating the patient's Mother/caregiver. Ordering and reviewing medications, tests, or procedures.  Documenting clinical information in EPIC. Independently interpreting results and communicating results to patient. Discussing future disposition of care with patient's Mother, RN and case management.

## 2024-03-16 NOTE — ASSESSMENT & PLAN NOTE
Chest x-ray reveals bilateral opacities greater on right side.  Patient is afebrile without leukocytosis and procalcitonin is negative.  Low suspicion for pneumonia at this time.  Continue to monitor closely and will start empiric antibiotics if his condition changes

## 2024-03-16 NOTE — PROGRESS NOTES
Pt transferred to [place] [id number]. monitor room notified. Pt left unit via hospital bed with this RN. Personal belongings with pt when leaving unit. Family notified. Hand off report given to MARE Levine.

## 2024-03-16 NOTE — CARE PLAN
Problem: Knowledge Deficit - Standard  Goal: Patient and family/care givers will demonstrate understanding of plan of care, disease process/condition, diagnostic tests and medications  Outcome: Progressing     Problem: Skin Integrity  Goal: Skin integrity is maintained or improved  Outcome: Progressing     Problem: Fall Risk  Goal: Patient will remain free from falls  Outcome: Progressing     Problem: Hemodynamics  Goal: Patient's hemodynamics, fluid balance and neurologic status will be stable or improve  Outcome: Progressing     Problem: Respiratory  Goal: Patient will achieve/maintain optimum respiratory ventilation and gas exchange  Outcome: Progressing     Problem: Urinary Elimination  Goal: Establish and maintain regular urinary output  Outcome: Progressing     Problem: Pain - Standard  Goal: Alleviation of pain or a reduction in pain to the patient’s comfort goal  Outcome: Progressing     Problem: Psychosocial  Goal: Patient's level of anxiety will decrease  Outcome: Progressing  Goal: Patient's ability to verbalize feelings about condition will improve  Outcome: Progressing  Goal: Patient's ability to re-evaluate and adapt role responsibilities will improve  Outcome: Progressing  Goal: Patient and family will demonstrate ability to cope with life altering diagnosis and/or procedure  Outcome: Progressing  Goal: Spiritual and cultural needs incorporated into hospitalization  Outcome: Progressing     Problem: Communication  Goal: The ability to communicate needs accurately and effectively will improve  Outcome: Progressing     Problem: Discharge Barriers/Planning  Goal: Patient's continuum of care needs are met  Outcome: Progressing     Problem: Chest Tube Management  Goal: Complications related to chest tube will be avoided or minimized  Outcome: Progressing     Problem: Fluid Volume  Goal: Fluid volume balance will be maintained  Outcome: Progressing     Problem: Mechanical Ventilation  Goal: Safe  management of artificial airway and ventilation  Outcome: Progressing  Goal: Successful weaning off mechanical ventilator, spontaneously maintains adequate gas exchange  Outcome: Progressing  Goal: Patient will be able to express needs and understand communication  Outcome: Progressing     Problem: Dysphagia  Goal: Dysphagia will improve  Outcome: Progressing     Problem: Risk for Aspiration  Goal: Patient's risk for aspiration will be absent or decrease  Outcome: Progressing     Problem: Nutrition  Goal: Patient's nutritional and fluid intake will be adequate or improve  Outcome: Progressing  Goal: Enteral nutrition will be maintained or improve  Outcome: Progressing  Goal: Enteral nutrition will be maintained or improve  Outcome: Progressing     Problem: Bowel Elimination  Goal: Establish and maintain regular bowel function  Outcome: Progressing     Problem: Gastrointestinal Irritability  Goal: Nausea and vomiting will be absent or improve  Outcome: Progressing  Goal: Diarrhea will be absent or improved  Outcome: Progressing     Problem: Rectal Tube  Goal: Fecal output will be contained and skin will remain free from irritation  Outcome: Progressing     Problem: Mobility  Goal: Patient's capacity to carry out activities will improve  Outcome: Progressing     Problem: Self Care  Goal: Patient will have the ability to perform ADLs independently or with assistance (bathe, groom, dress, toilet and feed)  Outcome: Progressing     Problem: Infection - Standard  Goal: Patient will remain free from infection  Outcome: Progressing     Problem: Wound/ / Incision Healing  Goal: Patient's wound/surgical incision will decrease in size and heals properly  Outcome: Progressing   The patient is Watcher - Medium risk of patient condition declining or worsening    Shift Goals  Clinical Goals: Monitor Oxygen, ENT consult  Patient Goals: Updates  Family Goals: Comfort    Progress made toward(s) clinical / shift goals:  Monitor  oxygen, safety, Seizure precautions     Patient is not progressing towards the following goals:

## 2024-03-16 NOTE — CARE PLAN
The patient is Stable - Low risk of patient condition declining or worsening    Shift Goals  Clinical Goals: Monitor O2 sats, Suction PRN, comfort  Patient Goals: TRAVIS  Family Goals: rest, comfort    Progress made toward(s) clinical / shift goals:      Problem: Knowledge Deficit - Standard  Goal: Patient and family/care givers will demonstrate understanding of plan of care, disease process/condition, diagnostic tests and medications  Outcome: Progressing     Problem: Skin Integrity  Goal: Skin integrity is maintained or improved  Outcome: Progressing     Problem: Hemodynamics  Goal: Patient's hemodynamics, fluid balance and neurologic status will be stable or improve  Outcome: Progressing     Problem: Respiratory  Goal: Patient will achieve/maintain optimum respiratory ventilation and gas exchange  Outcome: Progressing     Problem: Urinary Elimination  Goal: Establish and maintain regular urinary output  Outcome: Progressing     Problem: Mechanical Ventilation  Goal: Safe management of artificial airway and ventilation  Outcome: Progressing     Problem: Nutrition  Goal: Enteral nutrition will be maintained or improve  Outcome: Progressing     Problem: Wound/ / Incision Healing  Goal: Patient's wound/surgical incision will decrease in size and heals properly  Outcome: Progressing       Patient is not progressing towards the following goals:

## 2024-03-16 NOTE — PROGRESS NOTES
Received bedside report from RN, pt care assumed, VSS, pt assessment complete. Pt AAOx0, with no signs of pain at this time. No signs of acute distress noted at this time. Plan of care discussed with pt's family and verbalizes no questions. Bed locked/in lowest position, bed alarm on, pt's family educated on fall risk and verbalized understanding, call light within reach, hourly rounding initiated.

## 2024-03-16 NOTE — PROGRESS NOTES
"Report received from Luisana GARVEY POC discussed.   1830 Pt arrived to unit via hospital bed, mother at bedside. Pt is non verbal, eyes close. Per mother \"He's in coma, and he likes quite environments\" RT at bedside.   "

## 2024-03-17 LAB
ANION GAP SERPL CALC-SCNC: 11 MMOL/L (ref 7–16)
BUN SERPL-MCNC: 8 MG/DL (ref 8–22)
CALCIUM SERPL-MCNC: 8.6 MG/DL (ref 8.5–10.5)
CHLORIDE SERPL-SCNC: 105 MMOL/L (ref 96–112)
CO2 SERPL-SCNC: 19 MMOL/L (ref 20–33)
CREAT SERPL-MCNC: <0.17 MG/DL (ref 0.5–1.4)
ERYTHROCYTE [DISTWIDTH] IN BLOOD BY AUTOMATED COUNT: 56.8 FL (ref 35.9–50)
GFR SERPLBLD CREATININE-BSD FMLA CKD-EPI: 179 ML/MIN/1.73 M 2
GLUCOSE SERPL-MCNC: 80 MG/DL (ref 65–99)
HCT VFR BLD AUTO: 36.5 % (ref 42–52)
HGB BLD-MCNC: 12.4 G/DL (ref 14–18)
MCH RBC QN AUTO: 34.5 PG (ref 27–33)
MCHC RBC AUTO-ENTMCNC: 34 G/DL (ref 32.3–36.5)
MCV RBC AUTO: 101.7 FL (ref 81.4–97.8)
PLATELET # BLD AUTO: 249 K/UL (ref 164–446)
PMV BLD AUTO: 8.3 FL (ref 9–12.9)
POTASSIUM SERPL-SCNC: 3.8 MMOL/L (ref 3.6–5.5)
RBC # BLD AUTO: 3.59 M/UL (ref 4.7–6.1)
SODIUM SERPL-SCNC: 135 MMOL/L (ref 135–145)
WBC # BLD AUTO: 5.6 K/UL (ref 4.8–10.8)

## 2024-03-17 PROCEDURE — 770001 HCHG ROOM/CARE - MED/SURG/GYN PRIV*

## 2024-03-17 PROCEDURE — 700102 HCHG RX REV CODE 250 W/ 637 OVERRIDE(OP): Performed by: STUDENT IN AN ORGANIZED HEALTH CARE EDUCATION/TRAINING PROGRAM

## 2024-03-17 PROCEDURE — 700102 HCHG RX REV CODE 250 W/ 637 OVERRIDE(OP): Performed by: INTERNAL MEDICINE

## 2024-03-17 PROCEDURE — 94640 AIRWAY INHALATION TREATMENT: CPT

## 2024-03-17 PROCEDURE — A9270 NON-COVERED ITEM OR SERVICE: HCPCS | Performed by: STUDENT IN AN ORGANIZED HEALTH CARE EDUCATION/TRAINING PROGRAM

## 2024-03-17 PROCEDURE — 99233 SBSQ HOSP IP/OBS HIGH 50: CPT | Performed by: INTERNAL MEDICINE

## 2024-03-17 PROCEDURE — A9270 NON-COVERED ITEM OR SERVICE: HCPCS | Performed by: INTERNAL MEDICINE

## 2024-03-17 PROCEDURE — 700101 HCHG RX REV CODE 250: Performed by: STUDENT IN AN ORGANIZED HEALTH CARE EDUCATION/TRAINING PROGRAM

## 2024-03-17 PROCEDURE — 80048 BASIC METABOLIC PNL TOTAL CA: CPT

## 2024-03-17 PROCEDURE — 36415 COLL VENOUS BLD VENIPUNCTURE: CPT

## 2024-03-17 PROCEDURE — 94669 MECHANICAL CHEST WALL OSCILL: CPT

## 2024-03-17 PROCEDURE — 85027 COMPLETE CBC AUTOMATED: CPT

## 2024-03-17 PROCEDURE — 99222 1ST HOSP IP/OBS MODERATE 55: CPT | Performed by: PSYCHIATRY & NEUROLOGY

## 2024-03-17 RX ORDER — LACOSAMIDE 100 MG/1
100 TABLET ORAL 2 TIMES DAILY
Status: DISCONTINUED | OUTPATIENT
Start: 2024-03-17 | End: 2024-03-18

## 2024-03-17 RX ORDER — LACOSAMIDE 100 MG/1
200 TABLET ORAL 2 TIMES DAILY
Status: DISCONTINUED | OUTPATIENT
Start: 2024-03-17 | End: 2024-03-18

## 2024-03-17 RX ORDER — LACOSAMIDE 100 MG/1
300 TABLET ORAL 2 TIMES DAILY
Status: DISCONTINUED | OUTPATIENT
Start: 2024-03-17 | End: 2024-03-17

## 2024-03-17 RX ORDER — LEVETIRACETAM 500 MG/1
1500 TABLET ORAL 2 TIMES DAILY
Status: DISCONTINUED | OUTPATIENT
Start: 2024-03-17 | End: 2024-03-19 | Stop reason: HOSPADM

## 2024-03-17 RX ADMIN — LEVETIRACETAM 1500 MG: 500 TABLET, FILM COATED ORAL at 06:46

## 2024-03-17 RX ADMIN — CLONAZEPAM 0.5 MG: 1 TABLET ORAL at 21:24

## 2024-03-17 RX ADMIN — BISACODYL 10 MG: 10 SUPPOSITORY RECTAL at 06:01

## 2024-03-17 RX ADMIN — LACOSAMIDE 300 MG: 100 TABLET, FILM COATED ORAL at 12:40

## 2024-03-17 RX ADMIN — PHENOBARBITAL 120 MG: 20 ELIXIR ORAL at 19:42

## 2024-03-17 RX ADMIN — LACOSAMIDE 100 MG: 100 TABLET, FILM COATED ORAL at 21:23

## 2024-03-17 RX ADMIN — TOPIRAMATE 175 MG: 100 TABLET, FILM COATED ORAL at 20:17

## 2024-03-17 RX ADMIN — CLONAZEPAM 0.5 MG: 1 TABLET ORAL at 09:40

## 2024-03-17 RX ADMIN — LACOSAMIDE 200 MG: 100 TABLET, FILM COATED ORAL at 21:23

## 2024-03-17 RX ADMIN — LEVETIRACETAM 1500 MG: 500 TABLET, FILM COATED ORAL at 19:42

## 2024-03-17 RX ADMIN — PHENOBARBITAL 120 MG: 20 ELIXIR ORAL at 06:47

## 2024-03-17 RX ADMIN — TOPIRAMATE 150 MG: 100 TABLET, FILM COATED ORAL at 06:01

## 2024-03-17 ASSESSMENT — PATIENT HEALTH QUESTIONNAIRE - PHQ9
1. LITTLE INTEREST OR PLEASURE IN DOING THINGS: NOT AT ALL
2. FEELING DOWN, DEPRESSED, IRRITABLE, OR HOPELESS: NOT AT ALL
SUM OF ALL RESPONSES TO PHQ9 QUESTIONS 1 AND 2: 0

## 2024-03-17 ASSESSMENT — LIFESTYLE VARIABLES
TOTAL SCORE: 0
HOW MANY TIMES IN THE PAST YEAR HAVE YOU HAD 5 OR MORE DRINKS IN A DAY: 0
EVER FELT BAD OR GUILTY ABOUT YOUR DRINKING: NO
TOTAL SCORE: 0
EVER HAD A DRINK FIRST THING IN THE MORNING TO STEADY YOUR NERVES TO GET RID OF A HANGOVER: NO
CONSUMPTION TOTAL: NEGATIVE
HAVE YOU EVER FELT YOU SHOULD CUT DOWN ON YOUR DRINKING: NO
ALCOHOL_USE: NO
HAVE PEOPLE ANNOYED YOU BY CRITICIZING YOUR DRINKING: NO
ON A TYPICAL DAY WHEN YOU DRINK ALCOHOL HOW MANY DRINKS DO YOU HAVE: 0
TOTAL SCORE: 0
AVERAGE NUMBER OF DAYS PER WEEK YOU HAVE A DRINK CONTAINING ALCOHOL: 0

## 2024-03-17 ASSESSMENT — COGNITIVE AND FUNCTIONAL STATUS - GENERAL
SUGGESTED CMS G CODE MODIFIER DAILY ACTIVITY: CN
CLIMB 3 TO 5 STEPS WITH RAILING: TOTAL
PERSONAL GROOMING: TOTAL
STANDING UP FROM CHAIR USING ARMS: TOTAL
DRESSING REGULAR UPPER BODY CLOTHING: TOTAL
DAILY ACTIVITIY SCORE: 6
WALKING IN HOSPITAL ROOM: TOTAL
MOBILITY SCORE: 6
EATING MEALS: TOTAL
HELP NEEDED FOR BATHING: TOTAL
MOVING FROM LYING ON BACK TO SITTING ON SIDE OF FLAT BED: TOTAL
SUGGESTED CMS G CODE MODIFIER MOBILITY: CN
TOILETING: TOTAL
DRESSING REGULAR LOWER BODY CLOTHING: TOTAL
MOVING TO AND FROM BED TO CHAIR: TOTAL
TURNING FROM BACK TO SIDE WHILE IN FLAT BAD: TOTAL

## 2024-03-17 ASSESSMENT — FIBROSIS 4 INDEX: FIB4 SCORE: 1.04

## 2024-03-17 NOTE — PROGRESS NOTES
Pts mother questioning why keppra was discontinued as it is a home medication. Spoke with Mark in pharmacy. New orders placed.

## 2024-03-17 NOTE — PROGRESS NOTES
Pts mother stating that pt had a 10 sec seizure. This incident was not witnessed by primary RN. Patient resting without distress

## 2024-03-17 NOTE — PROGRESS NOTES
Mother states unwitnessed seizure of 10 seconds that presented as facial grimacing and leg twitch. Hospitalist notified and Neurology consulted. Patient assessed, resting without distress

## 2024-03-17 NOTE — PROGRESS NOTES
Received bedside report from RN, pt care assumed, VSS, pt assessment complete. Pt AAOx0, with no signs of pain at this time. No signs of acute distress noted at this time. Plan of care discussed with pt and verbalizes no questions. Pt denies any additional needs at this time. Bed locked/in lowest position, bed alarm on, pt educated on fall risk and verbalized understanding, call light within reach, hourly rounding initiated.

## 2024-03-17 NOTE — PROGRESS NOTES
Patient's family member wants to give the patient the home medication Vimpat for his seizures. She says that the the one we are giving is not the same and he will continue to have seizures. This RN talked to the Neurologist and Pharmacy concerning this, pharmacy is trying to find a solution as this is a controlled substance. This RN explained the situation to the family member and informed her that it was  a work in progress. The family member is concerned that the medication is three hours late. Pharmacy has been escalating to her leadership and leadership is going to communicated directly with the hospitalist and neurologist to get this figured out.

## 2024-03-17 NOTE — PROGRESS NOTES
Patient's mother stated patient presented with 24 to 45 second seizure at 0730. This incident was not witnessed by primary RN at the time. Primary RN assessed patient and notified Hospitalist, orders received for chest xray and  serum procalcitonin. Patient resting without distress.

## 2024-03-17 NOTE — CARE PLAN
Problem: Bronchopulmonary Hygiene  Goal: Increase mobilization of retained secretions  Description: Target End Date:  2 to 3 days    1.  Perform bronchopulmonary therapy as indicated by assessment  2.  Perform airway suctioning  3.  Perform actions to maintain patient airway  Outcome: Progressing     Problem: Aerosol Therapy  Goal: Improved hydration/ability to mobilize secretions and/or decreased airway edema  Description: Target End Date:  resolve prior to discharge or when underlying condition is resolved/stabilized    1.  Implement heated or cool aerosol therapy  2.  Assessed for optimal hydration, decreased edema and/or improved ability to mobilize secretions  Outcome: Progressing   T-Piece 4L/28%  CPT QID

## 2024-03-17 NOTE — CONSULTS
Neurology Initial Consult H&P  Neurohospitalist Service, [facility]    Referring Physician: Pepe Lees M.D.    Chief Complaint   Patient presents with    Shortness of Breath     Pt transported here by ambulance from [facility] for concerns of tracheostomy obstruction.  Per pt's mother, she has been having difficulty with suctioning pt at home due to suction getting stuck. Pt was seen by his general surgeon and inner cannular changed today but they continue to have difficulty with suctioning the trach. Pt satting in low 90s on room air at this time.  Pt transferred here for ENT consult.        HPI: Ruben Edwards is a 43 y.o. man with a hx of TBI and seizures admitted with a tracheostomy obstruction. At baseline, he has sleep wake cycles, but otherwise does not speak, follow commands, or move his extremities. His mother is his primary caregiver, and she states when adequately treated, he does not have seizures unless he has an infection. Over the past 3 days, he has had 1-2 breakthrough seizures daily. No clear evidence of infection has been found. Of note, his mother states he has had breakthrough seizure when his brand name Vimpat is changed to generic lacosamide. She requests that she be allowed to give the home Vimpat.      Review of systems: In addition to what is detailed in the HPI above, (and scanned into the chart if and when applicable), all other systems reviewed and are negative.    Past Medical History:    has a past medical history of Seizure (HCC) and Traumatic brain injury (HCC) (05/15/1998).    FHx:  family history is not on file.    SHx:   reports that he has never smoked. He has never used smokeless tobacco. He reports current drug use. Drug: Oral. He reports that he does not drink alcohol.    Allergies:  Allergies   Allergen Reactions    Sulfa Drugs Unspecified and Rash     Per caretaker, mother is allergic to sulfa so believes her son could be as well     Doxycycline Hyclate      Other reaction(s): Seizure (finding)    Chlorhexidine Rash     Mom refuses CHG bath       Medications:    Current Facility-Administered Medications:     levETIRAcetam (Keppra) tablet 1,500 mg, 1,500 mg, Enteral Tube, BID, Pepe Lees M.D., 1,500 mg at 03/17/24 0646    Vimpat (lacosamide) tablet 100 mg, 100 mg, Oral, BID **AND** Vimpat (lacosamide) tablet 200 mg, 200 mg, Oral, BID, Pepe Lees M.D.    Pain Pump (Patient Supplied) Device, , Injection, Continuous, Pepe Lees M.D., Given at 03/16/24 2000    bisacodyl (Dulcolax) suppository 10 mg, 10 mg, Rectal, DAILY, Zhen Thomas M.D., 10 mg at 03/17/24 0601    topiramate (Topamax) tablet 150 mg, 150 mg, Enteral Tube, DAILY, Jon Syed M.D., 150 mg at 03/17/24 0601    clonazePAM (KlonoPIN) tablet 0.5 mg, 0.5 mg, Enteral Tube, BID, Jon Syed M.D., 0.5 mg at 03/17/24 0940    PHENobarbital solution (NICU/PEDS) 120 mg, 120 mg, Enteral Tube, BID, Jon Syed M.D., 120 mg at 03/17/24 0647    topiramate (Topamax) tablet 175 mg, 175 mg, Enteral Tube, QHS, Jon Syed M.D., 175 mg at 03/16/24 2058    Pharmacy Consult: Enteral tube insertion - review meds/change route/product selection, 1 Each, Other, PHARMACY TO DOSE, Zhen Thomas M.D.    enoxaparin (Lovenox) inj 40 mg, 40 mg, Subcutaneous, DAILY AT 1800, Jon Syed M.D.    Physical Examination:     Vitals:    03/17/24 1039 03/17/24 1107 03/17/24 1326 03/17/24 1410   BP:  122/75 124/73    Pulse: 70 65  65   Resp: 17 17  17   Temp:  36.2 °C (97.2 °F)     TempSrc:  Temporal     SpO2: 94% 95% 94% 94%   Weight:       Height:           General: Patient is lying in NAD    NEUROLOGICAL EXAM:     MS: eyes closed; no response to voice/pain  CN: PEERL, no gaze preference, does not blink to threat; no obvious facial asymmetry; unable to test uvular lift and tongue protrusion, SCM and trap; Hearing intact grossly.  Fundus not visualized  MOT:    Diffusely contacted; no response to pain  SENS: no response to pain throughout  DTR: 2+ and symmetric  COOR: unable to test  Gait: unable to test      Objective Data:    Labs:  Lab Results   Component Value Date/Time    PROTHROMBTM 14.2 03/13/2024 05:38 PM    INR 1.08 03/13/2024 05:38 PM      Lab Results   Component Value Date/Time    WBC 5.6 03/17/2024 03:32 AM    RBC 3.59 (L) 03/17/2024 03:32 AM    HEMOGLOBIN 12.4 (L) 03/17/2024 03:32 AM    HEMATOCRIT 36.5 (L) 03/17/2024 03:32 AM    .7 (H) 03/17/2024 03:32 AM    MCH 34.5 (H) 03/17/2024 03:32 AM    MCHC 34.0 03/17/2024 03:32 AM    MPV 8.3 (L) 03/17/2024 03:32 AM    NEUTSPOLYS 77.50 (H) 03/14/2024 08:06 AM    LYMPHOCYTES 14.50 (L) 03/14/2024 08:06 AM    MONOCYTES 6.50 03/14/2024 08:06 AM    EOSINOPHILS 0.60 03/14/2024 08:06 AM    EOSINOPHILS 3 06/25/2017 02:40 PM    BASOPHILS 0.60 03/14/2024 08:06 AM    HYPOCHROMIA 1+ 02/26/2018 05:50 AM    ANISOCYTOSIS 2+ (A) 02/25/2022 02:17 AM      Lab Results   Component Value Date/Time    SODIUM 135 03/17/2024 03:32 AM    POTASSIUM 3.8 03/17/2024 03:32 AM    CHLORIDE 105 03/17/2024 03:32 AM    CO2 19 (L) 03/17/2024 03:32 AM    GLUCOSE 80 03/17/2024 03:32 AM    BUN 8 03/17/2024 03:32 AM    CREATININE <0.17 (L) 03/17/2024 03:32 AM      Lab Results   Component Value Date/Time    TRIGLYCERIDE 40 02/18/2018 05:40 PM       Lab Results   Component Value Date/Time    ALKPHOSPHAT 434 (H) 03/15/2024 03:23 AM    ASTSGOT 32 03/15/2024 03:23 AM    ALTSGPT 28 03/15/2024 03:23 AM    TBILIRUBIN 0.2 03/15/2024 03:23 AM        Imaging/Testing:    N/A    Assessment and Plan:    Breakthrough seizures - possibly from changing from brand name vimpat to lacosamide. Agree with infectious workup per primary team. He has received vimpat brought from home.    Plan:  Continue home AED doses  Infectious workup ongoing per primary team  No further recommendations at this time; will sign off.      Chuck Au MD  Board Certified Neurology,  ABPN

## 2024-03-17 NOTE — CARE PLAN
The patient is Stable - Low risk of patient condition declining or worsening    Shift Goals  Clinical Goals: monitor for seizures, monitor O2  Patient Goals: TRAVIS  Family Goals: rest, comfort    Progress made toward(s) clinical / shift goals:      Problem: Knowledge Deficit - Standard  Goal: Patient and family/care givers will demonstrate understanding of plan of care, disease process/condition, diagnostic tests and medications  Outcome: Progressing     Problem: Skin Integrity  Goal: Skin integrity is maintained or improved  Outcome: Progressing     Problem: Hemodynamics  Goal: Patient's hemodynamics, fluid balance and neurologic status will be stable or improve  Outcome: Progressing     Problem: Respiratory  Goal: Patient will achieve/maintain optimum respiratory ventilation and gas exchange  Outcome: Progressing     Problem: Pain - Standard  Goal: Alleviation of pain or a reduction in pain to the patient’s comfort goal  Outcome: Progressing       Patient is not progressing towards the following goals:

## 2024-03-17 NOTE — CARE PLAN
Problem: Bronchopulmonary Hygiene  Goal: Increase mobilization of retained secretions  Description: Target End Date:  2 to 3 days    1.  Perform bronchopulmonary therapy as indicated by assessment  2.  Perform airway suctioning  3.  Perform actions to maintain patient airway  Outcome: Progressing     Problem: Aerosol Therapy  Goal: Improved hydration/ability to mobilize secretions and/or decreased airway edema  Description: Target End Date:  resolve prior to discharge or when underlying condition is resolved/stabilized    1.  Implement heated or cool aerosol therapy  2.  Assessed for optimal hydration, decreased edema and/or improved ability to mobilize secretions  Outcome: Progressing   4L/28% T-Piece  CPT QID

## 2024-03-17 NOTE — PROGRESS NOTES
Hospital Medicine Daily Progress Note    Date of Service  3/17/2024    Chief Complaint  Ruben Edwards is a 43 y.o. male admitted 3/13/2024 with concerns for tracheostomy obstruction    Hospital Course    Ruben Edwards is a 43 y.o. male who presented 3/13/2024 with concerns for tracheostomy obstruction.  Patient had a tracheostomy tube placed over 20 years ago due to head injury.  For the last 2 days, patient has been noted to have several episodes of desaturation, and for shortness of breath, and difficulties with suctioning at home.      In ER, patient saturating 90% with tracheostomy tube.  ENT was consulted, patient had his tracheostomy tube exchanged and Bivona was secured in place.      Interval Problem Update  3/16 I evaluated the patient at bedside and met with the patient's mother who is the primary caretaker.  She reports that patient had a seizure-like activity this morning.  She states he usually gets these when there is an underlying infection.  I ordered a chest x-ray which revealed some bilateral pulmonary opacities which appear stable compared to prior x-ray.  I ordered a procalcitonin which returned negative.  He has been afebrile without leukocytosis therefore my suspicion for pneumonia is low.  Patient likely has some aspiration pneumonitis, will continue to monitor his CBC and vitals closely.  Ordered chest physiotherapy    3/17 Per mother patient had an episode of seizure overnight and this morning before receiving his meds. Patient's mother requesting that his home medication brand name vimpat be resumed instead of generlic lacosamide as he has previusly had breakthrough seizures from using the generic and his primary neurologist recommended using brand name vimpat over the generic.  I have asked pharmacy to allow patient to receive home dose of brand name vimpat instead of generic but it was declined. I have consulted Neurology for further management of seizures     I have discussed this patient's  plan of care and discharge plan at IDT rounds today with Case Management, Nursing, Nursing leadership, and other members of the IDT team.    Consultants/Specialty  ENT    Code Status  Full Code    Disposition  The patient is not medically cleared for discharge to home or a post-acute facility.      I have placed the appropriate orders for post-discharge needs.    Review of Systems  Review of Systems   Unable to perform ROS: Medical condition        Physical Exam  Temp:  [36.1 °C (97 °F)-36.3 °C (97.3 °F)] 36.2 °C (97.2 °F)  Pulse:  [60-75] 70  Resp:  [16-20] 17  BP: (118-138)/(69-91) 138/91  SpO2:  [93 %-96 %] 95 %    Physical Exam  Vitals reviewed.   Constitutional:       General: He is not in acute distress.  HENT:      Head: Normocephalic and atraumatic. No contusion.      Comments: Trach in place     Nose: Nose normal.      Mouth/Throat:      Pharynx: No oropharyngeal exudate.   Eyes:      Pupils: Pupils are equal, round, and reactive to light.   Cardiovascular:      Rate and Rhythm: Normal rate and regular rhythm.   Pulmonary:      Effort: Pulmonary effort is normal.      Breath sounds: No wheezing or rhonchi.   Abdominal:      General: Bowel sounds are normal.      Palpations: Abdomen is soft.      Comments: PEG tube   Musculoskeletal:         General: Deformity present.      Cervical back: No rigidity. No muscular tenderness.   Skin:     General: Skin is warm and dry.      Coloration: Skin is not jaundiced.   Neurological:      General: No focal deficit present.      Mental Status: He is alert.      Cranial Nerves: No cranial nerve deficit.      Comments: Not following commands   Psychiatric:         Mood and Affect: Mood normal.     I performed physical exam on March 15, 2024 it remains similar without any acute changes.    Fluids    Intake/Output Summary (Last 24 hours) at 3/17/2024 0911  Last data filed at 3/16/2024 2000  Gross per 24 hour   Intake 0 ml   Output 0 ml   Net 0 ml       Laboratory  Recent  Labs     03/15/24  0323 03/16/24  1346 03/17/24  0332   WBC 4.3* 6.3 5.6   RBC 3.37* 3.59* 3.59*   HEMOGLOBIN 11.4* 12.2* 12.4*   HEMATOCRIT 34.7* 37.1* 36.5*   .0* 103.3* 101.7*   MCH 33.8* 34.0* 34.5*   MCHC 32.9 32.9 34.0   RDW 55.9* 57.5* 56.8*   PLATELETCT 257 265 249   MPV 8.3* 8.2* 8.3*     Recent Labs     03/15/24  0323 03/16/24  1346 03/17/24  0332   SODIUM 140 138 135   POTASSIUM 3.6 4.3 3.8   CHLORIDE 110 108 105   CO2 18* 20 19*   GLUCOSE 81 100* 80   BUN 7* 9 8   CREATININE <0.17* <0.17* <0.17*   CALCIUM 8.0* 8.4* 8.6                     Imaging  DX-CHEST-PORTABLE (1 VIEW)   Final Result      No significant change from prior exam.      DX-CHEST-LIMITED (1 VIEW)   Final Result         Mild diffuse pulmonary opacities may be due to edema or infiltrates. More pronounced opacity and volume loss in the right lung base.      DX-CHEST-PORTABLE (1 VIEW)   Final Result         Tracheotomy tube is seen. Small right pleural effusion.           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  Found to have posterior tracheal erosion   Plan to reassess on Monday      Acute respiratory failure with hypoxia (HCC)- (present on admission)  Assessment & Plan  Currently requiring 3L of oxygen  Patient likely had aspiration   RT protocol  Chest physiotherapy    ACP (advance care planning)- (present on admission)  Assessment & Plan  Full code    Seizure disorder (HCC)- (present on admission)  Assessment & Plan  3/15/2024   Continue home medication.   Per mother:  Keppra 1500 mg at 7:30 AM and 7:30 PM  Phenobarbital 20 mg/ 5 mL at 7:30 AM and 7:30 PM  Topiramate 150 mg q am at 8:15 AM, and 175 mg at 8:15 PM  Vimpat 300 mg q am and q pm 9:15 AM and 9:15 PM  Klonopin 0.5 mg at 9:15 am and 9:15 PM  Seizure precautions.  Continue to monitor closely.    Hyponatremia- (present on admission)  Assessment & Plan  Now resolved    Aspiration  pneumonitis (HCC)- (present on admission)  Assessment & Plan  Chest x-ray reveals bilateral opacities greater on right side.  Patient is afebrile without leukocytosis and procalcitonin is negative.  Low suspicion for pneumonia at this time.  Continue to monitor closely and will start empiric antibiotics if his condition changes            VTE prophylaxis:     I have performed a physical exam and reviewed and updated ROS and Plan today (3/17/2024). In review of yesterday's note (3/16/2024), there are no changes except as documented above.    I spent 55 minutes, reviewing the chart, obtaining and/or reviewing separately obtained history. Performing a medically appropriate examination and evaluation.  Counseling and educating the patient's Mother/caregiver. Ordering and reviewing medications, tests, or procedures.  Discussing case with Neurology. Documenting clinical information in EPIC. Independently interpreting results and communicating results to patient's mother. Discussing future disposition of care with patient's Mother, RN and case management.

## 2024-03-18 ENCOUNTER — APPOINTMENT (OUTPATIENT)
Dept: RADIOLOGY | Facility: MEDICAL CENTER | Age: 44
DRG: 205 | End: 2024-03-18
Attending: INTERNAL MEDICINE
Payer: COMMERCIAL

## 2024-03-18 LAB
ANION GAP SERPL CALC-SCNC: 11 MMOL/L (ref 7–16)
BASOPHILS # BLD AUTO: 0.6 % (ref 0–1.8)
BASOPHILS # BLD: 0.03 K/UL (ref 0–0.12)
BUN SERPL-MCNC: 10 MG/DL (ref 8–22)
CALCIUM SERPL-MCNC: 8.6 MG/DL (ref 8.5–10.5)
CHLORIDE SERPL-SCNC: 105 MMOL/L (ref 96–112)
CO2 SERPL-SCNC: 21 MMOL/L (ref 20–33)
CREAT SERPL-MCNC: <0.17 MG/DL (ref 0.5–1.4)
CRP SERPL HS-MCNC: 4.77 MG/DL (ref 0–0.75)
EOSINOPHIL # BLD AUTO: 0.07 K/UL (ref 0–0.51)
EOSINOPHIL NFR BLD: 1.3 % (ref 0–6.9)
ERYTHROCYTE [DISTWIDTH] IN BLOOD BY AUTOMATED COUNT: 56.4 FL (ref 35.9–50)
GFR SERPLBLD CREATININE-BSD FMLA CKD-EPI: 179 ML/MIN/1.73 M 2
GLUCOSE SERPL-MCNC: 95 MG/DL (ref 65–99)
HCT VFR BLD AUTO: 39.3 % (ref 42–52)
HGB BLD-MCNC: 12.9 G/DL (ref 14–18)
IMM GRANULOCYTES # BLD AUTO: 0.02 K/UL (ref 0–0.11)
IMM GRANULOCYTES NFR BLD AUTO: 0.4 % (ref 0–0.9)
LYMPHOCYTES # BLD AUTO: 1.37 K/UL (ref 1–4.8)
LYMPHOCYTES NFR BLD: 26.2 % (ref 22–41)
MCH RBC QN AUTO: 33.9 PG (ref 27–33)
MCHC RBC AUTO-ENTMCNC: 32.8 G/DL (ref 32.3–36.5)
MCV RBC AUTO: 103.4 FL (ref 81.4–97.8)
MONOCYTES # BLD AUTO: 0.38 K/UL (ref 0–0.85)
MONOCYTES NFR BLD AUTO: 7.3 % (ref 0–13.4)
NEUTROPHILS # BLD AUTO: 3.35 K/UL (ref 1.82–7.42)
NEUTROPHILS NFR BLD: 64.2 % (ref 44–72)
NRBC # BLD AUTO: 0 K/UL
NRBC BLD-RTO: 0 /100 WBC (ref 0–0.2)
PLATELET # BLD AUTO: 248 K/UL (ref 164–446)
PMV BLD AUTO: 8.5 FL (ref 9–12.9)
POTASSIUM SERPL-SCNC: 4.1 MMOL/L (ref 3.6–5.5)
PREALB SERPL-MCNC: 19.3 MG/DL (ref 18–38)
RBC # BLD AUTO: 3.8 M/UL (ref 4.7–6.1)
SODIUM SERPL-SCNC: 137 MMOL/L (ref 135–145)
WBC # BLD AUTO: 5.2 K/UL (ref 4.8–10.8)

## 2024-03-18 PROCEDURE — 36415 COLL VENOUS BLD VENIPUNCTURE: CPT

## 2024-03-18 PROCEDURE — 99233 SBSQ HOSP IP/OBS HIGH 50: CPT | Performed by: INTERNAL MEDICINE

## 2024-03-18 PROCEDURE — 80048 BASIC METABOLIC PNL TOTAL CA: CPT

## 2024-03-18 PROCEDURE — A9270 NON-COVERED ITEM OR SERVICE: HCPCS | Performed by: INTERNAL MEDICINE

## 2024-03-18 PROCEDURE — 770001 HCHG ROOM/CARE - MED/SURG/GYN PRIV*

## 2024-03-18 PROCEDURE — 700102 HCHG RX REV CODE 250 W/ 637 OVERRIDE(OP): Performed by: INTERNAL MEDICINE

## 2024-03-18 PROCEDURE — 85025 COMPLETE CBC W/AUTO DIFF WBC: CPT

## 2024-03-18 PROCEDURE — 84134 ASSAY OF PREALBUMIN: CPT

## 2024-03-18 PROCEDURE — A9270 NON-COVERED ITEM OR SERVICE: HCPCS | Performed by: STUDENT IN AN ORGANIZED HEALTH CARE EDUCATION/TRAINING PROGRAM

## 2024-03-18 PROCEDURE — 94669 MECHANICAL CHEST WALL OSCILL: CPT

## 2024-03-18 PROCEDURE — 71045 X-RAY EXAM CHEST 1 VIEW: CPT

## 2024-03-18 PROCEDURE — 94640 AIRWAY INHALATION TREATMENT: CPT

## 2024-03-18 PROCEDURE — 700101 HCHG RX REV CODE 250: Performed by: STUDENT IN AN ORGANIZED HEALTH CARE EDUCATION/TRAINING PROGRAM

## 2024-03-18 PROCEDURE — 86140 C-REACTIVE PROTEIN: CPT

## 2024-03-18 PROCEDURE — 700102 HCHG RX REV CODE 250 W/ 637 OVERRIDE(OP): Performed by: STUDENT IN AN ORGANIZED HEALTH CARE EDUCATION/TRAINING PROGRAM

## 2024-03-18 RX ORDER — LACOSAMIDE 100 MG/1
100 TABLET ORAL 2 TIMES DAILY
Status: DISCONTINUED | OUTPATIENT
Start: 2024-03-18 | End: 2024-03-19 | Stop reason: HOSPADM

## 2024-03-18 RX ORDER — LACOSAMIDE 100 MG/1
200 TABLET ORAL 2 TIMES DAILY
Status: DISCONTINUED | OUTPATIENT
Start: 2024-03-18 | End: 2024-03-19 | Stop reason: HOSPADM

## 2024-03-18 RX ADMIN — PHENOBARBITAL 120 MG: 20 ELIXIR ORAL at 07:38

## 2024-03-18 RX ADMIN — LACOSAMIDE 100 MG: 100 TABLET, FILM COATED ORAL at 21:01

## 2024-03-18 RX ADMIN — LEVETIRACETAM 1500 MG: 500 TABLET, FILM COATED ORAL at 07:38

## 2024-03-18 RX ADMIN — CLONAZEPAM 0.5 MG: 1 TABLET ORAL at 21:01

## 2024-03-18 RX ADMIN — LACOSAMIDE 200 MG: 100 TABLET, FILM COATED ORAL at 21:01

## 2024-03-18 RX ADMIN — TOPIRAMATE 150 MG: 100 TABLET, FILM COATED ORAL at 08:21

## 2024-03-18 RX ADMIN — PHENOBARBITAL 120 MG: 20 ELIXIR ORAL at 19:30

## 2024-03-18 RX ADMIN — CLONAZEPAM 0.5 MG: 1 TABLET ORAL at 09:20

## 2024-03-18 RX ADMIN — TOPIRAMATE 175 MG: 100 TABLET, FILM COATED ORAL at 20:14

## 2024-03-18 RX ADMIN — LEVETIRACETAM 1500 MG: 500 TABLET, FILM COATED ORAL at 19:30

## 2024-03-18 RX ADMIN — LACOSAMIDE 100 MG: 100 TABLET, FILM COATED ORAL at 09:20

## 2024-03-18 RX ADMIN — LACOSAMIDE 200 MG: 100 TABLET, FILM COATED ORAL at 09:20

## 2024-03-18 ASSESSMENT — FIBROSIS 4 INDEX: FIB4 SCORE: 1.04

## 2024-03-18 NOTE — CARE PLAN
The patient is Stable - Low risk of patient condition declining or worsening    Shift Goals  Clinical Goals: Patient's Mother Susana will verbalize understanding of POC. Patient will remain free from seizures throughout shift. Patient will remain safe and free from ground level falls throughout shift.  Patient Goals: TRAVIS  Family Goals: Correct timing of medications, and all other clinical needs met.     Progress made toward(s) clinical / shift goals:    Problem: Knowledge Deficit - Standard  Goal: Patient and family/care givers will demonstrate understanding of plan of care, disease process/condition, diagnostic tests and medications  Outcome: Progressing  Pt's mother Susana extensively involved in care and has set preference of administering pt's medications, preforming wound care, repositioning pt, and administering pt feeds herself. Nursing and Susana collaborating to ensure pt's needs are met and to ensure pt/Susana can resume normal routine as much as possible.      Problem: Skin Integrity  Goal: Skin integrity is maintained or improved  Outcome: Progressing   Wound care and turns being performed by pt family members. Wound team contacted to round on pt today per Susana's request.     Patient is not progressing towards the following goals:

## 2024-03-18 NOTE — CARE PLAN
Problem: Knowledge Deficit - Standard  Goal: Patient and family/care givers will demonstrate understanding of plan of care, disease process/condition, diagnostic tests and medications  Outcome: Progressing     Problem: Skin Integrity  Goal: Skin integrity is maintained or improved  Outcome: Progressing     Problem: Fall Risk  Goal: Patient will remain free from falls  Outcome: Progressing     Problem: Hemodynamics  Goal: Patient's hemodynamics, fluid balance and neurologic status will be stable or improve  Outcome: Progressing     Problem: Respiratory  Goal: Patient will achieve/maintain optimum respiratory ventilation and gas exchange  Outcome: Progressing     Problem: Urinary Elimination  Goal: Establish and maintain regular urinary output  Outcome: Progressing     Problem: Pain - Standard  Goal: Alleviation of pain or a reduction in pain to the patient’s comfort goal  Outcome: Progressing     Problem: Psychosocial  Goal: Patient's level of anxiety will decrease  Outcome: Progressing  Goal: Patient's ability to verbalize feelings about condition will improve  Outcome: Progressing  Goal: Patient's ability to re-evaluate and adapt role responsibilities will improve  Outcome: Progressing  Goal: Patient and family will demonstrate ability to cope with life altering diagnosis and/or procedure  Outcome: Progressing  Goal: Spiritual and cultural needs incorporated into hospitalization  Outcome: Progressing     Problem: Communication  Goal: The ability to communicate needs accurately and effectively will improve  Outcome: Progressing     Problem: Discharge Barriers/Planning  Goal: Patient's continuum of care needs are met  Outcome: Progressing     Problem: Chest Tube Management  Goal: Complications related to chest tube will be avoided or minimized  Outcome: Progressing     Problem: Fluid Volume  Goal: Fluid volume balance will be maintained  Outcome: Progressing     Problem: Mechanical Ventilation  Goal: Safe  management of artificial airway and ventilation  Outcome: Progressing  Goal: Successful weaning off mechanical ventilator, spontaneously maintains adequate gas exchange  Outcome: Progressing  Goal: Patient will be able to express needs and understand communication  Outcome: Progressing     Problem: Dysphagia  Goal: Dysphagia will improve  Outcome: Progressing     Problem: Risk for Aspiration  Goal: Patient's risk for aspiration will be absent or decrease  Outcome: Progressing     Problem: Nutrition  Goal: Patient's nutritional and fluid intake will be adequate or improve  Outcome: Progressing  Goal: Enteral nutrition will be maintained or improve  Outcome: Progressing  Goal: Enteral nutrition will be maintained or improve  Outcome: Progressing     Problem: Bowel Elimination  Goal: Establish and maintain regular bowel function  Outcome: Progressing     Problem: Gastrointestinal Irritability  Goal: Nausea and vomiting will be absent or improve  Outcome: Progressing  Goal: Diarrhea will be absent or improved  Outcome: Progressing     Problem: Rectal Tube  Goal: Fecal output will be contained and skin will remain free from irritation  Outcome: Progressing     Problem: Mobility  Goal: Patient's capacity to carry out activities will improve  Outcome: Progressing     Problem: Self Care  Goal: Patient will have the ability to perform ADLs independently or with assistance (bathe, groom, dress, toilet and feed)  Outcome: Progressing     Problem: Infection - Standard  Goal: Patient will remain free from infection  Outcome: Progressing     Problem: Wound/ / Incision Healing  Goal: Patient's wound/surgical incision will decrease in size and heals properly  Outcome: Progressing   The patient is Watcher - Medium risk of patient condition declining or worsening    Shift Goals  Clinical Goals: Seizure precautions, oxygen monitoring  Patient Goals: Comfort  Family Goals: Updates    Progress made toward(s) clinical / shift goals:   Seizure precautions, oxygen monitoring    Patient is not progressing towards the following goals:

## 2024-03-18 NOTE — PROGRESS NOTES
Received bedside report from RN Calderon, pt care assumed. VS WDL, pt assessment complete. TRAVIS mentation due to baseline deficits, no signs of discomfort or pain at this time. POC discussed with patient's Mother Susana and Susana verbalized understanding. No additional needs at this time. Bed locked and in lowest position, suction on, bed alarm on. Pt's Mother Susana educated on fall risk and verbalized understanding, call light within reach, hourly rounding initiated.

## 2024-03-18 NOTE — PROGRESS NOTES
Hospital Medicine Daily Progress Note    Date of Service  3/18/2024    Chief Complaint  Ruben Edwards is a 43 y.o. male admitted 3/13/2024 with concerns for tracheostomy obstruction    Hospital Course    Ruben Edwards is a 43 y.o. male who presented 3/13/2024 with concerns for tracheostomy obstruction.  Patient had a tracheostomy tube placed over 20 years ago due to head injury.  For the last 2 days, patient has been noted to have several episodes of desaturation, and for shortness of breath, and difficulties with suctioning at home.      In ER, patient saturating 90% with tracheostomy tube.  ENT was consulted, patient had his tracheostomy tube exchanged and Bivona was secured in place.      Interval Problem Update  3/16 I evaluated the patient at bedside and met with the patient's mother who is the primary caretaker.  She reports that patient had a seizure-like activity this morning.  She states he usually gets these when there is an underlying infection.  I ordered a chest x-ray which revealed some bilateral pulmonary opacities which appear stable compared to prior x-ray.  I ordered a procalcitonin which returned negative.  He has been afebrile without leukocytosis therefore my suspicion for pneumonia is low.  Patient likely has some aspiration pneumonitis, will continue to monitor his CBC and vitals closely.  Ordered chest physiotherapy    3/17 Per mother patient had an episode of seizure overnight and this morning before receiving his meds. Patient's mother requesting that his home medication brand name vimpat be resumed instead of generlic lacosamide as he has previusly had breakthrough seizures from using the generic and his primary neurologist recommended using brand name vimpat over the generic.  I have asked pharmacy to allow patient to receive home dose of brand name vimpat instead of generic but it was declined. I have consulted Neurology for further management of seizures     3/18 patient is on 3 L of oxygen  via T-piece per mom is not on supplemental oxygen at home.  Continue wean off O2 as tolerated.  I have ordered a chest x-ray which reveals improving infiltrates bilaterally.  I encouraged ongoing chest physiotherapy and pulmonary toilet.  He remains afebrile without leukocytosis and a negative procalcitonin.  Low suspicion for pneumonia.  Patient has not had any further seizures, continue home regimen of seizure meds.  Patient evaluated by ENT and plan is to discharge patient home with the extended length by Bivona trach and allowing the trachea to heal up more and close outpatient follow-up for surgical change of his tracheostomy tube with custom tracheostomy tube     I have discussed this patient's plan of care and discharge plan at IDT rounds today with Case Management, Nursing, Nursing leadership, and other members of the IDT team.    Consultants/Specialty  ENT    Code Status  Full Code    Disposition  The patient is not medically cleared for discharge to home or a post-acute facility.      I have placed the appropriate orders for post-discharge needs.    Review of Systems  Review of Systems   Unable to perform ROS: Medical condition        Physical Exam  Temp:  [36.1 °C (97 °F)-36.2 °C (97.2 °F)] 36.1 °C (97 °F)  Pulse:  [55-70] 70  Resp:  [16-18] 16  BP: (112-125)/(70-79) (P) 112/72  SpO2:  [94 %-97 %] 97 %    Physical Exam  Vitals reviewed.   Constitutional:       General: He is not in acute distress.  HENT:      Head: Normocephalic and atraumatic. No contusion.      Comments: Trach in place     Nose: Nose normal.      Mouth/Throat:      Pharynx: No oropharyngeal exudate.   Eyes:      Pupils: Pupils are equal, round, and reactive to light.   Cardiovascular:      Rate and Rhythm: Normal rate and regular rhythm.   Pulmonary:      Effort: Pulmonary effort is normal.      Breath sounds: No wheezing or rhonchi.   Abdominal:      General: Bowel sounds are normal.      Palpations: Abdomen is soft.      Comments: PEG  tube   Musculoskeletal:         General: Deformity present.      Cervical back: No rigidity. No muscular tenderness.   Skin:     General: Skin is warm and dry.      Coloration: Skin is not jaundiced.   Neurological:      General: No focal deficit present.      Mental Status: He is alert.      Cranial Nerves: No cranial nerve deficit.      Comments: Not following commands   Psychiatric:         Mood and Affect: Mood normal.     I performed physical exam on March 15, 2024 it remains similar without any acute changes.    Fluids    Intake/Output Summary (Last 24 hours) at 3/18/2024 1405  Last data filed at 3/18/2024 1028  Gross per 24 hour   Intake 30 ml   Output 900 ml   Net -870 ml       Laboratory  Recent Labs     03/16/24  1346 03/17/24  0332   WBC 6.3 5.6   RBC 3.59* 3.59*   HEMOGLOBIN 12.2* 12.4*   HEMATOCRIT 37.1* 36.5*   .3* 101.7*   MCH 34.0* 34.5*   MCHC 32.9 34.0   RDW 57.5* 56.8*   PLATELETCT 265 249   MPV 8.2* 8.3*     Recent Labs     03/16/24  1346 03/17/24  0332   SODIUM 138 135   POTASSIUM 4.3 3.8   CHLORIDE 108 105   CO2 20 19*   GLUCOSE 100* 80   BUN 9 8   CREATININE <0.17* <0.17*   CALCIUM 8.4* 8.6                     Imaging  DX-CHEST-PORTABLE (1 VIEW)   Final Result         1.  Mild pulmonary edema and/or infiltrate, decreased since prior study.   2.  Small layering right pleural effusion, stable since prior study      DX-CHEST-PORTABLE (1 VIEW)   Final Result      No significant change from prior exam.      DX-CHEST-LIMITED (1 VIEW)   Final Result         Mild diffuse pulmonary opacities may be due to edema or infiltrates. More pronounced opacity and volume loss in the right lung base.      DX-CHEST-PORTABLE (1 VIEW)   Final Result         Tracheotomy tube is seen. Small right pleural effusion.           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  Found to have posterior tracheal erosion   Plan to  discharge home with Bivona until trachea heals followed by custom tracheostomy tube exchange as outpatient      Acute respiratory failure with hypoxia (HCC)- (present on admission)  Assessment & Plan  Currently requiring 3L of oxygen  Patient likely had aspiration   RT protocol  Chest physiotherapy    ACP (advance care planning)- (present on admission)  Assessment & Plan  Full code    Seizure disorder (HCC)- (present on admission)  Assessment & Plan  3/15/2024   Continue home medication.   Per mother:  Keppra 1500 mg at 7:30 AM and 7:30 PM  Phenobarbital 20 mg/ 5 mL at 7:30 AM and 7:30 PM  Topiramate 150 mg q am at 8:15 AM, and 175 mg at 8:15 PM  Vimpat 300 mg q am and q pm 9:15 AM and 9:15 PM  Klonopin 0.5 mg at 9:15 am and 9:15 PM  Seizure precautions.  Continue to monitor closely.    Hyponatremia- (present on admission)  Assessment & Plan  Now resolved    Aspiration pneumonitis (HCC)- (present on admission)  Assessment & Plan  Chest x-ray reveals bilateral opacities greater on right side.  Patient is afebrile without leukocytosis and procalcitonin is negative.  Low suspicion for pneumonia at this time.  Continue to monitor closely and will start empiric antibiotics if his condition changes            VTE prophylaxis: Lovenox    I have performed a physical exam and reviewed and updated ROS and Plan today (3/18/2024). In review of yesterday's note (3/17/2024), there are no changes except as documented above.    I spent 54 minutes, reviewing the chart, obtaining and/or reviewing separately obtained history. Performing a medically appropriate examination and evaluation.  Counseling and educating the patient's Mother/caregiver. Ordering and reviewing medications, tests, or procedures.  Documenting clinical information in EPIC. Independently interpreting results and communicating results to patient's mother. Discussing future disposition of care with patient's Mother, RN and case management.

## 2024-03-18 NOTE — PROGRESS NOTES
"S: Ruben Edwards is a 43 y.o. male here for tracheal erosion and urgent trach change.  Been doing well without significant clogging of the current tracheostomy tube.  There is some concern for possible pneumonia and so they are watching his oxygen saturations and following with chest x-rays.  He seems to be doing better.  The tracheostomy tube is functioning well..    O:  /76   Pulse (!) 55   Temp 36.1 °C (97 °F) (Temporal)   Resp 18   Ht 1.778 m (5' 10\")   Wt 61 kg (134 lb 7.7 oz)   SpO2 96%     Recent Labs     03/16/24  1346 03/17/24  0332   WBC 6.3 5.6   RBC 3.59* 3.59*   HEMOGLOBIN 12.2* 12.4*   HEMATOCRIT 37.1* 36.5*   .3* 101.7*   MCH 34.0* 34.5*   MCHC 32.9 34.0   RDW 57.5* 56.8*   PLATELETCT 265 249   MPV 8.2* 8.3*     Recent Labs     03/16/24  1346 03/17/24  0332   SODIUM 138 135   POTASSIUM 4.3 3.8   CHLORIDE 108 105   CO2 20 19*   GLUCOSE 100* 80   BUN 9 8   CREATININE <0.17* <0.17*   CALCIUM 8.4* 8.6         800 cuffed extended Bivona Portex is in place and functioning well at 11 cm  Respiratory normal quiet respirations    A: Status post urgent trach change due to some posterior tracheal erosion doing well    P:  -We discussed going home with the extended length Bivona trach and allowing the trachea to heal up more.  We discussed the risks and benefits of this including the lack of the inner cannula.  We also discussed considering a trach change prior to discharge may be later this week or early next week.  As long as his lungs are doing well mother feels that the tracheostomy tube is functioning well and she is not concerned about clogging.  She also feels comfortable doing an urgent trach change at home if needed.  I think that this is reasonable to follow-up as an outpatient and schedule outpatient surgical change of this tracheostomy tube after a custom tracheostomy tube was ordered.  The plan was discussed with mother and she agrees.  "

## 2024-03-18 NOTE — DIETARY
"Nutrition Services:  Day 5 of admit.  Ruben Edwards is a 43 y.o. 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].    Per chart review - \"Family providing and preparing patient meals and administering through G-tube.\"  Discussed with MARE Crawley who states family is providing tube feeding and there are no issues or concerns at this time.     Problem: Nutritional:  Goal: Nutrition support tolerated and meeting greater than 85% of estimated needs  Outcome: Met     RD will monitor per department policy. Please consult as needed.  "

## 2024-03-18 NOTE — WOUND TEAM
Patient's mother had a few questions about wound dressings and products available online.  All questions answered at this time.

## 2024-03-18 NOTE — CARE PLAN
"The patient is Watcher - Medium risk of patient condition declining or worsening    Shift Goals  Clinical Goals: Patient's Mother Susana will verbalize understanding of POC. Patient will remain free from seizures throughout shift. Patient will remain safe and free from ground level falls throughout shift.  Patient Goals: TRAVIS  Family Goals: \"Get the correct medications at the correct time.\"    Progress made toward(s) clinical / shift goals:  Patient's Mother Susana and family provided a verbal discussion related to POC. Susana verbalized understanding and also collaborated with this RN on POC and expectations. Susana assisted with medication administration, turns every 2 hours, and timing for medications. Patient's respiratory status improving requiring less FiO2% of oxygen. Patient's T-piece remains in proper placement. No signs of acute distress present and patient shows no signs of seizure like activity. Safety precautions in place. Family educated to call for assistance when needed. Proper hand hygiene before and after patient care to ensure patient will remain free from infection.      Patient is not progressing towards the following goals: Patient remains immobile due to baseline physical debility. Patient has yet to have a bowel movement despite rectal suppositories.       Problem: Risk for Aspiration  Goal: Patient's risk for aspiration will be absent or decrease  Outcome: Not Progressing     Problem: Bowel Elimination  Goal: Establish and maintain regular bowel function  Outcome: Not Progressing        "

## 2024-03-18 NOTE — PROGRESS NOTES
Assumed care of pt. Pt resting in bed with no signs of labored breathing. On 4L. Pt's mother at bedside, upper bed rails up. Plan of care discussed with pt's mother tawny.

## 2024-03-19 VITALS
BODY MASS INDEX: 19.25 KG/M2 | TEMPERATURE: 97 F | HEIGHT: 70 IN | RESPIRATION RATE: 18 BRPM | HEART RATE: 59 BPM | WEIGHT: 134.48 LBS | OXYGEN SATURATION: 95 % | SYSTOLIC BLOOD PRESSURE: 113 MMHG | DIASTOLIC BLOOD PRESSURE: 69 MMHG

## 2024-03-19 LAB
ANION GAP SERPL CALC-SCNC: 10 MMOL/L (ref 7–16)
BUN SERPL-MCNC: 9 MG/DL (ref 8–22)
CALCIUM SERPL-MCNC: 8.7 MG/DL (ref 8.5–10.5)
CHLORIDE SERPL-SCNC: 104 MMOL/L (ref 96–112)
CO2 SERPL-SCNC: 21 MMOL/L (ref 20–33)
CREAT SERPL-MCNC: <0.17 MG/DL (ref 0.5–1.4)
ERYTHROCYTE [DISTWIDTH] IN BLOOD BY AUTOMATED COUNT: 58.8 FL (ref 35.9–50)
GFR SERPLBLD CREATININE-BSD FMLA CKD-EPI: 179 ML/MIN/1.73 M 2
GLUCOSE SERPL-MCNC: 79 MG/DL (ref 65–99)
HCT VFR BLD AUTO: 41.3 % (ref 42–52)
HGB BLD-MCNC: 13.3 G/DL (ref 14–18)
MCH RBC QN AUTO: 33.9 PG (ref 27–33)
MCHC RBC AUTO-ENTMCNC: 32.2 G/DL (ref 32.3–36.5)
MCV RBC AUTO: 105.4 FL (ref 81.4–97.8)
PLATELET # BLD AUTO: 247 K/UL (ref 164–446)
PMV BLD AUTO: 8.6 FL (ref 9–12.9)
POTASSIUM SERPL-SCNC: 3.9 MMOL/L (ref 3.6–5.5)
RBC # BLD AUTO: 3.92 M/UL (ref 4.7–6.1)
SODIUM SERPL-SCNC: 135 MMOL/L (ref 135–145)
WBC # BLD AUTO: 5.2 K/UL (ref 4.8–10.8)

## 2024-03-19 PROCEDURE — 99239 HOSP IP/OBS DSCHRG MGMT >30: CPT | Performed by: INTERNAL MEDICINE

## 2024-03-19 PROCEDURE — A9270 NON-COVERED ITEM OR SERVICE: HCPCS | Performed by: INTERNAL MEDICINE

## 2024-03-19 PROCEDURE — 85027 COMPLETE CBC AUTOMATED: CPT

## 2024-03-19 PROCEDURE — 302043 TAPE, HYPAFIX: Performed by: INTERNAL MEDICINE

## 2024-03-19 PROCEDURE — 302053 SPONGE GAUZE N/S 4X4 200/PK: Performed by: INTERNAL MEDICINE

## 2024-03-19 PROCEDURE — 700102 HCHG RX REV CODE 250 W/ 637 OVERRIDE(OP): Performed by: INTERNAL MEDICINE

## 2024-03-19 PROCEDURE — 80048 BASIC METABOLIC PNL TOTAL CA: CPT

## 2024-03-19 PROCEDURE — A9270 NON-COVERED ITEM OR SERVICE: HCPCS | Performed by: STUDENT IN AN ORGANIZED HEALTH CARE EDUCATION/TRAINING PROGRAM

## 2024-03-19 PROCEDURE — 94640 AIRWAY INHALATION TREATMENT: CPT

## 2024-03-19 PROCEDURE — 36415 COLL VENOUS BLD VENIPUNCTURE: CPT

## 2024-03-19 PROCEDURE — 700102 HCHG RX REV CODE 250 W/ 637 OVERRIDE(OP): Performed by: STUDENT IN AN ORGANIZED HEALTH CARE EDUCATION/TRAINING PROGRAM

## 2024-03-19 PROCEDURE — 700101 HCHG RX REV CODE 250: Performed by: STUDENT IN AN ORGANIZED HEALTH CARE EDUCATION/TRAINING PROGRAM

## 2024-03-19 RX ADMIN — LACOSAMIDE 200 MG: 100 TABLET, FILM COATED ORAL at 09:15

## 2024-03-19 RX ADMIN — CLONAZEPAM 0.5 MG: 1 TABLET ORAL at 08:41

## 2024-03-19 RX ADMIN — TOPIRAMATE 150 MG: 100 TABLET, FILM COATED ORAL at 08:31

## 2024-03-19 RX ADMIN — LEVETIRACETAM 1500 MG: 500 TABLET, FILM COATED ORAL at 07:14

## 2024-03-19 RX ADMIN — LACOSAMIDE 100 MG: 100 TABLET, FILM COATED ORAL at 09:15

## 2024-03-19 RX ADMIN — PHENOBARBITAL 120 MG: 20 ELIXIR ORAL at 07:14

## 2024-03-19 ASSESSMENT — PAIN DESCRIPTION - PAIN TYPE: TYPE: ACUTE PAIN

## 2024-03-19 NOTE — DISCHARGE PLANNING
Case Management Discharge Planning    Admission Date: 3/13/2024  GMLOS: 4.5  ALOS: 6    6-Clicks ADL Score: 6  6-Clicks Mobility Score: 6  PT and/or OT Eval ordered: Yes  Post-acute Referrals Ordered: No, Pt's POA/ mother Susana does not wants any Home health and Pt is on baseline and she is the primary caregiver  Post-acute Choice Obtained: No  Has referral(s) been sent to post-acute provider:  No      Anticipated Discharge Dispo: Discharge Disposition: Discharged to home/self care (01)    DME Needed: No    Action(s) Taken: Updated Provider/Nurse on Discharge Plan and DC Assessment Complete (See below)    0845, RN CM received a call from Malena from [facility] and she said they cannot accommodate transport today due to no ambulance available.    0900, MARE HANEY visited Susana and she was informed that [facility] ambulance cannot accomodate today. Susana is agreeable to do [facility] as long as she rides along in the ambulance. Susana also said that the gurlilly cannot fit in the door of house and will need a claudio . RN CM called and spoke to Vicki from [facility] that Pt will be needing a claudio  and that [facility] will escort Pt in ambulance going home. Vicki said she noted that Pt's [facility] will escort Pt in ambulance but the decision if [facility] can ride along will depend on the [facility] once they arrive in the Pt's room.    0945, RN CM spoke with Pt's mother Susana and she is insistent that there should be a claudio  because the house is tight and has a lot of turns. MARE HANEY informed Vicki from [facility] through Voalte.    1100, Pt was discussed in IDT rounds for discharge to home today via [facility]. RN CM informed IDT team that Pt's mother Susana said that her son that does not use oxygen at home but there is a compressor and condenser. Susana also noted that she uses 1 LPM only as needed through a oxygen concentrator. Dr. Vargas okay to discharge Pt. No home O2 evaluation needed.    1130, RN CM called and  spoke to Milad from [facility] through telephone with Pt's mother Susana and RN manager Aleksandra at bedside. Susana was able to inform Milad of her concern that Pt needs a Jaleel  and that she rides along in the ambulance. Milad informed Susana that they have other equipments that can be used to safely to transfer her son to the house and Milad informed Susana that the crew will decide if she can be in the ambulance but it was already noted that she is requesting to ride along. Susana agreed and RN LYNDON informed her that [facility] transport will be at 1230 today.       Escalations Completed: None    Medically Clear: Yes    Next Steps: CM will continue to assist Pt with discharge needs.      Barriers to Discharge: Medical clearance and Transportation          Care Transition Team Assessment  RN LYNDON met with Pt's mother at bedside to obtain assessment informations. Demographics verified. RN CM introduced self and purpose of visit.   Pt lives with parents in a 1 story house with 2 steps to main entrance.  Pt has Pt's mother Susana for support.  Pt has  Tracy Weiner from [facility] for PCP  Pt was dependent with ADLs prior to hospitalization.  Per mother, Pt does not use any continuous O2 at home but Susana said she uses 1 LPM via oxygen concentrator if Pt needs it. Susana also said she has compressor and condenser to provide humidity.      Information Source  Orientation Level: Unable to assess  Information Given By: Parent  Informant's Name: Susana  Who is responsible for making decisions for patient? : POA  Name(s) of Primary Decision Maker: Susana Edwards      Elopement Risk  Legal Hold: No  Ambulatory or Self Mobile in Wheelchair: No-Not an Elopement Risk  Disoriented: No  Psychiatric Symptoms: None  History of Wandering: No  Elopement this Admit: No  Vocalizing Wanting to Leave: No  Displays Behaviors, Body Language Wanting to Leave: No-Not at Risk for Elopement  Elopement Risk: Not at Risk for Elopement  Personal Belongings:  Hospital Clothing Only    Interdisciplinary Discharge Planning  Primary Care Physician: Tracy Weiner from [facility]  Lives with - Patient's Self Care Capacity: Parents  Patient or legal guardian wants to designate a caregiver: Yes  Caregiver name: Susana Edwards  Support Systems: Parent, Family Member(s)  Housing / Facility: [address] (2 steps to main entrance)  Durable Medical Equipment: Home Oxygen (O2 concentrator, compressor and condenser)  DME Provider / Phone: [employer]    Discharge Preparedness  What is your plan after discharge?: Home with help  What are your discharge supports?: Parent  Prior Functional Level: Total Assist    Functional Assesment  Prior Functional Level: Total Assist    Finances  Prescription Coverage: Yes       Domestic Abuse  Have you ever been the victim of abuse or violence?: No  Physical Abuse or Sexual Abuse: Unable to Assess due to Patient Condition  Verbal Abuse or Emotional Abuse: Unable to Assess due to Patient Condition  Possible Abuse/Neglect Reported to:: Not Applicable    Psychological Assessment  History of Substance Abuse: None  History of Psychiatric Problems: No      Anticipated Discharge Information  Discharge Disposition: Discharged to home/self care (01)

## 2024-03-19 NOTE — PROGRESS NOTES
PT family member requested Pts floors be mopped. Reached out to [facility], requested floors to be cleaned. [facility] stated they would send a technician as soon as possible. RN notified.

## 2024-03-19 NOTE — DISCHARGE PLANNING
@ 0845: JOSE chart reviewed pt's chart. Per chart review, pt is to have new trach. JOSE voalte messaged RT to obtain information for trach. Awaiting response.    @ 0946: SW received a phone call from Ligia with pt's [employer] insurance. Per Ligia, she is attempting to obtain trach information for DME company [employer]. Awaiting information from RT.    @ 1025: Per RT, they will attempt to obtain necessary information for this SW.    @ 1045: Discussed pt during IDT rounds. Per RN, pt's mother wishes to speak to this SW after rounds. Per MD, pt anticipated to be medically cleared tomorrow. Attempting to ween off O2.     @ 1142: Per pt's mother, they would like this SW to obtain transport to home from [facility]. [facility] [phone]. Per Catie, she is unsure of who to transfer this SW to. Catie transferred this SW to [facility]. Per Ligia, she is an RN on the floor and cannot assist with transport. Per Ligia, only one ambulance is on-sight and cannot be utilized for transporting to pt's homes. JOSE informed Ligia that pt's POA has set this up before. Per Ligia, she will attempt to obtain information. SW on hold for 20 minutes. Per Ligia, pt might have transportation benefits but she is not the right person to contact. Per Ligia, she will pass along this information to the director of ambulance services, Malena.    @ 5319: JOSE attempted to call [facility] ambulation services again. JOSE spoke with Catie. Per Catie, she will transfer this SW to [place]. Per Naty, this SW was transferred to the emergency ambulance line. JOSE asked to call [phone] as that is the non-emergency line. JOSE called that phone number. JOSE spoke with Naty again. Per Naty, she will transfer this SW to Malena. JOSE left a VM with Malena.     @ 8689: JOSE reached out to [facility] leadership regarding pt's transport need. Per [facility] leadership, JOSE is to send Ride Line form and specify that pt and  pt's POA would like to use [facility]. SW placed order to Ride Line. Per Ride Line, this order will be worked on tomorrow.    @ 1612: SW received a phone call from Malena with ambulance services. Per Malena, she is not currently at work and cannot confirm if transport can be set up for tomorrow. Per Malena, she will pass this information along to current  who is now servicing pt. Awaiting call from .    @ 1620: JOSE notified RN of transportation barrier. OJSE met with pt and pt's POA at bedside to discuss this barrier. Per pt's mother/POA, [facility] is the only one who has been able to take them to and from home. Pt's POA called main number for [facility], got a hold of the ER. Per ER, there is no way to confirm transportation at this time. Pt's POA attempted to contact Malena herself, left a VM.

## 2024-03-19 NOTE — PROGRESS NOTES
At 1245 Patient safely left hospital with EMS. Mother at bedside. Opportunity provided to ask and answer questions. Patient's controlled narcotic Vimpat counted with Primary RN and Petey, Pharm. D. Medications returned to mother's care and possession.

## 2024-03-19 NOTE — DISCHARGE PLANNING
DC Transport Scheduled    Transport Company Scheduled:  [employer]  Spoke with Milad at [facility] to schedule transport.    Scheduled Date: 3/19/2024  Scheduled Time: 1230    Destination: Home   Destination address: [address] [place] [address]      Notified care team of scheduled transport via Voalte.     If there are any changes needed to the DC transportation scheduled, please contact [facility] at ext. [phone] between the hours of 2324-4469 Mon-Fri. If outside those hours, contact the ED Case Manager at ext. [phone].

## 2024-03-19 NOTE — PROGRESS NOTES
"S: Ruben Edwards is a 43 y.o. male here for post tracheal erosion with extended soft bivona. Was doing well until recently when labored breathing and hard to pass the suction.     O:  /72   Pulse (!) 55   Temp 36.1 °C (97 °F) (Temporal)   Resp 18   Ht 1.778 m (5' 10\")   Wt 61 kg (134 lb 7.7 oz)   SpO2 97%     Recent Labs     03/16/24  1346 03/17/24  0332 03/18/24  1421   WBC 6.3 5.6 5.2   RBC 3.59* 3.59* 3.80*   HEMOGLOBIN 12.2* 12.4* 12.9*   HEMATOCRIT 37.1* 36.5* 39.3*   .3* 101.7* 103.4*   MCH 34.0* 34.5* 33.9*   MCHC 32.9 34.0 32.8   RDW 57.5* 56.8* 56.4*   PLATELETCT 265 249 248   MPV 8.2* 8.3* 8.5*     Recent Labs     03/16/24  1346 03/17/24  0332 03/18/24  1421   SODIUM 138 135 137   POTASSIUM 4.3 3.8 4.1   CHLORIDE 108 105 105   CO2 20 19* 21   GLUCOSE 100* 80 95   BUN 9 8 10   CREATININE <0.17* <0.17* <0.17*   CALCIUM 8.4* 8.6 8.6       Neck: Trach sitting proud with several trach sponges underneith. These were removed and the trach was pushed back into place. There was a catch as it had been retracted pass the healing area of ulceraton. Advanced fairly easily. Scoped with flexible scope thru trach and sitting just above the kiersten. Oxygen recovered nicely and no labored breathing. The trach lumen was clear of any mucous.      A:trach sitting proud    P:  -trach was re advanced and is doing well  -discussed trach care and positioning with mother.           "

## 2024-03-19 NOTE — CARE PLAN
The patient is Stable - Low risk of patient condition declining or worsening    Shift Goals  Clinical Goals: hemodynamic stability  Patient Goals: TRAVIS  Family Goals: medication compliance    Progress made toward(s) clinical / shift goals:    Problem: Knowledge Deficit - Standard  Goal: Patient and family/care givers will demonstrate understanding of plan of care, disease process/condition, diagnostic tests and medications  Outcome: Progressing     Problem: Skin Integrity  Goal: Skin integrity is maintained or improved  Outcome: Progressing     Problem: Fall Risk  Goal: Patient will remain free from falls  Outcome: Progressing     Problem: Hemodynamics  Goal: Patient's hemodynamics, fluid balance and neurologic status will be stable or improve  Outcome: Progressing     Problem: Respiratory  Goal: Patient will achieve/maintain optimum respiratory ventilation and gas exchange  Outcome: Progressing     Problem: Urinary Elimination  Goal: Establish and maintain regular urinary output  Outcome: Progressing     Problem: Pain - Standard  Goal: Alleviation of pain or a reduction in pain to the patient’s comfort goal  Outcome: Progressing     Problem: Psychosocial  Goal: Patient's level of anxiety will decrease  Outcome: Progressing  Goal: Patient's ability to verbalize feelings about condition will improve  Outcome: Progressing  Goal: Patient's ability to re-evaluate and adapt role responsibilities will improve  Outcome: Progressing  Goal: Patient and family will demonstrate ability to cope with life altering diagnosis and/or procedure  Outcome: Progressing  Goal: Spiritual and cultural needs incorporated into hospitalization  Outcome: Progressing     Problem: Communication  Goal: The ability to communicate needs accurately and effectively will improve  Outcome: Progressing     Problem: Discharge Barriers/Planning  Goal: Patient's continuum of care needs are met  Outcome: Progressing    Problem: Fluid Volume  Goal: Fluid  volume balance will be maintained  Outcome: Progressing     Problem: Mechanical Ventilation  Goal: Safe management of artificial airway and ventilation  Outcome: Progressing  Goal: Successful weaning off mechanical ventilator, spontaneously maintains adequate gas exchange  Outcome: Progressing    Problem: Risk for Aspiration  Goal: Patient's risk for aspiration will be absent or decrease  Outcome: Progressing     Problem: Nutrition  Goal: Patient's nutritional and fluid intake will be adequate or improve  Outcome: Progressing  Goal: Enteral nutrition will be maintained or improve  Outcome: Progressing  Goal: Enteral nutrition will be maintained or improve  Outcome: Progressing     Problem: Bowel Elimination  Goal: Establish and maintain regular bowel function  Outcome: Progressing     Problem: Mobility  Goal: Patient's capacity to carry out activities will improve  Outcome: Progressing     Problem: Self Care  Goal: Patient will have the ability to perform ADLs independently or with assistance (bathe, groom, dress, toilet and feed)  Outcome: Progressing     Problem: Infection - Standard  Goal: Patient will remain free from infection  Outcome: Progressing     Problem: Wound/ / Incision Healing  Goal: Patient's wound/surgical incision will decrease in size and heals properly  Outcome: Progressing       Patient is not progressing towards the following goals:

## 2024-03-19 NOTE — RESPIRATORY CARE
Questions answered regarding cuffed vs uncuffed trach with mother. Upon suctioning patient resistance was noted. Moderate amount of thick white/yellow secretions. RN aware and at bedside.

## 2024-03-20 ENCOUNTER — TELEPHONE (OUTPATIENT)
Dept: HEALTH INFORMATION MANAGEMENT | Facility: OTHER | Age: 44
End: 2024-03-20
Payer: COMMERCIAL

## 2024-03-20 NOTE — DISCHARGE SUMMARY
Discharge Summary    CHIEF COMPLAINT ON ADMISSION  Chief Complaint   Patient presents with    Shortness of Breath     Pt transported here by ambulance from [facility] for concerns of tracheostomy obstruction.  Per pt's mother, she has been having difficulty with suctioning pt at home due to suction getting stuck. Pt was seen by his general surgeon and inner cannular changed today but they continue to have difficulty with suctioning the trach. Pt satting in low 90s on room air at this time.  Pt transferred here for ENT consult.        Reason for Admission  other     Admission Date  3/13/2024    CODE STATUS  Prior    HPI & HOSPITAL COURSE  Ruben Edwards is a 43 y.o. male who presented 3/13/2024 with concerns for tracheostomy obstruction.  Patient had a tracheostomy tube placed over 20 years ago due to TBI.  For the last 2 days, patient has been noted to have several episodes of desaturation, and for shortness of breath, and difficulties with suctioning at home. In ER, patient saturating 90% with tracheostomy tube. ENT was consulted, patient had his tracheostomy tube exchanged and Bivona was secured in place. Chest x-ray which revealed some bilateral pulmonary opacities which appear stable compared to prior x-ray. Procalcitonin was negative, febrile, no leukocytosis.  Patient likely has some aspiration pneumonitis. ENT assisted with trach advancement and positioning.  Patient's mom was agreeable with this discharge. Patient was determined satisfactory for discharge with appropriate follow up.    Therefore, he is discharged in fair and stable condition to home with close outpatient follow-up.    The patient met 2-midnight criteria for an inpatient stay at the time of discharge.    Discharge Date  3/19/2024    FOLLOW UP ITEMS POST DISCHARGE  Please follow up with PCP in 3-5 days for post hospitalization follow up and medication reconciliation.     DISCHARGE DIAGNOSES  Principal Problem:    Tracheal stenosis (POA:  Yes)  Active Problems:    Seizure disorder (HCC) (POA: Yes)    Aspiration pneumonitis (HCC) (POA: Yes)    Hyponatremia (POA: Yes)    ACP (advance care planning) (POA: Yes)    Acute respiratory failure with hypoxia (HCC) (POA: Yes)  Resolved Problems:    * No resolved hospital problems. *      FOLLOW UP  No future appointments.  No follow-up provider specified.    MEDICATIONS ON DISCHARGE     Medication List        CONTINUE taking these medications        Instructions   clonazePAM 0.5 MG Tabs  Commonly known as: KlonoPIN   0.5 mg by Enteral Tube route 2 times a day.  Dose: 0.5 mg     * lacosamide 100 MG Tabs tablet  Commonly known as: Vimpat   Doctor's comments: EDUAR reason: 1, no substitution allowed, brand name only  1 Tablet by Enteral Tube route 2 times a day for 270 days. Take in addition to 200 mg tablets for a total of 300 mg twice daily.  Dose: 100 mg     * lacosamide 200 MG Tabs tablet  Commonly known as: Vimpat   Doctor's comments: EDUAR reason: 1, No substitution allowed, brand name only  1 Tablet by Enteral Tube route 2 times a day for 270 days. Take in addition to 100 mg tablets for a total of 300 mg twice daily.  Dose: 200 mg     levETIRAcetam 100 MG/ML Soln  Commonly known as: Keppra   15 mL by Enteral Tube route 2 times a day.  Dose: 15 mL     Pain Pump Amie  Commonly known as: Patient Supplied   continuous. Patient's Pain Pump (placed and maintained as an outpatient)    Patient's Supplied Pain Pump (placed and maintained as an outpatient)   Medication: Baclofen 3000 mcg/mL   Route: Intrathecal   Volume: 40 mL   Rate: 26.3 mcg/hr (630.4 mcg/day)   Last Refill: 12/21/23  Next Refill: May 2024    MD office: [facility]   Phone number:[phone]     PHENobarbital 20 MG/5ML Soln  Commonly known as: Luminal   30 mL by Enteral Tube route 2 times a day.  Dose: 30 mL     POTASSIUM CITRATE PO   1 Scoop by Gastric Tube route every morning. 1/8 teaspoon of powder  Dose: 1 Scoop     * Topamax 50 MG  tablet  Generic drug: topiramate   Take 150 mg by mouth 2 times a day.  Dose: 150 mg     * topiramate 25 MG Tabs  Commonly known as: Topamax   25 mg by Enteral Tube route at bedtime.  Dose: 25 mg     Vitamin D3 Liqd  Generic drug: Cholecalciferol   by Per G Tube route every morning. 1 dropperful           * This list has 4 medication(s) that are the same as other medications prescribed for you. Read the directions carefully, and ask your doctor or other care provider to review them with you.                  Allergies  Allergies   Allergen Reactions    Sulfa Drugs Unspecified and Rash     Per caretaker, mother is allergic to sulfa so believes her son could be as well    Doxycycline Hyclate      Other reaction(s): Seizure (finding)    Chlorhexidine Rash     Mom refuses CHG bath       DIET  No orders of the defined types were placed in this encounter.      LABORATORY  Lab Results   Component Value Date    SODIUM 135 03/19/2024    POTASSIUM 3.9 03/19/2024    CHLORIDE 104 03/19/2024    CO2 21 03/19/2024    GLUCOSE 79 03/19/2024    BUN 9 03/19/2024    CREATININE <0.17 (L) 03/19/2024        Lab Results   Component Value Date    WBC 5.2 03/19/2024    HEMOGLOBIN 13.3 (L) 03/19/2024    HEMATOCRIT 41.3 (L) 03/19/2024    PLATELETCT 247 03/19/2024        Total time of the discharge process exceeds 38 minutes.

## 2024-03-20 NOTE — HOSPITAL COURSE
Ruben Edwards is a 43 y.o. male who presented 3/13/2024 with concerns for tracheostomy obstruction.  Patient had a tracheostomy tube placed over 20 years ago due to TBI.  For the last 2 days, patient has been noted to have several episodes of desaturation, and for shortness of breath, and difficulties with suctioning at home. In ER, patient saturating 90% with tracheostomy tube. ENT was consulted, patient had his tracheostomy tube exchanged and Bivona was secured in place. Chest x-ray which revealed some bilateral pulmonary opacities which appear stable compared to prior x-ray. Procalcitonin was negative, febrile, no leukocytosis.  Patient likely has some aspiration pneumonitis. ENT assisted with trach advancement and positioning.  Patient's mom was agreeable with this discharge. Patient was determined satisfactory for discharge with appropriate follow up.

## 2024-04-02 ENCOUNTER — HOSPITAL ENCOUNTER (EMERGENCY)
Facility: MEDICAL CENTER | Age: 44
End: 2024-04-02
Attending: STUDENT IN AN ORGANIZED HEALTH CARE EDUCATION/TRAINING PROGRAM
Payer: COMMERCIAL

## 2024-04-02 ENCOUNTER — APPOINTMENT (OUTPATIENT)
Dept: RADIOLOGY | Facility: MEDICAL CENTER | Age: 44
End: 2024-04-02
Attending: STUDENT IN AN ORGANIZED HEALTH CARE EDUCATION/TRAINING PROGRAM
Payer: COMMERCIAL

## 2024-04-02 VITALS
OXYGEN SATURATION: 94 % | DIASTOLIC BLOOD PRESSURE: 59 MMHG | BODY MASS INDEX: 17.9 KG/M2 | TEMPERATURE: 98.1 F | SYSTOLIC BLOOD PRESSURE: 101 MMHG | WEIGHT: 125 LBS | RESPIRATION RATE: 16 BRPM | HEART RATE: 57 BPM | HEIGHT: 70 IN

## 2024-04-02 DIAGNOSIS — J95.03 TRACHEOSTOMY MECHANICAL COMPLICATION (HCC): ICD-10-CM

## 2024-04-02 DIAGNOSIS — J96.01 ACUTE HYPOXEMIC RESPIRATORY FAILURE (HCC): ICD-10-CM

## 2024-04-02 LAB
ALBUMIN SERPL BCP-MCNC: 3.3 G/DL (ref 3.2–4.9)
ALBUMIN/GLOB SERPL: 0.7 G/DL
ALP SERPL-CCNC: 482 U/L (ref 30–99)
ALT SERPL-CCNC: 23 U/L (ref 2–50)
ANION GAP SERPL CALC-SCNC: 12 MMOL/L (ref 7–16)
AST SERPL-CCNC: 21 U/L (ref 12–45)
BASOPHILS # BLD AUTO: 0.3 % (ref 0–1.8)
BASOPHILS # BLD: 0.02 K/UL (ref 0–0.12)
BILIRUB SERPL-MCNC: 0.2 MG/DL (ref 0.1–1.5)
BUN SERPL-MCNC: 10 MG/DL (ref 8–22)
CALCIUM ALBUM COR SERPL-MCNC: 9.5 MG/DL (ref 8.5–10.5)
CALCIUM SERPL-MCNC: 8.9 MG/DL (ref 8.5–10.5)
CHLORIDE SERPL-SCNC: 102 MMOL/L (ref 96–112)
CO2 SERPL-SCNC: 23 MMOL/L (ref 20–33)
CREAT SERPL-MCNC: <0.17 MG/DL (ref 0.5–1.4)
CRP SERPL HS-MCNC: 8.12 MG/DL (ref 0–0.75)
EOSINOPHIL # BLD AUTO: 0.12 K/UL (ref 0–0.51)
EOSINOPHIL NFR BLD: 2.1 % (ref 0–6.9)
ERYTHROCYTE [DISTWIDTH] IN BLOOD BY AUTOMATED COUNT: 56.3 FL (ref 35.9–50)
FLUAV RNA SPEC QL NAA+PROBE: NEGATIVE
FLUBV RNA SPEC QL NAA+PROBE: NEGATIVE
GFR SERPLBLD CREATININE-BSD FMLA CKD-EPI: 179 ML/MIN/1.73 M 2
GLOBULIN SER CALC-MCNC: 4.8 G/DL (ref 1.9–3.5)
GLUCOSE SERPL-MCNC: 84 MG/DL (ref 65–99)
HCT VFR BLD AUTO: 40.7 % (ref 42–52)
HGB BLD-MCNC: 13.8 G/DL (ref 14–18)
IMM GRANULOCYTES # BLD AUTO: 0.01 K/UL (ref 0–0.11)
IMM GRANULOCYTES NFR BLD AUTO: 0.2 % (ref 0–0.9)
LYMPHOCYTES # BLD AUTO: 0.97 K/UL (ref 1–4.8)
LYMPHOCYTES NFR BLD: 16.6 % (ref 22–41)
MCH RBC QN AUTO: 34.1 PG (ref 27–33)
MCHC RBC AUTO-ENTMCNC: 33.9 G/DL (ref 32.3–36.5)
MCV RBC AUTO: 100.5 FL (ref 81.4–97.8)
MONOCYTES # BLD AUTO: 0.51 K/UL (ref 0–0.85)
MONOCYTES NFR BLD AUTO: 8.7 % (ref 0–13.4)
NEUTROPHILS # BLD AUTO: 4.22 K/UL (ref 1.82–7.42)
NEUTROPHILS NFR BLD: 72.1 % (ref 44–72)
NRBC # BLD AUTO: 0 K/UL
NRBC BLD-RTO: 0 /100 WBC (ref 0–0.2)
NT-PROBNP SERPL IA-MCNC: 48 PG/ML (ref 0–125)
PLATELET # BLD AUTO: 167 K/UL (ref 164–446)
PMV BLD AUTO: 9.1 FL (ref 9–12.9)
POTASSIUM SERPL-SCNC: 4.1 MMOL/L (ref 3.6–5.5)
PROCALCITONIN SERPL-MCNC: 0.05 NG/ML
PROT SERPL-MCNC: 8.1 G/DL (ref 6–8.2)
RBC # BLD AUTO: 4.05 M/UL (ref 4.7–6.1)
RSV RNA SPEC QL NAA+PROBE: NEGATIVE
SARS-COV-2 RNA RESP QL NAA+PROBE: NOTDETECTED
SODIUM SERPL-SCNC: 137 MMOL/L (ref 135–145)
WBC # BLD AUTO: 5.9 K/UL (ref 4.8–10.8)

## 2024-04-02 PROCEDURE — 36415 COLL VENOUS BLD VENIPUNCTURE: CPT

## 2024-04-02 PROCEDURE — 84145 PROCALCITONIN (PCT): CPT

## 2024-04-02 PROCEDURE — 0241U HCHG SARS-COV-2 COVID-19 NFCT DS RESP RNA 4 TRGT ED POC: CPT

## 2024-04-02 PROCEDURE — 93005 ELECTROCARDIOGRAM TRACING: CPT | Performed by: STUDENT IN AN ORGANIZED HEALTH CARE EDUCATION/TRAINING PROGRAM

## 2024-04-02 PROCEDURE — 83880 ASSAY OF NATRIURETIC PEPTIDE: CPT

## 2024-04-02 PROCEDURE — 99285 EMERGENCY DEPT VISIT HI MDM: CPT

## 2024-04-02 PROCEDURE — 71045 X-RAY EXAM CHEST 1 VIEW: CPT

## 2024-04-02 PROCEDURE — 94640 AIRWAY INHALATION TREATMENT: CPT

## 2024-04-02 PROCEDURE — 85025 COMPLETE CBC W/AUTO DIFF WBC: CPT

## 2024-04-02 PROCEDURE — 86140 C-REACTIVE PROTEIN: CPT

## 2024-04-02 PROCEDURE — 80053 COMPREHEN METABOLIC PANEL: CPT

## 2024-04-02 RX ORDER — SODIUM CHLORIDE, SODIUM LACTATE, POTASSIUM CHLORIDE, CALCIUM CHLORIDE 600; 310; 30; 20 MG/100ML; MG/100ML; MG/100ML; MG/100ML
1000 INJECTION, SOLUTION INTRAVENOUS ONCE
Status: DISCONTINUED | OUTPATIENT
Start: 2024-04-02 | End: 2024-04-02

## 2024-04-02 ASSESSMENT — COGNITIVE AND FUNCTIONAL STATUS - GENERAL
SUGGESTED CMS G CODE MODIFIER DAILY ACTIVITY: CN
MOVING TO AND FROM BED TO CHAIR: TOTAL
TOILETING: TOTAL
DRESSING REGULAR UPPER BODY CLOTHING: TOTAL
HELP NEEDED FOR BATHING: TOTAL
EATING MEALS: TOTAL
MOBILITY SCORE: 6
DRESSING REGULAR LOWER BODY CLOTHING: TOTAL
MOVING FROM LYING ON BACK TO SITTING ON SIDE OF FLAT BED: TOTAL
WALKING IN HOSPITAL ROOM: TOTAL
CLIMB 3 TO 5 STEPS WITH RAILING: TOTAL
DAILY ACTIVITIY SCORE: 6
STANDING UP FROM CHAIR USING ARMS: TOTAL
SUGGESTED CMS G CODE MODIFIER MOBILITY: CN
PERSONAL GROOMING: TOTAL
TURNING FROM BACK TO SIDE WHILE IN FLAT BAD: TOTAL

## 2024-04-02 ASSESSMENT — FIBROSIS 4 INDEX: FIB4 SCORE: 1.05

## 2024-04-02 NOTE — ED TRIAGE NOTES
Chief Complaint   Patient presents with    Respiratory Distress     BIB EMS from home. Patient has trach that has been consistently becoming dislodged causing patient to have low SPO2 in the 80s. Mother has been able to reposition allowing for better air flow. Mother concerned she will not be able to manage for much longer. Dr. Ghotra, ENT advised to report to ED for further eval.       Patient currently on RA with VSS. Most recent trach has been in place for 3 weeks. Per report patient has been been struggling with his trachs causing lots of discomfort and injury to trachea.     Baseline AOX0

## 2024-04-03 LAB — EKG IMPRESSION: NORMAL

## 2024-04-03 NOTE — ED NOTES
Bedside report received from off going RN Jluis, assumed care of patient.  POC discussed with patient. Call light within reach, all needs addressed at this time. Mother at bedside.      Fall risk interventions in place: Place fall risk sign on patient's door, Keep floor surfaces clean and dry, and Accompanied to restroom (all applicable per [place] Fall risk assessment)   Continuous monitoring: Cardiac Leads, Pulse Ox, or Blood Pressure  IVF/IV medications: Not Applicable   Oxygen: Room Air  Bedside sitter: Not Applicable   Isolation: Not Applicable

## 2024-04-03 NOTE — ED NOTES
CONSUELOSA present for pickup, patient moved over with 5 person assist. Mother to travel with [facility]. Pt resting with even chest rise and fall at 95% on RA. VSS.

## 2024-04-03 NOTE — ED PROVIDER NOTES
CHIEF COMPLAINT  Chief Complaint   Patient presents with    Respiratory Distress     BIB EMS from home. Patient has trach that has been consistently becoming dislodged causing patient to have low SPO2 in the 80s. Mother has been able to reposition allowing for better air flow. Mother concerned she will not be able to manage for much longer. Dr. Ghotra, ENT advised to report to ED for further eval.         LIMITATION TO HISTORY   Select:     LAKE Edwards is a 43 y.o. male who presents to the Emergency Department for evaluation of intermittent respiratory distress patient has a remote history of a TBI tracheostomy dependent for many years mother reports that he has been having intermittent episodes of respiratory distress and tachypnea with hypoxia in the low 80s she has to firmly hold his tracheostomy against his neck to get the symptoms to resolve    OUTSIDE HISTORIAN(S):  Select:    EXTERNAL RECORDS REVIEWED  Select:       PAST MEDICAL HISTORY  Past Medical History:   Diagnosis Date    Seizure (HCC)     Traumatic brain injury (HCC) 05/15/1998     .    SURGICAL HISTORY  Past Surgical History:   Procedure Laterality Date    LARYNGOSCOPY  7/2/2017    Procedure: LARYNGOSCOPY;  Surgeon: Catherine Osorio M.D.;  Location: SURGERY [facility];  Service:     BRONCHOSCOPY  7/2/2017    Procedure: BRONCHOSCOPY;  Surgeon: Catherine Osorio M.D.;  Location: SURGERY [facility];  Service:     TRACHEOSTOMY  7/2/2017    Procedure: TRACHEOSTOMY;  Surgeon: Catherine Osorio M.D.;  Location: SURGERY [facility];  Service:     ESOPHAGOSCOPY  7/2/2017    Procedure: ESOPHAGOSCOPY;  Surgeon: Catherine Osorio M.D.;  Location: SURGERY [facility];  Service:          FAMILY HISTORY  History reviewed. No pertinent family history.       SOCIAL HISTORY  Social History     Socioeconomic History    Marital status: Single     Spouse name: Not on file    Number of children: Not on file    Years of education: Not on  file    Highest education level: Not on file   Occupational History    Not on file   Tobacco Use    Smoking status: Never    Smokeless tobacco: Never   Vaping Use    Vaping Use: Never used   Substance and Sexual Activity    Alcohol use: No    Drug use: Yes     Types: Oral     Comment: Mother tried CBD treatment for siezures in past    Sexual activity: Not on file   Other Topics Concern    Not on file   Social History Narrative    Not on file     Social Determinants of Health     Financial Resource Strain: Not on file   Food Insecurity: Not on file   Transportation Needs: Not on file   Physical Activity: Not on file   Stress: Not on file   Social Connections: Not on file   Intimate Partner Violence: Not on file   Housing Stability: Not on file         CURRENT MEDICATIONS  No current facility-administered medications on file prior to encounter.     Current Outpatient Medications on File Prior to Encounter   Medication Sig Dispense Refill    POTASSIUM CITRATE PO 1 Scoop by Gastric Tube route every morning. 1/8 teaspoon of powder      clonazePAM (KLONOPIN) 0.5 MG Tab 0.5 mg by Enteral Tube route 2 times a day.      PHENobarbital (LUMINAL) 20 MG/5ML Solution 30 mL by Enteral Tube route 2 times a day.      Cholecalciferol (VITAMIN D3) Liquid by Per G Tube route every morning. 1 dropperful      topiramate (TOPAMAX) 50 MG tablet Take 150 mg by mouth 2 times a day.      topiramate (TOPAMAX) 25 MG Tab 25 mg by Enteral Tube route at bedtime.      levETIRAcetam (KEPPRA) 100 MG/ML Solution 15 mL by Enteral Tube route 2 times a day.      Pain Pump (PATIENT SUPPLIED) XX RENE continuous. Patient's Pain Pump (placed and maintained as an outpatient)    Patient's Supplied Pain Pump (placed and maintained as an outpatient)   Medication: Baclofen 3000 mcg/mL   Route: Intrathecal   Volume: 40 mL   Rate: 26.3 mcg/hr (630.4 mcg/day)   Last Refill: 12/21/23  Next Refill: May 2024    MD office:[facility]   Phone  "number:[phone]             ALLERGIES  Allergies   Allergen Reactions    Sulfa Drugs Unspecified and Rash     Per caretaker, mother is allergic to sulfa so believes her son could be as well    Doxycycline Hyclate      Other reaction(s): Seizure (finding)    Chlorhexidine Rash     Mom refuses CHG bath       PHYSICAL EXAM  VITAL SIGNS:/59   Pulse (!) 57   Temp 36.7 °C (98.1 °F) (Temporal)   Resp 16   Ht 1.778 m (5' 10\")   Wt 56.7 kg (125 lb)   SpO2 94%   BMI 17.94 kg/m²       GENERAL: Awake and alert  HEAD: Normocephalic and atraumatic  NECK: Normal range of motion, without meningismustracheostomy in site is clean dry intact  EYES: Pupils Equal, Round, Reactive to Light, extraocular movements intact, conjunctiva white  ENT: Mucous membranes moist, oropharynx clear,   PULMONARY: Normal effort, clear to auscultation  CARDIOVASCULAR: No murmurs, clicks or rubs, peripheral pulses 2+  ABDOMINAL: Soft, non-tender, no guarding or rigidity present, no pulsatile masses  BACK: no midline tenderness, no costovertebral tenderness  NEUROLOGICAL: Somnolent nonresponsive grossly non-focal neurological examination, speech normal, gait normal  EXTREMITIES: No edema, normal to inspection  SKIN: Warm and dry.  PSYCHIATRIC: Affect is appropriate    DIAGNOSTIC STUDIES / PROCEDURES  EKG Interpretation: I independently reviewed the below EKG and did not see signs of a STEMI  Results for orders placed or performed during the hospital encounter of 24   EKG   Result Value Ref Range    Report       [facility]    Test Date:  2024  Pt Name:    JAVED IGLESIAS                  Department: ER  MRN:        [id number]                      Room:       [facility]  Gender:     Male                         Technician: [id number]  :        1980                   Requested By:COLT WRIGHT  Order #:    [id number]                    Reading MD: Colt Wright    Measurements  Intervals                "                 Axis  Rate:       51                           P:          43  OK:         204                          QRS:        34  QRSD:       104                          T:          58  QT:         497  QTc:        458    Interpretive Statements  Sinus bradycardia  Borderline prolonged OK interval  Probable left atrial enlargement  Left ventricular hypertrophy  Compared to ECG 02/24/2018 01:39:19  Left ventricular hypertrophy now present  Sinus rhythm no longer present  Electronically Signed On 04- 00:47:23 PDT by Colt Delatorre               Cardiac Monitor Interpretation:    Time: 8:50 PM  The cardiac monitor revealed normal sinus rhythm as interpreted by me.  The cardiac monitor was ordered secondary to the patient´s history of hypoxia to monitor the patient for dysrhythmia      7:30 PM tracheostomy tube was advanced by 1 cm by myself patient tolerated this procedure well      LABS  Labs Reviewed   CBC WITH DIFFERENTIAL - Abnormal; Notable for the following components:       Result Value    RBC 4.05 (*)     Hemoglobin 13.8 (*)     Hematocrit 40.7 (*)     .5 (*)     MCH 34.1 (*)     RDW 56.3 (*)     Neutrophils-Polys 72.10 (*)     Lymphocytes 16.60 (*)     Lymphs (Absolute) 0.97 (*)     All other components within normal limits   COMP METABOLIC PANEL - Abnormal; Notable for the following components:    Creatinine <0.17 (*)     Alkaline Phosphatase 482 (*)     Globulin 4.8 (*)     All other components within normal limits   CRP QUANTITIVE (NON-CARDIAC) - Abnormal; Notable for the following components:    Stat C-Reactive Protein 8.12 (*)     All other components within normal limits   PROBRAIN NATRIURETIC PEPTIDE, NT   PROCALCITONIN   ESTIMATED GFR   POCT COV-2, FLU A/B, RSV BY PCR   POC COV-2, FLU A/B, RSV BY PCR         RADIOLOGY  I independently reviewed and interpreted the images obtained today in the ER.  Evidence of tracheostomy tube in the right mainstem    Radiologist interpretation:    DX-CHEST-PORTABLE (1 VIEW)   Final Result      1.  Supportive tubing as described above.   2.  No other significant change from prior exam.         DX-CHEST-LIMITED (1 VIEW)   Final Result         1.  Tracheostomy tube has been advanced and tip is now more inferior and close to the right mainstem bronchus just above the level of the kiersten.      2.  No consolidation or pneumothorax.      3.  These findings were discussed with CATRACHITO WRIGHT on 04/02/2024.           COURSE & MEDICAL DECISION MAKING    ED COURSE:        INTERVENTIONS BY ME:  Medications - No data to display    Response on recheck:  5:30 PM informed by nurse that patient has become hypoxic placed on 3 L/min    7:20 PM after readjustment tracheostomy tube patient is on room air oxygen with a saturation of 94% normal vital signs discussed plan of care and workup with the mother    INITIAL ASSESSMENT, COURSE AND PLAN  Care Narrative:       patient presenting with intermittent respiratory distress hypoxia Workup notable for tracheostomy tube that appears to be too deep, tracheostomy was withdrawn to 11 cm by myself after consultation with patient's ENT do not see any other sinister etiology to his symptoms or symptoms do appear to resolved after repositioning of the tracheostomy tube procalcitonin is normal CMP is unremarkable do not see any signs of infectious process such as tracheitis         ADDITIONAL PROBLEM LIST    DISPOSITION AND DISCUSSIONS  Discussion of management with other [facility] or appropriate source(s): None     I have discussed management of the patient with the following physicians and KAYKAY's: Spoke to ENT Dr. Alcantar recommended advancement of the tracheostomy tube and outpatient follow-up with himself patient's symptoms improved    Escalation of care considered, and ultimately not performed:acute inpatient care management, however at this time, the patient is most appropriate for outpatient management considered hospitalization for  evaluation by ENT with direct visualization however patient's symptoms appear to have resolved after my intervention        FINAL DIAGNOSIS  1. Acute hypoxemic respiratory failure (HCC)    2. Tracheostomy mechanical complication (HCC)             Electronically signed by: Colt Delatorre DO ,12:47 AM 04/03/24

## 2024-04-03 NOTE — DISCHARGE PLANNING
Pt is medically cleared and can return to home.     Pt is bedbound and will need [facility] transportation to home. Pt mother will also ride with him she is primary caregiver .    Pt resides at [address] [place] [id number]    PCS faxed to [facility]  Transportation time arranged for 2130    Pt mother also stated it is very difficult to get Pt in the home so the [employer] will need to be contacted to meet them at the house to assist with getting the Pt inside safely. You can call [facility] Office [phone] and give them an ETA and they can have their team on alert, once transport is closer they can call to verify a time and the fire department will meet them at the home.

## 2024-04-03 NOTE — ED NOTES
Pt resting with even chest rise and fall, mother reports no needs at this time, call light available and in reach.

## 2024-04-03 NOTE — ED NOTES
Mother of patient given discharge instructions and verbalizes understanding. Mother requesting bp and cardiac monitors be taken off of the patient for pending transfer via [facility]. RN requests patient stay on pulse ox, pulse ox remains on patient. Pt resting with even chest rise and fall, mother reports no needs at this time, call light available and in reach.

## 2024-04-22 ENCOUNTER — HOSPITAL ENCOUNTER (OUTPATIENT)
Facility: MEDICAL CENTER | Age: 44
End: 2024-04-22
Attending: OTOLARYNGOLOGY | Admitting: OTOLARYNGOLOGY
Payer: COMMERCIAL

## 2024-04-24 ENCOUNTER — APPOINTMENT (OUTPATIENT)
Dept: ADMISSIONS | Facility: MEDICAL CENTER | Age: 44
End: 2024-04-24
Attending: OTOLARYNGOLOGY
Payer: COMMERCIAL

## 2024-05-01 ENCOUNTER — APPOINTMENT (OUTPATIENT)
Dept: ADMISSIONS | Facility: MEDICAL CENTER | Age: 44
End: 2024-05-01
Attending: OTOLARYNGOLOGY
Payer: COMMERCIAL

## 2024-08-24 NOTE — PROGRESS NOTES
Hospitalist Progress Note    NAME:   Marilee Oakes   : 1952   MRN: [id number]     Date/Time: 2024 12:46 PM  Patient PCP: Bel Pereira APRN - CNP    Estimated discharge date:   Barriers: Volume status, CHF      Assessment / Plan:    Acute on chronic diastolic heart failure  X-ray showed mild pulm edema, small to moderate bilateral pleural effusion  S/p bumex 1 mg iv in ED  Currently on 1 mg daily, changed to 1 mg IV twice daily  Still appear volume overloaded, continue IV diuretics.  Weight improving, continue same medications       UTI:  Diagnosed on last admission, was discharged on levofloxacin  Continue ceftriaxone while he is here in the hospital, can be discharged on levofloxacin     Hx of delirium/sundowning  AVOID BENZOz, which will make delirium worse  Seroquel nightly  QTc reviewed, appropriate for paced rhythm  Can use Haldol judiciously     COPD  Chronic hypoxic respiratory failure on 2-3L nasal cannula  Sleep apnea  Not on exacerbation  Has CPAP at home, but not using, advised to bring CPAP in the hospital, if not then she can use CPAP from hospital          Acute hypokalemia:  Replaced,      Anxiety/agitation:  Hydroxyzine prn            Hypertension  CAD  Hyperlipidemia  EF 55-60%  Continue amlodipine, coreg, aspirin, imdur.     Intertrigo:  Continue miconazole     GERD:  Continue PPI        Impaired skin integrity      Recent left ankle surgery complicated by MRSA infection  - LLE boot and dressing  - patient previously seen at wound clinic  - follow by Dr. Chan wound care as outpatien  -Does not appear to be infected                   I personally reviewed imaging:CXR report  I personally reviewed EKG:  Toxic drug monitoring:   Discussed case with: RN, patient, IDR     Code Status: full  DVT Prophylaxis:lovenox   Baseline:        Subjective:     Chief Complaint / Reason for Physician Visit  Continues to have episode of sundowning in the evening.  This a.m. mentation  Circa 1700pm. Patient had a 30 second seizure involving stiffening of extremities and fluttering of eyelids. Susana, mother discovers patient's 20g PIV to left forearm is leaking. IV discontinued. Discussed further with Susana, mother who verbalizes concern that patient may have received incomplete doses of up to 6 bottles of Zosyn AEB patient's bedding suspiciously wet at times and perhaps leaking IV was written off as urinary incontinence. Notified Bebeto MEDEIROS. No intervention at this time. Fresh 22g PIV started to right wrist.

## 2025-04-25 NOTE — ASSESSMENT & PLAN NOTE
This is a clinical diagnosis present upon admit in the setting of COVID and Pseudomonas pneumonia  PEG feeding   No

## (undated) DEVICE — SUCTION INSTRUMENT YANKAUER BULBOUS TIP W/O VENT (50EA/CA)

## (undated) DEVICE — GLOVE BIOGEL INDICATOR SZ 8.5 SURGICAL PF LTX - (50/BX 4BX/CA)

## (undated) DEVICE — TUBE TRACHEOSTOMY BLUSELECT SUCTIONAIDE SIZE 7 (1/EA)

## (undated) DEVICE — PACK MINOR BASIN - (2EA/CA)

## (undated) DEVICE — GLOVE BIOGEL SZ 8.5 SURGICAL PF LTX - (50PR/BX 4BX/CA)

## (undated) DEVICE — DRAPE SURGICAL U 77X120 - (10/CA)

## (undated) DEVICE — LACTATED RINGERS INJ 1000 ML - (14EA/CA 60CA/PF)

## (undated) DEVICE — NEPTUNE 4 PORT MANIFOLD - (20/PK)

## (undated) DEVICE — BOVIE NEEDLE TIP INSULATD NON-SAFETY 2CM (50/PK)

## (undated) DEVICE — TOWELS CLOTH SURGICAL - (4/PK 20PK/CA)

## (undated) DEVICE — GOWN WARMING STANDARD FLEX - (30/CA)

## (undated) DEVICE — CIRCUIT VENTILATOR PEDIATRIC WITH FILTER  (20EA/CS)

## (undated) DEVICE — SUTURE GENERAL

## (undated) DEVICE — SET EXTENSION WITH 2 PORTS (48EA/CA) ***PART #2C8610 IS A SUBSTITUTE*****

## (undated) DEVICE — KIT ROOM DECONTAMINATION

## (undated) DEVICE — PROTECTOR ULNA NERVE - (36PR/CA)

## (undated) DEVICE — BLADE SURGICAL #11 - (50/BX)

## (undated) DEVICE — MEDICINE CUP STERILE 2 OZ - (100/CA)

## (undated) DEVICE — SUTURE 3-0 SILK SH 30 (36PK/BX)

## (undated) DEVICE — LEAD SET 6 DISP. EKG NIHON KOHDEN (100EA/CA) [9859].

## (undated) DEVICE — CANISTER SUCTION 3000ML MECHANICAL FILTER AUTO SHUTOFF MEDI-VAC NONSTERILE LF DISP  (40EA/CA)

## (undated) DEVICE — TRACHE SECURE VELCRO LARGE

## (undated) DEVICE — BLANKET WARMING LOWER BODY - (10/CA) INACTIVE USE #8585

## (undated) DEVICE — BLADE SURGICAL #15 - (50/BX 3BX/CA)

## (undated) DEVICE — SLEEVE, VASO, THIGH, MED

## (undated) DEVICE — GLOVE BIOGEL SZ 7 SURGICAL PF LTX - (50PR/BX 4BX/CA)

## (undated) DEVICE — SET LEADWIRE 5 LEAD BEDSIDE DISPOSABLE ECG (1SET OF 5/EA)

## (undated) DEVICE — ELECTRODE 850 FOAM ADHESIVE - HYDROGEL RADIOTRNSPRNT (50/PK)

## (undated) DEVICE — TRANSDUCER OXISENSOR PEDS O2 - (20EA/BX)

## (undated) DEVICE — HEAD HOLDER JUNIOR/ADULT

## (undated) DEVICE — KIT ANESTHESIA W/CIRCUIT & 3/LT BAG W/FILTER (20EA/CA)

## (undated) DEVICE — GLOVE BIOGEL ECLIPSE PF LATEX SIZE 8.5 (50PR/BX)

## (undated) DEVICE — MICRODRIP PRIMARY VENTED 60 (48EA/CA) THIS WAS PART #2C8428 WHICH WAS DISCONTINUED

## (undated) DEVICE — TUBING CLEARLINK DUO-VENT - C-FLO (48EA/CA)

## (undated) DEVICE — SPONGE PEANUT - (5/PK 50PK/CA)

## (undated) DEVICE — SODIUM CHL IRRIGATION 0.9% 1000ML (12EA/CA)

## (undated) DEVICE — SYRINGE SAFETY 10 ML 18 GA X 1 1/2 BLUNT LL (100/BX 4BX/CA)

## (undated) DEVICE — CLOSURE SKIN STRIP 1/2 X 4 IN - (STERI STRIP) (50/BX 4BX/CA)

## (undated) DEVICE — MASK ANESTHESIA ADULT  - (100/CA)

## (undated) DEVICE — TRAY SKIN SCRUB PVP WET (20EA/CA) PART #DYND70356 DISCONTINUED

## (undated) DEVICE — SENSOR SPO2 NEO LNCS ADHESIVE (20/BX) SEE USER NOTES